# Patient Record
Sex: FEMALE | Race: WHITE | NOT HISPANIC OR LATINO | Employment: OTHER | URBAN - METROPOLITAN AREA
[De-identification: names, ages, dates, MRNs, and addresses within clinical notes are randomized per-mention and may not be internally consistent; named-entity substitution may affect disease eponyms.]

---

## 2017-01-10 ENCOUNTER — CLINICAL SUPPORT NO REQUIREMENTS (OUTPATIENT)
Dept: CARDIOLOGY | Facility: CLINIC | Age: 77
End: 2017-01-10

## 2017-01-10 ENCOUNTER — OFFICE VISIT (OUTPATIENT)
Dept: CARDIOLOGY | Facility: CLINIC | Age: 77
End: 2017-01-10

## 2017-01-10 ENCOUNTER — TELEPHONE (OUTPATIENT)
Dept: CARDIOLOGY | Facility: CLINIC | Age: 77
End: 2017-01-10

## 2017-01-10 VITALS
BODY MASS INDEX: 26.37 KG/M2 | WEIGHT: 168 LBS | HEIGHT: 67 IN | SYSTOLIC BLOOD PRESSURE: 126 MMHG | DIASTOLIC BLOOD PRESSURE: 74 MMHG | HEART RATE: 72 BPM

## 2017-01-10 DIAGNOSIS — I42.9 CARDIOMYOPATHY (HCC): Primary | ICD-10-CM

## 2017-01-10 DIAGNOSIS — I10 ESSENTIAL HYPERTENSION: ICD-10-CM

## 2017-01-10 DIAGNOSIS — E78.49 OTHER HYPERLIPIDEMIA: ICD-10-CM

## 2017-01-10 PROCEDURE — 93284 PRGRMG EVAL IMPLANTABLE DFB: CPT | Performed by: INTERNAL MEDICINE

## 2017-01-10 PROCEDURE — 93000 ELECTROCARDIOGRAM COMPLETE: CPT | Performed by: INTERNAL MEDICINE

## 2017-01-10 PROCEDURE — 99214 OFFICE O/P EST MOD 30 MIN: CPT | Performed by: INTERNAL MEDICINE

## 2017-01-10 PROCEDURE — 93290 INTERROG DEV EVAL ICPMS IP: CPT | Performed by: INTERNAL MEDICINE

## 2017-01-10 RX ORDER — METHYLPREDNISOLONE 4 MG/1
4 TABLET ORAL DAILY
COMMUNITY
End: 2017-03-23

## 2017-01-10 NOTE — MR AVS SNAPSHOT
Charmaine Machuca   1/10/2017 1:20 PM   Office Visit    Dept Phone:  [phone]   Encounter #:  [id number]    Provider:  Estevan Matamoros MD   Department:  Psychiatric CARDIOLOGY                Your Full Care Plan              Your Updated Medication List          This list is accurate as of: 1/10/17  2:23 PM.  Always use your most recent med list.                allopurinol 100 MG tablet   Commonly known as:  ZYLOPRIM       aspirin 81 MG tablet       calcium carbonate 600 MG tablet   Commonly known as:  OS-RAYMOND       carvedilol 12.5 MG tablet   Commonly known as:  COREG   Take 1 tablet by mouth daily.       diazePAM 5 MG tablet   Commonly known as:  VALIUM       FLUZONE HIGH-DOSE 0.5 ML suspension prefilled syringe injection   Generic drug:  influenza vac split high-dose       furosemide 40 MG tablet   Commonly known as:  LASIX   Take 1 tablet by mouth daily. Take 1 tablet by mouth daily or as directed       gabapentin 300 MG capsule   Commonly known as:  NEURONTIN       GLUCOSAMINE CHONDROITIN COMPLX capsule       HORIZANT 300 MG tablet controlled-release   Generic drug:  Gabapentin Enacarbil ER       Iron tablet       levothyroxine 25 MCG tablet   Commonly known as:  SYNTHROID, LEVOTHROID       magnesium oxide 250 MG tablet       methscopolamine 5 MG tablet   Commonly known as:  PAMINE FORTE       methylPREDNISolone 4 MG tablet   Commonly known as:  MEDROL       MULTI-DAY VITAMINS tablet       NEUPRO 4 MG/24HR patch   Generic drug:  rotigotine       psyllium 58.6 % powder   Commonly known as:  METAMUCIL       spironolactone 25 MG tablet   Commonly known as:  ALDACTONE   TAKE 1/2 TABLET DAILY       VITAMIN D PO       Vitamin E 400 UNITS tablet               We Performed the Following     ECG 12 Lead       You Were Diagnosed With        Codes Comments    Cardiomyopathy    -  Primary ICD-10-CM: I42.9  ICD-9-CM: 425.4     Other hyperlipidemia     ICD-10-CM:  E78.4  ICD-9-CM: 272.4     Essential hypertension     ICD-10-CM: I10  ICD-9-CM: 401.9       Instructions     None    Patient Instructions History      Upcoming Appointments     Visit Type Date Time Department    FOLLOW UP 1/10/2017  1:20 PM [facility] [place]    PACEMAKER ICD - IN OFFICE 1/10/2017  1:45 PM [facility]    LAB 1/13/2017  2:30 PM [facility] CBC LAB [facility]    RN REVIEW 1/13/2017  3:00 PM [facility] OP INFU CBC [facility]    FOLLOW UP 1/19/2017 10:30 AM [facility]    FOLLOW UP 2 UNIT 2/21/2017 11:00 AM [facility] [place]    LAB 2/21/2017 10:30 AM [facility] CBC LAB [facility]    FOLLOW UP 2 UNIT 3/23/2017 11:00 AM [facility] CBC [place]    LAB 3/23/2017 10:30 AM [facility] CBC LAB EP    PACEMAKER ICD - REMOTE 4/13/2017 12:00 AM [facility] [place]    FOLLOW UP 7/12/2017 10:30 AM [facility] [place]    PACEMAKER ICD - IN OFFICE 7/12/2017 10:45 AM [facility] [place]      MyChart Signup     Our records indicate that your [facility] [employer] account has been deactivated. If you would like to reactivate your account, please email [email or web address] or call [phone] to talk to our [employer] staff.             Other Info from Your Visit           Your Appointments     Jan 13, 2017  2:30 PM EST   Lab with LAB CHAIR 6 [facility]    [address]  [facility] [address] [phone]            Jan 13, 2017  3:00 PM EST   NURSE APPOINTMENT with RN MICHAEL DAVID   [facility] INFUSION CBC ([place])    [address]  [facility] [address] [phone]            Jan 19, 2017 10:30 AM EST   Follow Up with CHANTELL Harry   [facility] GROUP NEUROLOGY (--)    [address]  [facility] [address] [phone]           Arrive 15 minutes prior to appointment.            Feb 21, 2017 10:30 AM EST   Lab with LAB CHAIR 2 [facility] ONC LAB (--)    [address] [address]   [phone]            Feb 21, 2017 11:00 AM EST   FOLLOW UP with Anuel Scott MD   [facility] GROUP CONSULTANTS IN BLOOD DISORDERS AND CANCER ([facility])    [address] [id number] [phone]              Allergies     Codeine      Propoxyphene-acetaminophen      Chlorhexidine  Rash    Patient states \"blue dye\" in chlorhexidine.  States the orange and clear are OK to use      Reason for Visit     Congestive Heart Failure     Cardiomyopathy           Vital Signs     Blood Pressure Pulse Height Weight Body Mass Index Smoking Status    126/74 (BP Location: Left arm) 72 67\" (170.2 cm) 168 lb (76.2 kg) 26.31 kg/m2 Never Smoker      Problems and Diagnoses Noted     Heart muscle disorder    Other hyperlipidemia        High blood pressure            "

## 2017-01-10 NOTE — PROGRESS NOTES
Date of Office Visit: 01/10/17  Encounter Provider: Estevan Matamoros MD  Place of Service: [facility] CARDIOLOGY  Patient Name: Charmaine Machuca  :1940      Chief Complaint   Patient presents with   • Congestive Heart Failure   • Cardiomyopathy     History of Present Illness  HPI Comments:     The patient is a pleasant 76-year-old female with a history of nonischemic  cardiomyopathy, hypertension, hyperlipidemia and LBBB. Original ejection   fraction was extremely low. She was enrolled in the [facility] study of ACE   inhibitor plus Atacand versus ACE inhibitor plus a placebo. Then, in 2005   she had a perfusion study that showed infarct but no ischemia. She had cardiac   catheterization in 2006, which showed moderate left ventricular dysfunction,   elevated right-sided pressure, left anterior end-diastolic pressure, mild mitral insufficiency,  but normal coronary arteries. She was treated medically. She then had  worsening dyspnea. In 2007, had a Bi-V defibrillator placed and she  continued to have GI problems, fatigue, weakness, dizziness, syncopal, near  syncopal spells, multiple medications were adjusted. Diuretics were  adjusted. On diuretics, off diuretics, volume overloaded, volume depleted.  She was diagnosed with myoclonus. Multiple GI complaints.  She then had her generator changed in 2014. Unfortunately, a day or two after that  developed small bowel obstruction, had to be admitted to the hospital and was then treated  for that. That resolved. She developed C. difficile infection, was treated for that. She  developed a pocket hematoma and had to have that evacuated.   She had a followup echocardiogram in 2015 that showed normal ejection fraction, mild  mitral insufficiency, mild aortic valve calcification without stenosis. She comes in for  followup now. She went to [facility] 2015 with this atypical chest  discomfort, left-sided  under her breast, pleuritic, worse when she would take a breath in,  worse when she would move to one side. No real increased shortness of breath with it, but  it was hard to take breath in. While there, she had a ventilation perfusion scan that was  negative for pulmonary embolus. She had a 2D echocardiogram that, again, showed normal LV  systolic function and no significant valvular disease.  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  Had that removed and new lead replaced.  She comes in for follow-up.  Unfortunately she's been through a lot since she was last year she was anemic and is been very symptomatic from that.  She's following with hematology she's received 3 transfusions some Procrit but her iron studies are still low despite being on oral iron supplement.  She also progressed stage IV renal failure as an appropriate is seeing nephrology.  She also had an elevated A N/A saw rheumatology does not have lupus apparently.  She denies chest pain and pressure.  Does have shortness of breath but she thinks this is due to her anemia.  Denies palpitations, near-syncope and syncope.  No fevers or chills.    Congestive Heart Failure   Pertinent negatives include no abdominal pain, muscle weakness or nocturia.   Cardiomyopathy   Associated symptoms include headaches, joint swelling and numbness. Pertinent negatives include no abdominal pain, congestion, diaphoresis, fever, myalgias, nausea, rash, vertigo, vomiting or weakness.         Past Medical History   Diagnosis Date   • Anemia      Iron deficiency   • Cancer      Skin cancer   • Cardiomyopathy      S/P pacemaker and defibrillator   • Clostridium difficile diarrhea    • Gastroparesis    • GERD (gastroesophageal reflux disease)    • Gout    • Hemorrhoids    • Hiatal hernia    • History of Nissen fundoplication    • History of pancreatitis 01/2014   • Hyperlipidemia    • Hypothyroidism    • Left bundle branch block    • Mitral and aortic valve  disease    • Mitral valve insufficiency    • Myoclonus      S/P Depakote   • Nephrolithiasis    • Osteoarthritis    • Pancytopenia    • Peripheral neuropathy    • Progressive familial myoclonic epilepsy    • Pulmonary hypertension    • RLS (restless legs syndrome)    • Ruptured ovarian cyst    • Spinal stenosis    • Urinary tract infection          Past Surgical History   Procedure Laterality Date   • Appendectomy     • Other surgical history       BLADDER CYSTOTOMY   • Other surgical history       CARDIAC CATH PROCEDURE SUMMARY   • Hemorrhoidectomy     • Hx ovarian cystectomy     • Other surgical history  2007     PACEMAKER PLACEMENT - TRANSVENOUS ELECTRODE BIVENTRICULAR   • Other surgical history       REPAIR OF PARAESOPHAGEAL HIATUS HERNIA   • Joint replacement Left 08/2014     history left total knee replacement   • Implantable cardioverter defibrillator lead replacement/pocket revision Left 3/28/2016     Procedure: AUTOMATIC IMPLANTABLE CARDIOVERTER DEFIBRILLATOR LEAD REPLACEMENT WITH LASER LEAD EXTRACTION;  Surgeon: Leandro Huber MD;  Location: [facility] 18/19;  Service:    • Johny milian (mmk) procedure     • Hysterectomy  1977   • Hernia repair  2006   • Cholecystectomy  2006         Current Outpatient Prescriptions on File Prior to Visit   Medication Sig Dispense Refill   • allopurinol (ZYLOPRIM) 100 MG tablet Take 1 tablet by mouth 2 (two) times a day.     • aspirin 81 MG tablet Take 1 tablet by mouth daily.     • calcium carbonate (OS-RAYMOND) 600 MG tablet Take 1 tablet by mouth daily.     • carvedilol (COREG) 12.5 MG tablet Take 1 tablet by mouth daily. 90 tablet 3   • Cholecalciferol (VITAMIN D PO) Take by mouth.     • diazepam (VALIUM) 5 MG tablet Take 1 tablet by mouth every night.     • FLUZONE HIGH-DOSE 0.5 ML suspension prefilled syringe injection ADM 0.5ML IM UTD  0   • furosemide (LASIX) 40 MG tablet Take 1 tablet by mouth daily. Take 1 tablet by mouth daily or as directed  90 tablet 1   • gabapentin (NEURONTIN) 300 MG capsule Take 300 mg by mouth daily with dinner.     • Glucosamine-Chondroit-Vit C-Mn (GLUCOSAMINE CHONDROITIN COMPLX) capsule Take 1 capsule by mouth every other day.     • HORIZANT 300 MG tablet controlled-release Take 1 tablet by mouth daily.     • Iron tablet Take 1 tablet by mouth daily.     • levothyroxine (SYNTHROID, LEVOTHROID) 25 MCG tablet Take 1 tablet by mouth daily.     • magnesium oxide 250 MG tablet Take 1 tablet by mouth daily.     • methscopolamine (PAMINE FORTE) 5 MG tablet Take 1 tablet by mouth daily.     • Multiple Vitamin (MULTI-DAY VITAMINS) tablet Take by mouth daily.     • NEUPRO 4 MG/24HR patch APPLY 1 PATCH DAILY AS DIRECTED.  3   • psyllium (METAMUCIL) 58.6 % powder Take 1 packet by mouth 2 (two) times a day.     • spironolactone (ALDACTONE) 25 MG tablet TAKE 1/2 TABLET DAILY 45 tablet 1   • Vitamin E 400 UNITS tablet Take by mouth.       No current facility-administered medications on file prior to visit.          Social History     Social History   • Marital status:      Spouse name: Zackery   • Number of children: N/A   • Years of education: N/A     Occupational History   •  Retired     Social History Main Topics   • Smoking status: Never Smoker   • Smokeless tobacco: Never Used   • Alcohol use No   • Drug use: No   • Sexual activity: Not on file     Other Topics Concern   • Not on file     Social History Narrative       Family History   Problem Relation Age of Onset   • Coronary artery disease Other    • Dementia Other    • Kidney disease Other          Review of Systems   Constitution: Positive for malaise/fatigue. Negative for decreased appetite, diaphoresis, fever, weakness, weight gain and weight loss.   HENT: Positive for headaches. Negative for congestion, hearing loss, nosebleeds and tinnitus.    Eyes: Negative for blurred vision, double vision, vision loss in left eye, vision loss in right eye and visual disturbance.  "  Cardiovascular:        As noted in HPI   Respiratory:        As noted HPI   Endocrine: Negative for cold intolerance and heat intolerance.   Hematologic/Lymphatic: Negative for bleeding problem. Does not bruise/bleed easily.   Skin: Negative for color change, flushing, itching and rash.   Musculoskeletal: Positive for joint swelling. Negative for arthritis, back pain, joint pain, muscle weakness and myalgias.   Gastrointestinal: Negative for bloating, abdominal pain, constipation, diarrhea, dysphagia, heartburn, hematemesis, hematochezia, melena, nausea and vomiting.   Genitourinary: Negative for bladder incontinence, dysuria, frequency, nocturia and urgency.   Neurological: Positive for numbness. Negative for dizziness, focal weakness, light-headedness, loss of balance, paresthesias and vertigo.   Psychiatric/Behavioral: Negative for depression, memory loss and substance abuse.       Procedures      ECG 12 Lead  Date/Time: 1/10/2017 1:54 PM  Performed by: YARON KRISHNAN  Authorized by: YARON KRISHNAN   Comparison: compared with previous ECG   Similar to previous ECG  Rhythm: sinus rhythm  Rhythm comments: Ventricular paced.  Rate: normal               Objective:      Visit Vitals   • /74 (BP Location: Left arm)   • Pulse 72   • Ht 67\" (170.2 cm)   • Wt 168 lb (76.2 kg)   • BMI 26.31 kg/m2          Physical Exam  Physical Exam   Constitutional: She is oriented to person, place, and time. She appears well-developed and well-nourished. No distress.   HENT:   Head: Normocephalic.   Eyes: Conjunctivae are normal. Pupils are equal, round, and reactive to light. No scleral icterus.   Neck: Normal carotid pulses, no hepatojugular reflux and no JVD present. Carotid bruit is not present. No tracheal deviation, no edema and no erythema present. No thyromegaly present.   Cardiovascular: Normal rate, regular rhythm, S1 normal, S2 normal and intact distal pulses.   No extrasystoles are present. PMI is not " displaced.  Exam reveals no gallop, no distant heart sounds and no friction rub.    Murmur heard.   Systolic murmur is present with a grade of 2/6  at the apex  Pulses:       Carotid pulses are 2+ on the right side, and 2+ on the left side.       Radial pulses are 2+ on the right side, and 2+ on the left side.        Femoral pulses are 2+ on the right side, and 2+ on the left side.       Dorsalis pedis pulses are 2+ on the right side, and 2+ on the left side.        Posterior tibial pulses are 2+ on the right side, and 2+ on the left side.   Pulmonary/Chest: Effort normal and breath sounds normal. No respiratory distress. She has no decreased breath sounds. She has no wheezes. She has no rhonchi. She has no rales. She exhibits no tenderness.   Abdominal: Soft. Bowel sounds are normal. She exhibits no distension and no mass. There is no hepatosplenomegaly. There is no tenderness. There is no rebound and no guarding.   Musculoskeletal: She exhibits edema (1+ bilateral at ankles). She exhibits no tenderness or deformity.   Neurological: She is alert and oriented to person, place, and time.   Skin: Skin is warm and dry. No rash noted. She is not diaphoretic. No cyanosis or erythema. No pallor. Nails show no clubbing.   Psychiatric: She has a normal mood and affect. Her speech is normal and behavior is normal. Judgment and thought content normal.           Assessment:   1. This is a 76-year-old female with history of nonischemic cardiomyopathy.  Last echocardiogram in 3/15 showed an ejection fraction of 62%.   Heart Failure  Assessment  • NYHA class II  • ACE inhibitor not prescribed for medical reasons  • Beta blocker prescribed  • Diuretics prescribed  • Angiotensin receptor blocker (ARB) not prescribed for medical reasons  • Left ventricular function is normal by qualitative assessment   Plan  • The patient has received heart failure education on the following topics: dietary sodium restriction, medication  instructions, minimizing or avoiding NSAID use, symptom management, weight monitoring and minimizing alcohol intake  • The heart failure care plan was discussed with the patient today including: continuing the current program  • The patient has been counseled about ICD or CRT-D implantation     Subjective/Objective    • Physical exam findings negative for rales, peripheral edema and elevated JVP.  • The patient has an ICD implant  • The patient has a CRT-D implant  • The patient has a CRT implant  She is stable from a cardiovascular standpoint think most her symptoms are now coming from her anemia.     2. History of congestive heart failure status post Bi-V ICD. Following in our defibrillator clinic. Now stable.  3. History of syncope, falling spells, and dizziness of unclear etiology. Resolved. May have been from Depakote.  4. Hyperlipidemia, followed in your office.  5. History of kidney stones.  6. History of left bundle branch block. unchanged.  7. History of anemia.worsening iron deficient still following with hematology.  Apparently she may progress to IV iron.  8. History of renal failure.  To see hematology.  9. History of hypertension.   10. Probable depression.   11. Myoclonus.   12. Mitral Insufficiency. Echocardiogram 3/15 was just mild.          Plan:

## 2017-01-13 ENCOUNTER — LAB (OUTPATIENT)
Dept: LAB | Facility: HOSPITAL | Age: 77
End: 2017-01-13

## 2017-01-13 ENCOUNTER — CLINICAL SUPPORT (OUTPATIENT)
Dept: ONCOLOGY | Facility: HOSPITAL | Age: 77
End: 2017-01-13

## 2017-01-13 ENCOUNTER — TRANSCRIBE ORDERS (OUTPATIENT)
Dept: ADMINISTRATIVE | Facility: HOSPITAL | Age: 77
End: 2017-01-13

## 2017-01-13 DIAGNOSIS — N18.9 HISTORY OF ANEMIA DUE TO CHRONIC KIDNEY DISEASE: Primary | ICD-10-CM

## 2017-01-13 DIAGNOSIS — N18.4 CHRONIC KIDNEY DISEASE, STAGE 4 (SEVERE) (HCC): Primary | ICD-10-CM

## 2017-01-13 DIAGNOSIS — Z86.2 HISTORY OF ANEMIA DUE TO CHRONIC KIDNEY DISEASE: Primary | ICD-10-CM

## 2017-01-13 LAB
ALBUMIN SERPL-MCNC: 3.9 G/DL (ref 3.5–5.2)
ALBUMIN/GLOB SERPL: 1.5 G/DL (ref 1.1–2.4)
ALP SERPL-CCNC: 70 U/L (ref 38–116)
ALT SERPL W P-5'-P-CCNC: 23 U/L (ref 0–33)
ANION GAP SERPL CALCULATED.3IONS-SCNC: 10.3 MMOL/L
AST SERPL-CCNC: 27 U/L (ref 0–32)
BASOPHILS # BLD AUTO: 0.01 10*3/MM3 (ref 0–0.1)
BASOPHILS NFR BLD AUTO: 0.2 % (ref 0–1.1)
BILIRUB SERPL-MCNC: <0.2 MG/DL (ref 0.1–1.2)
BUN BLD-MCNC: 44 MG/DL (ref 6–20)
BUN/CREAT SERPL: 22.3 (ref 7.3–30)
CALCIUM SPEC-SCNC: 10.2 MG/DL (ref 8.5–10.2)
CHLORIDE SERPL-SCNC: 105 MMOL/L (ref 98–107)
CO2 SERPL-SCNC: 26.7 MMOL/L (ref 22–29)
CREAT BLD-MCNC: 1.97 MG/DL (ref 0.6–1.1)
DEPRECATED RDW RBC AUTO: 49.6 FL (ref 37–49)
EOSINOPHIL # BLD AUTO: 0.08 10*3/MM3 (ref 0–0.36)
EOSINOPHIL NFR BLD AUTO: 1.3 % (ref 1–5)
ERYTHROCYTE [DISTWIDTH] IN BLOOD BY AUTOMATED COUNT: 14 % (ref 11.7–14.5)
FERRITIN SERPL-MCNC: 69.8 NG/ML
GFR SERPL CREATININE-BSD FRML MDRD: 25 ML/MIN/1.73
GLOBULIN UR ELPH-MCNC: 2.6 GM/DL (ref 1.8–3.5)
GLUCOSE BLD-MCNC: 93 MG/DL (ref 74–124)
HCT VFR BLD AUTO: 29.6 % (ref 34–45)
HGB BLD-MCNC: 9.1 G/DL (ref 11.5–14.9)
IMM GRANULOCYTES # BLD: 0.02 10*3/MM3 (ref 0–0.03)
IMM GRANULOCYTES NFR BLD: 0.3 % (ref 0–0.5)
IRON 24H UR-MRATE: 60 MCG/DL (ref 37–145)
IRON SATN MFR SERPL: 20 % (ref 14–48)
LYMPHOCYTES # BLD AUTO: 1.34 10*3/MM3 (ref 1–3.5)
LYMPHOCYTES NFR BLD AUTO: 21.6 % (ref 20–49)
MCH RBC QN AUTO: 30.1 PG (ref 27–33)
MCHC RBC AUTO-ENTMCNC: 30.7 G/DL (ref 32–35)
MCV RBC AUTO: 98 FL (ref 83–97)
MONOCYTES # BLD AUTO: 0.28 10*3/MM3 (ref 0.25–0.8)
MONOCYTES NFR BLD AUTO: 4.5 % (ref 4–12)
NEUTROPHILS # BLD AUTO: 4.47 10*3/MM3 (ref 1.5–7)
NEUTROPHILS NFR BLD AUTO: 72.1 % (ref 39–75)
NRBC BLD MANUAL-RTO: 0 /100 WBC (ref 0–0)
PLATELET # BLD AUTO: 140 10*3/MM3 (ref 150–375)
PMV BLD AUTO: 9.1 FL (ref 8.9–12.1)
POTASSIUM BLD-SCNC: 5.2 MMOL/L (ref 3.5–4.7)
PROT SERPL-MCNC: 6.5 G/DL (ref 6.3–8)
RBC # BLD AUTO: 3.02 10*6/MM3 (ref 3.9–5)
SODIUM BLD-SCNC: 142 MMOL/L (ref 134–145)
TIBC SERPL-MCNC: 304 MCG/DL (ref 249–505)
TRANSFERRIN SERPL-MCNC: 217 MG/DL (ref 200–360)
WBC NRBC COR # BLD: 6.2 10*3/MM3 (ref 4–10)

## 2017-01-13 PROCEDURE — 83540 ASSAY OF IRON: CPT | Performed by: INTERNAL MEDICINE

## 2017-01-13 PROCEDURE — 36415 COLL VENOUS BLD VENIPUNCTURE: CPT | Performed by: INTERNAL MEDICINE

## 2017-01-13 PROCEDURE — 85025 COMPLETE CBC W/AUTO DIFF WBC: CPT | Performed by: INTERNAL MEDICINE

## 2017-01-13 PROCEDURE — 84466 ASSAY OF TRANSFERRIN: CPT | Performed by: INTERNAL MEDICINE

## 2017-01-13 PROCEDURE — 80053 COMPREHEN METABOLIC PANEL: CPT | Performed by: INTERNAL MEDICINE

## 2017-01-13 PROCEDURE — 82728 ASSAY OF FERRITIN: CPT | Performed by: INTERNAL MEDICINE

## 2017-01-13 NOTE — PROGRESS NOTES
Pt here today for CBC and RN Review. Reviewed CBC with pt and gave copy to pt.  Instructed pt that the iron and ferritin levels will be back later and it may be Monday before she will be contacted due to being late in the day.  Hgb today 9.1  Pt stated that her symptoms have remained unchanged and she has fatigue and at times has lightheadedness.  Pt denies any SOA.  Pt continues to take iron pill 65mg BID as ordered per MD.  Pt states that she has not any problems with constipation and states that at times has loose stools.   with pt and neither had any further questions.  Appointments reviewed with pt.

## 2017-01-18 ENCOUNTER — HOSPITAL ENCOUNTER (OUTPATIENT)
Dept: ULTRASOUND IMAGING | Facility: HOSPITAL | Age: 77
Discharge: HOME | End: 2017-01-18
Attending: INTERNAL MEDICINE | Admitting: INTERNAL MEDICINE

## 2017-01-18 DIAGNOSIS — N18.4 CHRONIC KIDNEY DISEASE, STAGE 4 (SEVERE) (HCC): ICD-10-CM

## 2017-01-18 PROCEDURE — 76775 US EXAM ABDO BACK WALL LIM: CPT

## 2017-01-19 ENCOUNTER — OFFICE VISIT (OUTPATIENT)
Dept: NEUROLOGY | Facility: CLINIC | Age: 77
End: 2017-01-19

## 2017-01-19 VITALS
SYSTOLIC BLOOD PRESSURE: 120 MMHG | HEIGHT: 67 IN | OXYGEN SATURATION: 93 % | DIASTOLIC BLOOD PRESSURE: 58 MMHG | HEART RATE: 56 BPM | BODY MASS INDEX: 27.31 KG/M2 | WEIGHT: 174 LBS

## 2017-01-19 DIAGNOSIS — G47.61 PERIODIC LIMB MOVEMENT DISORDER: Primary | ICD-10-CM

## 2017-01-19 DIAGNOSIS — G25.81 RESTLESS LEGS SYNDROME: ICD-10-CM

## 2017-01-19 DIAGNOSIS — G62.9 PERIPHERAL POLYNEUROPATHY: ICD-10-CM

## 2017-01-19 PROCEDURE — 99213 OFFICE O/P EST LOW 20 MIN: CPT | Performed by: NURSE PRACTITIONER

## 2017-01-19 RX ORDER — ROTIGOTINE 4 MG/24H
1 PATCH, EXTENDED RELEASE TRANSDERMAL DAILY
Qty: 90 PATCH | Refills: 3 | Status: SHIPPED | OUTPATIENT
Start: 2017-01-19 | End: 2017-02-22 | Stop reason: SDUPTHER

## 2017-01-19 NOTE — MR AVS SNAPSHOT
Charmaine Machuca   1/19/2017 10:30 AM   Office Visit    Dept Phone:  [phone]   Encounter #:  [id number]    Provider:  CHANTELL Harry   Department:  [facility] NEUROLOGY                Your Full Care Plan              Your Updated Medication List          This list is accurate as of: 1/19/17 10:51 AM.  Always use your most recent med list.                allopurinol 100 MG tablet   Commonly known as:  ZYLOPRIM       aspirin 81 MG tablet       calcium carbonate 600 MG tablet   Commonly known as:  OS-RAYMOND       carvedilol 12.5 MG tablet   Commonly known as:  COREG   Take 1 tablet by mouth daily.       diazePAM 5 MG tablet   Commonly known as:  VALIUM       FLUZONE HIGH-DOSE 0.5 ML suspension prefilled syringe injection   Generic drug:  influenza vac split high-dose       furosemide 40 MG tablet   Commonly known as:  LASIX   Take 1 tablet by mouth daily. Take 1 tablet by mouth daily or as directed       gabapentin 300 MG capsule   Commonly known as:  NEURONTIN       GLUCOSAMINE CHONDROITIN COMPLX capsule       HORIZANT 300 MG tablet controlled-release   Generic drug:  Gabapentin Enacarbil ER       Iron tablet       levothyroxine 25 MCG tablet   Commonly known as:  SYNTHROID, LEVOTHROID       magnesium oxide 250 MG tablet       methscopolamine 5 MG tablet   Commonly known as:  PAMINE FORTE       methylPREDNISolone 4 MG tablet   Commonly known as:  MEDROL       MULTI-DAY VITAMINS tablet       NEUPRO 4 MG/24HR patch   Generic drug:  rotigotine       psyllium 58.6 % powder   Commonly known as:  METAMUCIL       spironolactone 25 MG tablet   Commonly known as:  ALDACTONE   TAKE 1/2 TABLET DAILY       VITAMIN D PO       Vitamin E 400 UNITS tablet               Instructions     None    Patient Instructions History      Upcoming Appointments     Visit Type Date Time Department    FOLLOW UP 1/19/2017 10:30 AM [facility] NEUROLOGY [facility]    FOLLOW UP 2 UNIT 2/21/2017 11:00 AM  [facility] [place]    LAB 2017 10:30 AM [facility] CBC LAB EP    FOLLOW UP 2 UNIT 3/23/2017 10:40 AM [facility] CBC [place]    LAB 3/23/2017 10:10 AM [facility] CBC LAB EP    PACEMAKER ICD - REMOTE 2017 12:00 AM [facility] [place]    [facility]    PACEMAKER ICD - IN OFFICE 2017 11:00 AM [facility] [place]      MyChart Signup     [facility] [employer] allows you to send messages to your doctor, view your test results, renew your prescriptions, schedule appointments, and more. To sign up, go to [email or web address] and click on the Sign Up Now link in the New User? box. Enter your [employer] Activation Code exactly as it appears below along with the last four digits of your Social Security Number and your Date of Birth () to complete the sign-up process. If you do not sign up before the expiration date, you must request a new code.    [employer] Activation Code: [email or web address]  Expires: 2017 10:51 AM    If you have questions, you can email [email or web address] or call [phone] to talk to our [employer] staff. Remember, [employer] is NOT to be used for urgent needs. For medical emergencies, dial 911.               Other Info from Your Visit           Your Appointments     2017 10:30 AM EST   Lab with LAB CHAIR 2 [facility] ONC LAB (--)    [facility] [place] [id number]   [phone]            2017 11:00 AM EST   FOLLOW UP with Anuel Scott MD   [facility] MEDICAL GROUP CBC GROUP CONSULTANTS IN BLOOD DISORDERS AND CANCER ([facility])    [address] [place] [id number] [phone]            Mar 23, 2017 10:10 AM EDT   Lab with LAB CHAIR 2 [facility] ONC LAB (--)    [address] [place] [id number]   [phone]            Mar 23, 2017 10:40 AM EDT   FOLLOW UP with Anuel Scott MD  "  [facility] GROUP CONSULTANTS IN BLOOD DISORDERS AND CANCER (CBC [place])    [address] [facility] [address]  [facility] [address] [phone]            Apr 13, 2017 12:00 AM EDT   PACEMAKER - REMOTE with CHUCKY DRUMMOND   [facility] CARDIOLOGY (--)    [address] [facility] [address]  [facility] [address] [phone]              Allergies     Codeine      Propoxyphene-acetaminophen      Chlorhexidine  Rash    Patient states \"blue dye\" in chlorhexidine.  States the orange and clear are OK to use      Reason for Visit     peripheral polyneuropathy     Restless Legs Syndrome           Vital Signs     Blood Pressure Pulse Height Weight Oxygen Saturation Body Mass Index    120/58 56 67\" (170.2 cm) 174 lb (78.9 kg) 93% 27.25 kg/m2    Smoking Status                   Never Smoker             "

## 2017-01-19 NOTE — PROGRESS NOTES
Subjective   Charmaine Machuca is a 76 y.o. female is here today for follow-up for RLS, myoclonic jerking limbs, PLMS, and peripheral neuropathy. Previous patient of Dr. Spencer. My last visit with the patient was on Jul 19, 2016. She is maintained on Neupro patch 4 mg daily as well as Horizant 300 mg daily. She takes an additional Horizant at bedtime if needed, but states that she rarely does this. Her symptoms are well controlled with these medications.  She does report occasional burning sensation to bilateral feet at nighttime. She reported this at her last visit as well. She feels it is worsening. She was recently diagnosed with stage 4 kidney disease.   She continues to take Diazepam 5mg qhs for muscle tightness.     She has chronic iron deficiency anemia and takes supplement. She is followed by [facility] for this.     History of Present Illness    The following portions of the patient's history were reviewed and updated as appropriate: allergies, current medications, past family history, past medical history, past social history, past surgical history and problem list.    Review of Systems   Constitutional: Positive for fatigue. Negative for chills and fever.   HENT: Positive for postnasal drip. Negative for hearing loss, tinnitus and trouble swallowing.    Eyes: Negative for pain, itching and visual disturbance.   Respiratory: Positive for cough and shortness of breath. Negative for wheezing.    Cardiovascular: Positive for leg swelling. Negative for chest pain and palpitations.   Gastrointestinal: Positive for constipation and diarrhea. Negative for nausea and vomiting.   Endocrine: Negative for cold intolerance and heat intolerance.   Genitourinary: Negative for decreased urine volume, difficulty urinating and urgency.   Musculoskeletal: Positive for back pain, gait problem and neck pain. Negative for neck stiffness.   Skin: Negative for rash and wound.   Allergic/Immunologic: Negative for environmental  allergies and food allergies.   Neurological: Positive for weakness, light-headedness and numbness (feet burn, ). Negative for dizziness and headaches.   Hematological: Bruises/bleeds easily.   Psychiatric/Behavioral: Negative for confusion and sleep disturbance. The patient is nervous/anxious.        Objective   Physical Exam  General: Well nourished, well developed, and in no acute distress.  HEENT: Normocephalic/atraumatic. Mucous membranes moist. Sclerae anicteric.    Skin: No notable rashes or lesions on the visible surfaces.    Extremities: arthritic changes bilat hands, no cyanosis.  Psychiatric: Pleasant, cooperative, and appropriate.  Neurologic Exam:  Mental Status: Alert and oriented. Speech is fluent. Comprehension is intact. No issues with memory, cognition or attention identified. She is a good historian.    Cranial Nerves II-XII: Pupils are equal. Extraocular movements are conjugate. No nystagmus noted.  Hearing is intact to conversational tones. Facial strength is preserved/symmetric. Normal facial movement/expression. Voice easily audible, non-hoarse, non-dysarthric.    Motor: decreased bulk L>R FDI, hand intrinsics. No focal or lateralizing weakness grossly. There are no myoclonic jerks, tremors, or other abnormal or involuntary movements noted.    Coordination: grossly intact. No bradykinesia.    Gait: Normal     Assessment/Plan   Charmaine was seen today for peripheral polyneuropathy and restless legs syndrome.    Diagnoses and all orders for this visit:    Periodic limb movement disorder    Restless legs syndrome    Peripheral polyneuropathy      Will continue Neupro patch 4 mg and Horizant 300 mg daily at bedtime. She can take an additional 300 mg of Horizant if needed for worsening symptoms. She only does this occasionally. Take with food. We will hold off on initiating a medication for her peripheral neuropathy given her kidney function. I discussed the option of a compound cream to use on her  feet if needed. She is going to discuss this with her nephrologist and will let me know. Risks benefits and potential side effects were discussed.     She will continue following with [facility] group for iron deficiency issues. It is expected that when her iron levels are lower her restless legs and myoclonus will worsen and she understands this correlation.     Follow-up 1 year.

## 2017-01-25 ENCOUNTER — TELEPHONE (OUTPATIENT)
Dept: NEUROLOGY | Facility: CLINIC | Age: 77
End: 2017-01-25

## 2017-01-25 NOTE — TELEPHONE ENCOUNTER
Okay. Will you let her know that it will be next week before I can do this though. The compound cream pharmacy papers are at [place] and I won't be there until next Tuesday. Is there anyway you could put a note on my desk so that I don't forget whe  n I get there Tuesday morning? Thanks!

## 2017-01-25 NOTE — TELEPHONE ENCOUNTER
----- Message from Alva Fuentes sent at 1/24/2017  3:10 PM EST -----  Pt says her nephrologist says it's ok for her to use the compound cream. She is asking for a rx.

## 2017-02-21 ENCOUNTER — OFFICE VISIT (OUTPATIENT)
Dept: ONCOLOGY | Facility: CLINIC | Age: 77
End: 2017-02-21

## 2017-02-21 ENCOUNTER — LAB (OUTPATIENT)
Dept: ONCOLOGY | Facility: HOSPITAL | Age: 77
End: 2017-02-21

## 2017-02-21 ENCOUNTER — APPOINTMENT (OUTPATIENT)
Dept: ONCOLOGY | Facility: HOSPITAL | Age: 77
End: 2017-02-21

## 2017-02-21 ENCOUNTER — APPOINTMENT (OUTPATIENT)
Dept: ONCOLOGY | Facility: CLINIC | Age: 77
End: 2017-02-21

## 2017-02-21 VITALS
HEART RATE: 67 BPM | SYSTOLIC BLOOD PRESSURE: 139 MMHG | TEMPERATURE: 97.7 F | OXYGEN SATURATION: 100 % | BODY MASS INDEX: 25.92 KG/M2 | RESPIRATION RATE: 12 BRPM | DIASTOLIC BLOOD PRESSURE: 88 MMHG | HEIGHT: 69 IN | WEIGHT: 175 LBS

## 2017-02-21 DIAGNOSIS — Z86.2 HISTORY OF ANEMIA DUE TO CHRONIC KIDNEY DISEASE: Primary | ICD-10-CM

## 2017-02-21 DIAGNOSIS — N18.4 CKD (CHRONIC KIDNEY DISEASE), STAGE 4 (SEVERE): ICD-10-CM

## 2017-02-21 DIAGNOSIS — N18.9 HISTORY OF ANEMIA DUE TO CHRONIC KIDNEY DISEASE: Primary | ICD-10-CM

## 2017-02-21 LAB
ABO GROUP BLD: NORMAL
BASOPHILS # BLD AUTO: 0.02 10*3/MM3 (ref 0–0.2)
BASOPHILS NFR BLD AUTO: 0.4 % (ref 0–1.5)
BLD GP AB SCN SERPL QL: NEGATIVE
DEPRECATED RDW RBC AUTO: 57 FL (ref 37–54)
EOSINOPHIL # BLD AUTO: 0.07 10*3/MM3 (ref 0–0.7)
EOSINOPHIL NFR BLD AUTO: 1.3 % (ref 0.3–6.2)
ERYTHROCYTE [DISTWIDTH] IN BLOOD BY AUTOMATED COUNT: 16.1 % (ref 11.7–13)
HCT VFR BLD AUTO: 25.6 % (ref 35.6–45.5)
HGB BLD-MCNC: 8.3 G/DL (ref 11.9–15.5)
IMM GRANULOCYTES # BLD: 0.03 10*3/MM3 (ref 0–0.03)
IMM GRANULOCYTES NFR BLD: 0.6 % (ref 0–0.5)
LYMPHOCYTES # BLD AUTO: 1.25 10*3/MM3 (ref 0.9–4.8)
LYMPHOCYTES NFR BLD AUTO: 23 % (ref 19.6–45.3)
MCH RBC QN AUTO: 31.8 PG (ref 26.9–32)
MCHC RBC AUTO-ENTMCNC: 32.4 G/DL (ref 32.4–36.3)
MCV RBC AUTO: 98.1 FL (ref 80.5–98.2)
MONOCYTES # BLD AUTO: 0.44 10*3/MM3 (ref 0.2–1.2)
MONOCYTES NFR BLD AUTO: 8.1 % (ref 5–12)
NEUTROPHILS # BLD AUTO: 3.63 10*3/MM3 (ref 1.9–8.1)
NEUTROPHILS NFR BLD AUTO: 66.6 % (ref 42.7–76)
NRBC BLD MANUAL-RTO: 0 /100 WBC (ref 0–0)
PLATELET # BLD AUTO: 147 10*3/MM3 (ref 140–500)
PMV BLD AUTO: 10.5 FL (ref 6–12)
RBC # BLD AUTO: 2.61 10*6/MM3 (ref 3.9–5.2)
RH BLD: NEGATIVE
WBC NRBC COR # BLD: 5.44 10*3/MM3 (ref 4.5–10.7)

## 2017-02-21 PROCEDURE — 86900 BLOOD TYPING SEROLOGIC ABO: CPT | Performed by: NURSE PRACTITIONER

## 2017-02-21 PROCEDURE — 86850 RBC ANTIBODY SCREEN: CPT

## 2017-02-21 PROCEDURE — 86901 BLOOD TYPING SEROLOGIC RH(D): CPT

## 2017-02-21 PROCEDURE — 85025 COMPLETE CBC W/AUTO DIFF WBC: CPT | Performed by: INTERNAL MEDICINE

## 2017-02-21 PROCEDURE — 86923 COMPATIBILITY TEST ELECTRIC: CPT

## 2017-02-21 PROCEDURE — 86901 BLOOD TYPING SEROLOGIC RH(D): CPT | Performed by: NURSE PRACTITIONER

## 2017-02-21 PROCEDURE — 86900 BLOOD TYPING SEROLOGIC ABO: CPT

## 2017-02-21 PROCEDURE — 36415 COLL VENOUS BLD VENIPUNCTURE: CPT

## 2017-02-21 PROCEDURE — 86850 RBC ANTIBODY SCREEN: CPT | Performed by: NURSE PRACTITIONER

## 2017-02-21 PROCEDURE — 99214 OFFICE O/P EST MOD 30 MIN: CPT | Performed by: NURSE PRACTITIONER

## 2017-02-21 RX ORDER — DIPHENHYDRAMINE HCL 25 MG
25 CAPSULE ORAL ONCE
Status: CANCELLED | OUTPATIENT
Start: 2017-02-21 | End: 2017-02-21

## 2017-02-21 RX ORDER — ACETAMINOPHEN 325 MG/1
650 TABLET ORAL ONCE
Status: CANCELLED | OUTPATIENT
Start: 2017-02-21 | End: 2017-02-21

## 2017-02-21 RX ORDER — SODIUM CHLORIDE 9 MG/ML
250 INJECTION, SOLUTION INTRAVENOUS AS NEEDED
Status: CANCELLED | OUTPATIENT
Start: 2017-02-21

## 2017-02-21 NOTE — PROGRESS NOTES
"  REASONS FOR FOLLOWUP:    Anemia  Positive SOFI(1:640)  Stage 4 CKD  Progressive weakness and \"light headedness\"     HISTORY OF PRESENT ILLNESS:  The patient is a 77 y.o. year old female  who is here for follow-up with the above-mentioned history.  She recently had iron studies in January which revealed mild iron depletion with an iron saturation of 12%. At her last office visit, iron dosing was increased from 650 mg to twice daily dosing for a total of 260 mg of elemental iron. She continues to tolerate this without difficulty with the exception of expected, dark, tarry stools.   Unfortunately, over the last week she has developed progressive fatigue and  \"lightheadedness.\"  CBC today reveals a decline in hemoglobin from 9.1 to 8.3.  She denies any excessive bleeding or bruising or abdominal pain.  She reports generalized, worsening weakness.  She recently followed up with Dr. Abraham, her nephrologist who explained that Mrs. Machuca has declining renal function with a GFR of 23%, which now places her in stage IV chronic kidney disease.  Certainly, all of these multifactorial issues are contributory to her progressive anemia.  We have discussed managing symptoms today with a blood transfusion and further evaluation of iron studies after resolution of immediate issues. Patient and her  are agreeable with this plan.  She has no other concerns today.  Past Medical History   Diagnosis Date   • Anemia      Iron deficiency   • Cancer      Skin cancer   • Cardiomyopathy      S/P pacemaker and defibrillator   • Clostridium difficile diarrhea    • Gastroparesis    • GERD (gastroesophageal reflux disease)    • Gout    • Hemorrhoids    • Hiatal hernia    • History of Nissen fundoplication    • History of pancreatitis 01/2014   • Hyperlipidemia    • Hypothyroidism    • Left bundle branch block    • Mitral and aortic valve disease    • Mitral valve insufficiency    • Myoclonus      S/P Depakote   • Nephrolithiasis    • " Osteoarthritis    • Pancytopenia    • Peripheral neuropathy    • Progressive familial myoclonic epilepsy    • Pulmonary hypertension    • RLS (restless legs syndrome)    • Ruptured ovarian cyst    • Spinal stenosis    • Urinary tract infection      Past Surgical History   Procedure Laterality Date   • Appendectomy     • Other surgical history       BLADDER CYSTOTOMY   • Other surgical history       CARDIAC CATH PROCEDURE SUMMARY   • Hemorrhoidectomy     • Hx ovarian cystectomy     • Other surgical history  2007     PACEMAKER PLACEMENT - TRANSVENOUS ELECTRODE BIVENTRICULAR   • Other surgical history       REPAIR OF PARAESOPHAGEAL HIATUS HERNIA   • Joint replacement Left 08/2014     history left total knee replacement   • Implantable cardioverter defibrillator lead replacement/pocket revision Left 3/28/2016     Procedure: AUTOMATIC IMPLANTABLE CARDIOVERTER DEFIBRILLATOR LEAD REPLACEMENT WITH LASER LEAD EXTRACTION;  Surgeon: Leandro Huber MD;  Location: [facility] 18/19;  Service:    • Johny milian (mmk) procedure     • Hysterectomy  1977   • Hernia repair  2006   • Cholecystectomy  2006       HEMATOLOGIC/ONCOLOGIC HISTORY:  (History from previous dates can be found in the separate document.)    MEDICATIONS    Current Outpatient Prescriptions:   •  allopurinol (ZYLOPRIM) 100 MG tablet, Take 1 tablet by mouth 2 (two) times a day., Disp: , Rfl:   •  aspirin 81 MG tablet, Take 1 tablet by mouth daily., Disp: , Rfl:   •  calcium carbonate (OS-RAYMOND) 600 MG tablet, Take 1 tablet by mouth daily., Disp: , Rfl:   •  carvedilol (COREG) 12.5 MG tablet, Take 1 tablet by mouth daily., Disp: 90 tablet, Rfl: 3  •  Cholecalciferol (VITAMIN D PO), Take by mouth., Disp: , Rfl:   •  diazepam (VALIUM) 5 MG tablet, Take 1 tablet by mouth every night., Disp: , Rfl:   •  FLUZONE HIGH-DOSE 0.5 ML suspension prefilled syringe injection, ADM 0.5ML IM UTD, Disp: , Rfl: 0  •  furosemide (LASIX) 40 MG tablet, Take 1 tablet  "by mouth daily. Take 1 tablet by mouth daily or as directed, Disp: 90 tablet, Rfl: 1  •  gabapentin (NEURONTIN) 300 MG capsule, Take 300 mg by mouth daily with dinner., Disp: , Rfl:   •  Glucosamine-Chondroit-Vit C-Mn (GLUCOSAMINE CHONDROITIN COMPLX) capsule, Take 1 capsule by mouth every other day., Disp: , Rfl:   •  HORIZANT 300 MG tablet controlled-release, Take 1 tablet by mouth daily., Disp: , Rfl:   •  Iron tablet, Take 1 tablet by mouth daily., Disp: , Rfl:   •  levothyroxine (SYNTHROID, LEVOTHROID) 25 MCG tablet, Take 1 tablet by mouth daily., Disp: , Rfl:   •  magnesium oxide 250 MG tablet, Take 1 tablet by mouth daily., Disp: , Rfl:   •  methscopolamine (PAMINE FORTE) 5 MG tablet, Take 1 tablet by mouth daily., Disp: , Rfl:   •  methylPREDNISolone (MEDROL) 4 MG tablet, Take 4 mg by mouth Daily., Disp: , Rfl:   •  Multiple Vitamin (MULTI-DAY VITAMINS) tablet, Take by mouth daily., Disp: , Rfl:   •  NEUPRO 4 MG/24HR patch, Place 1 patch on the skin Daily., Disp: 90 patch, Rfl: 3  •  psyllium (METAMUCIL) 58.6 % powder, Take 1 packet by mouth 2 (two) times a day., Disp: , Rfl:   •  spironolactone (ALDACTONE) 25 MG tablet, TAKE 1/2 TABLET DAILY, Disp: 45 tablet, Rfl: 1  •  Vitamin E 400 UNITS tablet, Take by mouth., Disp: , Rfl:     ALLERGIES:     Allergies   Allergen Reactions   • Chlorhexidine Rash and Itching     Patient states \"blue dye\" in chlorhexidine.  States the orange and clear are OK to use   • Chlorhexidine Gluconate Itching     itched   • Valproic Acid Other (See Comments)     Made her extremely sleepy   • Alcohol Itching     itching   • Codeine Other (See Comments)     Want to climb the walls, doesn't help pain   • Propoxyphene-Acetaminophen    • Propoxyphene Other (See Comments)     Belligerent, acting drunk       SOCIAL HISTORY:       Social History     Social History   • Marital status:      Spouse name: Zackery   • Number of children: N/A   • Years of education: N/A     Occupational " "History   •  Retired     Social History Main Topics   • Smoking status: Never Smoker   • Smokeless tobacco: Never Used   • Alcohol use No   • Drug use: No   • Sexual activity: Not on file     Other Topics Concern   • Not on file     Social History Narrative         FAMILY HISTORY:  Family History   Problem Relation Age of Onset   • Coronary artery disease Other    • Dementia Other    • Kidney disease Other        REVIEW OF SYSTEMS:  GENERAL: No change in appetite or weight;   No fevers, chills, sweats. Generalized fatigue.  SKIN: No nonhealing lesions.   No rashes.  HEME/LYMPH: No easy bruising, bleeding.   No swollen nodes.   EYES: No vision changes or diplopia.   ENT: No tinnitus, hearing loss, gum bleeding, epistaxis, hoarseness or dysphagia.   RESPIRATORY: No cough, shortness of breath, hemoptysis or wheezing.   CVS: No chest pain, palpitations, orthopnea, dyspnea on exertion or PND.   GI: No melena or hematochezia. Continued dark stools related to oral iron.   No abdominal pain.  No nausea, vomiting, reports of intermittent diarrhea related to gastroparesis   : No lower tract obstructive symptoms, dysuria or hematuria.   MUSCULOSKELETAL: No bone pain.  No joint stiffness.   NEUROLOGICAL: No global weakness, loss of consciousness or seizures.   PSYCHIATRIC: No increased nervousness, mood changes or depression.     Objective    Vitals:    02/21/17 1126   BP: 139/88   Pulse: 67   Resp: 12   Temp: 97.7 °F (36.5 °C)   SpO2: 100%   Weight: 175 lb (79.4 kg)   Height: 69.29\" (176 cm)   PainSc: 0-No pain     Current Status 2/21/2017   ECOG score 0      PHYSICAL EXAM:    GENERAL:  Well-developed, well-nourished in no acute distress.   SKIN:  Warm, dry without rashes, purpura or petechiae.  EYES:  Pupils equal, round and reactive to light.  EOMs intact.  Conjunctivae normal.  EARS:  Hearing intact.  NOSE:  Septum midline.  No excoriations or nasal discharge.  MOUTH:  Tongue is well-papillated; no stomatitis or ulcers.  " "Lips normal.  THROAT:  Oropharynx without lesions or exudates.  NECK:  Supple with good range of motion; no thyromegaly or masses, no JVD.  CHEST:  Lungs clear to auscultation. Good airflow.  CARDIAC:  Regular rate and rhythm with evidence of previously diagnosed murmur, no rubs or gallops. Normal S1,S2.  ABDOMEN:  Soft, nontender with no hepatosplenomegaly or masses.  EXTREMITIES:  No clubbing, cyanosis or edema.  NEUROLOGICAL:  Cranial Nerves II-XII grossly intact.  No focal neurological deficits.  PSYCHIATRIC:  Normal affect and mood.     RECENT LABS:        Lab Results   Component Value Date    WBC 5.44 02/21/2017    HGB 8.3 (L) 02/21/2017    HCT 25.6 (L) 02/21/2017    MCV 98.1 02/21/2017     02/21/2017         Assessment/Plan    Anemia of chronic kidney disease(stage 4, GFR=24 mL/min): Iron studies performed in January revealed iron studies confirm mild iron depletion.  Oral iron dosing was increased to twice daily 650 mg tablets.  She's continued to tolerate this quite well.  Unfortunately, today she presented to the office with worsening fatigue and generalized weakness.  \"Lightheadedness\" has exacerbated to the point where she finds it difficult to ambulate and perform daily activities.  Hemoglobin has declined from 9.1 in January to 8.3 today.  No evidence of bleeding.  This was discussed with Dr. Sctot and we will treat symptoms of patient's multifactorial anemia today by ordering 2 units of packed red blood cells.  We will bring her back in 2 weeks for iron studies with RN review, in hopes that patient has demonstrated iron repletion and will therefore be able to reinitiate Procrit injections.  In the meantime, patient was instructed to continue oral iron at current dosing.    PLAN:  1. Proceed with 2 units of packed red blood cells tomorrow at [facility].  2.  Continue current dosing of oral iron.  3.  Return in 2 weeks for iron studies, CBC, and RN review.  4.  I will call her with " results of iron studies in order to evaluate adequate stores for reinitiation of Procrit  5.  She was instructed to call our office with worsening shortness of breath, or development of new symptoms of concern.

## 2017-02-22 ENCOUNTER — INFUSION (OUTPATIENT)
Dept: ONCOLOGY | Facility: HOSPITAL | Age: 77
End: 2017-02-22

## 2017-02-22 VITALS
OXYGEN SATURATION: 99 % | RESPIRATION RATE: 18 BRPM | HEART RATE: 81 BPM | TEMPERATURE: 97.4 F | DIASTOLIC BLOOD PRESSURE: 67 MMHG | SYSTOLIC BLOOD PRESSURE: 122 MMHG

## 2017-02-22 DIAGNOSIS — N18.9 HISTORY OF ANEMIA DUE TO CHRONIC KIDNEY DISEASE: ICD-10-CM

## 2017-02-22 DIAGNOSIS — N18.4 CKD (CHRONIC KIDNEY DISEASE), STAGE 4 (SEVERE): ICD-10-CM

## 2017-02-22 DIAGNOSIS — Z86.2 HISTORY OF ANEMIA DUE TO CHRONIC KIDNEY DISEASE: ICD-10-CM

## 2017-02-22 PROCEDURE — 63710000001 DIPHENHYDRAMINE PER 50 MG: Performed by: NURSE PRACTITIONER

## 2017-02-22 PROCEDURE — P9016 RBC LEUKOCYTES REDUCED: HCPCS

## 2017-02-22 PROCEDURE — 86900 BLOOD TYPING SEROLOGIC ABO: CPT

## 2017-02-22 PROCEDURE — 36430 TRANSFUSION BLD/BLD COMPNT: CPT

## 2017-02-22 RX ORDER — SODIUM CHLORIDE 9 MG/ML
250 INJECTION, SOLUTION INTRAVENOUS AS NEEDED
Status: DISCONTINUED | OUTPATIENT
Start: 2017-02-22 | End: 2017-02-22 | Stop reason: HOSPADM

## 2017-02-22 RX ORDER — DIPHENHYDRAMINE HCL 25 MG
25 CAPSULE ORAL ONCE
Status: COMPLETED | OUTPATIENT
Start: 2017-02-22 | End: 2017-02-22

## 2017-02-22 RX ORDER — ACETAMINOPHEN 325 MG/1
650 TABLET ORAL ONCE
Status: COMPLETED | OUTPATIENT
Start: 2017-02-22 | End: 2017-02-22

## 2017-02-22 RX ADMIN — ACETAMINOPHEN 650 MG: 325 TABLET ORAL at 07:48

## 2017-02-22 RX ADMIN — DIPHENHYDRAMINE HYDROCHLORIDE 25 MG: 25 CAPSULE ORAL at 07:48

## 2017-02-23 LAB
ABO + RH BLD: NORMAL
ABO + RH BLD: NORMAL
BH BB BLOOD EXPIRATION DATE: NORMAL
BH BB BLOOD EXPIRATION DATE: NORMAL
BH BB BLOOD TYPE BARCODE: 600
BH BB BLOOD TYPE BARCODE: 600
BH BB DISPENSE STATUS: NORMAL
BH BB DISPENSE STATUS: NORMAL
BH BB PRODUCT CODE: NORMAL
BH BB PRODUCT CODE: NORMAL
BH BB UNIT NUMBER: NORMAL
BH BB UNIT NUMBER: NORMAL
UNIT  ABO: NORMAL
UNIT  ABO: NORMAL
UNIT  RH: NORMAL
UNIT  RH: NORMAL

## 2017-02-23 RX ORDER — ROTIGOTINE 4 MG/24H
PATCH, EXTENDED RELEASE TRANSDERMAL
Qty: 90 PATCH | Refills: 3 | Status: SHIPPED | OUTPATIENT
Start: 2017-02-23 | End: 2017-06-12

## 2017-03-01 RX ORDER — FUROSEMIDE 40 MG/1
TABLET ORAL
Qty: 90 TABLET | Refills: 1 | Status: SHIPPED | OUTPATIENT
Start: 2017-03-01 | End: 2017-09-16 | Stop reason: SDUPTHER

## 2017-03-06 ENCOUNTER — OFFICE VISIT (OUTPATIENT)
Dept: ONCOLOGY | Facility: CLINIC | Age: 77
End: 2017-03-06

## 2017-03-06 ENCOUNTER — LAB (OUTPATIENT)
Dept: ONCOLOGY | Facility: HOSPITAL | Age: 77
End: 2017-03-06

## 2017-03-06 VITALS
SYSTOLIC BLOOD PRESSURE: 142 MMHG | TEMPERATURE: 97.5 F | OXYGEN SATURATION: 96 % | DIASTOLIC BLOOD PRESSURE: 84 MMHG | HEART RATE: 72 BPM | WEIGHT: 173.8 LBS | BODY MASS INDEX: 25.45 KG/M2

## 2017-03-06 DIAGNOSIS — N18.4 CKD (CHRONIC KIDNEY DISEASE), STAGE 4 (SEVERE): ICD-10-CM

## 2017-03-06 DIAGNOSIS — Z86.2 HISTORY OF ANEMIA DUE TO CHRONIC KIDNEY DISEASE: ICD-10-CM

## 2017-03-06 DIAGNOSIS — N18.9 HISTORY OF ANEMIA DUE TO CHRONIC KIDNEY DISEASE: ICD-10-CM

## 2017-03-06 DIAGNOSIS — N18.4 CKD (CHRONIC KIDNEY DISEASE), STAGE 4 (SEVERE): Primary | ICD-10-CM

## 2017-03-06 LAB
BASOPHILS # BLD AUTO: 0.02 10*3/MM3 (ref 0–0.2)
BASOPHILS NFR BLD AUTO: 0.4 % (ref 0–1.5)
DEPRECATED RDW RBC AUTO: 62.9 FL (ref 37–54)
EOSINOPHIL # BLD AUTO: 0.1 10*3/MM3 (ref 0–0.7)
EOSINOPHIL NFR BLD AUTO: 1.9 % (ref 0.3–6.2)
ERYTHROCYTE [DISTWIDTH] IN BLOOD BY AUTOMATED COUNT: 17.7 % (ref 11.7–13)
FERRITIN SERPL-MCNC: 97.4 NG/ML (ref 13–150)
HCT VFR BLD AUTO: 32.1 % (ref 35.6–45.5)
HGB BLD-MCNC: 10 G/DL (ref 11.9–15.5)
IMM GRANULOCYTES # BLD: 0.01 10*3/MM3 (ref 0–0.03)
IMM GRANULOCYTES NFR BLD: 0.2 % (ref 0–0.5)
IRON 24H UR-MRATE: 64 MCG/DL (ref 37–145)
IRON SATN MFR SERPL: 19 % (ref 20–50)
LYMPHOCYTES # BLD AUTO: 1.17 10*3/MM3 (ref 0.9–4.8)
LYMPHOCYTES NFR BLD AUTO: 22.6 % (ref 19.6–45.3)
MCH RBC QN AUTO: 29.9 PG (ref 26.9–32)
MCHC RBC AUTO-ENTMCNC: 31.2 G/DL (ref 32.4–36.3)
MCV RBC AUTO: 96.1 FL (ref 80.5–98.2)
MONOCYTES # BLD AUTO: 0.32 10*3/MM3 (ref 0.2–1.2)
MONOCYTES NFR BLD AUTO: 6.2 % (ref 5–12)
NEUTROPHILS # BLD AUTO: 3.56 10*3/MM3 (ref 1.9–8.1)
NEUTROPHILS NFR BLD AUTO: 68.7 % (ref 42.7–76)
NRBC BLD MANUAL-RTO: 0 /100 WBC (ref 0–0)
PLATELET # BLD AUTO: 148 10*3/MM3 (ref 140–500)
PMV BLD AUTO: 8.7 FL (ref 6–12)
RBC # BLD AUTO: 3.34 10*6/MM3 (ref 3.9–5.2)
TIBC SERPL-MCNC: 346 MCG/DL (ref 298–536)
TRANSFERRIN SERPL-MCNC: 232 MG/DL (ref 200–360)
WBC NRBC COR # BLD: 5.18 10*3/MM3 (ref 4.5–10.7)

## 2017-03-06 PROCEDURE — 84466 ASSAY OF TRANSFERRIN: CPT | Performed by: NURSE PRACTITIONER

## 2017-03-06 PROCEDURE — 36415 COLL VENOUS BLD VENIPUNCTURE: CPT

## 2017-03-06 PROCEDURE — 85025 COMPLETE CBC W/AUTO DIFF WBC: CPT | Performed by: NURSE PRACTITIONER

## 2017-03-06 PROCEDURE — 82728 ASSAY OF FERRITIN: CPT | Performed by: NURSE PRACTITIONER

## 2017-03-06 PROCEDURE — 99212-NC PR NO CHARGE CBC OFFICE OUTPATIENT VISIT 10 MINUTES: Performed by: NURSE PRACTITIONER

## 2017-03-06 PROCEDURE — 83540 ASSAY OF IRON: CPT | Performed by: NURSE PRACTITIONER

## 2017-03-06 NOTE — PROGRESS NOTES
"Patient returns for lab and are reviewed today.  On February 22 she presented to our office with \"lightheadedness\" and CBC revealed a hemoglobin of 8.3.  She received 2 units of packed red blood cells and returns today for evaluation of labs.  Thankfully, her hemoglobin has improved to 10.0 which according to the patient as his highest its been in quite some time.  She continues 2, 325 mg tablets of ferrous sulfate.  She still notes fatigue and lightheadedness, though does admit that it is improved mildly.  She denies any excessive bleeding or bruising or chest pain and shortness of breath.  We bettie iron studies and ferritin level today and I will call her with results.  Likely, she is iron deplete and will require IV iron replacement due to malabsorption.  We will await results.  She will return to see Dr. Scott on March 23, 2017.  I have instructed her to call our office prior to this time if fatigue or other symptoms worsen.  She is agreeable with this plan.    Vitals:    03/06/17 1116   BP: 142/84   Pulse: 72   Temp: 97.5 °F (36.4 °C)   SpO2: 96%     Lab Results   Component Value Date    WBC 5.18 03/06/2017    HGB 10.0 (L) 03/06/2017    HCT 32.1 (L) 03/06/2017    MCV 96.1 03/06/2017     03/06/2017     CHANTELL Tiwari   "

## 2017-03-07 ENCOUNTER — APPOINTMENT (OUTPATIENT)
Dept: ONCOLOGY | Facility: CLINIC | Age: 77
End: 2017-03-07

## 2017-03-07 ENCOUNTER — DOCUMENTATION (OUTPATIENT)
Dept: ONCOLOGY | Facility: CLINIC | Age: 77
End: 2017-03-07

## 2017-03-07 ENCOUNTER — APPOINTMENT (OUTPATIENT)
Dept: ONCOLOGY | Facility: HOSPITAL | Age: 77
End: 2017-03-07

## 2017-03-07 NOTE — PROGRESS NOTES
I called and spoke with patient regarding iron studies. She will not qualify for IV iron based upon an iron saturation of 19% and a ferritin of 97. She will continue the 2 tablets of 325 mg ferrous sulfate and we will likely repeat iron studies again in 1-2 months. Patient knows that anemia is multi-factorial and will call if symptoms worsen prior to her next office visit with Dr. Scott in 2 weeks. She has verbalized understanding.

## 2017-03-22 DIAGNOSIS — N18.9 HISTORY OF ANEMIA DUE TO CHRONIC KIDNEY DISEASE: Primary | ICD-10-CM

## 2017-03-22 DIAGNOSIS — Z86.2 HISTORY OF ANEMIA DUE TO CHRONIC KIDNEY DISEASE: Primary | ICD-10-CM

## 2017-03-23 ENCOUNTER — LAB (OUTPATIENT)
Dept: OTHER | Facility: HOSPITAL | Age: 77
End: 2017-03-23

## 2017-03-23 ENCOUNTER — OFFICE VISIT (OUTPATIENT)
Dept: ONCOLOGY | Facility: CLINIC | Age: 77
End: 2017-03-23

## 2017-03-23 VITALS
HEIGHT: 69 IN | HEART RATE: 94 BPM | WEIGHT: 177.8 LBS | BODY MASS INDEX: 26.33 KG/M2 | TEMPERATURE: 97.4 F | DIASTOLIC BLOOD PRESSURE: 75 MMHG | OXYGEN SATURATION: 90 % | SYSTOLIC BLOOD PRESSURE: 142 MMHG | RESPIRATION RATE: 16 BRPM

## 2017-03-23 DIAGNOSIS — N18.9 HISTORY OF ANEMIA DUE TO CHRONIC KIDNEY DISEASE: ICD-10-CM

## 2017-03-23 DIAGNOSIS — Z86.2 HISTORY OF ANEMIA DUE TO CHRONIC KIDNEY DISEASE: ICD-10-CM

## 2017-03-23 DIAGNOSIS — Z86.2 HISTORY OF ANEMIA DUE TO CHRONIC KIDNEY DISEASE: Primary | ICD-10-CM

## 2017-03-23 DIAGNOSIS — N18.9 HISTORY OF ANEMIA DUE TO CHRONIC KIDNEY DISEASE: Primary | ICD-10-CM

## 2017-03-23 LAB
BASOPHILS # BLD AUTO: 0.02 10*3/MM3 (ref 0–0.2)
BASOPHILS NFR BLD AUTO: 0.2 % (ref 0–1.5)
DEPRECATED RDW RBC AUTO: 64.6 FL (ref 37–54)
EOSINOPHIL # BLD AUTO: 0.07 10*3/MM3 (ref 0–0.7)
EOSINOPHIL NFR BLD AUTO: 0.7 % (ref 0.3–6.2)
ERYTHROCYTE [DISTWIDTH] IN BLOOD BY AUTOMATED COUNT: 18.2 % (ref 11.7–13)
HCT VFR BLD AUTO: 30.1 % (ref 35.6–45.5)
HGB BLD-MCNC: 9.5 G/DL (ref 11.9–15.5)
IMM GRANULOCYTES # BLD: 0.03 10*3/MM3 (ref 0–0.03)
IMM GRANULOCYTES NFR BLD: 0.3 % (ref 0–0.5)
LYMPHOCYTES # BLD AUTO: 0.64 10*3/MM3 (ref 0.9–4.8)
LYMPHOCYTES NFR BLD AUTO: 6.2 % (ref 19.6–45.3)
MCH RBC QN AUTO: 30.1 PG (ref 26.9–32)
MCHC RBC AUTO-ENTMCNC: 31.6 G/DL (ref 32.4–36.3)
MCV RBC AUTO: 95.3 FL (ref 80.5–98.2)
MONOCYTES # BLD AUTO: 0.71 10*3/MM3 (ref 0.2–1.2)
MONOCYTES NFR BLD AUTO: 6.9 % (ref 5–12)
NEUTROPHILS # BLD AUTO: 8.79 10*3/MM3 (ref 1.9–8.1)
NEUTROPHILS NFR BLD AUTO: 85.7 % (ref 42.7–76)
NRBC BLD MANUAL-RTO: 0.2 /100 WBC (ref 0–0)
PLATELET # BLD AUTO: 144 10*3/MM3 (ref 140–500)
PMV BLD AUTO: 10.4 FL (ref 6–12)
RBC # BLD AUTO: 3.16 10*6/MM3 (ref 3.9–5.2)
WBC NRBC COR # BLD: 10.26 10*3/MM3 (ref 4.5–10.7)

## 2017-03-23 PROCEDURE — 36415 COLL VENOUS BLD VENIPUNCTURE: CPT

## 2017-03-23 PROCEDURE — 99214 OFFICE O/P EST MOD 30 MIN: CPT | Performed by: INTERNAL MEDICINE

## 2017-03-23 PROCEDURE — 85025 COMPLETE CBC W/AUTO DIFF WBC: CPT | Performed by: INTERNAL MEDICINE

## 2017-04-12 DIAGNOSIS — N18.4 CKD (CHRONIC KIDNEY DISEASE), STAGE 4 (SEVERE): Primary | ICD-10-CM

## 2017-04-13 ENCOUNTER — LAB (OUTPATIENT)
Dept: OTHER | Facility: HOSPITAL | Age: 77
End: 2017-04-13

## 2017-04-13 ENCOUNTER — OFFICE VISIT (OUTPATIENT)
Dept: ONCOLOGY | Facility: CLINIC | Age: 77
End: 2017-04-13

## 2017-04-13 ENCOUNTER — CLINICAL SUPPORT NO REQUIREMENTS (OUTPATIENT)
Dept: CARDIOLOGY | Facility: CLINIC | Age: 77
End: 2017-04-13

## 2017-04-13 VITALS
DIASTOLIC BLOOD PRESSURE: 70 MMHG | HEART RATE: 84 BPM | SYSTOLIC BLOOD PRESSURE: 122 MMHG | TEMPERATURE: 98 F | OXYGEN SATURATION: 100 %

## 2017-04-13 DIAGNOSIS — N18.4 CKD (CHRONIC KIDNEY DISEASE), STAGE 4 (SEVERE): Primary | ICD-10-CM

## 2017-04-13 DIAGNOSIS — I42.9 CARDIOMYOPATHY (HCC): Primary | ICD-10-CM

## 2017-04-13 DIAGNOSIS — N18.4 CKD (CHRONIC KIDNEY DISEASE), STAGE 4 (SEVERE): ICD-10-CM

## 2017-04-13 DIAGNOSIS — I50.22 CHRONIC SYSTOLIC CONGESTIVE HEART FAILURE (HCC): ICD-10-CM

## 2017-04-13 PROBLEM — D50.9 IRON DEFICIENCY ANEMIA: Status: ACTIVE | Noted: 2017-04-13

## 2017-04-13 LAB
BASOPHILS # BLD AUTO: 0.01 10*3/MM3 (ref 0–0.2)
BASOPHILS NFR BLD AUTO: 0.2 % (ref 0–1.5)
DEPRECATED RDW RBC AUTO: 67.5 FL (ref 37–54)
EOSINOPHIL # BLD AUTO: 0.1 10*3/MM3 (ref 0–0.7)
EOSINOPHIL NFR BLD AUTO: 2.1 % (ref 0.3–6.2)
ERYTHROCYTE [DISTWIDTH] IN BLOOD BY AUTOMATED COUNT: 18.3 % (ref 11.7–13)
FERRITIN SERPL-MCNC: 55.9 NG/ML (ref 13–150)
HCT VFR BLD AUTO: 25.8 % (ref 35.6–45.5)
HGB BLD-MCNC: 8 G/DL (ref 11.9–15.5)
IMM GRANULOCYTES # BLD: 0.01 10*3/MM3 (ref 0–0.03)
IMM GRANULOCYTES NFR BLD: 0.2 % (ref 0–0.5)
IRON 24H UR-MRATE: 54 MCG/DL (ref 37–145)
IRON SATN MFR SERPL: 17 % (ref 20–50)
LYMPHOCYTES # BLD AUTO: 1.13 10*3/MM3 (ref 0.9–4.8)
LYMPHOCYTES NFR BLD AUTO: 24.1 % (ref 19.6–45.3)
MCH RBC QN AUTO: 31 PG (ref 26.9–32)
MCHC RBC AUTO-ENTMCNC: 31 G/DL (ref 32.4–36.3)
MCV RBC AUTO: 100 FL (ref 80.5–98.2)
MONOCYTES # BLD AUTO: 0.37 10*3/MM3 (ref 0.2–1.2)
MONOCYTES NFR BLD AUTO: 7.9 % (ref 5–12)
NEUTROPHILS # BLD AUTO: 3.07 10*3/MM3 (ref 1.9–8.1)
NEUTROPHILS NFR BLD AUTO: 65.5 % (ref 42.7–76)
NRBC BLD MANUAL-RTO: 0 /100 WBC (ref 0–0)
PLATELET # BLD AUTO: 184 10*3/MM3 (ref 140–500)
PMV BLD AUTO: 9.1 FL (ref 6–12)
RBC # BLD AUTO: 2.58 10*6/MM3 (ref 3.9–5.2)
TIBC SERPL-MCNC: 325 MCG/DL (ref 298–536)
TRANSFERRIN SERPL-MCNC: 218 MG/DL (ref 200–360)
WBC NRBC COR # BLD: 4.69 10*3/MM3 (ref 4.5–10.7)

## 2017-04-13 PROCEDURE — 93296 REM INTERROG EVL PM/IDS: CPT | Performed by: INTERNAL MEDICINE

## 2017-04-13 PROCEDURE — 83540 ASSAY OF IRON: CPT

## 2017-04-13 PROCEDURE — 84466 ASSAY OF TRANSFERRIN: CPT

## 2017-04-13 PROCEDURE — 85025 COMPLETE CBC W/AUTO DIFF WBC: CPT

## 2017-04-13 PROCEDURE — 93295 DEV INTERROG REMOTE 1/2/MLT: CPT | Performed by: INTERNAL MEDICINE

## 2017-04-13 PROCEDURE — 36415 COLL VENOUS BLD VENIPUNCTURE: CPT

## 2017-04-13 PROCEDURE — 82728 ASSAY OF FERRITIN: CPT | Performed by: NURSE PRACTITIONER

## 2017-04-13 PROCEDURE — 99212-NC PR NO CHARGE CBC OFFICE OUTPATIENT VISIT 10 MINUTES: Performed by: NURSE PRACTITIONER

## 2017-04-13 PROCEDURE — 93297 REM INTERROG DEV EVAL ICPMS: CPT | Performed by: INTERNAL MEDICINE

## 2017-04-13 NOTE — PROGRESS NOTES
Reviewed labs with patient and her  today. She has been receiving Procrit but this was held due to iron depletion. Dr. Scott doubled her dose of oral iron which has not provided any benefit as evidenced by labs today. Mrs. Bonilla's hgb is 8, iron saturation is 17%, and Ferritin is 55.9. She is not experiencing any shortness of breath, chest pain or heart palpitations and declines transfusion today. Her iron panel was not resulted until after she left the office so I call the patient to make her aware that she needs a Feraheme infusion. She has been scheduled to come in tomorrow at 1:00pm for the first of 2 planned infusions.     She will call the office should she experience shortness of breath, dizziness, or  increased fatigue at which time she could be scheduled for a blood transfusion.

## 2017-04-14 ENCOUNTER — INFUSION (OUTPATIENT)
Dept: ONCOLOGY | Facility: HOSPITAL | Age: 77
End: 2017-04-14

## 2017-04-14 VITALS
SYSTOLIC BLOOD PRESSURE: 149 MMHG | WEIGHT: 181 LBS | BODY MASS INDEX: 26.51 KG/M2 | DIASTOLIC BLOOD PRESSURE: 84 MMHG | HEART RATE: 86 BPM

## 2017-04-14 DIAGNOSIS — D50.8 OTHER IRON DEFICIENCY ANEMIA: Primary | ICD-10-CM

## 2017-04-14 PROCEDURE — 25010000002 FERUMOXYTOL 510 MG/17ML SOLUTION 510 MG VIAL: Performed by: NURSE PRACTITIONER

## 2017-04-14 PROCEDURE — 96375 TX/PRO/DX INJ NEW DRUG ADDON: CPT | Performed by: NURSE PRACTITIONER

## 2017-04-14 PROCEDURE — 96374 THER/PROPH/DIAG INJ IV PUSH: CPT | Performed by: NURSE PRACTITIONER

## 2017-04-14 PROCEDURE — 63710000001 DIPHENHYDRAMINE PER 50 MG: Performed by: NURSE PRACTITIONER

## 2017-04-14 RX ORDER — SODIUM CHLORIDE 9 MG/ML
250 INJECTION, SOLUTION INTRAVENOUS ONCE
Status: COMPLETED | OUTPATIENT
Start: 2017-04-14 | End: 2017-04-14

## 2017-04-14 RX ORDER — DIPHENHYDRAMINE HCL 25 MG
25 CAPSULE ORAL ONCE
Status: COMPLETED | OUTPATIENT
Start: 2017-04-14 | End: 2017-04-14

## 2017-04-14 RX ADMIN — FERUMOXYTOL 510 MG: 510 INJECTION INTRAVENOUS at 13:26

## 2017-04-14 RX ADMIN — DIPHENHYDRAMINE HYDROCHLORIDE 25 MG: 25 CAPSULE ORAL at 13:07

## 2017-04-14 RX ADMIN — SODIUM CHLORIDE 250 ML: 900 INJECTION, SOLUTION INTRAVENOUS at 13:05

## 2017-04-14 RX ADMIN — FAMOTIDINE 20 MG: 10 INJECTION INTRAVENOUS at 13:06

## 2017-04-20 ENCOUNTER — APPOINTMENT (OUTPATIENT)
Dept: OTHER | Facility: HOSPITAL | Age: 77
End: 2017-04-20

## 2017-04-20 ENCOUNTER — OFFICE VISIT (OUTPATIENT)
Dept: ONCOLOGY | Facility: CLINIC | Age: 77
End: 2017-04-20

## 2017-04-20 ENCOUNTER — INFUSION (OUTPATIENT)
Dept: ONCOLOGY | Facility: HOSPITAL | Age: 77
End: 2017-04-20

## 2017-04-20 VITALS — SYSTOLIC BLOOD PRESSURE: 150 MMHG | DIASTOLIC BLOOD PRESSURE: 73 MMHG | TEMPERATURE: 98 F

## 2017-04-20 DIAGNOSIS — Z86.2 HISTORY OF ANEMIA DUE TO CHRONIC KIDNEY DISEASE: Primary | ICD-10-CM

## 2017-04-20 DIAGNOSIS — N18.9 HISTORY OF ANEMIA DUE TO CHRONIC KIDNEY DISEASE: Primary | ICD-10-CM

## 2017-04-20 DIAGNOSIS — D50.8 OTHER IRON DEFICIENCY ANEMIA: ICD-10-CM

## 2017-04-20 DIAGNOSIS — N18.4 CKD (CHRONIC KIDNEY DISEASE), STAGE 4 (SEVERE): ICD-10-CM

## 2017-04-20 DIAGNOSIS — D61.818 PANCYTOPENIA (HCC): ICD-10-CM

## 2017-04-20 LAB
BASOPHILS # BLD AUTO: 0.01 10*3/MM3 (ref 0–0.2)
BASOPHILS NFR BLD AUTO: 0.1 % (ref 0–1.5)
DEPRECATED RDW RBC AUTO: 65.1 FL (ref 37–54)
EOSINOPHIL # BLD AUTO: 0.12 10*3/MM3 (ref 0–0.7)
EOSINOPHIL NFR BLD AUTO: 1.8 % (ref 0.3–6.2)
ERYTHROCYTE [DISTWIDTH] IN BLOOD BY AUTOMATED COUNT: 18.3 % (ref 11.7–13)
HCT VFR BLD AUTO: 24.5 % (ref 35.6–45.5)
HGB BLD-MCNC: 7.9 G/DL (ref 11.9–15.5)
IMM GRANULOCYTES # BLD: 0.06 10*3/MM3 (ref 0–0.03)
IMM GRANULOCYTES NFR BLD: 0.9 % (ref 0–0.5)
LYMPHOCYTES # BLD AUTO: 1.08 10*3/MM3 (ref 0.9–4.8)
LYMPHOCYTES NFR BLD AUTO: 16.1 % (ref 19.6–45.3)
MCH RBC QN AUTO: 31.9 PG (ref 26.9–32)
MCHC RBC AUTO-ENTMCNC: 32.2 G/DL (ref 32.4–36.3)
MCV RBC AUTO: 98.8 FL (ref 80.5–98.2)
MONOCYTES # BLD AUTO: 0.48 10*3/MM3 (ref 0.2–1.2)
MONOCYTES NFR BLD AUTO: 7.2 % (ref 5–12)
NEUTROPHILS # BLD AUTO: 4.95 10*3/MM3 (ref 1.9–8.1)
NEUTROPHILS NFR BLD AUTO: 73.9 % (ref 42.7–76)
NRBC BLD MANUAL-RTO: 0 /100 WBC (ref 0–0)
PLATELET # BLD AUTO: 190 10*3/MM3 (ref 140–500)
PMV BLD AUTO: 9.8 FL (ref 6–12)
RBC # BLD AUTO: 2.48 10*6/MM3 (ref 3.9–5.2)
WBC NRBC COR # BLD: 6.7 10*3/MM3 (ref 4.5–10.7)

## 2017-04-20 PROCEDURE — 36415 COLL VENOUS BLD VENIPUNCTURE: CPT

## 2017-04-20 PROCEDURE — 85025 COMPLETE CBC W/AUTO DIFF WBC: CPT

## 2017-04-20 PROCEDURE — 96374 THER/PROPH/DIAG INJ IV PUSH: CPT

## 2017-04-20 PROCEDURE — 86850 RBC ANTIBODY SCREEN: CPT

## 2017-04-20 PROCEDURE — 86900 BLOOD TYPING SEROLOGIC ABO: CPT

## 2017-04-20 PROCEDURE — 96375 TX/PRO/DX INJ NEW DRUG ADDON: CPT

## 2017-04-20 PROCEDURE — 86901 BLOOD TYPING SEROLOGIC RH(D): CPT

## 2017-04-20 PROCEDURE — 99212-NC PR NO CHARGE CBC OFFICE OUTPATIENT VISIT 10 MINUTES: Performed by: NURSE PRACTITIONER

## 2017-04-20 PROCEDURE — 25010000002 FERUMOXYTOL 510 MG/17ML SOLUTION 510 MG VIAL: Performed by: NURSE PRACTITIONER

## 2017-04-20 PROCEDURE — 63710000001 DIPHENHYDRAMINE PER 50 MG: Performed by: NURSE PRACTITIONER

## 2017-04-20 PROCEDURE — 86923 COMPATIBILITY TEST ELECTRIC: CPT

## 2017-04-20 RX ORDER — SODIUM CHLORIDE 9 MG/ML
250 INJECTION, SOLUTION INTRAVENOUS ONCE
Status: CANCELLED | OUTPATIENT
Start: 2017-04-20

## 2017-04-20 RX ORDER — SODIUM CHLORIDE 9 MG/ML
250 INJECTION, SOLUTION INTRAVENOUS ONCE
Status: COMPLETED | OUTPATIENT
Start: 2017-04-20 | End: 2017-04-20

## 2017-04-20 RX ORDER — SODIUM CHLORIDE 9 MG/ML
250 INJECTION, SOLUTION INTRAVENOUS ONCE
Status: CANCELLED | OUTPATIENT
Start: 2017-04-21

## 2017-04-20 RX ORDER — DIPHENHYDRAMINE HCL 25 MG
25 CAPSULE ORAL ONCE
Status: CANCELLED | OUTPATIENT
Start: 2017-04-20 | End: 2017-04-20

## 2017-04-20 RX ORDER — DIPHENHYDRAMINE HCL 25 MG
25 CAPSULE ORAL ONCE
Status: COMPLETED | OUTPATIENT
Start: 2017-04-20 | End: 2017-04-20

## 2017-04-20 RX ORDER — ACETAMINOPHEN 325 MG/1
650 TABLET ORAL ONCE
Status: CANCELLED | OUTPATIENT
Start: 2017-04-20 | End: 2017-04-20

## 2017-04-20 RX ORDER — SODIUM CHLORIDE 9 MG/ML
250 INJECTION, SOLUTION INTRAVENOUS AS NEEDED
Status: CANCELLED | OUTPATIENT
Start: 2017-04-20

## 2017-04-20 RX ORDER — ONDANSETRON HYDROCHLORIDE 8 MG/1
8 TABLET, FILM COATED ORAL EVERY 8 HOURS PRN
Qty: 30 TABLET | Refills: 2 | Status: SHIPPED | OUTPATIENT
Start: 2017-04-20 | End: 2019-06-21

## 2017-04-20 RX ORDER — DIPHENHYDRAMINE HCL 25 MG
25 CAPSULE ORAL ONCE
Status: CANCELLED | OUTPATIENT
Start: 2017-04-20

## 2017-04-20 RX ORDER — DIPHENHYDRAMINE HCL 25 MG
25 CAPSULE ORAL ONCE
Status: CANCELLED | OUTPATIENT
Start: 2017-04-21

## 2017-04-20 RX ADMIN — FAMOTIDINE 20 MG: 10 INJECTION, SOLUTION INTRAVENOUS at 11:06

## 2017-04-20 RX ADMIN — SODIUM CHLORIDE 250 ML: 900 INJECTION, SOLUTION INTRAVENOUS at 11:06

## 2017-04-20 RX ADMIN — FERUMOXYTOL 510 MG: 510 INJECTION INTRAVENOUS at 11:23

## 2017-04-20 RX ADMIN — DIPHENHYDRAMINE HYDROCHLORIDE 25 MG: 25 CAPSULE ORAL at 11:06

## 2017-04-20 NOTE — PROGRESS NOTES
Mrs. Machuca is here today for scheduled Fereheme. Her hgb is 7.9 and she reports feeling more fatigued with shortness of breath on exertion. She denies chest pain or heart palpitations. I have scheduled her to recevie 2 units of PRBS's tomorrow at [facility]. She will then return as already scheduled on 5/4/2017 for RN Review, then to see Dr. Scott on 5/23/2017. She will call the office should she experience new or worsening symptoms.

## 2017-04-21 ENCOUNTER — INFUSION (OUTPATIENT)
Dept: ONCOLOGY | Facility: HOSPITAL | Age: 77
End: 2017-04-21

## 2017-04-21 VITALS
OXYGEN SATURATION: 99 % | RESPIRATION RATE: 18 BRPM | TEMPERATURE: 97.2 F | DIASTOLIC BLOOD PRESSURE: 68 MMHG | HEART RATE: 70 BPM | SYSTOLIC BLOOD PRESSURE: 134 MMHG

## 2017-04-21 DIAGNOSIS — D50.9 IRON DEFICIENCY ANEMIA, UNSPECIFIED IRON DEFICIENCY ANEMIA TYPE: Primary | ICD-10-CM

## 2017-04-21 LAB
ABO GROUP BLD: NORMAL
BLD GP AB SCN SERPL QL: NEGATIVE
RH BLD: NEGATIVE

## 2017-04-21 PROCEDURE — 86900 BLOOD TYPING SEROLOGIC ABO: CPT

## 2017-04-21 PROCEDURE — 63710000001 DIPHENHYDRAMINE PER 50 MG: Performed by: NURSE PRACTITIONER

## 2017-04-21 PROCEDURE — 86850 RBC ANTIBODY SCREEN: CPT

## 2017-04-21 PROCEDURE — P9016 RBC LEUKOCYTES REDUCED: HCPCS

## 2017-04-21 PROCEDURE — 36430 TRANSFUSION BLD/BLD COMPNT: CPT

## 2017-04-21 RX ORDER — SODIUM CHLORIDE 9 MG/ML
250 INJECTION, SOLUTION INTRAVENOUS AS NEEDED
Status: DISCONTINUED | OUTPATIENT
Start: 2017-04-21 | End: 2017-04-29 | Stop reason: HOSPADM

## 2017-04-21 RX ORDER — ACETAMINOPHEN 325 MG/1
650 TABLET ORAL ONCE
Status: COMPLETED | OUTPATIENT
Start: 2017-04-21 | End: 2017-04-21

## 2017-04-21 RX ORDER — DIPHENHYDRAMINE HCL 25 MG
25 CAPSULE ORAL ONCE
Status: COMPLETED | OUTPATIENT
Start: 2017-04-21 | End: 2017-04-21

## 2017-04-21 RX ADMIN — DIPHENHYDRAMINE HYDROCHLORIDE 25 MG: 25 CAPSULE ORAL at 07:47

## 2017-04-21 RX ADMIN — ACETAMINOPHEN 650 MG: 325 TABLET ORAL at 07:47

## 2017-05-04 ENCOUNTER — LAB (OUTPATIENT)
Dept: OTHER | Facility: HOSPITAL | Age: 77
End: 2017-05-04

## 2017-05-04 ENCOUNTER — OFFICE VISIT (OUTPATIENT)
Dept: ONCOLOGY | Facility: CLINIC | Age: 77
End: 2017-05-04

## 2017-05-04 VITALS
SYSTOLIC BLOOD PRESSURE: 138 MMHG | TEMPERATURE: 97.8 F | HEART RATE: 80 BPM | DIASTOLIC BLOOD PRESSURE: 70 MMHG | OXYGEN SATURATION: 96 %

## 2017-05-04 DIAGNOSIS — N18.4 CKD (CHRONIC KIDNEY DISEASE), STAGE 4 (SEVERE): ICD-10-CM

## 2017-05-04 DIAGNOSIS — D50.8 OTHER IRON DEFICIENCY ANEMIA: Primary | ICD-10-CM

## 2017-05-04 LAB
BASOPHILS # BLD AUTO: 0.02 10*3/MM3 (ref 0–0.2)
BASOPHILS NFR BLD AUTO: 0.4 % (ref 0–1.5)
DEPRECATED RDW RBC AUTO: 59 FL (ref 37–54)
EOSINOPHIL # BLD AUTO: 0.08 10*3/MM3 (ref 0–0.7)
EOSINOPHIL NFR BLD AUTO: 1.6 % (ref 0.3–6.2)
ERYTHROCYTE [DISTWIDTH] IN BLOOD BY AUTOMATED COUNT: 16.8 % (ref 11.7–13)
HCT VFR BLD AUTO: 28.4 % (ref 35.6–45.5)
HGB BLD-MCNC: 9.3 G/DL (ref 11.9–15.5)
IMM GRANULOCYTES # BLD: 0.03 10*3/MM3 (ref 0–0.03)
IMM GRANULOCYTES NFR BLD: 0.6 % (ref 0–0.5)
LYMPHOCYTES # BLD AUTO: 0.95 10*3/MM3 (ref 0.9–4.8)
LYMPHOCYTES NFR BLD AUTO: 18.9 % (ref 19.6–45.3)
MCH RBC QN AUTO: 31.5 PG (ref 26.9–32)
MCHC RBC AUTO-ENTMCNC: 32.7 G/DL (ref 32.4–36.3)
MCV RBC AUTO: 96.3 FL (ref 80.5–98.2)
MONOCYTES # BLD AUTO: 0.39 10*3/MM3 (ref 0.2–1.2)
MONOCYTES NFR BLD AUTO: 7.8 % (ref 5–12)
NEUTROPHILS # BLD AUTO: 3.56 10*3/MM3 (ref 1.9–8.1)
NEUTROPHILS NFR BLD AUTO: 70.7 % (ref 42.7–76)
NRBC BLD MANUAL-RTO: 0 /100 WBC (ref 0–0)
PLATELET # BLD AUTO: 169 10*3/MM3 (ref 140–500)
PMV BLD AUTO: 10.5 FL (ref 6–12)
RBC # BLD AUTO: 2.95 10*6/MM3 (ref 3.9–5.2)
WBC NRBC COR # BLD: 5.03 10*3/MM3 (ref 4.5–10.7)

## 2017-05-04 PROCEDURE — 36415 COLL VENOUS BLD VENIPUNCTURE: CPT

## 2017-05-04 PROCEDURE — 85025 COMPLETE CBC W/AUTO DIFF WBC: CPT | Performed by: INTERNAL MEDICINE

## 2017-05-04 PROCEDURE — 99212-NC PR NO CHARGE CBC OFFICE OUTPATIENT VISIT 10 MINUTES: Performed by: NURSE PRACTITIONER

## 2017-05-16 DIAGNOSIS — D61.818 PANCYTOPENIA (HCC): ICD-10-CM

## 2017-05-16 DIAGNOSIS — N18.9 HISTORY OF ANEMIA DUE TO CHRONIC KIDNEY DISEASE: Primary | ICD-10-CM

## 2017-05-16 DIAGNOSIS — Z86.2 HISTORY OF ANEMIA DUE TO CHRONIC KIDNEY DISEASE: Primary | ICD-10-CM

## 2017-05-23 ENCOUNTER — LAB (OUTPATIENT)
Dept: OTHER | Facility: HOSPITAL | Age: 77
End: 2017-05-23

## 2017-05-23 ENCOUNTER — OFFICE VISIT (OUTPATIENT)
Dept: ONCOLOGY | Facility: CLINIC | Age: 77
End: 2017-05-23

## 2017-05-23 VITALS
TEMPERATURE: 97.9 F | OXYGEN SATURATION: 99 % | HEART RATE: 75 BPM | RESPIRATION RATE: 18 BRPM | WEIGHT: 178 LBS | SYSTOLIC BLOOD PRESSURE: 122 MMHG | DIASTOLIC BLOOD PRESSURE: 71 MMHG | BODY MASS INDEX: 26.36 KG/M2 | HEIGHT: 69 IN

## 2017-05-23 DIAGNOSIS — Z86.2 HISTORY OF ANEMIA DUE TO CHRONIC KIDNEY DISEASE: ICD-10-CM

## 2017-05-23 DIAGNOSIS — N18.9 HISTORY OF ANEMIA DUE TO CHRONIC KIDNEY DISEASE: ICD-10-CM

## 2017-05-23 DIAGNOSIS — D61.818 PANCYTOPENIA (HCC): ICD-10-CM

## 2017-05-23 DIAGNOSIS — D50.8 OTHER IRON DEFICIENCY ANEMIA: Primary | ICD-10-CM

## 2017-05-23 LAB
ABO GROUP BLD: NORMAL
BASOPHILS # BLD AUTO: 0.01 10*3/MM3 (ref 0–0.2)
BASOPHILS NFR BLD AUTO: 0.2 % (ref 0–1.5)
BLD GP AB SCN SERPL QL: NEGATIVE
DEPRECATED RDW RBC AUTO: 65.9 FL (ref 37–54)
EOSINOPHIL # BLD AUTO: 0.11 10*3/MM3 (ref 0–0.7)
EOSINOPHIL NFR BLD AUTO: 2.1 % (ref 0.3–6.2)
ERYTHROCYTE [DISTWIDTH] IN BLOOD BY AUTOMATED COUNT: 18 % (ref 11.7–13)
FERRITIN SERPL-MCNC: 234 NG/ML (ref 13–150)
HCT VFR BLD AUTO: 22.8 % (ref 35.6–45.5)
HGB BLD-MCNC: 7.2 G/DL (ref 11.9–15.5)
IMM GRANULOCYTES # BLD: 0.02 10*3/MM3 (ref 0–0.03)
IMM GRANULOCYTES NFR BLD: 0.4 % (ref 0–0.5)
IRON 24H UR-MRATE: 75 MCG/DL (ref 37–145)
IRON SATN MFR SERPL: 27 % (ref 20–50)
LYMPHOCYTES # BLD AUTO: 1.16 10*3/MM3 (ref 0.9–4.8)
LYMPHOCYTES NFR BLD AUTO: 22.3 % (ref 19.6–45.3)
MCH RBC QN AUTO: 32.1 PG (ref 26.9–32)
MCHC RBC AUTO-ENTMCNC: 31.6 G/DL (ref 32.4–36.3)
MCV RBC AUTO: 101.8 FL (ref 80.5–98.2)
MONOCYTES # BLD AUTO: 0.45 10*3/MM3 (ref 0.2–1.2)
MONOCYTES NFR BLD AUTO: 8.7 % (ref 5–12)
NEUTROPHILS # BLD AUTO: 3.45 10*3/MM3 (ref 1.9–8.1)
NEUTROPHILS NFR BLD AUTO: 66.3 % (ref 42.7–76)
NRBC BLD MANUAL-RTO: 0 /100 WBC (ref 0–0)
PLATELET # BLD AUTO: 153 10*3/MM3 (ref 140–500)
PMV BLD AUTO: 9.6 FL (ref 6–12)
RBC # BLD AUTO: 2.24 10*6/MM3 (ref 3.9–5.2)
RH BLD: NEGATIVE
TIBC SERPL-MCNC: 274 MCG/DL (ref 298–536)
TRANSFERRIN SERPL-MCNC: 184 MG/DL (ref 200–360)
WBC NRBC COR # BLD: 5.2 10*3/MM3 (ref 4.5–10.7)

## 2017-05-23 PROCEDURE — 36415 COLL VENOUS BLD VENIPUNCTURE: CPT

## 2017-05-23 PROCEDURE — 99214 OFFICE O/P EST MOD 30 MIN: CPT | Performed by: INTERNAL MEDICINE

## 2017-05-23 PROCEDURE — 86923 COMPATIBILITY TEST ELECTRIC: CPT

## 2017-05-23 RX ORDER — DIPHENHYDRAMINE HCL 25 MG
25 CAPSULE ORAL ONCE
Status: CANCELLED | OUTPATIENT
Start: 2017-05-23 | End: 2017-05-23

## 2017-05-23 RX ORDER — SODIUM CHLORIDE 9 MG/ML
250 INJECTION, SOLUTION INTRAVENOUS AS NEEDED
Status: CANCELLED | OUTPATIENT
Start: 2017-05-23

## 2017-05-23 RX ORDER — ACETAMINOPHEN 325 MG/1
650 TABLET ORAL ONCE
Status: CANCELLED | OUTPATIENT
Start: 2017-05-23 | End: 2017-05-23

## 2017-05-24 ENCOUNTER — INFUSION (OUTPATIENT)
Dept: ONCOLOGY | Facility: HOSPITAL | Age: 77
End: 2017-05-24

## 2017-05-24 VITALS
TEMPERATURE: 97.6 F | RESPIRATION RATE: 18 BRPM | DIASTOLIC BLOOD PRESSURE: 60 MMHG | SYSTOLIC BLOOD PRESSURE: 121 MMHG | OXYGEN SATURATION: 100 % | HEART RATE: 81 BPM

## 2017-05-24 DIAGNOSIS — D50.8 OTHER IRON DEFICIENCY ANEMIA: ICD-10-CM

## 2017-05-24 PROCEDURE — 63710000001 DIPHENHYDRAMINE PER 50 MG: Performed by: INTERNAL MEDICINE

## 2017-05-24 PROCEDURE — 36430 TRANSFUSION BLD/BLD COMPNT: CPT

## 2017-05-24 PROCEDURE — P9016 RBC LEUKOCYTES REDUCED: HCPCS

## 2017-05-24 PROCEDURE — 86900 BLOOD TYPING SEROLOGIC ABO: CPT

## 2017-05-24 RX ORDER — ACETAMINOPHEN 325 MG/1
650 TABLET ORAL ONCE
Status: COMPLETED | OUTPATIENT
Start: 2017-05-24 | End: 2017-05-24

## 2017-05-24 RX ORDER — DIPHENHYDRAMINE HCL 25 MG
25 CAPSULE ORAL ONCE
Status: COMPLETED | OUTPATIENT
Start: 2017-05-24 | End: 2017-05-24

## 2017-05-24 RX ORDER — SODIUM CHLORIDE 9 MG/ML
250 INJECTION, SOLUTION INTRAVENOUS AS NEEDED
Status: DISCONTINUED | OUTPATIENT
Start: 2017-05-24 | End: 2017-05-24 | Stop reason: HOSPADM

## 2017-05-24 RX ADMIN — ACETAMINOPHEN 650 MG: 325 TABLET ORAL at 07:57

## 2017-05-24 RX ADMIN — DIPHENHYDRAMINE HYDROCHLORIDE 25 MG: 25 CAPSULE ORAL at 07:57

## 2017-05-26 ENCOUNTER — TELEPHONE (OUTPATIENT)
Dept: NEUROLOGY | Facility: CLINIC | Age: 77
End: 2017-05-26

## 2017-06-02 DIAGNOSIS — Z86.2 HISTORY OF ANEMIA DUE TO CHRONIC KIDNEY DISEASE: Primary | ICD-10-CM

## 2017-06-02 DIAGNOSIS — N18.9 HISTORY OF ANEMIA DUE TO CHRONIC KIDNEY DISEASE: Primary | ICD-10-CM

## 2017-06-05 ENCOUNTER — LAB (OUTPATIENT)
Dept: OTHER | Facility: HOSPITAL | Age: 77
End: 2017-06-05

## 2017-06-05 ENCOUNTER — APPOINTMENT (OUTPATIENT)
Dept: ONCOLOGY | Facility: HOSPITAL | Age: 77
End: 2017-06-05

## 2017-06-05 ENCOUNTER — OFFICE VISIT (OUTPATIENT)
Dept: ONCOLOGY | Facility: CLINIC | Age: 77
End: 2017-06-05

## 2017-06-05 VITALS — TEMPERATURE: 97.8 F | DIASTOLIC BLOOD PRESSURE: 70 MMHG | SYSTOLIC BLOOD PRESSURE: 152 MMHG

## 2017-06-05 DIAGNOSIS — Z86.2 HISTORY OF ANEMIA DUE TO CHRONIC KIDNEY DISEASE: ICD-10-CM

## 2017-06-05 DIAGNOSIS — N18.9 HISTORY OF ANEMIA DUE TO CHRONIC KIDNEY DISEASE: ICD-10-CM

## 2017-06-05 DIAGNOSIS — D50.8 OTHER IRON DEFICIENCY ANEMIA: Primary | ICD-10-CM

## 2017-06-05 LAB
BASOPHILS # BLD AUTO: 0.01 10*3/MM3 (ref 0–0.2)
BASOPHILS NFR BLD AUTO: 0.2 % (ref 0–1.5)
DEPRECATED RDW RBC AUTO: 62.6 FL (ref 37–54)
EOSINOPHIL # BLD AUTO: 0.08 10*3/MM3 (ref 0–0.7)
EOSINOPHIL NFR BLD AUTO: 1.6 % (ref 0.3–6.2)
ERYTHROCYTE [DISTWIDTH] IN BLOOD BY AUTOMATED COUNT: 16.8 % (ref 11.7–13)
FERRITIN SERPL-MCNC: 220.7 NG/ML (ref 13–150)
FOLATE SERPL-MCNC: >20 NG/ML (ref 4.78–24.2)
HCT VFR BLD AUTO: 28.4 % (ref 35.6–45.5)
HGB BLD-MCNC: 9 G/DL (ref 11.9–15.5)
IMM GRANULOCYTES # BLD: 0.02 10*3/MM3 (ref 0–0.03)
IMM GRANULOCYTES NFR BLD: 0.4 % (ref 0–0.5)
IRON 24H UR-MRATE: 62 MCG/DL (ref 37–145)
IRON SATN MFR SERPL: 22 % (ref 20–50)
LYMPHOCYTES # BLD AUTO: 0.94 10*3/MM3 (ref 0.9–4.8)
LYMPHOCYTES NFR BLD AUTO: 18.5 % (ref 19.6–45.3)
MCH RBC QN AUTO: 32 PG (ref 26.9–32)
MCHC RBC AUTO-ENTMCNC: 31.7 G/DL (ref 32.4–36.3)
MCV RBC AUTO: 101.1 FL (ref 80.5–98.2)
MONOCYTES # BLD AUTO: 0.24 10*3/MM3 (ref 0.2–1.2)
MONOCYTES NFR BLD AUTO: 4.7 % (ref 5–12)
NEUTROPHILS # BLD AUTO: 3.78 10*3/MM3 (ref 1.9–8.1)
NEUTROPHILS NFR BLD AUTO: 74.6 % (ref 42.7–76)
NRBC BLD MANUAL-RTO: 0 /100 WBC (ref 0–0)
PLATELET # BLD AUTO: 160 10*3/MM3 (ref 140–500)
PMV BLD AUTO: 9.6 FL (ref 6–12)
RBC # BLD AUTO: 2.81 10*6/MM3 (ref 3.9–5.2)
RETICS/RBC NFR AUTO: 2.53 % (ref 0.5–1.5)
TIBC SERPL-MCNC: 282 MCG/DL (ref 298–536)
TRANSFERRIN SERPL-MCNC: 189 MG/DL (ref 200–360)
VIT B12 BLD-MCNC: 843 PG/ML (ref 211–946)
WBC NRBC COR # BLD: 5.07 10*3/MM3 (ref 4.5–10.7)

## 2017-06-05 PROCEDURE — 83540 ASSAY OF IRON: CPT | Performed by: INTERNAL MEDICINE

## 2017-06-05 PROCEDURE — 36415 COLL VENOUS BLD VENIPUNCTURE: CPT

## 2017-06-05 PROCEDURE — 82728 ASSAY OF FERRITIN: CPT | Performed by: INTERNAL MEDICINE

## 2017-06-05 PROCEDURE — 85045 AUTOMATED RETICULOCYTE COUNT: CPT | Performed by: INTERNAL MEDICINE

## 2017-06-05 PROCEDURE — 82746 ASSAY OF FOLIC ACID SERUM: CPT | Performed by: INTERNAL MEDICINE

## 2017-06-05 PROCEDURE — 82607 VITAMIN B-12: CPT | Performed by: INTERNAL MEDICINE

## 2017-06-05 PROCEDURE — 85025 COMPLETE CBC W/AUTO DIFF WBC: CPT | Performed by: INTERNAL MEDICINE

## 2017-06-05 PROCEDURE — 84466 ASSAY OF TRANSFERRIN: CPT | Performed by: INTERNAL MEDICINE

## 2017-06-05 PROCEDURE — 99212-NC PR NO CHARGE CBC OFFICE OUTPATIENT VISIT 10 MINUTES: Performed by: NURSE PRACTITIONER

## 2017-06-05 PROCEDURE — 82668 ASSAY OF ERYTHROPOIETIN: CPT | Performed by: INTERNAL MEDICINE

## 2017-06-05 NOTE — PROGRESS NOTES
Patient here for CBC with RN review.  She does report some fatigue.  She states that she is likely very close to start dialysis, but will be following up with her nephrologist soon to further discuss.    Lab Results   Component Value Date    WBC 5.07 06/05/2017    HGB 9.0 (L) 06/05/2017    HCT 28.4 (L) 06/05/2017    .1 (H) 06/05/2017     06/05/2017     Advised the patient should she develop worsening fatigue or shortness of breath or cough or office as we can repeat a CBC to reevaluate her hemoglobin.  The patient will return in 2 weeks for follow-up with Dr. Scott with a repeat CBC at that time.  They will also discuss potentially reinitiating Procrit.    CHANTELL Butler

## 2017-06-07 LAB — ETHNIC BACKGROUND STATED: 17.5 MIU/ML (ref 2.6–18.5)

## 2017-06-12 RX ORDER — ROPINIROLE 0.5 MG/1
0.5 TABLET, FILM COATED ORAL 2 TIMES DAILY
Qty: 180 TABLET | Refills: 1 | Status: SHIPPED | OUTPATIENT
Start: 2017-06-12 | End: 2017-07-12 | Stop reason: SINTOL

## 2017-06-13 RX ORDER — SPIRONOLACTONE 25 MG/1
TABLET ORAL
Qty: 45 TABLET | Refills: 1 | Status: SHIPPED | OUTPATIENT
Start: 2017-06-13 | End: 2017-09-28

## 2017-06-13 RX ORDER — CARVEDILOL 12.5 MG/1
TABLET ORAL
Qty: 90 TABLET | Refills: 1 | Status: SHIPPED | OUTPATIENT
Start: 2017-06-13 | End: 2017-09-28

## 2017-06-19 DIAGNOSIS — N18.9 HISTORY OF ANEMIA DUE TO CHRONIC KIDNEY DISEASE: Primary | ICD-10-CM

## 2017-06-19 DIAGNOSIS — Z86.2 HISTORY OF ANEMIA DUE TO CHRONIC KIDNEY DISEASE: Primary | ICD-10-CM

## 2017-06-20 ENCOUNTER — OFFICE VISIT (OUTPATIENT)
Dept: ONCOLOGY | Facility: CLINIC | Age: 77
End: 2017-06-20

## 2017-06-20 ENCOUNTER — INFUSION (OUTPATIENT)
Dept: ONCOLOGY | Facility: HOSPITAL | Age: 77
End: 2017-06-20

## 2017-06-20 ENCOUNTER — LAB (OUTPATIENT)
Dept: OTHER | Facility: HOSPITAL | Age: 77
End: 2017-06-20

## 2017-06-20 VITALS
HEIGHT: 69 IN | WEIGHT: 175.8 LBS | RESPIRATION RATE: 16 BRPM | BODY MASS INDEX: 26.04 KG/M2 | DIASTOLIC BLOOD PRESSURE: 68 MMHG | HEART RATE: 76 BPM | SYSTOLIC BLOOD PRESSURE: 126 MMHG | TEMPERATURE: 98.4 F | OXYGEN SATURATION: 100 %

## 2017-06-20 DIAGNOSIS — Z86.2 HISTORY OF ANEMIA DUE TO CHRONIC KIDNEY DISEASE: Primary | ICD-10-CM

## 2017-06-20 DIAGNOSIS — N18.9 HISTORY OF ANEMIA DUE TO CHRONIC KIDNEY DISEASE: ICD-10-CM

## 2017-06-20 DIAGNOSIS — Z86.2 HISTORY OF ANEMIA DUE TO CHRONIC KIDNEY DISEASE: ICD-10-CM

## 2017-06-20 DIAGNOSIS — N18.9 HISTORY OF ANEMIA DUE TO CHRONIC KIDNEY DISEASE: Primary | ICD-10-CM

## 2017-06-20 LAB
BASOPHILS # BLD AUTO: 0.04 10*3/MM3 (ref 0–0.2)
BASOPHILS NFR BLD AUTO: 0.5 % (ref 0–1.5)
DEPRECATED RDW RBC AUTO: 61.2 FL (ref 37–54)
EOSINOPHIL # BLD AUTO: 0.12 10*3/MM3 (ref 0–0.7)
EOSINOPHIL NFR BLD AUTO: 1.4 % (ref 0.3–6.2)
ERYTHROCYTE [DISTWIDTH] IN BLOOD BY AUTOMATED COUNT: 16.8 % (ref 11.7–13)
HCT VFR BLD AUTO: 25.2 % (ref 35.6–45.5)
HGB BLD-MCNC: 8.1 G/DL (ref 11.9–15.5)
IMM GRANULOCYTES # BLD: 0.04 10*3/MM3 (ref 0–0.03)
IMM GRANULOCYTES NFR BLD: 0.5 % (ref 0–0.5)
LYMPHOCYTES # BLD AUTO: 1.32 10*3/MM3 (ref 0.9–4.8)
LYMPHOCYTES NFR BLD AUTO: 15.6 % (ref 19.6–45.3)
MCH RBC QN AUTO: 32.4 PG (ref 26.9–32)
MCHC RBC AUTO-ENTMCNC: 32.1 G/DL (ref 32.4–36.3)
MCV RBC AUTO: 100.8 FL (ref 80.5–98.2)
MONOCYTES # BLD AUTO: 0.51 10*3/MM3 (ref 0.2–1.2)
MONOCYTES NFR BLD AUTO: 6 % (ref 5–12)
NEUTROPHILS # BLD AUTO: 6.43 10*3/MM3 (ref 1.9–8.1)
NEUTROPHILS NFR BLD AUTO: 76 % (ref 42.7–76)
NRBC BLD MANUAL-RTO: 0 /100 WBC (ref 0–0)
PLATELET # BLD AUTO: 166 10*3/MM3 (ref 140–500)
PMV BLD AUTO: 9.7 FL (ref 6–12)
RBC # BLD AUTO: 2.5 10*6/MM3 (ref 3.9–5.2)
WBC NRBC COR # BLD: 8.46 10*3/MM3 (ref 4.5–10.7)

## 2017-06-20 PROCEDURE — 36415 COLL VENOUS BLD VENIPUNCTURE: CPT

## 2017-06-20 PROCEDURE — 99214 OFFICE O/P EST MOD 30 MIN: CPT | Performed by: INTERNAL MEDICINE

## 2017-06-20 PROCEDURE — 85025 COMPLETE CBC W/AUTO DIFF WBC: CPT | Performed by: INTERNAL MEDICINE

## 2017-06-20 RX ORDER — DIPHENHYDRAMINE HCL 25 MG
25 CAPSULE ORAL ONCE
Status: CANCELLED | OUTPATIENT
Start: 2017-06-20 | End: 2017-06-20

## 2017-06-20 RX ORDER — CALCITRIOL 0.25 UG/1
CAPSULE, LIQUID FILLED ORAL
Refills: 11 | COMMUNITY
Start: 2017-05-26 | End: 2017-09-28

## 2017-06-20 RX ORDER — ACETAMINOPHEN 325 MG/1
325 TABLET ORAL ONCE
Status: CANCELLED | OUTPATIENT
Start: 2017-06-20 | End: 2017-06-20

## 2017-06-20 RX ORDER — SODIUM CHLORIDE 9 MG/ML
250 INJECTION, SOLUTION INTRAVENOUS AS NEEDED
Status: CANCELLED | OUTPATIENT
Start: 2017-06-20

## 2017-06-20 NOTE — PROGRESS NOTES
"  Subjective     HISTORY OF PRESENT ILLNESS:     History of Present Illness      Past Medical History, Past Surgical History, Social History, Family History have been reviewed and are without significant changes except as mentioned.    Review of Systems   A comprehensive 14 point review of systems was performed and was negative except as mentioned.    Medications:  The current medication list was reviewed in the EMR    ALLERGIES:    Allergies   Allergen Reactions   • Chlorhexidine Rash and Itching     Patient states \"blue dye\" in chlorhexidine.  States the orange and clear are OK to use   • Chlorhexidine Gluconate Itching     itched   • Valproic Acid Other (See Comments)     Made her extremely sleepy   • Alcohol Itching     itching   • Codeine Other (See Comments)     Want to climb the walls, doesn't help pain   • Propoxyphene-Acetaminophen    • Propoxyphene Other (See Comments)     Belligerent, acting drunk       Objective      Vitals:    06/20/17 1012   BP: 126/68   Pulse: 76   Resp: 16   Temp: 98.4 °F (36.9 °C)   TempSrc: Oral   SpO2: 100%   Weight: 175 lb 12.8 oz (79.7 kg)   Height: 69.29\" (176 cm)   PainSc: 0-No pain     Current Status 6/20/2017   ECOG score 0       Physical Exam   GENERAL:  Well-developed, well-nourished in no acute distress.   SKIN:  Warm, dry without rashes, purpura or petechiae.  EYES:  Pupils equal, round and reactive to light.  EOMs intact.  Conjunctivae normal.  EARS:  Hearing intact.  NOSE:  Septum midline.  No excoriations or nasal discharge.  MOUTH:  Tongue is well-papillated; no stomatitis or ulcers.  Lips normal.  THROAT:  Oropharynx without lesions or exudates.  NECK:  Supple with good range of motion; no thyromegaly or masses, no JVD.  LYMPHATICS:  No cervical, supraclavicular, axillary or inguinal adenopathy.  CHEST:  Lungs clear to auscultation. Good airflow.  CARDIAC:  Regular rate and rhythm without murmurs, rubs or gallops. Normal S1,S2.  ABDOMEN:  Soft, nontender with no " hepatosplenomegaly or masses.  EXTREMITIES:  No clubbing, cyanosis or edema.  NEUROLOGICAL:  Cranial Nerves II-XII grossly intact.  No focal neurological deficits.  PSYCHIATRIC:  Normal affect and mood.      RECENT LABS:  Hematology WBC   Date Value Ref Range Status   06/20/2017 8.46 4.50 - 10.70 10*3/mm3 Final     RBC   Date Value Ref Range Status   06/20/2017 2.50 (L) 3.90 - 5.20 10*6/mm3 Final     Hemoglobin   Date Value Ref Range Status   06/20/2017 8.1 (L) 11.9 - 15.5 g/dL Final     Hematocrit   Date Value Ref Range Status   06/20/2017 25.2 (L) 35.6 - 45.5 % Final     Platelets   Date Value Ref Range Status   06/20/2017 166 140 - 500 10*3/mm3 Final          Assessment/Plan   Symptomatic anemia:transfuse PRBC . Return in 2weeks for labs and 4weeks for labs?MD/Procrit  She has a meeting regarding probable dialysis within the next month or so.  [place] need [place] support immediately prior to that 7[place] departure.    In the short-term with hemoglobin 8.1 and symptomatic anemia he will undergo 2 units packed red blood cells on 6/23.

## 2017-06-20 NOTE — PROGRESS NOTES
Ms Machuca was discharged home per Dr Scott.  Arrangements were made for pt to receive transfusion on 6/23.  DAYANA Esparza

## 2017-06-22 ENCOUNTER — LAB (OUTPATIENT)
Dept: LAB | Facility: HOSPITAL | Age: 77
End: 2017-06-22

## 2017-06-22 DIAGNOSIS — N18.9 HISTORY OF ANEMIA DUE TO CHRONIC KIDNEY DISEASE: ICD-10-CM

## 2017-06-22 DIAGNOSIS — Z86.2 HISTORY OF ANEMIA DUE TO CHRONIC KIDNEY DISEASE: ICD-10-CM

## 2017-06-22 LAB
ABO GROUP BLD: NORMAL
BLD GP AB SCN SERPL QL: NEGATIVE
RH BLD: NEGATIVE

## 2017-06-22 PROCEDURE — 86923 COMPATIBILITY TEST ELECTRIC: CPT

## 2017-06-23 ENCOUNTER — INFUSION (OUTPATIENT)
Dept: ONCOLOGY | Facility: HOSPITAL | Age: 77
End: 2017-06-23

## 2017-06-23 VITALS
RESPIRATION RATE: 18 BRPM | DIASTOLIC BLOOD PRESSURE: 62 MMHG | SYSTOLIC BLOOD PRESSURE: 122 MMHG | HEART RATE: 78 BPM | OXYGEN SATURATION: 100 % | TEMPERATURE: 97.5 F

## 2017-06-23 DIAGNOSIS — Z86.2 HISTORY OF ANEMIA DUE TO CHRONIC KIDNEY DISEASE: ICD-10-CM

## 2017-06-23 DIAGNOSIS — N18.9 HISTORY OF ANEMIA DUE TO CHRONIC KIDNEY DISEASE: ICD-10-CM

## 2017-06-23 PROCEDURE — P9016 RBC LEUKOCYTES REDUCED: HCPCS

## 2017-06-23 PROCEDURE — 86900 BLOOD TYPING SEROLOGIC ABO: CPT

## 2017-06-23 PROCEDURE — 63710000001 DIPHENHYDRAMINE PER 50 MG: Performed by: INTERNAL MEDICINE

## 2017-06-23 PROCEDURE — 36430 TRANSFUSION BLD/BLD COMPNT: CPT

## 2017-06-23 RX ORDER — DIPHENHYDRAMINE HCL 25 MG
25 CAPSULE ORAL ONCE
Status: COMPLETED | OUTPATIENT
Start: 2017-06-23 | End: 2017-06-23

## 2017-06-23 RX ORDER — SODIUM CHLORIDE 9 MG/ML
250 INJECTION, SOLUTION INTRAVENOUS AS NEEDED
Status: DISCONTINUED | OUTPATIENT
Start: 2017-06-23 | End: 2017-06-23 | Stop reason: HOSPADM

## 2017-06-23 RX ORDER — ACETAMINOPHEN 325 MG/1
325 TABLET ORAL ONCE
Status: COMPLETED | OUTPATIENT
Start: 2017-06-23 | End: 2017-06-23

## 2017-06-23 RX ADMIN — DIPHENHYDRAMINE HYDROCHLORIDE 25 MG: 25 CAPSULE ORAL at 07:30

## 2017-06-23 RX ADMIN — ACETAMINOPHEN 325 MG: 325 TABLET ORAL at 07:29

## 2017-07-03 ENCOUNTER — LAB (OUTPATIENT)
Dept: OTHER | Facility: HOSPITAL | Age: 77
End: 2017-07-03

## 2017-07-03 ENCOUNTER — OFFICE VISIT (OUTPATIENT)
Dept: ONCOLOGY | Facility: CLINIC | Age: 77
End: 2017-07-03

## 2017-07-03 VITALS — TEMPERATURE: 98.1 F | SYSTOLIC BLOOD PRESSURE: 115 MMHG | DIASTOLIC BLOOD PRESSURE: 66 MMHG

## 2017-07-03 DIAGNOSIS — D50.8 OTHER IRON DEFICIENCY ANEMIA: Primary | ICD-10-CM

## 2017-07-03 DIAGNOSIS — D61.818 PANCYTOPENIA (HCC): Primary | ICD-10-CM

## 2017-07-03 LAB
BASOPHILS # BLD AUTO: 0.02 10*3/MM3 (ref 0–0.2)
BASOPHILS NFR BLD AUTO: 0.4 % (ref 0–1.5)
DEPRECATED RDW RBC AUTO: 56.7 FL (ref 37–54)
EOSINOPHIL # BLD AUTO: 0.11 10*3/MM3 (ref 0–0.7)
EOSINOPHIL NFR BLD AUTO: 2.1 % (ref 0.3–6.2)
ERYTHROCYTE [DISTWIDTH] IN BLOOD BY AUTOMATED COUNT: 16.2 % (ref 11.7–13)
HCT VFR BLD AUTO: 29.1 % (ref 35.6–45.5)
HGB BLD-MCNC: 9.8 G/DL (ref 11.9–15.5)
IMM GRANULOCYTES # BLD: 0.02 10*3/MM3 (ref 0–0.03)
IMM GRANULOCYTES NFR BLD: 0.4 % (ref 0–0.5)
LYMPHOCYTES # BLD AUTO: 1.33 10*3/MM3 (ref 0.9–4.8)
LYMPHOCYTES NFR BLD AUTO: 24.9 % (ref 19.6–45.3)
MCH RBC QN AUTO: 32.1 PG (ref 26.9–32)
MCHC RBC AUTO-ENTMCNC: 33.7 G/DL (ref 32.4–36.3)
MCV RBC AUTO: 95.4 FL (ref 80.5–98.2)
MONOCYTES # BLD AUTO: 0.46 10*3/MM3 (ref 0.2–1.2)
MONOCYTES NFR BLD AUTO: 8.6 % (ref 5–12)
NEUTROPHILS # BLD AUTO: 3.41 10*3/MM3 (ref 1.9–8.1)
NEUTROPHILS NFR BLD AUTO: 63.6 % (ref 42.7–76)
NRBC BLD MANUAL-RTO: 0 /100 WBC (ref 0–0)
PLATELET # BLD AUTO: 164 10*3/MM3 (ref 140–500)
PMV BLD AUTO: 9.9 FL (ref 6–12)
RBC # BLD AUTO: 3.05 10*6/MM3 (ref 3.9–5.2)
WBC NRBC COR # BLD: 5.35 10*3/MM3 (ref 4.5–10.7)

## 2017-07-03 PROCEDURE — 85025 COMPLETE CBC W/AUTO DIFF WBC: CPT | Performed by: INTERNAL MEDICINE

## 2017-07-03 PROCEDURE — 36415 COLL VENOUS BLD VENIPUNCTURE: CPT

## 2017-07-03 PROCEDURE — 99212-NC PR NO CHARGE CBC OFFICE OUTPATIENT VISIT 10 MINUTES: Performed by: NURSE PRACTITIONER

## 2017-07-03 NOTE — PROGRESS NOTES
"Patient is here for lab with RN review.  She does report some fatigue.  She is also complaining of some ongoing issues with B[employer] horses.  She is being followed by nephrology, and reports she has to go to \"dialysis class\" soon to learn about dialysis.  She is quite tearful at thinking about having to start dialysis.  Vitals:    07/03/17 1456   BP: 115/66   Temp: 98.1 °F (36.7 °C)     Lab Results   Component Value Date    WBC 5.35 07/03/2017    HGB 9.8 (L) 07/03/2017    HCT 29.1 (L) 07/03/2017    MCV 95.4 07/03/2017     07/03/2017     Patient is scheduled to return on 7/17/2017 for follow-up with Dr. Scott.  We will recheck a CBC at that time.  "

## 2017-07-10 ENCOUNTER — TELEPHONE (OUTPATIENT)
Dept: NEUROLOGY | Facility: CLINIC | Age: 77
End: 2017-07-10

## 2017-07-10 NOTE — TELEPHONE ENCOUNTER
----- Message from Brenda Avelar sent at 7/10/2017  9:27 AM EDT -----  Contact: [phone]  Patient medication rOPINIRole (REQUIP) 0.5 MG tablet is making her very sleepy and requesting for a new medication. And also want to talk to Jer Kary about her Horizon because her insurance do not cover and its getting very expensive was wondering if she can get a discount card.

## 2017-07-12 ENCOUNTER — CLINICAL SUPPORT NO REQUIREMENTS (OUTPATIENT)
Dept: CARDIOLOGY | Facility: CLINIC | Age: 77
End: 2017-07-12

## 2017-07-12 ENCOUNTER — OFFICE VISIT (OUTPATIENT)
Dept: CARDIOLOGY | Facility: CLINIC | Age: 77
End: 2017-07-12

## 2017-07-12 VITALS
SYSTOLIC BLOOD PRESSURE: 128 MMHG | BODY MASS INDEX: 28.93 KG/M2 | WEIGHT: 180 LBS | DIASTOLIC BLOOD PRESSURE: 70 MMHG | HEART RATE: 76 BPM | HEIGHT: 66 IN

## 2017-07-12 DIAGNOSIS — I42.9 CARDIOMYOPATHY (HCC): Primary | ICD-10-CM

## 2017-07-12 DIAGNOSIS — E78.49 OTHER HYPERLIPIDEMIA: ICD-10-CM

## 2017-07-12 DIAGNOSIS — I10 ESSENTIAL HYPERTENSION: ICD-10-CM

## 2017-07-12 DIAGNOSIS — N18.4 RENAL FAILURE, CHRONIC, STAGE 4 (SEVERE) (HCC): ICD-10-CM

## 2017-07-12 DIAGNOSIS — I50.22 CHRONIC SYSTOLIC CONGESTIVE HEART FAILURE (HCC): ICD-10-CM

## 2017-07-12 PROCEDURE — 93284 PRGRMG EVAL IMPLANTABLE DFB: CPT | Performed by: INTERNAL MEDICINE

## 2017-07-12 PROCEDURE — 99214 OFFICE O/P EST MOD 30 MIN: CPT | Performed by: INTERNAL MEDICINE

## 2017-07-12 PROCEDURE — 93000 ELECTROCARDIOGRAM COMPLETE: CPT | Performed by: INTERNAL MEDICINE

## 2017-07-12 PROCEDURE — 93290 INTERROG DEV EVAL ICPMS IP: CPT | Performed by: INTERNAL MEDICINE

## 2017-07-12 NOTE — PROGRESS NOTES
Date of Office Visit: 17  Encounter Provider: Estevan Matamoros MD  Place of Service: [facility] CARDIOLOGY  Patient Name: Charmaine Machuca  :1940      Chief Complaint   Patient presents with   • Congestive Heart Failure   • Cardiomyopathy     History of Present Illness  HPI Comments:     The patient is a pleasant 77-year-old female with a history of nonischemic  cardiomyopathy, hypertension, hyperlipidemia and LBBB. Original ejection   fraction was extremely low. She was enrolled in the [facility] study of ACE   inhibitor plus Atacand versus ACE inhibitor plus a placebo. Then, in 2005   she had a perfusion study that showed infarct but no ischemia. She had cardiac   catheterization in 2006, which showed moderate left ventricular dysfunction,   elevated right-sided pressure, left anterior end-diastolic pressure, mild mitral insufficiency,  but normal coronary arteries. She was treated medically. She then had  worsening dyspnea. In 2007, had a Bi-V defibrillator placed and she  continued to have GI problems, fatigue, weakness, dizziness, syncopal, near  syncopal spells, multiple medications were adjusted. Diuretics were  adjusted. On diuretics, off diuretics, volume overloaded, volume depleted.  She was diagnosed with myoclonus. Multiple GI complaints.  She then had her generator changed in 2014. Unfortunately, a day or two after that  developed small bowel obstruction, had to be admitted to the hospital and was then treated  for that. That resolved. She developed C. difficile infection, was treated for that. She  developed a pocket hematoma and had to have that evacuated.   She had a followup echocardiogram in 2015 that showed normal ejection fraction, mild  mitral insufficiency, mild aortic valve calcification without stenosis. She comes in for  followup now. She went to [facility] 2015 with this atypical chest  discomfort, left-sided  under her breast, pleuritic, worse when she would take a breath in,  worse when she would move to one side. No real increased shortness of breath with it, but  it was hard to take breath in. While there, she had a ventilation perfusion scan that was  negative for pulmonary embolus. She had a 2D echocardiogram that, again, showed normal LV  systolic function and no significant valvular disease.  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  Had that removed and new lead replaced.    She got in for follow-up.  She overall says she's doing pretty good.  Things at home are good.  The biggest problem she has now some worsening renal failure and there are apparently talking dialysis.  She gets some shortness of breath with increased activity but really not doing much exercising.  His lower extremity edema which is unchanged.  She gets occasional dizziness but no near-syncope or syncope.  No chest pain or pressure.  No orthopnea or PND.  She feels like her fatigue may be getting somewhat worse.    Congestive Heart Failure   Pertinent negatives include no abdominal pain, muscle weakness, nocturia or shortness of breath.   Cardiomyopathy   Associated symptoms include joint swelling and numbness. Pertinent negatives include no abdominal pain, congestion, diaphoresis, fever, myalgias, nausea, rash, vertigo, vomiting or weakness.         Past Medical History:   Diagnosis Date   • Anemia     Iron deficiency   • Cancer     Skin cancer   • Cardiomyopathy     S/P pacemaker and defibrillator   • Clostridium difficile diarrhea    • Gastroparesis    • GERD (gastroesophageal reflux disease)    • Gout    • Hemorrhoids    • Hiatal hernia    • History of Nissen fundoplication    • History of pancreatitis 01/2014   • Hyperlipidemia    • Hypothyroidism    • Left bundle branch block    • Mitral and aortic valve disease    • Mitral valve insufficiency    • Myoclonus     S/P Depakote   • Nephrolithiasis    • Osteoarthritis    •  Pancytopenia    • Peripheral neuropathy    • Progressive familial myoclonic epilepsy    • Pulmonary hypertension    • RLS (restless legs syndrome)    • Ruptured ovarian cyst    • Spinal stenosis    • Urinary tract infection          Past Surgical History:   Procedure Laterality Date   • APPENDECTOMY     • CHOLECYSTECTOMY  2006   • HEMORRHOIDECTOMY     • HERNIA REPAIR  2006   • HX OVARIAN CYSTECTOMY     • HYSTERECTOMY  1977   • IMPLANTABLE CARDIOVERTER DEFIBRILLATOR LEAD REPLACEMENT/POCKET REVISION Left 3/28/2016    Procedure: AUTOMATIC IMPLANTABLE CARDIOVERTER DEFIBRILLATOR LEAD REPLACEMENT WITH LASER LEAD EXTRACTION;  Surgeon: Leandro Huber MD;  Location: [facility] the [facility] OR 18/19;  Service:    • JOINT REPLACEMENT Left 08/2014    history left total knee replacement   • NATALIE GONZALEZ ([facility]) PROCEDURE     • OTHER SURGICAL HISTORY      BLADDER CYSTOTOMY   • OTHER SURGICAL HISTORY      CARDIAC CATH PROCEDURE SUMMARY   • OTHER SURGICAL HISTORY  2007    PACEMAKER PLACEMENT - TRANSVENOUS ELECTRODE BIVENTRICULAR   • OTHER SURGICAL HISTORY      REPAIR OF PARAESOPHAGEAL HIATUS HERNIA         Current Outpatient Prescriptions on File Prior to Visit   Medication Sig Dispense Refill   • allopurinol (ZYLOPRIM) 100 MG tablet Take 1 tablet by mouth 2 (two) times a day.     • aspirin 81 MG tablet Take 1 tablet by mouth daily.     • calcitriol (ROCALTROL) 0.25 MCG capsule TAKE 1 CAPSULE BY MOUTH DAILY  11   • calcium carbonate (OS-RAYMOND) 600 MG tablet Take 1 tablet by mouth daily.     • carvedilol (COREG) 12.5 MG tablet TAKE 1 TABLET DAILY 90 tablet 1   • Cholecalciferol (VITAMIN D PO) Take  by mouth Daily.     • diazepam (VALIUM) 5 MG tablet Take 1 tablet by mouth every night.     • furosemide (LASIX) 40 MG tablet TAKE 1 TABLET DAILY OR AS  DIRECTED 90 tablet 1   • gabapentin (NEURONTIN) 300 MG capsule Take 300 mg by mouth daily with dinner.     • Glucosamine-Chondroit-Vit C-Mn (GLUCOSAMINE CHONDROITIN COMPLX) capsule  Take 1 capsule by mouth every other day.     • Iron tablet Take 1 tablet by mouth 2 (Two) Times a Day.     • levothyroxine (SYNTHROID, LEVOTHROID) 25 MCG tablet Take 1 tablet by mouth daily.     • magnesium oxide 250 MG tablet Take 1 tablet by mouth daily.     • methscopolamine (PAMINE FORTE) 5 MG tablet Take 1 tablet by mouth daily.     • Multiple Vitamin (MULTI-DAY VITAMINS) tablet Take by mouth daily.     • ondansetron (ZOFRAN) 8 MG tablet Take 1 tablet by mouth Every 8 (Eight) Hours As Needed for Nausea or Vomiting. 30 tablet 2   • psyllium (METAMUCIL) 58.6 % powder Take 1 packet by mouth 2 (two) times a day.     • spironolactone (ALDACTONE) 25 MG tablet TAKE 1/2 TABLET DAILY 45 tablet 1   • Vitamin E 400 UNITS tablet Take by mouth.     • [DISCONTINUED] FLUZONE HIGH-DOSE 0.5 ML suspension prefilled syringe injection ADM 0.5ML IM UTD  0   • [DISCONTINUED] HORIZANT 300 MG tablet controlled-release Take 1 tablet by mouth daily.     • [DISCONTINUED] rOPINIRole (REQUIP) 0.5 MG tablet Take 1 tablet by mouth 2 (Two) Times a Day. 180 tablet 1     No current facility-administered medications on file prior to visit.          Social History     Social History   • Marital status:      Spouse name: Zackery   • Number of children: 5   • Years of education: 1 yr college     Occupational History   •  Retired     Social History Main Topics   • Smoking status: Never Smoker   • Smokeless tobacco: Never Used   • Alcohol use No   • Drug use: No   • Sexual activity: Not on file     Other Topics Concern   • Not on file     Social History Narrative       Family History   Problem Relation Age of Onset   • Coronary artery disease Other    • Dementia Other    • Kidney disease Other          Review of Systems   Constitution: Negative for decreased appetite, diaphoresis, fever, weakness, malaise/fatigue, weight gain and weight loss.   HENT: Negative for congestion, hearing loss, nosebleeds and tinnitus.    Eyes: Negative for blurred  "vision, double vision, vision loss in left eye, vision loss in right eye and visual disturbance.   Cardiovascular: Negative for orthopnea.        As noted in HPI   Respiratory: Negative for shortness of breath.         As noted HPI   Endocrine: Negative for cold intolerance and heat intolerance.   Hematologic/Lymphatic: Negative for bleeding problem. Does not bruise/bleed easily.   Skin: Negative for color change, flushing, itching and rash.   Musculoskeletal: Positive for joint swelling. Negative for arthritis, back pain, joint pain, muscle weakness and myalgias.   Gastrointestinal: Negative for bloating, abdominal pain, constipation, diarrhea, dysphagia, heartburn, hematemesis, hematochezia, melena, nausea and vomiting.   Genitourinary: Negative for bladder incontinence, dysuria, frequency, nocturia and urgency.   Neurological: Positive for numbness. Negative for dizziness, focal weakness, light-headedness, loss of balance, paresthesias and vertigo.   Psychiatric/Behavioral: Negative for depression, memory loss and substance abuse.       Procedures      ECG 12 Lead  Date/Time: 7/12/2017 11:04 AM  Performed by: YARON KRISHNAN  Authorized by: YARON KRISHNAN   Comparison: compared with previous ECG   Similar to previous ECG  Rhythm: sinus rhythm  Rhythm comments: Ventricular paced.                 Objective:    /70 (BP Location: Left arm)  Pulse 76  Ht 66\" (167.6 cm)  Wt 180 lb (81.6 kg)  BMI 29.05 kg/m2       Physical Exam  Physical Exam   Constitutional: She is oriented to person, place, and time. She appears well-developed and well-nourished. No distress.   HENT:   Head: Normocephalic.   Eyes: Conjunctivae are normal. Pupils are equal, round, and reactive to light. No scleral icterus.   Neck: Normal carotid pulses, no hepatojugular reflux and no JVD present. Carotid bruit is not present. No tracheal deviation, no edema and no erythema present. No thyromegaly present.   Cardiovascular: Normal rate, " regular rhythm, S1 normal, S2 normal and intact distal pulses.   No extrasystoles are present. PMI is not displaced.  Exam reveals no distant heart sounds and no friction rub.    Murmur heard.   Systolic murmur is present with a grade of 2/6  at the upper right sternal border  Pulses:       Carotid pulses are 2+ on the right side, and 2+ on the left side.       Radial pulses are 2+ on the right side, and 2+ on the left side.        Femoral pulses are 2+ on the right side, and 2+ on the left side.       Dorsalis pedis pulses are 2+ on the right side, and 2+ on the left side.        Posterior tibial pulses are 2+ on the right side, and 2+ on the left side.   Pulmonary/Chest: Effort normal and breath sounds normal. No respiratory distress. She has no decreased breath sounds. She has no wheezes. She has no rhonchi. She has no rales. She exhibits no tenderness.   Abdominal: Soft. Bowel sounds are normal. She exhibits no distension and no mass. There is no hepatosplenomegaly. There is no tenderness. There is no rebound and no guarding.   Musculoskeletal: She exhibits edema (1+ bilateral tibial). She exhibits no tenderness or deformity.   Neurological: She is alert and oriented to person, place, and time.   Skin: Skin is warm and dry. No rash noted. She is not diaphoretic. No cyanosis or erythema. No pallor. Nails show no clubbing.   Psychiatric: She has a normal mood and affect. Her speech is normal and behavior is normal. Judgment and thought content normal.           Assessment:   1. This is a 77-year-old female with history of nonischemic cardiomyopathy. Last echocardiogram in 3/15 showed an ejection fraction of 62%.   Heart Failure  Assessment  • NYHA class I - There is no limitation of physical activity. Physical activity does not cause fatigue, palpitations or shortness of breath.  • ACE inhibitor not prescribed for medical reasons  • Beta blocker prescribed  • Diuretics prescribed  • Angiotensin receptor blocker  (ARB) not prescribed for medical reasons  • Left ventricular function is normal by qualitative assessment    Plan  • The patient has received heart failure education on the following topics: dietary sodium restriction, medication instructions, minimizing or avoiding NSAID use, symptom management, weight monitoring and minimizing alcohol intake  • The heart failure care plan was discussed with the patient today including: continuing the current program  •  The patient has been counseled about ICD or CRT-D implantation    Subjective/Objective    • Physical exam findings negative for rales and elevated JVP.  • The patient has an ICD implant  • The patient has a CRT-D implant  • The patient has a CRT implant      She is stable from a cardiovascular standpoint.  She will continue the same see us skin in follow-up in 6 months.      2. History of congestive heart failure status post Bi-V ICD. Following in our defibrillator clinic. Now stable.  3. History of syncope, falling spells, and dizziness of unclear etiology. Resolved. May have been from Depakote.  4. Hyperlipidemia, followed in your office.  5. History of kidney stones.  6. History of left bundle branch block. unchanged.  7. History of anemia.worsening iron deficient still following with hematology.   8. History of renal failure. To see hematology.  9. History of hypertension.   10. Probable depression.   11. Myoclonus.   12. Mitral Insufficiency. Echocardiogram 3/15 was just mild.    13.  Renal failure stage IV following with nephrology.         Plan:

## 2017-07-12 NOTE — TELEPHONE ENCOUNTER
Can you call this in? 300 mg Horizant, 1 tablet by mouth daily with dinner. This is controlled now so I can't e-prescribe and if it prints it will be over here at [facility].

## 2017-07-12 NOTE — TELEPHONE ENCOUNTER
She had already stopped the Requip when she called, and restarted the patch. She said all that she did was sleep all day and that has improved since. She is needing a refill on Horizant

## 2017-07-13 DIAGNOSIS — Z86.2 HISTORY OF ANEMIA DUE TO CHRONIC KIDNEY DISEASE: Primary | ICD-10-CM

## 2017-07-13 DIAGNOSIS — N18.9 HISTORY OF ANEMIA DUE TO CHRONIC KIDNEY DISEASE: Primary | ICD-10-CM

## 2017-07-17 ENCOUNTER — INFUSION (OUTPATIENT)
Dept: ONCOLOGY | Facility: HOSPITAL | Age: 77
End: 2017-07-17

## 2017-07-17 ENCOUNTER — OFFICE VISIT (OUTPATIENT)
Dept: ONCOLOGY | Facility: CLINIC | Age: 77
End: 2017-07-17

## 2017-07-17 ENCOUNTER — LAB (OUTPATIENT)
Dept: OTHER | Facility: HOSPITAL | Age: 77
End: 2017-07-17

## 2017-07-17 VITALS
HEIGHT: 66 IN | DIASTOLIC BLOOD PRESSURE: 80 MMHG | TEMPERATURE: 97.6 F | WEIGHT: 179 LBS | SYSTOLIC BLOOD PRESSURE: 130 MMHG | OXYGEN SATURATION: 98 % | HEART RATE: 67 BPM | BODY MASS INDEX: 28.77 KG/M2 | RESPIRATION RATE: 18 BRPM

## 2017-07-17 DIAGNOSIS — D50.8 OTHER IRON DEFICIENCY ANEMIA: ICD-10-CM

## 2017-07-17 DIAGNOSIS — N18.9 HISTORY OF ANEMIA DUE TO CHRONIC KIDNEY DISEASE: ICD-10-CM

## 2017-07-17 DIAGNOSIS — Z86.2 HISTORY OF ANEMIA DUE TO CHRONIC KIDNEY DISEASE: ICD-10-CM

## 2017-07-17 DIAGNOSIS — D61.818 PANCYTOPENIA (HCC): Primary | ICD-10-CM

## 2017-07-17 DIAGNOSIS — N18.4 CKD (CHRONIC KIDNEY DISEASE), STAGE 4 (SEVERE): Primary | ICD-10-CM

## 2017-07-17 DIAGNOSIS — R30.0 DYSURIA: ICD-10-CM

## 2017-07-17 LAB
BACTERIA UR QL AUTO: ABNORMAL /HPF
BASOPHILS # BLD AUTO: 0.02 10*3/MM3 (ref 0–0.2)
BASOPHILS NFR BLD AUTO: 0.3 % (ref 0–1.5)
BILIRUB UR QL STRIP: NEGATIVE
CLARITY UR: ABNORMAL
COLOR UR: YELLOW
DEPRECATED RDW RBC AUTO: 58.2 FL (ref 37–54)
EOSINOPHIL # BLD AUTO: 0.09 10*3/MM3 (ref 0–0.7)
EOSINOPHIL NFR BLD AUTO: 1.4 % (ref 0.3–6.2)
ERYTHROCYTE [DISTWIDTH] IN BLOOD BY AUTOMATED COUNT: 16.6 % (ref 11.7–13)
GLUCOSE UR STRIP-MCNC: NEGATIVE MG/DL
HCT VFR BLD AUTO: 27.1 % (ref 35.6–45.5)
HGB BLD-MCNC: 9.1 G/DL (ref 11.9–15.5)
HGB UR QL STRIP.AUTO: ABNORMAL
HYALINE CASTS UR QL AUTO: ABNORMAL /LPF
IMM GRANULOCYTES # BLD: 0.03 10*3/MM3 (ref 0–0.03)
IMM GRANULOCYTES NFR BLD: 0.5 % (ref 0–0.5)
KETONES UR QL STRIP: NEGATIVE
LEUKOCYTE ESTERASE UR QL STRIP.AUTO: ABNORMAL
LYMPHOCYTES # BLD AUTO: 1.18 10*3/MM3 (ref 0.9–4.8)
LYMPHOCYTES NFR BLD AUTO: 17.7 % (ref 19.6–45.3)
MCH RBC QN AUTO: 32.7 PG (ref 26.9–32)
MCHC RBC AUTO-ENTMCNC: 33.6 G/DL (ref 32.4–36.3)
MCV RBC AUTO: 97.5 FL (ref 80.5–98.2)
MONOCYTES # BLD AUTO: 0.49 10*3/MM3 (ref 0.2–1.2)
MONOCYTES NFR BLD AUTO: 7.4 % (ref 5–12)
NEUTROPHILS # BLD AUTO: 4.84 10*3/MM3 (ref 1.9–8.1)
NEUTROPHILS NFR BLD AUTO: 72.7 % (ref 42.7–76)
NITRITE UR QL STRIP: POSITIVE
NRBC BLD MANUAL-RTO: 0 /100 WBC (ref 0–0)
PH UR STRIP.AUTO: 5.5 [PH] (ref 5–8)
PLATELET # BLD AUTO: 144 10*3/MM3 (ref 140–500)
PMV BLD AUTO: 10.1 FL (ref 6–12)
PROT UR QL STRIP: ABNORMAL
RBC # BLD AUTO: 2.78 10*6/MM3 (ref 3.9–5.2)
RBC # UR: ABNORMAL /HPF
REF LAB TEST METHOD: ABNORMAL
SP GR UR STRIP: 1.01 (ref 1–1.03)
SQUAMOUS #/AREA URNS HPF: ABNORMAL /HPF
UROBILINOGEN UR QL STRIP: ABNORMAL
WBC NRBC COR # BLD: 6.65 10*3/MM3 (ref 4.5–10.7)
WBC UR QL AUTO: ABNORMAL /HPF

## 2017-07-17 PROCEDURE — 85025 COMPLETE CBC W/AUTO DIFF WBC: CPT | Performed by: INTERNAL MEDICINE

## 2017-07-17 PROCEDURE — 81001 URINALYSIS AUTO W/SCOPE: CPT | Performed by: INTERNAL MEDICINE

## 2017-07-17 PROCEDURE — 63510000001 EPOETIN ALFA PER 1000 UNITS: Performed by: INTERNAL MEDICINE

## 2017-07-17 PROCEDURE — 36415 COLL VENOUS BLD VENIPUNCTURE: CPT

## 2017-07-17 PROCEDURE — 99214 OFFICE O/P EST MOD 30 MIN: CPT | Performed by: INTERNAL MEDICINE

## 2017-07-17 PROCEDURE — 96372 THER/PROPH/DIAG INJ SC/IM: CPT

## 2017-07-17 RX ADMIN — ERYTHROPOIETIN 20000 UNITS: 20000 INJECTION, SOLUTION INTRAVENOUS; SUBCUTANEOUS at 12:35

## 2017-07-17 NOTE — PROGRESS NOTES
"  Subjective     HISTORY OF PRESENT ILLNESS: History of anemia due to chronic kidney disease, iron deficiency anemia     History of Present Illness    Patient comes to our office today for the above-mentioned history with ongoing symptoms of dysuria with associated frequency, burning, and pain upon urination.  Does have persistent, chronic lightheadedness, though has been able to maintain a reasonable energy level with frequent rest breaks.  She denies any exertional dyspnea.  Of note, her hemoglobin has declined from 9.8 down to 9.1 standard 2 weeks.  She does receive periodic transfusions for symptom management.  We will proceed with 20,000 units of Procrit today, in hopes of preventing further decline in her hemoglobin, as patient is leaving this weekend for a trip out west to visit the [place].  She is followed by Dr. Abraham, her nephrologist regularly for management of chronic kidney disease consideration of dialysis in the near future, if her kidney function continues to decline.      Past Medical History, Past Surgical History, Social History, Family History have been reviewed and are without significant changes except as mentioned.    Review of Systems   A comprehensive 14 point review of systems was performed and was negative except as mentioned.    Medications:  The current medication list was reviewed in the EMR    ALLERGIES:    Allergies   Allergen Reactions   • Chlorhexidine Rash and Itching     Patient states \"blue dye\" in chlorhexidine.  States the orange and clear are OK to use   • Chlorhexidine Gluconate Itching     itched   • Valproic Acid Other (See Comments)     Made her extremely sleepy   • Alcohol Itching     itching   • Codeine Other (See Comments)     Want to climb the walls, doesn't help pain   • Propoxyphene-Acetaminophen    • Propoxyphene Other (See Comments)     Belligerent, acting drunk       Objective      Vitals:    07/17/17 1132   BP: 130/80   Pulse: 67   Resp: 18   Temp: 97.6 °F " "(36.4 °C)   TempSrc: Oral   SpO2: 98%   Weight: 179 lb (81.2 kg)   Height: 66\" (167.6 cm)   PainSc:   4   PainLoc: Knee     Current Status 7/17/2017   ECOG score 0       Physical Exam   GENERAL:  Well-developed, well-nourished in no acute distress.   SKIN:  Warm, dry without rashes, purpura or petechiae.  EYES:  Pupils equal, round and reactive to light.  EOMs intact.  Conjunctivae normal.  EARS:  Hearing intact.  NOSE:  Septum midline.  No excoriations or nasal discharge.  MOUTH:  Tongue is well-papillated; no stomatitis or ulcers.  Lips normal.  THROAT:  Oropharynx without lesions or exudates.  NECK:  Supple with good range of motion; no thyromegaly or masses, no JVD.  LYMPHATICS:  No cervical, supraclavicular, axillary or inguinal adenopathy.  CHEST:  Lungs clear to auscultation. Good airflow.  CARDIAC:  Regular rate and rhythm without murmurs, rubs or gallops. Normal S1,S2.  ABDOMEN:  Soft, nontender with no hepatosplenomegaly or masses.  EXTREMITIES:  No clubbing, cyanosis, trace, bilateral lower extremity edema.  NEUROLOGICAL:  Cranial Nerves II-XII grossly intact.  No focal neurological deficits.  PSYCHIATRIC:  Normal affect and mood.      RECENT LABS:  Hematology WBC   Date Value Ref Range Status   07/17/2017 6.65 4.50 - 10.70 10*3/mm3 Final     RBC   Date Value Ref Range Status   07/17/2017 2.78 (L) 3.90 - 5.20 10*6/mm3 Final     Hemoglobin   Date Value Ref Range Status   07/17/2017 9.1 (L) 11.9 - 15.5 g/dL Final     Hematocrit   Date Value Ref Range Status   07/17/2017 27.1 (L) 35.6 - 45.5 % Final     Platelets   Date Value Ref Range Status   07/17/2017 144 140 - 500 10*3/mm3 Final          Assessment/Plan   Symptomatic anemia:transfuse PRBC . Patient will receive 20,000 units of Procrit today.  I would like to bring her back in 3 days, on Thursday, 07/20/2017 for a recheck of her labs with RN review.    Patient will be leaving for a vacation out west this weekend and we will proceed with possible blood " transfusion, if labs deem necessary prior to her leaving.    Dysuria: She does have a history of chronic urinary tract infections.  We will obtain a urinalysis today and will call her with results and possible antimicrobial therapy, if required.     I have reviewed the notes, assessments, and/or procedures performed by CHANTELL Tiwari.  I concur with her/his documentation of Charmaine Machuca.

## 2017-07-18 RX ORDER — CIPROFLOXACIN 500 MG/1
500 TABLET, FILM COATED ORAL 2 TIMES DAILY
Qty: 14 TABLET | Refills: 1 | Status: SHIPPED | OUTPATIENT
Start: 2017-07-18 | End: 2017-07-18 | Stop reason: SDUPTHER

## 2017-07-18 RX ORDER — CIPROFLOXACIN 500 MG/1
500 TABLET, FILM COATED ORAL 2 TIMES DAILY
Qty: 14 TABLET | Refills: 1 | Status: SHIPPED | OUTPATIENT
Start: 2017-07-18 | End: 2017-09-28

## 2017-07-20 ENCOUNTER — OFFICE VISIT (OUTPATIENT)
Dept: ONCOLOGY | Facility: CLINIC | Age: 77
End: 2017-07-20

## 2017-07-20 ENCOUNTER — LAB (OUTPATIENT)
Dept: OTHER | Facility: HOSPITAL | Age: 77
End: 2017-07-20

## 2017-07-20 VITALS — SYSTOLIC BLOOD PRESSURE: 121 MMHG | DIASTOLIC BLOOD PRESSURE: 59 MMHG | TEMPERATURE: 97.7 F

## 2017-07-20 DIAGNOSIS — D61.818 PANCYTOPENIA (HCC): ICD-10-CM

## 2017-07-20 DIAGNOSIS — D61.818 PANCYTOPENIA (HCC): Primary | ICD-10-CM

## 2017-07-20 LAB
BASOPHILS # BLD AUTO: 0.01 10*3/MM3 (ref 0–0.2)
BASOPHILS NFR BLD AUTO: 0.2 % (ref 0–1.5)
DEPRECATED RDW RBC AUTO: 59.1 FL (ref 37–54)
EOSINOPHIL # BLD AUTO: 0.08 10*3/MM3 (ref 0–0.7)
EOSINOPHIL NFR BLD AUTO: 1.4 % (ref 0.3–6.2)
ERYTHROCYTE [DISTWIDTH] IN BLOOD BY AUTOMATED COUNT: 17 % (ref 11.7–13)
HCT VFR BLD AUTO: 25.3 % (ref 35.6–45.5)
HGB BLD-MCNC: 8.5 G/DL (ref 11.9–15.5)
IMM GRANULOCYTES # BLD: 0.04 10*3/MM3 (ref 0–0.03)
IMM GRANULOCYTES NFR BLD: 0.7 % (ref 0–0.5)
LYMPHOCYTES # BLD AUTO: 1.08 10*3/MM3 (ref 0.9–4.8)
LYMPHOCYTES NFR BLD AUTO: 18.5 % (ref 19.6–45.3)
MCH RBC QN AUTO: 32.8 PG (ref 26.9–32)
MCHC RBC AUTO-ENTMCNC: 33.6 G/DL (ref 32.4–36.3)
MCV RBC AUTO: 97.7 FL (ref 80.5–98.2)
MONOCYTES # BLD AUTO: 0.42 10*3/MM3 (ref 0.2–1.2)
MONOCYTES NFR BLD AUTO: 7.2 % (ref 5–12)
NEUTROPHILS # BLD AUTO: 4.21 10*3/MM3 (ref 1.9–8.1)
NEUTROPHILS NFR BLD AUTO: 72 % (ref 42.7–76)
NRBC BLD MANUAL-RTO: 0.3 /100 WBC (ref 0–0)
PLATELET # BLD AUTO: 141 10*3/MM3 (ref 140–500)
PMV BLD AUTO: 10.2 FL (ref 6–12)
RBC # BLD AUTO: 2.59 10*6/MM3 (ref 3.9–5.2)
WBC NRBC COR # BLD: 5.84 10*3/MM3 (ref 4.5–10.7)

## 2017-07-20 PROCEDURE — 99212-NC PR NO CHARGE CBC OFFICE OUTPATIENT VISIT 10 MINUTES: Performed by: NURSE PRACTITIONER

## 2017-07-20 PROCEDURE — 36415 COLL VENOUS BLD VENIPUNCTURE: CPT

## 2017-07-20 PROCEDURE — 85025 COMPLETE CBC W/AUTO DIFF WBC: CPT | Performed by: INTERNAL MEDICINE

## 2017-07-20 RX ORDER — DIPHENHYDRAMINE HCL 25 MG
25 CAPSULE ORAL ONCE
Status: CANCELLED | OUTPATIENT
Start: 2017-07-20 | End: 2017-07-20

## 2017-07-20 RX ORDER — ACETAMINOPHEN 325 MG/1
650 TABLET ORAL ONCE
Status: CANCELLED | OUTPATIENT
Start: 2017-07-20 | End: 2017-07-20

## 2017-07-20 RX ORDER — SODIUM CHLORIDE 9 MG/ML
250 INJECTION, SOLUTION INTRAVENOUS AS NEEDED
Status: CANCELLED | OUTPATIENT
Start: 2017-07-20

## 2017-07-20 NOTE — PROGRESS NOTES
I reviewed labs with Mrs. Machuca today. Her hgb has dropped further and she is more symptomatic. She denies bleeding. She has no new questions or concerns. She plans to leave town Sunday. I have ordered 2 units of PRBC's before she leaves for her trip. She will call the office with new or worsening symptoms.     Lab Results   Component Value Date    WBC 5.84 07/20/2017    HGB 8.5 (L) 07/20/2017    HCT 25.3 (L) 07/20/2017    MCV 97.7 07/20/2017     07/20/2017

## 2017-07-21 ENCOUNTER — INFUSION (OUTPATIENT)
Dept: ONCOLOGY | Facility: HOSPITAL | Age: 77
End: 2017-07-21

## 2017-07-21 ENCOUNTER — TELEPHONE (OUTPATIENT)
Dept: NEUROLOGY | Facility: CLINIC | Age: 77
End: 2017-07-21

## 2017-07-21 VITALS
TEMPERATURE: 96.8 F | OXYGEN SATURATION: 98 % | HEART RATE: 66 BPM | SYSTOLIC BLOOD PRESSURE: 106 MMHG | RESPIRATION RATE: 16 BRPM | DIASTOLIC BLOOD PRESSURE: 61 MMHG

## 2017-07-21 DIAGNOSIS — C80.1 CANCER (HCC): Primary | ICD-10-CM

## 2017-07-21 LAB
ABO GROUP BLD: NORMAL
BLD GP AB SCN SERPL QL: NEGATIVE
RH BLD: NEGATIVE

## 2017-07-21 PROCEDURE — 36430 TRANSFUSION BLD/BLD COMPNT: CPT

## 2017-07-21 PROCEDURE — 63710000001 DIPHENHYDRAMINE PER 50 MG: Performed by: NURSE PRACTITIONER

## 2017-07-21 PROCEDURE — 86901 BLOOD TYPING SEROLOGIC RH(D): CPT

## 2017-07-21 PROCEDURE — 86900 BLOOD TYPING SEROLOGIC ABO: CPT

## 2017-07-21 PROCEDURE — 86923 COMPATIBILITY TEST ELECTRIC: CPT

## 2017-07-21 PROCEDURE — 86850 RBC ANTIBODY SCREEN: CPT

## 2017-07-21 PROCEDURE — P9016 RBC LEUKOCYTES REDUCED: HCPCS

## 2017-07-21 RX ORDER — ACETAMINOPHEN 325 MG/1
650 TABLET ORAL ONCE
Status: COMPLETED | OUTPATIENT
Start: 2017-07-21 | End: 2017-07-21

## 2017-07-21 RX ORDER — SODIUM CHLORIDE 9 MG/ML
250 INJECTION, SOLUTION INTRAVENOUS AS NEEDED
Status: DISCONTINUED | OUTPATIENT
Start: 2017-07-21 | End: 2017-07-21 | Stop reason: HOSPADM

## 2017-07-21 RX ORDER — DIPHENHYDRAMINE HCL 25 MG
25 CAPSULE ORAL ONCE
Status: COMPLETED | OUTPATIENT
Start: 2017-07-21 | End: 2017-07-21

## 2017-07-21 RX ADMIN — ACETAMINOPHEN 650 MG: 325 TABLET ORAL at 10:32

## 2017-07-21 RX ADMIN — DIPHENHYDRAMINE HYDROCHLORIDE 25 MG: 25 CAPSULE ORAL at 10:32

## 2017-07-21 NOTE — TELEPHONE ENCOUNTER
Pt called she picked of 30 Tablets of Horizant at the pharmacy It say to take 1 time daily. Pt says it should have been for 2 times a daily and for 90 days.

## 2017-08-02 ENCOUNTER — OFFICE VISIT (OUTPATIENT)
Dept: NEUROLOGY | Facility: CLINIC | Age: 77
End: 2017-08-02

## 2017-08-02 VITALS
HEIGHT: 66 IN | OXYGEN SATURATION: 98 % | SYSTOLIC BLOOD PRESSURE: 133 MMHG | DIASTOLIC BLOOD PRESSURE: 68 MMHG | HEART RATE: 66 BPM | WEIGHT: 176 LBS | BODY MASS INDEX: 28.28 KG/M2

## 2017-08-02 DIAGNOSIS — G25.81 RESTLESS LEGS SYNDROME: ICD-10-CM

## 2017-08-02 DIAGNOSIS — G25.3 MYOCLONUS: Primary | ICD-10-CM

## 2017-08-02 DIAGNOSIS — G62.9 PERIPHERAL POLYNEUROPATHY: ICD-10-CM

## 2017-08-02 PROCEDURE — 99213 OFFICE O/P EST LOW 20 MIN: CPT | Performed by: NURSE PRACTITIONER

## 2017-08-02 NOTE — PROGRESS NOTES
Subjective:     Patient ID: Charmaine Machuca is a 77 y.o. female presenting for follow-up for restless leg syndrome, myoclonic jerking lens, periodic limb movement disorder, and peripheral neuropathy.  She is a previous patient of Dr. Spencer.  My last visit with the patient was on January 19, 2017.  She is currently maintained on Neupro patch 4 mg daily as well as Horizant 300 mg daily.  She takes an additional Horizant at bedtime if needed, but states that she rarely does this.  Her symptoms are well controlled with these medications.  Her last visit she was having trouble keeping the Neupro patch is on her scan and was having a worsening of her symptoms so I switched her to Requip oral.  She could not tolerate this medication to drowsiness so she switched back to a Neupro patch.  She states that the most recent prescription she picked up seems to be sticking to her skin better and her symptoms are back to being well controlled.  She has a history of chronic iron deficiency anemia and takes a supplement.  She is followed by [facility] for this.    Peripheral Neuropathy   Associated symptoms include fatigue, nausea, neck pain, vertigo and weakness. Pertinent negatives include no abdominal pain, chest pain, diaphoresis, fever, headaches or vomiting.   Neurologic Problem   The patient's primary symptoms include focal sensory loss, a loss of balance, near-syncope and weakness. This is a recurrent problem. The current episode started more than 1 year ago. The neurological problem developed suddenly. The problem has been waxing and waning since onset. There was lower extremity focality noted. Associated symptoms include back pain, bowel incontinence, dizziness, fatigue, light-headedness, nausea, neck pain and vertigo. Pertinent negatives include no abdominal pain, auditory change, aura, bladder incontinence, chest pain, confusion, diaphoresis, fever, headaches, palpitations, shortness of breath or vomiting. Past  treatments include acetaminophen, medication and position change. The treatment provided mild relief.     The following portions of the patient's history were reviewed and updated as appropriate: allergies, current medications, past family history, past medical history, past social history, past surgical history and problem list.    Review of Systems   Constitutional: Positive for fatigue. Negative for diaphoresis and fever.   Respiratory: Negative for shortness of breath.    Cardiovascular: Positive for near-syncope. Negative for chest pain and palpitations.   Gastrointestinal: Positive for bowel incontinence and nausea. Negative for abdominal pain and vomiting.   Genitourinary: Negative for bladder incontinence.   Musculoskeletal: Positive for back pain and neck pain.   Neurological: Positive for dizziness, vertigo, weakness, light-headedness and loss of balance. Negative for headaches.   Psychiatric/Behavioral: Negative for confusion.        Objective:    The following exam was performed today and there have been no changes since her last visit.    Neurologic Exam  General: Well nourished, well developed, and in no acute distress.  HEENT: Normocephalic/atraumatic. Mucous membranes moist. Sclerae anicteric.    Skin: No notable rashes or lesions on the visible surfaces.    Extremities: arthritic changes bilat hands, no cyanosis.  Psychiatric: Pleasant, cooperative, and appropriate.  Neurologic Exam:  Mental Status: Alert and oriented. Speech is fluent. Comprehension is intact. No issues with memory, cognition or attention identified. She is a good historian.    Cranial Nerves II-XII: Pupils are equal. Extraocular movements are conjugate. No nystagmus noted.  Hearing is intact to conversational tones. Facial strength is preserved/symmetric. Normal facial movement/expression. Voice easily audible, non-hoarse, non-dysarthric.    Motor: decreased bulk L>R FDI, hand intrinsics. No focal or lateralizing weakness grossly.  There are no myoclonic jerks, tremors, or other abnormal or involuntary movements noted.    Coordination: grossly intact. No bradykinesia.    Gait: Normal      Physical Exam    Assessment/Plan:     Charmaine was seen today for restless legs syndrome and peripheral neuropathy.    Diagnoses and all orders for this visit:    Myoclonus    Peripheral polyneuropathy    Restless legs syndrome     She is to continue Neupro patch 4 mg and Horizant 300 mg daily at bedtime.  She can take an additional 300 mg of Horizant if needed for worsening symptoms.  She only does this occasionally.  She is to take this with food.  She will continue following with [facility] for iron deficiency issues.  It is expected that when her iron levels are lower her restless legs and myoclonus will worsen and she understands this correlation.    Follow-up 1 year.

## 2017-09-06 DIAGNOSIS — D63.1 ANEMIA OF CHRONIC RENAL FAILURE, STAGE 4 (SEVERE) (HCC): ICD-10-CM

## 2017-09-06 DIAGNOSIS — N18.4 ANEMIA OF CHRONIC RENAL FAILURE, STAGE 4 (SEVERE) (HCC): ICD-10-CM

## 2017-09-08 ENCOUNTER — LAB (OUTPATIENT)
Dept: OTHER | Facility: HOSPITAL | Age: 77
End: 2017-09-08

## 2017-09-08 ENCOUNTER — INFUSION (OUTPATIENT)
Dept: ONCOLOGY | Facility: HOSPITAL | Age: 77
End: 2017-09-08

## 2017-09-08 ENCOUNTER — OFFICE VISIT (OUTPATIENT)
Dept: ONCOLOGY | Facility: CLINIC | Age: 77
End: 2017-09-08

## 2017-09-08 VITALS
DIASTOLIC BLOOD PRESSURE: 77 MMHG | RESPIRATION RATE: 16 BRPM | SYSTOLIC BLOOD PRESSURE: 137 MMHG | HEIGHT: 69 IN | BODY MASS INDEX: 26.11 KG/M2 | OXYGEN SATURATION: 99 % | TEMPERATURE: 97.8 F | WEIGHT: 176.3 LBS | HEART RATE: 77 BPM

## 2017-09-08 DIAGNOSIS — N18.4 ANEMIA OF CHRONIC RENAL FAILURE, STAGE 4 (SEVERE) (HCC): Primary | ICD-10-CM

## 2017-09-08 DIAGNOSIS — N18.9 HISTORY OF ANEMIA DUE TO CHRONIC KIDNEY DISEASE: Primary | ICD-10-CM

## 2017-09-08 DIAGNOSIS — Z86.2 HISTORY OF ANEMIA DUE TO CHRONIC KIDNEY DISEASE: Primary | ICD-10-CM

## 2017-09-08 DIAGNOSIS — D63.1 ANEMIA OF CHRONIC RENAL FAILURE, STAGE 4 (SEVERE) (HCC): Primary | ICD-10-CM

## 2017-09-08 DIAGNOSIS — D63.1 ANEMIA OF CHRONIC RENAL FAILURE, STAGE 4 (SEVERE) (HCC): ICD-10-CM

## 2017-09-08 DIAGNOSIS — N18.4 ANEMIA OF CHRONIC RENAL FAILURE, STAGE 4 (SEVERE) (HCC): ICD-10-CM

## 2017-09-08 LAB
ABO GROUP BLD: NORMAL
ALBUMIN SERPL-MCNC: 3.8 G/DL (ref 3.5–5.2)
ALBUMIN/GLOB SERPL: 1.5 G/DL
ALP SERPL-CCNC: 56 U/L (ref 39–117)
ALT SERPL W P-5'-P-CCNC: 13 U/L (ref 1–33)
ANION GAP SERPL CALCULATED.3IONS-SCNC: 12.4 MMOL/L
AST SERPL-CCNC: 17 U/L (ref 1–32)
BASOPHILS # BLD AUTO: 0.01 10*3/MM3 (ref 0–0.2)
BASOPHILS NFR BLD AUTO: 0.2 % (ref 0–1.5)
BILIRUB SERPL-MCNC: <0.2 MG/DL (ref 0.1–1.2)
BLD GP AB SCN SERPL QL: NEGATIVE
BUN BLD-MCNC: 45 MG/DL (ref 8–23)
BUN/CREAT SERPL: 20.1 (ref 7–25)
CALCIUM SPEC-SCNC: 9.4 MG/DL (ref 8.6–10.5)
CHLORIDE SERPL-SCNC: 106 MMOL/L (ref 98–107)
CO2 SERPL-SCNC: 24.6 MMOL/L (ref 22–29)
CREAT BLD-MCNC: 2.24 MG/DL (ref 0.57–1)
DEPRECATED RDW RBC AUTO: 65.1 FL (ref 37–54)
EOSINOPHIL # BLD AUTO: 0.09 10*3/MM3 (ref 0–0.7)
EOSINOPHIL NFR BLD AUTO: 1.5 % (ref 0.3–6.2)
ERYTHROCYTE [DISTWIDTH] IN BLOOD BY AUTOMATED COUNT: 18 % (ref 11.7–13)
FERRITIN SERPL-MCNC: 89.2 NG/ML (ref 13–150)
GFR SERPL CREATININE-BSD FRML MDRD: 21 ML/MIN/1.73
GLOBULIN UR ELPH-MCNC: 2.5 GM/DL
GLUCOSE BLD-MCNC: 143 MG/DL (ref 65–99)
HCT VFR BLD AUTO: 24.9 % (ref 35.6–45.5)
HGB BLD-MCNC: 8 G/DL (ref 11.9–15.5)
IMM GRANULOCYTES # BLD: 0.02 10*3/MM3 (ref 0–0.03)
IMM GRANULOCYTES NFR BLD: 0.3 % (ref 0–0.5)
IRON 24H UR-MRATE: 48 MCG/DL (ref 37–145)
IRON SATN MFR SERPL: 16 % (ref 20–50)
LYMPHOCYTES # BLD AUTO: 1.18 10*3/MM3 (ref 0.9–4.8)
LYMPHOCYTES NFR BLD AUTO: 20 % (ref 19.6–45.3)
MCH RBC QN AUTO: 32 PG (ref 26.9–32)
MCHC RBC AUTO-ENTMCNC: 32.1 G/DL (ref 32.4–36.3)
MCV RBC AUTO: 99.6 FL (ref 80.5–98.2)
MONOCYTES # BLD AUTO: 0.36 10*3/MM3 (ref 0.2–1.2)
MONOCYTES NFR BLD AUTO: 6.1 % (ref 5–12)
NEUTROPHILS # BLD AUTO: 4.24 10*3/MM3 (ref 1.9–8.1)
NEUTROPHILS NFR BLD AUTO: 71.9 % (ref 42.7–76)
NRBC BLD MANUAL-RTO: 0 /100 WBC (ref 0–0)
PLATELET # BLD AUTO: 165 10*3/MM3 (ref 140–500)
PMV BLD AUTO: 9.1 FL (ref 6–12)
POTASSIUM BLD-SCNC: 5.2 MMOL/L (ref 3.5–5.2)
PROT SERPL-MCNC: 6.3 G/DL (ref 6–8.5)
RBC # BLD AUTO: 2.5 10*6/MM3 (ref 3.9–5.2)
RH BLD: NEGATIVE
SODIUM BLD-SCNC: 143 MMOL/L (ref 136–145)
TIBC SERPL-MCNC: 295 MCG/DL (ref 298–536)
TRANSFERRIN SERPL-MCNC: 198 MG/DL (ref 200–360)
WBC NRBC COR # BLD: 5.9 10*3/MM3 (ref 4.5–10.7)

## 2017-09-08 PROCEDURE — 36415 COLL VENOUS BLD VENIPUNCTURE: CPT

## 2017-09-08 PROCEDURE — 86901 BLOOD TYPING SEROLOGIC RH(D): CPT

## 2017-09-08 PROCEDURE — 86850 RBC ANTIBODY SCREEN: CPT

## 2017-09-08 PROCEDURE — 63510000001 EPOETIN ALFA PER 1000 UNITS: Performed by: INTERNAL MEDICINE

## 2017-09-08 PROCEDURE — 99213 OFFICE O/P EST LOW 20 MIN: CPT | Performed by: INTERNAL MEDICINE

## 2017-09-08 PROCEDURE — 86923 COMPATIBILITY TEST ELECTRIC: CPT

## 2017-09-08 PROCEDURE — 86900 BLOOD TYPING SEROLOGIC ABO: CPT

## 2017-09-08 PROCEDURE — 96372 THER/PROPH/DIAG INJ SC/IM: CPT

## 2017-09-08 RX ORDER — SODIUM CHLORIDE 9 MG/ML
250 INJECTION, SOLUTION INTRAVENOUS AS NEEDED
Status: CANCELLED | OUTPATIENT
Start: 2017-09-09

## 2017-09-08 RX ORDER — DIPHENHYDRAMINE HCL 25 MG
25 CAPSULE ORAL ONCE
Status: CANCELLED | OUTPATIENT
Start: 2017-09-09 | End: 2017-09-08

## 2017-09-08 RX ADMIN — ERYTHROPOIETIN 25000 UNITS: 20000 INJECTION, SOLUTION INTRAVENOUS; SUBCUTANEOUS at 14:10

## 2017-09-08 NOTE — PROGRESS NOTES
"  Subjective     Tired    Reason for follow up:   Anemia of chronic kidney diease   Probable Iron deficiency     HISTORY OF PRESENT ILLNESS: History of anemia due to chronic kidney disease, iron deficiency anemia     History of Present Illness    Mrs Machuca returns with progressive anemia and pending iron studies.    Past Medical History, Past Surgical History, Social History, Family History have been reviewed and are without significant changes except as mentioned.    Review of Systems   A comprehensive 14 point review of systems was performed and was negative except as mentioned.    Medications:  The current medication list was reviewed in the EMR    ALLERGIES:    Allergies   Allergen Reactions   • Chlorhexidine Rash and Itching     Patient states \"blue dye\" in chlorhexidine.  States the orange and clear are OK to use   • Chlorhexidine Gluconate Itching     itched   • Valproic Acid Other (See Comments)     Made her extremely sleepy   • Alcohol Itching     itching   • Codeine Other (See Comments)     Want to climb the walls, doesn't help pain   • Propoxyphene-Acetaminophen    • Propoxyphene Other (See Comments)     Belligerent, acting drunk       Objective      Vitals:    09/08/17 1341   BP: 137/77   Pulse: 77   Resp: 16   Temp: 97.8 °F (36.6 °C)   TempSrc: Oral   SpO2: 99%   Weight: 176 lb 4.8 oz (80 kg)   Height: 69.29\" (176 cm)     Current Status 9/8/2017   ECOG score 0       Physical Exam   GENERAL:  Well-developed, well-nourished in no acute distress.   SKIN:  Warm, dry without rashes, purpura or petechiae.  EYES:  Pupils equal, round and reactive to light.  EOMs intact.  Conjunctivae normal.  EARS:  Hearing intact.  NOSE:  Septum midline.  No excoriations or nasal discharge.  MOUTH:  Tongue is well-papillated; no stomatitis or ulcers.  Lips normal.  THROAT:  Oropharynx without lesions or exudates.  NECK:  Supple with good range of motion; no thyromegaly or masses, no JVD.  LYMPHATICS:  No cervical, " supraclavicular, axillary or inguinal adenopathy.  CHEST:  Lungs clear to auscultation. Good airflow.  CARDIAC:  Regular rate and rhythm without murmurs, rubs or gallops. Normal S1,S2.  ABDOMEN:  Soft, nontender with no hepatosplenomegaly or masses.  EXTREMITIES:  No clubbing, cyanosis, trace, bilateral lower extremity edema.  NEUROLOGICAL:  Cranial Nerves II-XII grossly intact.  No focal neurological deficits.  PSYCHIATRIC:  Normal affect and mood.      RECENT LABS:  Hematology WBC   Date Value Ref Range Status   09/08/2017 5.90 4.50 - 10.70 10*3/mm3 Final     RBC   Date Value Ref Range Status   09/08/2017 2.50 (L) 3.90 - 5.20 10*6/mm3 Final     Hemoglobin   Date Value Ref Range Status   09/08/2017 8.0 (L) 11.9 - 15.5 g/dL Final     Hematocrit   Date Value Ref Range Status   09/08/2017 24.9 (L) 35.6 - 45.5 % Final     Platelets   Date Value Ref Range Status   09/08/2017 165 140 - 500 10*3/mm3 Final          Assessment/Plan   ANemia of stage 4 CKD: Patient will receive 25,000 units of Procrit today. She will also receive 2 units of PRBCs.      Possible iron-deficiency:She will begin Venofer weekly due to her intolerance of oral iron(nausea/constipation) with on-going oral therapy.   I will see her in 3 months though she will receive weekly Venofer in the short-term.

## 2017-09-10 ENCOUNTER — INFUSION (OUTPATIENT)
Dept: ONCOLOGY | Facility: HOSPITAL | Age: 77
End: 2017-09-10

## 2017-09-10 VITALS
TEMPERATURE: 97.1 F | RESPIRATION RATE: 18 BRPM | DIASTOLIC BLOOD PRESSURE: 70 MMHG | HEART RATE: 83 BPM | OXYGEN SATURATION: 98 % | SYSTOLIC BLOOD PRESSURE: 117 MMHG

## 2017-09-10 DIAGNOSIS — N18.9 HISTORY OF ANEMIA DUE TO CHRONIC KIDNEY DISEASE: ICD-10-CM

## 2017-09-10 DIAGNOSIS — Z86.2 HISTORY OF ANEMIA DUE TO CHRONIC KIDNEY DISEASE: ICD-10-CM

## 2017-09-10 PROCEDURE — P9016 RBC LEUKOCYTES REDUCED: HCPCS

## 2017-09-10 PROCEDURE — 86900 BLOOD TYPING SEROLOGIC ABO: CPT

## 2017-09-10 PROCEDURE — 36430 TRANSFUSION BLD/BLD COMPNT: CPT

## 2017-09-10 PROCEDURE — 63710000001 DIPHENHYDRAMINE PER 50 MG: Performed by: INTERNAL MEDICINE

## 2017-09-10 RX ORDER — SODIUM CHLORIDE 9 MG/ML
250 INJECTION, SOLUTION INTRAVENOUS AS NEEDED
Status: DISCONTINUED | OUTPATIENT
Start: 2017-09-10 | End: 2017-09-10 | Stop reason: HOSPADM

## 2017-09-10 RX ORDER — DIPHENHYDRAMINE HCL 25 MG
25 CAPSULE ORAL ONCE
Status: COMPLETED | OUTPATIENT
Start: 2017-09-10 | End: 2017-09-10

## 2017-09-10 RX ADMIN — DIPHENHYDRAMINE HYDROCHLORIDE 25 MG: 25 CAPSULE ORAL at 10:07

## 2017-09-11 ENCOUNTER — TELEPHONE (OUTPATIENT)
Dept: ONCOLOGY | Facility: CLINIC | Age: 77
End: 2017-09-11

## 2017-09-11 DIAGNOSIS — N18.4 ANEMIA OF CHRONIC RENAL FAILURE, STAGE 4 (SEVERE) (HCC): Primary | ICD-10-CM

## 2017-09-11 DIAGNOSIS — D63.1 ANEMIA OF CHRONIC RENAL FAILURE, STAGE 4 (SEVERE) (HCC): Primary | ICD-10-CM

## 2017-09-11 NOTE — TELEPHONE ENCOUNTER
Pt calling because she is needing to be referred to a general surgeon for port placement. The vascular surgeons will not place port. D/W Dr. Scott. Per Dr. Scott, refer pt to Dr. Couch. Referral placed.

## 2017-09-13 DIAGNOSIS — D50.8 OTHER IRON DEFICIENCY ANEMIA: ICD-10-CM

## 2017-09-13 DIAGNOSIS — IMO0001 IRON AND ITS COMPOUNDS CAUSING ADVERSE EFFECT IN THERAPEUTIC USE, SUBSEQUENT ENCOUNTER: Primary | ICD-10-CM

## 2017-09-13 PROBLEM — T45.4X5A IRON AND ITS COMPOUNDS CAUSING ADVERSE EFFECT IN THERAPEUTIC USE: Status: ACTIVE | Noted: 2017-09-13

## 2017-09-13 RX ORDER — SODIUM CHLORIDE 9 MG/ML
250 INJECTION, SOLUTION INTRAVENOUS ONCE
Status: CANCELLED | OUTPATIENT
Start: 2017-10-06

## 2017-09-13 RX ORDER — SODIUM CHLORIDE 9 MG/ML
250 INJECTION, SOLUTION INTRAVENOUS ONCE
Status: CANCELLED | OUTPATIENT
Start: 2017-09-29

## 2017-09-13 RX ORDER — DIPHENHYDRAMINE HCL 25 MG
25 CAPSULE ORAL ONCE
Status: CANCELLED | OUTPATIENT
Start: 2017-09-22

## 2017-09-13 RX ORDER — FAMOTIDINE 10 MG/ML
20 INJECTION, SOLUTION INTRAVENOUS ONCE
Status: CANCELLED | OUTPATIENT
Start: 2017-09-29

## 2017-09-13 RX ORDER — FAMOTIDINE 10 MG/ML
20 INJECTION, SOLUTION INTRAVENOUS ONCE
Status: CANCELLED | OUTPATIENT
Start: 2017-10-06

## 2017-09-13 RX ORDER — SODIUM CHLORIDE 9 MG/ML
250 INJECTION, SOLUTION INTRAVENOUS ONCE
Status: CANCELLED | OUTPATIENT
Start: 2017-09-22

## 2017-09-13 RX ORDER — FAMOTIDINE 10 MG/ML
20 INJECTION, SOLUTION INTRAVENOUS ONCE
Status: CANCELLED | OUTPATIENT
Start: 2017-09-15

## 2017-09-13 RX ORDER — FAMOTIDINE 10 MG/ML
20 INJECTION, SOLUTION INTRAVENOUS ONCE
Status: CANCELLED | OUTPATIENT
Start: 2017-09-22

## 2017-09-13 RX ORDER — DIPHENHYDRAMINE HCL 25 MG
25 CAPSULE ORAL ONCE
Status: CANCELLED | OUTPATIENT
Start: 2017-09-15

## 2017-09-13 RX ORDER — DIPHENHYDRAMINE HCL 25 MG
25 CAPSULE ORAL ONCE
Status: CANCELLED | OUTPATIENT
Start: 2017-10-20

## 2017-09-13 RX ORDER — SODIUM CHLORIDE 9 MG/ML
250 INJECTION, SOLUTION INTRAVENOUS ONCE
Status: CANCELLED | OUTPATIENT
Start: 2017-10-20

## 2017-09-13 RX ORDER — SODIUM CHLORIDE 9 MG/ML
250 INJECTION, SOLUTION INTRAVENOUS ONCE
Status: CANCELLED | OUTPATIENT
Start: 2017-09-15

## 2017-09-13 RX ORDER — DIPHENHYDRAMINE HCL 25 MG
25 CAPSULE ORAL ONCE
Status: CANCELLED | OUTPATIENT
Start: 2017-10-06

## 2017-09-13 RX ORDER — DIPHENHYDRAMINE HCL 25 MG
25 CAPSULE ORAL ONCE
Status: CANCELLED | OUTPATIENT
Start: 2017-09-29

## 2017-09-13 RX ORDER — FAMOTIDINE 10 MG/ML
20 INJECTION, SOLUTION INTRAVENOUS ONCE
Status: CANCELLED | OUTPATIENT
Start: 2017-10-20

## 2017-09-13 NOTE — PROGRESS NOTES
Per verbal order Dr Scott patient to receive Venofer 300 mg IV times 5 treatments (Please note patient cannot receive Procrit and Venofer on the same day)

## 2017-09-14 ENCOUNTER — TELEPHONE (OUTPATIENT)
Dept: ONCOLOGY | Facility: HOSPITAL | Age: 77
End: 2017-09-14

## 2017-09-14 NOTE — TELEPHONE ENCOUNTER
Pt calling wanted to let Dr. Scott know that she had Procrit last Friday and 2 units PRBCs on Sunday. She states that they haven't made her feel any better. She is still feeling lightheaded and weak. However, she does start getting iron infusions starting tomorrow. She just wanted to let Dr. Scott know.

## 2017-09-15 ENCOUNTER — INFUSION (OUTPATIENT)
Dept: ONCOLOGY | Facility: HOSPITAL | Age: 77
End: 2017-09-15

## 2017-09-15 VITALS
OXYGEN SATURATION: 98 % | BODY MASS INDEX: 25.63 KG/M2 | SYSTOLIC BLOOD PRESSURE: 127 MMHG | HEART RATE: 66 BPM | DIASTOLIC BLOOD PRESSURE: 73 MMHG | WEIGHT: 175 LBS | TEMPERATURE: 98.4 F

## 2017-09-15 DIAGNOSIS — D50.8 OTHER IRON DEFICIENCY ANEMIA: ICD-10-CM

## 2017-09-15 DIAGNOSIS — IMO0001 IRON AND ITS COMPOUNDS CAUSING ADVERSE EFFECT IN THERAPEUTIC USE, SUBSEQUENT ENCOUNTER: Primary | ICD-10-CM

## 2017-09-15 PROCEDURE — 96375 TX/PRO/DX INJ NEW DRUG ADDON: CPT | Performed by: INTERNAL MEDICINE

## 2017-09-15 PROCEDURE — 96365 THER/PROPH/DIAG IV INF INIT: CPT | Performed by: INTERNAL MEDICINE

## 2017-09-15 PROCEDURE — 96366 THER/PROPH/DIAG IV INF ADDON: CPT | Performed by: INTERNAL MEDICINE

## 2017-09-15 PROCEDURE — 63710000001 DIPHENHYDRAMINE PER 50 MG: Performed by: INTERNAL MEDICINE

## 2017-09-15 PROCEDURE — 25010000002 IRON SUCROSE PER 1 MG: Performed by: INTERNAL MEDICINE

## 2017-09-15 RX ORDER — DIPHENHYDRAMINE HCL 25 MG
25 CAPSULE ORAL ONCE
Status: COMPLETED | OUTPATIENT
Start: 2017-09-15 | End: 2017-09-15

## 2017-09-15 RX ORDER — FAMOTIDINE 10 MG/ML
20 INJECTION, SOLUTION INTRAVENOUS ONCE
Status: COMPLETED | OUTPATIENT
Start: 2017-09-15 | End: 2017-09-15

## 2017-09-15 RX ORDER — SODIUM CHLORIDE 9 MG/ML
250 INJECTION, SOLUTION INTRAVENOUS ONCE
Status: COMPLETED | OUTPATIENT
Start: 2017-09-15 | End: 2017-09-15

## 2017-09-15 RX ADMIN — DIPHENHYDRAMINE HYDROCHLORIDE 25 MG: 25 CAPSULE ORAL at 10:45

## 2017-09-15 RX ADMIN — FAMOTIDINE 20 MG: 10 INJECTION, SOLUTION INTRAVENOUS at 10:45

## 2017-09-15 RX ADMIN — SODIUM CHLORIDE 250 ML: 900 INJECTION, SOLUTION INTRAVENOUS at 10:40

## 2017-09-15 RX ADMIN — IRON SUCROSE 300 MG: 20 INJECTION, SOLUTION INTRAVENOUS at 11:16

## 2017-09-18 RX ORDER — FUROSEMIDE 40 MG/1
TABLET ORAL
Qty: 90 TABLET | Refills: 1 | Status: SHIPPED | OUTPATIENT
Start: 2017-09-18 | End: 2017-09-28

## 2017-09-22 ENCOUNTER — INFUSION (OUTPATIENT)
Dept: ONCOLOGY | Facility: HOSPITAL | Age: 77
End: 2017-09-22

## 2017-09-22 VITALS
SYSTOLIC BLOOD PRESSURE: 101 MMHG | WEIGHT: 175.6 LBS | BODY MASS INDEX: 25.71 KG/M2 | DIASTOLIC BLOOD PRESSURE: 54 MMHG | TEMPERATURE: 98.6 F | HEART RATE: 97 BPM | OXYGEN SATURATION: 100 %

## 2017-09-22 DIAGNOSIS — IMO0001 IRON AND ITS COMPOUNDS CAUSING ADVERSE EFFECT IN THERAPEUTIC USE, SUBSEQUENT ENCOUNTER: ICD-10-CM

## 2017-09-22 DIAGNOSIS — D50.8 OTHER IRON DEFICIENCY ANEMIA: Primary | ICD-10-CM

## 2017-09-22 LAB
BASOPHILS # BLD AUTO: 0.01 10*3/MM3 (ref 0–0.2)
BASOPHILS NFR BLD AUTO: 0.2 % (ref 0–1.5)
DEPRECATED RDW RBC AUTO: 60.6 FL (ref 37–54)
EOSINOPHIL # BLD AUTO: 0.07 10*3/MM3 (ref 0–0.7)
EOSINOPHIL NFR BLD AUTO: 1.7 % (ref 0.3–6.2)
ERYTHROCYTE [DISTWIDTH] IN BLOOD BY AUTOMATED COUNT: 16.8 % (ref 11.7–13)
HCT VFR BLD AUTO: 33.4 % (ref 35.6–45.5)
HGB BLD-MCNC: 10.9 G/DL (ref 11.9–15.5)
IMM GRANULOCYTES # BLD: 0.01 10*3/MM3 (ref 0–0.03)
IMM GRANULOCYTES NFR BLD: 0.2 % (ref 0–0.5)
LYMPHOCYTES # BLD AUTO: 0.74 10*3/MM3 (ref 0.9–4.8)
LYMPHOCYTES NFR BLD AUTO: 17.6 % (ref 19.6–45.3)
MCH RBC QN AUTO: 32 PG (ref 26.9–32)
MCHC RBC AUTO-ENTMCNC: 32.6 G/DL (ref 32.4–36.3)
MCV RBC AUTO: 97.9 FL (ref 80.5–98.2)
MONOCYTES # BLD AUTO: 0.28 10*3/MM3 (ref 0.2–1.2)
MONOCYTES NFR BLD AUTO: 6.7 % (ref 5–12)
NEUTROPHILS # BLD AUTO: 3.09 10*3/MM3 (ref 1.9–8.1)
NEUTROPHILS NFR BLD AUTO: 73.6 % (ref 42.7–76)
NRBC BLD MANUAL-RTO: 0 /100 WBC (ref 0–0)
PLATELET # BLD AUTO: 129 10*3/MM3 (ref 140–500)
PMV BLD AUTO: 9.8 FL (ref 6–12)
RBC # BLD AUTO: 3.41 10*6/MM3 (ref 3.9–5.2)
WBC NRBC COR # BLD: 4.2 10*3/MM3 (ref 4.5–10.7)

## 2017-09-22 PROCEDURE — 36415 COLL VENOUS BLD VENIPUNCTURE: CPT | Performed by: INTERNAL MEDICINE

## 2017-09-22 PROCEDURE — 96366 THER/PROPH/DIAG IV INF ADDON: CPT | Performed by: INTERNAL MEDICINE

## 2017-09-22 PROCEDURE — 25010000002 IRON SUCROSE PER 1 MG: Performed by: INTERNAL MEDICINE

## 2017-09-22 PROCEDURE — 63710000001 DIPHENHYDRAMINE PER 50 MG: Performed by: INTERNAL MEDICINE

## 2017-09-22 PROCEDURE — 96375 TX/PRO/DX INJ NEW DRUG ADDON: CPT | Performed by: INTERNAL MEDICINE

## 2017-09-22 PROCEDURE — 85025 COMPLETE CBC W/AUTO DIFF WBC: CPT | Performed by: INTERNAL MEDICINE

## 2017-09-22 PROCEDURE — 96365 THER/PROPH/DIAG IV INF INIT: CPT | Performed by: INTERNAL MEDICINE

## 2017-09-22 RX ORDER — SODIUM CHLORIDE 9 MG/ML
250 INJECTION, SOLUTION INTRAVENOUS ONCE
Status: COMPLETED | OUTPATIENT
Start: 2017-09-22 | End: 2017-09-22

## 2017-09-22 RX ORDER — FAMOTIDINE 10 MG/ML
20 INJECTION, SOLUTION INTRAVENOUS ONCE
Status: COMPLETED | OUTPATIENT
Start: 2017-09-22 | End: 2017-09-22

## 2017-09-22 RX ORDER — DIPHENHYDRAMINE HCL 25 MG
25 CAPSULE ORAL ONCE
Status: COMPLETED | OUTPATIENT
Start: 2017-09-22 | End: 2017-09-22

## 2017-09-22 RX ADMIN — IRON SUCROSE 300 MG: 20 INJECTION, SOLUTION INTRAVENOUS at 10:53

## 2017-09-22 RX ADMIN — FAMOTIDINE 20 MG: 10 INJECTION, SOLUTION INTRAVENOUS at 10:35

## 2017-09-22 RX ADMIN — SODIUM CHLORIDE 250 ML: 900 INJECTION, SOLUTION INTRAVENOUS at 10:35

## 2017-09-22 RX ADMIN — DIPHENHYDRAMINE HYDROCHLORIDE 25 MG: 25 CAPSULE ORAL at 10:35

## 2017-09-26 ENCOUNTER — OFFICE VISIT (OUTPATIENT)
Dept: SURGERY | Facility: CLINIC | Age: 77
End: 2017-09-26

## 2017-09-26 VITALS — BODY MASS INDEX: 27.81 KG/M2 | OXYGEN SATURATION: 98 % | WEIGHT: 177.2 LBS | HEART RATE: 88 BPM | HEIGHT: 67 IN

## 2017-09-26 DIAGNOSIS — D50.8 OTHER IRON DEFICIENCY ANEMIA: ICD-10-CM

## 2017-09-26 DIAGNOSIS — D63.8 ANEMIA OF CHRONIC DISEASE: Primary | ICD-10-CM

## 2017-09-26 DIAGNOSIS — D17.21 LIPOMA OF RIGHT UPPER EXTREMITY: ICD-10-CM

## 2017-09-26 PROCEDURE — 99203 OFFICE O/P NEW LOW 30 MIN: CPT | Performed by: SURGERY

## 2017-09-26 RX ORDER — CEFAZOLIN SODIUM 2 G/100ML
2 INJECTION, SOLUTION INTRAVENOUS ONCE
Status: CANCELLED | OUTPATIENT
Start: 2017-10-03 | End: 2017-09-26

## 2017-09-26 RX ORDER — METHYLPREDNISOLONE 4 MG/1
4 TABLET ORAL AS NEEDED
COMMUNITY
End: 2019-08-15

## 2017-09-28 ENCOUNTER — APPOINTMENT (OUTPATIENT)
Dept: PREADMISSION TESTING | Facility: HOSPITAL | Age: 77
End: 2017-09-28

## 2017-09-28 VITALS
RESPIRATION RATE: 16 BRPM | HEART RATE: 73 BPM | WEIGHT: 177 LBS | HEIGHT: 67 IN | OXYGEN SATURATION: 99 % | TEMPERATURE: 97.9 F | BODY MASS INDEX: 27.78 KG/M2 | SYSTOLIC BLOOD PRESSURE: 127 MMHG | DIASTOLIC BLOOD PRESSURE: 72 MMHG

## 2017-09-28 LAB
ANION GAP SERPL CALCULATED.3IONS-SCNC: 13.3 MMOL/L
BUN BLD-MCNC: 47 MG/DL (ref 8–23)
BUN/CREAT SERPL: 23.7 (ref 7–25)
CALCIUM SPEC-SCNC: 10.1 MG/DL (ref 8.6–10.5)
CHLORIDE SERPL-SCNC: 108 MMOL/L (ref 98–107)
CO2 SERPL-SCNC: 23.7 MMOL/L (ref 22–29)
CREAT BLD-MCNC: 1.98 MG/DL (ref 0.57–1)
DEPRECATED RDW RBC AUTO: 58.5 FL (ref 37–54)
ERYTHROCYTE [DISTWIDTH] IN BLOOD BY AUTOMATED COUNT: 16 % (ref 11.7–13)
GFR SERPL CREATININE-BSD FRML MDRD: 24 ML/MIN/1.73
GLUCOSE BLD-MCNC: 97 MG/DL (ref 65–99)
HCT VFR BLD AUTO: 32.2 % (ref 35.6–45.5)
HGB BLD-MCNC: 10.3 G/DL (ref 11.9–15.5)
MCH RBC QN AUTO: 31.6 PG (ref 26.9–32)
MCHC RBC AUTO-ENTMCNC: 32 G/DL (ref 32.4–36.3)
MCV RBC AUTO: 98.8 FL (ref 80.5–98.2)
PLATELET # BLD AUTO: 168 10*3/MM3 (ref 140–500)
PMV BLD AUTO: 9.9 FL (ref 6–12)
POTASSIUM BLD-SCNC: 4.8 MMOL/L (ref 3.5–5.2)
RBC # BLD AUTO: 3.26 10*6/MM3 (ref 3.9–5.2)
SODIUM BLD-SCNC: 145 MMOL/L (ref 136–145)
WBC NRBC COR # BLD: 4.66 10*3/MM3 (ref 4.5–10.7)

## 2017-09-28 PROCEDURE — 36415 COLL VENOUS BLD VENIPUNCTURE: CPT

## 2017-09-28 PROCEDURE — 80048 BASIC METABOLIC PNL TOTAL CA: CPT | Performed by: SURGERY

## 2017-09-28 PROCEDURE — 85027 COMPLETE CBC AUTOMATED: CPT | Performed by: SURGERY

## 2017-09-28 RX ORDER — CARVEDILOL 12.5 MG/1
12.5 TABLET ORAL EVERY MORNING
COMMUNITY
End: 2017-12-31 | Stop reason: SDUPTHER

## 2017-09-28 RX ORDER — FUROSEMIDE 40 MG/1
40 TABLET ORAL EVERY OTHER DAY
COMMUNITY
End: 2018-05-10

## 2017-09-28 RX ORDER — CALCITRIOL 0.25 UG/1
0.25 CAPSULE, LIQUID FILLED ORAL EVERY OTHER DAY
COMMUNITY
End: 2020-04-03 | Stop reason: ALTCHOICE

## 2017-09-29 ENCOUNTER — INFUSION (OUTPATIENT)
Dept: ONCOLOGY | Facility: HOSPITAL | Age: 77
End: 2017-09-29

## 2017-09-29 VITALS
BODY MASS INDEX: 28.3 KG/M2 | DIASTOLIC BLOOD PRESSURE: 77 MMHG | HEART RATE: 63 BPM | WEIGHT: 178 LBS | TEMPERATURE: 97.8 F | SYSTOLIC BLOOD PRESSURE: 169 MMHG | OXYGEN SATURATION: 96 %

## 2017-09-29 DIAGNOSIS — D50.8 OTHER IRON DEFICIENCY ANEMIA: ICD-10-CM

## 2017-09-29 DIAGNOSIS — IMO0001 IRON AND ITS COMPOUNDS CAUSING ADVERSE EFFECT IN THERAPEUTIC USE, SUBSEQUENT ENCOUNTER: Primary | ICD-10-CM

## 2017-09-29 PROCEDURE — 63710000001 DIPHENHYDRAMINE PER 50 MG: Performed by: INTERNAL MEDICINE

## 2017-09-29 PROCEDURE — 96375 TX/PRO/DX INJ NEW DRUG ADDON: CPT | Performed by: NURSE PRACTITIONER

## 2017-09-29 PROCEDURE — 25010000002 IRON SUCROSE PER 1 MG: Performed by: INTERNAL MEDICINE

## 2017-09-29 PROCEDURE — 96366 THER/PROPH/DIAG IV INF ADDON: CPT | Performed by: NURSE PRACTITIONER

## 2017-09-29 PROCEDURE — 96365 THER/PROPH/DIAG IV INF INIT: CPT | Performed by: NURSE PRACTITIONER

## 2017-09-29 RX ORDER — SODIUM CHLORIDE 9 MG/ML
250 INJECTION, SOLUTION INTRAVENOUS ONCE
Status: COMPLETED | OUTPATIENT
Start: 2017-09-29 | End: 2017-09-29

## 2017-09-29 RX ORDER — DIPHENHYDRAMINE HCL 25 MG
25 CAPSULE ORAL ONCE
Status: COMPLETED | OUTPATIENT
Start: 2017-09-29 | End: 2017-09-29

## 2017-09-29 RX ORDER — FAMOTIDINE 10 MG/ML
20 INJECTION, SOLUTION INTRAVENOUS ONCE
Status: COMPLETED | OUTPATIENT
Start: 2017-09-29 | End: 2017-09-29

## 2017-09-29 RX ADMIN — DIPHENHYDRAMINE HYDROCHLORIDE 25 MG: 25 CAPSULE ORAL at 10:29

## 2017-09-29 RX ADMIN — IRON SUCROSE 300 MG: 20 INJECTION, SOLUTION INTRAVENOUS at 10:58

## 2017-09-29 RX ADMIN — SODIUM CHLORIDE 250 ML: 900 INJECTION, SOLUTION INTRAVENOUS at 10:29

## 2017-09-29 RX ADMIN — FAMOTIDINE 20 MG: 10 INJECTION, SOLUTION INTRAVENOUS at 10:33

## 2017-09-29 NOTE — PROGRESS NOTES
General Surgery  Initial Office Visit    CC: Port placement    HPI: The patient is a pleasant 77 y.o. year-old lady who presents today to discuss possible port placement.  She has iron deficiency anemia for which she receives blood transfusions once every 6-8 weeks.  She also receives IV Venofer once weekly.  Her peripheral veins have been very difficult access lately, and she would like to have an implantable port placed for long-term IV access.  She also would like to be evaluated for a small lipoma over her right lateral shoulder that has been present for approximately 6 years.  She states that it has slowly enlarged but otherwise does not bother her and is not painful.  She has had no overlying skin changes.    Past Medical History:   Iron deficiency anemia  Myoclonus  Coronary artery disease  Osteoarthritis  Chronic kidney disease  Hypothyroidism  History of C. difficile twice during prior hospitalizations    Past Surgical History:   Colonoscopy and EGD (2006, Dr. Beal)  Laparoscopic cholecystectomy and paraesophageal hernia repair (2006, Dr. Yoon)  Hemorrhoid ectomy (2004)  Pacemaker    Medications:   Lasix 40 mg every other day  Carvedilol 12.5 g daily  Levothyroxine 25 µg daily  Allopurinol 100 mg twice daily  Methylprednisolone steroid pack as needed  Diazepam 5 mg nightly  Gabapentin 300 mg nightly  Spironolactone 25 mg daily  Calcitriol 0.025 µg daily  Aspirin 81 mg daily  Calcium 600 mg daily  Multivitamin with iron once daily  Vitamin E 400 units daily  Vitamin D3 5000 units daily  Metamucil 17 g 1-2 times daily as needed  Magnesium 250 mg daily  Glucosamine chondroitin 1500 mg every other day  Ferrous sulfate 65 mg twice daily    Allergies: Codeine, Darvocet, ChloraPrep    Family History: Mother with coronary artery disease    Social History: Retired, , nonsmoker, no alcohol use regularly    ROS:   Constitutional: Negative for fevers or chills  HENT: Negative for hearing loss or runny  nose  Eyes: Negative for vision changes or scleral icterus  Respiratory: Negative for cough or shortness of breath  Cardiovascular: Negative for chest pain or heart palpitations  Gastrointestinal: Negative for abdominal pain, nausea, vomiting, constipation, melena, or hematochezia  Genitourinary: Negative for hematuria or dysuria  Musculoskeletal: Negative for joint pain or back pain  Neurologic: Negative for headaches or dizziness  Psychiatric: Negative for anxiety or depression  All other systems reviewed and negative    Physical Exam:  Vitals:    09/26/17 0908   Pulse: 88   SpO2: 98%     General: No acute distress, well-nourished & well-developed  HEAD: normocephalic, atraumatic  EYES: normal conjunctiva, sclera anicteric  EARS: grossly normal hearing  NECK: supple, no thyromegaly  CHEST: Pacemaker palpable in the left supraclavicular region of the anterior chest wall  CARDIOVASCULAR: regular rate and rhythm  RESPIRATORY: clear to auscultation bilaterally  GASTROINTESTINAL: soft, nontender, non-distended  MUSCULOSKELETAL: Right shoulder with 4 cm palpable lipoma over the lateral upper shoulder that is mobile within the soft tissues with no overlying skin erythema  PSYCHIATRIC: oriented x3, normal mood and affect    ASSESSMENT & PLAN  Mrs. Gonzalez is a 77-year-old lady in need of long-term IV access for blood transfusions and Venofer.  I discussed the risks of PowerPort placement include bleeding, possible bacteremia, and possible pneumothorax formation.  I also discussed that with her chronic kidney disease and the potential that she may need long-term dialysis access down the road, her port will need to be placed in the internal jugular vein and will need to avoid the subclavian vein.  She understands the risks of the procedure and has consented to proceed.  I discussed her right shoulder lipoma advised her that if she wishes this can be surgically removed, but it poses no health risk for her if left alone.   For now, she would like to leave alone and avoid any surgical intervention regarding lipoma.    Tiffanie Couch MD  General and Endoscopic Surgery  [facility]    [address], [address]  [place], [place], [address]  P: [phone]  F: [phone]

## 2017-10-03 ENCOUNTER — APPOINTMENT (OUTPATIENT)
Dept: GENERAL RADIOLOGY | Facility: HOSPITAL | Age: 77
End: 2017-10-03

## 2017-10-03 ENCOUNTER — HOSPITAL ENCOUNTER (OUTPATIENT)
Facility: HOSPITAL | Age: 77
Setting detail: HOSPITAL OUTPATIENT SURGERY
Discharge: HOME | End: 2017-10-03
Attending: SURGERY | Admitting: SURGERY

## 2017-10-03 ENCOUNTER — APPOINTMENT (OUTPATIENT)
Dept: GENERAL RADIOLOGY | Facility: HOSPITAL | Age: 77
End: 2017-10-03
Attending: SURGERY

## 2017-10-03 ENCOUNTER — ANESTHESIA (OUTPATIENT)
Dept: PERIOP | Facility: HOSPITAL | Age: 77
End: 2017-10-03

## 2017-10-03 ENCOUNTER — ANESTHESIA EVENT (OUTPATIENT)
Dept: PERIOP | Facility: HOSPITAL | Age: 77
End: 2017-10-03

## 2017-10-03 VITALS
RESPIRATION RATE: 16 BRPM | HEART RATE: 53 BPM | HEIGHT: 67 IN | WEIGHT: 177.7 LBS | SYSTOLIC BLOOD PRESSURE: 131 MMHG | TEMPERATURE: 97.6 F | DIASTOLIC BLOOD PRESSURE: 54 MMHG | OXYGEN SATURATION: 98 % | BODY MASS INDEX: 27.89 KG/M2

## 2017-10-03 DIAGNOSIS — D63.8 ANEMIA OF CHRONIC DISEASE: ICD-10-CM

## 2017-10-03 DIAGNOSIS — D50.8 OTHER IRON DEFICIENCY ANEMIA: ICD-10-CM

## 2017-10-03 PROCEDURE — 77001 FLUOROGUIDE FOR VEIN DEVICE: CPT | Performed by: SURGERY

## 2017-10-03 PROCEDURE — C1788 PORT, INDWELLING, IMP: HCPCS | Performed by: SURGERY

## 2017-10-03 PROCEDURE — 25010000002 PROPOFOL 10 MG/ML EMULSION: Performed by: NURSE ANESTHETIST, CERTIFIED REGISTERED

## 2017-10-03 PROCEDURE — 25010000002 MIDAZOLAM PER 1 MG: Performed by: ANESTHESIOLOGY

## 2017-10-03 PROCEDURE — 77001 FLUOROGUIDE FOR VEIN DEVICE: CPT

## 2017-10-03 PROCEDURE — 36561 INSERT TUNNELED CV CATH: CPT | Performed by: SURGERY

## 2017-10-03 PROCEDURE — 25010000003 CEFAZOLIN IN DEXTROSE 2-4 GM/100ML-% SOLUTION: Performed by: SURGERY

## 2017-10-03 DEVICE — POWERPORT M.R.I. IMPLANTABLE PORT WITH ATTACHABLE 8F CHRONOFLEX OPEN-ENDED SINGLE-LUMEN VENOUS CATHETER INTERMEDIATE KIT (WITH SUTURE PLUGS)
Type: IMPLANTABLE DEVICE | Site: NECK | Status: FUNCTIONAL
Brand: POWERPORT M.R.I., CHRONOFLEX

## 2017-10-03 RX ORDER — BUPIVACAINE HYDROCHLORIDE AND EPINEPHRINE 5; 5 MG/ML; UG/ML
INJECTION, SOLUTION PERINEURAL AS NEEDED
Status: DISCONTINUED | OUTPATIENT
Start: 2017-10-03 | End: 2017-10-03 | Stop reason: HOSPADM

## 2017-10-03 RX ORDER — OXYCODONE AND ACETAMINOPHEN 7.5; 325 MG/1; MG/1
1 TABLET ORAL ONCE AS NEEDED
Status: CANCELLED | OUTPATIENT
Start: 2017-10-03 | End: 2017-10-04

## 2017-10-03 RX ORDER — SODIUM CHLORIDE, SODIUM LACTATE, POTASSIUM CHLORIDE, CALCIUM CHLORIDE 600; 310; 30; 20 MG/100ML; MG/100ML; MG/100ML; MG/100ML
9 INJECTION, SOLUTION INTRAVENOUS CONTINUOUS
Status: DISCONTINUED | OUTPATIENT
Start: 2017-10-03 | End: 2017-10-03 | Stop reason: HOSPADM

## 2017-10-03 RX ORDER — DIPHENHYDRAMINE HYDROCHLORIDE 50 MG/ML
12.5 INJECTION INTRAMUSCULAR; INTRAVENOUS
Status: CANCELLED | OUTPATIENT
Start: 2017-10-03

## 2017-10-03 RX ORDER — LABETALOL HYDROCHLORIDE 5 MG/ML
5 INJECTION, SOLUTION INTRAVENOUS
Status: CANCELLED | OUTPATIENT
Start: 2017-10-03

## 2017-10-03 RX ORDER — CEFAZOLIN SODIUM 2 G/100ML
2 INJECTION, SOLUTION INTRAVENOUS ONCE
Status: COMPLETED | OUTPATIENT
Start: 2017-10-03 | End: 2017-10-03

## 2017-10-03 RX ORDER — MIDAZOLAM HYDROCHLORIDE 1 MG/ML
2 INJECTION INTRAMUSCULAR; INTRAVENOUS
Status: DISCONTINUED | OUTPATIENT
Start: 2017-10-03 | End: 2017-10-03 | Stop reason: HOSPADM

## 2017-10-03 RX ORDER — HYDROCODONE BITARTRATE AND ACETAMINOPHEN 7.5; 325 MG/1; MG/1
1 TABLET ORAL ONCE AS NEEDED
Status: CANCELLED | OUTPATIENT
Start: 2017-10-03 | End: 2017-10-04

## 2017-10-03 RX ORDER — MIDAZOLAM HYDROCHLORIDE 1 MG/ML
1 INJECTION INTRAMUSCULAR; INTRAVENOUS
Status: DISCONTINUED | OUTPATIENT
Start: 2017-10-03 | End: 2017-10-03 | Stop reason: HOSPADM

## 2017-10-03 RX ORDER — PROMETHAZINE HYDROCHLORIDE 25 MG/1
25 SUPPOSITORY RECTAL ONCE AS NEEDED
Status: CANCELLED | OUTPATIENT
Start: 2017-10-03

## 2017-10-03 RX ORDER — PROMETHAZINE HYDROCHLORIDE 25 MG/ML
12.5 INJECTION, SOLUTION INTRAMUSCULAR; INTRAVENOUS ONCE AS NEEDED
Status: CANCELLED | OUTPATIENT
Start: 2017-10-03

## 2017-10-03 RX ORDER — LOPERAMIDE HYDROCHLORIDE 2 MG/1
2 CAPSULE ORAL 4 TIMES DAILY PRN
COMMUNITY
End: 2020-02-11

## 2017-10-03 RX ORDER — PROPOFOL 10 MG/ML
VIAL (ML) INTRAVENOUS CONTINUOUS PRN
Status: DISCONTINUED | OUTPATIENT
Start: 2017-10-03 | End: 2017-10-03 | Stop reason: SURG

## 2017-10-03 RX ORDER — PROMETHAZINE HYDROCHLORIDE 25 MG/1
25 TABLET ORAL ONCE AS NEEDED
Status: CANCELLED | OUTPATIENT
Start: 2017-10-03

## 2017-10-03 RX ORDER — FENTANYL CITRATE 50 UG/ML
50 INJECTION, SOLUTION INTRAMUSCULAR; INTRAVENOUS
Status: CANCELLED | OUTPATIENT
Start: 2017-10-03

## 2017-10-03 RX ORDER — ONDANSETRON 2 MG/ML
4 INJECTION INTRAMUSCULAR; INTRAVENOUS ONCE AS NEEDED
Status: CANCELLED | OUTPATIENT
Start: 2017-10-03

## 2017-10-03 RX ORDER — PROMETHAZINE HYDROCHLORIDE 25 MG/1
12.5 TABLET ORAL ONCE AS NEEDED
Status: CANCELLED | OUTPATIENT
Start: 2017-10-03 | End: 2017-10-04

## 2017-10-03 RX ORDER — NALOXONE HCL 0.4 MG/ML
0.2 VIAL (ML) INJECTION AS NEEDED
Status: CANCELLED | OUTPATIENT
Start: 2017-10-03

## 2017-10-03 RX ORDER — HYDRALAZINE HYDROCHLORIDE 20 MG/ML
5 INJECTION INTRAMUSCULAR; INTRAVENOUS
Status: CANCELLED | OUTPATIENT
Start: 2017-10-03

## 2017-10-03 RX ORDER — SODIUM CHLORIDE 0.9 % (FLUSH) 0.9 %
1-10 SYRINGE (ML) INJECTION AS NEEDED
Status: DISCONTINUED | OUTPATIENT
Start: 2017-10-03 | End: 2017-10-03 | Stop reason: HOSPADM

## 2017-10-03 RX ORDER — FAMOTIDINE 10 MG/ML
20 INJECTION, SOLUTION INTRAVENOUS ONCE
Status: COMPLETED | OUTPATIENT
Start: 2017-10-03 | End: 2017-10-03

## 2017-10-03 RX ORDER — FLUMAZENIL 0.1 MG/ML
0.2 INJECTION INTRAVENOUS AS NEEDED
Status: CANCELLED | OUTPATIENT
Start: 2017-10-03

## 2017-10-03 RX ORDER — MAGNESIUM HYDROXIDE 1200 MG/15ML
LIQUID ORAL AS NEEDED
Status: DISCONTINUED | OUTPATIENT
Start: 2017-10-03 | End: 2017-10-03 | Stop reason: HOSPADM

## 2017-10-03 RX ORDER — TRAMADOL HYDROCHLORIDE 50 MG/1
50 TABLET ORAL EVERY 6 HOURS PRN
Qty: 20 TABLET | Refills: 0 | Status: SHIPPED | OUTPATIENT
Start: 2017-10-03 | End: 2017-12-08

## 2017-10-03 RX ORDER — EPHEDRINE SULFATE 50 MG/ML
5 INJECTION, SOLUTION INTRAVENOUS ONCE AS NEEDED
Status: CANCELLED | OUTPATIENT
Start: 2017-10-03

## 2017-10-03 RX ORDER — PROPOFOL 10 MG/ML
VIAL (ML) INTRAVENOUS AS NEEDED
Status: DISCONTINUED | OUTPATIENT
Start: 2017-10-03 | End: 2017-10-03 | Stop reason: SURG

## 2017-10-03 RX ORDER — FENTANYL CITRATE 50 UG/ML
50 INJECTION, SOLUTION INTRAMUSCULAR; INTRAVENOUS
Status: DISCONTINUED | OUTPATIENT
Start: 2017-10-03 | End: 2017-10-03 | Stop reason: HOSPADM

## 2017-10-03 RX ADMIN — SODIUM CHLORIDE, POTASSIUM CHLORIDE, SODIUM LACTATE AND CALCIUM CHLORIDE 9 ML/HR: 600; 310; 30; 20 INJECTION, SOLUTION INTRAVENOUS at 11:02

## 2017-10-03 RX ADMIN — FAMOTIDINE 20 MG: 10 INJECTION, SOLUTION INTRAVENOUS at 11:03

## 2017-10-03 RX ADMIN — PROPOFOL 50 MCG/KG/MIN: 10 INJECTION, EMULSION INTRAVENOUS at 12:19

## 2017-10-03 RX ADMIN — MIDAZOLAM 1 MG: 1 INJECTION INTRAMUSCULAR; INTRAVENOUS at 11:03

## 2017-10-03 RX ADMIN — CEFAZOLIN SODIUM 2 G: 2 INJECTION, SOLUTION INTRAVENOUS at 12:09

## 2017-10-03 RX ADMIN — PROPOFOL 50 MG: 10 INJECTION, EMULSION INTRAVENOUS at 12:11

## 2017-10-03 NOTE — OP NOTE
Operative Note :  Tiffanie Couch MD      Charmaine Seniorhelen  1940    Procedure Date: 10/03/17    Pre-op Diagnosis:  · Iron deficiency anemia, need for long-term IV access    Post-op Diagnosis:  · Iron deficiency anemia, need for long-term IV access    Procedure:   · Right internal jugular power port placement with ultrasound guidance    Surgeon: Tiffanie Couch MD    Assistant: None    Anesthesia:  MAC (monitored anesthetic care)    Estimated Blood Loss: 5 cc    Specimens: None    Complications: None    Indications:  · Mrs. Samayoa is a 77-year-old lady with iron deficiency anemia he requires weekly IV iron infusions as well as once every 6-8 weeks blood transfusion area she presents today for elective PowerPort placement for long-term IV access.  She has been counseled on the risks of the procedure to include bacteremia, pneumothorax, and possible wound infection.  Despite these risks, she has consented to proceed.  Of note, the patient has chronic kidney disease and understands the need to preserve her subclavian veins for possible AV fistula placement down the road.    Findings:   · 8 Hebrew right internal jugular power port placed    Description of procedure:  The patient was brought to the operating room and placed on the OR table in supine position.  A shoulder roll was positioned behind her scapulas and both arms tucked at her side.  Her bilateral neck and chest wall were prepped and draped in sterile fashion using Betadine paint.  A surgical timeout was completed.  Continuous propofol anesthesia was administered.  Her right neck was inspected using the SonoSite ultrasound and her internal jugular vein found to be widely patent.  The overlying skin was anesthetized using 0.5% Marcaine and the internal jugular vein accessed on the first attempt using a large 18-gauge introducer needle.  A guidewire was threaded through the needle and positioned within the right atrium, as confirmed on continuous  fluoroscopy.  A small subcutaneous pocket was fashioned on the anterior right chest wall and the 8 Lebanese port secured to the pectoral fascia using interrupted 2-0 Prolene sutures.  The catheter tubing was then tunneled through the subcutaneous tissues over the clavicle to the insertion site on the right lower neck.  The tubing was draped external to the patient and positioned so as to coincide with the same trajectory of the guidewire within the superior vena cava, as confirmed on continuous fluoroscopy.  The port tubing was cut to reside at the junction between the superior vena cava and right atrium.  The dilator sheath was then inserted over the guidewire, which was subsequently removed.  The catheter tubing was then threaded through the sheath, which was peeled away leaving the port tubing in good position in the superior vena cava.  Fluoroscopy was used to confirm this.  The incisions were then closed primarily using interrupted 3-0 Vicryl deep dermal sutures, running 4-0 Vicryl subcuticular stitches, and dressed with Dermabond.  The port was then accessed, aspirated, and then heparin locked with 20 cc of heparinized saline.  The patient was then transferred to the recovery area in stable condition and all counts were correct per nursing.    Tiffanie Couch MD  General and Endoscopic Surgery  [facility]    [address], [address]  [place], [place], [address]  P: [phone]  F: [phone]

## 2017-10-03 NOTE — DISCHARGE INSTRUCTIONS
Outpatient Surgery Guidelines, Adult  Outpatient procedures are those for which the person having the procedure is allowed to go home the same day as the procedure. Various procedures are done on an outpatient basis. You should follow some general guidelines if you will be having an outpatient procedure.  AFTER THE PROCEDURE  After surgery, you will be taken to a recovery area, where your progress will be monitored. If there are no complications, you will be allowed to go home when you are awake, stable, and taking fluids well. You may have numbness around the surgical site. Healing will take some time. You will have tenderness at the surgical site and may have some swelling and bruising. You may also have some nausea.  HOME CARE INSTRUCTIONS  · Do not drive for 24 hours, or as directed by your health care provider. Do not drive while taking prescription pain medicines.  · Do not drink alcohol for 24 hours.  · Do not make important decisions or sign legal documents for 24 hours.  · You may resume a normal diet and activities as directed.  · Do not lift anything heavier than 10 pounds (4.5 kg) or play contact sports until your health care provider says it is okay.  · Change your bandages (dressings) as directed.  · Only take over-the-counter or prescription medicines as directed by your health care provider.  · Follow up with your health care provider as directed.  SEEK MEDICAL CARE IF:  · You have increased bleeding (more than a small spot) from the surgical site.  · You have redness, swelling, or increasing pain in the wound.  · You see pus coming from the wound.  · You have a fever.  · You notice a bad smell coming from the wound or dressing.  · You feel lightheaded or faint.  · You develop a rash.  · You have trouble breathing.  · You develop allergies.  MAKE SURE YOU:  · Understand these instructions.  · Will watch your condition.  · Will get help right away if you are not doing well or get worse.     This  information is not intended to replace advice given to you by your health care provider. Make sure you discuss any questions you have with your health care provider.     Document Released: 09/12/2002 Document Revised: 05/03/2016 Document Reviewed: 05/22/2014

## 2017-10-03 NOTE — PLAN OF CARE
Problem: Patient Care Overview (Adult)  Goal: Plan of Care Review  Outcome: Outcome(s) achieved Date Met:  10/03/17    10/03/17 1437   Coping/Psychosocial Response Interventions   Plan Of Care Reviewed With patient;spouse   Patient Care Overview   Progress improving   Outcome Evaluation   Outcome Summary/Follow up Plan READY FOR DISCHARGE       Goal: Adult Individualization and Mutuality  Outcome: Outcome(s) achieved Date Met:  10/03/17  Goal: Discharge Needs Assessment  Outcome: Outcome(s) achieved Date Met:  10/03/17    Problem: Perioperative Period (Adult)  Goal: Signs and Symptoms of Listed Potential Problems Will be Absent or Manageable (Perioperative Period)  Outcome: Outcome(s) achieved Date Met:  10/03/17

## 2017-10-03 NOTE — H&P (VIEW-ONLY)
General Surgery  Initial Office Visit    CC: Port placement    HPI: The patient is a pleasant 77 y.o. year-old lady who presents today to discuss possible port placement.  She has iron deficiency anemia for which she receives blood transfusions once every 6-8 weeks.  She also receives IV Venofer once weekly.  Her peripheral veins have been very difficult access lately, and she would like to have an implantable port placed for long-term IV access.  She also would like to be evaluated for a small lipoma over her right lateral shoulder that has been present for approximately 6 years.  She states that it has slowly enlarged but otherwise does not bother her and is not painful.  She has had no overlying skin changes.    Past Medical History:   Iron deficiency anemia  Myoclonus  Coronary artery disease  Osteoarthritis  Chronic kidney disease  Hypothyroidism  History of C. difficile twice during prior hospitalizations    Past Surgical History:   Colonoscopy and EGD (2006, Dr. Beal)  Laparoscopic cholecystectomy and paraesophageal hernia repair (2006, Dr. Yoon)  Hemorrhoid ectomy (2004)  Pacemaker    Medications:   Lasix 40 mg every other day  Carvedilol 12.5 g daily  Levothyroxine 25 µg daily  Allopurinol 100 mg twice daily  Methylprednisolone steroid pack as needed  Diazepam 5 mg nightly  Gabapentin 300 mg nightly  Spironolactone 25 mg daily  Calcitriol 0.025 µg daily  Aspirin 81 mg daily  Calcium 600 mg daily  Multivitamin with iron once daily  Vitamin E 400 units daily  Vitamin D3 5000 units daily  Metamucil 17 g 1-2 times daily as needed  Magnesium 250 mg daily  Glucosamine chondroitin 1500 mg every other day  Ferrous sulfate 65 mg twice daily    Allergies: Codeine, Darvocet, ChloraPrep    Family History: Mother with coronary artery disease    Social History: Retired, , nonsmoker, no alcohol use regularly    ROS:   Constitutional: Negative for fevers or chills  HENT: Negative for hearing loss or runny  nose  Eyes: Negative for vision changes or scleral icterus  Respiratory: Negative for cough or shortness of breath  Cardiovascular: Negative for chest pain or heart palpitations  Gastrointestinal: Negative for abdominal pain, nausea, vomiting, constipation, melena, or hematochezia  Genitourinary: Negative for hematuria or dysuria  Musculoskeletal: Negative for joint pain or back pain  Neurologic: Negative for headaches or dizziness  Psychiatric: Negative for anxiety or depression  All other systems reviewed and negative    Physical Exam:  Vitals:    09/26/17 0908   Pulse: 88   SpO2: 98%     General: No acute distress, well-nourished & well-developed  HEAD: normocephalic, atraumatic  EYES: normal conjunctiva, sclera anicteric  EARS: grossly normal hearing  NECK: supple, no thyromegaly  CHEST: Pacemaker palpable in the left supraclavicular region of the anterior chest wall  CARDIOVASCULAR: regular rate and rhythm  RESPIRATORY: clear to auscultation bilaterally  GASTROINTESTINAL: soft, nontender, non-distended  MUSCULOSKELETAL: Right shoulder with 4 cm palpable lipoma over the lateral upper shoulder that is mobile within the soft tissues with no overlying skin erythema  PSYCHIATRIC: oriented x3, normal mood and affect    ASSESSMENT & PLAN  Mrs. Gonzalez is a 77-year-old lady in need of long-term IV access for blood transfusions and Venofer.  I discussed the risks of PowerPort placement include bleeding, possible bacteremia, and possible pneumothorax formation.  I also discussed that with her chronic kidney disease and the potential that she may need long-term dialysis access down the road, her port will need to be placed in the internal jugular vein and will need to avoid the subclavian vein.  She understands the risks of the procedure and has consented to proceed.  I discussed her right shoulder lipoma advised her that if she wishes this can be surgically removed, but it poses no health risk for her if left alone.   For now, she would like to leave alone and avoid any surgical intervention regarding lipoma.    Tiffanie Couch MD  General and Endoscopic Surgery  [facility]    [address], [address]  [place], [place], [address]  P: [phone]  F: [phone]

## 2017-10-03 NOTE — ANESTHESIA PREPROCEDURE EVALUATION
Anesthesia Evaluation     Patient summary reviewed   NPO Solid Status: > 8 hours  NPO Liquid Status: > 4 hours     Airway   Mallampati: III  TM distance: >3 FB  Dental      Pulmonary    Cardiovascular     ECG reviewed  Rhythm: regular  Rate: normal    (+) pacemaker pacemaker, ICD, dysrhythmias, CHF, hyperlipidemia    ROS comment: Anemia of chronic disease.    Neuro/Psych  (+) seizures, numbness,    GI/Hepatic/Renal/Endo    (+)  hiatal hernia, GERD, renal disease CRI, hypothyroidism,     ROS Comment: Gastroparesis.    Musculoskeletal     (+) neck pain,   Abdominal    Substance History      OB/GYN          Other   (+) arthritis                                     Anesthesia Plan    ASA 3     MAC   total IV anesthesia  Anesthetic plan and risks discussed with patient.

## 2017-10-03 NOTE — ANESTHESIA POSTPROCEDURE EVALUATION
Patient: Charmaine Machuca    Procedure Summary     Date Anesthesia Start Anesthesia Stop Room / Location    10/03/17 1204 1250  [facility] 03 / [facility]       Procedure Diagnosis Surgeon Provider    Right internal jugular port placement (Right ) Anemia of chronic disease; Other iron deficiency anemia  (Anemia of chronic disease [D63.8]; Other iron deficiency anemia [D50.8]) MD Alvina Curry MD          Anesthesia Type: MAC  Last vitals  BP   112/55 (10/03/17 1249)    Temp   36.4 °C (97.6 °F) (10/03/17 1249)    Pulse   54 (10/03/17 1249)   Resp   16 (10/03/17 1249)    SpO2   99 % (10/03/17 1249)      Post Anesthesia Care and Evaluation    Patient location during evaluation: PHASE II  Level of consciousness: awake and alert  Anesthetic complications: No anesthetic complications

## 2017-10-03 NOTE — PLAN OF CARE
Problem: Patient Care Overview (Adult)  Goal: Plan of Care Review  Outcome: Ongoing (interventions implemented as appropriate)    10/03/17 1042   Coping/Psychosocial Response Interventions   Plan Of Care Reviewed With patient   Patient Care Overview   Progress no change       Goal: Adult Individualization and Mutuality  Outcome: Ongoing (interventions implemented as appropriate)    10/03/17 1042   Individualization   Patient Specific Preferences Call pt Charmaine   Patient Specific Goals No infection after surgery.   Patient Specific Interventions Teach good hand hygiene.       Goal: Discharge Needs Assessment  Outcome: Ongoing (interventions implemented as appropriate)    10/03/17 1042   Discharge Needs Assessment   Concerns To Be Addressed denies needs/concerns at this time   Current Health   Anticipated Changes Related to Illness none   Self-Care   Equipment Currently Used at Home none         Problem: Perioperative Period (Adult)  Goal: Signs and Symptoms of Listed Potential Problems Will be Absent or Manageable (Perioperative Period)  Outcome: Ongoing (interventions implemented as appropriate)    10/03/17 1042   Perioperative Period   Problems Assessed (Perioperative Period) pain   Problems Present (Perioperative Period) pain

## 2017-10-03 NOTE — INTERVAL H&P NOTE
H&P reviewed. The patient was examined and there are no changes to the H&P. Plan for RIJ port placement today so as to preserve her subclavian veins for possible AVF long-term.    Tiffanie Couch MD  General and Endoscopic Surgery  [facility]    [address], [address]  [place], [place], [address]  P: [phone]  F: [phone]

## 2017-10-03 NOTE — NURSING NOTE
PORTAL CHEST X-RAY READ BY TALHA SHERIFF, IMPRESSION  MEDIPORT CATHETER TIP THOUGHT TO BE JUST WITHIN THE SVC.  NO PNEUMOTHORAX  IS SEEN.

## 2017-10-04 PROBLEM — Z45.2 ENCOUNTER FOR FITTING AND ADJUSTMENT OF VASCULAR CATHETER: Status: ACTIVE | Noted: 2017-10-04

## 2017-10-04 RX ORDER — SODIUM CHLORIDE 0.9 % (FLUSH) 0.9 %
10 SYRINGE (ML) INJECTION AS NEEDED
Status: CANCELLED | OUTPATIENT
Start: 2017-10-06

## 2017-10-06 ENCOUNTER — INFUSION (OUTPATIENT)
Dept: ONCOLOGY | Facility: HOSPITAL | Age: 77
End: 2017-10-06

## 2017-10-06 VITALS
TEMPERATURE: 97.8 F | OXYGEN SATURATION: 100 % | BODY MASS INDEX: 28.28 KG/M2 | HEART RATE: 83 BPM | DIASTOLIC BLOOD PRESSURE: 66 MMHG | SYSTOLIC BLOOD PRESSURE: 104 MMHG | WEIGHT: 177.9 LBS

## 2017-10-06 DIAGNOSIS — D50.8 OTHER IRON DEFICIENCY ANEMIA: Primary | ICD-10-CM

## 2017-10-06 DIAGNOSIS — IMO0001 IRON AND ITS COMPOUNDS CAUSING ADVERSE EFFECT IN THERAPEUTIC USE, SUBSEQUENT ENCOUNTER: ICD-10-CM

## 2017-10-06 LAB
BASOPHILS # BLD AUTO: 0.02 10*3/MM3 (ref 0–0.2)
BASOPHILS NFR BLD AUTO: 0.4 % (ref 0–1.5)
DEPRECATED RDW RBC AUTO: 57.9 FL (ref 37–54)
EOSINOPHIL # BLD AUTO: 0.09 10*3/MM3 (ref 0–0.7)
EOSINOPHIL NFR BLD AUTO: 1.8 % (ref 0.3–6.2)
ERYTHROCYTE [DISTWIDTH] IN BLOOD BY AUTOMATED COUNT: 16 % (ref 11.7–13)
HCT VFR BLD AUTO: 27.3 % (ref 35.6–45.5)
HGB BLD-MCNC: 9 G/DL (ref 11.9–15.5)
IMM GRANULOCYTES # BLD: 0.02 10*3/MM3 (ref 0–0.03)
IMM GRANULOCYTES NFR BLD: 0.4 % (ref 0–0.5)
LYMPHOCYTES # BLD AUTO: 0.93 10*3/MM3 (ref 0.9–4.8)
LYMPHOCYTES NFR BLD AUTO: 18.8 % (ref 19.6–45.3)
MCH RBC QN AUTO: 32.3 PG (ref 26.9–32)
MCHC RBC AUTO-ENTMCNC: 33 G/DL (ref 32.4–36.3)
MCV RBC AUTO: 97.8 FL (ref 80.5–98.2)
MONOCYTES # BLD AUTO: 0.35 10*3/MM3 (ref 0.2–1.2)
MONOCYTES NFR BLD AUTO: 7.1 % (ref 5–12)
NEUTROPHILS # BLD AUTO: 3.55 10*3/MM3 (ref 1.9–8.1)
NEUTROPHILS NFR BLD AUTO: 71.5 % (ref 42.7–76)
NRBC BLD MANUAL-RTO: 0 /100 WBC (ref 0–0)
PLATELET # BLD AUTO: 126 10*3/MM3 (ref 140–500)
PMV BLD AUTO: 9.8 FL (ref 6–12)
RBC # BLD AUTO: 2.79 10*6/MM3 (ref 3.9–5.2)
WBC NRBC COR # BLD: 4.96 10*3/MM3 (ref 4.5–10.7)

## 2017-10-06 PROCEDURE — 25010000002 IRON SUCROSE PER 1 MG: Performed by: INTERNAL MEDICINE

## 2017-10-06 PROCEDURE — 96366 THER/PROPH/DIAG IV INF ADDON: CPT | Performed by: INTERNAL MEDICINE

## 2017-10-06 PROCEDURE — 85025 COMPLETE CBC W/AUTO DIFF WBC: CPT | Performed by: NURSE PRACTITIONER

## 2017-10-06 PROCEDURE — 96375 TX/PRO/DX INJ NEW DRUG ADDON: CPT | Performed by: INTERNAL MEDICINE

## 2017-10-06 PROCEDURE — 96365 THER/PROPH/DIAG IV INF INIT: CPT | Performed by: INTERNAL MEDICINE

## 2017-10-06 RX ORDER — FAMOTIDINE 10 MG/ML
20 INJECTION, SOLUTION INTRAVENOUS ONCE
Status: COMPLETED | OUTPATIENT
Start: 2017-10-06 | End: 2017-10-06

## 2017-10-06 RX ORDER — HYDROXYZINE PAMOATE 25 MG/1
25 CAPSULE ORAL ONCE
Status: COMPLETED | OUTPATIENT
Start: 2017-10-06 | End: 2017-10-06

## 2017-10-06 RX ORDER — DIPHENHYDRAMINE HCL 25 MG
25 CAPSULE ORAL ONCE
Status: DISCONTINUED | OUTPATIENT
Start: 2017-10-06 | End: 2017-10-06 | Stop reason: HOSPADM

## 2017-10-06 RX ORDER — SODIUM CHLORIDE 9 MG/ML
250 INJECTION, SOLUTION INTRAVENOUS ONCE
Status: COMPLETED | OUTPATIENT
Start: 2017-10-06 | End: 2017-10-06

## 2017-10-06 RX ADMIN — FAMOTIDINE 20 MG: 10 INJECTION, SOLUTION INTRAVENOUS at 10:51

## 2017-10-06 RX ADMIN — SODIUM CHLORIDE 250 ML: 900 INJECTION, SOLUTION INTRAVENOUS at 10:30

## 2017-10-06 RX ADMIN — IRON SUCROSE 300 MG: 20 INJECTION, SOLUTION INTRAVENOUS at 11:26

## 2017-10-06 RX ADMIN — HYDROXYZINE PAMOATE 25 MG: 25 CAPSULE ORAL at 10:51

## 2017-10-06 NOTE — PROGRESS NOTES
S/W DR. RUGGIERO REGARDING VENOFER INFUSIONS. PER DR. RUGGIERO PT TO GET 5 TOTAL (NOT 6). OK FOR PT TO SKIP NEXT WK (PT HAS A SCHEDULING CONFLICT) AND RTN THE FOLLOWING WK FOR DOSE #5. HE ALSO WANTS A CBC, FERRITIN AND IRON PANEL ON THAT DAY BUT DO NOT NEED TO WAIT FOR RESULTS TO GIVE VENOFER. PT MADE AWARE AND V/U.      PT HAS C/O FEELING DIZZY AND LIGHTHEADED OVER THE PAST FEW TREATMENTS. PT DOES HAVE A HX OF THIS EVEN BEFORE THESE INFUSIONS STARTED. TRIED HOLDING THE BENADRYL TODAY AND GAVE PT VISTARIL INSTEAD. PT STILL EXPERIENCED DIZZINESS.

## 2017-10-09 ENCOUNTER — TELEPHONE (OUTPATIENT)
Dept: SURGERY | Facility: CLINIC | Age: 77
End: 2017-10-09

## 2017-10-09 NOTE — TELEPHONE ENCOUNTER
She has two incisions, the one on her chest wall where the port was implanted and the second one just over her clavicle at the base of her neck. As long as the incision isn't erythematous or have drainage, it is nothing to be worried about. Often, the neck incision will have the palpable catheter just beneath the skin and this will create a lump that can be felt. She can come see me in the office anytime if she is concerned and wants to be evaluated.

## 2017-10-10 NOTE — TELEPHONE ENCOUNTER
Spoke with patient and informed of Dr Couch response that the lump is normal for a mediport placement.

## 2017-10-13 ENCOUNTER — APPOINTMENT (OUTPATIENT)
Dept: ONCOLOGY | Facility: HOSPITAL | Age: 77
End: 2017-10-13

## 2017-10-17 ENCOUNTER — CLINICAL SUPPORT NO REQUIREMENTS (OUTPATIENT)
Dept: CARDIOLOGY | Facility: CLINIC | Age: 77
End: 2017-10-17

## 2017-10-17 DIAGNOSIS — I42.8 NONISCHEMIC CARDIOMYOPATHY (HCC): ICD-10-CM

## 2017-10-17 DIAGNOSIS — I50.22 CHRONIC SYSTOLIC CONGESTIVE HEART FAILURE (HCC): Primary | ICD-10-CM

## 2017-10-17 PROCEDURE — 93296 REM INTERROG EVL PM/IDS: CPT | Performed by: INTERNAL MEDICINE

## 2017-10-17 PROCEDURE — 93297 REM INTERROG DEV EVAL ICPMS: CPT | Performed by: INTERNAL MEDICINE

## 2017-10-17 PROCEDURE — 93295 DEV INTERROG REMOTE 1/2/MLT: CPT | Performed by: INTERNAL MEDICINE

## 2017-10-20 ENCOUNTER — INFUSION (OUTPATIENT)
Dept: ONCOLOGY | Facility: HOSPITAL | Age: 77
End: 2017-10-20

## 2017-10-20 VITALS
BODY MASS INDEX: 27.98 KG/M2 | WEIGHT: 176 LBS | OXYGEN SATURATION: 100 % | SYSTOLIC BLOOD PRESSURE: 111 MMHG | TEMPERATURE: 97.8 F | DIASTOLIC BLOOD PRESSURE: 58 MMHG | HEART RATE: 64 BPM

## 2017-10-20 DIAGNOSIS — D50.8 OTHER IRON DEFICIENCY ANEMIA: Primary | ICD-10-CM

## 2017-10-20 DIAGNOSIS — IMO0001 IRON AND ITS COMPOUNDS CAUSING ADVERSE EFFECT IN THERAPEUTIC USE, SUBSEQUENT ENCOUNTER: ICD-10-CM

## 2017-10-20 LAB
ABO GROUP BLD: NORMAL
ALBUMIN SERPL-MCNC: 3.7 G/DL (ref 3.5–5.2)
ALBUMIN/GLOB SERPL: 1.5 G/DL
ALP SERPL-CCNC: 56 U/L (ref 39–117)
ALT SERPL W P-5'-P-CCNC: 14 U/L (ref 1–33)
ANION GAP SERPL CALCULATED.3IONS-SCNC: 13.5 MMOL/L
AST SERPL-CCNC: 20 U/L (ref 1–32)
BASOPHILS # BLD AUTO: 0.01 10*3/MM3 (ref 0–0.2)
BASOPHILS NFR BLD AUTO: 0.2 % (ref 0–1.5)
BILIRUB SERPL-MCNC: <0.2 MG/DL (ref 0.1–1.2)
BLD GP AB SCN SERPL QL: NEGATIVE
BUN BLD-MCNC: 47 MG/DL (ref 8–23)
BUN/CREAT SERPL: 21.4 (ref 7–25)
CALCIUM SPEC-SCNC: 9.8 MG/DL (ref 8.6–10.5)
CHLORIDE SERPL-SCNC: 104 MMOL/L (ref 98–107)
CO2 SERPL-SCNC: 23.5 MMOL/L (ref 22–29)
CREAT BLD-MCNC: 2.2 MG/DL (ref 0.57–1)
DEPRECATED RDW RBC AUTO: 62.8 FL (ref 37–54)
EOSINOPHIL # BLD AUTO: 0.09 10*3/MM3 (ref 0–0.7)
EOSINOPHIL NFR BLD AUTO: 1.8 % (ref 0.3–6.2)
ERYTHROCYTE [DISTWIDTH] IN BLOOD BY AUTOMATED COUNT: 17.1 % (ref 11.7–13)
FERRITIN SERPL-MCNC: 338.1 NG/ML (ref 13–150)
GFR SERPL CREATININE-BSD FRML MDRD: 22 ML/MIN/1.73
GLOBULIN UR ELPH-MCNC: 2.4 GM/DL
GLUCOSE BLD-MCNC: 132 MG/DL (ref 65–99)
HCT VFR BLD AUTO: 23.1 % (ref 35.6–45.5)
HGB BLD-MCNC: 7.5 G/DL (ref 11.9–15.5)
IMM GRANULOCYTES # BLD: 0.02 10*3/MM3 (ref 0–0.03)
IMM GRANULOCYTES NFR BLD: 0.4 % (ref 0–0.5)
IRON 24H UR-MRATE: 64 MCG/DL (ref 37–145)
IRON SATN MFR SERPL: 23 % (ref 20–50)
LYMPHOCYTES # BLD AUTO: 1.03 10*3/MM3 (ref 0.9–4.8)
LYMPHOCYTES NFR BLD AUTO: 21.1 % (ref 19.6–45.3)
MCH RBC QN AUTO: 32.8 PG (ref 26.9–32)
MCHC RBC AUTO-ENTMCNC: 32.5 G/DL (ref 32.4–36.3)
MCV RBC AUTO: 100.9 FL (ref 80.5–98.2)
MONOCYTES # BLD AUTO: 0.32 10*3/MM3 (ref 0.2–1.2)
MONOCYTES NFR BLD AUTO: 6.6 % (ref 5–12)
NEUTROPHILS # BLD AUTO: 3.41 10*3/MM3 (ref 1.9–8.1)
NEUTROPHILS NFR BLD AUTO: 69.9 % (ref 42.7–76)
NRBC BLD MANUAL-RTO: 0 /100 WBC (ref 0–0)
PHOSPHATE SERPL-MCNC: 4 MG/DL (ref 2.5–4.5)
PLATELET # BLD AUTO: 134 10*3/MM3 (ref 140–500)
PMV BLD AUTO: 9.1 FL (ref 6–12)
POTASSIUM BLD-SCNC: 4.6 MMOL/L (ref 3.5–5.2)
PROT SERPL-MCNC: 6.1 G/DL (ref 6–8.5)
PTH-INTACT SERPL-MCNC: 150.8 PG/ML (ref 15–65)
RBC # BLD AUTO: 2.29 10*6/MM3 (ref 3.9–5.2)
RH BLD: NEGATIVE
SODIUM BLD-SCNC: 141 MMOL/L (ref 136–145)
TIBC SERPL-MCNC: 280 MCG/DL (ref 298–536)
TRANSFERRIN SERPL-MCNC: 188 MG/DL (ref 200–360)
URATE SERPL-MCNC: 5.9 MG/DL (ref 2.4–5.7)
WBC NRBC COR # BLD: 4.88 10*3/MM3 (ref 4.5–10.7)

## 2017-10-20 PROCEDURE — 86923 COMPATIBILITY TEST ELECTRIC: CPT

## 2017-10-20 PROCEDURE — 63710000001 DIPHENHYDRAMINE PER 50 MG: Performed by: INTERNAL MEDICINE

## 2017-10-20 PROCEDURE — 96365 THER/PROPH/DIAG IV INF INIT: CPT | Performed by: INTERNAL MEDICINE

## 2017-10-20 PROCEDURE — 96375 TX/PRO/DX INJ NEW DRUG ADDON: CPT | Performed by: INTERNAL MEDICINE

## 2017-10-20 PROCEDURE — 96366 THER/PROPH/DIAG IV INF ADDON: CPT | Performed by: INTERNAL MEDICINE

## 2017-10-20 PROCEDURE — 36415 COLL VENOUS BLD VENIPUNCTURE: CPT | Performed by: INTERNAL MEDICINE

## 2017-10-20 PROCEDURE — 25010000002 IRON SUCROSE PER 1 MG: Performed by: INTERNAL MEDICINE

## 2017-10-20 RX ORDER — SODIUM CHLORIDE 9 MG/ML
250 INJECTION, SOLUTION INTRAVENOUS AS NEEDED
Status: CANCELLED | OUTPATIENT
Start: 2017-10-20

## 2017-10-20 RX ORDER — DIPHENHYDRAMINE HCL 25 MG
25 CAPSULE ORAL ONCE
Status: CANCELLED | OUTPATIENT
Start: 2017-10-20 | End: 2017-10-20

## 2017-10-20 RX ORDER — SODIUM CHLORIDE 9 MG/ML
250 INJECTION, SOLUTION INTRAVENOUS ONCE
Status: COMPLETED | OUTPATIENT
Start: 2017-10-20 | End: 2017-10-20

## 2017-10-20 RX ORDER — FAMOTIDINE 10 MG/ML
20 INJECTION, SOLUTION INTRAVENOUS ONCE
Status: COMPLETED | OUTPATIENT
Start: 2017-10-20 | End: 2017-10-20

## 2017-10-20 RX ORDER — DIPHENHYDRAMINE HCL 25 MG
25 CAPSULE ORAL ONCE
Status: COMPLETED | OUTPATIENT
Start: 2017-10-20 | End: 2017-10-20

## 2017-10-20 RX ADMIN — IRON SUCROSE 300 MG: 20 INJECTION, SOLUTION INTRAVENOUS at 10:51

## 2017-10-20 RX ADMIN — SODIUM CHLORIDE 250 ML: 900 INJECTION, SOLUTION INTRAVENOUS at 10:28

## 2017-10-20 RX ADMIN — FAMOTIDINE 20 MG: 10 INJECTION, SOLUTION INTRAVENOUS at 10:36

## 2017-10-20 RX ADMIN — DIPHENHYDRAMINE HYDROCHLORIDE 25 MG: 25 CAPSULE ORAL at 10:36

## 2017-10-21 ENCOUNTER — INFUSION (OUTPATIENT)
Dept: ONCOLOGY | Facility: HOSPITAL | Age: 77
End: 2017-10-21

## 2017-10-21 VITALS
OXYGEN SATURATION: 97 % | DIASTOLIC BLOOD PRESSURE: 58 MMHG | RESPIRATION RATE: 16 BRPM | HEART RATE: 78 BPM | SYSTOLIC BLOOD PRESSURE: 113 MMHG | TEMPERATURE: 97.1 F

## 2017-10-21 DIAGNOSIS — IMO0001 IRON AND ITS COMPOUNDS CAUSING ADVERSE EFFECT IN THERAPEUTIC USE, SUBSEQUENT ENCOUNTER: ICD-10-CM

## 2017-10-21 PROCEDURE — 86900 BLOOD TYPING SEROLOGIC ABO: CPT

## 2017-10-21 PROCEDURE — 25010000002 HEPARIN FLUSH (PORCINE) 100 UNIT/ML SOLUTION: Performed by: INTERNAL MEDICINE

## 2017-10-21 PROCEDURE — 36430 TRANSFUSION BLD/BLD COMPNT: CPT

## 2017-10-21 PROCEDURE — P9016 RBC LEUKOCYTES REDUCED: HCPCS

## 2017-10-21 PROCEDURE — 63710000001 DIPHENHYDRAMINE PER 50 MG: Performed by: NURSE PRACTITIONER

## 2017-10-21 RX ORDER — SODIUM CHLORIDE 9 MG/ML
250 INJECTION, SOLUTION INTRAVENOUS AS NEEDED
Status: DISCONTINUED | OUTPATIENT
Start: 2017-10-21 | End: 2017-10-21 | Stop reason: HOSPADM

## 2017-10-21 RX ORDER — DIPHENHYDRAMINE HCL 25 MG
25 CAPSULE ORAL ONCE
Status: COMPLETED | OUTPATIENT
Start: 2017-10-21 | End: 2017-10-21

## 2017-10-21 RX ADMIN — DIPHENHYDRAMINE HYDROCHLORIDE 25 MG: 25 CAPSULE ORAL at 08:26

## 2017-10-21 RX ADMIN — Medication 500 UNITS: at 14:40

## 2017-10-26 NOTE — PROGRESS NOTES
REASONS FOR FOLLOWUP:   Anemia  Positive OSFI(1:640)  Stage 4 CKD    History of Present Illness      Mrs. Rios returns today feeling fatigues but not dramatically different.    Her  is to have surgery tomorrow for a ruptured tendon.    Past Medical History:   Diagnosis Date   • Anemia     Iron deficiency   • Cancer     Skin cancer   • Cardiomyopathy     S/P pacemaker and defibrillator   • Clostridium difficile diarrhea    • Gastroparesis    • GERD (gastroesophageal reflux disease)    • Gout    • Hemorrhoids    • Hiatal hernia    • History of Nissen fundoplication    • History of pancreatitis 01/2014   • Hyperlipidemia    • Hypothyroidism    • Left bundle branch block    • Mitral and aortic valve disease    • Mitral valve insufficiency    • Myoclonus     S/P Depakote   • Nephrolithiasis    • Osteoarthritis    • Pancytopenia    • Peripheral neuropathy    • Progressive familial myoclonic epilepsy    • Pulmonary hypertension    • RLS (restless legs syndrome)    • Ruptured ovarian cyst    • Spinal stenosis    • Urinary tract infection        ONCOLOGIC HISTORY:  (History from previous dates can be found in the separate document.)    Current Outpatient Prescriptions on File Prior to Visit   Medication Sig Dispense Refill   • allopurinol (ZYLOPRIM) 100 MG tablet Take 1 tablet by mouth 2 (two) times a day.     • aspirin 81 MG tablet Take 1 tablet by mouth daily.     • calcium carbonate (OS-RAYMOND) 600 MG tablet Take 1 tablet by mouth daily.     • carvedilol (COREG) 12.5 MG tablet Take 1 tablet by mouth daily. 90 tablet 3   • Cholecalciferol (VITAMIN D PO) Take by mouth.     • diazepam (VALIUM) 5 MG tablet Take 1 tablet by mouth every night.     • FLUZONE HIGH-DOSE 0.5 ML suspension prefilled syringe injection ADM 0.5ML IM UTD  0   • furosemide (LASIX) 40 MG tablet TAKE 1 TABLET DAILY OR AS  DIRECTED 90 tablet 1   • gabapentin (NEURONTIN) 300 MG capsule Take 300 mg by mouth daily with dinner.     •  Ivone Silva Fall Risk Assessment:     Last Known Fall: No falls  Mobility: No limitations  Medications: No meds  Mental Status/LOC/Awareness: Awake, alert, and oriented to date, place, and person  Toileting Needs: No needs  Volume/Electrolyte Status: No problems  Communication/Sensory: No deficits  Behavior: Appropriate behavior  Ivone Silva Fall Risk Total: 3  Fall Risk Level: NO RISK    Universal Fall Precautions:  call light/belongings in reach, bed in low position and locked, siderails up x 2, wheelchairs and assistive devices out of sight    Fall Risk Level Interventions:   TRIAL ([employer] 8, NEURO, MED AMILCAR 5) Low Fall Risk Interventions  Place yellow fall risk ID band on patient: verified  Provide patient/family education based on risk assessment: completed  Educate patient/family to call staff for assistance when getting out of bed: completed  Place fall precaution signage outside patient door: verifiedTRIAL (TELE 8, NEURO, MED AMILCAR 5) Moderate Fall Risk Interventions  Place yellow fall risk ID band on patient: verified  Provide patient/family education based on risk assessment : completed  Educate patient/family to call staff for assistance when getting out of bed: completed  Place fall precaution signage outside patient door: verified  Utilize bed/chair fall alarm: verifiedTRIAL (TELE 8, NEURO, [employer] AMILCAR 5) High Fall Risk Interventions  Place yellow fall risk ID band on patient: completed  Provide patient/family education based on risk assessment: completed  Educate patient/family to call staff for assistance when getting out of bed: completed  Place fall precaution signage outside patient door: completed  Place patient in room close to nursing station: completed  Utilize bed/chair fall alarm: completed  Notify charge of high risk for huddle: completed    Patient Specific Interventions:     Medication: review medications with patient and family  Mental Status/LOC/Awareness: reorient patient and  "Glucosamine-Chondroit-Vit C-Mn (GLUCOSAMINE CHONDROITIN COMPLX) capsule Take 1 capsule by mouth every other day.     • HORIZANT 300 MG tablet controlled-release Take 1 tablet by mouth daily.     • Iron tablet Take 1 tablet by mouth daily.     • levothyroxine (SYNTHROID, LEVOTHROID) 25 MCG tablet Take 1 tablet by mouth daily.     • magnesium oxide 250 MG tablet Take 1 tablet by mouth daily.     • methscopolamine (PAMINE FORTE) 5 MG tablet Take 1 tablet by mouth daily.     • Multiple Vitamin (MULTI-DAY VITAMINS) tablet Take by mouth daily.     • NEUPRO 4 MG/24HR patch APPLY 1 PATCH DAILY AS DIRECTED. 90 patch 3   • psyllium (METAMUCIL) 58.6 % powder Take 1 packet by mouth 2 (two) times a day.     • spironolactone (ALDACTONE) 25 MG tablet TAKE 1/2 TABLET DAILY 45 tablet 1   • Vitamin E 400 UNITS tablet Take by mouth.     • [DISCONTINUED] methylPREDNISolone (MEDROL) 4 MG tablet Take 4 mg by mouth Daily.       No current facility-administered medications on file prior to visit.        ALLERGIES:     Allergies   Allergen Reactions   • Chlorhexidine Rash and Itching     Patient states \"blue dye\" in chlorhexidine.  States the orange and clear are OK to use   • Chlorhexidine Gluconate Itching     itched   • Valproic Acid Other (See Comments)     Made her extremely sleepy   • Alcohol Itching     itching   • Codeine Other (See Comments)     Want to climb the walls, doesn't help pain   • Propoxyphene-Acetaminophen    • Propoxyphene Other (See Comments)     Belligerent, acting drunk       Social History     Social History   • Marital status:      Spouse name: Zackery   • Number of children: N/A   • Years of education: N/A     Occupational History   •  Retired     Social History Main Topics   • Smoking status: Never Smoker   • Smokeless tobacco: Never Used   • Alcohol use No   • Drug use: No   • Sexual activity: Not on file     Other Topics Concern   • Not on file     Social History Narrative         Cancer-related family " reinforce falls education  Toileting: consider obtaining elevated toilet seat or bedside commode (BSC)  Volume/Electrolyte Status: ensure patient remains hydrated  Communication/Sensory: update plan of care on whiteboard  Behavioral: encourage patient to voice feelings and engage patient in daily activities  Mobility: utilize bed/chair fall alarm   "history is not on file.     Review of Systems  A comprehensive 14 point review of systems was performed and was negative except as mentioned.    Objective      Vitals:    03/23/17 1053   BP: 142/75   Pulse: 94   Resp: 16   Temp: 97.4 °F (36.3 °C)   TempSrc: Oral   SpO2: 90%   Weight: 177 lb 12.8 oz (80.6 kg)   Height: 69.29\" (176 cm)   PainSc: 0-No pain     Current Status 3/23/2017   ECOG score 0       Physical Exam    GENERAL: Well-developed, well-nourished in no acute distress.   SKIN: Warm, dry without rashes, purpura or petechiae.  EYES: Pupils equal, round and reactive to light. EOMs intact. Conjunctivae normal.  EARS: Hearing intact.  NOSE: Septum midline. No excoriations or nasal discharge.  MOUTH: Tongue is well-papillated; no stomatitis or ulcers. Lips normal.  THROAT: Oropharynx without lesions or exudates.  NECK: Supple with good range of motion; no thyromegaly or masses, no JVD.  CHEST: Lungs clear to auscultation. Good airflow.  CARDIAC: Regular rate and rhythm with evidence of previously diagnosed murmur, no rubs or gallops. Normal S1,S2.  ABDOMEN: Soft, nontender with no hepatosplenomegaly or masses.  EXTREMITIES: No clubbing, cyanosis or edema.  NEUROLOGICAL: Cranial Nerves II-XII grossly intact. No focal neurological deficits.   PSYCHIATRIC: Normal affect and mood.      RECENT LABS:  Hematology WBC   Date Value Ref Range Status   03/23/2017 10.26 4.50 - 10.70 10*3/mm3 Final     RBC   Date Value Ref Range Status   03/23/2017 3.16 (L) 3.90 - 5.20 10*6/mm3 Final     Hemoglobin   Date Value Ref Range Status   03/23/2017 9.5 (L) 11.9 - 15.5 g/dL Final     Hematocrit   Date Value Ref Range Status   03/23/2017 30.1 (L) 35.6 - 45.5 % Final     Platelets   Date Value Ref Range Status   03/23/2017 144 140 - 500 10*3/mm3 Final        Assessment/Plan   Anemia of stage IV chronic kidney disease with mild iron deficiency: With her most recent GFR 24 Carbajal per minute she does fall in the stage IV current category. "  Unfortunately she is mildly iron deficient and Procrit has been withheld however at twice daily iron she likely in the course of 3 weeks will normalize her saturation and resume Procrit therapy   As her  undergoes surgery tomorrow for a ruptured tendon her transportation options are limited.  She will return in 3 weeks to 6 weeks 9 weeks for CBC at the 3 week miroslava she will undergo iron studies again however to assess for appropriateness of Procrit therapy.                  Cc:  Fannie Godfrey MD

## 2017-11-03 ENCOUNTER — INFUSION (OUTPATIENT)
Dept: ONCOLOGY | Facility: HOSPITAL | Age: 77
End: 2017-11-03

## 2017-11-03 ENCOUNTER — LAB (OUTPATIENT)
Dept: OTHER | Facility: HOSPITAL | Age: 77
End: 2017-11-03

## 2017-11-03 VITALS — WEIGHT: 175 LBS | BODY MASS INDEX: 27.82 KG/M2

## 2017-11-03 DIAGNOSIS — IMO0001 IRON AND ITS COMPOUNDS CAUSING ADVERSE EFFECT IN THERAPEUTIC USE, SUBSEQUENT ENCOUNTER: ICD-10-CM

## 2017-11-03 DIAGNOSIS — D50.8 OTHER IRON DEFICIENCY ANEMIA: ICD-10-CM

## 2017-11-03 DIAGNOSIS — D63.1 ANEMIA OF CHRONIC RENAL FAILURE, STAGE 4 (SEVERE) (HCC): Primary | ICD-10-CM

## 2017-11-03 DIAGNOSIS — N18.4 ANEMIA OF CHRONIC RENAL FAILURE, STAGE 4 (SEVERE) (HCC): Primary | ICD-10-CM

## 2017-11-03 LAB
BASOPHILS # BLD AUTO: 0.02 10*3/MM3 (ref 0–0.2)
BASOPHILS NFR BLD AUTO: 0.4 % (ref 0–1.5)
DEPRECATED RDW RBC AUTO: 57.9 FL (ref 37–54)
EOSINOPHIL # BLD AUTO: 0.1 10*3/MM3 (ref 0–0.7)
EOSINOPHIL NFR BLD AUTO: 2.2 % (ref 0.3–6.2)
ERYTHROCYTE [DISTWIDTH] IN BLOOD BY AUTOMATED COUNT: 16.9 % (ref 11.7–13)
HCT VFR BLD AUTO: 27.4 % (ref 35.6–45.5)
HGB BLD-MCNC: 9.3 G/DL (ref 11.9–15.5)
IMM GRANULOCYTES # BLD: 0.02 10*3/MM3 (ref 0–0.03)
IMM GRANULOCYTES NFR BLD: 0.4 % (ref 0–0.5)
LYMPHOCYTES # BLD AUTO: 0.99 10*3/MM3 (ref 0.9–4.8)
LYMPHOCYTES NFR BLD AUTO: 22 % (ref 19.6–45.3)
MCH RBC QN AUTO: 32 PG (ref 26.9–32)
MCHC RBC AUTO-ENTMCNC: 33.9 G/DL (ref 32.4–36.3)
MCV RBC AUTO: 94.2 FL (ref 80.5–98.2)
MONOCYTES # BLD AUTO: 0.33 10*3/MM3 (ref 0.2–1.2)
MONOCYTES NFR BLD AUTO: 7.3 % (ref 5–12)
NEUTROPHILS # BLD AUTO: 3.03 10*3/MM3 (ref 1.9–8.1)
NEUTROPHILS NFR BLD AUTO: 67.7 % (ref 42.7–76)
NRBC BLD MANUAL-RTO: 0.4 /100 WBC (ref 0–0)
PLATELET # BLD AUTO: 149 10*3/MM3 (ref 140–500)
PMV BLD AUTO: 9.9 FL (ref 6–12)
RBC # BLD AUTO: 2.91 10*6/MM3 (ref 3.9–5.2)
WBC NRBC COR # BLD: 4.49 10*3/MM3 (ref 4.5–10.7)

## 2017-11-03 PROCEDURE — 36415 COLL VENOUS BLD VENIPUNCTURE: CPT

## 2017-11-03 PROCEDURE — 96372 THER/PROPH/DIAG INJ SC/IM: CPT

## 2017-11-03 PROCEDURE — 63510000001 EPOETIN ALFA PER 1000 UNITS: Performed by: INTERNAL MEDICINE

## 2017-11-03 PROCEDURE — 85025 COMPLETE CBC W/AUTO DIFF WBC: CPT | Performed by: INTERNAL MEDICINE

## 2017-11-03 RX ADMIN — ERYTHROPOIETIN 25000 UNITS: 20000 INJECTION, SOLUTION INTRAVENOUS; SUBCUTANEOUS at 11:45

## 2017-11-03 NOTE — PROGRESS NOTES
Verified with Dr. Scott and Cindi SCHREIBER that Ferritin and Iron panel were not needed today.

## 2017-11-22 ENCOUNTER — TELEPHONE (OUTPATIENT)
Dept: ONCOLOGY | Facility: HOSPITAL | Age: 77
End: 2017-11-22

## 2017-11-22 NOTE — TELEPHONE ENCOUNTER
Pt called wanting labs checked. She c/o fatigue and feels she needs a CBC checked. Tatyana sent to scheduling and clinic manager re: pt phone call:        Pt states she was here this morning because she thought she had an appt to have her labs checked with possible procrit or RN review. Pt was not scheduled. She had come from home in [facility] and was not feeling well. Pt states she was sent to scheduling and the  spoke with an NP who said they were swamped and couldn't deal with it right now? Pt was sent home.     Pt should have been seen by someone and had CBC checked? Im not sure what happened or who was contacted but charge nurse or one of the other nurses should have been asked about this maybe?     Anyways,   Patient calling to get in and obviously hard to get back in today now and it's a long weekend. Can we call patient and set up CBC with Injection RN appt for Monday. Pt's  has an appt nearby around 10:00 that day. Thanks!

## 2017-11-27 ENCOUNTER — CLINICAL SUPPORT (OUTPATIENT)
Dept: ONCOLOGY | Facility: HOSPITAL | Age: 77
End: 2017-11-27

## 2017-11-27 ENCOUNTER — TELEPHONE (OUTPATIENT)
Dept: ONCOLOGY | Facility: CLINIC | Age: 77
End: 2017-11-27

## 2017-11-27 ENCOUNTER — INFUSION (OUTPATIENT)
Dept: ONCOLOGY | Facility: HOSPITAL | Age: 77
End: 2017-11-27

## 2017-11-27 ENCOUNTER — LAB (OUTPATIENT)
Dept: ONCOLOGY | Facility: HOSPITAL | Age: 77
End: 2017-11-27

## 2017-11-27 ENCOUNTER — APPOINTMENT (OUTPATIENT)
Dept: ONCOLOGY | Facility: HOSPITAL | Age: 77
End: 2017-11-27

## 2017-11-27 ENCOUNTER — APPOINTMENT (OUTPATIENT)
Dept: LAB | Facility: HOSPITAL | Age: 77
End: 2017-11-27

## 2017-11-27 DIAGNOSIS — D63.1 ANEMIA OF CHRONIC RENAL FAILURE, STAGE 4 (SEVERE) (HCC): Primary | ICD-10-CM

## 2017-11-27 DIAGNOSIS — N18.4 ANEMIA OF CHRONIC RENAL FAILURE, STAGE 4 (SEVERE) (HCC): Primary | ICD-10-CM

## 2017-11-27 DIAGNOSIS — IMO0001 IRON AND ITS COMPOUNDS CAUSING ADVERSE EFFECT IN THERAPEUTIC USE, SUBSEQUENT ENCOUNTER: Primary | ICD-10-CM

## 2017-11-27 DIAGNOSIS — Z45.2 ENCOUNTER FOR FITTING AND ADJUSTMENT OF VASCULAR CATHETER: ICD-10-CM

## 2017-11-27 DIAGNOSIS — D50.8 OTHER IRON DEFICIENCY ANEMIA: ICD-10-CM

## 2017-11-27 LAB
ABO GROUP BLD: NORMAL
BASOPHILS # BLD AUTO: 0.02 10*3/MM3 (ref 0–0.1)
BASOPHILS NFR BLD AUTO: 0.4 % (ref 0–1.1)
BLD GP AB SCN SERPL QL: NEGATIVE
DEPRECATED RDW RBC AUTO: 63.9 FL (ref 37–49)
EOSINOPHIL # BLD AUTO: 0.09 10*3/MM3 (ref 0–0.36)
EOSINOPHIL NFR BLD AUTO: 1.9 % (ref 1–5)
ERYTHROCYTE [DISTWIDTH] IN BLOOD BY AUTOMATED COUNT: 17.1 % (ref 11.7–14.5)
FERRITIN SERPL-MCNC: 310.9 NG/ML
HCT VFR BLD AUTO: 25.4 % (ref 34–45)
HGB BLD-MCNC: 8.1 G/DL (ref 11.5–14.9)
IMM GRANULOCYTES # BLD: 0.01 10*3/MM3 (ref 0–0.03)
IMM GRANULOCYTES NFR BLD: 0.2 % (ref 0–0.5)
IRON 24H UR-MRATE: 59 MCG/DL (ref 37–145)
IRON SATN MFR SERPL: 24 % (ref 14–48)
LYMPHOCYTES # BLD AUTO: 1.12 10*3/MM3 (ref 1–3.5)
LYMPHOCYTES NFR BLD AUTO: 23.6 % (ref 20–49)
MCH RBC QN AUTO: 32.8 PG (ref 27–33)
MCHC RBC AUTO-ENTMCNC: 31.9 G/DL (ref 32–35)
MCV RBC AUTO: 102.8 FL (ref 83–97)
MONOCYTES # BLD AUTO: 0.24 10*3/MM3 (ref 0.25–0.8)
MONOCYTES NFR BLD AUTO: 5.1 % (ref 4–12)
NEUTROPHILS # BLD AUTO: 3.27 10*3/MM3 (ref 1.5–7)
NEUTROPHILS NFR BLD AUTO: 68.8 % (ref 39–75)
NRBC BLD MANUAL-RTO: 0 /100 WBC (ref 0–0)
PLATELET # BLD AUTO: 150 10*3/MM3 (ref 150–375)
PMV BLD AUTO: 8.7 FL (ref 8.9–12.1)
RBC # BLD AUTO: 2.47 10*6/MM3 (ref 3.9–5)
RH BLD: NEGATIVE
TIBC SERPL-MCNC: 251 MCG/DL (ref 249–505)
TRANSFERRIN SERPL-MCNC: 179 MG/DL (ref 200–360)
WBC NRBC COR # BLD: 4.75 10*3/MM3 (ref 4–10)

## 2017-11-27 PROCEDURE — 96523 IRRIG DRUG DELIVERY DEVICE: CPT | Performed by: NURSE PRACTITIONER

## 2017-11-27 PROCEDURE — 36415 COLL VENOUS BLD VENIPUNCTURE: CPT | Performed by: INTERNAL MEDICINE

## 2017-11-27 PROCEDURE — 83540 ASSAY OF IRON: CPT | Performed by: INTERNAL MEDICINE

## 2017-11-27 PROCEDURE — 86901 BLOOD TYPING SEROLOGIC RH(D): CPT

## 2017-11-27 PROCEDURE — 82728 ASSAY OF FERRITIN: CPT | Performed by: INTERNAL MEDICINE

## 2017-11-27 PROCEDURE — 86923 COMPATIBILITY TEST ELECTRIC: CPT

## 2017-11-27 PROCEDURE — 85025 COMPLETE CBC W/AUTO DIFF WBC: CPT | Performed by: INTERNAL MEDICINE

## 2017-11-27 PROCEDURE — 25010000002 HEPARIN FLUSH (PORCINE) 100 UNIT/ML SOLUTION: Performed by: NURSE PRACTITIONER

## 2017-11-27 PROCEDURE — 86850 RBC ANTIBODY SCREEN: CPT

## 2017-11-27 PROCEDURE — 86900 BLOOD TYPING SEROLOGIC ABO: CPT

## 2017-11-27 PROCEDURE — 84466 ASSAY OF TRANSFERRIN: CPT | Performed by: INTERNAL MEDICINE

## 2017-11-27 RX ORDER — SODIUM CHLORIDE 0.9 % (FLUSH) 0.9 %
10 SYRINGE (ML) INJECTION AS NEEDED
Status: CANCELLED | OUTPATIENT
Start: 2017-11-27

## 2017-11-27 RX ORDER — ACETAMINOPHEN 325 MG/1
650 TABLET ORAL ONCE
Status: CANCELLED | OUTPATIENT
Start: 2017-11-27 | End: 2017-11-27

## 2017-11-27 RX ORDER — DIPHENHYDRAMINE HCL 25 MG
25 CAPSULE ORAL ONCE
Status: CANCELLED | OUTPATIENT
Start: 2017-11-27 | End: 2017-11-27

## 2017-11-27 RX ORDER — SODIUM CHLORIDE 0.9 % (FLUSH) 0.9 %
10 SYRINGE (ML) INJECTION AS NEEDED
Status: ACTIVE | OUTPATIENT
Start: 2017-11-27

## 2017-11-27 RX ORDER — SODIUM CHLORIDE 9 MG/ML
250 INJECTION, SOLUTION INTRAVENOUS AS NEEDED
Status: CANCELLED | OUTPATIENT
Start: 2017-11-27

## 2017-11-27 RX ADMIN — SODIUM CHLORIDE, PRESERVATIVE FREE 500 UNITS: 5 INJECTION INTRAVENOUS at 13:47

## 2017-11-27 RX ADMIN — Medication 10 ML: at 13:47

## 2017-11-27 NOTE — TELEPHONE ENCOUNTER
----- Message from Maria Del Carmen Mckinley RN sent at 11/27/2017  1:27 PM EST -----  Pt needs 2 units blood scheduled.  Please send her back to lab RN to have port deaccessed if she is not scheduled for blood tomorrow.

## 2017-11-27 NOTE — PROGRESS NOTES
Pt added to injection schedule for procrit injection, however MD decided to hold procrit and proceed with blood transfusion instead. Procrit returned to pharmacy.

## 2017-11-27 NOTE — PROGRESS NOTES
Hgb 8.1.  Pt feeling fatigued and light-headed.  Reviewed labs with Dr. Oropeza (Dr. #2).  Orders given for PRBC's.  No need for procrit today per MD and pt is to f/u as scheduled with Dr. Scott 12/8/17.  Type and screen drawn from port and pt sent to appt desk to schedule.  Pt and  v/u. Port was accessed by Port RN and instructed pt to come back to lab RN office to have port deaccessed if she was not scheduled for transfusion tomorrow.

## 2017-11-29 ENCOUNTER — INFUSION (OUTPATIENT)
Dept: ONCOLOGY | Facility: HOSPITAL | Age: 77
End: 2017-11-29

## 2017-11-29 VITALS
TEMPERATURE: 97.1 F | SYSTOLIC BLOOD PRESSURE: 128 MMHG | HEART RATE: 82 BPM | OXYGEN SATURATION: 98 % | RESPIRATION RATE: 18 BRPM | DIASTOLIC BLOOD PRESSURE: 67 MMHG

## 2017-11-29 DIAGNOSIS — N18.4 ANEMIA OF CHRONIC RENAL FAILURE, STAGE 4 (SEVERE) (HCC): ICD-10-CM

## 2017-11-29 DIAGNOSIS — D63.1 ANEMIA OF CHRONIC RENAL FAILURE, STAGE 4 (SEVERE) (HCC): ICD-10-CM

## 2017-11-29 PROCEDURE — 86900 BLOOD TYPING SEROLOGIC ABO: CPT

## 2017-11-29 PROCEDURE — 63710000001 DIPHENHYDRAMINE PER 50 MG: Performed by: INTERNAL MEDICINE

## 2017-11-29 PROCEDURE — P9016 RBC LEUKOCYTES REDUCED: HCPCS

## 2017-11-29 PROCEDURE — 36430 TRANSFUSION BLD/BLD COMPNT: CPT

## 2017-11-29 PROCEDURE — 25010000002 HEPARIN FLUSH (PORCINE) 100 UNIT/ML SOLUTION: Performed by: INTERNAL MEDICINE

## 2017-11-29 RX ORDER — SODIUM CHLORIDE 9 MG/ML
250 INJECTION, SOLUTION INTRAVENOUS AS NEEDED
Status: DISCONTINUED | OUTPATIENT
Start: 2017-11-29 | End: 2017-11-29 | Stop reason: HOSPADM

## 2017-11-29 RX ORDER — DIPHENHYDRAMINE HCL 25 MG
25 CAPSULE ORAL ONCE
Status: COMPLETED | OUTPATIENT
Start: 2017-11-29 | End: 2017-11-29

## 2017-11-29 RX ORDER — ACETAMINOPHEN 325 MG/1
650 TABLET ORAL ONCE
Status: COMPLETED | OUTPATIENT
Start: 2017-11-29 | End: 2017-11-29

## 2017-11-29 RX ADMIN — ACETAMINOPHEN 650 MG: 325 TABLET ORAL at 07:57

## 2017-11-29 RX ADMIN — Medication 500 UNITS: at 13:14

## 2017-11-29 RX ADMIN — DIPHENHYDRAMINE HYDROCHLORIDE 25 MG: 25 CAPSULE ORAL at 07:57

## 2017-12-05 DIAGNOSIS — N18.4 ANEMIA OF CHRONIC RENAL FAILURE, STAGE 4 (SEVERE) (HCC): ICD-10-CM

## 2017-12-05 DIAGNOSIS — D63.1 ANEMIA OF CHRONIC RENAL FAILURE, STAGE 4 (SEVERE) (HCC): ICD-10-CM

## 2017-12-08 ENCOUNTER — LAB (OUTPATIENT)
Dept: OTHER | Facility: HOSPITAL | Age: 77
End: 2017-12-08

## 2017-12-08 ENCOUNTER — APPOINTMENT (OUTPATIENT)
Dept: ONCOLOGY | Facility: HOSPITAL | Age: 77
End: 2017-12-08

## 2017-12-08 ENCOUNTER — OFFICE VISIT (OUTPATIENT)
Dept: ONCOLOGY | Facility: CLINIC | Age: 77
End: 2017-12-08

## 2017-12-08 VITALS
HEIGHT: 69 IN | TEMPERATURE: 98.1 F | SYSTOLIC BLOOD PRESSURE: 128 MMHG | HEART RATE: 73 BPM | OXYGEN SATURATION: 98 % | RESPIRATION RATE: 16 BRPM | DIASTOLIC BLOOD PRESSURE: 70 MMHG | BODY MASS INDEX: 26.33 KG/M2 | WEIGHT: 177.8 LBS

## 2017-12-08 DIAGNOSIS — D63.1 ANEMIA OF CHRONIC RENAL FAILURE, STAGE 4 (SEVERE) (HCC): ICD-10-CM

## 2017-12-08 DIAGNOSIS — N18.4 ANEMIA OF CHRONIC RENAL FAILURE, STAGE 4 (SEVERE) (HCC): ICD-10-CM

## 2017-12-08 DIAGNOSIS — N18.9 HISTORY OF ANEMIA DUE TO CHRONIC KIDNEY DISEASE: Primary | ICD-10-CM

## 2017-12-08 DIAGNOSIS — Z86.2 HISTORY OF ANEMIA DUE TO CHRONIC KIDNEY DISEASE: Primary | ICD-10-CM

## 2017-12-08 LAB
BASOPHILS # BLD AUTO: 0.01 10*3/MM3 (ref 0–0.2)
BASOPHILS NFR BLD AUTO: 0.2 % (ref 0–1.5)
DEPRECATED RDW RBC AUTO: 56.3 FL (ref 37–54)
EOSINOPHIL # BLD AUTO: 0.09 10*3/MM3 (ref 0–0.7)
EOSINOPHIL NFR BLD AUTO: 1.5 % (ref 0.3–6.2)
ERYTHROCYTE [DISTWIDTH] IN BLOOD BY AUTOMATED COUNT: 15.9 % (ref 11.7–13)
HCT VFR BLD AUTO: 29.7 % (ref 35.6–45.5)
HGB BLD-MCNC: 10 G/DL (ref 11.9–15.5)
IMM GRANULOCYTES # BLD: 0.03 10*3/MM3 (ref 0–0.03)
IMM GRANULOCYTES NFR BLD: 0.5 % (ref 0–0.5)
LYMPHOCYTES # BLD AUTO: 1.19 10*3/MM3 (ref 0.9–4.8)
LYMPHOCYTES NFR BLD AUTO: 19.2 % (ref 19.6–45.3)
MCH RBC QN AUTO: 32.3 PG (ref 26.9–32)
MCHC RBC AUTO-ENTMCNC: 33.7 G/DL (ref 32.4–36.3)
MCV RBC AUTO: 95.8 FL (ref 80.5–98.2)
MONOCYTES # BLD AUTO: 0.38 10*3/MM3 (ref 0.2–1.2)
MONOCYTES NFR BLD AUTO: 6.1 % (ref 5–12)
NEUTROPHILS # BLD AUTO: 4.5 10*3/MM3 (ref 1.9–8.1)
NEUTROPHILS NFR BLD AUTO: 72.5 % (ref 42.7–76)
NRBC BLD MANUAL-RTO: 0 /100 WBC (ref 0–0)
PLATELET # BLD AUTO: 138 10*3/MM3 (ref 140–500)
PMV BLD AUTO: 10.2 FL (ref 6–12)
RBC # BLD AUTO: 3.1 10*6/MM3 (ref 3.9–5.2)
WBC NRBC COR # BLD: 6.2 10*3/MM3 (ref 4.5–10.7)

## 2017-12-08 PROCEDURE — 36415 COLL VENOUS BLD VENIPUNCTURE: CPT

## 2017-12-08 PROCEDURE — 99213 OFFICE O/P EST LOW 20 MIN: CPT | Performed by: INTERNAL MEDICINE

## 2017-12-08 PROCEDURE — 85025 COMPLETE CBC W/AUTO DIFF WBC: CPT | Performed by: INTERNAL MEDICINE

## 2017-12-08 NOTE — PROGRESS NOTES
"  Subjective     Tired    Reason for follow up:   Anemia of chronic kidney diease   Probable Iron deficiency     HISTORY OF PRESENT ILLNESS: History of anemia due to chronic kidney disease, iron deficiency anemia     History of Present Illness    Mrs Machuca returns today after having received 2 units of PRBCs on 11/28/2017. Her hemoglobin reflects this at 10.0 today. She remains fatigued, particularly in the afternoons, though denies associated chest pain or shortness of breath. She continues to complain of \"occasional dizziness\" and leg cramping. These are both chronic issues that have been evaluated previously. She is maintaining adequate nutrition and hydration. Bowels remain regular. No excess bleeding or bruising reported. She has no other concerns today.     Past Medical History, Past Surgical History, Social History, Family History have been reviewed and are without significant changes except as mentioned.    Review of Systems   A comprehensive 14 point review of systems was performed and was negative except as mentioned.    Medications:  The current medication list was reviewed in the EMR    ALLERGIES:    Allergies   Allergen Reactions   • Chlorhexidine Rash and Itching     Patient states \"blue dye\" in chlorhexidine.  States the orange and clear are OK to use   • Chlorhexidine Gluconate Itching     i   • Valproic Acid Other (See Comments)     Made her extremely sleepy   • Codeine Other (See Comments)     Want to climb the walls, doesn't help pain   • Propoxyphene-Acetaminophen    • Propoxyphene Other (See Comments)     Belligerent, acting drunk       Objective      Vitals:    12/08/17 1325   BP: 128/70   Pulse: 73   Resp: 16   Temp: 98.1 °F (36.7 °C)   TempSrc: Oral   SpO2: 98%   Weight: 80.6 kg (177 lb 12.8 oz)   Height: 176 cm (69.29\")     Current Status 12/8/2017   ECOG score 0       Physical Exam   GENERAL:  Well-developed, well-nourished in no acute distress.   SKIN:  Warm, dry without rashes, " purpura or petechiae.  EYES:  Pupils equal, round and reactive to light.  EOMs intact.  Conjunctivae normal.  EARS:  Hearing intact.  NOSE:  Septum midline.  No excoriations or nasal discharge.  MOUTH:  Tongue is well-papillated; no stomatitis or ulcers.  Lips normal.  THROAT:  Oropharynx without lesions or exudates.  NECK:  Supple with good range of motion; no thyromegaly or masses, no JVD.  LYMPHATICS:  No cervical, supraclavicular, axillary or inguinal adenopathy.  CHEST:  Lungs clear to auscultation. Good airflow.  CARDIAC:  Regular rate and rhythm without murmurs, rubs or gallops. Normal S1,S2.  ABDOMEN:  Soft, nontender with no hepatosplenomegaly or masses.  EXTREMITIES:  No clubbing, cyanosis, trace, bilateral lower extremity edema.  NEUROLOGICAL:  Cranial Nerves II-XII grossly intact.  No focal neurological deficits.  PSYCHIATRIC:  Normal affect and mood.      RECENT LABS:  Hematology WBC   Date Value Ref Range Status   12/08/2017 6.20 4.50 - 10.70 10*3/mm3 Final     RBC   Date Value Ref Range Status   12/08/2017 3.10 (L) 3.90 - 5.20 10*6/mm3 Final     Hemoglobin   Date Value Ref Range Status   12/08/2017 10.0 (L) 11.9 - 15.5 g/dL Final     Hematocrit   Date Value Ref Range Status   12/08/2017 29.7 (L) 35.6 - 45.5 % Final     Platelets   Date Value Ref Range Status   12/08/2017 138 (L) 140 - 500 10*3/mm3 Final          Assessment/Plan   Anemia of stage 4 CKD: Patient will continue with transfusion/Procrit support as needed. She is leaving for a trip in 10 days and has requested labs right after returning on the 28th. We will do lab and RN review on that day and again in 6 weeks. I will see her in follow up in 9 weeks. Patient is to alecia our office with any concerns prior.      Possible iron-deficiency: Patient has previously received IV Venofer due to oral iron intolerance. Her last dose was in October. Recent iron studies reveal repletion, therefore she will not require supplementation at this time. We will  continue to monitor.     I have reviewed the notes, assessments, and/or procedures performed by CHANTELL Tiwari.  I concur with her/his documentation of Charmaine Machuca.

## 2017-12-12 RX ORDER — SPIRONOLACTONE 25 MG/1
TABLET ORAL
Qty: 45 TABLET | Refills: 1 | Status: SHIPPED | OUTPATIENT
Start: 2017-12-12 | End: 2018-06-13 | Stop reason: SDUPTHER

## 2017-12-12 RX ORDER — CARVEDILOL 12.5 MG/1
TABLET ORAL
Qty: 90 TABLET | Refills: 1 | Status: SHIPPED | OUTPATIENT
Start: 2017-12-12 | End: 2018-06-13 | Stop reason: SDUPTHER

## 2017-12-29 ENCOUNTER — OFFICE VISIT (OUTPATIENT)
Dept: ONCOLOGY | Facility: CLINIC | Age: 77
End: 2017-12-29

## 2017-12-29 ENCOUNTER — INFUSION (OUTPATIENT)
Dept: ONCOLOGY | Facility: HOSPITAL | Age: 77
End: 2017-12-29

## 2017-12-29 ENCOUNTER — LAB (OUTPATIENT)
Dept: OTHER | Facility: HOSPITAL | Age: 77
End: 2017-12-29

## 2017-12-29 VITALS
OXYGEN SATURATION: 99 % | TEMPERATURE: 97.9 F | SYSTOLIC BLOOD PRESSURE: 103 MMHG | DIASTOLIC BLOOD PRESSURE: 65 MMHG | HEART RATE: 81 BPM

## 2017-12-29 DIAGNOSIS — D63.1 ANEMIA OF CHRONIC RENAL FAILURE, STAGE 4 (SEVERE) (HCC): Primary | ICD-10-CM

## 2017-12-29 DIAGNOSIS — D63.8 ANEMIA OF CHRONIC DISEASE: ICD-10-CM

## 2017-12-29 DIAGNOSIS — N18.4 ANEMIA OF CHRONIC RENAL FAILURE, STAGE 4 (SEVERE) (HCC): Primary | ICD-10-CM

## 2017-12-29 DIAGNOSIS — Z45.2 ENCOUNTER FOR FITTING AND ADJUSTMENT OF VASCULAR CATHETER: ICD-10-CM

## 2017-12-29 DIAGNOSIS — D64.9 SYMPTOMATIC ANEMIA: ICD-10-CM

## 2017-12-29 DIAGNOSIS — D61.818 PANCYTOPENIA (HCC): Primary | ICD-10-CM

## 2017-12-29 LAB
ABO GROUP BLD: NORMAL
BASOPHILS # BLD AUTO: 0.01 10*3/MM3 (ref 0–0.2)
BASOPHILS NFR BLD AUTO: 0.2 % (ref 0–1.5)
BLD GP AB SCN SERPL QL: NEGATIVE
DEPRECATED RDW RBC AUTO: 64.4 FL (ref 37–54)
EOSINOPHIL # BLD AUTO: 0.15 10*3/MM3 (ref 0–0.7)
EOSINOPHIL NFR BLD AUTO: 2.4 % (ref 0.3–6.2)
ERYTHROCYTE [DISTWIDTH] IN BLOOD BY AUTOMATED COUNT: 17.5 % (ref 11.7–13)
HCT VFR BLD AUTO: 25.6 % (ref 35.6–45.5)
HGB BLD-MCNC: 8.2 G/DL (ref 11.9–15.5)
IMM GRANULOCYTES # BLD: 0.02 10*3/MM3 (ref 0–0.03)
IMM GRANULOCYTES NFR BLD: 0.3 % (ref 0–0.5)
LYMPHOCYTES # BLD AUTO: 1.03 10*3/MM3 (ref 0.9–4.8)
LYMPHOCYTES NFR BLD AUTO: 16.8 % (ref 19.6–45.3)
MCH RBC QN AUTO: 32.7 PG (ref 26.9–32)
MCHC RBC AUTO-ENTMCNC: 32 G/DL (ref 32.4–36.3)
MCV RBC AUTO: 102 FL (ref 80.5–98.2)
MONOCYTES # BLD AUTO: 0.36 10*3/MM3 (ref 0.2–1.2)
MONOCYTES NFR BLD AUTO: 5.9 % (ref 5–12)
NEUTROPHILS # BLD AUTO: 4.57 10*3/MM3 (ref 1.9–8.1)
NEUTROPHILS NFR BLD AUTO: 74.4 % (ref 42.7–76)
NRBC BLD MANUAL-RTO: 0 /100 WBC (ref 0–0)
PLATELET # BLD AUTO: 160 10*3/MM3 (ref 140–500)
PMV BLD AUTO: 9.1 FL (ref 6–12)
RBC # BLD AUTO: 2.51 10*6/MM3 (ref 3.9–5.2)
RH BLD: NEGATIVE
WBC NRBC COR # BLD: 6.14 10*3/MM3 (ref 4.5–10.7)

## 2017-12-29 PROCEDURE — 86901 BLOOD TYPING SEROLOGIC RH(D): CPT

## 2017-12-29 PROCEDURE — 86900 BLOOD TYPING SEROLOGIC ABO: CPT

## 2017-12-29 PROCEDURE — 36415 COLL VENOUS BLD VENIPUNCTURE: CPT

## 2017-12-29 PROCEDURE — 63510000001 EPOETIN ALFA PER 1000 UNITS: Performed by: NURSE PRACTITIONER

## 2017-12-29 PROCEDURE — 99212-NC PR NO CHARGE CBC OFFICE OUTPATIENT VISIT 10 MINUTES: Performed by: NURSE PRACTITIONER

## 2017-12-29 PROCEDURE — 85025 COMPLETE CBC W/AUTO DIFF WBC: CPT | Performed by: INTERNAL MEDICINE

## 2017-12-29 PROCEDURE — 86850 RBC ANTIBODY SCREEN: CPT

## 2017-12-29 PROCEDURE — 86923 COMPATIBILITY TEST ELECTRIC: CPT

## 2017-12-29 PROCEDURE — 96372 THER/PROPH/DIAG INJ SC/IM: CPT | Performed by: NURSE PRACTITIONER

## 2017-12-29 RX ORDER — SODIUM CHLORIDE 0.9 % (FLUSH) 0.9 %
10 SYRINGE (ML) INJECTION AS NEEDED
Status: CANCELLED | OUTPATIENT
Start: 2017-12-29

## 2017-12-29 RX ORDER — ACETAMINOPHEN 325 MG/1
650 TABLET ORAL ONCE
Status: CANCELLED | OUTPATIENT
Start: 2017-12-29 | End: 2017-12-29

## 2017-12-29 RX ORDER — SODIUM CHLORIDE 0.9 % (FLUSH) 0.9 %
10 SYRINGE (ML) INJECTION AS NEEDED
Status: DISCONTINUED | OUTPATIENT
Start: 2017-12-29 | End: 2017-12-29 | Stop reason: HOSPADM

## 2017-12-29 RX ORDER — SODIUM CHLORIDE 9 MG/ML
250 INJECTION, SOLUTION INTRAVENOUS AS NEEDED
Status: CANCELLED | OUTPATIENT
Start: 2017-12-29

## 2017-12-29 RX ADMIN — ERYTHROPOIETIN 25000 UNITS: 20000 INJECTION, SOLUTION INTRAVENOUS; SUBCUTANEOUS at 12:25

## 2017-12-31 ENCOUNTER — INFUSION (OUTPATIENT)
Dept: ONCOLOGY | Facility: HOSPITAL | Age: 77
End: 2017-12-31

## 2017-12-31 VITALS
RESPIRATION RATE: 20 BRPM | DIASTOLIC BLOOD PRESSURE: 71 MMHG | SYSTOLIC BLOOD PRESSURE: 120 MMHG | HEART RATE: 75 BPM | OXYGEN SATURATION: 100 % | TEMPERATURE: 97.2 F

## 2017-12-31 DIAGNOSIS — D63.8 ANEMIA OF CHRONIC DISEASE: ICD-10-CM

## 2017-12-31 DIAGNOSIS — D63.1 ANEMIA OF CHRONIC RENAL FAILURE, STAGE 4 (SEVERE) (HCC): ICD-10-CM

## 2017-12-31 DIAGNOSIS — N18.4 ANEMIA OF CHRONIC RENAL FAILURE, STAGE 4 (SEVERE) (HCC): ICD-10-CM

## 2017-12-31 DIAGNOSIS — D64.9 SYMPTOMATIC ANEMIA: ICD-10-CM

## 2017-12-31 PROCEDURE — P9016 RBC LEUKOCYTES REDUCED: HCPCS

## 2017-12-31 PROCEDURE — 86900 BLOOD TYPING SEROLOGIC ABO: CPT

## 2017-12-31 PROCEDURE — 25010000002 HEPARIN FLUSH (PORCINE) 100 UNIT/ML SOLUTION: Performed by: NURSE PRACTITIONER

## 2017-12-31 PROCEDURE — 36430 TRANSFUSION BLD/BLD COMPNT: CPT

## 2017-12-31 RX ORDER — SODIUM CHLORIDE 9 MG/ML
250 INJECTION, SOLUTION INTRAVENOUS AS NEEDED
Status: DISCONTINUED | OUTPATIENT
Start: 2017-12-31 | End: 2017-12-31 | Stop reason: HOSPADM

## 2017-12-31 RX ORDER — ACETAMINOPHEN 325 MG/1
650 TABLET ORAL ONCE
Status: COMPLETED | OUTPATIENT
Start: 2017-12-31 | End: 2017-12-31

## 2017-12-31 RX ADMIN — Medication 500 UNITS: at 16:13

## 2017-12-31 RX ADMIN — ACETAMINOPHEN 650 MG: 325 TABLET ORAL at 09:55

## 2018-01-05 ENCOUNTER — APPOINTMENT (OUTPATIENT)
Dept: ONCOLOGY | Facility: CLINIC | Age: 78
End: 2018-01-05

## 2018-01-05 ENCOUNTER — LAB (OUTPATIENT)
Dept: OTHER | Facility: HOSPITAL | Age: 78
End: 2018-01-05

## 2018-01-05 DIAGNOSIS — D50.8 OTHER IRON DEFICIENCY ANEMIA: Primary | ICD-10-CM

## 2018-01-12 ENCOUNTER — CLINICAL SUPPORT NO REQUIREMENTS (OUTPATIENT)
Dept: CARDIOLOGY | Facility: CLINIC | Age: 78
End: 2018-01-12

## 2018-01-12 ENCOUNTER — OFFICE VISIT (OUTPATIENT)
Dept: CARDIOLOGY | Facility: CLINIC | Age: 78
End: 2018-01-12

## 2018-01-12 VITALS
WEIGHT: 176 LBS | HEIGHT: 66 IN | SYSTOLIC BLOOD PRESSURE: 110 MMHG | BODY MASS INDEX: 28.28 KG/M2 | DIASTOLIC BLOOD PRESSURE: 68 MMHG | HEART RATE: 68 BPM

## 2018-01-12 DIAGNOSIS — I10 ESSENTIAL HYPERTENSION: ICD-10-CM

## 2018-01-12 DIAGNOSIS — I50.22 CHRONIC SYSTOLIC CONGESTIVE HEART FAILURE (HCC): Primary | ICD-10-CM

## 2018-01-12 DIAGNOSIS — I42.8 NONISCHEMIC CARDIOMYOPATHY (HCC): ICD-10-CM

## 2018-01-12 DIAGNOSIS — E78.49 OTHER HYPERLIPIDEMIA: ICD-10-CM

## 2018-01-12 DIAGNOSIS — H35.039: ICD-10-CM

## 2018-01-12 DIAGNOSIS — I34.0 NON-RHEUMATIC MITRAL REGURGITATION: ICD-10-CM

## 2018-01-12 DIAGNOSIS — N18.4: ICD-10-CM

## 2018-01-12 DIAGNOSIS — N18.4 RENAL FAILURE, CHRONIC, STAGE 4 (SEVERE) (HCC): ICD-10-CM

## 2018-01-12 PROCEDURE — 99214 OFFICE O/P EST MOD 30 MIN: CPT | Performed by: INTERNAL MEDICINE

## 2018-01-12 PROCEDURE — 93284 PRGRMG EVAL IMPLANTABLE DFB: CPT | Performed by: INTERNAL MEDICINE

## 2018-01-12 PROCEDURE — 93000 ELECTROCARDIOGRAM COMPLETE: CPT | Performed by: INTERNAL MEDICINE

## 2018-01-12 NOTE — PROGRESS NOTES
Date of Office Visit: 18  Encounter Provider: Estevan Matamoros MD  Place of Service: [facility] CARDIOLOGY  Patient Name: Charmaine Machuca  :1940      Chief Complaint   Patient presents with   • Cardiomyopathy   • Atrial Fibrillation     History of Present Illness  HPI Comments:     The patient is a pleasant 77-year-old female with a history of nonischemic  cardiomyopathy, hypertension, hyperlipidemia and LBBB. Original ejection   fraction was extremely low. She was enrolled in the [facility] study of ACE   inhibitor plus Atacand versus ACE inhibitor plus a placebo. Then, in 2005   she had a perfusion study that showed infarct but no ischemia. She had cardiac   catheterization in 2006, which showed moderate left ventricular dysfunction,   elevated right-sided pressure, left anterior end-diastolic pressure, mild mitral insufficiency,  but normal coronary arteries. She was treated medically. She then had  worsening dyspnea. In 2007, had a Bi-V defibrillator placed and she  continued to have GI problems, fatigue, weakness, dizziness, syncopal, near  syncopal spells, multiple medications were adjusted. Diuretics were  adjusted. On diuretics, off diuretics, volume overloaded, volume depleted.  She was diagnosed with myoclonus. Multiple GI complaints.  She then had her generator changed in 2014. Unfortunately, a day or two after that  developed small bowel obstruction, had to be admitted to the hospital and was then treated  for that. That resolved. She developed C. difficile infection, was treated for that. She  developed a pocket hematoma and had to have that evacuated.   She had a followup echocardiogram in 2015 that showed normal ejection fraction, mild  mitral insufficiency, mild aortic valve calcification without stenosis. She comes in for  followup now. She went to [facility] 2015 with this atypical chest  discomfort, left-sided under  her breast, pleuritic, worse when she would take a breath in,  worse when she would move to one side. No real increased shortness of breath with it, but  it was hard to take breath in. While there, she had a ventilation perfusion scan that was  negative for pulmonary embolus. She had a 2D echocardiogram that, again, showed normal LV  systolic function and no significant valvular disease.  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  Had that removed and new lead replaced.    She got in for follow-up.  Unfortunate she she's had a cold with upper respiratory congestion and with that has maybe been a little more short of breath but denies any real dyspnea with activity denies orthopnea and PND.  She's had no chest pain or pressure no palpitations she does get intermittent lightheadedness which for the past month she says is often all the time but it seems to be position positional.  She has had a port placed since she's required blood transfusions due to her renal failure about 2 units a month.  She does routinely follow with the [facility].    Cardiomyopathy   Associated symptoms include joint swelling and numbness. Pertinent negatives include no abdominal pain, congestion, diaphoresis, fever, headaches, myalgias, nausea, rash, vertigo, vomiting or weakness.   Atrial Fibrillation   Symptoms are negative for dizziness, shortness of breath and weakness. Past medical history includes atrial fibrillation, AICD and CHF.   Congestive Heart Failure   Pertinent negatives include no abdominal pain, muscle weakness, nocturia or shortness of breath.         Past Medical History:   Diagnosis Date   • Anemia     Iron deficiency   • Cancer     Skin cancer   • Cardiomyopathy     S/P pacemaker and defibrillator   • CHF (congestive heart failure)    • Clostridium difficile diarrhea     2013   • Colon polyps    • Gastroparesis    • GERD (gastroesophageal reflux disease)    • Gout    • Hemorrhoids    • Hiatal hernia    •  History of pancreatitis 01/2014   • History of pancreatitis     2014   • History of transfusion    • Hyperlipidemia    • Hypothyroidism    • Left bundle branch block    • Mitral and aortic valve disease    • Mitral valve insufficiency    • Myoclonus     S/P Depakote   • Nephrolithiasis    • Osteoarthritis    • Pancytopenia    • Peripheral neuropathy    • Progressive familial myoclonic epilepsy    • Pulmonary hypertension    • RLS (restless legs syndrome)    • Ruptured ovarian cyst    • Seizures     myoclonus   • Spinal stenosis          Past Surgical History:   Procedure Laterality Date   • APPENDECTOMY N/A    • CARDIAC CATHETERIZATION Left 03/28/2006    Arterial catheter insertion, cath left ventriculography, coronary angiography and left heart catheterization-Dr. Estevan Matamoros   • CARDIAC DEFIBRILLATOR PLACEMENT N/A 11/30/2007    Biventricular implantable cardioverter defibrillator-Dr. Leandro Huber   • CATARACT EXTRACTION Bilateral 2011   • COLONOSCOPY N/A 2014    done at [facility]   • CYSTECTOMY N/A     Ovarian cystectomy   • ENDOSCOPY N/A 04/28/2006    EGD with biopsies. Paraesophageal hiatal hernia from 34 to 44 cm, antral gastritis-Dr. Nehemiah Beal   • ENDOSCOPY N/A 09/29/2004    Hiatal hernia, gastritis and an erosion of the pylorus-Dr. Yang Pavon   • ENTEROSCOPY SMALL BOWEL N/A 10/30/2006    Small bowel enteroscopy with biopsies-Dr. Rory Sevilla   • FLEXIBLE SIGMOIDOSCOPY N/A 09/29/2004    Unsuccessful colonoscopy due to poop prep, a very tortuous sigmoid, bowel prep in the form of enema given and a barium enema was obtained-Dr. Yang Pavon   • FRACTURE SURGERY  2015    Broke big toe   • HEMATOMA EVACUATION TRUNK N/A 02/07/2014    Pocket evacuation of hematoma at pacemaker site-Dr. Leandro Huber   • HEMORRHOIDECTOMY N/A 04/19/2004    Flexible sigmoidoscopy and stapled hemorrhoidectomy-Dr. Yang Pavon   • HYSTERECTOMY Bilateral 1977   • IMPLANTABLE CARDIOVERTER DEFIBRILLATOR LEAD REPLACEMENT/POCKET  REVISION Left 3/28/2016    Procedure: AUTOMATIC IMPLANTABLE CARDIOVERTER DEFIBRILLATOR LEAD REPLACEMENT WITH LASER LEAD EXTRACTION;  Surgeon: Leandro Huber MD;  Location: [facility] HYBRID OR 18/19;  Service:    • IMPLANTABLE CARDIOVERTER DEFIBRILLATOR LEAD REPLACEMENT/POCKET REVISION N/A 01/13/2014    Biventricular ICD replacement-Dr. Jillian Huber   • LAPAROSCOPY REPAIR HIATAL HERNIA N/A 06/27/2006    Laparoscopic paraesophageal hiatal hernia repair with Nissen fundoplication and cholecystectomy-Dr. Sean Yoon   • NATALIE GONZALEZ ([facility]) PROCEDURE     • NJ INSJ TUNNELED CVC W/O SUBQ PORT/ AGE 3 YR/> Right 10/3/2017    Procedure: Right internal jugular port placement;  Surgeon: Tiffanie Couch MD;  Location: [facility] MAIN OR;  Service: General   • TOTAL KNEE ARTHROPLASTY Left 08/2014         Current Outpatient Prescriptions on File Prior to Visit   Medication Sig Dispense Refill   • allopurinol (ZYLOPRIM) 100 MG tablet Take 100 mg by mouth 2 (Two) Times a Day.     • aspirin 81 MG tablet Take 81 mg by mouth Daily. PT HOLDING FOR SURGERY     • calcitriol (ROCALTROL) 0.25 MCG capsule Take 0.25 mcg by mouth Daily.     • calcium carbonate (OS-RAYMOND) 600 MG tablet Take 600 mg by mouth Daily.     • carvedilol (COREG) 12.5 MG tablet TAKE 1 TABLET DAILY 90 tablet 1   • Cholecalciferol (VITAMIN D3) 5000 units capsule capsule Take 5,000 Units by mouth Daily. Every other day     • diazepam (VALIUM) 5 MG tablet Take 5 mg by mouth Every Night.     • furosemide (LASIX) 40 MG tablet Take 40 mg by mouth Every Other Day.     • Gabapentin Enacarbil ER (HORIZANT) 300 MG tablet controlled-release Take 1 tablet by mouth 2 (Two) Times a Day. (Patient taking differently: Take 1 tablet by mouth Daily With Dinner.) 180 tablet 2   • Glucosamine-Chondroit-Vit C-Mn (GLUCOSAMINE CHONDROITIN COMPLX) capsule Take 1,500 mg by mouth Every Other Day. PT HOLDING FOR SURGERY     • levothyroxine (SYNTHROID, LEVOTHROID) 25 MCG tablet  Take 25 mcg by mouth Daily.     • loperamide (IMODIUM) 2 MG capsule Take 2 mg by mouth 4 (Four) Times a Day As Needed for Diarrhea.     • magnesium oxide 250 MG tablet Take 250 mg by mouth Daily.     • methscopolamine (PAMINE FORTE) 5 MG tablet Take 5 mg by mouth Daily.     • methylPREDNISolone (MEDROL) 4 MG tablet Take 4 mg by mouth As Needed. TAKES FOR GOUT FLARE UPS     • Multiple Vitamin (MULTI-DAY VITAMINS) tablet Take 1 tablet by mouth Daily. Multivitamin with Iron     • ondansetron (ZOFRAN) 8 MG tablet Take 1 tablet by mouth Every 8 (Eight) Hours As Needed for Nausea or Vomiting. 30 tablet 2   • psyllium (METAMUCIL) 58.6 % powder Take 1 packet by mouth Daily.     • rotigotine (NEUPRO) 4 MG/24HR patch Place 1 patch on the skin Daily.     • spironolactone (ALDACTONE) 25 MG tablet TAKE 1/2 TABLET DAILY 45 tablet 1   • Vitamin E 400 UNITS tablet Take 400 Units by mouth Daily. PT HOLDING FOR SURGERY     • [DISCONTINUED] spironolactone (ALDACTONE) 12.5 MG tablet half tablet Take 12.5 mg by mouth Daily.       Current Facility-Administered Medications on File Prior to Visit   Medication Dose Route Frequency Provider Last Rate Last Dose   • heparin flush (porcine) 100 UNIT/ML injection 500 Units  500 Units Intravenous PRN Anuel Scott MD   500 Units at 11/27/17 1347   • sodium chloride 0.9 % flush 10 mL  10 mL Intravenous PRN Anuel Scott MD   10 mL at 11/27/17 1347         Social History     Social History   • Marital status:      Spouse name: Zackery   • Number of children: 5   • Years of education: 1 yr college     Occupational History   •  Retired     Social History Main Topics   • Smoking status: Never Smoker   • Smokeless tobacco: Never Used   • Alcohol use No   • Drug use: No   • Sexual activity: Defer      Comment: AGE!!!     Other Topics Concern   • Not on file     Social History Narrative       Family History   Problem Relation Age of Onset   • Coronary artery disease Other    • Dementia Other  "   • Kidney disease Other    • Arthritis Father    • Early death Father      Kidney failure   • Kidney disease Father    • Heart disease Mother    • Mental illness Mother      Dementia   • Malig Hyperthermia Neg Hx          Review of Systems   Constitution: Positive for malaise/fatigue. Negative for decreased appetite, diaphoresis, fever, weakness, weight gain and weight loss.   HENT: Negative for congestion, hearing loss, nosebleeds and tinnitus.    Eyes: Negative for blurred vision, double vision, vision loss in left eye, vision loss in right eye and visual disturbance.   Cardiovascular: Negative for orthopnea.        As noted in HPI   Respiratory: Negative for shortness of breath.         As noted HPI   Endocrine: Negative for cold intolerance and heat intolerance.   Hematologic/Lymphatic: Negative for bleeding problem. Does not bruise/bleed easily.   Skin: Negative for color change, flushing, itching and rash.   Musculoskeletal: Positive for joint swelling. Negative for arthritis, back pain, joint pain, muscle weakness and myalgias.   Gastrointestinal: Negative for bloating, abdominal pain, constipation, diarrhea, dysphagia, heartburn, hematemesis, hematochezia, melena, nausea and vomiting.   Genitourinary: Negative for bladder incontinence, dysuria, frequency, nocturia and urgency.   Neurological: Positive for numbness. Negative for dizziness, focal weakness, headaches, light-headedness, loss of balance, paresthesias and vertigo.   Psychiatric/Behavioral: Negative for depression, memory loss and substance abuse.       Procedures      ECG 12 Lead  Date/Time: 1/12/2018 10:54 AM  Performed by: YARON KRISHNAN  Authorized by: YARON KRISHNAN   Comparison: compared with previous ECG   Similar to previous ECG  Rhythm: sinus rhythm  Rhythm comments: ventricular paced                 Objective:    /68 (BP Location: Right arm)  Pulse 68  Ht 167.6 cm (66\")  Wt 79.8 kg (176 lb)  BMI 28.41 kg/m2       " Physical Exam  Physical Exam   Constitutional: She is oriented to person, place, and time. She appears well-developed and well-nourished. No distress.   HENT:   Head: Normocephalic.   Eyes: Conjunctivae are normal. Pupils are equal, round, and reactive to light. No scleral icterus.   Neck: Normal carotid pulses, no hepatojugular reflux and no JVD present. Carotid bruit is not present. No tracheal deviation, no edema and no erythema present. No thyromegaly present.   Cardiovascular: Normal rate, regular rhythm, S1 normal, S2 normal and intact distal pulses.   No extrasystoles are present. PMI is not displaced.  Exam reveals no distant heart sounds and no friction rub.    Murmur heard.   Systolic murmur is present with a grade of 2/6  at the lower left sternal border  Pulses:       Carotid pulses are 2+ on the right side, and 2+ on the left side.       Radial pulses are 2+ on the right side, and 2+ on the left side.        Femoral pulses are 2+ on the right side, and 2+ on the left side.       Dorsalis pedis pulses are 2+ on the right side, and 2+ on the left side.        Posterior tibial pulses are 2+ on the right side, and 2+ on the left side.   Pulmonary/Chest: Effort normal. No respiratory distress. She has no decreased breath sounds. She has no wheezes. She has rhonchi in the right lower field. She has no rales. She exhibits no tenderness.   Abdominal: Soft. Bowel sounds are normal. She exhibits no distension and no mass. There is no hepatosplenomegaly. There is no tenderness. There is no rebound and no guarding.   Musculoskeletal: She exhibits edema (1+ at ankles). She exhibits no tenderness or deformity.   Neurological: She is alert and oriented to person, place, and time.   Skin: Skin is warm and dry. No rash noted. She is not diaphoretic. No cyanosis or erythema. No pallor. Nails show no clubbing.   Psychiatric: She has a normal mood and affect. Her speech is normal and behavior is normal. Judgment and  thought content normal.           Assessment:   1. This is a 77-year-old female with history of nonischemic cardiomyopathy. Last echocardiogram in 3/15 showed an ejection fraction of 62%.   Heart Failure  Assessment  • NYHA class I - There is no limitation of physical activity. Physical activity does not cause fatigue, palpitations or shortness of breath.  • ACE inhibitor not prescribed for medical reasons  • Beta blocker prescribed  • Diuretics prescribed  • Angiotensin receptor blocker (ARB) not prescribed for medical reasons  • Left ventricular function is normal by qualitative assessment    Plan  • The patient has received heart failure education on the following topics: dietary sodium restriction, medication instructions, minimizing or avoiding NSAID use, symptom management, weight monitoring and minimizing alcohol intake  • The heart failure care plan was discussed with the patient today including: continuing the current program  •  The patient has been counseled about ICD or CRT-D implantation    Subjective/Objective    • Physical exam findings negative for rales and elevated JVP.  • The patient has an ICD implant  • The patient has a CRT-D implant  • The patient has a CRT implant      She is stable from a cardiovascular standpoint.  She will continue the same see us skin in follow-up in 6 months.      2. History of congestive heart failure status post Bi-V ICD. Following in our defibrillator clinic. Now stable.  3. History of syncope, falling spells, and dizziness of unclear etiology. Resolved. May have been from Depakote.  4. Hyperlipidemia, followed in your office.  5. History of kidney stones.  6. History of left bundle branch block. unchanged.  7. History of anemia. iron deficient still following with hematology.   8. History of renal failure. To see hematology.  9. History of hypertension.   10. Probable depression.   11. Myoclonus.   12. Mitral Insufficiency. Echocardiogram 3/15 was just mild.   will  recheck an echocardiogram when she returns in 6 months.  13.  Renal failure stage IV following with nephrology.          Plan:

## 2018-01-19 ENCOUNTER — APPOINTMENT (OUTPATIENT)
Dept: OTHER | Facility: HOSPITAL | Age: 78
End: 2018-01-19

## 2018-01-19 ENCOUNTER — OFFICE VISIT (OUTPATIENT)
Dept: ONCOLOGY | Facility: CLINIC | Age: 78
End: 2018-01-19

## 2018-01-19 VITALS
DIASTOLIC BLOOD PRESSURE: 65 MMHG | BODY MASS INDEX: 27.92 KG/M2 | SYSTOLIC BLOOD PRESSURE: 117 MMHG | HEART RATE: 74 BPM | TEMPERATURE: 97.4 F | OXYGEN SATURATION: 98 % | WEIGHT: 173 LBS

## 2018-01-19 DIAGNOSIS — N18.9 HISTORY OF ANEMIA DUE TO CHRONIC KIDNEY DISEASE: Primary | ICD-10-CM

## 2018-01-19 DIAGNOSIS — Z86.2 HISTORY OF ANEMIA DUE TO CHRONIC KIDNEY DISEASE: Primary | ICD-10-CM

## 2018-01-19 LAB
BASOPHILS # BLD AUTO: 0.02 10*3/MM3 (ref 0–0.2)
BASOPHILS NFR BLD AUTO: 0.5 % (ref 0–1.5)
DEPRECATED RDW RBC AUTO: 57.7 FL (ref 37–54)
EOSINOPHIL # BLD AUTO: 0.09 10*3/MM3 (ref 0–0.7)
EOSINOPHIL NFR BLD AUTO: 2.1 % (ref 0.3–6.2)
ERYTHROCYTE [DISTWIDTH] IN BLOOD BY AUTOMATED COUNT: 15.8 % (ref 11.7–13)
HCT VFR BLD AUTO: 30.1 % (ref 35.6–45.5)
HGB BLD-MCNC: 10 G/DL (ref 11.9–15.5)
IMM GRANULOCYTES # BLD: 0.02 10*3/MM3 (ref 0–0.03)
IMM GRANULOCYTES NFR BLD: 0.5 % (ref 0–0.5)
LYMPHOCYTES # BLD AUTO: 0.89 10*3/MM3 (ref 0.9–4.8)
LYMPHOCYTES NFR BLD AUTO: 20.4 % (ref 19.6–45.3)
MCH RBC QN AUTO: 32.8 PG (ref 26.9–32)
MCHC RBC AUTO-ENTMCNC: 33.2 G/DL (ref 32.4–36.3)
MCV RBC AUTO: 98.7 FL (ref 80.5–98.2)
MONOCYTES # BLD AUTO: 0.3 10*3/MM3 (ref 0.2–1.2)
MONOCYTES NFR BLD AUTO: 6.9 % (ref 5–12)
NEUTROPHILS # BLD AUTO: 3.04 10*3/MM3 (ref 1.9–8.1)
NEUTROPHILS NFR BLD AUTO: 69.6 % (ref 42.7–76)
NRBC BLD MANUAL-RTO: 0 /100 WBC (ref 0–0)
PLAT MORPH BLD: NORMAL
PLATELET # BLD AUTO: 202 10*3/MM3 (ref 140–500)
PMV BLD AUTO: 10 FL (ref 6–12)
RBC # BLD AUTO: 3.05 10*6/MM3 (ref 3.9–5.2)
RBC MORPH BLD: NORMAL
WBC MORPH BLD: NORMAL
WBC NRBC COR # BLD: 4.36 10*3/MM3 (ref 4.5–10.7)

## 2018-01-19 PROCEDURE — 99212-NC PR NO CHARGE CBC OFFICE OUTPATIENT VISIT 10 MINUTES: Performed by: NURSE PRACTITIONER

## 2018-01-19 PROCEDURE — 85025 COMPLETE CBC W/AUTO DIFF WBC: CPT | Performed by: INTERNAL MEDICINE

## 2018-01-19 PROCEDURE — 85007 BL SMEAR W/DIFF WBC COUNT: CPT | Performed by: INTERNAL MEDICINE

## 2018-01-19 PROCEDURE — 36415 COLL VENOUS BLD VENIPUNCTURE: CPT

## 2018-01-19 NOTE — PROGRESS NOTES
Patient here for lab and RN review today.  She and her  are both getting over a sinus infections and have been quite weak.  Thankfully, patient does feel like she is gaining some energy and is recovering.  With the exception of today, patient has denied any of her issues with lightheadedness.  She does have a modest degree today, though this is no worse than her baseline.  She denies any chest pain, shortness of breath, or excessive bleeding or bruising.  Hemoglobin has increased to 10.0 today, which she is very pleased with.  She will not require a Procrit injection.    She has a M.D. visit on February 6, 2018 for follow-up and evaluation of labs.  I've asked her to call our office with any concerns including progressive dyspnea, chest pain, or bleeding prior to this next office visit.  She has verbalized understanding.    Lab Results   Component Value Date    WBC 4.36 (L) 01/19/2018    HGB 10.0 (L) 01/19/2018    HCT 30.1 (L) 01/19/2018    MCV 98.7 (H) 01/19/2018     01/19/2018     Vitals:    01/19/18 1111   BP: 117/65   Pulse: 74   Temp: 97.4 °F (36.3 °C)   SpO2: 98%     CHANTELL Tiwari

## 2018-01-23 NOTE — TELEPHONE ENCOUNTER
----- Message from Danita Bowser sent at 1/22/2018 11:27 AM EST -----  Contact: [phone]  Patient needs a refill on Gabapentin Enacarbil ER (HORIZANT) 300 MG tablet. Call into local [facility] on [address] not mail order.

## 2018-01-26 ENCOUNTER — TELEPHONE (OUTPATIENT)
Dept: NEUROLOGY | Facility: CLINIC | Age: 78
End: 2018-01-26

## 2018-01-26 NOTE — TELEPHONE ENCOUNTER
----- Message from Danita Bowser sent at 1/25/2018  2:12 PM EST -----  Contact: [phone]  Patient would like a script for Gralise (gabapentin) because it cost less. [facility]

## 2018-01-30 NOTE — TELEPHONE ENCOUNTER
Okay. I sent in a low dose, 300 mg one tablet daily with dinner. I want her to start here and if after 1 week she does not notice any improvement or side effects she can increase to 2 tablets. The normal dose is 1800 mg daily with dinner... Let her know this and we will slowly work our way to a dose that works for her.

## 2018-02-06 ENCOUNTER — LAB (OUTPATIENT)
Dept: OTHER | Facility: HOSPITAL | Age: 78
End: 2018-02-06

## 2018-02-06 ENCOUNTER — OFFICE VISIT (OUTPATIENT)
Dept: ONCOLOGY | Facility: CLINIC | Age: 78
End: 2018-02-06

## 2018-02-06 VITALS
WEIGHT: 176.6 LBS | HEART RATE: 83 BPM | HEIGHT: 69 IN | TEMPERATURE: 97.6 F | RESPIRATION RATE: 16 BRPM | OXYGEN SATURATION: 94 % | DIASTOLIC BLOOD PRESSURE: 86 MMHG | BODY MASS INDEX: 26.16 KG/M2 | SYSTOLIC BLOOD PRESSURE: 132 MMHG

## 2018-02-06 DIAGNOSIS — D63.8 ANEMIA OF CHRONIC DISEASE: Primary | ICD-10-CM

## 2018-02-06 DIAGNOSIS — D50.8 OTHER IRON DEFICIENCY ANEMIA: Primary | ICD-10-CM

## 2018-02-06 DIAGNOSIS — D50.8 OTHER IRON DEFICIENCY ANEMIA: ICD-10-CM

## 2018-02-06 LAB
ABO GROUP BLD: NORMAL
BASOPHILS # BLD AUTO: 0.02 10*3/MM3 (ref 0–0.2)
BASOPHILS NFR BLD AUTO: 0.4 % (ref 0–1.5)
BLD GP AB SCN SERPL QL: NEGATIVE
DEPRECATED RDW RBC AUTO: 60.6 FL (ref 37–54)
EOSINOPHIL # BLD AUTO: 0.1 10*3/MM3 (ref 0–0.7)
EOSINOPHIL NFR BLD AUTO: 1.8 % (ref 0.3–6.2)
ERYTHROCYTE [DISTWIDTH] IN BLOOD BY AUTOMATED COUNT: 15.9 % (ref 11.7–13)
HCT VFR BLD AUTO: 27.3 % (ref 35.6–45.5)
HGB BLD-MCNC: 8.8 G/DL (ref 11.9–15.5)
IMM GRANULOCYTES # BLD: 0.03 10*3/MM3 (ref 0–0.03)
IMM GRANULOCYTES NFR BLD: 0.5 % (ref 0–0.5)
LYMPHOCYTES # BLD AUTO: 0.97 10*3/MM3 (ref 0.9–4.8)
LYMPHOCYTES NFR BLD AUTO: 17.7 % (ref 19.6–45.3)
MCH RBC QN AUTO: 33.1 PG (ref 26.9–32)
MCHC RBC AUTO-ENTMCNC: 32.2 G/DL (ref 32.4–36.3)
MCV RBC AUTO: 102.6 FL (ref 80.5–98.2)
MONOCYTES # BLD AUTO: 0.31 10*3/MM3 (ref 0.2–1.2)
MONOCYTES NFR BLD AUTO: 5.7 % (ref 5–12)
NEUTROPHILS # BLD AUTO: 4.04 10*3/MM3 (ref 1.9–8.1)
NEUTROPHILS NFR BLD AUTO: 73.9 % (ref 42.7–76)
NRBC BLD MANUAL-RTO: 0 /100 WBC (ref 0–0)
PLATELET # BLD AUTO: 153 10*3/MM3 (ref 140–500)
PMV BLD AUTO: 9.7 FL (ref 6–12)
RBC # BLD AUTO: 2.66 10*6/MM3 (ref 3.9–5.2)
RH BLD: NEGATIVE
WBC NRBC COR # BLD: 5.47 10*3/MM3 (ref 4.5–10.7)

## 2018-02-06 PROCEDURE — 85025 COMPLETE CBC W/AUTO DIFF WBC: CPT | Performed by: INTERNAL MEDICINE

## 2018-02-06 PROCEDURE — 86901 BLOOD TYPING SEROLOGIC RH(D): CPT | Performed by: INTERNAL MEDICINE

## 2018-02-06 PROCEDURE — 86900 BLOOD TYPING SEROLOGIC ABO: CPT | Performed by: INTERNAL MEDICINE

## 2018-02-06 PROCEDURE — 36415 COLL VENOUS BLD VENIPUNCTURE: CPT

## 2018-02-06 PROCEDURE — 99214 OFFICE O/P EST MOD 30 MIN: CPT | Performed by: INTERNAL MEDICINE

## 2018-02-06 PROCEDURE — 86850 RBC ANTIBODY SCREEN: CPT | Performed by: INTERNAL MEDICINE

## 2018-02-06 PROCEDURE — 86923 COMPATIBILITY TEST ELECTRIC: CPT

## 2018-02-06 RX ORDER — DIPHENHYDRAMINE HCL 25 MG
25 CAPSULE ORAL ONCE
Status: CANCELLED | OUTPATIENT
Start: 2018-02-07 | End: 2018-02-07

## 2018-02-06 RX ORDER — ACETAMINOPHEN 325 MG/1
325 TABLET ORAL ONCE
Status: CANCELLED | OUTPATIENT
Start: 2018-02-07 | End: 2018-02-07

## 2018-02-06 RX ORDER — SODIUM CHLORIDE 9 MG/ML
250 INJECTION, SOLUTION INTRAVENOUS AS NEEDED
Status: CANCELLED | OUTPATIENT
Start: 2018-02-07

## 2018-02-06 NOTE — PROGRESS NOTES
Subjective .    Fatigue   Dizziness    REASONS FOR FOLLOWUP:       HISTORY OF PRESENT ILLNESS:  The patient is a 77 y.o. year old female  who is here for follow-up with the above-mentioned history.She will undergo a 2 unit PRBC transfusion.She will return in about 2 weeks and then about 4weeks for MD.    Past Medical History:   Diagnosis Date   • Anemia     Iron deficiency   • Cancer     Skin cancer   • Cardiomyopathy     S/P pacemaker and defibrillator   • CHF (congestive heart failure)    • Clostridium difficile diarrhea     2013   • Colon polyps    • Gastroparesis    • GERD (gastroesophageal reflux disease)    • Gout    • Hemorrhoids    • Hiatal hernia    • History of pancreatitis 01/2014   • History of pancreatitis     2014   • History of transfusion    • Hyperlipidemia    • Hypothyroidism    • Left bundle branch block    • Mitral and aortic valve disease    • Mitral valve insufficiency    • Myoclonus     S/P Depakote   • Nephrolithiasis    • Osteoarthritis    • Pancytopenia    • Peripheral neuropathy    • Progressive familial myoclonic epilepsy    • Pulmonary hypertension    • RLS (restless legs syndrome)    • Ruptured ovarian cyst    • Seizures     myoclonus   • Spinal stenosis      Past Surgical History:   Procedure Laterality Date   • APPENDECTOMY N/A    • CARDIAC CATHETERIZATION Left 03/28/2006    Arterial catheter insertion, cath left ventriculography, coronary angiography and left heart catheterization-Dr. Estevan Matamoros   • CARDIAC DEFIBRILLATOR PLACEMENT N/A 11/30/2007    Biventricular implantable cardioverter defibrillator-Dr. Leandro Huber   • CATARACT EXTRACTION Bilateral 2011   • COLONOSCOPY N/A 2014    done at [facility]   • CYSTECTOMY N/A     Ovarian cystectomy   • ENDOSCOPY N/A 04/28/2006    EGD with biopsies. Paraesophageal hiatal hernia from 34 to 44 cm, antral gastritis-Dr. Nehemiah Beal   • ENDOSCOPY N/A 09/29/2004    Hiatal hernia, gastritis and an erosion of the pylorus-Dr. Yang Pavon   •  ENTEROSCOPY SMALL BOWEL N/A 10/30/2006    Small bowel enteroscopy with biopsies-Dr. Rory Sevilla   • FLEXIBLE SIGMOIDOSCOPY N/A 09/29/2004    Unsuccessful colonoscopy due to poop prep, a very tortuous sigmoid, bowel prep in the form of enema given and a barium enema was obtained-Dr. Yang Pavon   • FRACTURE SURGERY  2015    Broke big toe   • HEMATOMA EVACUATION TRUNK N/A 02/07/2014    Pocket evacuation of hematoma at pacemaker site-Dr. Leandro Huber   • HEMORRHOIDECTOMY N/A 04/19/2004    Flexible sigmoidoscopy and stapled hemorrhoidectomy-Dr. Yang Pavon   • HYSTERECTOMY Bilateral 1977   • IMPLANTABLE CARDIOVERTER DEFIBRILLATOR LEAD REPLACEMENT/POCKET REVISION Left 3/28/2016    Procedure: AUTOMATIC IMPLANTABLE CARDIOVERTER DEFIBRILLATOR LEAD REPLACEMENT WITH LASER LEAD EXTRACTION;  Surgeon: Leandro Huber MD;  Location: [facility] 18/19;  Service:    • IMPLANTABLE CARDIOVERTER DEFIBRILLATOR LEAD REPLACEMENT/POCKET REVISION N/A 01/13/2014    Biventricular ICD replacement-Dr. Jillian Huber   • LAPAROSCOPY REPAIR HIATAL HERNIA N/A 06/27/2006    Laparoscopic paraesophageal hiatal hernia repair with Nissen fundoplication and cholecystectomy-Dr. Sean Yoon   • NATALIE GONZALEZ ([facility]) PROCEDURE     • PA INSJ TUNNELED CVC W/O SUBQ PORT/ AGE 3 YR/> Right 10/3/2017    Procedure: Right internal jugular port placement;  Surgeon: Tiffanie Couch MD;  Location: [facility];  Service: General   • TOTAL KNEE ARTHROPLASTY Left 08/2014       MEDICATIONS    Current Outpatient Prescriptions:   •  allopurinol (ZYLOPRIM) 100 MG tablet, Take 100 mg by mouth 2 (Two) Times a Day., Disp: , Rfl:   •  aspirin 81 MG tablet, Take 81 mg by mouth Daily. PT HOLDING FOR SURGERY, Disp: , Rfl:   •  calcitriol (ROCALTROL) 0.25 MCG capsule, Take 0.25 mcg by mouth Daily., Disp: , Rfl:   •  calcium carbonate (OS-RAYMOND) 600 MG tablet, Take 600 mg by mouth Daily., Disp: , Rfl:   •  carvedilol (COREG) 12.5 MG tablet,  TAKE 1 TABLET DAILY, Disp: 90 tablet, Rfl: 1  •  Cholecalciferol (VITAMIN D3) 5000 units capsule capsule, Take 5,000 Units by mouth Daily. Every other day, Disp: , Rfl:   •  diazepam (VALIUM) 5 MG tablet, Take 5 mg by mouth Every Night., Disp: , Rfl:   •  furosemide (LASIX) 40 MG tablet, Take 40 mg by mouth Every Other Day., Disp: , Rfl:   •  Gabapentin, Once-Daily, 300 MG tablet, Take one tablet daily with dinner., Disp: 90 tablet, Rfl: 1  •  Glucosamine-Chondroit-Vit C-Mn (GLUCOSAMINE CHONDROITIN COMPLX) capsule, Take 1,500 mg by mouth Every Other Day. PT HOLDING FOR SURGERY, Disp: , Rfl:   •  levothyroxine (SYNTHROID, LEVOTHROID) 25 MCG tablet, Take 25 mcg by mouth Daily., Disp: , Rfl:   •  loperamide (IMODIUM) 2 MG capsule, Take 2 mg by mouth 4 (Four) Times a Day As Needed for Diarrhea., Disp: , Rfl:   •  magnesium oxide 250 MG tablet, Take 250 mg by mouth Daily., Disp: , Rfl:   •  methscopolamine (PAMINE FORTE) 5 MG tablet, Take 5 mg by mouth Daily., Disp: , Rfl:   •  methylPREDNISolone (MEDROL) 4 MG tablet, Take 4 mg by mouth As Needed. TAKES FOR GOUT FLARE UPS, Disp: , Rfl:   •  Multiple Vitamin (MULTI-DAY VITAMINS) tablet, Take 1 tablet by mouth Daily. Multivitamin with Iron, Disp: , Rfl:   •  ondansetron (ZOFRAN) 8 MG tablet, Take 1 tablet by mouth Every 8 (Eight) Hours As Needed for Nausea or Vomiting., Disp: 30 tablet, Rfl: 2  •  psyllium (METAMUCIL) 58.6 % powder, Take 1 packet by mouth Daily., Disp: , Rfl:   •  rotigotine (NEUPRO) 4 MG/24HR patch, Place 1 patch on the skin Daily., Disp: , Rfl:   •  spironolactone (ALDACTONE) 25 MG tablet, TAKE 1/2 TABLET DAILY, Disp: 45 tablet, Rfl: 1  •  Vitamin E 400 UNITS tablet, Take 400 Units by mouth Daily. PT HOLDING FOR SURGERY, Disp: , Rfl:   No current facility-administered medications for this visit.     Facility-Administered Medications Ordered in Other Visits:   •  heparin flush (porcine) 100 UNIT/ML injection 500 Units, 500 Units, Intravenous, PRN, Anuel  "LUIS DANIEL Scott MD, 500 Units at 11/27/17 1347  •  sodium chloride 0.9 % flush 10 mL, 10 mL, Intravenous, PRN, Anuel Scott MD, 10 mL at 11/27/17 1347    ALLERGIES:     Allergies   Allergen Reactions   • Chlorhexidine Rash and Itching     Patient states \"blue dye\" in chlorhexidine.  States the orange and clear are OK to use   • Chlorhexidine Gluconate Itching     i   • Valproic Acid Other (See Comments)     Made her extremely sleepy   • Codeine Other (See Comments)     Want to climb the walls, doesn't help pain   • Propoxyphene-Acetaminophen    • Propoxyphene Other (See Comments)     Belligerent, acting drunk       SOCIAL HISTORY:       Social History     Social History   • Marital status:      Spouse name: Zackery   • Number of children: 5   • Years of education: 1 yr college     Occupational History   •  Retired     Social History Main Topics   • Smoking status: Never Smoker   • Smokeless tobacco: Never Used   • Alcohol use No   • Drug use: No   • Sexual activity: Defer      Comment: AGE!!!     Other Topics Concern   • Not on file     Social History Narrative         FAMILY HISTORY:  Family History   Problem Relation Age of Onset   • Coronary artery disease Other    • Dementia Other    • Kidney disease Other    • Arthritis Father    • Early death Father      Kidney failure   • Kidney disease Father    • Heart disease Mother    • Mental illness Mother      Dementia   • Malig Hyperthermia Neg Hx        REVIEW OF SYSTEMS:  Review of Systems    Objective    Vitals:    02/06/18 1034   BP: 132/86   Pulse: 83   Resp: 16   Temp: 97.6 °F (36.4 °C)   TempSrc: Oral   SpO2: 94%   Weight: 80.1 kg (176 lb 9.6 oz)   Height: 176 cm (69.29\")   PainSc: 0-No pain     Current Status 2/6/2018   ECOG score 0        PHYSICAL EXAM:      CONSTITUTIONAL:  Vital signs reviewed.  No distress, looks comfortable.  EYES:  Conjunctiva and lids unremarkable.  PERRLA  EARS,NOSE,MOUTH,THROAT:  Ears and nose appear unremarkable.  Lips, teeth, " gums appear unremarkable.  RESPIRATORY:  Normal respiratory effort.  Lungs clear to auscultation bilaterally.  CARDIOVASCULAR:  Normal S1, S2.  No murmurs rubs or gallops.  No significant lower extremity edema.  GASTROINTESTINAL: Abdomen appears unremarkable.  Nontender.  No hepatomegaly.  No splenomegaly.  LYMPHATIC:  No cervical, supraclavicular, axillary lymphadenopathy.  MUSCULOSKELETAL:  Unremarkable gait and station.  Unremarkable digits/nails.  No cyanosis or clubbing.  SKIN:  Warm.  No rashes.  PSYCHIATRIC:  Normal judgment and insight.  Normal mood and affect.      RECENT LABS:        WBC   Date/Time Value Ref Range Status   02/06/2018 10:20 AM 5.47 4.50 - 10.70 10*3/mm3 Final     Hemoglobin   Date/Time Value Ref Range Status   02/06/2018 10:20 AM 8.8 (L) 11.9 - 15.5 g/dL Final     Platelets   Date/Time Value Ref Range Status   02/06/2018 10:20  140 - 500 10*3/mm3 Final       Assessment/Plan   Anemia of chronic disease  - Hold Transfusion and Notify Physician  - Nursing communication: Transfusion Reaction Management  - Verify Informed Consent for Transfusion  - Prepare RBC, 2 Units  - Transfuse RBC, 2 Units Infuse Each Unit Over: 2H  - sodium chloride 0.9 % infusion 250 mL  - Type and screen  - diphenhydrAMINE (BENADRYL) capsule 25 mg  - acetaminophen (TYLENOL) tablet 325 mg    Other iron deficiency anemia  repeat labs prior to her return

## 2018-02-08 ENCOUNTER — INFUSION (OUTPATIENT)
Dept: ONCOLOGY | Facility: HOSPITAL | Age: 78
End: 2018-02-08

## 2018-02-08 VITALS
TEMPERATURE: 98 F | SYSTOLIC BLOOD PRESSURE: 112 MMHG | DIASTOLIC BLOOD PRESSURE: 50 MMHG | HEART RATE: 76 BPM | RESPIRATION RATE: 18 BRPM | OXYGEN SATURATION: 99 %

## 2018-02-08 DIAGNOSIS — D63.8 ANEMIA OF CHRONIC DISEASE: Primary | ICD-10-CM

## 2018-02-08 DIAGNOSIS — Z45.2 ENCOUNTER FOR FITTING AND ADJUSTMENT OF VASCULAR CATHETER: ICD-10-CM

## 2018-02-08 PROCEDURE — 96374 THER/PROPH/DIAG INJ IV PUSH: CPT

## 2018-02-08 PROCEDURE — 63710000001 DIPHENHYDRAMINE PER 50 MG: Performed by: INTERNAL MEDICINE

## 2018-02-08 PROCEDURE — 25010000002 HEPARIN FLUSH (PORCINE) 100 UNIT/ML SOLUTION: Performed by: INTERNAL MEDICINE

## 2018-02-08 PROCEDURE — 86900 BLOOD TYPING SEROLOGIC ABO: CPT

## 2018-02-08 PROCEDURE — P9016 RBC LEUKOCYTES REDUCED: HCPCS

## 2018-02-08 PROCEDURE — 36430 TRANSFUSION BLD/BLD COMPNT: CPT

## 2018-02-08 RX ORDER — ACETAMINOPHEN 325 MG/1
325 TABLET ORAL ONCE
Status: COMPLETED | OUTPATIENT
Start: 2018-02-08 | End: 2018-02-08

## 2018-02-08 RX ORDER — SODIUM CHLORIDE 0.9 % (FLUSH) 0.9 %
10 SYRINGE (ML) INJECTION AS NEEDED
Status: CANCELLED | OUTPATIENT
Start: 2018-02-08

## 2018-02-08 RX ORDER — DIPHENHYDRAMINE HCL 25 MG
25 CAPSULE ORAL ONCE
Status: COMPLETED | OUTPATIENT
Start: 2018-02-08 | End: 2018-02-08

## 2018-02-08 RX ORDER — SODIUM CHLORIDE 9 MG/ML
250 INJECTION, SOLUTION INTRAVENOUS AS NEEDED
Status: DISCONTINUED | OUTPATIENT
Start: 2018-02-08 | End: 2018-02-08 | Stop reason: HOSPADM

## 2018-02-08 RX ADMIN — ACETAMINOPHEN 325 MG: 325 TABLET, FILM COATED ORAL at 09:01

## 2018-02-08 RX ADMIN — DIPHENHYDRAMINE HYDROCHLORIDE 25 MG: 25 CAPSULE ORAL at 09:01

## 2018-02-08 RX ADMIN — Medication 500 UNITS: at 13:45

## 2018-02-13 ENCOUNTER — OFFICE (OUTPATIENT)
Dept: URBAN - METROPOLITAN AREA CLINIC 75 | Facility: CLINIC | Age: 78
End: 2018-02-13
Payer: COMMERCIAL

## 2018-02-13 VITALS
HEART RATE: 83 BPM | WEIGHT: 177 LBS | DIASTOLIC BLOOD PRESSURE: 80 MMHG | SYSTOLIC BLOOD PRESSURE: 124 MMHG | HEIGHT: 67 IN

## 2018-02-13 DIAGNOSIS — D50.9 IRON DEFICIENCY ANEMIA, UNSPECIFIED: ICD-10-CM

## 2018-02-13 DIAGNOSIS — K58.0 IRRITABLE BOWEL SYNDROME WITH DIARRHEA: ICD-10-CM

## 2018-02-13 DIAGNOSIS — R13.10 DYSPHAGIA, UNSPECIFIED: ICD-10-CM

## 2018-02-13 PROCEDURE — 99203 OFFICE O/P NEW LOW 30 MIN: CPT | Performed by: INTERNAL MEDICINE

## 2018-02-13 RX ORDER — PANTOPRAZOLE SODIUM 40 MG/1
40 TABLET, DELAYED RELEASE ORAL
Qty: 90 | Refills: 4 | Status: COMPLETED
Start: 2018-02-13 | End: 2019-07-15

## 2018-02-20 ENCOUNTER — LAB (OUTPATIENT)
Dept: ONCOLOGY | Facility: HOSPITAL | Age: 78
End: 2018-02-20

## 2018-02-20 DIAGNOSIS — D61.818 PANCYTOPENIA (HCC): Primary | ICD-10-CM

## 2018-02-20 DIAGNOSIS — Z45.2 ENCOUNTER FOR FITTING AND ADJUSTMENT OF VASCULAR CATHETER: ICD-10-CM

## 2018-02-20 DIAGNOSIS — D50.8 OTHER IRON DEFICIENCY ANEMIA: ICD-10-CM

## 2018-02-20 LAB
BASOPHILS # BLD AUTO: 0.01 10*3/MM3 (ref 0–0.2)
BASOPHILS NFR BLD AUTO: 0.2 % (ref 0–1.5)
DEPRECATED RDW RBC AUTO: 58.6 FL (ref 37–54)
EOSINOPHIL # BLD AUTO: 0.13 10*3/MM3 (ref 0–0.7)
EOSINOPHIL NFR BLD AUTO: 2.8 % (ref 0.3–6.2)
ERYTHROCYTE [DISTWIDTH] IN BLOOD BY AUTOMATED COUNT: 16.5 % (ref 11.7–13)
FERRITIN SERPL-MCNC: 182.9 NG/ML (ref 13–150)
HCT VFR BLD AUTO: 29.1 % (ref 35.6–45.5)
HGB BLD-MCNC: 9.5 G/DL (ref 11.9–15.5)
IMM GRANULOCYTES # BLD: 0.02 10*3/MM3 (ref 0–0.03)
IMM GRANULOCYTES NFR BLD: 0.4 % (ref 0–0.5)
IRON 24H UR-MRATE: 66 MCG/DL (ref 37–145)
IRON SATN MFR SERPL: 24 % (ref 20–50)
LYMPHOCYTES # BLD AUTO: 0.82 10*3/MM3 (ref 0.9–4.8)
LYMPHOCYTES NFR BLD AUTO: 17.6 % (ref 19.6–45.3)
MCH RBC QN AUTO: 31.6 PG (ref 26.9–32)
MCHC RBC AUTO-ENTMCNC: 32.6 G/DL (ref 32.4–36.3)
MCV RBC AUTO: 96.7 FL (ref 80.5–98.2)
MONOCYTES # BLD AUTO: 0.38 10*3/MM3 (ref 0.2–1.2)
MONOCYTES NFR BLD AUTO: 8.2 % (ref 5–12)
NEUTROPHILS # BLD AUTO: 3.29 10*3/MM3 (ref 1.9–8.1)
NEUTROPHILS NFR BLD AUTO: 70.8 % (ref 42.7–76)
NRBC BLD MANUAL-RTO: 0 /100 WBC (ref 0–0)
PLATELET # BLD AUTO: 153 10*3/MM3 (ref 140–500)
PMV BLD AUTO: 8.8 FL (ref 6–12)
RBC # BLD AUTO: 3.01 10*6/MM3 (ref 3.9–5.2)
TIBC SERPL-MCNC: 276 MCG/DL (ref 298–536)
TRANSFERRIN SERPL-MCNC: 185 MG/DL (ref 200–360)
WBC NRBC COR # BLD: 4.65 10*3/MM3 (ref 4.5–10.7)

## 2018-02-20 PROCEDURE — 25010000002 HEPARIN FLUSH (PORCINE) 100 UNIT/ML SOLUTION: Performed by: INTERNAL MEDICINE

## 2018-02-20 PROCEDURE — 85025 COMPLETE CBC W/AUTO DIFF WBC: CPT | Performed by: INTERNAL MEDICINE

## 2018-02-20 PROCEDURE — 96523 IRRIG DRUG DELIVERY DEVICE: CPT | Performed by: NURSE PRACTITIONER

## 2018-02-20 PROCEDURE — 82728 ASSAY OF FERRITIN: CPT | Performed by: INTERNAL MEDICINE

## 2018-02-20 PROCEDURE — 83540 ASSAY OF IRON: CPT | Performed by: INTERNAL MEDICINE

## 2018-02-20 PROCEDURE — 84466 ASSAY OF TRANSFERRIN: CPT | Performed by: INTERNAL MEDICINE

## 2018-02-20 RX ORDER — SODIUM CHLORIDE 0.9 % (FLUSH) 0.9 %
10 SYRINGE (ML) INJECTION AS NEEDED
Status: CANCELLED | OUTPATIENT
Start: 2018-02-20

## 2018-02-20 RX ORDER — SODIUM CHLORIDE 0.9 % (FLUSH) 0.9 %
10 SYRINGE (ML) INJECTION AS NEEDED
Status: DISCONTINUED | OUTPATIENT
Start: 2018-02-20 | End: 2018-02-20 | Stop reason: HOSPADM

## 2018-02-20 RX ADMIN — SODIUM CHLORIDE, PRESERVATIVE FREE 500 UNITS: 5 INJECTION INTRAVENOUS at 10:21

## 2018-02-20 RX ADMIN — Medication 10 ML: at 10:20

## 2018-03-01 RX ORDER — ROTIGOTINE 4 MG/24H
PATCH, EXTENDED RELEASE TRANSDERMAL
Qty: 90 PATCH | Refills: 2 | Status: SHIPPED | OUTPATIENT
Start: 2018-03-01 | End: 2019-01-31

## 2018-03-02 ENCOUNTER — ON CAMPUS - OUTPATIENT (OUTPATIENT)
Dept: URBAN - METROPOLITAN AREA HOSPITAL 108 | Facility: HOSPITAL | Age: 78
End: 2018-03-02
Payer: COMMERCIAL

## 2018-03-02 ENCOUNTER — OFFICE VISIT (OUTPATIENT)
Dept: ONCOLOGY | Facility: CLINIC | Age: 78
End: 2018-03-02

## 2018-03-02 ENCOUNTER — LAB (OUTPATIENT)
Dept: OTHER | Facility: HOSPITAL | Age: 78
End: 2018-03-02

## 2018-03-02 VITALS
OXYGEN SATURATION: 99 % | BODY MASS INDEX: 25.89 KG/M2 | TEMPERATURE: 97.6 F | RESPIRATION RATE: 16 BRPM | HEIGHT: 69 IN | SYSTOLIC BLOOD PRESSURE: 136 MMHG | DIASTOLIC BLOOD PRESSURE: 78 MMHG | WEIGHT: 174.8 LBS | HEART RATE: 70 BPM

## 2018-03-02 DIAGNOSIS — K31.84 GASTROPARESIS: ICD-10-CM

## 2018-03-02 DIAGNOSIS — D61.818 PANCYTOPENIA (HCC): Primary | ICD-10-CM

## 2018-03-02 DIAGNOSIS — K29.70 GASTRITIS, UNSPECIFIED, WITHOUT BLEEDING: ICD-10-CM

## 2018-03-02 DIAGNOSIS — N18.4 ANEMIA OF CHRONIC RENAL FAILURE, STAGE 4 (SEVERE) (HCC): ICD-10-CM

## 2018-03-02 DIAGNOSIS — R13.10 DYSPHAGIA, UNSPECIFIED: ICD-10-CM

## 2018-03-02 DIAGNOSIS — D13.0 BENIGN NEOPLASM OF ESOPHAGUS: ICD-10-CM

## 2018-03-02 DIAGNOSIS — K21.0 GASTRO-ESOPHAGEAL REFLUX DISEASE WITH ESOPHAGITIS: ICD-10-CM

## 2018-03-02 DIAGNOSIS — Z86.2 HISTORY OF ANEMIA DUE TO CHRONIC KIDNEY DISEASE: Primary | ICD-10-CM

## 2018-03-02 DIAGNOSIS — D63.1 ANEMIA OF CHRONIC RENAL FAILURE, STAGE 4 (SEVERE) (HCC): ICD-10-CM

## 2018-03-02 DIAGNOSIS — N18.9 HISTORY OF ANEMIA DUE TO CHRONIC KIDNEY DISEASE: Primary | ICD-10-CM

## 2018-03-02 LAB
BASOPHILS # BLD AUTO: 0.02 10*3/MM3 (ref 0–0.2)
BASOPHILS NFR BLD AUTO: 0.3 % (ref 0–1.5)
DEPRECATED RDW RBC AUTO: 60.1 FL (ref 37–54)
EOSINOPHIL # BLD AUTO: 0.1 10*3/MM3 (ref 0–0.7)
EOSINOPHIL NFR BLD AUTO: 1.4 % (ref 0.3–6.2)
ERYTHROCYTE [DISTWIDTH] IN BLOOD BY AUTOMATED COUNT: 17.3 % (ref 11.7–13)
HCT VFR BLD AUTO: 27.1 % (ref 35.6–45.5)
HGB BLD-MCNC: 9.1 G/DL (ref 11.9–15.5)
IMM GRANULOCYTES # BLD: 0.03 10*3/MM3 (ref 0–0.03)
IMM GRANULOCYTES NFR BLD: 0.4 % (ref 0–0.5)
LYMPHOCYTES # BLD AUTO: 1.21 10*3/MM3 (ref 0.9–4.8)
LYMPHOCYTES NFR BLD AUTO: 16.5 % (ref 19.6–45.3)
MCH RBC QN AUTO: 32 PG (ref 26.9–32)
MCHC RBC AUTO-ENTMCNC: 33.6 G/DL (ref 32.4–36.3)
MCV RBC AUTO: 95.4 FL (ref 80.5–98.2)
MONOCYTES # BLD AUTO: 0.34 10*3/MM3 (ref 0.2–1.2)
MONOCYTES NFR BLD AUTO: 4.6 % (ref 5–12)
NEUTROPHILS # BLD AUTO: 5.63 10*3/MM3 (ref 1.9–8.1)
NEUTROPHILS NFR BLD AUTO: 76.8 % (ref 42.7–76)
NRBC BLD MANUAL-RTO: 0 /100 WBC (ref 0–0)
PLATELET # BLD AUTO: 157 10*3/MM3 (ref 140–500)
PMV BLD AUTO: 9.7 FL (ref 6–12)
RBC # BLD AUTO: 2.84 10*6/MM3 (ref 3.9–5.2)
WBC NRBC COR # BLD: 7.33 10*3/MM3 (ref 4.5–10.7)

## 2018-03-02 PROCEDURE — 43450 DILATE ESOPHAGUS 1/MULT PASS: CPT | Performed by: INTERNAL MEDICINE

## 2018-03-02 PROCEDURE — 43239 EGD BIOPSY SINGLE/MULTIPLE: CPT | Performed by: INTERNAL MEDICINE

## 2018-03-02 PROCEDURE — 36415 COLL VENOUS BLD VENIPUNCTURE: CPT

## 2018-03-02 PROCEDURE — 99214 OFFICE O/P EST MOD 30 MIN: CPT | Performed by: INTERNAL MEDICINE

## 2018-03-02 PROCEDURE — 85025 COMPLETE CBC W/AUTO DIFF WBC: CPT | Performed by: INTERNAL MEDICINE

## 2018-03-02 RX ORDER — PANTOPRAZOLE SODIUM 40 MG/1
TABLET, DELAYED RELEASE ORAL
Refills: 11 | COMMUNITY
Start: 2018-02-13 | End: 2019-02-11

## 2018-03-07 ENCOUNTER — INFUSION (OUTPATIENT)
Dept: ONCOLOGY | Facility: HOSPITAL | Age: 78
End: 2018-03-07

## 2018-03-07 ENCOUNTER — TELEPHONE (OUTPATIENT)
Dept: ONCOLOGY | Facility: CLINIC | Age: 78
End: 2018-03-07

## 2018-03-07 ENCOUNTER — LAB (OUTPATIENT)
Dept: ONCOLOGY | Facility: HOSPITAL | Age: 78
End: 2018-03-07

## 2018-03-07 DIAGNOSIS — D61.818 PANCYTOPENIA (HCC): ICD-10-CM

## 2018-03-07 DIAGNOSIS — D63.8 ANEMIA OF CHRONIC DISEASE: Primary | ICD-10-CM

## 2018-03-07 LAB
ABO GROUP BLD: NORMAL
ALBUMIN SERPL-MCNC: 3.5 G/DL (ref 3.5–5.2)
ALBUMIN/GLOB SERPL: 1.3 G/DL (ref 1.1–2.4)
ALP SERPL-CCNC: 49 U/L (ref 38–116)
ALT SERPL W P-5'-P-CCNC: 14 U/L (ref 0–33)
ANION GAP SERPL CALCULATED.3IONS-SCNC: 11.8 MMOL/L
AST SERPL-CCNC: 22 U/L (ref 0–32)
BASOPHILS # BLD AUTO: 0.01 10*3/MM3 (ref 0–0.1)
BASOPHILS NFR BLD AUTO: 0.2 % (ref 0–1.1)
BILIRUB SERPL-MCNC: <0.2 MG/DL (ref 0.1–1.2)
BLD GP AB SCN SERPL QL: NEGATIVE
BUN BLD-MCNC: 49 MG/DL (ref 6–20)
BUN/CREAT SERPL: 22.2 (ref 7.3–30)
CALCIUM SPEC-SCNC: 9.4 MG/DL (ref 8.5–10.2)
CHLORIDE SERPL-SCNC: 107 MMOL/L (ref 98–107)
CO2 SERPL-SCNC: 24.2 MMOL/L (ref 22–29)
CREAT BLD-MCNC: 2.21 MG/DL (ref 0.6–1.1)
DEPRECATED RDW RBC AUTO: 63.4 FL (ref 37–49)
EOSINOPHIL # BLD AUTO: 0.09 10*3/MM3 (ref 0–0.36)
EOSINOPHIL NFR BLD AUTO: 2.1 % (ref 1–5)
ERYTHROCYTE [DISTWIDTH] IN BLOOD BY AUTOMATED COUNT: 17.5 % (ref 11.7–14.5)
GFR SERPL CREATININE-BSD FRML MDRD: 21 ML/MIN/1.73
GLOBULIN UR ELPH-MCNC: 2.7 GM/DL (ref 1.8–3.5)
GLUCOSE BLD-MCNC: 150 MG/DL (ref 74–124)
HCT VFR BLD AUTO: 25.8 % (ref 34–45)
HGB BLD-MCNC: 8.2 G/DL (ref 11.5–14.9)
IMM GRANULOCYTES # BLD: 0.01 10*3/MM3 (ref 0–0.03)
IMM GRANULOCYTES NFR BLD: 0.2 % (ref 0–0.5)
LYMPHOCYTES # BLD AUTO: 0.88 10*3/MM3 (ref 1–3.5)
LYMPHOCYTES NFR BLD AUTO: 20.9 % (ref 20–49)
MCH RBC QN AUTO: 31.7 PG (ref 27–33)
MCHC RBC AUTO-ENTMCNC: 31.8 G/DL (ref 32–35)
MCV RBC AUTO: 99.6 FL (ref 83–97)
MONOCYTES # BLD AUTO: 0.27 10*3/MM3 (ref 0.25–0.8)
MONOCYTES NFR BLD AUTO: 6.4 % (ref 4–12)
NEUTROPHILS # BLD AUTO: 2.96 10*3/MM3 (ref 1.5–7)
NEUTROPHILS NFR BLD AUTO: 70.2 % (ref 39–75)
NRBC BLD MANUAL-RTO: 0 /100 WBC (ref 0–0)
PLATELET # BLD AUTO: 154 10*3/MM3 (ref 150–375)
PMV BLD AUTO: 8.9 FL (ref 8.9–12.1)
POTASSIUM BLD-SCNC: 4 MMOL/L (ref 3.5–4.7)
PROT SERPL-MCNC: 6.2 G/DL (ref 6.3–8)
RBC # BLD AUTO: 2.59 10*6/MM3 (ref 3.9–5)
RH BLD: NEGATIVE
SODIUM BLD-SCNC: 143 MMOL/L (ref 134–145)
WBC NRBC COR # BLD: 4.22 10*3/MM3 (ref 4–10)

## 2018-03-07 PROCEDURE — 86901 BLOOD TYPING SEROLOGIC RH(D): CPT | Performed by: NURSE PRACTITIONER

## 2018-03-07 PROCEDURE — 86850 RBC ANTIBODY SCREEN: CPT | Performed by: NURSE PRACTITIONER

## 2018-03-07 PROCEDURE — 80053 COMPREHEN METABOLIC PANEL: CPT | Performed by: INTERNAL MEDICINE

## 2018-03-07 PROCEDURE — 85025 COMPLETE CBC W/AUTO DIFF WBC: CPT | Performed by: INTERNAL MEDICINE

## 2018-03-07 PROCEDURE — 36415 COLL VENOUS BLD VENIPUNCTURE: CPT | Performed by: INTERNAL MEDICINE

## 2018-03-07 PROCEDURE — 86900 BLOOD TYPING SEROLOGIC ABO: CPT | Performed by: NURSE PRACTITIONER

## 2018-03-07 PROCEDURE — 86923 COMPATIBILITY TEST ELECTRIC: CPT

## 2018-03-07 RX ORDER — SODIUM CHLORIDE 9 MG/ML
250 INJECTION, SOLUTION INTRAVENOUS AS NEEDED
Status: CANCELLED | OUTPATIENT
Start: 2018-03-07

## 2018-03-07 RX ORDER — ACETAMINOPHEN 325 MG/1
325 TABLET ORAL ONCE
Status: CANCELLED | OUTPATIENT
Start: 2018-03-07 | End: 2018-03-07

## 2018-03-07 RX ORDER — DIPHENHYDRAMINE HCL 25 MG
25 CAPSULE ORAL ONCE
Status: CANCELLED | OUTPATIENT
Start: 2018-03-07 | End: 2018-03-07

## 2018-03-07 NOTE — TELEPHONE ENCOUNTER
----- Message from Shivani Hawkins RN sent at 3/7/2018 10:44 AM EST -----  Please schedule pt for 2 units of PRBC on 3/8/18 on [facility]. Pt can be there at 8 or 830 or after 1030. Please call pt and let them know their appt time.    Thanks,    Shivani POWER

## 2018-03-07 NOTE — PROGRESS NOTES
"  Subjective     Tired    Reason for follow up:   Anemia of chronic kidney diease   History of Iron deficiency     HISTORY OF PRESENT ILLNESS: History of anemia due to chronic kidney disease, iron deficiency anemia     History of Present Illness    Mrs Machuca returns today with fatigue noted. Her Hgb @ 8.2 explains the fatigue. Her iron studies recently were adequate with no recurrent iron deficiency as of yet.    Past Medical History, Past Surgical History, Social History, Family History have been reviewed and are without significant changes except as mentioned.    Review of Systems   A comprehensive 14 point review of systems was performed and was negative except as mentioned.    Medications:  The current medication list was reviewed in the EMR    ALLERGIES:    Allergies   Allergen Reactions   • Chlorhexidine Rash and Itching     Patient states \"blue dye\" in chlorhexidine.  States the orange and clear are OK to use   • Chlorhexidine Gluconate Itching     i   • Valproic Acid Other (See Comments)     Made her extremely sleepy   • Codeine Other (See Comments)     Want to climb the walls, doesn't help pain   • Propoxyphene-Acetaminophen    • Propoxyphene Other (See Comments)     Belligerent, acting drunk       Objective      Vitals:    03/02/18 1051   BP: 136/78   Pulse: 70   Resp: 16   Temp: 97.6 °F (36.4 °C)   TempSrc: Oral   SpO2: 99%   Weight: 79.3 kg (174 lb 12.8 oz)   Height: 176 cm (69.29\")     Current Status 3/2/2018   ECOG score 0       Physical Exam   GENERAL:  Well-developed, well-nourished in no acute distress.   SKIN:  Warm, dry without rashes, purpura or petechiae.  EYES:  Pupils equal, round and reactive to light.  EOMs intact.  Conjunctivae normal.  EARS:  Hearing intact.  NOSE:  Septum midline.  No excoriations or nasal discharge.  MOUTH:  Tongue is well-papillated; no stomatitis or ulcers.  Lips normal.  THROAT:  Oropharynx without lesions or exudates.  NECK:  Supple with good range of motion; " no thyromegaly or masses, no JVD.  LYMPHATICS:  No cervical, supraclavicular, axillary or inguinal adenopathy.  CHEST:  Lungs clear to auscultation. Good airflow.  CARDIAC:  Regular rate and rhythm without murmurs, rubs or gallops. Normal S1,S2.  ABDOMEN:  Soft, nontender with no hepatosplenomegaly or masses.  EXTREMITIES:  No clubbing, cyanosis, trace, bilateral lower extremity edema.  NEUROLOGICAL:  Cranial Nerves II-XII grossly intact.  No focal neurological deficits.  PSYCHIATRIC:  Normal affect and mood.      RECENT LABS:  Hematology WBC   Date Value Ref Range Status   03/02/2018 7.33 4.50 - 10.70 10*3/mm3 Final     RBC   Date Value Ref Range Status   03/02/2018 2.84 (L) 3.90 - 5.20 10*6/mm3 Final     Hemoglobin   Date Value Ref Range Status   03/02/2018 9.1 (L) 11.9 - 15.5 g/dL Final     Hematocrit   Date Value Ref Range Status   03/02/2018 27.1 (L) 35.6 - 45.5 % Final     Platelets   Date Value Ref Range Status   03/02/2018 157 140 - 500 10*3/mm3 Final          Assessment/Plan   Anemia of stage 4 CKD: Patient will continue with transfusion/Procrit support as needed.She requires 2 units of PRBC's now.        Possible iron-deficiency: Patient has previously received IV Venofer due to oral iron intolerance. Her last dose was in October. Recent iron studies reveal repletion, therefore she will not require supplementation at this time. We will continue to monitor.

## 2018-03-07 NOTE — PROGRESS NOTES
Pt presents for RN review. HGB 8.2. Pt states she is fatigues. Discussed with Nichole ABDUL. Pt will receive 2 units PRBC. Blood arm band applied and type and screen sent to lab. Message sent to scheduling to schedule on 3/8. Pt will call for CMP results tomorrow. Pt V/U.

## 2018-03-08 ENCOUNTER — INFUSION (OUTPATIENT)
Dept: ONCOLOGY | Facility: HOSPITAL | Age: 78
End: 2018-03-08

## 2018-03-08 VITALS
TEMPERATURE: 97.3 F | DIASTOLIC BLOOD PRESSURE: 60 MMHG | HEART RATE: 74 BPM | RESPIRATION RATE: 18 BRPM | OXYGEN SATURATION: 100 % | SYSTOLIC BLOOD PRESSURE: 123 MMHG

## 2018-03-08 DIAGNOSIS — D63.8 ANEMIA OF CHRONIC DISEASE: ICD-10-CM

## 2018-03-08 PROCEDURE — P9016 RBC LEUKOCYTES REDUCED: HCPCS

## 2018-03-08 PROCEDURE — 25010000002 HEPARIN FLUSH (PORCINE) 100 UNIT/ML SOLUTION: Performed by: NURSE PRACTITIONER

## 2018-03-08 PROCEDURE — 63710000001 DIPHENHYDRAMINE PER 50 MG: Performed by: NURSE PRACTITIONER

## 2018-03-08 PROCEDURE — 86900 BLOOD TYPING SEROLOGIC ABO: CPT

## 2018-03-08 PROCEDURE — 36430 TRANSFUSION BLD/BLD COMPNT: CPT

## 2018-03-08 RX ORDER — SODIUM CHLORIDE 9 MG/ML
250 INJECTION, SOLUTION INTRAVENOUS AS NEEDED
Status: DISCONTINUED | OUTPATIENT
Start: 2018-03-08 | End: 2018-03-08 | Stop reason: HOSPADM

## 2018-03-08 RX ORDER — ACETAMINOPHEN 325 MG/1
325 TABLET ORAL ONCE
Status: COMPLETED | OUTPATIENT
Start: 2018-03-08 | End: 2018-03-08

## 2018-03-08 RX ORDER — DIPHENHYDRAMINE HCL 25 MG
25 CAPSULE ORAL ONCE
Status: COMPLETED | OUTPATIENT
Start: 2018-03-08 | End: 2018-03-08

## 2018-03-08 RX ADMIN — DIPHENHYDRAMINE HYDROCHLORIDE 25 MG: 25 CAPSULE ORAL at 08:13

## 2018-03-08 RX ADMIN — ACETAMINOPHEN 325 MG: 325 TABLET ORAL at 08:13

## 2018-03-08 RX ADMIN — Medication 500 UNITS: at 14:10

## 2018-03-09 ENCOUNTER — TELEPHONE (OUTPATIENT)
Dept: ONCOLOGY | Facility: HOSPITAL | Age: 78
End: 2018-03-09

## 2018-03-14 ENCOUNTER — LAB (OUTPATIENT)
Dept: ONCOLOGY | Facility: HOSPITAL | Age: 78
End: 2018-03-14

## 2018-03-14 ENCOUNTER — OFFICE VISIT (OUTPATIENT)
Dept: ONCOLOGY | Facility: CLINIC | Age: 78
End: 2018-03-14

## 2018-03-14 VITALS
DIASTOLIC BLOOD PRESSURE: 76 MMHG | SYSTOLIC BLOOD PRESSURE: 138 MMHG | WEIGHT: 174 LBS | BODY MASS INDEX: 25.77 KG/M2 | OXYGEN SATURATION: 96 % | TEMPERATURE: 98.2 F | HEART RATE: 79 BPM | HEIGHT: 69 IN | RESPIRATION RATE: 14 BRPM

## 2018-03-14 DIAGNOSIS — N18.4 ANEMIA OF CHRONIC RENAL FAILURE, STAGE 4 (SEVERE) (HCC): Primary | ICD-10-CM

## 2018-03-14 DIAGNOSIS — D61.818 PANCYTOPENIA (HCC): Primary | ICD-10-CM

## 2018-03-14 DIAGNOSIS — D63.1 ANEMIA OF CHRONIC RENAL FAILURE, STAGE 4 (SEVERE) (HCC): Primary | ICD-10-CM

## 2018-03-14 DIAGNOSIS — Z45.2 ENCOUNTER FOR FITTING AND ADJUSTMENT OF VASCULAR CATHETER: ICD-10-CM

## 2018-03-14 DIAGNOSIS — I50.22 CHRONIC SYSTOLIC CONGESTIVE HEART FAILURE (HCC): ICD-10-CM

## 2018-03-14 LAB
ALBUMIN SERPL-MCNC: 3.6 G/DL (ref 3.5–5.2)
ALBUMIN/GLOB SERPL: 1.2 G/DL (ref 1.1–2.4)
ALP SERPL-CCNC: 55 U/L (ref 38–116)
ALT SERPL W P-5'-P-CCNC: 12 U/L (ref 0–33)
ANION GAP SERPL CALCULATED.3IONS-SCNC: 10.7 MMOL/L
AST SERPL-CCNC: 19 U/L (ref 0–32)
BASOPHILS # BLD AUTO: 0.01 10*3/MM3 (ref 0–0.1)
BASOPHILS NFR BLD AUTO: 0.2 % (ref 0–1.1)
BILIRUB SERPL-MCNC: 0.3 MG/DL (ref 0.1–1.2)
BUN BLD-MCNC: 54 MG/DL (ref 6–20)
BUN/CREAT SERPL: 26 (ref 7.3–30)
CALCIUM SPEC-SCNC: 9.9 MG/DL (ref 8.5–10.2)
CHLORIDE SERPL-SCNC: 104 MMOL/L (ref 98–107)
CO2 SERPL-SCNC: 26.3 MMOL/L (ref 22–29)
CREAT BLD-MCNC: 2.08 MG/DL (ref 0.6–1.1)
DEPRECATED RDW RBC AUTO: 63 FL (ref 37–49)
EOSINOPHIL # BLD AUTO: 0.13 10*3/MM3 (ref 0–0.36)
EOSINOPHIL NFR BLD AUTO: 2.5 % (ref 1–5)
ERYTHROCYTE [DISTWIDTH] IN BLOOD BY AUTOMATED COUNT: 17.9 % (ref 11.7–14.5)
GFR SERPL CREATININE-BSD FRML MDRD: 23 ML/MIN/1.73
GLOBULIN UR ELPH-MCNC: 2.9 GM/DL (ref 1.8–3.5)
GLUCOSE BLD-MCNC: 121 MG/DL (ref 74–124)
HCT VFR BLD AUTO: 30.9 % (ref 34–45)
HGB BLD-MCNC: 10 G/DL (ref 11.5–14.9)
IMM GRANULOCYTES # BLD: 0.01 10*3/MM3 (ref 0–0.03)
IMM GRANULOCYTES NFR BLD: 0.2 % (ref 0–0.5)
LYMPHOCYTES # BLD AUTO: 0.84 10*3/MM3 (ref 1–3.5)
LYMPHOCYTES NFR BLD AUTO: 16.4 % (ref 20–49)
MCH RBC QN AUTO: 31.1 PG (ref 27–33)
MCHC RBC AUTO-ENTMCNC: 32.4 G/DL (ref 32–35)
MCV RBC AUTO: 96 FL (ref 83–97)
MONOCYTES # BLD AUTO: 0.43 10*3/MM3 (ref 0.25–0.8)
MONOCYTES NFR BLD AUTO: 8.4 % (ref 4–12)
NEUTROPHILS # BLD AUTO: 3.71 10*3/MM3 (ref 1.5–7)
NEUTROPHILS NFR BLD AUTO: 72.3 % (ref 39–75)
NRBC BLD MANUAL-RTO: 0 /100 WBC (ref 0–0)
PLATELET # BLD AUTO: 155 10*3/MM3 (ref 150–375)
PMV BLD AUTO: 8.8 FL (ref 8.9–12.1)
POTASSIUM BLD-SCNC: 4.3 MMOL/L (ref 3.5–4.7)
PROT SERPL-MCNC: 6.5 G/DL (ref 6.3–8)
RBC # BLD AUTO: 3.22 10*6/MM3 (ref 3.9–5)
SODIUM BLD-SCNC: 141 MMOL/L (ref 134–145)
WBC NRBC COR # BLD: 5.13 10*3/MM3 (ref 4–10)

## 2018-03-14 PROCEDURE — 99213 OFFICE O/P EST LOW 20 MIN: CPT | Performed by: INTERNAL MEDICINE

## 2018-03-14 PROCEDURE — 96523 IRRIG DRUG DELIVERY DEVICE: CPT | Performed by: INTERNAL MEDICINE

## 2018-03-14 PROCEDURE — 25010000002 HEPARIN FLUSH (PORCINE) 100 UNIT/ML SOLUTION: Performed by: INTERNAL MEDICINE

## 2018-03-14 PROCEDURE — 85025 COMPLETE CBC W/AUTO DIFF WBC: CPT

## 2018-03-14 PROCEDURE — 80053 COMPREHEN METABOLIC PANEL: CPT

## 2018-03-14 RX ORDER — SODIUM CHLORIDE 0.9 % (FLUSH) 0.9 %
10 SYRINGE (ML) INJECTION AS NEEDED
Status: DISCONTINUED | OUTPATIENT
Start: 2018-03-14 | End: 2018-03-14 | Stop reason: HOSPADM

## 2018-03-14 RX ORDER — SODIUM CHLORIDE 0.9 % (FLUSH) 0.9 %
10 SYRINGE (ML) INJECTION AS NEEDED
Status: CANCELLED | OUTPATIENT
Start: 2018-03-14

## 2018-03-14 RX ADMIN — Medication 10 ML: at 10:30

## 2018-03-14 RX ADMIN — SODIUM CHLORIDE, PRESERVATIVE FREE 500 UNITS: 5 INJECTION INTRAVENOUS at 10:30

## 2018-03-14 NOTE — PROGRESS NOTES
Subjective .     REASONS FOR FOLLOWUP:      Anemia of chronic kidney diease              History of Iron deficiency    History of Present Illness      Mrs Machuca is seen today after 2 units of PRBCs about two weeks ago. Her Hgb still manifests a nice increment from 8.2 to 10. She feels somewhat better with CMP pending.    Past Medical History:   Diagnosis Date   • Anemia     Iron deficiency   • Cancer     Skin cancer   • Cardiomyopathy     S/P pacemaker and defibrillator   • CHF (congestive heart failure)    • Clostridium difficile diarrhea     2013   • Colon polyps    • Gastroparesis    • GERD (gastroesophageal reflux disease)    • Gout    • Hemorrhoids    • Hiatal hernia    • History of pancreatitis 01/2014   • History of pancreatitis     2014   • History of transfusion    • Hyperlipidemia    • Hypothyroidism    • Left bundle branch block    • Mitral and aortic valve disease    • Mitral valve insufficiency    • Myoclonus     S/P Depakote   • Nephrolithiasis    • Osteoarthritis    • Pancytopenia    • Peripheral neuropathy    • Progressive familial myoclonic epilepsy    • Pulmonary hypertension    • RLS (restless legs syndrome)    • Ruptured ovarian cyst    • Seizures     myoclonus   • Spinal stenosis        ONCOLOGIC HISTORY:  (History from previous dates can be found in the separate document.)    Current Outpatient Prescriptions on File Prior to Visit   Medication Sig Dispense Refill   • allopurinol (ZYLOPRIM) 100 MG tablet Take 100 mg by mouth 2 (Two) Times a Day.     • aspirin 81 MG tablet Take 81 mg by mouth Daily. PT HOLDING FOR SURGERY     • calcitriol (ROCALTROL) 0.25 MCG capsule Take 0.25 mcg by mouth Daily.     • calcium carbonate (OS-RAYMOND) 600 MG tablet Take 600 mg by mouth Daily.     • carvedilol (COREG) 12.5 MG tablet TAKE 1 TABLET DAILY 90 tablet 1   • Cholecalciferol (VITAMIN D3) 5000 units capsule capsule Take 5,000 Units by mouth Daily. Every other day     • diazepam (VALIUM) 5 MG tablet Take  5 mg by mouth Every Night.     • furosemide (LASIX) 40 MG tablet Take 40 mg by mouth Every Other Day.     • Gabapentin, Once-Daily, 300 MG tablet Take one tablet daily with dinner. 90 tablet 1   • Glucosamine-Chondroit-Vit C-Mn (GLUCOSAMINE CHONDROITIN COMPLX) capsule Take 1,500 mg by mouth Every Other Day. PT HOLDING FOR SURGERY     • levothyroxine (SYNTHROID, LEVOTHROID) 25 MCG tablet Take 25 mcg by mouth Daily.     • loperamide (IMODIUM) 2 MG capsule Take 2 mg by mouth 4 (Four) Times a Day As Needed for Diarrhea.     • magnesium oxide 250 MG tablet Take 250 mg by mouth Daily.     • methscopolamine (PAMINE FORTE) 5 MG tablet Take 5 mg by mouth Daily.     • methylPREDNISolone (MEDROL) 4 MG tablet Take 4 mg by mouth As Needed. TAKES FOR GOUT FLARE UPS     • Multiple Vitamin (MULTI-DAY VITAMINS) tablet Take 1 tablet by mouth Daily. Multivitamin with Iron     • NEUPRO 4 MG/24HR patch APPLY 1 PATCH DAILY AS DIRECTED.**PAYABLE 5/8/17 90 patch 2   • ondansetron (ZOFRAN) 8 MG tablet Take 1 tablet by mouth Every 8 (Eight) Hours As Needed for Nausea or Vomiting. 30 tablet 2   • pantoprazole (PROTONIX) 40 MG EC tablet TAKE 1 TABLET BY MOUTH EVERY MORNING  11   • psyllium (METAMUCIL) 58.6 % powder Take 1 packet by mouth Daily.     • spironolactone (ALDACTONE) 25 MG tablet TAKE 1/2 TABLET DAILY 45 tablet 1   • Vitamin E 400 UNITS tablet Take 400 Units by mouth Daily. PT HOLDING FOR SURGERY       Current Facility-Administered Medications on File Prior to Visit   Medication Dose Route Frequency Provider Last Rate Last Dose   • heparin flush (porcine) 100 UNIT/ML injection 500 Units  500 Units Intravenous PRN Anuel Scott MD   500 Units at 11/27/17 1347   • heparin flush (porcine) 100 UNIT/ML injection 500 Units  500 Units Intravenous PRN Anuel Scott MD   500 Units at 03/14/18 1030   • sodium chloride 0.9 % flush 10 mL  10 mL Intravenous PRN Anuel Scott MD   10 mL at 11/27/17 1347   • sodium chloride 0.9 % flush  "10 mL  10 mL Intravenous PRN Anuel Scott MD   10 mL at 03/14/18 1030       ALLERGIES:     Allergies   Allergen Reactions   • Chlorhexidine Rash and Itching     Patient states \"blue dye\" in chlorhexidine.  States the orange and clear are OK to use   • Chlorhexidine Gluconate Itching     i   • Valproic Acid Other (See Comments)     Made her extremely sleepy   • Codeine Other (See Comments)     Want to climb the walls, doesn't help pain   • Propoxyphene Other (See Comments)     Belligerent, acting drunk       Social History     Social History   • Marital status:      Spouse name: Zackery   • Number of children: 5   • Years of education: 1 yr college     Occupational History   •  Retired     Social History Main Topics   • Smoking status: Never Smoker   • Smokeless tobacco: Never Used   • Alcohol use No   • Drug use: No   • Sexual activity: Defer      Comment: AGE!!!     Other Topics Concern   • Not on file     Social History Narrative   • No narrative on file         Cancer-related family history is not on file.     Review of Systems   Constitutional: Negative.    HENT: Negative.    Eyes: Negative.    Respiratory: Negative.    Cardiovascular: Negative.    Gastrointestinal: Positive for diarrhea.   Endocrine: Negative.    Genitourinary: Negative.    Musculoskeletal: Negative.    Skin: Negative.    Hematological: Negative.      A comprehensive 14 point review of systems was performed and was negative except as mentioned.    Objective      Vitals:    03/14/18 1019   BP: 138/76   Pulse: 79   Resp: 14   Temp: 98.2 °F (36.8 °C)   TempSrc: Oral   SpO2: 96%   Weight: 78.9 kg (174 lb)   Height: 176 cm (69.29\")   PainSc: 4  Comment: arthritis     Current Status 3/14/2018   ECOG score 0       Physical Exam   Constitutional: She appears well-developed and well-nourished.   HENT:   Head: Normocephalic.   Eyes: Conjunctivae are normal. No scleral icterus.   Cardiovascular: Normal rate.    Pulmonary/Chest: Effort normal. "   Abdominal: Soft. Bowel sounds are normal.   Skin: No rash noted.   Psychiatric: She has a normal mood and affect.         RECENT LABS:  Hematology WBC   Date Value Ref Range Status   03/14/2018 5.13 4.00 - 10.00 10*3/mm3 Final     RBC   Date Value Ref Range Status   03/14/2018 3.22 (L) 3.90 - 5.00 10*6/mm3 Final     Hemoglobin   Date Value Ref Range Status   03/14/2018 10.0 (L) 11.5 - 14.9 g/dL Final     Hematocrit   Date Value Ref Range Status   03/14/2018 30.9 (L) 34.0 - 45.0 % Final     Platelets   Date Value Ref Range Status   03/14/2018 155 150 - 375 10*3/mm3 Final        Assessment/Plan    Anemia of stage 4 CKD:  Her creatinine is pending though her Hgb is excellent. We will review every 2 weeks her cbc and see her in 6 weeks.

## 2018-03-27 DIAGNOSIS — D63.1 ANEMIA IN STAGE 4 CHRONIC KIDNEY DISEASE (HCC): Primary | ICD-10-CM

## 2018-03-27 DIAGNOSIS — N18.4 ANEMIA IN STAGE 4 CHRONIC KIDNEY DISEASE (HCC): Primary | ICD-10-CM

## 2018-03-29 ENCOUNTER — OFFICE VISIT (OUTPATIENT)
Dept: ONCOLOGY | Facility: CLINIC | Age: 78
End: 2018-03-29

## 2018-03-29 ENCOUNTER — INFUSION (OUTPATIENT)
Dept: ONCOLOGY | Facility: HOSPITAL | Age: 78
End: 2018-03-29

## 2018-03-29 VITALS — HEIGHT: 69 IN | DIASTOLIC BLOOD PRESSURE: 68 MMHG | SYSTOLIC BLOOD PRESSURE: 117 MMHG

## 2018-03-29 DIAGNOSIS — D63.1 ANEMIA IN STAGE 4 CHRONIC KIDNEY DISEASE (HCC): Primary | ICD-10-CM

## 2018-03-29 DIAGNOSIS — D63.1 ANEMIA OF CHRONIC RENAL FAILURE, STAGE 4 (SEVERE) (HCC): ICD-10-CM

## 2018-03-29 DIAGNOSIS — N18.4 ANEMIA OF CHRONIC RENAL FAILURE, STAGE 4 (SEVERE) (HCC): ICD-10-CM

## 2018-03-29 DIAGNOSIS — N18.4 ANEMIA IN STAGE 4 CHRONIC KIDNEY DISEASE (HCC): Primary | ICD-10-CM

## 2018-03-29 DIAGNOSIS — D63.8 ANEMIA OF CHRONIC DISEASE: Primary | ICD-10-CM

## 2018-03-29 LAB
ABO GROUP BLD: NORMAL
BASOPHILS # BLD AUTO: 0.02 10*3/MM3 (ref 0–0.2)
BASOPHILS NFR BLD AUTO: 0.4 % (ref 0–1.5)
BLD GP AB SCN SERPL QL: NEGATIVE
DEPRECATED RDW RBC AUTO: 73.3 FL (ref 37–54)
EOSINOPHIL # BLD AUTO: 0.09 10*3/MM3 (ref 0–0.7)
EOSINOPHIL NFR BLD AUTO: 1.6 % (ref 0.3–6.2)
ERYTHROCYTE [DISTWIDTH] IN BLOOD BY AUTOMATED COUNT: 20.3 % (ref 11.7–13)
HCT VFR BLD AUTO: 21.8 % (ref 35.6–45.5)
HGB BLD-MCNC: 7 G/DL (ref 11.9–15.5)
IMM GRANULOCYTES # BLD: 0.02 10*3/MM3 (ref 0–0.03)
IMM GRANULOCYTES NFR BLD: 0.4 % (ref 0–0.5)
LYMPHOCYTES # BLD AUTO: 0.94 10*3/MM3 (ref 0.9–4.8)
LYMPHOCYTES NFR BLD AUTO: 16.6 % (ref 19.6–45.3)
MCH RBC QN AUTO: 32.3 PG (ref 26.9–32)
MCHC RBC AUTO-ENTMCNC: 32.1 G/DL (ref 32.4–36.3)
MCV RBC AUTO: 100.5 FL (ref 80.5–98.2)
MONOCYTES # BLD AUTO: 0.34 10*3/MM3 (ref 0.2–1.2)
MONOCYTES NFR BLD AUTO: 6 % (ref 5–12)
NEUTROPHILS # BLD AUTO: 4.25 10*3/MM3 (ref 1.9–8.1)
NEUTROPHILS NFR BLD AUTO: 75 % (ref 42.7–76)
NRBC BLD MANUAL-RTO: 0 /100 WBC (ref 0–0)
PLATELET # BLD AUTO: 159 10*3/MM3 (ref 140–500)
PMV BLD AUTO: 9.2 FL (ref 6–12)
RBC # BLD AUTO: 2.17 10*6/MM3 (ref 3.9–5.2)
RH BLD: NEGATIVE
T&S EXPIRATION DATE: NORMAL
WBC NRBC COR # BLD: 5.66 10*3/MM3 (ref 4.5–10.7)

## 2018-03-29 PROCEDURE — 86923 COMPATIBILITY TEST ELECTRIC: CPT

## 2018-03-29 PROCEDURE — 86850 RBC ANTIBODY SCREEN: CPT | Performed by: NURSE PRACTITIONER

## 2018-03-29 PROCEDURE — 63510000001 EPOETIN ALFA PER 1000 UNITS: Performed by: NURSE PRACTITIONER

## 2018-03-29 PROCEDURE — 86901 BLOOD TYPING SEROLOGIC RH(D): CPT | Performed by: NURSE PRACTITIONER

## 2018-03-29 PROCEDURE — 99212-NC PR NO CHARGE CBC OFFICE OUTPATIENT VISIT 10 MINUTES: Performed by: NURSE PRACTITIONER

## 2018-03-29 PROCEDURE — 85025 COMPLETE CBC W/AUTO DIFF WBC: CPT | Performed by: INTERNAL MEDICINE

## 2018-03-29 PROCEDURE — 86900 BLOOD TYPING SEROLOGIC ABO: CPT | Performed by: NURSE PRACTITIONER

## 2018-03-29 PROCEDURE — 96372 THER/PROPH/DIAG INJ SC/IM: CPT

## 2018-03-29 PROCEDURE — 36415 COLL VENOUS BLD VENIPUNCTURE: CPT | Performed by: INTERNAL MEDICINE

## 2018-03-29 RX ORDER — SODIUM CHLORIDE 9 MG/ML
250 INJECTION, SOLUTION INTRAVENOUS AS NEEDED
Status: CANCELLED | OUTPATIENT
Start: 2018-03-30

## 2018-03-29 RX ORDER — ACETAMINOPHEN 325 MG/1
325 TABLET ORAL ONCE
Status: CANCELLED | OUTPATIENT
Start: 2018-03-30 | End: 2018-03-30

## 2018-03-29 RX ORDER — DIPHENHYDRAMINE HCL 25 MG
25 CAPSULE ORAL ONCE
Status: CANCELLED | OUTPATIENT
Start: 2018-03-30 | End: 2018-03-30

## 2018-03-29 RX ADMIN — ERYTHROPOIETIN 20000 UNITS: 20000 INJECTION, SOLUTION INTRAVENOUS; SUBCUTANEOUS at 11:41

## 2018-03-29 NOTE — PROGRESS NOTES
The patient returns today for CBC with nurse review it.  She continues to be anemic with hemoglobin of 7.0.  She is symptomatic with light headedness with position changes and fatigue.  We will proceed with transfusion of 2 units packed red blood cells.  Patient is scheduled for Saturday, 3/31/2018.  Given that the patient is not being transfused today, we will also proceed with Procrit 25,000 units.  She will return in 1 week for repeat CBC with nurse review.  We will continue to administer Procrit for hemoglobin less than 10.0 unless she is scheduled for transfusion the same day as Procrit injection.    Lab Results   Component Value Date    WBC 5.66 03/29/2018    HGB 7.0 (L) 03/29/2018    HCT 21.8 (L) 03/29/2018    .5 (H) 03/29/2018     03/29/2018     Thuy Cruz, APRN  03/29/2018

## 2018-03-31 ENCOUNTER — INFUSION (OUTPATIENT)
Dept: ONCOLOGY | Facility: HOSPITAL | Age: 78
End: 2018-03-31

## 2018-03-31 VITALS
RESPIRATION RATE: 20 BRPM | SYSTOLIC BLOOD PRESSURE: 116 MMHG | HEART RATE: 71 BPM | DIASTOLIC BLOOD PRESSURE: 66 MMHG | OXYGEN SATURATION: 98 % | TEMPERATURE: 97.7 F

## 2018-03-31 DIAGNOSIS — D63.1 ANEMIA OF CHRONIC RENAL FAILURE, STAGE 4 (SEVERE) (HCC): ICD-10-CM

## 2018-03-31 DIAGNOSIS — N18.4 ANEMIA OF CHRONIC RENAL FAILURE, STAGE 4 (SEVERE) (HCC): ICD-10-CM

## 2018-03-31 DIAGNOSIS — D63.8 ANEMIA OF CHRONIC DISEASE: ICD-10-CM

## 2018-03-31 PROCEDURE — A9270 NON-COVERED ITEM OR SERVICE: HCPCS | Performed by: NURSE PRACTITIONER

## 2018-03-31 PROCEDURE — 63710000001 DIPHENHYDRAMINE PER 50 MG: Performed by: NURSE PRACTITIONER

## 2018-03-31 PROCEDURE — 63710000001 ACETAMINOPHEN 325 MG TABLET: Performed by: NURSE PRACTITIONER

## 2018-03-31 PROCEDURE — 86900 BLOOD TYPING SEROLOGIC ABO: CPT

## 2018-03-31 PROCEDURE — 25010000002 HEPARIN FLUSH (PORCINE) 100 UNIT/ML SOLUTION: Performed by: INTERNAL MEDICINE

## 2018-03-31 PROCEDURE — P9016 RBC LEUKOCYTES REDUCED: HCPCS

## 2018-03-31 PROCEDURE — 36430 TRANSFUSION BLD/BLD COMPNT: CPT

## 2018-03-31 RX ORDER — DIPHENHYDRAMINE HCL 25 MG
25 CAPSULE ORAL ONCE
Status: COMPLETED | OUTPATIENT
Start: 2018-03-31 | End: 2018-03-31

## 2018-03-31 RX ORDER — SODIUM CHLORIDE 9 MG/ML
250 INJECTION, SOLUTION INTRAVENOUS AS NEEDED
Status: DISCONTINUED | OUTPATIENT
Start: 2018-03-31 | End: 2018-03-31 | Stop reason: HOSPADM

## 2018-03-31 RX ORDER — ACETAMINOPHEN 325 MG/1
325 TABLET ORAL ONCE
Status: COMPLETED | OUTPATIENT
Start: 2018-03-31 | End: 2018-03-31

## 2018-03-31 RX ADMIN — Medication 500 UNITS: at 14:00

## 2018-03-31 RX ADMIN — DIPHENHYDRAMINE HYDROCHLORIDE 25 MG: 25 CAPSULE ORAL at 07:45

## 2018-03-31 RX ADMIN — ACETAMINOPHEN 325 MG: 325 TABLET ORAL at 07:45

## 2018-04-05 ENCOUNTER — LAB (OUTPATIENT)
Dept: OTHER | Facility: HOSPITAL | Age: 78
End: 2018-04-05

## 2018-04-05 ENCOUNTER — OFFICE VISIT (OUTPATIENT)
Dept: ONCOLOGY | Facility: CLINIC | Age: 78
End: 2018-04-05

## 2018-04-05 ENCOUNTER — INFUSION (OUTPATIENT)
Dept: ONCOLOGY | Facility: HOSPITAL | Age: 78
End: 2018-04-05

## 2018-04-05 DIAGNOSIS — N18.4 ANEMIA IN STAGE 4 CHRONIC KIDNEY DISEASE (HCC): ICD-10-CM

## 2018-04-05 DIAGNOSIS — D63.1 ANEMIA IN STAGE 4 CHRONIC KIDNEY DISEASE (HCC): ICD-10-CM

## 2018-04-05 DIAGNOSIS — N18.4 ANEMIA OF CHRONIC RENAL FAILURE, STAGE 4 (SEVERE) (HCC): Primary | ICD-10-CM

## 2018-04-05 DIAGNOSIS — D63.1 ANEMIA OF CHRONIC RENAL FAILURE, STAGE 4 (SEVERE) (HCC): Primary | ICD-10-CM

## 2018-04-05 LAB
BASOPHILS # BLD AUTO: 0.01 10*3/MM3 (ref 0–0.2)
BASOPHILS NFR BLD AUTO: 0.2 % (ref 0–1.5)
DEPRECATED RDW RBC AUTO: 73.4 FL (ref 37–54)
EOSINOPHIL # BLD AUTO: 0.13 10*3/MM3 (ref 0–0.7)
EOSINOPHIL NFR BLD AUTO: 2.6 % (ref 0.3–6.2)
ERYTHROCYTE [DISTWIDTH] IN BLOOD BY AUTOMATED COUNT: 20.6 % (ref 11.7–13)
HCT VFR BLD AUTO: 30.9 % (ref 35.6–45.5)
HGB BLD-MCNC: 10 G/DL (ref 11.9–15.5)
IMM GRANULOCYTES # BLD: 0.02 10*3/MM3 (ref 0–0.03)
IMM GRANULOCYTES NFR BLD: 0.4 % (ref 0–0.5)
LYMPHOCYTES # BLD AUTO: 0.77 10*3/MM3 (ref 0.9–4.8)
LYMPHOCYTES NFR BLD AUTO: 15.6 % (ref 19.6–45.3)
MCH RBC QN AUTO: 31.6 PG (ref 26.9–32)
MCHC RBC AUTO-ENTMCNC: 32.4 G/DL (ref 32.4–36.3)
MCV RBC AUTO: 97.8 FL (ref 80.5–98.2)
MONOCYTES # BLD AUTO: 0.35 10*3/MM3 (ref 0.2–1.2)
MONOCYTES NFR BLD AUTO: 7.1 % (ref 5–12)
NEUTROPHILS # BLD AUTO: 3.65 10*3/MM3 (ref 1.9–8.1)
NEUTROPHILS NFR BLD AUTO: 74.1 % (ref 42.7–76)
NRBC BLD MANUAL-RTO: 0 /100 WBC (ref 0–0)
PLATELET # BLD AUTO: 146 10*3/MM3 (ref 140–500)
PMV BLD AUTO: 9.6 FL (ref 6–12)
RBC # BLD AUTO: 3.16 10*6/MM3 (ref 3.9–5.2)
WBC NRBC COR # BLD: 4.93 10*3/MM3 (ref 4.5–10.7)

## 2018-04-05 PROCEDURE — 85025 COMPLETE CBC W/AUTO DIFF WBC: CPT | Performed by: INTERNAL MEDICINE

## 2018-04-05 PROCEDURE — 63510000001 EPOETIN ALFA PER 1000 UNITS: Performed by: NURSE PRACTITIONER

## 2018-04-05 PROCEDURE — 99212-NC PR NO CHARGE CBC OFFICE OUTPATIENT VISIT 10 MINUTES: Performed by: NURSE PRACTITIONER

## 2018-04-05 PROCEDURE — 96372 THER/PROPH/DIAG INJ SC/IM: CPT

## 2018-04-05 PROCEDURE — 36415 COLL VENOUS BLD VENIPUNCTURE: CPT

## 2018-04-05 RX ORDER — SODIUM CHLORIDE 0.9 % (FLUSH) 0.9 %
10 SYRINGE (ML) INJECTION AS NEEDED
Status: CANCELLED | OUTPATIENT
Start: 2018-04-05

## 2018-04-05 RX ADMIN — ERYTHROPOIETIN 20000 UNITS: 20000 INJECTION, SOLUTION INTRAVENOUS; SUBCUTANEOUS at 10:43

## 2018-04-05 NOTE — PROGRESS NOTES
The patient returns today for CBC with nurse review.  When evaluated 1 week ago, she was noted to have a symptomatic hemoglobin of 7.0.  She did receive a Procrit injection, with transfusion of 2 separate blood cells on Saturday, 3/31/2018.  Her hemoglobin has improved today to 10.0.  She reports she is feeling much better.  She will proceed with Procrit injection today, and return in 1 week for repeat CBC with RN review and Procrit if hbg is less than 10.0.    Lab Results   Component Value Date    WBC 4.93 04/05/2018    HGB 10.0 (L) 04/05/2018    HCT 30.9 (L) 04/05/2018    MCV 97.8 04/05/2018     04/05/2018     Thuy Cruz, APRN  04/05/2018

## 2018-04-09 RX ORDER — FUROSEMIDE 40 MG/1
TABLET ORAL
Qty: 90 TABLET | Refills: 3 | Status: SHIPPED | OUTPATIENT
Start: 2018-04-09 | End: 2019-02-11

## 2018-04-12 ENCOUNTER — INFUSION (OUTPATIENT)
Dept: ONCOLOGY | Facility: HOSPITAL | Age: 78
End: 2018-04-12

## 2018-04-12 ENCOUNTER — OFFICE VISIT (OUTPATIENT)
Dept: ONCOLOGY | Facility: CLINIC | Age: 78
End: 2018-04-12

## 2018-04-12 DIAGNOSIS — D63.1 ANEMIA IN STAGE 4 CHRONIC KIDNEY DISEASE (HCC): ICD-10-CM

## 2018-04-12 DIAGNOSIS — D63.8 ANEMIA OF CHRONIC DISEASE: Primary | ICD-10-CM

## 2018-04-12 DIAGNOSIS — N18.4 ANEMIA IN STAGE 4 CHRONIC KIDNEY DISEASE (HCC): ICD-10-CM

## 2018-04-12 LAB
BASOPHILS # BLD AUTO: 0.01 10*3/MM3 (ref 0–0.2)
BASOPHILS NFR BLD AUTO: 0.3 % (ref 0–1.5)
DEPRECATED RDW RBC AUTO: 71.8 FL (ref 37–54)
EOSINOPHIL # BLD AUTO: 0.09 10*3/MM3 (ref 0–0.7)
EOSINOPHIL NFR BLD AUTO: 2.5 % (ref 0.3–6.2)
ERYTHROCYTE [DISTWIDTH] IN BLOOD BY AUTOMATED COUNT: 19.9 % (ref 11.7–13)
HCT VFR BLD AUTO: 32.4 % (ref 35.6–45.5)
HGB BLD-MCNC: 10.2 G/DL (ref 11.9–15.5)
IMM GRANULOCYTES # BLD: 0.01 10*3/MM3 (ref 0–0.03)
IMM GRANULOCYTES NFR BLD: 0.3 % (ref 0–0.5)
LYMPHOCYTES # BLD AUTO: 0.71 10*3/MM3 (ref 0.9–4.8)
LYMPHOCYTES NFR BLD AUTO: 19.9 % (ref 19.6–45.3)
MCH RBC QN AUTO: 31.4 PG (ref 26.9–32)
MCHC RBC AUTO-ENTMCNC: 31.5 G/DL (ref 32.4–36.3)
MCV RBC AUTO: 99.7 FL (ref 80.5–98.2)
MONOCYTES # BLD AUTO: 0.29 10*3/MM3 (ref 0.2–1.2)
MONOCYTES NFR BLD AUTO: 8.1 % (ref 5–12)
NEUTROPHILS # BLD AUTO: 2.46 10*3/MM3 (ref 1.9–8.1)
NEUTROPHILS NFR BLD AUTO: 68.9 % (ref 42.7–76)
NRBC BLD MANUAL-RTO: 0 /100 WBC (ref 0–0)
PLATELET # BLD AUTO: 152 10*3/MM3 (ref 140–500)
PMV BLD AUTO: 9.8 FL (ref 6–12)
RBC # BLD AUTO: 3.25 10*6/MM3 (ref 3.9–5.2)
WBC NRBC COR # BLD: 3.57 10*3/MM3 (ref 4.5–10.7)

## 2018-04-12 PROCEDURE — 36415 COLL VENOUS BLD VENIPUNCTURE: CPT

## 2018-04-12 PROCEDURE — 99212-NC PR NO CHARGE CBC OFFICE OUTPATIENT VISIT 10 MINUTES: Performed by: NURSE PRACTITIONER

## 2018-04-12 PROCEDURE — 85025 COMPLETE CBC W/AUTO DIFF WBC: CPT | Performed by: INTERNAL MEDICINE

## 2018-04-12 NOTE — PROGRESS NOTES
I reviewed labs with the patient today.  Her hemoglobin remains improved at 10.2. She will not require Procrit today. She reports feeling well with no new issues or concerns.  She will return as already scheduled for RN review with me in 2 weeks and possible Procrit. She knows to call the office with any new or worsening symptoms.    Lab Results   Component Value Date    WBC 3.57 (L) 04/12/2018    HGB 10.2 (L) 04/12/2018    HCT 32.4 (L) 04/12/2018    MCV 99.7 (H) 04/12/2018     04/12/2018

## 2018-04-13 ENCOUNTER — CLINICAL SUPPORT NO REQUIREMENTS (OUTPATIENT)
Dept: CARDIOLOGY | Facility: CLINIC | Age: 78
End: 2018-04-13

## 2018-04-13 DIAGNOSIS — I50.22 CHRONIC SYSTOLIC CONGESTIVE HEART FAILURE (HCC): Primary | ICD-10-CM

## 2018-04-13 PROCEDURE — 93297 REM INTERROG DEV EVAL ICPMS: CPT | Performed by: INTERNAL MEDICINE

## 2018-04-13 PROCEDURE — 93295 DEV INTERROG REMOTE 1/2/MLT: CPT | Performed by: INTERNAL MEDICINE

## 2018-04-13 PROCEDURE — 93296 REM INTERROG EVL PM/IDS: CPT | Performed by: INTERNAL MEDICINE

## 2018-04-26 ENCOUNTER — INFUSION (OUTPATIENT)
Dept: ONCOLOGY | Facility: HOSPITAL | Age: 78
End: 2018-04-26

## 2018-04-26 ENCOUNTER — OFFICE VISIT (OUTPATIENT)
Dept: ONCOLOGY | Facility: CLINIC | Age: 78
End: 2018-04-26

## 2018-04-26 DIAGNOSIS — Z45.2 ENCOUNTER FOR FITTING AND ADJUSTMENT OF VASCULAR CATHETER: ICD-10-CM

## 2018-04-26 DIAGNOSIS — N18.9 CHRONIC KIDNEY DISEASE, UNSPECIFIED CKD STAGE: Primary | ICD-10-CM

## 2018-04-26 DIAGNOSIS — N18.4 ANEMIA IN STAGE 4 CHRONIC KIDNEY DISEASE (HCC): Primary | ICD-10-CM

## 2018-04-26 DIAGNOSIS — D63.1 ANEMIA OF CHRONIC RENAL FAILURE, STAGE 4 (SEVERE) (HCC): ICD-10-CM

## 2018-04-26 DIAGNOSIS — D63.1 ANEMIA IN STAGE 4 CHRONIC KIDNEY DISEASE (HCC): Primary | ICD-10-CM

## 2018-04-26 DIAGNOSIS — N18.4 ANEMIA OF CHRONIC RENAL FAILURE, STAGE 4 (SEVERE) (HCC): ICD-10-CM

## 2018-04-26 LAB
ABO GROUP BLD: NORMAL
BASOPHILS # BLD AUTO: 0.01 10*3/MM3 (ref 0–0.2)
BASOPHILS NFR BLD AUTO: 0.3 % (ref 0–1.5)
BLD GP AB SCN SERPL QL: NEGATIVE
DEPRECATED RDW RBC AUTO: 62.1 FL (ref 37–54)
EOSINOPHIL # BLD AUTO: 0.09 10*3/MM3 (ref 0–0.7)
EOSINOPHIL NFR BLD AUTO: 2.5 % (ref 0.3–6.2)
ERYTHROCYTE [DISTWIDTH] IN BLOOD BY AUTOMATED COUNT: 17.2 % (ref 11.7–13)
HCT VFR BLD AUTO: 25.2 % (ref 35.6–45.5)
HGB BLD-MCNC: 8.1 G/DL (ref 11.9–15.5)
IMM GRANULOCYTES # BLD: 0.02 10*3/MM3 (ref 0–0.03)
IMM GRANULOCYTES NFR BLD: 0.6 % (ref 0–0.5)
LYMPHOCYTES # BLD AUTO: 0.75 10*3/MM3 (ref 0.9–4.8)
LYMPHOCYTES NFR BLD AUTO: 20.7 % (ref 19.6–45.3)
MCH RBC QN AUTO: 31.4 PG (ref 26.9–32)
MCHC RBC AUTO-ENTMCNC: 32.1 G/DL (ref 32.4–36.3)
MCV RBC AUTO: 97.7 FL (ref 80.5–98.2)
MONOCYTES # BLD AUTO: 0.32 10*3/MM3 (ref 0.2–1.2)
MONOCYTES NFR BLD AUTO: 8.8 % (ref 5–12)
NEUTROPHILS # BLD AUTO: 2.44 10*3/MM3 (ref 1.9–8.1)
NEUTROPHILS NFR BLD AUTO: 67.1 % (ref 42.7–76)
NRBC BLD MANUAL-RTO: 0 /100 WBC (ref 0–0)
PLATELET # BLD AUTO: 171 10*3/MM3 (ref 140–500)
PMV BLD AUTO: 10.1 FL (ref 6–12)
RBC # BLD AUTO: 2.58 10*6/MM3 (ref 3.9–5.2)
RH BLD: NEGATIVE
T&S EXPIRATION DATE: NORMAL
WBC NRBC COR # BLD: 3.63 10*3/MM3 (ref 4.5–10.7)

## 2018-04-26 PROCEDURE — 63510000001 EPOETIN ALFA PER 1000 UNITS: Performed by: NURSE PRACTITIONER

## 2018-04-26 PROCEDURE — 96372 THER/PROPH/DIAG INJ SC/IM: CPT

## 2018-04-26 PROCEDURE — 36415 COLL VENOUS BLD VENIPUNCTURE: CPT

## 2018-04-26 PROCEDURE — 86850 RBC ANTIBODY SCREEN: CPT | Performed by: NURSE PRACTITIONER

## 2018-04-26 PROCEDURE — 86923 COMPATIBILITY TEST ELECTRIC: CPT

## 2018-04-26 PROCEDURE — 99212-NC PR NO CHARGE CBC OFFICE OUTPATIENT VISIT 10 MINUTES: Performed by: NURSE PRACTITIONER

## 2018-04-26 PROCEDURE — 85025 COMPLETE CBC W/AUTO DIFF WBC: CPT | Performed by: INTERNAL MEDICINE

## 2018-04-26 PROCEDURE — 86900 BLOOD TYPING SEROLOGIC ABO: CPT | Performed by: NURSE PRACTITIONER

## 2018-04-26 PROCEDURE — 86901 BLOOD TYPING SEROLOGIC RH(D): CPT | Performed by: NURSE PRACTITIONER

## 2018-04-26 RX ORDER — SODIUM CHLORIDE 0.9 % (FLUSH) 0.9 %
10 SYRINGE (ML) INJECTION AS NEEDED
Status: CANCELLED | OUTPATIENT
Start: 2018-04-26

## 2018-04-26 RX ORDER — SODIUM CHLORIDE 0.9 % (FLUSH) 0.9 %
10 SYRINGE (ML) INJECTION AS NEEDED
Status: DISCONTINUED | OUTPATIENT
Start: 2018-04-26 | End: 2018-04-26 | Stop reason: HOSPADM

## 2018-04-26 RX ORDER — SODIUM CHLORIDE 9 MG/ML
250 INJECTION, SOLUTION INTRAVENOUS AS NEEDED
Status: CANCELLED | OUTPATIENT
Start: 2018-04-26

## 2018-04-26 RX ADMIN — ERYTHROPOIETIN 20000 UNITS: 20000 INJECTION, SOLUTION INTRAVENOUS; SUBCUTANEOUS at 11:08

## 2018-04-26 RX ADMIN — Medication 10 ML: at 11:12

## 2018-04-26 NOTE — PROGRESS NOTES
I reviewed labs with the patient today. She reports feeling fatigued and more winded with exertion. Her hgb has dropped to 8.1 today.  She denies bleeding or bruising, chest pain or heart palpitations. I have ordered 2 units of PRBC's to be scheduled within the next couple of days. She will also receive Procrit. She will return as already scheduled on 5/10/2018 to see Dr Scott. I have instructed her to call the office with any new or worsening symptoms.    Lab Results   Component Value Date    WBC 3.63 (L) 04/26/2018    HGB 8.1 (L) 04/26/2018    HCT 25.2 (L) 04/26/2018    MCV 97.7 04/26/2018     04/26/2018

## 2018-04-28 ENCOUNTER — INFUSION (OUTPATIENT)
Dept: ONCOLOGY | Facility: HOSPITAL | Age: 78
End: 2018-04-28

## 2018-04-28 VITALS
SYSTOLIC BLOOD PRESSURE: 124 MMHG | DIASTOLIC BLOOD PRESSURE: 69 MMHG | HEART RATE: 84 BPM | OXYGEN SATURATION: 98 % | RESPIRATION RATE: 18 BRPM | TEMPERATURE: 97 F

## 2018-04-28 DIAGNOSIS — N18.9 CHRONIC KIDNEY DISEASE, UNSPECIFIED CKD STAGE: ICD-10-CM

## 2018-04-28 PROCEDURE — 36430 TRANSFUSION BLD/BLD COMPNT: CPT

## 2018-04-28 PROCEDURE — P9016 RBC LEUKOCYTES REDUCED: HCPCS

## 2018-04-28 PROCEDURE — 86900 BLOOD TYPING SEROLOGIC ABO: CPT

## 2018-04-28 RX ORDER — SODIUM CHLORIDE 9 MG/ML
250 INJECTION, SOLUTION INTRAVENOUS AS NEEDED
Status: DISCONTINUED | OUTPATIENT
Start: 2018-04-28 | End: 2018-04-28 | Stop reason: HOSPADM

## 2018-04-30 ENCOUNTER — TELEPHONE (OUTPATIENT)
Dept: NEUROLOGY | Facility: CLINIC | Age: 78
End: 2018-04-30

## 2018-05-08 DIAGNOSIS — N18.9 HISTORY OF ANEMIA DUE TO CHRONIC KIDNEY DISEASE: Primary | ICD-10-CM

## 2018-05-08 DIAGNOSIS — Z86.2 HISTORY OF ANEMIA DUE TO CHRONIC KIDNEY DISEASE: Primary | ICD-10-CM

## 2018-05-08 DIAGNOSIS — N18.4 STAGE 4 CHRONIC KIDNEY DISEASE (HCC): ICD-10-CM

## 2018-05-10 ENCOUNTER — OFFICE VISIT (OUTPATIENT)
Dept: ONCOLOGY | Facility: CLINIC | Age: 78
End: 2018-05-10

## 2018-05-10 ENCOUNTER — INFUSION (OUTPATIENT)
Dept: ONCOLOGY | Facility: HOSPITAL | Age: 78
End: 2018-05-10

## 2018-05-10 VITALS
HEART RATE: 68 BPM | HEIGHT: 69 IN | BODY MASS INDEX: 25.92 KG/M2 | RESPIRATION RATE: 12 BRPM | DIASTOLIC BLOOD PRESSURE: 76 MMHG | WEIGHT: 175 LBS | SYSTOLIC BLOOD PRESSURE: 144 MMHG | TEMPERATURE: 97.7 F | OXYGEN SATURATION: 99 %

## 2018-05-10 DIAGNOSIS — D63.1 ANEMIA IN STAGE 4 CHRONIC KIDNEY DISEASE (HCC): Primary | ICD-10-CM

## 2018-05-10 DIAGNOSIS — D64.9 ANEMIA, UNSPECIFIED TYPE: Primary | ICD-10-CM

## 2018-05-10 DIAGNOSIS — D63.1 ANEMIA OF CHRONIC RENAL FAILURE, STAGE 4 (SEVERE) (HCC): ICD-10-CM

## 2018-05-10 DIAGNOSIS — Z45.2 ENCOUNTER FOR FITTING AND ADJUSTMENT OF VASCULAR CATHETER: ICD-10-CM

## 2018-05-10 DIAGNOSIS — N18.4 STAGE 4 CHRONIC KIDNEY DISEASE (HCC): ICD-10-CM

## 2018-05-10 DIAGNOSIS — Z86.2 HISTORY OF ANEMIA DUE TO CHRONIC KIDNEY DISEASE: ICD-10-CM

## 2018-05-10 DIAGNOSIS — N18.4 ANEMIA IN STAGE 4 CHRONIC KIDNEY DISEASE (HCC): Primary | ICD-10-CM

## 2018-05-10 DIAGNOSIS — N18.4 ANEMIA OF CHRONIC RENAL FAILURE, STAGE 4 (SEVERE) (HCC): ICD-10-CM

## 2018-05-10 DIAGNOSIS — N18.9 HISTORY OF ANEMIA DUE TO CHRONIC KIDNEY DISEASE: ICD-10-CM

## 2018-05-10 LAB
BASOPHILS # BLD AUTO: 0.02 10*3/MM3 (ref 0–0.2)
BASOPHILS NFR BLD AUTO: 0.5 % (ref 0–1.5)
DEPRECATED RDW RBC AUTO: 69.1 FL (ref 37–54)
EOSINOPHIL # BLD AUTO: 0.1 10*3/MM3 (ref 0–0.7)
EOSINOPHIL NFR BLD AUTO: 2.5 % (ref 0.3–6.2)
ERYTHROCYTE [DISTWIDTH] IN BLOOD BY AUTOMATED COUNT: 19.8 % (ref 11.7–13)
FERRITIN SERPL-MCNC: 102.8 NG/ML (ref 13–150)
HCT VFR BLD AUTO: 30.3 % (ref 35.6–45.5)
HGB BLD-MCNC: 9.4 G/DL (ref 11.9–15.5)
IMM GRANULOCYTES # BLD: 0.01 10*3/MM3 (ref 0–0.03)
IMM GRANULOCYTES NFR BLD: 0.3 % (ref 0–0.5)
IRON 24H UR-MRATE: 70 MCG/DL (ref 37–145)
IRON SATN MFR SERPL: 21 % (ref 20–50)
LYMPHOCYTES # BLD AUTO: 0.89 10*3/MM3 (ref 0.9–4.8)
LYMPHOCYTES NFR BLD AUTO: 22.5 % (ref 19.6–45.3)
MCH RBC QN AUTO: 29.5 PG (ref 26.9–32)
MCHC RBC AUTO-ENTMCNC: 31 G/DL (ref 32.4–36.3)
MCV RBC AUTO: 95 FL (ref 80.5–98.2)
MONOCYTES # BLD AUTO: 0.31 10*3/MM3 (ref 0.2–1.2)
MONOCYTES NFR BLD AUTO: 7.8 % (ref 5–12)
NEUTROPHILS # BLD AUTO: 2.62 10*3/MM3 (ref 1.9–8.1)
NEUTROPHILS NFR BLD AUTO: 66.4 % (ref 42.7–76)
NRBC BLD MANUAL-RTO: 0 /100 WBC (ref 0–0)
PLATELET # BLD AUTO: 140 10*3/MM3 (ref 140–500)
PMV BLD AUTO: 9.6 FL (ref 6–12)
RBC # BLD AUTO: 3.19 10*6/MM3 (ref 3.9–5.2)
TIBC SERPL-MCNC: 328 MCG/DL (ref 298–536)
TRANSFERRIN SERPL-MCNC: 220 MG/DL (ref 200–360)
WBC NRBC COR # BLD: 3.95 10*3/MM3 (ref 4.5–10.7)

## 2018-05-10 PROCEDURE — 85025 COMPLETE CBC W/AUTO DIFF WBC: CPT | Performed by: INTERNAL MEDICINE

## 2018-05-10 PROCEDURE — 63510000001 EPOETIN ALFA PER 1000 UNITS: Performed by: INTERNAL MEDICINE

## 2018-05-10 PROCEDURE — 96372 THER/PROPH/DIAG INJ SC/IM: CPT | Performed by: INTERNAL MEDICINE

## 2018-05-10 PROCEDURE — 83540 ASSAY OF IRON: CPT | Performed by: INTERNAL MEDICINE

## 2018-05-10 PROCEDURE — 99213 OFFICE O/P EST LOW 20 MIN: CPT | Performed by: INTERNAL MEDICINE

## 2018-05-10 PROCEDURE — 82728 ASSAY OF FERRITIN: CPT | Performed by: INTERNAL MEDICINE

## 2018-05-10 PROCEDURE — 25010000002 HEPARIN FLUSH (PORCINE) 100 UNIT/ML SOLUTION: Performed by: INTERNAL MEDICINE

## 2018-05-10 PROCEDURE — 84466 ASSAY OF TRANSFERRIN: CPT | Performed by: INTERNAL MEDICINE

## 2018-05-10 RX ORDER — SODIUM CHLORIDE 0.9 % (FLUSH) 0.9 %
10 SYRINGE (ML) INJECTION AS NEEDED
Status: DISCONTINUED | OUTPATIENT
Start: 2018-05-10 | End: 2018-05-10 | Stop reason: HOSPADM

## 2018-05-10 RX ORDER — SODIUM CHLORIDE 0.9 % (FLUSH) 0.9 %
10 SYRINGE (ML) INJECTION AS NEEDED
Status: CANCELLED | OUTPATIENT
Start: 2018-05-10

## 2018-05-10 RX ADMIN — ERYTHROPOIETIN 20000 UNITS: 20000 INJECTION, SOLUTION INTRAVENOUS; SUBCUTANEOUS at 10:51

## 2018-05-10 RX ADMIN — SODIUM CHLORIDE, PRESERVATIVE FREE 500 UNITS: 5 INJECTION INTRAVENOUS at 10:54

## 2018-05-10 RX ADMIN — Medication 10 ML: at 10:54

## 2018-05-18 ENCOUNTER — INFUSION (OUTPATIENT)
Dept: ONCOLOGY | Facility: HOSPITAL | Age: 78
End: 2018-05-18

## 2018-05-18 VITALS — TEMPERATURE: 98 F | WEIGHT: 179 LBS | BODY MASS INDEX: 26.21 KG/M2

## 2018-05-18 DIAGNOSIS — N18.4 ANEMIA IN STAGE 4 CHRONIC KIDNEY DISEASE (HCC): ICD-10-CM

## 2018-05-18 DIAGNOSIS — D63.1 ANEMIA IN STAGE 4 CHRONIC KIDNEY DISEASE (HCC): ICD-10-CM

## 2018-05-18 DIAGNOSIS — D63.1 ANEMIA OF CHRONIC RENAL FAILURE, STAGE 4 (SEVERE) (HCC): ICD-10-CM

## 2018-05-18 DIAGNOSIS — N18.4 ANEMIA OF CHRONIC RENAL FAILURE, STAGE 4 (SEVERE) (HCC): ICD-10-CM

## 2018-05-18 DIAGNOSIS — Z45.2 ENCOUNTER FOR FITTING AND ADJUSTMENT OF VASCULAR CATHETER: Primary | ICD-10-CM

## 2018-05-18 LAB
BASOPHILS # BLD AUTO: 0.03 10*3/MM3 (ref 0–0.2)
BASOPHILS NFR BLD AUTO: 0.6 % (ref 0–1.5)
DEPRECATED RDW RBC AUTO: 71.7 FL (ref 37–54)
EOSINOPHIL # BLD AUTO: 0.09 10*3/MM3 (ref 0–0.7)
EOSINOPHIL NFR BLD AUTO: 1.8 % (ref 0.3–6.2)
ERYTHROCYTE [DISTWIDTH] IN BLOOD BY AUTOMATED COUNT: 20.6 % (ref 11.7–13)
HCT VFR BLD AUTO: 28.1 % (ref 35.6–45.5)
HGB BLD-MCNC: 8.6 G/DL (ref 11.9–15.5)
IMM GRANULOCYTES # BLD: 0.02 10*3/MM3 (ref 0–0.03)
IMM GRANULOCYTES NFR BLD: 0.4 % (ref 0–0.5)
LYMPHOCYTES # BLD AUTO: 0.88 10*3/MM3 (ref 0.9–4.8)
LYMPHOCYTES NFR BLD AUTO: 17.3 % (ref 19.6–45.3)
MCH RBC QN AUTO: 29.7 PG (ref 26.9–32)
MCHC RBC AUTO-ENTMCNC: 30.6 G/DL (ref 32.4–36.3)
MCV RBC AUTO: 96.9 FL (ref 80.5–98.2)
MONOCYTES # BLD AUTO: 0.29 10*3/MM3 (ref 0.2–1.2)
MONOCYTES NFR BLD AUTO: 5.7 % (ref 5–12)
NEUTROPHILS # BLD AUTO: 3.78 10*3/MM3 (ref 1.9–8.1)
NEUTROPHILS NFR BLD AUTO: 74.2 % (ref 42.7–76)
NRBC BLD MANUAL-RTO: 0 /100 WBC (ref 0–0)
PLATELET # BLD AUTO: 162 10*3/MM3 (ref 140–500)
PMV BLD AUTO: 9.1 FL (ref 6–12)
RBC # BLD AUTO: 2.9 10*6/MM3 (ref 3.9–5.2)
WBC NRBC COR # BLD: 5.09 10*3/MM3 (ref 4.5–10.7)

## 2018-05-18 PROCEDURE — 96372 THER/PROPH/DIAG INJ SC/IM: CPT

## 2018-05-18 PROCEDURE — 63510000001 EPOETIN ALFA PER 1000 UNITS: Performed by: NURSE PRACTITIONER

## 2018-05-18 PROCEDURE — 25010000002 HEPARIN FLUSH (PORCINE) 100 UNIT/ML SOLUTION: Performed by: INTERNAL MEDICINE

## 2018-05-18 PROCEDURE — 85025 COMPLETE CBC W/AUTO DIFF WBC: CPT | Performed by: INTERNAL MEDICINE

## 2018-05-18 RX ORDER — SODIUM CHLORIDE 0.9 % (FLUSH) 0.9 %
10 SYRINGE (ML) INJECTION AS NEEDED
Status: DISCONTINUED | OUTPATIENT
Start: 2018-05-18 | End: 2018-05-18 | Stop reason: HOSPADM

## 2018-05-18 RX ORDER — SODIUM CHLORIDE 0.9 % (FLUSH) 0.9 %
10 SYRINGE (ML) INJECTION AS NEEDED
Status: CANCELLED | OUTPATIENT
Start: 2018-05-18

## 2018-05-18 RX ADMIN — Medication 10 ML: at 10:31

## 2018-05-18 RX ADMIN — ERYTHROPOIETIN 20000 UNITS: 20000 INJECTION, SOLUTION INTRAVENOUS; SUBCUTANEOUS at 11:00

## 2018-05-18 RX ADMIN — SODIUM CHLORIDE, PRESERVATIVE FREE 500 UNITS: 5 INJECTION INTRAVENOUS at 10:31

## 2018-05-24 ENCOUNTER — HOSPITAL ENCOUNTER (OUTPATIENT)
Dept: CT IMAGING | Facility: HOSPITAL | Age: 78
Discharge: HOME | End: 2018-05-24
Attending: INTERNAL MEDICINE | Admitting: INTERNAL MEDICINE

## 2018-05-24 VITALS
TEMPERATURE: 97.3 F | WEIGHT: 173 LBS | HEIGHT: 67 IN | BODY MASS INDEX: 27.15 KG/M2 | DIASTOLIC BLOOD PRESSURE: 74 MMHG | HEART RATE: 66 BPM | RESPIRATION RATE: 16 BRPM | SYSTOLIC BLOOD PRESSURE: 131 MMHG | OXYGEN SATURATION: 100 %

## 2018-05-24 DIAGNOSIS — N18.4 STAGE 4 CHRONIC KIDNEY DISEASE (HCC): ICD-10-CM

## 2018-05-24 DIAGNOSIS — D64.9 ANEMIA, UNSPECIFIED TYPE: ICD-10-CM

## 2018-05-24 DIAGNOSIS — D64.9 ANEMIA, UNSPECIFIED TYPE: Primary | ICD-10-CM

## 2018-05-24 LAB
BASOPHILS # BLD AUTO: 0 10*3/MM3 (ref 0–0.2)
BASOPHILS NFR BLD AUTO: 0 % (ref 0–1.5)
DEPRECATED RDW RBC AUTO: 73.2 FL (ref 37–54)
EOSINOPHIL # BLD AUTO: 0.12 10*3/MM3 (ref 0–0.7)
EOSINOPHIL NFR BLD AUTO: 2.5 % (ref 0.3–6.2)
ERYTHROCYTE [DISTWIDTH] IN BLOOD BY AUTOMATED COUNT: 20.6 % (ref 11.7–13)
HCT VFR BLD AUTO: 28.8 % (ref 35.6–45.5)
HGB BLD-MCNC: 8.9 G/DL (ref 11.9–15.5)
IMM GRANULOCYTES # BLD: 0.01 10*3/MM3 (ref 0–0.03)
IMM GRANULOCYTES NFR BLD: 0.2 % (ref 0–0.5)
LYMPHOCYTES # BLD AUTO: 1.06 10*3/MM3 (ref 0.9–4.8)
LYMPHOCYTES NFR BLD AUTO: 22 % (ref 19.6–45.3)
MCH RBC QN AUTO: 29.8 PG (ref 26.9–32)
MCHC RBC AUTO-ENTMCNC: 30.9 G/DL (ref 32.4–36.3)
MCV RBC AUTO: 96.3 FL (ref 80.5–98.2)
MONOCYTES # BLD AUTO: 0.33 10*3/MM3 (ref 0.2–1.2)
MONOCYTES NFR BLD AUTO: 6.8 % (ref 5–12)
NEUTROPHILS # BLD AUTO: 3.31 10*3/MM3 (ref 1.9–8.1)
NEUTROPHILS NFR BLD AUTO: 68.7 % (ref 42.7–76)
PLATELET # BLD AUTO: 166 10*3/MM3 (ref 140–500)
PMV BLD AUTO: 8.9 FL (ref 6–12)
RBC # BLD AUTO: 2.99 10*6/MM3 (ref 3.9–5.2)
WBC NRBC COR # BLD: 4.82 10*3/MM3 (ref 4.5–10.7)

## 2018-05-24 PROCEDURE — 88184 FLOWCYTOMETRY/ TC 1 MARKER: CPT

## 2018-05-24 PROCEDURE — 88264 CHROMOSOME ANALYSIS 20-25: CPT

## 2018-05-24 PROCEDURE — 88360 TUMOR IMMUNOHISTOCHEM/MANUAL: CPT

## 2018-05-24 PROCEDURE — 77012 CT SCAN FOR NEEDLE BIOPSY: CPT

## 2018-05-24 PROCEDURE — 88342 IMHCHEM/IMCYTCHM 1ST ANTB: CPT

## 2018-05-24 PROCEDURE — 63710000001 ALPRAZOLAM 0.5 MG TABLET: Performed by: RADIOLOGY

## 2018-05-24 PROCEDURE — 88311 DECALCIFY TISSUE: CPT | Performed by: INTERNAL MEDICINE

## 2018-05-24 PROCEDURE — A9270 NON-COVERED ITEM OR SERVICE: HCPCS | Performed by: RADIOLOGY

## 2018-05-24 PROCEDURE — 88237 TISSUE CULTURE BONE MARROW: CPT

## 2018-05-24 PROCEDURE — 88313 SPECIAL STAINS GROUP 2: CPT

## 2018-05-24 PROCEDURE — 88341 IMHCHEM/IMCYTCHM EA ADD ANTB: CPT

## 2018-05-24 PROCEDURE — 88185 FLOWCYTOMETRY/TC ADD-ON: CPT

## 2018-05-24 PROCEDURE — 88189 FLOWCYTOMETRY/READ 16 & >: CPT

## 2018-05-24 PROCEDURE — 88305 TISSUE EXAM BY PATHOLOGIST: CPT | Performed by: INTERNAL MEDICINE

## 2018-05-24 PROCEDURE — 85025 COMPLETE CBC W/AUTO DIFF WBC: CPT | Performed by: INTERNAL MEDICINE

## 2018-05-24 RX ORDER — ALPRAZOLAM 0.5 MG/1
0.5 TABLET ORAL ONCE
Status: COMPLETED | OUTPATIENT
Start: 2018-05-24 | End: 2018-05-24

## 2018-05-24 RX ORDER — LIDOCAINE HYDROCHLORIDE 10 MG/ML
20 INJECTION, SOLUTION INFILTRATION; PERINEURAL ONCE
Status: COMPLETED | OUTPATIENT
Start: 2018-05-24 | End: 2018-05-24

## 2018-05-24 RX ADMIN — ALPRAZOLAM 0.5 MG: 0.5 TABLET ORAL at 13:13

## 2018-05-24 RX ADMIN — LIDOCAINE HYDROCHLORIDE 20 ML: 10 INJECTION, SOLUTION INFILTRATION; PERINEURAL at 13:47

## 2018-05-24 NOTE — NURSING NOTE
Triage at CBC called and informed that a tube of blood was drawn for a type and cross and held in the lab if needed after getting the CBC results.  It will be good until Saturday if the patient needs a transfusion.

## 2018-05-24 NOTE — DISCHARGE INSTRUCTIONS
EDUCATION /DISCHARGE INSTRUCTIONS  CT/US guided biopsy:  A biopsy is a procedure done to remove tissue for further analysis.  Before images are taken to locate the target area.  Images can be obtained using ultrasound, CT or MRI.  A physician will clean your skin with antiseptic soap, place a sterile towel around the site and administer a local anesthetic to numb the area.  The physician will then insert a special needle.  Sometimes images are taken of the needle after it is inserted to ensure the needle is in the correct area to be biopsied.   A sample is obtained and sent to the laboratory for study.  Occasionally the laboratory is unable to make a diagnosis from the sample and the procedure may need to be repeated.  Within a week the radiologist will send a report to your physician.  A pathologist will also examine the tissue and send a report.      Risks of the procedure include but are not limited to:   *  Bleeding    *  Infection   *  Puncture of surrounding organs *  Death     *  Lung collapse if the biopsy is near the chest which may require insertion of a       tube to re-inflate the lung if severe.      Benefits of the procedure:  Using x-ray helps to locate the area that requires a biopsy. The procedure is less invasive than a surgical procedure, there are no large incisions and it does not require anesthesia.      Alternatives to the procedure:  A biopsy can be performed surgically.  Risks of a surgical biopsy include exposure to anesthesia, infection, excessive bleeding and injury to abdominal organs.  A benefit of surgical biopsy is the ability to see the area to be biopsied and remove of a larger piece of tissue.    THIS EDUCATION INFORMATION WAS REVIEWED PRIOR TO PROCEDURE AND CONSENT. Patient initials__________________Time____1313_______________    Post Procedure:    *  Expect the biopsy site may be tender up to one week.    *  Rest today (no pushing pulling or straining).   *  Slowly increase  activity tomorrow.    *  If you received sedation do not drive for 24 hours.   *  Keep dressing clean and dry.   *  Leave dressing on puncture site for 24 hours.    *  You may shower when dressing removed.    Call your doctor if experiencing:   *  Signs of infection such as redness, swelling, excessive pain and / or foul        smelling drainage from the puncture site.   *  Chills or fever over 101 degrees (by mouth).   *  Unrelieved pain.   *  Any new or severe symptoms.   *  If experiencing sudden / severe shortness of breath or chest pain go to the       nearest emergency room.     Following the procedure:     Follow-up with the ordering physician as directed.    Continue to take other medications as directed by your physician unless    otherwise instructed.   If applicable, resume taking your blood thinners or Aspirin on _No Aspirin for 24 hours after the biopsy__Resume 5/25/18 after 3 pm.    If you have any concerns please call the Radiology Nurses Desk at [phone].  You are the most important factor in your recovery.  Follow the above instructions carefully.

## 2018-05-24 NOTE — NURSING NOTE
Discharge instructions reviewed with pt (and ) and she verbalizes and is able to teach back those instructions.

## 2018-05-25 ENCOUNTER — TELEPHONE (OUTPATIENT)
Dept: INTERVENTIONAL RADIOLOGY/VASCULAR | Facility: HOSPITAL | Age: 78
End: 2018-05-25

## 2018-05-25 LAB — REF LAB TEST METHOD: NORMAL

## 2018-05-30 ENCOUNTER — TRANSCRIBE ORDERS (OUTPATIENT)
Dept: ADMINISTRATIVE | Facility: HOSPITAL | Age: 78
End: 2018-05-30

## 2018-05-30 ENCOUNTER — HOSPITAL ENCOUNTER (OUTPATIENT)
Dept: CARDIOLOGY | Facility: HOSPITAL | Age: 78
Discharge: HOME | End: 2018-05-30
Admitting: INTERNAL MEDICINE

## 2018-05-30 DIAGNOSIS — D63.1 ANEMIA IN STAGE 4 CHRONIC KIDNEY DISEASE (HCC): Primary | ICD-10-CM

## 2018-05-30 DIAGNOSIS — M79.661 RIGHT CALF PAIN: Primary | ICD-10-CM

## 2018-05-30 DIAGNOSIS — M79.661 RIGHT CALF PAIN: ICD-10-CM

## 2018-05-30 DIAGNOSIS — N18.4 ANEMIA IN STAGE 4 CHRONIC KIDNEY DISEASE (HCC): Primary | ICD-10-CM

## 2018-05-30 LAB
BH CV LOW VAS RIGHT POPLITEAL SPONT: 1
BH CV LOWER VASCULAR LEFT COMMON FEMORAL AUGMENT: NORMAL
BH CV LOWER VASCULAR LEFT COMMON FEMORAL COMPETENT: NORMAL
BH CV LOWER VASCULAR LEFT COMMON FEMORAL COMPRESS: NORMAL
BH CV LOWER VASCULAR LEFT COMMON FEMORAL PHASIC: NORMAL
BH CV LOWER VASCULAR LEFT COMMON FEMORAL SPONT: NORMAL
BH CV LOWER VASCULAR RIGHT COMMON FEMORAL AUGMENT: NORMAL
BH CV LOWER VASCULAR RIGHT COMMON FEMORAL COMPETENT: NORMAL
BH CV LOWER VASCULAR RIGHT COMMON FEMORAL COMPRESS: NORMAL
BH CV LOWER VASCULAR RIGHT COMMON FEMORAL PHASIC: NORMAL
BH CV LOWER VASCULAR RIGHT COMMON FEMORAL SPONT: NORMAL
BH CV LOWER VASCULAR RIGHT DISTAL FEMORAL COMPRESS: NORMAL
BH CV LOWER VASCULAR RIGHT GASTRONEMIUS COMPRESS: NORMAL
BH CV LOWER VASCULAR RIGHT GREATER SAPH AK COMPRESS: NORMAL
BH CV LOWER VASCULAR RIGHT GREATER SAPH BK COMPRESS: NORMAL
BH CV LOWER VASCULAR RIGHT LESSER SAPH COMPRESS: NORMAL
BH CV LOWER VASCULAR RIGHT MID FEMORAL AUGMENT: NORMAL
BH CV LOWER VASCULAR RIGHT MID FEMORAL COMPETENT: NORMAL
BH CV LOWER VASCULAR RIGHT MID FEMORAL COMPRESS: NORMAL
BH CV LOWER VASCULAR RIGHT MID FEMORAL PHASIC: NORMAL
BH CV LOWER VASCULAR RIGHT MID FEMORAL SPONT: NORMAL
BH CV LOWER VASCULAR RIGHT PERONEAL COMPRESS: NORMAL
BH CV LOWER VASCULAR RIGHT POPLITEAL AUGMENT: NORMAL
BH CV LOWER VASCULAR RIGHT POPLITEAL COMPETENT: NORMAL
BH CV LOWER VASCULAR RIGHT POPLITEAL COMPRESS: NORMAL
BH CV LOWER VASCULAR RIGHT POPLITEAL PHASIC: NORMAL
BH CV LOWER VASCULAR RIGHT POPLITEAL SPONT: NORMAL
BH CV LOWER VASCULAR RIGHT POSTERIOR TIBIAL COMPRESS: NORMAL
BH CV LOWER VASCULAR RIGHT PROXIMAL FEMORAL COMPRESS: NORMAL
BH CV LOWER VASCULAR RIGHT SAPHENOFEMORAL JUNCTION AUGMENT: NORMAL
BH CV LOWER VASCULAR RIGHT SAPHENOFEMORAL JUNCTION COMPETENT: NORMAL
BH CV LOWER VASCULAR RIGHT SAPHENOFEMORAL JUNCTION COMPRESS: NORMAL
BH CV LOWER VASCULAR RIGHT SAPHENOFEMORAL JUNCTION PHASIC: NORMAL
BH CV LOWER VASCULAR RIGHT SAPHENOFEMORAL JUNCTION SPONT: NORMAL

## 2018-05-30 PROCEDURE — 93971 EXTREMITY STUDY: CPT

## 2018-06-01 ENCOUNTER — INFUSION (OUTPATIENT)
Dept: ONCOLOGY | Facility: HOSPITAL | Age: 78
End: 2018-06-01

## 2018-06-01 DIAGNOSIS — N18.4 ANEMIA IN STAGE 4 CHRONIC KIDNEY DISEASE (HCC): Primary | ICD-10-CM

## 2018-06-01 DIAGNOSIS — D63.1 ANEMIA OF CHRONIC RENAL FAILURE, STAGE 4 (SEVERE) (HCC): ICD-10-CM

## 2018-06-01 DIAGNOSIS — D63.1 ANEMIA IN STAGE 4 CHRONIC KIDNEY DISEASE (HCC): Primary | ICD-10-CM

## 2018-06-01 DIAGNOSIS — N18.4 ANEMIA OF CHRONIC RENAL FAILURE, STAGE 4 (SEVERE) (HCC): ICD-10-CM

## 2018-06-01 LAB
ABO GROUP BLD: NORMAL
ALBUMIN SERPL-MCNC: 4 G/DL (ref 3.5–5.2)
ALBUMIN/GLOB SERPL: 1.7 G/DL
ALP SERPL-CCNC: 51 U/L (ref 39–117)
ALT SERPL W P-5'-P-CCNC: 9 U/L (ref 1–33)
ANION GAP SERPL CALCULATED.3IONS-SCNC: 9.5 MMOL/L
AST SERPL-CCNC: 16 U/L (ref 1–32)
BASOPHILS # BLD AUTO: 0.02 10*3/MM3 (ref 0–0.2)
BASOPHILS NFR BLD AUTO: 0.5 % (ref 0–1.5)
BILIRUB SERPL-MCNC: 0.2 MG/DL (ref 0.1–1.2)
BLD GP AB SCN SERPL QL: NEGATIVE
BUN BLD-MCNC: 50 MG/DL (ref 8–23)
BUN/CREAT SERPL: 20.5 (ref 7–25)
CALCIUM SPEC-SCNC: 10.4 MG/DL (ref 8.6–10.5)
CHLORIDE SERPL-SCNC: 106 MMOL/L (ref 98–107)
CO2 SERPL-SCNC: 25.5 MMOL/L (ref 22–29)
CREAT BLD-MCNC: 2.44 MG/DL (ref 0.57–1)
DEPRECATED RDW RBC AUTO: 69.6 FL (ref 37–54)
EOSINOPHIL # BLD AUTO: 0.09 10*3/MM3 (ref 0–0.7)
EOSINOPHIL NFR BLD AUTO: 2 % (ref 0.3–6.2)
ERYTHROCYTE [DISTWIDTH] IN BLOOD BY AUTOMATED COUNT: 19.5 % (ref 11.7–13)
GFR SERPL CREATININE-BSD FRML MDRD: 19 ML/MIN/1.73
GLOBULIN UR ELPH-MCNC: 2.4 GM/DL
GLUCOSE BLD-MCNC: 82 MG/DL (ref 65–99)
HCT VFR BLD AUTO: 24.6 % (ref 35.6–45.5)
HGB BLD-MCNC: 7.7 G/DL (ref 11.9–15.5)
IMM GRANULOCYTES # BLD: 0.01 10*3/MM3 (ref 0–0.03)
IMM GRANULOCYTES NFR BLD: 0.2 % (ref 0–0.5)
LYMPHOCYTES # BLD AUTO: 0.9 10*3/MM3 (ref 0.9–4.8)
LYMPHOCYTES NFR BLD AUTO: 20.4 % (ref 19.6–45.3)
MCH RBC QN AUTO: 30.1 PG (ref 26.9–32)
MCHC RBC AUTO-ENTMCNC: 31.3 G/DL (ref 32.4–36.3)
MCV RBC AUTO: 96.1 FL (ref 80.5–98.2)
MONOCYTES # BLD AUTO: 0.34 10*3/MM3 (ref 0.2–1.2)
MONOCYTES NFR BLD AUTO: 7.7 % (ref 5–12)
NEUTROPHILS # BLD AUTO: 3.06 10*3/MM3 (ref 1.9–8.1)
NEUTROPHILS NFR BLD AUTO: 69.2 % (ref 42.7–76)
NRBC BLD MANUAL-RTO: 0 /100 WBC (ref 0–0)
PHOSPHATE SERPL-MCNC: 3.8 MG/DL (ref 2.5–4.5)
PLATELET # BLD AUTO: 158 10*3/MM3 (ref 140–500)
PMV BLD AUTO: 9.5 FL (ref 6–12)
POTASSIUM BLD-SCNC: 4.7 MMOL/L (ref 3.5–5.2)
PROT SERPL-MCNC: 6.4 G/DL (ref 6–8.5)
PTH-INTACT SERPL-MCNC: 117.1 PG/ML (ref 15–65)
RBC # BLD AUTO: 2.56 10*6/MM3 (ref 3.9–5.2)
RH BLD: NEGATIVE
SODIUM BLD-SCNC: 141 MMOL/L (ref 136–145)
T&S EXPIRATION DATE: NORMAL
URATE SERPL-MCNC: 6.2 MG/DL (ref 2.4–5.7)
WBC NRBC COR # BLD: 4.42 10*3/MM3 (ref 4.5–10.7)

## 2018-06-01 PROCEDURE — 86900 BLOOD TYPING SEROLOGIC ABO: CPT | Performed by: INTERNAL MEDICINE

## 2018-06-01 PROCEDURE — 84550 ASSAY OF BLOOD/URIC ACID: CPT | Performed by: INTERNAL MEDICINE

## 2018-06-01 PROCEDURE — 85025 COMPLETE CBC W/AUTO DIFF WBC: CPT | Performed by: INTERNAL MEDICINE

## 2018-06-01 PROCEDURE — 63510000001 EPOETIN ALFA PER 1000 UNITS: Performed by: NURSE PRACTITIONER

## 2018-06-01 PROCEDURE — 96372 THER/PROPH/DIAG INJ SC/IM: CPT | Performed by: NURSE PRACTITIONER

## 2018-06-01 PROCEDURE — 86850 RBC ANTIBODY SCREEN: CPT | Performed by: INTERNAL MEDICINE

## 2018-06-01 PROCEDURE — 84100 ASSAY OF PHOSPHORUS: CPT | Performed by: INTERNAL MEDICINE

## 2018-06-01 PROCEDURE — 36415 COLL VENOUS BLD VENIPUNCTURE: CPT | Performed by: NURSE PRACTITIONER

## 2018-06-01 PROCEDURE — 83970 ASSAY OF PARATHORMONE: CPT | Performed by: INTERNAL MEDICINE

## 2018-06-01 PROCEDURE — 86923 COMPATIBILITY TEST ELECTRIC: CPT

## 2018-06-01 PROCEDURE — 80053 COMPREHEN METABOLIC PANEL: CPT | Performed by: INTERNAL MEDICINE

## 2018-06-01 PROCEDURE — 86901 BLOOD TYPING SEROLOGIC RH(D): CPT | Performed by: INTERNAL MEDICINE

## 2018-06-01 RX ORDER — ACETAMINOPHEN 325 MG/1
650 TABLET ORAL ONCE
Status: CANCELLED | OUTPATIENT
Start: 2018-06-01 | End: 2018-06-01

## 2018-06-01 RX ORDER — DIPHENHYDRAMINE HCL 25 MG
25 CAPSULE ORAL ONCE
Status: CANCELLED | OUTPATIENT
Start: 2018-06-01 | End: 2018-06-01

## 2018-06-01 RX ORDER — SODIUM CHLORIDE 9 MG/ML
250 INJECTION, SOLUTION INTRAVENOUS ONCE
Status: CANCELLED | OUTPATIENT
Start: 2018-06-01 | End: 2018-06-01

## 2018-06-01 RX ADMIN — ERYTHROPOIETIN 20000 UNITS: 20000 INJECTION, SOLUTION INTRAVENOUS; SUBCUTANEOUS at 11:12

## 2018-06-01 NOTE — PROGRESS NOTES
Pt Hgb 7.7.  Reviewed with Dr. Scott, pt to get 2 units PRBC's tomorrow.  Armband placed and pt instructed not to remove.

## 2018-06-02 ENCOUNTER — INFUSION (OUTPATIENT)
Dept: ONCOLOGY | Facility: HOSPITAL | Age: 78
End: 2018-06-02

## 2018-06-02 VITALS
OXYGEN SATURATION: 98 % | DIASTOLIC BLOOD PRESSURE: 64 MMHG | SYSTOLIC BLOOD PRESSURE: 130 MMHG | TEMPERATURE: 97 F | RESPIRATION RATE: 18 BRPM | HEART RATE: 82 BPM

## 2018-06-02 DIAGNOSIS — D63.1 ANEMIA IN STAGE 4 CHRONIC KIDNEY DISEASE (HCC): ICD-10-CM

## 2018-06-02 DIAGNOSIS — N18.4 ANEMIA OF CHRONIC RENAL FAILURE, STAGE 4 (SEVERE) (HCC): ICD-10-CM

## 2018-06-02 DIAGNOSIS — N18.4 ANEMIA IN STAGE 4 CHRONIC KIDNEY DISEASE (HCC): ICD-10-CM

## 2018-06-02 DIAGNOSIS — D63.1 ANEMIA OF CHRONIC RENAL FAILURE, STAGE 4 (SEVERE) (HCC): ICD-10-CM

## 2018-06-02 PROCEDURE — P9016 RBC LEUKOCYTES REDUCED: HCPCS

## 2018-06-02 PROCEDURE — 86900 BLOOD TYPING SEROLOGIC ABO: CPT

## 2018-06-02 PROCEDURE — 25010000002 HEPARIN FLUSH (PORCINE) 100 UNIT/ML SOLUTION: Performed by: INTERNAL MEDICINE

## 2018-06-02 PROCEDURE — 36430 TRANSFUSION BLD/BLD COMPNT: CPT

## 2018-06-02 PROCEDURE — 63710000001 DIPHENHYDRAMINE PER 50 MG: Performed by: INTERNAL MEDICINE

## 2018-06-02 RX ORDER — DIPHENHYDRAMINE HCL 25 MG
25 CAPSULE ORAL ONCE
Status: COMPLETED | OUTPATIENT
Start: 2018-06-02 | End: 2018-06-02

## 2018-06-02 RX ORDER — SODIUM CHLORIDE 9 MG/ML
250 INJECTION, SOLUTION INTRAVENOUS ONCE
Status: DISCONTINUED | OUTPATIENT
Start: 2018-06-02 | End: 2018-06-02 | Stop reason: HOSPADM

## 2018-06-02 RX ORDER — ACETAMINOPHEN 325 MG/1
650 TABLET ORAL ONCE
Status: COMPLETED | OUTPATIENT
Start: 2018-06-02 | End: 2018-06-02

## 2018-06-02 RX ADMIN — Medication 500 UNITS: at 13:12

## 2018-06-02 RX ADMIN — ACETAMINOPHEN 650 MG: 325 TABLET ORAL at 07:28

## 2018-06-02 RX ADMIN — DIPHENHYDRAMINE HYDROCHLORIDE 25 MG: 25 CAPSULE ORAL at 07:28

## 2018-06-04 LAB
CYTO UR: NORMAL
LAB AP CASE REPORT: NORMAL
LAB AP CLINICAL INFORMATION: NORMAL
LAB AP DIAGNOSIS COMMENT: NORMAL
Lab: NORMAL
Lab: NORMAL
PATH REPORT.ADDENDUM SPEC: NORMAL
PATH REPORT.FINAL DX SPEC: NORMAL
PATH REPORT.GROSS SPEC: NORMAL

## 2018-06-11 ENCOUNTER — OFFICE VISIT (OUTPATIENT)
Dept: ONCOLOGY | Facility: CLINIC | Age: 78
End: 2018-06-11

## 2018-06-11 ENCOUNTER — INFUSION (OUTPATIENT)
Dept: ONCOLOGY | Facility: HOSPITAL | Age: 78
End: 2018-06-11

## 2018-06-11 VITALS
HEART RATE: 72 BPM | OXYGEN SATURATION: 100 % | RESPIRATION RATE: 16 BRPM | TEMPERATURE: 98 F | WEIGHT: 175.1 LBS | HEIGHT: 69 IN | BODY MASS INDEX: 25.93 KG/M2 | SYSTOLIC BLOOD PRESSURE: 132 MMHG | DIASTOLIC BLOOD PRESSURE: 76 MMHG

## 2018-06-11 DIAGNOSIS — Z45.2 ENCOUNTER FOR FITTING AND ADJUSTMENT OF VASCULAR CATHETER: ICD-10-CM

## 2018-06-11 DIAGNOSIS — N18.4 ANEMIA OF CHRONIC RENAL FAILURE, STAGE 4 (SEVERE) (HCC): ICD-10-CM

## 2018-06-11 DIAGNOSIS — D63.1 ANEMIA IN STAGE 4 CHRONIC KIDNEY DISEASE (HCC): Primary | ICD-10-CM

## 2018-06-11 DIAGNOSIS — D63.8 ANEMIA OF CHRONIC DISEASE: Primary | ICD-10-CM

## 2018-06-11 DIAGNOSIS — D63.1 ANEMIA OF CHRONIC RENAL FAILURE, STAGE 4 (SEVERE) (HCC): ICD-10-CM

## 2018-06-11 DIAGNOSIS — N18.4 STAGE 4 CHRONIC KIDNEY DISEASE (HCC): ICD-10-CM

## 2018-06-11 DIAGNOSIS — D50.8 OTHER IRON DEFICIENCY ANEMIA: ICD-10-CM

## 2018-06-11 DIAGNOSIS — N18.4 ANEMIA IN STAGE 4 CHRONIC KIDNEY DISEASE (HCC): Primary | ICD-10-CM

## 2018-06-11 LAB
ALBUMIN SERPL-MCNC: 4.2 G/DL (ref 3.5–5.2)
ALBUMIN/GLOB SERPL: 2 G/DL
ALP SERPL-CCNC: 65 U/L (ref 39–117)
ALT SERPL W P-5'-P-CCNC: 10 U/L (ref 1–33)
ANION GAP SERPL CALCULATED.3IONS-SCNC: 12 MMOL/L
AST SERPL-CCNC: 18 U/L (ref 1–32)
BASOPHILS # BLD AUTO: 0.01 10*3/MM3 (ref 0–0.2)
BASOPHILS NFR BLD AUTO: 0.2 % (ref 0–1.5)
BILIRUB SERPL-MCNC: 0.2 MG/DL (ref 0.1–1.2)
BUN BLD-MCNC: 70 MG/DL (ref 8–23)
BUN/CREAT SERPL: 28.2 (ref 7–25)
CALCIUM SPEC-SCNC: 9.7 MG/DL (ref 8.6–10.5)
CHLORIDE SERPL-SCNC: 107 MMOL/L (ref 98–107)
CO2 SERPL-SCNC: 25 MMOL/L (ref 22–29)
CREAT BLD-MCNC: 2.48 MG/DL (ref 0.57–1)
DEPRECATED RDW RBC AUTO: 64.1 FL (ref 37–54)
EOSINOPHIL # BLD AUTO: 0.07 10*3/MM3 (ref 0–0.7)
EOSINOPHIL NFR BLD AUTO: 1.1 % (ref 0.3–6.2)
ERYTHROCYTE [DISTWIDTH] IN BLOOD BY AUTOMATED COUNT: 18.8 % (ref 11.7–13)
GFR SERPL CREATININE-BSD FRML MDRD: 19 ML/MIN/1.73
GLOBULIN UR ELPH-MCNC: 2.1 GM/DL
GLUCOSE BLD-MCNC: 162 MG/DL (ref 65–99)
HCT VFR BLD AUTO: 31 % (ref 35.6–45.5)
HGB BLD-MCNC: 9.8 G/DL (ref 11.9–15.5)
IMM GRANULOCYTES # BLD: 0.02 10*3/MM3 (ref 0–0.03)
IMM GRANULOCYTES NFR BLD: 0.3 % (ref 0–0.5)
LYMPHOCYTES # BLD AUTO: 0.9 10*3/MM3 (ref 0.9–4.8)
LYMPHOCYTES NFR BLD AUTO: 13.6 % (ref 19.6–45.3)
MCH RBC QN AUTO: 29.4 PG (ref 26.9–32)
MCHC RBC AUTO-ENTMCNC: 31.6 G/DL (ref 32.4–36.3)
MCV RBC AUTO: 93.1 FL (ref 80.5–98.2)
MONOCYTES # BLD AUTO: 0.46 10*3/MM3 (ref 0.2–1.2)
MONOCYTES NFR BLD AUTO: 6.9 % (ref 5–12)
NEUTROPHILS # BLD AUTO: 5.16 10*3/MM3 (ref 1.9–8.1)
NEUTROPHILS NFR BLD AUTO: 77.9 % (ref 42.7–76)
NRBC BLD MANUAL-RTO: 0 /100 WBC (ref 0–0)
PLATELET # BLD AUTO: 171 10*3/MM3 (ref 140–500)
PMV BLD AUTO: 9.2 FL (ref 6–12)
POTASSIUM BLD-SCNC: 4.4 MMOL/L (ref 3.5–5.2)
PROT SERPL-MCNC: 6.3 G/DL (ref 6–8.5)
PTH-INTACT SERPL-MCNC: 207.9 PG/ML (ref 15–65)
RBC # BLD AUTO: 3.33 10*6/MM3 (ref 3.9–5.2)
SODIUM BLD-SCNC: 144 MMOL/L (ref 136–145)
URATE SERPL-MCNC: 6.8 MG/DL (ref 2.4–5.7)
WBC NRBC COR # BLD: 6.62 10*3/MM3 (ref 4.5–10.7)

## 2018-06-11 PROCEDURE — 96372 THER/PROPH/DIAG INJ SC/IM: CPT | Performed by: INTERNAL MEDICINE

## 2018-06-11 PROCEDURE — 85025 COMPLETE CBC W/AUTO DIFF WBC: CPT | Performed by: INTERNAL MEDICINE

## 2018-06-11 PROCEDURE — 25010000002 HEPARIN FLUSH (PORCINE) 100 UNIT/ML SOLUTION: Performed by: INTERNAL MEDICINE

## 2018-06-11 PROCEDURE — 83970 ASSAY OF PARATHORMONE: CPT | Performed by: INTERNAL MEDICINE

## 2018-06-11 PROCEDURE — 84550 ASSAY OF BLOOD/URIC ACID: CPT | Performed by: INTERNAL MEDICINE

## 2018-06-11 PROCEDURE — 99213 OFFICE O/P EST LOW 20 MIN: CPT | Performed by: INTERNAL MEDICINE

## 2018-06-11 PROCEDURE — 63510000001 EPOETIN ALFA PER 1000 UNITS: Performed by: INTERNAL MEDICINE

## 2018-06-11 PROCEDURE — 36415 COLL VENOUS BLD VENIPUNCTURE: CPT | Performed by: INTERNAL MEDICINE

## 2018-06-11 PROCEDURE — 80053 COMPREHEN METABOLIC PANEL: CPT | Performed by: INTERNAL MEDICINE

## 2018-06-11 RX ORDER — SODIUM CHLORIDE 0.9 % (FLUSH) 0.9 %
10 SYRINGE (ML) INJECTION AS NEEDED
Status: DISCONTINUED | OUTPATIENT
Start: 2018-06-11 | End: 2018-06-11 | Stop reason: HOSPADM

## 2018-06-11 RX ORDER — SODIUM CHLORIDE 0.9 % (FLUSH) 0.9 %
10 SYRINGE (ML) INJECTION AS NEEDED
Status: CANCELLED | OUTPATIENT
Start: 2018-06-11

## 2018-06-11 RX ADMIN — Medication 10 ML: at 10:25

## 2018-06-11 RX ADMIN — ERYTHROPOIETIN 25000 UNITS: 20000 INJECTION, SOLUTION INTRAVENOUS; SUBCUTANEOUS at 11:23

## 2018-06-11 RX ADMIN — SODIUM CHLORIDE, PRESERVATIVE FREE 500 UNITS: 5 INJECTION INTRAVENOUS at 10:25

## 2018-06-11 NOTE — PROGRESS NOTES
Subjective .     REASONS FOR FOLLOWUP:      Anemia of chronic kidney diease              History of Iron deficiency    History of Present Illness      Mrs Machuca is seen today after undergoing bone marrow biopsy on 05/24/2018.  Thankfully, no evidence of carcinoma or metastatic disease was found.  She remains slightly fatigued and lightheaded.  This is an ongoing issue.  She denies any associated shortness of breath or chest pain.  No visual disturbances or gait abnormalities.  Hemoglobin has recovered to 9.8 following 2 units of blood for a hemoglobin of 7.7 on June 1.  She is to yet again due for Procrit.  She was recently evaluated by her nephrologist, Dr. Abraham who has requested additional lab work given continued anemia.     She has no other concerns today.     Past Medical History:   Diagnosis Date   • Anemia     Iron deficiency   • Cancer     Skin cancer   • Cardiomyopathy     S/P pacemaker and defibrillator   • CHF (congestive heart failure)    • Clostridium difficile diarrhea     2013   • Colon polyps    • Coronary artery disease     pacemaker, defibrillator   • Gastroparesis    • GERD (gastroesophageal reflux disease)    • Gout    • Hemorrhoids    • Hiatal hernia    • History of pancreatitis 01/2014   • History of pancreatitis     2014   • History of transfusion    • Hyperlipidemia    • Hypothyroidism    • Left bundle branch block    • Mitral and aortic valve disease    • Mitral valve insufficiency    • Myoclonus     S/P Depakote   • Nephrolithiasis     stage 4 renal failure   • Osteoarthritis    • Pancytopenia    • Peripheral neuropathy    • Progressive familial myoclonic epilepsy    • Pulmonary hypertension    • RLS (restless legs syndrome)    • Ruptured ovarian cyst    • Seizures     myoclonus   • Spinal stenosis    • Transfusion history     3 weeks ago       ONCOLOGIC HISTORY:  (History from previous dates can be found in the separate document.)    Current Outpatient Prescriptions on File Prior  to Visit   Medication Sig Dispense Refill   • allopurinol (ZYLOPRIM) 100 MG tablet Take 100 mg by mouth 2 (Two) Times a Day.     • aspirin 81 MG tablet Take 81 mg by mouth Daily. PT HOLDING FOR SURGERY     • calcitriol (ROCALTROL) 0.25 MCG capsule Take 0.25 mcg by mouth Daily.     • carvedilol (COREG) 12.5 MG tablet TAKE 1 TABLET DAILY 90 tablet 1   • Cholecalciferol (VITAMIN D3) 5000 units capsule capsule Take 5,000 Units by mouth Daily. Every other day     • diazepam (VALIUM) 5 MG tablet Take 5 mg by mouth Every Night.     • furosemide (LASIX) 40 MG tablet TAKE 1 TABLET DAILY OR AS  DIRECTED 90 tablet 3   • Gabapentin, Once-Daily, 300 MG tablet Take one tablet daily with dinner. 90 tablet 1   • Glucosamine-Chondroit-Vit C-Mn (GLUCOSAMINE CHONDROITIN COMPLX) capsule Take 1,500 mg by mouth Every Other Day. PT HOLDING FOR SURGERY     • levothyroxine (SYNTHROID, LEVOTHROID) 25 MCG tablet Take 25 mcg by mouth Daily.     • loperamide (IMODIUM) 2 MG capsule Take 2 mg by mouth 4 (Four) Times a Day As Needed for Diarrhea.     • magnesium oxide 250 MG tablet Take 250 mg by mouth Daily.     • methscopolamine (PAMINE FORTE) 5 MG tablet Take 5 mg by mouth Daily.     • methylPREDNISolone (MEDROL) 4 MG tablet Take 4 mg by mouth As Needed. TAKES FOR GOUT FLARE UPS     • Multiple Vitamin (MULTI-DAY VITAMINS) tablet Take 1 tablet by mouth Daily. Multivitamin with Iron     • NEUPRO 4 MG/24HR patch APPLY 1 PATCH DAILY AS DIRECTED.**PAYABLE 5/8/17 90 patch 2   • ondansetron (ZOFRAN) 8 MG tablet Take 1 tablet by mouth Every 8 (Eight) Hours As Needed for Nausea or Vomiting. 30 tablet 2   • pantoprazole (PROTONIX) 40 MG EC tablet TAKE 1 TABLET BY MOUTH EVERY MORNING  11   • psyllium (METAMUCIL) 58.6 % powder Take 1 packet by mouth Daily.     • spironolactone (ALDACTONE) 25 MG tablet TAKE 1/2 TABLET DAILY 45 tablet 1   • Vitamin E 400 UNITS tablet Take 400 Units by mouth Daily. PT HOLDING FOR SURGERY     • [DISCONTINUED] calcium carbonate  "(OS-RAYMOND) 600 MG tablet Take 600 mg by mouth Daily.       Current Facility-Administered Medications on File Prior to Visit   Medication Dose Route Frequency Provider Last Rate Last Dose   • heparin flush (porcine) 100 UNIT/ML injection 500 Units  500 Units Intravenous PRN Anuel Scott MD   500 Units at 11/27/17 1347   • sodium chloride 0.9 % flush 10 mL  10 mL Intravenous PRN Anuel Scott MD   10 mL at 11/27/17 1347       ALLERGIES:     Allergies   Allergen Reactions   • Chlorhexidine Rash and Itching     Patient states \"blue dye\" in chlorhexidine.  States the orange and clear are OK to use   • Chlorhexidine Gluconate Itching     i   • Codeine Other (See Comments)     Want to climb the walls, doesn't help pain   • Propoxyphene Other (See Comments)     Belligerent, acting drunk       Social History     Social History   • Marital status:      Spouse name: Zackery   • Number of children: 5   • Years of education: 1 yr college     Occupational History   •  Retired     Social History Main Topics   • Smoking status: Never Smoker   • Smokeless tobacco: Never Used   • Alcohol use No   • Drug use: No   • Sexual activity: Defer      Comment: AGE!!!     Other Topics Concern   • Not on file     Social History Narrative   • No narrative on file         Cancer-related family history is not on file.     Review of Systems   Constitutional: Negative.    HENT: Negative.    Eyes: Negative.    Respiratory: Negative.    Cardiovascular: Negative.    Gastrointestinal: Negative for diarrhea.   Endocrine: Negative.    Genitourinary: Negative.    Musculoskeletal: Negative.    Skin: Negative.    Hematological: Negative.  Does not bruise/bleed easily.     A comprehensive 14 point review of systems was performed and was negative except as mentioned.    Objective      Vitals:    06/11/18 1032   BP: 132/76   Pulse: 72   Resp: 16   Temp: 98 °F (36.7 °C)   TempSrc: Oral   SpO2: 100%   Weight: 79.4 kg (175 lb 1.6 oz)   Height: 176 cm " "(69.29\")   PainSc: 0-No pain     Current Status 6/11/2018   ECOG score 0       Physical Exam   Constitutional: She appears well-developed and well-nourished.   HENT:   Head: Normocephalic.   Eyes: Conjunctivae are normal. No scleral icterus.   Cardiovascular: Normal rate.    Pulmonary/Chest: Effort normal.   Abdominal: Soft. Bowel sounds are normal.   Skin: No rash noted.   Psychiatric: She has a normal mood and affect.         RECENT LABS:  Hematology WBC   Date Value Ref Range Status   06/11/2018 6.62 4.50 - 10.70 10*3/mm3 Final     RBC   Date Value Ref Range Status   06/11/2018 3.33 (L) 3.90 - 5.20 10*6/mm3 Final     Hemoglobin   Date Value Ref Range Status   06/11/2018 9.8 (L) 11.9 - 15.5 g/dL Final     Hematocrit   Date Value Ref Range Status   06/11/2018 31.0 (L) 35.6 - 45.5 % Final     Platelets   Date Value Ref Range Status   06/11/2018 171 140 - 500 10*3/mm3 Final        Assessment/Plan   Anemia of stage 4 CKD:  Recent bone marrow biopsy demonstrates no abnormalities, therefore continued anemia is likely related to renal insufficiency, which is followed closely by Dr. Abraham.     We will increase frequency and dose of Procrit in hopes that we may gain stability of anemia. Patient will now receive Procrit weekly at an elevated dose of 25,000 units.     She will receive Procrit weekly x 6 weeks. I will see her in conjunction with her 6th dose of Procrit with labs prior.     I have asked patient to call our office with any new or worsening symptoms prior to her next office visit.     I have reviewed the notes, assessments, and/or procedures performed by CHANTELL Tiwari.  I concur with her/his documentation of Charmaine Machuca.                        "

## 2018-06-13 DIAGNOSIS — N18.4 ANEMIA OF CHRONIC RENAL FAILURE, STAGE 4 (SEVERE) (HCC): Primary | ICD-10-CM

## 2018-06-13 DIAGNOSIS — D63.1 ANEMIA OF CHRONIC RENAL FAILURE, STAGE 4 (SEVERE) (HCC): Primary | ICD-10-CM

## 2018-06-14 RX ORDER — SPIRONOLACTONE 25 MG/1
TABLET ORAL
Qty: 45 TABLET | Refills: 1 | Status: SHIPPED | OUTPATIENT
Start: 2018-06-14 | End: 2018-11-18 | Stop reason: SDUPTHER

## 2018-06-14 RX ORDER — CARVEDILOL 12.5 MG/1
TABLET ORAL
Qty: 90 TABLET | Refills: 1 | Status: SHIPPED | OUTPATIENT
Start: 2018-06-14 | End: 2018-12-16 | Stop reason: SDUPTHER

## 2018-06-15 ENCOUNTER — OFFICE (OUTPATIENT)
Dept: URBAN - METROPOLITAN AREA CLINIC 75 | Facility: CLINIC | Age: 78
End: 2018-06-15
Payer: COMMERCIAL

## 2018-06-15 VITALS
HEART RATE: 68 BPM | HEIGHT: 67 IN | SYSTOLIC BLOOD PRESSURE: 108 MMHG | DIASTOLIC BLOOD PRESSURE: 76 MMHG | WEIGHT: 177 LBS

## 2018-06-15 DIAGNOSIS — D50.9 IRON DEFICIENCY ANEMIA, UNSPECIFIED: ICD-10-CM

## 2018-06-15 DIAGNOSIS — R13.10 DYSPHAGIA, UNSPECIFIED: ICD-10-CM

## 2018-06-15 DIAGNOSIS — K58.0 IRRITABLE BOWEL SYNDROME WITH DIARRHEA: ICD-10-CM

## 2018-06-15 DIAGNOSIS — R10.30 LOWER ABDOMINAL PAIN, UNSPECIFIED: ICD-10-CM

## 2018-06-15 PROCEDURE — 99214 OFFICE O/P EST MOD 30 MIN: CPT | Performed by: INTERNAL MEDICINE

## 2018-06-18 ENCOUNTER — LAB (OUTPATIENT)
Dept: OTHER | Facility: HOSPITAL | Age: 78
End: 2018-06-18

## 2018-06-18 ENCOUNTER — INFUSION (OUTPATIENT)
Dept: ONCOLOGY | Facility: HOSPITAL | Age: 78
End: 2018-06-18

## 2018-06-18 VITALS — TEMPERATURE: 97.5 F | BODY MASS INDEX: 26.07 KG/M2 | WEIGHT: 178 LBS

## 2018-06-18 DIAGNOSIS — N18.4 ANEMIA OF CHRONIC RENAL FAILURE, STAGE 4 (SEVERE) (HCC): ICD-10-CM

## 2018-06-18 DIAGNOSIS — D63.1 ANEMIA OF CHRONIC RENAL FAILURE, STAGE 4 (SEVERE) (HCC): ICD-10-CM

## 2018-06-18 DIAGNOSIS — N18.4 ANEMIA OF CHRONIC RENAL FAILURE, STAGE 4 (SEVERE) (HCC): Primary | ICD-10-CM

## 2018-06-18 DIAGNOSIS — D63.1 ANEMIA OF CHRONIC RENAL FAILURE, STAGE 4 (SEVERE) (HCC): Primary | ICD-10-CM

## 2018-06-18 LAB
BASOPHILS # BLD AUTO: 0.02 10*3/MM3 (ref 0–0.2)
BASOPHILS NFR BLD AUTO: 0.3 % (ref 0–1.5)
DEPRECATED RDW RBC AUTO: 63.5 FL (ref 37–54)
EOSINOPHIL # BLD AUTO: 0.14 10*3/MM3 (ref 0–0.7)
EOSINOPHIL NFR BLD AUTO: 2.3 % (ref 0.3–6.2)
ERYTHROCYTE [DISTWIDTH] IN BLOOD BY AUTOMATED COUNT: 18.8 % (ref 11.7–13)
HCT VFR BLD AUTO: 30.1 % (ref 35.6–45.5)
HGB BLD-MCNC: 9.4 G/DL (ref 11.9–15.5)
IMM GRANULOCYTES # BLD: 0.02 10*3/MM3 (ref 0–0.03)
IMM GRANULOCYTES NFR BLD: 0.3 % (ref 0–0.5)
LYMPHOCYTES # BLD AUTO: 0.97 10*3/MM3 (ref 0.9–4.8)
LYMPHOCYTES NFR BLD AUTO: 16.2 % (ref 19.6–45.3)
MCH RBC QN AUTO: 29.3 PG (ref 26.9–32)
MCHC RBC AUTO-ENTMCNC: 31.2 G/DL (ref 32.4–36.3)
MCV RBC AUTO: 93.8 FL (ref 80.5–98.2)
MONOCYTES # BLD AUTO: 0.4 10*3/MM3 (ref 0.2–1.2)
MONOCYTES NFR BLD AUTO: 6.7 % (ref 5–12)
NEUTROPHILS # BLD AUTO: 4.42 10*3/MM3 (ref 1.9–8.1)
NEUTROPHILS NFR BLD AUTO: 74.2 % (ref 42.7–76)
NRBC BLD MANUAL-RTO: 0 /100 WBC (ref 0–0)
PLATELET # BLD AUTO: 208 10*3/MM3 (ref 140–500)
PMV BLD AUTO: 9.5 FL (ref 6–12)
RBC # BLD AUTO: 3.21 10*6/MM3 (ref 3.9–5.2)
WBC NRBC COR # BLD: 5.97 10*3/MM3 (ref 4.5–10.7)

## 2018-06-18 PROCEDURE — 85025 COMPLETE CBC W/AUTO DIFF WBC: CPT | Performed by: INTERNAL MEDICINE

## 2018-06-18 PROCEDURE — 96372 THER/PROPH/DIAG INJ SC/IM: CPT

## 2018-06-18 PROCEDURE — 63510000001 EPOETIN ALFA PER 1000 UNITS: Performed by: INTERNAL MEDICINE

## 2018-06-18 PROCEDURE — 36415 COLL VENOUS BLD VENIPUNCTURE: CPT

## 2018-06-18 RX ADMIN — ERYTHROPOIETIN 25000 UNITS: 20000 INJECTION, SOLUTION INTRAVENOUS; SUBCUTANEOUS at 10:44

## 2018-06-25 ENCOUNTER — INFUSION (OUTPATIENT)
Dept: ONCOLOGY | Facility: HOSPITAL | Age: 78
End: 2018-06-25

## 2018-06-25 ENCOUNTER — LAB (OUTPATIENT)
Dept: OTHER | Facility: HOSPITAL | Age: 78
End: 2018-06-25

## 2018-06-25 VITALS — WEIGHT: 181.4 LBS | BODY MASS INDEX: 26.56 KG/M2 | TEMPERATURE: 97.5 F

## 2018-06-25 DIAGNOSIS — D63.1 ANEMIA OF CHRONIC RENAL FAILURE, STAGE 4 (SEVERE) (HCC): Primary | ICD-10-CM

## 2018-06-25 DIAGNOSIS — N18.4 ANEMIA OF CHRONIC RENAL FAILURE, STAGE 4 (SEVERE) (HCC): Primary | ICD-10-CM

## 2018-06-25 DIAGNOSIS — N18.4 ANEMIA OF CHRONIC RENAL FAILURE, STAGE 4 (SEVERE) (HCC): ICD-10-CM

## 2018-06-25 DIAGNOSIS — D63.1 ANEMIA OF CHRONIC RENAL FAILURE, STAGE 4 (SEVERE) (HCC): ICD-10-CM

## 2018-06-25 LAB
BASOPHILS # BLD AUTO: 0.01 10*3/MM3 (ref 0–0.2)
BASOPHILS NFR BLD AUTO: 0.2 % (ref 0–1.5)
DEPRECATED RDW RBC AUTO: 68.1 FL (ref 37–54)
EOSINOPHIL # BLD AUTO: 0.09 10*3/MM3 (ref 0–0.7)
EOSINOPHIL NFR BLD AUTO: 1.6 % (ref 0.3–6.2)
ERYTHROCYTE [DISTWIDTH] IN BLOOD BY AUTOMATED COUNT: 19.8 % (ref 11.7–13)
HCT VFR BLD AUTO: 31.2 % (ref 35.6–45.5)
HGB BLD-MCNC: 9.8 G/DL (ref 11.9–15.5)
IMM GRANULOCYTES # BLD: 0.02 10*3/MM3 (ref 0–0.03)
IMM GRANULOCYTES NFR BLD: 0.4 % (ref 0–0.5)
LYMPHOCYTES # BLD AUTO: 0.95 10*3/MM3 (ref 0.9–4.8)
LYMPHOCYTES NFR BLD AUTO: 16.8 % (ref 19.6–45.3)
MCH RBC QN AUTO: 29.9 PG (ref 26.9–32)
MCHC RBC AUTO-ENTMCNC: 31.4 G/DL (ref 32.4–36.3)
MCV RBC AUTO: 95.1 FL (ref 80.5–98.2)
MONOCYTES # BLD AUTO: 0.36 10*3/MM3 (ref 0.2–1.2)
MONOCYTES NFR BLD AUTO: 6.4 % (ref 5–12)
NEUTROPHILS # BLD AUTO: 4.21 10*3/MM3 (ref 1.9–8.1)
NEUTROPHILS NFR BLD AUTO: 74.6 % (ref 42.7–76)
NRBC BLD MANUAL-RTO: 0 /100 WBC (ref 0–0)
PLATELET # BLD AUTO: 163 10*3/MM3 (ref 140–500)
PMV BLD AUTO: 9.8 FL (ref 6–12)
RBC # BLD AUTO: 3.28 10*6/MM3 (ref 3.9–5.2)
WBC NRBC COR # BLD: 5.64 10*3/MM3 (ref 4.5–10.7)

## 2018-06-25 PROCEDURE — 36415 COLL VENOUS BLD VENIPUNCTURE: CPT

## 2018-06-25 PROCEDURE — 96372 THER/PROPH/DIAG INJ SC/IM: CPT | Performed by: INTERNAL MEDICINE

## 2018-06-25 PROCEDURE — 63510000001 EPOETIN ALFA PER 1000 UNITS: Performed by: INTERNAL MEDICINE

## 2018-06-25 PROCEDURE — 85025 COMPLETE CBC W/AUTO DIFF WBC: CPT | Performed by: INTERNAL MEDICINE

## 2018-06-25 RX ADMIN — ERYTHROPOIETIN 25000 UNITS: 20000 INJECTION, SOLUTION INTRAVENOUS; SUBCUTANEOUS at 10:55

## 2018-06-27 NOTE — PROGRESS NOTES
.     REASONS FOR FOLLOWUP:     Anemia of chronic kidney diease              History of Iron deficiency    HISTORY OF PRESENT ILLNESS:  The patient is a 78 y.o. year old female  who is here for follow-up with the above-mentioned history. She has persistent anemia of chronic kidney disease. Perhaps to a greater degree than might be thought appropriate.    Past Medical History:   Diagnosis Date   • Anemia     Iron deficiency   • Cancer     Skin cancer   • Cardiomyopathy     S/P pacemaker and defibrillator   • CHF (congestive heart failure)    • Clostridium difficile diarrhea     2013   • Colon polyps    • Coronary artery disease     pacemaker, defibrillator   • Gastroparesis    • GERD (gastroesophageal reflux disease)    • Gout    • Hemorrhoids    • Hiatal hernia    • History of pancreatitis 01/2014   • History of pancreatitis     2014   • History of transfusion    • Hyperlipidemia    • Hypothyroidism    • Left bundle branch block    • Mitral and aortic valve disease    • Mitral valve insufficiency    • Myoclonus     S/P Depakote   • Nephrolithiasis     stage 4 renal failure   • Osteoarthritis    • Pancytopenia    • Peripheral neuropathy    • Progressive familial myoclonic epilepsy    • Pulmonary hypertension    • RLS (restless legs syndrome)    • Ruptured ovarian cyst    • Seizures     myoclonus   • Spinal stenosis    • Transfusion history     3 weeks ago     Past Surgical History:   Procedure Laterality Date   • APPENDECTOMY N/A    • CARDIAC CATHETERIZATION Left 03/28/2006    Arterial catheter insertion, cath left ventriculography, coronary angiography and left heart catheterization-Dr. Estevan Matamoros   • CARDIAC DEFIBRILLATOR PLACEMENT N/A 11/30/2007    Biventricular implantable cardioverter defibrillator-Dr. Leandro Huber   • CATARACT EXTRACTION Bilateral 2011   • COLONOSCOPY N/A 2014    done at [facility]   • CYSTECTOMY N/A     Ovarian cystectomy   • ENDOSCOPY N/A 04/28/2006    EGD with biopsies. Paraesophageal  hiatal hernia from 34 to 44 cm, antral gastritis-Dr. Nehemiah Beal   • ENDOSCOPY N/A 09/29/2004    Hiatal hernia, gastritis and an erosion of the pylorus-Dr. Yang Pavon   • ENTEROSCOPY SMALL BOWEL N/A 10/30/2006    Small bowel enteroscopy with biopsies-Dr. Rory Sevilla   • FLEXIBLE SIGMOIDOSCOPY N/A 09/29/2004    Unsuccessful colonoscopy due to poop prep, a very tortuous sigmoid, bowel prep in the form of enema given and a barium enema was obtained-Dr. Yang Pavon   • FRACTURE SURGERY  2015    Broke big toe   • HEMATOMA EVACUATION TRUNK N/A 02/07/2014    Pocket evacuation of hematoma at pacemaker site-Dr. Leandro Huber   • HEMORRHOIDECTOMY N/A 04/19/2004    Flexible sigmoidoscopy and stapled hemorrhoidectomy-Dr. Yang Pavon   • HYSTERECTOMY Bilateral 1977   • IMPLANTABLE CARDIOVERTER DEFIBRILLATOR LEAD REPLACEMENT/POCKET REVISION Left 3/28/2016    Procedure: AUTOMATIC IMPLANTABLE CARDIOVERTER DEFIBRILLATOR LEAD REPLACEMENT WITH LASER LEAD EXTRACTION;  Surgeon: Leandro Huber MD;  Location: [facility] OR 18/19;  Service:    • IMPLANTABLE CARDIOVERTER DEFIBRILLATOR LEAD REPLACEMENT/POCKET REVISION N/A 01/13/2014    Biventricular ICD replacement-Dr. Jillian Huber   • LAPAROSCOPY REPAIR HIATAL HERNIA N/A 06/27/2006    Laparoscopic paraesophageal hiatal hernia repair with Nissen fundoplication and cholecystectomy-Dr. Sean Yoon   • NATALIE GONZALEZ ([facility]) PROCEDURE     • NE INSJ TUNNELED CVC W/O SUBQ PORT/ AGE 3 YR/> Right 10/3/2017    Procedure: Right internal jugular port placement;  Surgeon: Tiffanie Couch MD;  Location: [facility] OR;  Service: General   • TOTAL KNEE ARTHROPLASTY Left 08/2014       MEDICATIONS    Current Outpatient Prescriptions:   •  allopurinol (ZYLOPRIM) 100 MG tablet, Take 100 mg by mouth 2 (Two) Times a Day., Disp: , Rfl:   •  aspirin 81 MG tablet, Take 81 mg by mouth Daily. PT HOLDING FOR SURGERY, Disp: , Rfl:   •  calcitriol (ROCALTROL) 0.25 MCG capsule,  Take 0.25 mcg by mouth Daily., Disp: , Rfl:   •  carvedilol (COREG) 12.5 MG tablet, TAKE 1 TABLET DAILY, Disp: 90 tablet, Rfl: 1  •  Cholecalciferol (VITAMIN D3) 5000 units capsule capsule, Take 5,000 Units by mouth Daily. Every other day, Disp: , Rfl:   •  diazepam (VALIUM) 5 MG tablet, Take 5 mg by mouth Every Night., Disp: , Rfl:   •  furosemide (LASIX) 40 MG tablet, TAKE 1 TABLET DAILY OR AS  DIRECTED, Disp: 90 tablet, Rfl: 3  •  Gabapentin, Once-Daily, 300 MG tablet, Take one tablet daily with dinner., Disp: 90 tablet, Rfl: 1  •  Glucosamine-Chondroit-Vit C-Mn (GLUCOSAMINE CHONDROITIN COMPLX) capsule, Take 1,500 mg by mouth Every Other Day. PT HOLDING FOR SURGERY, Disp: , Rfl:   •  levothyroxine (SYNTHROID, LEVOTHROID) 25 MCG tablet, Take 25 mcg by mouth Daily., Disp: , Rfl:   •  loperamide (IMODIUM) 2 MG capsule, Take 2 mg by mouth 4 (Four) Times a Day As Needed for Diarrhea., Disp: , Rfl:   •  magnesium oxide 250 MG tablet, Take 250 mg by mouth Daily., Disp: , Rfl:   •  methscopolamine (PAMINE FORTE) 5 MG tablet, Take 5 mg by mouth Daily., Disp: , Rfl:   •  methylPREDNISolone (MEDROL) 4 MG tablet, Take 4 mg by mouth As Needed. TAKES FOR GOUT FLARE UPS, Disp: , Rfl:   •  Multiple Vitamin (MULTI-DAY VITAMINS) tablet, Take 1 tablet by mouth Daily. Multivitamin with Iron, Disp: , Rfl:   •  NEUPRO 4 MG/24HR patch, APPLY 1 PATCH DAILY AS DIRECTED.**PAYABLE 5/8/17, Disp: 90 patch, Rfl: 2  •  ondansetron (ZOFRAN) 8 MG tablet, Take 1 tablet by mouth Every 8 (Eight) Hours As Needed for Nausea or Vomiting., Disp: 30 tablet, Rfl: 2  •  pantoprazole (PROTONIX) 40 MG EC tablet, TAKE 1 TABLET BY MOUTH EVERY MORNING, Disp: , Rfl: 11  •  psyllium (METAMUCIL) 58.6 % powder, Take 1 packet by mouth Daily., Disp: , Rfl:   •  spironolactone (ALDACTONE) 25 MG tablet, TAKE 1/2 TABLET DAILY, Disp: 45 tablet, Rfl: 1  •  Vitamin E 400 UNITS tablet, Take 400 Units by mouth Daily. PT HOLDING FOR SURGERY, Disp: , Rfl:   No current  "facility-administered medications for this visit.     Facility-Administered Medications Ordered in Other Visits:   •  heparin flush (porcine) 100 UNIT/ML injection 500 Units, 500 Units, Intravenous, PRN, Anuel Scott MD, 500 Units at 11/27/17 1347  •  sodium chloride 0.9 % flush 10 mL, 10 mL, Intravenous, PRN, Anuel Scott MD, 10 mL at 11/27/17 1347    ALLERGIES:     Allergies   Allergen Reactions   • Chlorhexidine Rash and Itching     Patient states \"blue dye\" in chlorhexidine.  States the orange and clear are OK to use   • Chlorhexidine Gluconate Itching     i   • Codeine Other (See Comments)     Want to climb the walls, doesn't help pain   • Propoxyphene Other (See Comments)     Belligerent, acting drunk       SOCIAL HISTORY:       Social History     Social History   • Marital status:      Spouse name: Zackery   • Number of children: 5   • Years of education: 1 yr college     Occupational History   •  Retired     Social History Main Topics   • Smoking status: Never Smoker   • Smokeless tobacco: Never Used   • Alcohol use No   • Drug use: No   • Sexual activity: Defer      Comment: AGE!!!     Other Topics Concern   • Not on file     Social History Narrative   • No narrative on file         FAMILY HISTORY:  Family History   Problem Relation Age of Onset   • Coronary artery disease Other    • Dementia Other    • Kidney disease Other    • Arthritis Father    • Early death Father         Kidney failure   • Kidney disease Father    • Heart disease Mother    • Mental illness Mother         Dementia   • Malig Hyperthermia Neg Hx        REVIEW OF SYSTEMS:  Review of Systems   Constitutional: Positive for fatigue.            Vitals:    05/10/18 0952   BP: 144/76   Pulse: 68   Resp: 12   Temp: 97.7 °F (36.5 °C)   TempSrc: Oral   SpO2: 99%   Weight: 79.4 kg (175 lb)   Height: 176 cm (69.29\")   PainSc:   4   PainLoc: Generalized     Current Status 6/11/2018   ECOG score 0        PHYSICAL EXAM:  "     CONSTITUTIONAL:  Vital signs reviewed.  No distress, looks comfortable.  EYES:  Conjunctiva and lids unremarkable.  PERRLA  EARS,NOSE,MOUTH,THROAT:  Ears and nose appear unremarkable.  Lips, teeth, gums appear unremarkable.  RESPIRATORY:  Normal respiratory effort.  Lungs clear to auscultation bilaterally.  CARDIOVASCULAR:  Normal S1, S2.  No murmurs rubs or gallops.  No significant lower extremity edema.  GASTROINTESTINAL: Abdomen appears unremarkable.  Nontender.  No hepatomegaly.  No splenomegaly.  LYMPHATIC:  No cervical, supraclavicular, axillary lymphadenopathy.  MUSCULOSKELETAL:  Unremarkable gait and station.  Unremarkable digits/nails.  No cyanosis or clubbing.  SKIN:  Warm.  No rashes.  PSYCHIATRIC:  Normal judgment and insight.  Normal mood and affect.      RECENT LABS:      5/10/2018   WBC 4.50 - 10.70 10*3/mm3 3.95 (A)   Neutrophils, Absolute 1.90 - 8.10 10*3/mm3 2.62   Hemoglobin 11.9 - 15.5 g/dL 9.4 (A)   Hematocrit 35.6 - 45.5 % 30.3 (A)   Platelets 140 - 500 10*3/mm3 140         Assessment/Plan   Anemia, unspecified type  - CT Guided Biopsy Bone Marrow    As her anemia is persistent and cryptic, a bone marrow biopsy is requested.

## 2018-06-28 ENCOUNTER — OFFICE VISIT (OUTPATIENT)
Dept: NEUROLOGY | Facility: CLINIC | Age: 78
End: 2018-06-28

## 2018-06-28 VITALS — WEIGHT: 182 LBS | SYSTOLIC BLOOD PRESSURE: 130 MMHG | BODY MASS INDEX: 26.65 KG/M2 | DIASTOLIC BLOOD PRESSURE: 72 MMHG

## 2018-06-28 DIAGNOSIS — G25.3 MYOCLONUS: Primary | ICD-10-CM

## 2018-06-28 PROCEDURE — 99213 OFFICE O/P EST LOW 20 MIN: CPT | Performed by: NURSE PRACTITIONER

## 2018-07-02 ENCOUNTER — LAB (OUTPATIENT)
Dept: OTHER | Facility: HOSPITAL | Age: 78
End: 2018-07-02

## 2018-07-02 ENCOUNTER — INFUSION (OUTPATIENT)
Dept: ONCOLOGY | Facility: HOSPITAL | Age: 78
End: 2018-07-02

## 2018-07-02 VITALS — BODY MASS INDEX: 25.77 KG/M2 | WEIGHT: 176 LBS | TEMPERATURE: 97.6 F

## 2018-07-02 DIAGNOSIS — D63.1 ANEMIA OF CHRONIC RENAL FAILURE, STAGE 4 (SEVERE) (HCC): ICD-10-CM

## 2018-07-02 DIAGNOSIS — N18.4 ANEMIA OF CHRONIC RENAL FAILURE, STAGE 4 (SEVERE) (HCC): Primary | ICD-10-CM

## 2018-07-02 DIAGNOSIS — D63.1 ANEMIA OF CHRONIC RENAL FAILURE, STAGE 4 (SEVERE) (HCC): Primary | ICD-10-CM

## 2018-07-02 DIAGNOSIS — N18.4 ANEMIA OF CHRONIC RENAL FAILURE, STAGE 4 (SEVERE) (HCC): ICD-10-CM

## 2018-07-02 LAB
BASOPHILS # BLD AUTO: 0.01 10*3/MM3 (ref 0–0.2)
BASOPHILS NFR BLD AUTO: 0.2 % (ref 0–1.5)
DEPRECATED RDW RBC AUTO: 68.8 FL (ref 37–54)
EOSINOPHIL # BLD AUTO: 0.09 10*3/MM3 (ref 0–0.7)
EOSINOPHIL NFR BLD AUTO: 1.6 % (ref 0.3–6.2)
ERYTHROCYTE [DISTWIDTH] IN BLOOD BY AUTOMATED COUNT: 19.5 % (ref 11.7–13)
HCT VFR BLD AUTO: 28.8 % (ref 35.6–45.5)
HGB BLD-MCNC: 9.1 G/DL (ref 11.9–15.5)
IMM GRANULOCYTES # BLD: 0.04 10*3/MM3 (ref 0–0.03)
IMM GRANULOCYTES NFR BLD: 0.7 % (ref 0–0.5)
LYMPHOCYTES # BLD AUTO: 0.88 10*3/MM3 (ref 0.9–4.8)
LYMPHOCYTES NFR BLD AUTO: 16.1 % (ref 19.6–45.3)
MCH RBC QN AUTO: 30.1 PG (ref 26.9–32)
MCHC RBC AUTO-ENTMCNC: 31.6 G/DL (ref 32.4–36.3)
MCV RBC AUTO: 95.4 FL (ref 80.5–98.2)
MONOCYTES # BLD AUTO: 0.48 10*3/MM3 (ref 0.2–1.2)
MONOCYTES NFR BLD AUTO: 8.8 % (ref 5–12)
NEUTROPHILS # BLD AUTO: 3.97 10*3/MM3 (ref 1.9–8.1)
NEUTROPHILS NFR BLD AUTO: 72.6 % (ref 42.7–76)
NRBC BLD MANUAL-RTO: 0.4 /100 WBC (ref 0–0)
PLATELET # BLD AUTO: 161 10*3/MM3 (ref 140–500)
PMV BLD AUTO: 9.7 FL (ref 6–12)
RBC # BLD AUTO: 3.02 10*6/MM3 (ref 3.9–5.2)
WBC NRBC COR # BLD: 5.47 10*3/MM3 (ref 4.5–10.7)

## 2018-07-02 PROCEDURE — 96372 THER/PROPH/DIAG INJ SC/IM: CPT

## 2018-07-02 PROCEDURE — 85025 COMPLETE CBC W/AUTO DIFF WBC: CPT | Performed by: INTERNAL MEDICINE

## 2018-07-02 PROCEDURE — 36415 COLL VENOUS BLD VENIPUNCTURE: CPT

## 2018-07-02 PROCEDURE — 63510000001 EPOETIN ALFA PER 1000 UNITS: Performed by: NURSE PRACTITIONER

## 2018-07-02 RX ADMIN — ERYTHROPOIETIN 25000 UNITS: 20000 INJECTION, SOLUTION INTRAVENOUS; SUBCUTANEOUS at 11:09

## 2018-07-09 RX ORDER — GABAPENTIN 300 MG/1
TABLET, FILM COATED ORAL
Qty: 90 TABLET | Refills: 1 | Status: SHIPPED | OUTPATIENT
Start: 2018-07-09 | End: 2019-01-06 | Stop reason: SDUPTHER

## 2018-07-10 ENCOUNTER — INFUSION (OUTPATIENT)
Dept: ONCOLOGY | Facility: HOSPITAL | Age: 78
End: 2018-07-10

## 2018-07-10 ENCOUNTER — LAB (OUTPATIENT)
Dept: OTHER | Facility: HOSPITAL | Age: 78
End: 2018-07-10

## 2018-07-10 VITALS — WEIGHT: 176 LBS | TEMPERATURE: 97.8 F | BODY MASS INDEX: 25.77 KG/M2

## 2018-07-10 DIAGNOSIS — D63.1 ANEMIA OF CHRONIC RENAL FAILURE, STAGE 4 (SEVERE) (HCC): ICD-10-CM

## 2018-07-10 DIAGNOSIS — D63.1 ANEMIA OF CHRONIC RENAL FAILURE, STAGE 4 (SEVERE) (HCC): Primary | ICD-10-CM

## 2018-07-10 DIAGNOSIS — N18.4 ANEMIA OF CHRONIC RENAL FAILURE, STAGE 4 (SEVERE) (HCC): Primary | ICD-10-CM

## 2018-07-10 DIAGNOSIS — N18.4 ANEMIA OF CHRONIC RENAL FAILURE, STAGE 4 (SEVERE) (HCC): ICD-10-CM

## 2018-07-10 LAB
BASOPHILS # BLD AUTO: 0.02 10*3/MM3 (ref 0–0.2)
BASOPHILS NFR BLD AUTO: 0.3 % (ref 0–1.5)
DEPRECATED RDW RBC AUTO: 65.2 FL (ref 37–54)
EOSINOPHIL # BLD AUTO: 0.08 10*3/MM3 (ref 0–0.7)
EOSINOPHIL NFR BLD AUTO: 1.1 % (ref 0.3–6.2)
ERYTHROCYTE [DISTWIDTH] IN BLOOD BY AUTOMATED COUNT: 18.6 % (ref 11.7–13)
HCT VFR BLD AUTO: 31.4 % (ref 35.6–45.5)
HGB BLD-MCNC: 9.9 G/DL (ref 11.9–15.5)
IMM GRANULOCYTES # BLD: 0.02 10*3/MM3 (ref 0–0.03)
IMM GRANULOCYTES NFR BLD: 0.3 % (ref 0–0.5)
LYMPHOCYTES # BLD AUTO: 0.82 10*3/MM3 (ref 0.9–4.8)
LYMPHOCYTES NFR BLD AUTO: 11.5 % (ref 19.6–45.3)
MCH RBC QN AUTO: 30 PG (ref 26.9–32)
MCHC RBC AUTO-ENTMCNC: 31.5 G/DL (ref 32.4–36.3)
MCV RBC AUTO: 95.2 FL (ref 80.5–98.2)
MONOCYTES # BLD AUTO: 0.48 10*3/MM3 (ref 0.2–1.2)
MONOCYTES NFR BLD AUTO: 6.8 % (ref 5–12)
NEUTROPHILS # BLD AUTO: 5.68 10*3/MM3 (ref 1.9–8.1)
NEUTROPHILS NFR BLD AUTO: 80 % (ref 42.7–76)
NRBC BLD MANUAL-RTO: 0 /100 WBC (ref 0–0)
PLATELET # BLD AUTO: 185 10*3/MM3 (ref 140–500)
PMV BLD AUTO: 10 FL (ref 6–12)
RBC # BLD AUTO: 3.3 10*6/MM3 (ref 3.9–5.2)
WBC NRBC COR # BLD: 7.1 10*3/MM3 (ref 4.5–10.7)

## 2018-07-10 PROCEDURE — 36415 COLL VENOUS BLD VENIPUNCTURE: CPT

## 2018-07-10 PROCEDURE — 96372 THER/PROPH/DIAG INJ SC/IM: CPT

## 2018-07-10 PROCEDURE — 63510000001 EPOETIN ALFA PER 1000 UNITS: Performed by: NURSE PRACTITIONER

## 2018-07-10 PROCEDURE — 85025 COMPLETE CBC W/AUTO DIFF WBC: CPT | Performed by: INTERNAL MEDICINE

## 2018-07-10 RX ADMIN — ERYTHROPOIETIN 30000 UNITS: 20000 INJECTION, SOLUTION INTRAVENOUS; SUBCUTANEOUS at 10:42

## 2018-07-16 ENCOUNTER — LAB (OUTPATIENT)
Dept: OTHER | Facility: HOSPITAL | Age: 78
End: 2018-07-16

## 2018-07-16 ENCOUNTER — INFUSION (OUTPATIENT)
Dept: ONCOLOGY | Facility: HOSPITAL | Age: 78
End: 2018-07-16

## 2018-07-16 VITALS — BODY MASS INDEX: 26.04 KG/M2 | TEMPERATURE: 97.6 F | WEIGHT: 177.8 LBS

## 2018-07-16 DIAGNOSIS — N18.4 ANEMIA OF CHRONIC RENAL FAILURE, STAGE 4 (SEVERE) (HCC): ICD-10-CM

## 2018-07-16 DIAGNOSIS — N18.4 ANEMIA OF CHRONIC RENAL FAILURE, STAGE 4 (SEVERE) (HCC): Primary | ICD-10-CM

## 2018-07-16 DIAGNOSIS — D63.1 ANEMIA OF CHRONIC RENAL FAILURE, STAGE 4 (SEVERE) (HCC): Primary | ICD-10-CM

## 2018-07-16 DIAGNOSIS — D63.1 ANEMIA OF CHRONIC RENAL FAILURE, STAGE 4 (SEVERE) (HCC): ICD-10-CM

## 2018-07-16 LAB
BASOPHILS # BLD AUTO: 0.01 10*3/MM3 (ref 0–0.2)
BASOPHILS NFR BLD AUTO: 0.2 % (ref 0–1.5)
DEPRECATED RDW RBC AUTO: 65.7 FL (ref 37–54)
EOSINOPHIL # BLD AUTO: 0.09 10*3/MM3 (ref 0–0.7)
EOSINOPHIL NFR BLD AUTO: 1.5 % (ref 0.3–6.2)
ERYTHROCYTE [DISTWIDTH] IN BLOOD BY AUTOMATED COUNT: 18.4 % (ref 11.7–13)
HCT VFR BLD AUTO: 31.8 % (ref 35.6–45.5)
HGB BLD-MCNC: 9.7 G/DL (ref 11.9–15.5)
IMM GRANULOCYTES # BLD: 0.04 10*3/MM3 (ref 0–0.03)
IMM GRANULOCYTES NFR BLD: 0.7 % (ref 0–0.5)
LYMPHOCYTES # BLD AUTO: 0.92 10*3/MM3 (ref 0.9–4.8)
LYMPHOCYTES NFR BLD AUTO: 15.5 % (ref 19.6–45.3)
MCH RBC QN AUTO: 29.8 PG (ref 26.9–32)
MCHC RBC AUTO-ENTMCNC: 30.5 G/DL (ref 32.4–36.3)
MCV RBC AUTO: 97.8 FL (ref 80.5–98.2)
MONOCYTES # BLD AUTO: 0.39 10*3/MM3 (ref 0.2–1.2)
MONOCYTES NFR BLD AUTO: 6.6 % (ref 5–12)
NEUTROPHILS # BLD AUTO: 4.48 10*3/MM3 (ref 1.9–8.1)
NEUTROPHILS NFR BLD AUTO: 75.5 % (ref 42.7–76)
NRBC BLD MANUAL-RTO: 0 /100 WBC (ref 0–0)
PLATELET # BLD AUTO: 182 10*3/MM3 (ref 140–500)
PMV BLD AUTO: 9.4 FL (ref 6–12)
RBC # BLD AUTO: 3.25 10*6/MM3 (ref 3.9–5.2)
WBC NRBC COR # BLD: 5.93 10*3/MM3 (ref 4.5–10.7)

## 2018-07-16 PROCEDURE — 36415 COLL VENOUS BLD VENIPUNCTURE: CPT

## 2018-07-16 PROCEDURE — 85025 COMPLETE CBC W/AUTO DIFF WBC: CPT | Performed by: INTERNAL MEDICINE

## 2018-07-16 PROCEDURE — 96372 THER/PROPH/DIAG INJ SC/IM: CPT

## 2018-07-16 PROCEDURE — 63510000001 EPOETIN ALFA PER 1000 UNITS: Performed by: INTERNAL MEDICINE

## 2018-07-16 RX ADMIN — ERYTHROPOIETIN 30000 UNITS: 10000 INJECTION, SOLUTION INTRAVENOUS; SUBCUTANEOUS at 10:44

## 2018-07-25 ENCOUNTER — INFUSION (OUTPATIENT)
Dept: ONCOLOGY | Facility: HOSPITAL | Age: 78
End: 2018-07-25

## 2018-07-25 ENCOUNTER — OFFICE VISIT (OUTPATIENT)
Dept: ONCOLOGY | Facility: CLINIC | Age: 78
End: 2018-07-25

## 2018-07-25 ENCOUNTER — LAB (OUTPATIENT)
Dept: OTHER | Facility: HOSPITAL | Age: 78
End: 2018-07-25

## 2018-07-25 VITALS
HEART RATE: 81 BPM | SYSTOLIC BLOOD PRESSURE: 107 MMHG | BODY MASS INDEX: 25.77 KG/M2 | WEIGHT: 174 LBS | DIASTOLIC BLOOD PRESSURE: 67 MMHG | OXYGEN SATURATION: 98 % | RESPIRATION RATE: 14 BRPM | TEMPERATURE: 97.8 F | HEIGHT: 69 IN

## 2018-07-25 DIAGNOSIS — IMO0001 IRON AND ITS COMPOUNDS CAUSING ADVERSE EFFECT IN THERAPEUTIC USE, SUBSEQUENT ENCOUNTER: ICD-10-CM

## 2018-07-25 DIAGNOSIS — N18.4 ANEMIA OF CHRONIC RENAL FAILURE, STAGE 4 (SEVERE) (HCC): Primary | ICD-10-CM

## 2018-07-25 DIAGNOSIS — D63.1 ANEMIA OF CHRONIC RENAL FAILURE, STAGE 4 (SEVERE) (HCC): Primary | ICD-10-CM

## 2018-07-25 DIAGNOSIS — D63.1 ANEMIA OF CHRONIC RENAL FAILURE, STAGE 4 (SEVERE) (HCC): ICD-10-CM

## 2018-07-25 DIAGNOSIS — N18.4 ANEMIA OF CHRONIC RENAL FAILURE, STAGE 4 (SEVERE) (HCC): ICD-10-CM

## 2018-07-25 DIAGNOSIS — Z45.2 ENCOUNTER FOR FITTING AND ADJUSTMENT OF VASCULAR CATHETER: ICD-10-CM

## 2018-07-25 LAB
BASOPHILS # BLD AUTO: 0.02 10*3/MM3 (ref 0–0.2)
BASOPHILS NFR BLD AUTO: 0.3 % (ref 0–1.5)
DEPRECATED RDW RBC AUTO: 61 FL (ref 37–54)
EOSINOPHIL # BLD AUTO: 0.08 10*3/MM3 (ref 0–0.7)
EOSINOPHIL NFR BLD AUTO: 1.3 % (ref 0.3–6.2)
ERYTHROCYTE [DISTWIDTH] IN BLOOD BY AUTOMATED COUNT: 17.5 % (ref 11.7–13)
HCT VFR BLD AUTO: 31.3 % (ref 35.6–45.5)
HGB BLD-MCNC: 9.8 G/DL (ref 11.9–15.5)
IMM GRANULOCYTES # BLD: 0.03 10*3/MM3 (ref 0–0.03)
IMM GRANULOCYTES NFR BLD: 0.5 % (ref 0–0.5)
LYMPHOCYTES # BLD AUTO: 0.94 10*3/MM3 (ref 0.9–4.8)
LYMPHOCYTES NFR BLD AUTO: 15.7 % (ref 19.6–45.3)
MCH RBC QN AUTO: 29.8 PG (ref 26.9–32)
MCHC RBC AUTO-ENTMCNC: 31.3 G/DL (ref 32.4–36.3)
MCV RBC AUTO: 95.1 FL (ref 80.5–98.2)
MONOCYTES # BLD AUTO: 0.49 10*3/MM3 (ref 0.2–1.2)
MONOCYTES NFR BLD AUTO: 8.2 % (ref 5–12)
NEUTROPHILS # BLD AUTO: 4.43 10*3/MM3 (ref 1.9–8.1)
NEUTROPHILS NFR BLD AUTO: 74 % (ref 42.7–76)
NRBC BLD MANUAL-RTO: 0 /100 WBC (ref 0–0)
PLATELET # BLD AUTO: 171 10*3/MM3 (ref 140–500)
PMV BLD AUTO: 10.1 FL (ref 6–12)
RBC # BLD AUTO: 3.29 10*6/MM3 (ref 3.9–5.2)
WBC NRBC COR # BLD: 5.99 10*3/MM3 (ref 4.5–10.7)

## 2018-07-25 PROCEDURE — 85025 COMPLETE CBC W/AUTO DIFF WBC: CPT | Performed by: INTERNAL MEDICINE

## 2018-07-25 PROCEDURE — 99213 OFFICE O/P EST LOW 20 MIN: CPT | Performed by: INTERNAL MEDICINE

## 2018-07-25 PROCEDURE — 96372 THER/PROPH/DIAG INJ SC/IM: CPT

## 2018-07-25 PROCEDURE — 63510000001 EPOETIN ALFA PER 1000 UNITS: Performed by: INTERNAL MEDICINE

## 2018-07-25 PROCEDURE — 36415 COLL VENOUS BLD VENIPUNCTURE: CPT

## 2018-07-25 RX ORDER — SODIUM CHLORIDE 0.9 % (FLUSH) 0.9 %
10 SYRINGE (ML) INJECTION AS NEEDED
Status: DISCONTINUED | OUTPATIENT
Start: 2018-07-25 | End: 2018-07-25 | Stop reason: HOSPADM

## 2018-07-25 RX ORDER — SODIUM CHLORIDE 0.9 % (FLUSH) 0.9 %
10 SYRINGE (ML) INJECTION AS NEEDED
Status: CANCELLED | OUTPATIENT
Start: 2018-07-25

## 2018-07-25 RX ADMIN — Medication 10 ML: at 11:54

## 2018-07-25 RX ADMIN — ERYTHROPOIETIN 30000 UNITS: 20000 INJECTION, SOLUTION INTRAVENOUS; SUBCUTANEOUS at 11:54

## 2018-07-26 NOTE — PROGRESS NOTES
Subjective .     REASONS FOR FOLLOWUP:      Anemia of chronic kidney diease              History of Iron deficiency    History of Present Illness      Mrs Machuca is seen today with stable Hgb with Procrit support. She remains symptomatic though less so. Her Hgb is stable at 9.8.    Past Medical History:   Diagnosis Date   • Anemia     Iron deficiency   • Cancer (CMS/HCC)     Skin cancer   • Cardiomyopathy (CMS/HCC)     S/P pacemaker and defibrillator   • CHF (congestive heart failure) (CMS/HCC)    • Clostridium difficile diarrhea     2013   • Colon polyps    • Coronary artery disease     pacemaker, defibrillator   • Gastroparesis    • GERD (gastroesophageal reflux disease)    • Gout    • Hemorrhoids    • Hiatal hernia    • History of pancreatitis 01/2014   • History of pancreatitis     2014   • History of transfusion    • Hyperlipidemia    • Hypothyroidism    • Left bundle branch block    • Mitral and aortic valve disease    • Mitral valve insufficiency    • Myoclonus     S/P Depakote   • Nephrolithiasis     stage 4 renal failure   • Osteoarthritis    • Pancytopenia (CMS/HCC)    • Peripheral neuropathy    • Progressive familial myoclonic epilepsy (CMS/HCC)    • Pulmonary hypertension    • RLS (restless legs syndrome)    • Ruptured ovarian cyst    • Seizures (CMS/HCC)     myoclonus   • Spinal stenosis    • Transfusion history     3 weeks ago       ONCOLOGIC HISTORY:  (History from previous dates can be found in the separate document.)    Current Outpatient Prescriptions on File Prior to Visit   Medication Sig Dispense Refill   • allopurinol (ZYLOPRIM) 100 MG tablet Take 100 mg by mouth 2 (Two) Times a Day.     • aspirin 81 MG tablet Take 81 mg by mouth Daily. PT HOLDING FOR SURGERY     • calcitriol (ROCALTROL) 0.25 MCG capsule Take 0.25 mcg by mouth Daily. 1 tablet every other day and 2 tablets every other day     • carvedilol (COREG) 12.5 MG tablet TAKE 1 TABLET DAILY 90 tablet 1   • Cholecalciferol (VITAMIN  D3) 5000 units capsule capsule Take 5,000 Units by mouth Daily. Every other day     • diazepam (VALIUM) 5 MG tablet Take 5 mg by mouth Every Night.     • furosemide (LASIX) 40 MG tablet TAKE 1 TABLET DAILY OR AS  DIRECTED 90 tablet 3   • Glucosamine-Chondroit-Vit C-Mn (GLUCOSAMINE CHONDROITIN COMPLX) capsule Take 1,500 mg by mouth Every Other Day. PT HOLDING FOR SURGERY     • GRALISE 300 MG tablet TAKE 1 TABLET DAILY WITH   DINNER 90 tablet 1   • levothyroxine (SYNTHROID, LEVOTHROID) 25 MCG tablet Take 25 mcg by mouth Daily.     • loperamide (IMODIUM) 2 MG capsule Take 2 mg by mouth 4 (Four) Times a Day As Needed for Diarrhea.     • magnesium oxide 250 MG tablet Take 250 mg by mouth Daily.     • methscopolamine (PAMINE FORTE) 5 MG tablet Take 5 mg by mouth Daily.     • methylPREDNISolone (MEDROL) 4 MG tablet Take 4 mg by mouth As Needed. TAKES FOR GOUT FLARE UPS     • Multiple Vitamin (MULTI-DAY VITAMINS) tablet Take 1 tablet by mouth Daily. Multivitamin with Iron     • NEUPRO 4 MG/24HR patch APPLY 1 PATCH DAILY AS DIRECTED.**PAYABLE 5/8/17 90 patch 2   • ondansetron (ZOFRAN) 8 MG tablet Take 1 tablet by mouth Every 8 (Eight) Hours As Needed for Nausea or Vomiting. 30 tablet 2   • pantoprazole (PROTONIX) 40 MG EC tablet TAKE 1 TABLET BY MOUTH EVERY MORNING  11   • psyllium (METAMUCIL) 58.6 % powder Take 1 packet by mouth Daily.     • spironolactone (ALDACTONE) 25 MG tablet TAKE 1/2 TABLET DAILY 45 tablet 1   • Vitamin E 400 UNITS tablet Take 400 Units by mouth Daily. PT HOLDING FOR SURGERY       Current Facility-Administered Medications on File Prior to Visit   Medication Dose Route Frequency Provider Last Rate Last Dose   • heparin flush (porcine) 100 UNIT/ML injection 500 Units  500 Units Intravenous PRN Anuel Scott MD   500 Units at 11/27/17 1347   • sodium chloride 0.9 % flush 10 mL  10 mL Intravenous PRN Anuel Scott MD   10 mL at 11/27/17 1347       ALLERGIES:     Allergies   Allergen Reactions   •  "Chlorhexidine Rash and Itching     Patient states \"blue dye\" in chlorhexidine.  States the orange and clear are OK to use   • Chlorhexidine Gluconate Itching     i   • Codeine Other (See Comments)     Want to climb the walls, doesn't help pain   • Propoxyphene Other (See Comments)     Belligerent, acting drunk       Social History     Social History   • Marital status:      Spouse name: Zackery   • Number of children: 5   • Years of education: 1 yr college     Occupational History   •  Retired     Social History Main Topics   • Smoking status: Never Smoker   • Smokeless tobacco: Never Used   • Alcohol use No   • Drug use: No   • Sexual activity: Defer      Comment: AGE!!!     Other Topics Concern   • Not on file     Social History Narrative   • No narrative on file         Cancer-related family history is not on file.     Review of Systems   Constitutional: Negative.    HENT: Negative.    Eyes: Negative.    Respiratory: Negative.    Cardiovascular: Negative.    Gastrointestinal: Negative for diarrhea.   Endocrine: Negative.    Genitourinary: Negative.    Musculoskeletal: Negative.    Skin: Negative.    Hematological: Negative.  Does not bruise/bleed easily.     A comprehensive 14 point review of systems was performed and was negative except as mentioned.    Objective      Vitals:    07/25/18 1103   BP: 107/67   Pulse: 81   Resp: 14   Temp: 97.8 °F (36.6 °C)   TempSrc: Oral   SpO2: 98%   Weight: 78.9 kg (174 lb)   Height: 176 cm (69.29\")     Current Status 7/25/2018   ECOG score 0       Physical Exam   Constitutional: She appears well-developed and well-nourished.   HENT:   Head: Normocephalic.   Eyes: Conjunctivae are normal. No scleral icterus.   Cardiovascular: Normal rate.    Pulmonary/Chest: Effort normal.   Abdominal: Soft. Bowel sounds are normal.   Skin: No rash noted.   Psychiatric: She has a normal mood and affect.         RECENT LABS:  Hematology WBC   Date Value Ref Range Status   07/25/2018 5.99 " 4.50 - 10.70 10*3/mm3 Final     RBC   Date Value Ref Range Status   07/25/2018 3.29 (L) 3.90 - 5.20 10*6/mm3 Final     Hemoglobin   Date Value Ref Range Status   07/25/2018 9.8 (L) 11.9 - 15.5 g/dL Final     Hematocrit   Date Value Ref Range Status   07/25/2018 31.3 (L) 35.6 - 45.5 % Final     Platelets   Date Value Ref Range Status   07/25/2018 171 140 - 500 10*3/mm3 Final        Assessment/Plan   Anemia of stage 4 CKD:  Recent bone marrow biopsy demonstrates no abnormalities, therefore continued anemia is likely related to renal insufficiency, which is followed closely by Dr. Abraham.     She has responded to increased frequency of Procrit dosing. We will maintain a weekly schedule today.

## 2018-07-31 DIAGNOSIS — D63.1 ANEMIA OF CHRONIC RENAL FAILURE, STAGE 4 (SEVERE) (HCC): Primary | ICD-10-CM

## 2018-07-31 DIAGNOSIS — N18.4 ANEMIA OF CHRONIC RENAL FAILURE, STAGE 4 (SEVERE) (HCC): Primary | ICD-10-CM

## 2018-08-02 ENCOUNTER — INFUSION (OUTPATIENT)
Dept: ONCOLOGY | Facility: HOSPITAL | Age: 78
End: 2018-08-02

## 2018-08-02 ENCOUNTER — LAB (OUTPATIENT)
Dept: OTHER | Facility: HOSPITAL | Age: 78
End: 2018-08-02

## 2018-08-02 ENCOUNTER — APPOINTMENT (OUTPATIENT)
Dept: LAB | Facility: HOSPITAL | Age: 78
End: 2018-08-02

## 2018-08-02 VITALS — TEMPERATURE: 98 F

## 2018-08-02 DIAGNOSIS — D63.1 ANEMIA OF CHRONIC RENAL FAILURE, STAGE 4 (SEVERE) (HCC): ICD-10-CM

## 2018-08-02 DIAGNOSIS — Z45.2 ENCOUNTER FOR FITTING AND ADJUSTMENT OF VASCULAR CATHETER: Primary | ICD-10-CM

## 2018-08-02 DIAGNOSIS — N18.4 ANEMIA OF CHRONIC RENAL FAILURE, STAGE 4 (SEVERE) (HCC): ICD-10-CM

## 2018-08-02 LAB
ABO GROUP BLD: NORMAL
BASOPHILS # BLD AUTO: 0.01 10*3/MM3 (ref 0–0.2)
BASOPHILS NFR BLD AUTO: 0.2 % (ref 0–1.5)
BLD GP AB SCN SERPL QL: NEGATIVE
DEPRECATED RDW RBC AUTO: 61 FL (ref 37–54)
EOSINOPHIL # BLD AUTO: 0.09 10*3/MM3 (ref 0–0.7)
EOSINOPHIL NFR BLD AUTO: 1.5 % (ref 0.3–6.2)
ERYTHROCYTE [DISTWIDTH] IN BLOOD BY AUTOMATED COUNT: 17.1 % (ref 11.7–13)
FERRITIN SERPL-MCNC: 21.6 NG/ML (ref 13–150)
HCT VFR BLD AUTO: 28 % (ref 35.6–45.5)
HGB BLD-MCNC: 8.4 G/DL (ref 11.9–15.5)
IMM GRANULOCYTES # BLD: 0.02 10*3/MM3 (ref 0–0.03)
IMM GRANULOCYTES NFR BLD: 0.3 % (ref 0–0.5)
IRON 24H UR-MRATE: 33 MCG/DL (ref 37–145)
IRON SATN MFR SERPL: 8 % (ref 20–50)
LYMPHOCYTES # BLD AUTO: 0.72 10*3/MM3 (ref 0.9–4.8)
LYMPHOCYTES NFR BLD AUTO: 12.3 % (ref 19.6–45.3)
MCH RBC QN AUTO: 29.2 PG (ref 26.9–32)
MCHC RBC AUTO-ENTMCNC: 30 G/DL (ref 32.4–36.3)
MCV RBC AUTO: 97.2 FL (ref 80.5–98.2)
MONOCYTES # BLD AUTO: 0.53 10*3/MM3 (ref 0.2–1.2)
MONOCYTES NFR BLD AUTO: 9.1 % (ref 5–12)
NEUTROPHILS # BLD AUTO: 4.48 10*3/MM3 (ref 1.9–8.1)
NEUTROPHILS NFR BLD AUTO: 76.6 % (ref 42.7–76)
NRBC BLD MANUAL-RTO: 0 /100 WBC (ref 0–0)
PLATELET # BLD AUTO: 166 10*3/MM3 (ref 140–500)
PMV BLD AUTO: 8.8 FL (ref 6–12)
RBC # BLD AUTO: 2.88 10*6/MM3 (ref 3.9–5.2)
RH BLD: NEGATIVE
T&S EXPIRATION DATE: NORMAL
TIBC SERPL-MCNC: 399 MCG/DL (ref 298–536)
TRANSFERRIN SERPL-MCNC: 268 MG/DL (ref 200–360)
WBC NRBC COR # BLD: 5.85 10*3/MM3 (ref 4.5–10.7)

## 2018-08-02 PROCEDURE — 63510000001 EPOETIN ALFA PER 1000 UNITS: Performed by: NURSE PRACTITIONER

## 2018-08-02 PROCEDURE — 25010000002 HEPARIN FLUSH (PORCINE) 100 UNIT/ML SOLUTION: Performed by: INTERNAL MEDICINE

## 2018-08-02 PROCEDURE — 96372 THER/PROPH/DIAG INJ SC/IM: CPT

## 2018-08-02 PROCEDURE — 86850 RBC ANTIBODY SCREEN: CPT | Performed by: NURSE PRACTITIONER

## 2018-08-02 PROCEDURE — 82728 ASSAY OF FERRITIN: CPT | Performed by: INTERNAL MEDICINE

## 2018-08-02 PROCEDURE — 85025 COMPLETE CBC W/AUTO DIFF WBC: CPT | Performed by: INTERNAL MEDICINE

## 2018-08-02 PROCEDURE — 86900 BLOOD TYPING SEROLOGIC ABO: CPT | Performed by: NURSE PRACTITIONER

## 2018-08-02 PROCEDURE — 86923 COMPATIBILITY TEST ELECTRIC: CPT

## 2018-08-02 PROCEDURE — 36415 COLL VENOUS BLD VENIPUNCTURE: CPT

## 2018-08-02 PROCEDURE — 83540 ASSAY OF IRON: CPT | Performed by: INTERNAL MEDICINE

## 2018-08-02 PROCEDURE — 84466 ASSAY OF TRANSFERRIN: CPT | Performed by: INTERNAL MEDICINE

## 2018-08-02 PROCEDURE — 86901 BLOOD TYPING SEROLOGIC RH(D): CPT | Performed by: NURSE PRACTITIONER

## 2018-08-02 RX ORDER — DIPHENHYDRAMINE HCL 25 MG
25 CAPSULE ORAL ONCE
Status: CANCELLED | OUTPATIENT
Start: 2018-08-04 | End: 2018-08-04

## 2018-08-02 RX ORDER — SODIUM CHLORIDE 9 MG/ML
250 INJECTION, SOLUTION INTRAVENOUS AS NEEDED
Status: CANCELLED | OUTPATIENT
Start: 2018-08-04 | End: 2019-08-04

## 2018-08-02 RX ORDER — ACETAMINOPHEN 325 MG/1
650 TABLET ORAL ONCE
Status: CANCELLED | OUTPATIENT
Start: 2018-08-04 | End: 2018-08-04

## 2018-08-02 RX ORDER — SODIUM CHLORIDE 0.9 % (FLUSH) 0.9 %
10 SYRINGE (ML) INJECTION AS NEEDED
Status: CANCELLED | OUTPATIENT
Start: 2018-08-02

## 2018-08-02 RX ORDER — SODIUM CHLORIDE 0.9 % (FLUSH) 0.9 %
10 SYRINGE (ML) INJECTION AS NEEDED
Status: DISCONTINUED | OUTPATIENT
Start: 2018-08-02 | End: 2018-08-02 | Stop reason: HOSPADM

## 2018-08-02 RX ADMIN — ERYTHROPOIETIN 30000 UNITS: 20000 INJECTION, SOLUTION INTRAVENOUS; SUBCUTANEOUS at 13:16

## 2018-08-02 RX ADMIN — SODIUM CHLORIDE, PRESERVATIVE FREE 500 UNITS: 5 INJECTION INTRAVENOUS at 12:45

## 2018-08-02 RX ADMIN — Medication 10 ML: at 12:45

## 2018-08-03 ENCOUNTER — TELEPHONE (OUTPATIENT)
Dept: ONCOLOGY | Facility: HOSPITAL | Age: 78
End: 2018-08-03

## 2018-08-03 NOTE — TELEPHONE ENCOUNTER
----- Message from Nisa Will sent at 8/3/2018  4:19 PM EDT -----  Contact: [phone]  Pt concerned about HGB dropping.  Worried about drop in HGB. Questions answered.

## 2018-08-04 ENCOUNTER — INFUSION (OUTPATIENT)
Dept: ONCOLOGY | Facility: HOSPITAL | Age: 78
End: 2018-08-04

## 2018-08-04 VITALS
RESPIRATION RATE: 14 BRPM | DIASTOLIC BLOOD PRESSURE: 68 MMHG | HEART RATE: 68 BPM | TEMPERATURE: 97 F | SYSTOLIC BLOOD PRESSURE: 115 MMHG | OXYGEN SATURATION: 98 %

## 2018-08-04 DIAGNOSIS — Z45.2 ENCOUNTER FOR FITTING AND ADJUSTMENT OF VASCULAR CATHETER: ICD-10-CM

## 2018-08-04 PROCEDURE — 63710000001 DIPHENHYDRAMINE PER 50 MG: Performed by: NURSE PRACTITIONER

## 2018-08-04 PROCEDURE — 86900 BLOOD TYPING SEROLOGIC ABO: CPT

## 2018-08-04 PROCEDURE — P9016 RBC LEUKOCYTES REDUCED: HCPCS

## 2018-08-04 PROCEDURE — 36430 TRANSFUSION BLD/BLD COMPNT: CPT

## 2018-08-04 RX ORDER — SODIUM CHLORIDE 9 MG/ML
250 INJECTION, SOLUTION INTRAVENOUS AS NEEDED
Status: DISCONTINUED | OUTPATIENT
Start: 2018-08-04 | End: 2018-08-04 | Stop reason: HOSPADM

## 2018-08-04 RX ORDER — DIPHENHYDRAMINE HCL 25 MG
25 CAPSULE ORAL ONCE
Status: COMPLETED | OUTPATIENT
Start: 2018-08-04 | End: 2018-08-04

## 2018-08-04 RX ORDER — ACETAMINOPHEN 325 MG/1
650 TABLET ORAL ONCE
Status: COMPLETED | OUTPATIENT
Start: 2018-08-04 | End: 2018-08-04

## 2018-08-04 RX ADMIN — DIPHENHYDRAMINE HYDROCHLORIDE 25 MG: 25 CAPSULE ORAL at 08:29

## 2018-08-04 RX ADMIN — ACETAMINOPHEN 650 MG: 325 TABLET, FILM COATED ORAL at 08:29

## 2018-08-09 ENCOUNTER — APPOINTMENT (OUTPATIENT)
Dept: LAB | Facility: HOSPITAL | Age: 78
End: 2018-08-09

## 2018-08-09 ENCOUNTER — INFUSION (OUTPATIENT)
Dept: ONCOLOGY | Facility: HOSPITAL | Age: 78
End: 2018-08-09

## 2018-08-09 ENCOUNTER — LAB (OUTPATIENT)
Dept: OTHER | Facility: HOSPITAL | Age: 78
End: 2018-08-09

## 2018-08-09 VITALS — BODY MASS INDEX: 26.21 KG/M2 | TEMPERATURE: 98.1 F | WEIGHT: 179 LBS

## 2018-08-09 DIAGNOSIS — D63.1 ANEMIA OF CHRONIC RENAL FAILURE, STAGE 4 (SEVERE) (HCC): ICD-10-CM

## 2018-08-09 DIAGNOSIS — N18.4 ANEMIA OF CHRONIC RENAL FAILURE, STAGE 4 (SEVERE) (HCC): ICD-10-CM

## 2018-08-09 LAB
BASOPHILS # BLD AUTO: 0.01 10*3/MM3 (ref 0–0.2)
BASOPHILS NFR BLD AUTO: 0.2 % (ref 0–1.5)
DEPRECATED RDW RBC AUTO: 61.7 FL (ref 37–54)
EOSINOPHIL # BLD AUTO: 0.1 10*3/MM3 (ref 0–0.7)
EOSINOPHIL NFR BLD AUTO: 1.8 % (ref 0.3–6.2)
ERYTHROCYTE [DISTWIDTH] IN BLOOD BY AUTOMATED COUNT: 18.6 % (ref 11.7–13)
HCT VFR BLD AUTO: 33.9 % (ref 35.6–45.5)
HGB BLD-MCNC: 10.8 G/DL (ref 11.9–15.5)
IMM GRANULOCYTES # BLD: 0.02 10*3/MM3 (ref 0–0.03)
IMM GRANULOCYTES NFR BLD: 0.4 % (ref 0–0.5)
LYMPHOCYTES # BLD AUTO: 0.86 10*3/MM3 (ref 0.9–4.8)
LYMPHOCYTES NFR BLD AUTO: 15.1 % (ref 19.6–45.3)
MCH RBC QN AUTO: 29 PG (ref 26.9–32)
MCHC RBC AUTO-ENTMCNC: 31.9 G/DL (ref 32.4–36.3)
MCV RBC AUTO: 91.1 FL (ref 80.5–98.2)
MONOCYTES # BLD AUTO: 0.42 10*3/MM3 (ref 0.2–1.2)
MONOCYTES NFR BLD AUTO: 7.4 % (ref 5–12)
NEUTROPHILS # BLD AUTO: 4.3 10*3/MM3 (ref 1.9–8.1)
NEUTROPHILS NFR BLD AUTO: 75.1 % (ref 42.7–76)
NRBC BLD MANUAL-RTO: 0 /100 WBC (ref 0–0)
PLATELET # BLD AUTO: 192 10*3/MM3 (ref 140–500)
PMV BLD AUTO: 9.8 FL (ref 6–12)
RBC # BLD AUTO: 3.72 10*6/MM3 (ref 3.9–5.2)
WBC NRBC COR # BLD: 5.71 10*3/MM3 (ref 4.5–10.7)

## 2018-08-09 PROCEDURE — 85025 COMPLETE CBC W/AUTO DIFF WBC: CPT | Performed by: INTERNAL MEDICINE

## 2018-08-09 PROCEDURE — 36415 COLL VENOUS BLD VENIPUNCTURE: CPT

## 2018-08-09 NOTE — PROGRESS NOTES
Patient arrived ambulatory for Procrit injection today. Labs reviewed and hemoglobin 10.8 today no injection given per doctor order. Reviewed labs with patient and D/C home ambulatory.

## 2018-08-16 ENCOUNTER — LAB (OUTPATIENT)
Dept: OTHER | Facility: HOSPITAL | Age: 78
End: 2018-08-16

## 2018-08-16 ENCOUNTER — INFUSION (OUTPATIENT)
Dept: ONCOLOGY | Facility: HOSPITAL | Age: 78
End: 2018-08-16

## 2018-08-16 VITALS — WEIGHT: 179 LBS | BODY MASS INDEX: 26.21 KG/M2 | TEMPERATURE: 98.2 F

## 2018-08-16 DIAGNOSIS — N18.4 ANEMIA OF CHRONIC RENAL FAILURE, STAGE 4 (SEVERE) (HCC): ICD-10-CM

## 2018-08-16 DIAGNOSIS — D63.1 ANEMIA OF CHRONIC RENAL FAILURE, STAGE 4 (SEVERE) (HCC): ICD-10-CM

## 2018-08-16 DIAGNOSIS — N18.4 ANEMIA OF CHRONIC RENAL FAILURE, STAGE 4 (SEVERE) (HCC): Primary | ICD-10-CM

## 2018-08-16 DIAGNOSIS — D63.1 ANEMIA OF CHRONIC RENAL FAILURE, STAGE 4 (SEVERE) (HCC): Primary | ICD-10-CM

## 2018-08-16 LAB
BASOPHILS # BLD AUTO: 0.02 10*3/MM3 (ref 0–0.2)
BASOPHILS NFR BLD AUTO: 0.4 % (ref 0–1.5)
DEPRECATED RDW RBC AUTO: 60.2 FL (ref 37–54)
EOSINOPHIL # BLD AUTO: 0.11 10*3/MM3 (ref 0–0.7)
EOSINOPHIL NFR BLD AUTO: 2.1 % (ref 0.3–6.2)
ERYTHROCYTE [DISTWIDTH] IN BLOOD BY AUTOMATED COUNT: 17.6 % (ref 11.7–13)
HCT VFR BLD AUTO: 31 % (ref 35.6–45.5)
HGB BLD-MCNC: 9.8 G/DL (ref 11.9–15.5)
IMM GRANULOCYTES # BLD: 0.02 10*3/MM3 (ref 0–0.03)
IMM GRANULOCYTES NFR BLD: 0.4 % (ref 0–0.5)
LYMPHOCYTES # BLD AUTO: 0.86 10*3/MM3 (ref 0.9–4.8)
LYMPHOCYTES NFR BLD AUTO: 16.4 % (ref 19.6–45.3)
MCH RBC QN AUTO: 29.1 PG (ref 26.9–32)
MCHC RBC AUTO-ENTMCNC: 31.6 G/DL (ref 32.4–36.3)
MCV RBC AUTO: 92 FL (ref 80.5–98.2)
MONOCYTES # BLD AUTO: 0.36 10*3/MM3 (ref 0.2–1.2)
MONOCYTES NFR BLD AUTO: 6.9 % (ref 5–12)
NEUTROPHILS # BLD AUTO: 3.88 10*3/MM3 (ref 1.9–8.1)
NEUTROPHILS NFR BLD AUTO: 73.8 % (ref 42.7–76)
NRBC BLD MANUAL-RTO: 0 /100 WBC (ref 0–0)
PLATELET # BLD AUTO: 163 10*3/MM3 (ref 140–500)
PMV BLD AUTO: 10.4 FL (ref 6–12)
RBC # BLD AUTO: 3.37 10*6/MM3 (ref 3.9–5.2)
WBC NRBC COR # BLD: 5.25 10*3/MM3 (ref 4.5–10.7)

## 2018-08-16 PROCEDURE — 96372 THER/PROPH/DIAG INJ SC/IM: CPT

## 2018-08-16 PROCEDURE — 63510000001 EPOETIN ALFA PER 1000 UNITS: Performed by: NURSE PRACTITIONER

## 2018-08-16 PROCEDURE — 85025 COMPLETE CBC W/AUTO DIFF WBC: CPT | Performed by: INTERNAL MEDICINE

## 2018-08-16 PROCEDURE — 36415 COLL VENOUS BLD VENIPUNCTURE: CPT

## 2018-08-16 RX ADMIN — ERYTHROPOIETIN 30000 UNITS: 10000 INJECTION, SOLUTION INTRAVENOUS; SUBCUTANEOUS at 13:03

## 2018-08-17 ENCOUNTER — OFFICE VISIT (OUTPATIENT)
Dept: CARDIOLOGY | Facility: CLINIC | Age: 78
End: 2018-08-17

## 2018-08-17 ENCOUNTER — CLINICAL SUPPORT NO REQUIREMENTS (OUTPATIENT)
Dept: CARDIOLOGY | Facility: CLINIC | Age: 78
End: 2018-08-17

## 2018-08-17 ENCOUNTER — HOSPITAL ENCOUNTER (OUTPATIENT)
Dept: CARDIOLOGY | Facility: HOSPITAL | Age: 78
Discharge: HOME | End: 2018-08-17
Attending: INTERNAL MEDICINE | Admitting: INTERNAL MEDICINE

## 2018-08-17 VITALS
HEART RATE: 61 BPM | SYSTOLIC BLOOD PRESSURE: 110 MMHG | WEIGHT: 177 LBS | DIASTOLIC BLOOD PRESSURE: 62 MMHG | BODY MASS INDEX: 26.22 KG/M2 | HEIGHT: 69 IN

## 2018-08-17 VITALS
DIASTOLIC BLOOD PRESSURE: 62 MMHG | WEIGHT: 179 LBS | BODY MASS INDEX: 26.51 KG/M2 | HEIGHT: 69 IN | SYSTOLIC BLOOD PRESSURE: 118 MMHG | HEART RATE: 70 BPM

## 2018-08-17 DIAGNOSIS — E78.2 MIXED HYPERLIPIDEMIA: ICD-10-CM

## 2018-08-17 DIAGNOSIS — I34.0 NON-RHEUMATIC MITRAL REGURGITATION: ICD-10-CM

## 2018-08-17 DIAGNOSIS — I10 ESSENTIAL HYPERTENSION: ICD-10-CM

## 2018-08-17 DIAGNOSIS — I42.9 CARDIOMYOPATHY, UNSPECIFIED TYPE (HCC): Primary | ICD-10-CM

## 2018-08-17 DIAGNOSIS — I42.8 NONISCHEMIC CARDIOMYOPATHY (HCC): Primary | ICD-10-CM

## 2018-08-17 DIAGNOSIS — N18.4 RENAL FAILURE, CHRONIC, STAGE 4 (SEVERE) (HCC): ICD-10-CM

## 2018-08-17 LAB
ASCENDING AORTA: 2.5 CM
BH CV ECHO MEAS - ACS: 1.4 CM
BH CV ECHO MEAS - AO MAX PG (FULL): 9.8 MMHG
BH CV ECHO MEAS - AO MAX PG: 13.4 MMHG
BH CV ECHO MEAS - AO MEAN PG (FULL): 6.3 MMHG
BH CV ECHO MEAS - AO MEAN PG: 8.8 MMHG
BH CV ECHO MEAS - AO ROOT AREA (BSA CORRECTED): 1.6
BH CV ECHO MEAS - AO ROOT AREA: 8.3 CM^2
BH CV ECHO MEAS - AO ROOT DIAM: 3.3 CM
BH CV ECHO MEAS - AO V2 MAX: 183.3 CM/SEC
BH CV ECHO MEAS - AO V2 MEAN: 143.6 CM/SEC
BH CV ECHO MEAS - AO V2 VTI: 44 CM
BH CV ECHO MEAS - AVA(I,A): 1.5 CM^2
BH CV ECHO MEAS - AVA(I,D): 1.5 CM^2
BH CV ECHO MEAS - AVA(V,A): 1.6 CM^2
BH CV ECHO MEAS - AVA(V,D): 1.6 CM^2
BH CV ECHO MEAS - BSA(HAYCOCK): 2 M^2
BH CV ECHO MEAS - BSA: 2 M^2
BH CV ECHO MEAS - BZI_BMI: 26.4 KILOGRAMS/M^2
BH CV ECHO MEAS - BZI_METRIC_HEIGHT: 175.3 CM
BH CV ECHO MEAS - BZI_METRIC_WEIGHT: 81.2 KG
BH CV ECHO MEAS - EDV(MOD-SP2): 81 ML
BH CV ECHO MEAS - EDV(MOD-SP4): 51 ML
BH CV ECHO MEAS - EDV(TEICH): 130.9 ML
BH CV ECHO MEAS - EF(CUBED): 71.8 %
BH CV ECHO MEAS - EF(MOD-BP): 57 %
BH CV ECHO MEAS - EF(MOD-SP2): 58 %
BH CV ECHO MEAS - EF(MOD-SP4): 54.9 %
BH CV ECHO MEAS - EF(TEICH): 63.1 %
BH CV ECHO MEAS - ESV(MOD-SP2): 34 ML
BH CV ECHO MEAS - ESV(MOD-SP4): 23 ML
BH CV ECHO MEAS - ESV(TEICH): 48.4 ML
BH CV ECHO MEAS - FS: 34.4 %
BH CV ECHO MEAS - IVS/LVPW: 1
BH CV ECHO MEAS - IVSD: 0.87 CM
BH CV ECHO MEAS - LAT PEAK E' VEL: 7 CM/SEC
BH CV ECHO MEAS - LV DIASTOLIC VOL/BSA (35-75): 25.9 ML/M^2
BH CV ECHO MEAS - LV MASS(C)D: 157.8 GRAMS
BH CV ECHO MEAS - LV MASS(C)DI: 80.1 GRAMS/M^2
BH CV ECHO MEAS - LV MAX PG: 3.6 MMHG
BH CV ECHO MEAS - LV MEAN PG: 2.5 MMHG
BH CV ECHO MEAS - LV SYSTOLIC VOL/BSA (12-30): 11.7 ML/M^2
BH CV ECHO MEAS - LV V1 MAX: 95.1 CM/SEC
BH CV ECHO MEAS - LV V1 MEAN: 75.7 CM/SEC
BH CV ECHO MEAS - LV V1 VTI: 22.6 CM
BH CV ECHO MEAS - LVIDD: 5.2 CM
BH CV ECHO MEAS - LVIDS: 3.4 CM
BH CV ECHO MEAS - LVLD AP2: 7 CM
BH CV ECHO MEAS - LVLD AP4: 7 CM
BH CV ECHO MEAS - LVLS AP2: 5.7 CM
BH CV ECHO MEAS - LVLS AP4: 6.5 CM
BH CV ECHO MEAS - LVOT AREA (M): 3.1 CM^2
BH CV ECHO MEAS - LVOT AREA: 3 CM^2
BH CV ECHO MEAS - LVOT DIAM: 2 CM
BH CV ECHO MEAS - LVPWD: 0.83 CM
BH CV ECHO MEAS - MED PEAK E' VEL: 6 CM/SEC
BH CV ECHO MEAS - MR MAX PG: 34.4 MMHG
BH CV ECHO MEAS - MR MAX VEL: 293.2 CM/SEC
BH CV ECHO MEAS - MV A DUR: 0.12 SEC
BH CV ECHO MEAS - MV A MAX VEL: 89.7 CM/SEC
BH CV ECHO MEAS - MV DEC SLOPE: 239.7 CM/SEC^2
BH CV ECHO MEAS - MV DEC TIME: 0.26 SEC
BH CV ECHO MEAS - MV E MAX VEL: 63.1 CM/SEC
BH CV ECHO MEAS - MV E/A: 0.7
BH CV ECHO MEAS - MV MAX PG: 4.2 MMHG
BH CV ECHO MEAS - MV MEAN PG: 1.6 MMHG
BH CV ECHO MEAS - MV P1/2T MAX VEL: 65 CM/SEC
BH CV ECHO MEAS - MV P1/2T: 79.4 MSEC
BH CV ECHO MEAS - MV V2 MAX: 102.1 CM/SEC
BH CV ECHO MEAS - MV V2 MEAN: 58 CM/SEC
BH CV ECHO MEAS - MV V2 VTI: 29.2 CM
BH CV ECHO MEAS - MVA P1/2T LCG: 3.4 CM^2
BH CV ECHO MEAS - MVA(P1/2T): 2.8 CM^2
BH CV ECHO MEAS - MVA(VTI): 2.3 CM^2
BH CV ECHO MEAS - PA ACC TIME: 0.14 SEC
BH CV ECHO MEAS - PA MAX PG (FULL): 0.86 MMHG
BH CV ECHO MEAS - PA MAX PG: 2.3 MMHG
BH CV ECHO MEAS - PA PR(ACCEL): 17.2 MMHG
BH CV ECHO MEAS - PA V2 MAX: 75.6 CM/SEC
BH CV ECHO MEAS - PULM A REVS DUR: 0.09 SEC
BH CV ECHO MEAS - PULM A REVS VEL: 31.5 CM/SEC
BH CV ECHO MEAS - PULM DIAS VEL: 42.7 CM/SEC
BH CV ECHO MEAS - PULM S/D: 1.7
BH CV ECHO MEAS - PULM SYS VEL: 73.2 CM/SEC
BH CV ECHO MEAS - PVA(V,A): 2.6 CM^2
BH CV ECHO MEAS - PVA(V,D): 2.6 CM^2
BH CV ECHO MEAS - QP/QS: 0.64
BH CV ECHO MEAS - RAP SYSTOLE: 3 MMHG
BH CV ECHO MEAS - RV MAX PG: 1.4 MMHG
BH CV ECHO MEAS - RV MEAN PG: 0.76 MMHG
BH CV ECHO MEAS - RV V1 MAX: 59.7 CM/SEC
BH CV ECHO MEAS - RV V1 MEAN: 40.7 CM/SEC
BH CV ECHO MEAS - RV V1 VTI: 13 CM
BH CV ECHO MEAS - RVOT AREA: 3.3 CM^2
BH CV ECHO MEAS - RVOT DIAM: 2.1 CM
BH CV ECHO MEAS - RVSP: 31 MMHG
BH CV ECHO MEAS - SI(AO): 185.2 ML/M^2
BH CV ECHO MEAS - SI(CUBED): 51.9 ML/M^2
BH CV ECHO MEAS - SI(LVOT): 34.6 ML/M^2
BH CV ECHO MEAS - SI(MOD-SP2): 23.9 ML/M^2
BH CV ECHO MEAS - SI(MOD-SP4): 14.2 ML/M^2
BH CV ECHO MEAS - SI(TEICH): 41.9 ML/M^2
BH CV ECHO MEAS - SUP REN AO DIAM: 1.5 CM
BH CV ECHO MEAS - SV(AO): 365 ML
BH CV ECHO MEAS - SV(CUBED): 102.3 ML
BH CV ECHO MEAS - SV(LVOT): 68.1 ML
BH CV ECHO MEAS - SV(MOD-SP2): 47 ML
BH CV ECHO MEAS - SV(MOD-SP4): 28 ML
BH CV ECHO MEAS - SV(RVOT): 43.3 ML
BH CV ECHO MEAS - SV(TEICH): 82.5 ML
BH CV ECHO MEAS - TAPSE (>1.6): 2 CM2
BH CV ECHO MEAS - TR MAX VEL: 263.9 CM/SEC
BH CV ECHO MEASUREMENTS AVERAGE E/E' RATIO: 9.71
BH CV XLRA - RV BASE: 2.6 CM
BH CV XLRA - TDI S': 14 CM/SEC
LEFT ATRIUM VOLUME INDEX: 27 ML/M2
SINUS: 2.8 CM
STJ: 2.2 CM

## 2018-08-17 PROCEDURE — 93290 INTERROG DEV EVAL ICPMS IP: CPT | Performed by: INTERNAL MEDICINE

## 2018-08-17 PROCEDURE — 99214 OFFICE O/P EST MOD 30 MIN: CPT | Performed by: INTERNAL MEDICINE

## 2018-08-17 PROCEDURE — 93000 ELECTROCARDIOGRAM COMPLETE: CPT | Performed by: INTERNAL MEDICINE

## 2018-08-17 PROCEDURE — 93284 PRGRMG EVAL IMPLANTABLE DFB: CPT | Performed by: INTERNAL MEDICINE

## 2018-08-17 PROCEDURE — 93306 TTE W/DOPPLER COMPLETE: CPT | Performed by: INTERNAL MEDICINE

## 2018-08-17 PROCEDURE — 93306 TTE W/DOPPLER COMPLETE: CPT

## 2018-08-17 RX ORDER — CARBIDOPA AND LEVODOPA 25; 100 MG/1; MG/1
1 TABLET, EXTENDED RELEASE ORAL EVERY EVENING
COMMUNITY
Start: 2018-08-02

## 2018-08-17 NOTE — PROGRESS NOTES
Date of Office Visit: 18  Encounter Provider: Estevan Matamoros MD  Place of Service: [facility] CARDIOLOGY  Patient Name: Charmaine Machuca  :1940  Referral Provider:Referring, Self      Chief Complaint   Patient presents with   • Cardiomyopathy   • Congestive Heart Failure     History of Present Illness      The patient is a pleasant 78-year-old female with a history of nonischemic  cardiomyopathy, hypertension, hyperlipidemia and LBBB. Original ejection   fraction was extremely low. She was enrolled in the [facility] study of ACE   inhibitor plus Atacand versus ACE inhibitor plus a placebo. Then, in 2005   she had a perfusion study that showed infarct but no ischemia. She had cardiac   catheterization in 2006, which showed moderate left ventricular dysfunction,   elevated right-sided pressure, left anterior end-diastolic pressure, mild mitral insufficiency,  but normal coronary arteries. She was treated medically. She then had  worsening dyspnea. In 2007, had a Bi-V defibrillator placed and she  continued to have GI problems, fatigue, weakness, dizziness, syncopal, near  syncopal spells, multiple medications were adjusted. Diuretics were  adjusted. On diuretics, off diuretics, volume overloaded, volume depleted.  She was diagnosed with myoclonus. Multiple GI complaints.  She then had her generator changed in 2014. Unfortunately, a day or two after that  developed small bowel obstruction, had to be admitted to the hospital and was then treated  for that. That resolved. She developed C. difficile infection, was treated for that. She  developed a pocket hematoma and had to have that evacuated.   She had a followup echocardiogram in 2015 that showed normal ejection fraction, mild  mitral insufficiency, mild aortic valve calcification without stenosis. She comes in for  followup now. She went to [facility] 2015 with this atypical  chest  discomfort, left-sided under her breast, pleuritic, worse when she would take a breath in,  worse when she would move to one side. No real increased shortness of breath with it, but  it was hard to take breath in. While there, she had a ventilation perfusion scan that was  negative for pulmonary embolus. She had a 2D echocardiogram that, again, showed normal LV  systolic function and no significant valvular disease.  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  Had that removed and new lead replaced.    She got in for follow-up.  She's probably doing about the same.  She gets some shortness of breath if she increases her activity to much.  She has frequent lightheadedness but no near-syncope or syncope.  No real chest pain or pressure.  No orthopnea or PND.  She because of her stage IV renal failure is still requiring intermittent transfusions and Epogen shots.  She has some problems with her arthritis.  Unfortunately she's not doing any structured exercise.      Cardiomyopathy   Associated symptoms include joint swelling and numbness. Pertinent negatives include no abdominal pain, congestion, diaphoresis, fever, headaches, myalgias, nausea, rash, vertigo, vomiting or weakness.   Atrial Fibrillation   Symptoms are negative for dizziness, shortness of breath and weakness. Past medical history includes atrial fibrillation, AICD and CHF.   Congestive Heart Failure   Pertinent negatives include no abdominal pain, muscle weakness, nocturia or shortness of breath.         Past Medical History:   Diagnosis Date   • Anemia     Iron deficiency   • Cancer (CMS/HCC)     Skin cancer   • Cardiomyopathy (CMS/HCC)     S/P pacemaker and defibrillator   • CHF (congestive heart failure) (CMS/HCC)    • Clostridium difficile diarrhea     2013   • Colon polyps    • Coronary artery disease     pacemaker, defibrillator   • Gastroparesis    • GERD (gastroesophageal reflux disease)    • Gout    • Hemorrhoids    • Hiatal  hernia    • History of pancreatitis 01/2014   • History of pancreatitis     2014   • History of transfusion    • Hyperlipidemia    • Hypothyroidism    • Left bundle branch block    • Mitral and aortic valve disease    • Mitral valve insufficiency    • Myoclonus     S/P Depakote   • Nephrolithiasis     stage 4 renal failure   • Osteoarthritis    • Pancytopenia (CMS/HCC)    • Peripheral neuropathy    • Progressive familial myoclonic epilepsy (CMS/HCC)    • Pulmonary hypertension    • RLS (restless legs syndrome)    • Ruptured ovarian cyst    • Seizures (CMS/HCC)     myoclonus   • Spinal stenosis    • Transfusion history     3 weeks ago         Past Surgical History:   Procedure Laterality Date   • APPENDECTOMY N/A    • CARDIAC CATHETERIZATION Left 03/28/2006    Arterial catheter insertion, cath left ventriculography, coronary angiography and left heart catheterization-Dr. Estevan Matamoros   • CARDIAC DEFIBRILLATOR PLACEMENT N/A 11/30/2007    Biventricular implantable cardioverter defibrillator-Dr. Leandro Huber   • CATARACT EXTRACTION Bilateral 2011   • COLONOSCOPY N/A 2014    done at [facility]   • CYSTECTOMY N/A     Ovarian cystectomy   • ENDOSCOPY N/A 04/28/2006    EGD with biopsies. Paraesophageal hiatal hernia from 34 to 44 cm, antral gastritis-Dr. Nehemiah Beal   • ENDOSCOPY N/A 09/29/2004    Hiatal hernia, gastritis and an erosion of the pylorus-Dr. Yang Pavon   • ENTEROSCOPY SMALL BOWEL N/A 10/30/2006    Small bowel enteroscopy with biopsies-Dr. Rory Sevilla   • FLEXIBLE SIGMOIDOSCOPY N/A 09/29/2004    Unsuccessful colonoscopy due to poop prep, a very tortuous sigmoid, bowel prep in the form of enema given and a barium enema was obtained-Dr. Yang Pavon   • FRACTURE SURGERY  2015    Broke big toe   • HEMATOMA EVACUATION TRUNK N/A 02/07/2014    Pocket evacuation of hematoma at pacemaker site-Dr. Leandro Huber   • HEMORRHOIDECTOMY N/A 04/19/2004    Flexible sigmoidoscopy and stapled hemorrhoidectomy-Dr. Soria  Savanah   • HYSTERECTOMY Bilateral 1977   • IMPLANTABLE CARDIOVERTER DEFIBRILLATOR LEAD REPLACEMENT/POCKET REVISION Left 3/28/2016    Procedure: AUTOMATIC IMPLANTABLE CARDIOVERTER DEFIBRILLATOR LEAD REPLACEMENT WITH LASER LEAD EXTRACTION;  Surgeon: Leandro Huber MD;  Location: [facility] OR 18/19;  Service:    • IMPLANTABLE CARDIOVERTER DEFIBRILLATOR LEAD REPLACEMENT/POCKET REVISION N/A 01/13/2014    Biventricular ICD replacement-Dr. Jillian Huber   • LAPAROSCOPY REPAIR HIATAL HERNIA N/A 06/27/2006    Laparoscopic paraesophageal hiatal hernia repair with Nissen fundoplication and cholecystectomy-Dr. Sean Yoon   • NATALIE GONZALEZ ([facility]) PROCEDURE     • TN INSJ TUNNELED CVC W/O SUBQ PORT/ AGE 3 YR/> Right 10/3/2017    Procedure: Right internal jugular port placement;  Surgeon: Tiffanie Couch MD;  Location: [facility] OR;  Service: General   • TOTAL KNEE ARTHROPLASTY Left 08/2014         Current Outpatient Prescriptions on File Prior to Visit   Medication Sig Dispense Refill   • allopurinol (ZYLOPRIM) 100 MG tablet Take 100 mg by mouth 2 (Two) Times a Day.     • aspirin 81 MG tablet Take 81 mg by mouth Daily. PT HOLDING FOR SURGERY     • calcitriol (ROCALTROL) 0.25 MCG capsule Take 0.25 mcg by mouth Daily. 1 tablet every other day and 2 tablets every other day     • carvedilol (COREG) 12.5 MG tablet TAKE 1 TABLET DAILY 90 tablet 1   • Cholecalciferol (VITAMIN D3) 5000 units capsule capsule Take 5,000 Units by mouth Daily. Every other day     • diazepam (VALIUM) 5 MG tablet Take 5 mg by mouth Every Night.     • furosemide (LASIX) 40 MG tablet TAKE 1 TABLET DAILY OR AS  DIRECTED 90 tablet 3   • Glucosamine-Chondroit-Vit C-Mn (GLUCOSAMINE CHONDROITIN COMPLX) capsule Take 1,500 mg by mouth Every Other Day. PT HOLDING FOR SURGERY     • GRALISE 300 MG tablet TAKE 1 TABLET DAILY WITH   DINNER 90 tablet 1   • levothyroxine (SYNTHROID, LEVOTHROID) 25 MCG tablet Take 25 mcg by mouth Daily.     •  loperamide (IMODIUM) 2 MG capsule Take 2 mg by mouth 4 (Four) Times a Day As Needed for Diarrhea.     • magnesium oxide 250 MG tablet Take 250 mg by mouth Daily.     • methscopolamine (PAMINE FORTE) 5 MG tablet Take 5 mg by mouth Daily.     • methylPREDNISolone (MEDROL) 4 MG tablet Take 4 mg by mouth As Needed. TAKES FOR GOUT FLARE UPS     • Multiple Vitamin (MULTI-DAY VITAMINS) tablet Take 1 tablet by mouth Daily. Multivitamin with Iron     • NEUPRO 4 MG/24HR patch APPLY 1 PATCH DAILY AS DIRECTED.**PAYABLE 5/8/17 90 patch 2   • ondansetron (ZOFRAN) 8 MG tablet Take 1 tablet by mouth Every 8 (Eight) Hours As Needed for Nausea or Vomiting. 30 tablet 2   • pantoprazole (PROTONIX) 40 MG EC tablet TAKE 1 TABLET BY MOUTH EVERY MORNING  11   • psyllium (METAMUCIL) 58.6 % powder Take 1 packet by mouth Daily.     • spironolactone (ALDACTONE) 25 MG tablet TAKE 1/2 TABLET DAILY 45 tablet 1   • Vitamin E 400 UNITS tablet Take 400 Units by mouth Daily. PT HOLDING FOR SURGERY       Current Facility-Administered Medications on File Prior to Visit   Medication Dose Route Frequency Provider Last Rate Last Dose   • heparin flush (porcine) 100 UNIT/ML injection 500 Units  500 Units Intravenous PRN Anuel Scott MD   500 Units at 11/27/17 1347   • sodium chloride 0.9 % flush 10 mL  10 mL Intravenous PRN Anuel Scott MD   10 mL at 11/27/17 1347   • [DISCONTINUED] epoetin jose (EPOGEN,PROCRIT) injection 30,000 Units  30,000 Units Subcutaneous Weekly Nisa Lloyd APRN   30,000 Units at 08/16/18 1303         Social History     Social History   • Marital status:      Spouse name: Zackery   • Number of children: 5   • Years of education: 1 yr college     Occupational History   •  Retired     Social History Main Topics   • Smoking status: Never Smoker   • Smokeless tobacco: Never Used   • Alcohol use No   • Drug use: No   • Sexual activity: Defer      Comment: AGE!!!     Other Topics Concern   • Not on file     Social  History Narrative   • No narrative on file       Family History   Problem Relation Age of Onset   • Coronary artery disease Other    • Dementia Other    • Kidney disease Other    • Arthritis Father    • Early death Father         Kidney failure   • Kidney disease Father    • Heart disease Mother    • Mental illness Mother         Dementia   • Malig Hyperthermia Neg Hx          Review of Systems   Constitution: Negative for decreased appetite, diaphoresis, fever, weakness, malaise/fatigue, weight gain and weight loss.   HENT: Negative for congestion, hearing loss, nosebleeds and tinnitus.    Eyes: Negative for blurred vision, double vision, vision loss in left eye, vision loss in right eye and visual disturbance.   Cardiovascular:        As noted in HPI   Respiratory: Negative for shortness of breath.         As noted HPI   Endocrine: Negative for cold intolerance and heat intolerance.   Hematologic/Lymphatic: Negative for bleeding problem. Does not bruise/bleed easily.   Skin: Negative for color change, flushing, itching and rash.   Musculoskeletal: Positive for joint swelling. Negative for arthritis, back pain, joint pain, muscle weakness and myalgias.   Gastrointestinal: Negative for bloating, abdominal pain, constipation, diarrhea, dysphagia, heartburn, hematemesis, hematochezia, melena, nausea and vomiting.   Genitourinary: Negative for bladder incontinence, dysuria, frequency, nocturia and urgency.   Neurological: Positive for numbness. Negative for dizziness, focal weakness, headaches, light-headedness, loss of balance, paresthesias and vertigo.   Psychiatric/Behavioral: Negative for depression, memory loss and substance abuse.       Procedures      ECG 12 Lead  Date/Time: 8/17/2018 12:02 PM  Performed by: YARON KRISHANN  Authorized by: YARON KRISHNAN   Comparison: compared with previous ECG   Similar to previous ECG  Rhythm: sinus rhythm  Rhythm comments: Ventricular paced                  Objective:     "/62 (BP Location: Left arm)   Pulse 61   Ht 175.3 cm (69\")   Wt 80.3 kg (177 lb)   BMI 26.14 kg/m²        Physical Exam  Physical Exam   Constitutional: She is oriented to person, place, and time. She appears well-developed and well-nourished. No distress.   HENT:   Head: Normocephalic.   Eyes: Pupils are equal, round, and reactive to light. Conjunctivae are normal. No scleral icterus.   Neck: Normal carotid pulses, no hepatojugular reflux and no JVD present. Carotid bruit is not present. No tracheal deviation, no edema and no erythema present. No thyromegaly present.   Cardiovascular: Normal rate, regular rhythm, S1 normal, S2 normal and intact distal pulses.   No extrasystoles are present. PMI is not displaced.  Exam reveals no gallop, no distant heart sounds and no friction rub.    Murmur heard.   Systolic murmur is present with a grade of 2/6   Pulses:       Carotid pulses are 2+ on the right side, and 2+ on the left side.       Radial pulses are 2+ on the right side, and 2+ on the left side.        Femoral pulses are 2+ on the right side, and 2+ on the left side.       Dorsalis pedis pulses are 2+ on the right side, and 2+ on the left side.        Posterior tibial pulses are 2+ on the right side, and 2+ on the left side.   Pulmonary/Chest: Effort normal and breath sounds normal. No respiratory distress. She has no decreased breath sounds. She has no wheezes. She has no rhonchi. She has no rales. She exhibits no tenderness.   Abdominal: Soft. Bowel sounds are normal. She exhibits no distension and no mass. There is no hepatosplenomegaly. There is no tenderness. There is no rebound and no guarding.   Musculoskeletal: She exhibits edema (1+ bilateral tibial). She exhibits no tenderness or deformity.   Neurological: She is alert and oriented to person, place, and time.   Skin: Skin is warm and dry. No rash noted. She is not diaphoretic. No cyanosis or erythema. No pallor. Nails show no clubbing. "   Psychiatric: She has a normal mood and affect. Her speech is normal and behavior is normal. Judgment and thought content normal.           Assessment:   1. This is a 78-year-old female with history of nonischemic cardiomyopathy.  Echocardiogram 8/18 again normal left ventricular ejection fraction.  Heart Failure  Assessment  • NYHA class I - There is no limitation of physical activity. Physical activity does not cause fatigue, palpitations or shortness of breath.  • ACE inhibitor not prescribed for medical reasons  • Beta blocker prescribed  • Diuretics prescribed  • Angiotensin receptor blocker (ARB) not prescribed for medical reasons  • Left ventricular function is normal by qualitative assessment     Plan  • The patient has received heart failure education on the following topics: dietary sodium restriction, medication instructions, minimizing or avoiding NSAID use, symptom management, weight monitoring and minimizing alcohol intake  • The heart failure care plan was discussed with the patient today including: continuing the current program  •  The patient has been counseled about ICD or CRT-D implantation     Subjective/Objective    • Physical exam findings negative for rales and elevated JVP.  • The patient has an ICD implant  • The patient has a CRT-D implant  • The patient has a CRT implant  She is stable from a cardiovascular standpoint.  She will continue the same see us again in follow-up in 6 months.      2. History of congestive heart failure status post Bi-V ICD. Following in our defibrillator clinic. Now stable.  3. History of syncope, falling spells, and dizziness of unclear etiology. Resolved. May have been from Depakote.  4. Hyperlipidemia, followed in your office.  5. History of kidney stones.  6. History of left bundle branch block. unchanged.  7. History of anemia. iron deficient still following with hematology.   8. History of renal failure. To see hematology.  9. History of hypertension.    10. Probable depression.   11. Myoclonus.   12. Mitral Insufficiency. Echocardiogram 8/18 just mild  13.  Renal failure stage IV following with nephrology.   14. Anemia.  Secondary to renal failure.  Receiving intermittent transfusions and Epogen shots.         Plan:

## 2018-08-23 ENCOUNTER — APPOINTMENT (OUTPATIENT)
Dept: OTHER | Facility: HOSPITAL | Age: 78
End: 2018-08-23

## 2018-08-23 ENCOUNTER — APPOINTMENT (OUTPATIENT)
Dept: ONCOLOGY | Facility: HOSPITAL | Age: 78
End: 2018-08-23

## 2018-08-24 ENCOUNTER — INFUSION (OUTPATIENT)
Dept: ONCOLOGY | Facility: HOSPITAL | Age: 78
End: 2018-08-24

## 2018-08-24 ENCOUNTER — LAB (OUTPATIENT)
Dept: OTHER | Facility: HOSPITAL | Age: 78
End: 2018-08-24

## 2018-08-24 VITALS
SYSTOLIC BLOOD PRESSURE: 112 MMHG | WEIGHT: 180.8 LBS | DIASTOLIC BLOOD PRESSURE: 74 MMHG | BODY MASS INDEX: 26.7 KG/M2 | TEMPERATURE: 98.4 F | HEART RATE: 69 BPM

## 2018-08-24 DIAGNOSIS — D63.1 ANEMIA OF CHRONIC RENAL FAILURE, STAGE 4 (SEVERE) (HCC): ICD-10-CM

## 2018-08-24 DIAGNOSIS — D50.8 OTHER IRON DEFICIENCY ANEMIA: ICD-10-CM

## 2018-08-24 DIAGNOSIS — N18.4 ANEMIA OF CHRONIC RENAL FAILURE, STAGE 4 (SEVERE) (HCC): Primary | ICD-10-CM

## 2018-08-24 DIAGNOSIS — N18.4 ANEMIA OF CHRONIC RENAL FAILURE, STAGE 4 (SEVERE) (HCC): ICD-10-CM

## 2018-08-24 DIAGNOSIS — D63.1 ANEMIA OF CHRONIC RENAL FAILURE, STAGE 4 (SEVERE) (HCC): Primary | ICD-10-CM

## 2018-08-24 DIAGNOSIS — Z45.2 ENCOUNTER FOR FITTING AND ADJUSTMENT OF VASCULAR CATHETER: ICD-10-CM

## 2018-08-24 LAB
ANISOCYTOSIS BLD QL: NORMAL
BASOPHILS # BLD AUTO: 0.01 10*3/MM3 (ref 0–0.2)
BASOPHILS NFR BLD AUTO: 0.1 % (ref 0–1.5)
DEPRECATED RDW RBC AUTO: 62.2 FL (ref 37–54)
EOSINOPHIL # BLD AUTO: 0.11 10*3/MM3 (ref 0–0.7)
EOSINOPHIL NFR BLD AUTO: 1.5 % (ref 0.3–6.2)
ERYTHROCYTE [DISTWIDTH] IN BLOOD BY AUTOMATED COUNT: 18.9 % (ref 11.7–13)
HCT VFR BLD AUTO: 27.3 % (ref 35.6–45.5)
HGB BLD-MCNC: 8.5 G/DL (ref 11.9–15.5)
IMM GRANULOCYTES # BLD: 0.04 10*3/MM3 (ref 0–0.03)
IMM GRANULOCYTES NFR BLD: 0.6 % (ref 0–0.5)
LYMPHOCYTES # BLD AUTO: 0.73 10*3/MM3 (ref 0.9–4.8)
LYMPHOCYTES NFR BLD AUTO: 10.3 % (ref 19.6–45.3)
MCH RBC QN AUTO: 29.2 PG (ref 26.9–32)
MCHC RBC AUTO-ENTMCNC: 31.1 G/DL (ref 32.4–36.3)
MCV RBC AUTO: 93.8 FL (ref 80.5–98.2)
MONOCYTES # BLD AUTO: 0.52 10*3/MM3 (ref 0.2–1.2)
MONOCYTES NFR BLD AUTO: 7.3 % (ref 5–12)
NEUTROPHILS # BLD AUTO: 5.71 10*3/MM3 (ref 1.9–8.1)
NEUTROPHILS NFR BLD AUTO: 80.2 % (ref 42.7–76)
NRBC BLD MANUAL-RTO: 0.3 /100 WBC (ref 0–0)
PLAT MORPH BLD: NORMAL
PLATELET # BLD AUTO: 163 10*3/MM3 (ref 140–500)
PMV BLD AUTO: 10.8 FL (ref 6–12)
RBC # BLD AUTO: 2.91 10*6/MM3 (ref 3.9–5.2)
WBC MORPH BLD: NORMAL
WBC NRBC COR # BLD: 7.12 10*3/MM3 (ref 4.5–10.7)

## 2018-08-24 PROCEDURE — 96374 THER/PROPH/DIAG INJ IV PUSH: CPT

## 2018-08-24 PROCEDURE — 25010000002 FERUMOXYTOL 510 MG/17ML SOLUTION 510 MG VIAL: Performed by: INTERNAL MEDICINE

## 2018-08-24 PROCEDURE — 25010000002 HEPARIN FLUSH (PORCINE) 100 UNIT/ML SOLUTION: Performed by: INTERNAL MEDICINE

## 2018-08-24 PROCEDURE — 85025 COMPLETE CBC W/AUTO DIFF WBC: CPT | Performed by: INTERNAL MEDICINE

## 2018-08-24 PROCEDURE — 63710000001 DIPHENHYDRAMINE PER 50 MG: Performed by: INTERNAL MEDICINE

## 2018-08-24 PROCEDURE — 85007 BL SMEAR W/DIFF WBC COUNT: CPT | Performed by: INTERNAL MEDICINE

## 2018-08-24 PROCEDURE — A9270 NON-COVERED ITEM OR SERVICE: HCPCS | Performed by: INTERNAL MEDICINE

## 2018-08-24 PROCEDURE — 36415 COLL VENOUS BLD VENIPUNCTURE: CPT

## 2018-08-24 RX ORDER — DIPHENHYDRAMINE HCL 25 MG
25 CAPSULE ORAL ONCE
Status: COMPLETED | OUTPATIENT
Start: 2018-08-24 | End: 2018-08-24

## 2018-08-24 RX ORDER — SODIUM CHLORIDE 9 MG/ML
250 INJECTION, SOLUTION INTRAVENOUS ONCE
Status: CANCELLED | OUTPATIENT
Start: 2018-08-24

## 2018-08-24 RX ORDER — SODIUM CHLORIDE 0.9 % (FLUSH) 0.9 %
10 SYRINGE (ML) INJECTION AS NEEDED
Status: DISCONTINUED | OUTPATIENT
Start: 2018-08-24 | End: 2018-08-24 | Stop reason: HOSPADM

## 2018-08-24 RX ORDER — FAMOTIDINE 10 MG/ML
20 INJECTION, SOLUTION INTRAVENOUS ONCE
Status: CANCELLED | OUTPATIENT
Start: 2018-08-24

## 2018-08-24 RX ORDER — DIPHENHYDRAMINE HCL 25 MG
25 CAPSULE ORAL ONCE
Status: CANCELLED | OUTPATIENT
Start: 2018-08-24

## 2018-08-24 RX ORDER — SODIUM CHLORIDE 9 MG/ML
250 INJECTION, SOLUTION INTRAVENOUS ONCE
Status: COMPLETED | OUTPATIENT
Start: 2018-08-24 | End: 2018-08-24

## 2018-08-24 RX ORDER — SODIUM CHLORIDE 0.9 % (FLUSH) 0.9 %
10 SYRINGE (ML) INJECTION AS NEEDED
Status: CANCELLED | OUTPATIENT
Start: 2018-08-24

## 2018-08-24 RX ADMIN — Medication 10 ML: at 13:41

## 2018-08-24 RX ADMIN — SODIUM CHLORIDE 250 ML: 900 INJECTION, SOLUTION INTRAVENOUS at 12:24

## 2018-08-24 RX ADMIN — FAMOTIDINE 20 MG: 10 INJECTION INTRAVENOUS at 12:24

## 2018-08-24 RX ADMIN — DIPHENHYDRAMINE HYDROCHLORIDE 25 MG: 25 CAPSULE ORAL at 12:24

## 2018-08-24 RX ADMIN — FERUMOXYTOL 510 MG: 510 INJECTION INTRAVENOUS at 12:48

## 2018-08-24 RX ADMIN — SODIUM CHLORIDE, PRESERVATIVE FREE 500 UNITS: 5 INJECTION INTRAVENOUS at 13:41

## 2018-08-24 NOTE — PROGRESS NOTES
Procrit not given today, iron sat and ferritin level abnormal and reviewed with Dr Scott. Orders place to receive faraheme for 2 doses. Pt vu and given appt time for next infusion and follow up with Dr Scott in sept with repeat labs   Fall with Harm Risk

## 2018-08-28 DIAGNOSIS — D63.1 ANEMIA OF CHRONIC RENAL FAILURE, STAGE 4 (SEVERE) (HCC): Primary | ICD-10-CM

## 2018-08-28 DIAGNOSIS — N18.4 ANEMIA OF CHRONIC RENAL FAILURE, STAGE 4 (SEVERE) (HCC): Primary | ICD-10-CM

## 2018-08-28 DIAGNOSIS — D50.8 OTHER IRON DEFICIENCY ANEMIA: ICD-10-CM

## 2018-08-30 ENCOUNTER — APPOINTMENT (OUTPATIENT)
Dept: ONCOLOGY | Facility: HOSPITAL | Age: 78
End: 2018-08-30

## 2018-08-30 ENCOUNTER — INFUSION (OUTPATIENT)
Dept: ONCOLOGY | Facility: HOSPITAL | Age: 78
End: 2018-08-30

## 2018-08-30 ENCOUNTER — APPOINTMENT (OUTPATIENT)
Dept: OTHER | Facility: HOSPITAL | Age: 78
End: 2018-08-30

## 2018-08-30 VITALS
TEMPERATURE: 97.6 F | DIASTOLIC BLOOD PRESSURE: 67 MMHG | BODY MASS INDEX: 26.64 KG/M2 | HEART RATE: 76 BPM | SYSTOLIC BLOOD PRESSURE: 118 MMHG | OXYGEN SATURATION: 97 % | WEIGHT: 180.4 LBS

## 2018-08-30 DIAGNOSIS — D50.8 OTHER IRON DEFICIENCY ANEMIA: ICD-10-CM

## 2018-08-30 DIAGNOSIS — N18.4 ANEMIA OF CHRONIC RENAL FAILURE, STAGE 4 (SEVERE) (HCC): ICD-10-CM

## 2018-08-30 DIAGNOSIS — Z45.2 ENCOUNTER FOR FITTING AND ADJUSTMENT OF VASCULAR CATHETER: ICD-10-CM

## 2018-08-30 DIAGNOSIS — D50.9 IRON DEFICIENCY ANEMIA, UNSPECIFIED IRON DEFICIENCY ANEMIA TYPE: Primary | ICD-10-CM

## 2018-08-30 DIAGNOSIS — D63.1 ANEMIA OF CHRONIC RENAL FAILURE, STAGE 4 (SEVERE) (HCC): ICD-10-CM

## 2018-08-30 LAB
ABO GROUP BLD: NORMAL
ALBUMIN SERPL-MCNC: 3.6 G/DL (ref 3.5–5.2)
ALBUMIN/GLOB SERPL: 1.4 G/DL
ALP SERPL-CCNC: 54 U/L (ref 39–117)
ALT SERPL W P-5'-P-CCNC: 8 U/L (ref 1–33)
ANION GAP SERPL CALCULATED.3IONS-SCNC: 9.9 MMOL/L
AST SERPL-CCNC: 16 U/L (ref 1–32)
BASOPHILS # BLD AUTO: 0.01 10*3/MM3 (ref 0–0.2)
BASOPHILS NFR BLD AUTO: 0.2 % (ref 0–1.5)
BILIRUB SERPL-MCNC: <0.2 MG/DL (ref 0.1–1.2)
BLD GP AB SCN SERPL QL: NEGATIVE
BUN BLD-MCNC: 57 MG/DL (ref 8–23)
BUN/CREAT SERPL: 23.8 (ref 7–25)
CALCIUM SPEC-SCNC: 9.9 MG/DL (ref 8.6–10.5)
CHLORIDE SERPL-SCNC: 105 MMOL/L (ref 98–107)
CO2 SERPL-SCNC: 26.1 MMOL/L (ref 22–29)
CREAT BLD-MCNC: 2.39 MG/DL (ref 0.57–1)
DEPRECATED RDW RBC AUTO: 69.8 FL (ref 37–54)
EOSINOPHIL # BLD AUTO: 0.08 10*3/MM3 (ref 0–0.7)
EOSINOPHIL NFR BLD AUTO: 1.6 % (ref 0.3–6.2)
ERYTHROCYTE [DISTWIDTH] IN BLOOD BY AUTOMATED COUNT: 19.6 % (ref 11.7–13)
GFR SERPL CREATININE-BSD FRML MDRD: 20 ML/MIN/1.73
GLOBULIN UR ELPH-MCNC: 2.6 GM/DL
GLUCOSE BLD-MCNC: 88 MG/DL (ref 65–99)
HCT VFR BLD AUTO: 21.3 % (ref 35.6–45.5)
HGB BLD-MCNC: 6.5 G/DL (ref 11.9–15.5)
IMM GRANULOCYTES # BLD: 0.02 10*3/MM3 (ref 0–0.03)
IMM GRANULOCYTES NFR BLD: 0.4 % (ref 0–0.5)
LYMPHOCYTES # BLD AUTO: 0.82 10*3/MM3 (ref 0.9–4.8)
LYMPHOCYTES NFR BLD AUTO: 16.7 % (ref 19.6–45.3)
MCH RBC QN AUTO: 30 PG (ref 26.9–32)
MCHC RBC AUTO-ENTMCNC: 30.5 G/DL (ref 32.4–36.3)
MCV RBC AUTO: 98.2 FL (ref 80.5–98.2)
MONOCYTES # BLD AUTO: 0.4 10*3/MM3 (ref 0.2–1.2)
MONOCYTES NFR BLD AUTO: 8.1 % (ref 5–12)
NEUTROPHILS # BLD AUTO: 3.58 10*3/MM3 (ref 1.9–8.1)
NEUTROPHILS NFR BLD AUTO: 73 % (ref 42.7–76)
NRBC BLD MANUAL-RTO: 0 /100 WBC (ref 0–0)
PLATELET # BLD AUTO: 137 10*3/MM3 (ref 140–500)
PMV BLD AUTO: 9.5 FL (ref 6–12)
POTASSIUM BLD-SCNC: 4.7 MMOL/L (ref 3.5–5.2)
PROT SERPL-MCNC: 6.2 G/DL (ref 6–8.5)
RBC # BLD AUTO: 2.17 10*6/MM3 (ref 3.9–5.2)
RH BLD: NEGATIVE
SODIUM BLD-SCNC: 141 MMOL/L (ref 136–145)
T&S EXPIRATION DATE: NORMAL
WBC NRBC COR # BLD: 4.91 10*3/MM3 (ref 4.5–10.7)

## 2018-08-30 PROCEDURE — 25010000002 FERUMOXYTOL 510 MG/17ML SOLUTION 510 MG VIAL: Performed by: INTERNAL MEDICINE

## 2018-08-30 PROCEDURE — 96374 THER/PROPH/DIAG INJ IV PUSH: CPT

## 2018-08-30 PROCEDURE — 86850 RBC ANTIBODY SCREEN: CPT | Performed by: NURSE PRACTITIONER

## 2018-08-30 PROCEDURE — 85025 COMPLETE CBC W/AUTO DIFF WBC: CPT | Performed by: INTERNAL MEDICINE

## 2018-08-30 PROCEDURE — 25010000002 HEPARIN FLUSH (PORCINE) 100 UNIT/ML SOLUTION: Performed by: INTERNAL MEDICINE

## 2018-08-30 PROCEDURE — 63710000001 DIPHENHYDRAMINE PER 50 MG: Performed by: INTERNAL MEDICINE

## 2018-08-30 PROCEDURE — A9270 NON-COVERED ITEM OR SERVICE: HCPCS | Performed by: INTERNAL MEDICINE

## 2018-08-30 PROCEDURE — 86923 COMPATIBILITY TEST ELECTRIC: CPT

## 2018-08-30 PROCEDURE — 80053 COMPREHEN METABOLIC PANEL: CPT | Performed by: NURSE PRACTITIONER

## 2018-08-30 PROCEDURE — 86901 BLOOD TYPING SEROLOGIC RH(D): CPT | Performed by: NURSE PRACTITIONER

## 2018-08-30 PROCEDURE — 86900 BLOOD TYPING SEROLOGIC ABO: CPT | Performed by: NURSE PRACTITIONER

## 2018-08-30 RX ORDER — ACETAMINOPHEN 325 MG/1
650 TABLET ORAL ONCE
Status: CANCELLED | OUTPATIENT
Start: 2018-08-30 | End: 2018-08-30

## 2018-08-30 RX ORDER — SODIUM CHLORIDE 9 MG/ML
250 INJECTION, SOLUTION INTRAVENOUS AS NEEDED
Status: CANCELLED | OUTPATIENT
Start: 2018-08-30

## 2018-08-30 RX ORDER — SODIUM CHLORIDE 9 MG/ML
250 INJECTION, SOLUTION INTRAVENOUS ONCE
Status: COMPLETED | OUTPATIENT
Start: 2018-08-30 | End: 2018-08-30

## 2018-08-30 RX ORDER — SODIUM CHLORIDE 0.9 % (FLUSH) 0.9 %
10 SYRINGE (ML) INJECTION AS NEEDED
Status: CANCELLED | OUTPATIENT
Start: 2018-08-30

## 2018-08-30 RX ORDER — DIPHENHYDRAMINE HCL 25 MG
25 CAPSULE ORAL ONCE
Status: COMPLETED | OUTPATIENT
Start: 2018-08-30 | End: 2018-08-30

## 2018-08-30 RX ORDER — DIPHENHYDRAMINE HCL 25 MG
25 CAPSULE ORAL ONCE
Status: CANCELLED | OUTPATIENT
Start: 2018-08-31

## 2018-08-30 RX ORDER — DIPHENHYDRAMINE HCL 25 MG
25 CAPSULE ORAL ONCE
Status: CANCELLED | OUTPATIENT
Start: 2018-08-30 | End: 2018-08-30

## 2018-08-30 RX ORDER — SODIUM CHLORIDE 0.9 % (FLUSH) 0.9 %
10 SYRINGE (ML) INJECTION AS NEEDED
Status: DISCONTINUED | OUTPATIENT
Start: 2018-08-30 | End: 2018-08-30 | Stop reason: HOSPADM

## 2018-08-30 RX ORDER — SODIUM CHLORIDE 9 MG/ML
250 INJECTION, SOLUTION INTRAVENOUS ONCE
Status: CANCELLED | OUTPATIENT
Start: 2018-08-31

## 2018-08-30 RX ADMIN — DIPHENHYDRAMINE HYDROCHLORIDE 25 MG: 25 CAPSULE ORAL at 12:58

## 2018-08-30 RX ADMIN — SODIUM CHLORIDE, PRESERVATIVE FREE 500 UNITS: 5 INJECTION INTRAVENOUS at 14:26

## 2018-08-30 RX ADMIN — FERUMOXYTOL 510 MG: 510 INJECTION INTRAVENOUS at 13:28

## 2018-08-30 RX ADMIN — Medication 10 ML: at 14:26

## 2018-08-30 RX ADMIN — SODIUM CHLORIDE 250 ML: 900 INJECTION, SOLUTION INTRAVENOUS at 12:58

## 2018-08-30 NOTE — PROGRESS NOTES
Pt Hgb 6.5.  Order for 2 units PRBC's per Nisa Lloyd NP. Pt requests to have CMP drawn because she is curious about her kidney functioning.  Results reviewed with MEL Paula and called to pt with slight improvement in BUN and Creatinine. Pt voices no other concerns and instructed to call if any arise.

## 2018-09-01 ENCOUNTER — INFUSION (OUTPATIENT)
Dept: ONCOLOGY | Facility: HOSPITAL | Age: 78
End: 2018-09-01

## 2018-09-01 VITALS
RESPIRATION RATE: 16 BRPM | HEART RATE: 68 BPM | SYSTOLIC BLOOD PRESSURE: 112 MMHG | DIASTOLIC BLOOD PRESSURE: 62 MMHG | TEMPERATURE: 97.1 F | OXYGEN SATURATION: 98 %

## 2018-09-01 DIAGNOSIS — D63.1 ANEMIA OF CHRONIC RENAL FAILURE, STAGE 4 (SEVERE) (HCC): Primary | ICD-10-CM

## 2018-09-01 DIAGNOSIS — N18.4 ANEMIA OF CHRONIC RENAL FAILURE, STAGE 4 (SEVERE) (HCC): Primary | ICD-10-CM

## 2018-09-01 DIAGNOSIS — Z45.2 ENCOUNTER FOR FITTING AND ADJUSTMENT OF VASCULAR CATHETER: ICD-10-CM

## 2018-09-01 PROCEDURE — P9016 RBC LEUKOCYTES REDUCED: HCPCS

## 2018-09-01 PROCEDURE — 86900 BLOOD TYPING SEROLOGIC ABO: CPT

## 2018-09-01 PROCEDURE — 63710000001 DIPHENHYDRAMINE PER 50 MG: Performed by: NURSE PRACTITIONER

## 2018-09-01 PROCEDURE — 25010000002 HEPARIN FLUSH (PORCINE) 100 UNIT/ML SOLUTION: Performed by: INTERNAL MEDICINE

## 2018-09-01 PROCEDURE — 36430 TRANSFUSION BLD/BLD COMPNT: CPT

## 2018-09-01 RX ORDER — SODIUM CHLORIDE 9 MG/ML
250 INJECTION, SOLUTION INTRAVENOUS AS NEEDED
Status: DISCONTINUED | OUTPATIENT
Start: 2018-09-01 | End: 2018-09-01 | Stop reason: HOSPADM

## 2018-09-01 RX ORDER — DIPHENHYDRAMINE HCL 25 MG
25 CAPSULE ORAL ONCE
Status: COMPLETED | OUTPATIENT
Start: 2018-09-01 | End: 2018-09-01

## 2018-09-01 RX ORDER — ACETAMINOPHEN 325 MG/1
650 TABLET ORAL ONCE
Status: COMPLETED | OUTPATIENT
Start: 2018-09-01 | End: 2018-09-01

## 2018-09-01 RX ORDER — SODIUM CHLORIDE 0.9 % (FLUSH) 0.9 %
10 SYRINGE (ML) INJECTION AS NEEDED
Status: CANCELLED | OUTPATIENT
Start: 2018-09-01

## 2018-09-01 RX ADMIN — Medication 500 UNITS: at 15:05

## 2018-09-01 RX ADMIN — DIPHENHYDRAMINE HYDROCHLORIDE 25 MG: 25 CAPSULE ORAL at 10:11

## 2018-09-01 RX ADMIN — ACETAMINOPHEN 650 MG: 325 TABLET, FILM COATED ORAL at 10:11

## 2018-09-06 ENCOUNTER — LAB (OUTPATIENT)
Dept: OTHER | Facility: HOSPITAL | Age: 78
End: 2018-09-06

## 2018-09-06 ENCOUNTER — OFFICE VISIT (OUTPATIENT)
Dept: ONCOLOGY | Facility: CLINIC | Age: 78
End: 2018-09-06

## 2018-09-06 ENCOUNTER — INFUSION (OUTPATIENT)
Dept: ONCOLOGY | Facility: HOSPITAL | Age: 78
End: 2018-09-06

## 2018-09-06 VITALS
HEART RATE: 75 BPM | OXYGEN SATURATION: 100 % | RESPIRATION RATE: 16 BRPM | DIASTOLIC BLOOD PRESSURE: 76 MMHG | TEMPERATURE: 97.4 F | SYSTOLIC BLOOD PRESSURE: 152 MMHG | BODY MASS INDEX: 26.22 KG/M2 | HEIGHT: 69 IN | WEIGHT: 177 LBS

## 2018-09-06 DIAGNOSIS — D63.1 ANEMIA OF CHRONIC RENAL FAILURE, STAGE 4 (SEVERE) (HCC): ICD-10-CM

## 2018-09-06 DIAGNOSIS — N18.4 ANEMIA OF CHRONIC RENAL FAILURE, STAGE 4 (SEVERE) (HCC): ICD-10-CM

## 2018-09-06 DIAGNOSIS — D63.8 ANEMIA OF CHRONIC DISEASE: Primary | ICD-10-CM

## 2018-09-06 DIAGNOSIS — D50.8 OTHER IRON DEFICIENCY ANEMIA: ICD-10-CM

## 2018-09-06 DIAGNOSIS — D63.1 ANEMIA OF CHRONIC RENAL FAILURE, STAGE 4 (SEVERE) (HCC): Primary | ICD-10-CM

## 2018-09-06 DIAGNOSIS — N18.4 ANEMIA OF CHRONIC RENAL FAILURE, STAGE 4 (SEVERE) (HCC): Primary | ICD-10-CM

## 2018-09-06 DIAGNOSIS — Z45.2 ENCOUNTER FOR FITTING AND ADJUSTMENT OF VASCULAR CATHETER: ICD-10-CM

## 2018-09-06 LAB
ABO GROUP BLD: NORMAL
BASOPHILS # BLD AUTO: 0.01 10*3/MM3 (ref 0–0.2)
BASOPHILS NFR BLD AUTO: 0.2 % (ref 0–1.5)
BLD GP AB SCN SERPL QL: NEGATIVE
DEPRECATED RDW RBC AUTO: 70.5 FL (ref 37–54)
EOSINOPHIL # BLD AUTO: 0.09 10*3/MM3 (ref 0–0.7)
EOSINOPHIL NFR BLD AUTO: 1.7 % (ref 0.3–6.2)
ERYTHROCYTE [DISTWIDTH] IN BLOOD BY AUTOMATED COUNT: 20 % (ref 11.7–13)
FERRITIN SERPL-MCNC: 493.4 NG/ML (ref 13–150)
HCT VFR BLD AUTO: 25.7 % (ref 35.6–45.5)
HGB BLD-MCNC: 8.2 G/DL (ref 11.9–15.5)
IMM GRANULOCYTES # BLD: 0.03 10*3/MM3 (ref 0–0.03)
IMM GRANULOCYTES NFR BLD: 0.6 % (ref 0–0.5)
IRON 24H UR-MRATE: 78 MCG/DL (ref 37–145)
IRON SATN MFR SERPL: 24 % (ref 20–50)
LYMPHOCYTES # BLD AUTO: 0.71 10*3/MM3 (ref 0.9–4.8)
LYMPHOCYTES NFR BLD AUTO: 13.1 % (ref 19.6–45.3)
MCH RBC QN AUTO: 30.8 PG (ref 26.9–32)
MCHC RBC AUTO-ENTMCNC: 31.9 G/DL (ref 32.4–36.3)
MCV RBC AUTO: 96.6 FL (ref 80.5–98.2)
MONOCYTES # BLD AUTO: 0.48 10*3/MM3 (ref 0.2–1.2)
MONOCYTES NFR BLD AUTO: 8.8 % (ref 5–12)
NEUTROPHILS # BLD AUTO: 4.12 10*3/MM3 (ref 1.9–8.1)
NEUTROPHILS NFR BLD AUTO: 75.6 % (ref 42.7–76)
NRBC BLD MANUAL-RTO: 0 /100 WBC (ref 0–0)
PLATELET # BLD AUTO: 138 10*3/MM3 (ref 140–500)
PMV BLD AUTO: 9.4 FL (ref 6–12)
RBC # BLD AUTO: 2.66 10*6/MM3 (ref 3.9–5.2)
RH BLD: NEGATIVE
T&S EXPIRATION DATE: NORMAL
TIBC SERPL-MCNC: 326 MCG/DL (ref 298–536)
TRANSFERRIN SERPL-MCNC: 219 MG/DL (ref 200–360)
WBC NRBC COR # BLD: 5.44 10*3/MM3 (ref 4.5–10.7)

## 2018-09-06 PROCEDURE — 36415 COLL VENOUS BLD VENIPUNCTURE: CPT

## 2018-09-06 PROCEDURE — 86901 BLOOD TYPING SEROLOGIC RH(D): CPT | Performed by: INTERNAL MEDICINE

## 2018-09-06 PROCEDURE — 84466 ASSAY OF TRANSFERRIN: CPT | Performed by: INTERNAL MEDICINE

## 2018-09-06 PROCEDURE — 96372 THER/PROPH/DIAG INJ SC/IM: CPT

## 2018-09-06 PROCEDURE — 25010000002 HEPARIN FLUSH (PORCINE) 100 UNIT/ML SOLUTION: Performed by: INTERNAL MEDICINE

## 2018-09-06 PROCEDURE — 82728 ASSAY OF FERRITIN: CPT | Performed by: INTERNAL MEDICINE

## 2018-09-06 PROCEDURE — 86900 BLOOD TYPING SEROLOGIC ABO: CPT | Performed by: INTERNAL MEDICINE

## 2018-09-06 PROCEDURE — 85025 COMPLETE CBC W/AUTO DIFF WBC: CPT | Performed by: INTERNAL MEDICINE

## 2018-09-06 PROCEDURE — 86850 RBC ANTIBODY SCREEN: CPT | Performed by: INTERNAL MEDICINE

## 2018-09-06 PROCEDURE — 99213 OFFICE O/P EST LOW 20 MIN: CPT | Performed by: INTERNAL MEDICINE

## 2018-09-06 PROCEDURE — 83540 ASSAY OF IRON: CPT | Performed by: INTERNAL MEDICINE

## 2018-09-06 PROCEDURE — 86923 COMPATIBILITY TEST ELECTRIC: CPT

## 2018-09-06 PROCEDURE — 63510000001 EPOETIN ALFA PER 1000 UNITS: Performed by: INTERNAL MEDICINE

## 2018-09-06 RX ORDER — SODIUM CHLORIDE 0.9 % (FLUSH) 0.9 %
10 SYRINGE (ML) INJECTION AS NEEDED
Status: CANCELLED | OUTPATIENT
Start: 2018-09-06

## 2018-09-06 RX ORDER — DIPHENHYDRAMINE HCL 25 MG
25 CAPSULE ORAL ONCE
Status: CANCELLED | OUTPATIENT
Start: 2018-09-07 | End: 2018-09-07

## 2018-09-06 RX ORDER — SODIUM CHLORIDE 0.9 % (FLUSH) 0.9 %
10 SYRINGE (ML) INJECTION AS NEEDED
Status: DISCONTINUED | OUTPATIENT
Start: 2018-09-06 | End: 2018-09-06 | Stop reason: HOSPADM

## 2018-09-06 RX ORDER — ACETAMINOPHEN 325 MG/1
650 TABLET ORAL ONCE
Status: CANCELLED | OUTPATIENT
Start: 2018-09-07 | End: 2018-09-07

## 2018-09-06 RX ORDER — SODIUM CHLORIDE 9 MG/ML
250 INJECTION, SOLUTION INTRAVENOUS AS NEEDED
Status: CANCELLED | OUTPATIENT
Start: 2018-09-07

## 2018-09-06 RX ADMIN — ERYTHROPOIETIN 30000 UNITS: 20000 INJECTION, SOLUTION INTRAVENOUS; SUBCUTANEOUS at 11:04

## 2018-09-06 RX ADMIN — SODIUM CHLORIDE, PRESERVATIVE FREE 500 UNITS: 5 INJECTION INTRAVENOUS at 11:02

## 2018-09-06 RX ADMIN — Medication 10 ML: at 11:02

## 2018-09-06 NOTE — PROGRESS NOTES
Subjective .     REASONS FOR FOLLOWUP:      Anemia of chronic kidney diease              History of Iron deficiency    History of Present Illness      Mrs Machuca is seen today for evaluation. She recently underwent a blood transfusion for a HGB of 6.5 on 9/1/2018. She has also received 2 doses of IV Feraheme with her last dose on 8/30/2018 as iron studies on 8/2/2018 revealed depletion with a saturation of 8% and a ferritin of 21.     Today, her hemoglobin is 8.2. She is still experiencing chronic vertigo, which has been previously evaluated. She notes that is has worsened over the last week and this is likely a result of her anemia contributing. She denies any associated chest pain and is experiencing a mild degree of exertional dyspnea. Stools are normal.     We will proceed with weekly Procrit today and transfusion support as patient is symptomatic.     Past Medical History:   Diagnosis Date   • Anemia     Iron deficiency   • Cancer (CMS/HCC)     Skin cancer   • Cardiomyopathy (CMS/HCC)     S/P pacemaker and defibrillator   • CHF (congestive heart failure) (CMS/HCC)    • Clostridium difficile diarrhea     2013   • Colon polyps    • Coronary artery disease     pacemaker, defibrillator   • Gastroparesis    • GERD (gastroesophageal reflux disease)    • Gout    • Hemorrhoids    • Hiatal hernia    • History of pancreatitis 01/2014   • History of pancreatitis     2014   • History of transfusion    • Hyperlipidemia    • Hypothyroidism    • Left bundle branch block    • Mitral and aortic valve disease    • Mitral valve insufficiency    • Myoclonus     S/P Depakote   • Nephrolithiasis     stage 4 renal failure   • Osteoarthritis    • Pancytopenia (CMS/HCC)    • Peripheral neuropathy    • Progressive familial myoclonic epilepsy (CMS/HCC)    • Pulmonary hypertension    • RLS (restless legs syndrome)    • Ruptured ovarian cyst    • Seizures (CMS/HCC)     myoclonus   • Spinal stenosis    • Transfusion history     3  weeks ago       ONCOLOGIC HISTORY:  (History from previous dates can be found in the separate document.)    Current Outpatient Prescriptions on File Prior to Visit   Medication Sig Dispense Refill   • allopurinol (ZYLOPRIM) 100 MG tablet Take 100 mg by mouth 2 (Two) Times a Day.     • aspirin 81 MG tablet Take 81 mg by mouth Daily. PT HOLDING FOR SURGERY     • calcitriol (ROCALTROL) 0.25 MCG capsule Take 0.25 mcg by mouth Daily. 1 tablet every other day and 2 tablets every other day     • carbidopa-levodopa ER (SINEMET CR)  MG per tablet Take 1 tablet by mouth.     • carvedilol (COREG) 12.5 MG tablet TAKE 1 TABLET DAILY 90 tablet 1   • Cholecalciferol (VITAMIN D3) 5000 units capsule capsule Take 5,000 Units by mouth Daily. Every other day     • diazepam (VALIUM) 5 MG tablet Take 5 mg by mouth Every Night.     • furosemide (LASIX) 40 MG tablet TAKE 1 TABLET DAILY OR AS  DIRECTED 90 tablet 3   • Glucosamine-Chondroit-Vit C-Mn (GLUCOSAMINE CHONDROITIN COMPLX) capsule Take 1,500 mg by mouth Every Other Day. PT HOLDING FOR SURGERY     • GRALISE 300 MG tablet TAKE 1 TABLET DAILY WITH   DINNER 90 tablet 1   • levothyroxine (SYNTHROID, LEVOTHROID) 25 MCG tablet Take 25 mcg by mouth Daily.     • loperamide (IMODIUM) 2 MG capsule Take 2 mg by mouth 4 (Four) Times a Day As Needed for Diarrhea.     • magnesium oxide 250 MG tablet Take 250 mg by mouth Daily.     • methscopolamine (PAMINE FORTE) 5 MG tablet Take 5 mg by mouth Daily.     • methylPREDNISolone (MEDROL) 4 MG tablet Take 4 mg by mouth As Needed. TAKES FOR GOUT FLARE UPS     • Multiple Vitamin (MULTI-DAY VITAMINS) tablet Take 1 tablet by mouth Daily. Multivitamin with Iron     • NEUPRO 4 MG/24HR patch APPLY 1 PATCH DAILY AS DIRECTED.**PAYABLE 5/8/17 90 patch 2   • ondansetron (ZOFRAN) 8 MG tablet Take 1 tablet by mouth Every 8 (Eight) Hours As Needed for Nausea or Vomiting. 30 tablet 2   • pantoprazole (PROTONIX) 40 MG EC tablet TAKE 1 TABLET BY MOUTH EVERY  "MORNING  11   • psyllium (METAMUCIL) 58.6 % powder Take 1 packet by mouth Daily.     • spironolactone (ALDACTONE) 25 MG tablet TAKE 1/2 TABLET DAILY 45 tablet 1   • Vitamin E 400 UNITS tablet Take 400 Units by mouth Daily. PT HOLDING FOR SURGERY       Current Facility-Administered Medications on File Prior to Visit   Medication Dose Route Frequency Provider Last Rate Last Dose   • epoetin jose (EPOGEN,PROCRIT) injection 30,000 Units  30,000 Units Subcutaneous Weekly Anuel Scott MD       • heparin flush (porcine) 100 UNIT/ML injection 500 Units  500 Units Intravenous PRN Anuel Scott MD   500 Units at 11/27/17 1347   • heparin flush (porcine) 100 UNIT/ML injection 500 Units  500 Units Intravenous PRN Anuel Scott MD   500 Units at 09/06/18 1102   • sodium chloride 0.9 % flush 10 mL  10 mL Intravenous PRAnuel Bueno MD   10 mL at 11/27/17 1347   • sodium chloride 0.9 % flush 10 mL  10 mL Intravenous PRN Anuel Scott MD   10 mL at 09/06/18 1102       ALLERGIES:     Allergies   Allergen Reactions   • Chlorhexidine Rash and Itching     Patient states \"blue dye\" in chlorhexidine.  States the orange and clear are OK to use   • Chlorhexidine Gluconate Itching     i   • Codeine Other (See Comments)     Want to climb the walls, doesn't help pain   • Propoxyphene Other (See Comments)     Belligerent, acting drunk       Social History     Social History   • Marital status:      Spouse name: Zackery   • Number of children: 5   • Years of education: 1 yr college     Occupational History   •  Retired     Social History Main Topics   • Smoking status: Never Smoker   • Smokeless tobacco: Never Used   • Alcohol use No   • Drug use: No   • Sexual activity: Defer      Comment: AGE!!!     Other Topics Concern   • Not on file     Social History Narrative   • No narrative on file         Cancer-related family history is not on file.     Review of Systems   Constitutional: Positive for fatigue.   HENT: " "Negative.    Eyes: Negative.    Respiratory: Positive for shortness of breath (exertional ).    Cardiovascular: Negative.    Gastrointestinal: Negative for diarrhea.   Endocrine: Negative.    Genitourinary: Negative.    Musculoskeletal:        Chronic lower extremity myalgias    Skin: Negative.    Neurological: Positive for weakness and light-headedness (chronic, though worsened over last week ).   Hematological: Negative.  Does not bruise/bleed easily.   Psychiatric/Behavioral: Negative.      A comprehensive 14 point review of systems was performed and was negative except as mentioned.    Objective      Vitals:    09/06/18 1018   BP: 152/76  Comment: 2 cups of coffee   Pulse: 75   Resp: 16   Temp: 97.4 °F (36.3 °C)   TempSrc: Oral   SpO2: 100%   Weight: 80.3 kg (177 lb)   Height: 176 cm (69.29\")   PainSc: 5  Comment: arthrtiis pain, stomach ache   PainLoc: Hand     Current Status 9/6/2018   ECOG score 0       Physical Exam   Constitutional: She appears well-developed and well-nourished.   HENT:   Head: Normocephalic.   Eyes: Conjunctivae are normal. No scleral icterus.   Cardiovascular: Normal rate.    Pulmonary/Chest: Effort normal.   Abdominal: Soft. Bowel sounds are normal.   Skin: No rash noted.   Psychiatric: She has a normal mood and affect.         RECENT LABS:  Hematology WBC   Date Value Ref Range Status   09/06/2018 5.44 4.50 - 10.70 10*3/mm3 Final     RBC   Date Value Ref Range Status   09/06/2018 2.66 (L) 3.90 - 5.20 10*6/mm3 Final     Hemoglobin   Date Value Ref Range Status   09/06/2018 8.2 (L) 11.9 - 15.5 g/dL Final     Hematocrit   Date Value Ref Range Status   09/06/2018 25.7 (L) 35.6 - 45.5 % Final     Platelets   Date Value Ref Range Status   09/06/2018 138 (L) 140 - 500 10*3/mm3 Final        Assessment/Plan   Anemia of stage 4 CKD:  Recent bone marrow biopsy demonstrates no abnormalities, therefore continued anemia is likely related to renal insufficiency, which is followed closely by Dr. Abraham. "     She has developed progressive, symptomatic anemia, despite a blood transfusion just 6 days ago. Her hemoglobin is 8.2 without evidence of excess bleeding. Iron stores obtained today reveal repletion with a ferritin of 493 and saturation of 24. I am hopeful that her hemoglobin will begin to recover as stores have been replaced.     For now, we will proceed with Procrit today and weekly. We will transfuse for a hemoglobin less than 9 with symptoms.     I will plan to see her again formally in 8 weeks.     I have asked patient to call with any concerns prior.     I have reviewed the notes, assessments, and/or procedures performed by CHANTELL Tiwari.  I concur with her/his documentation of Charmaine Machuca.

## 2018-09-08 ENCOUNTER — INFUSION (OUTPATIENT)
Dept: ONCOLOGY | Facility: HOSPITAL | Age: 78
End: 2018-09-08

## 2018-09-08 VITALS
RESPIRATION RATE: 16 BRPM | HEART RATE: 74 BPM | OXYGEN SATURATION: 99 % | SYSTOLIC BLOOD PRESSURE: 136 MMHG | DIASTOLIC BLOOD PRESSURE: 65 MMHG | TEMPERATURE: 96.9 F

## 2018-09-08 DIAGNOSIS — D63.8 ANEMIA OF CHRONIC DISEASE: ICD-10-CM

## 2018-09-08 PROCEDURE — 36430 TRANSFUSION BLD/BLD COMPNT: CPT

## 2018-09-08 PROCEDURE — 96375 TX/PRO/DX INJ NEW DRUG ADDON: CPT

## 2018-09-08 PROCEDURE — 86900 BLOOD TYPING SEROLOGIC ABO: CPT

## 2018-09-08 PROCEDURE — P9016 RBC LEUKOCYTES REDUCED: HCPCS

## 2018-09-08 PROCEDURE — 63710000001 DIPHENHYDRAMINE PER 50 MG: Performed by: INTERNAL MEDICINE

## 2018-09-08 PROCEDURE — 25010000002 HEPARIN FLUSH (PORCINE) 100 UNIT/ML SOLUTION

## 2018-09-08 RX ORDER — DIPHENHYDRAMINE HCL 25 MG
25 CAPSULE ORAL ONCE
Status: COMPLETED | OUTPATIENT
Start: 2018-09-08 | End: 2018-09-08

## 2018-09-08 RX ORDER — SODIUM CHLORIDE 9 MG/ML
250 INJECTION, SOLUTION INTRAVENOUS AS NEEDED
Status: DISCONTINUED | OUTPATIENT
Start: 2018-09-08 | End: 2018-09-08 | Stop reason: HOSPADM

## 2018-09-08 RX ORDER — ACETAMINOPHEN 325 MG/1
650 TABLET ORAL ONCE
Status: COMPLETED | OUTPATIENT
Start: 2018-09-08 | End: 2018-09-08

## 2018-09-08 RX ADMIN — DIPHENHYDRAMINE HYDROCHLORIDE 25 MG: 25 CAPSULE ORAL at 07:56

## 2018-09-08 RX ADMIN — ACETAMINOPHEN 650 MG: 325 TABLET, FILM COATED ORAL at 07:56

## 2018-09-08 RX ADMIN — Medication: at 12:45

## 2018-09-14 ENCOUNTER — LAB (OUTPATIENT)
Dept: OTHER | Facility: HOSPITAL | Age: 78
End: 2018-09-14

## 2018-09-14 ENCOUNTER — INFUSION (OUTPATIENT)
Dept: ONCOLOGY | Facility: HOSPITAL | Age: 78
End: 2018-09-14

## 2018-09-14 VITALS — BODY MASS INDEX: 26.36 KG/M2 | WEIGHT: 180 LBS | TEMPERATURE: 98 F

## 2018-09-14 DIAGNOSIS — N18.4 ANEMIA OF CHRONIC RENAL FAILURE, STAGE 4 (SEVERE) (HCC): Primary | ICD-10-CM

## 2018-09-14 DIAGNOSIS — D63.1 ANEMIA OF CHRONIC RENAL FAILURE, STAGE 4 (SEVERE) (HCC): Primary | ICD-10-CM

## 2018-09-14 DIAGNOSIS — N18.4 ANEMIA OF CHRONIC RENAL FAILURE, STAGE 4 (SEVERE) (HCC): ICD-10-CM

## 2018-09-14 DIAGNOSIS — D63.1 ANEMIA OF CHRONIC RENAL FAILURE, STAGE 4 (SEVERE) (HCC): ICD-10-CM

## 2018-09-14 LAB
BASOPHILS # BLD AUTO: 0.02 10*3/MM3 (ref 0–0.2)
BASOPHILS NFR BLD AUTO: 0.4 % (ref 0–1.5)
DEPRECATED RDW RBC AUTO: 65.8 FL (ref 37–54)
EOSINOPHIL # BLD AUTO: 0.11 10*3/MM3 (ref 0–0.7)
EOSINOPHIL NFR BLD AUTO: 2.2 % (ref 0.3–6.2)
ERYTHROCYTE [DISTWIDTH] IN BLOOD BY AUTOMATED COUNT: 19.8 % (ref 11.7–13)
HCT VFR BLD AUTO: 31 % (ref 35.6–45.5)
HGB BLD-MCNC: 10 G/DL (ref 11.9–15.5)
IMM GRANULOCYTES # BLD: 0.02 10*3/MM3 (ref 0–0.03)
IMM GRANULOCYTES NFR BLD: 0.4 % (ref 0–0.5)
LYMPHOCYTES # BLD AUTO: 0.61 10*3/MM3 (ref 0.9–4.8)
LYMPHOCYTES NFR BLD AUTO: 12.2 % (ref 19.6–45.3)
MCH RBC QN AUTO: 30.7 PG (ref 26.9–32)
MCHC RBC AUTO-ENTMCNC: 32.3 G/DL (ref 32.4–36.3)
MCV RBC AUTO: 95.1 FL (ref 80.5–98.2)
MONOCYTES # BLD AUTO: 0.44 10*3/MM3 (ref 0.2–1.2)
MONOCYTES NFR BLD AUTO: 8.8 % (ref 5–12)
NEUTROPHILS # BLD AUTO: 3.79 10*3/MM3 (ref 1.9–8.1)
NEUTROPHILS NFR BLD AUTO: 76 % (ref 42.7–76)
NRBC BLD MANUAL-RTO: 0.4 /100 WBC (ref 0–0)
PLATELET # BLD AUTO: 137 10*3/MM3 (ref 140–500)
PMV BLD AUTO: 10.1 FL (ref 6–12)
RBC # BLD AUTO: 3.26 10*6/MM3 (ref 3.9–5.2)
WBC NRBC COR # BLD: 4.99 10*3/MM3 (ref 4.5–10.7)

## 2018-09-14 PROCEDURE — 36415 COLL VENOUS BLD VENIPUNCTURE: CPT

## 2018-09-14 PROCEDURE — 96372 THER/PROPH/DIAG INJ SC/IM: CPT

## 2018-09-14 PROCEDURE — 85025 COMPLETE CBC W/AUTO DIFF WBC: CPT | Performed by: INTERNAL MEDICINE

## 2018-09-14 PROCEDURE — 63510000001 EPOETIN ALFA PER 1000 UNITS: Performed by: INTERNAL MEDICINE

## 2018-09-14 RX ADMIN — ERYTHROPOIETIN 30000 UNITS: 20000 INJECTION, SOLUTION INTRAVENOUS; SUBCUTANEOUS at 11:02

## 2018-09-14 NOTE — PROGRESS NOTES
Pt arrived for poss procrit injection, HGB 10.0, parameters are to hold if HGB >10.0, injection given in LT arm at the same dose as last week, with band aid placed over site. No c/o voiced

## 2018-09-20 RX ORDER — SODIUM CHLORIDE 0.9 % (FLUSH) 0.9 %
10 SYRINGE (ML) INJECTION AS NEEDED
Status: CANCELLED | OUTPATIENT
Start: 2018-09-27

## 2018-09-21 ENCOUNTER — LAB (OUTPATIENT)
Dept: OTHER | Facility: HOSPITAL | Age: 78
End: 2018-09-21

## 2018-09-21 ENCOUNTER — INFUSION (OUTPATIENT)
Dept: ONCOLOGY | Facility: HOSPITAL | Age: 78
End: 2018-09-21

## 2018-09-21 VITALS — TEMPERATURE: 97.6 F | WEIGHT: 179 LBS | BODY MASS INDEX: 26.21 KG/M2

## 2018-09-21 DIAGNOSIS — D63.1 ANEMIA OF CHRONIC RENAL FAILURE, STAGE 4 (SEVERE) (HCC): ICD-10-CM

## 2018-09-21 DIAGNOSIS — D63.1 ANEMIA OF CHRONIC RENAL FAILURE, STAGE 4 (SEVERE) (HCC): Primary | ICD-10-CM

## 2018-09-21 DIAGNOSIS — N18.4 ANEMIA OF CHRONIC RENAL FAILURE, STAGE 4 (SEVERE) (HCC): Primary | ICD-10-CM

## 2018-09-21 DIAGNOSIS — N18.4 ANEMIA OF CHRONIC RENAL FAILURE, STAGE 4 (SEVERE) (HCC): ICD-10-CM

## 2018-09-21 LAB
BASOPHILS # BLD AUTO: 0.01 10*3/MM3 (ref 0–0.2)
BASOPHILS NFR BLD AUTO: 0.2 % (ref 0–1.5)
DEPRECATED RDW RBC AUTO: 74.6 FL (ref 37–54)
EOSINOPHIL # BLD AUTO: 0.1 10*3/MM3 (ref 0–0.7)
EOSINOPHIL NFR BLD AUTO: 1.9 % (ref 0.3–6.2)
ERYTHROCYTE [DISTWIDTH] IN BLOOD BY AUTOMATED COUNT: 21.3 % (ref 11.7–13)
HCT VFR BLD AUTO: 29.7 % (ref 35.6–45.5)
HGB BLD-MCNC: 9.4 G/DL (ref 11.9–15.5)
IMM GRANULOCYTES # BLD: 0.03 10*3/MM3 (ref 0–0.03)
IMM GRANULOCYTES NFR BLD: 0.6 % (ref 0–0.5)
LYMPHOCYTES # BLD AUTO: 0.84 10*3/MM3 (ref 0.9–4.8)
LYMPHOCYTES NFR BLD AUTO: 15.8 % (ref 19.6–45.3)
MCH RBC QN AUTO: 31.4 PG (ref 26.9–32)
MCHC RBC AUTO-ENTMCNC: 31.6 G/DL (ref 32.4–36.3)
MCV RBC AUTO: 99.3 FL (ref 80.5–98.2)
MONOCYTES # BLD AUTO: 0.37 10*3/MM3 (ref 0.2–1.2)
MONOCYTES NFR BLD AUTO: 7 % (ref 5–12)
NEUTROPHILS # BLD AUTO: 3.97 10*3/MM3 (ref 1.9–8.1)
NEUTROPHILS NFR BLD AUTO: 74.5 % (ref 42.7–76)
NRBC BLD MANUAL-RTO: 0 /100 WBC (ref 0–0)
PLATELET # BLD AUTO: 150 10*3/MM3 (ref 140–500)
PMV BLD AUTO: 9.8 FL (ref 6–12)
RBC # BLD AUTO: 2.99 10*6/MM3 (ref 3.9–5.2)
WBC NRBC COR # BLD: 5.32 10*3/MM3 (ref 4.5–10.7)

## 2018-09-21 PROCEDURE — 63510000001 EPOETIN ALFA PER 1000 UNITS: Performed by: NURSE PRACTITIONER

## 2018-09-21 PROCEDURE — 85025 COMPLETE CBC W/AUTO DIFF WBC: CPT | Performed by: INTERNAL MEDICINE

## 2018-09-21 PROCEDURE — 96372 THER/PROPH/DIAG INJ SC/IM: CPT | Performed by: NURSE PRACTITIONER

## 2018-09-21 RX ADMIN — ERYTHROPOIETIN 30000 UNITS: 20000 INJECTION, SOLUTION INTRAVENOUS; SUBCUTANEOUS at 10:56

## 2018-09-28 ENCOUNTER — INFUSION (OUTPATIENT)
Dept: ONCOLOGY | Facility: HOSPITAL | Age: 78
End: 2018-09-28

## 2018-09-28 ENCOUNTER — LAB (OUTPATIENT)
Dept: OTHER | Facility: HOSPITAL | Age: 78
End: 2018-09-28

## 2018-09-28 DIAGNOSIS — D63.1 ANEMIA OF CHRONIC RENAL FAILURE, STAGE 4 (SEVERE) (HCC): Primary | ICD-10-CM

## 2018-09-28 DIAGNOSIS — D63.1 ANEMIA OF CHRONIC RENAL FAILURE, STAGE 4 (SEVERE) (HCC): ICD-10-CM

## 2018-09-28 DIAGNOSIS — N18.4 ANEMIA OF CHRONIC RENAL FAILURE, STAGE 4 (SEVERE) (HCC): ICD-10-CM

## 2018-09-28 DIAGNOSIS — Z45.2 ENCOUNTER FOR FITTING AND ADJUSTMENT OF VASCULAR CATHETER: ICD-10-CM

## 2018-09-28 DIAGNOSIS — N18.4 ANEMIA OF CHRONIC RENAL FAILURE, STAGE 4 (SEVERE) (HCC): Primary | ICD-10-CM

## 2018-09-28 LAB
ALBUMIN SERPL-MCNC: 3.7 G/DL (ref 3.5–5.2)
ALBUMIN/GLOB SERPL: 1.3 G/DL
ALP SERPL-CCNC: 60 U/L (ref 39–117)
ALT SERPL W P-5'-P-CCNC: <5 U/L (ref 1–33)
ANION GAP SERPL CALCULATED.3IONS-SCNC: 9.7 MMOL/L
AST SERPL-CCNC: 15 U/L (ref 1–32)
BASOPHILS # BLD AUTO: 0 10*3/MM3 (ref 0–0.2)
BASOPHILS NFR BLD AUTO: 0 % (ref 0–1.5)
BILIRUB SERPL-MCNC: 0.2 MG/DL (ref 0.1–1.2)
BUN BLD-MCNC: 44 MG/DL (ref 8–23)
BUN/CREAT SERPL: 15.7 (ref 7–25)
CALCIUM SPEC-SCNC: 10 MG/DL (ref 8.6–10.5)
CHLORIDE SERPL-SCNC: 106 MMOL/L (ref 98–107)
CO2 SERPL-SCNC: 26.3 MMOL/L (ref 22–29)
CREAT BLD-MCNC: 2.8 MG/DL (ref 0.57–1)
DEPRECATED RDW RBC AUTO: 79 FL (ref 37–54)
EOSINOPHIL # BLD AUTO: 0.08 10*3/MM3 (ref 0–0.7)
EOSINOPHIL NFR BLD AUTO: 1.7 % (ref 0.3–6.2)
ERYTHROCYTE [DISTWIDTH] IN BLOOD BY AUTOMATED COUNT: 21.2 % (ref 11.7–13)
GFR SERPL CREATININE-BSD FRML MDRD: 16 ML/MIN/1.73
GLOBULIN UR ELPH-MCNC: 2.9 GM/DL
GLUCOSE BLD-MCNC: 56 MG/DL (ref 65–99)
HCT VFR BLD AUTO: 30.1 % (ref 35.6–45.5)
HGB BLD-MCNC: 9.5 G/DL (ref 11.9–15.5)
IMM GRANULOCYTES # BLD: 0.02 10*3/MM3 (ref 0–0.03)
IMM GRANULOCYTES NFR BLD: 0.4 % (ref 0–0.5)
LYMPHOCYTES # BLD AUTO: 0.63 10*3/MM3 (ref 0.9–4.8)
LYMPHOCYTES NFR BLD AUTO: 13.5 % (ref 19.6–45.3)
MCH RBC QN AUTO: 32.5 PG (ref 26.9–32)
MCHC RBC AUTO-ENTMCNC: 31.6 G/DL (ref 32.4–36.3)
MCV RBC AUTO: 103.1 FL (ref 80.5–98.2)
MONOCYTES # BLD AUTO: 0.33 10*3/MM3 (ref 0.2–1.2)
MONOCYTES NFR BLD AUTO: 7.1 % (ref 5–12)
NEUTROPHILS # BLD AUTO: 3.62 10*3/MM3 (ref 1.9–8.1)
NEUTROPHILS NFR BLD AUTO: 77.3 % (ref 42.7–76)
NRBC BLD MANUAL-RTO: 0 /100 WBC (ref 0–0)
PHOSPHATE SERPL-MCNC: 3.5 MG/DL (ref 2.5–4.5)
PLATELET # BLD AUTO: 150 10*3/MM3 (ref 140–500)
PMV BLD AUTO: 9.1 FL (ref 6–12)
POTASSIUM BLD-SCNC: 4.7 MMOL/L (ref 3.5–5.2)
PROT SERPL-MCNC: 6.6 G/DL (ref 6–8.5)
PTH-INTACT SERPL-MCNC: 218.6 PG/ML (ref 15–65)
RBC # BLD AUTO: 2.92 10*6/MM3 (ref 3.9–5.2)
SODIUM BLD-SCNC: 142 MMOL/L (ref 136–145)
URATE SERPL-MCNC: 6 MG/DL (ref 2.4–5.7)
WBC NRBC COR # BLD: 4.68 10*3/MM3 (ref 4.5–10.7)

## 2018-09-28 PROCEDURE — 84100 ASSAY OF PHOSPHORUS: CPT | Performed by: INTERNAL MEDICINE

## 2018-09-28 PROCEDURE — 84550 ASSAY OF BLOOD/URIC ACID: CPT | Performed by: INTERNAL MEDICINE

## 2018-09-28 PROCEDURE — 25010000002 HEPARIN FLUSH (PORCINE) 100 UNIT/ML SOLUTION: Performed by: INTERNAL MEDICINE

## 2018-09-28 PROCEDURE — 96372 THER/PROPH/DIAG INJ SC/IM: CPT

## 2018-09-28 PROCEDURE — 85025 COMPLETE CBC W/AUTO DIFF WBC: CPT | Performed by: INTERNAL MEDICINE

## 2018-09-28 PROCEDURE — 83970 ASSAY OF PARATHORMONE: CPT | Performed by: INTERNAL MEDICINE

## 2018-09-28 PROCEDURE — 80053 COMPREHEN METABOLIC PANEL: CPT | Performed by: INTERNAL MEDICINE

## 2018-09-28 PROCEDURE — 36415 COLL VENOUS BLD VENIPUNCTURE: CPT

## 2018-09-28 PROCEDURE — 63510000001 EPOETIN ALFA PER 1000 UNITS: Performed by: INTERNAL MEDICINE

## 2018-09-28 RX ORDER — SODIUM CHLORIDE 0.9 % (FLUSH) 0.9 %
10 SYRINGE (ML) INJECTION AS NEEDED
Status: DISCONTINUED | OUTPATIENT
Start: 2018-09-28 | End: 2018-09-28 | Stop reason: HOSPADM

## 2018-09-28 RX ORDER — SODIUM CHLORIDE 0.9 % (FLUSH) 0.9 %
10 SYRINGE (ML) INJECTION AS NEEDED
Status: CANCELLED | OUTPATIENT
Start: 2018-09-28

## 2018-09-28 RX ADMIN — SODIUM CHLORIDE, PRESERVATIVE FREE 500 UNITS: 5 INJECTION INTRAVENOUS at 11:25

## 2018-09-28 RX ADMIN — ERYTHROPOIETIN 30000 UNITS: 20000 INJECTION, SOLUTION INTRAVENOUS; SUBCUTANEOUS at 11:25

## 2018-09-28 RX ADMIN — Medication 10 ML: at 11:25

## 2018-10-02 ENCOUNTER — TELEPHONE (OUTPATIENT)
Dept: CARDIOLOGY | Facility: CLINIC | Age: 78
End: 2018-10-02

## 2018-10-02 NOTE — TELEPHONE ENCOUNTER
----- Message from Charmaine Machuca sent at 10/1/2018 10:59 PM EDT -----  Regarding: Non-Urgent Medical Question  Contact: [phone]  I have been feeling pain intermittently below my left collarbone, by the upper  outside edge of my pacemaker/defibrillator. Should I be concerned?  On a 1 to 10 scale, it’s about a 7.    Charmaine [email or web address]   38    We have a new phone number — [phone].

## 2018-10-02 NOTE — TELEPHONE ENCOUNTER
Please see the note below and contact the pt mounika am. She denies any pain ,discomfort, SOA, swelling or other symptoms today, stating she is only aware of pain near the ICD when she sits down @ night. [phone]/[email or web address]

## 2018-10-03 ENCOUNTER — CLINICAL SUPPORT NO REQUIREMENTS (OUTPATIENT)
Dept: CARDIOLOGY | Facility: CLINIC | Age: 78
End: 2018-10-03

## 2018-10-03 DIAGNOSIS — I50.22 CHRONIC SYSTOLIC CONGESTIVE HEART FAILURE (HCC): Primary | ICD-10-CM

## 2018-10-03 PROCEDURE — 93297 REM INTERROG DEV EVAL ICPMS: CPT | Performed by: INTERNAL MEDICINE

## 2018-10-03 PROCEDURE — 93296 REM INTERROG EVL PM/IDS: CPT | Performed by: INTERNAL MEDICINE

## 2018-10-03 PROCEDURE — 93295 DEV INTERROG REMOTE 1/2/MLT: CPT | Performed by: INTERNAL MEDICINE

## 2018-10-03 NOTE — TELEPHONE ENCOUNTER
Manual transmission due to pacemaker site pain. Presenting rhythm AS/BVP 96 - 102 bpm. BVP 99.5%. Lead trending stable. No episodes. Fluid index around 0 and thoracic impedance at baseline. Appropriate CRT-D function.    I called pt to further discuss her pain. She states it is under her left collar bone around the pacemaker site. It usually occurs in the evening when she is sitting in her chair and watching TV. It is not reproducible with her sitting in the chair at this time. she describes the pain as sharp and consistent. I was thinking she could possibly have pocket stimulation, however it is not pulsating with her heart beat. She said sometimes it's annoying and other times the pain is somewhat severe.  She has no swelling at the ICD site and no left arm swelling. She states she is receiving Procit injections in her left arm and her arm is sore, but not swollen.  I'm not sure what to make of the pain. I informed her I would inform Dr. Matamoros. I rescheduled her remote check from November to today and we will keep her clinic visit in Feburary in conjunction with SCOTT contreras.

## 2018-10-08 ENCOUNTER — APPOINTMENT (OUTPATIENT)
Dept: OTHER | Facility: HOSPITAL | Age: 78
End: 2018-10-08

## 2018-10-08 ENCOUNTER — INFUSION (OUTPATIENT)
Dept: ONCOLOGY | Facility: HOSPITAL | Age: 78
End: 2018-10-08

## 2018-10-08 VITALS — BODY MASS INDEX: 25.92 KG/M2 | WEIGHT: 177 LBS | TEMPERATURE: 97.5 F

## 2018-10-08 DIAGNOSIS — N18.4 ANEMIA OF CHRONIC RENAL FAILURE, STAGE 4 (SEVERE) (HCC): Primary | ICD-10-CM

## 2018-10-08 DIAGNOSIS — D63.1 ANEMIA OF CHRONIC RENAL FAILURE, STAGE 4 (SEVERE) (HCC): Primary | ICD-10-CM

## 2018-10-08 DIAGNOSIS — D50.8 OTHER IRON DEFICIENCY ANEMIA: Primary | ICD-10-CM

## 2018-10-08 LAB
BASOPHILS # BLD AUTO: 0.01 10*3/MM3 (ref 0–0.2)
BASOPHILS NFR BLD AUTO: 0.2 % (ref 0–1.5)
DEPRECATED RDW RBC AUTO: 74.6 FL (ref 37–54)
EOSINOPHIL # BLD AUTO: 0.08 10*3/MM3 (ref 0–0.7)
EOSINOPHIL NFR BLD AUTO: 1.6 % (ref 0.3–6.2)
ERYTHROCYTE [DISTWIDTH] IN BLOOD BY AUTOMATED COUNT: 19.5 % (ref 11.7–13)
HCT VFR BLD AUTO: 30 % (ref 35.6–45.5)
HGB BLD-MCNC: 9.5 G/DL (ref 11.9–15.5)
IMM GRANULOCYTES # BLD: 0.04 10*3/MM3 (ref 0–0.03)
IMM GRANULOCYTES NFR BLD: 0.8 % (ref 0–0.5)
LYMPHOCYTES # BLD AUTO: 0.92 10*3/MM3 (ref 0.9–4.8)
LYMPHOCYTES NFR BLD AUTO: 18.8 % (ref 19.6–45.3)
MCH RBC QN AUTO: 32.8 PG (ref 26.9–32)
MCHC RBC AUTO-ENTMCNC: 31.7 G/DL (ref 32.4–36.3)
MCV RBC AUTO: 103.4 FL (ref 80.5–98.2)
MONOCYTES # BLD AUTO: 0.41 10*3/MM3 (ref 0.2–1.2)
MONOCYTES NFR BLD AUTO: 8.4 % (ref 5–12)
NEUTROPHILS # BLD AUTO: 3.43 10*3/MM3 (ref 1.9–8.1)
NEUTROPHILS NFR BLD AUTO: 70.2 % (ref 42.7–76)
NRBC BLD MANUAL-RTO: 0 /100 WBC (ref 0–0)
PLATELET # BLD AUTO: 152 10*3/MM3 (ref 140–500)
PMV BLD AUTO: 9.7 FL (ref 6–12)
RBC # BLD AUTO: 2.9 10*6/MM3 (ref 3.9–5.2)
WBC NRBC COR # BLD: 4.89 10*3/MM3 (ref 4.5–10.7)

## 2018-10-08 PROCEDURE — 96372 THER/PROPH/DIAG INJ SC/IM: CPT

## 2018-10-08 PROCEDURE — 85025 COMPLETE CBC W/AUTO DIFF WBC: CPT | Performed by: INTERNAL MEDICINE

## 2018-10-08 PROCEDURE — 63510000001 EPOETIN ALFA PER 1000 UNITS: Performed by: INTERNAL MEDICINE

## 2018-10-08 PROCEDURE — 36415 COLL VENOUS BLD VENIPUNCTURE: CPT

## 2018-10-08 RX ADMIN — ERYTHROPOIETIN 37000 UNITS: 20000 INJECTION, SOLUTION INTRAVENOUS; SUBCUTANEOUS at 11:29

## 2018-10-08 NOTE — PROGRESS NOTES
Labs reviewed and Procrit dosage adjusted per protocol, copy of labs given to pt     Lab Results   Component Value Date    WBC 4.89 10/08/2018    HGB 9.5 (L) 10/08/2018    HCT 30.0 (L) 10/08/2018    .4 (H) 10/08/2018     10/08/2018

## 2018-10-12 DIAGNOSIS — N18.4 ANEMIA OF CHRONIC RENAL FAILURE, STAGE 4 (SEVERE) (HCC): Primary | ICD-10-CM

## 2018-10-12 DIAGNOSIS — D63.1 ANEMIA OF CHRONIC RENAL FAILURE, STAGE 4 (SEVERE) (HCC): Primary | ICD-10-CM

## 2018-10-15 ENCOUNTER — LAB (OUTPATIENT)
Dept: LAB | Facility: HOSPITAL | Age: 78
End: 2018-10-15

## 2018-10-15 ENCOUNTER — INFUSION (OUTPATIENT)
Dept: ONCOLOGY | Facility: HOSPITAL | Age: 78
End: 2018-10-15

## 2018-10-15 DIAGNOSIS — D63.1 ANEMIA OF CHRONIC RENAL FAILURE, STAGE 4 (SEVERE) (HCC): Primary | ICD-10-CM

## 2018-10-15 DIAGNOSIS — N18.4 ANEMIA OF CHRONIC RENAL FAILURE, STAGE 4 (SEVERE) (HCC): ICD-10-CM

## 2018-10-15 DIAGNOSIS — N18.4 ANEMIA OF CHRONIC RENAL FAILURE, STAGE 4 (SEVERE) (HCC): Primary | ICD-10-CM

## 2018-10-15 DIAGNOSIS — D63.1 ANEMIA OF CHRONIC RENAL FAILURE, STAGE 4 (SEVERE) (HCC): ICD-10-CM

## 2018-10-15 LAB
BASOPHILS # BLD AUTO: 0.01 10*3/MM3 (ref 0–0.1)
BASOPHILS NFR BLD AUTO: 0.2 % (ref 0–1.1)
DEPRECATED RDW RBC AUTO: 67.5 FL (ref 37–49)
EOSINOPHIL # BLD AUTO: 0.14 10*3/MM3 (ref 0–0.36)
EOSINOPHIL NFR BLD AUTO: 2.8 % (ref 1–5)
ERYTHROCYTE [DISTWIDTH] IN BLOOD BY AUTOMATED COUNT: 18 % (ref 11.7–14.5)
HCT VFR BLD AUTO: 28.6 % (ref 34–45)
HGB BLD-MCNC: 9 G/DL (ref 11.5–14.9)
IMM GRANULOCYTES # BLD: 0.11 10*3/MM3 (ref 0–0.03)
IMM GRANULOCYTES NFR BLD: 2.2 % (ref 0–0.5)
LYMPHOCYTES # BLD AUTO: 0.69 10*3/MM3 (ref 1–3.5)
LYMPHOCYTES NFR BLD AUTO: 13.6 % (ref 20–49)
MCH RBC QN AUTO: 32.7 PG (ref 27–33)
MCHC RBC AUTO-ENTMCNC: 31.5 G/DL (ref 32–35)
MCV RBC AUTO: 104 FL (ref 83–97)
MONOCYTES # BLD AUTO: 0.37 10*3/MM3 (ref 0.25–0.8)
MONOCYTES NFR BLD AUTO: 7.3 % (ref 4–12)
NEUTROPHILS # BLD AUTO: 3.77 10*3/MM3 (ref 1.5–7)
NEUTROPHILS NFR BLD AUTO: 73.9 % (ref 39–75)
NRBC BLD MANUAL-RTO: 0.6 /100 WBC (ref 0–0)
PLATELET # BLD AUTO: 135 10*3/MM3 (ref 150–375)
PMV BLD AUTO: 11.1 FL (ref 8.9–12.1)
RBC # BLD AUTO: 2.75 10*6/MM3 (ref 3.9–5)
WBC NRBC COR # BLD: 5.09 10*3/MM3 (ref 4–10)

## 2018-10-15 PROCEDURE — 96372 THER/PROPH/DIAG INJ SC/IM: CPT

## 2018-10-15 PROCEDURE — 63510000001 EPOETIN ALFA PER 1000 UNITS: Performed by: INTERNAL MEDICINE

## 2018-10-15 PROCEDURE — 85025 COMPLETE CBC W/AUTO DIFF WBC: CPT | Performed by: INTERNAL MEDICINE

## 2018-10-15 PROCEDURE — 36415 COLL VENOUS BLD VENIPUNCTURE: CPT | Performed by: INTERNAL MEDICINE

## 2018-10-15 RX ADMIN — ERYTHROPOIETIN 37000 UNITS: 20000 INJECTION, SOLUTION INTRAVENOUS; SUBCUTANEOUS at 11:37

## 2018-10-22 ENCOUNTER — LAB (OUTPATIENT)
Dept: OTHER | Facility: HOSPITAL | Age: 78
End: 2018-10-22

## 2018-10-22 ENCOUNTER — INFUSION (OUTPATIENT)
Dept: ONCOLOGY | Facility: HOSPITAL | Age: 78
End: 2018-10-22

## 2018-10-22 DIAGNOSIS — D63.1 ANEMIA OF CHRONIC RENAL FAILURE, STAGE 4 (SEVERE) (HCC): ICD-10-CM

## 2018-10-22 DIAGNOSIS — N18.4 ANEMIA OF CHRONIC RENAL FAILURE, STAGE 4 (SEVERE) (HCC): ICD-10-CM

## 2018-10-22 DIAGNOSIS — N18.4 ANEMIA OF CHRONIC RENAL FAILURE, STAGE 4 (SEVERE) (HCC): Primary | ICD-10-CM

## 2018-10-22 DIAGNOSIS — D63.1 ANEMIA OF CHRONIC RENAL FAILURE, STAGE 4 (SEVERE) (HCC): Primary | ICD-10-CM

## 2018-10-22 LAB
BASOPHILS # BLD AUTO: 0.02 10*3/MM3 (ref 0–0.2)
BASOPHILS NFR BLD AUTO: 0.4 % (ref 0–1.5)
DEPRECATED RDW RBC AUTO: 64.1 FL (ref 37–54)
EOSINOPHIL # BLD AUTO: 0.07 10*3/MM3 (ref 0–0.7)
EOSINOPHIL NFR BLD AUTO: 1.3 % (ref 0.3–6.2)
ERYTHROCYTE [DISTWIDTH] IN BLOOD BY AUTOMATED COUNT: 17.1 % (ref 11.7–13)
HCT VFR BLD AUTO: 28.7 % (ref 35.6–45.5)
HGB BLD-MCNC: 9 G/DL (ref 11.9–15.5)
IMM GRANULOCYTES # BLD: 0.02 10*3/MM3 (ref 0–0.03)
IMM GRANULOCYTES NFR BLD: 0.4 % (ref 0–0.5)
LYMPHOCYTES # BLD AUTO: 0.89 10*3/MM3 (ref 0.9–4.8)
LYMPHOCYTES NFR BLD AUTO: 16.1 % (ref 19.6–45.3)
MCH RBC QN AUTO: 32.1 PG (ref 26.9–32)
MCHC RBC AUTO-ENTMCNC: 31.4 G/DL (ref 32.4–36.3)
MCV RBC AUTO: 102.5 FL (ref 80.5–98.2)
MONOCYTES # BLD AUTO: 0.39 10*3/MM3 (ref 0.2–1.2)
MONOCYTES NFR BLD AUTO: 7.1 % (ref 5–12)
NEUTROPHILS # BLD AUTO: 4.13 10*3/MM3 (ref 1.9–8.1)
NEUTROPHILS NFR BLD AUTO: 74.7 % (ref 42.7–76)
NRBC BLD MANUAL-RTO: 0 /100 WBC (ref 0–0)
PLATELET # BLD AUTO: 181 10*3/MM3 (ref 140–500)
PMV BLD AUTO: 9.6 FL (ref 6–12)
RBC # BLD AUTO: 2.8 10*6/MM3 (ref 3.9–5.2)
WBC NRBC COR # BLD: 5.52 10*3/MM3 (ref 4.5–10.7)

## 2018-10-22 PROCEDURE — 36415 COLL VENOUS BLD VENIPUNCTURE: CPT

## 2018-10-22 PROCEDURE — 96372 THER/PROPH/DIAG INJ SC/IM: CPT

## 2018-10-22 PROCEDURE — 85025 COMPLETE CBC W/AUTO DIFF WBC: CPT | Performed by: INTERNAL MEDICINE

## 2018-10-22 PROCEDURE — 63510000001 EPOETIN ALFA PER 1000 UNITS: Performed by: INTERNAL MEDICINE

## 2018-10-22 RX ADMIN — ERYTHROPOIETIN 37000 UNITS: 20000 INJECTION, SOLUTION INTRAVENOUS; SUBCUTANEOUS at 11:12

## 2018-10-29 ENCOUNTER — LAB (OUTPATIENT)
Dept: OTHER | Facility: HOSPITAL | Age: 78
End: 2018-10-29

## 2018-10-29 ENCOUNTER — TRANSCRIBE ORDERS (OUTPATIENT)
Dept: ADMINISTRATIVE | Facility: HOSPITAL | Age: 78
End: 2018-10-29

## 2018-10-29 ENCOUNTER — INFUSION (OUTPATIENT)
Dept: ONCOLOGY | Facility: HOSPITAL | Age: 78
End: 2018-10-29

## 2018-10-29 VITALS
BODY MASS INDEX: 26.04 KG/M2 | SYSTOLIC BLOOD PRESSURE: 132 MMHG | DIASTOLIC BLOOD PRESSURE: 76 MMHG | HEART RATE: 82 BPM | WEIGHT: 177.8 LBS

## 2018-10-29 DIAGNOSIS — N18.4 CKD (CHRONIC KIDNEY DISEASE), STAGE IV (HCC): Primary | ICD-10-CM

## 2018-10-29 DIAGNOSIS — N18.4 ANEMIA OF CHRONIC RENAL FAILURE, STAGE 4 (SEVERE) (HCC): ICD-10-CM

## 2018-10-29 DIAGNOSIS — D63.1 ANEMIA OF CHRONIC RENAL FAILURE, STAGE 4 (SEVERE) (HCC): Primary | ICD-10-CM

## 2018-10-29 DIAGNOSIS — N18.4 ANEMIA OF CHRONIC RENAL FAILURE, STAGE 4 (SEVERE) (HCC): Primary | ICD-10-CM

## 2018-10-29 DIAGNOSIS — D63.1 ANEMIA OF CHRONIC RENAL FAILURE, STAGE 4 (SEVERE) (HCC): ICD-10-CM

## 2018-10-29 LAB
BASOPHILS # BLD AUTO: 0.01 10*3/MM3 (ref 0–0.2)
BASOPHILS NFR BLD AUTO: 0.2 % (ref 0–1.5)
DEPRECATED RDW RBC AUTO: 60.5 FL (ref 37–54)
EOSINOPHIL # BLD AUTO: 0.06 10*3/MM3 (ref 0–0.7)
EOSINOPHIL NFR BLD AUTO: 1.3 % (ref 0.3–6.2)
ERYTHROCYTE [DISTWIDTH] IN BLOOD BY AUTOMATED COUNT: 16.2 % (ref 11.7–13)
HCT VFR BLD AUTO: 27.3 % (ref 35.6–45.5)
HGB BLD-MCNC: 8.6 G/DL (ref 11.9–15.5)
IMM GRANULOCYTES # BLD: 0.03 10*3/MM3 (ref 0–0.03)
IMM GRANULOCYTES NFR BLD: 0.7 % (ref 0–0.5)
LYMPHOCYTES # BLD AUTO: 0.76 10*3/MM3 (ref 0.9–4.8)
LYMPHOCYTES NFR BLD AUTO: 16.9 % (ref 19.6–45.3)
MCH RBC QN AUTO: 32.1 PG (ref 26.9–32)
MCHC RBC AUTO-ENTMCNC: 31.5 G/DL (ref 32.4–36.3)
MCV RBC AUTO: 101.9 FL (ref 80.5–98.2)
MONOCYTES # BLD AUTO: 0.37 10*3/MM3 (ref 0.2–1.2)
MONOCYTES NFR BLD AUTO: 8.2 % (ref 5–12)
NEUTROPHILS # BLD AUTO: 3.27 10*3/MM3 (ref 1.9–8.1)
NEUTROPHILS NFR BLD AUTO: 72.7 % (ref 42.7–76)
NRBC BLD MANUAL-RTO: 0 /100 WBC (ref 0–0)
PLATELET # BLD AUTO: 178 10*3/MM3 (ref 140–500)
PMV BLD AUTO: 10 FL (ref 6–12)
RBC # BLD AUTO: 2.68 10*6/MM3 (ref 3.9–5.2)
WBC NRBC COR # BLD: 4.5 10*3/MM3 (ref 4.5–10.7)

## 2018-10-29 PROCEDURE — 85025 COMPLETE CBC W/AUTO DIFF WBC: CPT | Performed by: INTERNAL MEDICINE

## 2018-10-29 PROCEDURE — 36415 COLL VENOUS BLD VENIPUNCTURE: CPT

## 2018-10-29 PROCEDURE — 63510000001 EPOETIN ALFA PER 1000 UNITS: Performed by: NURSE PRACTITIONER

## 2018-10-29 PROCEDURE — 96372 THER/PROPH/DIAG INJ SC/IM: CPT

## 2018-10-29 RX ADMIN — ERYTHROPOIETIN 37000 UNITS: 20000 INJECTION, SOLUTION INTRAVENOUS; SUBCUTANEOUS at 11:24

## 2018-11-02 ENCOUNTER — HOSPITAL ENCOUNTER (OUTPATIENT)
Dept: ULTRASOUND IMAGING | Facility: HOSPITAL | Age: 78
Discharge: HOME | End: 2018-11-02
Attending: INTERNAL MEDICINE | Admitting: INTERNAL MEDICINE

## 2018-11-02 DIAGNOSIS — N18.4 CKD (CHRONIC KIDNEY DISEASE), STAGE IV (HCC): ICD-10-CM

## 2018-11-02 PROCEDURE — 76775 US EXAM ABDO BACK WALL LIM: CPT

## 2018-11-08 ENCOUNTER — LAB (OUTPATIENT)
Dept: OTHER | Facility: HOSPITAL | Age: 78
End: 2018-11-08

## 2018-11-08 ENCOUNTER — OFFICE VISIT (OUTPATIENT)
Dept: ONCOLOGY | Facility: CLINIC | Age: 78
End: 2018-11-08

## 2018-11-08 VITALS
HEIGHT: 69 IN | HEART RATE: 82 BPM | OXYGEN SATURATION: 98 % | RESPIRATION RATE: 16 BRPM | BODY MASS INDEX: 26.31 KG/M2 | TEMPERATURE: 97.7 F | WEIGHT: 177.6 LBS | SYSTOLIC BLOOD PRESSURE: 132 MMHG | DIASTOLIC BLOOD PRESSURE: 70 MMHG

## 2018-11-08 DIAGNOSIS — D63.1 ANEMIA OF CHRONIC RENAL FAILURE, STAGE 4 (SEVERE) (HCC): Primary | ICD-10-CM

## 2018-11-08 DIAGNOSIS — N18.4 ANEMIA OF CHRONIC RENAL FAILURE, STAGE 4 (SEVERE) (HCC): Primary | ICD-10-CM

## 2018-11-08 DIAGNOSIS — D50.8 OTHER IRON DEFICIENCY ANEMIA: ICD-10-CM

## 2018-11-08 DIAGNOSIS — D63.1 ANEMIA OF CHRONIC RENAL FAILURE, STAGE 4 (SEVERE) (HCC): ICD-10-CM

## 2018-11-08 DIAGNOSIS — N18.4 ANEMIA OF CHRONIC RENAL FAILURE, STAGE 4 (SEVERE) (HCC): ICD-10-CM

## 2018-11-08 DIAGNOSIS — D63.8 ANEMIA OF CHRONIC DISEASE: ICD-10-CM

## 2018-11-08 LAB
ABO GROUP BLD: NORMAL
BASOPHILS # BLD AUTO: 0.01 10*3/MM3 (ref 0–0.2)
BASOPHILS NFR BLD AUTO: 0.3 % (ref 0–1.5)
BLD GP AB SCN SERPL QL: NEGATIVE
DEPRECATED RDW RBC AUTO: 54.3 FL (ref 37–54)
EOSINOPHIL # BLD AUTO: 0.05 10*3/MM3 (ref 0–0.7)
EOSINOPHIL NFR BLD AUTO: 1.3 % (ref 0.3–6.2)
ERYTHROCYTE [DISTWIDTH] IN BLOOD BY AUTOMATED COUNT: 15.1 % (ref 11.7–13)
HCT VFR BLD AUTO: 22.5 % (ref 35.6–45.5)
HGB BLD-MCNC: 6.9 G/DL (ref 11.9–15.5)
IMM GRANULOCYTES # BLD: 0.02 10*3/MM3 (ref 0–0.03)
IMM GRANULOCYTES NFR BLD: 0.5 % (ref 0–0.5)
LYMPHOCYTES # BLD AUTO: 0.65 10*3/MM3 (ref 0.9–4.8)
LYMPHOCYTES NFR BLD AUTO: 17.5 % (ref 19.6–45.3)
MCH RBC QN AUTO: 30.1 PG (ref 26.9–32)
MCHC RBC AUTO-ENTMCNC: 30.7 G/DL (ref 32.4–36.3)
MCV RBC AUTO: 98.3 FL (ref 80.5–98.2)
MONOCYTES # BLD AUTO: 0.39 10*3/MM3 (ref 0.2–1.2)
MONOCYTES NFR BLD AUTO: 10.5 % (ref 5–12)
NEUTROPHILS # BLD AUTO: 2.59 10*3/MM3 (ref 1.9–8.1)
NEUTROPHILS NFR BLD AUTO: 69.9 % (ref 42.7–76)
NRBC BLD MANUAL-RTO: 0 /100 WBC (ref 0–0)
PLATELET # BLD AUTO: 154 10*3/MM3 (ref 140–500)
PMV BLD AUTO: 10.1 FL (ref 6–12)
RBC # BLD AUTO: 2.29 10*6/MM3 (ref 3.9–5.2)
RH BLD: NEGATIVE
T&S EXPIRATION DATE: NORMAL
WBC NRBC COR # BLD: 3.71 10*3/MM3 (ref 4.5–10.7)

## 2018-11-08 PROCEDURE — 99214 OFFICE O/P EST MOD 30 MIN: CPT | Performed by: INTERNAL MEDICINE

## 2018-11-08 PROCEDURE — 86900 BLOOD TYPING SEROLOGIC ABO: CPT | Performed by: INTERNAL MEDICINE

## 2018-11-08 PROCEDURE — 86901 BLOOD TYPING SEROLOGIC RH(D): CPT | Performed by: INTERNAL MEDICINE

## 2018-11-08 PROCEDURE — 85025 COMPLETE CBC W/AUTO DIFF WBC: CPT | Performed by: INTERNAL MEDICINE

## 2018-11-08 PROCEDURE — 86850 RBC ANTIBODY SCREEN: CPT | Performed by: INTERNAL MEDICINE

## 2018-11-08 PROCEDURE — 36415 COLL VENOUS BLD VENIPUNCTURE: CPT

## 2018-11-08 PROCEDURE — 86923 COMPATIBILITY TEST ELECTRIC: CPT

## 2018-11-08 RX ORDER — SODIUM CHLORIDE 9 MG/ML
250 INJECTION, SOLUTION INTRAVENOUS AS NEEDED
Status: CANCELLED | OUTPATIENT
Start: 2018-11-09

## 2018-11-08 RX ORDER — FAMOTIDINE 20 MG/1
20 TABLET, FILM COATED ORAL 2 TIMES DAILY
COMMUNITY
End: 2019-05-28

## 2018-11-08 RX ORDER — DIPHENHYDRAMINE HCL 25 MG
25 CAPSULE ORAL ONCE
Status: CANCELLED | OUTPATIENT
Start: 2018-11-09 | End: 2018-11-09

## 2018-11-08 NOTE — PROGRESS NOTES
Subjective .     REASONS FOR FOLLOWUP:      Anemia of chronic kidney diease              History of Iron deficiency    Shortness of Breath          Mrs Machuca is seen today for evaluation. Her hemoglobin today has again declined to 6.9. Remarkably, she denies any exertional dyspnea, chest pain, or bleeding issues. Of note, she receieved 2 doses of IV Feraheme on  8/30/2018 as iron studies on 8/2/2018 revealed depletion with a saturation of 8% and a ferritin of 21.    She continues to experience chronic vertigo, which has been evaluated previously.      Weekly procrit continues.     Progressive anemia is attributable to worsening renal insufficiency. She will be initiating dialysis under the direction of Dr. Marcello Abraham within the next several weeks.     She has no other concerns today.     Past Medical History:   Diagnosis Date   • Anemia     Iron deficiency   • Cancer (CMS/HCC)     Skin cancer   • Cardiomyopathy (CMS/HCC)     S/P pacemaker and defibrillator   • CHF (congestive heart failure) (CMS/HCC)    • Clostridium difficile diarrhea     2013   • Colon polyps    • Coronary artery disease     pacemaker, defibrillator   • Gastroparesis    • GERD (gastroesophageal reflux disease)    • Gout    • Hemorrhoids    • Hiatal hernia    • History of pancreatitis 01/2014   • History of pancreatitis     2014   • History of transfusion    • Hyperlipidemia    • Hypothyroidism    • Left bundle branch block    • Mitral and aortic valve disease    • Mitral valve insufficiency    • Myoclonus     S/P Depakote   • Nephrolithiasis     stage 4 renal failure   • Osteoarthritis    • Pancytopenia (CMS/HCC)    • Peripheral neuropathy    • Progressive familial myoclonic epilepsy (CMS/HCC)    • Pulmonary hypertension (CMS/HCC)    • RLS (restless legs syndrome)    • Ruptured ovarian cyst    • Seizures (CMS/HCC)     myoclonus   • Spinal stenosis    • Transfusion history     3 weeks ago       ONCOLOGIC HISTORY:  (History from previous dates  can be found in the separate document.)    Current Outpatient Prescriptions on File Prior to Visit   Medication Sig Dispense Refill   • allopurinol (ZYLOPRIM) 100 MG tablet Take 100 mg by mouth 2 (Two) Times a Day.     • aspirin 81 MG tablet Take 81 mg by mouth Daily. PT HOLDING FOR SURGERY     • calcitriol (ROCALTROL) 0.25 MCG capsule Take 0.25 mcg by mouth Daily. 1 tablet every other day and 2 tablets every other day     • carbidopa-levodopa ER (SINEMET CR)  MG per tablet Take 1 tablet by mouth.     • carvedilol (COREG) 12.5 MG tablet TAKE 1 TABLET DAILY 90 tablet 1   • Cholecalciferol (VITAMIN D3) 5000 units capsule capsule Take 5,000 Units by mouth Daily. Every other day     • diazepam (VALIUM) 5 MG tablet Take 5 mg by mouth Every Night.     • furosemide (LASIX) 40 MG tablet TAKE 1 TABLET DAILY OR AS  DIRECTED 90 tablet 3   • Glucosamine-Chondroit-Vit C-Mn (GLUCOSAMINE CHONDROITIN COMPLX) capsule Take 1,500 mg by mouth Every Other Day. PT HOLDING FOR SURGERY     • GRALISE 300 MG tablet TAKE 1 TABLET DAILY WITH   DINNER 90 tablet 1   • levothyroxine (SYNTHROID, LEVOTHROID) 25 MCG tablet Take 25 mcg by mouth Daily.     • loperamide (IMODIUM) 2 MG capsule Take 2 mg by mouth 4 (Four) Times a Day As Needed for Diarrhea.     • magnesium oxide 250 MG tablet Take 250 mg by mouth Daily.     • methscopolamine (PAMINE FORTE) 5 MG tablet Take 5 mg by mouth Daily.     • methylPREDNISolone (MEDROL) 4 MG tablet Take 4 mg by mouth As Needed. TAKES FOR GOUT FLARE UPS     • Multiple Vitamin (MULTI-DAY VITAMINS) tablet Take 1 tablet by mouth Daily. Multivitamin with Iron     • NEUPRO 4 MG/24HR patch APPLY 1 PATCH DAILY AS DIRECTED.**PAYABLE 5/8/17 90 patch 2   • ondansetron (ZOFRAN) 8 MG tablet Take 1 tablet by mouth Every 8 (Eight) Hours As Needed for Nausea or Vomiting. 30 tablet 2   • pantoprazole (PROTONIX) 40 MG EC tablet TAKE 1 TABLET BY MOUTH EVERY MORNING  11   • psyllium (METAMUCIL) 58.6 % powder Take 1 packet by  "mouth Daily.     • spironolactone (ALDACTONE) 25 MG tablet TAKE 1/2 TABLET DAILY 45 tablet 1   • Vitamin E 400 UNITS tablet Take 400 Units by mouth Daily. PT HOLDING FOR SURGERY       Current Facility-Administered Medications on File Prior to Visit   Medication Dose Route Frequency Provider Last Rate Last Dose   • heparin flush (porcine) 100 UNIT/ML injection 500 Units  500 Units Intravenous PRN Anuel Scott MD   500 Units at 11/27/17 1347   • sodium chloride 0.9 % flush 10 mL  10 mL Intravenous PRN Anuel Scott MD   10 mL at 11/27/17 1347       ALLERGIES:     Allergies   Allergen Reactions   • Chlorhexidine Rash and Itching     Patient states \"blue dye\" in chlorhexidine.  States the orange and clear are OK to use   • Chlorhexidine Gluconate Itching     i   • Codeine Other (See Comments)     Want to climb the walls, doesn't help pain   • Propoxyphene Other (See Comments)     Belligerent, acting drunk       Social History     Social History   • Marital status:      Spouse name: Zackery   • Number of children: 5   • Years of education: 1 yr college     Occupational History   •  Retired     Social History Main Topics   • Smoking status: Never Smoker   • Smokeless tobacco: Never Used   • Alcohol use No   • Drug use: No   • Sexual activity: Defer      Comment: AGE!!!     Other Topics Concern   • Not on file     Social History Narrative   • No narrative on file         Cancer-related family history is not on file.     Review of Systems   Constitutional: Positive for fatigue.   HENT: Negative.    Eyes: Negative.    Respiratory: Negative for shortness of breath.    Cardiovascular: Negative.    Gastrointestinal: Negative for diarrhea.   Endocrine: Negative.    Genitourinary: Negative.    Musculoskeletal:        Chronic lower extremity myalgias    Skin: Negative.    Neurological: Positive for weakness and light-headedness (chronic, unchanged ).   Hematological: Negative.  Does not bruise/bleed easily. " "  Psychiatric/Behavioral: Negative.      A comprehensive 14 point review of systems was performed and was negative except as mentioned.    Objective      Vitals:    11/08/18 1009   BP: 132/70   Pulse: 82   Resp: 16   Temp: 97.7 °F (36.5 °C)   SpO2: 98%  Comment: at rest   Weight: 80.6 kg (177 lb 9.6 oz)   Height: 176 cm (69.29\")   PainSc: 3  Comment: all over     Current Status 11/8/2018   ECOG score 0       Physical Exam   Constitutional: She appears well-developed and well-nourished.   HENT:   Head: Normocephalic.   Eyes: Conjunctivae are normal. No scleral icterus.   Cardiovascular: Normal rate.    Pulmonary/Chest: Effort normal.   Abdominal: Soft. Bowel sounds are normal.   Skin: No rash noted.   Psychiatric: She has a normal mood and affect.         RECENT LABS:  Hematology WBC   Date Value Ref Range Status   11/08/2018 3.71 (L) 4.50 - 10.70 10*3/mm3 Final     RBC   Date Value Ref Range Status   11/08/2018 2.29 (L) 3.90 - 5.20 10*6/mm3 Final     Hemoglobin   Date Value Ref Range Status   11/08/2018 6.9 (C) 11.9 - 15.5 g/dL Final     Hematocrit   Date Value Ref Range Status   11/08/2018 22.5 (L) 35.6 - 45.5 % Final     Platelets   Date Value Ref Range Status   11/08/2018 154 140 - 500 10*3/mm3 Final        Assessment/Plan   Anemia of stage 4 CKD:  Recent bone marrow biopsy demonstrates no abnormalities, therefore continued anemia is likely related to renal insufficiency, which is followed closely by Dr. Abraham. Patient will be initiating dialysis within the next several weeks.     Her hemoglobin is reflected in this renal insult with a hemoglobin today of 6.9. She will proceed with 2 units of PRBC and Procrit injection today.      PLAN:  1. Proceed with Procrit today and weekly for HGB under 10  2. Transfusion support for symptomatic anemia with hemoglobin under 9  3. Port flush every 6 weeks  4. I will plan to see her formally again in 3 months, but have asked her to call sooner with any new concerns  5. Patient " will begin dialysis under the direction of Dr. Marcello Abraham within the next month      I have reviewed the notes, assessments, and/or procedures performed by CHANTELL Tiwari.  I concur with her/his documentation of Charmaine Machuca.

## 2018-11-10 ENCOUNTER — INFUSION (OUTPATIENT)
Dept: ONCOLOGY | Facility: HOSPITAL | Age: 78
End: 2018-11-10

## 2018-11-10 VITALS
DIASTOLIC BLOOD PRESSURE: 59 MMHG | SYSTOLIC BLOOD PRESSURE: 113 MMHG | OXYGEN SATURATION: 100 % | RESPIRATION RATE: 16 BRPM | TEMPERATURE: 98.1 F | HEART RATE: 77 BPM

## 2018-11-10 DIAGNOSIS — D63.1 ANEMIA OF CHRONIC RENAL FAILURE, STAGE 4 (SEVERE) (HCC): ICD-10-CM

## 2018-11-10 DIAGNOSIS — N18.4 ANEMIA OF CHRONIC RENAL FAILURE, STAGE 4 (SEVERE) (HCC): ICD-10-CM

## 2018-11-10 PROCEDURE — 86900 BLOOD TYPING SEROLOGIC ABO: CPT

## 2018-11-10 PROCEDURE — 63710000001 DIPHENHYDRAMINE PER 50 MG: Performed by: INTERNAL MEDICINE

## 2018-11-10 PROCEDURE — P9016 RBC LEUKOCYTES REDUCED: HCPCS

## 2018-11-10 PROCEDURE — 36430 TRANSFUSION BLD/BLD COMPNT: CPT

## 2018-11-10 RX ORDER — DIPHENHYDRAMINE HCL 25 MG
25 CAPSULE ORAL ONCE
Status: COMPLETED | OUTPATIENT
Start: 2018-11-10 | End: 2018-11-10

## 2018-11-10 RX ORDER — SODIUM CHLORIDE 9 MG/ML
250 INJECTION, SOLUTION INTRAVENOUS AS NEEDED
Status: DISCONTINUED | OUTPATIENT
Start: 2018-11-10 | End: 2018-11-10 | Stop reason: HOSPADM

## 2018-11-10 RX ADMIN — Medication: at 13:42

## 2018-11-10 RX ADMIN — DIPHENHYDRAMINE HYDROCHLORIDE 25 MG: 25 CAPSULE ORAL at 08:27

## 2018-11-15 ENCOUNTER — APPOINTMENT (OUTPATIENT)
Dept: ONCOLOGY | Facility: HOSPITAL | Age: 78
End: 2018-11-15

## 2018-11-15 ENCOUNTER — INFUSION (OUTPATIENT)
Dept: ONCOLOGY | Facility: HOSPITAL | Age: 78
End: 2018-11-15

## 2018-11-15 ENCOUNTER — APPOINTMENT (OUTPATIENT)
Dept: ONCOLOGY | Facility: CLINIC | Age: 78
End: 2018-11-15

## 2018-11-15 DIAGNOSIS — M25.50 PAIN IN JOINT, MULTIPLE SITES: ICD-10-CM

## 2018-11-15 DIAGNOSIS — Z45.2 ENCOUNTER FOR FITTING AND ADJUSTMENT OF VASCULAR CATHETER: ICD-10-CM

## 2018-11-15 DIAGNOSIS — N18.4 ANEMIA OF CHRONIC RENAL FAILURE, STAGE 4 (SEVERE) (HCC): Primary | ICD-10-CM

## 2018-11-15 DIAGNOSIS — D63.1 ANEMIA OF CHRONIC RENAL FAILURE, STAGE 4 (SEVERE) (HCC): Primary | ICD-10-CM

## 2018-11-15 LAB
ALBUMIN SERPL-MCNC: 3.8 G/DL (ref 3.5–5.2)
ALBUMIN/GLOB SERPL: 1.5 G/DL (ref 1.1–2.4)
ALP SERPL-CCNC: 55 U/L (ref 38–116)
ALT SERPL W P-5'-P-CCNC: <5 U/L (ref 0–33)
ANION GAP SERPL CALCULATED.3IONS-SCNC: 11.1 MMOL/L
AST SERPL-CCNC: 16 U/L (ref 0–32)
BACTERIA UR QL AUTO: NEGATIVE /HPF
BASOPHILS # BLD AUTO: 0.01 10*3/MM3 (ref 0–0.1)
BASOPHILS NFR BLD AUTO: 0.2 % (ref 0–1.1)
BILIRUB SERPL-MCNC: 0.2 MG/DL (ref 0.1–1.2)
BILIRUB UR QL STRIP: NEGATIVE
BUN BLD-MCNC: 61 MG/DL (ref 6–20)
BUN/CREAT SERPL: 24.8 (ref 7.3–30)
CALCIUM SPEC-SCNC: 9.7 MG/DL (ref 8.5–10.2)
CHLORIDE SERPL-SCNC: 104 MMOL/L (ref 98–107)
CK SERPL-CCNC: 52 U/L (ref 20–180)
CLARITY UR: CLEAR
CO2 SERPL-SCNC: 26.9 MMOL/L (ref 22–29)
COLOR UR: ABNORMAL
CREAT BLD-MCNC: 2.46 MG/DL (ref 0.6–1.1)
CRP SERPL-MCNC: 0.54 MG/DL (ref 0–0.5)
DEPRECATED RDW RBC AUTO: 55.2 FL (ref 37–49)
EOSINOPHIL # BLD AUTO: 0.05 10*3/MM3 (ref 0–0.36)
EOSINOPHIL NFR BLD AUTO: 1.2 % (ref 1–5)
ERYTHROCYTE [DISTWIDTH] IN BLOOD BY AUTOMATED COUNT: 15.6 % (ref 11.7–14.5)
GFR SERPL CREATININE-BSD FRML MDRD: 19 ML/MIN/1.73
GLOBULIN UR ELPH-MCNC: 2.5 GM/DL (ref 1.8–3.5)
GLUCOSE BLD-MCNC: 85 MG/DL (ref 74–124)
GLUCOSE UR STRIP-MCNC: NEGATIVE MG/DL
HCT VFR BLD AUTO: 30.4 % (ref 34–45)
HGB BLD-MCNC: 9.1 G/DL (ref 11.5–14.9)
HGB UR QL STRIP.AUTO: NEGATIVE
HYALINE CASTS UR QL AUTO: ABNORMAL /LPF
IMM GRANULOCYTES # BLD: 0.02 10*3/MM3 (ref 0–0.03)
IMM GRANULOCYTES NFR BLD: 0.5 % (ref 0–0.5)
KETONES UR QL STRIP: NEGATIVE
LEUKOCYTE ESTERASE UR QL STRIP.AUTO: ABNORMAL
LYMPHOCYTES # BLD AUTO: 0.62 10*3/MM3 (ref 1–3.5)
LYMPHOCYTES NFR BLD AUTO: 14.9 % (ref 20–49)
MCH RBC QN AUTO: 29.5 PG (ref 27–33)
MCHC RBC AUTO-ENTMCNC: 29.9 G/DL (ref 32–35)
MCV RBC AUTO: 98.7 FL (ref 83–97)
MONOCYTES # BLD AUTO: 0.33 10*3/MM3 (ref 0.25–0.8)
MONOCYTES NFR BLD AUTO: 8 % (ref 4–12)
NEUTROPHILS # BLD AUTO: 3.12 10*3/MM3 (ref 1.5–7)
NEUTROPHILS NFR BLD AUTO: 75.2 % (ref 39–75)
NITRITE UR QL STRIP: NEGATIVE
NRBC BLD MANUAL-RTO: 0 /100 WBC (ref 0–0)
PH UR STRIP.AUTO: 5.5 [PH] (ref 4.5–8)
PLATELET # BLD AUTO: 161 10*3/MM3 (ref 150–375)
PMV BLD AUTO: 9.3 FL (ref 8.9–12.1)
POTASSIUM BLD-SCNC: 4.9 MMOL/L (ref 3.5–4.7)
PROT SERPL-MCNC: 6.3 G/DL (ref 6.3–8)
PROT UR QL STRIP: NEGATIVE
RBC # BLD AUTO: 3.08 10*6/MM3 (ref 3.9–5)
RBC # UR: ABNORMAL /HPF
REF LAB TEST METHOD: ABNORMAL
SODIUM BLD-SCNC: 142 MMOL/L (ref 134–145)
SP GR UR STRIP: 1.01 (ref 1–1.03)
SQUAMOUS #/AREA URNS HPF: ABNORMAL /HPF
UROBILINOGEN UR QL STRIP: ABNORMAL
WBC NRBC COR # BLD: 4.15 10*3/MM3 (ref 4–10)
WBC UR QL AUTO: ABNORMAL /HPF

## 2018-11-15 PROCEDURE — 96523 IRRIG DRUG DELIVERY DEVICE: CPT | Performed by: NURSE PRACTITIONER

## 2018-11-15 PROCEDURE — 80053 COMPREHEN METABOLIC PANEL: CPT

## 2018-11-15 PROCEDURE — 81001 URINALYSIS AUTO W/SCOPE: CPT

## 2018-11-15 PROCEDURE — 96372 THER/PROPH/DIAG INJ SC/IM: CPT | Performed by: NURSE PRACTITIONER

## 2018-11-15 PROCEDURE — 63510000001 EPOETIN ALFA PER 1000 UNITS: Performed by: NURSE PRACTITIONER

## 2018-11-15 PROCEDURE — 85025 COMPLETE CBC W/AUTO DIFF WBC: CPT

## 2018-11-15 PROCEDURE — 82550 ASSAY OF CK (CPK): CPT | Performed by: INTERNAL MEDICINE

## 2018-11-15 PROCEDURE — 86140 C-REACTIVE PROTEIN: CPT | Performed by: INTERNAL MEDICINE

## 2018-11-15 RX ORDER — SODIUM CHLORIDE 0.9 % (FLUSH) 0.9 %
10 SYRINGE (ML) INJECTION AS NEEDED
Status: DISCONTINUED | OUTPATIENT
Start: 2018-11-15 | End: 2018-11-15 | Stop reason: HOSPADM

## 2018-11-15 RX ORDER — SODIUM CHLORIDE 0.9 % (FLUSH) 0.9 %
10 SYRINGE (ML) INJECTION AS NEEDED
Status: CANCELLED | OUTPATIENT
Start: 2018-11-15

## 2018-11-15 RX ADMIN — SODIUM CHLORIDE, PRESERVATIVE FREE 500 UNITS: 5 INJECTION INTRAVENOUS at 15:10

## 2018-11-15 RX ADMIN — Medication 10 ML: at 15:10

## 2018-11-15 RX ADMIN — ERYTHROPOIETIN 46000 UNITS: 40000 INJECTION, SOLUTION INTRAVENOUS; SUBCUTANEOUS at 15:40

## 2018-11-15 NOTE — PROGRESS NOTES
Reviewed CBC with patient.  Counts are stable.   Patient without questions or concerns at this time.   Received procrit dose today [id number] units.   Given copy of labs.

## 2018-11-16 LAB
ANA SER QL: NEGATIVE
C3 SERPL-MCNC: 127 MG/DL (ref 82–167)
C4 SERPL-MCNC: 25 MG/DL (ref 14–44)
RIBOSOMAL P AB SER-ACNC: <0.2 AI (ref 0–0.9)

## 2018-11-17 LAB
CARDIOLIPIN IGA SER IA-ACNC: <9 APL U/ML (ref 0–11)
CARDIOLIPIN IGG SER IA-ACNC: <9 GPL U/ML (ref 0–14)
CARDIOLIPIN IGM SER IA-ACNC: <9 MPL U/ML (ref 0–12)
PS IGA SER-ACNC: 1 APS IGA (ref 0–20)
PS IGG SER-ACNC: 2 GPS IGG (ref 0–11)
PS IGM SER-ACNC: 14 MPS IGM (ref 0–25)

## 2018-11-19 RX ORDER — SPIRONOLACTONE 25 MG/1
TABLET ORAL
Qty: 45 TABLET | Refills: 1 | Status: SHIPPED | OUTPATIENT
Start: 2018-11-19 | End: 2019-02-11

## 2018-11-20 ENCOUNTER — LAB (OUTPATIENT)
Dept: OTHER | Facility: HOSPITAL | Age: 78
End: 2018-11-20

## 2018-11-20 ENCOUNTER — INFUSION (OUTPATIENT)
Dept: ONCOLOGY | Facility: HOSPITAL | Age: 78
End: 2018-11-20

## 2018-11-20 ENCOUNTER — OFFICE VISIT (OUTPATIENT)
Dept: ONCOLOGY | Facility: CLINIC | Age: 78
End: 2018-11-20

## 2018-11-20 DIAGNOSIS — D63.1 ANEMIA OF CHRONIC RENAL FAILURE, STAGE 4 (SEVERE) (HCC): Primary | ICD-10-CM

## 2018-11-20 DIAGNOSIS — D63.1 ANEMIA OF CHRONIC RENAL FAILURE, STAGE 4 (SEVERE) (HCC): ICD-10-CM

## 2018-11-20 DIAGNOSIS — N18.4 ANEMIA OF CHRONIC RENAL FAILURE, STAGE 4 (SEVERE) (HCC): Primary | ICD-10-CM

## 2018-11-20 DIAGNOSIS — N18.4 ANEMIA OF CHRONIC RENAL FAILURE, STAGE 4 (SEVERE) (HCC): ICD-10-CM

## 2018-11-20 LAB
BASOPHILS # BLD AUTO: 0.01 10*3/MM3 (ref 0–0.2)
BASOPHILS NFR BLD AUTO: 0.2 % (ref 0–1.5)
DEPRECATED RDW RBC AUTO: 54.9 FL (ref 37–54)
EOSINOPHIL # BLD AUTO: 0.08 10*3/MM3 (ref 0–0.7)
EOSINOPHIL NFR BLD AUTO: 1.6 % (ref 0.3–6.2)
ERYTHROCYTE [DISTWIDTH] IN BLOOD BY AUTOMATED COUNT: 15.7 % (ref 11.7–13)
HCT VFR BLD AUTO: 27.4 % (ref 35.6–45.5)
HGB BLD-MCNC: 8.5 G/DL (ref 11.9–15.5)
IMM GRANULOCYTES # BLD: 0.02 10*3/MM3 (ref 0–0.03)
IMM GRANULOCYTES NFR BLD: 0.4 % (ref 0–0.5)
LYMPHOCYTES # BLD AUTO: 0.69 10*3/MM3 (ref 0.9–4.8)
LYMPHOCYTES NFR BLD AUTO: 13.6 % (ref 19.6–45.3)
MCH RBC QN AUTO: 30 PG (ref 26.9–32)
MCHC RBC AUTO-ENTMCNC: 31 G/DL (ref 32.4–36.3)
MCV RBC AUTO: 96.8 FL (ref 80.5–98.2)
MONOCYTES # BLD AUTO: 0.46 10*3/MM3 (ref 0.2–1.2)
MONOCYTES NFR BLD AUTO: 9 % (ref 5–12)
NEUTROPHILS # BLD AUTO: 3.83 10*3/MM3 (ref 1.9–8.1)
NEUTROPHILS NFR BLD AUTO: 75.2 % (ref 42.7–76)
NRBC BLD MANUAL-RTO: 0 /100 WBC (ref 0–0)
PLATELET # BLD AUTO: 175 10*3/MM3 (ref 140–500)
PMV BLD AUTO: 10.4 FL (ref 6–12)
RBC # BLD AUTO: 2.83 10*6/MM3 (ref 3.9–5.2)
WBC NRBC COR # BLD: 5.09 10*3/MM3 (ref 4.5–10.7)

## 2018-11-20 PROCEDURE — 63510000001 EPOETIN ALFA PER 1000 UNITS: Performed by: NURSE PRACTITIONER

## 2018-11-20 PROCEDURE — 99212-NC PR NO CHARGE CBC OFFICE OUTPATIENT VISIT 10 MINUTES: Performed by: NURSE PRACTITIONER

## 2018-11-20 PROCEDURE — 36415 COLL VENOUS BLD VENIPUNCTURE: CPT

## 2018-11-20 PROCEDURE — 96372 THER/PROPH/DIAG INJ SC/IM: CPT

## 2018-11-20 PROCEDURE — 85025 COMPLETE CBC W/AUTO DIFF WBC: CPT | Performed by: INTERNAL MEDICINE

## 2018-11-20 RX ADMIN — ERYTHROPOIETIN 46000 UNITS: 40000 INJECTION, SOLUTION INTRAVENOUS; SUBCUTANEOUS at 12:02

## 2018-11-20 NOTE — PROGRESS NOTES
Patient is here for lab and RN review.  Hemoglobin has declined today from 9.1 down to 8.5.  Patient is essentially asymptomatic, however.  She has stable, chronic shortness of breath.  She denies any associated chest pain or excess bleeding and bruising.  Given that this is a holiday week, patient feels that she can wait to receive a blood transfusion.  She would like to proceed with her Procrit today and will return in 9 days for a repeat lab and RN review, likely requiring transfusion support at that time.  I have asked her to call in the interim should she develop progressive fatigue or any additional symptoms.  She is agreeable.    Lab Results   Component Value Date    WBC 5.09 11/20/2018    HGB 8.5 (L) 11/20/2018    HCT 27.4 (L) 11/20/2018    MCV 96.8 11/20/2018     11/20/2018     Nisa Lloyd, APRN

## 2018-11-21 ENCOUNTER — APPOINTMENT (OUTPATIENT)
Dept: ONCOLOGY | Facility: HOSPITAL | Age: 78
End: 2018-11-21

## 2018-11-21 ENCOUNTER — APPOINTMENT (OUTPATIENT)
Dept: ONCOLOGY | Facility: CLINIC | Age: 78
End: 2018-11-21

## 2018-11-21 LAB
APTT HEX PL PPP: 17 SEC
APTT IMM NP PPP: ABNORMAL SEC
APTT PPP 1:1 SALINE: ABNORMAL SEC
APTT PPP: 27.9 SEC
B2 GLYCOPROT1 IGA SER-ACNC: <10 SAU
B2 GLYCOPROT1 IGG SER-ACNC: <10 SGU
B2 GLYCOPROT1 IGM SER-ACNC: <10 SMU
CARDIOLIPIN IGG SER IA-ACNC: <10 GPL
CARDIOLIPIN IGM SER IA-ACNC: <10 MPL
CONFIRM DRVVT: ABNORMAL SEC
INR PPP: 0.9 RATIO
LAC INTERPRETATION: ABNORMAL
PLATELET NEUTRALIZATION: 7.1 SEC
PROTHROMBIN TIME: 10 SEC
SCREEN DRVVT/NORMAL: ABNORMAL RATIO
SCREEN DRVVT: 43.2 SEC
THROMBIN TIME: 20.7 SEC

## 2018-11-29 ENCOUNTER — OFFICE VISIT (OUTPATIENT)
Dept: ONCOLOGY | Facility: CLINIC | Age: 78
End: 2018-11-29

## 2018-11-29 ENCOUNTER — INFUSION (OUTPATIENT)
Dept: ONCOLOGY | Facility: HOSPITAL | Age: 78
End: 2018-11-29

## 2018-11-29 ENCOUNTER — APPOINTMENT (OUTPATIENT)
Dept: ONCOLOGY | Facility: HOSPITAL | Age: 78
End: 2018-11-29

## 2018-11-29 DIAGNOSIS — N18.4 ANEMIA OF CHRONIC RENAL FAILURE, STAGE 4 (SEVERE) (HCC): Primary | ICD-10-CM

## 2018-11-29 DIAGNOSIS — N18.4 ANEMIA DUE TO STAGE 4 CHRONIC KIDNEY DISEASE (HCC): Primary | ICD-10-CM

## 2018-11-29 DIAGNOSIS — D63.1 ANEMIA OF CHRONIC RENAL FAILURE, STAGE 4 (SEVERE) (HCC): Primary | ICD-10-CM

## 2018-11-29 DIAGNOSIS — D63.1 ANEMIA DUE TO STAGE 4 CHRONIC KIDNEY DISEASE (HCC): Primary | ICD-10-CM

## 2018-11-29 DIAGNOSIS — N18.4 STAGE 4 CHRONIC KIDNEY DISEASE (HCC): Primary | ICD-10-CM

## 2018-11-29 LAB
ABO GROUP BLD: NORMAL
BASOPHILS # BLD AUTO: 0.01 10*3/MM3 (ref 0–0.2)
BASOPHILS NFR BLD AUTO: 0.1 % (ref 0–1.5)
BLD GP AB SCN SERPL QL: NEGATIVE
DEPRECATED RDW RBC AUTO: 53.7 FL (ref 37–54)
EOSINOPHIL # BLD AUTO: 0.04 10*3/MM3 (ref 0–0.7)
EOSINOPHIL NFR BLD AUTO: 0.5 % (ref 0.3–6.2)
ERYTHROCYTE [DISTWIDTH] IN BLOOD BY AUTOMATED COUNT: 14.9 % (ref 11.7–13)
HCT VFR BLD AUTO: 23.1 % (ref 35.6–45.5)
HGB BLD-MCNC: 6.7 G/DL (ref 11.9–15.5)
HYPOCHROMIA BLD QL: NORMAL
IMM GRANULOCYTES # BLD: 0.02 10*3/MM3 (ref 0–0.03)
IMM GRANULOCYTES NFR BLD: 0.3 % (ref 0–0.5)
LYMPHOCYTES # BLD AUTO: 0.51 10*3/MM3 (ref 0.9–4.8)
LYMPHOCYTES NFR BLD AUTO: 6.9 % (ref 19.6–45.3)
MCH RBC QN AUTO: 28.4 PG (ref 26.9–32)
MCHC RBC AUTO-ENTMCNC: 29 G/DL (ref 32.4–36.3)
MCV RBC AUTO: 97.9 FL (ref 80.5–98.2)
MONOCYTES # BLD AUTO: 0.43 10*3/MM3 (ref 0.2–1.2)
MONOCYTES NFR BLD AUTO: 5.8 % (ref 5–12)
NEUTROPHILS # BLD AUTO: 6.4 10*3/MM3 (ref 1.9–8.1)
NEUTROPHILS NFR BLD AUTO: 86.4 % (ref 42.7–76)
NRBC BLD MANUAL-RTO: 0 /100 WBC (ref 0–0)
PLAT MORPH BLD: NORMAL
PLATELET # BLD AUTO: 163 10*3/MM3 (ref 140–500)
PMV BLD AUTO: 10 FL (ref 6–12)
RBC # BLD AUTO: 2.36 10*6/MM3 (ref 3.9–5.2)
RH BLD: NEGATIVE
T&S EXPIRATION DATE: NORMAL
WBC MORPH BLD: NORMAL
WBC NRBC COR # BLD: 7.41 10*3/MM3 (ref 4.5–10.7)

## 2018-11-29 PROCEDURE — 86850 RBC ANTIBODY SCREEN: CPT | Performed by: NURSE PRACTITIONER

## 2018-11-29 PROCEDURE — 86900 BLOOD TYPING SEROLOGIC ABO: CPT | Performed by: NURSE PRACTITIONER

## 2018-11-29 PROCEDURE — 99212-NC PR NO CHARGE CBC OFFICE OUTPATIENT VISIT 10 MINUTES: Performed by: NURSE PRACTITIONER

## 2018-11-29 PROCEDURE — 86923 COMPATIBILITY TEST ELECTRIC: CPT

## 2018-11-29 PROCEDURE — 25010000002 EPOETIN ALFA PER 1000 UNITS: Performed by: NURSE PRACTITIONER

## 2018-11-29 PROCEDURE — 85025 COMPLETE CBC W/AUTO DIFF WBC: CPT | Performed by: INTERNAL MEDICINE

## 2018-11-29 PROCEDURE — 86901 BLOOD TYPING SEROLOGIC RH(D): CPT | Performed by: NURSE PRACTITIONER

## 2018-11-29 PROCEDURE — 36415 COLL VENOUS BLD VENIPUNCTURE: CPT

## 2018-11-29 PROCEDURE — 96372 THER/PROPH/DIAG INJ SC/IM: CPT

## 2018-11-29 PROCEDURE — 85007 BL SMEAR W/DIFF WBC COUNT: CPT | Performed by: INTERNAL MEDICINE

## 2018-11-29 RX ORDER — ACETAMINOPHEN 325 MG/1
650 TABLET ORAL ONCE
Status: CANCELLED | OUTPATIENT
Start: 2018-12-01 | End: 2018-11-29

## 2018-11-29 RX ORDER — SODIUM CHLORIDE 9 MG/ML
250 INJECTION, SOLUTION INTRAVENOUS AS NEEDED
Status: CANCELLED | OUTPATIENT
Start: 2018-12-01

## 2018-11-29 RX ORDER — DIPHENHYDRAMINE HCL 25 MG
25 CAPSULE ORAL ONCE
Status: CANCELLED | OUTPATIENT
Start: 2018-12-01 | End: 2018-11-29

## 2018-11-29 RX ADMIN — ERYTHROPOIETIN 46000 UNITS: 40000 INJECTION, SOLUTION INTRAVENOUS; SUBCUTANEOUS at 11:32

## 2018-12-01 ENCOUNTER — INFUSION (OUTPATIENT)
Dept: ONCOLOGY | Facility: HOSPITAL | Age: 78
End: 2018-12-01

## 2018-12-01 VITALS
SYSTOLIC BLOOD PRESSURE: 115 MMHG | OXYGEN SATURATION: 99 % | RESPIRATION RATE: 18 BRPM | DIASTOLIC BLOOD PRESSURE: 60 MMHG | TEMPERATURE: 97.2 F | HEART RATE: 78 BPM

## 2018-12-01 DIAGNOSIS — N18.4 STAGE 4 CHRONIC KIDNEY DISEASE (HCC): ICD-10-CM

## 2018-12-01 PROCEDURE — 63710000001 DIPHENHYDRAMINE PER 50 MG: Performed by: NURSE PRACTITIONER

## 2018-12-01 PROCEDURE — 86900 BLOOD TYPING SEROLOGIC ABO: CPT

## 2018-12-01 PROCEDURE — 36430 TRANSFUSION BLD/BLD COMPNT: CPT

## 2018-12-01 PROCEDURE — P9016 RBC LEUKOCYTES REDUCED: HCPCS

## 2018-12-01 RX ORDER — DIPHENHYDRAMINE HCL 25 MG
25 CAPSULE ORAL ONCE
Status: COMPLETED | OUTPATIENT
Start: 2018-12-01 | End: 2018-12-01

## 2018-12-01 RX ORDER — SODIUM CHLORIDE 9 MG/ML
250 INJECTION, SOLUTION INTRAVENOUS AS NEEDED
Status: DISCONTINUED | OUTPATIENT
Start: 2018-12-01 | End: 2018-12-01 | Stop reason: HOSPADM

## 2018-12-01 RX ORDER — ACETAMINOPHEN 325 MG/1
650 TABLET ORAL ONCE
Status: COMPLETED | OUTPATIENT
Start: 2018-12-01 | End: 2018-12-01

## 2018-12-01 RX ADMIN — Medication: at 13:52

## 2018-12-01 RX ADMIN — ACETAMINOPHEN 650 MG: 325 TABLET, FILM COATED ORAL at 08:37

## 2018-12-01 RX ADMIN — DIPHENHYDRAMINE HYDROCHLORIDE 25 MG: 25 CAPSULE ORAL at 08:37

## 2018-12-05 DIAGNOSIS — D50.8 OTHER IRON DEFICIENCY ANEMIA: ICD-10-CM

## 2018-12-05 DIAGNOSIS — N18.9 HISTORY OF ANEMIA DUE TO CHRONIC KIDNEY DISEASE: Primary | ICD-10-CM

## 2018-12-05 DIAGNOSIS — D63.1 ANEMIA OF CHRONIC RENAL FAILURE, STAGE 4 (SEVERE) (HCC): ICD-10-CM

## 2018-12-05 DIAGNOSIS — N18.4 ANEMIA OF CHRONIC RENAL FAILURE, STAGE 4 (SEVERE) (HCC): ICD-10-CM

## 2018-12-05 DIAGNOSIS — Z86.2 HISTORY OF ANEMIA DUE TO CHRONIC KIDNEY DISEASE: Primary | ICD-10-CM

## 2018-12-06 ENCOUNTER — INFUSION (OUTPATIENT)
Dept: ONCOLOGY | Facility: HOSPITAL | Age: 78
End: 2018-12-06

## 2018-12-06 ENCOUNTER — APPOINTMENT (OUTPATIENT)
Dept: OTHER | Facility: HOSPITAL | Age: 78
End: 2018-12-06

## 2018-12-06 ENCOUNTER — OFFICE VISIT (OUTPATIENT)
Dept: ONCOLOGY | Facility: CLINIC | Age: 78
End: 2018-12-06

## 2018-12-06 DIAGNOSIS — D63.1 ANEMIA OF CHRONIC RENAL FAILURE, STAGE 4 (SEVERE) (HCC): Primary | ICD-10-CM

## 2018-12-06 DIAGNOSIS — N18.4 ANEMIA OF CHRONIC RENAL FAILURE, STAGE 4 (SEVERE) (HCC): Primary | ICD-10-CM

## 2018-12-06 DIAGNOSIS — E27.40 CHRONIC ADRENAL INSUFFICIENCY (HCC): Primary | ICD-10-CM

## 2018-12-06 DIAGNOSIS — Z45.2 ENCOUNTER FOR FITTING AND ADJUSTMENT OF VASCULAR CATHETER: ICD-10-CM

## 2018-12-06 DIAGNOSIS — D50.8 OTHER IRON DEFICIENCY ANEMIA: ICD-10-CM

## 2018-12-06 DIAGNOSIS — Z86.2 HISTORY OF ANEMIA DUE TO CHRONIC KIDNEY DISEASE: ICD-10-CM

## 2018-12-06 DIAGNOSIS — D63.1 ANEMIA OF CHRONIC RENAL FAILURE, STAGE 4 (SEVERE) (HCC): ICD-10-CM

## 2018-12-06 DIAGNOSIS — N18.4 ANEMIA OF CHRONIC RENAL FAILURE, STAGE 4 (SEVERE) (HCC): ICD-10-CM

## 2018-12-06 DIAGNOSIS — N18.9 HISTORY OF ANEMIA DUE TO CHRONIC KIDNEY DISEASE: ICD-10-CM

## 2018-12-06 LAB
BASOPHILS # BLD AUTO: 0.01 10*3/MM3 (ref 0–0.2)
BASOPHILS NFR BLD AUTO: 0.2 % (ref 0–1.5)
DEPRECATED RDW RBC AUTO: 54.4 FL (ref 37–54)
EOSINOPHIL # BLD AUTO: 0.04 10*3/MM3 (ref 0–0.7)
EOSINOPHIL NFR BLD AUTO: 0.8 % (ref 0.3–6.2)
ERYTHROCYTE [DISTWIDTH] IN BLOOD BY AUTOMATED COUNT: 16 % (ref 11.7–13)
FERRITIN SERPL-MCNC: 16.7 NG/ML (ref 13–150)
HCT VFR BLD AUTO: 27.1 % (ref 35.6–45.5)
HGB BLD-MCNC: 8.2 G/DL (ref 11.9–15.5)
IMM GRANULOCYTES # BLD: 0.01 10*3/MM3 (ref 0–0.03)
IMM GRANULOCYTES NFR BLD: 0.2 % (ref 0–0.5)
IRON 24H UR-MRATE: 23 MCG/DL (ref 37–145)
IRON SATN MFR SERPL: 6 % (ref 20–50)
LYMPHOCYTES # BLD AUTO: 0.62 10*3/MM3 (ref 0.9–4.8)
LYMPHOCYTES NFR BLD AUTO: 12.4 % (ref 19.6–45.3)
MCH RBC QN AUTO: 28.4 PG (ref 26.9–32)
MCHC RBC AUTO-ENTMCNC: 30.3 G/DL (ref 32.4–36.3)
MCV RBC AUTO: 93.8 FL (ref 80.5–98.2)
MONOCYTES # BLD AUTO: 0.5 10*3/MM3 (ref 0.2–1.2)
MONOCYTES NFR BLD AUTO: 10 % (ref 5–12)
NEUTROPHILS # BLD AUTO: 3.8 10*3/MM3 (ref 1.9–8.1)
NEUTROPHILS NFR BLD AUTO: 76.4 % (ref 42.7–76)
NRBC BLD MANUAL-RTO: 0 /100 WBC (ref 0–0)
PLATELET # BLD AUTO: 148 10*3/MM3 (ref 140–500)
PMV BLD AUTO: 9.5 FL (ref 6–12)
RBC # BLD AUTO: 2.89 10*6/MM3 (ref 3.9–5.2)
TIBC SERPL-MCNC: 389 MCG/DL (ref 298–536)
TRANSFERRIN SERPL-MCNC: 261 MG/DL (ref 200–360)
WBC NRBC COR # BLD: 4.98 10*3/MM3 (ref 4.5–10.7)

## 2018-12-06 PROCEDURE — 96372 THER/PROPH/DIAG INJ SC/IM: CPT | Performed by: NURSE PRACTITIONER

## 2018-12-06 PROCEDURE — 85025 COMPLETE CBC W/AUTO DIFF WBC: CPT | Performed by: INTERNAL MEDICINE

## 2018-12-06 PROCEDURE — 83540 ASSAY OF IRON: CPT | Performed by: INTERNAL MEDICINE

## 2018-12-06 PROCEDURE — 84466 ASSAY OF TRANSFERRIN: CPT | Performed by: INTERNAL MEDICINE

## 2018-12-06 PROCEDURE — 63510000001 EPOETIN ALFA PER 1000 UNITS: Performed by: NURSE PRACTITIONER

## 2018-12-06 PROCEDURE — 25010000002 EPOETIN ALFA PER 1000 UNITS: Performed by: NURSE PRACTITIONER

## 2018-12-06 PROCEDURE — 99212 OFFICE O/P EST SF 10 MIN: CPT | Performed by: NURSE PRACTITIONER

## 2018-12-06 PROCEDURE — 82728 ASSAY OF FERRITIN: CPT | Performed by: INTERNAL MEDICINE

## 2018-12-06 RX ORDER — SODIUM CHLORIDE 0.9 % (FLUSH) 0.9 %
10 SYRINGE (ML) INJECTION AS NEEDED
Status: CANCELLED | OUTPATIENT
Start: 2018-12-06

## 2018-12-06 RX ORDER — SODIUM CHLORIDE 0.9 % (FLUSH) 0.9 %
10 SYRINGE (ML) INJECTION AS NEEDED
Status: DISCONTINUED | OUTPATIENT
Start: 2018-12-06 | End: 2018-12-06 | Stop reason: HOSPADM

## 2018-12-06 RX ADMIN — SODIUM CHLORIDE, PRESERVATIVE FREE 10 ML: 5 INJECTION INTRAVENOUS at 11:34

## 2018-12-06 RX ADMIN — SODIUM CHLORIDE, PRESERVATIVE FREE 500 UNITS: 5 INJECTION INTRAVENOUS at 11:40

## 2018-12-06 RX ADMIN — ERYTHROPOIETIN 57000 UNITS: 40000 INJECTION, SOLUTION INTRAVENOUS; SUBCUTANEOUS at 13:25

## 2018-12-06 NOTE — PROGRESS NOTES
Mrs. Machuca is here today for scheduled lab review and possible Procrit.  She did receive a blood transfusion last week for a hemoglobin of just 6.7. Her hgb today is improved but remains low at 8.2. She denies new symptoms  But continues to struggle with significant fatigue. She denies bleeding, bruising, or chest pain. She received Procrit today and will return next week as already scheduled.      The patients iron profile returned today revealing iron depletion. Her saturation is 6%. There I have ordered IV Feraheme to be added to her schedule next week and the following week.       I spent approximately 20 minutes with the patient today reviewing lab values and answering questions. We discussed the contributing factors to her anemia and our plan of care going forward. We discussed the necessity of meeting with he specialists and why she sees each physician. 100% of this time was spent in face to face counseling.        Lab Results   Component Value Date    WBC 4.98 12/06/2018    HGB 8.2 (L) 12/06/2018    HCT 27.1 (L) 12/06/2018    MCV 93.8 12/06/2018     12/06/2018     Lab Results   Component Value Date    IRON 23 (L) 12/06/2018    TIBC 389 12/06/2018    FERRITIN 16.70 12/06/2018

## 2018-12-10 ENCOUNTER — HOSPITAL ENCOUNTER (OUTPATIENT)
Dept: GENERAL RADIOLOGY | Facility: HOSPITAL | Age: 78
Discharge: HOME | End: 2018-12-10
Admitting: INTERNAL MEDICINE

## 2018-12-10 DIAGNOSIS — M54.2 CERVICALGIA: ICD-10-CM

## 2018-12-10 PROCEDURE — 72050 X-RAY EXAM NECK SPINE 4/5VWS: CPT

## 2018-12-13 ENCOUNTER — APPOINTMENT (OUTPATIENT)
Dept: ONCOLOGY | Facility: CLINIC | Age: 78
End: 2018-12-13

## 2018-12-13 ENCOUNTER — APPOINTMENT (OUTPATIENT)
Dept: ONCOLOGY | Facility: HOSPITAL | Age: 78
End: 2018-12-13

## 2018-12-13 ENCOUNTER — INFUSION (OUTPATIENT)
Dept: ONCOLOGY | Facility: HOSPITAL | Age: 78
End: 2018-12-13

## 2018-12-13 VITALS
TEMPERATURE: 97.7 F | DIASTOLIC BLOOD PRESSURE: 69 MMHG | HEART RATE: 84 BPM | SYSTOLIC BLOOD PRESSURE: 146 MMHG | OXYGEN SATURATION: 98 %

## 2018-12-13 DIAGNOSIS — IMO0001 IRON AND ITS COMPOUNDS CAUSING ADVERSE EFFECT IN THERAPEUTIC USE, SUBSEQUENT ENCOUNTER: ICD-10-CM

## 2018-12-13 DIAGNOSIS — N18.4 ANEMIA OF CHRONIC RENAL FAILURE, STAGE 4 (SEVERE) (HCC): ICD-10-CM

## 2018-12-13 DIAGNOSIS — D63.1 ANEMIA OF CHRONIC RENAL FAILURE, STAGE 4 (SEVERE) (HCC): ICD-10-CM

## 2018-12-13 DIAGNOSIS — D50.8 OTHER IRON DEFICIENCY ANEMIA: Primary | ICD-10-CM

## 2018-12-13 LAB
ANION GAP SERPL CALCULATED.3IONS-SCNC: 12.8 MMOL/L
BASOPHILS # BLD AUTO: 0.01 10*3/MM3 (ref 0–0.2)
BASOPHILS NFR BLD AUTO: 0.3 % (ref 0–1.5)
BUN BLD-MCNC: 58 MG/DL (ref 8–23)
BUN/CREAT SERPL: 17.8 (ref 7–25)
CALCIUM SPEC-SCNC: 9.7 MG/DL (ref 8.6–10.5)
CHLORIDE SERPL-SCNC: 104 MMOL/L (ref 98–107)
CO2 SERPL-SCNC: 25.2 MMOL/L (ref 22–29)
CREAT BLD-MCNC: 3.26 MG/DL (ref 0.57–1)
DEPRECATED RDW RBC AUTO: 54.4 FL (ref 37–54)
EOSINOPHIL # BLD AUTO: 0.05 10*3/MM3 (ref 0–0.7)
EOSINOPHIL NFR BLD AUTO: 1.5 % (ref 0.3–6.2)
ERYTHROCYTE [DISTWIDTH] IN BLOOD BY AUTOMATED COUNT: 15.7 % (ref 11.7–13)
GFR SERPL CREATININE-BSD FRML MDRD: 14 ML/MIN/1.73
GFR SERPL CREATININE-BSD FRML MDRD: ABNORMAL ML/MIN/1.73
GLUCOSE BLD-MCNC: 100 MG/DL (ref 65–99)
HCT VFR BLD AUTO: 27 % (ref 35.6–45.5)
HGB BLD-MCNC: 7.9 G/DL (ref 11.9–15.5)
IMM GRANULOCYTES # BLD: 0.02 10*3/MM3 (ref 0–0.03)
IMM GRANULOCYTES NFR BLD: 0.6 % (ref 0–0.5)
LYMPHOCYTES # BLD AUTO: 0.59 10*3/MM3 (ref 0.9–4.8)
LYMPHOCYTES NFR BLD AUTO: 17.2 % (ref 19.6–45.3)
MCH RBC QN AUTO: 27.6 PG (ref 26.9–32)
MCHC RBC AUTO-ENTMCNC: 29.3 G/DL (ref 32.4–36.3)
MCV RBC AUTO: 94.4 FL (ref 80.5–98.2)
MONOCYTES # BLD AUTO: 0.38 10*3/MM3 (ref 0.2–1.2)
MONOCYTES NFR BLD AUTO: 11 % (ref 5–12)
NEUTROPHILS # BLD AUTO: 2.39 10*3/MM3 (ref 1.9–8.1)
NEUTROPHILS NFR BLD AUTO: 69.4 % (ref 42.7–76)
NRBC BLD MANUAL-RTO: 0 /100 WBC (ref 0–0)
PLATELET # BLD AUTO: 186 10*3/MM3 (ref 140–500)
PMV BLD AUTO: 9.5 FL (ref 6–12)
POTASSIUM BLD-SCNC: 5.1 MMOL/L (ref 3.5–5.2)
POTASSIUM BLD-SCNC: 5.2 MMOL/L (ref 3.5–5.2)
RBC # BLD AUTO: 2.86 10*6/MM3 (ref 3.9–5.2)
SODIUM BLD-SCNC: 142 MMOL/L (ref 136–145)
WBC NRBC COR # BLD: 3.44 10*3/MM3 (ref 4.5–10.7)

## 2018-12-13 PROCEDURE — 63710000001 DIPHENHYDRAMINE PER 50 MG: Performed by: INTERNAL MEDICINE

## 2018-12-13 PROCEDURE — 96374 THER/PROPH/DIAG INJ IV PUSH: CPT | Performed by: NURSE PRACTITIONER

## 2018-12-13 PROCEDURE — 25010000002 FERUMOXYTOL 510 MG/17ML SOLUTION 510 MG VIAL: Performed by: NURSE PRACTITIONER

## 2018-12-13 PROCEDURE — 84132 ASSAY OF SERUM POTASSIUM: CPT | Performed by: INTERNAL MEDICINE

## 2018-12-13 PROCEDURE — 85025 COMPLETE CBC W/AUTO DIFF WBC: CPT | Performed by: INTERNAL MEDICINE

## 2018-12-13 PROCEDURE — 96375 TX/PRO/DX INJ NEW DRUG ADDON: CPT | Performed by: NURSE PRACTITIONER

## 2018-12-13 PROCEDURE — A9270 NON-COVERED ITEM OR SERVICE: HCPCS | Performed by: INTERNAL MEDICINE

## 2018-12-13 PROCEDURE — 80048 BASIC METABOLIC PNL TOTAL CA: CPT | Performed by: INTERNAL MEDICINE

## 2018-12-13 RX ORDER — SODIUM CHLORIDE 9 MG/ML
250 INJECTION, SOLUTION INTRAVENOUS ONCE
Status: CANCELLED | OUTPATIENT
Start: 2018-12-13

## 2018-12-13 RX ORDER — DIPHENHYDRAMINE HCL 25 MG
25 CAPSULE ORAL ONCE
Status: COMPLETED | OUTPATIENT
Start: 2018-12-13 | End: 2018-12-13

## 2018-12-13 RX ORDER — SODIUM CHLORIDE 9 MG/ML
250 INJECTION, SOLUTION INTRAVENOUS ONCE
Status: COMPLETED | OUTPATIENT
Start: 2018-12-13 | End: 2018-12-13

## 2018-12-13 RX ORDER — DIPHENHYDRAMINE HCL 25 MG
25 CAPSULE ORAL ONCE
Status: CANCELLED | OUTPATIENT
Start: 2018-12-13

## 2018-12-13 RX ORDER — FAMOTIDINE 10 MG/ML
20 INJECTION, SOLUTION INTRAVENOUS ONCE
Status: CANCELLED | OUTPATIENT
Start: 2018-12-13

## 2018-12-13 RX ADMIN — FERUMOXYTOL 510 MG: 510 INJECTION INTRAVENOUS at 11:25

## 2018-12-13 RX ADMIN — SODIUM CHLORIDE 250 ML: 900 INJECTION, SOLUTION INTRAVENOUS at 11:00

## 2018-12-13 RX ADMIN — FAMOTIDINE 20 MG: 10 INJECTION, SOLUTION INTRAVENOUS at 11:14

## 2018-12-13 RX ADMIN — DIPHENHYDRAMINE HYDROCHLORIDE 25 MG: 25 CAPSULE ORAL at 11:12

## 2018-12-13 NOTE — PROGRESS NOTES
Supportive plan placed for Feraheme x 2 doses one week apart per Odalys ABDUL notes,  ELADIO Scott

## 2018-12-13 NOTE — PROGRESS NOTES
Pt states that her primary care MD, Dr Godfrey wanted a potassium level collected and wanted to know if we could draw additional lab while here. Spoke with Nichole at Dr Godfrey's office, ordered faxed over for K+ level and copy sent to lab and medical records to be scanned in.

## 2018-12-17 RX ORDER — CARVEDILOL 12.5 MG/1
TABLET ORAL
Qty: 90 TABLET | Refills: 1 | Status: SHIPPED | OUTPATIENT
Start: 2018-12-17 | End: 2019-02-11

## 2018-12-20 ENCOUNTER — INFUSION (OUTPATIENT)
Dept: ONCOLOGY | Facility: HOSPITAL | Age: 78
End: 2018-12-20

## 2018-12-20 ENCOUNTER — APPOINTMENT (OUTPATIENT)
Dept: ONCOLOGY | Facility: HOSPITAL | Age: 78
End: 2018-12-20

## 2018-12-20 ENCOUNTER — OFFICE VISIT (OUTPATIENT)
Dept: ONCOLOGY | Facility: CLINIC | Age: 78
End: 2018-12-20

## 2018-12-20 DIAGNOSIS — N18.4 STAGE 4 CHRONIC KIDNEY DISEASE (HCC): Primary | ICD-10-CM

## 2018-12-20 DIAGNOSIS — D63.1 ANEMIA OF CHRONIC RENAL FAILURE, STAGE 4 (SEVERE) (HCC): ICD-10-CM

## 2018-12-20 DIAGNOSIS — D63.8 ANEMIA OF CHRONIC DISEASE: ICD-10-CM

## 2018-12-20 DIAGNOSIS — Z45.2 ENCOUNTER FOR FITTING AND ADJUSTMENT OF VASCULAR CATHETER: ICD-10-CM

## 2018-12-20 DIAGNOSIS — N18.4 ANEMIA OF CHRONIC RENAL FAILURE, STAGE 4 (SEVERE) (HCC): ICD-10-CM

## 2018-12-20 DIAGNOSIS — D50.8 OTHER IRON DEFICIENCY ANEMIA: ICD-10-CM

## 2018-12-20 PROBLEM — N18.9 CKD (CHRONIC KIDNEY DISEASE): Status: ACTIVE | Noted: 2017-09-13

## 2018-12-20 LAB
ABO GROUP BLD: NORMAL
ALBUMIN SERPL-MCNC: 3.7 G/DL (ref 3.5–5.2)
ALBUMIN/GLOB SERPL: 1.5 G/DL
ALP SERPL-CCNC: 45 U/L (ref 39–117)
ALT SERPL W P-5'-P-CCNC: <5 U/L (ref 1–33)
ANION GAP SERPL CALCULATED.3IONS-SCNC: 12.8 MMOL/L
AST SERPL-CCNC: 13 U/L (ref 1–32)
BASOPHILS # BLD AUTO: 0.02 10*3/MM3 (ref 0–0.2)
BASOPHILS NFR BLD AUTO: 0.4 % (ref 0–1.5)
BILIRUB SERPL-MCNC: <0.2 MG/DL (ref 0.1–1.2)
BLD GP AB SCN SERPL QL: NEGATIVE
BUN BLD-MCNC: 54 MG/DL (ref 8–23)
BUN/CREAT SERPL: 16.2 (ref 7–25)
CALCIUM SPEC-SCNC: 10.4 MG/DL (ref 8.6–10.5)
CHLORIDE SERPL-SCNC: 103 MMOL/L (ref 98–107)
CO2 SERPL-SCNC: 27.2 MMOL/L (ref 22–29)
CREAT BLD-MCNC: 3.33 MG/DL (ref 0.57–1)
DEPRECATED RDW RBC AUTO: 59.1 FL (ref 37–54)
EOSINOPHIL # BLD AUTO: 0.04 10*3/MM3 (ref 0–0.7)
EOSINOPHIL NFR BLD AUTO: 0.9 % (ref 0.3–6.2)
ERYTHROCYTE [DISTWIDTH] IN BLOOD BY AUTOMATED COUNT: 17.2 % (ref 11.7–13)
GFR SERPL CREATININE-BSD FRML MDRD: 13 ML/MIN/1.73
GFR SERPL CREATININE-BSD FRML MDRD: ABNORMAL ML/MIN/1.73
GLOBULIN UR ELPH-MCNC: 2.5 GM/DL
GLUCOSE BLD-MCNC: 143 MG/DL (ref 65–99)
HCT VFR BLD AUTO: 25.2 % (ref 35.6–45.5)
HGB BLD-MCNC: 7.5 G/DL (ref 11.9–15.5)
IMM GRANULOCYTES # BLD: 0.02 10*3/MM3 (ref 0–0.03)
IMM GRANULOCYTES NFR BLD: 0.4 % (ref 0–0.5)
LYMPHOCYTES # BLD AUTO: 0.58 10*3/MM3 (ref 0.9–4.8)
LYMPHOCYTES NFR BLD AUTO: 12.4 % (ref 19.6–45.3)
MCH RBC QN AUTO: 28.3 PG (ref 26.9–32)
MCHC RBC AUTO-ENTMCNC: 29.8 G/DL (ref 32.4–36.3)
MCV RBC AUTO: 95.1 FL (ref 80.5–98.2)
MONOCYTES # BLD AUTO: 0.38 10*3/MM3 (ref 0.2–1.2)
MONOCYTES NFR BLD AUTO: 8.2 % (ref 5–12)
NEUTROPHILS # BLD AUTO: 3.62 10*3/MM3 (ref 1.9–8.1)
NEUTROPHILS NFR BLD AUTO: 77.7 % (ref 42.7–76)
NRBC BLD MANUAL-RTO: 0 /100 WBC (ref 0–0)
PLATELET # BLD AUTO: 161 10*3/MM3 (ref 140–500)
PMV BLD AUTO: 9.1 FL (ref 6–12)
POTASSIUM BLD-SCNC: 5.3 MMOL/L (ref 3.5–5.2)
PROT SERPL-MCNC: 6.2 G/DL (ref 6–8.5)
RBC # BLD AUTO: 2.65 10*6/MM3 (ref 3.9–5.2)
RH BLD: NEGATIVE
SODIUM BLD-SCNC: 143 MMOL/L (ref 136–145)
T&S EXPIRATION DATE: NORMAL
WBC NRBC COR # BLD: 4.66 10*3/MM3 (ref 4.5–10.7)

## 2018-12-20 PROCEDURE — 25010000002 FERUMOXYTOL 510 MG/17ML SOLUTION 510 MG VIAL: Performed by: NURSE PRACTITIONER

## 2018-12-20 PROCEDURE — A9270 NON-COVERED ITEM OR SERVICE: HCPCS | Performed by: NURSE PRACTITIONER

## 2018-12-20 PROCEDURE — 63710000001 DIPHENHYDRAMINE PER 50 MG: Performed by: NURSE PRACTITIONER

## 2018-12-20 PROCEDURE — 86900 BLOOD TYPING SEROLOGIC ABO: CPT

## 2018-12-20 PROCEDURE — 36415 COLL VENOUS BLD VENIPUNCTURE: CPT

## 2018-12-20 PROCEDURE — 86901 BLOOD TYPING SEROLOGIC RH(D): CPT

## 2018-12-20 PROCEDURE — 85025 COMPLETE CBC W/AUTO DIFF WBC: CPT | Performed by: INTERNAL MEDICINE

## 2018-12-20 PROCEDURE — 86923 COMPATIBILITY TEST ELECTRIC: CPT

## 2018-12-20 PROCEDURE — 80053 COMPREHEN METABOLIC PANEL: CPT | Performed by: NURSE PRACTITIONER

## 2018-12-20 PROCEDURE — 96374 THER/PROPH/DIAG INJ IV PUSH: CPT

## 2018-12-20 PROCEDURE — 86850 RBC ANTIBODY SCREEN: CPT

## 2018-12-20 PROCEDURE — 99214 OFFICE O/P EST MOD 30 MIN: CPT | Performed by: NURSE PRACTITIONER

## 2018-12-20 RX ORDER — SODIUM CHLORIDE 0.9 % (FLUSH) 0.9 %
10 SYRINGE (ML) INJECTION AS NEEDED
Status: CANCELLED | OUTPATIENT
Start: 2018-12-20

## 2018-12-20 RX ORDER — SODIUM CHLORIDE 9 MG/ML
250 INJECTION, SOLUTION INTRAVENOUS AS NEEDED
Status: CANCELLED | OUTPATIENT
Start: 2018-12-21

## 2018-12-20 RX ORDER — DIPHENHYDRAMINE HCL 25 MG
25 CAPSULE ORAL ONCE
Status: CANCELLED | OUTPATIENT
Start: 2018-12-20

## 2018-12-20 RX ORDER — ACETAMINOPHEN 325 MG/1
650 TABLET ORAL ONCE
Status: CANCELLED | OUTPATIENT
Start: 2018-12-21 | End: 2018-12-20

## 2018-12-20 RX ORDER — DIPHENHYDRAMINE HCL 25 MG
25 CAPSULE ORAL ONCE
Status: COMPLETED | OUTPATIENT
Start: 2018-12-20 | End: 2018-12-20

## 2018-12-20 RX ORDER — DIPHENHYDRAMINE HCL 25 MG
25 CAPSULE ORAL ONCE
Status: CANCELLED | OUTPATIENT
Start: 2018-12-21 | End: 2018-12-20

## 2018-12-20 RX ORDER — SODIUM CHLORIDE 9 MG/ML
250 INJECTION, SOLUTION INTRAVENOUS ONCE
Status: COMPLETED | OUTPATIENT
Start: 2018-12-20 | End: 2018-12-20

## 2018-12-20 RX ORDER — SODIUM CHLORIDE 0.9 % (FLUSH) 0.9 %
10 SYRINGE (ML) INJECTION AS NEEDED
Status: DISCONTINUED | OUTPATIENT
Start: 2018-12-20 | End: 2018-12-20 | Stop reason: HOSPADM

## 2018-12-20 RX ORDER — SODIUM CHLORIDE 9 MG/ML
250 INJECTION, SOLUTION INTRAVENOUS ONCE
Status: CANCELLED | OUTPATIENT
Start: 2018-12-20

## 2018-12-20 RX ADMIN — SODIUM CHLORIDE 250 ML: 900 INJECTION, SOLUTION INTRAVENOUS at 11:09

## 2018-12-20 RX ADMIN — DIPHENHYDRAMINE HYDROCHLORIDE 25 MG: 25 CAPSULE ORAL at 11:10

## 2018-12-20 RX ADMIN — SODIUM CHLORIDE, PRESERVATIVE FREE 500 UNITS: 5 INJECTION INTRAVENOUS at 12:23

## 2018-12-20 RX ADMIN — SODIUM CHLORIDE, PRESERVATIVE FREE 10 ML: 5 INJECTION INTRAVENOUS at 12:23

## 2018-12-20 RX ADMIN — FERUMOXYTOL 510 MG: 510 INJECTION INTRAVENOUS at 11:37

## 2018-12-20 NOTE — PROGRESS NOTES
Subjective .     REASONS FOR FOLLOWUP:      Anemia of chronic kidney diease              History of Iron deficiency    Shortness of Breath          Mrs Machuca is seen today for lab review and scheduled Fereheme. She reports feeling quite fatigued and short of breath. She denies chest pain or heart palpitations. Her hgb is quite low but stable at 7.5. She denies bleeding or bruising.    She was informed by her primary care physician last week that her potassium was quite elevated.  She was instructed to follow up with her nephrologist, Dr. deficit as possible.  She has an appointment at the first of the year.  Her creatinine continues to climb at 3.33 today versus 3.26 last week.  Her potassium is 5.3.  The only noted medication that I see that could be contributing to this aspirin lactone.  This is prescribed by her cardiologist.    She is urinating without issue. She denies swelling.       Past Medical History:   Diagnosis Date   • Anemia     Iron deficiency   • Cancer (CMS/HCC)     Skin cancer   • Cardiomyopathy (CMS/HCC)     S/P pacemaker and defibrillator   • CHF (congestive heart failure) (CMS/HCC)    • Clostridium difficile diarrhea     2013   • Colon polyps    • Coronary artery disease     pacemaker, defibrillator   • Gastroparesis    • GERD (gastroesophageal reflux disease)    • Gout    • Hemorrhoids    • Hiatal hernia    • History of pancreatitis 01/2014   • History of pancreatitis     2014   • History of transfusion    • Hyperlipidemia    • Hypothyroidism    • Left bundle branch block    • Mitral and aortic valve disease    • Mitral valve insufficiency    • Myoclonus     S/P Depakote   • Nephrolithiasis     stage 4 renal failure   • Osteoarthritis    • Pancytopenia (CMS/HCC)    • Peripheral neuropathy    • Progressive familial myoclonic epilepsy (CMS/HCC)    • Pulmonary hypertension (CMS/HCC)    • RLS (restless legs syndrome)    • Ruptured ovarian cyst    • Seizures (CMS/HCC)     myoclonus   •  Spinal stenosis    • Transfusion history     3 weeks ago       ONCOLOGIC HISTORY:  (History from previous dates can be found in the separate document.)    Current Outpatient Medications on File Prior to Visit   Medication Sig Dispense Refill   • allopurinol (ZYLOPRIM) 100 MG tablet Take 100 mg by mouth 2 (Two) Times a Day.     • aspirin 81 MG tablet Take 81 mg by mouth Daily. PT HOLDING FOR SURGERY     • calcitriol (ROCALTROL) 0.25 MCG capsule Take 0.25 mcg by mouth Daily. 1 tablet every other day and 2 tablets every other day     • carbidopa-levodopa ER (SINEMET CR)  MG per tablet Take 1 tablet by mouth.     • carvedilol (COREG) 12.5 MG tablet TAKE 1 TABLET DAILY 90 tablet 1   • Cholecalciferol (VITAMIN D3) 5000 units capsule capsule Take 5,000 Units by mouth Daily. Every other day     • diazepam (VALIUM) 5 MG tablet Take 5 mg by mouth Every Night.     • famotidine (PEPCID) 20 MG tablet Take 20 mg by mouth 2 (Two) Times a Day.     • furosemide (LASIX) 40 MG tablet TAKE 1 TABLET DAILY OR AS  DIRECTED 90 tablet 3   • Glucosamine-Chondroit-Vit C-Mn (GLUCOSAMINE CHONDROITIN COMPLX) capsule Take 1,500 mg by mouth Every Other Day. PT HOLDING FOR SURGERY     • GRALISE 300 MG tablet TAKE 1 TABLET DAILY WITH   DINNER 90 tablet 1   • levothyroxine (SYNTHROID, LEVOTHROID) 25 MCG tablet Take 25 mcg by mouth Daily.     • loperamide (IMODIUM) 2 MG capsule Take 2 mg by mouth 4 (Four) Times a Day As Needed for Diarrhea.     • magnesium oxide 250 MG tablet Take 250 mg by mouth Daily.     • methscopolamine (PAMINE FORTE) 5 MG tablet Take 5 mg by mouth Daily.     • methylPREDNISolone (MEDROL) 4 MG tablet Take 4 mg by mouth As Needed. TAKES FOR GOUT FLARE UPS     • Multiple Vitamin (MULTI-DAY VITAMINS) tablet Take 1 tablet by mouth Daily. Multivitamin with Iron     • NEUPRO 4 MG/24HR patch APPLY 1 PATCH DAILY AS DIRECTED.**PAYABLE 5/8/17 90 patch 2   • ondansetron (ZOFRAN) 8 MG tablet Take 1 tablet by mouth Every 8 (Eight) Hours  "As Needed for Nausea or Vomiting. 30 tablet 2   • pantoprazole (PROTONIX) 40 MG EC tablet TAKE 1 TABLET BY MOUTH EVERY MORNING  11   • psyllium (METAMUCIL) 58.6 % powder Take 1 packet by mouth Daily.     • rotigotine (NEUPRO) 6 MG/24HR patch Place 1 patch on the skin as directed by provider.     • spironolactone (ALDACTONE) 25 MG tablet TAKE 1/2 TABLET DAILY 45 tablet 1   • Vitamin E 400 UNITS tablet Take 400 Units by mouth Daily. PT HOLDING FOR SURGERY       Current Facility-Administered Medications on File Prior to Visit   Medication Dose Route Frequency Provider Last Rate Last Dose   • heparin flush (porcine) 100 UNIT/ML injection 500 Units  500 Units Intravenous PRN Anuel Scott MD   500 Units at 11/27/17 1347   • sodium chloride 0.9 % flush 10 mL  10 mL Intravenous PRN Anuel Scott MD   10 mL at 11/27/17 1347       ALLERGIES:     Allergies   Allergen Reactions   • Chlorhexidine Rash and Itching     Patient states \"blue dye\" in chlorhexidine.  States the orange and clear are OK to use   • Chlorhexidine Gluconate Itching     i   • Codeine Other (See Comments)     Want to climb the walls, doesn't help pain   • Propoxyphene Other (See Comments)     Belligerent, acting drunk       Social History     Socioeconomic History   • Marital status:      Spouse name: Zackery   • Number of children: 5   • Years of education: 1 yr college   • Highest education level: Not on file   Social Needs   • Financial resource strain: Not on file   • Food insecurity - worry: Not on file   • Food insecurity - inability: Not on file   • Transportation needs - medical: Not on file   • Transportation needs - non-medical: Not on file   Occupational History     Employer: RETIRED   Tobacco Use   • Smoking status: Never Smoker   • Smokeless tobacco: Never Used   Substance and Sexual Activity   • Alcohol use: No   • Drug use: No   • Sexual activity: Defer     Comment: AGE!!!   Other Topics Concern   • Not on file   Social History " Narrative   • Not on file         Cancer-related family history is not on file.     Review of Systems   Constitutional: Positive for fatigue.   HENT: Negative.    Eyes: Negative.    Respiratory: Negative for shortness of breath.    Cardiovascular: Negative.    Gastrointestinal: Negative for diarrhea.   Endocrine: Negative.    Genitourinary: Negative.         See HPI   Musculoskeletal:        Chronic lower extremity myalgias    Skin: Negative.    Neurological: Positive for weakness and light-headedness (chronic, unchanged ).   Hematological: Negative.  Does not bruise/bleed easily.        See HPI   Psychiatric/Behavioral: Negative.        Objective      There were no vitals filed for this visit.  Current Status 11/8/2018   ECOG score 0       Physical Exam   Constitutional: She appears well-developed and well-nourished.   HENT:   Head: Normocephalic.   Eyes: Conjunctivae are normal. No scleral icterus.   Cardiovascular: Normal rate.   Pulmonary/Chest: Effort normal.   Abdominal: Soft. Bowel sounds are normal.   Skin: No rash noted.   Psychiatric: She has a normal mood and affect.         RECENT LABS:  Hematology WBC   Date Value Ref Range Status   12/20/2018 4.66 4.50 - 10.70 10*3/mm3 Final     RBC   Date Value Ref Range Status   12/20/2018 2.65 (L) 3.90 - 5.20 10*6/mm3 Final     Hemoglobin   Date Value Ref Range Status   12/20/2018 7.5 (L) 11.9 - 15.5 g/dL Final     Hematocrit   Date Value Ref Range Status   12/20/2018 25.2 (L) 35.6 - 45.5 % Final     Platelets   Date Value Ref Range Status   12/20/2018 161 140 - 500 10*3/mm3 Final        Assessment/Plan   Anemia of stage 4 CKD:  William Maki is here today for scheduled Feraheme today recent iron profile revealed a saturation of just 6%. This is of course contributing to her anemia. However, her CKD appears to be worsening as evidenced by an increased creatinine of 3.33 as opposed to 3.261 week ago and 2.46 the month prior.  Her potassium is also increasing at  5.3 in the office today.    PLAN:  1.  Proceed with Feraheme as scheduled today.  She will return next week as already scheduled for her second dose and to see me, CHANTELL Morgan.  2.  She will likely continue to require Procrit once she is iron replete.  We will schedule and dose according to labs.  3.  I have emphasized the importance of following up with  as soon  as possible.  I explained that the patient's creatinine is rising and her potassium as directed correlated to kidney function.  I did advise that she call her cardiologist,  to make him aware of her increased potassium and possibly discontinue Spirinolactone.

## 2018-12-22 ENCOUNTER — INFUSION (OUTPATIENT)
Dept: ONCOLOGY | Facility: HOSPITAL | Age: 78
End: 2018-12-22

## 2018-12-22 VITALS
TEMPERATURE: 96.7 F | DIASTOLIC BLOOD PRESSURE: 68 MMHG | SYSTOLIC BLOOD PRESSURE: 110 MMHG | OXYGEN SATURATION: 100 % | HEART RATE: 63 BPM | RESPIRATION RATE: 16 BRPM

## 2018-12-22 DIAGNOSIS — N18.4 STAGE 4 CHRONIC KIDNEY DISEASE (HCC): ICD-10-CM

## 2018-12-22 PROCEDURE — 36430 TRANSFUSION BLD/BLD COMPNT: CPT

## 2018-12-22 PROCEDURE — 86900 BLOOD TYPING SEROLOGIC ABO: CPT

## 2018-12-22 PROCEDURE — P9016 RBC LEUKOCYTES REDUCED: HCPCS

## 2018-12-22 PROCEDURE — 63710000001 DIPHENHYDRAMINE PER 50 MG: Performed by: NURSE PRACTITIONER

## 2018-12-22 RX ORDER — SODIUM CHLORIDE 9 MG/ML
250 INJECTION, SOLUTION INTRAVENOUS AS NEEDED
Status: DISCONTINUED | OUTPATIENT
Start: 2018-12-22 | End: 2018-12-22 | Stop reason: HOSPADM

## 2018-12-22 RX ORDER — ACETAMINOPHEN 325 MG/1
650 TABLET ORAL ONCE
Status: COMPLETED | OUTPATIENT
Start: 2018-12-22 | End: 2018-12-22

## 2018-12-22 RX ORDER — DIPHENHYDRAMINE HCL 25 MG
25 CAPSULE ORAL ONCE
Status: COMPLETED | OUTPATIENT
Start: 2018-12-22 | End: 2018-12-22

## 2018-12-22 RX ADMIN — Medication 500 UNITS: at 11:52

## 2018-12-22 RX ADMIN — ACETAMINOPHEN 650 MG: 325 TABLET, FILM COATED ORAL at 07:22

## 2018-12-22 RX ADMIN — DIPHENHYDRAMINE HYDROCHLORIDE 25 MG: 25 CAPSULE ORAL at 07:22

## 2018-12-27 ENCOUNTER — INFUSION (OUTPATIENT)
Dept: ONCOLOGY | Facility: HOSPITAL | Age: 78
End: 2018-12-27

## 2018-12-27 ENCOUNTER — OFFICE VISIT (OUTPATIENT)
Dept: ONCOLOGY | Facility: CLINIC | Age: 78
End: 2018-12-27

## 2018-12-27 ENCOUNTER — APPOINTMENT (OUTPATIENT)
Dept: OTHER | Facility: HOSPITAL | Age: 78
End: 2018-12-27

## 2018-12-27 VITALS
OXYGEN SATURATION: 97 % | TEMPERATURE: 97.5 F | SYSTOLIC BLOOD PRESSURE: 123 MMHG | RESPIRATION RATE: 16 BRPM | DIASTOLIC BLOOD PRESSURE: 69 MMHG | HEART RATE: 69 BPM

## 2018-12-27 DIAGNOSIS — D63.1 ANEMIA OF CHRONIC RENAL FAILURE, STAGE 4 (SEVERE) (HCC): ICD-10-CM

## 2018-12-27 DIAGNOSIS — E27.40 CHRONIC ADRENAL INSUFFICIENCY (HCC): Primary | ICD-10-CM

## 2018-12-27 DIAGNOSIS — N18.4 ANEMIA OF CHRONIC RENAL FAILURE, STAGE 4 (SEVERE) (HCC): ICD-10-CM

## 2018-12-27 DIAGNOSIS — D63.1 ANEMIA OF CHRONIC RENAL FAILURE, STAGE 4 (SEVERE) (HCC): Primary | ICD-10-CM

## 2018-12-27 DIAGNOSIS — N18.4 ANEMIA OF CHRONIC RENAL FAILURE, STAGE 4 (SEVERE) (HCC): Primary | ICD-10-CM

## 2018-12-27 DIAGNOSIS — Z86.2 HISTORY OF ANEMIA DUE TO CHRONIC KIDNEY DISEASE: ICD-10-CM

## 2018-12-27 DIAGNOSIS — N18.9 HISTORY OF ANEMIA DUE TO CHRONIC KIDNEY DISEASE: ICD-10-CM

## 2018-12-27 LAB
BASOPHILS # BLD AUTO: 0.01 10*3/MM3 (ref 0–0.2)
BASOPHILS NFR BLD AUTO: 0.2 % (ref 0–1.5)
DEPRECATED RDW RBC AUTO: 56.9 FL (ref 37–54)
EOSINOPHIL # BLD AUTO: 0.05 10*3/MM3 (ref 0–0.7)
EOSINOPHIL NFR BLD AUTO: 0.8 % (ref 0.3–6.2)
ERYTHROCYTE [DISTWIDTH] IN BLOOD BY AUTOMATED COUNT: 16.8 % (ref 11.7–13)
HCT VFR BLD AUTO: 28.8 % (ref 35.6–45.5)
HGB BLD-MCNC: 8.9 G/DL (ref 11.9–15.5)
IMM GRANULOCYTES # BLD AUTO: 0.03 10*3/MM3 (ref 0–0.03)
IMM GRANULOCYTES NFR BLD AUTO: 0.5 % (ref 0–0.5)
LYMPHOCYTES # BLD AUTO: 0.65 10*3/MM3 (ref 0.9–4.8)
LYMPHOCYTES NFR BLD AUTO: 11 % (ref 19.6–45.3)
MCH RBC QN AUTO: 28.6 PG (ref 26.9–32)
MCHC RBC AUTO-ENTMCNC: 30.9 G/DL (ref 32.4–36.3)
MCV RBC AUTO: 92.6 FL (ref 80.5–98.2)
MONOCYTES # BLD AUTO: 0.52 10*3/MM3 (ref 0.2–1.2)
MONOCYTES NFR BLD AUTO: 8.8 % (ref 5–12)
NEUTROPHILS # BLD AUTO: 4.64 10*3/MM3 (ref 1.9–8.1)
NEUTROPHILS NFR BLD AUTO: 78.7 % (ref 42.7–76)
NRBC BLD AUTO-RTO: 0 /100 WBC (ref 0–0)
PLATELET # BLD AUTO: 161 10*3/MM3 (ref 140–500)
PMV BLD AUTO: 10 FL (ref 6–12)
RBC # BLD AUTO: 3.11 10*6/MM3 (ref 3.9–5.2)
WBC NRBC COR # BLD: 5.9 10*3/MM3 (ref 4.5–10.7)

## 2018-12-27 PROCEDURE — 85025 COMPLETE CBC W/AUTO DIFF WBC: CPT | Performed by: INTERNAL MEDICINE

## 2018-12-27 PROCEDURE — 63510000001 EPOETIN ALFA PER 1000 UNITS: Performed by: NURSE PRACTITIONER

## 2018-12-27 PROCEDURE — 99214 OFFICE O/P EST MOD 30 MIN: CPT | Performed by: NURSE PRACTITIONER

## 2018-12-27 PROCEDURE — 96372 THER/PROPH/DIAG INJ SC/IM: CPT

## 2018-12-27 RX ADMIN — ERYTHROPOIETIN 57000 UNITS: 20000 INJECTION, SOLUTION INTRAVENOUS; SUBCUTANEOUS at 11:57

## 2018-12-27 NOTE — PROGRESS NOTES
Subjective .     REASONS FOR FOLLOWUP:      Anemia of chronic kidney diease              History of Iron deficiency    Shortness of Breath          Mrs Machuca is seen today for lab review and scheduled and possible Procrit. She completed her second dose of Feraheme on 12/20/2018 and a blood transfusion on 12/23/2018.  Her hgb has improved to 8.9. She reports feeling slightly better today in terms of her fatigue and shortness of breath with exertion.    She denies bleeding or swelling.     When she was seen in the office last week her potassium was 5.3.. I advised the patient to contact her nephrologist immediately and her cardiologist as she is on Spirinolactone. She states today that she has not been in contact with either.       Past Medical History:   Diagnosis Date   • Anemia     Iron deficiency   • Cancer (CMS/HCC)     Skin cancer   • Cardiomyopathy (CMS/HCC)     S/P pacemaker and defibrillator   • CHF (congestive heart failure) (CMS/HCC)    • Clostridium difficile diarrhea     2013   • Colon polyps    • Coronary artery disease     pacemaker, defibrillator   • Gastroparesis    • GERD (gastroesophageal reflux disease)    • Gout    • Hemorrhoids    • Hiatal hernia    • History of pancreatitis 01/2014   • History of pancreatitis     2014   • History of transfusion    • Hyperlipidemia    • Hypothyroidism    • Left bundle branch block    • Mitral and aortic valve disease    • Mitral valve insufficiency    • Myoclonus     S/P Depakote   • Nephrolithiasis     stage 4 renal failure   • Osteoarthritis    • Pancytopenia (CMS/HCC)    • Peripheral neuropathy    • Progressive familial myoclonic epilepsy (CMS/HCC)    • Pulmonary hypertension (CMS/HCC)    • RLS (restless legs syndrome)    • Ruptured ovarian cyst    • Seizures (CMS/HCC)     myoclonus   • Spinal stenosis    • Transfusion history     3 weeks ago       ONCOLOGIC HISTORY:  (History from previous dates can be found in the separate document.)    Current  Outpatient Medications on File Prior to Visit   Medication Sig Dispense Refill   • allopurinol (ZYLOPRIM) 100 MG tablet Take 100 mg by mouth 2 (Two) Times a Day.     • aspirin 81 MG tablet Take 81 mg by mouth Daily. PT HOLDING FOR SURGERY     • calcitriol (ROCALTROL) 0.25 MCG capsule Take 0.25 mcg by mouth Daily. 1 tablet every other day and 2 tablets every other day     • carbidopa-levodopa ER (SINEMET CR)  MG per tablet Take 1 tablet by mouth.     • carvedilol (COREG) 12.5 MG tablet TAKE 1 TABLET DAILY 90 tablet 1   • Cholecalciferol (VITAMIN D3) 5000 units capsule capsule Take 5,000 Units by mouth Daily. Every other day     • diazepam (VALIUM) 5 MG tablet Take 5 mg by mouth Every Night.     • famotidine (PEPCID) 20 MG tablet Take 20 mg by mouth 2 (Two) Times a Day.     • furosemide (LASIX) 40 MG tablet TAKE 1 TABLET DAILY OR AS  DIRECTED 90 tablet 3   • Glucosamine-Chondroit-Vit C-Mn (GLUCOSAMINE CHONDROITIN COMPLX) capsule Take 1,500 mg by mouth Every Other Day. PT HOLDING FOR SURGERY     • GRALISE 300 MG tablet TAKE 1 TABLET DAILY WITH   DINNER 90 tablet 1   • levothyroxine (SYNTHROID, LEVOTHROID) 25 MCG tablet Take 25 mcg by mouth Daily.     • loperamide (IMODIUM) 2 MG capsule Take 2 mg by mouth 4 (Four) Times a Day As Needed for Diarrhea.     • magnesium oxide 250 MG tablet Take 250 mg by mouth Daily.     • methscopolamine (PAMINE FORTE) 5 MG tablet Take 5 mg by mouth Daily.     • methylPREDNISolone (MEDROL) 4 MG tablet Take 4 mg by mouth As Needed. TAKES FOR GOUT FLARE UPS     • Multiple Vitamin (MULTI-DAY VITAMINS) tablet Take 1 tablet by mouth Daily. Multivitamin with Iron     • NEUPRO 4 MG/24HR patch APPLY 1 PATCH DAILY AS DIRECTED.**PAYABLE 5/8/17 90 patch 2   • ondansetron (ZOFRAN) 8 MG tablet Take 1 tablet by mouth Every 8 (Eight) Hours As Needed for Nausea or Vomiting. 30 tablet 2   • pantoprazole (PROTONIX) 40 MG EC tablet TAKE 1 TABLET BY MOUTH EVERY MORNING  11   • psyllium (METAMUCIL) 58.6 %  "powder Take 1 packet by mouth Daily.     • rotigotine (NEUPRO) 6 MG/24HR patch Place 1 patch on the skin as directed by provider.     • spironolactone (ALDACTONE) 25 MG tablet TAKE 1/2 TABLET DAILY 45 tablet 1   • Vitamin E 400 UNITS tablet Take 400 Units by mouth Daily. PT HOLDING FOR SURGERY       Current Facility-Administered Medications on File Prior to Visit   Medication Dose Route Frequency Provider Last Rate Last Dose   • heparin flush (porcine) 100 UNIT/ML injection 500 Units  500 Units Intravenous PRN Anuel Scott MD   500 Units at 11/27/17 1347   • sodium chloride 0.9 % flush 10 mL  10 mL Intravenous PRN Anuel Scott MD   10 mL at 11/27/17 1347       ALLERGIES:     Allergies   Allergen Reactions   • Chlorhexidine Rash and Itching     Patient states \"blue dye\" in chlorhexidine.  States the orange and clear are OK to use   • Chlorhexidine Gluconate Itching     i   • Codeine Other (See Comments)     Want to climb the walls, doesn't help pain   • Propoxyphene Other (See Comments)     Belligerent, acting drunk       Social History     Socioeconomic History   • Marital status:      Spouse name: Zackery   • Number of children: 5   • Years of education: 1 yr college   • Highest education level: Not on file   Social Needs   • Financial resource strain: Not on file   • Food insecurity - worry: Not on file   • Food insecurity - inability: Not on file   • Transportation needs - medical: Not on file   • Transportation needs - non-medical: Not on file   Occupational History     Employer: RETIRED   Tobacco Use   • Smoking status: Never Smoker   • Smokeless tobacco: Never Used   Substance and Sexual Activity   • Alcohol use: No   • Drug use: No   • Sexual activity: Defer     Comment: AGE!!!   Other Topics Concern   • Not on file   Social History Narrative   • Not on file         Cancer-related family history is not on file.     Review of Systems   Constitutional: Positive for fatigue.   HENT: Negative.  "   Eyes: Negative.    Respiratory: Negative for shortness of breath.    Cardiovascular: Negative.    Gastrointestinal: Negative for diarrhea.   Endocrine: Negative.    Genitourinary: Negative.         See HPI   Musculoskeletal:        Chronic lower extremity myalgias    Skin: Negative.    Neurological: Positive for weakness and light-headedness (chronic, unchanged ).   Hematological: Negative.  Does not bruise/bleed easily.        See HPI   Psychiatric/Behavioral: Negative.        Objective      There were no vitals filed for this visit.  Current Status 11/8/2018   ECOG score 0       Physical Exam   Constitutional: She appears well-developed and well-nourished.   HENT:   Head: Normocephalic.   Eyes: Conjunctivae are normal. No scleral icterus.   Cardiovascular: Normal rate.   Pulmonary/Chest: Effort normal.   Abdominal: Soft. Bowel sounds are normal.   Skin: No rash noted.   Psychiatric: She has a normal mood and affect.         RECENT LABS:  Hematology WBC   Date Value Ref Range Status   12/27/2018 5.90 4.50 - 10.70 10*3/mm3 Final     RBC   Date Value Ref Range Status   12/27/2018 3.11 (L) 3.90 - 5.20 10*6/mm3 Final     Hemoglobin   Date Value Ref Range Status   12/27/2018 8.9 (L) 11.9 - 15.5 g/dL Final     Hematocrit   Date Value Ref Range Status   12/27/2018 28.8 (L) 35.6 - 45.5 % Final     Platelets   Date Value Ref Range Status   12/27/2018 161 140 - 500 10*3/mm3 Final        Assessment/Plan   Anemia of stage 4 CKD:  1. William Maki is here today for scheduled lab review and possible Procrit.  She did receive her second dose of Feraheme last week.  She also had a blood transfusion on 12/23/2018.  This is reflected by an improved hemoglobin, however, she will still require Procrit today.  2.  For: Anemia related to chronic kidney disease disease.  She does see Dr. Abraham and I instructed her last week to follow up with him as soon as possible regarding her elevated potassium.  She is also currently taking  spironolactone and I instructed her to follow-up with her cardiologist.  She has not been able to do this yet she states that she didn't know she was supposed to call.    PLAN:  1.  Proceed Procrit as scheduled today.  2.  I have again emphasized the importance of following up with her nephrologist and cardiologist.  I did explain that an elevated potassium can cause significant and life-threatening issues.  She verbalizes understanding.  I also offered to call her nephrologist myself, however, she declines and states that she will do so today.  3.  She will return as already scheduled  to see Dr. Scott on 1/31/2019.  She will return weekly for CBC with differential, and possible Procrit before this date.  4.  I am instructed the patient to call the office with any new or worsening symptoms.

## 2019-01-03 ENCOUNTER — INFUSION (OUTPATIENT)
Dept: ONCOLOGY | Facility: HOSPITAL | Age: 79
End: 2019-01-03

## 2019-01-03 VITALS
OXYGEN SATURATION: 97 % | HEART RATE: 81 BPM | WEIGHT: 175.8 LBS | BODY MASS INDEX: 25.74 KG/M2 | TEMPERATURE: 97.5 F | SYSTOLIC BLOOD PRESSURE: 125 MMHG | DIASTOLIC BLOOD PRESSURE: 76 MMHG

## 2019-01-03 DIAGNOSIS — Z45.2 ENCOUNTER FOR FITTING AND ADJUSTMENT OF VASCULAR CATHETER: ICD-10-CM

## 2019-01-03 DIAGNOSIS — D63.1 ANEMIA OF CHRONIC RENAL FAILURE, STAGE 4 (SEVERE) (HCC): Primary | ICD-10-CM

## 2019-01-03 DIAGNOSIS — N18.4 ANEMIA OF CHRONIC RENAL FAILURE, STAGE 4 (SEVERE) (HCC): Primary | ICD-10-CM

## 2019-01-03 LAB
ABO GROUP BLD: NORMAL
ALBUMIN SERPL-MCNC: 3.9 G/DL (ref 3.5–5.2)
ALBUMIN/GLOB SERPL: 1.6 G/DL
ALP SERPL-CCNC: 53 U/L (ref 39–117)
ALT SERPL W P-5'-P-CCNC: <5 U/L (ref 1–33)
ANION GAP SERPL CALCULATED.3IONS-SCNC: 9.5 MMOL/L
AST SERPL-CCNC: 16 U/L (ref 1–32)
BASOPHILS # BLD AUTO: 0.01 10*3/MM3 (ref 0–0.2)
BASOPHILS NFR BLD AUTO: 0.2 % (ref 0–1.5)
BILIRUB SERPL-MCNC: 0.2 MG/DL (ref 0.1–1.2)
BLD GP AB SCN SERPL QL: NEGATIVE
BUN BLD-MCNC: 48 MG/DL (ref 8–23)
BUN/CREAT SERPL: 17.7 (ref 7–25)
CALCIUM SPEC-SCNC: 10.2 MG/DL (ref 8.6–10.5)
CHLORIDE SERPL-SCNC: 102 MMOL/L (ref 98–107)
CO2 SERPL-SCNC: 27.5 MMOL/L (ref 22–29)
CREAT BLD-MCNC: 2.71 MG/DL (ref 0.57–1)
DEPRECATED RDW RBC AUTO: 59.4 FL (ref 37–54)
EOSINOPHIL # BLD AUTO: 0.07 10*3/MM3 (ref 0–0.7)
EOSINOPHIL NFR BLD AUTO: 1.4 % (ref 0.3–6.2)
ERYTHROCYTE [DISTWIDTH] IN BLOOD BY AUTOMATED COUNT: 18.1 % (ref 11.7–13)
GFR SERPL CREATININE-BSD FRML MDRD: 17 ML/MIN/1.73
GLOBULIN UR ELPH-MCNC: 2.4 GM/DL
GLUCOSE BLD-MCNC: 103 MG/DL (ref 65–99)
HCT VFR BLD AUTO: 27.1 % (ref 35.6–45.5)
HGB BLD-MCNC: 8.5 G/DL (ref 11.9–15.5)
IMM GRANULOCYTES # BLD AUTO: 0.07 10*3/MM3 (ref 0–0.03)
IMM GRANULOCYTES NFR BLD AUTO: 1.4 % (ref 0–0.5)
LYMPHOCYTES # BLD AUTO: 0.63 10*3/MM3 (ref 0.9–4.8)
LYMPHOCYTES NFR BLD AUTO: 12.2 % (ref 19.6–45.3)
MCH RBC QN AUTO: 29.8 PG (ref 26.9–32)
MCHC RBC AUTO-ENTMCNC: 31.4 G/DL (ref 32.4–36.3)
MCV RBC AUTO: 95.1 FL (ref 80.5–98.2)
MONOCYTES # BLD AUTO: 0.36 10*3/MM3 (ref 0.2–1.2)
MONOCYTES NFR BLD AUTO: 7 % (ref 5–12)
NEUTROPHILS # BLD AUTO: 4.03 10*3/MM3 (ref 1.9–8.1)
NEUTROPHILS NFR BLD AUTO: 77.8 % (ref 42.7–76)
NRBC BLD AUTO-RTO: 1.4 /100 WBC (ref 0–0)
PHOSPHATE SERPL-MCNC: 3.5 MG/DL (ref 2.5–4.5)
PLATELET # BLD AUTO: 219 10*3/MM3 (ref 140–500)
PMV BLD AUTO: 9.3 FL (ref 6–12)
POTASSIUM BLD-SCNC: 3.9 MMOL/L (ref 3.5–5.2)
PROT SERPL-MCNC: 6.3 G/DL (ref 6–8.5)
RBC # BLD AUTO: 2.85 10*6/MM3 (ref 3.9–5.2)
RH BLD: NEGATIVE
SODIUM BLD-SCNC: 139 MMOL/L (ref 136–145)
T&S EXPIRATION DATE: NORMAL
URATE SERPL-MCNC: 11.6 MG/DL (ref 2.4–5.7)
WBC NRBC COR # BLD: 5.17 10*3/MM3 (ref 4.5–10.7)

## 2019-01-03 PROCEDURE — 86850 RBC ANTIBODY SCREEN: CPT | Performed by: INTERNAL MEDICINE

## 2019-01-03 PROCEDURE — 86901 BLOOD TYPING SEROLOGIC RH(D): CPT | Performed by: INTERNAL MEDICINE

## 2019-01-03 PROCEDURE — 84550 ASSAY OF BLOOD/URIC ACID: CPT | Performed by: INTERNAL MEDICINE

## 2019-01-03 PROCEDURE — 85025 COMPLETE CBC W/AUTO DIFF WBC: CPT | Performed by: INTERNAL MEDICINE

## 2019-01-03 PROCEDURE — 86900 BLOOD TYPING SEROLOGIC ABO: CPT | Performed by: INTERNAL MEDICINE

## 2019-01-03 PROCEDURE — 25010000002 EPOETIN ALFA PER 1000 UNITS: Performed by: NURSE PRACTITIONER

## 2019-01-03 PROCEDURE — 80053 COMPREHEN METABOLIC PANEL: CPT | Performed by: INTERNAL MEDICINE

## 2019-01-03 PROCEDURE — 86923 COMPATIBILITY TEST ELECTRIC: CPT

## 2019-01-03 PROCEDURE — 96372 THER/PROPH/DIAG INJ SC/IM: CPT

## 2019-01-03 PROCEDURE — 84100 ASSAY OF PHOSPHORUS: CPT | Performed by: INTERNAL MEDICINE

## 2019-01-03 PROCEDURE — 63510000001 EPOETIN ALFA PER 1000 UNITS: Performed by: NURSE PRACTITIONER

## 2019-01-03 RX ORDER — SODIUM CHLORIDE 9 MG/ML
250 INJECTION, SOLUTION INTRAVENOUS AS NEEDED
Status: CANCELLED | OUTPATIENT
Start: 2019-01-03

## 2019-01-03 RX ORDER — SODIUM CHLORIDE 0.9 % (FLUSH) 0.9 %
10 SYRINGE (ML) INJECTION AS NEEDED
Status: DISCONTINUED | OUTPATIENT
Start: 2019-01-03 | End: 2019-01-03 | Stop reason: HOSPADM

## 2019-01-03 RX ORDER — SODIUM CHLORIDE 0.9 % (FLUSH) 0.9 %
10 SYRINGE (ML) INJECTION AS NEEDED
Status: CANCELLED | OUTPATIENT
Start: 2019-01-03

## 2019-01-03 RX ORDER — DIPHENHYDRAMINE HCL 25 MG
25 CAPSULE ORAL ONCE
Status: CANCELLED | OUTPATIENT
Start: 2019-01-03 | End: 2019-01-03

## 2019-01-03 RX ORDER — ACETAMINOPHEN 325 MG/1
650 TABLET ORAL ONCE
Status: CANCELLED | OUTPATIENT
Start: 2019-01-03 | End: 2019-01-03

## 2019-01-03 RX ADMIN — Medication 10 ML: at 10:45

## 2019-01-03 RX ADMIN — SODIUM CHLORIDE, PRESERVATIVE FREE 500 UNITS: 5 INJECTION INTRAVENOUS at 10:45

## 2019-01-03 RX ADMIN — ERYTHROPOIETIN 57000 UNITS: 40000 INJECTION, SOLUTION INTRAVENOUS; SUBCUTANEOUS at 11:06

## 2019-01-03 NOTE — PROGRESS NOTES
"Pt hgb 8.5 and she c/o feeling \"woozy\" and fatigue.  Reviewed with Dr. Scott and v/o to give 2 units PRBC's.  Armband placed and pt instructed not to remove.  Transfusion scheduled for Saturday morning. Maloney needle left in place per patient's request.   "

## 2019-01-05 ENCOUNTER — INFUSION (OUTPATIENT)
Dept: ONCOLOGY | Facility: HOSPITAL | Age: 79
End: 2019-01-05

## 2019-01-05 VITALS
SYSTOLIC BLOOD PRESSURE: 128 MMHG | RESPIRATION RATE: 16 BRPM | HEART RATE: 71 BPM | DIASTOLIC BLOOD PRESSURE: 58 MMHG | TEMPERATURE: 96.9 F | OXYGEN SATURATION: 100 %

## 2019-01-05 DIAGNOSIS — N18.4 ANEMIA OF CHRONIC RENAL FAILURE, STAGE 4 (SEVERE) (HCC): Primary | ICD-10-CM

## 2019-01-05 DIAGNOSIS — Z45.2 ENCOUNTER FOR FITTING AND ADJUSTMENT OF VASCULAR CATHETER: ICD-10-CM

## 2019-01-05 DIAGNOSIS — D63.1 ANEMIA OF CHRONIC RENAL FAILURE, STAGE 4 (SEVERE) (HCC): Primary | ICD-10-CM

## 2019-01-05 PROCEDURE — 63710000001 DIPHENHYDRAMINE PER 50 MG: Performed by: INTERNAL MEDICINE

## 2019-01-05 PROCEDURE — 86900 BLOOD TYPING SEROLOGIC ABO: CPT

## 2019-01-05 PROCEDURE — 36430 TRANSFUSION BLD/BLD COMPNT: CPT

## 2019-01-05 PROCEDURE — P9016 RBC LEUKOCYTES REDUCED: HCPCS

## 2019-01-05 RX ORDER — SODIUM CHLORIDE 0.9 % (FLUSH) 0.9 %
10 SYRINGE (ML) INJECTION AS NEEDED
Status: DISCONTINUED | OUTPATIENT
Start: 2019-01-05 | End: 2019-01-05 | Stop reason: HOSPADM

## 2019-01-05 RX ORDER — SODIUM CHLORIDE 0.9 % (FLUSH) 0.9 %
10 SYRINGE (ML) INJECTION AS NEEDED
Status: CANCELLED | OUTPATIENT
Start: 2019-01-05

## 2019-01-05 RX ORDER — DIPHENHYDRAMINE HCL 25 MG
25 CAPSULE ORAL ONCE
Status: COMPLETED | OUTPATIENT
Start: 2019-01-05 | End: 2019-01-05

## 2019-01-05 RX ORDER — ACETAMINOPHEN 325 MG/1
650 TABLET ORAL ONCE
Status: COMPLETED | OUTPATIENT
Start: 2019-01-05 | End: 2019-01-05

## 2019-01-05 RX ORDER — SODIUM CHLORIDE 9 MG/ML
250 INJECTION, SOLUTION INTRAVENOUS AS NEEDED
Status: DISCONTINUED | OUTPATIENT
Start: 2019-01-05 | End: 2019-01-05 | Stop reason: HOSPADM

## 2019-01-05 RX ADMIN — DIPHENHYDRAMINE HYDROCHLORIDE 25 MG: 25 CAPSULE ORAL at 08:23

## 2019-01-05 RX ADMIN — Medication 500 UNITS: at 12:51

## 2019-01-05 RX ADMIN — ACETAMINOPHEN 650 MG: 325 TABLET, FILM COATED ORAL at 08:23

## 2019-01-08 RX ORDER — GABAPENTIN 300 MG/1
TABLET, FILM COATED ORAL
Qty: 90 TABLET | Refills: 1 | Status: SHIPPED | OUTPATIENT
Start: 2019-01-08 | End: 2019-02-11

## 2019-01-10 ENCOUNTER — INFUSION (OUTPATIENT)
Dept: ONCOLOGY | Facility: HOSPITAL | Age: 79
End: 2019-01-10

## 2019-01-10 DIAGNOSIS — Z45.2 ENCOUNTER FOR FITTING AND ADJUSTMENT OF VASCULAR CATHETER: ICD-10-CM

## 2019-01-10 DIAGNOSIS — D63.1 ANEMIA OF CHRONIC RENAL FAILURE, STAGE 4 (SEVERE) (HCC): Primary | ICD-10-CM

## 2019-01-10 DIAGNOSIS — N18.4 ANEMIA OF CHRONIC RENAL FAILURE, STAGE 4 (SEVERE) (HCC): Primary | ICD-10-CM

## 2019-01-10 LAB
BASOPHILS # BLD AUTO: 0.02 10*3/MM3 (ref 0–0.2)
BASOPHILS NFR BLD AUTO: 0.4 % (ref 0–1.5)
DEPRECATED RDW RBC AUTO: 66.6 FL (ref 37–54)
EOSINOPHIL # BLD AUTO: 0.08 10*3/MM3 (ref 0–0.7)
EOSINOPHIL NFR BLD AUTO: 1.7 % (ref 0.3–6.2)
ERYTHROCYTE [DISTWIDTH] IN BLOOD BY AUTOMATED COUNT: 20.4 % (ref 11.7–13)
HCT VFR BLD AUTO: 37.1 % (ref 35.6–45.5)
HGB BLD-MCNC: 11.7 G/DL (ref 11.9–15.5)
IMM GRANULOCYTES # BLD AUTO: 0.01 10*3/MM3 (ref 0–0.03)
IMM GRANULOCYTES NFR BLD AUTO: 0.2 % (ref 0–0.5)
LYMPHOCYTES # BLD AUTO: 0.49 10*3/MM3 (ref 0.9–4.8)
LYMPHOCYTES NFR BLD AUTO: 10.4 % (ref 19.6–45.3)
MCH RBC QN AUTO: 30.7 PG (ref 26.9–32)
MCHC RBC AUTO-ENTMCNC: 31.5 G/DL (ref 32.4–36.3)
MCV RBC AUTO: 97.4 FL (ref 80.5–98.2)
MONOCYTES # BLD AUTO: 0.42 10*3/MM3 (ref 0.2–1.2)
MONOCYTES NFR BLD AUTO: 8.9 % (ref 5–12)
NEUTROPHILS # BLD AUTO: 3.68 10*3/MM3 (ref 1.9–8.1)
NEUTROPHILS NFR BLD AUTO: 78.4 % (ref 42.7–76)
NRBC BLD AUTO-RTO: 0.4 /100 WBC (ref 0–0)
PLATELET # BLD AUTO: 129 10*3/MM3 (ref 140–500)
PMV BLD AUTO: 9.7 FL (ref 6–12)
RBC # BLD AUTO: 3.81 10*6/MM3 (ref 3.9–5.2)
WBC NRBC COR # BLD: 4.7 10*3/MM3 (ref 4.5–10.7)

## 2019-01-10 PROCEDURE — 82397 CHEMILUMINESCENT ASSAY: CPT | Performed by: INTERNAL MEDICINE

## 2019-01-10 PROCEDURE — 36591 DRAW BLOOD OFF VENOUS DEVICE: CPT

## 2019-01-10 PROCEDURE — 85025 COMPLETE CBC W/AUTO DIFF WBC: CPT | Performed by: INTERNAL MEDICINE

## 2019-01-10 PROCEDURE — 36592 COLLECT BLOOD FROM PICC: CPT

## 2019-01-10 RX ORDER — SODIUM CHLORIDE 0.9 % (FLUSH) 0.9 %
10 SYRINGE (ML) INJECTION AS NEEDED
Status: CANCELLED | OUTPATIENT
Start: 2019-01-10

## 2019-01-10 RX ORDER — SODIUM CHLORIDE 0.9 % (FLUSH) 0.9 %
10 SYRINGE (ML) INJECTION AS NEEDED
Status: DISCONTINUED | OUTPATIENT
Start: 2019-01-10 | End: 2019-01-10 | Stop reason: HOSPADM

## 2019-01-10 RX ADMIN — SODIUM CHLORIDE, PRESERVATIVE FREE 10 ML: 5 INJECTION INTRAVENOUS at 10:47

## 2019-01-10 RX ADMIN — Medication 500 UNITS: at 10:47

## 2019-01-10 NOTE — PROGRESS NOTES
Procrit not indicated today per tx plan    Lab Results   Component Value Date    WBC 4.70 01/10/2019    HGB 11.7 (L) 01/10/2019    HCT 37.1 01/10/2019    MCV 97.4 01/10/2019     (L) 01/10/2019

## 2019-01-14 LAB — PTH RELATED PROT SERPL-SCNC: <2 PMOL/L

## 2019-01-17 ENCOUNTER — LAB (OUTPATIENT)
Dept: OTHER | Facility: HOSPITAL | Age: 79
End: 2019-01-17

## 2019-01-17 ENCOUNTER — INFUSION (OUTPATIENT)
Dept: ONCOLOGY | Facility: HOSPITAL | Age: 79
End: 2019-01-17

## 2019-01-17 DIAGNOSIS — D63.1 ANEMIA OF CHRONIC RENAL FAILURE, STAGE 4 (SEVERE) (HCC): ICD-10-CM

## 2019-01-17 DIAGNOSIS — N18.4 ANEMIA OF CHRONIC RENAL FAILURE, STAGE 4 (SEVERE) (HCC): ICD-10-CM

## 2019-01-17 LAB
BASOPHILS # BLD AUTO: 0.01 10*3/MM3 (ref 0–0.2)
BASOPHILS NFR BLD AUTO: 0.2 % (ref 0–1.5)
DEPRECATED RDW RBC AUTO: 69.4 FL (ref 37–54)
EOSINOPHIL # BLD AUTO: 0.07 10*3/MM3 (ref 0–0.7)
EOSINOPHIL NFR BLD AUTO: 1.5 % (ref 0.3–6.2)
ERYTHROCYTE [DISTWIDTH] IN BLOOD BY AUTOMATED COUNT: 19.5 % (ref 11.7–13)
HCT VFR BLD AUTO: 36.9 % (ref 35.6–45.5)
HGB BLD-MCNC: 11.8 G/DL (ref 11.9–15.5)
IMM GRANULOCYTES # BLD AUTO: 0.02 10*3/MM3 (ref 0–0.03)
IMM GRANULOCYTES NFR BLD AUTO: 0.4 % (ref 0–0.5)
LYMPHOCYTES # BLD AUTO: 0.56 10*3/MM3 (ref 0.9–4.8)
LYMPHOCYTES NFR BLD AUTO: 11.6 % (ref 19.6–45.3)
MCH RBC QN AUTO: 30.8 PG (ref 26.9–32)
MCHC RBC AUTO-ENTMCNC: 32 G/DL (ref 32.4–36.3)
MCV RBC AUTO: 96.3 FL (ref 80.5–98.2)
MONOCYTES # BLD AUTO: 0.41 10*3/MM3 (ref 0.2–1.2)
MONOCYTES NFR BLD AUTO: 8.5 % (ref 5–12)
NEUTROPHILS # BLD AUTO: 3.74 10*3/MM3 (ref 1.9–8.1)
NEUTROPHILS NFR BLD AUTO: 77.8 % (ref 42.7–76)
NRBC BLD AUTO-RTO: 0 /100 WBC (ref 0–0)
PLATELET # BLD AUTO: 138 10*3/MM3 (ref 140–500)
PMV BLD AUTO: 10.3 FL (ref 6–12)
RBC # BLD AUTO: 3.83 10*6/MM3 (ref 3.9–5.2)
WBC NRBC COR # BLD: 4.81 10*3/MM3 (ref 4.5–10.7)

## 2019-01-17 PROCEDURE — 85025 COMPLETE CBC W/AUTO DIFF WBC: CPT | Performed by: INTERNAL MEDICINE

## 2019-01-17 PROCEDURE — 36415 COLL VENOUS BLD VENIPUNCTURE: CPT

## 2019-01-17 NOTE — PROGRESS NOTES
Procrit not indicated for Hgb 11.8.  Pt denies complaints and next appt reviewed. Pt knows to call sooner with concerns.

## 2019-01-24 ENCOUNTER — LAB (OUTPATIENT)
Dept: OTHER | Facility: HOSPITAL | Age: 79
End: 2019-01-24

## 2019-01-24 ENCOUNTER — INFUSION (OUTPATIENT)
Dept: ONCOLOGY | Facility: HOSPITAL | Age: 79
End: 2019-01-24

## 2019-01-24 VITALS
OXYGEN SATURATION: 96 % | WEIGHT: 178.6 LBS | BODY MASS INDEX: 26.15 KG/M2 | SYSTOLIC BLOOD PRESSURE: 143 MMHG | DIASTOLIC BLOOD PRESSURE: 78 MMHG | TEMPERATURE: 98.1 F | HEART RATE: 79 BPM

## 2019-01-24 DIAGNOSIS — N18.4 ANEMIA OF CHRONIC RENAL FAILURE, STAGE 4 (SEVERE) (HCC): ICD-10-CM

## 2019-01-24 DIAGNOSIS — D63.1 ANEMIA OF CHRONIC RENAL FAILURE, STAGE 4 (SEVERE) (HCC): ICD-10-CM

## 2019-01-24 LAB
BASOPHILS # BLD AUTO: 0.01 10*3/MM3 (ref 0–0.2)
BASOPHILS NFR BLD AUTO: 0.2 % (ref 0–1.5)
DEPRECATED RDW RBC AUTO: 62.3 FL (ref 37–54)
EOSINOPHIL # BLD AUTO: 0.06 10*3/MM3 (ref 0–0.7)
EOSINOPHIL NFR BLD AUTO: 1.3 % (ref 0.3–6.2)
ERYTHROCYTE [DISTWIDTH] IN BLOOD BY AUTOMATED COUNT: 17.6 % (ref 11.7–13)
HCT VFR BLD AUTO: 36 % (ref 35.6–45.5)
HGB BLD-MCNC: 11.6 G/DL (ref 11.9–15.5)
IMM GRANULOCYTES # BLD AUTO: 0.02 10*3/MM3 (ref 0–0.03)
IMM GRANULOCYTES NFR BLD AUTO: 0.4 % (ref 0–0.5)
LYMPHOCYTES # BLD AUTO: 0.68 10*3/MM3 (ref 0.9–4.8)
LYMPHOCYTES NFR BLD AUTO: 14.2 % (ref 19.6–45.3)
MCH RBC QN AUTO: 30.8 PG (ref 26.9–32)
MCHC RBC AUTO-ENTMCNC: 32.2 G/DL (ref 32.4–36.3)
MCV RBC AUTO: 95.5 FL (ref 80.5–98.2)
MONOCYTES # BLD AUTO: 0.27 10*3/MM3 (ref 0.2–1.2)
MONOCYTES NFR BLD AUTO: 5.6 % (ref 5–12)
NEUTROPHILS # BLD AUTO: 3.76 10*3/MM3 (ref 1.9–8.1)
NEUTROPHILS NFR BLD AUTO: 78.3 % (ref 42.7–76)
NRBC BLD AUTO-RTO: 0 /100 WBC (ref 0–0)
PLATELET # BLD AUTO: 139 10*3/MM3 (ref 140–500)
PMV BLD AUTO: 10.2 FL (ref 6–12)
RBC # BLD AUTO: 3.77 10*6/MM3 (ref 3.9–5.2)
WBC NRBC COR # BLD: 4.8 10*3/MM3 (ref 4.5–10.7)

## 2019-01-24 PROCEDURE — 85025 COMPLETE CBC W/AUTO DIFF WBC: CPT | Performed by: INTERNAL MEDICINE

## 2019-01-24 PROCEDURE — 36415 COLL VENOUS BLD VENIPUNCTURE: CPT

## 2019-01-29 DIAGNOSIS — D63.1 ANEMIA OF CHRONIC RENAL FAILURE, STAGE 4 (SEVERE) (HCC): Primary | ICD-10-CM

## 2019-01-29 DIAGNOSIS — N18.4 ANEMIA OF CHRONIC RENAL FAILURE, STAGE 4 (SEVERE) (HCC): Primary | ICD-10-CM

## 2019-01-29 DIAGNOSIS — D50.8 OTHER IRON DEFICIENCY ANEMIA: ICD-10-CM

## 2019-01-31 ENCOUNTER — APPOINTMENT (OUTPATIENT)
Dept: ONCOLOGY | Facility: HOSPITAL | Age: 79
End: 2019-01-31

## 2019-01-31 ENCOUNTER — INFUSION (OUTPATIENT)
Dept: ONCOLOGY | Facility: HOSPITAL | Age: 79
End: 2019-01-31

## 2019-01-31 ENCOUNTER — OFFICE VISIT (OUTPATIENT)
Dept: ONCOLOGY | Facility: CLINIC | Age: 79
End: 2019-01-31

## 2019-01-31 VITALS
WEIGHT: 176 LBS | RESPIRATION RATE: 12 BRPM | DIASTOLIC BLOOD PRESSURE: 78 MMHG | TEMPERATURE: 97.9 F | HEIGHT: 69 IN | SYSTOLIC BLOOD PRESSURE: 160 MMHG | OXYGEN SATURATION: 98 % | BODY MASS INDEX: 26.07 KG/M2 | HEART RATE: 70 BPM

## 2019-01-31 DIAGNOSIS — N18.4 ANEMIA OF CHRONIC RENAL FAILURE, STAGE 4 (SEVERE) (HCC): Primary | ICD-10-CM

## 2019-01-31 DIAGNOSIS — D50.8 OTHER IRON DEFICIENCY ANEMIA: Primary | ICD-10-CM

## 2019-01-31 DIAGNOSIS — N18.4 STAGE 4 CHRONIC KIDNEY DISEASE (HCC): ICD-10-CM

## 2019-01-31 DIAGNOSIS — D63.1 ANEMIA OF CHRONIC RENAL FAILURE, STAGE 4 (SEVERE) (HCC): Primary | ICD-10-CM

## 2019-01-31 DIAGNOSIS — D50.8 OTHER IRON DEFICIENCY ANEMIA: ICD-10-CM

## 2019-01-31 DIAGNOSIS — Z45.2 ENCOUNTER FOR FITTING AND ADJUSTMENT OF VASCULAR CATHETER: ICD-10-CM

## 2019-01-31 LAB
BASOPHILS # BLD AUTO: 0.01 10*3/MM3 (ref 0–0.2)
BASOPHILS NFR BLD AUTO: 0.3 % (ref 0–1.5)
DEPRECATED RDW RBC AUTO: 59.1 FL (ref 37–54)
EOSINOPHIL # BLD AUTO: 0.07 10*3/MM3 (ref 0–0.7)
EOSINOPHIL NFR BLD AUTO: 1.8 % (ref 0.3–6.2)
ERYTHROCYTE [DISTWIDTH] IN BLOOD BY AUTOMATED COUNT: 16.5 % (ref 11.7–13)
FERRITIN SERPL-MCNC: 116.6 NG/ML (ref 13–150)
HCT VFR BLD AUTO: 31.7 % (ref 35.6–45.5)
HGB BLD-MCNC: 10.2 G/DL (ref 11.9–15.5)
IMM GRANULOCYTES # BLD AUTO: 0.01 10*3/MM3 (ref 0–0.03)
IMM GRANULOCYTES NFR BLD AUTO: 0.3 % (ref 0–0.5)
IRON 24H UR-MRATE: 68 MCG/DL (ref 37–145)
IRON SATN MFR SERPL: 24 % (ref 20–50)
LYMPHOCYTES # BLD AUTO: 0.56 10*3/MM3 (ref 0.9–4.8)
LYMPHOCYTES NFR BLD AUTO: 14.5 % (ref 19.6–45.3)
MCH RBC QN AUTO: 30.8 PG (ref 26.9–32)
MCHC RBC AUTO-ENTMCNC: 32.2 G/DL (ref 32.4–36.3)
MCV RBC AUTO: 95.8 FL (ref 80.5–98.2)
MONOCYTES # BLD AUTO: 0.31 10*3/MM3 (ref 0.2–1.2)
MONOCYTES NFR BLD AUTO: 8 % (ref 5–12)
NEUTROPHILS # BLD AUTO: 2.9 10*3/MM3 (ref 1.9–8.1)
NEUTROPHILS NFR BLD AUTO: 75.1 % (ref 42.7–76)
NRBC BLD AUTO-RTO: 0 /100 WBC (ref 0–0)
PLATELET # BLD AUTO: 146 10*3/MM3 (ref 140–500)
PMV BLD AUTO: 9.5 FL (ref 6–12)
RBC # BLD AUTO: 3.31 10*6/MM3 (ref 3.9–5.2)
TIBC SERPL-MCNC: 283 MCG/DL (ref 298–536)
TRANSFERRIN SERPL-MCNC: 190 MG/DL (ref 200–360)
WBC NRBC COR # BLD: 3.86 10*3/MM3 (ref 4.5–10.7)

## 2019-01-31 PROCEDURE — 84466 ASSAY OF TRANSFERRIN: CPT | Performed by: INTERNAL MEDICINE

## 2019-01-31 PROCEDURE — 83540 ASSAY OF IRON: CPT | Performed by: INTERNAL MEDICINE

## 2019-01-31 PROCEDURE — 82728 ASSAY OF FERRITIN: CPT | Performed by: INTERNAL MEDICINE

## 2019-01-31 PROCEDURE — 96523 IRRIG DRUG DELIVERY DEVICE: CPT | Performed by: INTERNAL MEDICINE

## 2019-01-31 PROCEDURE — 85025 COMPLETE CBC W/AUTO DIFF WBC: CPT | Performed by: INTERNAL MEDICINE

## 2019-01-31 PROCEDURE — 99213 OFFICE O/P EST LOW 20 MIN: CPT | Performed by: INTERNAL MEDICINE

## 2019-01-31 RX ORDER — SODIUM CHLORIDE 0.9 % (FLUSH) 0.9 %
10 SYRINGE (ML) INJECTION AS NEEDED
Status: CANCELLED | OUTPATIENT
Start: 2019-01-31

## 2019-01-31 RX ORDER — SODIUM CHLORIDE 0.9 % (FLUSH) 0.9 %
10 SYRINGE (ML) INJECTION AS NEEDED
Status: DISCONTINUED | OUTPATIENT
Start: 2019-01-31 | End: 2019-01-31 | Stop reason: HOSPADM

## 2019-01-31 RX ADMIN — Medication 10 ML: at 10:22

## 2019-01-31 RX ADMIN — SODIUM CHLORIDE, PRESERVATIVE FREE 500 UNITS: 5 INJECTION INTRAVENOUS at 10:22

## 2019-01-31 NOTE — PROGRESS NOTES
Subjective .     REASONS FOR FOLLOWUP:      Anemia of chronic kidney diease              History of Iron deficiency    Shortness of Breath          Mrs Machuca is seen today for evaluation.She has continued on weekly Procrit for a hemoglobin less than 10. Most recently, patient has received transfusion support twice within a month for symptomatic anemia (Hemoglobin in the 8 range). Today, her hemoglobin has declined from 11.6 last week to 10.2. She denies any excess exertional dyspnea or bleeding. Stools are regular.     Her greatest concern today is upcoming surgery for a right basilic fistula placement on 2/18/2019 as patient is looking to initiate dialysis in the near future for progressive renal insufficiency.     She has no other concerns today.     Past Medical History:   Diagnosis Date   • Anemia     Iron deficiency   • Cancer (CMS/HCC)     Skin cancer   • Cardiomyopathy (CMS/HCC)     S/P pacemaker and defibrillator   • CHF (congestive heart failure) (CMS/HCC)    • Clostridium difficile diarrhea     2013   • Colon polyps    • Coronary artery disease     pacemaker, defibrillator   • Gastroparesis    • GERD (gastroesophageal reflux disease)    • Gout    • Hemorrhoids    • Hiatal hernia    • History of pancreatitis 01/2014   • History of pancreatitis     2014   • History of transfusion    • Hyperlipidemia    • Hypothyroidism    • Left bundle branch block    • Mitral and aortic valve disease    • Mitral valve insufficiency    • Myoclonus     S/P Depakote   • Nephrolithiasis     stage 4 renal failure   • Osteoarthritis    • Pancytopenia (CMS/HCC)    • Peripheral neuropathy    • Progressive familial myoclonic epilepsy (CMS/HCC)    • Pulmonary hypertension (CMS/HCC)    • RLS (restless legs syndrome)    • Ruptured ovarian cyst    • Seizures (CMS/HCC)     myoclonus   • Spinal stenosis    • Transfusion history     3 weeks ago       ONCOLOGIC HISTORY:  (History from previous dates can be found in the separate  document.)    Current Outpatient Medications on File Prior to Visit   Medication Sig Dispense Refill   • aspirin 81 MG tablet Take 81 mg by mouth Daily. PT HOLDING FOR SURGERY     • calcitriol (ROCALTROL) 0.25 MCG capsule Take 0.25 mcg by mouth Daily. 1 tablet every other day and 2 tablets every other day     • carbidopa-levodopa ER (SINEMET CR)  MG per tablet Take 1 tablet by mouth.     • carvedilol (COREG) 12.5 MG tablet TAKE 1 TABLET DAILY 90 tablet 1   • Cholecalciferol (VITAMIN D3) 5000 units capsule capsule Take 5,000 Units by mouth Daily. Every other day     • diazepam (VALIUM) 5 MG tablet Take 5 mg by mouth Every Night.     • famotidine (PEPCID) 20 MG tablet Take 20 mg by mouth 2 (Two) Times a Day.     • furosemide (LASIX) 40 MG tablet TAKE 1 TABLET DAILY OR AS  DIRECTED 90 tablet 3   • Glucosamine-Chondroit-Vit C-Mn (GLUCOSAMINE CHONDROITIN COMPLX) capsule Take 1,500 mg by mouth Every Other Day. PT HOLDING FOR SURGERY     • GRALISE 300 MG tablet TAKE 1 TABLET DAILY WITH   DINNER 90 tablet 1   • levothyroxine (SYNTHROID, LEVOTHROID) 25 MCG tablet Take 25 mcg by mouth Daily.     • loperamide (IMODIUM) 2 MG capsule Take 2 mg by mouth 4 (Four) Times a Day As Needed for Diarrhea.     • magnesium oxide 250 MG tablet Take 250 mg by mouth Daily.     • methscopolamine (PAMINE FORTE) 5 MG tablet Take 5 mg by mouth Daily.     • methylPREDNISolone (MEDROL) 4 MG tablet Take 4 mg by mouth As Needed. TAKES FOR GOUT FLARE UPS     • Multiple Vitamin (MULTI-DAY VITAMINS) tablet Take 1 tablet by mouth Daily. Multivitamin with Iron     • ondansetron (ZOFRAN) 8 MG tablet Take 1 tablet by mouth Every 8 (Eight) Hours As Needed for Nausea or Vomiting. 30 tablet 2   • pantoprazole (PROTONIX) 40 MG EC tablet TAKE 1 TABLET BY MOUTH EVERY MORNING  11   • psyllium (METAMUCIL) 58.6 % powder Take 1 packet by mouth Daily.     • rotigotine (NEUPRO) 6 MG/24HR patch Place 1 patch on the skin as directed by provider.     •  "spironolactone (ALDACTONE) 25 MG tablet TAKE 1/2 TABLET DAILY 45 tablet 1   • Vitamin E 400 UNITS tablet Take 400 Units by mouth Daily. PT HOLDING FOR SURGERY     • [DISCONTINUED] allopurinol (ZYLOPRIM) 100 MG tablet Take 100 mg by mouth 2 (Two) Times a Day.     • [DISCONTINUED] NEUPRO 4 MG/24HR patch APPLY 1 PATCH DAILY AS DIRECTED.**PAYABLE 5/8/17 90 patch 2     Current Facility-Administered Medications on File Prior to Visit   Medication Dose Route Frequency Provider Last Rate Last Dose   • heparin flush (porcine) 100 UNIT/ML injection 500 Units  500 Units Intravenous PRN Anuel Scott MD   500 Units at 11/27/17 1347   • sodium chloride 0.9 % flush 10 mL  10 mL Intravenous PRN Anuel Scott MD   10 mL at 11/27/17 1347       ALLERGIES:     Allergies   Allergen Reactions   • Chlorhexidine Rash and Itching     Patient states \"blue dye\" in chlorhexidine.  States the orange and clear are OK to use   • Chlorhexidine Gluconate Itching     i   • Valproic Acid Other (See Comments)     Made her extremely sleepy  Made her extremely sleepy   • Codeine Other (See Comments)     Want to climb the walls, doesn't help pain   • Propoxyphene Other (See Comments)     Belligerent, acting drunk  Belligerent, acting drunk       Social History     Socioeconomic History   • Marital status:      Spouse name: Zackery   • Number of children: 5   • Years of education: 1 yr college   • Highest education level: Not on file   Social Needs   • Financial resource strain: Not on file   • Food insecurity - worry: Not on file   • Food insecurity - inability: Not on file   • Transportation needs - medical: Not on file   • Transportation needs - non-medical: Not on file   Occupational History     Employer: RETIRED   Tobacco Use   • Smoking status: Never Smoker   • Smokeless tobacco: Never Used   Substance and Sexual Activity   • Alcohol use: No   • Drug use: No   • Sexual activity: Defer     Comment: AGE!!!   Other Topics Concern   • " "Not on file   Social History Narrative   • Not on file         Cancer-related family history is not on file.     Review of Systems   Constitutional: Positive for fatigue.   HENT: Negative.    Eyes: Negative.    Respiratory: Negative for shortness of breath.    Cardiovascular: Negative.    Gastrointestinal: Negative for diarrhea.   Endocrine: Negative.    Genitourinary: Negative.    Musculoskeletal:        Chronic lower extremity myalgias    Skin: Negative.    Neurological: Positive for weakness and light-headedness (chronic, unchanged ).   Hematological: Negative.  Does not bruise/bleed easily.   Psychiatric/Behavioral: Negative.    ROS unchanged from prior   A comprehensive 14 point review of systems was performed and was negative except as mentioned.    Objective      Vitals:    01/31/19 1029   BP: 160/78   Pulse: 70   Resp: 12   Temp: 97.9 °F (36.6 °C)   TempSrc: Oral   SpO2: 98%   Weight: 79.8 kg (176 lb)   Height: 176 cm (69.29\")   PainSc:   3   PainLoc: Generalized     Current Status 1/31/2019   ECOG score 0       Physical Exam   Constitutional: She appears well-developed and well-nourished.   HENT:   Head: Normocephalic.   Eyes: Conjunctivae are normal. No scleral icterus.   Cardiovascular: Normal rate.   Pulmonary/Chest: Effort normal.   Abdominal: Soft. Bowel sounds are normal.   Skin: No rash noted.   Psychiatric: She has a normal mood and affect.         RECENT LABS:  Hematology WBC   Date Value Ref Range Status   01/31/2019 3.86 (L) 4.50 - 10.70 10*3/mm3 Final     RBC   Date Value Ref Range Status   01/31/2019 3.31 (L) 3.90 - 5.20 10*6/mm3 Final     Hemoglobin   Date Value Ref Range Status   01/31/2019 10.2 (L) 11.9 - 15.5 g/dL Final     Hematocrit   Date Value Ref Range Status   01/31/2019 31.7 (L) 35.6 - 45.5 % Final     Platelets   Date Value Ref Range Status   01/31/2019 146 140 - 500 10*3/mm3 Final        Assessment/Plan   Anemia of stage 4 CKD:  Recent bone marrow biopsy demonstrates no " abnormalities, therefore continued anemia is likely related to renal insufficiency, which is followed closely by Dr. Abraham. As noted above, patient will be undergoing fistula placement in 3 weeks in preparation for dialysis.    Her hemoglobin has declined today, though has not reached threshold for transfusion support, nor Procrit.     PLAN:  1. Proceed with weekly for HGB under 10  2. Transfusion support for symptomatic anemia with hemoglobin under 9  3. Port flush every 6 weeks  4. I will plan to see her formally again in 1 month, but have asked her to call sooner with any new concerns  5. Patient will begin dialysis under the direction of Dr. Marcello Abraham following Fistula placement       I have reviewed the notes, assessments, and/or procedures performed by CHANTELL Tiwari.  I concur with her/his documentation of Charmaine Machuca.

## 2019-02-07 ENCOUNTER — APPOINTMENT (OUTPATIENT)
Dept: ONCOLOGY | Facility: HOSPITAL | Age: 79
End: 2019-02-07

## 2019-02-07 ENCOUNTER — INFUSION (OUTPATIENT)
Dept: ONCOLOGY | Facility: HOSPITAL | Age: 79
End: 2019-02-07

## 2019-02-07 VITALS
RESPIRATION RATE: 14 BRPM | OXYGEN SATURATION: 98 % | SYSTOLIC BLOOD PRESSURE: 106 MMHG | BODY MASS INDEX: 25.63 KG/M2 | WEIGHT: 175 LBS | TEMPERATURE: 98.1 F | DIASTOLIC BLOOD PRESSURE: 71 MMHG | HEART RATE: 63 BPM

## 2019-02-07 DIAGNOSIS — N18.4 ANEMIA OF CHRONIC RENAL FAILURE, STAGE 4 (SEVERE) (HCC): Primary | ICD-10-CM

## 2019-02-07 DIAGNOSIS — Z45.2 ENCOUNTER FOR FITTING AND ADJUSTMENT OF VASCULAR CATHETER: ICD-10-CM

## 2019-02-07 DIAGNOSIS — D63.1 ANEMIA OF CHRONIC RENAL FAILURE, STAGE 4 (SEVERE) (HCC): Primary | ICD-10-CM

## 2019-02-07 LAB
BASOPHILS # BLD AUTO: 0.01 10*3/MM3 (ref 0–0.2)
BASOPHILS NFR BLD AUTO: 0.2 % (ref 0–1.5)
DEPRECATED RDW RBC AUTO: 56.9 FL (ref 37–54)
EOSINOPHIL # BLD AUTO: 0.06 10*3/MM3 (ref 0–0.7)
EOSINOPHIL NFR BLD AUTO: 1.2 % (ref 0.3–6.2)
ERYTHROCYTE [DISTWIDTH] IN BLOOD BY AUTOMATED COUNT: 16 % (ref 11.7–13)
HCT VFR BLD AUTO: 28.8 % (ref 35.6–45.5)
HGB BLD-MCNC: 9.3 G/DL (ref 11.9–15.5)
IMM GRANULOCYTES # BLD AUTO: 0.01 10*3/MM3 (ref 0–0.03)
IMM GRANULOCYTES NFR BLD AUTO: 0.2 % (ref 0–0.5)
LYMPHOCYTES # BLD AUTO: 0.62 10*3/MM3 (ref 0.9–4.8)
LYMPHOCYTES NFR BLD AUTO: 12.8 % (ref 19.6–45.3)
MCH RBC QN AUTO: 30.7 PG (ref 26.9–32)
MCHC RBC AUTO-ENTMCNC: 32.3 G/DL (ref 32.4–36.3)
MCV RBC AUTO: 95 FL (ref 80.5–98.2)
MONOCYTES # BLD AUTO: 0.39 10*3/MM3 (ref 0.2–1.2)
MONOCYTES NFR BLD AUTO: 8 % (ref 5–12)
NEUTROPHILS # BLD AUTO: 3.76 10*3/MM3 (ref 1.9–8.1)
NEUTROPHILS NFR BLD AUTO: 77.6 % (ref 42.7–76)
NRBC BLD AUTO-RTO: 0 /100 WBC (ref 0–0)
PLATELET # BLD AUTO: 139 10*3/MM3 (ref 140–500)
PMV BLD AUTO: 8.9 FL (ref 6–12)
RBC # BLD AUTO: 3.03 10*6/MM3 (ref 3.9–5.2)
WBC NRBC COR # BLD: 4.85 10*3/MM3 (ref 4.5–10.7)

## 2019-02-07 PROCEDURE — 63510000001 EPOETIN ALFA PER 1000 UNITS: Performed by: INTERNAL MEDICINE

## 2019-02-07 PROCEDURE — 25010000002 EPOETIN ALFA PER 1000 UNITS: Performed by: INTERNAL MEDICINE

## 2019-02-07 PROCEDURE — 85025 COMPLETE CBC W/AUTO DIFF WBC: CPT | Performed by: INTERNAL MEDICINE

## 2019-02-07 PROCEDURE — 96372 THER/PROPH/DIAG INJ SC/IM: CPT

## 2019-02-07 RX ORDER — SODIUM CHLORIDE 0.9 % (FLUSH) 0.9 %
10 SYRINGE (ML) INJECTION AS NEEDED
Status: DISCONTINUED | OUTPATIENT
Start: 2019-02-07 | End: 2019-02-07 | Stop reason: HOSPADM

## 2019-02-07 RX ORDER — SODIUM CHLORIDE 0.9 % (FLUSH) 0.9 %
10 SYRINGE (ML) INJECTION AS NEEDED
Status: CANCELLED | OUTPATIENT
Start: 2019-02-07

## 2019-02-07 RX ADMIN — SODIUM CHLORIDE, PRESERVATIVE FREE 10 ML: 5 INJECTION INTRAVENOUS at 11:11

## 2019-02-07 RX ADMIN — SODIUM CHLORIDE, PRESERVATIVE FREE 500 UNITS: 5 INJECTION INTRAVENOUS at 11:11

## 2019-02-07 RX ADMIN — ERYTHROPOIETIN 42000 UNITS: 40000 INJECTION, SOLUTION INTRAVENOUS; SUBCUTANEOUS at 11:36

## 2019-02-11 ENCOUNTER — HOSPITAL ENCOUNTER (OUTPATIENT)
Dept: GENERAL RADIOLOGY | Facility: HOSPITAL | Age: 79
Discharge: HOME | End: 2019-02-11
Admitting: SURGERY

## 2019-02-11 ENCOUNTER — APPOINTMENT (OUTPATIENT)
Dept: PREADMISSION TESTING | Facility: HOSPITAL | Age: 79
End: 2019-02-11

## 2019-02-11 VITALS
SYSTOLIC BLOOD PRESSURE: 126 MMHG | RESPIRATION RATE: 18 BRPM | TEMPERATURE: 97.7 F | DIASTOLIC BLOOD PRESSURE: 70 MMHG | BODY MASS INDEX: 27.8 KG/M2 | HEART RATE: 67 BPM | HEIGHT: 67 IN | OXYGEN SATURATION: 99 % | WEIGHT: 177.13 LBS

## 2019-02-11 LAB
ALBUMIN SERPL-MCNC: 3.9 G/DL (ref 3.5–5.2)
ALBUMIN/GLOB SERPL: 1.4 G/DL
ALP SERPL-CCNC: 50 U/L (ref 39–117)
ALT SERPL W P-5'-P-CCNC: 8 U/L (ref 1–33)
ANION GAP SERPL CALCULATED.3IONS-SCNC: 11.8 MMOL/L
APTT PPP: 28.1 SECONDS (ref 22.7–35.4)
AST SERPL-CCNC: 12 U/L (ref 1–32)
BACTERIA UR QL AUTO: NORMAL /HPF
BILIRUB SERPL-MCNC: 0.2 MG/DL (ref 0.1–1.2)
BILIRUB UR QL STRIP: NEGATIVE
BUN BLD-MCNC: 46 MG/DL (ref 8–23)
BUN/CREAT SERPL: 20.1 (ref 7–25)
CALCIUM SPEC-SCNC: 10.3 MG/DL (ref 8.6–10.5)
CHLORIDE SERPL-SCNC: 102 MMOL/L (ref 98–107)
CLARITY UR: CLEAR
CO2 SERPL-SCNC: 29.2 MMOL/L (ref 22–29)
COLOR UR: YELLOW
CREAT BLD-MCNC: 2.29 MG/DL (ref 0.57–1)
DEPRECATED RDW RBC AUTO: 58.9 FL (ref 37–54)
ERYTHROCYTE [DISTWIDTH] IN BLOOD BY AUTOMATED COUNT: 16.3 % (ref 12.3–15.4)
GFR SERPL CREATININE-BSD FRML MDRD: 21 ML/MIN/1.73
GLOBULIN UR ELPH-MCNC: 2.7 GM/DL
GLUCOSE BLD-MCNC: 125 MG/DL (ref 65–99)
GLUCOSE UR STRIP-MCNC: NEGATIVE MG/DL
HCT VFR BLD AUTO: 30.6 % (ref 34–46.6)
HGB BLD-MCNC: 9.6 G/DL (ref 12–15.9)
HGB UR QL STRIP.AUTO: NEGATIVE
HYALINE CASTS UR QL AUTO: NORMAL /LPF
INR PPP: 1.02 (ref 0.9–1.1)
KETONES UR QL STRIP: NEGATIVE
LEUKOCYTE ESTERASE UR QL STRIP.AUTO: NEGATIVE
MCH RBC QN AUTO: 31.2 PG (ref 26.6–33)
MCHC RBC AUTO-ENTMCNC: 31.4 G/DL (ref 31.5–35.7)
MCV RBC AUTO: 99.4 FL (ref 79–97)
NITRITE UR QL STRIP: NEGATIVE
PH UR STRIP.AUTO: 5.5 [PH] (ref 5–8)
PLATELET # BLD AUTO: 164 10*3/MM3 (ref 140–450)
PMV BLD AUTO: 10.1 FL (ref 6–12)
POTASSIUM BLD-SCNC: 4.2 MMOL/L (ref 3.5–5.2)
PROT SERPL-MCNC: 6.6 G/DL (ref 6–8.5)
PROT UR QL STRIP: NEGATIVE
PROTHROMBIN TIME: 13.2 SECONDS (ref 11.7–14.2)
RBC # BLD AUTO: 3.08 10*6/MM3 (ref 3.77–5.28)
RBC # UR: NORMAL /HPF
REF LAB TEST METHOD: NORMAL
SODIUM BLD-SCNC: 143 MMOL/L (ref 136–145)
SP GR UR STRIP: 1.01 (ref 1–1.03)
SQUAMOUS #/AREA URNS HPF: NORMAL /HPF
UROBILINOGEN UR QL STRIP: NORMAL
WBC NRBC COR # BLD: 5.27 10*3/MM3 (ref 3.4–10.8)
WBC UR QL AUTO: NORMAL /HPF

## 2019-02-11 PROCEDURE — 85610 PROTHROMBIN TIME: CPT | Performed by: SURGERY

## 2019-02-11 PROCEDURE — 36415 COLL VENOUS BLD VENIPUNCTURE: CPT

## 2019-02-11 PROCEDURE — 81001 URINALYSIS AUTO W/SCOPE: CPT | Performed by: SURGERY

## 2019-02-11 PROCEDURE — 93005 ELECTROCARDIOGRAM TRACING: CPT

## 2019-02-11 PROCEDURE — 93010 ELECTROCARDIOGRAM REPORT: CPT | Performed by: INTERNAL MEDICINE

## 2019-02-11 PROCEDURE — 85027 COMPLETE CBC AUTOMATED: CPT | Performed by: SURGERY

## 2019-02-11 PROCEDURE — 85730 THROMBOPLASTIN TIME PARTIAL: CPT | Performed by: SURGERY

## 2019-02-11 PROCEDURE — 71046 X-RAY EXAM CHEST 2 VIEWS: CPT

## 2019-02-11 PROCEDURE — 80053 COMPREHEN METABOLIC PANEL: CPT | Performed by: SURGERY

## 2019-02-11 RX ORDER — SPIRONOLACTONE 25 MG/1
12.5 TABLET ORAL DAILY
Status: ON HOLD | COMMUNITY
End: 2019-02-18

## 2019-02-11 RX ORDER — DULOXETIN HYDROCHLORIDE 30 MG/1
30 CAPSULE, DELAYED RELEASE ORAL DAILY
COMMUNITY
End: 2019-06-21 | Stop reason: SDDI

## 2019-02-11 RX ORDER — CARVEDILOL 12.5 MG/1
12.5 TABLET ORAL DAILY
COMMUNITY
End: 2019-05-20 | Stop reason: SDUPTHER

## 2019-02-11 RX ORDER — FUROSEMIDE 40 MG/1
40 TABLET ORAL DAILY
COMMUNITY
End: 2019-05-20 | Stop reason: SDUPTHER

## 2019-02-11 RX ORDER — PANTOPRAZOLE SODIUM 40 MG/1
40 TABLET, DELAYED RELEASE ORAL DAILY
COMMUNITY
End: 2019-05-28

## 2019-02-11 NOTE — DISCHARGE INSTRUCTIONS
ARRIVE DAY OF SURGERY AT  7:30 AM TO MAIN SURGERY        Take the following medications the morning of surgery with a small sip of water:    FAMOTIDINE    General Instructions:  • Do not eat solid food after midnight the night before surgery.  • You may drink clear liquids day of surgery but must stop at least one hour before your hospital arrival time.  • It is beneficial for you to have a clear drink that contains carbohydrates the day of surgery.  We suggest a 12 to 20 ounce bottle of Gatorade or Powerade for non-diabetic patients or a 12 to 20 ounce bottle of G2 or Powerade Zero for diabetic patients. (Pediatric patients, are not advised to drink a 12 to 20 ounce carbohydrate drink)    Clear liquids are liquids you can see through.  Nothing red in color.     Plain water                               Sports drinks  Sodas                                   Gelatin (Jell-O)  Fruit juices without pulp such as white grape juice and apple juice  Popsicles that contain no fruit or yogurt  Tea or coffee (no cream or milk added)  Gatorade / Powerade  G2 / Powerade Zero    • Infants may have breast milk up to four hours before surgery.  • Infants drinking formula may drink formula up to six hours before surgery.   • Patients who avoid smoking, chewing tobacco and alcohol for 4 weeks prior to surgery have a reduced risk of post-operative complications.  Quit smoking as many days before surgery as you can.  • Do not smoke, use chewing tobacco or drink alcohol the day of surgery.   • If applicable bring your C-PAP/ BI-PAP machine.  • Bring any papers given to you in the doctor’s office.  • Wear clean comfortable clothes and socks.  • Do not wear contact lenses or make-up.  Bring a case for your glasses.   • Bring crutches or walker if applicable.  • Remove all piercings.  Leave jewelry and any other valuables at home.  • Hair extensions with metal clips must be removed prior to surgery.  • The Pre-Admission Testing nurse will  instruct you to bring medications if unable to obtain an accurate list in Pre-Admission Testing.            Preventing a Surgical Site Infection:  • For 2 to 3 days before surgery, avoid shaving with a razor because the razor can irritate skin and make it easier to develop an infection.    • Any areas of open skin can increase the risk of a post-operative wound infection by allowing bacteria to enter and travel throughout the body.  Notify your surgeon if you have any skin wounds / rashes even if it is not near the expected surgical site.  The area will need assessed to determine if surgery should be delayed until it is healed.  • The night prior to surgery sleep in a clean bed with clean clothing.  Do not allow pets to sleep with you.  • Shower on the morning of surgery using a fresh bar of anti-bacterial soap (such as Dial) and clean washcloth.  Dry with a clean towel and dress in clean clothing.  • Ask your surgeon if you will be receiving antibiotics prior to surgery.  • Make sure you, your family, and all healthcare providers clean their hands with soap and water or an alcohol based hand  before caring for you or your wound.    Day of surgery:  Upon arrival, a Pre-op nurse and Anesthesiologist will review your health history, obtain vital signs, and answer questions you may have.  The only belongings needed at this time will be your home medications and if applicable your C-PAP/BI-PAP machine.  If you are staying overnight your family can leave the rest of your belongings in the car and bring them to your room later.  A Pre-op nurse will start an IV and you may receive medication in preparation for surgery, including something to help you relax.  Your family will be able to see you in the Pre-op area.  While you are in surgery your family should notify the waiting room  if they leave the waiting room area and provide a contact phone number.    Please be aware that surgery does come with  discomfort.  We want to make every effort to control your discomfort so please discuss any uncontrolled symptoms with your nurse.   Your doctor will most likely have prescribed pain medications.      If you are going home after surgery you will receive individualized written care instructions before being discharged.  A responsible adult must drive you to and from the hospital on the day of your surgery and stay with you for 24 hours.    If you are staying overnight following surgery, you will be transported to your hospital room following the recovery period.  [facility] has all private rooms.    You have received a list of surgical assistants for your reference.  If you have any questions please call Pre-Admission Testing at [phone].  Deductibles and co-payments are collected on the day of service. Please be prepared to pay the required co-pay, deductible or deposit on the day of service as defined by your plan.

## 2019-02-14 ENCOUNTER — OFFICE VISIT (OUTPATIENT)
Dept: CARDIOLOGY | Facility: CLINIC | Age: 79
End: 2019-02-14

## 2019-02-14 ENCOUNTER — CLINICAL SUPPORT (OUTPATIENT)
Dept: ONCOLOGY | Facility: HOSPITAL | Age: 79
End: 2019-02-14

## 2019-02-14 ENCOUNTER — CLINICAL SUPPORT NO REQUIREMENTS (OUTPATIENT)
Dept: CARDIOLOGY | Facility: CLINIC | Age: 79
End: 2019-02-14

## 2019-02-14 ENCOUNTER — LAB (OUTPATIENT)
Dept: LAB | Facility: HOSPITAL | Age: 79
End: 2019-02-14

## 2019-02-14 VITALS
SYSTOLIC BLOOD PRESSURE: 110 MMHG | WEIGHT: 176 LBS | DIASTOLIC BLOOD PRESSURE: 80 MMHG | HEART RATE: 76 BPM | BODY MASS INDEX: 27.62 KG/M2 | HEIGHT: 67 IN

## 2019-02-14 DIAGNOSIS — I50.22 CHRONIC SYSTOLIC CONGESTIVE HEART FAILURE (HCC): Primary | ICD-10-CM

## 2019-02-14 DIAGNOSIS — I34.0 NON-RHEUMATIC MITRAL REGURGITATION: ICD-10-CM

## 2019-02-14 DIAGNOSIS — N18.4 ANEMIA OF CHRONIC RENAL FAILURE, STAGE 4 (SEVERE) (HCC): ICD-10-CM

## 2019-02-14 DIAGNOSIS — N18.4 ANEMIA, CHRONIC RENAL FAILURE, STAGE 4 (SEVERE) (HCC): ICD-10-CM

## 2019-02-14 DIAGNOSIS — E78.2 MIXED HYPERLIPIDEMIA: ICD-10-CM

## 2019-02-14 DIAGNOSIS — I42.8 NONISCHEMIC CARDIOMYOPATHY (HCC): ICD-10-CM

## 2019-02-14 DIAGNOSIS — I10 ESSENTIAL HYPERTENSION: ICD-10-CM

## 2019-02-14 DIAGNOSIS — D63.1 ANEMIA, CHRONIC RENAL FAILURE, STAGE 4 (SEVERE) (HCC): ICD-10-CM

## 2019-02-14 DIAGNOSIS — D63.1 ANEMIA OF CHRONIC RENAL FAILURE, STAGE 4 (SEVERE) (HCC): ICD-10-CM

## 2019-02-14 LAB
BASOPHILS # BLD AUTO: 0.01 10*3/MM3 (ref 0–0.2)
BASOPHILS NFR BLD AUTO: 0.2 % (ref 0–1.5)
DEPRECATED RDW RBC AUTO: 56.3 FL (ref 37–54)
EOSINOPHIL # BLD AUTO: 0.09 10*3/MM3 (ref 0–0.4)
EOSINOPHIL NFR BLD AUTO: 1.4 % (ref 0.3–6.2)
ERYTHROCYTE [DISTWIDTH] IN BLOOD BY AUTOMATED COUNT: 16.5 % (ref 12.3–15.4)
HCT VFR BLD AUTO: 30.8 % (ref 34–46.6)
HGB BLD-MCNC: 10.1 G/DL (ref 12–15.9)
IMM GRANULOCYTES # BLD AUTO: 0.02 10*3/MM3 (ref 0–0.05)
IMM GRANULOCYTES NFR BLD AUTO: 0.3 % (ref 0–0.5)
LYMPHOCYTES # BLD AUTO: 0.79 10*3/MM3 (ref 0.7–3.1)
LYMPHOCYTES NFR BLD AUTO: 12.1 % (ref 19.6–45.3)
MCH RBC QN AUTO: 31.1 PG (ref 26.6–33)
MCHC RBC AUTO-ENTMCNC: 32.8 G/DL (ref 31.5–35.7)
MCV RBC AUTO: 94.8 FL (ref 79–97)
MONOCYTES # BLD AUTO: 0.43 10*3/MM3 (ref 0.1–0.9)
MONOCYTES NFR BLD AUTO: 6.6 % (ref 5–12)
NEUTROPHILS # BLD AUTO: 5.21 10*3/MM3 (ref 1.4–7)
NEUTROPHILS NFR BLD AUTO: 79.4 % (ref 42.7–76)
NRBC BLD AUTO-RTO: 0.3 /100 WBC (ref 0–0)
PLATELET # BLD AUTO: 173 10*3/MM3 (ref 140–450)
PMV BLD AUTO: 10.1 FL (ref 6–12)
RBC # BLD AUTO: 3.25 10*6/MM3 (ref 3.77–5.28)
WBC NRBC COR # BLD: 6.55 10*3/MM3 (ref 3.4–10.8)

## 2019-02-14 PROCEDURE — 36416 COLLJ CAPILLARY BLOOD SPEC: CPT | Performed by: INTERNAL MEDICINE

## 2019-02-14 PROCEDURE — 93284 PRGRMG EVAL IMPLANTABLE DFB: CPT | Performed by: INTERNAL MEDICINE

## 2019-02-14 PROCEDURE — 99214 OFFICE O/P EST MOD 30 MIN: CPT | Performed by: INTERNAL MEDICINE

## 2019-02-14 PROCEDURE — 93000 ELECTROCARDIOGRAM COMPLETE: CPT | Performed by: INTERNAL MEDICINE

## 2019-02-14 PROCEDURE — 85025 COMPLETE CBC W/AUTO DIFF WBC: CPT | Performed by: INTERNAL MEDICINE

## 2019-02-14 RX ORDER — FEBUXOSTAT 40 MG/1
40 TABLET ORAL DAILY
COMMUNITY
End: 2021-01-01

## 2019-02-14 NOTE — PROGRESS NOTES
Pt is here for lab with RN review.  CBC reviewed with pt, counts are stable for this pt at this time. Pt has no complaints.  Copy of labs given to pt and f/u appt reviewed. Pt is instructed to call with concerns prior to next visit.    Lab Results   Component Value Date    WBC 6.55 02/14/2019    HGB 10.1 (L) 02/14/2019    HCT 30.8 (L) 02/14/2019    MCV 94.8 02/14/2019     02/14/2019

## 2019-02-14 NOTE — PROGRESS NOTES
Date of Office Visit: 19  Encounter Provider: Estevan Matamoros MD  Place of Service: [facility] CARDIOLOGY  Patient Name: Charmaine Machuca  :1940  Referral Provider:Estevan Matamoros MD      No chief complaint on file.    History of Present Illness      The patient is a pleasant 78-year-old female with a history of nonischemic  cardiomyopathy, hypertension, hyperlipidemia and LBBB. Original ejection   fraction was extremely low. She was enrolled in the [facility] study of ACE   inhibitor plus Atacand versus ACE inhibitor plus a placebo. Then, in 2005   she had a perfusion study that showed infarct but no ischemia. She had cardiac   catheterization in 2006, which showed moderate left ventricular dysfunction,   elevated right-sided pressure, left anterior end-diastolic pressure, mild mitral insufficiency,  but normal coronary arteries. She was treated medically. She then had  worsening dyspnea. In 2007, had a Bi-V defibrillator placed and she  continued to have GI problems, fatigue, weakness, dizziness, syncopal, near  syncopal spells, multiple medications were adjusted. Diuretics were  adjusted. On diuretics, off diuretics, volume overloaded, volume depleted.  She was diagnosed with myoclonus. Multiple GI complaints.  She then had her generator changed in 2014. Unfortunately, a day or two after that  developed small bowel obstruction, had to be admitted to the hospital and was then treated  for that. That resolved. She developed C. difficile infection, was treated for that. She  developed a pocket hematoma and had to have that evacuated.   She had a followup echocardiogram in 2015 that showed normal ejection fraction, mild  mitral insufficiency, mild aortic valve calcification without stenosis. She comes in for  followup now. She went to [facility] 2015 with this atypical chest  discomfort, left-sided under her breast, pleuritic, worse  when she would take a breath in,  worse when she would move to one side. No real increased shortness of breath with it, but  it was hard to take breath in. While there, she had a ventilation perfusion scan that was  negative for pulmonary embolus. She had a 2D echocardiogram that, again, showed normal LV  systolic function and no significant valvular disease.  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  Had that removed and new lead replaced.    She got in for follow-up.  The patient denies chest pain, pressure and heaviness. No shortness of breath, othopnea or PND. No palpitations, near syncope or syncope. No stroke type symptoms like paralysis, paresthesia, abrupt vision loss and dysarthria. No bleeding like blood in the stool or dark stools.  She is just really mainly markedly fatigued with activity and back pain.  Her renal function has gotten worse for contemplating fistula placement and temporary dialysis.      Cardiomyopathy   Associated symptoms include numbness. Pertinent negatives include no abdominal pain, congestion, diaphoresis, fever, headaches, myalgias, nausea, rash, vertigo, vomiting or weakness.   Atrial Fibrillation   Symptoms are negative for dizziness, shortness of breath and weakness. Past medical history includes atrial fibrillation, AICD and CHF.   Congestive Heart Failure   Pertinent negatives include no abdominal pain, muscle weakness, nocturia or shortness of breath.         Past Medical History:   Diagnosis Date   • Anemia     Iron deficiency   • Cardiomyopathy (CMS/HCC)     S/P pacemaker and defibrillator   • CHF (congestive heart failure) (CMS/HCC)    • Chronic renal failure, stage 4 (severe) (CMS/HCC)    • Colon polyps    • Coronary artery disease     pacemaker, defibrillator   • Gastroparesis    • GERD (gastroesophageal reflux disease)    • Gout    • Hemorrhoids    • Hiatal hernia    • History of Clostridium difficile colitis 2013   • History of kidney stones    •  History of pancreatitis     2014   • History of skin cancer    • History of transfusion     LAST TRANSFUSION 2 UNITS, 4 WEEKS AGO   • Hypothyroidism    • Left bundle branch block    • Mitral and aortic valve disease    • Mitral valve insufficiency    • Myoclonus     S/P Depakote   • Osteoarthritis    • Pancytopenia (CMS/HCC)    • Peripheral neuropathy    • Presence of cardiac pacemaker     AND DEFIBRILLATOR   • Pulmonary hypertension (CMS/HCC)    • RLS (restless legs syndrome)    • SOB (shortness of breath) on exertion    • Spinal stenosis          Past Surgical History:   Procedure Laterality Date   • APPENDECTOMY N/A    • CARDIAC CATHETERIZATION Left 03/28/2006    Arterial catheter insertion, cath left ventriculography, coronary angiography and left heart catheterization-Dr. Estevan Matamoros   • CARDIAC DEFIBRILLATOR PLACEMENT N/A 11/30/2007    Biventricular implantable cardioverter defibrillator-Dr. Leandro Huber   • CATARACT EXTRACTION Bilateral 2011   • COLONOSCOPY N/A 2014    done at [facility]   • CYSTECTOMY N/A     Ovarian cystectomy   • ENDOSCOPY N/A 04/28/2006    EGD with biopsies. Paraesophageal hiatal hernia from 34 to 44 cm, antral gastritis-Dr. Nehemiah Beal   • ENDOSCOPY N/A 09/29/2004    Hiatal hernia, gastritis and an erosion of the pylorus-Dr. Yang Pavon   • ENTEROSCOPY SMALL BOWEL N/A 10/30/2006    Small bowel enteroscopy with biopsies-Dr. Rory Sevilla   • FLEXIBLE SIGMOIDOSCOPY N/A 09/29/2004    Unsuccessful colonoscopy due to poop prep, a very tortuous sigmoid, bowel prep in the form of enema given and a barium enema was obtained-Dr. Yang Pavon   • FRACTURE SURGERY  2015    Broke big toe   • HEMATOMA EVACUATION TRUNK N/A 02/07/2014    Pocket evacuation of hematoma at pacemaker site-Dr. Leandro Huber   • HEMORRHOIDECTOMY N/A 04/19/2004    Flexible sigmoidoscopy and stapled hemorrhoidectomy-Dr. Yang Pavon   • HYSTERECTOMY Bilateral 1977   • IMPLANTABLE CARDIOVERTER DEFIBRILLATOR LEAD  REPLACEMENT/POCKET REVISION Left 3/28/2016    Procedure: AUTOMATIC IMPLANTABLE CARDIOVERTER DEFIBRILLATOR LEAD REPLACEMENT WITH LASER LEAD EXTRACTION;  Surgeon: Leandro Huber MD;  Location: [facility] OR 18/19;  Service:    • IMPLANTABLE CARDIOVERTER DEFIBRILLATOR LEAD REPLACEMENT/POCKET REVISION N/A 01/13/2014    Biventricular ICD replacement-Dr. Jillian Huber   • LAPAROSCOPY REPAIR HIATAL HERNIA N/A 06/27/2006    Laparoscopic paraesophageal hiatal hernia repair with Nissen fundoplication and cholecystectomy-Dr. Sean Yoon   • NATALIE GONZALEZ ([facility]) PROCEDURE     • FL INSJ TUNNELED CVC W/O SUBQ PORT/ AGE 3 YR/> Right 10/3/2017    Procedure: Right internal jugular port placement;  Surgeon: Tiffanie Couch MD;  Location: [facility] OR;  Service: General   • TOTAL KNEE ARTHROPLASTY Left 08/2014         Current Outpatient Medications on File Prior to Visit   Medication Sig Dispense Refill   • aspirin 81 MG tablet Take 81 mg by mouth Daily.     • calcitriol (ROCALTROL) 0.25 MCG capsule Take 0.25 mcg by mouth Daily. 1 tablet every other day and 2 tablets every other day     • Calcium Carbonate (CALCIUM 600 PO) Take 600 mg by mouth Daily.     • carbidopa-levodopa ER (SINEMET CR)  MG per tablet Take 1 tablet by mouth Every Evening.     • carvedilol (COREG) 12.5 MG tablet Take 12.5 mg by mouth Daily.     • Cholecalciferol (VITAMIN D3) 5000 units capsule capsule Take 5,000 Units by mouth Daily. Every other day     • diazepam (VALIUM) 5 MG tablet Take 5 mg by mouth Every Night.     • DULoxetine (CYMBALTA) 30 MG capsule Take 30 mg by mouth Daily.     • famotidine (PEPCID) 20 MG tablet Take 20 mg by mouth 2 (Two) Times a Day.     • febuxostat (ULORIC) 40 MG tablet Take 40 mg by mouth Daily.     • furosemide (LASIX) 40 MG tablet Take 40 mg by mouth Daily.     • Gabapentin, Once-Daily, (GRALISE) 300 MG tablet Take 300 mg by mouth Daily With Dinner. MAY REPEAT LATE EVENING IF NEEDED     •  Glucosamine-Chondroit-Vit C-Mn (GLUCOSAMINE CHONDROITIN COMPLX) capsule Take 1,500 mg by mouth Every Other Day. PT HOLDING FOR SURGERY     • levothyroxine (SYNTHROID, LEVOTHROID) 25 MCG tablet Take 25 mcg by mouth Daily.     • loperamide (IMODIUM) 2 MG capsule Take 2 mg by mouth 4 (Four) Times a Day As Needed for Diarrhea.     • magnesium oxide 250 MG tablet Take 250 mg by mouth Daily.     • methscopolamine (PAMINE FORTE) 5 MG tablet Take 5 mg by mouth Every Morning.     • methylPREDNISolone (MEDROL) 4 MG tablet Take 4 mg by mouth As Needed. TAKES FOR GOUT FLARE UPS     • Multiple Vitamin (MULTI-DAY VITAMINS) tablet Take 1 tablet by mouth Daily. Multivitamin with Iron     • ondansetron (ZOFRAN) 8 MG tablet Take 1 tablet by mouth Every 8 (Eight) Hours As Needed for Nausea or Vomiting. 30 tablet 2   • pantoprazole (PROTONIX) 40 MG EC tablet Take 40 mg by mouth Daily.     • psyllium (METAMUCIL) 58.6 % powder Take 1 packet by mouth Daily.     • rotigotine (NEUPRO) 6 MG/24HR patch Place 1 patch on the skin as directed by provider Daily.     • spironolactone (ALDACTONE) 25 MG tablet Take 12.5 mg by mouth Daily.     • Vitamin E 400 UNITS tablet Take 400 Units by mouth Daily. PT HOLDING FOR SURGERY       Current Facility-Administered Medications on File Prior to Visit   Medication Dose Route Frequency Provider Last Rate Last Dose   • heparin flush (porcine) 100 UNIT/ML injection 500 Units  500 Units Intravenous PRN Anuel Scott MD   500 Units at 11/27/17 1347   • sodium chloride 0.9 % flush 10 mL  10 mL Intravenous PRN Anuel Scott MD   10 mL at 11/27/17 1347         Social History     Socioeconomic History   • Marital status:      Spouse name: Zackery   • Number of children: 5   • Years of education: 1 yr college   • Highest education level: Not on file   Social Needs   • Financial resource strain: Not on file   • Food insecurity - worry: Not on file   • Food insecurity - inability: Not on file   •  Transportation needs - medical: Not on file   • Transportation needs - non-medical: Not on file   Occupational History     Employer: RETIRED   Tobacco Use   • Smoking status: Never Smoker   • Smokeless tobacco: Never Used   Substance and Sexual Activity   • Alcohol use: No   • Drug use: No   • Sexual activity: Defer   Other Topics Concern   • Not on file   Social History Narrative   • Not on file       Family History   Problem Relation Age of Onset   • Coronary artery disease Other    • Dementia Other    • Kidney disease Other    • Arthritis Father    • Early death Father         Kidney failure   • Kidney disease Father    • Heart disease Mother    • Mental illness Mother         Dementia   • Malig Hyperthermia Neg Hx          Review of Systems   Constitution: Positive for malaise/fatigue. Negative for decreased appetite, diaphoresis, fever, weakness, weight gain and weight loss.   HENT: Negative for congestion, hearing loss, nosebleeds and tinnitus.    Eyes: Negative for blurred vision, double vision, vision loss in left eye, vision loss in right eye and visual disturbance.   Cardiovascular: Negative for orthopnea.        As noted in HPI   Respiratory: Negative for shortness of breath.         As noted HPI   Endocrine: Negative for cold intolerance and heat intolerance.   Hematologic/Lymphatic: Negative for bleeding problem. Does not bruise/bleed easily.   Skin: Negative for color change, flushing, itching and rash.   Musculoskeletal: Negative for arthritis, back pain, joint pain, muscle weakness and myalgias.   Gastrointestinal: Negative for bloating, abdominal pain, constipation, diarrhea, dysphagia, heartburn, hematemesis, hematochezia, melena, nausea and vomiting.   Genitourinary: Negative for bladder incontinence, dysuria, frequency, nocturia and urgency.   Neurological: Positive for numbness. Negative for dizziness, focal weakness, headaches, light-headedness, loss of balance, paresthesias and vertigo.  "  Psychiatric/Behavioral: Negative for depression, memory loss and substance abuse.       Procedures      ECG 12 Lead  Date/Time: 2/14/2019 12:15 PM  Performed by: Estevan Matamoros MD  Authorized by: Estevan Matamoros MD   Comparison: compared with previous ECG   Similar to previous ECG  Rhythm: sinus rhythm  Rhythm comments: Ventricular paced.                  Objective:    /80 (BP Location: Right arm)   Pulse 76   Ht 170.2 cm (67\")   Wt 79.8 kg (176 lb)   BMI 27.57 kg/m²        Physical Exam  Physical Exam   Constitutional: She is oriented to person, place, and time. She appears well-developed and well-nourished. No distress.   HENT:   Head: Normocephalic.   Eyes: Conjunctivae are normal. Pupils are equal, round, and reactive to light. No scleral icterus.   Neck: Normal carotid pulses, no hepatojugular reflux and no JVD present. Carotid bruit is not present. No tracheal deviation, no edema and no erythema present. No thyromegaly present.   Cardiovascular: Normal rate, regular rhythm, S1 normal, S2 normal, normal heart sounds and intact distal pulses.  No extrasystoles are present. PMI is not displaced. Exam reveals no distant heart sounds and no friction rub.   No murmur heard.  Pulses:       Carotid pulses are 2+ on the right side, and 2+ on the left side.       Radial pulses are 2+ on the right side, and 2+ on the left side.        Femoral pulses are 2+ on the right side, and 2+ on the left side.       Dorsalis pedis pulses are 2+ on the right side, and 2+ on the left side.        Posterior tibial pulses are 2+ on the right side, and 2+ on the left side.   Pulmonary/Chest: Effort normal and breath sounds normal. No respiratory distress. She has no decreased breath sounds. She has no wheezes. She has no rhonchi. She has no rales. She exhibits no tenderness.   Abdominal: Soft. Bowel sounds are normal. She exhibits no distension and no mass. There is no hepatosplenomegaly. There is no tenderness. There " is no rebound and no guarding.   Musculoskeletal: She exhibits edema (1+ bilateral ankle). She exhibits no tenderness or deformity.   Neurological: She is alert and oriented to person, place, and time.   Skin: Skin is warm and dry. No rash noted. She is not diaphoretic. No cyanosis or erythema. No pallor. Nails show no clubbing.   Psychiatric: She has a normal mood and affect. Her speech is normal and behavior is normal. Judgment and thought content normal.           Assessment:   1. This is a 79-year-old female with history of nonischemic cardiomyopathy.  Echocardiogram 8/18 again normal left ventricular ejection fraction.  Heart Failure  Assessment  • NYHA class II - There is slight limitation of physical activity. The patient is comfortable at rest, but physical activity results in fatigue, palpitations or shortness of breath.  • ACE inhibitor not prescribed for medical reasons  • Beta blocker prescribed  • Diuretics prescribed  • Angiotensin receptor blocker (ARB) not prescribed for medical reasons  • Left ventricular function is normal by qualitative assessment    Plan  • The patient has received heart failure education on the following topics: dietary sodium restriction, medication instructions, minimizing or avoiding NSAID use, symptom management, weight monitoring and minimizing alcohol intake  • The heart failure care plan was discussed with the patient today including: continuing the current program  •  The patient has been counseled about ICD or CRT-D implantation    Subjective/Objective    • Physical exam findings negative for rales and elevated JVP.  • The patient has an ICD implant  • The patient has a CRT-D implant  • The patient has a CRT implant      Probably stable.  She will see us in follow-up in 6 months we will continue to follow her in our defibrillator clinic.      2. History of congestive heart failure status post Bi-V ICD. Following in our defibrillator clinic. Now stable.  3. History of  syncope, falling spells, and dizziness of unclear etiology. Resolved. May have been from Depakote.  4. Hyperlipidemia, followed in your office.  5. History of kidney stones.  6. History of left bundle branch block. unchanged.  7. History of anemia. iron deficient still following with hematology.   8. History of renal failure.  Progressively worse.  She is going to get fistula placement.  She is Jer's revised moderate risk none of those modifiable no further cardiovascular evaluation or treatment needed prior to that procedure.  9. History of hypertension.  Blood pressure at goal.  10. Probable depression.  Much improved.  11. Myoclonus.   12. Mitral Insufficiency. Echocardiogram 8/18 just mild  13.  Anemia.  Secondary to renal failure.    Following up with hematology.       Plan:

## 2019-02-18 ENCOUNTER — HOSPITAL ENCOUNTER (OUTPATIENT)
Facility: HOSPITAL | Age: 79
Setting detail: HOSPITAL OUTPATIENT SURGERY
Discharge: HOME | End: 2019-02-18
Attending: SURGERY | Admitting: SURGERY

## 2019-02-18 ENCOUNTER — ANESTHESIA (OUTPATIENT)
Dept: PERIOP | Facility: HOSPITAL | Age: 79
End: 2019-02-18

## 2019-02-18 ENCOUNTER — ANESTHESIA EVENT (OUTPATIENT)
Dept: PERIOP | Facility: HOSPITAL | Age: 79
End: 2019-02-18

## 2019-02-18 VITALS
RESPIRATION RATE: 18 BRPM | DIASTOLIC BLOOD PRESSURE: 92 MMHG | TEMPERATURE: 97.6 F | HEIGHT: 67 IN | OXYGEN SATURATION: 100 % | WEIGHT: 176.31 LBS | BODY MASS INDEX: 27.67 KG/M2 | HEART RATE: 67 BPM | SYSTOLIC BLOOD PRESSURE: 115 MMHG

## 2019-02-18 PROCEDURE — 25010000002 HEPARIN (PORCINE) PER 1000 UNITS: Performed by: SURGERY

## 2019-02-18 PROCEDURE — 25010000002 HEPARIN (PORCINE) PER 1000 UNITS: Performed by: NURSE ANESTHETIST, CERTIFIED REGISTERED

## 2019-02-18 PROCEDURE — 25010000003 CEFAZOLIN IN DEXTROSE 2-4 GM/100ML-% SOLUTION: Performed by: SURGERY

## 2019-02-18 PROCEDURE — 25010000003 CEFAZOLIN PER 500 MG: Performed by: SURGERY

## 2019-02-18 PROCEDURE — 25010000002 PROPOFOL 10 MG/ML EMULSION: Performed by: NURSE ANESTHETIST, CERTIFIED REGISTERED

## 2019-02-18 PROCEDURE — 25010000002 MIDAZOLAM PER 1 MG: Performed by: ANESTHESIOLOGY

## 2019-02-18 PROCEDURE — 25010000002 PROTAMINE SULFATE PER 10 MG: Performed by: NURSE ANESTHETIST, CERTIFIED REGISTERED

## 2019-02-18 RX ORDER — PROMETHAZINE HYDROCHLORIDE 25 MG/ML
12.5 INJECTION, SOLUTION INTRAMUSCULAR; INTRAVENOUS ONCE AS NEEDED
Status: DISCONTINUED | OUTPATIENT
Start: 2019-02-18 | End: 2019-02-18 | Stop reason: HOSPADM

## 2019-02-18 RX ORDER — OXYCODONE AND ACETAMINOPHEN 7.5; 325 MG/1; MG/1
1 TABLET ORAL ONCE AS NEEDED
Status: CANCELLED | OUTPATIENT
Start: 2019-02-18

## 2019-02-18 RX ORDER — FLUMAZENIL 0.1 MG/ML
0.2 INJECTION INTRAVENOUS AS NEEDED
Status: DISCONTINUED | OUTPATIENT
Start: 2019-02-18 | End: 2019-02-18 | Stop reason: HOSPADM

## 2019-02-18 RX ORDER — MIDAZOLAM HYDROCHLORIDE 1 MG/ML
2 INJECTION INTRAMUSCULAR; INTRAVENOUS
Status: DISCONTINUED | OUTPATIENT
Start: 2019-02-18 | End: 2019-02-18 | Stop reason: HOSPADM

## 2019-02-18 RX ORDER — SODIUM CHLORIDE 9 MG/ML
9 INJECTION, SOLUTION INTRAVENOUS CONTINUOUS PRN
Status: DISCONTINUED | OUTPATIENT
Start: 2019-02-18 | End: 2019-02-18 | Stop reason: HOSPADM

## 2019-02-18 RX ORDER — HYDROMORPHONE HYDROCHLORIDE 1 MG/ML
0.5 INJECTION, SOLUTION INTRAMUSCULAR; INTRAVENOUS; SUBCUTANEOUS
Status: CANCELLED | OUTPATIENT
Start: 2019-02-18

## 2019-02-18 RX ORDER — DIPHENHYDRAMINE HYDROCHLORIDE 50 MG/ML
12.5 INJECTION INTRAMUSCULAR; INTRAVENOUS
Status: DISCONTINUED | OUTPATIENT
Start: 2019-02-18 | End: 2019-02-18 | Stop reason: HOSPADM

## 2019-02-18 RX ORDER — LABETALOL HYDROCHLORIDE 5 MG/ML
5 INJECTION, SOLUTION INTRAVENOUS
Status: DISCONTINUED | OUTPATIENT
Start: 2019-02-18 | End: 2019-02-18 | Stop reason: HOSPADM

## 2019-02-18 RX ORDER — DIPHENHYDRAMINE HCL 25 MG
25 CAPSULE ORAL
Status: DISCONTINUED | OUTPATIENT
Start: 2019-02-18 | End: 2019-02-18 | Stop reason: HOSPADM

## 2019-02-18 RX ORDER — PROMETHAZINE HYDROCHLORIDE 25 MG/1
25 SUPPOSITORY RECTAL ONCE AS NEEDED
Status: DISCONTINUED | OUTPATIENT
Start: 2019-02-18 | End: 2019-02-18 | Stop reason: HOSPADM

## 2019-02-18 RX ORDER — FAMOTIDINE 10 MG/ML
20 INJECTION, SOLUTION INTRAVENOUS
Status: DISCONTINUED | OUTPATIENT
Start: 2019-02-18 | End: 2019-02-18 | Stop reason: HOSPADM

## 2019-02-18 RX ORDER — HEPARIN SODIUM 1000 [USP'U]/ML
INJECTION, SOLUTION INTRAVENOUS; SUBCUTANEOUS AS NEEDED
Status: DISCONTINUED | OUTPATIENT
Start: 2019-02-18 | End: 2019-02-18 | Stop reason: SURG

## 2019-02-18 RX ORDER — MIDAZOLAM HYDROCHLORIDE 1 MG/ML
1 INJECTION INTRAMUSCULAR; INTRAVENOUS
Status: DISCONTINUED | OUTPATIENT
Start: 2019-02-18 | End: 2019-02-18 | Stop reason: HOSPADM

## 2019-02-18 RX ORDER — FENTANYL CITRATE 50 UG/ML
50 INJECTION, SOLUTION INTRAMUSCULAR; INTRAVENOUS
Status: DISCONTINUED | OUTPATIENT
Start: 2019-02-18 | End: 2019-02-18 | Stop reason: HOSPADM

## 2019-02-18 RX ORDER — EPHEDRINE SULFATE 50 MG/ML
5 INJECTION, SOLUTION INTRAVENOUS ONCE AS NEEDED
Status: DISCONTINUED | OUTPATIENT
Start: 2019-02-18 | End: 2019-02-18 | Stop reason: HOSPADM

## 2019-02-18 RX ORDER — PROPOFOL 10 MG/ML
VIAL (ML) INTRAVENOUS CONTINUOUS PRN
Status: DISCONTINUED | OUTPATIENT
Start: 2019-02-18 | End: 2019-02-18 | Stop reason: SURG

## 2019-02-18 RX ORDER — SODIUM CHLORIDE 0.9 % (FLUSH) 0.9 %
3 SYRINGE (ML) INJECTION EVERY 12 HOURS SCHEDULED
Status: DISCONTINUED | OUTPATIENT
Start: 2019-02-18 | End: 2019-02-18 | Stop reason: HOSPADM

## 2019-02-18 RX ORDER — BUPIVACAINE HYDROCHLORIDE AND EPINEPHRINE 5; 5 MG/ML; UG/ML
INJECTION, SOLUTION EPIDURAL; INTRACAUDAL; PERINEURAL AS NEEDED
Status: DISCONTINUED | OUTPATIENT
Start: 2019-02-18 | End: 2019-02-18 | Stop reason: SURG

## 2019-02-18 RX ORDER — PROMETHAZINE HYDROCHLORIDE 25 MG/1
25 TABLET ORAL ONCE AS NEEDED
Status: DISCONTINUED | OUTPATIENT
Start: 2019-02-18 | End: 2019-02-18 | Stop reason: HOSPADM

## 2019-02-18 RX ORDER — HYDRALAZINE HYDROCHLORIDE 20 MG/ML
5 INJECTION INTRAMUSCULAR; INTRAVENOUS
Status: DISCONTINUED | OUTPATIENT
Start: 2019-02-18 | End: 2019-02-18 | Stop reason: HOSPADM

## 2019-02-18 RX ORDER — ONDANSETRON 2 MG/ML
4 INJECTION INTRAMUSCULAR; INTRAVENOUS ONCE AS NEEDED
Status: DISCONTINUED | OUTPATIENT
Start: 2019-02-18 | End: 2019-02-18 | Stop reason: HOSPADM

## 2019-02-18 RX ORDER — HYDROCODONE BITARTRATE AND ACETAMINOPHEN 5; 325 MG/1; MG/1
1 TABLET ORAL ONCE AS NEEDED
Status: DISCONTINUED | OUTPATIENT
Start: 2019-02-18 | End: 2019-02-18 | Stop reason: HOSPADM

## 2019-02-18 RX ORDER — ACETAMINOPHEN 325 MG/1
650 TABLET ORAL ONCE AS NEEDED
Status: DISCONTINUED | OUTPATIENT
Start: 2019-02-18 | End: 2019-02-18 | Stop reason: HOSPADM

## 2019-02-18 RX ORDER — PROTAMINE SULFATE 10 MG/ML
INJECTION, SOLUTION INTRAVENOUS AS NEEDED
Status: DISCONTINUED | OUTPATIENT
Start: 2019-02-18 | End: 2019-02-18 | Stop reason: SURG

## 2019-02-18 RX ORDER — HYDROCODONE BITARTRATE AND ACETAMINOPHEN 7.5; 325 MG/1; MG/1
1 TABLET ORAL ONCE AS NEEDED
Status: CANCELLED | OUTPATIENT
Start: 2019-02-18

## 2019-02-18 RX ORDER — CEFAZOLIN SODIUM 2 G/100ML
2 INJECTION, SOLUTION INTRAVENOUS ONCE
Status: COMPLETED | OUTPATIENT
Start: 2019-02-18 | End: 2019-02-18

## 2019-02-18 RX ORDER — HYDROCODONE BITARTRATE AND ACETAMINOPHEN 5; 325 MG/1; MG/1
1-2 TABLET ORAL EVERY 8 HOURS PRN
Qty: 10 TABLET | Refills: 0 | Status: ON HOLD | OUTPATIENT
Start: 2019-02-18 | End: 2019-05-31 | Stop reason: SDUPTHER

## 2019-02-18 RX ORDER — NALOXONE HCL 0.4 MG/ML
0.2 VIAL (ML) INJECTION AS NEEDED
Status: DISCONTINUED | OUTPATIENT
Start: 2019-02-18 | End: 2019-02-18 | Stop reason: HOSPADM

## 2019-02-18 RX ORDER — SODIUM CHLORIDE 0.9 % (FLUSH) 0.9 %
3-10 SYRINGE (ML) INJECTION AS NEEDED
Status: DISCONTINUED | OUTPATIENT
Start: 2019-02-18 | End: 2019-02-18 | Stop reason: HOSPADM

## 2019-02-18 RX ADMIN — BUPIVACAINE HYDROCHLORIDE AND EPINEPHRINE BITARTRATE 25 ML: 5; .0091 INJECTION, SOLUTION EPIDURAL; INTRACAUDAL; PERINEURAL at 09:30

## 2019-02-18 RX ADMIN — SODIUM CHLORIDE 9 ML/HR: 9 INJECTION, SOLUTION INTRAVENOUS at 09:25

## 2019-02-18 RX ADMIN — FAMOTIDINE 20 MG: 10 INJECTION, SOLUTION INTRAVENOUS at 09:24

## 2019-02-18 RX ADMIN — PROTAMINE SULFATE 30 MG: 10 INJECTION, SOLUTION INTRAVENOUS at 10:35

## 2019-02-18 RX ADMIN — CEFAZOLIN SODIUM 2 G: 2 INJECTION, SOLUTION INTRAVENOUS at 09:54

## 2019-02-18 RX ADMIN — PROPOFOL 75 MCG/KG/MIN: 10 INJECTION, EMULSION INTRAVENOUS at 09:43

## 2019-02-18 RX ADMIN — HEPARIN SODIUM 5000 UNITS: 1000 INJECTION, SOLUTION INTRAVENOUS; SUBCUTANEOUS at 10:11

## 2019-02-18 RX ADMIN — MIDAZOLAM 1 MG: 1 INJECTION INTRAMUSCULAR; INTRAVENOUS at 09:29

## 2019-02-18 NOTE — ANESTHESIA PREPROCEDURE EVALUATION
Anesthesia Evaluation     Patient summary reviewed                Airway   Dental      Pulmonary    Cardiovascular     ECG reviewed    (+) valvular problems/murmurs, CAD, dysrhythmias,       Neuro/Psych  GI/Hepatic/Renal/Endo    (+)   hypothyroidism,     Musculoskeletal     Abdominal    Substance History      OB/GYN          Other          Other Comment: Hb 10.1                  Anesthesia Plan    ASA 3     regional     Anesthetic plan, all risks, benefits, and alternatives have been provided, discussed and informed consent has been obtained with: patient.  Use of blood products discussed with patient .

## 2019-02-18 NOTE — ANESTHESIA PROCEDURE NOTES
Peripheral Block      Patient location during procedure: holding area  Start time: 2/18/2019 9:26 AM  Stop time: 2/18/2019 9:30 AM  Reason for block: primary anesthetic  Performed by  Anesthesiologist: Azael Carver MD  Preanesthetic Checklist  Completed: patient identified, site marked, surgical consent, pre-op evaluation, timeout performed, IV checked, risks and benefits discussed and monitors and equipment checked  Prep:  Pt Position: supine  Patient monitoring: blood pressure monitoring, continuous pulse oximetry and EKG  Procedure  Sedation:yes    Guidance:ultrasound guided  ULTRASOUND INTERPRETATION.  Using ultrasound guidance a 21 G gauge needle was placed in close proximity to the brachial plexus nerve, at which point, under ultrasound guidance anesthetic was injected in the area of the nerve and spread of the anesthesia was seen on ultrasound in close proximity thereto.  There were no abnormalities seen on ultrasound; a digital image was taken; and the patient tolerated the procedure with no complications. Images:still images obtained    Laterality:right  Block Type:interscalene and supraclavicular  Injection Technique:single-shot    Post Assessment  Injection Assessment: negative aspiration for heme  Patient Tolerance:comfortable throughout block  Complications:no

## 2019-02-18 NOTE — OP NOTE
Date of Admission:  2/18/2019  Today's Date:  02/18/19  Royer Dozier MD      Preoperative Diagnosis: Chronic Kidney Disease Stage IV    Postoperative Diagnosis: same as above    Procedure Performed: Right brachial basilic fistula creation    CPT:  78094    Surgeon: Royer Dozier MD    Assistant:  Lisa Napier KCSA , provided critical assistance in exposure, retraction, and suctioning that overall decrease blood loss and operative time.    Anesthesia: MAC with regional    Staff:   Circulator: Clemencia Mclean RN; Estelle Barbosa RN  Scrub Person: Ashley Silverio  Assistant: Lisa Napier    Estimated Blood Loss: minimal    Findings:     Vein quality:  Moderate   Artery quality:  Good   Post operative thrill quality:  Good   Post distal perfusion: Palpable radial pulse.  Unchanged from preoperative.    Implants:    Nothing was implanted during the procedure     Specimen: none    Complications: none    Dispo: to PACU    Indication for procedure: 79 y.o. female with chronic kidney disease patient of Dr. Abraham.  Referral for preemptive arteriovenous fistula creation.  Preoperative vein mapping shows a adequate right basilic vein.  Of note patient has a right internal jugular port and a left subclavian pacer.  Informed consent was obtained.    Description of procedure:   Patient was taken to the operating room. Anesthesia was induced without difficulty. Surgical sites were prepped and draped in the usual sterile manner. A full surgical timeout was performed. Ultrasound was used to make the basilic vein as well as the brachial artery. Incision was planned between the two. This was made with a #15 blade scalpel. Bovie electrocautery was used to dissect down to the level of the skin, down to subcutaneous tissue, down to the level of the vein. Sharp dissection was performed to mobilize the basilic vein. Once enough vein was mobilized, the brachial artery was identified and dissected upon its length. Then,  5000 units of heparin were administered. After 5 minutes, clamps were placed on the vein. It was transected distally, flushed with heparin, and dilated with a 3 and 3.5 coronary dilator. Venous end was spatulated and a #11 blade scalpel and Moreno scissors to make an arteriotomy after vascular clamps were applied to he brachial artery. A 6-0 anastomosis was performed in running manner. Appropriate flushing maneuvers were taken prior to completion of the anastomosis. Flow was restored. Meticulous hemostasis was obtained throughout. Protamine was administered. Deep tissues closed with appropriate Vicryl sutures and skin was run closed with 4-0 Vicryl in subcuticular manner. Overall, patient tolerated procedure well.    Royer Dozier MD  02/18/19    There are no hospital problems to display for this patient.

## 2019-02-18 NOTE — ANESTHESIA POSTPROCEDURE EVALUATION
"Patient: Charmaine Machuca    Procedure Summary     Date:  02/18/19 Room / Location:  [facility] OR 09 / [facility] MAIN OR    Anesthesia Start:  0939 Anesthesia Stop:  1055    Procedure:  RIGHT BASILIC FISTULA CREATION WITHOUT TRANSPOSITION (Right ) Diagnosis:      Surgeon:  Royer Dozier MD Provider:  Azael Carver MD    Anesthesia Type:  regional ASA Status:  3          Anesthesia Type: regional  Last vitals  BP   124/63 (02/18/19 1100)   Temp   36.4 °C (97.6 °F) (02/18/19 1052)   Pulse   73 (02/18/19 1100)   Resp   16 (02/18/19 1100)     SpO2   97 % (02/18/19 1100)     Post Anesthesia Care and Evaluation    Patient location during evaluation: bedside  Patient participation: complete - patient participated  Level of consciousness: awake and alert  Pain management: adequate  Airway patency: patent  Anesthetic complications: No anesthetic complications  PONV Status: none  Cardiovascular status: acceptable  Respiratory status: acceptable  Hydration status: acceptable    Comments: /63   Pulse 73   Temp 36.4 °C (97.6 °F) (Oral)   Resp 16   Ht 170.2 cm (67.01\")   Wt 80 kg (176 lb 5 oz)   SpO2 97%   BMI 27.61 kg/m²         "

## 2019-02-20 NOTE — PROGRESS NOTES
Arrived ambulatory for possible injection today . HGB 11.6 Procrit held per doctors orders . Will return next week for lab recheck.  
20-Feb-2019 09:27

## 2019-02-21 ENCOUNTER — APPOINTMENT (OUTPATIENT)
Dept: ONCOLOGY | Facility: HOSPITAL | Age: 79
End: 2019-02-21

## 2019-02-21 ENCOUNTER — INFUSION (OUTPATIENT)
Dept: ONCOLOGY | Facility: HOSPITAL | Age: 79
End: 2019-02-21

## 2019-02-21 VITALS
WEIGHT: 176 LBS | RESPIRATION RATE: 16 BRPM | OXYGEN SATURATION: 99 % | DIASTOLIC BLOOD PRESSURE: 69 MMHG | SYSTOLIC BLOOD PRESSURE: 116 MMHG | HEIGHT: 67 IN | TEMPERATURE: 98 F | HEART RATE: 77 BPM | BODY MASS INDEX: 27.62 KG/M2

## 2019-02-21 DIAGNOSIS — N18.4 ANEMIA OF CHRONIC RENAL FAILURE, STAGE 4 (SEVERE) (HCC): Primary | ICD-10-CM

## 2019-02-21 DIAGNOSIS — D63.1 ANEMIA OF CHRONIC RENAL FAILURE, STAGE 4 (SEVERE) (HCC): Primary | ICD-10-CM

## 2019-02-21 LAB
ABO GROUP BLD: NORMAL
BASOPHILS # BLD AUTO: 0.01 10*3/MM3 (ref 0–0.2)
BASOPHILS NFR BLD AUTO: 0.3 % (ref 0–1.5)
BLD GP AB SCN SERPL QL: NEGATIVE
DEPRECATED RDW RBC AUTO: 60.7 FL (ref 37–54)
EOSINOPHIL # BLD AUTO: 0.06 10*3/MM3 (ref 0–0.4)
EOSINOPHIL NFR BLD AUTO: 1.8 % (ref 0.3–6.2)
ERYTHROCYTE [DISTWIDTH] IN BLOOD BY AUTOMATED COUNT: 16.8 % (ref 12.3–15.4)
FERRITIN SERPL-MCNC: 67 NG/ML (ref 13–150)
HCT VFR BLD AUTO: 25.4 % (ref 34–46.6)
HGB BLD-MCNC: 7.9 G/DL (ref 12–15.9)
IMM GRANULOCYTES # BLD AUTO: 0 10*3/MM3 (ref 0–0.05)
IMM GRANULOCYTES NFR BLD AUTO: 0 % (ref 0–0.5)
IRON 24H UR-MRATE: 32 MCG/DL (ref 37–145)
IRON SATN MFR SERPL: 13 % (ref 20–50)
LYMPHOCYTES # BLD AUTO: 0.45 10*3/MM3 (ref 0.7–3.1)
LYMPHOCYTES NFR BLD AUTO: 13.4 % (ref 19.6–45.3)
MCH RBC QN AUTO: 30.6 PG (ref 26.6–33)
MCHC RBC AUTO-ENTMCNC: 31.1 G/DL (ref 31.5–35.7)
MCV RBC AUTO: 98.4 FL (ref 79–97)
MONOCYTES # BLD AUTO: 0.39 10*3/MM3 (ref 0.1–0.9)
MONOCYTES NFR BLD AUTO: 11.6 % (ref 5–12)
NEUTROPHILS # BLD AUTO: 2.46 10*3/MM3 (ref 1.4–7)
NEUTROPHILS NFR BLD AUTO: 72.9 % (ref 42.7–76)
NRBC BLD AUTO-RTO: 0 /100 WBC (ref 0–0)
PLATELET # BLD AUTO: 151 10*3/MM3 (ref 140–450)
PMV BLD AUTO: 9.8 FL (ref 6–12)
RBC # BLD AUTO: 2.58 10*6/MM3 (ref 3.77–5.28)
RH BLD: NEGATIVE
T&S EXPIRATION DATE: NORMAL
TIBC SERPL-MCNC: 249 MCG/DL (ref 298–536)
TRANSFERRIN SERPL-MCNC: 167 MG/DL (ref 200–360)
WBC NRBC COR # BLD: 3.37 10*3/MM3 (ref 3.4–10.8)

## 2019-02-21 PROCEDURE — 84466 ASSAY OF TRANSFERRIN: CPT | Performed by: NURSE PRACTITIONER

## 2019-02-21 PROCEDURE — 86923 COMPATIBILITY TEST ELECTRIC: CPT

## 2019-02-21 PROCEDURE — 96372 THER/PROPH/DIAG INJ SC/IM: CPT

## 2019-02-21 PROCEDURE — 86900 BLOOD TYPING SEROLOGIC ABO: CPT

## 2019-02-21 PROCEDURE — 82728 ASSAY OF FERRITIN: CPT | Performed by: NURSE PRACTITIONER

## 2019-02-21 PROCEDURE — 85025 COMPLETE CBC W/AUTO DIFF WBC: CPT | Performed by: INTERNAL MEDICINE

## 2019-02-21 PROCEDURE — 83540 ASSAY OF IRON: CPT | Performed by: NURSE PRACTITIONER

## 2019-02-21 PROCEDURE — 86901 BLOOD TYPING SEROLOGIC RH(D): CPT

## 2019-02-21 PROCEDURE — 86850 RBC ANTIBODY SCREEN: CPT

## 2019-02-21 PROCEDURE — 63510000001 EPOETIN ALFA PER 1000 UNITS: Performed by: NURSE PRACTITIONER

## 2019-02-21 RX ORDER — SODIUM CHLORIDE 9 MG/ML
250 INJECTION, SOLUTION INTRAVENOUS AS NEEDED
Status: CANCELLED | OUTPATIENT
Start: 2019-02-21

## 2019-02-21 RX ORDER — SODIUM CHLORIDE 9 MG/ML
250 INJECTION, SOLUTION INTRAVENOUS ONCE
Status: CANCELLED | OUTPATIENT
Start: 2019-02-28

## 2019-02-21 RX ORDER — PROCHLORPERAZINE MALEATE 10 MG
10 TABLET ORAL ONCE
Status: CANCELLED
Start: 2019-03-14 | End: 2019-03-07

## 2019-02-21 RX ORDER — PROCHLORPERAZINE MALEATE 10 MG
10 TABLET ORAL ONCE
Status: CANCELLED
Start: 2019-02-28 | End: 2019-02-28

## 2019-02-21 RX ORDER — SODIUM CHLORIDE 9 MG/ML
250 INJECTION, SOLUTION INTRAVENOUS ONCE
Status: CANCELLED | OUTPATIENT
Start: 2019-03-14

## 2019-02-21 RX ORDER — DIPHENHYDRAMINE HCL 25 MG
25 CAPSULE ORAL ONCE
Status: CANCELLED | OUTPATIENT
Start: 2019-02-21 | End: 2019-02-21

## 2019-02-21 RX ORDER — ACETAMINOPHEN 325 MG/1
325 TABLET ORAL ONCE
Status: CANCELLED | OUTPATIENT
Start: 2019-02-21 | End: 2019-02-21

## 2019-02-21 RX ADMIN — ERYTHROPOIETIN 42000 UNITS: 20000 INJECTION, SOLUTION INTRAVENOUS; SUBCUTANEOUS at 12:04

## 2019-02-21 NOTE — PROGRESS NOTES
Pt is here for lab with RN review.  CBC reviewed with pt,HGB was 7.9 today . Reviewed with  ARNP and orders noted. Type and cross drawn and apaitent set up to receive two units of blood on Saturday .Also given a hemoccult card for patient to complete at home. We proceed with Procrit today .    Pt has no complaints.  Copy of labs given to pt and f/u appt reviewed. Pt is instructed to call with concerns prior to next visit.

## 2019-02-23 ENCOUNTER — INFUSION (OUTPATIENT)
Dept: ONCOLOGY | Facility: HOSPITAL | Age: 79
End: 2019-02-23

## 2019-02-23 VITALS
SYSTOLIC BLOOD PRESSURE: 123 MMHG | RESPIRATION RATE: 16 BRPM | OXYGEN SATURATION: 99 % | TEMPERATURE: 97.7 F | HEART RATE: 83 BPM | DIASTOLIC BLOOD PRESSURE: 67 MMHG

## 2019-02-23 DIAGNOSIS — N18.4 ANEMIA OF CHRONIC RENAL FAILURE, STAGE 4 (SEVERE) (HCC): Primary | ICD-10-CM

## 2019-02-23 DIAGNOSIS — Z45.2 ENCOUNTER FOR FITTING AND ADJUSTMENT OF VASCULAR CATHETER: ICD-10-CM

## 2019-02-23 DIAGNOSIS — D63.1 ANEMIA OF CHRONIC RENAL FAILURE, STAGE 4 (SEVERE) (HCC): Primary | ICD-10-CM

## 2019-02-23 PROCEDURE — 86900 BLOOD TYPING SEROLOGIC ABO: CPT

## 2019-02-23 PROCEDURE — 36430 TRANSFUSION BLD/BLD COMPNT: CPT

## 2019-02-23 PROCEDURE — P9016 RBC LEUKOCYTES REDUCED: HCPCS

## 2019-02-23 PROCEDURE — 63710000001 DIPHENHYDRAMINE PER 50 MG: Performed by: NURSE PRACTITIONER

## 2019-02-23 RX ORDER — SODIUM CHLORIDE 0.9 % (FLUSH) 0.9 %
10 SYRINGE (ML) INJECTION AS NEEDED
Status: DISCONTINUED | OUTPATIENT
Start: 2019-02-23 | End: 2019-02-23 | Stop reason: HOSPADM

## 2019-02-23 RX ORDER — ACETAMINOPHEN 325 MG/1
325 TABLET ORAL ONCE
Status: COMPLETED | OUTPATIENT
Start: 2019-02-23 | End: 2019-02-23

## 2019-02-23 RX ORDER — SODIUM CHLORIDE 9 MG/ML
250 INJECTION, SOLUTION INTRAVENOUS AS NEEDED
Status: DISCONTINUED | OUTPATIENT
Start: 2019-02-23 | End: 2019-02-23 | Stop reason: HOSPADM

## 2019-02-23 RX ORDER — DIPHENHYDRAMINE HCL 25 MG
25 CAPSULE ORAL ONCE
Status: COMPLETED | OUTPATIENT
Start: 2019-02-23 | End: 2019-02-23

## 2019-02-23 RX ORDER — SODIUM CHLORIDE 0.9 % (FLUSH) 0.9 %
10 SYRINGE (ML) INJECTION AS NEEDED
Status: CANCELLED | OUTPATIENT
Start: 2019-02-23

## 2019-02-23 RX ADMIN — DIPHENHYDRAMINE HYDROCHLORIDE 25 MG: 25 CAPSULE ORAL at 08:40

## 2019-02-23 RX ADMIN — SODIUM CHLORIDE, PRESERVATIVE FREE 500 UNITS: 5 INJECTION INTRAVENOUS at 13:46

## 2019-02-23 RX ADMIN — ACETAMINOPHEN 325 MG: 325 TABLET, FILM COATED ORAL at 08:40

## 2019-02-28 ENCOUNTER — INFUSION (OUTPATIENT)
Dept: ONCOLOGY | Facility: HOSPITAL | Age: 79
End: 2019-02-28

## 2019-02-28 ENCOUNTER — OFFICE VISIT (OUTPATIENT)
Dept: ONCOLOGY | Facility: CLINIC | Age: 79
End: 2019-02-28

## 2019-02-28 VITALS
TEMPERATURE: 98.3 F | BODY MASS INDEX: 27.97 KG/M2 | HEIGHT: 67 IN | RESPIRATION RATE: 16 BRPM | HEART RATE: 66 BPM | OXYGEN SATURATION: 99 % | WEIGHT: 178.2 LBS | SYSTOLIC BLOOD PRESSURE: 146 MMHG | DIASTOLIC BLOOD PRESSURE: 67 MMHG

## 2019-02-28 DIAGNOSIS — N18.4 ANEMIA OF CHRONIC RENAL FAILURE, STAGE 4 (SEVERE) (HCC): ICD-10-CM

## 2019-02-28 DIAGNOSIS — D63.1 ANEMIA OF CHRONIC RENAL FAILURE, STAGE 4 (SEVERE) (HCC): ICD-10-CM

## 2019-02-28 DIAGNOSIS — N18.4 STAGE 4 CHRONIC KIDNEY DISEASE (HCC): Primary | ICD-10-CM

## 2019-02-28 DIAGNOSIS — D50.8 OTHER IRON DEFICIENCY ANEMIA: ICD-10-CM

## 2019-02-28 DIAGNOSIS — N18.4 STAGE 4 CHRONIC KIDNEY DISEASE (HCC): ICD-10-CM

## 2019-02-28 DIAGNOSIS — D63.8 ANEMIA OF CHRONIC DISEASE: Primary | ICD-10-CM

## 2019-02-28 LAB
BASOPHILS # BLD AUTO: 0.01 10*3/MM3 (ref 0–0.2)
BASOPHILS NFR BLD AUTO: 0.2 % (ref 0–1.5)
DEPRECATED RDW RBC AUTO: 57.4 FL (ref 37–54)
EOSINOPHIL # BLD AUTO: 0.08 10*3/MM3 (ref 0–0.4)
EOSINOPHIL NFR BLD AUTO: 1.4 % (ref 0.3–6.2)
ERYTHROCYTE [DISTWIDTH] IN BLOOD BY AUTOMATED COUNT: 17 % (ref 12.3–15.4)
HCT VFR BLD AUTO: 33.9 % (ref 34–46.6)
HGB BLD-MCNC: 10.9 G/DL (ref 12–15.9)
IMM GRANULOCYTES # BLD AUTO: 0.02 10*3/MM3 (ref 0–0.05)
IMM GRANULOCYTES NFR BLD AUTO: 0.4 % (ref 0–0.5)
LYMPHOCYTES # BLD AUTO: 0.72 10*3/MM3 (ref 0.7–3.1)
LYMPHOCYTES NFR BLD AUTO: 13 % (ref 19.6–45.3)
MCH RBC QN AUTO: 30.3 PG (ref 26.6–33)
MCHC RBC AUTO-ENTMCNC: 32.2 G/DL (ref 31.5–35.7)
MCV RBC AUTO: 94.2 FL (ref 79–97)
MONOCYTES # BLD AUTO: 0.42 10*3/MM3 (ref 0.1–0.9)
MONOCYTES NFR BLD AUTO: 7.6 % (ref 5–12)
NEUTROPHILS # BLD AUTO: 4.29 10*3/MM3 (ref 1.4–7)
NEUTROPHILS NFR BLD AUTO: 77.4 % (ref 42.7–76)
NRBC BLD AUTO-RTO: 0 /100 WBC (ref 0–0)
PLATELET # BLD AUTO: 197 10*3/MM3 (ref 140–450)
PMV BLD AUTO: 9.1 FL (ref 6–12)
RBC # BLD AUTO: 3.6 10*6/MM3 (ref 3.77–5.28)
WBC NRBC COR # BLD: 5.54 10*3/MM3 (ref 3.4–10.8)

## 2019-02-28 PROCEDURE — 96523 IRRIG DRUG DELIVERY DEVICE: CPT | Performed by: INTERNAL MEDICINE

## 2019-02-28 PROCEDURE — 85025 COMPLETE CBC W/AUTO DIFF WBC: CPT | Performed by: INTERNAL MEDICINE

## 2019-02-28 PROCEDURE — 99213 OFFICE O/P EST LOW 20 MIN: CPT | Performed by: INTERNAL MEDICINE

## 2019-02-28 RX ORDER — PROCHLORPERAZINE MALEATE 10 MG
10 TABLET ORAL ONCE
Status: DISCONTINUED | OUTPATIENT
Start: 2019-02-28 | End: 2019-02-28

## 2019-02-28 RX ORDER — SODIUM CHLORIDE 0.9 % (FLUSH) 0.9 %
10 SYRINGE (ML) INJECTION AS NEEDED
Status: CANCELLED | OUTPATIENT
Start: 2019-02-28

## 2019-02-28 RX ORDER — SODIUM CHLORIDE 9 MG/ML
250 INJECTION, SOLUTION INTRAVENOUS ONCE
Status: CANCELLED | OUTPATIENT
Start: 2019-03-07

## 2019-02-28 RX ORDER — SODIUM CHLORIDE 9 MG/ML
250 INJECTION, SOLUTION INTRAVENOUS ONCE
Status: DISCONTINUED | OUTPATIENT
Start: 2019-02-28 | End: 2019-02-28

## 2019-02-28 RX ORDER — PROCHLORPERAZINE MALEATE 10 MG
10 TABLET ORAL ONCE
Status: CANCELLED | OUTPATIENT
Start: 2019-03-07

## 2019-02-28 NOTE — PROGRESS NOTES
Subjective .     REASONS FOR FOLLOWUP:      Anemia of chronic kidney diease              History of Iron deficiency     Shortness of Breath          Mrs Machuca is seen today for evaluation.She has continued on weekly Procrit for a hemoglobin less than 10. Most recently, she underwent transfusion last week with a hemoglobin of 7.9.  She has had quite an incremented 10.9 on today's review however.    Preceding that hemoglobin of 7.9 however was extensive bleeding and bruising associated with a right arm fistula surgery.     Additionally she underwent iron studies prior to this visit 2/21/2019 in fact.  Her iron percent saturation was 13 ferritin 67 and therefore she will undergo injected for therapy and replacement to resume Procrit therapy.    Past Medical History:   Diagnosis Date   • Anemia     Iron deficiency   • Cardiomyopathy (CMS/HCC)     S/P pacemaker and defibrillator   • CHF (congestive heart failure) (CMS/HCC)    • Chronic renal failure, stage 4 (severe) (CMS/HCC)    • Colon polyps    • Coronary artery disease     pacemaker, defibrillator   • Gastroparesis    • GERD (gastroesophageal reflux disease)    • Gout    • Hemorrhoids    • Hiatal hernia    • History of Clostridium difficile colitis 2013   • History of kidney stones    • History of pancreatitis     2014   • History of skin cancer    • History of transfusion     LAST TRANSFUSION 2 UNITS, 4 WEEKS AGO   • Hypothyroidism    • Left bundle branch block    • Mitral and aortic valve disease    • Mitral valve insufficiency    • Myoclonus     S/P Depakote   • Osteoarthritis    • Pancytopenia (CMS/HCC)    • Peripheral neuropathy    • Presence of cardiac pacemaker     AND DEFIBRILLATOR   • Pulmonary hypertension (CMS/HCC)    • RLS (restless legs syndrome)    • SOB (shortness of breath) on exertion    • Spinal stenosis        ONCOLOGIC HISTORY:  (History from previous dates can be found in the separate document.)    Current Outpatient Medications on File  Prior to Visit   Medication Sig Dispense Refill   • aspirin 81 MG tablet Take 81 mg by mouth Daily.     • calcitriol (ROCALTROL) 0.25 MCG capsule Take 0.25 mcg by mouth Daily. 1 tablet every other day and 2 tablets every other day     • Calcium Carbonate (CALCIUM 600 PO) Take 600 mg by mouth Daily.     • carbidopa-levodopa ER (SINEMET CR)  MG per tablet Take 1 tablet by mouth Every Evening.     • carvedilol (COREG) 12.5 MG tablet Take 12.5 mg by mouth Daily.     • Cholecalciferol (VITAMIN D3) 5000 units capsule capsule Take 5,000 Units by mouth Daily. Every other day     • diazepam (VALIUM) 5 MG tablet Take 5 mg by mouth Every Night.     • famotidine (PEPCID) 20 MG tablet Take 20 mg by mouth 2 (Two) Times a Day.     • febuxostat (ULORIC) 40 MG tablet Take 40 mg by mouth Daily.     • furosemide (LASIX) 40 MG tablet Take 40 mg by mouth Daily.     • Gabapentin, Once-Daily, (GRALISE) 300 MG tablet Take 300 mg by mouth Daily With Dinner. MAY REPEAT LATE EVENING IF NEEDED     • Glucosamine-Chondroit-Vit C-Mn (GLUCOSAMINE CHONDROITIN COMPLX) capsule Take 1,500 mg by mouth Every Other Day. PT HOLDING FOR SURGERY     • HYDROcodone-acetaminophen (NORCO) 5-325 MG per tablet Take 1-2 tablets by mouth Every 8 (Eight) Hours As Needed (Pain). 10 tablet 0   • levothyroxine (SYNTHROID, LEVOTHROID) 25 MCG tablet Take 25 mcg by mouth Daily.     • loperamide (IMODIUM) 2 MG capsule Take 2 mg by mouth 4 (Four) Times a Day As Needed for Diarrhea.     • magnesium oxide 250 MG tablet Take 250 mg by mouth Daily.     • methscopolamine (PAMINE FORTE) 5 MG tablet Take 5 mg by mouth Every Morning.     • methylPREDNISolone (MEDROL) 4 MG tablet Take 4 mg by mouth As Needed. TAKES FOR GOUT FLARE UPS     • Multiple Vitamin (MULTI-DAY VITAMINS) tablet Take 1 tablet by mouth Daily. Multivitamin with Iron     • ondansetron (ZOFRAN) 8 MG tablet Take 1 tablet by mouth Every 8 (Eight) Hours As Needed for Nausea or Vomiting. 30 tablet 2   •  "pantoprazole (PROTONIX) 40 MG EC tablet Take 40 mg by mouth Daily.     • psyllium (METAMUCIL) 58.6 % powder Take 1 packet by mouth Daily.     • rotigotine (NEUPRO) 6 MG/24HR patch Place 1 patch on the skin as directed by provider Daily.     • Vitamin E 400 UNITS tablet Take 400 Units by mouth Daily. PT HOLDING FOR SURGERY     • DULoxetine (CYMBALTA) 30 MG capsule Take 30 mg by mouth Daily.       Current Facility-Administered Medications on File Prior to Visit   Medication Dose Route Frequency Provider Last Rate Last Dose   • heparin flush (porcine) 100 UNIT/ML injection 500 Units  500 Units Intravenous PRN Anuel Scott MD   500 Units at 11/27/17 1347   • sodium chloride 0.9 % flush 10 mL  10 mL Intravenous PRN Anuel Scott MD   10 mL at 11/27/17 1347       ALLERGIES:     Allergies   Allergen Reactions   • Chlorhexidine Rash and Itching     Patient states \"blue dye\" in chlorhexidine.  States the orange and clear are OK to use   • Valproic Acid Other (See Comments)     Made her extremely sleepy  Made her extremely sleepy   • Codeine Other (See Comments)     Want to climb the walls, doesn't help pain   • Propoxyphene Other (See Comments)     Belligerent, acting drunk  Belligerent, acting drunk       Social History     Socioeconomic History   • Marital status:      Spouse name: Zackery   • Number of children: 5   • Years of education: 1 yr college   • Highest education level: Not on file   Social Needs   • Financial resource strain: Not on file   • Food insecurity - worry: Not on file   • Food insecurity - inability: Not on file   • Transportation needs - medical: Not on file   • Transportation needs - non-medical: Not on file   Occupational History     Employer: RETIRED   Tobacco Use   • Smoking status: Never Smoker   • Smokeless tobacco: Never Used   Substance and Sexual Activity   • Alcohol use: No   • Drug use: No   • Sexual activity: Defer   Other Topics Concern   • Not on file   Social History " "Narrative   • Not on file         Cancer-related family history is not on file.     Review of Systems   Constitutional: Positive for fatigue.   HENT: Negative.    Eyes: Negative.    Respiratory: Negative for shortness of breath.    Cardiovascular: Negative.    Gastrointestinal: Negative for diarrhea.   Endocrine: Negative.    Genitourinary: Negative.    Musculoskeletal:        Chronic lower extremity myalgias    Skin: Negative.    Neurological: Positive for weakness and light-headedness (chronic, unchanged ).   Hematological: Negative.  Does not bruise/bleed easily.   Psychiatric/Behavioral: Negative.    ROS unchanged from prior   A comprehensive 14 point review of systems was performed and was negative except as mentioned.    Objective      Vitals:    02/28/19 1116   BP: 146/67   Pulse: 66   Resp: 16   Temp: 98.3 °F (36.8 °C)   SpO2: 99%   Weight: 80.8 kg (178 lb 3.2 oz)   Height: 170.2 cm (67.01\")   PainSc:   3   PainLoc: Arm  Comment: PT HAD SURGERY A WEEK AGO ON RIGHT ARM.     Current Status 2/28/2019   ECOG score 0       Physical Exam   Constitutional: She appears well-developed and well-nourished.   HENT:   Head: Normocephalic.   Eyes: Conjunctivae are normal. No scleral icterus.   Cardiovascular: Normal rate.   Pulmonary/Chest: Effort normal.   Abdominal: Soft. Bowel sounds are normal.   Skin: No rash noted.   Psychiatric: She has a normal mood and affect.         RECENT LABS:  Hematology WBC   Date Value Ref Range Status   02/28/2019 5.54 3.40 - 10.80 10*3/mm3 Final     RBC   Date Value Ref Range Status   02/28/2019 3.60 (L) 3.77 - 5.28 10*6/mm3 Final     Hemoglobin   Date Value Ref Range Status   02/28/2019 10.9 (L) 12.0 - 15.9 g/dL Final     Hematocrit   Date Value Ref Range Status   02/28/2019 33.9 (L) 34.0 - 46.6 % Final     Platelets   Date Value Ref Range Status   02/28/2019 197 140 - 450 10*3/mm3 Final        Assessment/Plan   Anemia of stage 4 CKD:  She is clearly iron deficient with an iron percent " saturation of 13 and iron replacement will take place beginning 1 week from today with Injectafer x2 weekly doses planned.   She will undergo weekly Procrit therapy thereafter   We will have repeat studies in approximately 5 weeks   An MD review will follow in 6 weeks

## 2019-02-28 NOTE — PROGRESS NOTES
Patient here for physician appointment and C1D1 of Injectafer. Patient had made other plans and so was unable to stay for treatment. Dr. Scott was okay with the deferral to an additional day. C1D1 will be March 7, 2019.

## 2019-03-01 LAB
COLLECT DATE SP2 STL: ABNORMAL
COLLECT DATE STL: ABNORMAL
HEMOCCULT STL QL: POSITIVE
Lab: ABNORMAL
Lab: ABNORMAL

## 2019-03-01 PROCEDURE — 82270 OCCULT BLOOD FECES: CPT | Performed by: INTERNAL MEDICINE

## 2019-03-07 ENCOUNTER — APPOINTMENT (OUTPATIENT)
Dept: ONCOLOGY | Facility: HOSPITAL | Age: 79
End: 2019-03-07

## 2019-03-07 ENCOUNTER — INFUSION (OUTPATIENT)
Dept: ONCOLOGY | Facility: HOSPITAL | Age: 79
End: 2019-03-07

## 2019-03-07 VITALS
HEART RATE: 67 BPM | OXYGEN SATURATION: 96 % | TEMPERATURE: 97.8 F | SYSTOLIC BLOOD PRESSURE: 122 MMHG | WEIGHT: 176.4 LBS | DIASTOLIC BLOOD PRESSURE: 72 MMHG | BODY MASS INDEX: 27.62 KG/M2

## 2019-03-07 DIAGNOSIS — N18.4 ANEMIA OF CHRONIC RENAL FAILURE, STAGE 4 (SEVERE) (HCC): Primary | ICD-10-CM

## 2019-03-07 DIAGNOSIS — N18.4 STAGE 4 CHRONIC KIDNEY DISEASE (HCC): ICD-10-CM

## 2019-03-07 DIAGNOSIS — D63.1 ANEMIA OF CHRONIC RENAL FAILURE, STAGE 4 (SEVERE) (HCC): Primary | ICD-10-CM

## 2019-03-07 DIAGNOSIS — Z45.2 ENCOUNTER FOR FITTING AND ADJUSTMENT OF VASCULAR CATHETER: ICD-10-CM

## 2019-03-07 DIAGNOSIS — D50.8 OTHER IRON DEFICIENCY ANEMIA: ICD-10-CM

## 2019-03-07 LAB
BASOPHILS # BLD AUTO: 0.01 10*3/MM3 (ref 0–0.2)
BASOPHILS NFR BLD AUTO: 0.2 % (ref 0–1.5)
DEPRECATED RDW RBC AUTO: 58 FL (ref 37–54)
EOSINOPHIL # BLD AUTO: 0.05 10*3/MM3 (ref 0–0.4)
EOSINOPHIL NFR BLD AUTO: 1.1 % (ref 0.3–6.2)
ERYTHROCYTE [DISTWIDTH] IN BLOOD BY AUTOMATED COUNT: 16.5 % (ref 12.3–15.4)
HCT VFR BLD AUTO: 33.6 % (ref 34–46.6)
HGB BLD-MCNC: 10.8 G/DL (ref 12–15.9)
IMM GRANULOCYTES # BLD AUTO: 0.01 10*3/MM3 (ref 0–0.05)
IMM GRANULOCYTES NFR BLD AUTO: 0.2 % (ref 0–0.5)
LYMPHOCYTES # BLD AUTO: 0.51 10*3/MM3 (ref 0.7–3.1)
LYMPHOCYTES NFR BLD AUTO: 11.5 % (ref 19.6–45.3)
MCH RBC QN AUTO: 30.5 PG (ref 26.6–33)
MCHC RBC AUTO-ENTMCNC: 32.1 G/DL (ref 31.5–35.7)
MCV RBC AUTO: 94.9 FL (ref 79–97)
MONOCYTES # BLD AUTO: 0.42 10*3/MM3 (ref 0.1–0.9)
MONOCYTES NFR BLD AUTO: 9.5 % (ref 5–12)
NEUTROPHILS # BLD AUTO: 3.43 10*3/MM3 (ref 1.4–7)
NEUTROPHILS NFR BLD AUTO: 77.5 % (ref 42.7–76)
NRBC BLD AUTO-RTO: 0 /100 WBC (ref 0–0)
PLATELET # BLD AUTO: 171 10*3/MM3 (ref 140–450)
PMV BLD AUTO: 10 FL (ref 6–12)
RBC # BLD AUTO: 3.54 10*6/MM3 (ref 3.77–5.28)
WBC NRBC COR # BLD: 4.43 10*3/MM3 (ref 3.4–10.8)

## 2019-03-07 PROCEDURE — 85025 COMPLETE CBC W/AUTO DIFF WBC: CPT | Performed by: INTERNAL MEDICINE

## 2019-03-07 PROCEDURE — 96374 THER/PROPH/DIAG INJ IV PUSH: CPT | Performed by: INTERNAL MEDICINE

## 2019-03-07 PROCEDURE — 63710000001 PROCHLORPERAZINE MALEATE PER 10 MG: Performed by: INTERNAL MEDICINE

## 2019-03-07 PROCEDURE — 25010000002 FERRIC CARBOXYMALTOSE 750 MG/15ML SOLUTION 15 ML VIAL: Performed by: INTERNAL MEDICINE

## 2019-03-07 RX ORDER — SODIUM CHLORIDE 0.9 % (FLUSH) 0.9 %
10 SYRINGE (ML) INJECTION AS NEEDED
Status: CANCELLED | OUTPATIENT
Start: 2019-03-07

## 2019-03-07 RX ORDER — SODIUM CHLORIDE 0.9 % (FLUSH) 0.9 %
10 SYRINGE (ML) INJECTION AS NEEDED
Status: DISCONTINUED | OUTPATIENT
Start: 2019-03-07 | End: 2019-03-07 | Stop reason: HOSPADM

## 2019-03-07 RX ORDER — PROCHLORPERAZINE MALEATE 10 MG
10 TABLET ORAL ONCE
Status: COMPLETED | OUTPATIENT
Start: 2019-03-07 | End: 2019-03-07

## 2019-03-07 RX ORDER — SODIUM CHLORIDE 9 MG/ML
250 INJECTION, SOLUTION INTRAVENOUS ONCE
Status: COMPLETED | OUTPATIENT
Start: 2019-03-07 | End: 2019-03-07

## 2019-03-07 RX ADMIN — SODIUM CHLORIDE, PRESERVATIVE FREE 10 ML: 5 INJECTION INTRAVENOUS at 11:49

## 2019-03-07 RX ADMIN — SODIUM CHLORIDE 250 ML: 900 INJECTION, SOLUTION INTRAVENOUS at 10:49

## 2019-03-07 RX ADMIN — PROCHLORPERAZINE MALEATE 10 MG: 10 TABLET, FILM COATED ORAL at 10:49

## 2019-03-07 RX ADMIN — FERRIC CARBOXYMALTOSE INJECTION 750 MG: 50 INJECTION, SOLUTION INTRAVENOUS at 11:01

## 2019-03-07 RX ADMIN — Medication 500 UNITS: at 11:49

## 2019-03-07 NOTE — PROGRESS NOTES
Pt is here for lab with RN review and IV injectafer.  CBC reviewed with pt, counts are stable for this pt at this time. Pt has no complaints.  Pt declined copy of labs and f/u appt reviewed. Pt is instructed to call with concerns prior to next visit.    Lab Results   Component Value Date    WBC 4.43 03/07/2019    HGB 10.8 (L) 03/07/2019    HCT 33.6 (L) 03/07/2019    MCV 94.9 03/07/2019     03/07/2019

## 2019-03-14 ENCOUNTER — INFUSION (OUTPATIENT)
Dept: ONCOLOGY | Facility: HOSPITAL | Age: 79
End: 2019-03-14

## 2019-03-14 VITALS
OXYGEN SATURATION: 97 % | BODY MASS INDEX: 27.68 KG/M2 | HEART RATE: 83 BPM | SYSTOLIC BLOOD PRESSURE: 108 MMHG | DIASTOLIC BLOOD PRESSURE: 45 MMHG | WEIGHT: 176.8 LBS | TEMPERATURE: 98 F

## 2019-03-14 DIAGNOSIS — D63.1 ANEMIA OF CHRONIC RENAL FAILURE, STAGE 4 (SEVERE) (HCC): ICD-10-CM

## 2019-03-14 DIAGNOSIS — D50.8 OTHER IRON DEFICIENCY ANEMIA: ICD-10-CM

## 2019-03-14 DIAGNOSIS — N18.4 STAGE 4 CHRONIC KIDNEY DISEASE (HCC): Primary | ICD-10-CM

## 2019-03-14 DIAGNOSIS — Z45.2 ENCOUNTER FOR FITTING AND ADJUSTMENT OF VASCULAR CATHETER: ICD-10-CM

## 2019-03-14 DIAGNOSIS — N18.4 ANEMIA OF CHRONIC RENAL FAILURE, STAGE 4 (SEVERE) (HCC): ICD-10-CM

## 2019-03-14 PROCEDURE — 63710000001 PROCHLORPERAZINE MALEATE PER 10 MG: Performed by: INTERNAL MEDICINE

## 2019-03-14 PROCEDURE — 25010000002 FERRIC CARBOXYMALTOSE 750 MG/15ML SOLUTION 15 ML VIAL: Performed by: INTERNAL MEDICINE

## 2019-03-14 RX ORDER — SODIUM CHLORIDE 9 MG/ML
250 INJECTION, SOLUTION INTRAVENOUS ONCE
Status: COMPLETED | OUTPATIENT
Start: 2019-03-14 | End: 2019-03-14

## 2019-03-14 RX ORDER — SODIUM CHLORIDE 0.9 % (FLUSH) 0.9 %
10 SYRINGE (ML) INJECTION AS NEEDED
Status: CANCELLED | OUTPATIENT
Start: 2019-03-14

## 2019-03-14 RX ORDER — SODIUM CHLORIDE 0.9 % (FLUSH) 0.9 %
10 SYRINGE (ML) INJECTION AS NEEDED
Status: DISCONTINUED | OUTPATIENT
Start: 2019-03-14 | End: 2019-03-14 | Stop reason: HOSPADM

## 2019-03-14 RX ORDER — PROCHLORPERAZINE MALEATE 10 MG
10 TABLET ORAL ONCE
Status: COMPLETED | OUTPATIENT
Start: 2019-03-14 | End: 2019-03-14

## 2019-03-14 RX ADMIN — PROCHLORPERAZINE MALEATE 10 MG: 10 TABLET, FILM COATED ORAL at 10:13

## 2019-03-14 RX ADMIN — FERRIC CARBOXYMALTOSE INJECTION 750 MG: 50 INJECTION, SOLUTION INTRAVENOUS at 10:26

## 2019-03-14 RX ADMIN — SODIUM CHLORIDE 250 ML: 900 INJECTION, SOLUTION INTRAVENOUS at 10:01

## 2019-03-14 RX ADMIN — Medication 500 UNITS: at 11:16

## 2019-03-14 RX ADMIN — SODIUM CHLORIDE, PRESERVATIVE FREE 10 ML: 5 INJECTION INTRAVENOUS at 11:15

## 2019-03-21 ENCOUNTER — APPOINTMENT (OUTPATIENT)
Dept: ONCOLOGY | Facility: HOSPITAL | Age: 79
End: 2019-03-21

## 2019-03-21 ENCOUNTER — LAB (OUTPATIENT)
Dept: OTHER | Facility: HOSPITAL | Age: 79
End: 2019-03-21

## 2019-03-21 ENCOUNTER — INFUSION (OUTPATIENT)
Dept: ONCOLOGY | Facility: HOSPITAL | Age: 79
End: 2019-03-21

## 2019-03-21 VITALS
SYSTOLIC BLOOD PRESSURE: 137 MMHG | RESPIRATION RATE: 16 BRPM | WEIGHT: 173.4 LBS | HEIGHT: 67 IN | OXYGEN SATURATION: 98 % | BODY MASS INDEX: 27.21 KG/M2 | TEMPERATURE: 98.2 F | HEART RATE: 87 BPM | DIASTOLIC BLOOD PRESSURE: 66 MMHG

## 2019-03-21 DIAGNOSIS — N18.4 ANEMIA OF CHRONIC RENAL FAILURE, STAGE 4 (SEVERE) (HCC): ICD-10-CM

## 2019-03-21 DIAGNOSIS — D63.1 ANEMIA OF CHRONIC RENAL FAILURE, STAGE 4 (SEVERE) (HCC): Primary | ICD-10-CM

## 2019-03-21 DIAGNOSIS — D63.1 ANEMIA OF CHRONIC RENAL FAILURE, STAGE 4 (SEVERE) (HCC): ICD-10-CM

## 2019-03-21 DIAGNOSIS — N18.4 ANEMIA OF CHRONIC RENAL FAILURE, STAGE 4 (SEVERE) (HCC): Primary | ICD-10-CM

## 2019-03-21 LAB
BASOPHILS # BLD AUTO: 0.01 10*3/MM3 (ref 0–0.2)
BASOPHILS NFR BLD AUTO: 0.2 % (ref 0–1.5)
DEPRECATED RDW RBC AUTO: 58.3 FL (ref 37–54)
EOSINOPHIL # BLD AUTO: 0.07 10*3/MM3 (ref 0–0.4)
EOSINOPHIL NFR BLD AUTO: 1.5 % (ref 0.3–6.2)
ERYTHROCYTE [DISTWIDTH] IN BLOOD BY AUTOMATED COUNT: 16.6 % (ref 12.3–15.4)
HCT VFR BLD AUTO: 25.4 % (ref 34–46.6)
HGB BLD-MCNC: 8.2 G/DL (ref 12–15.9)
IMM GRANULOCYTES # BLD AUTO: 0.03 10*3/MM3 (ref 0–0.05)
IMM GRANULOCYTES NFR BLD AUTO: 0.6 % (ref 0–0.5)
LYMPHOCYTES # BLD AUTO: 0.67 10*3/MM3 (ref 0.7–3.1)
LYMPHOCYTES NFR BLD AUTO: 14.1 % (ref 19.6–45.3)
MCH RBC QN AUTO: 30.8 PG (ref 26.6–33)
MCHC RBC AUTO-ENTMCNC: 32.3 G/DL (ref 31.5–35.7)
MCV RBC AUTO: 95.5 FL (ref 79–97)
MONOCYTES # BLD AUTO: 0.44 10*3/MM3 (ref 0.1–0.9)
MONOCYTES NFR BLD AUTO: 9.2 % (ref 5–12)
NEUTROPHILS # BLD AUTO: 3.54 10*3/MM3 (ref 1.4–7)
NEUTROPHILS NFR BLD AUTO: 74.4 % (ref 42.7–76)
NRBC BLD AUTO-RTO: 0 /100 WBC (ref 0–0)
PLATELET # BLD AUTO: 149 10*3/MM3 (ref 140–450)
PMV BLD AUTO: 10.1 FL (ref 6–12)
RBC # BLD AUTO: 2.66 10*6/MM3 (ref 3.77–5.28)
WBC NRBC COR # BLD: 4.76 10*3/MM3 (ref 3.4–10.8)

## 2019-03-21 PROCEDURE — 63510000001 EPOETIN ALFA PER 1000 UNITS: Performed by: INTERNAL MEDICINE

## 2019-03-21 PROCEDURE — 96372 THER/PROPH/DIAG INJ SC/IM: CPT

## 2019-03-21 PROCEDURE — 36415 COLL VENOUS BLD VENIPUNCTURE: CPT

## 2019-03-21 PROCEDURE — 85025 COMPLETE CBC W/AUTO DIFF WBC: CPT | Performed by: INTERNAL MEDICINE

## 2019-03-21 RX ADMIN — ERYTHROPOIETIN 31000 UNITS: 20000 INJECTION, SOLUTION INTRAVENOUS; SUBCUTANEOUS at 10:59

## 2019-03-28 ENCOUNTER — INFUSION (OUTPATIENT)
Dept: ONCOLOGY | Facility: HOSPITAL | Age: 79
End: 2019-03-28

## 2019-03-28 ENCOUNTER — LAB (OUTPATIENT)
Dept: OTHER | Facility: HOSPITAL | Age: 79
End: 2019-03-28

## 2019-03-28 VITALS
HEIGHT: 67 IN | BODY MASS INDEX: 27.81 KG/M2 | SYSTOLIC BLOOD PRESSURE: 142 MMHG | DIASTOLIC BLOOD PRESSURE: 53 MMHG | WEIGHT: 177.2 LBS | TEMPERATURE: 98.2 F | HEART RATE: 67 BPM | OXYGEN SATURATION: 99 % | RESPIRATION RATE: 16 BRPM

## 2019-03-28 DIAGNOSIS — N18.4 ANEMIA OF CHRONIC RENAL FAILURE, STAGE 4 (SEVERE) (HCC): ICD-10-CM

## 2019-03-28 DIAGNOSIS — N18.4 ANEMIA DUE TO STAGE 4 CHRONIC KIDNEY DISEASE (HCC): ICD-10-CM

## 2019-03-28 DIAGNOSIS — D63.8 ANEMIA OF CHRONIC DISEASE: Primary | ICD-10-CM

## 2019-03-28 DIAGNOSIS — Z45.2 ENCOUNTER FOR FITTING AND ADJUSTMENT OF VASCULAR CATHETER: ICD-10-CM

## 2019-03-28 DIAGNOSIS — D63.1 ANEMIA DUE TO STAGE 4 CHRONIC KIDNEY DISEASE (HCC): ICD-10-CM

## 2019-03-28 DIAGNOSIS — N18.4 ANEMIA OF CHRONIC RENAL FAILURE, STAGE 4 (SEVERE) (HCC): Primary | ICD-10-CM

## 2019-03-28 DIAGNOSIS — D63.1 ANEMIA OF CHRONIC RENAL FAILURE, STAGE 4 (SEVERE) (HCC): Primary | ICD-10-CM

## 2019-03-28 DIAGNOSIS — D63.1 ANEMIA OF CHRONIC RENAL FAILURE, STAGE 4 (SEVERE) (HCC): ICD-10-CM

## 2019-03-28 LAB
ABO GROUP BLD: NORMAL
BASOPHILS # BLD AUTO: 0 10*3/MM3 (ref 0–0.2)
BASOPHILS NFR BLD AUTO: 0 % (ref 0–1.5)
BLD GP AB SCN SERPL QL: NEGATIVE
DEPRECATED RDW RBC AUTO: 66.4 FL (ref 37–54)
EOSINOPHIL # BLD AUTO: 0.07 10*3/MM3 (ref 0–0.4)
EOSINOPHIL NFR BLD AUTO: 1.5 % (ref 0.3–6.2)
ERYTHROCYTE [DISTWIDTH] IN BLOOD BY AUTOMATED COUNT: 19.3 % (ref 12.3–15.4)
HCT VFR BLD AUTO: 24.5 % (ref 34–46.6)
HGB BLD-MCNC: 7.8 G/DL (ref 12–15.9)
IMM GRANULOCYTES # BLD AUTO: 0.02 10*3/MM3 (ref 0–0.05)
IMM GRANULOCYTES NFR BLD AUTO: 0.4 % (ref 0–0.5)
LYMPHOCYTES # BLD AUTO: 0.66 10*3/MM3 (ref 0.7–3.1)
LYMPHOCYTES NFR BLD AUTO: 13.8 % (ref 19.6–45.3)
MCH RBC QN AUTO: 31.6 PG (ref 26.6–33)
MCHC RBC AUTO-ENTMCNC: 31.8 G/DL (ref 31.5–35.7)
MCV RBC AUTO: 99.2 FL (ref 79–97)
MONOCYTES # BLD AUTO: 0.38 10*3/MM3 (ref 0.1–0.9)
MONOCYTES NFR BLD AUTO: 8 % (ref 5–12)
NEUTROPHILS # BLD AUTO: 3.64 10*3/MM3 (ref 1.4–7)
NEUTROPHILS NFR BLD AUTO: 76.3 % (ref 42.7–76)
NRBC BLD AUTO-RTO: 0.4 /100 WBC (ref 0–0)
PLATELET # BLD AUTO: 167 10*3/MM3 (ref 140–450)
PMV BLD AUTO: 9.9 FL (ref 6–12)
RBC # BLD AUTO: 2.47 10*6/MM3 (ref 3.77–5.28)
RH BLD: NEGATIVE
T&S EXPIRATION DATE: NORMAL
WBC NRBC COR # BLD: 4.77 10*3/MM3 (ref 3.4–10.8)

## 2019-03-28 PROCEDURE — 86923 COMPATIBILITY TEST ELECTRIC: CPT

## 2019-03-28 PROCEDURE — 86901 BLOOD TYPING SEROLOGIC RH(D): CPT

## 2019-03-28 PROCEDURE — 63510000001 EPOETIN ALFA PER 1000 UNITS: Performed by: INTERNAL MEDICINE

## 2019-03-28 PROCEDURE — 36415 COLL VENOUS BLD VENIPUNCTURE: CPT

## 2019-03-28 PROCEDURE — 96372 THER/PROPH/DIAG INJ SC/IM: CPT | Performed by: INTERNAL MEDICINE

## 2019-03-28 PROCEDURE — 86900 BLOOD TYPING SEROLOGIC ABO: CPT

## 2019-03-28 PROCEDURE — 85025 COMPLETE CBC W/AUTO DIFF WBC: CPT | Performed by: INTERNAL MEDICINE

## 2019-03-28 PROCEDURE — 86850 RBC ANTIBODY SCREEN: CPT

## 2019-03-28 RX ORDER — DIPHENHYDRAMINE HCL 25 MG
25 CAPSULE ORAL ONCE
Status: CANCELLED | OUTPATIENT
Start: 2019-03-30 | End: 2019-03-28

## 2019-03-28 RX ORDER — ACETAMINOPHEN 325 MG/1
650 TABLET ORAL ONCE
Status: CANCELLED | OUTPATIENT
Start: 2019-03-30 | End: 2019-03-28

## 2019-03-28 RX ORDER — SODIUM CHLORIDE 9 MG/ML
250 INJECTION, SOLUTION INTRAVENOUS AS NEEDED
Status: CANCELLED | OUTPATIENT
Start: 2019-03-30

## 2019-03-28 RX ORDER — SODIUM CHLORIDE 0.9 % (FLUSH) 0.9 %
10 SYRINGE (ML) INJECTION AS NEEDED
Status: CANCELLED | OUTPATIENT
Start: 2019-03-28

## 2019-03-28 RX ORDER — SODIUM CHLORIDE 0.9 % (FLUSH) 0.9 %
10 SYRINGE (ML) INJECTION AS NEEDED
Status: DISCONTINUED | OUTPATIENT
Start: 2019-03-28 | End: 2019-03-28 | Stop reason: HOSPADM

## 2019-03-28 RX ADMIN — SODIUM CHLORIDE, PRESERVATIVE FREE 10 ML: 5 INJECTION INTRAVENOUS at 11:03

## 2019-03-28 RX ADMIN — ERYTHROPOIETIN 31000 UNITS: 20000 INJECTION, SOLUTION INTRAVENOUS; SUBCUTANEOUS at 11:03

## 2019-03-28 RX ADMIN — Medication 500 UNITS: at 11:03

## 2019-03-28 NOTE — PROGRESS NOTES
Lab Results   Component Value Date    WBC 4.77 03/28/2019    HGB 7.8 (L) 03/28/2019    HCT 24.5 (L) 03/28/2019    MCV 99.2 (H) 03/28/2019     03/28/2019     Pt here for procrit injection and hgb 7.8.  Reviewed with MEL Paula and v/o to for 2 units PRBC's.  Pt T&C done and armband placed.  Pt knows not remove bracelet and pt aware of appt made for Sat at 8:00AM.

## 2019-03-28 NOTE — PROGRESS NOTES
Orders entered for 2 units PRBCs and referral to [facility] to be transfused as soon as can be scheduled ELADIO ANSARI

## 2019-03-30 ENCOUNTER — INFUSION (OUTPATIENT)
Dept: ONCOLOGY | Facility: HOSPITAL | Age: 79
End: 2019-03-30

## 2019-03-30 VITALS
TEMPERATURE: 97 F | SYSTOLIC BLOOD PRESSURE: 110 MMHG | HEART RATE: 68 BPM | RESPIRATION RATE: 16 BRPM | OXYGEN SATURATION: 98 % | DIASTOLIC BLOOD PRESSURE: 51 MMHG

## 2019-03-30 DIAGNOSIS — D63.8 ANEMIA OF CHRONIC DISEASE: Primary | ICD-10-CM

## 2019-03-30 DIAGNOSIS — N18.4 ANEMIA DUE TO STAGE 4 CHRONIC KIDNEY DISEASE (HCC): ICD-10-CM

## 2019-03-30 DIAGNOSIS — D63.1 ANEMIA DUE TO STAGE 4 CHRONIC KIDNEY DISEASE (HCC): ICD-10-CM

## 2019-03-30 DIAGNOSIS — Z45.2 ENCOUNTER FOR FITTING AND ADJUSTMENT OF VASCULAR CATHETER: ICD-10-CM

## 2019-03-30 PROCEDURE — P9016 RBC LEUKOCYTES REDUCED: HCPCS

## 2019-03-30 PROCEDURE — 36430 TRANSFUSION BLD/BLD COMPNT: CPT

## 2019-03-30 PROCEDURE — 86900 BLOOD TYPING SEROLOGIC ABO: CPT

## 2019-03-30 PROCEDURE — 63710000001 DIPHENHYDRAMINE PER 50 MG: Performed by: NURSE PRACTITIONER

## 2019-03-30 RX ORDER — DIPHENHYDRAMINE HCL 25 MG
25 CAPSULE ORAL ONCE
Status: COMPLETED | OUTPATIENT
Start: 2019-03-30 | End: 2019-03-30

## 2019-03-30 RX ORDER — SODIUM CHLORIDE 0.9 % (FLUSH) 0.9 %
10 SYRINGE (ML) INJECTION AS NEEDED
Status: CANCELLED | OUTPATIENT
Start: 2019-03-30

## 2019-03-30 RX ORDER — ACETAMINOPHEN 325 MG/1
650 TABLET ORAL ONCE
Status: COMPLETED | OUTPATIENT
Start: 2019-03-30 | End: 2019-03-30

## 2019-03-30 RX ORDER — SODIUM CHLORIDE 9 MG/ML
250 INJECTION, SOLUTION INTRAVENOUS AS NEEDED
Status: DISCONTINUED | OUTPATIENT
Start: 2019-03-30 | End: 2019-03-30 | Stop reason: HOSPADM

## 2019-03-30 RX ORDER — SODIUM CHLORIDE 0.9 % (FLUSH) 0.9 %
10 SYRINGE (ML) INJECTION AS NEEDED
Status: DISCONTINUED | OUTPATIENT
Start: 2019-03-30 | End: 2019-03-30 | Stop reason: HOSPADM

## 2019-03-30 RX ADMIN — DIPHENHYDRAMINE HYDROCHLORIDE 25 MG: 25 CAPSULE ORAL at 08:02

## 2019-03-30 RX ADMIN — Medication 500 UNITS: at 13:32

## 2019-03-30 RX ADMIN — ACETAMINOPHEN 650 MG: 325 TABLET ORAL at 08:02

## 2019-04-04 ENCOUNTER — LAB (OUTPATIENT)
Dept: OTHER | Facility: HOSPITAL | Age: 79
End: 2019-04-04

## 2019-04-04 ENCOUNTER — INFUSION (OUTPATIENT)
Dept: ONCOLOGY | Facility: HOSPITAL | Age: 79
End: 2019-04-04

## 2019-04-04 DIAGNOSIS — N18.4 ANEMIA OF CHRONIC RENAL FAILURE, STAGE 4 (SEVERE) (HCC): ICD-10-CM

## 2019-04-04 DIAGNOSIS — Z45.2 ENCOUNTER FOR FITTING AND ADJUSTMENT OF VASCULAR CATHETER: Primary | ICD-10-CM

## 2019-04-04 DIAGNOSIS — D63.1 ANEMIA OF CHRONIC RENAL FAILURE, STAGE 4 (SEVERE) (HCC): ICD-10-CM

## 2019-04-04 DIAGNOSIS — D50.8 OTHER IRON DEFICIENCY ANEMIA: ICD-10-CM

## 2019-04-04 LAB
BASOPHILS # BLD AUTO: 0.01 10*3/MM3 (ref 0–0.2)
BASOPHILS NFR BLD AUTO: 0.2 % (ref 0–1.5)
DEPRECATED RDW RBC AUTO: 72.2 FL (ref 37–54)
EOSINOPHIL # BLD AUTO: 0.07 10*3/MM3 (ref 0–0.4)
EOSINOPHIL NFR BLD AUTO: 1.6 % (ref 0.3–6.2)
ERYTHROCYTE [DISTWIDTH] IN BLOOD BY AUTOMATED COUNT: 19.3 % (ref 12.3–15.4)
FERRITIN SERPL-MCNC: 356 NG/ML (ref 13–150)
HCT VFR BLD AUTO: 32.9 % (ref 34–46.6)
HGB BLD-MCNC: 10.2 G/DL (ref 12–15.9)
IMM GRANULOCYTES # BLD AUTO: 0.01 10*3/MM3 (ref 0–0.05)
IMM GRANULOCYTES NFR BLD AUTO: 0.2 % (ref 0–0.5)
IRON 24H UR-MRATE: 59 MCG/DL (ref 37–145)
IRON SATN MFR SERPL: 24 % (ref 20–50)
LYMPHOCYTES # BLD AUTO: 0.5 10*3/MM3 (ref 0.7–3.1)
LYMPHOCYTES NFR BLD AUTO: 11.3 % (ref 19.6–45.3)
MCH RBC QN AUTO: 31.7 PG (ref 26.6–33)
MCHC RBC AUTO-ENTMCNC: 31 G/DL (ref 31.5–35.7)
MCV RBC AUTO: 102.2 FL (ref 79–97)
MONOCYTES # BLD AUTO: 0.42 10*3/MM3 (ref 0.1–0.9)
MONOCYTES NFR BLD AUTO: 9.5 % (ref 5–12)
NEUTROPHILS # BLD AUTO: 3.41 10*3/MM3 (ref 1.4–7)
NEUTROPHILS NFR BLD AUTO: 77.2 % (ref 42.7–76)
NRBC BLD AUTO-RTO: 0 /100 WBC (ref 0–0)
PLATELET # BLD AUTO: 139 10*3/MM3 (ref 140–450)
PMV BLD AUTO: 9.6 FL (ref 6–12)
RBC # BLD AUTO: 3.22 10*6/MM3 (ref 3.77–5.28)
TIBC SERPL-MCNC: 250 MCG/DL (ref 298–536)
TRANSFERRIN SERPL-MCNC: 168 MG/DL (ref 200–360)
WBC NRBC COR # BLD: 4.42 10*3/MM3 (ref 3.4–10.8)

## 2019-04-04 PROCEDURE — 82728 ASSAY OF FERRITIN: CPT | Performed by: INTERNAL MEDICINE

## 2019-04-04 PROCEDURE — 83540 ASSAY OF IRON: CPT | Performed by: INTERNAL MEDICINE

## 2019-04-04 PROCEDURE — 85025 COMPLETE CBC W/AUTO DIFF WBC: CPT | Performed by: INTERNAL MEDICINE

## 2019-04-04 PROCEDURE — 36415 COLL VENOUS BLD VENIPUNCTURE: CPT

## 2019-04-04 PROCEDURE — 84466 ASSAY OF TRANSFERRIN: CPT | Performed by: INTERNAL MEDICINE

## 2019-04-04 PROCEDURE — 96523 IRRIG DRUG DELIVERY DEVICE: CPT | Performed by: NURSE PRACTITIONER

## 2019-04-04 RX ORDER — SODIUM CHLORIDE 0.9 % (FLUSH) 0.9 %
10 SYRINGE (ML) INJECTION AS NEEDED
Status: CANCELLED | OUTPATIENT
Start: 2019-04-04

## 2019-04-04 RX ORDER — SODIUM CHLORIDE 0.9 % (FLUSH) 0.9 %
10 SYRINGE (ML) INJECTION AS NEEDED
Status: DISCONTINUED | OUTPATIENT
Start: 2019-04-04 | End: 2019-04-04 | Stop reason: HOSPADM

## 2019-04-04 RX ADMIN — Medication 500 UNITS: at 10:10

## 2019-04-04 RX ADMIN — SODIUM CHLORIDE, PRESERVATIVE FREE 10 ML: 5 INJECTION INTRAVENOUS at 10:09

## 2019-04-04 NOTE — PROGRESS NOTES
Lab Results   Component Value Date    WBC 4.42 04/04/2019    HGB 10.2 (L) 04/04/2019    HCT 32.9 (L) 04/04/2019    .2 (H) 04/04/2019     (L) 04/04/2019     Procrit not indicated for Hgb 10.2.  Pt had recent blood transfusion and states she is feeling well.  Pt will return next week for poss procrit and knows to call sooner with concerns.

## 2019-04-10 DIAGNOSIS — D63.1 ANEMIA DUE TO STAGE 4 CHRONIC KIDNEY DISEASE (HCC): Primary | ICD-10-CM

## 2019-04-10 DIAGNOSIS — N18.4 ANEMIA DUE TO STAGE 4 CHRONIC KIDNEY DISEASE (HCC): Primary | ICD-10-CM

## 2019-04-11 ENCOUNTER — INFUSION (OUTPATIENT)
Dept: ONCOLOGY | Facility: HOSPITAL | Age: 79
End: 2019-04-11

## 2019-04-11 ENCOUNTER — LAB (OUTPATIENT)
Dept: OTHER | Facility: HOSPITAL | Age: 79
End: 2019-04-11

## 2019-04-11 VITALS — BODY MASS INDEX: 27.21 KG/M2 | WEIGHT: 173.8 LBS

## 2019-04-11 DIAGNOSIS — D63.1 ANEMIA OF CHRONIC RENAL FAILURE, STAGE 4 (SEVERE) (HCC): Primary | ICD-10-CM

## 2019-04-11 DIAGNOSIS — N18.4 ANEMIA OF CHRONIC RENAL FAILURE, STAGE 4 (SEVERE) (HCC): Primary | ICD-10-CM

## 2019-04-11 DIAGNOSIS — N18.4 ANEMIA DUE TO STAGE 4 CHRONIC KIDNEY DISEASE (HCC): ICD-10-CM

## 2019-04-11 DIAGNOSIS — D63.1 ANEMIA DUE TO STAGE 4 CHRONIC KIDNEY DISEASE (HCC): ICD-10-CM

## 2019-04-11 LAB
BASOPHILS # BLD AUTO: 0.01 10*3/MM3 (ref 0–0.2)
BASOPHILS NFR BLD AUTO: 0.2 % (ref 0–1.5)
DEPRECATED RDW RBC AUTO: 63.9 FL (ref 37–54)
EOSINOPHIL # BLD AUTO: 0.12 10*3/MM3 (ref 0–0.4)
EOSINOPHIL NFR BLD AUTO: 2.6 % (ref 0.3–6.2)
ERYTHROCYTE [DISTWIDTH] IN BLOOD BY AUTOMATED COUNT: 17.6 % (ref 12.3–15.4)
HCT VFR BLD AUTO: 29.3 % (ref 34–46.6)
HGB BLD-MCNC: 9.6 G/DL (ref 12–15.9)
IMM GRANULOCYTES # BLD AUTO: 0.02 10*3/MM3 (ref 0–0.05)
IMM GRANULOCYTES NFR BLD AUTO: 0.4 % (ref 0–0.5)
LYMPHOCYTES # BLD AUTO: 0.75 10*3/MM3 (ref 0.7–3.1)
LYMPHOCYTES NFR BLD AUTO: 16.3 % (ref 19.6–45.3)
MCH RBC QN AUTO: 32.3 PG (ref 26.6–33)
MCHC RBC AUTO-ENTMCNC: 32.8 G/DL (ref 31.5–35.7)
MCV RBC AUTO: 98.7 FL (ref 79–97)
MONOCYTES # BLD AUTO: 0.47 10*3/MM3 (ref 0.1–0.9)
MONOCYTES NFR BLD AUTO: 10.2 % (ref 5–12)
NEUTROPHILS # BLD AUTO: 3.24 10*3/MM3 (ref 1.4–7)
NEUTROPHILS NFR BLD AUTO: 70.3 % (ref 42.7–76)
NRBC BLD AUTO-RTO: 0 /100 WBC (ref 0–0)
PLATELET # BLD AUTO: 143 10*3/MM3 (ref 140–450)
PMV BLD AUTO: 10.1 FL (ref 6–12)
RBC # BLD AUTO: 2.97 10*6/MM3 (ref 3.77–5.28)
WBC NRBC COR # BLD: 4.61 10*3/MM3 (ref 3.4–10.8)

## 2019-04-11 PROCEDURE — 63510000001 EPOETIN ALFA PER 1000 UNITS: Performed by: NURSE PRACTITIONER

## 2019-04-11 PROCEDURE — 96372 THER/PROPH/DIAG INJ SC/IM: CPT | Performed by: NURSE PRACTITIONER

## 2019-04-11 PROCEDURE — 36415 COLL VENOUS BLD VENIPUNCTURE: CPT

## 2019-04-11 PROCEDURE — 85025 COMPLETE CBC W/AUTO DIFF WBC: CPT | Performed by: INTERNAL MEDICINE

## 2019-04-11 RX ADMIN — ERYTHROPOIETIN 31000 UNITS: 20000 INJECTION, SOLUTION INTRAVENOUS; SUBCUTANEOUS at 10:48

## 2019-04-17 DIAGNOSIS — N18.4 ANEMIA OF CHRONIC RENAL FAILURE, STAGE 4 (SEVERE) (HCC): Primary | ICD-10-CM

## 2019-04-17 DIAGNOSIS — D63.1 ANEMIA OF CHRONIC RENAL FAILURE, STAGE 4 (SEVERE) (HCC): Primary | ICD-10-CM

## 2019-04-18 ENCOUNTER — INFUSION (OUTPATIENT)
Dept: ONCOLOGY | Facility: HOSPITAL | Age: 79
End: 2019-04-18

## 2019-04-18 ENCOUNTER — LAB (OUTPATIENT)
Dept: OTHER | Facility: HOSPITAL | Age: 79
End: 2019-04-18

## 2019-04-18 VITALS
BODY MASS INDEX: 26.96 KG/M2 | HEART RATE: 77 BPM | SYSTOLIC BLOOD PRESSURE: 141 MMHG | WEIGHT: 172.2 LBS | DIASTOLIC BLOOD PRESSURE: 52 MMHG | TEMPERATURE: 98 F | OXYGEN SATURATION: 97 %

## 2019-04-18 DIAGNOSIS — D63.1 ANEMIA OF CHRONIC RENAL FAILURE, STAGE 4 (SEVERE) (HCC): Primary | ICD-10-CM

## 2019-04-18 DIAGNOSIS — D63.1 ANEMIA OF CHRONIC RENAL FAILURE, STAGE 4 (SEVERE) (HCC): ICD-10-CM

## 2019-04-18 DIAGNOSIS — N18.4 ANEMIA DUE TO STAGE 4 CHRONIC KIDNEY DISEASE (HCC): ICD-10-CM

## 2019-04-18 DIAGNOSIS — N18.4 ANEMIA OF CHRONIC RENAL FAILURE, STAGE 4 (SEVERE) (HCC): ICD-10-CM

## 2019-04-18 DIAGNOSIS — N18.4 ANEMIA OF CHRONIC RENAL FAILURE, STAGE 4 (SEVERE) (HCC): Primary | ICD-10-CM

## 2019-04-18 DIAGNOSIS — D63.1 ANEMIA DUE TO STAGE 4 CHRONIC KIDNEY DISEASE (HCC): ICD-10-CM

## 2019-04-18 LAB
BASOPHILS # BLD AUTO: 0.01 10*3/MM3 (ref 0–0.2)
BASOPHILS NFR BLD AUTO: 0.2 % (ref 0–1.5)
DEPRECATED RDW RBC AUTO: 63.6 FL (ref 37–54)
EOSINOPHIL # BLD AUTO: 0.06 10*3/MM3 (ref 0–0.4)
EOSINOPHIL NFR BLD AUTO: 1.2 % (ref 0.3–6.2)
ERYTHROCYTE [DISTWIDTH] IN BLOOD BY AUTOMATED COUNT: 17.4 % (ref 12.3–15.4)
HCT VFR BLD AUTO: 29 % (ref 34–46.6)
HGB BLD-MCNC: 9.4 G/DL (ref 12–15.9)
IMM GRANULOCYTES # BLD AUTO: 0.04 10*3/MM3 (ref 0–0.05)
IMM GRANULOCYTES NFR BLD AUTO: 0.8 % (ref 0–0.5)
LYMPHOCYTES # BLD AUTO: 0.84 10*3/MM3 (ref 0.7–3.1)
LYMPHOCYTES NFR BLD AUTO: 16.2 % (ref 19.6–45.3)
MCH RBC QN AUTO: 32.8 PG (ref 26.6–33)
MCHC RBC AUTO-ENTMCNC: 32.4 G/DL (ref 31.5–35.7)
MCV RBC AUTO: 101 FL (ref 79–97)
MONOCYTES # BLD AUTO: 0.4 10*3/MM3 (ref 0.1–0.9)
MONOCYTES NFR BLD AUTO: 7.7 % (ref 5–12)
NEUTROPHILS # BLD AUTO: 3.84 10*3/MM3 (ref 1.4–7)
NEUTROPHILS NFR BLD AUTO: 73.9 % (ref 42.7–76)
NRBC BLD AUTO-RTO: 0.6 /100 WBC (ref 0–0)
PLATELET # BLD AUTO: 226 10*3/MM3 (ref 140–450)
PMV BLD AUTO: 9.7 FL (ref 6–12)
RBC # BLD AUTO: 2.87 10*6/MM3 (ref 3.77–5.28)
WBC NRBC COR # BLD: 5.19 10*3/MM3 (ref 3.4–10.8)

## 2019-04-18 PROCEDURE — 63510000001 EPOETIN ALFA PER 1000 UNITS: Performed by: NURSE PRACTITIONER

## 2019-04-18 PROCEDURE — 96372 THER/PROPH/DIAG INJ SC/IM: CPT | Performed by: NURSE PRACTITIONER

## 2019-04-18 PROCEDURE — 36415 COLL VENOUS BLD VENIPUNCTURE: CPT

## 2019-04-18 PROCEDURE — 85025 COMPLETE CBC W/AUTO DIFF WBC: CPT | Performed by: INTERNAL MEDICINE

## 2019-04-18 RX ADMIN — ERYTHROPOIETIN 31000 UNITS: 20000 INJECTION, SOLUTION INTRAVENOUS; SUBCUTANEOUS at 11:17

## 2019-04-18 NOTE — PROGRESS NOTES
V/o Dr. Scott to keep Procrit dose 31,000 Units today.  If patient Hgb goes below 10, reduce dose by 25%.

## 2019-04-25 ENCOUNTER — LAB (OUTPATIENT)
Dept: OTHER | Facility: HOSPITAL | Age: 79
End: 2019-04-25

## 2019-04-25 ENCOUNTER — OFFICE VISIT (OUTPATIENT)
Dept: ONCOLOGY | Facility: CLINIC | Age: 79
End: 2019-04-25

## 2019-04-25 ENCOUNTER — APPOINTMENT (OUTPATIENT)
Dept: ONCOLOGY | Facility: HOSPITAL | Age: 79
End: 2019-04-25

## 2019-04-25 ENCOUNTER — INFUSION (OUTPATIENT)
Dept: ONCOLOGY | Facility: HOSPITAL | Age: 79
End: 2019-04-25

## 2019-04-25 ENCOUNTER — APPOINTMENT (OUTPATIENT)
Dept: ONCOLOGY | Facility: CLINIC | Age: 79
End: 2019-04-25

## 2019-04-25 VITALS
HEIGHT: 66 IN | WEIGHT: 174.8 LBS | BODY MASS INDEX: 28.09 KG/M2 | DIASTOLIC BLOOD PRESSURE: 65 MMHG | RESPIRATION RATE: 16 BRPM | SYSTOLIC BLOOD PRESSURE: 105 MMHG | HEART RATE: 78 BPM | OXYGEN SATURATION: 97 % | TEMPERATURE: 98 F

## 2019-04-25 DIAGNOSIS — D63.1 ANEMIA OF CHRONIC RENAL FAILURE, STAGE 4 (SEVERE) (HCC): Primary | ICD-10-CM

## 2019-04-25 DIAGNOSIS — D63.1 ANEMIA DUE TO STAGE 4 CHRONIC KIDNEY DISEASE (HCC): ICD-10-CM

## 2019-04-25 DIAGNOSIS — N18.4 ANEMIA OF CHRONIC RENAL FAILURE, STAGE 4 (SEVERE) (HCC): ICD-10-CM

## 2019-04-25 DIAGNOSIS — N18.9 HISTORY OF ANEMIA DUE TO CHRONIC KIDNEY DISEASE: ICD-10-CM

## 2019-04-25 DIAGNOSIS — D63.1 ANEMIA OF CHRONIC RENAL FAILURE, STAGE 4 (SEVERE) (HCC): ICD-10-CM

## 2019-04-25 DIAGNOSIS — Z86.2 HISTORY OF ANEMIA DUE TO CHRONIC KIDNEY DISEASE: ICD-10-CM

## 2019-04-25 DIAGNOSIS — N18.4 ANEMIA OF CHRONIC RENAL FAILURE, STAGE 4 (SEVERE) (HCC): Primary | ICD-10-CM

## 2019-04-25 DIAGNOSIS — N18.4 STAGE 4 CHRONIC KIDNEY DISEASE (HCC): Primary | ICD-10-CM

## 2019-04-25 DIAGNOSIS — N18.4 ANEMIA DUE TO STAGE 4 CHRONIC KIDNEY DISEASE (HCC): ICD-10-CM

## 2019-04-25 LAB
BASOPHILS # BLD AUTO: 0.02 10*3/MM3 (ref 0–0.2)
BASOPHILS NFR BLD AUTO: 0.4 % (ref 0–1.5)
DEPRECATED RDW RBC AUTO: 71.7 FL (ref 37–54)
EOSINOPHIL # BLD AUTO: 0.08 10*3/MM3 (ref 0–0.4)
EOSINOPHIL NFR BLD AUTO: 1.7 % (ref 0.3–6.2)
ERYTHROCYTE [DISTWIDTH] IN BLOOD BY AUTOMATED COUNT: 18.8 % (ref 12.3–15.4)
HCT VFR BLD AUTO: 28.7 % (ref 34–46.6)
HGB BLD-MCNC: 9.3 G/DL (ref 12–15.9)
IMM GRANULOCYTES # BLD AUTO: 0.02 10*3/MM3 (ref 0–0.05)
IMM GRANULOCYTES NFR BLD AUTO: 0.4 % (ref 0–0.5)
LYMPHOCYTES # BLD AUTO: 0.73 10*3/MM3 (ref 0.7–3.1)
LYMPHOCYTES NFR BLD AUTO: 15.3 % (ref 19.6–45.3)
MCH RBC QN AUTO: 33.3 PG (ref 26.6–33)
MCHC RBC AUTO-ENTMCNC: 32.4 G/DL (ref 31.5–35.7)
MCV RBC AUTO: 102.9 FL (ref 79–97)
MONOCYTES # BLD AUTO: 0.37 10*3/MM3 (ref 0.1–0.9)
MONOCYTES NFR BLD AUTO: 7.8 % (ref 5–12)
NEUTROPHILS # BLD AUTO: 3.55 10*3/MM3 (ref 1.7–7)
NEUTROPHILS NFR BLD AUTO: 74.4 % (ref 42.7–76)
NRBC BLD AUTO-RTO: 0 /100 WBC (ref 0–0.2)
PLATELET # BLD AUTO: 168 10*3/MM3 (ref 140–450)
PMV BLD AUTO: 9.8 FL (ref 6–12)
RBC # BLD AUTO: 2.79 10*6/MM3 (ref 3.77–5.28)
WBC NRBC COR # BLD: 4.77 10*3/MM3 (ref 3.4–10.8)

## 2019-04-25 PROCEDURE — 25010000002 EPOETIN ALFA PER 1000 UNITS: Performed by: INTERNAL MEDICINE

## 2019-04-25 PROCEDURE — 96372 THER/PROPH/DIAG INJ SC/IM: CPT

## 2019-04-25 PROCEDURE — 63510000001 EPOETIN ALFA PER 1000 UNITS: Performed by: INTERNAL MEDICINE

## 2019-04-25 PROCEDURE — 85025 COMPLETE CBC W/AUTO DIFF WBC: CPT | Performed by: INTERNAL MEDICINE

## 2019-04-25 PROCEDURE — 99213 OFFICE O/P EST LOW 20 MIN: CPT | Performed by: INTERNAL MEDICINE

## 2019-04-25 RX ADMIN — ERYTHROPOIETIN 31000 UNITS: 20000 INJECTION, SOLUTION INTRAVENOUS; SUBCUTANEOUS at 15:13

## 2019-04-30 DIAGNOSIS — N18.4 ANEMIA OF CHRONIC RENAL FAILURE, STAGE 4 (SEVERE) (HCC): Primary | ICD-10-CM

## 2019-04-30 DIAGNOSIS — D63.1 ANEMIA OF CHRONIC RENAL FAILURE, STAGE 4 (SEVERE) (HCC): Primary | ICD-10-CM

## 2019-05-02 ENCOUNTER — INFUSION (OUTPATIENT)
Dept: ONCOLOGY | Facility: HOSPITAL | Age: 79
End: 2019-05-02

## 2019-05-02 ENCOUNTER — LAB (OUTPATIENT)
Dept: OTHER | Facility: HOSPITAL | Age: 79
End: 2019-05-02

## 2019-05-02 VITALS
OXYGEN SATURATION: 99 % | WEIGHT: 172.6 LBS | HEART RATE: 59 BPM | DIASTOLIC BLOOD PRESSURE: 72 MMHG | RESPIRATION RATE: 16 BRPM | SYSTOLIC BLOOD PRESSURE: 118 MMHG | BODY MASS INDEX: 27.74 KG/M2 | TEMPERATURE: 98.1 F

## 2019-05-02 DIAGNOSIS — D63.1 ANEMIA OF CHRONIC RENAL FAILURE, STAGE 4 (SEVERE) (HCC): ICD-10-CM

## 2019-05-02 DIAGNOSIS — N18.4 ANEMIA OF CHRONIC RENAL FAILURE, STAGE 4 (SEVERE) (HCC): Primary | ICD-10-CM

## 2019-05-02 DIAGNOSIS — D63.1 ANEMIA OF CHRONIC RENAL FAILURE, STAGE 4 (SEVERE) (HCC): Primary | ICD-10-CM

## 2019-05-02 DIAGNOSIS — N18.4 ANEMIA OF CHRONIC RENAL FAILURE, STAGE 4 (SEVERE) (HCC): ICD-10-CM

## 2019-05-02 LAB
BASOPHILS # BLD AUTO: 0.01 10*3/MM3 (ref 0–0.2)
BASOPHILS NFR BLD AUTO: 0.2 % (ref 0–1.5)
DEPRECATED RDW RBC AUTO: 68.6 FL (ref 37–54)
EOSINOPHIL # BLD AUTO: 0.08 10*3/MM3 (ref 0–0.4)
EOSINOPHIL NFR BLD AUTO: 1.6 % (ref 0.3–6.2)
ERYTHROCYTE [DISTWIDTH] IN BLOOD BY AUTOMATED COUNT: 17.9 % (ref 12.3–15.4)
HCT VFR BLD AUTO: 28 % (ref 34–46.6)
HGB BLD-MCNC: 8.8 G/DL (ref 12–15.9)
IMM GRANULOCYTES # BLD AUTO: 0.02 10*3/MM3 (ref 0–0.05)
IMM GRANULOCYTES NFR BLD AUTO: 0.4 % (ref 0–0.5)
LYMPHOCYTES # BLD AUTO: 0.63 10*3/MM3 (ref 0.7–3.1)
LYMPHOCYTES NFR BLD AUTO: 12.7 % (ref 19.6–45.3)
MCH RBC QN AUTO: 32.6 PG (ref 26.6–33)
MCHC RBC AUTO-ENTMCNC: 31.4 G/DL (ref 31.5–35.7)
MCV RBC AUTO: 103.7 FL (ref 79–97)
MONOCYTES # BLD AUTO: 0.42 10*3/MM3 (ref 0.1–0.9)
MONOCYTES NFR BLD AUTO: 8.5 % (ref 5–12)
NEUTROPHILS # BLD AUTO: 3.8 10*3/MM3 (ref 1.7–7)
NEUTROPHILS NFR BLD AUTO: 76.6 % (ref 42.7–76)
NRBC BLD AUTO-RTO: 0 /100 WBC (ref 0–0.2)
PLATELET # BLD AUTO: 184 10*3/MM3 (ref 140–450)
PMV BLD AUTO: 10 FL (ref 6–12)
RBC # BLD AUTO: 2.7 10*6/MM3 (ref 3.77–5.28)
WBC NRBC COR # BLD: 4.96 10*3/MM3 (ref 3.4–10.8)

## 2019-05-02 PROCEDURE — 63510000001 EPOETIN ALFA PER 1000 UNITS: Performed by: NURSE PRACTITIONER

## 2019-05-02 PROCEDURE — 96372 THER/PROPH/DIAG INJ SC/IM: CPT

## 2019-05-02 PROCEDURE — 25010000002 EPOETIN ALFA PER 1000 UNITS: Performed by: NURSE PRACTITIONER

## 2019-05-02 PROCEDURE — 36415 COLL VENOUS BLD VENIPUNCTURE: CPT

## 2019-05-02 PROCEDURE — 85025 COMPLETE CBC W/AUTO DIFF WBC: CPT | Performed by: NURSE PRACTITIONER

## 2019-05-02 RX ADMIN — ERYTHROPOIETIN 31000 UNITS: 20000 INJECTION, SOLUTION INTRAVENOUS; SUBCUTANEOUS at 10:53

## 2019-05-03 DIAGNOSIS — N18.4 ANEMIA OF CHRONIC RENAL FAILURE, STAGE 4 (SEVERE) (HCC): Primary | ICD-10-CM

## 2019-05-03 DIAGNOSIS — D63.1 ANEMIA OF CHRONIC RENAL FAILURE, STAGE 4 (SEVERE) (HCC): Primary | ICD-10-CM

## 2019-05-09 ENCOUNTER — INFUSION (OUTPATIENT)
Dept: ONCOLOGY | Facility: HOSPITAL | Age: 79
End: 2019-05-09

## 2019-05-09 ENCOUNTER — LAB (OUTPATIENT)
Dept: OTHER | Facility: HOSPITAL | Age: 79
End: 2019-05-09

## 2019-05-09 VITALS
DIASTOLIC BLOOD PRESSURE: 61 MMHG | RESPIRATION RATE: 16 BRPM | WEIGHT: 175.4 LBS | OXYGEN SATURATION: 97 % | HEIGHT: 66 IN | BODY MASS INDEX: 28.19 KG/M2 | SYSTOLIC BLOOD PRESSURE: 131 MMHG | TEMPERATURE: 97.9 F | HEART RATE: 79 BPM

## 2019-05-09 DIAGNOSIS — D63.1 ANEMIA OF CHRONIC RENAL FAILURE, STAGE 4 (SEVERE) (HCC): ICD-10-CM

## 2019-05-09 DIAGNOSIS — N18.4 CHRONIC KIDNEY DISEASE, STAGE IV (SEVERE) (HCC): Primary | ICD-10-CM

## 2019-05-09 DIAGNOSIS — N18.4 ANEMIA OF CHRONIC RENAL FAILURE, STAGE 4 (SEVERE) (HCC): ICD-10-CM

## 2019-05-09 DIAGNOSIS — Z45.2 ENCOUNTER FOR FITTING AND ADJUSTMENT OF VASCULAR CATHETER: Primary | ICD-10-CM

## 2019-05-09 LAB
ALBUMIN SERPL-MCNC: 3.7 G/DL (ref 3.5–5.2)
ALBUMIN/GLOB SERPL: 1.7 G/DL
ALP SERPL-CCNC: 48 U/L (ref 39–117)
ALT SERPL W P-5'-P-CCNC: 6 U/L (ref 1–33)
ANION GAP SERPL CALCULATED.3IONS-SCNC: 8.5 MMOL/L
ANISOCYTOSIS BLD QL: NORMAL
AST SERPL-CCNC: 16 U/L (ref 1–32)
BASOPHILS # BLD AUTO: 0.01 10*3/MM3 (ref 0–0.2)
BASOPHILS NFR BLD AUTO: 0.2 % (ref 0–1.5)
BILIRUB SERPL-MCNC: <0.2 MG/DL (ref 0.1–1.2)
BUN BLD-MCNC: 54 MG/DL (ref 8–23)
BUN/CREAT SERPL: 19.1 (ref 7–25)
CALCIUM SPEC-SCNC: 9.6 MG/DL (ref 8.6–10.5)
CHLORIDE SERPL-SCNC: 105 MMOL/L (ref 98–107)
CO2 SERPL-SCNC: 27.5 MMOL/L (ref 22–29)
CREAT BLD-MCNC: 2.82 MG/DL (ref 0.57–1)
DEPRECATED RDW RBC AUTO: 65.3 FL (ref 37–54)
EOSINOPHIL # BLD AUTO: 0.06 10*3/MM3 (ref 0–0.4)
EOSINOPHIL NFR BLD AUTO: 1.5 % (ref 0.3–6.2)
ERYTHROCYTE [DISTWIDTH] IN BLOOD BY AUTOMATED COUNT: 16.7 % (ref 12.3–15.4)
GFR SERPL CREATININE-BSD FRML MDRD: 16 ML/MIN/1.73
GLOBULIN UR ELPH-MCNC: 2.2 GM/DL
GLUCOSE BLD-MCNC: 86 MG/DL (ref 65–99)
HCT VFR BLD AUTO: 27.3 % (ref 34–46.6)
HGB BLD-MCNC: 8.3 G/DL (ref 12–15.9)
IMM GRANULOCYTES # BLD AUTO: 0.01 10*3/MM3 (ref 0–0.05)
IMM GRANULOCYTES NFR BLD AUTO: 0.2 % (ref 0–0.5)
LYMPHOCYTES # BLD AUTO: 0.65 10*3/MM3 (ref 0.7–3.1)
LYMPHOCYTES NFR BLD AUTO: 15.9 % (ref 19.6–45.3)
MCH RBC QN AUTO: 32.3 PG (ref 26.6–33)
MCHC RBC AUTO-ENTMCNC: 30.4 G/DL (ref 31.5–35.7)
MCV RBC AUTO: 106.2 FL (ref 79–97)
MONOCYTES # BLD AUTO: 0.35 10*3/MM3 (ref 0.1–0.9)
MONOCYTES NFR BLD AUTO: 8.5 % (ref 5–12)
NEUTROPHILS # BLD AUTO: 3.02 10*3/MM3 (ref 1.7–7)
NEUTROPHILS NFR BLD AUTO: 73.7 % (ref 42.7–76)
NRBC BLD AUTO-RTO: 0 /100 WBC (ref 0–0.2)
OVALOCYTES BLD QL SMEAR: NORMAL
PHOSPHATE SERPL-MCNC: 3.6 MG/DL (ref 2.5–4.5)
PLAT MORPH BLD: NORMAL
PLATELET # BLD AUTO: 171 10*3/MM3 (ref 140–450)
PMV BLD AUTO: 9.2 FL (ref 6–12)
POTASSIUM BLD-SCNC: 4.9 MMOL/L (ref 3.5–5.2)
PROT SERPL-MCNC: 5.9 G/DL (ref 6–8.5)
PTH-INTACT SERPL-MCNC: 90.7 PG/ML (ref 15–65)
RBC # BLD AUTO: 2.57 10*6/MM3 (ref 3.77–5.28)
SODIUM BLD-SCNC: 141 MMOL/L (ref 136–145)
SPHEROCYTES BLD QL SMEAR: NORMAL
URATE SERPL-MCNC: 6.4 MG/DL (ref 2.4–5.7)
WBC MORPH BLD: NORMAL
WBC NRBC COR # BLD: 4.1 10*3/MM3 (ref 3.4–10.8)

## 2019-05-09 PROCEDURE — 85007 BL SMEAR W/DIFF WBC COUNT: CPT | Performed by: NURSE PRACTITIONER

## 2019-05-09 PROCEDURE — 84100 ASSAY OF PHOSPHORUS: CPT | Performed by: INTERNAL MEDICINE

## 2019-05-09 PROCEDURE — 84550 ASSAY OF BLOOD/URIC ACID: CPT | Performed by: INTERNAL MEDICINE

## 2019-05-09 PROCEDURE — 63510000001 EPOETIN ALFA PER 1000 UNITS: Performed by: NURSE PRACTITIONER

## 2019-05-09 PROCEDURE — 36415 COLL VENOUS BLD VENIPUNCTURE: CPT

## 2019-05-09 PROCEDURE — 83970 ASSAY OF PARATHORMONE: CPT | Performed by: INTERNAL MEDICINE

## 2019-05-09 PROCEDURE — 85025 COMPLETE CBC W/AUTO DIFF WBC: CPT | Performed by: NURSE PRACTITIONER

## 2019-05-09 PROCEDURE — 80053 COMPREHEN METABOLIC PANEL: CPT | Performed by: INTERNAL MEDICINE

## 2019-05-09 PROCEDURE — 96372 THER/PROPH/DIAG INJ SC/IM: CPT | Performed by: NURSE PRACTITIONER

## 2019-05-09 PROCEDURE — 25010000002 EPOETIN ALFA PER 1000 UNITS: Performed by: NURSE PRACTITIONER

## 2019-05-09 RX ORDER — SODIUM CHLORIDE 0.9 % (FLUSH) 0.9 %
10 SYRINGE (ML) INJECTION AS NEEDED
Status: CANCELLED | OUTPATIENT
Start: 2019-05-09

## 2019-05-09 RX ORDER — SODIUM CHLORIDE 0.9 % (FLUSH) 0.9 %
10 SYRINGE (ML) INJECTION AS NEEDED
Status: DISCONTINUED | OUTPATIENT
Start: 2019-05-09 | End: 2019-05-09 | Stop reason: HOSPADM

## 2019-05-09 RX ADMIN — SODIUM CHLORIDE, PRESERVATIVE FREE 10 ML: 5 INJECTION INTRAVENOUS at 10:24

## 2019-05-09 RX ADMIN — Medication 500 UNITS: at 10:24

## 2019-05-09 RX ADMIN — ERYTHROPOIETIN 38000 UNITS: 20000 INJECTION, SOLUTION INTRAVENOUS; SUBCUTANEOUS at 11:00

## 2019-05-09 NOTE — PROGRESS NOTES
Lab Results   Component Value Date    WBC 4.10 05/09/2019    HGB 8.3 (L) 05/09/2019    HCT 27.3 (L) 05/09/2019    .2 (H) 05/09/2019     05/09/2019     Pt Procrit dose increased per protocol.  Pt reports feeling fatigued but has been preparing for a yard sale and moving to a condo.  She will return next week for repeat labs and procrit.  Pt knows to call sooner with worsening symptoms.

## 2019-05-10 DIAGNOSIS — D50.8 OTHER IRON DEFICIENCY ANEMIA: ICD-10-CM

## 2019-05-10 DIAGNOSIS — N18.4 ANEMIA OF CHRONIC RENAL FAILURE, STAGE 4 (SEVERE) (HCC): Primary | ICD-10-CM

## 2019-05-10 DIAGNOSIS — D63.1 ANEMIA OF CHRONIC RENAL FAILURE, STAGE 4 (SEVERE) (HCC): Primary | ICD-10-CM

## 2019-05-16 ENCOUNTER — INFUSION (OUTPATIENT)
Dept: ONCOLOGY | Facility: HOSPITAL | Age: 79
End: 2019-05-16

## 2019-05-16 ENCOUNTER — LAB (OUTPATIENT)
Dept: OTHER | Facility: HOSPITAL | Age: 79
End: 2019-05-16

## 2019-05-16 VITALS
WEIGHT: 173 LBS | HEART RATE: 83 BPM | BODY MASS INDEX: 27.8 KG/M2 | OXYGEN SATURATION: 99 % | DIASTOLIC BLOOD PRESSURE: 50 MMHG | SYSTOLIC BLOOD PRESSURE: 130 MMHG | RESPIRATION RATE: 16 BRPM | TEMPERATURE: 98 F

## 2019-05-16 DIAGNOSIS — N18.4 ANEMIA OF CHRONIC RENAL FAILURE, STAGE 4 (SEVERE) (HCC): Primary | ICD-10-CM

## 2019-05-16 DIAGNOSIS — D50.8 OTHER IRON DEFICIENCY ANEMIA: ICD-10-CM

## 2019-05-16 DIAGNOSIS — D63.1 ANEMIA OF CHRONIC RENAL FAILURE, STAGE 4 (SEVERE) (HCC): ICD-10-CM

## 2019-05-16 DIAGNOSIS — N18.4 ANEMIA OF CHRONIC RENAL FAILURE, STAGE 4 (SEVERE) (HCC): ICD-10-CM

## 2019-05-16 DIAGNOSIS — D63.1 ANEMIA OF CHRONIC RENAL FAILURE, STAGE 4 (SEVERE) (HCC): Primary | ICD-10-CM

## 2019-05-16 LAB
BASOPHILS # BLD AUTO: 0.01 10*3/MM3 (ref 0–0.2)
BASOPHILS NFR BLD AUTO: 0.2 % (ref 0–1.5)
DEPRECATED RDW RBC AUTO: 63 FL (ref 37–54)
EOSINOPHIL # BLD AUTO: 0.08 10*3/MM3 (ref 0–0.4)
EOSINOPHIL NFR BLD AUTO: 1.8 % (ref 0.3–6.2)
ERYTHROCYTE [DISTWIDTH] IN BLOOD BY AUTOMATED COUNT: 16.4 % (ref 12.3–15.4)
HCT VFR BLD AUTO: 27.5 % (ref 34–46.6)
HGB BLD-MCNC: 8.7 G/DL (ref 12–15.9)
IMM GRANULOCYTES # BLD AUTO: 0.02 10*3/MM3 (ref 0–0.05)
IMM GRANULOCYTES NFR BLD AUTO: 0.4 % (ref 0–0.5)
LYMPHOCYTES # BLD AUTO: 0.72 10*3/MM3 (ref 0.7–3.1)
LYMPHOCYTES NFR BLD AUTO: 15.8 % (ref 19.6–45.3)
MCH RBC QN AUTO: 33.2 PG (ref 26.6–33)
MCHC RBC AUTO-ENTMCNC: 31.6 G/DL (ref 31.5–35.7)
MCV RBC AUTO: 105 FL (ref 79–97)
MONOCYTES # BLD AUTO: 0.39 10*3/MM3 (ref 0.1–0.9)
MONOCYTES NFR BLD AUTO: 8.6 % (ref 5–12)
NEUTROPHILS # BLD AUTO: 3.33 10*3/MM3 (ref 1.7–7)
NEUTROPHILS NFR BLD AUTO: 73.2 % (ref 42.7–76)
NRBC BLD AUTO-RTO: 0.4 /100 WBC (ref 0–0.2)
PLATELET # BLD AUTO: 190 10*3/MM3 (ref 140–450)
PMV BLD AUTO: 9.6 FL (ref 6–12)
RBC # BLD AUTO: 2.62 10*6/MM3 (ref 3.77–5.28)
WBC NRBC COR # BLD: 4.55 10*3/MM3 (ref 3.4–10.8)

## 2019-05-16 PROCEDURE — 96372 THER/PROPH/DIAG INJ SC/IM: CPT

## 2019-05-16 PROCEDURE — 85025 COMPLETE CBC W/AUTO DIFF WBC: CPT | Performed by: NURSE PRACTITIONER

## 2019-05-16 PROCEDURE — 25010000002 EPOETIN ALFA PER 1000 UNITS: Performed by: NURSE PRACTITIONER

## 2019-05-16 PROCEDURE — 36415 COLL VENOUS BLD VENIPUNCTURE: CPT

## 2019-05-16 RX ADMIN — ERYTHROPOIETIN 38000 UNITS: 20000 INJECTION, SOLUTION INTRAVENOUS; SUBCUTANEOUS at 10:39

## 2019-05-17 ENCOUNTER — CLINICAL SUPPORT NO REQUIREMENTS (OUTPATIENT)
Dept: CARDIOLOGY | Facility: CLINIC | Age: 79
End: 2019-05-17

## 2019-05-17 DIAGNOSIS — I42.9 CARDIOMYOPATHY, UNSPECIFIED TYPE (HCC): Primary | ICD-10-CM

## 2019-05-17 PROCEDURE — 93294 REM INTERROG EVL PM/LDLS PM: CPT | Performed by: INTERNAL MEDICINE

## 2019-05-17 PROCEDURE — 93296 REM INTERROG EVL PM/IDS: CPT | Performed by: INTERNAL MEDICINE

## 2019-05-20 RX ORDER — FUROSEMIDE 40 MG/1
40 TABLET ORAL DAILY
Qty: 90 TABLET | Refills: 3 | Status: SHIPPED | OUTPATIENT
Start: 2019-05-20 | End: 2020-09-02 | Stop reason: ALTCHOICE

## 2019-05-20 RX ORDER — CARVEDILOL 12.5 MG/1
TABLET ORAL
Qty: 90 TABLET | Refills: 1 | Status: SHIPPED | OUTPATIENT
Start: 2019-05-20 | End: 2019-05-28

## 2019-05-22 NOTE — PROGRESS NOTES
Subjective .     REASONS FOR FOLLOWUP:      Anemia of chronic kidney diease              History of Iron deficiency         Mrs Machuca is seen today for evaluation.She has continued on weekly Procrit for a hemoglobin less than 10. With a Hgb of 9.3, she doesn't require transfusion.    Additionally she underwent iron studies last month which were generous.    Past Medical History:   Diagnosis Date   • Anemia     Iron deficiency   • Cardiomyopathy (CMS/HCC)     S/P pacemaker and defibrillator   • CHF (congestive heart failure) (CMS/HCC)    • Chronic renal failure, stage 4 (severe) (CMS/HCC)    • Colon polyps    • Coronary artery disease     pacemaker, defibrillator   • Gastroparesis    • GERD (gastroesophageal reflux disease)    • Gout    • Hemorrhoids    • Hiatal hernia    • History of Clostridium difficile colitis 2013   • History of kidney stones    • History of pancreatitis     2014   • History of skin cancer    • History of transfusion     LAST TRANSFUSION 2 UNITS, 4 WEEKS AGO   • Hypothyroidism    • Left bundle branch block    • Mitral and aortic valve disease    • Mitral valve insufficiency    • Myoclonus     S/P Depakote   • Osteoarthritis    • Pancytopenia (CMS/HCC)    • Peripheral neuropathy    • Presence of cardiac pacemaker     AND DEFIBRILLATOR   • Pulmonary hypertension (CMS/HCC)    • RLS (restless legs syndrome)    • SOB (shortness of breath) on exertion    • Spinal stenosis        ONCOLOGIC HISTORY:  (History from previous dates can be found in the separate document.)    Current Outpatient Medications on File Prior to Visit   Medication Sig Dispense Refill   • aspirin 81 MG tablet Take 81 mg by mouth Daily.     • calcitriol (ROCALTROL) 0.25 MCG capsule Take 0.25 mcg by mouth Daily. 1 tablet every other day and 2 tablets every other day     • Calcium Carbonate (CALCIUM 600 PO) Take 600 mg by mouth Daily.     • carbidopa-levodopa ER (SINEMET CR)  MG per tablet Take 1 tablet by mouth Every  Evening.     • Cholecalciferol (VITAMIN D3) 5000 units capsule capsule Take 5,000 Units by mouth Daily. Every other day     • diazepam (VALIUM) 5 MG tablet Take 5 mg by mouth Every Night.     • febuxostat (ULORIC) 40 MG tablet Take 40 mg by mouth Daily.     • Gabapentin, Once-Daily, (GRALISE) 300 MG tablet Take 300 mg by mouth Daily With Dinner. MAY REPEAT LATE EVENING IF NEEDED     • Glucosamine-Chondroit-Vit C-Mn (GLUCOSAMINE CHONDROITIN COMPLX) capsule Take 1,500 mg by mouth Every Other Day. PT HOLDING FOR SURGERY     • levothyroxine (SYNTHROID, LEVOTHROID) 25 MCG tablet Take 25 mcg by mouth Daily.     • loperamide (IMODIUM) 2 MG capsule Take 2 mg by mouth 4 (Four) Times a Day As Needed for Diarrhea.     • magnesium oxide 250 MG tablet Take 250 mg by mouth Daily.     • methscopolamine (PAMINE FORTE) 5 MG tablet Take 5 mg by mouth Every Morning.     • Multiple Vitamin (MULTI-DAY VITAMINS) tablet Take 1 tablet by mouth Daily. Multivitamin with Iron     • psyllium (METAMUCIL) 58.6 % powder Take 1 packet by mouth Daily.     • rotigotine (NEUPRO) 6 MG/24HR patch Place 1 patch on the skin as directed by provider Daily.     • Vitamin E 400 UNITS tablet Take 400 Units by mouth Daily. PT HOLDING FOR SURGERY     • DULoxetine (CYMBALTA) 30 MG capsule Take 30 mg by mouth Daily.     • famotidine (PEPCID) 20 MG tablet Take 20 mg by mouth 2 (Two) Times a Day.     • HYDROcodone-acetaminophen (NORCO) 5-325 MG per tablet Take 1-2 tablets by mouth Every 8 (Eight) Hours As Needed (Pain). 10 tablet 0   • methylPREDNISolone (MEDROL) 4 MG tablet Take 4 mg by mouth As Needed. TAKES FOR GOUT FLARE UPS     • ondansetron (ZOFRAN) 8 MG tablet Take 1 tablet by mouth Every 8 (Eight) Hours As Needed for Nausea or Vomiting. 30 tablet 2   • pantoprazole (PROTONIX) 40 MG EC tablet Take 40 mg by mouth Daily.       Current Facility-Administered Medications on File Prior to Visit   Medication Dose Route Frequency Provider Last Rate Last Dose   •  "heparin flush (porcine) 100 UNIT/ML injection 500 Units  500 Units Intravenous PRN Anuel Scott MD   500 Units at 11/27/17 1347   • sodium chloride 0.9 % flush 10 mL  10 mL Intravenous PRN Anuel Scott MD   10 mL at 11/27/17 1347       ALLERGIES:     Allergies   Allergen Reactions   • Chlorhexidine Rash and Itching     Patient states \"blue dye\" in chlorhexidine.  States the orange and clear are OK to use   • Valproic Acid Other (See Comments)     Made her extremely sleepy  Made her extremely sleepy   • Codeine Other (See Comments)     Want to climb the walls, doesn't help pain   • Propoxyphene Other (See Comments)     Belligerent, acting drunk  Belligerent, acting drunk       Social History     Socioeconomic History   • Marital status:      Spouse name: Zackery   • Number of children: 5   • Years of education: 1 yr college   • Highest education level: Not on file   Occupational History     Employer: RETIRED   Tobacco Use   • Smoking status: Never Smoker   • Smokeless tobacco: Never Used   Substance and Sexual Activity   • Alcohol use: No   • Drug use: No   • Sexual activity: Defer         Cancer-related family history is not on file.     Review of Systems   Constitutional: Positive for fatigue.   HENT: Negative.    Eyes: Negative.    Respiratory: Negative for shortness of breath.    Cardiovascular: Negative.    Gastrointestinal: Negative for diarrhea.   Endocrine: Negative.    Genitourinary: Negative.    Musculoskeletal:        Chronic lower extremity myalgias    Skin: Negative.    Neurological: Positive for weakness and light-headedness (chronic, unchanged ).   Hematological: Negative.  Does not bruise/bleed easily.   Psychiatric/Behavioral: Negative.    ROS unchanged from prior   A comprehensive 14 point review of systems was performed and was negative except as mentioned.    Objective      Vitals:    04/25/19 1347   BP: 105/65   Pulse: 78   Resp: 16   Temp: 98 °F (36.7 °C)   SpO2: 97%   Weight: " "79.3 kg (174 lb 12.8 oz)   Height: 168 cm (66.14\")  Comment: new ht w/shoes   PainSc:   3   PainLoc: Generalized  Comment: joints     Current Status 4/25/2019   ECOG score 0       Physical Exam   Constitutional: She appears well-developed and well-nourished.   HENT:   Head: Normocephalic.   Eyes: Conjunctivae are normal. No scleral icterus.   Cardiovascular: Normal rate.   Pulmonary/Chest: Effort normal.   Abdominal: Soft. Bowel sounds are normal.   Skin: No rash noted.   Psychiatric: She has a normal mood and affect.         RECENT LABS:      4/25/2019   WBC 3.40 - 10.80 10*3/mm3 4.77   Neutrophils Absolute 1.70 - 7.00 10*3/mm3 3.55   Hemoglobin 12.0 - 15.9 g/dL 9.3 (A)   Hematocrit 34.0 - 46.6 % 28.7 (A)   Platelets 140 - 450 10*3/mm3 168         Assessment/Plan   Anemia of stage 4 CKD:  She is iron replete with an iron percent saturation of 24.  She will receive Procrit today though does not require transfusion.   She will return weekly for CBC and Procrit as needed.                          "

## 2019-05-23 ENCOUNTER — LAB (OUTPATIENT)
Dept: OTHER | Facility: HOSPITAL | Age: 79
End: 2019-05-23

## 2019-05-23 ENCOUNTER — OFFICE VISIT (OUTPATIENT)
Dept: ONCOLOGY | Facility: CLINIC | Age: 79
End: 2019-05-23

## 2019-05-23 ENCOUNTER — INFUSION (OUTPATIENT)
Dept: ONCOLOGY | Facility: HOSPITAL | Age: 79
End: 2019-05-23

## 2019-05-23 VITALS
OXYGEN SATURATION: 100 % | RESPIRATION RATE: 16 BRPM | HEIGHT: 66 IN | SYSTOLIC BLOOD PRESSURE: 114 MMHG | TEMPERATURE: 97.7 F | DIASTOLIC BLOOD PRESSURE: 67 MMHG | BODY MASS INDEX: 27.76 KG/M2 | WEIGHT: 172.7 LBS | HEART RATE: 64 BPM

## 2019-05-23 DIAGNOSIS — D63.1 ANEMIA OF CHRONIC RENAL FAILURE, STAGE 4 (SEVERE) (HCC): Primary | ICD-10-CM

## 2019-05-23 DIAGNOSIS — N18.4 ANEMIA OF CHRONIC RENAL FAILURE, STAGE 4 (SEVERE) (HCC): Primary | ICD-10-CM

## 2019-05-23 DIAGNOSIS — D50.8 OTHER IRON DEFICIENCY ANEMIA: ICD-10-CM

## 2019-05-23 DIAGNOSIS — Z45.2 ENCOUNTER FOR FITTING AND ADJUSTMENT OF VASCULAR CATHETER: ICD-10-CM

## 2019-05-23 DIAGNOSIS — N18.4 ANEMIA OF CHRONIC RENAL FAILURE, STAGE 4 (SEVERE) (HCC): ICD-10-CM

## 2019-05-23 DIAGNOSIS — N18.4 STAGE 4 CHRONIC KIDNEY DISEASE (HCC): ICD-10-CM

## 2019-05-23 DIAGNOSIS — D63.1 ANEMIA OF CHRONIC RENAL FAILURE, STAGE 4 (SEVERE) (HCC): ICD-10-CM

## 2019-05-23 LAB
ABO GROUP BLD: NORMAL
BASOPHILS # BLD AUTO: 0.02 10*3/MM3 (ref 0–0.2)
BASOPHILS NFR BLD AUTO: 0.5 % (ref 0–1.5)
BLD GP AB SCN SERPL QL: NEGATIVE
DEPRECATED RDW RBC AUTO: 61.7 FL (ref 37–54)
EOSINOPHIL # BLD AUTO: 0.13 10*3/MM3 (ref 0–0.4)
EOSINOPHIL NFR BLD AUTO: 3 % (ref 0.3–6.2)
ERYTHROCYTE [DISTWIDTH] IN BLOOD BY AUTOMATED COUNT: 16.1 % (ref 12.3–15.4)
HCT VFR BLD AUTO: 26.7 % (ref 34–46.6)
HGB BLD-MCNC: 8.2 G/DL (ref 12–15.9)
IMM GRANULOCYTES # BLD AUTO: 0.03 10*3/MM3 (ref 0–0.05)
IMM GRANULOCYTES NFR BLD AUTO: 0.7 % (ref 0–0.5)
LYMPHOCYTES # BLD AUTO: 0.67 10*3/MM3 (ref 0.7–3.1)
LYMPHOCYTES NFR BLD AUTO: 15.5 % (ref 19.6–45.3)
MACROCYTES BLD QL SMEAR: NORMAL
MCH RBC QN AUTO: 32.4 PG (ref 26.6–33)
MCHC RBC AUTO-ENTMCNC: 30.7 G/DL (ref 31.5–35.7)
MCV RBC AUTO: 105.5 FL (ref 79–97)
MONOCYTES # BLD AUTO: 0.32 10*3/MM3 (ref 0.1–0.9)
MONOCYTES NFR BLD AUTO: 7.4 % (ref 5–12)
NEUTROPHILS # BLD AUTO: 3.15 10*3/MM3 (ref 1.7–7)
NEUTROPHILS NFR BLD AUTO: 72.9 % (ref 42.7–76)
NRBC BLD AUTO-RTO: 0 /100 WBC (ref 0–0.2)
PLAT MORPH BLD: NORMAL
PLATELET # BLD AUTO: 180 10*3/MM3 (ref 140–450)
PMV BLD AUTO: 10.3 FL (ref 6–12)
RBC # BLD AUTO: 2.53 10*6/MM3 (ref 3.77–5.28)
RH BLD: NEGATIVE
T&S EXPIRATION DATE: NORMAL
WBC MORPH BLD: NORMAL
WBC NRBC COR # BLD: 4.32 10*3/MM3 (ref 3.4–10.8)

## 2019-05-23 PROCEDURE — 85025 COMPLETE CBC W/AUTO DIFF WBC: CPT | Performed by: NURSE PRACTITIONER

## 2019-05-23 PROCEDURE — 25010000002 EPOETIN ALFA PER 1000 UNITS: Performed by: NURSE PRACTITIONER

## 2019-05-23 PROCEDURE — 86923 COMPATIBILITY TEST ELECTRIC: CPT

## 2019-05-23 PROCEDURE — 86900 BLOOD TYPING SEROLOGIC ABO: CPT | Performed by: NURSE PRACTITIONER

## 2019-05-23 PROCEDURE — 85007 BL SMEAR W/DIFF WBC COUNT: CPT | Performed by: NURSE PRACTITIONER

## 2019-05-23 PROCEDURE — 86850 RBC ANTIBODY SCREEN: CPT | Performed by: NURSE PRACTITIONER

## 2019-05-23 PROCEDURE — 96372 THER/PROPH/DIAG INJ SC/IM: CPT

## 2019-05-23 PROCEDURE — 99213 OFFICE O/P EST LOW 20 MIN: CPT | Performed by: NURSE PRACTITIONER

## 2019-05-23 PROCEDURE — 86901 BLOOD TYPING SEROLOGIC RH(D): CPT | Performed by: NURSE PRACTITIONER

## 2019-05-23 PROCEDURE — 36415 COLL VENOUS BLD VENIPUNCTURE: CPT

## 2019-05-23 RX ORDER — SODIUM CHLORIDE 0.9 % (FLUSH) 0.9 %
10 SYRINGE (ML) INJECTION AS NEEDED
Status: CANCELLED | OUTPATIENT
Start: 2019-05-23

## 2019-05-23 RX ORDER — SODIUM CHLORIDE 0.9 % (FLUSH) 0.9 %
10 SYRINGE (ML) INJECTION AS NEEDED
Status: DISCONTINUED | OUTPATIENT
Start: 2019-05-23 | End: 2019-05-23 | Stop reason: HOSPADM

## 2019-05-23 RX ORDER — DIPHENHYDRAMINE HCL 25 MG
25 CAPSULE ORAL ONCE
Status: CANCELLED | OUTPATIENT
Start: 2019-05-25 | End: 2019-05-23

## 2019-05-23 RX ORDER — SODIUM CHLORIDE 9 MG/ML
250 INJECTION, SOLUTION INTRAVENOUS AS NEEDED
Status: CANCELLED | OUTPATIENT
Start: 2019-05-25

## 2019-05-23 RX ORDER — ACETAMINOPHEN 325 MG/1
325 TABLET ORAL ONCE
Status: CANCELLED | OUTPATIENT
Start: 2019-05-25 | End: 2019-05-23

## 2019-05-23 RX ORDER — SPIRONOLACTONE 25 MG/1
25 TABLET ORAL DAILY
COMMUNITY
Start: 2019-05-19 | End: 2019-08-15 | Stop reason: SDUPTHER

## 2019-05-23 RX ADMIN — SODIUM CHLORIDE, PRESERVATIVE FREE 500 UNITS: 5 INJECTION INTRAVENOUS at 11:35

## 2019-05-23 RX ADMIN — Medication 10 ML: at 11:35

## 2019-05-23 RX ADMIN — ERYTHROPOIETIN 38000 UNITS: 20000 INJECTION, SOLUTION INTRAVENOUS; SUBCUTANEOUS at 11:21

## 2019-05-23 NOTE — PROGRESS NOTES
Subjective .     REASONS FOR FOLLOWUP:      Anemia of chronic kidney diease              History of Iron deficiency         Mrs Machuca for routine follow-up for the above-mentioned history.  She continues on weekly Procrit for hemoglobin less than 10.  Iron studies were recently obtained in April with a normal saturation of 24% and a ferritin of 356.    Overall, she is feeling well.  She does continue with chronic lightheadedness, though notes that this is improving.  She is in the process of moving and has been quite busy and fatigued from this.  She has been more short of breath over the last several days, but denies any associated chest pain or bleeding.  She will be undergoing a fistula placement next Friday and is requesting a blood transfusion prior.    Her hemoglobin today is 8.2 therefore we will proceed with Procrit.    She has no other concerns today.  Past Medical History:   Diagnosis Date   • Anemia     Iron deficiency   • Cardiomyopathy (CMS/HCC)     S/P pacemaker and defibrillator   • CHF (congestive heart failure) (CMS/HCC)    • Chronic renal failure, stage 4 (severe) (CMS/HCC)    • Colon polyps    • Coronary artery disease     pacemaker, defibrillator   • Gastroparesis    • GERD (gastroesophageal reflux disease)    • Gout    • Hemorrhoids    • Hiatal hernia    • History of Clostridium difficile colitis 2013   • History of kidney stones    • History of pancreatitis     2014   • History of skin cancer    • History of transfusion     LAST TRANSFUSION 2 UNITS, 4 WEEKS AGO   • Hypothyroidism    • Left bundle branch block    • Mitral and aortic valve disease    • Mitral valve insufficiency    • Myoclonus     S/P Depakote   • Osteoarthritis    • Pancytopenia (CMS/HCC)    • Peripheral neuropathy    • Presence of cardiac pacemaker     AND DEFIBRILLATOR   • Pulmonary hypertension (CMS/HCC)    • RLS (restless legs syndrome)    • SOB (shortness of breath) on exertion    • Spinal stenosis        ONCOLOGIC  HISTORY:  (History from previous dates can be found in the separate document.)    Current Outpatient Medications on File Prior to Visit   Medication Sig Dispense Refill   • aspirin 81 MG tablet Take 81 mg by mouth Daily.     • calcitriol (ROCALTROL) 0.25 MCG capsule Take 0.25 mcg by mouth Daily. 1 tablet every other day and 2 tablets every other day     • Calcium Carbonate (CALCIUM 600 PO) Take 600 mg by mouth Daily.     • carbidopa-levodopa ER (SINEMET CR)  MG per tablet Take 1 tablet by mouth Every Evening.     • carvedilol (COREG) 12.5 MG tablet TAKE 1 TABLET DAILY 90 tablet 1   • Cholecalciferol (VITAMIN D3) 5000 units capsule capsule Take 5,000 Units by mouth Daily. Every other day     • diazepam (VALIUM) 5 MG tablet Take 5 mg by mouth Every Night.     • DULoxetine (CYMBALTA) 30 MG capsule Take 30 mg by mouth Daily.     • famotidine (PEPCID) 20 MG tablet Take 20 mg by mouth 2 (Two) Times a Day.     • febuxostat (ULORIC) 40 MG tablet Take 40 mg by mouth Daily.     • furosemide (LASIX) 40 MG tablet Take 1 tablet by mouth Daily. 90 tablet 3   • Gabapentin, Once-Daily, (GRALISE) 300 MG tablet Take 300 mg by mouth Daily With Dinner. MAY REPEAT LATE EVENING IF NEEDED     • Glucosamine-Chondroit-Vit C-Mn (GLUCOSAMINE CHONDROITIN COMPLX) capsule Take 1,500 mg by mouth Every Other Day. PT HOLDING FOR SURGERY     • HYDROcodone-acetaminophen (NORCO) 5-325 MG per tablet Take 1-2 tablets by mouth Every 8 (Eight) Hours As Needed (Pain). 10 tablet 0   • levothyroxine (SYNTHROID, LEVOTHROID) 25 MCG tablet Take 25 mcg by mouth Daily.     • loperamide (IMODIUM) 2 MG capsule Take 2 mg by mouth 4 (Four) Times a Day As Needed for Diarrhea.     • magnesium oxide 250 MG tablet Take 250 mg by mouth Daily.     • methscopolamine (PAMINE FORTE) 5 MG tablet Take 5 mg by mouth Every Morning.     • methylPREDNISolone (MEDROL) 4 MG tablet Take 4 mg by mouth As Needed. TAKES FOR GOUT FLARE UPS     • Multiple Vitamin (MULTI-DAY VITAMINS)  "tablet Take 1 tablet by mouth Daily. Multivitamin with Iron     • ondansetron (ZOFRAN) 8 MG tablet Take 1 tablet by mouth Every 8 (Eight) Hours As Needed for Nausea or Vomiting. 30 tablet 2   • pantoprazole (PROTONIX) 40 MG EC tablet Take 40 mg by mouth Daily.     • psyllium (METAMUCIL) 58.6 % powder Take 1 packet by mouth Daily.     • rotigotine (NEUPRO) 6 MG/24HR patch Place 1 patch on the skin as directed by provider Daily.     • Vitamin E 400 UNITS tablet Take 400 Units by mouth Daily. PT HOLDING FOR SURGERY       Current Facility-Administered Medications on File Prior to Visit   Medication Dose Route Frequency Provider Last Rate Last Dose   • heparin flush (porcine) 100 UNIT/ML injection 500 Units  500 Units Intravenous PRN Anuel Scott MD   500 Units at 11/27/17 1347   • sodium chloride 0.9 % flush 10 mL  10 mL Intravenous PRN Anuel Scott MD   10 mL at 11/27/17 1347       ALLERGIES:     Allergies   Allergen Reactions   • Chlorhexidine Rash and Itching     Patient states \"blue dye\" in chlorhexidine.  States the orange and clear are OK to use   • Valproic Acid Other (See Comments)     Made her extremely sleepy  Made her extremely sleepy   • Codeine Other (See Comments)     Want to climb the walls, doesn't help pain   • Propoxyphene Other (See Comments)     Belligerent, acting drunk  Belligerent, acting drunk       Social History     Socioeconomic History   • Marital status:      Spouse name: Zackery   • Number of children: 5   • Years of education: 1 yr college   • Highest education level: Not on file   Occupational History     Employer: RETIRED   Tobacco Use   • Smoking status: Never Smoker   • Smokeless tobacco: Never Used   Substance and Sexual Activity   • Alcohol use: No   • Drug use: No   • Sexual activity: Defer         Cancer-related family history is not on file.     Review of Systems   Constitutional: Positive for fatigue.   HENT: Negative.    Eyes: Negative.    Respiratory: Negative " for shortness of breath (with exertion ).    Cardiovascular: Negative.    Gastrointestinal: Negative for diarrhea.   Endocrine: Negative.    Genitourinary: Negative.    Musculoskeletal:        Chronic lower extremity myalgias    Skin: Negative.    Neurological: Positive for weakness and light-headedness (chronic, unchanged ).   Hematological: Negative.  Does not bruise/bleed easily.   Psychiatric/Behavioral: Negative.    ROS unchanged from prior   A comprehensive 14 point review of systems was performed and was negative except as mentioned.    Objective      There were no vitals filed for this visit.  Current Status 4/25/2019   ECOG score 0       Physical Exam   Constitutional: She appears well-developed and well-nourished.   HENT:   Head: Normocephalic.   Eyes: Conjunctivae are normal. No scleral icterus.   Cardiovascular: Normal rate.   Pulmonary/Chest: Effort normal.   Abdominal: Soft. Bowel sounds are normal.   Skin: No rash noted.   Psychiatric: She has a normal mood and affect.         RECENT LABS:      4/25/2019   WBC 3.40 - 10.80 10*3/mm3 4.77   Neutrophils Absolute 1.70 - 7.00 10*3/mm3 3.55   Hemoglobin 12.0 - 15.9 g/dL 9.3 (A)   Hematocrit 34.0 - 46.6 % 28.7 (A)   Platelets 140 - 450 10*3/mm3 168         Assessment/Plan   Anemia of stage 4 CKD:  As noted above, patient is iron replete and therefore is okay to proceed with weekly Procrit for hemoglobin less than 10.  She will be undergoing fistula placement next Friday, May 31st and though she states she could wait on transfusion symptomatically, she would like to proceed in light of the upcoming surgery.      We will plan to set her up for 2 units of packed red blood cells for hemoglobin 8.2.  She will continue with weekly Procrit injections.    She will formally be seen by Dr. Oropeza on June 25, 2019.  I have asked her to call with any issues prior to her next office visit.  She has verbalized understanding.

## 2019-05-25 ENCOUNTER — INFUSION (OUTPATIENT)
Dept: ONCOLOGY | Facility: HOSPITAL | Age: 79
End: 2019-05-25

## 2019-05-25 VITALS
DIASTOLIC BLOOD PRESSURE: 53 MMHG | HEART RATE: 69 BPM | TEMPERATURE: 98.3 F | RESPIRATION RATE: 20 BRPM | OXYGEN SATURATION: 100 % | SYSTOLIC BLOOD PRESSURE: 117 MMHG

## 2019-05-25 DIAGNOSIS — Z45.2 ENCOUNTER FOR FITTING AND ADJUSTMENT OF VASCULAR CATHETER: ICD-10-CM

## 2019-05-25 DIAGNOSIS — N18.4 ANEMIA OF CHRONIC RENAL FAILURE, STAGE 4 (SEVERE) (HCC): Primary | ICD-10-CM

## 2019-05-25 DIAGNOSIS — D63.1 ANEMIA OF CHRONIC RENAL FAILURE, STAGE 4 (SEVERE) (HCC): Primary | ICD-10-CM

## 2019-05-25 PROCEDURE — 86900 BLOOD TYPING SEROLOGIC ABO: CPT

## 2019-05-25 PROCEDURE — P9016 RBC LEUKOCYTES REDUCED: HCPCS

## 2019-05-25 PROCEDURE — 63710000001 DIPHENHYDRAMINE PER 50 MG: Performed by: NURSE PRACTITIONER

## 2019-05-25 PROCEDURE — 36430 TRANSFUSION BLD/BLD COMPNT: CPT

## 2019-05-25 RX ORDER — SODIUM CHLORIDE 0.9 % (FLUSH) 0.9 %
10 SYRINGE (ML) INJECTION AS NEEDED
Status: DISCONTINUED | OUTPATIENT
Start: 2019-05-25 | End: 2019-05-25 | Stop reason: HOSPADM

## 2019-05-25 RX ORDER — SODIUM CHLORIDE 9 MG/ML
250 INJECTION, SOLUTION INTRAVENOUS AS NEEDED
Status: DISCONTINUED | OUTPATIENT
Start: 2019-05-25 | End: 2019-05-25 | Stop reason: HOSPADM

## 2019-05-25 RX ORDER — ACETAMINOPHEN 325 MG/1
325 TABLET ORAL ONCE
Status: COMPLETED | OUTPATIENT
Start: 2019-05-25 | End: 2019-05-25

## 2019-05-25 RX ORDER — SODIUM CHLORIDE 0.9 % (FLUSH) 0.9 %
10 SYRINGE (ML) INJECTION AS NEEDED
Status: CANCELLED | OUTPATIENT
Start: 2019-05-25

## 2019-05-25 RX ORDER — DIPHENHYDRAMINE HCL 25 MG
25 CAPSULE ORAL ONCE
Status: COMPLETED | OUTPATIENT
Start: 2019-05-25 | End: 2019-05-25

## 2019-05-25 RX ADMIN — Medication 500 UNITS: at 13:00

## 2019-05-25 RX ADMIN — ACETAMINOPHEN 325 MG: 325 TABLET, FILM COATED ORAL at 08:09

## 2019-05-25 RX ADMIN — DIPHENHYDRAMINE HYDROCHLORIDE 25 MG: 25 CAPSULE ORAL at 08:09

## 2019-05-25 RX ADMIN — Medication 10 ML: at 13:00

## 2019-05-28 ENCOUNTER — HOSPITAL ENCOUNTER (OUTPATIENT)
Dept: GENERAL RADIOLOGY | Facility: HOSPITAL | Age: 79
Discharge: HOME | End: 2019-05-28

## 2019-05-28 ENCOUNTER — APPOINTMENT (OUTPATIENT)
Dept: PREADMISSION TESTING | Facility: HOSPITAL | Age: 79
End: 2019-05-28

## 2019-05-28 ENCOUNTER — HOSPITAL ENCOUNTER (OUTPATIENT)
Dept: INFUSION THERAPY | Facility: HOSPITAL | Age: 79
Discharge: HOME | End: 2019-05-28
Admitting: SURGERY

## 2019-05-28 VITALS
DIASTOLIC BLOOD PRESSURE: 73 MMHG | WEIGHT: 171 LBS | TEMPERATURE: 96.9 F | HEART RATE: 67 BPM | RESPIRATION RATE: 16 BRPM | OXYGEN SATURATION: 100 % | SYSTOLIC BLOOD PRESSURE: 135 MMHG | BODY MASS INDEX: 26.84 KG/M2 | HEIGHT: 67 IN

## 2019-05-28 VITALS
SYSTOLIC BLOOD PRESSURE: 131 MMHG | RESPIRATION RATE: 20 BRPM | HEART RATE: 72 BPM | OXYGEN SATURATION: 99 % | DIASTOLIC BLOOD PRESSURE: 58 MMHG | TEMPERATURE: 97.7 F

## 2019-05-28 DIAGNOSIS — Z01.818 ENCOUNTER FOR PREADMISSION TESTING: Primary | ICD-10-CM

## 2019-05-28 LAB
ALBUMIN SERPL-MCNC: 3.9 G/DL (ref 3.5–5.2)
ALBUMIN/GLOB SERPL: 1.6 G/DL
ALP SERPL-CCNC: 50 U/L (ref 39–117)
ALT SERPL W P-5'-P-CCNC: 7 U/L (ref 1–33)
ANION GAP SERPL CALCULATED.3IONS-SCNC: 11 MMOL/L
APTT PPP: >200 SECONDS (ref 22.7–35.4)
AST SERPL-CCNC: 15 U/L (ref 1–32)
BACTERIA UR QL AUTO: NORMAL /HPF
BASOPHILS # BLD AUTO: 0.02 10*3/MM3 (ref 0–0.2)
BASOPHILS NFR BLD AUTO: 0.4 % (ref 0–1.5)
BILIRUB SERPL-MCNC: 0.2 MG/DL (ref 0.2–1.2)
BILIRUB UR QL STRIP: NEGATIVE
BUN BLD-MCNC: 54 MG/DL (ref 8–23)
BUN/CREAT SERPL: 24.1 (ref 7–25)
CALCIUM SPEC-SCNC: 9.6 MG/DL (ref 8.6–10.5)
CHLORIDE SERPL-SCNC: 102 MMOL/L (ref 98–107)
CLARITY UR: CLEAR
CO2 SERPL-SCNC: 25 MMOL/L (ref 22–29)
COLOR UR: YELLOW
CREAT BLD-MCNC: 2.24 MG/DL (ref 0.57–1)
DEPRECATED RDW RBC AUTO: 63.8 FL (ref 37–54)
EOSINOPHIL # BLD AUTO: 0.07 10*3/MM3 (ref 0–0.4)
EOSINOPHIL NFR BLD AUTO: 1.3 % (ref 0.3–6.2)
ERYTHROCYTE [DISTWIDTH] IN BLOOD BY AUTOMATED COUNT: 17 % (ref 12.3–15.4)
GFR SERPL CREATININE-BSD FRML MDRD: 21 ML/MIN/1.73
GLOBULIN UR ELPH-MCNC: 2.5 GM/DL
GLUCOSE BLD-MCNC: 85 MG/DL (ref 65–99)
GLUCOSE UR STRIP-MCNC: NEGATIVE MG/DL
HCT VFR BLD AUTO: 35.1 % (ref 34–46.6)
HGB BLD-MCNC: 10.6 G/DL (ref 12–15.9)
HGB UR QL STRIP.AUTO: NEGATIVE
HYALINE CASTS UR QL AUTO: NORMAL /LPF
IMM GRANULOCYTES # BLD AUTO: 0.02 10*3/MM3 (ref 0–0.05)
IMM GRANULOCYTES NFR BLD AUTO: 0.4 % (ref 0–0.5)
INR PPP: 1.07 (ref 0.9–1.1)
KETONES UR QL STRIP: NEGATIVE
LEUKOCYTE ESTERASE UR QL STRIP.AUTO: NEGATIVE
LYMPHOCYTES # BLD AUTO: 0.69 10*3/MM3 (ref 0.7–3.1)
LYMPHOCYTES NFR BLD AUTO: 12.6 % (ref 19.6–45.3)
MCH RBC QN AUTO: 31.1 PG (ref 26.6–33)
MCHC RBC AUTO-ENTMCNC: 30.2 G/DL (ref 31.5–35.7)
MCV RBC AUTO: 102.9 FL (ref 79–97)
MONOCYTES # BLD AUTO: 0.46 10*3/MM3 (ref 0.1–0.9)
MONOCYTES NFR BLD AUTO: 8.4 % (ref 5–12)
NEUTROPHILS # BLD AUTO: 4.2 10*3/MM3 (ref 1.7–7)
NEUTROPHILS NFR BLD AUTO: 76.9 % (ref 42.7–76)
NITRITE UR QL STRIP: NEGATIVE
NRBC BLD AUTO-RTO: 0 /100 WBC (ref 0–0.2)
PH UR STRIP.AUTO: 6 [PH] (ref 5–8)
PLATELET # BLD AUTO: 201 10*3/MM3 (ref 140–450)
PMV BLD AUTO: 10 FL (ref 6–12)
POTASSIUM BLD-SCNC: 5.1 MMOL/L (ref 3.5–5.2)
PROT SERPL-MCNC: 6.4 G/DL (ref 6–8.5)
PROT UR QL STRIP: NEGATIVE
PROTHROMBIN TIME: 13.6 SECONDS (ref 11.7–14.2)
RBC # BLD AUTO: 3.41 10*6/MM3 (ref 3.77–5.28)
RBC # UR: NORMAL /HPF
REF LAB TEST METHOD: NORMAL
SODIUM BLD-SCNC: 138 MMOL/L (ref 136–145)
SP GR UR STRIP: 1.01 (ref 1–1.03)
SQUAMOUS #/AREA URNS HPF: NORMAL /HPF
UROBILINOGEN UR QL STRIP: NORMAL
WBC NRBC COR # BLD: 5.46 10*3/MM3 (ref 3.4–10.8)
WBC UR QL AUTO: NORMAL /HPF

## 2019-05-28 PROCEDURE — 81001 URINALYSIS AUTO W/SCOPE: CPT | Performed by: SURGERY

## 2019-05-28 PROCEDURE — 85730 THROMBOPLASTIN TIME PARTIAL: CPT | Performed by: SURGERY

## 2019-05-28 PROCEDURE — 85025 COMPLETE CBC W/AUTO DIFF WBC: CPT | Performed by: SURGERY

## 2019-05-28 PROCEDURE — 80053 COMPREHEN METABOLIC PANEL: CPT | Performed by: SURGERY

## 2019-05-28 PROCEDURE — 71046 X-RAY EXAM CHEST 2 VIEWS: CPT

## 2019-05-28 PROCEDURE — 36591 DRAW BLOOD OFF VENOUS DEVICE: CPT

## 2019-05-28 PROCEDURE — 85610 PROTHROMBIN TIME: CPT | Performed by: SURGERY

## 2019-05-28 RX ORDER — FAMOTIDINE 20 MG/1
20 TABLET, FILM COATED ORAL 2 TIMES DAILY
COMMUNITY
End: 2019-05-31 | Stop reason: HOSPADM

## 2019-05-28 RX ORDER — CARVEDILOL 6.25 MG/1
12.5 TABLET ORAL DAILY
Status: ON HOLD | COMMUNITY
End: 2019-08-19 | Stop reason: SDUPTHER

## 2019-05-28 RX ADMIN — Medication 500 UNITS: at 14:11

## 2019-05-30 ENCOUNTER — LAB (OUTPATIENT)
Dept: OTHER | Facility: HOSPITAL | Age: 79
End: 2019-05-30

## 2019-05-30 ENCOUNTER — INFUSION (OUTPATIENT)
Dept: ONCOLOGY | Facility: HOSPITAL | Age: 79
End: 2019-05-30

## 2019-05-30 VITALS
WEIGHT: 173.4 LBS | OXYGEN SATURATION: 97 % | BODY MASS INDEX: 27.16 KG/M2 | TEMPERATURE: 97.5 F | DIASTOLIC BLOOD PRESSURE: 81 MMHG | HEART RATE: 99 BPM | SYSTOLIC BLOOD PRESSURE: 119 MMHG

## 2019-05-30 DIAGNOSIS — N18.4 ANEMIA OF CHRONIC RENAL FAILURE, STAGE 4 (SEVERE) (HCC): ICD-10-CM

## 2019-05-30 DIAGNOSIS — D63.1 ANEMIA OF CHRONIC RENAL FAILURE, STAGE 4 (SEVERE) (HCC): Primary | ICD-10-CM

## 2019-05-30 DIAGNOSIS — N18.4 ANEMIA OF CHRONIC RENAL FAILURE, STAGE 4 (SEVERE) (HCC): Primary | ICD-10-CM

## 2019-05-30 DIAGNOSIS — D63.1 ANEMIA OF CHRONIC RENAL FAILURE, STAGE 4 (SEVERE) (HCC): ICD-10-CM

## 2019-05-30 LAB
BASOPHILS # BLD AUTO: 0.02 10*3/MM3 (ref 0–0.2)
BASOPHILS NFR BLD AUTO: 0.4 % (ref 0–1.5)
DEPRECATED RDW RBC AUTO: 58.4 FL (ref 37–54)
EOSINOPHIL # BLD AUTO: 0.08 10*3/MM3 (ref 0–0.4)
EOSINOPHIL NFR BLD AUTO: 1.4 % (ref 0.3–6.2)
ERYTHROCYTE [DISTWIDTH] IN BLOOD BY AUTOMATED COUNT: 16.1 % (ref 12.3–15.4)
HCT VFR BLD AUTO: 33.3 % (ref 34–46.6)
HGB BLD-MCNC: 10.6 G/DL (ref 12–15.9)
IMM GRANULOCYTES # BLD AUTO: 0.02 10*3/MM3 (ref 0–0.05)
IMM GRANULOCYTES NFR BLD AUTO: 0.4 % (ref 0–0.5)
LYMPHOCYTES # BLD AUTO: 0.69 10*3/MM3 (ref 0.7–3.1)
LYMPHOCYTES NFR BLD AUTO: 12.4 % (ref 19.6–45.3)
MCH RBC QN AUTO: 31.2 PG (ref 26.6–33)
MCHC RBC AUTO-ENTMCNC: 31.8 G/DL (ref 31.5–35.7)
MCV RBC AUTO: 97.9 FL (ref 79–97)
MONOCYTES # BLD AUTO: 0.53 10*3/MM3 (ref 0.1–0.9)
MONOCYTES NFR BLD AUTO: 9.5 % (ref 5–12)
NEUTROPHILS # BLD AUTO: 4.21 10*3/MM3 (ref 1.7–7)
NEUTROPHILS NFR BLD AUTO: 75.9 % (ref 42.7–76)
NRBC BLD AUTO-RTO: 0 /100 WBC (ref 0–0.2)
PLATELET # BLD AUTO: 203 10*3/MM3 (ref 140–450)
PMV BLD AUTO: 9.9 FL (ref 6–12)
RBC # BLD AUTO: 3.4 10*6/MM3 (ref 3.77–5.28)
WBC NRBC COR # BLD: 5.55 10*3/MM3 (ref 3.4–10.8)

## 2019-05-30 PROCEDURE — 36415 COLL VENOUS BLD VENIPUNCTURE: CPT

## 2019-05-30 PROCEDURE — 85025 COMPLETE CBC W/AUTO DIFF WBC: CPT | Performed by: NURSE PRACTITIONER

## 2019-05-30 NOTE — PROGRESS NOTES
Lab Results   Component Value Date    WBC 5.55 05/30/2019    HGB 10.6 (L) 05/30/2019    HCT 33.3 (L) 05/30/2019    MCV 97.9 (H) 05/30/2019     05/30/2019     Procrit not indicated for Hgb 10.6.  Pt denies complaints and states she is feeling well.  She will return next week for CBC and poss. Procrit.

## 2019-05-31 ENCOUNTER — ANESTHESIA (OUTPATIENT)
Dept: PERIOP | Facility: HOSPITAL | Age: 79
End: 2019-05-31

## 2019-05-31 ENCOUNTER — HOSPITAL ENCOUNTER (OUTPATIENT)
Facility: HOSPITAL | Age: 79
Setting detail: HOSPITAL OUTPATIENT SURGERY
Discharge: HOME | End: 2019-05-31
Attending: SURGERY | Admitting: SURGERY

## 2019-05-31 ENCOUNTER — ANESTHESIA EVENT (OUTPATIENT)
Dept: PERIOP | Facility: HOSPITAL | Age: 79
End: 2019-05-31

## 2019-05-31 VITALS
WEIGHT: 172.56 LBS | SYSTOLIC BLOOD PRESSURE: 101 MMHG | DIASTOLIC BLOOD PRESSURE: 56 MMHG | BODY MASS INDEX: 27.08 KG/M2 | HEIGHT: 67 IN | OXYGEN SATURATION: 95 % | HEART RATE: 64 BPM | TEMPERATURE: 97.6 F | RESPIRATION RATE: 16 BRPM

## 2019-05-31 PROCEDURE — 25010000003 MEPIVACAINE PER 10 ML: Performed by: ANESTHESIOLOGY

## 2019-05-31 PROCEDURE — 25010000002 PHENYLEPHRINE PER 1 ML: Performed by: NURSE ANESTHETIST, CERTIFIED REGISTERED

## 2019-05-31 PROCEDURE — 25010000003 CEFAZOLIN IN DEXTROSE 2-4 GM/100ML-% SOLUTION: Performed by: SURGERY

## 2019-05-31 PROCEDURE — 25010000002 PROTAMINE SULFATE PER 10 MG: Performed by: NURSE ANESTHETIST, CERTIFIED REGISTERED

## 2019-05-31 PROCEDURE — 25010000002 HEPARIN (PORCINE) PER 1000 UNITS: Performed by: SURGERY

## 2019-05-31 PROCEDURE — 25010000002 MIDAZOLAM PER 1 MG: Performed by: ANESTHESIOLOGY

## 2019-05-31 PROCEDURE — 25010000003 CEFAZOLIN PER 500 MG: Performed by: SURGERY

## 2019-05-31 PROCEDURE — 25010000002 FENTANYL CITRATE (PF) 100 MCG/2ML SOLUTION: Performed by: ANESTHESIOLOGY

## 2019-05-31 PROCEDURE — 25010000002 HEPARIN (PORCINE) PER 1000 UNITS: Performed by: NURSE ANESTHETIST, CERTIFIED REGISTERED

## 2019-05-31 PROCEDURE — 25010000002 PROPOFOL 10 MG/ML EMULSION: Performed by: NURSE ANESTHETIST, CERTIFIED REGISTERED

## 2019-05-31 RX ORDER — CALCITRIOL 0.25 UG/1
0.5 CAPSULE, LIQUID FILLED ORAL EVERY OTHER DAY
COMMUNITY
End: 2019-06-21 | Stop reason: SDUPTHER

## 2019-05-31 RX ORDER — ONDANSETRON 2 MG/ML
4 INJECTION INTRAMUSCULAR; INTRAVENOUS ONCE AS NEEDED
Status: DISCONTINUED | OUTPATIENT
Start: 2019-05-31 | End: 2019-05-31 | Stop reason: HOSPADM

## 2019-05-31 RX ORDER — FENTANYL CITRATE 50 UG/ML
25 INJECTION, SOLUTION INTRAMUSCULAR; INTRAVENOUS
Status: DISCONTINUED | OUTPATIENT
Start: 2019-05-31 | End: 2019-05-31 | Stop reason: HOSPADM

## 2019-05-31 RX ORDER — SODIUM CHLORIDE, SODIUM LACTATE, POTASSIUM CHLORIDE, CALCIUM CHLORIDE 600; 310; 30; 20 MG/100ML; MG/100ML; MG/100ML; MG/100ML
9 INJECTION, SOLUTION INTRAVENOUS CONTINUOUS
Status: DISCONTINUED | OUTPATIENT
Start: 2019-05-31 | End: 2019-05-31

## 2019-05-31 RX ORDER — SODIUM CHLORIDE 0.9 % (FLUSH) 0.9 %
1-10 SYRINGE (ML) INJECTION AS NEEDED
Status: DISCONTINUED | OUTPATIENT
Start: 2019-05-31 | End: 2019-05-31 | Stop reason: HOSPADM

## 2019-05-31 RX ORDER — MIDAZOLAM HYDROCHLORIDE 1 MG/ML
2 INJECTION INTRAMUSCULAR; INTRAVENOUS
Status: DISCONTINUED | OUTPATIENT
Start: 2019-05-31 | End: 2019-05-31 | Stop reason: HOSPADM

## 2019-05-31 RX ORDER — FLUMAZENIL 0.1 MG/ML
0.2 INJECTION INTRAVENOUS AS NEEDED
Status: DISCONTINUED | OUTPATIENT
Start: 2019-05-31 | End: 2019-05-31 | Stop reason: HOSPADM

## 2019-05-31 RX ORDER — OXYCODONE AND ACETAMINOPHEN 7.5; 325 MG/1; MG/1
1 TABLET ORAL ONCE AS NEEDED
Status: DISCONTINUED | OUTPATIENT
Start: 2019-05-31 | End: 2019-05-31 | Stop reason: HOSPADM

## 2019-05-31 RX ORDER — SODIUM CHLORIDE 9 MG/ML
20 INJECTION, SOLUTION INTRAVENOUS CONTINUOUS
Status: DISCONTINUED | OUTPATIENT
Start: 2019-05-31 | End: 2019-05-31 | Stop reason: HOSPADM

## 2019-05-31 RX ORDER — PROTAMINE SULFATE 10 MG/ML
INJECTION, SOLUTION INTRAVENOUS AS NEEDED
Status: DISCONTINUED | OUTPATIENT
Start: 2019-05-31 | End: 2019-05-31 | Stop reason: SURG

## 2019-05-31 RX ORDER — HYDROCODONE BITARTRATE AND ACETAMINOPHEN 7.5; 325 MG/1; MG/1
1 TABLET ORAL ONCE AS NEEDED
Status: DISCONTINUED | OUTPATIENT
Start: 2019-05-31 | End: 2019-05-31 | Stop reason: HOSPADM

## 2019-05-31 RX ORDER — MAGNESIUM HYDROXIDE 1200 MG/15ML
LIQUID ORAL AS NEEDED
Status: DISCONTINUED | OUTPATIENT
Start: 2019-05-31 | End: 2019-05-31 | Stop reason: HOSPADM

## 2019-05-31 RX ORDER — BUPIVACAINE HYDROCHLORIDE 5 MG/ML
INJECTION, SOLUTION EPIDURAL; INTRACAUDAL
Status: COMPLETED | OUTPATIENT
Start: 2019-05-31 | End: 2019-05-31

## 2019-05-31 RX ORDER — DIPHENHYDRAMINE HYDROCHLORIDE 50 MG/ML
12.5 INJECTION INTRAMUSCULAR; INTRAVENOUS
Status: DISCONTINUED | OUTPATIENT
Start: 2019-05-31 | End: 2019-05-31 | Stop reason: HOSPADM

## 2019-05-31 RX ORDER — FAMOTIDINE 10 MG/ML
20 INJECTION, SOLUTION INTRAVENOUS ONCE
Status: COMPLETED | OUTPATIENT
Start: 2019-05-31 | End: 2019-05-31

## 2019-05-31 RX ORDER — HEPARIN SODIUM 1000 [USP'U]/ML
INJECTION, SOLUTION INTRAVENOUS; SUBCUTANEOUS AS NEEDED
Status: DISCONTINUED | OUTPATIENT
Start: 2019-05-31 | End: 2019-05-31 | Stop reason: SURG

## 2019-05-31 RX ORDER — MIDAZOLAM HYDROCHLORIDE 1 MG/ML
1 INJECTION INTRAMUSCULAR; INTRAVENOUS
Status: DISCONTINUED | OUTPATIENT
Start: 2019-05-31 | End: 2019-05-31 | Stop reason: HOSPADM

## 2019-05-31 RX ORDER — CEFAZOLIN SODIUM 2 G/100ML
2 INJECTION, SOLUTION INTRAVENOUS ONCE
Status: COMPLETED | OUTPATIENT
Start: 2019-05-31 | End: 2019-05-31

## 2019-05-31 RX ORDER — EPHEDRINE SULFATE 50 MG/ML
5 INJECTION, SOLUTION INTRAVENOUS ONCE AS NEEDED
Status: DISCONTINUED | OUTPATIENT
Start: 2019-05-31 | End: 2019-05-31 | Stop reason: HOSPADM

## 2019-05-31 RX ORDER — DIPHENHYDRAMINE HCL 25 MG
25 CAPSULE ORAL
Status: DISCONTINUED | OUTPATIENT
Start: 2019-05-31 | End: 2019-05-31 | Stop reason: HOSPADM

## 2019-05-31 RX ORDER — HYDRALAZINE HYDROCHLORIDE 20 MG/ML
5 INJECTION INTRAMUSCULAR; INTRAVENOUS
Status: DISCONTINUED | OUTPATIENT
Start: 2019-05-31 | End: 2019-05-31 | Stop reason: HOSPADM

## 2019-05-31 RX ORDER — HYDROCODONE BITARTRATE AND ACETAMINOPHEN 5; 325 MG/1; MG/1
1-2 TABLET ORAL EVERY 8 HOURS PRN
Qty: 30 TABLET | Refills: 0 | Status: ON HOLD | OUTPATIENT
Start: 2019-05-31 | End: 2019-10-28

## 2019-05-31 RX ORDER — HYDROMORPHONE HYDROCHLORIDE 1 MG/ML
0.25 INJECTION, SOLUTION INTRAMUSCULAR; INTRAVENOUS; SUBCUTANEOUS
Status: DISCONTINUED | OUTPATIENT
Start: 2019-05-31 | End: 2019-05-31 | Stop reason: HOSPADM

## 2019-05-31 RX ORDER — NALOXONE HCL 0.4 MG/ML
0.2 VIAL (ML) INJECTION AS NEEDED
Status: DISCONTINUED | OUTPATIENT
Start: 2019-05-31 | End: 2019-05-31 | Stop reason: HOSPADM

## 2019-05-31 RX ORDER — PROPOFOL 10 MG/ML
VIAL (ML) INTRAVENOUS CONTINUOUS PRN
Status: DISCONTINUED | OUTPATIENT
Start: 2019-05-31 | End: 2019-05-31 | Stop reason: SURG

## 2019-05-31 RX ORDER — LIDOCAINE HYDROCHLORIDE 10 MG/ML
0.5 INJECTION, SOLUTION EPIDURAL; INFILTRATION; INTRACAUDAL; PERINEURAL ONCE AS NEEDED
Status: DISCONTINUED | OUTPATIENT
Start: 2019-05-31 | End: 2019-05-31 | Stop reason: HOSPADM

## 2019-05-31 RX ORDER — SODIUM CHLORIDE 9 MG/ML
9 INJECTION, SOLUTION INTRAVENOUS CONTINUOUS
Status: DISCONTINUED | OUTPATIENT
Start: 2019-05-31 | End: 2019-05-31 | Stop reason: HOSPADM

## 2019-05-31 RX ORDER — LIDOCAINE HYDROCHLORIDE 20 MG/ML
INJECTION, SOLUTION INFILTRATION; PERINEURAL AS NEEDED
Status: DISCONTINUED | OUTPATIENT
Start: 2019-05-31 | End: 2019-05-31 | Stop reason: SURG

## 2019-05-31 RX ORDER — ALBUTEROL SULFATE 2.5 MG/3ML
2.5 SOLUTION RESPIRATORY (INHALATION) ONCE AS NEEDED
Status: DISCONTINUED | OUTPATIENT
Start: 2019-05-31 | End: 2019-05-31 | Stop reason: HOSPADM

## 2019-05-31 RX ORDER — FENTANYL CITRATE 50 UG/ML
25 INJECTION, SOLUTION INTRAMUSCULAR; INTRAVENOUS
Status: ACTIVE | OUTPATIENT
Start: 2019-05-31 | End: 2019-05-31

## 2019-05-31 RX ORDER — ACETAMINOPHEN 325 MG/1
650 TABLET ORAL ONCE AS NEEDED
Status: DISCONTINUED | OUTPATIENT
Start: 2019-05-31 | End: 2019-05-31 | Stop reason: HOSPADM

## 2019-05-31 RX ADMIN — FENTANYL CITRATE 25 MCG: 50 INJECTION INTRAMUSCULAR; INTRAVENOUS at 10:55

## 2019-05-31 RX ADMIN — PHENYLEPHRINE HYDROCHLORIDE 200 MCG: 10 INJECTION INTRAVENOUS at 13:50

## 2019-05-31 RX ADMIN — MIDAZOLAM 0.5 MG: 1 INJECTION INTRAMUSCULAR; INTRAVENOUS at 10:57

## 2019-05-31 RX ADMIN — FENTANYL CITRATE 25 MCG: 50 INJECTION INTRAMUSCULAR; INTRAVENOUS at 11:01

## 2019-05-31 RX ADMIN — BUPIVACAINE HYDROCHLORIDE 20 ML: 5 INJECTION, SOLUTION EPIDURAL; INTRACAUDAL; PERINEURAL at 10:50

## 2019-05-31 RX ADMIN — PHENYLEPHRINE HYDROCHLORIDE 100 MCG: 10 INJECTION INTRAVENOUS at 13:30

## 2019-05-31 RX ADMIN — PHENYLEPHRINE HYDROCHLORIDE 100 MCG: 10 INJECTION INTRAVENOUS at 12:45

## 2019-05-31 RX ADMIN — SODIUM CHLORIDE, POTASSIUM CHLORIDE, SODIUM LACTATE AND CALCIUM CHLORIDE 9 ML/HR: 600; 310; 30; 20 INJECTION, SOLUTION INTRAVENOUS at 10:08

## 2019-05-31 RX ADMIN — PROPOFOL 50 MCG/KG/MIN: 10 INJECTION, EMULSION INTRAVENOUS at 12:09

## 2019-05-31 RX ADMIN — CEFAZOLIN SODIUM 2 G: 2 INJECTION, SOLUTION INTRAVENOUS at 12:04

## 2019-05-31 RX ADMIN — HEPARIN SODIUM 5000 UNITS: 1000 INJECTION, SOLUTION INTRAVENOUS; SUBCUTANEOUS at 13:16

## 2019-05-31 RX ADMIN — SODIUM CHLORIDE 9 ML/HR: 9 INJECTION, SOLUTION INTRAVENOUS at 11:12

## 2019-05-31 RX ADMIN — PHENYLEPHRINE HYDROCHLORIDE 100 MCG: 10 INJECTION INTRAVENOUS at 13:10

## 2019-05-31 RX ADMIN — PHENYLEPHRINE HYDROCHLORIDE 100 MCG: 10 INJECTION INTRAVENOUS at 12:55

## 2019-05-31 RX ADMIN — PROTAMINE SULFATE 30 MG: 10 INJECTION, SOLUTION INTRAVENOUS at 13:37

## 2019-05-31 RX ADMIN — LIDOCAINE HYDROCHLORIDE 25 MG: 20 INJECTION, SOLUTION INFILTRATION; PERINEURAL at 12:09

## 2019-05-31 RX ADMIN — MEPIVACAINE HYDROCHLORIDE 10 ML: 15 INJECTION, SOLUTION EPIDURAL; INFILTRATION at 10:50

## 2019-05-31 RX ADMIN — FAMOTIDINE 20 MG: 10 INJECTION INTRAVENOUS at 10:36

## 2019-05-31 RX ADMIN — PHENYLEPHRINE HYDROCHLORIDE 100 MCG: 10 INJECTION INTRAVENOUS at 13:20

## 2019-05-31 RX ADMIN — PHENYLEPHRINE HYDROCHLORIDE 100 MCG: 10 INJECTION INTRAVENOUS at 13:05

## 2019-05-31 NOTE — ANESTHESIA POSTPROCEDURE EVALUATION
"Patient: Charmaine Machuca    Procedure Summary     Date:  05/31/19 Room / Location:  [facility] OR  / [facility] MAIN OR    Anesthesia Start:  1203 Anesthesia Stop:  1412    Procedure:  RIGHT 2ND STAGE BASILIC VEIN CREATION (Right ) Diagnosis:      Surgeon:  Royer Dozier MD Provider:  Obed Wright MD    Anesthesia Type:  regional, MAC ASA Status:  4          Anesthesia Type: regional, MAC  Last vitals  BP   110/60 (05/31/19 1529)   Temp   36.4 °C (97.6 °F) (05/31/19 1406)   Pulse   64 (05/31/19 1529)   Resp   16 (05/31/19 1529)     SpO2   100 % (05/31/19 1529)     Post Anesthesia Care and Evaluation    Patient location during evaluation: bedside  Patient participation: complete - patient participated  Level of consciousness: awake  Pain score: 2  Pain management: adequate  Airway patency: patent  Anesthetic complications: No anesthetic complications    Cardiovascular status: acceptable  Respiratory status: acceptable  Hydration status: acceptable    Comments: /60 (BP Location: Left arm, Patient Position: Lying)   Pulse 64   Temp 36.4 °C (97.6 °F) (Oral)   Resp 16   Ht 170.2 cm (67\")   Wt 78.3 kg (172 lb 9 oz)   SpO2 100%   BMI 27.03 kg/m²         "

## 2019-05-31 NOTE — ANESTHESIA PROCEDURE NOTES
Peripheral Block      Patient location during procedure: holding area  Start time: 5/31/2019 10:51 AM  Reason for block: at surgeon's request and primary anesthetic  Performed by  Anesthesiologist: Yamileth Jett MD  Preanesthetic Checklist  Completed: patient identified, site marked, surgical consent, pre-op evaluation, timeout performed, IV checked, risks and benefits discussed and monitors and equipment checked  Prep:  Sterile barriers:cap, gloves and sterile barriers  Prep: ChloraPrep  Patient monitoring: blood pressure monitoring, continuous pulse oximetry and EKG  Procedure  Sedation:yes  Performed under: PNB  Guidance:ultrasound guided  ULTRASOUND INTERPRETATION. Using ultrasound guidance a gauge needle was placed in close proximity to the brachial plexus nerve, at which point, under ultrasound guidance anesthetic was injected in the area of the nerve and spread of the anesthesia was seen on ultrasound in close proximity thereto.  There were no abnormalities seen on ultrasound; a digital image was taken; and the patient tolerated the procedure with no complications. Images:still images obtained, printed/placed on chart    Laterality:right  Block Type:supraclavicular  Needle Gauge:20 G    Medications Used: bupivacaine (PF) (MARCAINE) 0.5 % injection, 20 mL  mepivacaine (CARBOCAINE) 1.5 % injection, 10 mL  Post Assessment  Patient Tolerance:comfortable throughout block  Complications:no

## 2019-05-31 NOTE — ANESTHESIA PREPROCEDURE EVALUATION
Anesthesia Evaluation                  Airway   Mallampati: III  TM distance: <3 FB  Neck ROM: full  Possible difficult intubation and Small opening  Dental - normal exam     Pulmonary - normal exam   (+) shortness of breath,   Cardiovascular - normal exam    ECG reviewed  Patient on routine beta blocker    (+) pacemaker pacemaker, ICD, valvular problems/murmurs MR, CAD, dysrhythmias, CHF,     ROS comment: cardiomyopathy    Neuro/Psych  (+) numbness,     GI/Hepatic/Renal/Endo    (+)  hiatal hernia, GERD,  renal disease ESRD, hypothyroidism,     Musculoskeletal     (+) chronic pain,   Abdominal    Substance History      OB/GYN          Other                        Anesthesia Plan    ASA 4     regional and MAC     intravenous induction   Anesthetic plan, all risks, benefits, and alternatives have been provided, discussed and informed consent has been obtained with: patient.

## 2019-06-01 ENCOUNTER — HOSPITAL ENCOUNTER (EMERGENCY)
Facility: HOSPITAL | Age: 79
Discharge: HOME | End: 2019-06-01
Attending: EMERGENCY MEDICINE | Admitting: EMERGENCY MEDICINE

## 2019-06-01 VITALS
HEART RATE: 63 BPM | SYSTOLIC BLOOD PRESSURE: 107 MMHG | WEIGHT: 172 LBS | OXYGEN SATURATION: 98 % | HEIGHT: 67 IN | BODY MASS INDEX: 27 KG/M2 | RESPIRATION RATE: 18 BRPM | TEMPERATURE: 98.2 F | DIASTOLIC BLOOD PRESSURE: 50 MMHG

## 2019-06-01 DIAGNOSIS — T82.9XXA COMPLICATION OF ARTERIOVENOUS DIALYSIS FISTULA, INITIAL ENCOUNTER: Primary | ICD-10-CM

## 2019-06-01 PROCEDURE — 99283 EMERGENCY DEPT VISIT LOW MDM: CPT

## 2019-06-01 NOTE — DISCHARGE INSTRUCTIONS
Follow your vascular surgeon's discharge instructions, change bandage daily, home medicine as prescribed, follow up with your surgeon for recheck as scheduled. Return to care with further concerns.

## 2019-06-01 NOTE — ED TRIAGE NOTES
Pt reports she had a fistula placed in her right arm today. Pt reports the incision site started bleeding and she can't get it under control. Pt reports the on call physician told her to wrap it with ace wrap and she still couldn't the bleeding under control.

## 2019-06-01 NOTE — ED PROVIDER NOTES
EMERGENCY DEPARTMENT ENCOUNTER    CHIEF COMPLAINT  Chief Complaint: post op bleeding  History given by: pt  History limited by: nothing  Room Number: 29/29  PMD: Fannie Godfrey MD Dr. Scherrer, surgery    HPI:  Pt is a 79 y.o. female who presents complaining of bleeding post op after getting fistula put in R arm today. Pt called their surgeon, was told to wrap the area w/ ace bandage, but the bleeding continued.     Duration: today  Onset: gradual  Timing: constant  Location: R arm fistula  Quality: bleeding  Intensity/Severity: moderate  Progression: worsening  Treatment before arrival: call w/ surgeon    PAST MEDICAL HISTORY  Active Ambulatory Problems     Diagnosis Date Noted   • Pacemaker lead failure 03/28/2016   • Chronic systolic congestive heart failure (CMS/HCC) 06/15/2016   • Cardiomyopathy (CMS/HCC) 06/15/2016   • Carpal tunnel syndrome 07/19/2016   • Periodic limb movement disorder 07/19/2016   • Peripheral neuropathy 07/19/2016   • Restless legs syndrome 07/19/2016   • Anemia 09/22/2016   • CKD (chronic kidney disease) 11/22/2016   • History of anemia due to chronic kidney disease 11/23/2016   • Iron deficiency anemia 04/13/2017   • Dysuria 07/17/2017   • Chronic adrenal insufficiency (CMS/HCC) 08/17/2013   • 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 08/05/2015   • Chest pain 11/29/2015   • Congestive heart failure (CMS/HCC) 08/15/2013   • Gastroparesis 08/15/2013   • Hypotension 08/15/2013   • Hypothyroidism 08/15/2013   • Myoclonus 08/15/2013   • Osteoarthritis 08/15/2013   • Automatic implantable cardioverter-defibrillator in situ 08/15/2013   • Spondylolisthesis 08/05/2015   • History of total knee replacement 08/15/2013   • Anemia of chronic renal failure, stage 4 (severe) (CMS/HCC) 09/06/2017   • CKD (chronic kidney disease) 09/13/2017   • Anemia of chronic disease 09/26/2017   • Encounter for fitting and adjustment of vascular catheter 10/04/2017     Resolved Ambulatory Problems      Diagnosis Date Noted   • No Resolved Ambulatory Problems     Past Medical History:   Diagnosis Date   • Anemia    • Cardiomyopathy (CMS/HCC)    • CHF (congestive heart failure) (CMS/HCC)    • Chronic renal failure, stage 4 (severe) (CMS/HCC)    • Coronary artery disease    • Dizzy    • Gastroparesis    • GERD (gastroesophageal reflux disease)    • Gout    • Hemorrhoids    • Hiatal hernia    • History of Clostridium difficile colitis 2013   • History of kidney stones    • History of pancreatitis    • History of skin cancer    • History of transfusion    • Hypothyroidism    • Left bundle branch block    • Mitral and aortic valve disease    • Mitral valve insufficiency    • Myoclonus    • Osteoarthritis    • Pancytopenia (CMS/HCC)    • Peripheral neuropathy    • Presence of cardiac pacemaker    • Pulmonary hypertension (CMS/HCC)    • SOB (shortness of breath) on exertion    • Spinal stenosis        PAST SURGICAL HISTORY  Past Surgical History:   Procedure Laterality Date   • APPENDECTOMY N/A    • ARTERIOVENOUS FISTULA/SHUNT SURGERY Right 2/18/2019    Procedure: RIGHT BASILIC FISTULA CREATION WITHOUT TRANSPOSITION;  Surgeon: Royer Dozier MD;  Location: [facility];  Service: Vascular   • CARDIAC CATHETERIZATION Left 03/28/2006    Arterial catheter insertion, cath left ventriculography, coronary angiography and left heart catheterization-Dr. Estevan Matamoros   • CARDIAC DEFIBRILLATOR PLACEMENT N/A 11/30/2007    Biventricular implantable cardioverter defibrillator-Dr. Leandro Huber   • CATARACT EXTRACTION Bilateral 2011   • COLONOSCOPY N/A 2014    done at [facility]   • CYSTECTOMY N/A     Ovarian cystectomy   • ENDOSCOPY N/A 04/28/2006    EGD with biopsies. Paraesophageal hiatal hernia from 34 to 44 cm, antral gastritis-Dr. Nehemiah Beal   • ENDOSCOPY N/A 09/29/2004    Hiatal hernia, gastritis and an erosion of the pylorus-Dr. Yang Pavon   • ENTEROSCOPY SMALL BOWEL N/A 10/30/2006    Small bowel enteroscopy with  biopsies-Dr. Rory Sevilla   • FLEXIBLE SIGMOIDOSCOPY N/A 09/29/2004    Unsuccessful colonoscopy due to poop prep, a very tortuous sigmoid, bowel prep in the form of enema given and a barium enema was obtained-Dr. Yang Pavon   • FRACTURE SURGERY  2015    Broke big toe   • HEMATOMA EVACUATION TRUNK N/A 02/07/2014    Pocket evacuation of hematoma at pacemaker site-Dr. Leandro Huber   • HEMORRHOIDECTOMY N/A 04/19/2004    Flexible sigmoidoscopy and stapled hemorrhoidectomy-Dr. Yang Pavon   • HYSTERECTOMY Bilateral 1977   • IMPLANTABLE CARDIOVERTER DEFIBRILLATOR LEAD REPLACEMENT/POCKET REVISION Left 3/28/2016    Procedure: AUTOMATIC IMPLANTABLE CARDIOVERTER DEFIBRILLATOR LEAD REPLACEMENT WITH LASER LEAD EXTRACTION;  Surgeon: Leandro Huber MD;  Location: [facility] 18/19;  Service:    • IMPLANTABLE CARDIOVERTER DEFIBRILLATOR LEAD REPLACEMENT/POCKET REVISION N/A 01/13/2014    Biventricular ICD replacement-Dr. Jillian Huber   • LAPAROSCOPY REPAIR HIATAL HERNIA N/A 06/27/2006    Laparoscopic paraesophageal hiatal hernia repair with Nissen fundoplication and cholecystectomy-Dr. Sean Yoon   • NATALIE GONZALEZ ([facility]) PROCEDURE     • [place] INSJ TUNNELED CVC W/O SUBQ PORT/ AGE 3 YR/> Right 10/3/2017    Procedure: Right internal jugular port placement;  Surgeon: Tiffanie Couch MD;  Location: [facility];  Service: General   • TOE SURGERY Left     SET BIG TOE   • TOTAL KNEE ARTHROPLASTY Left 08/2014       FAMILY HISTORY  Family History   Problem Relation Age of Onset   • Coronary artery disease Other    • Dementia Other    • Kidney disease Other    • Arthritis Father    • Early death Father         Kidney failure   • Kidney disease Father    • Heart disease Mother    • Mental illness Mother         Dementia   • Malig Hyperthermia Neg Hx        SOCIAL HISTORY  Social History     Socioeconomic History   • Marital status:      Spouse name: Zackery   • Number of children: 5   • Years of  education: 1 yr college   • Highest education level: Not on file   Occupational History     Employer: RETIRED   Tobacco Use   • Smoking status: Never Smoker   • Smokeless tobacco: Never Used   Substance and Sexual Activity   • Alcohol use: No   • Drug use: No   • Sexual activity: Defer       ALLERGIES  Chlorhexidine; Valproic acid; Codeine; and Propoxyphene    REVIEW OF SYSTEMS  Review of Systems   Constitutional: Negative for fever.   HENT: Negative for sore throat.    Eyes: Negative.    Respiratory: Negative for cough and shortness of breath.    Cardiovascular: Negative for chest pain.   Gastrointestinal: Negative for abdominal pain, diarrhea and vomiting.   Genitourinary: Negative for dysuria.   Musculoskeletal: Negative for neck pain.   Skin: Positive for wound. Negative for rash.        (+) bleeding from fistula site post op   Allergic/Immunologic: Negative.    Neurological: Negative for weakness, numbness and headaches.   Hematological: Negative.    Psychiatric/Behavioral: Negative.    All other systems reviewed and are negative.      PHYSICAL EXAM  ED Triage Vitals [06/01/19 0014]   Temp Heart Rate Resp BP SpO2   97.4 °F (36.3 °C) 67 16 -- 97 %      Temp src Heart Rate Source Patient Position BP Location FiO2 (%)   Tympanic Monitor -- -- --       Physical Exam   Constitutional: She is oriented to person, place, and time. No distress.   Eyes: EOM are normal.   Neck: Normal range of motion.   Cardiovascular: Normal rate and regular rhythm.   Pulmonary/Chest: Effort normal and breath sounds normal. No respiratory distress.   Musculoskeletal:   Fistula in RUE w/ surgical incision w/ staples in place. Small amount of bleeding from distal aspect of incision. Surrounding ecchymosis and thrill is present.    Neurological: She is alert and oriented to person, place, and time. She has normal sensation and normal strength.   Skin: Skin is warm and dry.   Psychiatric: Affect normal.   Nursing note and vitals  reviewed.      PROCEDURES  Procedures      PROGRESS AND CONSULTS     0029- Pt is resting comfortably. Discussed the plan to discharge the pt home with thrombus pad and ace wrap on fistula site. I instructed the pt to f/u w/ surgeon and PCP as needed. Pt understands and agrees with the plan, all questions answered.    0100- Discussed the pt with Dr. Gill. After performing their own physical exam of the pt, they agreed with the plan of care.    0110- Rechecked, no further bleeding on bandage.    MEDICAL DECISION MAKING  Results were reviewed/discussed with the patient and they were also made aware of online access. Pt also made aware that some labs, such as cultures, will not be resulted during ER visit and follow up with PMD is necessary.     MDM  Number of Diagnoses or Management Options  Complication of arteriovenous dialysis fistula, initial encounter:      Amount and/or Complexity of Data Reviewed  Decide to obtain previous medical records or to obtain history from someone other than the patient: yes  Review and summarize past medical records: yes  Discuss the patient with other providers: yes (Dr. Gill)           DIAGNOSIS  Final diagnoses:   Complication of arteriovenous dialysis fistula, initial encounter       DISPOSITION  DISCHARGE    Patient discharged in stable condition.    Reviewed implications of results, diagnosis, meds, responsibility to follow up, warning signs and symptoms of possible worsening, potential complications and reasons to return to ER, including worsening sx.    Patient/Family voiced understanding of above instructions.    Discussed plan for discharge, as there is no emergent indication for admission. Patient referred to primary care provider for BP management due to today's BP. Pt/family is agreeable and understands need for follow up and repeat testing.  Pt is aware that discharge does not mean that nothing is wrong but it indicates no emergency is present that requires admission and  they must continue care with follow-up as given below or physician of their choice.     FOLLOW-UP  Royer Dozier MD  [address] [address]  [phone]    Call            Medication List      No changes were made to your prescriptions during this visit.           Latest Documented Vital Signs:  As of 1:27 AM  BP- 107/50 HR- 63 Temp- 98.2 °F (36.8 °C) (Oral) O2 sat- 98%    --  Documentation assistance provided by tia Johnson for Wally Kemp PA-C.  Information recorded by the scribe was done at my direction and has been verified and validated by me.     Kristine Johnson  06/01/19 0109       Wally Kemp PA  06/01/19 0127

## 2019-06-01 NOTE — ED PROVIDER NOTES
MD ATTESTATION NOTE    The ANDREW and I have discussed this patient's history, physical exam, and treatment plan.  I have reviewed the documentation and personally had a face to face interaction with the patient. I affirm the documentation and agree with the treatment and plan.  The attached note describes my personal findings.    Pt with bleeding after having a new AV fistula placed today in the R upper arm. She has not yet had fistula accessed. Pt has had continued bleeding despite applying continued pressure. Pt does not take anticoagulants      Limited physical exam:  Patient is nontoxic appearing and in mild distress  Lungs/cardiovascular: CTAB and RRR. 1+ radial pulse L hand with <1s cap refill.   Back/extremities: AV fistula R upper arm with dressing in place    Area has been dressed with thrombipad. Bleeding is controlled. Will discharge.    Documentation assistance provided by tia Gtz for Dr Gill.  Information recorded by the scribe was done at my direction and has been verified and validated by me.       Tex Gtz  06/01/19 0128       Denzel Gill MD  06/01/19 0258

## 2019-06-06 ENCOUNTER — INFUSION (OUTPATIENT)
Dept: ONCOLOGY | Facility: HOSPITAL | Age: 79
End: 2019-06-06

## 2019-06-06 ENCOUNTER — LAB (OUTPATIENT)
Dept: OTHER | Facility: HOSPITAL | Age: 79
End: 2019-06-06

## 2019-06-06 VITALS
HEART RATE: 71 BPM | DIASTOLIC BLOOD PRESSURE: 72 MMHG | TEMPERATURE: 97.6 F | SYSTOLIC BLOOD PRESSURE: 138 MMHG | BODY MASS INDEX: 27.1 KG/M2 | WEIGHT: 173 LBS | OXYGEN SATURATION: 98 %

## 2019-06-06 DIAGNOSIS — D63.1 ANEMIA OF CHRONIC RENAL FAILURE, STAGE 4 (SEVERE) (HCC): Primary | ICD-10-CM

## 2019-06-06 DIAGNOSIS — N18.4 ANEMIA OF CHRONIC RENAL FAILURE, STAGE 4 (SEVERE) (HCC): ICD-10-CM

## 2019-06-06 DIAGNOSIS — N18.4 ANEMIA OF CHRONIC RENAL FAILURE, STAGE 4 (SEVERE) (HCC): Primary | ICD-10-CM

## 2019-06-06 DIAGNOSIS — D63.1 ANEMIA OF CHRONIC RENAL FAILURE, STAGE 4 (SEVERE) (HCC): ICD-10-CM

## 2019-06-06 LAB
ABO GROUP BLD: NORMAL
BASOPHILS # BLD AUTO: 0.02 10*3/MM3 (ref 0–0.2)
BASOPHILS NFR BLD AUTO: 0.3 % (ref 0–1.5)
BLD GP AB SCN SERPL QL: NEGATIVE
DEPRECATED RDW RBC AUTO: 54.9 FL (ref 37–54)
EOSINOPHIL # BLD AUTO: 0.13 10*3/MM3 (ref 0–0.4)
EOSINOPHIL NFR BLD AUTO: 2 % (ref 0.3–6.2)
ERYTHROCYTE [DISTWIDTH] IN BLOOD BY AUTOMATED COUNT: 15.1 % (ref 12.3–15.4)
HCT VFR BLD AUTO: 25 % (ref 34–46.6)
HGB BLD-MCNC: 7.9 G/DL (ref 12–15.9)
IMM GRANULOCYTES # BLD AUTO: 0.03 10*3/MM3 (ref 0–0.05)
IMM GRANULOCYTES NFR BLD AUTO: 0.5 % (ref 0–0.5)
LYMPHOCYTES # BLD AUTO: 0.93 10*3/MM3 (ref 0.7–3.1)
LYMPHOCYTES NFR BLD AUTO: 14.3 % (ref 19.6–45.3)
MCH RBC QN AUTO: 31.1 PG (ref 26.6–33)
MCHC RBC AUTO-ENTMCNC: 31.6 G/DL (ref 31.5–35.7)
MCV RBC AUTO: 98.4 FL (ref 79–97)
MONOCYTES # BLD AUTO: 0.57 10*3/MM3 (ref 0.1–0.9)
MONOCYTES NFR BLD AUTO: 8.7 % (ref 5–12)
NEUTROPHILS # BLD AUTO: 4.84 10*3/MM3 (ref 1.7–7)
NEUTROPHILS NFR BLD AUTO: 74.2 % (ref 42.7–76)
NRBC BLD AUTO-RTO: 0 /100 WBC (ref 0–0.2)
PLATELET # BLD AUTO: 203 10*3/MM3 (ref 140–450)
PMV BLD AUTO: 9.7 FL (ref 6–12)
RBC # BLD AUTO: 2.54 10*6/MM3 (ref 3.77–5.28)
RH BLD: NEGATIVE
T&S EXPIRATION DATE: NORMAL
WBC NRBC COR # BLD: 6.52 10*3/MM3 (ref 3.4–10.8)

## 2019-06-06 PROCEDURE — 86923 COMPATIBILITY TEST ELECTRIC: CPT

## 2019-06-06 PROCEDURE — 96372 THER/PROPH/DIAG INJ SC/IM: CPT

## 2019-06-06 PROCEDURE — 86901 BLOOD TYPING SEROLOGIC RH(D): CPT | Performed by: NURSE PRACTITIONER

## 2019-06-06 PROCEDURE — 86900 BLOOD TYPING SEROLOGIC ABO: CPT | Performed by: NURSE PRACTITIONER

## 2019-06-06 PROCEDURE — 85025 COMPLETE CBC W/AUTO DIFF WBC: CPT | Performed by: NURSE PRACTITIONER

## 2019-06-06 PROCEDURE — 36415 COLL VENOUS BLD VENIPUNCTURE: CPT

## 2019-06-06 PROCEDURE — 25010000002 EPOETIN ALFA PER 1000 UNITS: Performed by: NURSE PRACTITIONER

## 2019-06-06 PROCEDURE — 86850 RBC ANTIBODY SCREEN: CPT | Performed by: NURSE PRACTITIONER

## 2019-06-06 RX ORDER — SODIUM CHLORIDE 9 MG/ML
250 INJECTION, SOLUTION INTRAVENOUS AS NEEDED
Status: CANCELLED | OUTPATIENT
Start: 2019-06-08

## 2019-06-06 RX ORDER — ACETAMINOPHEN 325 MG/1
650 TABLET ORAL ONCE
Status: CANCELLED | OUTPATIENT
Start: 2019-06-08 | End: 2019-06-06

## 2019-06-06 RX ORDER — DIPHENHYDRAMINE HCL 25 MG
25 CAPSULE ORAL ONCE
Status: CANCELLED | OUTPATIENT
Start: 2019-06-08 | End: 2019-06-06

## 2019-06-06 RX ADMIN — ERYTHROPOIETIN 38000 UNITS: 20000 INJECTION, SOLUTION INTRAVENOUS; SUBCUTANEOUS at 10:49

## 2019-06-06 NOTE — PROGRESS NOTES
lovely arrived today for procrit today. Dose was not reduced because patient received a blood transfusion.HGB was 7.9 today and procrit given and patient set up for blood transfusion on Saturday. Patient states post op bleeding after shunt placement last week.

## 2019-06-08 ENCOUNTER — INFUSION (OUTPATIENT)
Dept: ONCOLOGY | Facility: HOSPITAL | Age: 79
End: 2019-06-08

## 2019-06-08 VITALS
HEART RATE: 77 BPM | RESPIRATION RATE: 20 BRPM | DIASTOLIC BLOOD PRESSURE: 65 MMHG | OXYGEN SATURATION: 98 % | SYSTOLIC BLOOD PRESSURE: 106 MMHG | TEMPERATURE: 97 F

## 2019-06-08 DIAGNOSIS — Z45.2 ENCOUNTER FOR FITTING AND ADJUSTMENT OF VASCULAR CATHETER: ICD-10-CM

## 2019-06-08 DIAGNOSIS — N18.4 ANEMIA OF CHRONIC RENAL FAILURE, STAGE 4 (SEVERE) (HCC): Primary | ICD-10-CM

## 2019-06-08 DIAGNOSIS — D63.1 ANEMIA OF CHRONIC RENAL FAILURE, STAGE 4 (SEVERE) (HCC): Primary | ICD-10-CM

## 2019-06-08 PROCEDURE — 86900 BLOOD TYPING SEROLOGIC ABO: CPT

## 2019-06-08 PROCEDURE — 36430 TRANSFUSION BLD/BLD COMPNT: CPT

## 2019-06-08 PROCEDURE — 63710000001 DIPHENHYDRAMINE PER 50 MG: Performed by: NURSE PRACTITIONER

## 2019-06-08 PROCEDURE — P9016 RBC LEUKOCYTES REDUCED: HCPCS

## 2019-06-08 RX ORDER — SODIUM CHLORIDE 0.9 % (FLUSH) 0.9 %
10 SYRINGE (ML) INJECTION AS NEEDED
Status: DISCONTINUED | OUTPATIENT
Start: 2019-06-08 | End: 2019-06-08 | Stop reason: HOSPADM

## 2019-06-08 RX ORDER — SODIUM CHLORIDE 0.9 % (FLUSH) 0.9 %
10 SYRINGE (ML) INJECTION AS NEEDED
Status: CANCELLED | OUTPATIENT
Start: 2019-06-08

## 2019-06-08 RX ORDER — ACETAMINOPHEN 325 MG/1
650 TABLET ORAL ONCE
Status: COMPLETED | OUTPATIENT
Start: 2019-06-08 | End: 2019-06-08

## 2019-06-08 RX ORDER — DIPHENHYDRAMINE HCL 25 MG
25 CAPSULE ORAL ONCE
Status: COMPLETED | OUTPATIENT
Start: 2019-06-08 | End: 2019-06-08

## 2019-06-08 RX ORDER — SODIUM CHLORIDE 9 MG/ML
250 INJECTION, SOLUTION INTRAVENOUS AS NEEDED
Status: DISCONTINUED | OUTPATIENT
Start: 2019-06-08 | End: 2019-06-08 | Stop reason: HOSPADM

## 2019-06-08 RX ADMIN — Medication 500 UNITS: at 12:46

## 2019-06-08 RX ADMIN — ACETAMINOPHEN 650 MG: 325 TABLET, FILM COATED ORAL at 07:30

## 2019-06-08 RX ADMIN — DIPHENHYDRAMINE HYDROCHLORIDE 25 MG: 25 CAPSULE ORAL at 07:30

## 2019-06-08 RX ADMIN — Medication 10 ML: at 12:46

## 2019-06-11 DIAGNOSIS — N18.4 ANEMIA DUE TO STAGE 4 CHRONIC KIDNEY DISEASE (HCC): ICD-10-CM

## 2019-06-11 DIAGNOSIS — D63.1 ANEMIA DUE TO STAGE 4 CHRONIC KIDNEY DISEASE (HCC): ICD-10-CM

## 2019-06-11 DIAGNOSIS — N18.4 STAGE 4 CHRONIC KIDNEY DISEASE (HCC): Primary | ICD-10-CM

## 2019-06-13 ENCOUNTER — INFUSION (OUTPATIENT)
Dept: ONCOLOGY | Facility: HOSPITAL | Age: 79
End: 2019-06-13

## 2019-06-13 ENCOUNTER — LAB (OUTPATIENT)
Dept: OTHER | Facility: HOSPITAL | Age: 79
End: 2019-06-13

## 2019-06-13 VITALS
HEART RATE: 72 BPM | BODY MASS INDEX: 27.19 KG/M2 | TEMPERATURE: 97.7 F | DIASTOLIC BLOOD PRESSURE: 72 MMHG | WEIGHT: 173.6 LBS | SYSTOLIC BLOOD PRESSURE: 143 MMHG | OXYGEN SATURATION: 98 %

## 2019-06-13 DIAGNOSIS — D63.1 ANEMIA OF CHRONIC RENAL FAILURE, STAGE 4 (SEVERE) (HCC): Primary | ICD-10-CM

## 2019-06-13 DIAGNOSIS — N18.4 ANEMIA DUE TO STAGE 4 CHRONIC KIDNEY DISEASE (HCC): ICD-10-CM

## 2019-06-13 DIAGNOSIS — N18.4 ANEMIA OF CHRONIC RENAL FAILURE, STAGE 4 (SEVERE) (HCC): Primary | ICD-10-CM

## 2019-06-13 DIAGNOSIS — N18.4 STAGE 4 CHRONIC KIDNEY DISEASE (HCC): ICD-10-CM

## 2019-06-13 DIAGNOSIS — D63.1 ANEMIA DUE TO STAGE 4 CHRONIC KIDNEY DISEASE (HCC): ICD-10-CM

## 2019-06-13 LAB
BASOPHILS # BLD AUTO: 0.02 10*3/MM3 (ref 0–0.2)
BASOPHILS NFR BLD AUTO: 0.3 % (ref 0–1.5)
DEPRECATED RDW RBC AUTO: 55.5 FL (ref 37–54)
EOSINOPHIL # BLD AUTO: 0.07 10*3/MM3 (ref 0–0.4)
EOSINOPHIL NFR BLD AUTO: 1 % (ref 0.3–6.2)
ERYTHROCYTE [DISTWIDTH] IN BLOOD BY AUTOMATED COUNT: 16 % (ref 12.3–15.4)
HCT VFR BLD AUTO: 30.6 % (ref 34–46.6)
HGB BLD-MCNC: 9.6 G/DL (ref 12–15.9)
IMM GRANULOCYTES # BLD AUTO: 0.03 10*3/MM3 (ref 0–0.05)
IMM GRANULOCYTES NFR BLD AUTO: 0.4 % (ref 0–0.5)
LYMPHOCYTES # BLD AUTO: 0.76 10*3/MM3 (ref 0.7–3.1)
LYMPHOCYTES NFR BLD AUTO: 11 % (ref 19.6–45.3)
MCH RBC QN AUTO: 30.8 PG (ref 26.6–33)
MCHC RBC AUTO-ENTMCNC: 31.4 G/DL (ref 31.5–35.7)
MCV RBC AUTO: 98.1 FL (ref 79–97)
MONOCYTES # BLD AUTO: 0.45 10*3/MM3 (ref 0.1–0.9)
MONOCYTES NFR BLD AUTO: 6.5 % (ref 5–12)
NEUTROPHILS # BLD AUTO: 5.6 10*3/MM3 (ref 1.7–7)
NEUTROPHILS NFR BLD AUTO: 80.8 % (ref 42.7–76)
NRBC BLD AUTO-RTO: 0.3 /100 WBC (ref 0–0.2)
PLATELET # BLD AUTO: 255 10*3/MM3 (ref 140–450)
PMV BLD AUTO: 9.4 FL (ref 6–12)
RBC # BLD AUTO: 3.12 10*6/MM3 (ref 3.77–5.28)
WBC NRBC COR # BLD: 6.93 10*3/MM3 (ref 3.4–10.8)

## 2019-06-13 PROCEDURE — 25010000002 EPOETIN ALFA PER 1000 UNITS: Performed by: NURSE PRACTITIONER

## 2019-06-13 PROCEDURE — 36415 COLL VENOUS BLD VENIPUNCTURE: CPT

## 2019-06-13 PROCEDURE — 96372 THER/PROPH/DIAG INJ SC/IM: CPT

## 2019-06-13 PROCEDURE — 85025 COMPLETE CBC W/AUTO DIFF WBC: CPT | Performed by: INTERNAL MEDICINE

## 2019-06-13 RX ADMIN — ERYTHROPOIETIN 38000 UNITS: 20000 INJECTION, SOLUTION INTRAVENOUS; SUBCUTANEOUS at 10:36

## 2019-06-20 ENCOUNTER — LAB (OUTPATIENT)
Dept: OTHER | Facility: HOSPITAL | Age: 79
End: 2019-06-20

## 2019-06-20 ENCOUNTER — INFUSION (OUTPATIENT)
Dept: ONCOLOGY | Facility: HOSPITAL | Age: 79
End: 2019-06-20

## 2019-06-20 VITALS
SYSTOLIC BLOOD PRESSURE: 119 MMHG | WEIGHT: 173 LBS | HEART RATE: 80 BPM | OXYGEN SATURATION: 99 % | BODY MASS INDEX: 27.1 KG/M2 | DIASTOLIC BLOOD PRESSURE: 62 MMHG | TEMPERATURE: 97.9 F

## 2019-06-20 DIAGNOSIS — D63.1 ANEMIA OF CHRONIC RENAL FAILURE, STAGE 4 (SEVERE) (HCC): ICD-10-CM

## 2019-06-20 DIAGNOSIS — N18.4 ANEMIA OF CHRONIC RENAL FAILURE, STAGE 4 (SEVERE) (HCC): Primary | ICD-10-CM

## 2019-06-20 DIAGNOSIS — N18.4 ANEMIA OF CHRONIC RENAL FAILURE, STAGE 4 (SEVERE) (HCC): ICD-10-CM

## 2019-06-20 DIAGNOSIS — D63.1 ANEMIA OF CHRONIC RENAL FAILURE, STAGE 4 (SEVERE) (HCC): Primary | ICD-10-CM

## 2019-06-20 LAB
BASOPHILS # BLD AUTO: 0.03 10*3/MM3 (ref 0–0.2)
BASOPHILS NFR BLD AUTO: 0.5 % (ref 0–1.5)
DEPRECATED RDW RBC AUTO: 57.2 FL (ref 37–54)
EOSINOPHIL # BLD AUTO: 0.1 10*3/MM3 (ref 0–0.4)
EOSINOPHIL NFR BLD AUTO: 1.7 % (ref 0.3–6.2)
ERYTHROCYTE [DISTWIDTH] IN BLOOD BY AUTOMATED COUNT: 16 % (ref 12.3–15.4)
HCT VFR BLD AUTO: 28 % (ref 34–46.6)
HGB BLD-MCNC: 8.9 G/DL (ref 12–15.9)
IMM GRANULOCYTES # BLD AUTO: 0.06 10*3/MM3 (ref 0–0.05)
IMM GRANULOCYTES NFR BLD AUTO: 1 % (ref 0–0.5)
LYMPHOCYTES # BLD AUTO: 0.84 10*3/MM3 (ref 0.7–3.1)
LYMPHOCYTES NFR BLD AUTO: 14.5 % (ref 19.6–45.3)
MCH RBC QN AUTO: 31 PG (ref 26.6–33)
MCHC RBC AUTO-ENTMCNC: 31.8 G/DL (ref 31.5–35.7)
MCV RBC AUTO: 97.6 FL (ref 79–97)
MONOCYTES # BLD AUTO: 0.73 10*3/MM3 (ref 0.1–0.9)
MONOCYTES NFR BLD AUTO: 12.6 % (ref 5–12)
NEUTROPHILS # BLD AUTO: 4.03 10*3/MM3 (ref 1.7–7)
NEUTROPHILS NFR BLD AUTO: 69.7 % (ref 42.7–76)
NRBC BLD AUTO-RTO: 0 /100 WBC (ref 0–0.2)
PLATELET # BLD AUTO: 207 10*3/MM3 (ref 140–450)
PMV BLD AUTO: 10.1 FL (ref 6–12)
RBC # BLD AUTO: 2.87 10*6/MM3 (ref 3.77–5.28)
WBC NRBC COR # BLD: 5.79 10*3/MM3 (ref 3.4–10.8)

## 2019-06-20 PROCEDURE — 25010000002 EPOETIN ALFA PER 1000 UNITS: Performed by: NURSE PRACTITIONER

## 2019-06-20 PROCEDURE — 36415 COLL VENOUS BLD VENIPUNCTURE: CPT

## 2019-06-20 PROCEDURE — 96372 THER/PROPH/DIAG INJ SC/IM: CPT

## 2019-06-20 PROCEDURE — 85025 COMPLETE CBC W/AUTO DIFF WBC: CPT | Performed by: NURSE PRACTITIONER

## 2019-06-20 RX ADMIN — ERYTHROPOIETIN 38000 UNITS: 20000 INJECTION, SOLUTION INTRAVENOUS; SUBCUTANEOUS at 10:53

## 2019-06-21 ENCOUNTER — APPOINTMENT (OUTPATIENT)
Dept: GENERAL RADIOLOGY | Facility: HOSPITAL | Age: 79
End: 2019-06-21

## 2019-06-21 ENCOUNTER — HOSPITAL ENCOUNTER (EMERGENCY)
Facility: HOSPITAL | Age: 79
Discharge: HOME | End: 2019-06-21
Attending: EMERGENCY MEDICINE | Admitting: EMERGENCY MEDICINE

## 2019-06-21 VITALS
BODY MASS INDEX: 26.68 KG/M2 | WEIGHT: 170 LBS | HEIGHT: 67 IN | TEMPERATURE: 98.5 F | SYSTOLIC BLOOD PRESSURE: 132 MMHG | DIASTOLIC BLOOD PRESSURE: 74 MMHG | RESPIRATION RATE: 16 BRPM | HEART RATE: 78 BPM | OXYGEN SATURATION: 96 %

## 2019-06-21 DIAGNOSIS — M25.551 PAIN OF RIGHT HIP JOINT: Primary | ICD-10-CM

## 2019-06-21 PROCEDURE — 99283 EMERGENCY DEPT VISIT LOW MDM: CPT

## 2019-06-21 PROCEDURE — 73502 X-RAY EXAM HIP UNI 2-3 VIEWS: CPT

## 2019-06-21 PROCEDURE — 72110 X-RAY EXAM L-2 SPINE 4/>VWS: CPT

## 2019-06-21 RX ORDER — HYDROCODONE BITARTRATE AND ACETAMINOPHEN 7.5; 325 MG/1; MG/1
1 TABLET ORAL ONCE
Status: COMPLETED | OUTPATIENT
Start: 2019-06-21 | End: 2019-06-21

## 2019-06-21 RX ADMIN — HYDROCODONE BITARTRATE AND ACETAMINOPHEN 1 TABLET: 7.5; 325 TABLET ORAL at 13:27

## 2019-06-24 DIAGNOSIS — N18.4 ANEMIA OF CHRONIC RENAL FAILURE, STAGE 4 (SEVERE) (HCC): Primary | ICD-10-CM

## 2019-06-24 DIAGNOSIS — D63.1 ANEMIA OF CHRONIC RENAL FAILURE, STAGE 4 (SEVERE) (HCC): Primary | ICD-10-CM

## 2019-06-25 ENCOUNTER — APPOINTMENT (OUTPATIENT)
Dept: ONCOLOGY | Facility: HOSPITAL | Age: 79
End: 2019-06-25

## 2019-06-25 ENCOUNTER — APPOINTMENT (OUTPATIENT)
Dept: ONCOLOGY | Facility: CLINIC | Age: 79
End: 2019-06-25

## 2019-06-25 ENCOUNTER — APPOINTMENT (OUTPATIENT)
Dept: OTHER | Facility: HOSPITAL | Age: 79
End: 2019-06-25

## 2019-06-27 ENCOUNTER — APPOINTMENT (OUTPATIENT)
Dept: ONCOLOGY | Facility: HOSPITAL | Age: 79
End: 2019-06-27

## 2019-06-27 ENCOUNTER — APPOINTMENT (OUTPATIENT)
Dept: OTHER | Facility: HOSPITAL | Age: 79
End: 2019-06-27

## 2019-06-27 ENCOUNTER — INFUSION (OUTPATIENT)
Dept: ONCOLOGY | Facility: HOSPITAL | Age: 79
End: 2019-06-27

## 2019-06-27 ENCOUNTER — OFFICE VISIT (OUTPATIENT)
Dept: ONCOLOGY | Facility: CLINIC | Age: 79
End: 2019-06-27

## 2019-06-27 VITALS
OXYGEN SATURATION: 94 % | TEMPERATURE: 97.4 F | HEIGHT: 66 IN | BODY MASS INDEX: 27.58 KG/M2 | DIASTOLIC BLOOD PRESSURE: 72 MMHG | RESPIRATION RATE: 16 BRPM | SYSTOLIC BLOOD PRESSURE: 120 MMHG | HEART RATE: 71 BPM | WEIGHT: 171.6 LBS

## 2019-06-27 DIAGNOSIS — D50.8 OTHER IRON DEFICIENCY ANEMIA: ICD-10-CM

## 2019-06-27 DIAGNOSIS — Z45.2 ENCOUNTER FOR FITTING AND ADJUSTMENT OF VASCULAR CATHETER: ICD-10-CM

## 2019-06-27 DIAGNOSIS — E27.40 CHRONIC ADRENAL INSUFFICIENCY (HCC): ICD-10-CM

## 2019-06-27 DIAGNOSIS — R79.9 ABNORMAL FINDING OF BLOOD CHEMISTRY: ICD-10-CM

## 2019-06-27 DIAGNOSIS — D63.1 ANEMIA OF CHRONIC RENAL FAILURE, STAGE 4 (SEVERE) (HCC): ICD-10-CM

## 2019-06-27 DIAGNOSIS — K31.84 GASTROPARESIS: ICD-10-CM

## 2019-06-27 DIAGNOSIS — I50.22 CHRONIC SYSTOLIC CONGESTIVE HEART FAILURE (HCC): ICD-10-CM

## 2019-06-27 DIAGNOSIS — N18.4 ANEMIA OF CHRONIC RENAL FAILURE, STAGE 4 (SEVERE) (HCC): ICD-10-CM

## 2019-06-27 DIAGNOSIS — N18.4 ANEMIA DUE TO STAGE 4 CHRONIC KIDNEY DISEASE (HCC): ICD-10-CM

## 2019-06-27 DIAGNOSIS — G62.9 PERIPHERAL POLYNEUROPATHY: ICD-10-CM

## 2019-06-27 DIAGNOSIS — D50.8 OTHER IRON DEFICIENCY ANEMIA: Primary | ICD-10-CM

## 2019-06-27 DIAGNOSIS — D63.1 ANEMIA DUE TO STAGE 4 CHRONIC KIDNEY DISEASE (HCC): ICD-10-CM

## 2019-06-27 DIAGNOSIS — N18.4 STAGE 4 CHRONIC KIDNEY DISEASE (HCC): ICD-10-CM

## 2019-06-27 DIAGNOSIS — IMO0001 ADVERSE EFFECT OF IRON OR ITS COMPOUND, SUBSEQUENT ENCOUNTER: ICD-10-CM

## 2019-06-27 DIAGNOSIS — R30.0 DYSURIA: ICD-10-CM

## 2019-06-27 DIAGNOSIS — I42.9 CARDIOMYOPATHY, UNSPECIFIED TYPE (HCC): ICD-10-CM

## 2019-06-27 DIAGNOSIS — G62.9 NERVE DISEASE, PERIPHERAL: Primary | ICD-10-CM

## 2019-06-27 LAB
ABO GROUP BLD: NORMAL
BASOPHILS # BLD AUTO: 0.01 10*3/MM3 (ref 0–0.2)
BASOPHILS NFR BLD AUTO: 0.3 % (ref 0–1.5)
BLD GP AB SCN SERPL QL: NEGATIVE
DEPRECATED RDW RBC AUTO: 55.1 FL (ref 37–54)
EOSINOPHIL # BLD AUTO: 0.08 10*3/MM3 (ref 0–0.4)
EOSINOPHIL NFR BLD AUTO: 2.1 % (ref 0.3–6.2)
ERYTHROCYTE [DISTWIDTH] IN BLOOD BY AUTOMATED COUNT: 15.3 % (ref 12.3–15.4)
FERRITIN SERPL-MCNC: 25.8 NG/ML (ref 13–150)
HBA1C MFR BLD: 4.72 % (ref 4.8–5.6)
HCT VFR BLD AUTO: 21 % (ref 34–46.6)
HGB BLD-MCNC: 6.4 G/DL (ref 12–15.9)
IMM GRANULOCYTES # BLD AUTO: 0.01 10*3/MM3 (ref 0–0.05)
IMM GRANULOCYTES NFR BLD AUTO: 0.3 % (ref 0–0.5)
IRON 24H UR-MRATE: 28 MCG/DL (ref 37–145)
IRON SATN MFR SERPL: 8 % (ref 20–50)
LYMPHOCYTES # BLD AUTO: 0.55 10*3/MM3 (ref 0.7–3.1)
LYMPHOCYTES NFR BLD AUTO: 14.7 % (ref 19.6–45.3)
MCH RBC QN AUTO: 30 PG (ref 26.6–33)
MCHC RBC AUTO-ENTMCNC: 30.5 G/DL (ref 31.5–35.7)
MCV RBC AUTO: 98.6 FL (ref 79–97)
MONOCYTES # BLD AUTO: 0.41 10*3/MM3 (ref 0.1–0.9)
MONOCYTES NFR BLD AUTO: 11 % (ref 5–12)
NEUTROPHILS # BLD AUTO: 2.68 10*3/MM3 (ref 1.7–7)
NEUTROPHILS NFR BLD AUTO: 71.6 % (ref 42.7–76)
NRBC BLD AUTO-RTO: 0 /100 WBC (ref 0–0.2)
PLATELET # BLD AUTO: 214 10*3/MM3 (ref 140–450)
PMV BLD AUTO: 9.4 FL (ref 6–12)
RBC # BLD AUTO: 2.13 10*6/MM3 (ref 3.77–5.28)
RH BLD: NEGATIVE
T&S EXPIRATION DATE: NORMAL
TIBC SERPL-MCNC: 362 MCG/DL (ref 298–536)
TRANSFERRIN SERPL-MCNC: 243 MG/DL (ref 200–360)
VIT B12 BLD-MCNC: 646 PG/ML (ref 211–946)
WBC NRBC COR # BLD: 3.74 10*3/MM3 (ref 3.4–10.8)

## 2019-06-27 PROCEDURE — 82784 ASSAY IGA/IGD/IGG/IGM EACH: CPT | Performed by: PSYCHIATRY & NEUROLOGY

## 2019-06-27 PROCEDURE — 86901 BLOOD TYPING SEROLOGIC RH(D): CPT | Performed by: INTERNAL MEDICINE

## 2019-06-27 PROCEDURE — 86850 RBC ANTIBODY SCREEN: CPT | Performed by: INTERNAL MEDICINE

## 2019-06-27 PROCEDURE — 85025 COMPLETE CBC W/AUTO DIFF WBC: CPT | Performed by: INTERNAL MEDICINE

## 2019-06-27 PROCEDURE — 84155 ASSAY OF PROTEIN SERUM: CPT | Performed by: PSYCHIATRY & NEUROLOGY

## 2019-06-27 PROCEDURE — 84165 PROTEIN E-PHORESIS SERUM: CPT | Performed by: PSYCHIATRY & NEUROLOGY

## 2019-06-27 PROCEDURE — 83036 HEMOGLOBIN GLYCOSYLATED A1C: CPT | Performed by: PSYCHIATRY & NEUROLOGY

## 2019-06-27 PROCEDURE — 82728 ASSAY OF FERRITIN: CPT | Performed by: INTERNAL MEDICINE

## 2019-06-27 PROCEDURE — 84466 ASSAY OF TRANSFERRIN: CPT | Performed by: INTERNAL MEDICINE

## 2019-06-27 PROCEDURE — 63710000001 PROCHLORPERAZINE MALEATE PER 10 MG: Performed by: INTERNAL MEDICINE

## 2019-06-27 PROCEDURE — 36415 COLL VENOUS BLD VENIPUNCTURE: CPT

## 2019-06-27 PROCEDURE — 99214 OFFICE O/P EST MOD 30 MIN: CPT | Performed by: INTERNAL MEDICINE

## 2019-06-27 PROCEDURE — 96374 THER/PROPH/DIAG INJ IV PUSH: CPT | Performed by: INTERNAL MEDICINE

## 2019-06-27 PROCEDURE — 82607 VITAMIN B-12: CPT | Performed by: PSYCHIATRY & NEUROLOGY

## 2019-06-27 PROCEDURE — 83540 ASSAY OF IRON: CPT | Performed by: INTERNAL MEDICINE

## 2019-06-27 PROCEDURE — 86334 IMMUNOFIX E-PHORESIS SERUM: CPT | Performed by: PSYCHIATRY & NEUROLOGY

## 2019-06-27 PROCEDURE — 86923 COMPATIBILITY TEST ELECTRIC: CPT

## 2019-06-27 PROCEDURE — A9270 NON-COVERED ITEM OR SERVICE: HCPCS | Performed by: INTERNAL MEDICINE

## 2019-06-27 PROCEDURE — 84425 ASSAY OF VITAMIN B-1: CPT | Performed by: PSYCHIATRY & NEUROLOGY

## 2019-06-27 PROCEDURE — 86900 BLOOD TYPING SEROLOGIC ABO: CPT | Performed by: INTERNAL MEDICINE

## 2019-06-27 PROCEDURE — 25010000002 FERRIC CARBOXYMALTOSE 750 MG/15ML SOLUTION 15 ML VIAL: Performed by: INTERNAL MEDICINE

## 2019-06-27 PROCEDURE — 82525 ASSAY OF COPPER: CPT | Performed by: PSYCHIATRY & NEUROLOGY

## 2019-06-27 RX ORDER — ACETAMINOPHEN 325 MG/1
650 TABLET ORAL ONCE
Status: CANCELLED | OUTPATIENT
Start: 2019-06-29 | End: 2019-06-27

## 2019-06-27 RX ORDER — SODIUM CHLORIDE 0.9 % (FLUSH) 0.9 %
10 SYRINGE (ML) INJECTION AS NEEDED
Status: CANCELLED | OUTPATIENT
Start: 2019-06-27

## 2019-06-27 RX ORDER — PROCHLORPERAZINE MALEATE 10 MG
10 TABLET ORAL ONCE
Status: COMPLETED | OUTPATIENT
Start: 2019-06-27 | End: 2019-06-27

## 2019-06-27 RX ORDER — DIPHENHYDRAMINE HCL 25 MG
25 CAPSULE ORAL ONCE
Status: CANCELLED | OUTPATIENT
Start: 2019-06-29 | End: 2019-06-27

## 2019-06-27 RX ORDER — PROCHLORPERAZINE MALEATE 10 MG
10 TABLET ORAL ONCE
Status: CANCELLED
Start: 2019-06-27 | End: 2019-06-27

## 2019-06-27 RX ORDER — SODIUM CHLORIDE 9 MG/ML
250 INJECTION, SOLUTION INTRAVENOUS AS NEEDED
Status: CANCELLED | OUTPATIENT
Start: 2019-06-29

## 2019-06-27 RX ORDER — SODIUM CHLORIDE 9 MG/ML
250 INJECTION, SOLUTION INTRAVENOUS ONCE
Status: COMPLETED | OUTPATIENT
Start: 2019-06-27 | End: 2019-06-27

## 2019-06-27 RX ORDER — SODIUM CHLORIDE 9 MG/ML
250 INJECTION, SOLUTION INTRAVENOUS ONCE
Status: CANCELLED | OUTPATIENT
Start: 2019-06-27

## 2019-06-27 RX ORDER — SODIUM CHLORIDE 0.9 % (FLUSH) 0.9 %
10 SYRINGE (ML) INJECTION AS NEEDED
Status: DISCONTINUED | OUTPATIENT
Start: 2019-06-27 | End: 2019-06-27 | Stop reason: HOSPADM

## 2019-06-27 RX ADMIN — FERRIC CARBOXYMALTOSE INJECTION 750 MG: 50 INJECTION, SOLUTION INTRAVENOUS at 11:23

## 2019-06-27 RX ADMIN — Medication 10 ML: at 12:10

## 2019-06-27 RX ADMIN — PROCHLORPERAZINE MALEATE 10 MG: 10 TABLET, FILM COATED ORAL at 11:23

## 2019-06-27 RX ADMIN — SODIUM CHLORIDE, PRESERVATIVE FREE 500 UNITS: 5 INJECTION INTRAVENOUS at 12:10

## 2019-06-27 RX ADMIN — SODIUM CHLORIDE 250 ML: 900 INJECTION, SOLUTION INTRAVENOUS at 11:23

## 2019-06-27 NOTE — PROGRESS NOTES
Orders entered for 2 units PRBCs with referral to [facility] for transfusions.  Supportive plan placed for 2 doses of Injectafer 750 mg IV x 2 doses and premedication of Compazine 10 mg PO to be given prior to each dose, first dose to be today.  Labs entered as ordered in scheduling notes with CBC to be drawn with second dose of Injectafer to be scheduled for next week.  STAT Ferritin, iron profile and CBC to be drawn in 2 weeks, CBC with MD visit in 2 months and ferritin, iron profile and reticulated hemoglobin to be drawn one week prior to MD visit in 2 months per scheduling notes Dr. Merritt

## 2019-06-27 NOTE — PROGRESS NOTES
Patient requested the Port Needle be left in as she is receiving another infusion on Saturday. Port was flushed with 10 ml normal saline flush with brisk blood return and then Heparinized per protocol.

## 2019-06-27 NOTE — PROGRESS NOTES
.     REASONS FOR FOLLOWUP: Anemia    HISTORY OF PRESENT ILLNESS:  The patient is a 79 y.o. year old female  who is here for follow-up with the above-mentioned history.    Feels more tired and more dizzy.  Has had some dark stools recently.  Has not seen blood in the stools.  States she is overdue to see Dr. Earl Avelar and plans to call him for a follow-up.    Past Medical History:   Diagnosis Date   • Anemia     Iron deficiency   • Cardiomyopathy (CMS/HCC)     S/P pacemaker and defibrillator   • CHF (congestive heart failure) (CMS/HCC)    • Chronic renal failure, stage 4 (severe) (CMS/HCC)    • Coronary artery disease     pacemaker, defibrillator   • Dizzy    • Gastroparesis    • GERD (gastroesophageal reflux disease)    • Gout    • Hemorrhoids    • Hiatal hernia    • History of Clostridium difficile colitis 2013   • History of kidney stones    • History of pancreatitis     2014   • History of skin cancer    • History of transfusion     MULTIPLE    • Hypothyroidism    • Left bundle branch block    • Mitral and aortic valve disease    • Mitral valve insufficiency    • Myoclonus     S/P Depakote   • Osteoarthritis    • Pancytopenia (CMS/HCC)    • Peripheral neuropathy    • Presence of cardiac pacemaker     AND DEFIBRILLATOR   • Pulmonary hypertension (CMS/HCC)    • SOB (shortness of breath) on exertion    • Spinal stenosis      Past Surgical History:   Procedure Laterality Date   • APPENDECTOMY N/A    • ARTERIOVENOUS FISTULA/SHUNT SURGERY Right 2/18/2019    Procedure: RIGHT BASILIC FISTULA CREATION WITHOUT TRANSPOSITION;  Surgeon: Royer Dozier MD;  Location: [facility];  Service: Vascular   • ARTERIOVENOUS FISTULA/SHUNT SURGERY Right 5/31/2019    Procedure: RIGHT 2ND STAGE BASILIC VEIN CREATION;  Surgeon: Royer Dozier MD;  Location: [facility] OR;  Service: Vascular   • CARDIAC CATHETERIZATION Left 03/28/2006    Arterial catheter insertion, cath left ventriculography, coronary angiography and left  heart catheterization-Dr. Estevan Matamoros   • CARDIAC DEFIBRILLATOR PLACEMENT N/A 11/30/2007    Biventricular implantable cardioverter defibrillator-Dr. Leandro Huber   • CATARACT EXTRACTION Bilateral 2011   • COLONOSCOPY N/A 2014    done at [facility]   • CYSTECTOMY N/A     Ovarian cystectomy   • ENDOSCOPY N/A 04/28/2006    EGD with biopsies. Paraesophageal hiatal hernia from 34 to 44 cm, antral gastritis-Dr. Nehemiah Beal   • ENDOSCOPY N/A 09/29/2004    Hiatal hernia, gastritis and an erosion of the pylorus-Dr. Yang Pavon   • ENTEROSCOPY SMALL BOWEL N/A 10/30/2006    Small bowel enteroscopy with biopsies-Dr. Rory Sevilla   • FLEXIBLE SIGMOIDOSCOPY N/A 09/29/2004    Unsuccessful colonoscopy due to poop prep, a very tortuous sigmoid, bowel prep in the form of enema given and a barium enema was obtained-Dr. Yang Pavon   • FRACTURE SURGERY  2015    Broke big toe   • HEMATOMA EVACUATION TRUNK N/A 02/07/2014    Pocket evacuation of hematoma at pacemaker site-Dr. Leandro Huber   • HEMORRHOIDECTOMY N/A 04/19/2004    Flexible sigmoidoscopy and stapled hemorrhoidectomy-Dr. Yang Pavon   • HYSTERECTOMY Bilateral 1977   • IMPLANTABLE CARDIOVERTER DEFIBRILLATOR LEAD REPLACEMENT/POCKET REVISION Left 3/28/2016    Procedure: AUTOMATIC IMPLANTABLE CARDIOVERTER DEFIBRILLATOR LEAD REPLACEMENT WITH LASER LEAD EXTRACTION;  Surgeon: Leandro Huber MD;  Location: [facility] 18/19;  Service:    • IMPLANTABLE CARDIOVERTER DEFIBRILLATOR LEAD REPLACEMENT/POCKET REVISION N/A 01/13/2014    Biventricular ICD replacement-Dr. Jillian Huber   • LAPAROSCOPY REPAIR HIATAL HERNIA N/A 06/27/2006    Laparoscopic paraesophageal hiatal hernia repair with Nissen fundoplication and cholecystectomy-Dr. Sean Yoon   • NATALIE GONZALEZ ([facility]) PROCEDURE     • TX INSJ TUNNELED CVC W/O SUBQ PORT/ AGE 3 YR/> Right 10/3/2017    Procedure: Right internal jugular port placement;  Surgeon: Tiffanie Couch MD;  Location: [facility]  MAIN OR;  Service: General   • TOE SURGERY Left     SET BIG TOE   • TOTAL KNEE ARTHROPLASTY Left 08/2014       MEDICATIONS    Current Outpatient Medications:   •  aspirin 81 MG tablet, Take 81 mg by mouth Daily., Disp: , Rfl:   •  calcitriol (ROCALTROL) 0.25 MCG capsule, Take 0.25 mcg by mouth Every Other Day. 1 tablet every other day and 2 tablets every other day, Disp: , Rfl:   •  Calcium Carbonate (CALCIUM 600 PO), Take 600 mg by mouth Daily., Disp: , Rfl:   •  carbidopa-levodopa ER (SINEMET CR)  MG per tablet, Take 1 tablet by mouth Every Evening., Disp: , Rfl:   •  carvedilol (COREG) 6.25 MG tablet, Take 6.25 mg by mouth Daily., Disp: , Rfl:   •  Cholecalciferol (VITAMIN D3) 5000 units capsule capsule, Take 5,000 Units by mouth Daily., Disp: , Rfl:   •  diazepam (VALIUM) 5 MG tablet, Take 5 mg by mouth Every Night., Disp: , Rfl:   •  febuxostat (ULORIC) 40 MG tablet, Take 40 mg by mouth Daily., Disp: , Rfl:   •  furosemide (LASIX) 40 MG tablet, Take 1 tablet by mouth Daily., Disp: 90 tablet, Rfl: 3  •  Gabapentin, Once-Daily, (GRALISE) 300 MG tablet, Take 300 mg by mouth Daily With Dinner. MAY REPEAT LATE EVENING IF NEEDED, Disp: , Rfl:   •  Glucosamine-Chondroit-Vit C-Mn (GLUCOSAMINE CHONDROITIN COMPLX) capsule, Take 1,500 mg by mouth Every Other Day. PT HOLDING FOR SURGERY, Disp: , Rfl:   •  HYDROcodone-acetaminophen (NORCO) 5-325 MG per tablet, Take 1-2 tablets by mouth Every 8 (Eight) Hours As Needed for Moderate Pain  or Severe Pain  (Pain)., Disp: 30 tablet, Rfl: 0  •  levothyroxine (SYNTHROID, LEVOTHROID) 25 MCG tablet, Take 25 mcg by mouth Daily., Disp: , Rfl:   •  loperamide (IMODIUM) 2 MG capsule, Take 2 mg by mouth 4 (Four) Times a Day As Needed for Diarrhea., Disp: , Rfl:   •  magnesium oxide 250 MG tablet, Take 250 mg by mouth Daily., Disp: , Rfl:   •  methscopolamine (PAMINE FORTE) 5 MG tablet, Take 5 mg by mouth Every Morning., Disp: , Rfl:   •  methylPREDNISolone (MEDROL) 4 MG tablet, Take  "4 mg by mouth As Needed. TAKES FOR GOUT FLARE UPS, Disp: , Rfl:   •  Multiple Vitamin (MULTI-DAY VITAMINS) tablet, Take 1 tablet by mouth Daily. HOLD FOR SURGERY, Disp: , Rfl:   •  psyllium (METAMUCIL) 58.6 % powder, Take 1 packet by mouth Daily., Disp: , Rfl:   •  rotigotine (NEUPRO) 6 MG/24HR patch, Place 1 patch on the skin as directed by provider Daily., Disp: , Rfl:   •  spironolactone (ALDACTONE) 25 MG tablet, Take 25 mg by mouth Daily., Disp: , Rfl:   •  Vitamin E 400 UNITS tablet, Take 400 Units by mouth Daily. PT HOLDING FOR SURGERY, Disp: , Rfl:   No current facility-administered medications for this visit.     Facility-Administered Medications Ordered in Other Visits:   •  heparin flush (porcine) 100 UNIT/ML injection 500 Units, 500 Units, Intravenous, PRN, Anuel Scott MD, 500 Units at 11/27/17 1347  •  sodium chloride 0.9 % flush 10 mL, 10 mL, Intravenous, PRN, Anuel Scott MD, 10 mL at 11/27/17 1347    ALLERGIES:     Allergies   Allergen Reactions   • Chlorhexidine Rash and Itching     Patient states \"blue dye\" in chlorhexidine.  States the orange and clear are OK to use   • Valproic Acid Other (See Comments)     Made her extremely sleepy  Made her extremely sleepy   • Codeine Other (See Comments)     HYPER, DOES NOT HELP PAIN   • Propoxyphene Other (See Comments)     Belligerent, acting drunk  Belligerent, acting drunk       SOCIAL HISTORY:       Social History     Socioeconomic History   • Marital status:      Spouse name: Zackery   • Number of children: 5   • Years of education: 1 yr college   • Highest education level: Not on file   Occupational History     Employer: RETIRED   Tobacco Use   • Smoking status: Never Smoker   • Smokeless tobacco: Never Used   Substance and Sexual Activity   • Alcohol use: No   • Drug use: No   • Sexual activity: Defer         FAMILY HISTORY:  Family History   Problem Relation Age of Onset   • Coronary artery disease Other    • Dementia Other    • " "Kidney disease Other    • Arthritis Father    • Early death Father         Kidney failure   • Kidney disease Father    • Heart disease Mother    • Mental illness Mother         Dementia   • Malig Hyperthermia Neg Hx        REVIEW OF SYSTEMS:  Review of Systems   Constitutional: Positive for fatigue. Negative for activity change.   HENT: Negative for nosebleeds and trouble swallowing.    Respiratory: Negative for shortness of breath and wheezing.    Cardiovascular: Negative for chest pain and palpitations.   Gastrointestinal: Negative for constipation, diarrhea and nausea.   Genitourinary: Negative for dysuria and hematuria.   Musculoskeletal: Negative for arthralgias and myalgias.   Skin: Negative for rash and wound.   Neurological: Negative for seizures and syncope.   Hematological: Negative for adenopathy. Does not bruise/bleed easily.   Psychiatric/Behavioral: Negative for confusion.            Vitals:    06/27/19 1015   BP: 120/72   Pulse: 71   Resp: 16   Temp: 97.4 °F (36.3 °C)   SpO2: 94%   Weight: 77.8 kg (171 lb 9.6 oz)   Height: 168 cm (66.14\")   PainSc:   2   PainLoc: Comment: back and hands     Current Status 6/27/2019   ECOG score 0        PHYSICAL EXAM:    CONSTITUTIONAL:  Vital signs reviewed.  No distress, looks comfortable.  EYES:  Conjunctiva and lids unremarkable.  PERRLA  EARS,NOSE,MOUTH,THROAT:  Ears and nose appear unremarkable.  Lips, teeth, gums appear unremarkable.  RESPIRATORY:  Normal respiratory effort.  Lungs clear to auscultation bilaterally.  CARDIOVASCULAR:  Normal S1, S2.  No murmurs rubs or gallops.  No significant lower extremity edema.  GASTROINTESTINAL: Abdomen appears unremarkable.  Nontender.  No hepatomegaly.  No splenomegaly.  LYMPHATIC:  No cervical, supraclavicular, axillary lymphadenopathy.  SKIN:  Warm.  No rashes.  PSYCHIATRIC:  Normal judgment and insight.  Normal mood and affect.      RECENT LABS:        WBC   Date/Time Value Ref Range Status   06/27/2019 09:34 AM 3.74 " 3.40 - 10.80 10*3/mm3 Final     Hemoglobin   Date/Time Value Ref Range Status   06/27/2019 09:34 AM 6.4 (C) 12.0 - 15.9 g/dL Final     Platelets   Date/Time Value Ref Range Status   06/27/2019 09:34  140 - 450 10*3/mm3 Final       Assessment/Plan   Other iron deficiency anemia    *Anemia due to stage IV chronic kidney disease.  Weekly Procrit if Hb <10.    *Iron deficiency anemia.  Does not respond to oral iron due to poor absorption.  Ferritin 25.  8% saturation.  Hb down to 6.4.    *Source of iron deficiency.  Follows regularly with Dr. Earl Avelar.  States she cannot have colonoscopies due to tortuous colon and therefore has virtual colonoscopies.  Dark stools recently.  I recommended she follow-up with Dr. Avelar.  She states she will do so.    Plan  2 doses Injectafer, first dose today if possible.  Weekly CBC with Procrit if Hb <10  MD 2 months.  1 week prior: Iron labs  2 unit PRBC transfusion     assisted with history.  This was my first time meeting the patient as she previously saw Dr. Scott of our practice.  I reviewed and summarized records.  I also discussed this case with Nisa Caputo who is seen the patient in the past.

## 2019-06-28 LAB
ALBUMIN SERPL-MCNC: 2.4 G/DL (ref 2.9–4.4)
ALBUMIN/GLOB SERPL: 1.5 {RATIO} (ref 0.7–1.7)
ALPHA1 GLOB FLD ELPH-MCNC: 0.1 G/DL (ref 0–0.4)
ALPHA2 GLOB SERPL ELPH-MCNC: 0.4 G/DL (ref 0.4–1)
B-GLOBULIN SERPL ELPH-MCNC: 0.6 G/DL (ref 0.7–1.3)
GAMMA GLOB SERPL ELPH-MCNC: 0.5 G/DL (ref 0.4–1.8)
GLOBULIN SER CALC-MCNC: 1.6 G/DL (ref 2.2–3.9)
IGA SERPL-MCNC: 132 MG/DL (ref 64–422)
IGG SERPL-MCNC: 752 MG/DL (ref 700–1600)
IGM SERPL-MCNC: 58 MG/DL (ref 26–217)
Lab: ABNORMAL
M-SPIKE: ABNORMAL G/DL
PROT PATTERN SERPL IFE-IMP: NORMAL
PROT SERPL-MCNC: 4 G/DL (ref 6–8.5)

## 2019-06-29 ENCOUNTER — INFUSION (OUTPATIENT)
Dept: ONCOLOGY | Facility: HOSPITAL | Age: 79
End: 2019-06-29

## 2019-06-29 VITALS
HEART RATE: 65 BPM | DIASTOLIC BLOOD PRESSURE: 46 MMHG | SYSTOLIC BLOOD PRESSURE: 97 MMHG | OXYGEN SATURATION: 98 % | TEMPERATURE: 98 F | RESPIRATION RATE: 18 BRPM

## 2019-06-29 DIAGNOSIS — D63.1 ANEMIA OF CHRONIC RENAL FAILURE, STAGE 4 (SEVERE) (HCC): ICD-10-CM

## 2019-06-29 DIAGNOSIS — N18.4 ANEMIA OF CHRONIC RENAL FAILURE, STAGE 4 (SEVERE) (HCC): ICD-10-CM

## 2019-06-29 DIAGNOSIS — Z45.2 ENCOUNTER FOR FITTING AND ADJUSTMENT OF VASCULAR CATHETER: ICD-10-CM

## 2019-06-29 DIAGNOSIS — D50.8 OTHER IRON DEFICIENCY ANEMIA: Primary | ICD-10-CM

## 2019-06-29 LAB — COPPER SERPL-MCNC: 98 UG/DL (ref 72–166)

## 2019-06-29 PROCEDURE — P9016 RBC LEUKOCYTES REDUCED: HCPCS

## 2019-06-29 PROCEDURE — 63710000001 DIPHENHYDRAMINE PER 50 MG: Performed by: INTERNAL MEDICINE

## 2019-06-29 PROCEDURE — 36430 TRANSFUSION BLD/BLD COMPNT: CPT

## 2019-06-29 PROCEDURE — 86900 BLOOD TYPING SEROLOGIC ABO: CPT

## 2019-06-29 RX ORDER — SODIUM CHLORIDE 0.9 % (FLUSH) 0.9 %
10 SYRINGE (ML) INJECTION AS NEEDED
Status: CANCELLED | OUTPATIENT
Start: 2019-06-29

## 2019-06-29 RX ORDER — DIPHENHYDRAMINE HCL 25 MG
25 CAPSULE ORAL ONCE
Status: COMPLETED | OUTPATIENT
Start: 2019-06-29 | End: 2019-06-29

## 2019-06-29 RX ORDER — ACETAMINOPHEN 325 MG/1
650 TABLET ORAL ONCE
Status: COMPLETED | OUTPATIENT
Start: 2019-06-29 | End: 2019-06-29

## 2019-06-29 RX ORDER — SODIUM CHLORIDE 0.9 % (FLUSH) 0.9 %
10 SYRINGE (ML) INJECTION AS NEEDED
Status: DISCONTINUED | OUTPATIENT
Start: 2019-06-29 | End: 2019-06-29 | Stop reason: HOSPADM

## 2019-06-29 RX ORDER — SODIUM CHLORIDE 9 MG/ML
250 INJECTION, SOLUTION INTRAVENOUS AS NEEDED
Status: DISCONTINUED | OUTPATIENT
Start: 2019-06-29 | End: 2019-06-29 | Stop reason: HOSPADM

## 2019-06-29 RX ADMIN — DIPHENHYDRAMINE HYDROCHLORIDE 25 MG: 25 CAPSULE ORAL at 08:15

## 2019-06-29 RX ADMIN — SODIUM CHLORIDE, PRESERVATIVE FREE 10 ML: 5 INJECTION INTRAVENOUS at 13:08

## 2019-06-29 RX ADMIN — Medication 500 UNITS: at 13:09

## 2019-06-29 RX ADMIN — ACETAMINOPHEN 650 MG: 325 TABLET, FILM COATED ORAL at 08:15

## 2019-07-01 DIAGNOSIS — IMO0001 ADVERSE EFFECT OF IRON OR ITS COMPOUND, SUBSEQUENT ENCOUNTER: ICD-10-CM

## 2019-07-01 DIAGNOSIS — D63.1 ANEMIA OF CHRONIC RENAL FAILURE, STAGE 4 (SEVERE) (HCC): ICD-10-CM

## 2019-07-01 DIAGNOSIS — N18.4 ANEMIA OF CHRONIC RENAL FAILURE, STAGE 4 (SEVERE) (HCC): ICD-10-CM

## 2019-07-01 DIAGNOSIS — D50.8 OTHER IRON DEFICIENCY ANEMIA: ICD-10-CM

## 2019-07-01 DIAGNOSIS — N18.4 STAGE 4 CHRONIC KIDNEY DISEASE (HCC): ICD-10-CM

## 2019-07-03 LAB — VIT B1 BLD-SCNC: 105.4 NMOL/L (ref 66.5–200)

## 2019-07-05 ENCOUNTER — INFUSION (OUTPATIENT)
Dept: ONCOLOGY | Facility: HOSPITAL | Age: 79
End: 2019-07-05

## 2019-07-05 VITALS
DIASTOLIC BLOOD PRESSURE: 72 MMHG | OXYGEN SATURATION: 99 % | WEIGHT: 174.2 LBS | SYSTOLIC BLOOD PRESSURE: 113 MMHG | BODY MASS INDEX: 28 KG/M2 | TEMPERATURE: 98.2 F | HEART RATE: 78 BPM

## 2019-07-05 DIAGNOSIS — N18.4 STAGE 4 CHRONIC KIDNEY DISEASE (HCC): Primary | ICD-10-CM

## 2019-07-05 DIAGNOSIS — N18.4 ANEMIA OF CHRONIC RENAL FAILURE, STAGE 4 (SEVERE) (HCC): ICD-10-CM

## 2019-07-05 DIAGNOSIS — D50.8 OTHER IRON DEFICIENCY ANEMIA: ICD-10-CM

## 2019-07-05 DIAGNOSIS — Z45.2 ENCOUNTER FOR FITTING AND ADJUSTMENT OF VASCULAR CATHETER: ICD-10-CM

## 2019-07-05 DIAGNOSIS — D63.1 ANEMIA OF CHRONIC RENAL FAILURE, STAGE 4 (SEVERE) (HCC): ICD-10-CM

## 2019-07-05 DIAGNOSIS — IMO0001 ADVERSE EFFECT OF IRON OR ITS COMPOUND, SUBSEQUENT ENCOUNTER: ICD-10-CM

## 2019-07-05 LAB
ABO GROUP BLD: NORMAL
BASOPHILS # BLD AUTO: 0.01 10*3/MM3 (ref 0–0.2)
BASOPHILS NFR BLD AUTO: 0.2 % (ref 0–1.5)
BLD GP AB SCN SERPL QL: NEGATIVE
DEPRECATED RDW RBC AUTO: 60.9 FL (ref 37–54)
EOSINOPHIL # BLD AUTO: 0.08 10*3/MM3 (ref 0–0.4)
EOSINOPHIL NFR BLD AUTO: 1.5 % (ref 0.3–6.2)
ERYTHROCYTE [DISTWIDTH] IN BLOOD BY AUTOMATED COUNT: 17 % (ref 12.3–15.4)
HCT VFR BLD AUTO: 23 % (ref 34–46.6)
HGB BLD-MCNC: 7 G/DL (ref 12–15.9)
IMM GRANULOCYTES # BLD AUTO: 0.02 10*3/MM3 (ref 0–0.05)
IMM GRANULOCYTES NFR BLD AUTO: 0.4 % (ref 0–0.5)
LYMPHOCYTES # BLD AUTO: 0.63 10*3/MM3 (ref 0.7–3.1)
LYMPHOCYTES NFR BLD AUTO: 12 % (ref 19.6–45.3)
MCH RBC QN AUTO: 30.2 PG (ref 26.6–33)
MCHC RBC AUTO-ENTMCNC: 30.4 G/DL (ref 31.5–35.7)
MCV RBC AUTO: 99.1 FL (ref 79–97)
MONOCYTES # BLD AUTO: 0.35 10*3/MM3 (ref 0.1–0.9)
MONOCYTES NFR BLD AUTO: 6.7 % (ref 5–12)
NEUTROPHILS # BLD AUTO: 4.16 10*3/MM3 (ref 1.7–7)
NEUTROPHILS NFR BLD AUTO: 79.2 % (ref 42.7–76)
NRBC BLD AUTO-RTO: 0 /100 WBC (ref 0–0.2)
PLATELET # BLD AUTO: 170 10*3/MM3 (ref 140–450)
PMV BLD AUTO: 9.1 FL (ref 6–12)
RBC # BLD AUTO: 2.32 10*6/MM3 (ref 3.77–5.28)
RH BLD: NEGATIVE
T&S EXPIRATION DATE: NORMAL
WBC NRBC COR # BLD: 5.25 10*3/MM3 (ref 3.4–10.8)

## 2019-07-05 PROCEDURE — 63710000001 PROCHLORPERAZINE MALEATE PER 10 MG: Performed by: INTERNAL MEDICINE

## 2019-07-05 PROCEDURE — 86900 BLOOD TYPING SEROLOGIC ABO: CPT | Performed by: NURSE PRACTITIONER

## 2019-07-05 PROCEDURE — 36415 COLL VENOUS BLD VENIPUNCTURE: CPT

## 2019-07-05 PROCEDURE — 85025 COMPLETE CBC W/AUTO DIFF WBC: CPT | Performed by: INTERNAL MEDICINE

## 2019-07-05 PROCEDURE — 25010000002 FERRIC CARBOXYMALTOSE 750 MG/15ML SOLUTION 15 ML VIAL: Performed by: INTERNAL MEDICINE

## 2019-07-05 PROCEDURE — 83883 ASSAY NEPHELOMETRY NOT SPEC: CPT | Performed by: PSYCHIATRY & NEUROLOGY

## 2019-07-05 PROCEDURE — 86850 RBC ANTIBODY SCREEN: CPT | Performed by: NURSE PRACTITIONER

## 2019-07-05 PROCEDURE — 86901 BLOOD TYPING SEROLOGIC RH(D): CPT | Performed by: NURSE PRACTITIONER

## 2019-07-05 PROCEDURE — 96374 THER/PROPH/DIAG INJ IV PUSH: CPT | Performed by: NURSE PRACTITIONER

## 2019-07-05 PROCEDURE — 86923 COMPATIBILITY TEST ELECTRIC: CPT

## 2019-07-05 RX ORDER — SODIUM CHLORIDE 0.9 % (FLUSH) 0.9 %
10 SYRINGE (ML) INJECTION AS NEEDED
Status: CANCELLED | OUTPATIENT
Start: 2019-07-05

## 2019-07-05 RX ORDER — SODIUM CHLORIDE 9 MG/ML
250 INJECTION, SOLUTION INTRAVENOUS AS NEEDED
Status: CANCELLED | OUTPATIENT
Start: 2019-07-06

## 2019-07-05 RX ORDER — DIPHENHYDRAMINE HCL 25 MG
25 CAPSULE ORAL ONCE
Status: CANCELLED | OUTPATIENT
Start: 2019-07-06 | End: 2019-07-05

## 2019-07-05 RX ORDER — SODIUM CHLORIDE 9 MG/ML
250 INJECTION, SOLUTION INTRAVENOUS ONCE
Status: COMPLETED | OUTPATIENT
Start: 2019-07-05 | End: 2019-07-05

## 2019-07-05 RX ORDER — ACETAMINOPHEN 325 MG/1
650 TABLET ORAL ONCE
Status: CANCELLED | OUTPATIENT
Start: 2019-07-06 | End: 2019-07-05

## 2019-07-05 RX ORDER — SODIUM CHLORIDE 0.9 % (FLUSH) 0.9 %
10 SYRINGE (ML) INJECTION AS NEEDED
Status: DISCONTINUED | OUTPATIENT
Start: 2019-07-05 | End: 2019-07-05 | Stop reason: HOSPADM

## 2019-07-05 RX ORDER — PROCHLORPERAZINE MALEATE 10 MG
10 TABLET ORAL ONCE
Status: COMPLETED | OUTPATIENT
Start: 2019-07-05 | End: 2019-07-05

## 2019-07-05 RX ADMIN — Medication 500 UNITS: at 12:05

## 2019-07-05 RX ADMIN — SODIUM CHLORIDE 250 ML: 900 INJECTION, SOLUTION INTRAVENOUS at 11:04

## 2019-07-05 RX ADMIN — PROCHLORPERAZINE MALEATE 10 MG: 10 TABLET, FILM COATED ORAL at 11:04

## 2019-07-05 RX ADMIN — FERRIC CARBOXYMALTOSE INJECTION 750 MG: 50 INJECTION, SOLUTION INTRAVENOUS at 11:10

## 2019-07-05 RX ADMIN — SODIUM CHLORIDE, PRESERVATIVE FREE 10 ML: 5 INJECTION INTRAVENOUS at 12:05

## 2019-07-05 NOTE — PROGRESS NOTES
Orders entered for two units PRBCs and referral to [facility]U per communication from Ekta Perez RN, ELADIO Lloyd APRN

## 2019-07-05 NOTE — PROGRESS NOTES
CBC results reviewed with Nisa Lloyd APRN, hgb 7.0. Verbal order received to give 2 units of pRBC. Spoke with clinical RN,  Leanne Argueta who will place orders. Pt informed and instructed to leave blood bank arm band on until received transfusions. Pt vu  Pt given supplies and instructed on collecting 24 hr urine, pt vu

## 2019-07-06 ENCOUNTER — INFUSION (OUTPATIENT)
Dept: ONCOLOGY | Facility: HOSPITAL | Age: 79
End: 2019-07-06

## 2019-07-06 VITALS
RESPIRATION RATE: 18 BRPM | OXYGEN SATURATION: 99 % | SYSTOLIC BLOOD PRESSURE: 96 MMHG | DIASTOLIC BLOOD PRESSURE: 45 MMHG | TEMPERATURE: 97.2 F | HEART RATE: 67 BPM

## 2019-07-06 DIAGNOSIS — D63.1 ANEMIA OF CHRONIC RENAL FAILURE, STAGE 4 (SEVERE) (HCC): ICD-10-CM

## 2019-07-06 DIAGNOSIS — N18.4 STAGE 4 CHRONIC KIDNEY DISEASE (HCC): Primary | ICD-10-CM

## 2019-07-06 DIAGNOSIS — N18.4 ANEMIA OF CHRONIC RENAL FAILURE, STAGE 4 (SEVERE) (HCC): ICD-10-CM

## 2019-07-06 DIAGNOSIS — D50.8 OTHER IRON DEFICIENCY ANEMIA: ICD-10-CM

## 2019-07-06 DIAGNOSIS — Z45.2 ENCOUNTER FOR FITTING AND ADJUSTMENT OF VASCULAR CATHETER: ICD-10-CM

## 2019-07-06 PROCEDURE — P9016 RBC LEUKOCYTES REDUCED: HCPCS

## 2019-07-06 PROCEDURE — 81050 URINALYSIS VOLUME MEASURE: CPT | Performed by: PSYCHIATRY & NEUROLOGY

## 2019-07-06 PROCEDURE — 86900 BLOOD TYPING SEROLOGIC ABO: CPT

## 2019-07-06 PROCEDURE — 83883 ASSAY NEPHELOMETRY NOT SPEC: CPT | Performed by: PSYCHIATRY & NEUROLOGY

## 2019-07-06 PROCEDURE — 63710000001 DIPHENHYDRAMINE PER 50 MG: Performed by: NURSE PRACTITIONER

## 2019-07-06 PROCEDURE — 36430 TRANSFUSION BLD/BLD COMPNT: CPT

## 2019-07-06 RX ORDER — SODIUM CHLORIDE 0.9 % (FLUSH) 0.9 %
10 SYRINGE (ML) INJECTION AS NEEDED
Status: DISCONTINUED | OUTPATIENT
Start: 2019-07-06 | End: 2019-07-06 | Stop reason: HOSPADM

## 2019-07-06 RX ORDER — SODIUM CHLORIDE 9 MG/ML
250 INJECTION, SOLUTION INTRAVENOUS AS NEEDED
Status: DISCONTINUED | OUTPATIENT
Start: 2019-07-06 | End: 2019-07-06 | Stop reason: HOSPADM

## 2019-07-06 RX ORDER — ACETAMINOPHEN 325 MG/1
650 TABLET ORAL ONCE
Status: COMPLETED | OUTPATIENT
Start: 2019-07-06 | End: 2019-07-06

## 2019-07-06 RX ORDER — DIPHENHYDRAMINE HCL 25 MG
25 CAPSULE ORAL ONCE
Status: COMPLETED | OUTPATIENT
Start: 2019-07-06 | End: 2019-07-06

## 2019-07-06 RX ORDER — SODIUM CHLORIDE 0.9 % (FLUSH) 0.9 %
10 SYRINGE (ML) INJECTION AS NEEDED
Status: CANCELLED | OUTPATIENT
Start: 2019-07-06

## 2019-07-06 RX ADMIN — DIPHENHYDRAMINE HYDROCHLORIDE 25 MG: 25 CAPSULE ORAL at 07:43

## 2019-07-06 RX ADMIN — ACETAMINOPHEN 650 MG: 325 TABLET, FILM COATED ORAL at 07:43

## 2019-07-08 LAB
KAPPA LC SERPL-MCNC: 77.7 MG/L (ref 3.3–19.4)
KAPPA LC/LAMBDA SER: 1.28 {RATIO} (ref 0.26–1.65)
LAMBDA LC FREE SERPL-MCNC: 60.6 MG/L (ref 5.7–26.3)

## 2019-07-11 ENCOUNTER — CLINICAL SUPPORT (OUTPATIENT)
Dept: ONCOLOGY | Facility: HOSPITAL | Age: 79
End: 2019-07-11

## 2019-07-11 ENCOUNTER — LAB (OUTPATIENT)
Dept: ONCOLOGY | Facility: HOSPITAL | Age: 79
End: 2019-07-11

## 2019-07-11 DIAGNOSIS — Z45.2 ENCOUNTER FOR FITTING AND ADJUSTMENT OF VASCULAR CATHETER: ICD-10-CM

## 2019-07-11 DIAGNOSIS — D63.1 ANEMIA OF CHRONIC RENAL FAILURE, STAGE 4 (SEVERE) (HCC): ICD-10-CM

## 2019-07-11 DIAGNOSIS — E53.8 B12 DEFICIENCY: ICD-10-CM

## 2019-07-11 DIAGNOSIS — D50.8 OTHER IRON DEFICIENCY ANEMIA: ICD-10-CM

## 2019-07-11 DIAGNOSIS — N18.4 ANEMIA OF CHRONIC RENAL FAILURE, STAGE 4 (SEVERE) (HCC): Primary | ICD-10-CM

## 2019-07-11 DIAGNOSIS — G62.9 PERIPHERAL NERVE DISORDER: Primary | ICD-10-CM

## 2019-07-11 DIAGNOSIS — N18.4 STAGE 4 CHRONIC KIDNEY DISEASE (HCC): ICD-10-CM

## 2019-07-11 DIAGNOSIS — D63.1 ANEMIA OF CHRONIC RENAL FAILURE, STAGE 4 (SEVERE) (HCC): Primary | ICD-10-CM

## 2019-07-11 DIAGNOSIS — IMO0001 ADVERSE EFFECT OF IRON OR ITS COMPOUND, SUBSEQUENT ENCOUNTER: ICD-10-CM

## 2019-07-11 DIAGNOSIS — N18.4 ANEMIA OF CHRONIC RENAL FAILURE, STAGE 4 (SEVERE) (HCC): ICD-10-CM

## 2019-07-11 LAB
ABO GROUP BLD: NORMAL
BASOPHILS # BLD AUTO: 0.01 10*3/MM3 (ref 0–0.2)
BASOPHILS NFR BLD AUTO: 0.2 % (ref 0–1.5)
BLD GP AB SCN SERPL QL: NEGATIVE
DEPRECATED RDW RBC AUTO: 59.3 FL (ref 37–54)
EOSINOPHIL # BLD AUTO: 0.08 10*3/MM3 (ref 0–0.4)
EOSINOPHIL NFR BLD AUTO: 1.2 % (ref 0.3–6.2)
ERYTHROCYTE [DISTWIDTH] IN BLOOD BY AUTOMATED COUNT: 16.7 % (ref 12.3–15.4)
FERRITIN SERPL-MCNC: 580 NG/ML (ref 13–150)
FOLATE SERPL-MCNC: >20 NG/ML (ref 4.78–24.2)
HCT VFR BLD AUTO: 22.1 % (ref 34–46.6)
HGB BLD-MCNC: 7.1 G/DL (ref 12–15.9)
IMM GRANULOCYTES # BLD AUTO: 0.02 10*3/MM3 (ref 0–0.05)
IMM GRANULOCYTES NFR BLD AUTO: 0.3 % (ref 0–0.5)
IRON 24H UR-MRATE: 108 MCG/DL (ref 37–145)
IRON SATN MFR SERPL: 35 % (ref 20–50)
LYMPHOCYTES # BLD AUTO: 0.6 10*3/MM3 (ref 0.7–3.1)
LYMPHOCYTES NFR BLD AUTO: 9.2 % (ref 19.6–45.3)
MCH RBC QN AUTO: 31.8 PG (ref 26.6–33)
MCHC RBC AUTO-ENTMCNC: 32.1 G/DL (ref 31.5–35.7)
MCV RBC AUTO: 99.1 FL (ref 79–97)
MONOCYTES # BLD AUTO: 0.46 10*3/MM3 (ref 0.1–0.9)
MONOCYTES NFR BLD AUTO: 7.1 % (ref 5–12)
NEUTROPHILS # BLD AUTO: 5.34 10*3/MM3 (ref 1.7–7)
NEUTROPHILS NFR BLD AUTO: 82 % (ref 42.7–76)
NRBC BLD AUTO-RTO: 0 /100 WBC (ref 0–0.2)
PLATELET # BLD AUTO: 177 10*3/MM3 (ref 140–450)
PMV BLD AUTO: 9.5 FL (ref 6–12)
RBC # BLD AUTO: 2.23 10*6/MM3 (ref 3.77–5.28)
RH BLD: NEGATIVE
T&S EXPIRATION DATE: NORMAL
TIBC SERPL-MCNC: 305 MCG/DL (ref 298–536)
TRANSFERRIN SERPL-MCNC: 205 MG/DL (ref 200–360)
WBC NRBC COR # BLD: 6.51 10*3/MM3 (ref 3.4–10.8)

## 2019-07-11 PROCEDURE — 84466 ASSAY OF TRANSFERRIN: CPT | Performed by: INTERNAL MEDICINE

## 2019-07-11 PROCEDURE — 96523 IRRIG DRUG DELIVERY DEVICE: CPT | Performed by: NURSE PRACTITIONER

## 2019-07-11 PROCEDURE — 86850 RBC ANTIBODY SCREEN: CPT | Performed by: NURSE PRACTITIONER

## 2019-07-11 PROCEDURE — 86923 COMPATIBILITY TEST ELECTRIC: CPT

## 2019-07-11 PROCEDURE — 96372 THER/PROPH/DIAG INJ SC/IM: CPT | Performed by: NURSE PRACTITIONER

## 2019-07-11 PROCEDURE — 86901 BLOOD TYPING SEROLOGIC RH(D): CPT | Performed by: NURSE PRACTITIONER

## 2019-07-11 PROCEDURE — 82746 ASSAY OF FOLIC ACID SERUM: CPT | Performed by: INTERNAL MEDICINE

## 2019-07-11 PROCEDURE — 25010000002 EPOETIN ALFA PER 1000 UNITS: Performed by: NURSE PRACTITIONER

## 2019-07-11 PROCEDURE — 84207 ASSAY OF VITAMIN B-6: CPT | Performed by: PSYCHIATRY & NEUROLOGY

## 2019-07-11 PROCEDURE — 85025 COMPLETE CBC W/AUTO DIFF WBC: CPT | Performed by: INTERNAL MEDICINE

## 2019-07-11 PROCEDURE — 86900 BLOOD TYPING SEROLOGIC ABO: CPT | Performed by: NURSE PRACTITIONER

## 2019-07-11 PROCEDURE — 82728 ASSAY OF FERRITIN: CPT | Performed by: INTERNAL MEDICINE

## 2019-07-11 PROCEDURE — 25010000002 CYANOCOBALAMIN PER 1000 MCG: Performed by: NURSE PRACTITIONER

## 2019-07-11 PROCEDURE — 83540 ASSAY OF IRON: CPT | Performed by: INTERNAL MEDICINE

## 2019-07-11 PROCEDURE — 36415 COLL VENOUS BLD VENIPUNCTURE: CPT | Performed by: NURSE PRACTITIONER

## 2019-07-11 RX ORDER — SODIUM CHLORIDE 0.9 % (FLUSH) 0.9 %
10 SYRINGE (ML) INJECTION AS NEEDED
Status: CANCELLED | OUTPATIENT
Start: 2019-07-11

## 2019-07-11 RX ORDER — SODIUM CHLORIDE 9 MG/ML
250 INJECTION, SOLUTION INTRAVENOUS AS NEEDED
Status: CANCELLED | OUTPATIENT
Start: 2019-07-13

## 2019-07-11 RX ORDER — ACETAMINOPHEN 325 MG/1
650 TABLET ORAL ONCE
Status: CANCELLED | OUTPATIENT
Start: 2019-07-13 | End: 2019-07-11

## 2019-07-11 RX ORDER — SODIUM CHLORIDE 0.9 % (FLUSH) 0.9 %
10 SYRINGE (ML) INJECTION AS NEEDED
Status: DISCONTINUED | OUTPATIENT
Start: 2019-07-11 | End: 2019-07-11 | Stop reason: HOSPADM

## 2019-07-11 RX ORDER — CYANOCOBALAMIN 1000 UG/ML
1000 INJECTION, SOLUTION INTRAMUSCULAR; SUBCUTANEOUS
Status: DISCONTINUED | OUTPATIENT
Start: 2019-07-11 | End: 2019-07-11 | Stop reason: HOSPADM

## 2019-07-11 RX ORDER — DIPHENHYDRAMINE HCL 25 MG
25 CAPSULE ORAL ONCE
Status: CANCELLED | OUTPATIENT
Start: 2019-07-13 | End: 2019-07-11

## 2019-07-11 RX ADMIN — Medication 500 UNITS: at 12:25

## 2019-07-11 RX ADMIN — ERYTHROPOIETIN 38000 UNITS: 20000 INJECTION, SOLUTION INTRAVENOUS; SUBCUTANEOUS at 12:24

## 2019-07-11 RX ADMIN — CYANOCOBALAMIN 1000 MCG: 1000 INJECTION, SOLUTION INTRAMUSCULAR at 12:24

## 2019-07-11 RX ADMIN — SODIUM CHLORIDE, PRESERVATIVE FREE 10 ML: 5 INJECTION INTRAVENOUS at 12:24

## 2019-07-11 NOTE — PROGRESS NOTES
Pt here for cbc and RN Review.  Pt's HGB 7.1 today and she is symptomatic- short of breath with exertion and fatigue.  Pt states that she is having dark stools and has had dark stools for 4 weeks. Reviewed with Nisa Lloyd NP.  Per Nisa, transfuse 2 units of PRBC and give 38,000 units of procrit today.  Nisa reviewed the above with .  Per Dr. Rivas, make sure pt gets in to see GI MD ASAP- it sounds like patient has a bleed.  Also add on monthly Vitamin b12 injections. Also draw a folate level today. R&V DAYANA Yusuf   Explained to patient, she v/u.  Orders placed for blood and ambulatory care referral.  Pt will contact GI MD and get in ASAP.      Lab Results   Component Value Date    WBC 6.51 07/11/2019    HGB 7.1 (L) 07/11/2019    HCT 22.1 (L) 07/11/2019    MCV 99.1 (H) 07/11/2019     07/11/2019       Flushed patient's mediport per protocol.  Pt is scheduled to get a blood transfusion on Saturday- she is requesting to leave her mediport needle in.  I explained to the patient that we are not supposed to leave in the needle if it is not going to be flushed every 24 hours- per protocol.  Per patient she always comes here on Thursday and gets blood on Saturday and the needle has always been left accessed.  The patient is refusing to have mediport taken out today and wants to keep it in for blood transfusion on Saturday.  I explained to patient the risks of leaving it in without being flushed in 24 hours, she v/u. Mediport secured to patient.

## 2019-07-11 NOTE — PROGRESS NOTES
Supportive plan entered for B12 1000 mcg injections monthly per communication from MONTANA Jensen RN, per LIAM ABDUL/ ELADIO Rivas

## 2019-07-13 ENCOUNTER — INFUSION (OUTPATIENT)
Dept: ONCOLOGY | Facility: HOSPITAL | Age: 79
End: 2019-07-13

## 2019-07-13 VITALS
SYSTOLIC BLOOD PRESSURE: 105 MMHG | HEART RATE: 78 BPM | DIASTOLIC BLOOD PRESSURE: 49 MMHG | RESPIRATION RATE: 16 BRPM | OXYGEN SATURATION: 100 % | TEMPERATURE: 97 F

## 2019-07-13 DIAGNOSIS — N18.4 ANEMIA OF CHRONIC RENAL FAILURE, STAGE 4 (SEVERE) (HCC): Primary | ICD-10-CM

## 2019-07-13 DIAGNOSIS — Z45.2 ENCOUNTER FOR FITTING AND ADJUSTMENT OF VASCULAR CATHETER: ICD-10-CM

## 2019-07-13 DIAGNOSIS — D63.1 ANEMIA OF CHRONIC RENAL FAILURE, STAGE 4 (SEVERE) (HCC): Primary | ICD-10-CM

## 2019-07-13 LAB
ALBUMIN SERPL-MCNC: 3.4 G/DL (ref 3.5–5.2)
ALBUMIN/GLOB SERPL: 1.8 G/DL
ALP SERPL-CCNC: 37 U/L (ref 39–117)
ALT SERPL W P-5'-P-CCNC: 5 U/L (ref 1–33)
ANION GAP SERPL CALCULATED.3IONS-SCNC: 14.1 MMOL/L (ref 5–15)
AST SERPL-CCNC: 15 U/L (ref 1–32)
BILIRUB SERPL-MCNC: 0.2 MG/DL (ref 0.2–1.2)
BUN BLD-MCNC: 75 MG/DL (ref 8–23)
BUN/CREAT SERPL: 26.7 (ref 7–25)
CALCIUM SPEC-SCNC: 9.2 MG/DL (ref 8.6–10.5)
CHLORIDE SERPL-SCNC: 106 MMOL/L (ref 98–107)
CO2 SERPL-SCNC: 22.9 MMOL/L (ref 22–29)
CREAT BLD-MCNC: 2.81 MG/DL (ref 0.57–1)
FREE KAPPA LT CHAINS, 24HR: 40 MG/24 HR
FREE LAMBDA LT CHAINS, 24 HR: 4 MG/24 HR
GFR SERPL CREATININE-BSD FRML MDRD: 16 ML/MIN/1.73
GLOBULIN UR ELPH-MCNC: 1.9 GM/DL
GLUCOSE BLD-MCNC: 120 MG/DL (ref 65–99)
KAPPA LC UR-MCNC: 43.9 MG/L (ref 1.35–24.19)
KAPPA LC/LAMBDA UR: 9.13 {RATIO} (ref 2.04–10.37)
LAMBDA LC UR-MCNC: 4.81 MG/L (ref 0.24–6.66)
PHOSPHATE SERPL-MCNC: 3.3 MG/DL (ref 2.5–4.5)
POTASSIUM BLD-SCNC: 4.1 MMOL/L (ref 3.5–5.2)
PROT SERPL-MCNC: 5.3 G/DL (ref 6–8.5)
PTH-INTACT SERPL-MCNC: 121 PG/ML (ref 15–65)
SODIUM BLD-SCNC: 143 MMOL/L (ref 136–145)
URATE SERPL-MCNC: 6.7 MG/DL (ref 2.4–5.7)

## 2019-07-13 PROCEDURE — 86900 BLOOD TYPING SEROLOGIC ABO: CPT

## 2019-07-13 PROCEDURE — A9270 NON-COVERED ITEM OR SERVICE: HCPCS | Performed by: NURSE PRACTITIONER

## 2019-07-13 PROCEDURE — P9016 RBC LEUKOCYTES REDUCED: HCPCS

## 2019-07-13 PROCEDURE — 84550 ASSAY OF BLOOD/URIC ACID: CPT | Performed by: INTERNAL MEDICINE

## 2019-07-13 PROCEDURE — 36430 TRANSFUSION BLD/BLD COMPNT: CPT

## 2019-07-13 PROCEDURE — 63710000001 DIPHENHYDRAMINE PER 50 MG: Performed by: NURSE PRACTITIONER

## 2019-07-13 PROCEDURE — 84100 ASSAY OF PHOSPHORUS: CPT | Performed by: INTERNAL MEDICINE

## 2019-07-13 PROCEDURE — 80053 COMPREHEN METABOLIC PANEL: CPT | Performed by: INTERNAL MEDICINE

## 2019-07-13 PROCEDURE — 63710000001 ACETAMINOPHEN 325 MG TABLET: Performed by: NURSE PRACTITIONER

## 2019-07-13 PROCEDURE — 83970 ASSAY OF PARATHORMONE: CPT | Performed by: INTERNAL MEDICINE

## 2019-07-13 RX ORDER — SODIUM CHLORIDE 0.9 % (FLUSH) 0.9 %
10 SYRINGE (ML) INJECTION AS NEEDED
Status: DISCONTINUED | OUTPATIENT
Start: 2019-07-13 | End: 2019-07-13 | Stop reason: HOSPADM

## 2019-07-13 RX ORDER — DIPHENHYDRAMINE HCL 25 MG
25 CAPSULE ORAL ONCE
Status: COMPLETED | OUTPATIENT
Start: 2019-07-13 | End: 2019-07-13

## 2019-07-13 RX ORDER — SODIUM CHLORIDE 0.9 % (FLUSH) 0.9 %
10 SYRINGE (ML) INJECTION AS NEEDED
Status: CANCELLED | OUTPATIENT
Start: 2019-07-13

## 2019-07-13 RX ORDER — ACETAMINOPHEN 325 MG/1
650 TABLET ORAL ONCE
Status: COMPLETED | OUTPATIENT
Start: 2019-07-13 | End: 2019-07-13

## 2019-07-13 RX ORDER — SODIUM CHLORIDE 9 MG/ML
250 INJECTION, SOLUTION INTRAVENOUS AS NEEDED
Status: DISCONTINUED | OUTPATIENT
Start: 2019-07-13 | End: 2019-07-13 | Stop reason: HOSPADM

## 2019-07-13 RX ADMIN — Medication 500 UNITS: at 13:03

## 2019-07-13 RX ADMIN — ACETAMINOPHEN 650 MG: 325 TABLET, FILM COATED ORAL at 07:38

## 2019-07-13 RX ADMIN — DIPHENHYDRAMINE HYDROCHLORIDE 25 MG: 25 CAPSULE ORAL at 07:38

## 2019-07-14 LAB
ABO + RH BLD: NORMAL
ABO + RH BLD: NORMAL
BH BB BLOOD EXPIRATION DATE: NORMAL
BH BB BLOOD EXPIRATION DATE: NORMAL
BH BB BLOOD TYPE BARCODE: 600
BH BB BLOOD TYPE BARCODE: 600
BH BB DISPENSE STATUS: NORMAL
BH BB DISPENSE STATUS: NORMAL
BH BB PRODUCT CODE: NORMAL
BH BB PRODUCT CODE: NORMAL
BH BB UNIT NUMBER: NORMAL
BH BB UNIT NUMBER: NORMAL
UNIT  ABO: NORMAL
UNIT  ABO: NORMAL
UNIT  RH: NORMAL
UNIT  RH: NORMAL
VIT B6 SERPL-MCNC: 2.7 UG/L (ref 2–32.8)

## 2019-07-15 ENCOUNTER — OFFICE (OUTPATIENT)
Dept: URBAN - METROPOLITAN AREA CLINIC 75 | Facility: CLINIC | Age: 79
End: 2019-07-15
Payer: COMMERCIAL

## 2019-07-15 VITALS
DIASTOLIC BLOOD PRESSURE: 74 MMHG | HEIGHT: 67 IN | HEART RATE: 75 BPM | SYSTOLIC BLOOD PRESSURE: 116 MMHG | WEIGHT: 174 LBS

## 2019-07-15 DIAGNOSIS — R13.10 DYSPHAGIA, UNSPECIFIED: ICD-10-CM

## 2019-07-15 DIAGNOSIS — D50.9 IRON DEFICIENCY ANEMIA, UNSPECIFIED: ICD-10-CM

## 2019-07-15 DIAGNOSIS — K58.0 IRRITABLE BOWEL SYNDROME WITH DIARRHEA: ICD-10-CM

## 2019-07-15 PROCEDURE — 99214 OFFICE O/P EST MOD 30 MIN: CPT | Performed by: INTERNAL MEDICINE

## 2019-07-15 RX ORDER — FAMOTIDINE 20 MG/1
TABLET, FILM COATED ORAL
Qty: 180 | Refills: 6 | Status: ACTIVE

## 2019-07-17 RX ORDER — SPIRONOLACTONE 25 MG/1
TABLET ORAL
Qty: 45 TABLET | Refills: 1 | Status: SHIPPED | OUTPATIENT
Start: 2019-07-17 | End: 2019-09-02 | Stop reason: HOSPADM

## 2019-07-18 ENCOUNTER — CLINICAL SUPPORT (OUTPATIENT)
Dept: ONCOLOGY | Facility: HOSPITAL | Age: 79
End: 2019-07-18

## 2019-07-18 ENCOUNTER — APPOINTMENT (OUTPATIENT)
Dept: OTHER | Facility: HOSPITAL | Age: 79
End: 2019-07-18

## 2019-07-18 VITALS
TEMPERATURE: 98 F | OXYGEN SATURATION: 92 % | WEIGHT: 174 LBS | HEART RATE: 84 BPM | SYSTOLIC BLOOD PRESSURE: 138 MMHG | BODY MASS INDEX: 27.96 KG/M2 | DIASTOLIC BLOOD PRESSURE: 67 MMHG

## 2019-07-18 DIAGNOSIS — D63.1 ANEMIA OF CHRONIC RENAL FAILURE, STAGE 4 (SEVERE) (HCC): Primary | ICD-10-CM

## 2019-07-18 DIAGNOSIS — N18.4 ANEMIA OF CHRONIC RENAL FAILURE, STAGE 4 (SEVERE) (HCC): Primary | ICD-10-CM

## 2019-07-18 LAB
BASOPHILS # BLD AUTO: 0.01 10*3/MM3 (ref 0–0.2)
BASOPHILS NFR BLD AUTO: 0.2 % (ref 0–1.5)
DEPRECATED RDW RBC AUTO: 62.3 FL (ref 37–54)
EOSINOPHIL # BLD AUTO: 0.12 10*3/MM3 (ref 0–0.4)
EOSINOPHIL NFR BLD AUTO: 2 % (ref 0.3–6.2)
ERYTHROCYTE [DISTWIDTH] IN BLOOD BY AUTOMATED COUNT: 18.8 % (ref 12.3–15.4)
HCT VFR BLD AUTO: 29.1 % (ref 34–46.6)
HGB BLD-MCNC: 9.5 G/DL (ref 12–15.9)
IMM GRANULOCYTES # BLD AUTO: 0.03 10*3/MM3 (ref 0–0.05)
IMM GRANULOCYTES NFR BLD AUTO: 0.5 % (ref 0–0.5)
LYMPHOCYTES # BLD AUTO: 0.65 10*3/MM3 (ref 0.7–3.1)
LYMPHOCYTES NFR BLD AUTO: 10.8 % (ref 19.6–45.3)
MCH RBC QN AUTO: 31.3 PG (ref 26.6–33)
MCHC RBC AUTO-ENTMCNC: 32.6 G/DL (ref 31.5–35.7)
MCV RBC AUTO: 95.7 FL (ref 79–97)
MONOCYTES # BLD AUTO: 0.41 10*3/MM3 (ref 0.1–0.9)
MONOCYTES NFR BLD AUTO: 6.8 % (ref 5–12)
NEUTROPHILS # BLD AUTO: 4.82 10*3/MM3 (ref 1.7–7)
NEUTROPHILS NFR BLD AUTO: 79.7 % (ref 42.7–76)
NRBC BLD AUTO-RTO: 0.8 /100 WBC (ref 0–0.2)
PLATELET # BLD AUTO: 183 10*3/MM3 (ref 140–450)
PMV BLD AUTO: 9.6 FL (ref 6–12)
RBC # BLD AUTO: 3.04 10*6/MM3 (ref 3.77–5.28)
WBC NRBC COR # BLD: 6.04 10*3/MM3 (ref 3.4–10.8)

## 2019-07-18 PROCEDURE — 36415 COLL VENOUS BLD VENIPUNCTURE: CPT

## 2019-07-18 PROCEDURE — 85025 COMPLETE CBC W/AUTO DIFF WBC: CPT | Performed by: INTERNAL MEDICINE

## 2019-07-18 PROCEDURE — 96372 THER/PROPH/DIAG INJ SC/IM: CPT

## 2019-07-18 PROCEDURE — 25010000002 EPOETIN ALFA PER 1000 UNITS: Performed by: NURSE PRACTITIONER

## 2019-07-18 RX ADMIN — ERYTHROPOIETIN 47000 UNITS: 20000 INJECTION, SOLUTION INTRAVENOUS; SUBCUTANEOUS at 11:20

## 2019-07-25 ENCOUNTER — CLINICAL SUPPORT (OUTPATIENT)
Dept: ONCOLOGY | Facility: HOSPITAL | Age: 79
End: 2019-07-25

## 2019-07-25 ENCOUNTER — LAB (OUTPATIENT)
Dept: ONCOLOGY | Facility: HOSPITAL | Age: 79
End: 2019-07-25

## 2019-07-25 VITALS
SYSTOLIC BLOOD PRESSURE: 93 MMHG | OXYGEN SATURATION: 99 % | TEMPERATURE: 97.8 F | HEART RATE: 84 BPM | DIASTOLIC BLOOD PRESSURE: 57 MMHG

## 2019-07-25 DIAGNOSIS — D63.1 ANEMIA DUE TO STAGE 4 CHRONIC KIDNEY DISEASE (HCC): Primary | ICD-10-CM

## 2019-07-25 DIAGNOSIS — N18.4 ANEMIA DUE TO STAGE 4 CHRONIC KIDNEY DISEASE (HCC): Primary | ICD-10-CM

## 2019-07-25 DIAGNOSIS — Z45.2 ENCOUNTER FOR FITTING AND ADJUSTMENT OF VASCULAR CATHETER: ICD-10-CM

## 2019-07-25 DIAGNOSIS — N18.4 ANEMIA OF CHRONIC RENAL FAILURE, STAGE 4 (SEVERE) (HCC): Primary | ICD-10-CM

## 2019-07-25 DIAGNOSIS — D63.1 ANEMIA OF CHRONIC RENAL FAILURE, STAGE 4 (SEVERE) (HCC): Primary | ICD-10-CM

## 2019-07-25 LAB
ABO GROUP BLD: NORMAL
BASOPHILS # BLD AUTO: 0.01 10*3/MM3 (ref 0–0.2)
BASOPHILS NFR BLD AUTO: 0.2 % (ref 0–1.5)
BLD GP AB SCN SERPL QL: NEGATIVE
DEPRECATED RDW RBC AUTO: 86.1 FL (ref 37–54)
EOSINOPHIL # BLD AUTO: 0.09 10*3/MM3 (ref 0–0.4)
EOSINOPHIL NFR BLD AUTO: 1.8 % (ref 0.3–6.2)
ERYTHROCYTE [DISTWIDTH] IN BLOOD BY AUTOMATED COUNT: 21.9 % (ref 12.3–15.4)
HCT VFR BLD AUTO: 21.5 % (ref 34–46.6)
HGB BLD-MCNC: 6.4 G/DL (ref 12–15.9)
IMM GRANULOCYTES # BLD AUTO: 0.02 10*3/MM3 (ref 0–0.05)
IMM GRANULOCYTES NFR BLD AUTO: 0.4 % (ref 0–0.5)
LYMPHOCYTES # BLD AUTO: 0.58 10*3/MM3 (ref 0.7–3.1)
LYMPHOCYTES NFR BLD AUTO: 11.9 % (ref 19.6–45.3)
MCH RBC QN AUTO: 33.2 PG (ref 26.6–33)
MCHC RBC AUTO-ENTMCNC: 29.8 G/DL (ref 31.5–35.7)
MCV RBC AUTO: 111.4 FL (ref 79–97)
MONOCYTES # BLD AUTO: 0.41 10*3/MM3 (ref 0.1–0.9)
MONOCYTES NFR BLD AUTO: 8.4 % (ref 5–12)
NEUTROPHILS # BLD AUTO: 3.78 10*3/MM3 (ref 1.7–7)
NEUTROPHILS NFR BLD AUTO: 77.3 % (ref 42.7–76)
NRBC BLD AUTO-RTO: 0.4 /100 WBC (ref 0–0.2)
PLATELET # BLD AUTO: 174 10*3/MM3 (ref 140–450)
PMV BLD AUTO: 9 FL (ref 6–12)
RBC # BLD AUTO: 1.93 10*6/MM3 (ref 3.77–5.28)
RH BLD: NEGATIVE
T&S EXPIRATION DATE: NORMAL
WBC NRBC COR # BLD: 4.89 10*3/MM3 (ref 3.4–10.8)

## 2019-07-25 PROCEDURE — 25010000002 EPOETIN ALFA PER 1000 UNITS: Performed by: INTERNAL MEDICINE

## 2019-07-25 PROCEDURE — 86901 BLOOD TYPING SEROLOGIC RH(D): CPT | Performed by: NURSE PRACTITIONER

## 2019-07-25 PROCEDURE — 96372 THER/PROPH/DIAG INJ SC/IM: CPT | Performed by: INTERNAL MEDICINE

## 2019-07-25 PROCEDURE — 36415 COLL VENOUS BLD VENIPUNCTURE: CPT | Performed by: INTERNAL MEDICINE

## 2019-07-25 PROCEDURE — 86923 COMPATIBILITY TEST ELECTRIC: CPT

## 2019-07-25 PROCEDURE — 86900 BLOOD TYPING SEROLOGIC ABO: CPT | Performed by: NURSE PRACTITIONER

## 2019-07-25 PROCEDURE — 86850 RBC ANTIBODY SCREEN: CPT | Performed by: NURSE PRACTITIONER

## 2019-07-25 PROCEDURE — 85025 COMPLETE CBC W/AUTO DIFF WBC: CPT | Performed by: INTERNAL MEDICINE

## 2019-07-25 PROCEDURE — 96523 IRRIG DRUG DELIVERY DEVICE: CPT | Performed by: INTERNAL MEDICINE

## 2019-07-25 RX ORDER — SODIUM CHLORIDE 0.9 % (FLUSH) 0.9 %
10 SYRINGE (ML) INJECTION AS NEEDED
Status: CANCELLED | OUTPATIENT
Start: 2019-07-25

## 2019-07-25 RX ORDER — SODIUM CHLORIDE 9 MG/ML
250 INJECTION, SOLUTION INTRAVENOUS AS NEEDED
Status: CANCELLED | OUTPATIENT
Start: 2019-07-27

## 2019-07-25 RX ORDER — ACETAMINOPHEN 325 MG/1
650 TABLET ORAL ONCE
Status: CANCELLED | OUTPATIENT
Start: 2019-07-27 | End: 2019-07-25

## 2019-07-25 RX ORDER — SODIUM CHLORIDE 0.9 % (FLUSH) 0.9 %
10 SYRINGE (ML) INJECTION AS NEEDED
Status: DISCONTINUED | OUTPATIENT
Start: 2019-07-25 | End: 2019-07-25 | Stop reason: HOSPADM

## 2019-07-25 RX ORDER — FUROSEMIDE 10 MG/ML
20 INJECTION INTRAMUSCULAR; INTRAVENOUS ONCE
Status: CANCELLED | OUTPATIENT
Start: 2019-07-27 | End: 2019-07-25

## 2019-07-25 RX ORDER — DIPHENHYDRAMINE HCL 25 MG
25 CAPSULE ORAL ONCE
Status: CANCELLED | OUTPATIENT
Start: 2019-07-27 | End: 2019-07-25

## 2019-07-25 RX ADMIN — ERYTHROPOIETIN 47000 UNITS: 20000 INJECTION, SOLUTION INTRAVENOUS; SUBCUTANEOUS at 11:55

## 2019-07-25 RX ADMIN — Medication 500 UNITS: at 11:01

## 2019-07-25 RX ADMIN — SODIUM CHLORIDE, PRESERVATIVE FREE 10 ML: 5 INJECTION INTRAVENOUS at 11:01

## 2019-07-25 NOTE — PROGRESS NOTES
Pt adamant that we leave port needle in for her possible transfusion on Saturday. Informed her that they might not be able to use it. She V/U.

## 2019-07-25 NOTE — PROGRESS NOTES
Pt is here for lab with RN review.  Pt C/O being fatigued.She denies any bleeding. CBC reviewed with Jocelyne Delatorre NP Per jocelyne pt is to receive 2 units of PRBC. Per Dr. Clary champagne pt will also receive a procrit injection. She will hold her daily lasix the day of transfusion and take IV lasix between units. All orders placed and message sent to scheduling. Copy of labs given to pt and f/u appt reviewed. Pt is instructed to call the office with any concerns or new symptoms prior to next visit. Pt vu all above.   Lab Results   Component Value Date    WBC 4.89 07/25/2019    HGB 6.4 (C) 07/25/2019    HCT 21.5 (L) 07/25/2019    .4 (H) 07/25/2019     07/25/2019

## 2019-07-27 ENCOUNTER — INFUSION (OUTPATIENT)
Dept: ONCOLOGY | Facility: HOSPITAL | Age: 79
End: 2019-07-27

## 2019-07-27 VITALS
TEMPERATURE: 97 F | HEART RATE: 70 BPM | DIASTOLIC BLOOD PRESSURE: 58 MMHG | SYSTOLIC BLOOD PRESSURE: 96 MMHG | OXYGEN SATURATION: 99 % | RESPIRATION RATE: 18 BRPM

## 2019-07-27 DIAGNOSIS — D63.1 ANEMIA OF CHRONIC RENAL FAILURE, STAGE 4 (SEVERE) (HCC): Primary | ICD-10-CM

## 2019-07-27 DIAGNOSIS — N18.4 ANEMIA OF CHRONIC RENAL FAILURE, STAGE 4 (SEVERE) (HCC): Primary | ICD-10-CM

## 2019-07-27 DIAGNOSIS — Z45.2 ENCOUNTER FOR FITTING AND ADJUSTMENT OF VASCULAR CATHETER: ICD-10-CM

## 2019-07-27 PROCEDURE — 96374 THER/PROPH/DIAG INJ IV PUSH: CPT

## 2019-07-27 PROCEDURE — 63710000001 ACETAMINOPHEN 325 MG TABLET: Performed by: NURSE PRACTITIONER

## 2019-07-27 PROCEDURE — A9270 NON-COVERED ITEM OR SERVICE: HCPCS | Performed by: NURSE PRACTITIONER

## 2019-07-27 PROCEDURE — 63710000001 DIPHENHYDRAMINE PER 50 MG: Performed by: NURSE PRACTITIONER

## 2019-07-27 PROCEDURE — 86900 BLOOD TYPING SEROLOGIC ABO: CPT

## 2019-07-27 PROCEDURE — 36430 TRANSFUSION BLD/BLD COMPNT: CPT

## 2019-07-27 PROCEDURE — 25010000002 FUROSEMIDE PER 20 MG: Performed by: NURSE PRACTITIONER

## 2019-07-27 PROCEDURE — P9016 RBC LEUKOCYTES REDUCED: HCPCS

## 2019-07-27 RX ORDER — FUROSEMIDE 10 MG/ML
20 INJECTION INTRAMUSCULAR; INTRAVENOUS ONCE
Status: COMPLETED | OUTPATIENT
Start: 2019-07-27 | End: 2019-07-27

## 2019-07-27 RX ORDER — ACETAMINOPHEN 325 MG/1
650 TABLET ORAL ONCE
Status: COMPLETED | OUTPATIENT
Start: 2019-07-27 | End: 2019-07-27

## 2019-07-27 RX ORDER — DIPHENHYDRAMINE HCL 25 MG
25 CAPSULE ORAL ONCE
Status: COMPLETED | OUTPATIENT
Start: 2019-07-27 | End: 2019-07-27

## 2019-07-27 RX ORDER — SODIUM CHLORIDE 0.9 % (FLUSH) 0.9 %
10 SYRINGE (ML) INJECTION AS NEEDED
Status: CANCELLED | OUTPATIENT
Start: 2019-07-27

## 2019-07-27 RX ORDER — SODIUM CHLORIDE 9 MG/ML
250 INJECTION, SOLUTION INTRAVENOUS AS NEEDED
Status: DISCONTINUED | OUTPATIENT
Start: 2019-07-27 | End: 2019-07-27 | Stop reason: HOSPADM

## 2019-07-27 RX ORDER — SODIUM CHLORIDE 0.9 % (FLUSH) 0.9 %
10 SYRINGE (ML) INJECTION AS NEEDED
Status: DISCONTINUED | OUTPATIENT
Start: 2019-07-27 | End: 2019-07-27 | Stop reason: HOSPADM

## 2019-07-27 RX ADMIN — Medication 500 UNITS: at 13:56

## 2019-07-27 RX ADMIN — FUROSEMIDE 20 MG: 10 INJECTION, SOLUTION INTRAMUSCULAR; INTRAVENOUS at 12:47

## 2019-07-27 RX ADMIN — DIPHENHYDRAMINE HYDROCHLORIDE 25 MG: 25 CAPSULE ORAL at 09:19

## 2019-07-27 RX ADMIN — ACETAMINOPHEN 650 MG: 325 TABLET, FILM COATED ORAL at 09:19

## 2019-08-01 ENCOUNTER — LAB (OUTPATIENT)
Dept: OTHER | Facility: HOSPITAL | Age: 79
End: 2019-08-01

## 2019-08-01 ENCOUNTER — CLINICAL SUPPORT (OUTPATIENT)
Dept: ONCOLOGY | Facility: HOSPITAL | Age: 79
End: 2019-08-01

## 2019-08-01 VITALS
SYSTOLIC BLOOD PRESSURE: 117 MMHG | BODY MASS INDEX: 27.13 KG/M2 | OXYGEN SATURATION: 97 % | WEIGHT: 168.8 LBS | DIASTOLIC BLOOD PRESSURE: 69 MMHG | TEMPERATURE: 98 F | HEART RATE: 84 BPM

## 2019-08-01 DIAGNOSIS — D63.1 ANEMIA OF CHRONIC RENAL FAILURE, STAGE 4 (SEVERE) (HCC): Primary | ICD-10-CM

## 2019-08-01 DIAGNOSIS — N18.4 ANEMIA OF CHRONIC RENAL FAILURE, STAGE 4 (SEVERE) (HCC): ICD-10-CM

## 2019-08-01 DIAGNOSIS — D63.1 ANEMIA OF CHRONIC RENAL FAILURE, STAGE 4 (SEVERE) (HCC): ICD-10-CM

## 2019-08-01 DIAGNOSIS — N18.4 ANEMIA OF CHRONIC RENAL FAILURE, STAGE 4 (SEVERE) (HCC): Primary | ICD-10-CM

## 2019-08-01 DIAGNOSIS — Z45.2 ENCOUNTER FOR FITTING AND ADJUSTMENT OF VASCULAR CATHETER: ICD-10-CM

## 2019-08-01 DIAGNOSIS — D50.8 OTHER IRON DEFICIENCY ANEMIA: ICD-10-CM

## 2019-08-01 LAB
BASOPHILS # BLD AUTO: 0.01 10*3/MM3 (ref 0–0.2)
BASOPHILS NFR BLD AUTO: 0.2 % (ref 0–1.5)
DEPRECATED RDW RBC AUTO: 76.2 FL (ref 37–54)
EOSINOPHIL # BLD AUTO: 0.15 10*3/MM3 (ref 0–0.4)
EOSINOPHIL NFR BLD AUTO: 2.5 % (ref 0.3–6.2)
ERYTHROCYTE [DISTWIDTH] IN BLOOD BY AUTOMATED COUNT: 20.5 % (ref 12.3–15.4)
HCT VFR BLD AUTO: 27.9 % (ref 34–46.6)
HGB BLD-MCNC: 8.7 G/DL (ref 12–15.9)
IMM GRANULOCYTES # BLD AUTO: 0.03 10*3/MM3 (ref 0–0.05)
IMM GRANULOCYTES NFR BLD AUTO: 0.5 % (ref 0–0.5)
LYMPHOCYTES # BLD AUTO: 0.54 10*3/MM3 (ref 0.7–3.1)
LYMPHOCYTES NFR BLD AUTO: 8.9 % (ref 19.6–45.3)
MCH RBC QN AUTO: 32.3 PG (ref 26.6–33)
MCHC RBC AUTO-ENTMCNC: 31.2 G/DL (ref 31.5–35.7)
MCV RBC AUTO: 103.7 FL (ref 79–97)
MONOCYTES # BLD AUTO: 0.42 10*3/MM3 (ref 0.1–0.9)
MONOCYTES NFR BLD AUTO: 7 % (ref 5–12)
NEUTROPHILS # BLD AUTO: 4.89 10*3/MM3 (ref 1.7–7)
NEUTROPHILS NFR BLD AUTO: 80.9 % (ref 42.7–76)
NRBC BLD AUTO-RTO: 0.5 /100 WBC (ref 0–0.2)
PLATELET # BLD AUTO: 180 10*3/MM3 (ref 140–450)
PMV BLD AUTO: 9.6 FL (ref 6–12)
RBC # BLD AUTO: 2.69 10*6/MM3 (ref 3.77–5.28)
WBC NRBC COR # BLD: 6.04 10*3/MM3 (ref 3.4–10.8)

## 2019-08-01 PROCEDURE — 36415 COLL VENOUS BLD VENIPUNCTURE: CPT

## 2019-08-01 PROCEDURE — 96372 THER/PROPH/DIAG INJ SC/IM: CPT

## 2019-08-01 PROCEDURE — 25010000002 EPOETIN ALFA PER 1000 UNITS: Performed by: NURSE PRACTITIONER

## 2019-08-01 PROCEDURE — 85025 COMPLETE CBC W/AUTO DIFF WBC: CPT | Performed by: INTERNAL MEDICINE

## 2019-08-01 RX ORDER — SODIUM CHLORIDE 0.9 % (FLUSH) 0.9 %
10 SYRINGE (ML) INJECTION AS NEEDED
Status: DISCONTINUED | OUTPATIENT
Start: 2019-08-01 | End: 2019-08-01 | Stop reason: HOSPADM

## 2019-08-01 RX ORDER — SODIUM CHLORIDE 0.9 % (FLUSH) 0.9 %
10 SYRINGE (ML) INJECTION AS NEEDED
Status: CANCELLED | OUTPATIENT
Start: 2019-08-01

## 2019-08-01 RX ADMIN — ERYTHROPOIETIN 47000 UNITS: 40000 INJECTION, SOLUTION INTRAVENOUS; SUBCUTANEOUS at 11:28

## 2019-08-01 NOTE — PROGRESS NOTES
Patient arrived ambulatory with her  waiting in the lobby. Patient's labs reviewed with CHANTELL Tiwari. Hgb=8.7 and patient does not complain of shortness of air, excessive bruising or active bleeding. Patient will not require transfusion of PRBCs today. Patient will receive Procrit 47,000 units. Patient vu and discharged ambulatory.

## 2019-08-02 DIAGNOSIS — N18.4 ANEMIA OF CHRONIC RENAL FAILURE, STAGE 4 (SEVERE) (HCC): ICD-10-CM

## 2019-08-02 DIAGNOSIS — D63.1 ANEMIA OF CHRONIC RENAL FAILURE, STAGE 4 (SEVERE) (HCC): ICD-10-CM

## 2019-08-08 ENCOUNTER — CLINICAL SUPPORT (OUTPATIENT)
Dept: ONCOLOGY | Facility: HOSPITAL | Age: 79
End: 2019-08-08

## 2019-08-08 ENCOUNTER — LAB (OUTPATIENT)
Dept: OTHER | Facility: HOSPITAL | Age: 79
End: 2019-08-08

## 2019-08-08 VITALS
TEMPERATURE: 97.7 F | OXYGEN SATURATION: 100 % | SYSTOLIC BLOOD PRESSURE: 103 MMHG | HEART RATE: 92 BPM | BODY MASS INDEX: 27.35 KG/M2 | WEIGHT: 170.2 LBS | DIASTOLIC BLOOD PRESSURE: 58 MMHG

## 2019-08-08 DIAGNOSIS — E53.8 B12 DEFICIENCY: ICD-10-CM

## 2019-08-08 DIAGNOSIS — D63.1 ANEMIA OF CHRONIC RENAL FAILURE, STAGE 4 (SEVERE) (HCC): Primary | ICD-10-CM

## 2019-08-08 DIAGNOSIS — N18.4 ANEMIA OF CHRONIC RENAL FAILURE, STAGE 4 (SEVERE) (HCC): ICD-10-CM

## 2019-08-08 DIAGNOSIS — D50.8 OTHER IRON DEFICIENCY ANEMIA: ICD-10-CM

## 2019-08-08 DIAGNOSIS — IMO0001 ADVERSE EFFECT OF IRON OR ITS COMPOUND, SUBSEQUENT ENCOUNTER: ICD-10-CM

## 2019-08-08 DIAGNOSIS — Z45.2 ENCOUNTER FOR FITTING AND ADJUSTMENT OF VASCULAR CATHETER: ICD-10-CM

## 2019-08-08 DIAGNOSIS — N18.4 STAGE 4 CHRONIC KIDNEY DISEASE (HCC): ICD-10-CM

## 2019-08-08 DIAGNOSIS — N18.4 ANEMIA OF CHRONIC RENAL FAILURE, STAGE 4 (SEVERE) (HCC): Primary | ICD-10-CM

## 2019-08-08 DIAGNOSIS — D63.1 ANEMIA OF CHRONIC RENAL FAILURE, STAGE 4 (SEVERE) (HCC): ICD-10-CM

## 2019-08-08 LAB
ABO GROUP BLD: NORMAL
BASOPHILS # BLD AUTO: 0.01 10*3/MM3 (ref 0–0.2)
BASOPHILS NFR BLD AUTO: 0.2 % (ref 0–1.5)
BLD GP AB SCN SERPL QL: NEGATIVE
C3 FRG RBC-MCNC: NORMAL
DACRYOCYTES BLD QL SMEAR: NORMAL
DEPRECATED RDW RBC AUTO: 73.5 FL (ref 37–54)
EOSINOPHIL # BLD AUTO: 0.2 10*3/MM3 (ref 0–0.4)
EOSINOPHIL NFR BLD AUTO: 3.3 % (ref 0.3–6.2)
ERYTHROCYTE [DISTWIDTH] IN BLOOD BY AUTOMATED COUNT: 19.3 % (ref 12.3–15.4)
HCT VFR BLD AUTO: 20.6 % (ref 34–46.6)
HGB BLD-MCNC: 6.4 G/DL (ref 12–15.9)
HYPOCHROMIA BLD QL: NORMAL
IMM GRANULOCYTES # BLD AUTO: 0.03 10*3/MM3 (ref 0–0.05)
IMM GRANULOCYTES NFR BLD AUTO: 0.5 % (ref 0–0.5)
LYMPHOCYTES # BLD AUTO: 0.51 10*3/MM3 (ref 0.7–3.1)
LYMPHOCYTES NFR BLD AUTO: 8.5 % (ref 19.6–45.3)
MCH RBC QN AUTO: 33 PG (ref 26.6–33)
MCHC RBC AUTO-ENTMCNC: 31.1 G/DL (ref 31.5–35.7)
MCV RBC AUTO: 106.2 FL (ref 79–97)
MICROCYTES BLD QL: NORMAL
MONOCYTES # BLD AUTO: 0.22 10*3/MM3 (ref 0.1–0.9)
MONOCYTES NFR BLD AUTO: 3.7 % (ref 5–12)
NEUTROPHILS # BLD AUTO: 5.05 10*3/MM3 (ref 1.7–7)
NEUTROPHILS NFR BLD AUTO: 83.8 % (ref 42.7–76)
NRBC BLD AUTO-RTO: 0 /100 WBC (ref 0–0.2)
PLAT MORPH BLD: NORMAL
PLATELET # BLD AUTO: 213 10*3/MM3 (ref 140–450)
PMV BLD AUTO: 9.9 FL (ref 6–12)
RBC # BLD AUTO: 1.94 10*6/MM3 (ref 3.77–5.28)
RH BLD: NEGATIVE
SPHEROCYTES BLD QL SMEAR: NORMAL
T&S EXPIRATION DATE: NORMAL
WBC MORPH BLD: NORMAL
WBC NRBC COR # BLD: 6.02 10*3/MM3 (ref 3.4–10.8)

## 2019-08-08 PROCEDURE — 85025 COMPLETE CBC W/AUTO DIFF WBC: CPT | Performed by: INTERNAL MEDICINE

## 2019-08-08 PROCEDURE — 36415 COLL VENOUS BLD VENIPUNCTURE: CPT

## 2019-08-08 PROCEDURE — 86850 RBC ANTIBODY SCREEN: CPT | Performed by: NURSE PRACTITIONER

## 2019-08-08 PROCEDURE — 86901 BLOOD TYPING SEROLOGIC RH(D): CPT | Performed by: NURSE PRACTITIONER

## 2019-08-08 PROCEDURE — 86923 COMPATIBILITY TEST ELECTRIC: CPT

## 2019-08-08 PROCEDURE — 25010000002 EPOETIN ALFA PER 1000 UNITS: Performed by: NURSE PRACTITIONER

## 2019-08-08 PROCEDURE — 25010000002 CYANOCOBALAMIN PER 1000 MCG: Performed by: NURSE PRACTITIONER

## 2019-08-08 PROCEDURE — 86900 BLOOD TYPING SEROLOGIC ABO: CPT | Performed by: NURSE PRACTITIONER

## 2019-08-08 PROCEDURE — 96372 THER/PROPH/DIAG INJ SC/IM: CPT

## 2019-08-08 PROCEDURE — 96523 IRRIG DRUG DELIVERY DEVICE: CPT

## 2019-08-08 PROCEDURE — 85007 BL SMEAR W/DIFF WBC COUNT: CPT | Performed by: INTERNAL MEDICINE

## 2019-08-08 RX ORDER — DIPHENHYDRAMINE HCL 25 MG
25 CAPSULE ORAL ONCE
Status: CANCELLED | OUTPATIENT
Start: 2019-08-10 | End: 2019-08-08

## 2019-08-08 RX ORDER — ACETAMINOPHEN 325 MG/1
650 TABLET ORAL ONCE
Status: CANCELLED | OUTPATIENT
Start: 2019-08-10 | End: 2019-08-08

## 2019-08-08 RX ORDER — SODIUM CHLORIDE 9 MG/ML
250 INJECTION, SOLUTION INTRAVENOUS AS NEEDED
Status: CANCELLED | OUTPATIENT
Start: 2019-08-10

## 2019-08-08 RX ORDER — CYANOCOBALAMIN 1000 UG/ML
1000 INJECTION, SOLUTION INTRAMUSCULAR; SUBCUTANEOUS
Status: DISCONTINUED | OUTPATIENT
Start: 2019-08-08 | End: 2019-08-08 | Stop reason: HOSPADM

## 2019-08-08 RX ORDER — SODIUM CHLORIDE 0.9 % (FLUSH) 0.9 %
10 SYRINGE (ML) INJECTION AS NEEDED
Status: DISCONTINUED | OUTPATIENT
Start: 2019-08-08 | End: 2019-08-08 | Stop reason: HOSPADM

## 2019-08-08 RX ORDER — SODIUM CHLORIDE 0.9 % (FLUSH) 0.9 %
10 SYRINGE (ML) INJECTION AS NEEDED
Status: CANCELLED | OUTPATIENT
Start: 2019-08-08

## 2019-08-08 RX ADMIN — Medication 500 UNITS: at 11:17

## 2019-08-08 RX ADMIN — SODIUM CHLORIDE, PRESERVATIVE FREE 10 ML: 5 INJECTION INTRAVENOUS at 11:15

## 2019-08-08 RX ADMIN — CYANOCOBALAMIN 1000 MCG: 1000 INJECTION, SOLUTION INTRAMUSCULAR at 11:14

## 2019-08-08 RX ADMIN — ERYTHROPOIETIN 47000 UNITS: 40000 INJECTION, SOLUTION INTRAVENOUS; SUBCUTANEOUS at 11:14

## 2019-08-08 NOTE — PROGRESS NOTES
Pt here for CBC with possible weekly procrit and monthly B12.  Hgb 6.4 today.  Pt feels fatigued.  No active bleeding.  She felt like blood was low.  Reviewed labs with CHANTELL Paula. Orders given for 2 units PRBC's.    She requested that right chest port be accessed for type and screen and leave needle in for transfusion Saturday morning.  Procrit 47,000 units given per protocol.  B12 also given.      Lab Results   Component Value Date    WBC 6.02 08/08/2019    HGB 6.4 (C) 08/08/2019    HCT 20.6 (C) 08/08/2019    .2 (H) 08/08/2019     08/08/2019

## 2019-08-10 ENCOUNTER — INFUSION (OUTPATIENT)
Dept: ONCOLOGY | Facility: HOSPITAL | Age: 79
End: 2019-08-10

## 2019-08-10 VITALS
DIASTOLIC BLOOD PRESSURE: 50 MMHG | TEMPERATURE: 98 F | HEART RATE: 57 BPM | OXYGEN SATURATION: 96 % | SYSTOLIC BLOOD PRESSURE: 94 MMHG | RESPIRATION RATE: 18 BRPM

## 2019-08-10 DIAGNOSIS — Z45.2 ENCOUNTER FOR FITTING AND ADJUSTMENT OF VASCULAR CATHETER: ICD-10-CM

## 2019-08-10 DIAGNOSIS — D63.1 ANEMIA OF CHRONIC RENAL FAILURE, STAGE 4 (SEVERE) (HCC): Primary | ICD-10-CM

## 2019-08-10 DIAGNOSIS — N18.4 ANEMIA OF CHRONIC RENAL FAILURE, STAGE 4 (SEVERE) (HCC): Primary | ICD-10-CM

## 2019-08-10 PROCEDURE — P9016 RBC LEUKOCYTES REDUCED: HCPCS

## 2019-08-10 PROCEDURE — 36430 TRANSFUSION BLD/BLD COMPNT: CPT

## 2019-08-10 PROCEDURE — 86900 BLOOD TYPING SEROLOGIC ABO: CPT

## 2019-08-10 PROCEDURE — 63710000001 DIPHENHYDRAMINE PER 50 MG: Performed by: NURSE PRACTITIONER

## 2019-08-10 RX ORDER — SODIUM CHLORIDE 0.9 % (FLUSH) 0.9 %
10 SYRINGE (ML) INJECTION AS NEEDED
Status: CANCELLED | OUTPATIENT
Start: 2019-08-10

## 2019-08-10 RX ORDER — SODIUM CHLORIDE 9 MG/ML
250 INJECTION, SOLUTION INTRAVENOUS AS NEEDED
Status: DISCONTINUED | OUTPATIENT
Start: 2019-08-10 | End: 2019-08-10 | Stop reason: HOSPADM

## 2019-08-10 RX ORDER — ACETAMINOPHEN 325 MG/1
650 TABLET ORAL ONCE
Status: COMPLETED | OUTPATIENT
Start: 2019-08-10 | End: 2019-08-10

## 2019-08-10 RX ORDER — DIPHENHYDRAMINE HCL 25 MG
25 CAPSULE ORAL ONCE
Status: COMPLETED | OUTPATIENT
Start: 2019-08-10 | End: 2019-08-10

## 2019-08-10 RX ORDER — SODIUM CHLORIDE 0.9 % (FLUSH) 0.9 %
10 SYRINGE (ML) INJECTION AS NEEDED
Status: DISCONTINUED | OUTPATIENT
Start: 2019-08-10 | End: 2019-08-10 | Stop reason: HOSPADM

## 2019-08-10 RX ADMIN — DIPHENHYDRAMINE HYDROCHLORIDE 25 MG: 25 CAPSULE ORAL at 08:54

## 2019-08-10 RX ADMIN — Medication 500 UNITS: at 14:11

## 2019-08-10 RX ADMIN — ACETAMINOPHEN 650 MG: 325 TABLET, FILM COATED ORAL at 08:54

## 2019-08-10 RX ADMIN — Medication 10 ML: at 14:10

## 2019-08-15 ENCOUNTER — CLINICAL SUPPORT NO REQUIREMENTS (OUTPATIENT)
Dept: CARDIOLOGY | Facility: CLINIC | Age: 79
End: 2019-08-15

## 2019-08-15 ENCOUNTER — CLINICAL SUPPORT (OUTPATIENT)
Dept: ONCOLOGY | Facility: HOSPITAL | Age: 79
End: 2019-08-15

## 2019-08-15 ENCOUNTER — LAB (OUTPATIENT)
Dept: ONCOLOGY | Facility: HOSPITAL | Age: 79
End: 2019-08-15

## 2019-08-15 ENCOUNTER — OFFICE VISIT (OUTPATIENT)
Dept: CARDIOLOGY | Facility: CLINIC | Age: 79
End: 2019-08-15

## 2019-08-15 VITALS
DIASTOLIC BLOOD PRESSURE: 61 MMHG | OXYGEN SATURATION: 97 % | SYSTOLIC BLOOD PRESSURE: 95 MMHG | HEART RATE: 76 BPM | TEMPERATURE: 97.7 F

## 2019-08-15 VITALS
HEIGHT: 66 IN | DIASTOLIC BLOOD PRESSURE: 62 MMHG | WEIGHT: 168.8 LBS | SYSTOLIC BLOOD PRESSURE: 126 MMHG | BODY MASS INDEX: 27.13 KG/M2 | HEART RATE: 77 BPM

## 2019-08-15 DIAGNOSIS — D63.1 ANEMIA OF CHRONIC RENAL FAILURE, STAGE 4 (SEVERE) (HCC): Primary | ICD-10-CM

## 2019-08-15 DIAGNOSIS — I34.0 NON-RHEUMATIC MITRAL REGURGITATION: ICD-10-CM

## 2019-08-15 DIAGNOSIS — I50.22 CHRONIC SYSTOLIC CONGESTIVE HEART FAILURE (HCC): ICD-10-CM

## 2019-08-15 DIAGNOSIS — I10 ESSENTIAL HYPERTENSION: ICD-10-CM

## 2019-08-15 DIAGNOSIS — I50.22 CHRONIC SYSTOLIC CONGESTIVE HEART FAILURE (HCC): Primary | ICD-10-CM

## 2019-08-15 DIAGNOSIS — N18.4 ANEMIA OF CHRONIC RENAL FAILURE, STAGE 4 (SEVERE) (HCC): Primary | ICD-10-CM

## 2019-08-15 DIAGNOSIS — I42.8 NONISCHEMIC CARDIOMYOPATHY (HCC): Primary | ICD-10-CM

## 2019-08-15 DIAGNOSIS — Z45.2 ENCOUNTER FOR FITTING AND ADJUSTMENT OF VASCULAR CATHETER: ICD-10-CM

## 2019-08-15 DIAGNOSIS — E78.2 MIXED HYPERLIPIDEMIA: ICD-10-CM

## 2019-08-15 LAB
ABO GROUP BLD: NORMAL
BASOPHILS # BLD AUTO: 0.01 10*3/MM3 (ref 0–0.2)
BASOPHILS NFR BLD AUTO: 0.2 % (ref 0–1.5)
BLD GP AB SCN SERPL QL: NEGATIVE
DEPRECATED RDW RBC AUTO: 75 FL (ref 37–54)
EOSINOPHIL # BLD AUTO: 0.18 10*3/MM3 (ref 0–0.4)
EOSINOPHIL NFR BLD AUTO: 3.7 % (ref 0.3–6.2)
ERYTHROCYTE [DISTWIDTH] IN BLOOD BY AUTOMATED COUNT: 19.2 % (ref 12.3–15.4)
FERRITIN SERPL-MCNC: 72 NG/ML (ref 13–150)
HCT VFR BLD AUTO: 24.4 % (ref 34–46.6)
HGB BLD-MCNC: 7.2 G/DL (ref 12–15.9)
HGB RETIC QN AUTO: 30.1 PG (ref 29.8–36.1)
IMM GRANULOCYTES # BLD AUTO: 0.01 10*3/MM3 (ref 0–0.05)
IMM GRANULOCYTES NFR BLD AUTO: 0.2 % (ref 0–0.5)
IMM RETICS NFR: 23.5 % (ref 3–15.8)
IRON 24H UR-MRATE: 48 MCG/DL (ref 37–145)
IRON SATN MFR SERPL: 16 % (ref 14–48)
LYMPHOCYTES # BLD AUTO: 0.34 10*3/MM3 (ref 0.7–3.1)
LYMPHOCYTES NFR BLD AUTO: 6.9 % (ref 19.6–45.3)
MCH RBC QN AUTO: 32.1 PG (ref 26.6–33)
MCHC RBC AUTO-ENTMCNC: 29.5 G/DL (ref 31.5–35.7)
MCV RBC AUTO: 108.9 FL (ref 79–97)
MONOCYTES # BLD AUTO: 0.33 10*3/MM3 (ref 0.1–0.9)
MONOCYTES NFR BLD AUTO: 6.7 % (ref 5–12)
NEUTROPHILS # BLD AUTO: 4.05 10*3/MM3 (ref 1.7–7)
NEUTROPHILS NFR BLD AUTO: 82.3 % (ref 42.7–76)
NRBC BLD AUTO-RTO: 0 /100 WBC (ref 0–0.2)
PLATELET # BLD AUTO: 212 10*3/MM3 (ref 140–450)
PMV BLD AUTO: 9 FL (ref 6–12)
RBC # BLD AUTO: 2.24 10*6/MM3 (ref 3.77–5.28)
RETICS/RBC NFR AUTO: 9.67 % (ref 0.7–1.9)
RH BLD: NEGATIVE
T&S EXPIRATION DATE: NORMAL
TIBC SERPL-MCNC: 295 MCG/DL (ref 249–505)
TRANSFERRIN SERPL-MCNC: 211 MG/DL (ref 200–360)
WBC NRBC COR # BLD: 4.92 10*3/MM3 (ref 3.4–10.8)

## 2019-08-15 PROCEDURE — 93000 ELECTROCARDIOGRAM COMPLETE: CPT | Performed by: INTERNAL MEDICINE

## 2019-08-15 PROCEDURE — 86901 BLOOD TYPING SEROLOGIC RH(D): CPT

## 2019-08-15 PROCEDURE — 36415 COLL VENOUS BLD VENIPUNCTURE: CPT | Performed by: NURSE PRACTITIONER

## 2019-08-15 PROCEDURE — 86923 COMPATIBILITY TEST ELECTRIC: CPT

## 2019-08-15 PROCEDURE — 85025 COMPLETE CBC W/AUTO DIFF WBC: CPT | Performed by: NURSE PRACTITIONER

## 2019-08-15 PROCEDURE — 99214 OFFICE O/P EST MOD 30 MIN: CPT | Performed by: INTERNAL MEDICINE

## 2019-08-15 PROCEDURE — 96523 IRRIG DRUG DELIVERY DEVICE: CPT | Performed by: NURSE PRACTITIONER

## 2019-08-15 PROCEDURE — 93284 PRGRMG EVAL IMPLANTABLE DFB: CPT | Performed by: INTERNAL MEDICINE

## 2019-08-15 PROCEDURE — 82728 ASSAY OF FERRITIN: CPT | Performed by: NURSE PRACTITIONER

## 2019-08-15 PROCEDURE — 85046 RETICYTE/HGB CONCENTRATE: CPT | Performed by: NURSE PRACTITIONER

## 2019-08-15 PROCEDURE — 96372 THER/PROPH/DIAG INJ SC/IM: CPT | Performed by: NURSE PRACTITIONER

## 2019-08-15 PROCEDURE — 84466 ASSAY OF TRANSFERRIN: CPT | Performed by: NURSE PRACTITIONER

## 2019-08-15 PROCEDURE — 86900 BLOOD TYPING SEROLOGIC ABO: CPT

## 2019-08-15 PROCEDURE — 25010000002 EPOETIN ALFA PER 1000 UNITS: Performed by: NURSE PRACTITIONER

## 2019-08-15 PROCEDURE — 86850 RBC ANTIBODY SCREEN: CPT

## 2019-08-15 PROCEDURE — 83540 ASSAY OF IRON: CPT | Performed by: NURSE PRACTITIONER

## 2019-08-15 RX ORDER — SODIUM CHLORIDE 0.9 % (FLUSH) 0.9 %
10 SYRINGE (ML) INJECTION AS NEEDED
Status: CANCELLED | OUTPATIENT
Start: 2019-08-15

## 2019-08-15 RX ORDER — FAMOTIDINE 20 MG/1
20 TABLET, FILM COATED ORAL 2 TIMES DAILY
COMMUNITY
Start: 2019-07-15 | End: 2019-10-15 | Stop reason: SDUPTHER

## 2019-08-15 RX ORDER — ACETAMINOPHEN 325 MG/1
650 TABLET ORAL ONCE
Status: CANCELLED | OUTPATIENT
Start: 2019-08-18 | End: 2019-08-15

## 2019-08-15 RX ORDER — SODIUM CHLORIDE 0.9 % (FLUSH) 0.9 %
10 SYRINGE (ML) INJECTION AS NEEDED
Status: DISCONTINUED | OUTPATIENT
Start: 2019-08-15 | End: 2019-08-15 | Stop reason: HOSPADM

## 2019-08-15 RX ORDER — DIPHENHYDRAMINE HCL 25 MG
25 CAPSULE ORAL ONCE
Status: CANCELLED | OUTPATIENT
Start: 2019-08-18 | End: 2019-08-15

## 2019-08-15 RX ORDER — SODIUM CHLORIDE 9 MG/ML
250 INJECTION, SOLUTION INTRAVENOUS AS NEEDED
Status: CANCELLED | OUTPATIENT
Start: 2019-08-18

## 2019-08-15 RX ORDER — DULOXETIN HYDROCHLORIDE 30 MG/1
CAPSULE, DELAYED RELEASE ORAL 2 TIMES DAILY
Status: ON HOLD | COMMUNITY
End: 2020-02-12

## 2019-08-15 RX ADMIN — Medication 500 UNITS: at 14:34

## 2019-08-15 RX ADMIN — SODIUM CHLORIDE, PRESERVATIVE FREE 10 ML: 5 INJECTION INTRAVENOUS at 14:34

## 2019-08-15 RX ADMIN — ERYTHROPOIETIN 58000 UNITS: 40000 INJECTION, SOLUTION INTRAVENOUS; SUBCUTANEOUS at 15:25

## 2019-08-15 NOTE — PROGRESS NOTES
Pt is here for lab with RN review. Pt C/O fatigue and SOA HGB 7.2  CBC reviewed and symptoms reviewed with Dr. Merritt. Per Dr. Merritt pt will receive 58,000 (25% increase) units of procrit and 2 units of PRBC. Added labs are ferritin, Iron profile and Retic. All orders placed and pt given 58,0000 units of Procrit.     Message sent to scheduling for PRBC. Copy of labs given to pt and f/u appt reviewed. Pt is instructed to call the office with any concerns or new symptoms prior to next visit. Pt vu.   Lab Results   Component Value Date    WBC 4.92 08/15/2019    HGB 7.2 (C) 08/15/2019    HCT 24.4 (L) 08/15/2019    .9 (H) 08/15/2019     08/15/2019

## 2019-08-15 NOTE — PROGRESS NOTES
Date of Office Visit: 08/15/19  Encounter Provider: Estevan Matamoros MD  Place of Service: [facility] CARDIOLOGY  Patient Name: Charmaine Machuca  :1940  Referral Provider:No ref. provider found      Chief Complaint   Patient presents with   • Follow-up     History of Present Illness      The patient is a pleasant 79-year-old female with a history of nonischemic  cardiomyopathy, hypertension, hyperlipidemia and LBBB. Original ejection   fraction was extremely low. She was enrolled in the [facility] study of ACE   inhibitor plus Atacand versus ACE inhibitor plus a placebo. Then, in 2005   she had a perfusion study that showed infarct but no ischemia. She had cardiac   catheterization in 2006, which showed moderate left ventricular dysfunction,   elevated right-sided pressure, left anterior end-diastolic pressure, mild mitral insufficiency,  but normal coronary arteries. She was treated medically. She then had  worsening dyspnea. In 2007, had a Bi-V defibrillator placed and she  continued to have GI problems, fatigue, weakness, dizziness, syncopal, near  syncopal spells, multiple medications were adjusted. Diuretics were  adjusted. On diuretics, off diuretics, volume overloaded, volume depleted.  She was diagnosed with myoclonus. Multiple GI complaints.  She then had her generator changed in 2014. Unfortunately, a day or two after that  developed small bowel obstruction, had to be admitted to the hospital and was then treated  for that. That resolved. She developed C. difficile infection, was treated for that. She  developed a pocket hematoma and had to have that evacuated.   She had a followup echocardiogram in 2015 that showed normal ejection fraction, mild  mitral insufficiency, mild aortic valve calcification without stenosis. She comes in for  followup now. She went to [facility] 2015 with this atypical chest  discomfort, left-sided under her  breast, pleuritic, worse when she would take a breath in,  worse when she would move to one side. No real increased shortness of breath with it, but  it was hard to take breath in. While there, she had a ventilation perfusion scan that was  negative for pulmonary embolus. She had a 2D echocardiogram that, again, showed normal LV  systolic function and no significant valvular disease.  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  Had that removed and new lead replaced.    She got in for follow-up.  She still been undergoing a lot.  Her kidney function is poor has a fistula placed.  She is been anemic and is intermittently required blood transfusions in addition to the Epogen.  She has had no real chest pain or pressure she does have shortness of breath which is chronic and unchanged may be a little worse since she is been more anemic denies palpitations near syncope or syncope.  She also does have fatigue.  No paralysis, paresthesia, dysarthria, abrupt loss of vision.      Cardiomyopathy   Associated symptoms include myalgias and numbness. Pertinent negatives include no abdominal pain, congestion, diaphoresis, fever, headaches, joint swelling, nausea, rash, vertigo, vomiting or weakness.   Atrial Fibrillation   Symptoms are negative for dizziness, shortness of breath and weakness. Past medical history includes atrial fibrillation, AICD and CHF.   Congestive Heart Failure   Pertinent negatives include no abdominal pain, muscle weakness, nocturia or shortness of breath.         Past Medical History:   Diagnosis Date   • Anemia     Iron deficiency   • Cardiomyopathy (CMS/HCC)     S/P pacemaker and defibrillator   • CHF (congestive heart failure) (CMS/HCC)    • Chronic renal failure, stage 4 (severe) (CMS/HCC)    • Coronary artery disease     pacemaker, defibrillator   • Dizzy    • Gastroparesis    • GERD (gastroesophageal reflux disease)    • Gout    • Hemorrhoids    • Hiatal hernia    • History of  Clostridium difficile colitis 2013   • History of kidney stones    • History of pancreatitis     2014   • History of skin cancer    • History of transfusion     MULTIPLE    • Hypothyroidism    • Left bundle branch block    • Mitral and aortic valve disease    • Mitral valve insufficiency    • Myoclonus     S/P Depakote   • Osteoarthritis    • Pancytopenia (CMS/HCC)    • Peripheral neuropathy    • Presence of cardiac pacemaker     AND DEFIBRILLATOR   • Pulmonary hypertension (CMS/HCC)    • SOB (shortness of breath) on exertion    • Spinal stenosis          Past Surgical History:   Procedure Laterality Date   • APPENDECTOMY N/A    • ARTERIOVENOUS FISTULA/SHUNT SURGERY Right 2/18/2019    Procedure: RIGHT BASILIC FISTULA CREATION WITHOUT TRANSPOSITION;  Surgeon: Royer Dozier MD;  Location: [facility] OR;  Service: Vascular   • ARTERIOVENOUS FISTULA/SHUNT SURGERY Right 5/31/2019    Procedure: RIGHT 2ND STAGE BASILIC VEIN CREATION;  Surgeon: Royer Dozier MD;  Location: [facility] OR;  Service: Vascular   • CARDIAC CATHETERIZATION Left 03/28/2006    Arterial catheter insertion, cath left ventriculography, coronary angiography and left heart catheterization-Dr. Estevan Matamoros   • CARDIAC DEFIBRILLATOR PLACEMENT N/A 11/30/2007    Biventricular implantable cardioverter defibrillator-Dr. Leandro Huber   • CATARACT EXTRACTION Bilateral 2011   • COLONOSCOPY N/A 2014    done at [facility]   • CYSTECTOMY N/A     Ovarian cystectomy   • ENDOSCOPY N/A 04/28/2006    EGD with biopsies. Paraesophageal hiatal hernia from 34 to 44 cm, antral gastritis-Dr. Nehemiah Beal   • ENDOSCOPY N/A 09/29/2004    Hiatal hernia, gastritis and an erosion of the pylorus-Dr. Yang Pavon   • ENTEROSCOPY SMALL BOWEL N/A 10/30/2006    Small bowel enteroscopy with biopsies-Dr. Rory Sevilla   • FLEXIBLE SIGMOIDOSCOPY N/A 09/29/2004    Unsuccessful colonoscopy due to poop prep, a very tortuous sigmoid, bowel prep in the form of enema given and a  barium enema was obtained-Dr. Yang Pavon   • FRACTURE SURGERY  2015    Broke big toe   • HEMATOMA EVACUATION TRUNK N/A 02/07/2014    Pocket evacuation of hematoma at pacemaker site-Dr. Leandro Huber   • HEMORRHOIDECTOMY N/A 04/19/2004    Flexible sigmoidoscopy and stapled hemorrhoidectomy-Dr. Yang Pavon   • HYSTERECTOMY Bilateral 1977   • IMPLANTABLE CARDIOVERTER DEFIBRILLATOR LEAD REPLACEMENT/POCKET REVISION Left 3/28/2016    Procedure: AUTOMATIC IMPLANTABLE CARDIOVERTER DEFIBRILLATOR LEAD REPLACEMENT WITH LASER LEAD EXTRACTION;  Surgeon: Leandro Huber MD;  [facility];  Service:    • IMPLANTABLE CARDIOVERTER DEFIBRILLATOR LEAD REPLACEMENT/POCKET REVISION N/A 01/13/2014    Biventricular ICD replacement-Dr. Jillian Huber   • LAPAROSCOPY REPAIR HIATAL HERNIA N/A 06/27/2006    Laparoscopic paraesophageal hiatal hernia repair with Nissen fundoplication and cholecystectomy-Dr. Sean Yoon   • NATALIE GONZALEZ ([facility]) PROCEDURE     • NC INSJ TUNNELED CVC W/O SUBQ PORT/ AGE 3 YR/> Right 10/3/2017    Procedure: Right internal jugular port placement;  Surgeon: Tiffanie Couch MD;  Location: [facility] OR;  Service: General   • TOE SURGERY Left     SET BIG TOE   • TOTAL KNEE ARTHROPLASTY Left 08/2014         Current Outpatient Medications on File Prior to Visit   Medication Sig Dispense Refill   • aspirin 81 MG tablet Take 81 mg by mouth Daily.     • calcitriol (ROCALTROL) 0.25 MCG capsule Take 0.25 mcg by mouth Every Other Day. 1 tablet every other day and 2 tablets every other day     • carbidopa-levodopa ER (SINEMET CR)  MG per tablet Take 1 tablet by mouth Every Evening.     • carvedilol (COREG) 6.25 MG tablet Take 6.25 mg by mouth Daily.     • Cholecalciferol (VITAMIN D3) 5000 units capsule capsule Take 5,000 Units by mouth Daily.     • diazepam (VALIUM) 5 MG tablet Take 5 mg by mouth Every Night.     • DULOXETINE HCL PO Take  by mouth 2 (Two) Times a Day.      • famotidine (PEPCID) 20 MG tablet Take 20 mg by mouth 2 (Two) Times a Day.     • febuxostat (ULORIC) 40 MG tablet Take 40 mg by mouth Daily.     • furosemide (LASIX) 40 MG tablet Take 1 tablet by mouth Daily. 90 tablet 3   • Gabapentin, Once-Daily, (GRALISE) 300 MG tablet Take 300 mg by mouth Daily With Dinner. MAY REPEAT LATE EVENING IF NEEDED     • Glucosamine-Chondroit-Vit C-Mn (GLUCOSAMINE CHONDROITIN COMPLX) capsule Take 1,500 mg by mouth Every Other Day. PT HOLDING FOR SURGERY     • HYDROcodone-acetaminophen (NORCO) 5-325 MG per tablet Take 1-2 tablets by mouth Every 8 (Eight) Hours As Needed for Moderate Pain  or Severe Pain  (Pain). 30 tablet 0   • levothyroxine (SYNTHROID, LEVOTHROID) 25 MCG tablet Take 25 mcg by mouth Daily.     • loperamide (IMODIUM) 2 MG capsule Take 2 mg by mouth 4 (Four) Times a Day As Needed for Diarrhea.     • magnesium oxide 250 MG tablet Take 250 mg by mouth Daily.     • methscopolamine (PAMINE FORTE) 5 MG tablet Take 5 mg by mouth Every Morning.     • Multiple Vitamin (MULTI-DAY VITAMINS) tablet Take 1 tablet by mouth Daily. HOLD FOR SURGERY     • psyllium (METAMUCIL) 58.6 % powder Take 1 packet by mouth Daily.     • rotigotine (NEUPRO) 6 MG/24HR patch Place 1 patch on the skin as directed by provider Daily.     • spironolactone (ALDACTONE) 25 MG tablet TAKE 1/2 TABLET DAILY 45 tablet 1   • Vitamin E 400 UNITS tablet Take 400 Units by mouth Daily. PT HOLDING FOR SURGERY     • [DISCONTINUED] Calcium Carbonate (CALCIUM 600 PO) Take 600 mg by mouth Daily.     • [DISCONTINUED] methylPREDNISolone (MEDROL) 4 MG tablet Take 4 mg by mouth As Needed. TAKES FOR GOUT FLARE UPS     • [DISCONTINUED] spironolactone (ALDACTONE) 25 MG tablet Take 25 mg by mouth Daily.       Current Facility-Administered Medications on File Prior to Visit   Medication Dose Route Frequency Provider Last Rate Last Dose   • heparin flush (porcine) 100 UNIT/ML injection 500 Units  500 Units Intravenous PRN Tyler  Anuel CARY MD   500 Units at 11/27/17 1347   • sodium chloride 0.9 % flush 10 mL  10 mL Intravenous PRN Anuel Scott MD   10 mL at 11/27/17 1347         Social History     Socioeconomic History   • Marital status:      Spouse name: Zackery   • Number of children: 5   • Years of education: 1 yr college   • Highest education level: Not on file   Occupational History     Employer: RETIRED   Tobacco Use   • Smoking status: Never Smoker   • Smokeless tobacco: Never Used   Substance and Sexual Activity   • Alcohol use: No   • Drug use: No   • Sexual activity: Defer       Family History   Problem Relation Age of Onset   • Coronary artery disease Other    • Dementia Other    • Kidney disease Other    • Arthritis Father    • Early death Father         Kidney failure   • Kidney disease Father    • Heart disease Mother    • Mental illness Mother         Dementia   • Malig Hyperthermia Neg Hx          Review of Systems   Constitution: Positive for malaise/fatigue. Negative for decreased appetite, diaphoresis, fever, weakness, weight gain and weight loss.   HENT: Negative for congestion, hearing loss, nosebleeds and tinnitus.    Eyes: Negative for blurred vision, double vision, vision loss in left eye, vision loss in right eye and visual disturbance.   Cardiovascular:        As noted in HPI   Respiratory: Negative for shortness of breath.         As noted HPI   Endocrine: Negative for cold intolerance and heat intolerance.   Hematologic/Lymphatic: Negative for bleeding problem. Does not bruise/bleed easily.   Skin: Negative for color change, flushing, itching and rash.   Musculoskeletal: Positive for joint pain and myalgias. Negative for arthritis, back pain, joint swelling and muscle weakness.   Gastrointestinal: Positive for diarrhea. Negative for bloating, abdominal pain, constipation, dysphagia, heartburn, hematemesis, hematochezia, melena, nausea and vomiting.   Genitourinary: Negative for bladder incontinence,  "dysuria, frequency, nocturia and urgency.   Neurological: Positive for light-headedness and numbness. Negative for dizziness, focal weakness, headaches, loss of balance, paresthesias and vertigo.   Psychiatric/Behavioral: Negative for depression, memory loss and substance abuse.       Procedures      ECG 12 Lead  Date/Time: 8/15/2019 12:36 PM  Performed by: Estevan Matamoros MD  Authorized by: Estevan Matamoros MD   Comparison: compared with previous ECG   Similar to previous ECG  Rhythm: sinus rhythm  Rhythm comments: Ventricular paced                  Objective:    /62 (BP Location: Left arm)   Pulse 77   Ht 167.6 cm (66\")   Wt 76.6 kg (168 lb 12.8 oz)   BMI 27.25 kg/m²        Physical Exam  Physical Exam   Constitutional: She is oriented to person, place, and time. She appears well-developed and well-nourished. No distress.   HENT:   Head: Normocephalic.   Eyes: Conjunctivae are normal. Pupils are equal, round, and reactive to light. No scleral icterus.   Neck: Normal carotid pulses, no hepatojugular reflux and no JVD present. Carotid bruit is not present. No tracheal deviation, no edema and no erythema present. No thyromegaly present.   Cardiovascular: Normal rate, regular rhythm, S1 normal, S2 normal and intact distal pulses.  No extrasystoles are present. PMI is not displaced. Exam reveals no gallop, no distant heart sounds and no friction rub.   Murmur heard.   Systolic murmur is present with a grade of 2/6 at the lower left sternal border. Fistual right arm  Pulses:       Carotid pulses are 2+ on the right side, and 2+ on the left side.       Radial pulses are 2+ on the right side, and 2+ on the left side.        Femoral pulses are 2+ on the right side, and 2+ on the left side.       Dorsalis pedis pulses are 2+ on the right side, and 2+ on the left side.        Posterior tibial pulses are 2+ on the right side, and 2+ on the left side.   Pulmonary/Chest: Effort normal and breath sounds normal. No " respiratory distress. She has no decreased breath sounds. She has no wheezes. She has no rhonchi. She has no rales. She exhibits no tenderness.   Abdominal: Soft. Bowel sounds are normal. She exhibits no distension and no mass. There is no hepatosplenomegaly. There is no tenderness. There is no rebound and no guarding.   Musculoskeletal: She exhibits edema (1+ bilateral tibial). She exhibits no tenderness or deformity.   Neurological: She is alert and oriented to person, place, and time.   Skin: Skin is warm and dry. No rash noted. She is not diaphoretic. No cyanosis or erythema. No pallor. Nails show no clubbing.   Psychiatric: She has a normal mood and affect. Her speech is normal and behavior is normal. Judgment and thought content normal.           Assessment:   1. This is a 79-year-old female with history of nonischemic cardiomyopathy.  Echocardiogram 8/18 again normal left ventricular ejection fraction.  Heart Failure  Assessment  • NYHA class II - There is slight limitation of physical activity. The patient is comfortable at rest, but physical activity results in fatigue, palpitations or shortness of breath.  • ACE inhibitor not prescribed for medical reasons  • Beta blocker prescribed  • Diuretics prescribed  • Angiotensin receptor blocker (ARB) not prescribed for medical reasons  • Left ventricular function is normal by qualitative assessment     Plan  • The patient has received heart failure education on the following topics: dietary sodium restriction, medication instructions, minimizing or avoiding NSAID use, symptom management, weight monitoring and minimizing alcohol intake  • The heart failure care plan was discussed with the patient today including: continuing the current program  •  The patient has been counseled about ICD or CRT-D implantation     Subjective/Objective    • Physical exam findings negative for rales and elevated JVP.  • The patient has an ICD implant  • The patient has a CRT-D  implant  • The patient has a CRT implant  From a cardiac standpoint I believe she is stable.  We are checking her defibrillator today if no changes will see her again in follow-up in a year and have her see our nurse practitioner in 6 months.      2. History of congestive heart failure status post Bi-V ICD. Following in our defibrillator clinic. Now stable.  3. History of syncope, falling spells, and dizziness of unclear etiology. Resolved. May have been from Depakote.  4. Hyperlipidemia, followed by her PCP  5. History of kidney stones.  6. History of left bundle branch block. unchanged.  7. History of anemia. iron deficient still following with hematology.   8. History of renal failure.  Progressively worse.    Following with nephrology.  9. History of hypertension.  Blood pressure at goal.  10. Probable depression.  Much improved.  11. Myoclonus.   12. Mitral Insufficiency. Echocardiogram 8/18 just mild  13.  Anemia.  Secondary to renal failure.    Following up with hematology.          Plan:

## 2019-08-17 ENCOUNTER — APPOINTMENT (OUTPATIENT)
Dept: CT IMAGING | Facility: HOSPITAL | Age: 79
End: 2019-08-17

## 2019-08-17 ENCOUNTER — HOSPITAL ENCOUNTER (INPATIENT)
Facility: HOSPITAL | Age: 79
LOS: 3 days | Discharge: HOME | End: 2019-08-20
Attending: EMERGENCY MEDICINE | Admitting: INTERNAL MEDICINE

## 2019-08-17 DIAGNOSIS — N17.9 ACUTE RENAL FAILURE SUPERIMPOSED ON CHRONIC KIDNEY DISEASE, UNSPECIFIED CKD STAGE, UNSPECIFIED ACUTE RENAL FAILURE TYPE (HCC): ICD-10-CM

## 2019-08-17 DIAGNOSIS — N18.9 ACUTE RENAL FAILURE SUPERIMPOSED ON CHRONIC KIDNEY DISEASE, UNSPECIFIED CKD STAGE, UNSPECIFIED ACUTE RENAL FAILURE TYPE (HCC): ICD-10-CM

## 2019-08-17 DIAGNOSIS — R47.81 SLURRED SPEECH: ICD-10-CM

## 2019-08-17 DIAGNOSIS — D64.9 ANEMIA, UNSPECIFIED TYPE: ICD-10-CM

## 2019-08-17 DIAGNOSIS — R29.810 WEAKNESS ON LEFT SIDE OF FACE: Primary | ICD-10-CM

## 2019-08-17 DIAGNOSIS — I63.9 ACUTE CVA (CEREBROVASCULAR ACCIDENT) (HCC): ICD-10-CM

## 2019-08-17 LAB
ALBUMIN SERPL-MCNC: 2.9 G/DL (ref 3.5–5.2)
ALBUMIN/GLOB SERPL: 1.1 G/DL
ALP SERPL-CCNC: 44 U/L (ref 39–117)
ALT SERPL W P-5'-P-CCNC: <5 U/L (ref 1–33)
ANION GAP SERPL CALCULATED.3IONS-SCNC: 9 MMOL/L (ref 5–15)
ANISOCYTOSIS BLD QL: ABNORMAL
AST SERPL-CCNC: 16 U/L (ref 1–32)
BASOPHILS # BLD MANUAL: 0.05 10*3/MM3 (ref 0–0.2)
BASOPHILS NFR BLD AUTO: 1 % (ref 0–1.5)
BILIRUB SERPL-MCNC: <0.2 MG/DL (ref 0.2–1.2)
BUN BLD-MCNC: 75 MG/DL (ref 8–23)
BUN/CREAT SERPL: 21.1 (ref 7–25)
CALCIUM SPEC-SCNC: 9.8 MG/DL (ref 8.6–10.5)
CHLORIDE SERPL-SCNC: 102 MMOL/L (ref 98–107)
CO2 SERPL-SCNC: 27 MMOL/L (ref 22–29)
CREAT BLD-MCNC: 3.56 MG/DL (ref 0.57–1)
DEPRECATED RDW RBC AUTO: 72.9 FL (ref 37–54)
EOSINOPHIL # BLD MANUAL: 0.21 10*3/MM3 (ref 0–0.4)
EOSINOPHIL NFR BLD MANUAL: 4.1 % (ref 0.3–6.2)
ERYTHROCYTE [DISTWIDTH] IN BLOOD BY AUTOMATED COUNT: 18.6 % (ref 12.3–15.4)
GFR SERPL CREATININE-BSD FRML MDRD: 12 ML/MIN/1.73
GFR SERPL CREATININE-BSD FRML MDRD: ABNORMAL ML/MIN/1.73
GIANT PLATELETS: ABNORMAL
GLOBULIN UR ELPH-MCNC: 2.7 GM/DL
GLUCOSE BLD-MCNC: 116 MG/DL (ref 65–99)
GLUCOSE BLDC GLUCOMTR-MCNC: 156 MG/DL (ref 70–130)
HCT VFR BLD AUTO: 21.8 % (ref 34–46.6)
HGB BLD-MCNC: 6.3 G/DL (ref 12–15.9)
INR PPP: 1.11 (ref 0.9–1.1)
LYMPHOCYTES # BLD MANUAL: 0.26 10*3/MM3 (ref 0.7–3.1)
LYMPHOCYTES NFR BLD MANUAL: 2 % (ref 5–12)
LYMPHOCYTES NFR BLD MANUAL: 5.1 % (ref 19.6–45.3)
MACROCYTES BLD QL SMEAR: ABNORMAL
MCH RBC QN AUTO: 31 PG (ref 26.6–33)
MCHC RBC AUTO-ENTMCNC: 28.9 G/DL (ref 31.5–35.7)
MCV RBC AUTO: 107.4 FL (ref 79–97)
MONOCYTES # BLD AUTO: 0.1 10*3/MM3 (ref 0.1–0.9)
NEUTROPHILS # BLD AUTO: 4.45 10*3/MM3 (ref 1.7–7)
NEUTROPHILS NFR BLD MANUAL: 87.8 % (ref 42.7–76)
NRBC SPEC MANUAL: 1 /100 WBC (ref 0–0.2)
PLATELET # BLD AUTO: 200 10*3/MM3 (ref 140–450)
PMV BLD AUTO: 9.6 FL (ref 6–12)
POLYCHROMASIA BLD QL SMEAR: ABNORMAL
POTASSIUM BLD-SCNC: 4.5 MMOL/L (ref 3.5–5.2)
PROT SERPL-MCNC: 5.6 G/DL (ref 6–8.5)
PROTHROMBIN TIME: 14 SECONDS (ref 11.7–14.2)
RBC # BLD AUTO: 2.03 10*6/MM3 (ref 3.77–5.28)
SODIUM BLD-SCNC: 138 MMOL/L (ref 136–145)
TROPONIN T SERPL-MCNC: <0.01 NG/ML (ref 0–0.03)
WBC MORPH BLD: NORMAL
WBC NRBC COR # BLD: 5.07 10*3/MM3 (ref 3.4–10.8)

## 2019-08-17 PROCEDURE — 93005 ELECTROCARDIOGRAM TRACING: CPT | Performed by: EMERGENCY MEDICINE

## 2019-08-17 PROCEDURE — 93010 ELECTROCARDIOGRAM REPORT: CPT | Performed by: INTERNAL MEDICINE

## 2019-08-17 PROCEDURE — 82962 GLUCOSE BLOOD TEST: CPT

## 2019-08-17 PROCEDURE — 70450 CT HEAD/BRAIN W/O DYE: CPT

## 2019-08-17 PROCEDURE — 84484 ASSAY OF TROPONIN QUANT: CPT | Performed by: EMERGENCY MEDICINE

## 2019-08-17 PROCEDURE — 99284 EMERGENCY DEPT VISIT MOD MDM: CPT

## 2019-08-17 PROCEDURE — 85025 COMPLETE CBC W/AUTO DIFF WBC: CPT | Performed by: EMERGENCY MEDICINE

## 2019-08-17 PROCEDURE — 85610 PROTHROMBIN TIME: CPT | Performed by: EMERGENCY MEDICINE

## 2019-08-17 PROCEDURE — 80053 COMPREHEN METABOLIC PANEL: CPT | Performed by: EMERGENCY MEDICINE

## 2019-08-17 PROCEDURE — 85007 BL SMEAR W/DIFF WBC COUNT: CPT | Performed by: EMERGENCY MEDICINE

## 2019-08-17 RX ORDER — SODIUM CHLORIDE 0.9 % (FLUSH) 0.9 %
10 SYRINGE (ML) INJECTION AS NEEDED
Status: DISCONTINUED | OUTPATIENT
Start: 2019-08-17 | End: 2019-08-20 | Stop reason: HOSPADM

## 2019-08-17 RX ORDER — SODIUM CHLORIDE 0.9 % (FLUSH) 0.9 %
20 SYRINGE (ML) INJECTION AS NEEDED
Status: DISCONTINUED | OUTPATIENT
Start: 2019-08-17 | End: 2019-08-20 | Stop reason: HOSPADM

## 2019-08-17 RX ORDER — SODIUM CHLORIDE 0.9 % (FLUSH) 0.9 %
10 SYRINGE (ML) INJECTION EVERY 12 HOURS SCHEDULED
Status: DISCONTINUED | OUTPATIENT
Start: 2019-08-17 | End: 2019-08-20 | Stop reason: HOSPADM

## 2019-08-17 RX ADMIN — SODIUM CHLORIDE, PRESERVATIVE FREE 20 ML: 5 INJECTION INTRAVENOUS at 21:12

## 2019-08-18 ENCOUNTER — INFUSION (OUTPATIENT)
Dept: ONCOLOGY | Facility: HOSPITAL | Age: 79
End: 2019-08-18

## 2019-08-18 DIAGNOSIS — N18.4 ANEMIA OF CHRONIC RENAL FAILURE, STAGE 4 (SEVERE) (HCC): ICD-10-CM

## 2019-08-18 DIAGNOSIS — D63.1 ANEMIA OF CHRONIC RENAL FAILURE, STAGE 4 (SEVERE) (HCC): ICD-10-CM

## 2019-08-18 LAB
ABO + RH BLD: NORMAL
ABO + RH BLD: NORMAL
ANION GAP SERPL CALCULATED.3IONS-SCNC: 9.8 MMOL/L (ref 5–15)
BH BB BLOOD EXPIRATION DATE: NORMAL
BH BB BLOOD EXPIRATION DATE: NORMAL
BH BB BLOOD TYPE BARCODE: 600
BH BB BLOOD TYPE BARCODE: 600
BH BB DISPENSE STATUS: NORMAL
BH BB DISPENSE STATUS: NORMAL
BH BB PRODUCT CODE: NORMAL
BH BB PRODUCT CODE: NORMAL
BH BB UNIT NUMBER: NORMAL
BH BB UNIT NUMBER: NORMAL
BUN BLD-MCNC: 77 MG/DL (ref 8–23)
BUN/CREAT SERPL: 23.5 (ref 7–25)
CALCIUM SPEC-SCNC: 9.3 MG/DL (ref 8.6–10.5)
CHLORIDE SERPL-SCNC: 102 MMOL/L (ref 98–107)
CO2 SERPL-SCNC: 27.2 MMOL/L (ref 22–29)
CREAT BLD-MCNC: 3.27 MG/DL (ref 0.57–1)
DEPRECATED RDW RBC AUTO: 71.5 FL (ref 37–54)
ERYTHROCYTE [DISTWIDTH] IN BLOOD BY AUTOMATED COUNT: 20.3 % (ref 12.3–15.4)
GFR SERPL CREATININE-BSD FRML MDRD: 14 ML/MIN/1.73
GFR SERPL CREATININE-BSD FRML MDRD: ABNORMAL ML/MIN/1.73
GLUCOSE BLD-MCNC: 104 MG/DL (ref 65–99)
HCT VFR BLD AUTO: 27.8 % (ref 34–46.6)
HGB BLD-MCNC: 8.7 G/DL (ref 12–15.9)
MAGNESIUM SERPL-MCNC: 2.6 MG/DL (ref 1.6–2.4)
MCH RBC QN AUTO: 31.1 PG (ref 26.6–33)
MCHC RBC AUTO-ENTMCNC: 31.3 G/DL (ref 31.5–35.7)
MCV RBC AUTO: 99.3 FL (ref 79–97)
PHOSPHATE SERPL-MCNC: 3.8 MG/DL (ref 2.5–4.5)
PLATELET # BLD AUTO: 172 10*3/MM3 (ref 140–450)
PMV BLD AUTO: 9.6 FL (ref 6–12)
POTASSIUM BLD-SCNC: 4.5 MMOL/L (ref 3.5–5.2)
RBC # BLD AUTO: 2.8 10*6/MM3 (ref 3.77–5.28)
SODIUM BLD-SCNC: 139 MMOL/L (ref 136–145)
UNIT  ABO: NORMAL
UNIT  ABO: NORMAL
UNIT  RH: NORMAL
UNIT  RH: NORMAL
WBC NRBC COR # BLD: 4.82 10*3/MM3 (ref 3.4–10.8)

## 2019-08-18 PROCEDURE — P9016 RBC LEUKOCYTES REDUCED: HCPCS

## 2019-08-18 PROCEDURE — 83735 ASSAY OF MAGNESIUM: CPT | Performed by: HOSPITALIST

## 2019-08-18 PROCEDURE — 84100 ASSAY OF PHOSPHORUS: CPT | Performed by: HOSPITALIST

## 2019-08-18 PROCEDURE — 85027 COMPLETE CBC AUTOMATED: CPT | Performed by: HOSPITALIST

## 2019-08-18 PROCEDURE — 80048 BASIC METABOLIC PNL TOTAL CA: CPT | Performed by: HOSPITALIST

## 2019-08-18 PROCEDURE — 99222 1ST HOSP IP/OBS MODERATE 55: CPT | Performed by: PSYCHIATRY & NEUROLOGY

## 2019-08-18 PROCEDURE — 36430 TRANSFUSION BLD/BLD COMPNT: CPT

## 2019-08-18 PROCEDURE — 86900 BLOOD TYPING SEROLOGIC ABO: CPT

## 2019-08-18 PROCEDURE — 92610 EVALUATE SWALLOWING FUNCTION: CPT

## 2019-08-18 PROCEDURE — 97110 THERAPEUTIC EXERCISES: CPT

## 2019-08-18 PROCEDURE — 97161 PT EVAL LOW COMPLEX 20 MIN: CPT

## 2019-08-18 RX ORDER — ATORVASTATIN CALCIUM 80 MG/1
80 TABLET, FILM COATED ORAL NIGHTLY
Status: DISCONTINUED | OUTPATIENT
Start: 2019-08-18 | End: 2019-08-18

## 2019-08-18 RX ORDER — BISACODYL 10 MG
10 SUPPOSITORY, RECTAL RECTAL DAILY PRN
Status: DISCONTINUED | OUTPATIENT
Start: 2019-08-18 | End: 2019-08-20 | Stop reason: HOSPADM

## 2019-08-18 RX ORDER — DIAZEPAM 5 MG/1
5 TABLET ORAL NIGHTLY
Status: DISCONTINUED | OUTPATIENT
Start: 2019-08-18 | End: 2019-08-20 | Stop reason: HOSPADM

## 2019-08-18 RX ORDER — ONDANSETRON 2 MG/ML
4 INJECTION INTRAMUSCULAR; INTRAVENOUS EVERY 6 HOURS PRN
Status: DISCONTINUED | OUTPATIENT
Start: 2019-08-18 | End: 2019-08-20 | Stop reason: HOSPADM

## 2019-08-18 RX ORDER — ZOLPIDEM TARTRATE 5 MG/1
5 TABLET ORAL NIGHTLY PRN
Status: DISCONTINUED | OUTPATIENT
Start: 2019-08-18 | End: 2019-08-20 | Stop reason: HOSPADM

## 2019-08-18 RX ORDER — ASPIRIN 81 MG/1
81 TABLET, CHEWABLE ORAL DAILY
Status: DISCONTINUED | OUTPATIENT
Start: 2019-08-18 | End: 2019-08-20 | Stop reason: HOSPADM

## 2019-08-18 RX ORDER — ASPIRIN 300 MG/1
300 SUPPOSITORY RECTAL DAILY
Status: DISCONTINUED | OUTPATIENT
Start: 2019-08-18 | End: 2019-08-18

## 2019-08-18 RX ORDER — CARVEDILOL 6.25 MG/1
6.25 TABLET ORAL DAILY
Status: DISCONTINUED | OUTPATIENT
Start: 2019-08-18 | End: 2019-08-20 | Stop reason: HOSPADM

## 2019-08-18 RX ORDER — ASPIRIN 81 MG/1
81 TABLET, CHEWABLE ORAL DAILY
Status: DISCONTINUED | OUTPATIENT
Start: 2019-08-18 | End: 2019-08-18

## 2019-08-18 RX ORDER — BISACODYL 5 MG/1
5 TABLET, DELAYED RELEASE ORAL DAILY PRN
Status: DISCONTINUED | OUTPATIENT
Start: 2019-08-18 | End: 2019-08-20 | Stop reason: HOSPADM

## 2019-08-18 RX ORDER — SODIUM CHLORIDE 0.9 % (FLUSH) 0.9 %
3 SYRINGE (ML) INJECTION EVERY 12 HOURS SCHEDULED
Status: DISCONTINUED | OUTPATIENT
Start: 2019-08-18 | End: 2019-08-20 | Stop reason: HOSPADM

## 2019-08-18 RX ORDER — FAMOTIDINE 20 MG/1
20 TABLET, FILM COATED ORAL 2 TIMES DAILY
Status: DISCONTINUED | OUTPATIENT
Start: 2019-08-18 | End: 2019-08-18

## 2019-08-18 RX ORDER — CALCITRIOL 0.25 UG/1
0.25 CAPSULE, LIQUID FILLED ORAL EVERY OTHER DAY
Status: DISCONTINUED | OUTPATIENT
Start: 2019-08-18 | End: 2019-08-20 | Stop reason: HOSPADM

## 2019-08-18 RX ORDER — HYDROCODONE BITARTRATE AND ACETAMINOPHEN 5; 325 MG/1; MG/1
1 TABLET ORAL EVERY 8 HOURS PRN
Status: DISCONTINUED | OUTPATIENT
Start: 2019-08-18 | End: 2019-08-20 | Stop reason: HOSPADM

## 2019-08-18 RX ORDER — FUROSEMIDE 40 MG/1
40 TABLET ORAL DAILY
Status: DISCONTINUED | OUTPATIENT
Start: 2019-08-18 | End: 2019-08-19

## 2019-08-18 RX ORDER — ATORVASTATIN CALCIUM 20 MG/1
40 TABLET, FILM COATED ORAL NIGHTLY
Status: DISCONTINUED | OUTPATIENT
Start: 2019-08-18 | End: 2019-08-20 | Stop reason: HOSPADM

## 2019-08-18 RX ORDER — LEVOTHYROXINE SODIUM 0.03 MG/1
25 TABLET ORAL DAILY
Status: DISCONTINUED | OUTPATIENT
Start: 2019-08-18 | End: 2019-08-20 | Stop reason: HOSPADM

## 2019-08-18 RX ORDER — CARBIDOPA AND LEVODOPA 25; 100 MG/1; MG/1
1 TABLET, EXTENDED RELEASE ORAL EVERY EVENING
Status: DISCONTINUED | OUTPATIENT
Start: 2019-08-18 | End: 2019-08-20 | Stop reason: HOSPADM

## 2019-08-18 RX ORDER — SODIUM CHLORIDE 0.9 % (FLUSH) 0.9 %
3-10 SYRINGE (ML) INJECTION AS NEEDED
Status: DISCONTINUED | OUTPATIENT
Start: 2019-08-18 | End: 2019-08-20 | Stop reason: HOSPADM

## 2019-08-18 RX ORDER — FEBUXOSTAT 40 MG/1
40 TABLET, FILM COATED ORAL DAILY
Status: DISCONTINUED | OUTPATIENT
Start: 2019-08-18 | End: 2019-08-20 | Stop reason: HOSPADM

## 2019-08-18 RX ORDER — FAMOTIDINE 20 MG/1
20 TABLET, FILM COATED ORAL DAILY
Status: DISCONTINUED | OUTPATIENT
Start: 2019-08-19 | End: 2019-08-20 | Stop reason: HOSPADM

## 2019-08-18 RX ORDER — NITROGLYCERIN 0.4 MG/1
0.4 TABLET SUBLINGUAL
Status: DISCONTINUED | OUTPATIENT
Start: 2019-08-18 | End: 2019-08-20 | Stop reason: HOSPADM

## 2019-08-18 RX ORDER — GABAPENTIN 300 MG/1
300 CAPSULE ORAL NIGHTLY
Status: DISCONTINUED | OUTPATIENT
Start: 2019-08-18 | End: 2019-08-20 | Stop reason: HOSPADM

## 2019-08-18 RX ADMIN — SODIUM CHLORIDE, PRESERVATIVE FREE 3 ML: 5 INJECTION INTRAVENOUS at 09:00

## 2019-08-18 RX ADMIN — DIAZEPAM 5 MG: 5 TABLET ORAL at 21:05

## 2019-08-18 RX ADMIN — ATORVASTATIN CALCIUM 40 MG: 20 TABLET, FILM COATED ORAL at 21:05

## 2019-08-18 RX ADMIN — LEVOTHYROXINE SODIUM 25 MCG: 25 TABLET ORAL at 15:07

## 2019-08-18 RX ADMIN — CARBIDOPA AND LEVODOPA 1 TABLET: 25; 100 TABLET, EXTENDED RELEASE ORAL at 17:12

## 2019-08-18 RX ADMIN — CALCITRIOL 0.25 MCG: 0.25 CAPSULE ORAL at 15:11

## 2019-08-18 RX ADMIN — GABAPENTIN 300 MG: 300 CAPSULE ORAL at 21:05

## 2019-08-18 RX ADMIN — ASPIRIN 81 MG: 81 TABLET, CHEWABLE ORAL at 15:06

## 2019-08-18 RX ADMIN — SODIUM CHLORIDE, PRESERVATIVE FREE 20 ML: 5 INJECTION INTRAVENOUS at 21:07

## 2019-08-18 RX ADMIN — FEBUXOSTAT 40 MG: 40 TABLET, FILM COATED ORAL at 15:09

## 2019-08-18 RX ADMIN — SODIUM CHLORIDE, PRESERVATIVE FREE 10 ML: 5 INJECTION INTRAVENOUS at 09:00

## 2019-08-18 RX ADMIN — FUROSEMIDE 40 MG: 40 TABLET ORAL at 15:13

## 2019-08-18 RX ADMIN — CARVEDILOL 6.25 MG: 6.25 TABLET, FILM COATED ORAL at 15:12

## 2019-08-18 NOTE — PLAN OF CARE
Problem: Patient Care Overview  Goal: Plan of Care Review   08/18/19 1411   Coping/Psychosocial   Plan of Care Reviewed With patient   OTHER   Outcome Summary Pt. will benefit from skilled inpt. P.T. to address her functional deficits and to assist pt. in regaining her maximum level of independence with functional mobility.

## 2019-08-18 NOTE — PROGRESS NOTES
"Renal Adjustment Note    Charmaine Machuca is currently ordered famotine 20 mg po bid.    Relevant clinical data and objective history reviewed:  79 y.o. female 167.6 cm (66\")   76.7 kg (169 lb 3.2 oz)   Ideal body weight: 59.3 kg (130 lb 11.7 oz)  Adjusted ideal body weight: 66.3 kg (146 lb 1.9 oz)    Allergies: reviewed allergy section in the chart    Lab Results   Component Value Date    GLUCOSE 104 (H) 08/18/2019    CALCIUM 9.3 08/18/2019     08/18/2019    K 4.5 08/18/2019    CO2 27.2 08/18/2019     08/18/2019    BUN 77 (H) 08/18/2019    CREATININE 3.27 (H) 08/18/2019    EGFRIFAFRI  08/18/2019      Comment:      <15 Indicative of kidney failure.    EGFRIFNONA 14 (L) 08/18/2019    BCR 23.5 08/18/2019    ANIONGAP 9.8 08/18/2019     Lab Results   Component Value Date    WBC 4.82 08/18/2019    HGB 8.7 (L) 08/18/2019    HCT 27.8 (L) 08/18/2019    MCV 99.3 (H) 08/18/2019     08/18/2019       Estimated Creatinine Clearance: 14.1 mL/min (A) (by C-G formula based on SCr of 3.27 mg/dL (H)).  I/O last 3 completed shifts:  In: 652.5 [Blood:652.5]  Out: -   Temp Readings from Last 3 Encounters:   08/18/19 98.2 °F (36.8 °C) (Oral)   08/15/19 97.7 °F (36.5 °C) (Oral)   08/10/19 98 °F (36.7 °C)       Assessment/Plan    Recommended dose of famotidine 20 mg once daily for CrCl <50 ml/min. Per P&T Protocol - famotidine 20 mg dose decreased to once daily to account for this patient's currently level of renal function.  CKD - stage IV.      Yamileth Mart R.Ph.  Clinical Staff Pharmacist    "

## 2019-08-18 NOTE — ED PROVIDER NOTES
EMERGENCY DEPARTMENT ENCOUNTER    CHIEF COMPLAINT  Chief Complaint: Facial droop, speech problem  Room Number: 24/24  PMD: Fannie Godfrey MD      HPI:  Pt is a 79 y.o. female who presents complaining of left facial droop and slurred speech which began this morning.  Last known normal was around midnight when she went to bed.  She denies weakness of the left arm or left leg.  Facial weakness and slurred speech is mild.  Worsened/improved by nothing.  She has not had prior symptoms in the past.  Patient has numerous chronic medical conditions including nonischemic cardiomyopathy,  stage IV renal disease and anemia of chronic disease.  She takes aspirin 81 mg for anticoagulation.  Apparently has a hemoglobin in the 7 range and is due for a blood transfusion sometime in the very near future.  She has had dark stools ongoing for several weeks or months and had a recent GI visit during which she was heme-negative.      PAST MEDICAL HISTORY  Active Ambulatory Problems     Diagnosis Date Noted   • Pacemaker lead failure 03/28/2016   • Chronic systolic congestive heart failure (CMS/HCC) 06/15/2016   • Cardiomyopathy (CMS/HCC) 06/15/2016   • Carpal tunnel syndrome 07/19/2016   • Periodic limb movement disorder 07/19/2016   • Peripheral neuropathy 07/19/2016   • Restless legs syndrome 07/19/2016   • Anemia 09/22/2016   • CKD (chronic kidney disease) 11/22/2016   • History of anemia due to chronic kidney disease 11/23/2016   • Iron deficiency anemia 04/13/2017   • Dysuria 07/17/2017   • Chronic adrenal insufficiency (CMS/HCC) 08/17/2013   • 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 08/05/2015   • Chest pain 11/29/2015   • Congestive heart failure (CMS/HCC) 08/15/2013   • Gastroparesis 08/15/2013   • Hypotension 08/15/2013   • Hypothyroidism 08/15/2013   • Myoclonus 08/15/2013   • Osteoarthritis 08/15/2013   • Automatic implantable cardioverter-defibrillator in situ 08/15/2013   • Spondylolisthesis 08/05/2015   •  History of total knee replacement 08/15/2013   • Anemia of chronic renal failure, stage 4 (severe) (CMS/HCC) 09/06/2017   • CKD (chronic kidney disease) 09/13/2017   • Anemia of chronic disease 09/26/2017   • Encounter for fitting and adjustment of vascular catheter 10/04/2017   • Adverse effect of iron or its compound, subsequent encounter 06/27/2019   • B12 deficiency 07/11/2019     Resolved Ambulatory Problems     Diagnosis Date Noted   • No Resolved Ambulatory Problems     Past Medical History:   Diagnosis Date   • Anemia    • Cardiomyopathy (CMS/HCC)    • CHF (congestive heart failure) (CMS/HCC)    • Chronic renal failure, stage 4 (severe) (CMS/HCC)    • Coronary artery disease    • Dizzy    • Gastroparesis    • GERD (gastroesophageal reflux disease)    • Gout    • Hemorrhoids    • Hiatal hernia    • History of Clostridium difficile colitis 2013   • History of kidney stones    • History of pancreatitis    • History of skin cancer    • History of transfusion    • Hypothyroidism    • Left bundle branch block    • Mitral and aortic valve disease    • Mitral valve insufficiency    • Myoclonus    • Osteoarthritis    • Pancytopenia (CMS/HCC)    • Peripheral neuropathy    • Presence of cardiac pacemaker    • Pulmonary hypertension (CMS/HCC)    • SOB (shortness of breath) on exertion    • Spinal stenosis        PAST SURGICAL HISTORY  Past Surgical History:   Procedure Laterality Date   • APPENDECTOMY N/A    • ARTERIOVENOUS FISTULA/SHUNT SURGERY Right 2/18/2019    Procedure: RIGHT BASILIC FISTULA CREATION WITHOUT TRANSPOSITION;  Surgeon: Royer Dozier MD;  Location: [facility];  Service: Vascular   • ARTERIOVENOUS FISTULA/SHUNT SURGERY Right 5/31/2019    Procedure: RIGHT 2ND STAGE BASILIC VEIN CREATION;  Surgeon: Royer Dozier MD;  Location: [facility];  Service: Vascular   • CARDIAC CATHETERIZATION Left 03/28/2006    Arterial catheter insertion, cath left ventriculography, coronary angiography and left  heart catheterization-Dr. Estevan Matamoros   • CARDIAC DEFIBRILLATOR PLACEMENT N/A 11/30/2007    Biventricular implantable cardioverter defibrillator-Dr. Leandro Huber   • CATARACT EXTRACTION Bilateral 2011   • COLONOSCOPY N/A 2014    done at [facility]   • CYSTECTOMY N/A     Ovarian cystectomy   • ENDOSCOPY N/A 04/28/2006    EGD with biopsies. Paraesophageal hiatal hernia from 34 to 44 cm, antral gastritis-Dr. Nehemiah Beal   • ENDOSCOPY N/A 09/29/2004    Hiatal hernia, gastritis and an erosion of the pylorus-Dr. Yang Pavon   • ENTEROSCOPY SMALL BOWEL N/A 10/30/2006    Small bowel enteroscopy with biopsies-Dr. Rory Sevilla   • FLEXIBLE SIGMOIDOSCOPY N/A 09/29/2004    Unsuccessful colonoscopy due to poop prep, a very tortuous sigmoid, bowel prep in the form of enema given and a barium enema was obtained-Dr. Yang Pavon   • FRACTURE SURGERY  2015    Broke big toe   • HEMATOMA EVACUATION TRUNK N/A 02/07/2014    Pocket evacuation of hematoma at pacemaker site-Dr. Leandro Huber   • HEMORRHOIDECTOMY N/A 04/19/2004    Flexible sigmoidoscopy and stapled hemorrhoidectomy-Dr. Yang Pavon   • HYSTERECTOMY Bilateral 1977   • IMPLANTABLE CARDIOVERTER DEFIBRILLATOR LEAD REPLACEMENT/POCKET REVISION Left 3/28/2016    Procedure: AUTOMATIC IMPLANTABLE CARDIOVERTER DEFIBRILLATOR LEAD REPLACEMENT WITH LASER LEAD EXTRACTION;  Surgeon: Leandro Huber MD;  Location: [facility] 18/19;  Service:    • IMPLANTABLE CARDIOVERTER DEFIBRILLATOR LEAD REPLACEMENT/POCKET REVISION N/A 01/13/2014    Biventricular ICD replacement-Dr. Jillian Huber   • LAPAROSCOPY REPAIR HIATAL HERNIA N/A 06/27/2006    Laparoscopic paraesophageal hiatal hernia repair with Nissen fundoplication and cholecystectomy-Dr. eSan Yoon   • NATALIE GONZALEZ ([facility]) PROCEDURE     • [place] INSJ TUNNELED CVC W/O SUBQ PORT/ AGE 3 YR/> Right 10/3/2017    Procedure: Right internal jugular port placement;  Surgeon: Tiffanie Couch MD;  Location: [facility]  MAIN OR;  Service: General   • TOE SURGERY Left     SET BIG TOE   • TOTAL KNEE ARTHROPLASTY Left 08/2014       FAMILY HISTORY  Family History   Problem Relation Age of Onset   • Coronary artery disease Other    • Dementia Other    • Kidney disease Other    • Arthritis Father    • Early death Father         Kidney failure   • Kidney disease Father    • Heart disease Mother    • Mental illness Mother         Dementia   • Malig Hyperthermia Neg Hx        SOCIAL HISTORY  Social History     Socioeconomic History   • Marital status:      Spouse name: Zackery   • Number of children: 5   • Years of education: 1 yr college   • Highest education level: Not on file   Occupational History     Employer: RETIRED   Tobacco Use   • Smoking status: Never Smoker   • Smokeless tobacco: Never Used   Substance and Sexual Activity   • Alcohol use: No   • Drug use: No   • Sexual activity: Defer       ALLERGIES  Chlorhexidine; Valproic acid; Codeine; and Propoxyphene    REVIEW OF SYSTEMS  Review of Systems   Constitutional: Negative.  Negative for fever.   HENT: Negative.  Negative for sore throat.    Eyes: Negative.    Respiratory: Positive for shortness of breath (Slightly increased shortness of air over the last several months). Negative for cough.    Cardiovascular: Negative.  Negative for chest pain.   Gastrointestinal: Negative.  Negative for blood in stool.   Genitourinary: Negative.  Negative for dysuria.   Musculoskeletal: Negative.  Negative for back pain.        Chronic left ankle swelling   Skin: Negative.  Negative for rash.   Neurological: Positive for speech difficulty. Negative for headaches.   All other systems reviewed and are negative.      PHYSICAL EXAM  ED Triage Vitals   Temp Pulse Resp BP SpO2   -- -- -- -- --      Temp src Heart Rate Source Patient Position BP Location FiO2 (%)   -- -- -- -- --       Physical Exam   Constitutional: No distress.   HENT:   Head: Normocephalic and atraumatic.   Eyes: Conjunctivae  are normal.   Neck: Normal range of motion.   Cardiovascular: Normal rate and regular rhythm.   Pulmonary/Chest: Breath sounds normal. No respiratory distress.   Abdominal: There is no tenderness.   Musculoskeletal: She exhibits no edema or tenderness.   Neurological: She is alert.   Mild left facial droop.  There is no obvious involvement of the upper face, raising the concern for a upper motor neuron problem.  Strength, sensation, coordination and mentation all seem to be within normal limits.  Bedside NIH is 2.  A point is added for facial weakness and for slurred speech.   Skin: No rash noted.   Nursing note and vitals reviewed.      LAB RESULTS  Recent Results (from the past 24 hour(s))   POC Glucose Once    Collection Time: 08/17/19  8:50 PM   Result Value Ref Range    Glucose 156 (H) 70 - 130 mg/dL   Comprehensive Metabolic Panel    Collection Time: 08/17/19  9:10 PM   Result Value Ref Range    Glucose 116 (H) 65 - 99 mg/dL    BUN 75 (H) 8 - 23 mg/dL    Creatinine 3.56 (H) 0.57 - 1.00 mg/dL    Sodium 138 136 - 145 mmol/L    Potassium 4.5 3.5 - 5.2 mmol/L    Chloride 102 98 - 107 mmol/L    CO2 27.0 22.0 - 29.0 mmol/L    Calcium 9.8 8.6 - 10.5 mg/dL    Total Protein 5.6 (L) 6.0 - 8.5 g/dL    Albumin 2.90 (L) 3.50 - 5.20 g/dL    ALT (SGPT) <5 1 - 33 U/L    AST (SGOT) 16 1 - 32 U/L    Alkaline Phosphatase 44 39 - 117 U/L    Total Bilirubin <0.2 (L) 0.2 - 1.2 mg/dL    eGFR Non African Amer 12 (L) >60 mL/min/1.73    eGFR  African Amer  >60 mL/min/1.73    Globulin 2.7 gm/dL    A/G Ratio 1.1 g/dL    BUN/Creatinine Ratio 21.1 7.0 - 25.0    Anion Gap 9.0 5.0 - 15.0 mmol/L   Protime-INR    Collection Time: 08/17/19  9:10 PM   Result Value Ref Range    Protime 14.0 11.7 - 14.2 Seconds    INR 1.11 (H) 0.90 - 1.10   Troponin    Collection Time: 08/17/19  9:10 PM   Result Value Ref Range    Troponin T <0.010 0.000 - 0.030 ng/mL   CBC Auto Differential    Collection Time: 08/17/19  9:10 PM   Result Value Ref Range    WBC  5.07 3.40 - 10.80 10*3/mm3    RBC 2.03 (L) 3.77 - 5.28 10*6/mm3    Hemoglobin 6.3 (C) 12.0 - 15.9 g/dL    Hematocrit 21.8 (L) 34.0 - 46.6 %    .4 (H) 79.0 - 97.0 fL    MCH 31.0 26.6 - 33.0 pg    MCHC 28.9 (L) 31.5 - 35.7 g/dL    RDW 18.6 (H) 12.3 - 15.4 %    RDW-SD 72.9 (H) 37.0 - 54.0 fl    MPV 9.6 6.0 - 12.0 fL    Platelets 200 140 - 450 10*3/mm3   Manual Differential    Collection Time: 08/17/19  9:10 PM   Result Value Ref Range    Neutrophil % 87.8 (H) 42.7 - 76.0 %    Lymphocyte % 5.1 (L) 19.6 - 45.3 %    Monocyte % 2.0 (L) 5.0 - 12.0 %    Eosinophil % 4.1 0.3 - 6.2 %    Basophil % 1.0 0.0 - 1.5 %    Neutrophils Absolute 4.45 1.70 - 7.00 10*3/mm3    Lymphocytes Absolute 0.26 (L) 0.70 - 3.10 10*3/mm3    Monocytes Absolute 0.10 0.10 - 0.90 10*3/mm3    Eosinophils Absolute 0.21 0.00 - 0.40 10*3/mm3    Basophils Absolute 0.05 0.00 - 0.20 10*3/mm3    nRBC 1.0 (H) 0.0 - 0.2 /100 WBC    Anisocytosis Mod/2+ None Seen    Macrocytes Slight/1+ None Seen    Polychromasia Mod/2+ None Seen    WBC Morphology Normal Normal    Giant Platelets Slight/1+ None Seen   Prepare RBC, 2 Units    Collection Time: 08/17/19 11:08 PM   Result Value Ref Range    Product Code R6566U78     Unit Number [id number]     UNIT  ABO A     UNIT  RH NEG     Dispense Status XM     Blood Type ANEG     Blood Expiration Date 201909182359     Blood Type Barcode 0600     Product Code K8361X02     Unit Number [id number]     UNIT  ABO A     UNIT  RH NEG     Dispense Status XM     Blood Type ANEG     Blood Expiration Date 201909182359     Blood Type Barcode 0600        I ordered the above labs and reviewed the results    RADIOLOGY  CT Head Without Contrast   Final Result   1. No acute intracranial abnormality.                       This report was finalized on 8/17/2019 9:41 PM by Willie Washington M.D.                 PROCEDURES  Critical Care  Performed by: Mikhail Higginbotham MD  Authorized by: Mikhail Higginbotham MD     Critical care provider  statement:     Critical care time (minutes):  35    Critical care time was exclusive of:  Separately billable procedures and treating other patients    Critical care was necessary to treat or prevent imminent or life-threatening deterioration of the following conditions:  CNS failure or compromise and circulatory failure    Critical care was time spent personally by me on the following activities:  Evaluation of patient's response to treatment, examination of patient, re-evaluation of patient's condition, review of old charts, pulse oximetry, ordering and review of radiographic studies, ordering and review of laboratory studies, ordering and performing treatments and interventions and obtaining history from patient or surrogate        EKG          EKG time: 2120  Rhythm/Rate: Atrial paced 75  P waves and PA: Normal P waves  QRS, axis: Atrial sensed, ventricular paced, IVCD, left axis deviation  ST and T waves: Nonspecific changes    Interpreted Contemporaneously by me, independently viewed  Not changed compared to prior 2/2019      PROGRESS AND CONSULTS  ED Course as of Aug 17 2314   Sat Aug 17, 2019   2057 I reviewed patient's prior medical records including most recent cardiology visit with Dr. Matamoros.  Patient has nonischemic cardiomyopathy with a ICD in place.  She has chronic renal disease and chronic anemia.  [DB]   2058 Patient is not a candidate for thrombolytics due to onset and severity.  Will get CT and labs for further evaluation.  [DB]   2240 I reviewed patient's labs.  Hemoglobin has dropped to 6.3.  We will go ahead and initiate a blood transfusion.  Renal function slightly worsened than baseline with a creatinine of 3.6.  [DB]   2241 Head CT shows no acute pathology.  Will contact A about further admission  [DB]   7804 I have paged A for admission.  I have ordered 2 units of packed red blood cells for severe anemia in the setting of acute likely stroke.  I discussed the results of findings and  plan for treatment at the bedside.  [DB]   3535 I spoke with Dr. Emigdio Huffman from [facility] about admission and evaluation.  [DB]      ED Course User Index  [DB] Mikhail Higginbotham MD           MEDICAL DECISION MAKING  Results were reviewed/discussed with the patient and they were also made aware of online access. Pt also made aware that some labs, such as cultures, will not be resulted during ER visit and follow up with PMD is necessary.     MDM       DIAGNOSIS  Final diagnoses:   Weakness on left side of face   Slurred speech   Anemia, unspecified type   Acute renal failure superimposed on chronic kidney disease, unspecified CKD stage, unspecified acute renal failure type (CMS/HCC)       DISPOSITION  ADMISSION    Discussed treatment plan and reason for admission with pt/family and admitting physician.  Pt/family voiced understanding of the plan for admission for further testing/treatment as needed.           Latest Documented Vital Signs:  As of 11:14 PM  BP- 109/47 HR- 68 Temp- 97.9 °F (36.6 °C) (Tympanic) O2 sat- 98%    Dictated using Dragon Dictation     Mikhail Higginbotham MD  08/17/19 [id number]

## 2019-08-18 NOTE — PLAN OF CARE
Problem: Fall Risk (Adult)  Goal: Absence of Fall  Outcome: Ongoing (interventions implemented as appropriate)      Problem: Patient Care Overview  Goal: Plan of Care Review  Outcome: Ongoing (interventions implemented as appropriate)   08/18/19 0203   Coping/Psychosocial   Plan of Care Reviewed With patient   Plan of Care Review   Progress no change   OTHER   Outcome Summary new admit from ED w/ facial weakness. NIH 2. transfusing 2 units PRBC. will continue to monitor.       Problem: Skin Injury Risk (Adult)  Goal: Skin Health and Integrity  Outcome: Ongoing (interventions implemented as appropriate)      Problem: Anemia (Adult)  Goal: Symptom Improvement  Outcome: Ongoing (interventions implemented as appropriate)      Problem: Stroke (Ischemic) (Adult)  Goal: Signs and Symptoms of Listed Potential Problems Will be Absent, Minimized or Managed (Stroke)  Outcome: Ongoing (interventions implemented as appropriate)

## 2019-08-18 NOTE — CONSULTS
Patient Identification:  NAME:  Charmaine Machuca  Age:  79 y.o.   Sex:  female   :  1940   MRN:  [id number]       Chief complaint: I thought I had a stroke, reason for consult stroke    History of present illness: This patient is a 79-year-old right-handed white female with a history of pacemaker internal defibrillator cardiomyopathy pacemaker gastroparesis hypertension  pancreatitis in the past who comes in now with with awake awakening with some slurred speech and left face weakness.  Durations been over 24 hours associated symptoms she drops things in her with her left hand before she came in but feels like the hand is stronger now it is only her face and her speech now involved duration is noted quality as described no double vision no pain paresthesias or paralysis involving anything else her face is not numb modifying factors she is on aspirin a day a confounding variable is the fact that her hemoglobin was 6.6 and she is needed transfusions she is apparently had long-standing anemia as context.  Quality is weakness of the left face and slurred speech.      Past medical history:  Past Medical History:   Diagnosis Date   • Anemia     Iron deficiency   • Cardiomyopathy (CMS/HCC)     S/P pacemaker and defibrillator   • CHF (congestive heart failure) (CMS/HCC)    • Chronic renal failure, stage 4 (severe) (CMS/HCC)    • Coronary artery disease     pacemaker, defibrillator   • Dizzy    • Gastroparesis    • GERD (gastroesophageal reflux disease)    • Gout    • Hemorrhoids    • Hiatal hernia    • History of Clostridium difficile colitis    • History of kidney stones    • History of pancreatitis        • History of skin cancer    • History of transfusion     MULTIPLE    • Hypothyroidism    • Left bundle branch block    • Mitral and aortic valve disease    • Mitral valve insufficiency    • Myoclonus     S/P Depakote   • Osteoarthritis    • Pancytopenia (CMS/HCC)    • Peripheral neuropathy    •  Presence of cardiac pacemaker     AND DEFIBRILLATOR   • Pulmonary hypertension (CMS/HCC)    • SOB (shortness of breath) on exertion    • Spinal stenosis        Past surgical history:  Past Surgical History:   Procedure Laterality Date   • APPENDECTOMY N/A    • ARTERIOVENOUS FISTULA/SHUNT SURGERY Right 2/18/2019    Procedure: RIGHT BASILIC FISTULA CREATION WITHOUT TRANSPOSITION;  Surgeon: Royer Dozier MD;  Location: [facility] OR;  Service: Vascular   • ARTERIOVENOUS FISTULA/SHUNT SURGERY Right 5/31/2019    Procedure: RIGHT 2ND STAGE BASILIC VEIN CREATION;  Surgeon: Royer Dozier MD;  Location: [facility] OR;  Service: Vascular   • CARDIAC CATHETERIZATION Left 03/28/2006    Arterial catheter insertion, cath left ventriculography, coronary angiography and left heart catheterization-Dr. Estevan Matamoros   • CARDIAC DEFIBRILLATOR PLACEMENT N/A 11/30/2007    Biventricular implantable cardioverter defibrillator-Dr. Leandro Huber   • CATARACT EXTRACTION Bilateral 2011   • COLONOSCOPY N/A 2014    done at [facility]   • CYSTECTOMY N/A     Ovarian cystectomy   • ENDOSCOPY N/A 04/28/2006    EGD with biopsies. Paraesophageal hiatal hernia from 34 to 44 cm, antral gastritis-Dr. Nehemiah Beal   • ENDOSCOPY N/A 09/29/2004    Hiatal hernia, gastritis and an erosion of the pylorus-Dr. Yang Pavon   • ENTEROSCOPY SMALL BOWEL N/A 10/30/2006    Small bowel enteroscopy with biopsies-Dr. Rory Sevilla   • FLEXIBLE SIGMOIDOSCOPY N/A 09/29/2004    Unsuccessful colonoscopy due to poop prep, a very tortuous sigmoid, bowel prep in the form of enema given and a barium enema was obtained-Dr. Yang Pavon   • FRACTURE SURGERY  2015    Broke big toe   • HEMATOMA EVACUATION TRUNK N/A 02/07/2014    Pocket evacuation of hematoma at pacemaker site-Dr. Leandro Huber   • HEMORRHOIDECTOMY N/A 04/19/2004    Flexible sigmoidoscopy and stapled hemorrhoidectomy-Dr. Yang Pavon   • HYSTERECTOMY Bilateral 1977   • IMPLANTABLE CARDIOVERTER  DEFIBRILLATOR LEAD REPLACEMENT/POCKET REVISION Left 3/28/2016    Procedure: AUTOMATIC IMPLANTABLE CARDIOVERTER DEFIBRILLATOR LEAD REPLACEMENT WITH LASER LEAD EXTRACTION;  Surgeon: Leandro Huber MD;  Location: [facility] OR 18/19;  Service:    • IMPLANTABLE CARDIOVERTER DEFIBRILLATOR LEAD REPLACEMENT/POCKET REVISION N/A 01/13/2014    Biventricular ICD replacement-Dr. Jillian Huber   • LAPAROSCOPY REPAIR HIATAL HERNIA N/A 06/27/2006    Laparoscopic paraesophageal hiatal hernia repair with Nissen fundoplication and cholecystectomy-Dr. Sean Yoon   • NATALIE GONZALEZ ([facility]) PROCEDURE     • HI INSJ TUNNELED CVC W/O SUBQ PORT/ AGE 3 YR/> Right 10/3/2017    Procedure: Right internal jugular port placement;  Surgeon: Tiffanie Couch MD;  Location: [facility] OR;  Service: General   • TOE SURGERY Left     SET BIG TOE   • TOTAL KNEE ARTHROPLASTY Left 08/2014       Allergies:  Chlorhexidine; Valproic acid; Codeine; and Propoxyphene    Home medications:  Medications Prior to Admission   Medication Sig Dispense Refill Last Dose   • aspirin 81 MG tablet Take 81 mg by mouth Daily.   8/17/2019 at Unknown time   • calcitriol (ROCALTROL) 0.25 MCG capsule Take 0.25 mcg by mouth Every Other Day. 1 tablet every other day and 2 tablets every other day   8/17/2019 at Unknown time   • carbidopa-levodopa ER (SINEMET CR)  MG per tablet Take 1 tablet by mouth Every Evening.   8/17/2019 at Unknown time   • carvedilol (COREG) 6.25 MG tablet Take 12.5 mg by mouth Daily.   8/17/2019 at Unknown time   • Cholecalciferol (VITAMIN D3) 5000 units capsule capsule Take 5,000 Units by mouth Daily.   8/17/2019 at Unknown time   • diazepam (VALIUM) 5 MG tablet Take 5 mg by mouth Every Night.   8/17/2019 at Unknown time   • DULOXETINE HCL PO Take  by mouth 2 (Two) Times a Day.   8/17/2019 at Unknown time   • famotidine (PEPCID) 20 MG tablet Take 20 mg by mouth 2 (Two) Times a Day.   8/17/2019 at Unknown time   •  febuxostat (ULORIC) 40 MG tablet Take 40 mg by mouth Daily.   8/17/2019 at Unknown time   • furosemide (LASIX) 40 MG tablet Take 1 tablet by mouth Daily. 90 tablet 3 8/17/2019 at Unknown time   • Gabapentin, Once-Daily, (GRALISE) 300 MG tablet Take 300 mg by mouth Daily With Dinner. MAY REPEAT LATE EVENING IF NEEDED   8/17/2019 at Unknown time   • Glucosamine-Chondroit-Vit C-Mn (GLUCOSAMINE CHONDROITIN COMPLX) capsule Take 1,500 mg by mouth Every Other Day. PT HOLDING FOR SURGERY   8/17/2019 at Unknown time   • levothyroxine (SYNTHROID, LEVOTHROID) 25 MCG tablet Take 25 mcg by mouth Daily.   8/17/2019 at Unknown time   • magnesium oxide 250 MG tablet Take 250 mg by mouth Daily.   8/17/2019 at Unknown time   • methscopolamine (PAMINE FORTE) 5 MG tablet Take 5 mg by mouth Every Morning.   8/17/2019 at Unknown time   • Multiple Vitamin (MULTI-DAY VITAMINS) tablet Take 1 tablet by mouth Daily. HOLD FOR SURGERY   8/17/2019 at Unknown time   • rotigotine (NEUPRO) 6 MG/24HR patch Place 1 patch on the skin as directed by provider Daily.   8/17/2019 at Unknown time   • spironolactone (ALDACTONE) 25 MG tablet TAKE 1/2 TABLET DAILY 45 tablet 1 8/17/2019 at Unknown time   • Vitamin E 400 UNITS tablet Take 400 Units by mouth Daily. PT HOLDING FOR SURGERY   8/17/2019 at Unknown time   • HYDROcodone-acetaminophen (NORCO) 5-325 MG per tablet Take 1-2 tablets by mouth Every 8 (Eight) Hours As Needed for Moderate Pain  or Severe Pain  (Pain). 30 tablet 0 Taking   • loperamide (IMODIUM) 2 MG capsule Take 2 mg by mouth 4 (Four) Times a Day As Needed for Diarrhea.   Taking   • psyllium (METAMUCIL) 58.6 % powder Take 1 packet by mouth Daily.   Taking        Hospital medications:    aspirin 81 mg Oral Daily   calcitriol 0.25 mcg Oral Every Other Day   carbidopa-levodopa ER 1 tablet Oral Q PM   carvedilol 6.25 mg Oral Daily   diazePAM 5 mg Oral Nightly   [START ON 8/19/2019] famotidine 20 mg Oral Daily   febuxostat 40 mg Oral Daily    furosemide 40 mg Oral Daily   gabapentin 300 mg Oral Nightly   levothyroxine 25 mcg Oral Daily   sodium chloride 10 mL Intravenous Q12H   sodium chloride 3 mL Intravenous Q12H        bisacodyl  •  bisacodyl  •  heparin flush (porcine)  •  HYDROcodone-acetaminophen  •  nitroglycerin  •  ondansetron  •  [COMPLETED] Insert peripheral IV **AND** sodium chloride  •  Access VAD **AND** sodium chloride  •  sodium chloride  •  sodium chloride  •  sodium chloride    Family history:  Family History   Problem Relation Age of Onset   • Coronary artery disease Other    • Dementia Other    • Kidney disease Other    • Arthritis Father    • Early death Father         Kidney failure   • Kidney disease Father    • Heart disease Mother    • Mental illness Mother         Dementia   • Malig Hyperthermia Neg Hx        Social history:  Social History     Tobacco Use   • Smoking status: Never Smoker   • Smokeless tobacco: Never Used   Substance Use Topics   • Alcohol use: No   • Drug use: No       Review of systems:    Her speech is slurred and her face is weak she dropped a few things in her left hand but does not feel like the left hand is weak now she has no hair eyes ears nose throat skin bone joint  lymphatic hematologic or oncologic complaints no neck pain chest pain abdominal pain bowel bladder incontinence fever chills or rash    Objective:  Vitals Ranges:   Temp:  [97.3 °F (36.3 °C)-99.6 °F (37.6 °C)] 99.6 °F (37.6 °C)  Heart Rate:  [66-87] 79  Resp:  [16-18] 18  BP: (103-136)/(40-61) 107/44      Physical Exam: Awake alert slurred speech dysarthria but no a aphasia elderly in no distress fund of knowledge good attention span concentration good recent remote memory good language as noted well-developed well-nourished no distress decreased left nasolabial fold tongue deviates to the left forehead wrinkling is perfectly symmetrical pupils one half constricting to 1 normal disc retinas visual fields extraocular movements full  without nystagmus palpebral facial fissures are equal on both side head turning facial sensation shoulder shrug is all normal motor no pronator drift atrophy fasciculations rigidity or bradykinesia she does have some mild bilateral asterixis reflexes trace throughout symmetrical toes downgoing bilaterally sensation normal light touch face both arms both legs coordination normal in the upper extremities with some superimposed tremor heart regular without murmur neck supple without bruits extremities no clubbing cyanosis there is some edema in the left leg compared to the right leg which is apparently long-standing Station and gait was not tested for [place] letter fall normal light touch throughout coordination is within normal limits bilaterally in the upper and lower extremities    Results review:   I reviewed the patient's new clinical results.    Data review:  Lab Results (last 24 hours)     Procedure Component Value Units Date/Time    CBC (No Diff) [[id number]]  (Abnormal) Collected:  08/18/19 0625    Specimen:  Blood Updated:  08/18/19 0830     WBC 4.82 10*3/mm3      RBC 2.80 10*6/mm3      Hemoglobin 8.7 g/dL      Hematocrit 27.8 %      MCV 99.3 fL      MCH 31.1 pg      MCHC 31.3 g/dL      RDW 20.3 %      RDW-SD 71.5 fl      MPV 9.6 fL      Platelets 172 10*3/mm3     Narrative:       Post transfusion specimen    Basic Metabolic Panel [[id number]]  (Abnormal) Collected:  08/18/19 0625    Specimen:  Blood Updated:  08/18/19 0720     Glucose 104 mg/dL      BUN 77 mg/dL      Creatinine 3.27 mg/dL      Sodium 139 mmol/L      Potassium 4.5 mmol/L      Chloride 102 mmol/L      CO2 27.2 mmol/L      Calcium 9.3 mg/dL      eGFR  African Amer -- mL/min/1.73      Comment: <15 Indicative of kidney failure.        eGFR Non African Amer 14 mL/min/1.73      Comment: <15 Indicative of kidney failure.        BUN/Creatinine Ratio 23.5     Anion Gap 9.8 mmol/L     Narrative:       GFR Normal >60  Chronic Kidney Disease <60  Kidney  Failure <15    Magnesium [[id number]]  (Abnormal) Collected:  08/18/19 0625    Specimen:  Blood Updated:  08/18/19 0720     Magnesium 2.6 mg/dL     Phosphorus [[id number]]  (Normal) Collected:  08/18/19 0625    Specimen:  Blood Updated:  08/18/19 0720     Phosphorus 3.8 mg/dL     Manual Differential [[id number]]  (Abnormal) Collected:  08/17/19 2110    Specimen:  Blood Updated:  08/17/19 2152     Neutrophil % 87.8 %      Lymphocyte % 5.1 %      Monocyte % 2.0 %      Eosinophil % 4.1 %      Basophil % 1.0 %      Neutrophils Absolute 4.45 10*3/mm3      Lymphocytes Absolute 0.26 10*3/mm3      Monocytes Absolute 0.10 10*3/mm3      Eosinophils Absolute 0.21 10*3/mm3      Basophils Absolute 0.05 10*3/mm3      nRBC 1.0 /100 WBC      Anisocytosis Mod/2+     Macrocytes Slight/1+     Polychromasia Mod/2+     WBC Morphology Normal     Giant Platelets Slight/1+    Troponin [[id number]]  (Normal) Collected:  08/17/19 2110    Specimen:  Blood Updated:  08/17/19 2149     Troponin T <0.010 ng/mL     Narrative:       Troponin T Reference Range:  <= 0.03 ng/mL-   Negative for AMI  >0.03 ng/mL-     Abnormal for myocardial necrosis.  Clinicians would have to utilize clinical acumen, EKG, Troponin and serial changes to determine if it is an Acute Myocardial Infarction or myocardial injury due to an underlying chronic condition.     Comprehensive Metabolic Panel [[id number]]  (Abnormal) Collected:  08/17/19 2110    Specimen:  Blood Updated:  08/17/19 2147     Glucose 116 mg/dL      BUN 75 mg/dL      Creatinine 3.56 mg/dL      Sodium 138 mmol/L      Potassium 4.5 mmol/L      Chloride 102 mmol/L      CO2 27.0 mmol/L      Calcium 9.8 mg/dL      Total Protein 5.6 g/dL      Albumin 2.90 g/dL      ALT (SGPT) <5 U/L      AST (SGOT) 16 U/L      Alkaline Phosphatase 44 U/L      Total Bilirubin <0.2 mg/dL      eGFR Non African Amer 12 mL/min/1.73      Comment: <15 Indicative of kidney failure.        eGFR   Amer -- mL/min/1.73      Comment: <15  Indicative of kidney failure.        Globulin 2.7 gm/dL      A/G Ratio 1.1 g/dL      BUN/Creatinine Ratio 21.1     Anion Gap 9.0 mmol/L     Narrative:       GFR Normal >60  Chronic Kidney Disease <60  Kidney Failure <15    Protime-INR [[id number]]  (Abnormal) Collected:  08/17/19 2110    Specimen:  Blood Updated:  08/17/19 2144     Protime 14.0 Seconds      INR 1.11    CBC & Differential [[id number]] Collected:  08/17/19 2110    Specimen:  Blood Updated:  08/17/19 2135    Narrative:       The following orders were created for panel order CBC & Differential.  Procedure                               Abnormality         Status                     ---------                               -----------         ------                     CBC Auto Differential[[id number]]        Abnormal            Final result                 Please view results for these tests on the individual orders.    CBC Auto Differential [[id number]]  (Abnormal) Collected:  08/17/19 2110    Specimen:  Blood Updated:  08/17/19 2135     WBC 5.07 10*3/mm3      RBC 2.03 10*6/mm3      Hemoglobin 6.3 g/dL      Hematocrit 21.8 %      .4 fL      MCH 31.0 pg      MCHC 28.9 g/dL      RDW 18.6 %      RDW-SD 72.9 fl      MPV 9.6 fL      Platelets 200 10*3/mm3     POC Glucose Once [[id number]]  (Abnormal) Collected:  08/17/19 2050    Specimen:  Blood Updated:  08/17/19 2053     Glucose 156 mg/dL            Imaging:  Imaging Results (last 24 hours)     Procedure Component Value Units Date/Time    CT Head Without Contrast [[id number]] Collected:  08/17/19 2140     Updated:  08/17/19 2144    Narrative:       CRANIAL CT SCAN WITHOUT CONTRAST     CLINICAL HISTORY: Stroke     COMPARISON: None.     TECHNIQUE: Radiation dose reduction techniques were utilized, including  automated exposure control and exposure modulation based on body size.  Multiple axial images of the head were obtained without contrast.      FINDINGS:  There are no abnormal areas of increased density  or mass  effect. Generalized atrophy. There are mild scattered areas of decreased  density in the white matter likely related to chronic ischemic gliotic  changes.   Ventricles, sulci, and cisterns appear normal. Bone window  images are unremarkable.                Impression:       1. No acute intracranial abnormality.                 This report was finalized on 8/17/2019 9:41 PM by Willie Washington M.D.                Assessment and Plan:       Chronic systolic congestive heart failure (CMS/HCC)    Cardiomyopathy (CMS/HCC)    Restless legs syndrome    Chronic adrenal insufficiency (CMS/HCC)    Hypothyroidism    Anemia of chronic renal failure, stage 4 (severe) (CMS/HCC)    CKD (chronic kidney disease)    Anemia of chronic disease    Weakness on left side of face    What a conundrum!  This patient is suffered an acute cortical stroke in the right hemisphere that would explain her current symptoms.  This will almost certainly be an embolic etiology rather than thrombotic.  The ideal treatment would of course be anticoagulation but she will not be able to have any of that secondary to her severe anemia!  She is on a baby aspirin now which is about as strong as we will go currently as her anemia is being worked up aggressively.      Wally Jones MD  08/18/19  4:21 PM

## 2019-08-18 NOTE — PLAN OF CARE
Problem: Patient Care Overview  Goal: Plan of Care Review  Outcome: Ongoing (interventions implemented as appropriate)   08/18/19 6583   Coping/Psychosocial   Plan of Care Reviewed With patient;spouse   Plan of Care Review   Progress improving   OTHER   Outcome Summary Pt A+O x4. NIH =2 for facial droop and slurred speech. Up x1, multiple trips to the bathroom. Had several BMs today, pt states this is normal for her. Had 2 units PRBC last night. Hgb 8.7 today. Seen by Dr. Jones, who feels that the pt experieneced a stroke, not Bell's Palsy. Pt passed ST eval and is on regular-thin diet. Nephrology to see for Stage IV kidney disease. Pacemaker not MRI compatible. [place] to monitor and progress towards goals as tolerated.

## 2019-08-18 NOTE — PROGRESS NOTES
Name: Charmaine Machuca ADMIT: 2019   : 1940  PCP: Fannie Godfrey MD    MRN: [id number] LOS: 1 days   AGE/SEX: 79 y.o. female  ROOM: [facility]     Subjective   Subjective   CC: left facial weakness  No acute events.  Patient overall feels a little better.  She feels that her speech has improved.  She is NPO awaiting SLP evaluation.  No CP/dyspnea/f/c/n/v/d/HA/vision changes.    Objective   Objective   Vital Signs  Temp:  [97.3 °F (36.3 °C)-99 °F (37.2 °C)] 98.2 °F (36.8 °C)  Heart Rate:  [66-87] 84  Resp:  [16-18] 18  BP: (103-136)/(40-61) 113/48  SpO2:  [95 %-99 %] 97 %  on   ;   Device (Oxygen Therapy): room air  Body mass index is 27.31 kg/m².  Physical Exam   Constitutional: She is oriented to person, place, and time. No distress.   HENT:   Head: Normocephalic and atraumatic.   Mouth/Throat: Oropharynx is clear and moist.   Eyes: Conjunctivae and EOM are normal. Pupils are equal, round, and reactive to light.   Neck: Normal range of motion. Neck supple.   Cardiovascular: Normal rate, regular rhythm and intact distal pulses.   Right-sided port is in place   Pulmonary/Chest: Effort normal and breath sounds normal. She has no rales.   Abdominal: Soft. Bowel sounds are normal. There is no tenderness.   Musculoskeletal: She exhibits no edema or tenderness.   Neurological: She is alert and oriented to person, place, and time.   Left facial weakness mostly in the lower portion   Skin: Skin is warm and dry. She is not diaphoretic.   Psychiatric: She has a normal mood and affect. Her behavior is normal.   Nursing note and vitals reviewed.      Results Review:       I reviewed the patient's new clinical results.  Results from last 7 days   Lab Units 19  0625 19  2110 08/15/19  1424   WBC 10*3/mm3 4.82 5.07 4.92   HEMOGLOBIN g/dL 8.7* 6.3* 7.2*   PLATELETS 10*3/mm3 172 200 212     Results from last 7 days   Lab Units 19  0625 19  2110   SODIUM mmol/L 139 138   POTASSIUM mmol/L 4.5  4.5   CHLORIDE mmol/L 102 102   CO2 mmol/L 27.2 27.0   BUN mg/dL 77* 75*   CREATININE mg/dL 3.27* 3.56*   GLUCOSE mg/dL 104* 116*   Estimated Creatinine Clearance: 14.1 mL/min (A) (by C-G formula based on SCr of 3.27 mg/dL (H)).  Results from last 7 days   Lab Units 08/17/19  2110   ALBUMIN g/dL 2.90*   BILIRUBIN mg/dL <0.2*   ALK PHOS U/L 44   AST (SGOT) U/L 16   ALT (SGPT) U/L <5     Results from last 7 days   Lab Units 08/18/19  0625 08/17/19  2110   CALCIUM mg/dL 9.3 9.8   ALBUMIN g/dL  --  2.90*   MAGNESIUM mg/dL 2.6*  --    PHOSPHORUS mg/dL 3.8  --        Glucose   Date/Time Value Ref Range Status   08/17/2019 2050 156 (H) 70 - 130 mg/dL Final         aspirin 81 mg Oral Daily   calcitriol 0.25 mcg Oral Every Other Day   carbidopa-levodopa ER 1 tablet Oral Q PM   carvedilol 6.25 mg Oral Daily   diazePAM 5 mg Oral Nightly   [START ON 8/19/2019] famotidine 20 mg Oral Daily   febuxostat 40 mg Oral Daily   furosemide 40 mg Oral Daily   gabapentin 300 mg Oral Nightly   levothyroxine 25 mcg Oral Daily   sodium chloride 10 mL Intravenous Q12H   sodium chloride 3 mL Intravenous Q12H      NPO Diet       Assessment/Plan     Active Hospital Problems    Diagnosis  POA   • Weakness on left side of face [R29.810]  Yes   • Anemia of chronic disease [D63.8]  Yes   • CKD (chronic kidney disease) [N18.9]  Yes   • Anemia of chronic renal failure, stage 4 (severe) (CMS/HCC) [N18.4, D63.1]  Yes   • Restless legs syndrome [G25.81]  Yes   • Cardiomyopathy (CMS/HCC) [I42.9]  Yes   • Chronic systolic congestive heart failure (CMS/HCC) [I50.22]  Yes   • Chronic adrenal insufficiency (CMS/HCC) [E27.40]  Yes   • Hypothyroidism [E03.9]  Yes      Resolved Hospital Problems   No resolved problems to display.   Left-Sided Facial Weakness  - could be Bell's Palsy but lower facial weakness is much more pronounced-treating as CVA currently  - CT head is negative for acute stroke or bleed-she would have issues with MRI as she has a pacemaker and  would rather avoid IV contrast due to her renal disease-defer further imaging to neuro who are consulted  - continue on ASA  - SLP, PT/OT    CKD Stage 4  - Cr a little lower today  - nephrology consulted for fluid/electrolyte management    Anemia  - from chronic inflammation, CKD, has had Fe deficiency in the past  - s/p 2 units of PRBCs with appropriate response  - will monitor    Chronic Systolic CHF  - no exacerbation  - continue on coreg and lasix  - monitor volume status    VTE Prophylaxis - SCDs  Code Status - Full code  Disposition - TBD      Willie Haddad MD  [facility]  08/18/19  1:55 PM

## 2019-08-18 NOTE — PLAN OF CARE
Problem: Patient Care Overview  Goal: Plan of Care Review  Outcome: Ongoing (interventions implemented as appropriate)   08/18/19 1510   Coping/Psychosocial   Plan of Care Reviewed With patient   OTHER   Outcome Summary Pt presents with functional-mild oropharyngeal dysphagia s/p concern for CVA vs Bell's Palsy characterized by impaired labial sill causing spillage of juice from mixed consistency peaches on the left, impaired sensation of left labial area,  and mildly prolonged mastication, but functional and adequate. Pharyngeal phase appears functional indicated by no overt signs/symptoms of penetration/aspiration during any trials of consistencies assessed and no reports of globus sensation. Recommend regular diet and thin liquids. Meds whole with water.

## 2019-08-18 NOTE — THERAPY EVALUATION
Patient Name: Charmaine Machuca  : 1940    MRN: [id number]                              Today's Date: 2019       Admit Date: 2019    Visit Dx:     ICD-10-CM ICD-9-CM   1. Weakness on left side of face R29.810 781.94   2. Slurred speech R47.81 784.59   3. Anemia, unspecified type D64.9 285.9   4. Acute renal failure superimposed on chronic kidney disease, unspecified CKD stage, unspecified acute renal failure type (CMS/HCC) N17.9 584.9    N18.9 585.9     Patient Active Problem List   Diagnosis   • Pacemaker lead failure   • Chronic systolic congestive heart failure (CMS/HCC)   • Cardiomyopathy (CMS/HCC)   • Carpal tunnel syndrome   • Periodic limb movement disorder   • Peripheral neuropathy   • Restless legs syndrome   • Anemia   • CKD (chronic kidney disease)   • History of anemia due to chronic kidney disease   • Iron deficiency anemia   • Dysuria   • Chronic adrenal insufficiency (CMS/HCC)   • 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   • Chest pain   • Congestive heart failure (CMS/HCC)   • Gastroparesis   • Hypotension   • Hypothyroidism   • Myoclonus   • Osteoarthritis   • Automatic implantable cardioverter-defibrillator in situ   • Spondylolisthesis   • History of total knee replacement   • Anemia of chronic renal failure, stage 4 (severe) (CMS/HCC)   • CKD (chronic kidney disease)   • Anemia of chronic disease   • Encounter for fitting and adjustment of vascular catheter   • Adverse effect of iron or its compound, subsequent encounter   • B12 deficiency   • Weakness on left side of face     Past Medical History:   Diagnosis Date   • Anemia     Iron deficiency   • Cardiomyopathy (CMS/HCC)     S/P pacemaker and defibrillator   • CHF (congestive heart failure) (CMS/HCC)    • Chronic renal failure, stage 4 (severe) (CMS/HCC)    • Coronary artery disease     pacemaker, defibrillator   • Dizzy    • Gastroparesis    • GERD (gastroesophageal reflux disease)    • Gout    • Hemorrhoids     • Hiatal hernia    • History of Clostridium difficile colitis 2013   • History of kidney stones    • History of pancreatitis     2014   • History of skin cancer    • History of transfusion     MULTIPLE    • Hypothyroidism    • Left bundle branch block    • Mitral and aortic valve disease    • Mitral valve insufficiency    • Myoclonus     S/P Depakote   • Osteoarthritis    • Pancytopenia (CMS/HCC)    • Peripheral neuropathy    • Presence of cardiac pacemaker     AND DEFIBRILLATOR   • Pulmonary hypertension (CMS/HCC)    • SOB (shortness of breath) on exertion    • Spinal stenosis      Past Surgical History:   Procedure Laterality Date   • APPENDECTOMY N/A    • ARTERIOVENOUS FISTULA/SHUNT SURGERY Right 2/18/2019    Procedure: RIGHT BASILIC FISTULA CREATION WITHOUT TRANSPOSITION;  Surgeon: Royer Dozier MD;  Location: [facility] OR;  Service: Vascular   • ARTERIOVENOUS FISTULA/SHUNT SURGERY Right 5/31/2019    Procedure: RIGHT 2ND STAGE BASILIC VEIN CREATION;  Surgeon: Royer Dozier MD;  Location: [facility] OR;  Service: Vascular   • CARDIAC CATHETERIZATION Left 03/28/2006    Arterial catheter insertion, cath left ventriculography, coronary angiography and left heart catheterization-Dr. Estevan Matamoros   • CARDIAC DEFIBRILLATOR PLACEMENT N/A 11/30/2007    Biventricular implantable cardioverter defibrillator-Dr. Leandro Huber   • CATARACT EXTRACTION Bilateral 2011   • COLONOSCOPY N/A 2014    [facility]   • CYSTECTOMY N/A     Ovarian cystectomy   • ENDOSCOPY N/A 04/28/2006    EGD with biopsies. Paraesophageal hiatal hernia from 34 to 44 cm, antral gastritis-Dr. Nehemiah Beal   • ENDOSCOPY N/A 09/29/2004    Hiatal hernia, gastritis and an erosion of the pylorus-Dr. Yang Pavon   • ENTEROSCOPY SMALL BOWEL N/A 10/30/2006    Small bowel enteroscopy with biopsies-Dr. Rory Sevilla   • FLEXIBLE SIGMOIDOSCOPY N/A 09/29/2004    Unsuccessful colonoscopy due to poop prep, a very tortuous sigmoid, bowel prep in the  form of enema given and a barium enema was obtained-Dr. Yang Pavon   • FRACTURE SURGERY  2015    Broke big toe   • HEMATOMA EVACUATION TRUNK N/A 02/07/2014    Pocket evacuation of hematoma at pacemaker site-Dr. Leandro Huber   • HEMORRHOIDECTOMY N/A 04/19/2004    Flexible sigmoidoscopy and stapled hemorrhoidectomy-Dr. Yang Pavon   • HYSTERECTOMY Bilateral 1977   • IMPLANTABLE CARDIOVERTER DEFIBRILLATOR LEAD REPLACEMENT/POCKET REVISION Left 3/28/2016    Procedure: AUTOMATIC IMPLANTABLE CARDIOVERTER DEFIBRILLATOR LEAD REPLACEMENT WITH LASER LEAD EXTRACTION;  Surgeon: Leandro Huber MD;  Location: [facility] 18/19;  Service:    • IMPLANTABLE CARDIOVERTER DEFIBRILLATOR LEAD REPLACEMENT/POCKET REVISION N/A 01/13/2014    Biventricular ICD replacement-Dr. Jillian Huber   • LAPAROSCOPY REPAIR HIATAL HERNIA N/A 06/27/2006    Laparoscopic paraesophageal hiatal hernia repair with Nissen fundoplication and cholecystectomy-Dr. Sean Yoon   • NATALIE GONZALEZ ([facility]) PROCEDURE     • LA INSJ TUNNELED CVC W/O SUBQ PORT/ AGE 3 YR/> Right 10/3/2017    Procedure: Right internal jugular port placement;  Surgeon: Tiffanie Couch MD;  Location: [facility] OR;  Service: General   • TOE SURGERY Left     SET BIG TOE   • TOTAL KNEE ARTHROPLASTY Left 08/2014     General Information     Row Name 08/18/19 1408          PT Evaluation Time/Intention    Document Type  evaluation Pt. admitted with Left sided facial droop; Speech Difficulty  -MS     Mode of Treatment  physical therapy;individual therapy  -MS     Row Name 08/18/19 140          General Information    Patient Profile Reviewed?  yes  -MS     Prior Level of Function  independent: No A.D.  -MS     Existing Precautions/Restrictions  fall Exit alarm  -MS     Barriers to Rehab  none identified  -MS     Row Name 08/18/19 1400          Cognitive Assessment/Intervention- PT/OT    Orientation Status (Cognition)  oriented x 3  -MS     Row Name 08/18/19  1407          Safety Issues, Functional Mobility    Comment, Safety Issues/Impairments (Mobility)  Gait belt used for safety  -MS       User Key  (r) = Recorded By, (t) = Taken By, (c) = Cosigned By    Initials Name Provider Type    MS HagenWillie, PT Physical Therapist        Mobility     Row Name 08/18/19 1408          Bed Mobility Assessment/Treatment    Bed Mobility Assessment/Treatment  bed mobility (all) activities  -MS     [place] Level (Bed Mobility)  contact guard assist  -MS     Assistive Device (Bed Mobility)  bed rails  -MS     Row Name 08/18/19 1408          Sit-Stand Transfer    Sit-Stand [place] (Transfers)  contact guard  -MS     Assistive Device (Sit-Stand Transfers)  -- HHA x 1  -MS     Row Name 08/18/19 1408          Gait/Stairs Assessment/Training    [place] Level (Gait)  contact guard  -MS     Assistive Device (Gait)  -- HHA x 1  -MS     Distance in Feet (Gait)  100 feet  -MS     Deviations/Abnormal Patterns (Gait)  bala decreased;stride length decreased  -MS     Comment (Gait/Stairs)  Limited in gait distance this date by overall fatigue, weakness.  -MS       User Key  (r) = Recorded By, (t) = Taken By, (c) = Cosigned By    Initials Name Provider Type    MS DelucaerWillie PT Physical Therapist        Obj/Interventions     Row Name 08/18/19 1409          General ROM    GENERAL ROM COMMENTS  BUE/LE (WFL's)  -MS     Row Name 08/18/19 1409          MMT (Manual Muscle Testing)    General MMT Comments  BUE/LE (3+/5)  -MS       User Key  (r) = Recorded By, (t) = Taken By, (c) = Cosigned By    Initials Name Provider Type    MS HagenWillie, PT Physical Therapist        Goals/Plan     Row Name 08/18/19 1410          Bed Mobility Goal 1 (PT)    Activity/Assistive Device (Bed Mobility Goal 1, PT)  bed mobility activities, all  -MS     [place] Level/Cues Needed (Bed Mobility Goal 1, PT)  independent  -MS     Time Frame (Bed Mobility Goal 1, PT)  long term goal (LTG);5  days  -MS     Row Name 08/18/19 1410          Transfer Goal 1 (PT)    Activity/Assistive Device (Transfer Goal 1, PT)  transfers, all  -MS     [address] Level/Cues Needed (Transfer Goal 1, PT)  independent  -MS     Time Frame (Transfer Goal 1, PT)  long term goal (LTG);5 days  -MS     Row Name 08/18/19 1410          Gait Training Goal 1 (PT)    Activity/Assistive Device (Gait Training Goal 1, PT)  gait (walking locomotion)  -MS     [address] Level (Gait Training Goal 1, PT)  independent  -MS     Distance (Gait Goal 1, PT)  300 feet  -MS     Time Frame (Gait Training Goal 1, PT)  long term goal (LTG);5 days  -MS       User Key  (r) = Recorded By, (t) = Taken By, (c) = Cosigned By    Initials Name Provider Type    Willie Ken, PT Physical Therapist        Clinical Impression     Row Name 08/18/19 1409          Pain Assessment    Additional Documentation  Pain Scale: Numbers Pre/Post-Treatment (Group)  -MS     Row Name 08/18/19 1409          Pain Scale: Numbers Pre/Post-Treatment    Pain Scale: Numbers, Pretreatment  0/10 - no pain  -MS     Pain Scale: Numbers, Post-Treatment  0/10 - no pain  -MS     Row Name 08/18/19 1411 08/18/19 1042       Plan of Care Review    Plan of Care Reviewed With  patient  -MS  patient  -JS    [facility] Name 08/18/19 1409          Physical Therapy Clinical Impression    Criteria for Skilled Interventions Met (PT Clinical Impression)  yes  -MS     Rehab Potential (PT Clinical Summary)  good, to achieve stated therapy goals  -MS     Row Name 08/18/19 1409          Positioning and Restraints    Pre-Treatment Position  in bed  -MS     Post Treatment Position  bed  -MS     In Bed  notified nsg;supine;call light within reach;encouraged to call for assist;exit alarm on All lines intact.  -MS       User Key  (r) = Recorded By, (t) = Taken By, (c) = Cosigned By    Initials Name Provider Type    Willie Ken, PT Physical Therapist    Katelyn Cameron, RN Registered Nurse         Outcome Measures     Row Name 08/18/19 1411          How much help from another person do you currently need...    Turning from your back to your side while in flat bed without using bedrails?  3  -MS     Moving from lying on back to sitting on the side of a flat bed without bedrails?  3  -MS     Moving to and from a bed to a chair (including a wheelchair)?  3  -MS     Standing up from a chair using your arms (e.g., wheelchair, bedside chair)?  3  -MS     Climbing 3-5 steps with a railing?  3  -MS     To walk in hospital room?  3  -MS     AM-PAC 6 Clicks Score (PT)  18  -MS     Row Name 08/18/19 1411          Functional Assessment    Outcome Measure Options  AM-PAC 6 Clicks Basic Mobility (PT)  -MS       User Key  (r) = Recorded By, (t) = Taken By, (c) = Cosigned By    Initials Name Provider Type    Willie Ken, PT Physical Therapist        Physical Therapy Education     Title: PT OT SLP Therapies (Done)     Topic: Physical Therapy (Done)     Point: Mobility training (Done)     Learning Progress Summary           Patient Acceptance, E,D, VU,NR by MS at 8/18/2019  2:10 PM                   Point: Home exercise program (Done)     Learning Progress Summary           Patient Acceptance, E,D, VU,NR by MS at 8/18/2019  2:10 PM                   Point: Body mechanics (Done)     Learning Progress Summary           Patient Acceptance, E,D, VU,NR by MS at 8/18/2019  2:10 PM                   Point: Precautions (Done)     Learning Progress Summary           Patient Acceptance, E,D, VU,NR by MS at 8/18/2019  2:10 PM                               User Key     Initials Effective Dates Name Provider Type Discipline    MS 04/03/18 -  Willie Hagen, PT Physical Therapist PT              PT Recommendation and Plan  Planned Therapy Interventions (PT Eval): balance training, bed mobility training, gait training, home exercise program, patient/family education, postural re-education, strengthening, transfer  training  Outcome Summary/Treatment Plan (PT)  Anticipated Discharge Disposition (PT): home with assist, home with home health  Plan of Care Reviewed With: patient  Outcome Summary: Pt. will benefit from skilled inpt. P.T. to address her functional deficits and to assist pt. in regaining her maximum level of independence with functional mobility.      Time Calculation:   PT Charges     Row Name 08/18/19 1411             Time Calculation    Start Time  1305  -MS      Stop Time  1320  -MS      Time Calculation (min)  15 min  -MS      PT Received On  08/18/19  -MS      PT - Next Appointment  08/19/19  -MS      PT Goal Re-Cert Due Date  08/23/19  -MS         Time Calculation- PT    Total Timed Code Minutes- PT  13 minute(s)  -MS        User Key  (r) = Recorded By, (t) = Taken By, (c) = Cosigned By    Initials Name Provider Type    Willie Ken, PT Physical Therapist        Therapy Charges for Today     Code Description Service Date Service Provider Modifiers Qty    88817551223 HC PT EVAL LOW COMPLEXITY 1 8/18/2019 Willie Hagen, PT GP 1    41823643146 HC PT THER PROC EA 15 MIN 8/18/2019 Willie Hagen, PT GP 1          PT G-Codes  Outcome Measure Options: AM-PAC 6 Clicks Basic Mobility (PT)  AM-PAC 6 Clicks Score (PT): 18    Willie Hagen, CRUZ  8/18/2019

## 2019-08-18 NOTE — THERAPY EVALUATION
Acute Care - Speech Language Pathology   Swallow Initial Evaluation [facility]     Patient Name: Charmaine Machuca  : 1940  MRN: [id number]  Today's Date: 2019               Admit Date: 2019    Visit Dx:     ICD-10-CM ICD-9-CM   1. Weakness on left side of face R29.810 781.94   2. Slurred speech R47.81 784.59   3. Anemia, unspecified type D64.9 285.9   4. Acute renal failure superimposed on chronic kidney disease, unspecified CKD stage, unspecified acute renal failure type (CMS/HCC) N17.9 584.9    N18.9 585.9     Patient Active Problem List   Diagnosis   • Pacemaker lead failure   • Chronic systolic congestive heart failure (CMS/HCC)   • Cardiomyopathy (CMS/HCC)   • Carpal tunnel syndrome   • Periodic limb movement disorder   • Peripheral neuropathy   • Restless legs syndrome   • Anemia   • CKD (chronic kidney disease)   • History of anemia due to chronic kidney disease   • Iron deficiency anemia   • Dysuria   • Chronic adrenal insufficiency (CMS/HCC)   • 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   • Chest pain   • Congestive heart failure (CMS/HCC)   • Gastroparesis   • Hypotension   • Hypothyroidism   • Myoclonus   • Osteoarthritis   • Automatic implantable cardioverter-defibrillator in situ   • Spondylolisthesis   • History of total knee replacement   • Anemia of chronic renal failure, stage 4 (severe) (CMS/HCC)   • CKD (chronic kidney disease)   • Anemia of chronic disease   • Encounter for fitting and adjustment of vascular catheter   • Adverse effect of iron or its compound, subsequent encounter   • B12 deficiency   • Weakness on left side of face     Past Medical History:   Diagnosis Date   • Anemia     Iron deficiency   • Cardiomyopathy (CMS/HCC)     S/P pacemaker and defibrillator   • CHF (congestive heart failure) (CMS/HCC)    • Chronic renal failure, stage 4 (severe) (CMS/HCC)    • Coronary artery disease     pacemaker, defibrillator   • Dizzy    • Gastroparesis    • GERD  (gastroesophageal reflux disease)    • Gout    • Hemorrhoids    • Hiatal hernia    • History of Clostridium difficile colitis 2013   • History of kidney stones    • History of pancreatitis     2014   • History of skin cancer    • History of transfusion     MULTIPLE    • Hypothyroidism    • Left bundle branch block    • Mitral and aortic valve disease    • Mitral valve insufficiency    • Myoclonus     S/P Depakote   • Osteoarthritis    • Pancytopenia (CMS/HCC)    • Peripheral neuropathy    • Presence of cardiac pacemaker     AND DEFIBRILLATOR   • Pulmonary hypertension (CMS/HCC)    • SOB (shortness of breath) on exertion    • Spinal stenosis      Past Surgical History:   Procedure Laterality Date   • APPENDECTOMY N/A    • ARTERIOVENOUS FISTULA/SHUNT SURGERY Right 2/18/2019    Procedure: RIGHT BASILIC FISTULA CREATION WITHOUT TRANSPOSITION;  Surgeon: Royer Dozier MD;  Location: [facility] OR;  Service: Vascular   • ARTERIOVENOUS FISTULA/SHUNT SURGERY Right 5/31/2019    Procedure: RIGHT 2ND STAGE BASILIC VEIN CREATION;  Surgeon: Royer Dozier MD;  Location: [facility] OR;  Service: Vascular   • CARDIAC CATHETERIZATION Left 03/28/2006    Arterial catheter insertion, cath left ventriculography, coronary angiography and left heart catheterization-Dr. Estevan Matamoros   • CARDIAC DEFIBRILLATOR PLACEMENT N/A 11/30/2007    Biventricular implantable cardioverter defibrillator-Dr. Leandro Huber   • CATARACT EXTRACTION Bilateral 2011   • COLONOSCOPY N/A 2014    done at [facility]   • CYSTECTOMY N/A     Ovarian cystectomy   • ENDOSCOPY N/A 04/28/2006    EGD with biopsies. Paraesophageal hiatal hernia from 34 to 44 cm, antral gastritis-Dr. Nehemiah Beal   • ENDOSCOPY N/A 09/29/2004    Hiatal hernia, gastritis and an erosion of the pylorus-Dr. Yang Pavon   • ENTEROSCOPY SMALL BOWEL N/A 10/30/2006    Small bowel enteroscopy with biopsies-Dr. Rory Sevilla   • FLEXIBLE SIGMOIDOSCOPY N/A 09/29/2004    Unsuccessful colonoscopy  due to poop prep, a very tortuous sigmoid, bowel prep in the form of enema given and a barium enema was obtained-Dr. Yang Pavon   • FRACTURE SURGERY  2015    Broke big toe   • HEMATOMA EVACUATION TRUNK N/A 02/07/2014    Pocket evacuation of hematoma at pacemaker site-Dr. Leandro Huber   • HEMORRHOIDECTOMY N/A 04/19/2004    Flexible sigmoidoscopy and stapled hemorrhoidectomy-Dr. Yang Pavon   • HYSTERECTOMY Bilateral 1977   • IMPLANTABLE CARDIOVERTER DEFIBRILLATOR LEAD REPLACEMENT/POCKET REVISION Left 3/28/2016    Procedure: AUTOMATIC IMPLANTABLE CARDIOVERTER DEFIBRILLATOR LEAD REPLACEMENT WITH LASER LEAD EXTRACTION;  Surgeon: Leandro Huber MD;  Location: [facility] OR 18/19;  Service:    • IMPLANTABLE CARDIOVERTER DEFIBRILLATOR LEAD REPLACEMENT/POCKET REVISION N/A 01/13/2014    Biventricular ICD replacement-Dr. Jillian Huber   • LAPAROSCOPY REPAIR HIATAL HERNIA N/A 06/27/2006    Laparoscopic paraesophageal hiatal hernia repair with Nissen fundoplication and cholecystectomy-Dr. Sean Yoon   • NATALIE GONZALEZ ([facility]) PROCEDURE     • [place] INSJ TUNNELED CVC W/O SUBQ PORT/ AGE 3 YR/> Right 10/3/2017    Procedure: Right internal jugular port placement;  Surgeon: Tiffanie Couch MD;  Location: [facility] OR;  Service: General   • TOE SURGERY Left     SET BIG TOE   • TOTAL KNEE ARTHROPLASTY Left 08/2014        SWALLOW EVALUATION (last 72 hours)      SLP Adult Swallow Evaluation     Row Name 08/18/19 4665          Document Type  evaluation  -ML    Subjective Information  no complaints  -ML    Patient Observations  alert;cooperative  -ML    Patient/Family Observations  No family present  -ML    Patient Effort  good  -ML    Symptoms Noted During/After Treatment  none  -ML          Patient Profile Reviewed  yes  -ML    Pertinent History Of Current Problem  admitted with L sided facial droop and slurred speech. Concern for CVA. CT negative for acute intracranial abnormalities. Pt cannot have  MRI.   -ML    Current Method of Nutrition  NPO failed RN swallow screen due to facial droop  -ML    Precautions/Limitations, Vision  WFL with corrective lenses;for purposes of eval  -ML    Precautions/Limitations, Hearing  WFL;for purposes of eval  -ML    Prior Level of Function-Communication  WFL  -ML    Prior Level of Function-Swallowing  no diet consistency restrictions pt avoids breads  -ML    Plans/Goals Discussed with  patient;agreed upon  -ML    Barriers to Rehab  none identified  -ML    Patient's Goals for Discharge  return to PO diet  -ML          Additional Documentation  Pain Scale: Numbers Pre/Post-Treatment (Group)  -ML          Pain Scale: Numbers, Pretreatment  0/10 - no pain  -ML    Pain Scale: Numbers, Post-Treatment  0/10 - no pain  -ML          Dentition Assessment  natural, present and adequate  -ML    Secretion Management  WNL/WFL  -ML    Mucosal Quality  moist, healthy  -ML          Oral Motor General Assessment  lingual impairment;oral labial or buccal impairment  -ML    Oral Labial or Buccal Impairment, Detail, Cranial Nerve VII (Facial):  left labial droop;CN7: Sensory Impairment  -ML    Lingual Impairment, Detail. Cranial Nerves IX, XII (Glossopharyngeal and Hypoglossal)  -- appeared to deviate slightly to the left  -ML    Oral Motor, Comment  Pt initially declining impaired sensation, however, during p.o. trials, pt unable to feel left sided labial residue of puree and spillage of peach juice.   -ML          Respiratory Support Currently in Use  room air  -ML    Eating/Swallowing Skills  self-fed  -ML    Positioning During Eating  upright 90 degree;upright in bed  -ML    Utensils Used  spoon;cup;straw  -ML    Consistencies Trialed  regular textures;soft textures;pureed;thin liquids mixed  -ML          Oral Prep Phase  impaired  -ML    Oral Transit  WFL  -ML    Oral Residue  WFL  -ML    Pharyngeal Phase  no overt signs/symptoms of pharyngeal impairment  -ML    Clinical Swallow Evaluation  Summary  Pt presents with functional-mild oropharyngeal dysphagia s/p concern for CVA vs Bell's Palsy characterized by impaired labial sill causing spillage of juice from mixed consistency peaches on the left, impaired sensation of left labial area, and mildly prolonged mastication, but functional and adequate. Pharyngeal phase appears functional indicated by no overt signs/symptoms of penetration/aspiration during any trials of consistencies assessed and no reports of globus sensation. Pt appears appropriate for regular diet and thin liquids. Meds whole with water.   -ML          SLP Swallowing Diagnosis  functional oral phase;functional pharyngeal phase  -ML    Functional Impact  no impact on function  -ML    Rehab Potential/Prognosis, Swallowing  good, to achieve stated therapy goals  -ML    Swallow Criteria for Skilled Therapeutic Interventions Met  demonstrates skilled criteria  -ML          Therapy Frequency (Swallow)  PRN  -ML    Predicted Duration Therapy Intervention (Days)  until discharge  -ML    SLP Diet Recommendation  regular textures;thin liquids  -ML    Recommended Precautions and Strategies  upright posture during/after eating  -ML    SLP Rec. for Method of Medication Administration  meds whole;with thin liquids  -ML    Monitor for Signs of Aspiration  yes;notify SLP if any concerns  -ML    Anticipated Dischage Disposition  home  -ML          Oral Nutrition/Hydration Goal Selection (SLP)  oral nutrition/hydration, SLP goal 1  -ML    Labial Strengthening Goal Selection (SLP)  labial strengthening, SLP goal 1  -ML    Additional Documentation  labial strengthening goal selection (SLP)  -ML          Oral Nutrition/Hydration Goal 1, SLP  Pt will safely swallow regular diet and thin liquids without overt signs/symptoms of penetration/aspiration.   -ML    Time Frame (Oral Nutrition/Hydration Goal 1, SLP)  by discharge  -ML          Activity (Labial Strengthening Goal 1, SLP)  increase labial tone;increase  labial sensation/afferent drive  -ML    Increase Labial Tone  labial resistance exercises  -ML    Increase Labial Sensation/Afferent Drive  swallow trials;sensory stimulation (CN V, VII)  -ML    [place]/Accuracy (Labial Strengthening Goal 1, SLP)  independently (over 90% accuracy)  -ML    Time Frame (Labial Strengthening Goal 1, SLP)  by discharge  -ML      User Key  (r) = Recorded By, (t) = Taken By, (c) = Cosigned By    Initials Name Effective Dates    ML Loree Terrazas MS CCC-SLP 10/04/18 -           EDUCATION  The patient has been educated in the following areas:   Dysphagia (Swallowing Impairment).    SLP Recommendation and Plan  SLP Swallowing Diagnosis: functional oral phase, functional pharyngeal phase  SLP Diet Recommendation: regular textures, thin liquids  Recommended Precautions and Strategies: upright posture during/after eating  SLP Rec. for Method of Medication Administration: meds whole, with thin liquids     Monitor for Signs of Aspiration: yes, notify SLP if any concerns     Swallow Criteria for Skilled Therapeutic Interventions Met: demonstrates skilled criteria  Anticipated Dischage Disposition: home  Rehab Potential/Prognosis, Swallowing: good, to achieve stated therapy goals  Therapy Frequency (Swallow): PRN  Predicted Duration Therapy Intervention (Days): until discharge       Plan of Care Reviewed With: patient  Plan of Care Review  Plan of Care Reviewed With: patient  Outcome Summary: Pt presents with functional-mild oropharyngeal dysphagia s/p concern for CVA vs Bell's Palsy characterized by impaired labial sill causing spillage of juice from mixed consistency peaches on the left and mildly prolonged mastication, but functional and adequate. Pharyngeal phase appears functional indicated by no overt signs/symptoms of penetration/aspiration during any trials of consistencies assessed and no reports of globus sensation. Pt appears appropriate for regular diet and thin liquids. Meds  whole with water.     SLP GOALS     Row Name 08/18/19 1430             Oral Nutrition/Hydration Goal 1 (SLP)    Oral Nutrition/Hydration Goal 1, SLP  Pt will safely swallow regular diet and thin liquids without overt signs/symptoms of penetration/aspiration.   -ML      Time Frame (Oral Nutrition/Hydration Goal 1, SLP)  by discharge  -ML         Labial Strengthening Goal 1 (SLP)    Activity (Labial Strengthening Goal 1, SLP)  increase labial tone;increase labial sensation/afferent drive  -ML      Increase Labial Tone  labial resistance exercises  -ML      Increase Labial Sensation/Afferent Drive  swallow trials;sensory stimulation (CN V, VII)  -ML      [place]/Accuracy (Labial Strengthening Goal 1, SLP)  independently (over 90% accuracy)  -ML      Time Frame (Labial Strengthening Goal 1, SLP)  by discharge  -ML        User Key  (r) = Recorded By, (t) = Taken By, (c) = Cosigned By    Initials Name Provider Type    Loree Fonseca MS CCC-SLP Speech and Language Pathologist           SLP Outcome Measures (last 72 hours)      SLP Outcome Measures     Row Name 08/18/19 1500             SLP Outcome Measures    Outcome Measure Used?  Adult NOMS  -ML         Adult FCM Scores    FCM Chosen  Swallowing  -ML      Swallowing FCM Score  7  -ML        User Key  (r) = Recorded By, (t) = Taken By, (c) = Cosigned By    Initials Name Effective Dates    Loree Fonseca MS CCC-SLP 10/04/18 -            Time Calculation:   Time Calculation- SLP     Row Name 08/18/19 1512             Time Calculation- SLP    SLP Start Time  1430  -ML      SLP Received On  08/18/19  -ML        User Key  (r) = Recorded By, (t) = Taken By, (c) = Cosigned By    Initials Name Provider Type    Loree Fonseca MS CCC-SLP Speech and Language Pathologist          Therapy Charges for Today     Code Description Service Date Service Provider Modifiers Qty    [id number]  ST EVAL ORAL PHARYNG SWALLOW 3 8/18/2019 Loree Terrazas MS  CCC-SLP GN 1               Loree Terrazas, MS DEISY-SLP  8/18/2019

## 2019-08-18 NOTE — H&P
Name: Charmaine Machuca ADMIT: 2019   : 1940  PCP: Fannie Godfrey MD    MRN: [id number] LOS: 0 days   AGE/SEX: 79 y.o. female  ROOM:      Chief Complaint   Patient presents with   • Facial Droop   • Speech Problem       Subjective   Patient is a 79 y.o. female who presents to [facility] with the above chief complaint.  She woke up this morning with facial droop and slurred speech.  She kept telling her  that the problem was him and that he could not hear but eventually she looked in the mirror and noticed that her left side of her mouth was not moving right.  Throughout the day did not get any better so she came to the emergency room this evening.  She does not have a history of stroke but does have a history of chronic kidney disease stage IV.  She also has a history of iron deficiency anemia and is followed by the [facility] across the street.  She denies any other numbness or tingling but says she has a wobbly gait but this is chronic in nature and not acute.        History of Present Illness    Past Medical History:   Diagnosis Date   • Anemia     Iron deficiency   • Cardiomyopathy (CMS/HCC)     S/P pacemaker and defibrillator   • CHF (congestive heart failure) (CMS/HCC)    • Chronic renal failure, stage 4 (severe) (CMS/HCC)    • Coronary artery disease     pacemaker, defibrillator   • Dizzy    • Gastroparesis    • GERD (gastroesophageal reflux disease)    • Gout    • Hemorrhoids    • Hiatal hernia    • History of Clostridium difficile colitis    • History of kidney stones    • History of pancreatitis        • History of skin cancer    • History of transfusion     MULTIPLE    • Hypothyroidism    • Left bundle branch block    • Mitral and aortic valve disease    • Mitral valve insufficiency    • Myoclonus     S/P Depakote   • Osteoarthritis    • Pancytopenia (CMS/HCC)    • Peripheral neuropathy    • Presence of cardiac pacemaker     AND DEFIBRILLATOR   •  Pulmonary hypertension (CMS/HCC)    • SOB (shortness of breath) on exertion    • Spinal stenosis      Past Surgical History:   Procedure Laterality Date   • APPENDECTOMY N/A    • ARTERIOVENOUS FISTULA/SHUNT SURGERY Right 2/18/2019    Procedure: RIGHT BASILIC FISTULA CREATION WITHOUT TRANSPOSITION;  Surgeon: Royer Dozier MD;  Location: [facility] OR;  Service: Vascular   • ARTERIOVENOUS FISTULA/SHUNT SURGERY Right 5/31/2019    Procedure: RIGHT 2ND STAGE BASILIC VEIN CREATION;  Surgeon: Royer Dozier MD;  Location: [facility] OR;  Service: Vascular   • CARDIAC CATHETERIZATION Left 03/28/2006    Arterial catheter insertion, cath left ventriculography, coronary angiography and left heart catheterization-Dr. Estevan Matamoros   • CARDIAC DEFIBRILLATOR PLACEMENT N/A 11/30/2007    Biventricular implantable cardioverter defibrillator-Dr. Leandro Huber   • CATARACT EXTRACTION Bilateral 2011   • COLONOSCOPY N/A 2014    done at [facility]   • CYSTECTOMY N/A     Ovarian cystectomy   • ENDOSCOPY N/A 04/28/2006    EGD with biopsies. Paraesophageal hiatal hernia from 34 to 44 cm, antral gastritis-Dr. Nehemiah Beal   • ENDOSCOPY N/A 09/29/2004    Hiatal hernia, gastritis and an erosion of the pylorus-Dr. Yang Pavon   • ENTEROSCOPY SMALL BOWEL N/A 10/30/2006    Small bowel enteroscopy with biopsies-Dr. Rory Sevilla   • FLEXIBLE SIGMOIDOSCOPY N/A 09/29/2004    Unsuccessful colonoscopy due to poop prep, a very tortuous sigmoid, bowel prep in the form of enema given and a barium enema was obtained-Dr. Yang Pavon   • FRACTURE SURGERY  2015    Broke big toe   • HEMATOMA EVACUATION TRUNK N/A 02/07/2014    Pocket evacuation of hematoma at pacemaker site-Dr. Leandro Huber   • HEMORRHOIDECTOMY N/A 04/19/2004    Flexible sigmoidoscopy and stapled hemorrhoidectomy-Dr. Yang Pavon   • HYSTERECTOMY Bilateral 1977   • IMPLANTABLE CARDIOVERTER DEFIBRILLATOR LEAD REPLACEMENT/POCKET REVISION Left 3/28/2016    Procedure: AUTOMATIC  IMPLANTABLE CARDIOVERTER DEFIBRILLATOR LEAD REPLACEMENT WITH LASER LEAD EXTRACTION;  Surgeon: Leandro Huber MD;  Location: [facility] HYBRID OR 18/19;  Service:    • IMPLANTABLE CARDIOVERTER DEFIBRILLATOR LEAD REPLACEMENT/POCKET REVISION N/A 01/13/2014    Biventricular ICD replacement-Dr. Jillian Huber   • LAPAROSCOPY REPAIR HIATAL HERNIA N/A 06/27/2006    Laparoscopic paraesophageal hiatal hernia repair with Nissen fundoplication and cholecystectomy-Dr. Sean Yoon   • NATALIE GONZALEZ ([facility]) PROCEDURE     • [place] INSJ TUNNELED CVC W/O SUBQ PORT/ AGE 3 YR/> Right 10/3/2017    Procedure: Right internal jugular port placement;  Surgeon: Tiffanie Couch MD;  Location: [facility] MAIN OR;  Service: General   • TOE SURGERY Left     SET BIG TOE   • TOTAL KNEE ARTHROPLASTY Left 08/2014     Family History   Problem Relation Age of Onset   • Coronary artery disease Other    • Dementia Other    • Kidney disease Other    • Arthritis Father    • Early death Father         Kidney failure   • Kidney disease Father    • Heart disease Mother    • Mental illness Mother         Dementia   • Malig Hyperthermia Neg Hx      Social History     Tobacco Use   • Smoking status: Never Smoker   • Smokeless tobacco: Never Used   Substance Use Topics   • Alcohol use: No   • Drug use: No       (Not in a hospital admission)  Allergies:  Chlorhexidine; Valproic acid; Codeine; and Propoxyphene    Review of Systems   Constitutional: Negative for chills, fatigue and fever.   HENT: Negative for congestion, rhinorrhea and sore throat.    Eyes: Negative for photophobia, redness and visual disturbance.   Respiratory: Negative for cough and shortness of breath.    Cardiovascular: Negative for chest pain, palpitations and leg swelling.   Gastrointestinal: Negative for constipation, diarrhea and nausea.   Endocrine: Negative for polydipsia, polyphagia and polyuria.   Genitourinary: Negative for difficulty urinating and dysuria.    Musculoskeletal: Negative for back pain, myalgias and neck pain.   Skin: Negative for pallor and rash.   Allergic/Immunologic: Negative for environmental allergies, food allergies and immunocompromised state.   Neurological: Positive for facial asymmetry. Negative for dizziness and headaches.   Hematological: Negative for adenopathy. Does not bruise/bleed easily.   Psychiatric/Behavioral: Negative for agitation, behavioral problems and confusion.        Objective    Vital Signs  Temp:  [97.5 °F (36.4 °C)-97.9 °F (36.6 °C)] 97.5 °F (36.4 °C)  Heart Rate:  [68-87] 70  Resp:  [16] 16  BP: (109-131)/(47-61) 111/55  SpO2:  [96 %-99 %] 98 %  on   ;   Device (Oxygen Therapy): room air  Body mass index is 27.39 kg/m².    Physical Exam   Constitutional: She is oriented to person, place, and time. She appears well-developed and well-nourished.   HENT:   Head: Normocephalic and atraumatic.   Cardiovascular: Normal rate, regular rhythm and normal heart sounds.   Pulmonary/Chest: Effort normal and breath sounds normal.   Abdominal: Soft. Bowel sounds are normal.   Musculoskeletal: Normal range of motion. She exhibits no edema.   Neurological: She is alert and oriented to person, place, and time.   Facial asym     Skin: Skin is warm and dry. Capillary refill takes less than 2 seconds.   Psychiatric: She has a normal mood and affect. Her behavior is normal.   Nursing note and vitals reviewed.      Results Review:   I reviewed the patient's new clinical results.  Results from last 7 days   Lab Units 08/17/19  2110 08/15/19  1424   WBC 10*3/mm3 5.07 4.92   HEMOGLOBIN g/dL 6.3* 7.2*   PLATELETS 10*3/mm3 200 212     Results from last 7 days   Lab Units 08/17/19  2110   SODIUM mmol/L 138   POTASSIUM mmol/L 4.5   CHLORIDE mmol/L 102   CO2 mmol/L 27.0   BUN mg/dL 75*   CREATININE mg/dL 3.56*   GLUCOSE mg/dL 116*   ALBUMIN g/dL 2.90*   BILIRUBIN mg/dL <0.2*   ALK PHOS U/L 44   AST (SGOT) U/L 16   ALT (SGPT) U/L <5   Estimated Creatinine  Clearance: 13 mL/min (A) (by C-G formula based on SCr of 3.56 mg/dL (H)).  Results from last 7 days   Lab Units 08/17/19  2110   INR  1.11*   TROPONIN T ng/mL <0.010         Invalid input(s): LDLCALC    CT Head Without Contrast   Final Result   1. No acute intracranial abnormality.                       This report was finalized on 8/17/2019 9:41 PM by Willie Washington M.D.            Assessment/Plan       Chronic systolic congestive heart failure (CMS/HCC)    Cardiomyopathy (CMS/HCC)    Restless legs syndrome    Chronic adrenal insufficiency (CMS/HCC)    Hypothyroidism    Anemia of chronic renal failure, stage 4 (severe) (CMS/HCC)    CKD (chronic kidney disease)    Anemia of chronic disease    Weakness on left side of face      Assessment & Plan  This is an 79-year-old female with a history of chronic kidney disease stage IV presents to the hospital with left facial droop.  1.  Left facial droop: Possible this could be Bell's palsy but also possible it could be an acute stroke.  Neurology's been consulted for assistance.  I have held off on imaging given her pacemaker she can have an MRI and given her chronic kidney disease she can have a CT with contrast.  Will defer further imaging and evaluation to neurology.  2.  Symptomatic anemia: She had her labs checked earlier this week and was scheduled to have a blood transfusion tomorrow.  Hemoglobin 6.6.  She is fatigued tired short of breath plan at this time is for transfusion of 2 units packed red blood cells  3.  Chronic kidney disease stage IV: I consult her nephrologist for further assistance with managing medications and work-up with regards to her kidney disease      I discussed the patients findings and my recommendations with patient, family and nursing staff.          Anuel Huffman MD  [facility]  08/17/19  11:34 PM

## 2019-08-18 NOTE — ED NOTES
"Pt c/o slurred speech since \"earlier this morning.\"  also reports left sided facial droop since she woke up this morning.  states the last time she was normal was last night before she went to bed at midnight. Pt also c/o generalized weakness \"because of my anemia.\" States has been having SOB x several months. Pt has an accessed port in place and states she is supposed to get blood in the morning. States stool is very dark and was checked by the GI doctor three weeks ago and was negative then. Denies any hx of stroke or TIA. Dr. Higginbotham at bedside assessing pt.     Brittni Martínez, RN  08/17/19 2045    "

## 2019-08-18 NOTE — ED TRIAGE NOTES
Patient arrived from home with complaint of slurred speech that she states she noticed around the time when she awoke this am around 0800. Patient noted to have left side facial droop and reported difficulty with her  on the left.

## 2019-08-19 ENCOUNTER — APPOINTMENT (OUTPATIENT)
Dept: CARDIOLOGY | Facility: HOSPITAL | Age: 79
End: 2019-08-19

## 2019-08-19 LAB
ABO + RH BLD: NORMAL
ABO + RH BLD: NORMAL
ALBUMIN SERPL-MCNC: 3 G/DL (ref 3.5–5.2)
ANION GAP SERPL CALCULATED.3IONS-SCNC: 9.6 MMOL/L (ref 5–15)
BACTERIA UR QL AUTO: ABNORMAL /HPF
BH BB BLOOD EXPIRATION DATE: NORMAL
BH BB BLOOD EXPIRATION DATE: NORMAL
BH BB BLOOD TYPE BARCODE: 600
BH BB BLOOD TYPE BARCODE: 600
BH BB DISPENSE STATUS: NORMAL
BH BB DISPENSE STATUS: NORMAL
BH BB PRODUCT CODE: NORMAL
BH BB PRODUCT CODE: NORMAL
BH BB UNIT NUMBER: NORMAL
BH BB UNIT NUMBER: NORMAL
BH CV XLRA MEAS LEFT DIST CCA EDV: -16.1 CM/SEC
BH CV XLRA MEAS LEFT DIST CCA PSV: -71.1 CM/SEC
BH CV XLRA MEAS LEFT DIST ICA EDV: 22.8 CM/SEC
BH CV XLRA MEAS LEFT DIST ICA PSV: 99.8 CM/SEC
BH CV XLRA MEAS LEFT ICA/CCA RATIO: 1.44
BH CV XLRA MEAS LEFT MID ICA EDV: -27.5 CM/SEC
BH CV XLRA MEAS LEFT MID ICA PSV: -103 CM/SEC
BH CV XLRA MEAS LEFT PROX CCA EDV: 11.8 CM/SEC
BH CV XLRA MEAS LEFT PROX CCA PSV: 66 CM/SEC
BH CV XLRA MEAS LEFT PROX ECA PSV: -79.2 CM/SEC
BH CV XLRA MEAS LEFT PROX ICA EDV: -16.5 CM/SEC
BH CV XLRA MEAS LEFT PROX ICA PSV: -76.6 CM/SEC
BH CV XLRA MEAS LEFT PROX SCLA PSV: 92.9 CM/SEC
BH CV XLRA MEAS LEFT VERTEBRAL A EDV: -13.4 CM/SEC
BH CV XLRA MEAS LEFT VERTEBRAL A PSV: -65.2 CM/SEC
BH CV XLRA MEAS RIGHT DIST CCA EDV: -11.1 CM/SEC
BH CV XLRA MEAS RIGHT DIST CCA PSV: -76.2 CM/SEC
BH CV XLRA MEAS RIGHT DIST ICA EDV: -21.7 CM/SEC
BH CV XLRA MEAS RIGHT DIST ICA PSV: -96.2 CM/SEC
BH CV XLRA MEAS RIGHT ICA/CCA RATIO: 1.26
BH CV XLRA MEAS RIGHT MID ICA EDV: 16.4 CM/SEC
BH CV XLRA MEAS RIGHT MID ICA PSV: 89.7 CM/SEC
BH CV XLRA MEAS RIGHT PROX CCA EDV: 11.1 CM/SEC
BH CV XLRA MEAS RIGHT PROX CCA PSV: 82.1 CM/SEC
BH CV XLRA MEAS RIGHT PROX ECA PSV: 51.1 CM/SEC
BH CV XLRA MEAS RIGHT PROX ICA EDV: -15.2 CM/SEC
BH CV XLRA MEAS RIGHT PROX ICA PSV: -77.4 CM/SEC
BH CV XLRA MEAS RIGHT PROX SCLA EDV: 34.1 CM/SEC
BH CV XLRA MEAS RIGHT PROX SCLA PSV: 225 CM/SEC
BH CV XLRA MEAS RIGHT VERTEBRAL A EDV: -13 CM/SEC
BH CV XLRA MEAS RIGHT VERTEBRAL A PSV: -54.6 CM/SEC
BILIRUB UR QL STRIP: NEGATIVE
BUN BLD-MCNC: 64 MG/DL (ref 8–23)
BUN/CREAT SERPL: 20.7 (ref 7–25)
CALCIUM SPEC-SCNC: 9.5 MG/DL (ref 8.6–10.5)
CHLORIDE SERPL-SCNC: 105 MMOL/L (ref 98–107)
CHOLEST SERPL-MCNC: 124 MG/DL (ref 0–200)
CLARITY UR: ABNORMAL
CO2 SERPL-SCNC: 28.4 MMOL/L (ref 22–29)
COLOR UR: YELLOW
CREAT BLD-MCNC: 3.09 MG/DL (ref 0.57–1)
CREAT UR-MCNC: 88.3 MG/DL
GFR SERPL CREATININE-BSD FRML MDRD: 14 ML/MIN/1.73
GFR SERPL CREATININE-BSD FRML MDRD: ABNORMAL ML/MIN/1.73
GLUCOSE BLD-MCNC: 98 MG/DL (ref 65–99)
GLUCOSE BLDC GLUCOMTR-MCNC: 100 MG/DL (ref 70–130)
GLUCOSE BLDC GLUCOMTR-MCNC: 102 MG/DL (ref 70–130)
GLUCOSE UR STRIP-MCNC: NEGATIVE MG/DL
HBA1C MFR BLD: 4.5 % (ref 4.8–5.6)
HDLC SERPL-MCNC: 37 MG/DL (ref 40–60)
HGB UR QL STRIP.AUTO: NEGATIVE
HYALINE CASTS UR QL AUTO: ABNORMAL /LPF
KETONES UR QL STRIP: NEGATIVE
LDLC SERPL CALC-MCNC: 60 MG/DL (ref 0–100)
LDLC/HDLC SERPL: 1.63 {RATIO}
LEFT ARM BP: NORMAL MMHG
LEUKOCYTE ESTERASE UR QL STRIP.AUTO: ABNORMAL
NITRITE UR QL STRIP: POSITIVE
PH UR STRIP.AUTO: 6 [PH] (ref 5–8)
PHOSPHATE SERPL-MCNC: 3.2 MG/DL (ref 2.5–4.5)
POTASSIUM BLD-SCNC: 4.2 MMOL/L (ref 3.5–5.2)
PROT UR QL STRIP: NEGATIVE
RBC # UR: ABNORMAL /HPF
REF LAB TEST METHOD: ABNORMAL
SODIUM BLD-SCNC: 143 MMOL/L (ref 136–145)
SODIUM UR-SCNC: 55 MMOL/L
SP GR UR STRIP: 1.01 (ref 1–1.03)
SQUAMOUS #/AREA URNS HPF: ABNORMAL /HPF
TRIGL SERPL-MCNC: 134 MG/DL (ref 0–150)
UNIT  ABO: NORMAL
UNIT  ABO: NORMAL
UNIT  RH: NORMAL
UNIT  RH: NORMAL
UROBILINOGEN UR QL STRIP: ABNORMAL
VLDLC SERPL-MCNC: 26.8 MG/DL (ref 5–40)
WBC UR QL AUTO: ABNORMAL /HPF

## 2019-08-19 PROCEDURE — 93880 EXTRACRANIAL BILAT STUDY: CPT

## 2019-08-19 PROCEDURE — 84300 ASSAY OF URINE SODIUM: CPT | Performed by: INTERNAL MEDICINE

## 2019-08-19 PROCEDURE — 81001 URINALYSIS AUTO W/SCOPE: CPT | Performed by: INTERNAL MEDICINE

## 2019-08-19 PROCEDURE — 82570 ASSAY OF URINE CREATININE: CPT | Performed by: INTERNAL MEDICINE

## 2019-08-19 PROCEDURE — 97110 THERAPEUTIC EXERCISES: CPT

## 2019-08-19 PROCEDURE — 83036 HEMOGLOBIN GLYCOSYLATED A1C: CPT | Performed by: PSYCHIATRY & NEUROLOGY

## 2019-08-19 PROCEDURE — 80061 LIPID PANEL: CPT | Performed by: PSYCHIATRY & NEUROLOGY

## 2019-08-19 PROCEDURE — 82962 GLUCOSE BLOOD TEST: CPT

## 2019-08-19 PROCEDURE — 97535 SELF CARE MNGMENT TRAINING: CPT

## 2019-08-19 PROCEDURE — 97165 OT EVAL LOW COMPLEX 30 MIN: CPT

## 2019-08-19 PROCEDURE — 80069 RENAL FUNCTION PANEL: CPT | Performed by: INTERNAL MEDICINE

## 2019-08-19 PROCEDURE — 99233 SBSQ HOSP IP/OBS HIGH 50: CPT | Performed by: NURSE PRACTITIONER

## 2019-08-19 RX ORDER — ATORVASTATIN CALCIUM 40 MG/1
40 TABLET, FILM COATED ORAL NIGHTLY
Qty: 30 TABLET | Refills: 0 | Status: SHIPPED | OUTPATIENT
Start: 2019-08-19 | End: 2020-02-12

## 2019-08-19 RX ORDER — FUROSEMIDE 40 MG/1
40 TABLET ORAL DAILY
Status: DISCONTINUED | OUTPATIENT
Start: 2019-08-20 | End: 2019-08-20 | Stop reason: HOSPADM

## 2019-08-19 RX ORDER — CLOPIDOGREL BISULFATE 75 MG/1
75 TABLET ORAL DAILY
Qty: 30 TABLET | Refills: 0 | Status: SHIPPED | OUTPATIENT
Start: 2019-08-20 | End: 2020-02-11

## 2019-08-19 RX ORDER — CARVEDILOL 6.25 MG/1
6.25 TABLET ORAL DAILY
Start: 2019-08-19 | End: 2019-09-02 | Stop reason: HOSPADM

## 2019-08-19 RX ORDER — CLOPIDOGREL BISULFATE 75 MG/1
75 TABLET ORAL DAILY
Status: DISCONTINUED | OUTPATIENT
Start: 2019-08-19 | End: 2019-08-20 | Stop reason: HOSPADM

## 2019-08-19 RX ADMIN — CARVEDILOL 6.25 MG: 6.25 TABLET, FILM COATED ORAL at 09:22

## 2019-08-19 RX ADMIN — SODIUM CHLORIDE, PRESERVATIVE FREE 3 ML: 5 INJECTION INTRAVENOUS at 21:27

## 2019-08-19 RX ADMIN — FAMOTIDINE 20 MG: 20 TABLET, FILM COATED ORAL at 09:22

## 2019-08-19 RX ADMIN — SODIUM CHLORIDE, PRESERVATIVE FREE 3 ML: 5 INJECTION INTRAVENOUS at 09:23

## 2019-08-19 RX ADMIN — CLOPIDOGREL 75 MG: 75 TABLET, FILM COATED ORAL at 14:54

## 2019-08-19 RX ADMIN — DIAZEPAM 5 MG: 5 TABLET ORAL at 21:25

## 2019-08-19 RX ADMIN — FUROSEMIDE 40 MG: 40 TABLET ORAL at 09:22

## 2019-08-19 RX ADMIN — CARBIDOPA AND LEVODOPA 1 TABLET: 25; 100 TABLET, EXTENDED RELEASE ORAL at 17:57

## 2019-08-19 RX ADMIN — LEVOTHYROXINE SODIUM 25 MCG: 25 TABLET ORAL at 09:22

## 2019-08-19 RX ADMIN — ASPIRIN 81 MG: 81 TABLET, CHEWABLE ORAL at 09:21

## 2019-08-19 RX ADMIN — ATORVASTATIN CALCIUM 40 MG: 20 TABLET, FILM COATED ORAL at 21:25

## 2019-08-19 RX ADMIN — SODIUM CHLORIDE, PRESERVATIVE FREE 10 ML: 5 INJECTION INTRAVENOUS at 21:26

## 2019-08-19 RX ADMIN — FEBUXOSTAT 40 MG: 40 TABLET, FILM COATED ORAL at 09:22

## 2019-08-19 RX ADMIN — SODIUM CHLORIDE, PRESERVATIVE FREE 10 ML: 5 INJECTION INTRAVENOUS at 09:31

## 2019-08-19 RX ADMIN — GABAPENTIN 300 MG: 300 CAPSULE ORAL at 21:25

## 2019-08-19 NOTE — CONSULTS
Referring Provider: Dr. Bradly Haddad  Reason for Consultation: CARYL on CKD4    Subjective     Chief complaint   Chief Complaint   Patient presents with   • Facial Droop   • Speech Problem       History of present illness:  78 yo WF with CKD4 (baseline SCR 2.2-2.5; she has a mature AV fistula in the right arm in preparation for future dialysis) followed by our group admitted 8.17.19 for further eval abrupt-onset left facial droop with dysarthria.  Renal consulted due to abnormal SCR of 3.6, though value today is 3.1.  Full PMH outlined below; pertinent is chronic CHF/cardiomyopathy with defibrillator; multi-valvular heart disease; pulmonary hypertension; and profound anemia that is now transfusion-dependent.  Hospitalization notable for administration of IV fluids; neurology evaluation underway to discriminate between Bell's palsy and stroke syndrome.  · No urinary complaints other than occasional incontinence  · Bowel movements unremarkable  · Has had decline in appetite for the last week or 2; describes metallic taste  · No shortness of breath at rest but does have dyspnea with exertion; no chest pain  · No recent fever or chills    Past Medical History:   Diagnosis Date   • Anemia     Iron deficiency   • Cardiomyopathy (CMS/HCC)     S/P pacemaker and defibrillator   • CHF (congestive heart failure) (CMS/HCC)    • Chronic renal failure, stage 4 (severe) (CMS/HCC)    • Coronary artery disease     pacemaker, defibrillator   • Dizzy    • Gastroparesis    • GERD (gastroesophageal reflux disease)    • Gout    • Hemorrhoids    • Hiatal hernia    • History of Clostridium difficile colitis 2013   • History of kidney stones    • History of pancreatitis     2014   • History of skin cancer    • History of transfusion     MULTIPLE    • Hypothyroidism    • Left bundle branch block    • Mitral and aortic valve disease    • Mitral valve insufficiency    • Myoclonus     S/P Depakote   • Osteoarthritis    • Pancytopenia (CMS/HCC)     • Peripheral neuropathy    • Presence of cardiac pacemaker     AND DEFIBRILLATOR   • Pulmonary hypertension (CMS/HCC)    • SOB (shortness of breath) on exertion    • Spinal stenosis      Past Surgical History:   Procedure Laterality Date   • APPENDECTOMY N/A    • ARTERIOVENOUS FISTULA/SHUNT SURGERY Right 2/18/2019    Procedure: RIGHT BASILIC FISTULA CREATION WITHOUT TRANSPOSITION;  Surgeon: Royer Dozier MD;  Location: [facility] OR;  Service: Vascular   • ARTERIOVENOUS FISTULA/SHUNT SURGERY Right 5/31/2019    Procedure: RIGHT 2ND STAGE BASILIC VEIN CREATION;  Surgeon: Royer Dozier MD;  Location: [facility] OR;  Service: Vascular   • CARDIAC CATHETERIZATION Left 03/28/2006    Arterial catheter insertion, cath left ventriculography, coronary angiography and left heart catheterization-Dr. Estevan Matamoros   • CARDIAC DEFIBRILLATOR PLACEMENT N/A 11/30/2007    Biventricular implantable cardioverter defibrillator-Dr. Leandro Huber   • CATARACT EXTRACTION Bilateral 2011   • COLONOSCOPY N/A 2014    done at [facility]   • CYSTECTOMY N/A     Ovarian cystectomy   • ENDOSCOPY N/A 04/28/2006    EGD with biopsies. Paraesophageal hiatal hernia from 34 to 44 cm, antral gastritis-Dr. Nehemiah Beal   • ENDOSCOPY N/A 09/29/2004    Hiatal hernia, gastritis and an erosion of the pylorus-Dr. Yang Pavon   • ENTEROSCOPY SMALL BOWEL N/A 10/30/2006    Small bowel enteroscopy with biopsies-Dr. Rory Sevilla   • FLEXIBLE SIGMOIDOSCOPY N/A 09/29/2004    Unsuccessful colonoscopy due to poop prep, a very tortuous sigmoid, bowel prep in the form of enema given and a barium enema was obtained-Dr. Yang Pavon   • FRACTURE SURGERY  2015    Broke big toe   • HEMATOMA EVACUATION TRUNK N/A 02/07/2014    Pocket evacuation of hematoma at pacemaker site-Dr. Leandro Huber   • HEMORRHOIDECTOMY N/A 04/19/2004    Flexible sigmoidoscopy and stapled hemorrhoidectomy-Dr. Yang Pavon   • HYSTERECTOMY Bilateral 1977   • IMPLANTABLE CARDIOVERTER  DEFIBRILLATOR LEAD REPLACEMENT/POCKET REVISION Left 3/28/2016    Procedure: AUTOMATIC IMPLANTABLE CARDIOVERTER DEFIBRILLATOR LEAD REPLACEMENT WITH LASER LEAD EXTRACTION;  Surgeon: Leandro Huber MD;  Location: [facility] OR 18/19;  Service:    • IMPLANTABLE CARDIOVERTER DEFIBRILLATOR LEAD REPLACEMENT/POCKET REVISION N/A 01/13/2014    Biventricular ICD replacement-Dr. Jillian Huber   • LAPAROSCOPY REPAIR HIATAL HERNIA N/A 06/27/2006    Laparoscopic paraesophageal hiatal hernia repair with Nissen fundoplication and cholecystectomy-Dr. Sean Yoon   • NATALIE GONZALEZ ([facility]) PROCEDURE     • AZ INSJ TUNNELED CVC W/O SUBQ PORT/ AGE 3 YR/> Right 10/3/2017    Procedure: Right internal jugular port placement;  Surgeon: Tiffanie Couch MD;  Location: [facility] OR;  Service: General   • TOE SURGERY Left     SET BIG TOE   • TOTAL KNEE ARTHROPLASTY Left 08/2014     Family History   Problem Relation Age of Onset   • Coronary artery disease Other    • Dementia Other    • Kidney disease Other    • Arthritis Father    • Early death Father         Kidney failure   • Kidney disease Father    • Heart disease Mother    • Mental illness Mother         Dementia   • Malig Hyperthermia Neg Hx      Social History     Tobacco Use   • Smoking status: Never Smoker   • Smokeless tobacco: Never Used   Substance Use Topics   • Alcohol use: No   • Drug use: No     Medications Prior to Admission   Medication Sig Dispense Refill Last Dose   • aspirin 81 MG tablet Take 81 mg by mouth Daily.   8/17/2019 at Unknown time   • calcitriol (ROCALTROL) 0.25 MCG capsule Take 0.25 mcg by mouth Every Other Day. 1 tablet every other day and 2 tablets every other day   8/17/2019 at Unknown time   • carbidopa-levodopa ER (SINEMET CR)  MG per tablet Take 1 tablet by mouth Every Evening.   8/17/2019 at Unknown time   • carvedilol (COREG) 6.25 MG tablet Take 12.5 mg by mouth Daily.   8/17/2019 at Unknown time   • Cholecalciferol  (VITAMIN D3) 5000 units capsule capsule Take 5,000 Units by mouth Daily.   8/17/2019 at Unknown time   • diazepam (VALIUM) 5 MG tablet Take 5 mg by mouth Every Night.   8/17/2019 at Unknown time   • DULOXETINE HCL PO Take  by mouth 2 (Two) Times a Day.   8/17/2019 at Unknown time   • famotidine (PEPCID) 20 MG tablet Take 20 mg by mouth 2 (Two) Times a Day.   8/17/2019 at Unknown time   • febuxostat (ULORIC) 40 MG tablet Take 40 mg by mouth Daily.   8/17/2019 at Unknown time   • furosemide (LASIX) 40 MG tablet Take 1 tablet by mouth Daily. 90 tablet 3 8/17/2019 at Unknown time   • Gabapentin, Once-Daily, (GRALISE) 300 MG tablet Take 300 mg by mouth Daily With Dinner. MAY REPEAT LATE EVENING IF NEEDED   8/17/2019 at Unknown time   • Glucosamine-Chondroit-Vit C-Mn (GLUCOSAMINE CHONDROITIN COMPLX) capsule Take 1,500 mg by mouth Every Other Day. PT HOLDING FOR SURGERY   8/17/2019 at Unknown time   • levothyroxine (SYNTHROID, LEVOTHROID) 25 MCG tablet Take 25 mcg by mouth Daily.   8/17/2019 at Unknown time   • magnesium oxide 250 MG tablet Take 250 mg by mouth Daily.   8/17/2019 at Unknown time   • methscopolamine (PAMINE FORTE) 5 MG tablet Take 5 mg by mouth Every Morning.   8/17/2019 at Unknown time   • Multiple Vitamin (MULTI-DAY VITAMINS) tablet Take 1 tablet by mouth Daily. HOLD FOR SURGERY   8/17/2019 at Unknown time   • rotigotine (NEUPRO) 6 MG/24HR patch Place 1 patch on the skin as directed by provider Daily.   8/17/2019 at Unknown time   • spironolactone (ALDACTONE) 25 MG tablet TAKE 1/2 TABLET DAILY 45 tablet 1 8/17/2019 at Unknown time   • Vitamin E 400 UNITS tablet Take 400 Units by mouth Daily. PT HOLDING FOR SURGERY   8/17/2019 at Unknown time   • HYDROcodone-acetaminophen (NORCO) 5-325 MG per tablet Take 1-2 tablets by mouth Every 8 (Eight) Hours As Needed for Moderate Pain  or Severe Pain  (Pain). 30 tablet 0 Taking   • loperamide (IMODIUM) 2 MG capsule Take 2 mg by mouth 4 (Four) Times a Day As Needed  "for Diarrhea.   Taking   • psyllium (METAMUCIL) 58.6 % powder Take 1 packet by mouth Daily.   Taking     Allergies:  Chlorhexidine; Valproic acid; Codeine; and Propoxyphene    Review of Systems  14-point ROS performed and all negative except for pertinent +/-'s detailed in HPI.     Objective     Vital Signs  Temp:  [97.9 °F (36.6 °C)-99.6 °F (37.6 °C)] 98.6 °F (37 °C)  Heart Rate:  [71-84] 71  Resp:  [18] 18  BP: (107-121)/(43-96) 118/65    Flowsheet Rows      First Filed Value   Admission Height  167.6 cm (66\") Documented at 08/17/2019 2053   Admission Weight  77 kg (169 lb 11.2 oz) Documented at 08/17/2019 2053           No intake/output data recorded.  I/O last 3 completed shifts:  In: 652.5 [Blood:652.5]  Out: 300 [Urine:300]    Intake/Output Summary (Last 24 hours) at 8/19/2019 0821  Last data filed at 8/19/2019 0624  Gross per 24 hour   Intake --   Output 300 ml   Net -300 ml       Physical Exam:  NAD; pleasant; oriented; looks stated age  Chronically ill-appearing; facial asymmetry with left-sided droop  MMM; AT/NC   No eye discharge; no scleral icterus  No JVD; bilateral carotid bruits  Coarse bilat; not labored on room air  RRR, no rub  Soft, NT, ND, BS+  Trace edema  Right arm AV fistula patent  No clubbing  Subtle asterixis  Moves all extremities, though hemiparesis noted on the left  Mood and affect are normal  Speech is a bit slurred; word-searching at times      Results Review:  Results from last 7 days   Lab Units 08/19/19  0635 08/18/19  0625 08/17/19  2110   SODIUM mmol/L 143 139 138   POTASSIUM mmol/L 4.2 4.5 4.5   CHLORIDE mmol/L 105 102 102   CO2 mmol/L 28.4 27.2 27.0   BUN mg/dL 64* 77* 75*   CREATININE mg/dL 3.09* 3.27* 3.56*   CALCIUM mg/dL 9.5 9.3 9.8   BILIRUBIN mg/dL  --   --  <0.2*   ALK PHOS U/L  --   --  44   ALT (SGPT) U/L  --   --  <5   AST (SGOT) U/L  --   --  16   GLUCOSE mg/dL 98 104* 116*       Estimated Creatinine Clearance: 14.7 mL/min (A) (by C-G formula based on SCr of 3.09 " mg/dL (H)).    Results from last 7 days   Lab Units 08/19/19  0635 08/18/19  0625   MAGNESIUM mg/dL  --  2.6*   PHOSPHORUS mg/dL 3.2 3.8       Results from last 7 days   Lab Units 08/18/19  0625 08/17/19  2110 08/15/19  1424   WBC 10*3/mm3 4.82 5.07 4.92   HEMOGLOBIN g/dL 8.7* 6.3* 7.2*   PLATELETS 10*3/mm3 172 200 212       Results from last 7 days   Lab Units 08/17/19  2110   INR  1.11*       Active Medications    aspirin 81 mg Oral Daily   atorvastatin 40 mg Oral Nightly   calcitriol 0.25 mcg Oral Every Other Day   carbidopa-levodopa ER 1 tablet Oral Q PM   carvedilol 6.25 mg Oral Daily   diazePAM 5 mg Oral Nightly   famotidine 20 mg Oral Daily   febuxostat 40 mg Oral Daily   furosemide 40 mg Oral Daily   gabapentin 300 mg Oral Nightly   levothyroxine 25 mcg Oral Daily   rotigotine 1 patch Transdermal Daily   sodium chloride 10 mL Intravenous Q12H   sodium chloride 3 mL Intravenous Q12H   sodium chloride 3 mL Intravenous Q12H          Assessment/Plan   Assessment  1.  CARYL on CKD4: Urine output not fully quantified; likely prerenal related to very poor intake of food and drink for the last several days.  SCR has already improved today versus yesterday.  She seems volume-replete.  Compensated electrolytes other than mild hypernatremia.  Mature fistula noted.  Urinalysis with moderate leukocytes, TNTC WBCs, and 4+ bacteria.  No blood or protein detected.  2.  Left facial droop with dysarthria and word-searching difficulties: Neurology evaluating and stroke is suspected  3.  Hypertension, controlled  4.  Severe transfusion-dependent anemia: Followed by [facility]       Chronic systolic congestive heart failure (CMS/HCC)    Cardiomyopathy (CMS/HCC)    Restless legs syndrome    Chronic adrenal insufficiency (CMS/HCC)    Hypothyroidism    Anemia of chronic renal failure, stage 4 (severe) (CMS/HCC)    CKD (chronic kidney disease)    Anemia of chronic disease    Weakness on left side of face      Plan  1.  Will stop lasix  if serum Na climbs further  2.  Neurology evaluation underway  3.  Surveillance labs    I discussed the patient's findings and my recommendations with patient    Juan Luis Skelton MD  08/19/19  8:21 AM

## 2019-08-19 NOTE — PLAN OF CARE
Problem: Fall Risk (Adult)  Goal: Absence of Fall  Outcome: Ongoing (interventions implemented as appropriate)      Problem: Patient Care Overview  Goal: Plan of Care Review  Outcome: Ongoing (interventions implemented as appropriate)   08/18/19 1730 08/18/19 2045 08/19/19 0601   Coping/Psychosocial   Plan of Care Reviewed With --  patient --    Plan of Care Review   Progress improving --  --    OTHER   Outcome Summary --  --  Pt A&O x 4, NIH 2, slurred speecha nd facial dropp. Ambulates x 1 asst. to BRP. Awaiting lab results. Not candidate for MRI r/t PPM. V paced on monitor. Frequent BM's. No s/s os distress, and no acute changes over night. No c/o pain. Will cntinue to monitor.       Problem: Skin Injury Risk (Adult)  Goal: Skin Health and Integrity  Outcome: Ongoing (interventions implemented as appropriate)      Problem: Anemia (Adult)  Goal: Symptom Improvement  Outcome: Ongoing (interventions implemented as appropriate)      Problem: Stroke (Ischemic) (Adult)  Goal: Signs and Symptoms of Listed Potential Problems Will be Absent, Minimized or Managed (Stroke)  Outcome: Ongoing (interventions implemented as appropriate)

## 2019-08-19 NOTE — PLAN OF CARE
Problem: Patient Care Overview  Goal: Plan of Care Review  Outcome: Ongoing (interventions implemented as appropriate)   08/19/19 1230   Coping/Psychosocial   Plan of Care Reviewed With patient   OTHER   Outcome Summary Pt presents to OT with c/o decreased sensation and coordination LUE. Pt instructed with in hand manipulation tasks and nany. coordiantion activities to perform on own. Pt will continue to benefit from OT

## 2019-08-19 NOTE — PROGRESS NOTES
"DOS: 2019  NAME: Charmaine Machuca   : 1940  PCP: Fannie Godfrey MD  Chief Complaint   Patient presents with   • Facial Droop   • Speech Problem     Stroke    Subjective: Patient conitnues to have left facial droop and dysarthria. Feels her left arm is a little weak as well. Denies h/a, vision change, dizziness. States her most recnt stool check was negative for blood, she see's Dr Earl Avelar GI. Pt seen in follow up today, however the problem is new to the examiner.      Objective:  Vital signs: /54 (BP Location: Left arm, Patient Position: Lying)   Pulse 84   Temp 97.8 °F (36.6 °C) (Oral)   Resp 18   Ht 167.6 cm (66\")   Wt 73.6 kg (162 lb 3.2 oz)   SpO2 98%   BMI 26.18 kg/m²       General appearance: Well developed, well nourished, well groomed, alert and cooperative.   HEENT: Normocephalic.   Neck and spine: Normal range of motion. Normal alignment. No mass or tenderness.    Cardiac: Regular rate and rhythm. No murmurs.   Peripheral Vasculature: Peripheral pulses are equal and symmetric.  Chest Exam: Clear to auscultation bilaterally, no wheezes, no rhonchi.  Extremities: Normal, no edema.   Skin: No rashes or birthmarks.     Higher integrative function: Oriented to time, place, person, intact recent and remote memory, attention span, concentration and language. Spontaneous speech, fund of vocabulary are normal. Mild dysarthria.  CN II: Normal  visual fields.   CN III IV VI: Extraocular movements are full without nystagmus. Pupils are equal, round, and reactive to light.  CN V: Normal facial sensation and strength of muscles of mastication.   CN VII:Left lower > upper facial droop.   CN VIII: Auditory acuity is normal.   CN IX & X: Symmetric palatal movement.   CN XI: Sternocleidomastoid and trapezius are normal. No weakness.   CN XII: Left tongue deviation.  Motor:  LUE pronator drift, otherwise normal muscle strength. No fasciculations, rigidity, spasticity or abnormal movements. "   Sensation: Normal light touch.  Station and gait: N/A  Muscle stretch reflexes: Reflexes are 1+  Plantar reflexes are flexor bilaterally.   Coordination: Finger to nose test showed no dysmetria. Rapid alternating movements were decreased in L hand.       Scheduled Meds:  aspirin 81 mg Oral Daily   atorvastatin 40 mg Oral Nightly   calcitriol 0.25 mcg Oral Every Other Day   carbidopa-levodopa ER 1 tablet Oral Q PM   carvedilol 6.25 mg Oral Daily   diazePAM 5 mg Oral Nightly   famotidine 20 mg Oral Daily   febuxostat 40 mg Oral Daily   furosemide 40 mg Oral Daily   gabapentin 300 mg Oral Nightly   levothyroxine 25 mcg Oral Daily   rotigotine 1 patch Transdermal Daily   sodium chloride 10 mL Intravenous Q12H   sodium chloride 3 mL Intravenous Q12H   sodium chloride 3 mL Intravenous Q12H     Continuous Infusions:   PRN Meds:.bisacodyl  •  bisacodyl  •  heparin flush (porcine)  •  HYDROcodone-acetaminophen  •  nitroglycerin  •  ondansetron  •  [COMPLETED] Insert peripheral IV **AND** sodium chloride  •  Access VAD **AND** sodium chloride  •  sodium chloride  •  sodium chloride  •  sodium chloride  •  sodium chloride  •  zolpidem    Laboratory results:  Lab Results   Component Value Date    GLUCOSE 98 08/19/2019    CALCIUM 9.5 08/19/2019     08/19/2019    K 4.2 08/19/2019    CO2 28.4 08/19/2019     08/19/2019    BUN 64 (H) 08/19/2019    CREATININE 3.09 (H) 08/19/2019    EGFRIFAFRI  08/19/2019      Comment:      <15 Indicative of kidney failure.    EGFRIFNONA 14 (L) 08/19/2019    BCR 20.7 08/19/2019    ANIONGAP 9.6 08/19/2019     Lab Results   Component Value Date    WBC 4.82 08/18/2019    HGB 8.7 (L) 08/18/2019    HCT 27.8 (L) 08/18/2019    MCV 99.3 (H) 08/18/2019     08/18/2019     Lab Results   Component Value Date    CHOL 124 08/19/2019     Lab Results   Component Value Date    HDL 37 (L) 08/19/2019    HDL 42 02/14/2014    HDL 65 (H) 01/15/2014     Lab Results   Component Value Date    LDL 60  08/19/2019    LDL 88 02/14/2014    LDL 69 01/15/2014     Lab Results   Component Value Date    TRIG 134 08/19/2019    TRIG 230 (H) 02/14/2014    TRIG 115 01/15/2014     ECG tracings viewed by me, shows paced rhythm    Review and interpretation of imaging: CT head images viewed by me, no acute findings  CRANIAL CT SCAN WITHOUT CONTRAST     CLINICAL HISTORY: Stroke     COMPARISON: None.     TECHNIQUE: Radiation dose reduction techniques were utilized, including  automated exposure control and exposure modulation based on body size.  Multiple axial images of the head were obtained without contrast.      FINDINGS:  There are no abnormal areas of increased density or mass  effect. Generalized atrophy. There are mild scattered areas of decreased  density in the white matter likely related to chronic ischemic gliotic  changes.   Ventricles, sulci, and cisterns appear normal. Bone window  images are unremarkable.              IMPRESSION:  1. No acute intracranial abnormality.                 This report was finalized on 8/17/2019 9:41 PM by Willie Washington M.D.    Impression:  Patient is an 80 yo female with PMH HTN, HLD, CKD IV, NICM s/p ICD, periodic limb movement d/o, peripheral neuropathy with MGUS, myoclonus, hypothyroidism, gastroparesis, pancreatitis, iron deficiency anemia, and previous blood in stool who was admitted 8/17 with facial droop and slurred speech. Her hgb was 6.6 on admission (s/p transfusion) and SBP has been in low 100s to 120. She was on asa 81 mg PTA, was not on statin. No afib detected on most recent ICD interrogation 8/18/19. Most recent Echo showed EF 57% in Aug 2018    Dx: clinical right hemisphere stroke   CT head 8/17: generalized atrophy, no acute findings.   Labs: hgb a1c 4.5%, LDL 60, creatinine 3.09, UA with 4+ bact, 2+ leukocytes, positive nitrites  Unable to get MRI due to ICD, unable to get CTA due to CKD    Plan:  D/W Dr Farrell, prefer to add plavix or increase asa. Will get most recent  "GI note to eval risk for recurrent GI Bleed.  Check carotid u/s .  ASA 81 mg continued  Lipitor 40 mg added  Neurochecks  BP control  Stroke Education  GISSELL/SCDs  PT/OT/ST  Will follow.     Addendum @ 6863- records from recent visit with GI 7/15/19, Dr Earl Avelar, reviewed. He notes \" no evidence of GI blood loss and her stools are hemoccult negative today.\" Will add plavix to asa 81 mg. If carotid u/s negative for significant stenosis patient can be d/c.  Dr Haddad updated.  "

## 2019-08-19 NOTE — PROGRESS NOTES
Name: Charmaine Machuca ADMIT: 2019   : 1940  PCP: Fannie Godfrey MD    MRN: [id number] LOS: 2 days   AGE/SEX: 79 y.o. female  ROOM: [facility]     Subjective   Subjective   CC: left facial weakness  No acute events.  Patient overall feels well, eager to go home.  CP/dyspnea/f/c/n/v/d/HA/vision changes.    Objective   Objective   Vital Signs  Temp:  [97.8 °F (36.6 °C)-98.6 °F (37 °C)] 97.8 °F (36.6 °C)  Heart Rate:  [76-84] 76  Resp:  [18] 18  BP: (115-121)/(47-96) 117/47  SpO2:  [98 %-100 %] 100 %  on   ;   Device (Oxygen Therapy): room air  Body mass index is 26.18 kg/m².  Physical Exam   Constitutional: She is oriented to person, place, and time. No distress.   HENT:   Head: Normocephalic and atraumatic.   Mouth/Throat: Oropharynx is clear and moist.   Eyes: Conjunctivae and EOM are normal. Pupils are equal, round, and reactive to light.   Neck: Normal range of motion. Neck supple.   Cardiovascular: Normal rate, regular rhythm and intact distal pulses.   Right-sided port is in place   Pulmonary/Chest: Effort normal and breath sounds normal. She has no rales.   Abdominal: Soft. Bowel sounds are normal. There is no tenderness.   Musculoskeletal: She exhibits no edema or tenderness.   Neurological: She is alert and oriented to person, place, and time.   Left facial weakness mostly in the lower portion   Skin: Skin is warm and dry. She is not diaphoretic.   Psychiatric: She has a normal mood and affect. Her behavior is normal.   Nursing note and vitals reviewed.      Results Review:       I reviewed the patient's new clinical results.  Results from last 7 days   Lab Units 19  0625 19  2110 08/15/19  1424   WBC 10*3/mm3 4.82 5.07 4.92   HEMOGLOBIN g/dL 8.7* 6.3* 7.2*   PLATELETS 10*3/mm3 172 200 212     Results from last 7 days   Lab Units 19  0635 19  0625 19  2110   SODIUM mmol/L 143 139 138   POTASSIUM mmol/L 4.2 4.5 4.5   CHLORIDE mmol/L 105 102 102   CO2 mmol/L 28.4  27.2 27.0   BUN mg/dL 64* 77* 75*   CREATININE mg/dL 3.09* 3.27* 3.56*   GLUCOSE mg/dL 98 104* 116*   Estimated Creatinine Clearance: 14.7 mL/min (A) (by C-G formula based on SCr of 3.09 mg/dL (H)).  Results from last 7 days   Lab Units 08/19/19  0635 08/17/19  2110   ALBUMIN g/dL 3.00* 2.90*   BILIRUBIN mg/dL  --  <0.2*   ALK PHOS U/L  --  44   AST (SGOT) U/L  --  16   ALT (SGPT) U/L  --  <5     Results from last 7 days   Lab Units 08/19/19  0635 08/18/19  0625 08/17/19  2110   CALCIUM mg/dL 9.5 9.3 9.8   ALBUMIN g/dL 3.00*  --  2.90*   MAGNESIUM mg/dL  --  2.6*  --    PHOSPHORUS mg/dL 3.2 3.8  --        Hemoglobin A1C   Date/Time Value Ref Range Status   08/19/2019 0635 4.50 (L) 4.80 - 5.60 % Final     Glucose   Date/Time Value Ref Range Status   08/19/2019 1144 102 70 - 130 mg/dL Final   08/19/2019 0029 100 70 - 130 mg/dL Final   08/17/2019 2050 156 (H) 70 - 130 mg/dL Final         aspirin 81 mg Oral Daily   atorvastatin 40 mg Oral Nightly   calcitriol 0.25 mcg Oral Every Other Day   carbidopa-levodopa ER 1 tablet Oral Q PM   carvedilol 6.25 mg Oral Daily   clopidogrel 75 mg Oral Daily   diazePAM 5 mg Oral Nightly   famotidine 20 mg Oral Daily   febuxostat 40 mg Oral Daily   [START ON 8/20/2019] furosemide 40 mg Oral Daily   gabapentin 300 mg Oral Nightly   levothyroxine 25 mcg Oral Daily   rotigotine 1 patch Transdermal Daily   sodium chloride 10 mL Intravenous Q12H   sodium chloride 3 mL Intravenous Q12H   sodium chloride 3 mL Intravenous Q12H      Diet Regular; Thin; Renal       Assessment/Plan     Active Hospital Problems    Diagnosis  POA   • Weakness on left side of face [R29.810]  Yes   • Anemia of chronic disease [D63.8]  Yes   • CKD (chronic kidney disease) [N18.9]  Yes   • Anemia of chronic renal failure, stage 4 (severe) (CMS/HCC) [N18.4, D63.1]  Yes   • Restless legs syndrome [G25.81]  Yes   • Cardiomyopathy (CMS/HCC) [I42.9]  Yes   • Chronic systolic congestive heart failure (CMS/HCC) [I50.22]  Yes    • Chronic adrenal insufficiency (CMS/HCC) [E27.40]  Yes   • Hypothyroidism [E03.9]  Yes      Resolved Hospital Problems   No resolved problems to display.   Acute CVA  - unable to demonstrate on imaging due to CKD and pacemaker (cannot have contrasted CT or MRI)  - no AF on pacemaker interrogation  - on ASA and plavix per neuro-low likelihood of GIB based on recent GI evaluation  - SLP, PT/OT  - carotid doppler today  - d/w CHANTELL Gomez    CKD Stage 4  - Cr is improved today  - nephrology consulted for fluid/electrolyte management    Anemia  - from chronic inflammation, CKD, has had Fe deficiency in the past  - s/p 2 units of PRBCs with appropriate response  - will monitor    Chronic Systolic CHF  - no exacerbation  - continue on coreg and lasix  - monitor volume status    VTE Prophylaxis - SCDs  Code Status - Full code  Disposition - Home with HH tomorrow if carotid doppler shows no significant stenosis and okay with all      Willie Haddad MD  [facility]  08/19/19  5:02 PM

## 2019-08-19 NOTE — PLAN OF CARE
Problem: Patient Care Overview  Goal: Plan of Care Review  Outcome: Ongoing (interventions implemented as appropriate)   08/19/19 7272   Coping/Psychosocial   Plan of Care Reviewed With patient   Plan of Care Review   Progress improving   OTHER   Outcome Summary pt tolerated treatment with no complaints. Pt is progressing well with PT. Pt requires supervision for bed mobility and CGA for sit/stand transfers. Pt ambulated 400 feet without AD, CGA. Pt will tend to veer to the right side during ambulation but is able to self correct without assistance.

## 2019-08-19 NOTE — THERAPY EVALUATION
Acute Care - Occupational Therapy Initial Evaluation  [facility]     Patient Name: Charmaine Machuca  : 1940  MRN: [id number]  Today's Date: 2019             Admit Date: 2019       ICD-10-CM ICD-9-CM   1. Weakness on left side of face R29.810 781.94   2. Slurred speech R47.81 784.59   3. Anemia, unspecified type D64.9 285.9   4. Acute renal failure superimposed on chronic kidney disease, unspecified CKD stage, unspecified acute renal failure type (CMS/HCC) N17.9 584.9    N18.9 585.9     Patient Active Problem List   Diagnosis   • Pacemaker lead failure   • Chronic systolic congestive heart failure (CMS/HCC)   • Cardiomyopathy (CMS/HCC)   • Carpal tunnel syndrome   • Periodic limb movement disorder   • Peripheral neuropathy   • Restless legs syndrome   • Anemia   • CKD (chronic kidney disease)   • History of anemia due to chronic kidney disease   • Iron deficiency anemia   • Dysuria   • Chronic adrenal insufficiency (CMS/HCC)   • 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   • Chest pain   • Congestive heart failure (CMS/HCC)   • Gastroparesis   • Hypotension   • Hypothyroidism   • Myoclonus   • Osteoarthritis   • Automatic implantable cardioverter-defibrillator in situ   • Spondylolisthesis   • History of total knee replacement   • Anemia of chronic renal failure, stage 4 (severe) (CMS/HCC)   • CKD (chronic kidney disease)   • Anemia of chronic disease   • Encounter for fitting and adjustment of vascular catheter   • Adverse effect of iron or its compound, subsequent encounter   • B12 deficiency   • Weakness on left side of face     Past Medical History:   Diagnosis Date   • Anemia     Iron deficiency   • Cardiomyopathy (CMS/HCC)     S/P pacemaker and defibrillator   • CHF (congestive heart failure) (CMS/HCC)    • Chronic renal failure, stage 4 (severe) (CMS/HCC)    • Coronary artery disease     pacemaker, defibrillator   • Dizzy    • Gastroparesis    • GERD (gastroesophageal reflux  disease)    • Gout    • Hemorrhoids    • Hiatal hernia    • History of Clostridium difficile colitis 2013   • History of kidney stones    • History of pancreatitis     2014   • History of skin cancer    • History of transfusion     MULTIPLE    • Hypothyroidism    • Left bundle branch block    • Mitral and aortic valve disease    • Mitral valve insufficiency    • Myoclonus     S/P Depakote   • Osteoarthritis    • Pancytopenia (CMS/HCC)    • Peripheral neuropathy    • Presence of cardiac pacemaker     AND DEFIBRILLATOR   • Pulmonary hypertension (CMS/HCC)    • SOB (shortness of breath) on exertion    • Spinal stenosis      Past Surgical History:   Procedure Laterality Date   • APPENDECTOMY N/A    • ARTERIOVENOUS FISTULA/SHUNT SURGERY Right 2/18/2019    Procedure: RIGHT BASILIC FISTULA CREATION WITHOUT TRANSPOSITION;  Surgeon: Royer Dozier MD;  Location: [facility] OR;  Service: Vascular   • ARTERIOVENOUS FISTULA/SHUNT SURGERY Right 5/31/2019    Procedure: RIGHT 2ND STAGE BASILIC VEIN CREATION;  Surgeon: Royer Dozier MD;  Location: [facility] MAIN OR;  Service: Vascular   • CARDIAC CATHETERIZATION Left 03/28/2006    Arterial catheter insertion, cath left ventriculography, coronary angiography and left heart catheterization-Dr. Estevan Matamoros   • CARDIAC DEFIBRILLATOR PLACEMENT N/A 11/30/2007    Biventricular implantable cardioverter defibrillator-Dr. Leandro Huber   • CATARACT EXTRACTION Bilateral 2011   • COLONOSCOPY N/A 2014    done at [facility]   • CYSTECTOMY N/A     Ovarian cystectomy   • ENDOSCOPY N/A 04/28/2006    EGD with biopsies. Paraesophageal hiatal hernia from 34 to 44 cm, antral gastritis-Dr. Nehemiah Beal   • ENDOSCOPY N/A 09/29/2004    Hiatal hernia, gastritis and an erosion of the pylorus-Dr. Yang Pavon   • ENTEROSCOPY SMALL BOWEL N/A 10/30/2006    Small bowel enteroscopy with biopsies-Dr. Rory Sevilla   • FLEXIBLE SIGMOIDOSCOPY N/A 09/29/2004    Unsuccessful colonoscopy due to poop prep, a very  tortuous sigmoid, bowel prep in the form of enema given and a barium enema was obtained-Dr. Yang Pavon   • FRACTURE SURGERY  2015    Broke big toe   • HEMATOMA EVACUATION TRUNK N/A 02/07/2014    Pocket evacuation of hematoma at pacemaker site-Dr. Leandro Huber   • HEMORRHOIDECTOMY N/A 04/19/2004    Flexible sigmoidoscopy and stapled hemorrhoidectomy-Dr. Yang Pavon   • HYSTERECTOMY Bilateral 1977   • IMPLANTABLE CARDIOVERTER DEFIBRILLATOR LEAD REPLACEMENT/POCKET REVISION Left 3/28/2016    Procedure: AUTOMATIC IMPLANTABLE CARDIOVERTER DEFIBRILLATOR LEAD REPLACEMENT WITH LASER LEAD EXTRACTION;  Surgeon: Leandro Huber MD;  [facility];  Service:    • IMPLANTABLE CARDIOVERTER DEFIBRILLATOR LEAD REPLACEMENT/POCKET REVISION N/A 01/13/2014    Biventricular ICD replacement-Dr. Jillian Huber   • LAPAROSCOPY REPAIR HIATAL HERNIA N/A 06/27/2006    Laparoscopic paraesophageal hiatal hernia repair with Nissen fundoplication and cholecystectomy-Dr. Sean Yoon   • NATALIE GONZALEZ ([facility]) PROCEDURE     • MT INSJ TUNNELED CVC W/O SUBQ PORT/ AGE 3 YR/> Right 10/3/2017    Procedure: Right internal jugular port placement;  Surgeon: Tiffanie Couch MD;  Location: [facility] OR;  Service: General   • TOE SURGERY Left     SET BIG TOE   • TOTAL KNEE ARTHROPLASTY Left 08/2014          OT ASSESSMENT FLOWSHEET (last 12 hours)      Occupational Therapy Evaluation     Row Name 08/19/19 1200                   OT Evaluation Time/Intention    Subjective Information  no complaints  -SG        Document Type  evaluation  -SG        Mode of Treatment  individual therapy;occupational therapy  -SG        Patient Effort  good  -SG           General Information    Patient Profile Reviewed?  yes  -SG        Patient Observations  alert;cooperative;agree to therapy  -SG        Prior Level of Function  independent:;ADL's  -SG        Existing Precautions/Restrictions  fall  -SG           Cognitive  Assessment/Intervention- PT/OT    Orientation Status (Cognition)  oriented x 4  -SG        Follows Commands (Cognition)  WFL  -SG           ADL Assessment/Intervention    BADL Assessment/Intervention  upper body dressing;grooming;feeding  -SG           Upper Body Dressing Assessment/Training    Upper Body Dressing [place] Level  upper body dressing skills;doff;don;set up;supervision  -SG        Upper Body Dressing Position  supported sitting  -SG           Grooming Assessment/Training    [place] Level (Grooming)  grooming skills;supervision  -SG        Grooming Position  sitting up in bed  -SG           Self-Feeding Assessment/Training    [place] Level (Feeding)  feeding skills;supervision  -SG        Position (Self-Feeding)  sitting up in bed  -SG        Comment (Feeding)  pt states difficulty holding on to objects at times. Able to hold items at time of OT eval  -SG           BADL Safety/Performance    Impairments, BADL Safety/Performance  coordination  -SG        Skilled BADL Treatment/Intervention  BADL process/adaptation training;sensory/visual deficit compensatory training  -SG           General ROM    GENERAL ROM COMMENTS  BUE's WFL AROM, arthritic changes Agustin hands/fingers  -SG           Motor Assessment/Interventions    Additional Documentation  Neuromuscular Re-education (Group)  -SG           Neuromuscular Re-education    Comment, Neuromuscular Re-education  pt instructed with in hand manipulation tasks and agustin. coordination activities  -           Sensory Assessment/Intervention    Sensory General Assessment  -- pt c/o numbness at times with LUE  -SG           Positioning and Restraints    Pre-Treatment Position  in bed  -SG        Post Treatment Position  bed  -SG        In Bed  call light within reach;encouraged to call for assist;exit alarm on  -SG           Pain Scale: Numbers Pre/Post-Treatment    Pain Scale: Numbers, Pretreatment  0/10 - no pain  -SG        Pain Scale: Numbers,  Post-Treatment  0/10 - no pain  -SG           Clinical Impression (OT)    OT Diagnosis  need for assist with personal care  -SG        Criteria for Skilled Therapeutic Interventions Met (OT Eval)  yes;treatment indicated  -SG        Therapy Frequency (OT Eval)  5 times/wk  -SG        Care Plan Review (OT)  evaluation/treatment results reviewed  -SG           Planned OT Interventions    Planned Therapy Interventions (OT Eval)  BADL retraining;transfer/mobility retraining;neuromuscular control/coordination retraining  -SG           OT Goals    Transfer Goal Selection (OT)  transfer, OT goal 1  -SG        Dressing Goal Selection (OT)  dressing, OT goal 1  -SG        Coordination Goal Selection (OT)  coordination, OT goal 1  -SG        Additional Documentation  Coordination Goal Selection (OT) (Row)  -SG           Transfer Goal 1 (OT)    Activity/Assistive Device (Transfer Goal 1, OT)  toilet  -SG        [place] Level/Cues Needed (Transfer Goal 1, OT)  supervision required  -SG        Time Frame (Transfer Goal 1, OT)  1 week  -SG        Progress/Outcome (Transfer Goal 1, OT)  goal ongoing  -SG           Dressing Goal 1 (OT)    Activity/Assistive Device (Dressing Goal 1, OT)  dressing skills, all  -SG        [place]/Cues Needed (Dressing Goal 1, OT)  supervision required  -SG        Time Frame (Dressing Goal 1, OT)  1 week  -SG        Progress/Outcome (Dressing Goal 1, OT)  goal ongoing  -SG           Coordination Goal 1 (OT)    Activity/Assistive Device (Coordination Goal 1, OT)  FM task;GM task  -SG        [place] Level/Cues Needed (Coordination Goal 1, OT)  supervision required;set-up required  -SG        Time Frame (Coordination Goal 1, OT)  1 week  -SG        Progress/Outcomes (Coordination Goal 1, OT)  goal ongoing  -SG          User Key  (r) = Recorded By, (t) = Taken By, (c) = Cosigned By    Initials Name Effective Dates    Fannie Redmond OTR 06/08/18 -          Occupational Therapy Education      Title: PT OT SLP Therapies (In Progress)     Topic: Occupational Therapy (In Progress)     Point: ADL training (Done)     Description: Instruct learner(s) on proper safety adaptation and remediation techniques during self care or transfers.   Instruct in proper use of assistive devices.    Learning Progress Summary           Patient Acceptance, E,TB,D, VU,NR by  at 8/19/2019 12:29 PM    Comment:  pt instructed with safety for LUE and decreased sensation. Instructed with nany. coordination activities and in hand manipulation task                               User Key     Initials Effective Dates Name Provider Type Discipline     06/08/18 -  Fannie Gómez, OTR Occupational Therapist OT                  OT Recommendation and Plan  Planned Therapy Interventions (OT Eval): BADL retraining, transfer/mobility retraining, neuromuscular control/coordination retraining  Therapy Frequency (OT Eval): 5 times/wk  Plan of Care Review  Plan of Care Reviewed With: patient  Plan of Care Reviewed With: patient  Outcome Summary: Pt presents to OT with c/o decreased sensation and coordination LUE. Pt instructed with in hand manipulation tasks and nany. coordiantion activities to perform on own.  Pt will continue to benefit from OT    Outcome Measures     Row Name 08/19/19 1232             How much help from another is currently needed...    Putting on and taking off regular lower body clothing?  3  -SG      Bathing (including washing, rinsing, and drying)  3  -SG      Toileting (which includes using toilet bed pan or urinal)  3  -SG      Putting on and taking off regular upper body clothing  3  -SG      Taking care of personal grooming (such as brushing teeth)  3  -SG      Eating meals  3  -SG      AM-PAC 6 Clicks Score (OT)  18  -SG         Modified [place] Scale    Modified [place] Scale  4 - Moderately severe disability.  Unable to walk without assistance, and unable to attend to own bodily needs without assistance.  -SG          Functional Assessment    Outcome Measure Options  AM-PAC 6 Clicks Daily Activity (OT)  -SG        User Key  (r) = Recorded By, (t) = Taken By, (c) = Cosigned By    Initials Name Provider Type    Fannie Redmond OTR Occupational Therapist          Time Calculation:   Time Calculation- OT     Row Name 08/19/19 1233             Time Calculation- OT    OT Start Time  0854  -SG      OT Stop Time  0912  -      OT Time Calculation (min)  18 min  -      Total Timed Code Minutes- OT  9 minute(s)  -      OT Received On  08/19/19  -      OT Goal Re-Cert Due Date  08/26/19  -        User Key  (r) = Recorded By, (t) = Taken By, (c) = Cosigned By    Initials Name Provider Type    Fannie Redmond OTR Occupational Therapist        Therapy Charges for Today     Code Description Service Date Service Provider Modifiers Qty    [id number]  OT EVAL LOW COMPLEXITY 2 8/19/2019 Fannie Gómez OTR GO 1    [id number]  OT SELF CARE/MGMT/TRAIN EA 15 MIN 8/19/2019 Fannie Gómez OTR GO 1               PIYUSH Castanon  8/19/2019

## 2019-08-19 NOTE — PLAN OF CARE
Problem: Patient Care Overview  Goal: Discharge Needs Assessment  Outcome: Ongoing (interventions implemented as appropriate)  Pt alert and oriented x4. Facial droop still present with slurred speech; NIH score of 2. Plan is to D/C pt tomorrow. Pt had Carotid US done today.     Problem: Skin Injury Risk (Adult)  Goal: Identify Related Risk Factors and Signs and Symptoms  Outcome: Ongoing (interventions implemented as appropriate)      Problem: Anemia (Adult)  Goal: Identify Related Risk Factors and Signs and Symptoms  Outcome: Ongoing (interventions implemented as appropriate)    Goal: Symptom Improvement  Outcome: Ongoing (interventions implemented as appropriate)      Problem: Stroke (Ischemic) (Adult)  Goal: Signs and Symptoms of Listed Potential Problems Will be Absent, Minimized or Managed (Stroke)  Outcome: Ongoing (interventions implemented as appropriate)

## 2019-08-19 NOTE — THERAPY TREATMENT NOTE
Patient Name: Charmaine Machuca  : 1940    MRN: [id number]                              Today's Date: 2019       Admit Date: 2019    Visit Dx:     ICD-10-CM ICD-9-CM   1. Weakness on left side of face R29.810 781.94   2. Slurred speech R47.81 784.59   3. Anemia, unspecified type D64.9 285.9   4. Acute renal failure superimposed on chronic kidney disease, unspecified CKD stage, unspecified acute renal failure type (CMS/[facility]) N17.9 584.9    N18.9 585.9   5. Acute CVA (cerebrovascular accident) (CMS/[facility]) I63.9 434.91     Patient Active Problem List   Diagnosis   • Pacemaker lead failure   • Chronic systolic congestive heart failure (CMS/[facility])   • Cardiomyopathy (CMS/[facility])   • Carpal tunnel syndrome   • Periodic limb movement disorder   • Peripheral neuropathy   • Restless legs syndrome   • Anemia   • CKD (chronic kidney disease)   • History of anemia due to chronic kidney disease   • Iron deficiency anemia   • Dysuria   • Chronic adrenal insufficiency (CMS/[facility])   • 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   • Chest pain   • Congestive heart failure (CMS/[facility])   • Gastroparesis   • Hypotension   • Hypothyroidism   • Myoclonus   • Osteoarthritis   • Automatic implantable cardioverter-defibrillator in situ   • Spondylolisthesis   • History of total knee replacement   • Anemia of chronic renal failure, stage 4 (severe) (CMS/[facility])   • CKD (chronic kidney disease)   • Anemia of chronic disease   • Encounter for fitting and adjustment of vascular catheter   • Adverse effect of iron or its compound, subsequent encounter   • B12 deficiency   • Weakness on left side of face     Past Medical History:   Diagnosis Date   • Anemia     Iron deficiency   • Cardiomyopathy (CMS/[facility])     S/P pacemaker and defibrillator   • CHF (congestive heart failure) (CMS/[facility])    • Chronic renal failure, stage 4 (severe) (CMS/[facility])    • Coronary artery disease     pacemaker, defibrillator   • Dizzy    • Gastroparesis    • GERD  (gastroesophageal reflux disease)    • Gout    • Hemorrhoids    • Hiatal hernia    • History of Clostridium difficile colitis 2013   • History of kidney stones    • History of pancreatitis     2014   • History of skin cancer    • History of transfusion     MULTIPLE    • Hypothyroidism    • Left bundle branch block    • Mitral and aortic valve disease    • Mitral valve insufficiency    • Myoclonus     S/P Depakote   • Osteoarthritis    • Pancytopenia (CMS/HCC)    • Peripheral neuropathy    • Presence of cardiac pacemaker     AND DEFIBRILLATOR   • Pulmonary hypertension (CMS/HCC)    • SOB (shortness of breath) on exertion    • Spinal stenosis      Past Surgical History:   Procedure Laterality Date   • APPENDECTOMY N/A    • ARTERIOVENOUS FISTULA/SHUNT SURGERY Right 2/18/2019    Procedure: RIGHT BASILIC FISTULA CREATION WITHOUT TRANSPOSITION;  Surgeon: Royer Dozier MD;  Location: [facility] OR;  Service: Vascular   • ARTERIOVENOUS FISTULA/SHUNT SURGERY Right 5/31/2019    Procedure: RIGHT 2ND STAGE BASILIC VEIN CREATION;  Surgeon: Royer Dozier MD;  Location: [facility] OR;  Service: Vascular   • CARDIAC CATHETERIZATION Left 03/28/2006    Arterial catheter insertion, cath left ventriculography, coronary angiography and left heart catheterization-Dr. Estevan Matamoros   • CARDIAC DEFIBRILLATOR PLACEMENT N/A 11/30/2007    Biventricular implantable cardioverter defibrillator-Dr. Leandro Huber   • CATARACT EXTRACTION Bilateral 2011   • COLONOSCOPY N/A 2014    done at [facility]   • CYSTECTOMY N/A     Ovarian cystectomy   • ENDOSCOPY N/A 04/28/2006    EGD with biopsies. Paraesophageal hiatal hernia from 34 to 44 cm, antral gastritis-Dr. Nehemiah Beal   • ENDOSCOPY N/A 09/29/2004    Hiatal hernia, gastritis and an erosion of the pylorus-Dr. Yang Pavon   • ENTEROSCOPY SMALL BOWEL N/A 10/30/2006    Small bowel enteroscopy with biopsies-Dr. Rory Sevilla   • FLEXIBLE SIGMOIDOSCOPY N/A 09/29/2004    Unsuccessful colonoscopy  due to poop prep, a very tortuous sigmoid, bowel prep in the form of enema given and a barium enema was obtained-Dr. Yang Pavon   • FRACTURE SURGERY  2015    Broke big toe   • HEMATOMA EVACUATION TRUNK N/A 02/07/2014    Pocket evacuation of hematoma at pacemaker site-Dr. Leandro Huber   • HEMORRHOIDECTOMY N/A 04/19/2004    Flexible sigmoidoscopy and stapled hemorrhoidectomy-Dr. Yang Pavon   • HYSTERECTOMY Bilateral 1977   • IMPLANTABLE CARDIOVERTER DEFIBRILLATOR LEAD REPLACEMENT/POCKET REVISION Left 3/28/2016    Procedure: AUTOMATIC IMPLANTABLE CARDIOVERTER DEFIBRILLATOR LEAD REPLACEMENT WITH LASER LEAD EXTRACTION;  Surgeon: Leandro Huber MD;  Location: [facility] 18/19;  Service:    • IMPLANTABLE CARDIOVERTER DEFIBRILLATOR LEAD REPLACEMENT/POCKET REVISION N/A 01/13/2014    Biventricular ICD replacement-Dr. Jillian Huber   • LAPAROSCOPY REPAIR HIATAL HERNIA N/A 06/27/2006    Laparoscopic paraesophageal hiatal hernia repair with Nissen fundoplication and cholecystectomy-Dr. Sean Yoon   • NATALIE GONZALEZ ([facility]) PROCEDURE     • TX INSJ TUNNELED CVC W/O SUBQ PORT/ AGE 3 YR/> Right 10/3/2017    Procedure: Right internal jugular port placement;  Surgeon: Tiffanie Couch MD;  Location: [facility];  Service: General   • TOE SURGERY Left     SET BIG TOE   • TOTAL KNEE ARTHROPLASTY Left 08/2014     General Information     Row Name 08/19/19 1613 08/19/19 1200       PT Evaluation Time/Intention    Subjective Information  --  no complaints  -SG    Document Type  therapy note (daily note)  -  evaluation  -    Mode of Treatment  individual therapy;physical therapy  -EH  individual therapy;occupational therapy  -SG    Patient Effort  --  good  -    Row Name 08/19/19 1613 08/19/19 1200       General Information    Patient Profile Reviewed?  --  yes  -SG    Patient Observations  --  alert;cooperative;agree to therapy  -SG    Prior Level of Function  --  independent:;ADL's  -SG     Existing Precautions/Restrictions  fall  -EH  fall  -SG    Row Name 08/19/19 1112          Relationship/Environment    Lives With  spouse  -MS     Name(s) of Who Lives With Patient  Zackery Machuca [phone]  -MS     Family Caregiver if Needed  spouse  -MS     Family Caregiver Names  Zackery Machuca [phone]  -MS     Row Name 08/19/19 1112          Resource/Environmental Concerns    Current Living Arrangements  home/apartment/condo  -MS     Resource/Environmental Concerns  none  -MS     Row Name 08/19/19 1613 08/19/19 1200       Cognitive Assessment/Intervention- PT/OT    Orientation Status (Cognition)  oriented x 4  -EH  oriented x 4  -SG    Follows Commands (Cognition)  --  WFL  -SG      User Key  (r) = Recorded By, (t) = Taken By, (c) = Cosigned By    Initials Name Provider Type    SG Fannie Gómez, OTR Occupational Therapist    Heidi Christensen, MSW      Cha Farley PTA Physical Therapy Assistant        Mobility     Row Name 08/19/19 1613          Bed Mobility Assessment/Treatment    Supine-Sit Glenn (Bed Mobility)  supervision  -     Sit-Supine Glenn (Bed Mobility)  supervision  -EH     Assistive Device (Bed Mobility)  bed rails;head of bed elevated  -EH     Row Name 08/19/19 1613          Sit-Stand Transfer    Sit-Stand Glenn (Transfers)  contact guard  -EH     Assistive Device (Sit-Stand Transfers)  -- No AD  -EH     Row Name 08/19/19 1613          Gait/Stairs Assessment/Training    Glenn Level (Gait)  contact guard  -EH     Assistive Device (Gait)  -- No AD  -EH     Distance in Feet (Gait)  400  -EH     Deviations/Abnormal Patterns (Gait)  bala decreased;stride length decreased  -EH     Comment (Gait/Stairs)  pt will veer to right side but able to self correct.   -       User Key  (r) = Recorded By, (t) = Taken By, (c) = Cosigned By    Initials Name Provider Type    Cha Ramirez PTA Physical Therapy Assistant        Obj/Interventions      Row Name 08/19/19 1200          General ROM    GENERAL ROM COMMENTS  BUE's WFL AROM, arthritic changes Agustin hands/fingers  -SG     Row Name 08/19/19 1200          Sensory Assessment/Intervention    Sensory General Assessment  -- pt c/o numbness at times with LUE  -SG       User Key  (r) = Recorded By, (t) = Taken By, (c) = Cosigned By    Initials Name Provider Type    Fannie Redmond OTR Occupational Therapist        Goals/Plan    No documentation.       Clinical Impression     Row Name 08/19/19 1200          Pain Scale: Numbers Pre/Post-Treatment    Pain Scale: Numbers, Pretreatment  0/10 - no pain  -SG     Pain Scale: Numbers, Post-Treatment  0/10 - no pain  -SG     Row Name 08/19/19 1617 08/19/19 1615       Plan of Care Review    Plan of Care Reviewed With  patient  -  patient  -    Row Name 08/19/19 1230 08/19/19 0936       Plan of Care Review    Plan of Care Reviewed With  patient  -SG  patient  -    Row Name 08/19/19 1615          Vital Signs    O2 Delivery Pre Treatment  room air  -     O2 Delivery Intra Treatment  room air  -     O2 Delivery Post Treatment  room air  -EH     Row Name 08/19/19 1615 08/19/19 1200       Positioning and Restraints    Pre-Treatment Position  in bed  -  in bed  -    Post Treatment Position  bed  -  bed  -SG    In Bed  fowlers;call light within reach;encouraged to call for assist;exit alarm on  -EH  call light within reach;encouraged to call for assist;exit alarm on  -SG      User Key  (r) = Recorded By, (t) = Taken By, (c) = Cosigned By    Initials Name Provider Type    Fannie Redmond OTR Occupational Therapist     Cha Farley PTA Physical Therapy Assistant     Nichole Wick RN Registered Nurse        Outcome Measures     Row Name 08/19/19 1615          How much help from another person do you currently need...    Turning from your back to your side while in flat bed without using bedrails?  3  -EH     Moving from lying on back to sitting on  the side of a flat bed without bedrails?  3  -EH     Moving to and from a bed to a chair (including a wheelchair)?  3  -EH     Standing up from a chair using your arms (e.g., wheelchair, bedside chair)?  3  -EH     Climbing 3-5 steps with a railing?  3  -EH     To walk in hospital room?  3  -EH     AM-PAC 6 Clicks Score (PT)  18  -EH     Row Name 08/19/19 1615 08/19/19 1232       Modified Tawnya Scale    Modified Tawnya Scale  4 - Moderately severe disability.  Unable to walk without assistance, and unable to attend to own bodily needs without assistance.  -EH  4 - Moderately severe disability.  Unable to walk without assistance, and unable to attend to own bodily needs without assistance.  -SG    Row Name 08/19/19 0834          Modified Tawnya Scale    Modified [place] Scale  2 - Slight disability.  Unable to carry out all previous activities but able to look after own affairs without assistance.  -MS     Row Name 08/19/19 1232 08/19/19 0834       Functional Assessment    Outcome Measure Options  AM-PAC 6 Clicks Daily Activity (OT)  -SG  Modified [place]  -MS      User Key  (r) = Recorded By, (t) = Taken By, (c) = Cosigned By    Initials Name Provider Type    SG Fannie Gómez, OTR Occupational Therapist    Emily Garcia, PT Physical Therapist    Cha Ramirez PTA Physical Therapy Assistant        Physical Therapy Education     Title: PT OT SLP Therapies (In Progress)     Topic: Physical Therapy (Done)     Point: Mobility training (Done)     Learning Progress Summary           Patient Acceptance, E,D, VU,NR by  at 8/19/2019  4:15 PM    Acceptance, E,D, VU,NR by MS at 8/18/2019  2:10 PM                   Point: Home exercise program (Done)     Learning Progress Summary           Patient Acceptance, E,D, VU,NR by  at 8/19/2019  4:15 PM    Acceptance, E,D, VU,NR by MS at 8/18/2019  2:10 PM                   Point: Body mechanics (Done)     Learning Progress Summary           Patient Acceptance, E,D,  VU,NR by  at 8/19/2019  4:15 PM    Acceptance, E,D, VU,NR by MS at 8/18/2019  2:10 PM                   Point: Precautions (Done)     Learning Progress Summary           Patient Acceptance, E,D, VU,NR by  at 8/19/2019  4:15 PM    Acceptance, E,D, VU,NR by MS at 8/18/2019  2:10 PM                               User Key     Initials Effective Dates Name Provider Type Discipline    MS 04/03/18 -  Willie Hagen PT Physical Therapist PT     08/19/18 -  Cha Farley PTA Physical Therapy Assistant PT              PT Recommendation and Plan     Plan of Care Reviewed With: patient  Progress: improving  Outcome Summary: pt tolerated treatment with no complaints. Pt is progressing well with PT. Pt requires supervision for bed mobility and CGA for sit/stand transfers. Pt ambulated 400 feet without AD, CGA. Pt will tend to veer to the right side during ambulation but is able to self correct without assistance.      Time Calculation:   PT Charges     Row Name 08/19/19 1559             Time Calculation    Start Time  1503  -      Stop Time  1518  -      Time Calculation (min)  15 min  -      PT Received On  08/19/19  -      PT - Next Appointment  08/20/19  -         Time Calculation- PT    Total Timed Code Minutes- PT  15 minute(s)  -        User Key  (r) = Recorded By, (t) = Taken By, (c) = Cosigned By    Initials Name Provider Type     Cha Farley PTA Physical Therapy Assistant        Therapy Charges for Today     Code Description Service Date Service Provider Modifiers Qty    [id number] HC PT THER PROC EA 15 MIN 8/19/2019 Cha Farley PTA GP 1          PT G-Codes  Outcome Measure Options: AM-PAC 6 Clicks Daily Activity (OT)  AM-PAC 6 Clicks Score (PT): 18  AM-PAC 6 Clicks Score (OT): 18  Modified Poth Scale: 4 - Moderately severe disability.  Unable to walk without assistance, and unable to attend to own bodily needs without assistance.    Cha Farley PTA  8/19/2019

## 2019-08-20 VITALS
DIASTOLIC BLOOD PRESSURE: 58 MMHG | SYSTOLIC BLOOD PRESSURE: 127 MMHG | WEIGHT: 162.2 LBS | RESPIRATION RATE: 17 BRPM | HEART RATE: 81 BPM | BODY MASS INDEX: 26.07 KG/M2 | HEIGHT: 66 IN | OXYGEN SATURATION: 97 % | TEMPERATURE: 97.5 F

## 2019-08-20 LAB
ALBUMIN SERPL-MCNC: 2.7 G/DL (ref 3.5–5.2)
ANION GAP SERPL CALCULATED.3IONS-SCNC: 8.9 MMOL/L (ref 5–15)
BUN BLD-MCNC: 57 MG/DL (ref 8–23)
BUN/CREAT SERPL: 19.4 (ref 7–25)
CALCIUM SPEC-SCNC: 9.4 MG/DL (ref 8.6–10.5)
CHLORIDE SERPL-SCNC: 104 MMOL/L (ref 98–107)
CO2 SERPL-SCNC: 30.1 MMOL/L (ref 22–29)
CREAT BLD-MCNC: 2.94 MG/DL (ref 0.57–1)
GFR SERPL CREATININE-BSD FRML MDRD: 15 ML/MIN/1.73
GLUCOSE BLD-MCNC: 95 MG/DL (ref 65–99)
MAGNESIUM SERPL-MCNC: 2.2 MG/DL (ref 1.6–2.4)
PHOSPHATE SERPL-MCNC: 3.4 MG/DL (ref 2.5–4.5)
POTASSIUM BLD-SCNC: 4 MMOL/L (ref 3.5–5.2)
SODIUM BLD-SCNC: 143 MMOL/L (ref 136–145)

## 2019-08-20 PROCEDURE — 80069 RENAL FUNCTION PANEL: CPT | Performed by: INTERNAL MEDICINE

## 2019-08-20 PROCEDURE — 97535 SELF CARE MNGMENT TRAINING: CPT

## 2019-08-20 PROCEDURE — 83735 ASSAY OF MAGNESIUM: CPT | Performed by: INTERNAL MEDICINE

## 2019-08-20 PROCEDURE — 99231 SBSQ HOSP IP/OBS SF/LOW 25: CPT | Performed by: NURSE PRACTITIONER

## 2019-08-20 RX ADMIN — SODIUM CHLORIDE, PRESERVATIVE FREE 3 ML: 5 INJECTION INTRAVENOUS at 08:54

## 2019-08-20 RX ADMIN — Medication 500 UNITS: at 15:37

## 2019-08-20 RX ADMIN — ASPIRIN 81 MG: 81 TABLET, CHEWABLE ORAL at 08:51

## 2019-08-20 RX ADMIN — SODIUM CHLORIDE, PRESERVATIVE FREE 3 ML: 5 INJECTION INTRAVENOUS at 08:53

## 2019-08-20 RX ADMIN — CARVEDILOL 6.25 MG: 6.25 TABLET, FILM COATED ORAL at 08:51

## 2019-08-20 RX ADMIN — CALCITRIOL 0.25 MCG: 0.25 CAPSULE ORAL at 08:52

## 2019-08-20 RX ADMIN — CLOPIDOGREL 75 MG: 75 TABLET, FILM COATED ORAL at 08:51

## 2019-08-20 RX ADMIN — FAMOTIDINE 20 MG: 20 TABLET, FILM COATED ORAL at 08:51

## 2019-08-20 RX ADMIN — FEBUXOSTAT 40 MG: 40 TABLET, FILM COATED ORAL at 08:51

## 2019-08-20 RX ADMIN — HYDROCODONE BITARTRATE AND ACETAMINOPHEN 1 TABLET: 5; 325 TABLET ORAL at 00:53

## 2019-08-20 RX ADMIN — FUROSEMIDE 40 MG: 40 TABLET ORAL at 08:51

## 2019-08-20 RX ADMIN — SODIUM CHLORIDE, PRESERVATIVE FREE 10 ML: 5 INJECTION INTRAVENOUS at 08:52

## 2019-08-20 RX ADMIN — LEVOTHYROXINE SODIUM 25 MCG: 25 TABLET ORAL at 08:51

## 2019-08-20 NOTE — DISCHARGE SUMMARY
Date of Admission: 8/17/2019  Date of Discharge:  8/20/2019  Primary Care Physician: Fannie Godfrey MD     Discharge Diagnosis:  Active Hospital Problems    Diagnosis  POA   • Weakness on left side of face [R29.810]  Yes   • Anemia of chronic disease [D63.8]  Yes   • CKD (chronic kidney disease) [N18.9]  Yes   • Anemia of chronic renal failure, stage 4 (severe) (CMS/HCC) [N18.4, D63.1]  Yes   • Restless legs syndrome [G25.81]  Yes   • Cardiomyopathy (CMS/HCC) [I42.9]  Yes   • Chronic systolic congestive heart failure (CMS/HCC) [I50.22]  Yes   • Chronic adrenal insufficiency (CMS/HCC) [E27.40]  Yes   • Hypothyroidism [E03.9]  Yes      Resolved Hospital Problems   No resolved problems to display.       Presenting Problem/History of Present Illness:  Slurred speech [R47.81]  Weakness on left side of face [R29.810]  Anemia, unspecified type [D64.9]  Acute renal failure superimposed on chronic kidney disease, unspecified CKD stage, unspecified acute renal failure type (CMS/HCC) [N17.9, N18.9]     Hospital Course:  The patient is a 79 y.o. female with a history of systolic CHF, pacemaker placement, and CKD stage IV who presented with left facial and LUE weakness.  Please see admission H&P from 8/17/19 for further details.  On admission she had a head CT which was negative.  She was unable to get a CT angiogram due to her renal function and unable to get an MRI due to her pacemaker.  She did have a pacemaker interrogation which showed no atrial fibrillation.  Neurology evaluated the patient and she was thought to have had a right hemisphere cortical stroke.  She was started on plavix in addition to her aspirin.  She had a bilateral carotid doppler which showed no significant stenosis.  She will continue her aspirin and plavix and follow up in stroke clinic in 3 months.  She will be discharged with PT/OT/SLP.    Of note, she has chronic anemia and her hemoglobin was 6.3 on admission.  She was transfused 2 units of PRBCs  "which she tolerated well.  Her hemoglobin levels responded appropriately.  She does have chronic anemia from chronic renal disease and iron deficiency-she has received procrit and IV iron as an outpatient with the [facility] group and she should continue to follow with them.  She has been evaluated extensively by gastroenterology (Dr. Avelar) and she has no evidence of GI blood loss and thus the benefits of dual antiplatelet therapy for stroke prophylaxis are thought to outweigh the risks.    Exam Today:  Blood pressure 127/58, pulse 81, temperature 97.5 °F (36.4 °C), temperature source Oral, resp. rate 17, height 167.6 cm (66\"), weight 73.6 kg (162 lb 3.2 oz), SpO2 97 %.  Constitutional: She is oriented to person, place, and time. No distress.   Head: Normocephalic and atraumatic.   Mouth/Throat: Oropharynx is clear and moist.   Eyes: Conjunctivae and EOM are normal. Pupils are equal, round, and reactive to light.   Neck: Normal range of motion. Neck supple.   Cardiovascular: Normal rate, regular rhythm and intact distal pulses.   Right-sided port is in place   Pulmonary/Chest: Effort normal and breath sounds normal. She has no rales.   Abdominal: Soft. Bowel sounds are normal. There is no tenderness.   Musculoskeletal: She exhibits no edema or tenderness.   Neurological: She is alert and oriented to person, place, and time.   Left facial weakness mostly in the lower portion, subtle LUE weakness  Skin: Skin is warm and dry. She is not diaphoretic.   Psychiatric: She has a normal mood and affect. Her behavior is normal.   Nursing note and vitals reviewed.    Procedures Performed:  Charmaine Machuca   Duplex scan of extracranial arteries using B-mode, color flow, and/or spectral Doppler; bilateral   Order# [id number]   Reading physician: Kian Arreola MD Ordering physician: Thuy Alan APRN Study date: 19   Patient Information     Patient Name  Charmaine Machuca MRN  [id number] Sex  Female  " (Age)  1940 (79 y.o.)   Clinical Indication     speech impairment; motor impairment; slurred speech   Admission Information     Admission Date/Time Discharge Date/Time Room/Bed   08/17/19 2036  P592/1   Interpretation Summary     · Mid right internal carotid artery mild stenosis.  · Mid left internal carotid artery mild stenosis.      Patient Hx Of Height, Weight, and Vitals     Height Weight BSA (Calculated - sq m) BMI (kg/m2) Pulse BP    73.6 kg (162 lb 3.2 oz)   76 118/48   Study Findings     • Right CCA Prox: No plaque visualized.   • Right CCA Dist: No plaque visualized.   • Right ICA Prox: Plaque present.   • Right ECA: Plaque present.   • Right Vertebral: Antegrade flow noted.       • Left CCA Prox: No plaque visualized.   • Left CCA Dist: No plaque visualized.   • Left ICA Prox: Plaque present.   • Left ECA: Plaque present.   • Left Vertebral: Antegrade flow noted.      CRANIAL CT SCAN WITHOUT CONTRAST-8/17/19   CLINICAL HISTORY: Stroke     COMPARISON: None.     TECHNIQUE: Radiation dose reduction techniques were utilized, including  automated exposure control and exposure modulation based on body size.  Multiple axial images of the head were obtained without contrast.      FINDINGS:  There are no abnormal areas of increased density or mass  effect. Generalized atrophy. There are mild scattered areas of decreased  density in the white matter likely related to chronic ischemic gliotic  changes.   Ventricles, sulci, and cisterns appear normal. Bone window  images are unremarkable.        IMPRESSION:  1. No acute intracranial abnormality.      Consults:   Consults     Date and Time Order Name Status Description    8/18/2019 0800 Inpatient Nephrology Consult      8/18/2019 0748 Inpatient Neurology Consult Stroke Completed     8/17/2019 1721 [facility] (on-call MD unless specified) Details Completed            Discharge Disposition:  Home or Self Care    Discharge Medications:     Discharge Medications      New  Medications      Instructions Start Date   atorvastatin 40 MG tablet  Commonly known as:  LIPITOR   40 mg, Oral, Nightly      clopidogrel 75 MG tablet  Commonly known as:  PLAVIX   75 mg, Oral, Daily         Changes to Medications      Instructions Start Date   carvedilol 6.25 MG tablet  Commonly known as:  COREG  What changed:  how much to take   6.25 mg, Oral, Daily         Continue These Medications      Instructions Start Date   aspirin 81 MG tablet   81 mg, Oral, Daily      calcitriol 0.25 MCG capsule  Commonly known as:  ROCALTROL   0.25 mcg, Oral, Every Other Day, 1 tablet every other day and 2 tablets every other day      carbidopa-levodopa ER  MG per tablet  Commonly known as:  SINEMET CR   1 tablet, Oral, Every Evening      diazePAM 5 MG tablet  Commonly known as:  VALIUM   5 mg, Oral, Nightly      DULOXETINE HCL PO   Oral, 2 Times Daily      famotidine 20 MG tablet  Commonly known as:  PEPCID   20 mg, Oral, 2 Times Daily      furosemide 40 MG tablet  Commonly known as:  LASIX   40 mg, Oral, Daily      GLUCOSAMINE CHONDROITIN COMPLX capsule   1,500 mg, Oral, Every Other Day, PT HOLDING FOR SURGERY      GRALISE 300 MG tablet  Generic drug:  Gabapentin (Once-Daily)   300 mg, Oral, Daily With Dinner, MAY REPEAT LATE EVENING IF NEEDED       HYDROcodone-acetaminophen 5-325 MG per tablet  Commonly known as:  NORCO   1-2 tablets, Oral, Every 8 Hours PRN      levothyroxine 25 MCG tablet  Commonly known as:  SYNTHROID, LEVOTHROID   25 mcg, Oral, Daily      loperamide 2 MG capsule  Commonly known as:  IMODIUM   2 mg, Oral, 4 Times Daily PRN      magnesium oxide 250 MG tablet   250 mg, Oral, Daily      methscopolamine 5 MG tablet  Commonly known as:  PAMINE FORTE   5 mg, Oral, Every Morning      MULTI-DAY VITAMINS tablet   1 tablet, Oral, Daily, HOLD FOR SURGERY      psyllium 58.6 % powder  Commonly known as:  METAMUCIL   1 packet, Oral, Daily      rotigotine 6 MG/24HR patch  Commonly known as:  NEUPRO   1  patch, Transdermal, Daily      spironolactone 25 MG tablet  Commonly known as:  ALDACTONE   TAKE 1/2 TABLET DAILY      ULORIC 40 MG tablet  Generic drug:  febuxostat   40 mg, Oral, Daily      vitamin D3 5000 units capsule capsule   5,000 Units, Oral, Daily      Vitamin E 400 units tablet   400 Units, Oral, Daily, PT HOLDING FOR SURGERY             Discharge Diet:   Diet Instructions     Diet: Regular, Renal; Thin      Discharge Diet:   Regular  Renal       Fluid Consistency:  Thin          Activity at Discharge:   Activity Instructions     Activity as Tolerated            Follow-up Appointments:  Future Appointments   Date Time Provider Department Center   8/22/2019 10:30 AM LAB CHAIR [facility] None   8/22/2019 11:00 AM Neno Merritt II, MD [facility]   11/19/2019 12:00 AM CHUCKY FLORES [facility] None   2/17/2020 10:00 AM Pastora Wilson APRN [facility] None     Additional Instructions for the Follow-ups that You Need to Schedule     Discharge Follow-up with PCP   As directed       Currently Documented PCP:    Fannie Godfrey MD    PCP Phone Number:    [phone]     Follow Up Details:  1 week         Discharge Follow-up with Specialty: Stroke clinic; 3 Months   As directed      Specialty:  Stroke clinic    Follow Up:  3 Months         Referral to Home Health   As directed      Face to Face Visit Date:  8/19/2019    Follow-up Provider for Plan of Care?:  I treated the patient in an acute care facility and will not continue treatment after discharge.    Follow-up Provider:  FANNIE GODFREY [[id number]]    Reason/Clinical Findings:  CVA    Describe mobility limitations that make leaving home difficult:  CVA    Nursing/Therapeutic Services Requested:  Physical Therapy Occupational Therapy Speech Therapy    PT orders:  Strengthening    Occupational orders:  Strengthening Fine motor    SLP orders:  Dysphagia therapy    Frequency:  1 Week 1               Willie Haddad,  MD  08/20/19  1:50 PM    Time Spent on Discharge Activities: Greater than 30 minutes.

## 2019-08-20 NOTE — DISCHARGE PLACEMENT REQUEST
"Charmaine Machuca (79 y.o. Female)     Date of Birth Social Security Number Address Home Phone MRN    1940  [address] [place] [address] [address] [place] [address] [phone] [id number]    Sikhism Marital Status          Roman Catholic        Admission Date Admission Type Admitting Provider Attending Provider Department, Room/Bed    8/17/19 Emergency Anuel Huffman MD Lykins, Matthew D, MD [address], P592/1    Discharge Date Discharge Disposition Discharge Destination         Home or Self Care              Attending Provider:  Willie Haddad MD    Allergies:  Chlorhexidine, Valproic Acid, Codeine, Propoxyphene    Isolation:  None   Infection:  None   Code Status:  CPR    Ht:  167.6 cm (66\")   Wt:  73.6 kg (162 lb 3.2 oz)    Admission Cmt:  None   Principal Problem:  None                Active Insurance as of 8/17/2019     Primary Coverage     Payor Plan Insurance Group Employer/Plan Group    MEDICARE MEDICARE A & B      Payor Plan Address Payor Plan Phone Number Payor Plan Fax Number Effective Dates    [address] [phone]  2/1/2003 - None Entered    [facility] [id number]       Subscriber Name Subscriber Birth Date Member ID       CHARMAINE MACHUCA 1940 [id number]           Secondary Coverage     Payor Plan Insurance Group Employer/Plan Group    [employer]  [employer] [id number]     Payor Plan Address Payor Plan Phone Number Payor Plan Fax Number Effective Dates    [address] [phone]  1/10/1970 - None Entered    [facility] [id number]       Subscriber Name Subscriber Birth Date Member ID       CHARMAINE MACHUCA 1940 [id number]                 Emergency Contacts      (Rel.) Home Phone Work Phone Mobile Phone    Zackery Machuca (Spouse) [phone] -- --    XIOMARA MACHUCA (Daughter) [phone] -- --    Nichole Agrawal (Daughter) [phone] -- --              "

## 2019-08-20 NOTE — PLAN OF CARE
Problem: Patient Care Overview  Goal: Plan of Care Review   08/20/19 1532   Coping/Psychosocial   Plan of Care Reviewed With patient;spouse   Plan of Care Review   Progress improving   OTHER   Outcome Summary VSS. No complaints of pain. Up with standby assist. Chest port deactivated. Discharge to home/ for transprotation.

## 2019-08-20 NOTE — PROGRESS NOTES
"DOS: 2019  NAME: Charmaine Machuca   : 1940  PCP: Fannie Godfrey MD  Chief Complaint   Patient presents with   • Facial Droop   • Speech Problem     Stroke    Subjective: Patient feels facial droop is a little improved. Denies vision changes. Going home.    Objective:  Vital signs: /58 (BP Location: Left arm, Patient Position: Sitting)   Pulse 81   Temp 97.5 °F (36.4 °C) (Oral)   Resp 17   Ht 167.6 cm (66\")   Wt 73.6 kg (162 lb 3.2 oz)   SpO2 97%   BMI 26.18 kg/m²       General appearance: Well developed, well nourished, well groomed, alert and cooperative.   HEENT: Normocephalic.   Neck and spine: Normal range of motion. Normal alignment. No mass or tenderness.    Cardiac: Regular rate and rhythm. No murmurs.   Peripheral Vasculature: Radial pulses are equal and symmetric.  Chest Exam: Clear to auscultation bilaterally, no wheezes, no rhonchi.  Extremities: Normal, no edema.   Skin: No rashes or birthmarks.      Higher integrative function: Oriented to time, place, person, intact recent and remote memory, attention span, concentration and language. Spontaneous speech, fund of vocabulary are normal. Mild dysarthria.  CN II: Normal visual fields.   CN III IV VI: Extraocular movements are full without nystagmus. Pupils are equal, round, and reactive to light.  CN V: Normal facial sensation.  CN VII:Left lower > upper facial droop.   CN VIII: Auditory acuity is normal.   CN IX & X: Symmetric palatal movement.   CN XI: Sternocleidomastoid and trapezius are normal. No weakness.   CN XII: Left tongue deviation.  Motor:  Normal muscle strength, no drift. No fasciculations, rigidity, spasticity or abnormal movements.   Sensation: Normal light touch.  Station and gait: N/A  Muscle stretch reflexes: Reflexes are 1+    Coordination: Finger to nose test showed no dysmetria.     Patient reexamined, changes noted.     Scheduled Meds:  aspirin 81 mg Oral Daily   atorvastatin 40 mg Oral Nightly "   calcitriol 0.25 mcg Oral Every Other Day   carbidopa-levodopa ER 1 tablet Oral Q PM   carvedilol 6.25 mg Oral Daily   clopidogrel 75 mg Oral Daily   diazePAM 5 mg Oral Nightly   famotidine 20 mg Oral Daily   febuxostat 40 mg Oral Daily   furosemide 40 mg Oral Daily   gabapentin 300 mg Oral Nightly   levothyroxine 25 mcg Oral Daily   rotigotine 1 patch Transdermal Daily   sodium chloride 10 mL Intravenous Q12H   sodium chloride 3 mL Intravenous Q12H   sodium chloride 3 mL Intravenous Q12H     Continuous Infusions:   PRN Meds:.bisacodyl  •  bisacodyl  •  heparin flush (porcine)  •  HYDROcodone-acetaminophen  •  nitroglycerin  •  ondansetron  •  [COMPLETED] Insert peripheral IV **AND** sodium chloride  •  Access VAD **AND** sodium chloride  •  sodium chloride  •  sodium chloride  •  sodium chloride  •  sodium chloride  •  zolpidem    Laboratory results:  Lab Results   Component Value Date    GLUCOSE 95 08/20/2019    CALCIUM 9.4 08/20/2019     08/20/2019    K 4.0 08/20/2019    CO2 30.1 (H) 08/20/2019     08/20/2019    BUN 57 (H) 08/20/2019    CREATININE 2.94 (H) 08/20/2019    EGFRIFAFRI  08/19/2019      Comment:      <15 Indicative of kidney failure.    EGFRIFNONA 15 (L) 08/20/2019    BCR 19.4 08/20/2019    ANIONGAP 8.9 08/20/2019     Lab Results   Component Value Date    WBC 4.82 08/18/2019    HGB 8.7 (L) 08/18/2019    HCT 27.8 (L) 08/18/2019    MCV 99.3 (H) 08/18/2019     08/18/2019     Lab Results   Component Value Date    CHOL 124 08/19/2019     Lab Results   Component Value Date    HDL 37 (L) 08/19/2019    HDL 42 02/14/2014    HDL 65 (H) 01/15/2014     Lab Results   Component Value Date    LDL 60 08/19/2019    LDL 88 02/14/2014    LDL 69 01/15/2014     Lab Results   Component Value Date    TRIG 134 08/19/2019    TRIG 230 (H) 02/14/2014    TRIG 115 01/15/2014     @hgba1c@   Review and interpretation of imaging:  Study Impression     • Right ICA Mid: Imaging indicates 16%-49% stenosis.       •  Left ICA Mid: Imaging indicates 16-49% stenosis.       Impression:  Patient is an 80 yo female with PMH HTN, HLD, CKD IV, NICM s/p ICD, periodic limb movement d/o, peripheral neuropathy with MGUS, myoclonus, hypothyroidism, gastroparesis, pancreatitis, iron deficiency anemia, and previous blood in stool who was admitted 8/17 with facial droop and slurred speech. Her hgb was 6.6 on admission (s/p transfusion) and SBP has been in low 100s to 120. She was on asa 81 mg PTA, was not on statin. No afib detected on most recent ICD interrogation 8/15/19. Most recent Echo showed EF 57% in Aug 2018     Dx: clinical right hemisphere stroke   CT head 8/17: generalized atrophy, no acute findings.   Labs: hgb a1c 4.5%, LDL 60, creatinine 3.09, UA with 4+ bact, 2+ leukocytes, positive nitrites  Unable to get MRI due to ICD, unable to get CTA due to CKD  Carotid u/s 8/16: bilateral ICA stenosis of 16-49%    The above impression statement reviewed and changes noted.    Plan:  ASA 81 mg continued from home, Plavix 75 mg started this admission.  Lipitor 40 mg daily started this admission  Neurochecks  BP control  Stroke Education  Follow-up as outpatient with me in 3 months.  Okay for discharge.  Discussed with Dr. Farrell.

## 2019-08-20 NOTE — PLAN OF CARE
Problem: Fall Risk (Adult)  Goal: Absence of Fall  Outcome: Ongoing (interventions implemented as appropriate)   08/20/19 0645   Fall Risk (Adult)   Absence of Fall making progress toward outcome       Problem: Patient Care Overview  Goal: Plan of Care Review  Outcome: Ongoing (interventions implemented as appropriate)   08/20/19 0645   Coping/Psychosocial   Plan of Care Reviewed With patient   Plan of Care Review   Progress improving   OTHER   Outcome Summary Progressing well, ambulating with standby assist, vss, anticipating to go home today       Problem: Skin Injury Risk (Adult)  Goal: Skin Health and Integrity  Outcome: Ongoing (interventions implemented as appropriate)   08/20/19 0645   Skin Injury Risk (Adult)   Skin Health and Integrity making progress toward outcome       Problem: Anemia (Adult)  Goal: Symptom Improvement  Outcome: Ongoing (interventions implemented as appropriate)   08/20/19 0645   Anemia (Adult)   Symptom Improvement making progress toward outcome

## 2019-08-20 NOTE — PROGRESS NOTES
Continued Stay Note  [facility]     Patient Name: Charmaine Machuca  MRN: [id number]  Today's Date: 8/20/2019    Admit Date: 8/17/2019    Discharge Plan     Row Name 08/20/19 1413       Plan    Plan  Home with [facility] (referral made)    Patient/Family in Agreement with Plan  yes    Plan Comments  Noted HH referral order. Spoke with patient who is agreeable to [facility]. Referral made and Franchesca notified.         Discharge Codes    No documentation.       Expected Discharge Date and Time     Expected Discharge Date Expected Discharge Time    Aug 20, 2019             Fannie Wheeler RN

## 2019-08-20 NOTE — PLAN OF CARE
Problem: Patient Care Overview  Goal: Plan of Care Review  Outcome: Ongoing (interventions implemented as appropriate)   08/20/19 1536   Coping/Psychosocial   Plan of Care Reviewed With patient   Plan of Care Review   Progress improving   OTHER   Outcome Summary Pt performs sit to stand transfer with SBA but required min A due to LOB with stand /sit transfer today. Pt stands and performs functional standing balance task with CGA. Pt will continue to benefit from OT

## 2019-08-20 NOTE — THERAPY TREATMENT NOTE
Acute Care - Occupational Therapy Progress Note  [facility]     Patient Name: Charmaine Machuca  : 1940  MRN: [id number]  Today's Date: 2019             Admit Date: 2019       ICD-10-CM ICD-9-CM   1. Weakness on left side of face R29.810 781.94   2. Slurred speech R47.81 784.59   3. Anemia, unspecified type D64.9 285.9   4. Acute renal failure superimposed on chronic kidney disease, unspecified CKD stage, unspecified acute renal failure type (CMS/formerly [facility]) N17.9 584.9    N18.9 585.9   5. Acute CVA (cerebrovascular accident) (CMS/formerly [facility]) I63.9 434.91     Patient Active Problem List   Diagnosis   • Pacemaker lead failure   • Chronic systolic congestive heart failure (CMS/formerly [facility])   • Cardiomyopathy (CMS/formerly [facility])   • Carpal tunnel syndrome   • Periodic limb movement disorder   • Peripheral neuropathy   • Restless legs syndrome   • Anemia   • CKD (chronic kidney disease)   • History of anemia due to chronic kidney disease   • Iron deficiency anemia   • Dysuria   • Chronic adrenal insufficiency (CMS/formerly [facility])   • 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   • Chest pain   • Congestive heart failure (CMS/formerly [facility])   • Gastroparesis   • Hypotension   • Hypothyroidism   • Myoclonus   • Osteoarthritis   • Automatic implantable cardioverter-defibrillator in situ   • Spondylolisthesis   • History of total knee replacement   • Anemia of chronic renal failure, stage 4 (severe) (CMS/formerly [facility])   • CKD (chronic kidney disease)   • Anemia of chronic disease   • Encounter for fitting and adjustment of vascular catheter   • Adverse effect of iron or its compound, subsequent encounter   • B12 deficiency   • Weakness on left side of face     Past Medical History:   Diagnosis Date   • Anemia     Iron deficiency   • Cardiomyopathy (CMS/formerly [facility])     S/P pacemaker and defibrillator   • CHF (congestive heart failure) (CMS/formerly [facility])    • Chronic renal failure, stage 4 (severe) (CMS/formerly [facility])    • Coronary artery disease     pacemaker, defibrillator   •  Dizzy    • Gastroparesis    • GERD (gastroesophageal reflux disease)    • Gout    • Hemorrhoids    • Hiatal hernia    • History of Clostridium difficile colitis 2013   • History of kidney stones    • History of pancreatitis     2014   • History of skin cancer    • History of transfusion     MULTIPLE    • Hypothyroidism    • Left bundle branch block    • Mitral and aortic valve disease    • Mitral valve insufficiency    • Myoclonus     S/P Depakote   • Osteoarthritis    • Pancytopenia (CMS/HCC)    • Peripheral neuropathy    • Presence of cardiac pacemaker     AND DEFIBRILLATOR   • Pulmonary hypertension (CMS/HCC)    • SOB (shortness of breath) on exertion    • Spinal stenosis      Past Surgical History:   Procedure Laterality Date   • APPENDECTOMY N/A    • ARTERIOVENOUS FISTULA/SHUNT SURGERY Right 2/18/2019    Procedure: RIGHT BASILIC FISTULA CREATION WITHOUT TRANSPOSITION;  Surgeon: Royer Dozier MD;  Location: [facility] OR;  Service: Vascular   • ARTERIOVENOUS FISTULA/SHUNT SURGERY Right 5/31/2019    Procedure: RIGHT 2ND STAGE BASILIC VEIN CREATION;  Surgeon: Royer Dozier MD;  Location: [facility] OR;  Service: Vascular   • CARDIAC CATHETERIZATION Left 03/28/2006    Arterial catheter insertion, cath left ventriculography, coronary angiography and left heart catheterization-Dr. Estevan Matamoros   • CARDIAC DEFIBRILLATOR PLACEMENT N/A 11/30/2007    Biventricular implantable cardioverter defibrillator-Dr. Leandro Huber   • CATARACT EXTRACTION Bilateral 2011   • COLONOSCOPY N/A 2014    done at [facility]   • CYSTECTOMY N/A     Ovarian cystectomy   • ENDOSCOPY N/A 04/28/2006    EGD with biopsies. Paraesophageal hiatal hernia from 34 to 44 cm, antral gastritis-Dr. Nehemiah Beal   • ENDOSCOPY N/A 09/29/2004    Hiatal hernia, gastritis and an erosion of the pylorus-Dr. Yang Pavon   • ENTEROSCOPY SMALL BOWEL N/A 10/30/2006    Small bowel enteroscopy with biopsies-Dr. Rory Sevilla   • FLEXIBLE SIGMOIDOSCOPY N/A  09/29/2004    Unsuccessful colonoscopy due to poop prep, a very tortuous sigmoid, bowel prep in the form of enema given and a barium enema was obtained-Dr. Yang Pavon   • FRACTURE SURGERY  2015    Broke big toe   • HEMATOMA EVACUATION TRUNK N/A 02/07/2014    Pocket evacuation of hematoma at pacemaker site-Dr. Leandro Huber   • HEMORRHOIDECTOMY N/A 04/19/2004    Flexible sigmoidoscopy and stapled hemorrhoidectomy-Dr. Yang Pavon   • HYSTERECTOMY Bilateral 1977   • IMPLANTABLE CARDIOVERTER DEFIBRILLATOR LEAD REPLACEMENT/POCKET REVISION Left 3/28/2016    Procedure: AUTOMATIC IMPLANTABLE CARDIOVERTER DEFIBRILLATOR LEAD REPLACEMENT WITH LASER LEAD EXTRACTION;  Surgeon: Leandro Huber MD;  Location: [facility] 18/19;  Service:    • IMPLANTABLE CARDIOVERTER DEFIBRILLATOR LEAD REPLACEMENT/POCKET REVISION N/A 01/13/2014    Biventricular ICD replacement-Dr. Jillian Huber   • LAPAROSCOPY REPAIR HIATAL HERNIA N/A 06/27/2006    Laparoscopic paraesophageal hiatal hernia repair with Nissen fundoplication and cholecystectomy-Dr. Sean Yoon   • NATALIE GONZALEZ ([facility]) PROCEDURE     • TN INSJ TUNNELED CVC W/O SUBQ PORT/ AGE 3 YR/> Right 10/3/2017    Procedure: Right internal jugular port placement;  Surgeon: Tiffanie Couch MD;  Location: [facility];  Service: General   • TOE SURGERY Left     SET BIG TOE   • TOTAL KNEE ARTHROPLASTY Left 08/2014       Therapy Treatment    Rehabilitation Treatment Summary     Row Name 08/20/19 1136             Treatment Time/Intention    Discipline  occupational therapist  -SG      Document Type  therapy note (daily note)  -SG      Subjective Information  no complaints  -SG      Mode of Treatment  individual therapy;occupational therapy  -SG      Patient Effort  good  -SG      Existing Precautions/Restrictions  fall  -SG      Recorded by [SG] Fannie Gómez OTR 08/20/19 1159      Row Name 08/20/19 1136             Cognitive Assessment/Intervention- PT/OT     Orientation Status (Cognition)  oriented x 4  -SG      Follows Commands (Cognition)  WFL  -SG      Recorded by [SG] Fannie Gómez OTR 08/20/19 1159      Row Name 08/20/19 1136             Transfer Assessment/Treatment    Transfer Assessment/Treatment  stand-sit transfer  -SG      Recorded by [SG] Fannie Gómez OTR 08/20/19 1159      Row Name 08/20/19 1136             Sit-Stand Transfer    Sit-Stand [place] (Transfers)  contact guard  -SG      Recorded by [SG] Fannie Gómez OTR 08/20/19 1159      Row Name 08/20/19 1136             Stand-Sit Transfer    Stand-Sit [place] (Transfers)  minimum assist (75% patient effort) pt with LOB as sitting in chair  -SG      Recorded by [SG] Fannie Gómez OTR 08/20/19 1159      Row Name 08/20/19 1136             ADL Assessment/Intervention    BADL Assessment/Intervention  lower body dressing  -SG      Recorded by [SG] Fannie Gómez OTR 08/20/19 1159      Row Name 08/20/19 1136             Lower Body Dressing Assessment/Training    Lower Body Dressing Woodson Level  lower body dressing skills;doff;don;contact guard assist  -SG      Lower Body Dressing Position  supported sitting;supported standing  -SG      Comment (Lower Body Dressing)  cga for safety with functional standing  -SG      Recorded by [SG] Fannie Gómez OTR 08/20/19 1159      Row Name 08/20/19 1136             Neuromuscular Re-education    Comment, Neuromuscular Re-education  pt performed nany. coordination task and in hand manipulation task  -SG      Recorded by [SG] Fannie Gómez OTR 08/20/19 1159      Row Name 08/20/19 1136             Positioning and Restraints    Post Treatment Position  chair  -SG      In Chair  reclined;call light within reach;encouraged to call for assist;exit alarm on  -SG      Recorded by [SG] Fannie Gómez OTR 08/20/19 1159      Row Name 08/20/19 1136             Pain Scale: Numbers Pre/Post-Treatment    Pain Scale: Numbers, Pretreatment  0/10  - no pain  -SG      Pain Scale: Numbers, Post-Treatment  0/10 - no pain  -SG      Recorded by [] Dalia PIYUSH Greco 08/20/19 0749        User Key  (r) = Recorded By, (t) = Taken By, (c) = Cosigned By    Initials Name Effective Dates Discipline    SG Dalia Fannie, OTR 06/08/18 -  OT             Occupational Therapy Education     Title: PT OT SLP Therapies (In Progress)     Topic: Occupational Therapy (In Progress)     Point: ADL training (Done)     Description: Instruct learner(s) on proper safety adaptation and remediation techniques during self care or transfers.   Instruct in proper use of assistive devices.    Learning Progress Summary           Patient Acceptance, E,TB,D, VU,NR by  at 8/19/2019 12:29 PM    Comment:  pt instructed with safety for LUE and decreased sensation. Instructed with nany. coordination activities and in hand manipulation task                               User Key     Initials Effective Dates Name Provider Type Discipline     06/08/18 -  Fannie Gómez OTR Occupational Therapist OT                OT Recommendation and Plan  Planned Therapy Interventions (OT Eval): BADL retraining, transfer/mobility retraining, neuromuscular control/coordination retraining  Therapy Frequency (OT Eval): 5 times/wk  Plan of Care Review  Plan of Care Reviewed With: patient  Plan of Care Reviewed With: patient  Outcome Summary: Pt performs sit to stand transfer with SBA but required min A due to LOB with stand /sit transfer today.  Pt stands and performs functional standing balance task with CGA.  Pt will continue to benefit from OT  Outcome Measures     Row Name 08/20/19 1202 08/19/19 1232          How much help from another is currently needed...    Putting on and taking off regular lower body clothing?  3  -SG  3  -SG     Bathing (including washing, rinsing, and drying)  3  -SG  3  -SG     Toileting (which includes using toilet bed pan or urinal)  3  -SG  3  -SG     Putting on and taking off  regular upper body clothing  3  -SG  3  -SG     Taking care of personal grooming (such as brushing teeth)  3  -SG  3  -SG     Eating meals  3  -SG  3  -SG     AM-PAC 6 Clicks Score (OT)  18  -SG  18  -SG        Modified [place] Scale    Modified [place] Scale  --  4 - Moderately severe disability.  Unable to walk without assistance, and unable to attend to own bodily needs without assistance.  -SG        Functional Assessment    Outcome Measure Options  --  AM-PAC 6 Clicks Daily Activity (OT)  -SG       User Key  (r) = Recorded By, (t) = Taken By, (c) = Cosigned By    Initials Name Provider Type    Fannie Redmond OTR Occupational Therapist           Time Calculation:   Time Calculation- OT     Row Name 08/20/19 1202             Time Calculation- OT    OT Start Time  1000  -SG      OT Stop Time  1012  -      OT Time Calculation (min)  12 min  -      Total Timed Code Minutes- OT  12 minute(s)  -      OT Non-Billable Time (min)  12 min  -      OT Received On  08/20/19  -        User Key  (r) = Recorded By, (t) = Taken By, (c) = Cosigned By    Initials Name Provider Type    Fannie Redmond OTR Occupational Therapist        Therapy Charges for Today     Code Description Service Date Service Provider Modifiers Qty    16603514729 HC OT EVAL LOW COMPLEXITY 2 8/19/2019 Fannie Gómez OTR GO 1    50262302870 HC OT SELF CARE/MGMT/TRAIN EA 15 MIN 8/19/2019 Fannie Gómez OTR GO 1    69063368945 HC OT SELF CARE/MGMT/TRAIN EA 15 MIN 8/20/2019 Fannie Gómez OTR GO 1               PIYUSH Castanon  8/20/2019

## 2019-08-21 ENCOUNTER — READMISSION MANAGEMENT (OUTPATIENT)
Dept: CALL CENTER | Facility: HOSPITAL | Age: 79
End: 2019-08-21

## 2019-08-21 NOTE — PROGRESS NOTES
Case Management Discharge Note    Final Note: Discharged home with [facility] ([facility]). Family transported         Transportation Services  Private: Car    Final Discharge Disposition Code: 01 - home or self-care

## 2019-08-21 NOTE — OUTREACH NOTE
Prep Survey      Responses   Facility patient discharged from?  [facility]   Is patient eligible?  Yes   Discharge diagnosis  Right hemisphere cortical stroke   Does the patient have one of the following disease processes/diagnoses(primary or secondary)?  Stroke (TIA)   Does the patient have Home health ordered?  Yes   What is the Home health agency?   [facility]   Is there a DME ordered?  No   Prep survey completed?  Yes          Gena Howard RN

## 2019-08-22 ENCOUNTER — APPOINTMENT (OUTPATIENT)
Dept: ONCOLOGY | Facility: HOSPITAL | Age: 79
End: 2019-08-22

## 2019-08-22 ENCOUNTER — OFFICE VISIT (OUTPATIENT)
Dept: ONCOLOGY | Facility: CLINIC | Age: 79
End: 2019-08-22

## 2019-08-22 ENCOUNTER — READMISSION MANAGEMENT (OUTPATIENT)
Dept: CALL CENTER | Facility: HOSPITAL | Age: 79
End: 2019-08-22

## 2019-08-22 ENCOUNTER — LAB (OUTPATIENT)
Dept: OTHER | Facility: HOSPITAL | Age: 79
End: 2019-08-22

## 2019-08-22 ENCOUNTER — INFUSION (OUTPATIENT)
Dept: ONCOLOGY | Facility: HOSPITAL | Age: 79
End: 2019-08-22

## 2019-08-22 VITALS
TEMPERATURE: 97.7 F | OXYGEN SATURATION: 98 % | WEIGHT: 164.9 LBS | RESPIRATION RATE: 16 BRPM | HEART RATE: 83 BPM | HEIGHT: 66 IN | DIASTOLIC BLOOD PRESSURE: 78 MMHG | BODY MASS INDEX: 26.5 KG/M2 | SYSTOLIC BLOOD PRESSURE: 130 MMHG

## 2019-08-22 DIAGNOSIS — N18.4 ANEMIA DUE TO STAGE 4 CHRONIC KIDNEY DISEASE (HCC): ICD-10-CM

## 2019-08-22 DIAGNOSIS — N18.30 CHRONIC KIDNEY DISEASE, STAGE III (MODERATE) (HCC): Primary | ICD-10-CM

## 2019-08-22 DIAGNOSIS — IMO0001 ADVERSE EFFECT OF IRON OR ITS COMPOUND, SUBSEQUENT ENCOUNTER: ICD-10-CM

## 2019-08-22 DIAGNOSIS — D63.1 ANEMIA DUE TO STAGE 4 CHRONIC KIDNEY DISEASE (HCC): Primary | ICD-10-CM

## 2019-08-22 DIAGNOSIS — D63.8 ANEMIA OF CHRONIC DISEASE: ICD-10-CM

## 2019-08-22 DIAGNOSIS — Z45.2 ENCOUNTER FOR FITTING AND ADJUSTMENT OF VASCULAR CATHETER: ICD-10-CM

## 2019-08-22 DIAGNOSIS — N18.4 ANEMIA DUE TO STAGE 4 CHRONIC KIDNEY DISEASE (HCC): Primary | ICD-10-CM

## 2019-08-22 DIAGNOSIS — D50.8 OTHER IRON DEFICIENCY ANEMIA: ICD-10-CM

## 2019-08-22 DIAGNOSIS — D63.1 ANEMIA OF CHRONIC RENAL FAILURE, STAGE 4 (SEVERE) (HCC): ICD-10-CM

## 2019-08-22 DIAGNOSIS — D63.1 ANEMIA DUE TO STAGE 4 CHRONIC KIDNEY DISEASE (HCC): ICD-10-CM

## 2019-08-22 DIAGNOSIS — N18.4 ANEMIA OF CHRONIC RENAL FAILURE, STAGE 4 (SEVERE) (HCC): ICD-10-CM

## 2019-08-22 LAB
BASOPHILS # BLD AUTO: 0.02 10*3/MM3 (ref 0–0.2)
BASOPHILS NFR BLD AUTO: 0.3 % (ref 0–1.5)
BILIRUB SERPL-MCNC: 0.2 MG/DL (ref 0.1–1.2)
DAT POLY-SP REAG RBC QL: NEGATIVE
DEPRECATED RDW RBC AUTO: 63.7 FL (ref 37–54)
EOSINOPHIL # BLD AUTO: 0.17 10*3/MM3 (ref 0–0.4)
EOSINOPHIL NFR BLD AUTO: 2.9 % (ref 0.3–6.2)
ERYTHROCYTE [DISTWIDTH] IN BLOOD BY AUTOMATED COUNT: 17.6 % (ref 12.3–15.4)
FERRITIN SERPL-MCNC: 46.5 NG/ML (ref 13–150)
HAPTOGLOB SERPL-MCNC: 146 MG/DL (ref 30–200)
HCT VFR BLD AUTO: 30.4 % (ref 34–46.6)
HGB BLD-MCNC: 9.4 G/DL (ref 12–15.9)
HGB RETIC QN AUTO: 25 PG (ref 29.8–36.1)
IMM GRANULOCYTES # BLD AUTO: 0.01 10*3/MM3 (ref 0–0.05)
IMM GRANULOCYTES NFR BLD AUTO: 0.2 % (ref 0–0.5)
IMM RETICS NFR: 19.3 % (ref 3–15.8)
IRON 24H UR-MRATE: 40 MCG/DL (ref 37–145)
IRON SATN MFR SERPL: 13 % (ref 20–50)
LDH SERPL-CCNC: 217 U/L (ref 135–214)
LYMPHOCYTES # BLD AUTO: 0.41 10*3/MM3 (ref 0.7–3.1)
LYMPHOCYTES NFR BLD AUTO: 6.9 % (ref 19.6–45.3)
MCH RBC QN AUTO: 30.8 PG (ref 26.6–33)
MCHC RBC AUTO-ENTMCNC: 30.9 G/DL (ref 31.5–35.7)
MCV RBC AUTO: 99.7 FL (ref 79–97)
MONOCYTES # BLD AUTO: 0.57 10*3/MM3 (ref 0.1–0.9)
MONOCYTES NFR BLD AUTO: 9.6 % (ref 5–12)
NEUTROPHILS # BLD AUTO: 4.73 10*3/MM3 (ref 1.7–7)
NEUTROPHILS NFR BLD AUTO: 80.1 % (ref 42.7–76)
NRBC BLD AUTO-RTO: 0 /100 WBC (ref 0–0.2)
PLATELET # BLD AUTO: 196 10*3/MM3 (ref 140–450)
PMV BLD AUTO: 9.2 FL (ref 6–12)
RBC # BLD AUTO: 3.05 10*6/MM3 (ref 3.77–5.28)
RETICS/RBC NFR AUTO: 4.81 % (ref 0.7–1.9)
TIBC SERPL-MCNC: 307 MCG/DL (ref 298–536)
TRANSFERRIN SERPL-MCNC: 206 MG/DL (ref 200–360)
WBC NRBC COR # BLD: 5.91 10*3/MM3 (ref 3.4–10.8)

## 2019-08-22 PROCEDURE — 86880 COOMBS TEST DIRECT: CPT | Performed by: INTERNAL MEDICINE

## 2019-08-22 PROCEDURE — 63710000001 PROCHLORPERAZINE MALEATE PER 5 MG: Performed by: INTERNAL MEDICINE

## 2019-08-22 PROCEDURE — 85025 COMPLETE CBC W/AUTO DIFF WBC: CPT | Performed by: INTERNAL MEDICINE

## 2019-08-22 PROCEDURE — 82728 ASSAY OF FERRITIN: CPT | Performed by: INTERNAL MEDICINE

## 2019-08-22 PROCEDURE — 85046 RETICYTE/HGB CONCENTRATE: CPT | Performed by: INTERNAL MEDICINE

## 2019-08-22 PROCEDURE — 83540 ASSAY OF IRON: CPT | Performed by: INTERNAL MEDICINE

## 2019-08-22 PROCEDURE — 99215 OFFICE O/P EST HI 40 MIN: CPT | Performed by: INTERNAL MEDICINE

## 2019-08-22 PROCEDURE — 36415 COLL VENOUS BLD VENIPUNCTURE: CPT

## 2019-08-22 PROCEDURE — 82247 BILIRUBIN TOTAL: CPT | Performed by: INTERNAL MEDICINE

## 2019-08-22 PROCEDURE — 25010000002 FERRIC CARBOXYMALTOSE 750 MG/15ML SOLUTION 15 ML VIAL: Performed by: INTERNAL MEDICINE

## 2019-08-22 PROCEDURE — 83010 ASSAY OF HAPTOGLOBIN QUANT: CPT | Performed by: INTERNAL MEDICINE

## 2019-08-22 PROCEDURE — 84466 ASSAY OF TRANSFERRIN: CPT | Performed by: INTERNAL MEDICINE

## 2019-08-22 PROCEDURE — 83615 LACTATE (LD) (LDH) ENZYME: CPT | Performed by: INTERNAL MEDICINE

## 2019-08-22 PROCEDURE — 96374 THER/PROPH/DIAG INJ IV PUSH: CPT

## 2019-08-22 RX ORDER — SODIUM CHLORIDE 9 MG/ML
250 INJECTION, SOLUTION INTRAVENOUS ONCE
Status: CANCELLED | OUTPATIENT
Start: 2019-08-22

## 2019-08-22 RX ORDER — SODIUM CHLORIDE 0.9 % (FLUSH) 0.9 %
10 SYRINGE (ML) INJECTION AS NEEDED
Status: CANCELLED | OUTPATIENT
Start: 2019-08-22

## 2019-08-22 RX ORDER — PROCHLORPERAZINE MALEATE 10 MG
10 TABLET ORAL ONCE
Status: CANCELLED
Start: 2019-08-22 | End: 2019-08-22

## 2019-08-22 RX ORDER — SODIUM CHLORIDE 9 MG/ML
250 INJECTION, SOLUTION INTRAVENOUS ONCE
Status: COMPLETED | OUTPATIENT
Start: 2019-08-22 | End: 2019-08-22

## 2019-08-22 RX ORDER — SODIUM CHLORIDE 0.9 % (FLUSH) 0.9 %
10 SYRINGE (ML) INJECTION AS NEEDED
Status: DISCONTINUED | OUTPATIENT
Start: 2019-08-22 | End: 2019-08-22 | Stop reason: HOSPADM

## 2019-08-22 RX ORDER — SODIUM CHLORIDE 9 MG/ML
250 INJECTION, SOLUTION INTRAVENOUS ONCE
Status: CANCELLED | OUTPATIENT
Start: 2019-08-29

## 2019-08-22 RX ORDER — PROCHLORPERAZINE MALEATE 5 MG/1
10 TABLET ORAL ONCE
Status: COMPLETED | OUTPATIENT
Start: 2019-08-22 | End: 2019-08-22

## 2019-08-22 RX ORDER — PROCHLORPERAZINE MALEATE 10 MG
10 TABLET ORAL ONCE
Status: CANCELLED
Start: 2019-08-29 | End: 2019-08-29

## 2019-08-22 RX ADMIN — SODIUM CHLORIDE, PRESERVATIVE FREE 10 ML: 5 INJECTION INTRAVENOUS at 13:18

## 2019-08-22 RX ADMIN — Medication 500 UNITS: at 13:18

## 2019-08-22 RX ADMIN — FERRIC CARBOXYMALTOSE INJECTION 750 MG: 50 INJECTION, SOLUTION INTRAVENOUS at 12:30

## 2019-08-22 RX ADMIN — PROCHLORPERAZINE MALEATE 10 MG: 5 TABLET, FILM COATED ORAL at 12:31

## 2019-08-22 RX ADMIN — SODIUM CHLORIDE 250 ML: 900 INJECTION, SOLUTION INTRAVENOUS at 12:30

## 2019-08-22 NOTE — OUTREACH NOTE
Stroke Week 1 Survey      Responses   Facility patient discharged from?  [facility]   Does the patient have one of the following disease processes/diagnoses(primary or secondary)?  Stroke (TIA)   Is there a successful TCM telephone encounter documented?  No   Week 1 attempt successful?  Yes   Call start time  1455   Call end time  1515   Discharge diagnosis  Right hemisphere cortical stroke   Is patient permission given to speak with other caregiver?  Yes   Person spoke with today (if not patient) and relationship     Meds reviewed with patient/caregiver?  Yes   Is the patient having any side effects they believe may be caused by any medication additions or changes?  No   Does the patient have all medications ordered at discharge?  Yes   Is the patient taking all medications as directed (includes completed medication regime)?  Yes   Does the patient have a primary care provider?   Yes   Does the patient have an appointment with their PCP within 7 days of discharge?  Yes   Has the patient kept scheduled appointments due by today?  Yes   What is the Home health agency?   [facility]   Has home health visited the patient within 72 hours of discharge?  Yes   Psychosocial issues?  No   Does the patient require any assistance with activities of daily living such as eating, bathing, dressing, walking, etc.?  No   Does the patient have any residual symptoms from stroke/TIA?  Yes   Residual symptoms comments  Corner of mouth with droop still   Does the patient understand the diet ordered at discharge?  Yes   Did the patient receive a copy of their discharge instructions?  Yes   Nursing interventions  Reviewed instructions with patient   What is the patient's perception of their health status since discharge?  Improving   Nursing interventions  Nurse provided patient education   Is the patient able to teach back FAST for Stroke?  Yes   Is the patient/caregiver able to teach back the risk factors for a stroke?  High blood  pressure-goal below 120/80, High Cholesterol, Physical inactivity and obesity, Carotid or other artery disease   Is the patient/caregiver able to teach back signs and symptoms related to disease process for when to call PCP?  Yes   Is the patient/caregiver able to teach back signs and symptoms related to disease process for when to call 911?  Yes   Is the patient/caregiver able to teach back the hierarchy of who to call/visit for symptoms/problems? PCP, Specialist, Home health nurse, Urgent Care, ED, 911  Yes   Week 1 call completed?  Yes          Esdras Ruiz RN

## 2019-08-22 NOTE — PROGRESS NOTES
.     REASONS FOR FOLLOWUP: Anemia    HISTORY OF PRESENT ILLNESS:  The patient is a 79 y.o. year old female  who is here for follow-up with the above-mentioned history.    Denies any more dark stools.  However, was in the hospital a few days ago and diagnosed with a presumed stroke.  Has residual left mouth drooping.  Cannot have an MRI due to pacemaker.  Plavix was added to aspirin.  Despite this, denies bleeding.    Denies chest pain or shortness of breath.  No change in baseline dizziness.    Past Medical History:   Diagnosis Date   • Anemia     Iron deficiency   • Cardiomyopathy (CMS/HCC)     S/P pacemaker and defibrillator   • CHF (congestive heart failure) (CMS/HCC)    • Chronic renal failure, stage 4 (severe) (CMS/HCC)    • Coronary artery disease     pacemaker, defibrillator   • Dizzy    • Gastroparesis    • GERD (gastroesophageal reflux disease)    • Gout    • Hemorrhoids    • Hiatal hernia    • History of Clostridium difficile colitis 2013   • History of kidney stones    • History of pancreatitis     2014   • History of skin cancer    • History of transfusion     MULTIPLE    • Hypothyroidism    • Left bundle branch block    • Mitral and aortic valve disease    • Mitral valve insufficiency    • Myoclonus     S/P Depakote   • Osteoarthritis    • Pancytopenia (CMS/HCC)    • Peripheral neuropathy    • Presence of cardiac pacemaker     AND DEFIBRILLATOR   • Pulmonary hypertension (CMS/HCC)    • SOB (shortness of breath) on exertion    • Spinal stenosis      Past Surgical History:   Procedure Laterality Date   • APPENDECTOMY N/A    • ARTERIOVENOUS FISTULA/SHUNT SURGERY Right 2/18/2019    Procedure: RIGHT BASILIC FISTULA CREATION WITHOUT TRANSPOSITION;  Surgeon: Royer Dozier MD;  Location: [facility] OR;  Service: Vascular   • ARTERIOVENOUS FISTULA/SHUNT SURGERY Right 5/31/2019    Procedure: RIGHT 2ND STAGE BASILIC VEIN CREATION;  Surgeon: Royer Dozier MD;  Location: [facility] OR;  Service:  Vascular   • CARDIAC CATHETERIZATION Left 03/28/2006    Arterial catheter insertion, cath left ventriculography, coronary angiography and left heart catheterization-Dr. Estevan Matamoros   • CARDIAC DEFIBRILLATOR PLACEMENT N/A 11/30/2007    Biventricular implantable cardioverter defibrillator-Dr. Leandro Huber   • CATARACT EXTRACTION Bilateral 2011   • COLONOSCOPY N/A 2014    done at [facility]   • CYSTECTOMY N/A     Ovarian cystectomy   • ENDOSCOPY N/A 04/28/2006    EGD with biopsies. Paraesophageal hiatal hernia from 34 to 44 cm, antral gastritis-Dr. Nehemiah Beal   • ENDOSCOPY N/A 09/29/2004    Hiatal hernia, gastritis and an erosion of the pylorus-Dr. Yang Pavon   • ENTEROSCOPY SMALL BOWEL N/A 10/30/2006    Small bowel enteroscopy with biopsies-Dr. Rory Sevilla   • FLEXIBLE SIGMOIDOSCOPY N/A 09/29/2004    Unsuccessful colonoscopy due to poop prep, a very tortuous sigmoid, bowel prep in the form of enema given and a barium enema was obtained-Dr. Yang Pavon   • FRACTURE SURGERY  2015    Broke big toe   • HEMATOMA EVACUATION TRUNK N/A 02/07/2014    Pocket evacuation of hematoma at pacemaker site-Dr. Leandro Huber   • HEMORRHOIDECTOMY N/A 04/19/2004    Flexible sigmoidoscopy and stapled hemorrhoidectomy-Dr. Yang Pavon   • HYSTERECTOMY Bilateral 1977   • IMPLANTABLE CARDIOVERTER DEFIBRILLATOR LEAD REPLACEMENT/POCKET REVISION Left 3/28/2016    Procedure: AUTOMATIC IMPLANTABLE CARDIOVERTER DEFIBRILLATOR LEAD REPLACEMENT WITH LASER LEAD EXTRACTION;  Surgeon: Leandro Huber MD;  Location: [facility] 18/19;  Service:    • IMPLANTABLE CARDIOVERTER DEFIBRILLATOR LEAD REPLACEMENT/POCKET REVISION N/A 01/13/2014    Biventricular ICD replacement-Dr. Jillian Huber   • LAPAROSCOPY REPAIR HIATAL HERNIA N/A 06/27/2006    Laparoscopic paraesophageal hiatal hernia repair with Nissen fundoplication and cholecystectomy-Dr. Sean Yoon   • NATALIE GONZALEZ ([facility]) PROCEDURE     • NH INSJ TUNNELED CVC W/O  SUBQ PORT/ AGE 3 YR/> Right 10/3/2017    Procedure: Right internal jugular port placement;  Surgeon: Tiffanie Couch MD;  Location: [facility] OR;  Service: General   • TOE SURGERY Left     SET BIG TOE   • TOTAL KNEE ARTHROPLASTY Left 08/2014       MEDICATIONS    Current Outpatient Medications:   •  aspirin 81 MG tablet, Take 81 mg by mouth Daily., Disp: , Rfl:   •  atorvastatin (LIPITOR) 40 MG tablet, Take 1 tablet by mouth Every Night., Disp: 30 tablet, Rfl: 0  •  calcitriol (ROCALTROL) 0.25 MCG capsule, Take 0.25 mcg by mouth Every Other Day. 1 tablet every other day and 2 tablets every other day, Disp: , Rfl:   •  carbidopa-levodopa ER (SINEMET CR)  MG per tablet, Take 1 tablet by mouth Every Evening., Disp: , Rfl:   •  carvedilol (COREG) 6.25 MG tablet, Take 1 tablet by mouth Daily., Disp: , Rfl:   •  Cholecalciferol (VITAMIN D3) 5000 units capsule capsule, Take 5,000 Units by mouth Daily., Disp: , Rfl:   •  clopidogrel (PLAVIX) 75 MG tablet, Take 1 tablet by mouth Daily., Disp: 30 tablet, Rfl: 0  •  diazepam (VALIUM) 5 MG tablet, Take 5 mg by mouth Every Night., Disp: , Rfl:   •  DULOXETINE HCL PO, Take  by mouth 2 (Two) Times a Day., Disp: , Rfl:   •  famotidine (PEPCID) 20 MG tablet, Take 20 mg by mouth 2 (Two) Times a Day., Disp: , Rfl:   •  febuxostat (ULORIC) 40 MG tablet, Take 40 mg by mouth Daily., Disp: , Rfl:   •  furosemide (LASIX) 40 MG tablet, Take 1 tablet by mouth Daily., Disp: 90 tablet, Rfl: 3  •  Gabapentin, Once-Daily, (GRALISE) 300 MG tablet, Take 300 mg by mouth Daily With Dinner. MAY REPEAT LATE EVENING IF NEEDED, Disp: , Rfl:   •  Glucosamine-Chondroit-Vit C-Mn (GLUCOSAMINE CHONDROITIN COMPLX) capsule, Take 1,500 mg by mouth Every Other Day. PT HOLDING FOR SURGERY, Disp: , Rfl:   •  HYDROcodone-acetaminophen (NORCO) 5-325 MG per tablet, Take 1-2 tablets by mouth Every 8 (Eight) Hours As Needed for Moderate Pain  or Severe Pain  (Pain)., Disp: 30 tablet, Rfl: 0  •   "levothyroxine (SYNTHROID, LEVOTHROID) 25 MCG tablet, Take 25 mcg by mouth Daily., Disp: , Rfl:   •  loperamide (IMODIUM) 2 MG capsule, Take 2 mg by mouth 4 (Four) Times a Day As Needed for Diarrhea., Disp: , Rfl:   •  magnesium oxide 250 MG tablet, Take 250 mg by mouth Daily., Disp: , Rfl:   •  methscopolamine (PAMINE FORTE) 5 MG tablet, Take 5 mg by mouth Every Morning., Disp: , Rfl:   •  Multiple Vitamin (MULTI-DAY VITAMINS) tablet, Take 1 tablet by mouth Daily. HOLD FOR SURGERY, Disp: , Rfl:   •  psyllium (METAMUCIL) 58.6 % powder, Take 1 packet by mouth Daily., Disp: , Rfl:   •  rotigotine (NEUPRO) 6 MG/24HR patch, Place 1 patch on the skin as directed by provider Daily., Disp: , Rfl:   •  spironolactone (ALDACTONE) 25 MG tablet, TAKE 1/2 TABLET DAILY, Disp: 45 tablet, Rfl: 1  •  Vitamin E 400 UNITS tablet, Take 400 Units by mouth Daily. PT HOLDING FOR SURGERY, Disp: , Rfl:   No current facility-administered medications for this visit.     Facility-Administered Medications Ordered in Other Visits:   •  heparin flush (porcine) 100 UNIT/ML injection 500 Units, 500 Units, Intravenous, PRN, Anuel Scott MD, 500 Units at 11/27/17 1347  •  sodium chloride 0.9 % flush 10 mL, 10 mL, Intravenous, PRN, Anuel Scott MD, 10 mL at 11/27/17 1347    ALLERGIES:     Allergies   Allergen Reactions   • Chlorhexidine Rash and Itching     Patient states \"blue dye\" in chlorhexidine.  States the orange and clear are OK to use   • Valproic Acid Other (See Comments)     Made her extremely sleepy  Made her extremely sleepy   • Codeine Other (See Comments)     HYPER, DOES NOT HELP PAIN   • Propoxyphene Other (See Comments)     Belligerent, acting drunk  Belligerent, acting drunk       SOCIAL HISTORY:       Social History     Socioeconomic History   • Marital status:      Spouse name: Zackery   • Number of children: 5   • Years of education: 1 yr college   • Highest education level: Not on file   Occupational History     " "Employer: RETIRED   Tobacco Use   • Smoking status: Never Smoker   • Smokeless tobacco: Never Used   Substance and Sexual Activity   • Alcohol use: No   • Drug use: No   • Sexual activity: Defer         FAMILY HISTORY:  Family History   Problem Relation Age of Onset   • Coronary artery disease Other    • Dementia Other    • Kidney disease Other    • Arthritis Father    • Early death Father         Kidney failure   • Kidney disease Father    • Heart disease Mother    • Mental illness Mother         Dementia   • Malig Hyperthermia Neg Hx        REVIEW OF SYSTEMS:  Review of Systems   Constitutional: Positive for fatigue. Negative for activity change.   HENT: Negative for nosebleeds and trouble swallowing.    Respiratory: Negative for shortness of breath and wheezing.    Cardiovascular: Negative for chest pain and palpitations.   Gastrointestinal: Negative for constipation, diarrhea and nausea.   Genitourinary: Negative for dysuria and hematuria.   Musculoskeletal: Negative for arthralgias and myalgias.   Skin: Negative for rash and wound.   Neurological: Positive for dizziness and facial asymmetry. Negative for seizures and syncope.   Hematological: Negative for adenopathy. Does not bruise/bleed easily.   Psychiatric/Behavioral: Negative for confusion.            Vitals:    08/22/19 1053   BP: 130/78   Pulse: 83   Resp: 16   Temp: 97.7 °F (36.5 °C)   SpO2: 98%   Weight: 74.8 kg (164 lb 14.4 oz)   Height: 168 cm (66.14\")   PainSc: 0-No pain     Current Status 7/18/2019   ECOG score 0        PHYSICAL EXAM:    CONSTITUTIONAL:  Vital signs reviewed.  No distress, looks comfortable.  EYES:  Conjunctiva and lids unremarkable.  PERRLA  EARS,NOSE,MOUTH,THROAT:  Ears and nose appear unremarkable.  Lips, teeth, gums appear unremarkable.  RESPIRATORY:  Normal respiratory effort.  Lungs clear to auscultation bilaterally.  CARDIOVASCULAR:  Normal S1, S2.  No murmurs rubs or gallops.  No significant lower extremity " edema.  GASTROINTESTINAL: Abdomen appears unremarkable.  Nontender.  No hepatomegaly.  No splenomegaly.  NEURO left mouth drooping  LYMPHATIC:  No cervical, supraclavicular, axillary lymphadenopathy.  SKIN:  Warm.  No rashes.  PSYCHIATRIC:  Normal judgment and insight.  Normal mood and affect.      RECENT LABS:        WBC   Date/Time Value Ref Range Status   08/22/2019 10:46 AM 5.91 3.40 - 10.80 10*3/mm3 Final     Hemoglobin   Date/Time Value Ref Range Status   08/22/2019 10:46 AM 9.4 (L) 12.0 - 15.9 g/dL Final     Platelets   Date/Time Value Ref Range Status   08/22/2019 10:46  140 - 450 10*3/mm3 Final       Assessment/Plan   There are no diagnoses linked to this encounter.     *Anemia due to stage IV chronic kidney disease.  Weekly Procrit if Hb <10.    *Iron deficiency anemia.  Does not respond to oral iron due to poor absorption.  Iron labs today are low.   Arrange 2 doses Injectafer.    *Source of iron deficiency.  · Follows regularly with Dr. Earl Avelar.  · States she cannot have colonoscopies due to tortuous colon and therefore has virtual colonoscopies.  · She states she saw Dr. Avelar after the 6/27/2019 visit with me due to recent dark stools.  She states Dr. Avelar did a rectal exam which was heme negative and recommended no further evaluation.  · Today, 8/22/2019, I told her I suspect she is having occult GI bleeding considering she is needing IV iron frequently.  However, with new stroke mid May 2019 and the addition of Plavix to aspirin, risk may be too great to stop antiplatelet agents for further GI evaluation.  Patient and  agree.    *Macrocytosis.  B12 and folate unremarkable    *Reticulocytosis.  Because of this, check hemolysis labs    *Circulating nucleated red blood cells.  Intermittent.  Could consider a bone marrow biopsy at some point.    *Stroke mid August 2019.  Presumed.  Could not have MRI due to pacemaker.  Plavix was added to aspirin.    Plan  · 2 doses Injectafer, first  dose today if possible.  · Weekly CBC with Procrit if Hb <10  · MD 6 weeks.  1 week prior: Iron labs  · Hemolysis labs today  · Could consider a bone marrow biopsy in the future.       assisted with history.  I reviewed and summarized hospital records      Send a copy this to Dr. Earl Avelar, GI

## 2019-08-29 ENCOUNTER — APPOINTMENT (OUTPATIENT)
Dept: OTHER | Facility: HOSPITAL | Age: 79
End: 2019-08-29

## 2019-08-29 ENCOUNTER — INFUSION (OUTPATIENT)
Dept: ONCOLOGY | Facility: HOSPITAL | Age: 79
End: 2019-08-29

## 2019-08-29 ENCOUNTER — HOSPITAL ENCOUNTER (INPATIENT)
Facility: HOSPITAL | Age: 79
LOS: 3 days | Discharge: HOME HEALTH | End: 2019-09-02
Attending: EMERGENCY MEDICINE | Admitting: HOSPITALIST

## 2019-08-29 ENCOUNTER — READMISSION MANAGEMENT (OUTPATIENT)
Dept: CALL CENTER | Facility: HOSPITAL | Age: 79
End: 2019-08-29

## 2019-08-29 VITALS
DIASTOLIC BLOOD PRESSURE: 52 MMHG | RESPIRATION RATE: 18 BRPM | SYSTOLIC BLOOD PRESSURE: 94 MMHG | WEIGHT: 156.8 LBS | HEIGHT: 66 IN | BODY MASS INDEX: 25.2 KG/M2 | OXYGEN SATURATION: 98 % | TEMPERATURE: 97.5 F | HEART RATE: 62 BPM

## 2019-08-29 DIAGNOSIS — N18.30 CHRONIC KIDNEY DISEASE, STAGE III (MODERATE) (HCC): ICD-10-CM

## 2019-08-29 DIAGNOSIS — IMO0001 ADVERSE EFFECT OF IRON OR ITS COMPOUND, SUBSEQUENT ENCOUNTER: ICD-10-CM

## 2019-08-29 DIAGNOSIS — D63.8 ANEMIA OF CHRONIC DISEASE: ICD-10-CM

## 2019-08-29 DIAGNOSIS — Z95.810 AUTOMATIC IMPLANTABLE CARDIOVERTER-DEFIBRILLATOR IN SITU: ICD-10-CM

## 2019-08-29 DIAGNOSIS — Z86.73 HISTORY OF RECENT STROKE: ICD-10-CM

## 2019-08-29 DIAGNOSIS — K31.819 GAVE (GASTRIC ANTRAL VASCULAR ECTASIA): ICD-10-CM

## 2019-08-29 DIAGNOSIS — D62 ANEMIA DUE TO ACUTE BLOOD LOSS: ICD-10-CM

## 2019-08-29 DIAGNOSIS — N17.9 ACUTE RENAL FAILURE SUPERIMPOSED ON CHRONIC KIDNEY DISEASE, UNSPECIFIED CKD STAGE, UNSPECIFIED ACUTE RENAL FAILURE TYPE (HCC): ICD-10-CM

## 2019-08-29 DIAGNOSIS — C91.10 CLL (CHRONIC LYMPHOCYTIC LEUKEMIA) (HCC): Primary | ICD-10-CM

## 2019-08-29 DIAGNOSIS — D64.9 SYMPTOMATIC ANEMIA: Primary | ICD-10-CM

## 2019-08-29 DIAGNOSIS — R29.810 WEAKNESS ON LEFT SIDE OF FACE: ICD-10-CM

## 2019-08-29 DIAGNOSIS — N18.9 ACUTE RENAL FAILURE SUPERIMPOSED ON CHRONIC KIDNEY DISEASE, UNSPECIFIED CKD STAGE, UNSPECIFIED ACUTE RENAL FAILURE TYPE (HCC): ICD-10-CM

## 2019-08-29 LAB
ABO GROUP BLD: NORMAL
ALBUMIN SERPL-MCNC: 2.8 G/DL (ref 3.5–5.2)
ALBUMIN/GLOB SERPL: 1.2 G/DL
ALP SERPL-CCNC: 40 U/L (ref 39–117)
ALT SERPL W P-5'-P-CCNC: <5 U/L (ref 1–33)
ANION GAP SERPL CALCULATED.3IONS-SCNC: 8.4 MMOL/L (ref 5–15)
AST SERPL-CCNC: 19 U/L (ref 1–32)
BASO STIPL COARSE BLD QL SMEAR: NORMAL
BASOPHILS # BLD AUTO: 0.02 10*3/MM3 (ref 0–0.2)
BASOPHILS NFR BLD AUTO: 0.3 % (ref 0–1.5)
BILIRUB SERPL-MCNC: <0.2 MG/DL (ref 0.2–1.2)
BLD GP AB SCN SERPL QL: NEGATIVE
BUN BLD-MCNC: 90 MG/DL (ref 8–23)
BUN/CREAT SERPL: 26.6 (ref 7–25)
CALCIUM SPEC-SCNC: 9.4 MG/DL (ref 8.6–10.5)
CHLORIDE SERPL-SCNC: 103 MMOL/L (ref 98–107)
CO2 SERPL-SCNC: 28.6 MMOL/L (ref 22–29)
CREAT BLD-MCNC: 3.38 MG/DL (ref 0.57–1)
DEPRECATED RDW RBC AUTO: 69 FL (ref 37–54)
EOSINOPHIL # BLD AUTO: 0.15 10*3/MM3 (ref 0–0.4)
EOSINOPHIL NFR BLD AUTO: 2.2 % (ref 0.3–6.2)
ERYTHROCYTE [DISTWIDTH] IN BLOOD BY AUTOMATED COUNT: 18.3 % (ref 12.3–15.4)
FERRITIN SERPL-MCNC: 422 NG/ML (ref 13–150)
FOLATE SERPL-MCNC: >20 NG/ML (ref 4.78–24.2)
GFR SERPL CREATININE-BSD FRML MDRD: 13 ML/MIN/1.73
GFR SERPL CREATININE-BSD FRML MDRD: ABNORMAL ML/MIN/{1.73_M2}
GLOBULIN UR ELPH-MCNC: 2.4 GM/DL
GLUCOSE BLD-MCNC: 100 MG/DL (ref 65–99)
HCT VFR BLD AUTO: 17.4 % (ref 34–46.6)
HGB BLD-MCNC: 5.3 G/DL (ref 12–15.9)
HYPOCHROMIA BLD QL: NORMAL
IMM GRANULOCYTES # BLD AUTO: 0.05 10*3/MM3 (ref 0–0.05)
IMM GRANULOCYTES NFR BLD AUTO: 0.7 % (ref 0–0.5)
IRON 24H UR-MRATE: 357 MCG/DL (ref 37–145)
IRON SATN MFR SERPL: >101 % (ref 20–50)
LYMPHOCYTES # BLD AUTO: 0.56 10*3/MM3 (ref 0.7–3.1)
LYMPHOCYTES NFR BLD AUTO: 8.3 % (ref 19.6–45.3)
MACROCYTES BLD QL SMEAR: NORMAL
MCH RBC QN AUTO: 31.9 PG (ref 26.6–33)
MCHC RBC AUTO-ENTMCNC: 30.5 G/DL (ref 31.5–35.7)
MCV RBC AUTO: 104.8 FL (ref 79–97)
MONOCYTES # BLD AUTO: 0.51 10*3/MM3 (ref 0.1–0.9)
MONOCYTES NFR BLD AUTO: 7.5 % (ref 5–12)
NEUTROPHILS # BLD AUTO: 5.48 10*3/MM3 (ref 1.7–7)
NEUTROPHILS NFR BLD AUTO: 81 % (ref 42.7–76)
NRBC BLD AUTO-RTO: 0.3 /100 WBC (ref 0–0.2)
PLAT MORPH BLD: NORMAL
PLATELET # BLD AUTO: 272 10*3/MM3 (ref 140–450)
PMV BLD AUTO: 9.8 FL (ref 6–12)
POTASSIUM BLD-SCNC: 4.4 MMOL/L (ref 3.5–5.2)
PROT SERPL-MCNC: 5.2 G/DL (ref 6–8.5)
RBC # BLD AUTO: 1.66 10*6/MM3 (ref 3.77–5.28)
RH BLD: NEGATIVE
SODIUM BLD-SCNC: 140 MMOL/L (ref 136–145)
T&S EXPIRATION DATE: NORMAL
TIBC SERPL-MCNC: 288 MCG/DL (ref 298–536)
TRANSFERRIN SERPL-MCNC: 193 MG/DL (ref 200–360)
TSH SERPL DL<=0.05 MIU/L-ACNC: 1.96 UIU/ML (ref 0.27–4.2)
VIT B12 BLD-MCNC: 1301 PG/ML (ref 211–946)
WBC MORPH BLD: NORMAL
WBC NRBC COR # BLD: 6.77 10*3/MM3 (ref 3.4–10.8)

## 2019-08-29 PROCEDURE — G0378 HOSPITAL OBSERVATION PER HR: HCPCS

## 2019-08-29 PROCEDURE — 86901 BLOOD TYPING SEROLOGIC RH(D): CPT | Performed by: EMERGENCY MEDICINE

## 2019-08-29 PROCEDURE — 80053 COMPREHEN METABOLIC PANEL: CPT | Performed by: EMERGENCY MEDICINE

## 2019-08-29 PROCEDURE — 82607 VITAMIN B-12: CPT | Performed by: HOSPITALIST

## 2019-08-29 PROCEDURE — 99284 EMERGENCY DEPT VISIT MOD MDM: CPT

## 2019-08-29 PROCEDURE — P9016 RBC LEUKOCYTES REDUCED: HCPCS

## 2019-08-29 PROCEDURE — 25010000002 FERRIC CARBOXYMALTOSE 750 MG/15ML SOLUTION 15 ML VIAL: Performed by: NURSE PRACTITIONER

## 2019-08-29 PROCEDURE — 86923 COMPATIBILITY TEST ELECTRIC: CPT

## 2019-08-29 PROCEDURE — 83540 ASSAY OF IRON: CPT | Performed by: HOSPITALIST

## 2019-08-29 PROCEDURE — 36430 TRANSFUSION BLD/BLD COMPNT: CPT

## 2019-08-29 PROCEDURE — 86900 BLOOD TYPING SEROLOGIC ABO: CPT | Performed by: EMERGENCY MEDICINE

## 2019-08-29 PROCEDURE — 84466 ASSAY OF TRANSFERRIN: CPT | Performed by: HOSPITALIST

## 2019-08-29 PROCEDURE — 85025 COMPLETE CBC W/AUTO DIFF WBC: CPT | Performed by: NURSE PRACTITIONER

## 2019-08-29 PROCEDURE — 82746 ASSAY OF FOLIC ACID SERUM: CPT | Performed by: HOSPITALIST

## 2019-08-29 PROCEDURE — 63710000001 PROCHLORPERAZINE MALEATE PER 5 MG: Performed by: NURSE PRACTITIONER

## 2019-08-29 PROCEDURE — 96374 THER/PROPH/DIAG INJ IV PUSH: CPT | Performed by: NURSE PRACTITIONER

## 2019-08-29 PROCEDURE — 86900 BLOOD TYPING SEROLOGIC ABO: CPT

## 2019-08-29 PROCEDURE — 86850 RBC ANTIBODY SCREEN: CPT | Performed by: EMERGENCY MEDICINE

## 2019-08-29 PROCEDURE — 82728 ASSAY OF FERRITIN: CPT | Performed by: HOSPITALIST

## 2019-08-29 PROCEDURE — 84443 ASSAY THYROID STIM HORMONE: CPT | Performed by: HOSPITALIST

## 2019-08-29 PROCEDURE — 85007 BL SMEAR W/DIFF WBC COUNT: CPT | Performed by: NURSE PRACTITIONER

## 2019-08-29 RX ORDER — LEVOTHYROXINE SODIUM 0.03 MG/1
25 TABLET ORAL
Status: DISCONTINUED | OUTPATIENT
Start: 2019-08-30 | End: 2019-09-02 | Stop reason: HOSPADM

## 2019-08-29 RX ORDER — FUROSEMIDE 40 MG/1
40 TABLET ORAL DAILY
Status: DISCONTINUED | OUTPATIENT
Start: 2019-08-29 | End: 2019-09-02 | Stop reason: HOSPADM

## 2019-08-29 RX ORDER — ASPIRIN 81 MG/1
81 TABLET ORAL DAILY
Status: DISCONTINUED | OUTPATIENT
Start: 2019-08-29 | End: 2019-09-02 | Stop reason: HOSPADM

## 2019-08-29 RX ORDER — ATORVASTATIN CALCIUM 20 MG/1
40 TABLET, FILM COATED ORAL NIGHTLY
Status: DISCONTINUED | OUTPATIENT
Start: 2019-08-29 | End: 2019-09-02 | Stop reason: HOSPADM

## 2019-08-29 RX ORDER — CARBIDOPA AND LEVODOPA 25; 100 MG/1; MG/1
1 TABLET, EXTENDED RELEASE ORAL NIGHTLY
Status: DISCONTINUED | OUTPATIENT
Start: 2019-08-29 | End: 2019-09-02 | Stop reason: HOSPADM

## 2019-08-29 RX ORDER — GABAPENTIN 300 MG/1
300 CAPSULE ORAL NIGHTLY
Status: DISCONTINUED | OUTPATIENT
Start: 2019-08-29 | End: 2019-09-02 | Stop reason: HOSPADM

## 2019-08-29 RX ORDER — LOPERAMIDE HYDROCHLORIDE 2 MG/1
2 CAPSULE ORAL 4 TIMES DAILY PRN
Status: DISCONTINUED | OUTPATIENT
Start: 2019-08-29 | End: 2019-09-02 | Stop reason: HOSPADM

## 2019-08-29 RX ORDER — FAMOTIDINE 20 MG/1
20 TABLET, FILM COATED ORAL 2 TIMES DAILY
Status: DISCONTINUED | OUTPATIENT
Start: 2019-08-29 | End: 2019-08-30

## 2019-08-29 RX ORDER — ONDANSETRON 2 MG/ML
4 INJECTION INTRAMUSCULAR; INTRAVENOUS EVERY 6 HOURS PRN
Status: DISCONTINUED | OUTPATIENT
Start: 2019-08-29 | End: 2019-09-02 | Stop reason: HOSPADM

## 2019-08-29 RX ORDER — CALCITRIOL 0.25 UG/1
0.25 CAPSULE, LIQUID FILLED ORAL EVERY OTHER DAY
Status: DISCONTINUED | OUTPATIENT
Start: 2019-08-30 | End: 2019-09-02 | Stop reason: HOSPADM

## 2019-08-29 RX ORDER — ACETAMINOPHEN 325 MG/1
650 TABLET ORAL EVERY 6 HOURS PRN
Status: DISCONTINUED | OUTPATIENT
Start: 2019-08-29 | End: 2019-09-02 | Stop reason: HOSPADM

## 2019-08-29 RX ORDER — SODIUM CHLORIDE 0.9 % (FLUSH) 0.9 %
10 SYRINGE (ML) INJECTION AS NEEDED
Status: DISCONTINUED | OUTPATIENT
Start: 2019-08-29 | End: 2019-09-02 | Stop reason: HOSPADM

## 2019-08-29 RX ORDER — CARVEDILOL 6.25 MG/1
6.25 TABLET ORAL DAILY
Status: DISCONTINUED | OUTPATIENT
Start: 2019-08-29 | End: 2019-08-30

## 2019-08-29 RX ORDER — HYDROCODONE BITARTRATE AND ACETAMINOPHEN 5; 325 MG/1; MG/1
1 TABLET ORAL EVERY 4 HOURS PRN
Status: DISCONTINUED | OUTPATIENT
Start: 2019-08-29 | End: 2019-09-02 | Stop reason: HOSPADM

## 2019-08-29 RX ORDER — PROCHLORPERAZINE MALEATE 5 MG/1
10 TABLET ORAL ONCE
Status: COMPLETED | OUTPATIENT
Start: 2019-08-29 | End: 2019-08-29

## 2019-08-29 RX ORDER — SODIUM CHLORIDE 9 MG/ML
250 INJECTION, SOLUTION INTRAVENOUS ONCE
Status: COMPLETED | OUTPATIENT
Start: 2019-08-29 | End: 2019-08-29

## 2019-08-29 RX ORDER — CLOPIDOGREL BISULFATE 75 MG/1
75 TABLET ORAL DAILY
Status: DISCONTINUED | OUTPATIENT
Start: 2019-08-29 | End: 2019-09-02 | Stop reason: HOSPADM

## 2019-08-29 RX ORDER — DIPHENOXYLATE HYDROCHLORIDE AND ATROPINE SULFATE 2.5; .025 MG/1; MG/1
1 TABLET ORAL DAILY
Status: DISCONTINUED | OUTPATIENT
Start: 2019-08-29 | End: 2019-09-02 | Stop reason: HOSPADM

## 2019-08-29 RX ORDER — FEBUXOSTAT 40 MG/1
40 TABLET, FILM COATED ORAL DAILY
Status: DISCONTINUED | OUTPATIENT
Start: 2019-08-29 | End: 2019-09-02 | Stop reason: HOSPADM

## 2019-08-29 RX ORDER — SPIRONOLACTONE 25 MG/1
12.5 TABLET ORAL DAILY
Status: DISCONTINUED | OUTPATIENT
Start: 2019-08-29 | End: 2019-08-30

## 2019-08-29 RX ORDER — SODIUM CHLORIDE 0.9 % (FLUSH) 0.9 %
10 SYRINGE (ML) INJECTION EVERY 12 HOURS SCHEDULED
Status: DISCONTINUED | OUTPATIENT
Start: 2019-08-29 | End: 2019-09-02 | Stop reason: HOSPADM

## 2019-08-29 RX ORDER — DIAZEPAM 5 MG/1
5 TABLET ORAL NIGHTLY
Status: DISCONTINUED | OUTPATIENT
Start: 2019-08-29 | End: 2019-09-02 | Stop reason: HOSPADM

## 2019-08-29 RX ADMIN — CARBIDOPA AND LEVODOPA 1 TABLET: 25; 100 TABLET, EXTENDED RELEASE ORAL at 20:50

## 2019-08-29 RX ADMIN — FERRIC CARBOXYMALTOSE INJECTION 750 MG: 50 INJECTION, SOLUTION INTRAVENOUS at 10:18

## 2019-08-29 RX ADMIN — Medication 1 TABLET: at 18:39

## 2019-08-29 RX ADMIN — DIAZEPAM 5 MG: 5 TABLET ORAL at 20:50

## 2019-08-29 RX ADMIN — SODIUM CHLORIDE 250 ML: 900 INJECTION, SOLUTION INTRAVENOUS at 10:17

## 2019-08-29 RX ADMIN — HYDROCODONE BITARTRATE AND ACETAMINOPHEN 1 TABLET: 5; 325 TABLET ORAL at 19:56

## 2019-08-29 RX ADMIN — GABAPENTIN 300 MG: 300 CAPSULE ORAL at 18:39

## 2019-08-29 RX ADMIN — PROCHLORPERAZINE MALEATE 10 MG: 5 TABLET, FILM COATED ORAL at 10:17

## 2019-08-29 RX ADMIN — ATORVASTATIN CALCIUM 40 MG: 20 TABLET, FILM COATED ORAL at 20:50

## 2019-08-29 RX ADMIN — FAMOTIDINE 20 MG: 20 TABLET, FILM COATED ORAL at 20:50

## 2019-08-29 RX ADMIN — CLOPIDOGREL 75 MG: 75 TABLET, FILM COATED ORAL at 18:39

## 2019-08-29 RX ADMIN — ASPIRIN 81 MG: 81 TABLET, COATED ORAL at 18:39

## 2019-08-29 RX ADMIN — SODIUM CHLORIDE, POTASSIUM CHLORIDE, SODIUM LACTATE AND CALCIUM CHLORIDE 1000 ML: 600; 310; 30; 20 INJECTION, SOLUTION INTRAVENOUS at 12:50

## 2019-08-29 RX ADMIN — FEBUXOSTAT 40 MG: 40 TABLET, FILM COATED ORAL at 18:39

## 2019-08-29 NOTE — PROGRESS NOTES
Patient complaining of fatigue and not feeling well. Previous stroke. CBC drawn HGB 5.3. MEL Paula sending patient to ED per ambulance.

## 2019-08-29 NOTE — OUTREACH NOTE
Stroke Week 2 Survey      Responses   Facility patient discharged from?  [facility]   Does the patient have one of the following disease processes/diagnoses(primary or secondary)?  Stroke (TIA)   Week 2 attempt successful?  No [Patient noted currently in the ER. ]   Unsuccessful attempts  Attempt 1          Ekta Rincon RN

## 2019-08-30 ENCOUNTER — APPOINTMENT (OUTPATIENT)
Dept: CT IMAGING | Facility: HOSPITAL | Age: 79
End: 2019-08-30

## 2019-08-30 ENCOUNTER — READMISSION MANAGEMENT (OUTPATIENT)
Dept: CALL CENTER | Facility: HOSPITAL | Age: 79
End: 2019-08-30

## 2019-08-30 PROBLEM — Z86.73 HISTORY OF RECENT STROKE: Status: ACTIVE | Noted: 2019-08-30

## 2019-08-30 LAB
ABO + RH BLD: NORMAL
ABO + RH BLD: NORMAL
ANION GAP SERPL CALCULATED.3IONS-SCNC: 8.2 MMOL/L (ref 5–15)
BASOPHILS # BLD AUTO: 0.01 10*3/MM3 (ref 0–0.2)
BASOPHILS NFR BLD AUTO: 0.2 % (ref 0–1.5)
BH BB BLOOD EXPIRATION DATE: NORMAL
BH BB BLOOD EXPIRATION DATE: NORMAL
BH BB BLOOD TYPE BARCODE: 600
BH BB BLOOD TYPE BARCODE: 600
BH BB DISPENSE STATUS: NORMAL
BH BB DISPENSE STATUS: NORMAL
BH BB PRODUCT CODE: NORMAL
BH BB PRODUCT CODE: NORMAL
BH BB UNIT NUMBER: NORMAL
BH BB UNIT NUMBER: NORMAL
BUN BLD-MCNC: 78 MG/DL (ref 8–23)
BUN/CREAT SERPL: 24.1 (ref 7–25)
CALCIUM SPEC-SCNC: 9.1 MG/DL (ref 8.6–10.5)
CHLORIDE SERPL-SCNC: 104 MMOL/L (ref 98–107)
CO2 SERPL-SCNC: 28.8 MMOL/L (ref 22–29)
CREAT BLD-MCNC: 3.23 MG/DL (ref 0.57–1)
DEPRECATED RDW RBC AUTO: 72.1 FL (ref 37–54)
EOSINOPHIL # BLD AUTO: 0.12 10*3/MM3 (ref 0–0.4)
EOSINOPHIL NFR BLD AUTO: 2.9 % (ref 0.3–6.2)
ERYTHROCYTE [DISTWIDTH] IN BLOOD BY AUTOMATED COUNT: 20.4 % (ref 12.3–15.4)
GFR SERPL CREATININE-BSD FRML MDRD: 14 ML/MIN/1.73
GFR SERPL CREATININE-BSD FRML MDRD: ABNORMAL ML/MIN/{1.73_M2}
GLUCOSE BLD-MCNC: 89 MG/DL (ref 65–99)
HCT VFR BLD AUTO: 23.1 % (ref 34–46.6)
HCT VFR BLD AUTO: 26.7 % (ref 34–46.6)
HEMOCCULT STL QL: POSITIVE
HGB BLD-MCNC: 6.9 G/DL (ref 12–15.9)
HGB BLD-MCNC: 8.4 G/DL (ref 12–15.9)
IMM GRANULOCYTES # BLD AUTO: 0.04 10*3/MM3 (ref 0–0.05)
IMM GRANULOCYTES NFR BLD AUTO: 1 % (ref 0–0.5)
LYMPHOCYTES # BLD AUTO: 0.49 10*3/MM3 (ref 0.7–3.1)
LYMPHOCYTES NFR BLD AUTO: 11.7 % (ref 19.6–45.3)
MCH RBC QN AUTO: 30 PG (ref 26.6–33)
MCHC RBC AUTO-ENTMCNC: 29.9 G/DL (ref 31.5–35.7)
MCV RBC AUTO: 100.4 FL (ref 79–97)
MONOCYTES # BLD AUTO: 0.34 10*3/MM3 (ref 0.1–0.9)
MONOCYTES NFR BLD AUTO: 8.1 % (ref 5–12)
NEUTROPHILS # BLD AUTO: 3.2 10*3/MM3 (ref 1.7–7)
NEUTROPHILS NFR BLD AUTO: 76.1 % (ref 42.7–76)
NRBC BLD AUTO-RTO: 0.5 /100 WBC (ref 0–0.2)
PLATELET # BLD AUTO: 183 10*3/MM3 (ref 140–450)
PMV BLD AUTO: 9.5 FL (ref 6–12)
POTASSIUM BLD-SCNC: 4.4 MMOL/L (ref 3.5–5.2)
RBC # BLD AUTO: 2.3 10*6/MM3 (ref 3.77–5.28)
SODIUM BLD-SCNC: 141 MMOL/L (ref 136–145)
UNIT  ABO: NORMAL
UNIT  ABO: NORMAL
UNIT  RH: NORMAL
UNIT  RH: NORMAL
WBC NRBC COR # BLD: 4.2 10*3/MM3 (ref 3.4–10.8)

## 2019-08-30 PROCEDURE — P9016 RBC LEUKOCYTES REDUCED: HCPCS

## 2019-08-30 PROCEDURE — 25010000002 FUROSEMIDE PER 20 MG: Performed by: INTERNAL MEDICINE

## 2019-08-30 PROCEDURE — 85014 HEMATOCRIT: CPT | Performed by: HOSPITALIST

## 2019-08-30 PROCEDURE — 82272 OCCULT BLD FECES 1-3 TESTS: CPT | Performed by: HOSPITALIST

## 2019-08-30 PROCEDURE — 86900 BLOOD TYPING SEROLOGIC ABO: CPT

## 2019-08-30 PROCEDURE — 70450 CT HEAD/BRAIN W/O DYE: CPT

## 2019-08-30 PROCEDURE — 36430 TRANSFUSION BLD/BLD COMPNT: CPT

## 2019-08-30 PROCEDURE — 92610 EVALUATE SWALLOWING FUNCTION: CPT

## 2019-08-30 PROCEDURE — 99222 1ST HOSP IP/OBS MODERATE 55: CPT | Performed by: INTERNAL MEDICINE

## 2019-08-30 PROCEDURE — 85025 COMPLETE CBC W/AUTO DIFF WBC: CPT | Performed by: HOSPITALIST

## 2019-08-30 PROCEDURE — 80048 BASIC METABOLIC PNL TOTAL CA: CPT | Performed by: HOSPITALIST

## 2019-08-30 PROCEDURE — 85018 HEMOGLOBIN: CPT | Performed by: HOSPITALIST

## 2019-08-30 RX ORDER — BISACODYL 10 MG
10 SUPPOSITORY, RECTAL RECTAL DAILY
Status: DISCONTINUED | OUTPATIENT
Start: 2019-08-30 | End: 2019-09-02 | Stop reason: HOSPADM

## 2019-08-30 RX ORDER — FUROSEMIDE 10 MG/ML
20 INJECTION INTRAMUSCULAR; INTRAVENOUS ONCE
Status: DISCONTINUED | OUTPATIENT
Start: 2019-08-30 | End: 2019-08-30

## 2019-08-30 RX ORDER — FAMOTIDINE 20 MG/1
20 TABLET, FILM COATED ORAL DAILY
Status: DISCONTINUED | OUTPATIENT
Start: 2019-08-31 | End: 2019-09-02 | Stop reason: HOSPADM

## 2019-08-30 RX ORDER — FUROSEMIDE 10 MG/ML
20 INJECTION INTRAMUSCULAR; INTRAVENOUS ONCE AS NEEDED
Status: COMPLETED | OUTPATIENT
Start: 2019-08-30 | End: 2019-08-30

## 2019-08-30 RX ORDER — CARVEDILOL 3.12 MG/1
3.12 TABLET ORAL DAILY
Status: DISCONTINUED | OUTPATIENT
Start: 2019-08-31 | End: 2019-09-02 | Stop reason: HOSPADM

## 2019-08-30 RX ADMIN — CARBIDOPA AND LEVODOPA 1 TABLET: 25; 100 TABLET, EXTENDED RELEASE ORAL at 20:13

## 2019-08-30 RX ADMIN — FUROSEMIDE 40 MG: 40 TABLET ORAL at 11:05

## 2019-08-30 RX ADMIN — ATORVASTATIN CALCIUM 40 MG: 20 TABLET, FILM COATED ORAL at 20:14

## 2019-08-30 RX ADMIN — DIAZEPAM 5 MG: 5 TABLET ORAL at 20:14

## 2019-08-30 RX ADMIN — ASPIRIN 81 MG: 81 TABLET, COATED ORAL at 11:05

## 2019-08-30 RX ADMIN — BISACODYL 10 MG: 10 SUPPOSITORY RECTAL at 16:42

## 2019-08-30 RX ADMIN — LEVOTHYROXINE SODIUM 25 MCG: 25 TABLET ORAL at 06:15

## 2019-08-30 RX ADMIN — CALCITRIOL 0.25 MCG: 0.25 CAPSULE ORAL at 11:05

## 2019-08-30 RX ADMIN — FUROSEMIDE 20 MG: 10 INJECTION, SOLUTION INTRAMUSCULAR; INTRAVENOUS at 16:42

## 2019-08-30 RX ADMIN — SODIUM CHLORIDE, PRESERVATIVE FREE 10 ML: 5 INJECTION INTRAVENOUS at 20:15

## 2019-08-30 RX ADMIN — FEBUXOSTAT 40 MG: 40 TABLET, FILM COATED ORAL at 11:05

## 2019-08-30 RX ADMIN — SPIRONOLACTONE 12.5 MG: 25 TABLET, FILM COATED ORAL at 11:04

## 2019-08-30 RX ADMIN — CLOPIDOGREL 75 MG: 75 TABLET, FILM COATED ORAL at 11:05

## 2019-08-30 RX ADMIN — CARVEDILOL 3.12 MG: 6.25 TABLET, FILM COATED ORAL at 11:06

## 2019-08-30 RX ADMIN — GABAPENTIN 300 MG: 300 CAPSULE ORAL at 17:37

## 2019-08-30 RX ADMIN — Medication 1 TABLET: at 11:05

## 2019-08-30 NOTE — OUTREACH NOTE
Stroke Week 2 Survey      Responses   Facility patient discharged from?  [facility]   Does the patient have one of the following disease processes/diagnoses(primary or secondary)?  Stroke (TIA)   Week 2 attempt successful?  No   Revoke  Readmitted          Tania Qureshi RN

## 2019-08-31 ENCOUNTER — DOCUMENTATION (OUTPATIENT)
Dept: ONCOLOGY | Facility: CLINIC | Age: 79
End: 2019-08-31

## 2019-08-31 PROBLEM — D62 ANEMIA DUE TO ACUTE BLOOD LOSS: Status: ACTIVE | Noted: 2019-08-29

## 2019-08-31 LAB
ABO + RH BLD: NORMAL
ALBUMIN SERPL-MCNC: 3.2 G/DL (ref 3.5–5.2)
ANION GAP SERPL CALCULATED.3IONS-SCNC: 6.7 MMOL/L (ref 5–15)
BASOPHILS # BLD AUTO: 0.01 10*3/MM3 (ref 0–0.2)
BASOPHILS NFR BLD AUTO: 0.2 % (ref 0–1.5)
BH BB BLOOD EXPIRATION DATE: NORMAL
BH BB BLOOD TYPE BARCODE: 600
BH BB DISPENSE STATUS: NORMAL
BH BB PRODUCT CODE: NORMAL
BH BB UNIT NUMBER: NORMAL
BUN BLD-MCNC: 74 MG/DL (ref 8–23)
BUN/CREAT SERPL: 25.5 (ref 7–25)
CALCIUM SPEC-SCNC: 9.6 MG/DL (ref 8.6–10.5)
CHLORIDE SERPL-SCNC: 103 MMOL/L (ref 98–107)
CO2 SERPL-SCNC: 29.3 MMOL/L (ref 22–29)
CREAT BLD-MCNC: 2.9 MG/DL (ref 0.57–1)
DEPRECATED RDW RBC AUTO: 65.9 FL (ref 37–54)
EOSINOPHIL # BLD AUTO: 0.24 10*3/MM3 (ref 0–0.4)
EOSINOPHIL NFR BLD AUTO: 4 % (ref 0.3–6.2)
ERYTHROCYTE [DISTWIDTH] IN BLOOD BY AUTOMATED COUNT: 18.7 % (ref 12.3–15.4)
GFR SERPL CREATININE-BSD FRML MDRD: 16 ML/MIN/1.73
GLUCOSE BLD-MCNC: 95 MG/DL (ref 65–99)
HCT VFR BLD AUTO: 27 % (ref 34–46.6)
HGB BLD-MCNC: 8.7 G/DL (ref 12–15.9)
IMM GRANULOCYTES # BLD AUTO: 0.03 10*3/MM3 (ref 0–0.05)
IMM GRANULOCYTES NFR BLD AUTO: 0.5 % (ref 0–0.5)
LYMPHOCYTES # BLD AUTO: 0.53 10*3/MM3 (ref 0.7–3.1)
LYMPHOCYTES NFR BLD AUTO: 8.8 % (ref 19.6–45.3)
MCH RBC QN AUTO: 32.2 PG (ref 26.6–33)
MCHC RBC AUTO-ENTMCNC: 32.2 G/DL (ref 31.5–35.7)
MCV RBC AUTO: 100 FL (ref 79–97)
MONOCYTES # BLD AUTO: 0.47 10*3/MM3 (ref 0.1–0.9)
MONOCYTES NFR BLD AUTO: 7.8 % (ref 5–12)
NEUTROPHILS # BLD AUTO: 4.71 10*3/MM3 (ref 1.7–7)
NEUTROPHILS NFR BLD AUTO: 78.7 % (ref 42.7–76)
NRBC BLD AUTO-RTO: 0.2 /100 WBC (ref 0–0.2)
PHOSPHATE SERPL-MCNC: 3.4 MG/DL (ref 2.5–4.5)
PLATELET # BLD AUTO: 195 10*3/MM3 (ref 140–450)
PMV BLD AUTO: 9.5 FL (ref 6–12)
POTASSIUM BLD-SCNC: 4.1 MMOL/L (ref 3.5–5.2)
RBC # BLD AUTO: 2.7 10*6/MM3 (ref 3.77–5.28)
SODIUM BLD-SCNC: 139 MMOL/L (ref 136–145)
UNIT  ABO: NORMAL
UNIT  RH: NORMAL
WBC NRBC COR # BLD: 5.99 10*3/MM3 (ref 3.4–10.8)

## 2019-08-31 PROCEDURE — 99222 1ST HOSP IP/OBS MODERATE 55: CPT | Performed by: INTERNAL MEDICINE

## 2019-08-31 PROCEDURE — 85025 COMPLETE CBC W/AUTO DIFF WBC: CPT | Performed by: INTERNAL MEDICINE

## 2019-08-31 PROCEDURE — 99231 SBSQ HOSP IP/OBS SF/LOW 25: CPT | Performed by: INTERNAL MEDICINE

## 2019-08-31 PROCEDURE — 80069 RENAL FUNCTION PANEL: CPT | Performed by: INTERNAL MEDICINE

## 2019-08-31 RX ADMIN — FUROSEMIDE 40 MG: 40 TABLET ORAL at 09:32

## 2019-08-31 RX ADMIN — ASPIRIN 81 MG: 81 TABLET, COATED ORAL at 09:32

## 2019-08-31 RX ADMIN — Medication 1 TABLET: at 09:32

## 2019-08-31 RX ADMIN — GABAPENTIN 300 MG: 300 CAPSULE ORAL at 18:06

## 2019-08-31 RX ADMIN — SODIUM CHLORIDE, PRESERVATIVE FREE 10 ML: 5 INJECTION INTRAVENOUS at 09:33

## 2019-08-31 RX ADMIN — FAMOTIDINE 20 MG: 20 TABLET, FILM COATED ORAL at 09:32

## 2019-08-31 RX ADMIN — CLOPIDOGREL 75 MG: 75 TABLET, FILM COATED ORAL at 09:32

## 2019-08-31 RX ADMIN — DIAZEPAM 5 MG: 5 TABLET ORAL at 20:32

## 2019-08-31 RX ADMIN — SODIUM CHLORIDE, PRESERVATIVE FREE 10 ML: 5 INJECTION INTRAVENOUS at 20:32

## 2019-08-31 RX ADMIN — CARBIDOPA AND LEVODOPA 1 TABLET: 25; 100 TABLET, EXTENDED RELEASE ORAL at 20:32

## 2019-08-31 RX ADMIN — LEVOTHYROXINE SODIUM 25 MCG: 25 TABLET ORAL at 05:47

## 2019-08-31 RX ADMIN — CARVEDILOL 3.12 MG: 6.25 TABLET, FILM COATED ORAL at 09:32

## 2019-08-31 RX ADMIN — FEBUXOSTAT 40 MG: 40 TABLET, FILM COATED ORAL at 09:32

## 2019-08-31 RX ADMIN — ATORVASTATIN CALCIUM 40 MG: 20 TABLET, FILM COATED ORAL at 20:32

## 2019-08-31 NOTE — PROGRESS NOTES
Patient's  calls today indicating that her pain persist.  They are nearly out of her hydrocodone and we discussed that, ordinarily, this is not refilled on the weekend but it is the holiday weekend and the record is clear per her need.  Plan to prescribe 40 of the hydrocodone tablets and she is to be seen next week as scheduled.

## 2019-09-01 ENCOUNTER — APPOINTMENT (OUTPATIENT)
Dept: CARDIOLOGY | Facility: HOSPITAL | Age: 79
End: 2019-09-01

## 2019-09-01 ENCOUNTER — ANESTHESIA EVENT (OUTPATIENT)
Dept: GASTROENTEROLOGY | Facility: HOSPITAL | Age: 79
End: 2019-09-01

## 2019-09-01 ENCOUNTER — ANESTHESIA (OUTPATIENT)
Dept: GASTROENTEROLOGY | Facility: HOSPITAL | Age: 79
End: 2019-09-01

## 2019-09-01 PROBLEM — K31.819 GAVE (GASTRIC ANTRAL VASCULAR ECTASIA): Status: ACTIVE | Noted: 2019-09-01

## 2019-09-01 LAB
ANION GAP SERPL CALCULATED.3IONS-SCNC: 9.6 MMOL/L (ref 5–15)
AORTIC DIMENSIONLESS INDEX: 0.5 (DI)
BH CV ECHO MEAS - ACS: 1.5 CM
BH CV ECHO MEAS - AO MAX PG (FULL): 15.7 MMHG
BH CV ECHO MEAS - AO MAX PG: 19.4 MMHG
BH CV ECHO MEAS - AO MEAN PG (FULL): 7 MMHG
BH CV ECHO MEAS - AO MEAN PG: 9 MMHG
BH CV ECHO MEAS - AO ROOT AREA (BSA CORRECTED): 1.7
BH CV ECHO MEAS - AO ROOT AREA: 7.5 CM^2
BH CV ECHO MEAS - AO ROOT DIAM: 3.1 CM
BH CV ECHO MEAS - AO V2 MAX: 220 CM/SEC
BH CV ECHO MEAS - AO V2 MEAN: 139 CM/SEC
BH CV ECHO MEAS - AO V2 VTI: 49.2 CM
BH CV ECHO MEAS - AVA(I,A): 1.6 CM^2
BH CV ECHO MEAS - AVA(I,D): 1.6 CM^2
BH CV ECHO MEAS - AVA(V,A): 1.4 CM^2
BH CV ECHO MEAS - AVA(V,D): 1.4 CM^2
BH CV ECHO MEAS - BSA(HAYCOCK): 1.9 M^2
BH CV ECHO MEAS - BSA: 1.8 M^2
BH CV ECHO MEAS - BZI_BMI: 26.3 KILOGRAMS/M^2
BH CV ECHO MEAS - BZI_METRIC_HEIGHT: 167.6 CM
BH CV ECHO MEAS - BZI_METRIC_WEIGHT: 73.9 KG
BH CV ECHO MEAS - EDV(MOD-SP2): 83 ML
BH CV ECHO MEAS - EDV(MOD-SP4): 75 ML
BH CV ECHO MEAS - EDV(TEICH): 107.5 ML
BH CV ECHO MEAS - EF(CUBED): 74.6 %
BH CV ECHO MEAS - EF(MOD-BP): 57 %
BH CV ECHO MEAS - EF(MOD-SP2): 53 %
BH CV ECHO MEAS - EF(MOD-SP4): 61.3 %
BH CV ECHO MEAS - EF(TEICH): 66.4 %
BH CV ECHO MEAS - ESV(MOD-SP2): 39 ML
BH CV ECHO MEAS - ESV(MOD-SP4): 29 ML
BH CV ECHO MEAS - ESV(TEICH): 36.2 ML
BH CV ECHO MEAS - FS: 36.7 %
BH CV ECHO MEAS - IVS/LVPW: 0.85
BH CV ECHO MEAS - IVSD: 0.79 CM
BH CV ECHO MEAS - LV DIASTOLIC VOL/BSA (35-75): 40.9 ML/M^2
BH CV ECHO MEAS - LV MASS(C)D: 139 GRAMS
BH CV ECHO MEAS - LV MASS(C)DI: 75.8 GRAMS/M^2
BH CV ECHO MEAS - LV MAX PG: 3.7 MMHG
BH CV ECHO MEAS - LV MEAN PG: 2 MMHG
BH CV ECHO MEAS - LV SYSTOLIC VOL/BSA (12-30): 15.8 ML/M^2
BH CV ECHO MEAS - LV V1 MAX: 96 CM/SEC
BH CV ECHO MEAS - LV V1 MEAN: 61.6 CM/SEC
BH CV ECHO MEAS - LV V1 VTI: 24.4 CM
BH CV ECHO MEAS - LVIDD: 4.8 CM
BH CV ECHO MEAS - LVIDS: 3 CM
BH CV ECHO MEAS - LVLD AP2: 7 CM
BH CV ECHO MEAS - LVLD AP4: 6.6 CM
BH CV ECHO MEAS - LVLS AP2: 5.8 CM
BH CV ECHO MEAS - LVLS AP4: 5.6 CM
BH CV ECHO MEAS - LVOT AREA (M): 3.1 CM^2
BH CV ECHO MEAS - LVOT AREA: 3.1 CM^2
BH CV ECHO MEAS - LVOT DIAM: 2 CM
BH CV ECHO MEAS - LVPWD: 0.93 CM
BH CV ECHO MEAS - MR MAX PG: 43.8 MMHG
BH CV ECHO MEAS - MR MAX VEL: 331 CM/SEC
BH CV ECHO MEAS - MV A DUR: 0.1 SEC
BH CV ECHO MEAS - MV A MAX VEL: 121 CM/SEC
BH CV ECHO MEAS - MV DEC SLOPE: 309 CM/SEC^2
BH CV ECHO MEAS - MV DEC TIME: 0.22 SEC
BH CV ECHO MEAS - MV E MAX VEL: 76.5 CM/SEC
BH CV ECHO MEAS - MV E/A: 0.63
BH CV ECHO MEAS - MV MAX PG: 5.9 MMHG
BH CV ECHO MEAS - MV MEAN PG: 2 MMHG
BH CV ECHO MEAS - MV P1/2T MAX VEL: 90.7 CM/SEC
BH CV ECHO MEAS - MV P1/2T: 85.9 MSEC
BH CV ECHO MEAS - MV V2 MAX: 121 CM/SEC
BH CV ECHO MEAS - MV V2 MEAN: 68.9 CM/SEC
BH CV ECHO MEAS - MV V2 VTI: 39.7 CM
BH CV ECHO MEAS - MVA P1/2T LCG: 2.4 CM^2
BH CV ECHO MEAS - MVA(P1/2T): 2.6 CM^2
BH CV ECHO MEAS - MVA(VTI): 1.9 CM^2
BH CV ECHO MEAS - PA ACC TIME: 0.1 SEC
BH CV ECHO MEAS - PA MAX PG (FULL): 0.66 MMHG
BH CV ECHO MEAS - PA MAX PG: 2.5 MMHG
BH CV ECHO MEAS - PA PR(ACCEL): 34.5 MMHG
BH CV ECHO MEAS - PA V2 MAX: 78.4 CM/SEC
BH CV ECHO MEAS - PULM A REVS DUR: 0.13 SEC
BH CV ECHO MEAS - PULM A REVS VEL: 22.6 CM/SEC
BH CV ECHO MEAS - PULM DIAS VEL: 37.8 CM/SEC
BH CV ECHO MEAS - PULM S/D: 1.7
BH CV ECHO MEAS - PULM SYS VEL: 66.1 CM/SEC
BH CV ECHO MEAS - PVA(V,A): 2.4 CM^2
BH CV ECHO MEAS - PVA(V,D): 2.4 CM^2
BH CV ECHO MEAS - QP/QS: 0.47
BH CV ECHO MEAS - RAP SYSTOLE: 3 MMHG
BH CV ECHO MEAS - RV MAX PG: 1.8 MMHG
BH CV ECHO MEAS - RV MEAN PG: 1 MMHG
BH CV ECHO MEAS - RV V1 MAX: 67.1 CM/SEC
BH CV ECHO MEAS - RV V1 MEAN: 43.9 CM/SEC
BH CV ECHO MEAS - RV V1 VTI: 12.8 CM
BH CV ECHO MEAS - RVOT AREA: 2.8 CM^2
BH CV ECHO MEAS - RVOT DIAM: 1.9 CM
BH CV ECHO MEAS - RVSP: 28 MMHG
BH CV ECHO MEAS - SI(AO): 202.5 ML/M^2
BH CV ECHO MEAS - SI(CUBED): 45 ML/M^2
BH CV ECHO MEAS - SI(LVOT): 41.8 ML/M^2
BH CV ECHO MEAS - SI(MOD-SP2): 24 ML/M^2
BH CV ECHO MEAS - SI(MOD-SP4): 25.1 ML/M^2
BH CV ECHO MEAS - SI(TEICH): 38.9 ML/M^2
BH CV ECHO MEAS - SV(AO): 371.3 ML
BH CV ECHO MEAS - SV(CUBED): 82.5 ML
BH CV ECHO MEAS - SV(LVOT): 76.7 ML
BH CV ECHO MEAS - SV(MOD-SP2): 44 ML
BH CV ECHO MEAS - SV(MOD-SP4): 46 ML
BH CV ECHO MEAS - SV(RVOT): 36.3 ML
BH CV ECHO MEAS - SV(TEICH): 71.4 ML
BH CV ECHO MEAS - TAPSE (>1.6): 2.1 CM2
BH CV ECHO MEAS - TR MAX VEL: 253 CM/SEC
BH CV XLRA - RV BASE: 4 CM
BUN BLD-MCNC: 74 MG/DL (ref 8–23)
BUN/CREAT SERPL: 25.2 (ref 7–25)
CALCIUM SPEC-SCNC: 8.8 MG/DL (ref 8.6–10.5)
CHLORIDE SERPL-SCNC: 106 MMOL/L (ref 98–107)
CO2 SERPL-SCNC: 28.4 MMOL/L (ref 22–29)
CREAT BLD-MCNC: 2.94 MG/DL (ref 0.57–1)
DEPRECATED RDW RBC AUTO: 69.1 FL (ref 37–54)
ERYTHROCYTE [DISTWIDTH] IN BLOOD BY AUTOMATED COUNT: 19.1 % (ref 12.3–15.4)
GFR SERPL CREATININE-BSD FRML MDRD: 15 ML/MIN/1.73
GLUCOSE BLD-MCNC: 85 MG/DL (ref 65–99)
HCT VFR BLD AUTO: 24.3 % (ref 34–46.6)
HGB BLD-MCNC: 7.4 G/DL (ref 12–15.9)
LEFT ATRIUM VOLUME INDEX: 38 ML/M2
MAXIMAL PREDICTED HEART RATE: 139 BPM
MCH RBC QN AUTO: 31.1 PG (ref 26.6–33)
MCHC RBC AUTO-ENTMCNC: 30.5 G/DL (ref 31.5–35.7)
MCV RBC AUTO: 102.1 FL (ref 79–97)
PLATELET # BLD AUTO: 187 10*3/MM3 (ref 140–450)
PMV BLD AUTO: 9.6 FL (ref 6–12)
POTASSIUM BLD-SCNC: 3.6 MMOL/L (ref 3.5–5.2)
RBC # BLD AUTO: 2.38 10*6/MM3 (ref 3.77–5.28)
SODIUM BLD-SCNC: 144 MMOL/L (ref 136–145)
STRESS TARGET HR: 118 BPM
WBC NRBC COR # BLD: 5.18 10*3/MM3 (ref 3.4–10.8)

## 2019-09-01 PROCEDURE — P9016 RBC LEUKOCYTES REDUCED: HCPCS

## 2019-09-01 PROCEDURE — 0W3P8ZZ CONTROL BLEEDING IN GASTROINTESTINAL TRACT, VIA NATURAL OR ARTIFICIAL OPENING ENDOSCOPIC: ICD-10-PCS | Performed by: INTERNAL MEDICINE

## 2019-09-01 PROCEDURE — 93306 TTE W/DOPPLER COMPLETE: CPT

## 2019-09-01 PROCEDURE — 99232 SBSQ HOSP IP/OBS MODERATE 35: CPT | Performed by: INTERNAL MEDICINE

## 2019-09-01 PROCEDURE — 85027 COMPLETE CBC AUTOMATED: CPT | Performed by: INTERNAL MEDICINE

## 2019-09-01 PROCEDURE — 25010000002 FUROSEMIDE PER 20 MG: Performed by: INTERNAL MEDICINE

## 2019-09-01 PROCEDURE — 93306 TTE W/DOPPLER COMPLETE: CPT | Performed by: INTERNAL MEDICINE

## 2019-09-01 PROCEDURE — 86900 BLOOD TYPING SEROLOGIC ABO: CPT

## 2019-09-01 PROCEDURE — 43255 EGD CONTROL BLEEDING ANY: CPT | Performed by: INTERNAL MEDICINE

## 2019-09-01 PROCEDURE — 80048 BASIC METABOLIC PNL TOTAL CA: CPT | Performed by: INTERNAL MEDICINE

## 2019-09-01 PROCEDURE — 36430 TRANSFUSION BLD/BLD COMPNT: CPT

## 2019-09-01 PROCEDURE — 25010000002 PROPOFOL 10 MG/ML EMULSION: Performed by: ANESTHESIOLOGY

## 2019-09-01 RX ORDER — SODIUM CHLORIDE, SODIUM LACTATE, POTASSIUM CHLORIDE, CALCIUM CHLORIDE 600; 310; 30; 20 MG/100ML; MG/100ML; MG/100ML; MG/100ML
INJECTION, SOLUTION INTRAVENOUS CONTINUOUS PRN
Status: DISCONTINUED | OUTPATIENT
Start: 2019-09-01 | End: 2019-09-01 | Stop reason: SURG

## 2019-09-01 RX ORDER — SUCRALFATE ORAL 1 G/10ML
1 SUSPENSION ORAL
Status: DISCONTINUED | OUTPATIENT
Start: 2019-09-01 | End: 2019-09-02 | Stop reason: HOSPADM

## 2019-09-01 RX ORDER — LIDOCAINE HYDROCHLORIDE 20 MG/ML
INJECTION, SOLUTION INFILTRATION; PERINEURAL AS NEEDED
Status: DISCONTINUED | OUTPATIENT
Start: 2019-09-01 | End: 2019-09-01 | Stop reason: SURG

## 2019-09-01 RX ORDER — SODIUM CHLORIDE 9 MG/ML
30 INJECTION, SOLUTION INTRAVENOUS CONTINUOUS PRN
Status: DISCONTINUED | OUTPATIENT
Start: 2019-09-01 | End: 2019-09-02 | Stop reason: HOSPADM

## 2019-09-01 RX ORDER — PROPOFOL 10 MG/ML
VIAL (ML) INTRAVENOUS AS NEEDED
Status: DISCONTINUED | OUTPATIENT
Start: 2019-09-01 | End: 2019-09-01 | Stop reason: SURG

## 2019-09-01 RX ORDER — FUROSEMIDE 10 MG/ML
40 INJECTION INTRAMUSCULAR; INTRAVENOUS ONCE
Status: COMPLETED | OUTPATIENT
Start: 2019-09-01 | End: 2019-09-01

## 2019-09-01 RX ADMIN — DIAZEPAM 5 MG: 5 TABLET ORAL at 23:42

## 2019-09-01 RX ADMIN — PROPOFOL 250 MG: 10 INJECTION, EMULSION INTRAVENOUS at 10:55

## 2019-09-01 RX ADMIN — CALCITRIOL 0.25 MCG: 0.25 CAPSULE ORAL at 08:12

## 2019-09-01 RX ADMIN — Medication 1 TABLET: at 08:14

## 2019-09-01 RX ADMIN — SODIUM CHLORIDE 30 ML/HR: 9 INJECTION, SOLUTION INTRAVENOUS at 09:25

## 2019-09-01 RX ADMIN — GABAPENTIN 300 MG: 300 CAPSULE ORAL at 17:18

## 2019-09-01 RX ADMIN — LIDOCAINE HYDROCHLORIDE 100 MG: 20 INJECTION, SOLUTION INFILTRATION; PERINEURAL at 10:55

## 2019-09-01 RX ADMIN — SODIUM CHLORIDE, POTASSIUM CHLORIDE, SODIUM LACTATE AND CALCIUM CHLORIDE: 600; 310; 30; 20 INJECTION, SOLUTION INTRAVENOUS at 10:51

## 2019-09-01 RX ADMIN — FEBUXOSTAT 40 MG: 40 TABLET, FILM COATED ORAL at 08:14

## 2019-09-01 RX ADMIN — SUCRALFATE 1 G: 1 SUSPENSION ORAL at 20:16

## 2019-09-01 RX ADMIN — CARBIDOPA AND LEVODOPA 1 TABLET: 25; 100 TABLET, EXTENDED RELEASE ORAL at 20:16

## 2019-09-01 RX ADMIN — SODIUM CHLORIDE, PRESERVATIVE FREE 10 ML: 5 INJECTION INTRAVENOUS at 08:15

## 2019-09-01 RX ADMIN — FAMOTIDINE 20 MG: 20 TABLET, FILM COATED ORAL at 08:14

## 2019-09-01 RX ADMIN — CLOPIDOGREL 75 MG: 75 TABLET, FILM COATED ORAL at 08:12

## 2019-09-01 RX ADMIN — FUROSEMIDE 40 MG: 40 TABLET ORAL at 08:14

## 2019-09-01 RX ADMIN — CARVEDILOL 3.12 MG: 6.25 TABLET, FILM COATED ORAL at 08:09

## 2019-09-01 RX ADMIN — SUCRALFATE 1 G: 1 SUSPENSION ORAL at 17:18

## 2019-09-01 RX ADMIN — FUROSEMIDE 40 MG: 10 INJECTION, SOLUTION INTRAMUSCULAR; INTRAVENOUS at 20:16

## 2019-09-01 RX ADMIN — ASPIRIN 81 MG: 81 TABLET, COATED ORAL at 08:08

## 2019-09-01 RX ADMIN — ATORVASTATIN CALCIUM 40 MG: 20 TABLET, FILM COATED ORAL at 20:15

## 2019-09-01 NOTE — ANESTHESIA POSTPROCEDURE EVALUATION
"Patient: Charmaine Machuca    Procedure Summary     Date:  09/01/19 Room / Location:   [facility]    Anesthesia Start:  1051 Anesthesia Stop:  1134    Procedure:  ESOPHAGOGASTRODUODENOSCOPY with APC (N/A Esophagus) Diagnosis:       Anemia due to acute blood loss      GAVE (gastric antral vascular ectasia)      Reflux esophagitis      (Anemia due to acute blood loss [D62])    Surgeon:  Anuel Roach MD Provider:  Wally Escobedo MD    Anesthesia Type:  MAC ASA Status:  4          Anesthesia Type: MAC  Last vitals  BP   122/42 (09/01/19 1219)   Temp   36.5 °C (97.7 °F) (09/01/19 1219)   Pulse   70 (09/01/19 1219)   Resp   16 (09/01/19 1219)     SpO2   97 % (09/01/19 1219)     Post Anesthesia Care and Evaluation    Patient location during evaluation: bedside  Patient participation: complete - patient participated  Level of consciousness: awake and alert  Pain management: adequate  Airway patency: patent  Anesthetic complications: No anesthetic complications    Cardiovascular status: acceptable  Respiratory status: acceptable  Hydration status: acceptable    Comments: /42 (BP Location: Left arm, Patient Position: Sitting)   Pulse 70   Temp 36.5 °C (97.7 °F) (Oral)   Resp 16   Ht 168 cm (66.14\")   Wt 74.1 kg (163 lb 6.4 oz)   SpO2 97%   BMI 26.26 kg/m²       "

## 2019-09-01 NOTE — PROGRESS NOTES
"DAILY PROGRESS NOTE  [facility]    Patient Identification:  Name: Charmaine Machuca  Age: 79 y.o.  Sex: female  :  1940  MRN: [id number]         Primary Care Physician: Fannie Godfrey MD    Subjective:  Interval History:She feels better today. Still weak    Objective:    Scheduled Meds:    aspirin 81 mg Oral Daily   atorvastatin 40 mg Oral Nightly   bisacodyl 10 mg Rectal Daily   calcitriol 0.25 mcg Oral Every Other Day   carbidopa-levodopa ER 1 tablet Oral Nightly   carvedilol 3.125 mg Oral Daily   clopidogrel 75 mg Oral Daily   diazePAM 5 mg Oral Nightly   famotidine 20 mg Oral Daily   febuxostat 40 mg Oral Daily   furosemide 40 mg Intravenous Once   furosemide 40 mg Oral Daily   gabapentin 300 mg Oral Nightly   levothyroxine 25 mcg Oral Q AM   multivitamin 1 tablet Oral Daily   rotigotine 1 patch Transdermal Q24H   sodium chloride 10 mL Intravenous Q12H   sucralfate 1 g Oral 4x Daily AC & at Bedtime     Continuous Infusions:    sodium chloride 30 mL/hr Last Rate: Stopped (19 1146)       Vital signs in last 24 hours:  Temp:  [97.7 °F (36.5 °C)-98.1 °F (36.7 °C)] 97.9 °F (36.6 °C)  Heart Rate:  [67-77] 72  Resp:  [12-18] 16  BP: (104-131)/(42-57) 129/52    Intake/Output:    Intake/Output Summary (Last 24 hours) at 2019 1508  Last data filed at 2019 1300  Gross per 24 hour   Intake 2120 ml   Output 350 ml   Net 1770 ml       Exam:  /52   Pulse 72   Temp 97.9 °F (36.6 °C) (Oral)   Resp 16   Ht 167.6 cm (66\")   Wt 73.9 kg (163 lb)   SpO2 98%   BMI 26.31 kg/m²     General Appearance:    Alert, cooperative, no distress   Head:    Normocephalic, without obvious abnormality, atraumatic   Eyes:       Throat:   Lips, tongue, gums normal   Neck:   Supple, symmetrical, trachea midline, no JVD   Lungs:     Clear to auscultation bilaterally, respirations unlabored   Chest Wall:    No tenderness or deformity    Heart:    Regular rate and rhythm, S1 and S2 normal, no " murmur,no  Rub or gallop   Abdomen:     Soft, non-tender, bowel sounds active, no masses, no organomegaly    Extremities:   Extremities normal, atraumatic, no cyanosis or edema   Pulses:      Skin:   Skin is warm and dry,  no rashes or palpable lesions   Neurologic:   no focal deficits noted      Lab Results (last 72 hours)     Procedure Component Value Units Date/Time    CBC Auto Differential [[id number]]  (Abnormal) Collected:  08/30/19 0704    Specimen:  Blood Updated:  08/30/19 0817     WBC 4.20 10*3/mm3      RBC 2.30 10*6/mm3      Hemoglobin 6.9 g/dL      Hematocrit 23.1 %      .4 fL      MCH 30.0 pg      MCHC 29.9 g/dL      RDW 20.4 %      RDW-SD 72.1 fl      MPV 9.5 fL      Platelets 183 10*3/mm3      Neutrophil % 76.1 %      Lymphocyte % 11.7 %      Monocyte % 8.1 %      Eosinophil % 2.9 %      Basophil % 0.2 %      Immature Grans % 1.0 %      Neutrophils, Absolute 3.20 10*3/mm3      Lymphocytes, Absolute 0.49 10*3/mm3      Monocytes, Absolute 0.34 10*3/mm3      Eosinophils, Absolute 0.12 10*3/mm3      Basophils, Absolute 0.01 10*3/mm3      Immature Grans, Absolute 0.04 10*3/mm3      nRBC 0.5 /100 WBC     Basic Metabolic Panel [[id number]]  (Abnormal) Collected:  08/30/19 0704    Specimen:  Blood Updated:  08/30/19 0756     Glucose 89 mg/dL      BUN 78 mg/dL      Creatinine 3.23 mg/dL      Sodium 141 mmol/L      Potassium 4.4 mmol/L      Chloride 104 mmol/L      CO2 28.8 mmol/L      Calcium 9.1 mg/dL      eGFR   Amer --     Comment: <15 Indicative of kidney failure.        eGFR Non African Amer 14 mL/min/1.73      Comment: <15 Indicative of kidney failure.        BUN/Creatinine Ratio 24.1     Anion Gap 8.2 mmol/L     Narrative:       GFR Normal >60  Chronic Kidney Disease <60  Kidney Failure <15    Ferritin [[id number]]  (Abnormal) Collected:  08/29/19 1657    Specimen:  Blood Updated:  08/29/19 1816     Ferritin 422.00 ng/mL     Folate [[id number]]  (Normal) Collected:  08/29/19 1657    Specimen:   Blood Updated:  08/29/19 1814     Folate >20.00 ng/mL     Vitamin B12 [[id number]]  (Abnormal) Collected:  08/29/19 1657    Specimen:  Blood Updated:  08/29/19 1814     Vitamin B-12 1,301 pg/mL     TSH [[id number]]  (Normal) Collected:  08/29/19 1657    Specimen:  Blood Updated:  08/29/19 1814     TSH 1.960 uIU/mL     Iron Profile [[id number]]  (Abnormal) Collected:  08/29/19 1657    Specimen:  Blood Updated:  08/29/19 1802     Iron 357 mcg/dL      Iron Saturation >101 %      Transferrin 193 mg/dL      TIBC 288 mcg/dL     Comprehensive Metabolic Panel [[id number]]  (Abnormal) Collected:  08/29/19 1247    Specimen:  Blood Updated:  08/29/19 1328     Glucose 100 mg/dL      BUN 90 mg/dL      Creatinine 3.38 mg/dL      Sodium 140 mmol/L      Potassium 4.4 mmol/L      Chloride 103 mmol/L      CO2 28.6 mmol/L      Calcium 9.4 mg/dL      Total Protein 5.2 g/dL      Albumin 2.80 g/dL      ALT (SGPT) <5 U/L      AST (SGOT) 19 U/L      Alkaline Phosphatase 40 U/L      Total Bilirubin <0.2 mg/dL      eGFR Non African Amer 13 mL/min/1.73      Comment: <15 Indicative of kidney failure.        eGFR   Amer --     Comment: <15 Indicative of kidney failure.        Globulin 2.4 gm/dL      A/G Ratio 1.2 g/dL      BUN/Creatinine Ratio 26.6     Anion Gap 8.4 mmol/L     Narrative:       GFR Normal >60  Chronic Kidney Disease <60  Kidney Failure <15        Data Review:  Results from last 7 days   Lab Units 09/01/19  0438 08/31/19  0550 08/30/19  0704   SODIUM mmol/L 144 139 141   POTASSIUM mmol/L 3.6 4.1 4.4   CHLORIDE mmol/L 106 103 104   CO2 mmol/L 28.4 29.3* 28.8   BUN mg/dL 74* 74* 78*   CREATININE mg/dL 2.94* 2.90* 3.23*   GLUCOSE mg/dL 85 95 89   CALCIUM mg/dL 8.8 9.6 9.1     Results from last 7 days   Lab Units 09/01/19  0438 08/31/19  0550 08/30/19 2016 08/30/19  0704   WBC 10*3/mm3 5.18 5.99  --  4.20   HEMOGLOBIN g/dL 7.4* 8.7* 8.4* 6.9*   HEMATOCRIT % 24.3* 27.0* 26.7* 23.1*   PLATELETS 10*3/mm3 187 195  --  183     Results  from last 7 days   Lab Units 08/29/19  1657   TSH uIU/mL 1.960         Lab Results   Lab Value Date/Time    TROPONINT <0.010 08/17/2019 2110    TROPONINT <0.01 01/14/2014 1757         Results from last 7 days   Lab Units 08/29/19  1247   ALK PHOS U/L 40   BILIRUBIN mg/dL <0.2*   ALT (SGPT) U/L <5   AST (SGOT) U/L 19     Results from last 7 days   Lab Units 08/29/19  1657   TSH uIU/mL 1.960         No results found for: POCGLU        Past Medical History:   Diagnosis Date   • Anemia     Iron deficiency   • Anxiety    • Cardiomyopathy (CMS/HCC)     S/P pacemaker and defibrillator   • CHF (congestive heart failure) (CMS/HCC)    • Chronic renal failure, stage 4 (severe) (CMS/HCC)    • Coronary artery disease     pacemaker, defibrillator   • Depression    • Dizzy    • Gastroparesis    • GAVE (gastric antral vascular ectasia)    • GERD (gastroesophageal reflux disease)    • Gout    • Hemorrhoids    • Hiatal hernia    • History of Clostridium difficile colitis 2013   • History of kidney stones    • History of pancreatitis     2014   • History of skin cancer    • History of transfusion     MULTIPLE    • Hyperlipidemia    • Hypertension    • Hypothyroidism    • Left bundle branch block    • Mitral and aortic valve disease    • Mitral valve insufficiency    • Myoclonus     S/P Depakote   • Osteoarthritis    • Pancytopenia (CMS/HCC)    • Peripheral neuropathy    • Presence of cardiac pacemaker     AND DEFIBRILLATOR   • Pulmonary hypertension (CMS/HCC)    • SOB (shortness of breath) on exertion    • Spinal stenosis    • Stroke (CMS/HCC)        Assessment:  Active Hospital Problems    Diagnosis  POA   • **Symptomatic anemia [D64.9]  Yes   • GAVE (gastric antral vascular ectasia) [K31.819]  Unknown   • History of recent stroke [Z86.73]  Not Applicable   • Acute renal failure superimposed on chronic kidney disease (CMS/HCC) [N17.9, N18.9]  Unknown   • Anemia due to acute blood loss [D62]  Unknown   • Iron deficiency anemia [D50.9]   Yes   • History of anemia due to chronic kidney disease [Z86.2]  Not Applicable   • Peripheral neuropathy [G62.9]  Yes   • Chronic systolic congestive heart failure (CMS/HCC) [I50.22]  Yes   • Cardiomyopathy (CMS/HCC) [I42.9]  Yes   • Gastroparesis [K31.84]  Yes   • Hypothyroidism [E03.9]  Yes   • Automatic implantable cardioverter-defibrillator in situ [Z95.810]  Yes      Resolved Hospital Problems   No resolved problems to display.       Plan:  Give one more unit PRBC  And GI work up for heme positive stool.  Follow up lab.  CT shows stroke consistent with hx of recent stroke. No new neuro SX.    PT, OT and speech jean marie Poon MD  9/1/2019  3:08 PM

## 2019-09-01 NOTE — PROGRESS NOTES
"   LOS: 2 days    Patient Care Team:  Fannie Godfrey MD as PCP - General (Internal Medicine)  Anuel Scott MD as PCP - Claims Attributed  Anuel Scott MD as Consulting Physician (Hematology and Oncology)  Fannie Godfrey MD as Referring Physician (Internal Medicine)  Estevan Matamoros MD as Consulting Physician (Cardiology)  Parveen Abraham MD as Consulting Physician (Nephrology)  Ondina Haro MD as Consulting Physician (Neurology)  Estevan Oropeza MD as Consulting Physician (Hematology and Oncology)  Neno Merritt II, MD as Consulting Physician (Hematology and Oncology)    Chief Complaint:    Chief Complaint   Patient presents with   • Abnormal Lab     Hgb 5.3 per CBC      Follow UP Elsa on CKD 5  Subjective     Interval History:   Feels sleepy.  Plan for EGD today noted.   Urinating.  Appetite better. No dysgeusia. Bowels moved, not black.  Chronically dark.   Not soa.        ROS :  See above    Objective     Vital Signs  Temp:  [97.6 °F (36.4 °C)-98.1 °F (36.7 °C)] 98.1 °F (36.7 °C)  Heart Rate:  [71-77] 77  Resp:  [16-18] 18  BP: (104-123)/(46-52) 107/46    Flowsheet Rows      First Filed Value   Admission Height  168 cm (66.14\") Documented at 08/29/2019 1148   Admission Weight  71.1 kg (156 lb 12.8 oz) Documented at 08/29/2019 1148          No intake/output data recorded.  I/O last 3 completed shifts:  In: 1560 [P.O.:1560]  Out: -     Intake/Output Summary (Last 24 hours) at 9/1/2019 0917  Last data filed at 8/31/2019 2247  Gross per 24 hour   Intake 960 ml   Output --   Net 960 ml       Physical Exam:  Elderly female. Lying in bed. Some left facial asymmetry. Oral mucosa very dry.   No scleral icterus .   Neck  Right chest port  Heart RRR no s3 or rub  Lungs clear to auscultation  Abd +bs soft, nontender. No body wall edema  Skin no rash  Ext 1+ lower ext edema.   Neuro Awake alert, left facial asymmetry.     Results Review:    Results from last 7 days   Lab Units 09/01/19  0438 " 08/31/19  0550 08/30/19  0704 08/29/19  1247   SODIUM mmol/L 144 139 141 140   POTASSIUM mmol/L 3.6 4.1 4.4 4.4   CHLORIDE mmol/L 106 103 104 103   CO2 mmol/L 28.4 29.3* 28.8 28.6   BUN mg/dL 74* 74* 78* 90*   CREATININE mg/dL 2.94* 2.90* 3.23* 3.38*   CALCIUM mg/dL 8.8 9.6 9.1 9.4   BILIRUBIN mg/dL  --   --   --  <0.2*   ALK PHOS U/L  --   --   --  40   ALT (SGPT) U/L  --   --   --  <5   AST (SGOT) U/L  --   --   --  19   GLUCOSE mg/dL 85 95 89 100*       Estimated Creatinine Clearance: 15.5 mL/min (A) (by C-G formula based on SCr of 2.94 mg/dL (H)).    Results from last 7 days   Lab Units 08/31/19  0550   PHOSPHORUS mg/dL 3.4             Results from last 7 days   Lab Units 09/01/19  0438 08/31/19  0550 08/30/19  2016 08/30/19  0704 08/29/19  1011   WBC 10*3/mm3 5.18 5.99  --  4.20 6.77   HEMOGLOBIN g/dL 7.4* 8.7* 8.4* 6.9* 5.3*   PLATELETS 10*3/mm3 187 195  --  183 272               Imaging Results (last 24 hours)     ** No results found for the last 24 hours. **          aspirin 81 mg Oral Daily   atorvastatin 40 mg Oral Nightly   bisacodyl 10 mg Rectal Daily   calcitriol 0.25 mcg Oral Every Other Day   carbidopa-levodopa ER 1 tablet Oral Nightly   carvedilol 3.125 mg Oral Daily   clopidogrel 75 mg Oral Daily   diazePAM 5 mg Oral Nightly   famotidine 20 mg Oral Daily   febuxostat 40 mg Oral Daily   furosemide 40 mg Oral Daily   gabapentin 300 mg Oral Nightly   levothyroxine 25 mcg Oral Q AM   multivitamin 1 tablet Oral Daily   rotigotine 1 patch Transdermal Q24H   sodium chloride 10 mL Intravenous Q12H          Medication Review:   Current Facility-Administered Medications   Medication Dose Route Frequency Provider Last Rate Last Dose   • acetaminophen (TYLENOL) tablet 650 mg  650 mg Oral Q6H PRN Melchor Poon MD       • aspirin EC tablet 81 mg  81 mg Oral Daily Melchor Poon MD   Stopped at 09/01/19 0808   • atorvastatin (LIPITOR) tablet 40 mg  40 mg Oral Nightly Melchor Poon MD   40 mg at 08/31/19 2032   •  bisacodyl (DULCOLAX) suppository 10 mg  10 mg Rectal Daily Estevan Oropeza MD   Stopped at 08/31/19 0932   • calcitriol (ROCALTROL) capsule 0.25 mcg  0.25 mcg Oral Every Other Day Melchor Poon MD   Stopped at 09/01/19 0812   • carbidopa-levodopa ER (SINEMET CR)  MG per tablet 1 tablet  1 tablet Oral Nightly Melchor Poon MD   1 tablet at 08/31/19 2032   • carvedilol (COREG) tablet 3.125 mg  3.125 mg Oral Daily Estevan Pyle MD   3.125 mg at 09/01/19 0809   • clopidogrel (PLAVIX) tablet 75 mg  75 mg Oral Daily Melchor Poon MD   Stopped at 09/01/19 0812   • diazePAM (VALIUM) tablet 5 mg  5 mg Oral Nightly Melchor Poon MD   5 mg at 08/31/19 2032   • famotidine (PEPCID) tablet 20 mg  20 mg Oral Daily Estevan Pyle MD   Stopped at 09/01/19 0814   • febuxostat (ULORIC) tablet 40 mg  40 mg Oral Daily Melchor Poon MD   Stopped at 09/01/19 0814   • furosemide (LASIX) tablet 40 mg  40 mg Oral Daily Melchor Poon MD   Stopped at 09/01/19 0814   • gabapentin (NEURONTIN) capsule 300 mg  300 mg Oral Nightly Melchor Poon MD   300 mg at 08/31/19 1806   • HYDROcodone-acetaminophen (NORCO) 5-325 MG per tablet 1 tablet  1 tablet Oral Q4H PRN Melchor Poon MD   1 tablet at 08/29/19 1956   • levothyroxine (SYNTHROID, LEVOTHROID) tablet 25 mcg  25 mcg Oral Q AM Melchor Poon MD   25 mcg at 08/31/19 0547   • loperamide (IMODIUM) capsule 2 mg  2 mg Oral 4x Daily PRN Melchor Poon MD       • multivitamin (THERAGRAN) tablet 1 tablet  1 tablet Oral Daily Melchor Poon MD   Stopped at 09/01/19 0814   • ondansetron (ZOFRAN) injection 4 mg  4 mg Intravenous Q6H PRN Melchor Poon MD       • rotigotine (NEUPRO) 6 MG/24HR patch 1 patch  1 patch Transdermal Q24H eMlchor Poon MD   1 patch at 08/31/19 1808   • sodium chloride 0.9 % flush 10 mL  10 mL Intravenous Q12H Melchor Poon MD   10 mL at 09/01/19 0815   • sodium chloride 0.9 % flush 10 mL  10 mL Intravenous PRN Melchor Poon MD          Facility-Administered Medications Ordered in Other Encounters   Medication Dose Route Frequency Provider Last Rate Last Dose   • heparin flush (porcine) 100 UNIT/ML injection 500 Units  500 Units Intravenous PRN Anuel Scott MD   500 Units at 11/27/17 1347   • sodium chloride 0.9 % flush 10 mL  10 mL Intravenous PRN Anuel Scott MD   10 mL at 11/27/17 1347       Assessment/Plan   1. CARYL on CKD 5 with baseline crt 3 ( poor muscle mass).  Creatinine stable today. Dysgeusia resolved. No indication for acute dialysis.    2. Anemia of CKD, iron deficiency,  and blood loss. Hg falling after PRBC.   IV iron recently, AMELIA.  GI eval Dr. Earl Avelar 6/19. Virtual colonoscopy 2-3 years ago. Pre-renal azotemia concerning for GI blood loss. EGD today.  Dr. Oropeza ordered PRBC.   3. Chronic diastolic heart failure. Echo 8/17/18. Lasix after PRBC today .  4. Hypothyroid on replacement.   5. Recent CVA on ASA, Plavix. Evidence of new right corona radiata infarct when compared to CT 8/17/19..         Lisa Luke MD  09/01/19  9:17 AM

## 2019-09-01 NOTE — PROGRESS NOTES
[facility] GROUP INPATIENT PROGRESS NOTE  Subjective  Patient feeling about the same, understands need for additional transfusion and EGD this a.mLexy    CC: Anemia    Interval history:   The patient is a 79 y.o. year old female who had been seen in our office August 22, 2019 with anemia secondary to stage IV chronic kidney disease undergoing weekly Procrit, iron deficiency requiring Injectafer, additional evidence of macrocytosis and circulating nucleated RBCs.  She had been recently in hospital for presumed stroke with Plavix added to aspirin.  She was not having darker stools, shortness of breath or dizziness.       We planned, at this point, to give IV Injectafer with her first dose August 22, proceeded with hemolytic assessment with LORETTA negative, retake at 4.81%, LDH of 217, total bilirubin 0.2 normal haptoglobin.  Her assessment was that she likely had occult GI bleeding but that assessing her was going to be difficult as result of her recent CVA.  She, unfortunately, presented back to emergency room August 29, 2019 just after obtaining a second Injectafer infusion with a hemoglobin of 5.3 g%, hematocrit 17.4, white count of 6770, .8 and platelet count of 272,000.  Additional tests include a ferritin at 422, normal folate, iron with TIBC of 288, iron 357 with saturation of greater than 100%,, elevated B12, normal TSH.  She is transfused and is seen August 30, 2019 with hemoglobin of 6.9 and 23.1.  The patient states that she is feeling stronger than she had been and that she has not been able to have a BM for several days.  Stool has been dark without overt blood and she has had no hemoptysis, hematemesis or hematuria.      The patient did go on to receive additional transfusion.  She was further seen by speech therapy with pharyngeal dysfunction recognized.  She continues to feel reasonably well and hopes to be discharged home.  Discussed that her stool is heme positive and that GI assessment will  "be necessary which did proceed with the patient seen by Dr. Roach August 31, 2019 and EGD planned this a.m. she indicates that her stool remains dark but not blackish and no blood is noticeable.        Medications:  The current medication list was reviewed in the EMR.    Allergies:    Allergies   Allergen Reactions   • Chlorhexidine Rash and Itching     Patient states \"blue dye\" in chlorhexidine.  States the orange and clear are OK to use   • Valproic Acid Other (See Comments)     Made her extremely sleepy  Made her extremely sleepy   • Codeine Other (See Comments)     HYPER, DOES NOT HELP PAIN   • Propoxyphene Other (See Comments)     Belligerent, acting drunk  Belligerent, acting drunk       Objective      Vitals:    08/31/19 2350   BP: 104/47   Pulse: 76   Resp: 18   Temp: 98.1 °F (36.7 °C)   SpO2: 100%        Physical exam:   CONSTITUTIONAL:  Vital signs reviewed.  No distress, looks comfortable.  EYES:  Conjunctiva and lids unremarkable.  PERRLA  EARS,NOSE,MOUTH,THROAT:  Ears and nose appear unremarkable.  Lips, teeth, gums appear unremarkable.  RESPIRATORY:  Normal respiratory effort.  Lungs clear to auscultation bilaterally.  CARDIOVASCULAR:  Normal S1, S2.  No murmurs rubs or gallops.  No significant lower extremity edema.  GASTROINTESTINAL: Abdomen appears unremarkable.  Nontender.  No hepatomegaly.  No splenomegaly.  NEURO left mouth drooping  LYMPHATIC:  No cervical, supraclavicular, axillary lymphadenopathy.  SKIN:  Warm.  No rashes.  PSYCHIATRIC:  Normal judgment and insight.  Normal mood and affect.    RECENT LABS:    Results from last 7 days   Lab Units 09/01/19 0438 08/31/19  0550 08/30/19  2016 08/30/19  0704   WBC 10*3/mm3 5.18 5.99  --  4.20   HEMOGLOBIN g/dL 7.4* 8.7* 8.4* 6.9*   HEMATOCRIT % 24.3* 27.0* 26.7* 23.1*   PLATELETS 10*3/mm3 187 195  --  183     Results from last 7 days   Lab Units 09/01/19 0438 08/31/19  0550 08/30/19  0704 08/29/19  1247   SODIUM mmol/L 144 139 141 140 "   POTASSIUM mmol/L 3.6 4.1 4.4 4.4   CHLORIDE mmol/L 106 103 104 103   CO2 mmol/L 28.4 29.3* 28.8 28.6   BUN mg/dL 74* 74* 78* 90*   CREATININE mg/dL 2.94* 2.90* 3.23* 3.38*   CALCIUM mg/dL 8.8 9.6 9.1 9.4   BILIRUBIN mg/dL  --   --   --  <0.2*   ALK PHOS U/L  --   --   --  40   ALT (SGPT) U/L  --   --   --  <5   AST (SGOT) U/L  --   --   --  19   GLUCOSE mg/dL 85 95 89 100*           Assessment/Plan        *Anemia due to stage IV chronic kidney disease.  Weekly Procrit if Hb <10.     *Iron deficiency anemia.  Does not respond to oral iron due to poor absorption.  Status post IV Injectafer X2 with her second dose August 29, 2019.     *Source of iron deficiency.  · Follows regularly with Dr. Earl Avelar.  · States she cannot have colonoscopies due to tortuous colon and therefore has virtual colonoscopies.  · She states she saw Dr. Avelar after the 6/27/2019 visit with me due to recent dark stools.  She states Dr. Avelar did a rectal exam which was heme negative and recommended no further evaluation.  · Seen August 22 was suspected that she is having occult GI bleeding considering she is needing IV iron frequently.  However, with new stroke mid May 2019 and the addition of Plavix to aspirin, risk may be too great to stop antiplatelet agents for further GI evaluation.   · As she is readmitted with a significant drop in her hemoglobin additional bleeding is suspected and stool will be assessed patient transfused supportively.  Additional GI assessment has proceeded and EGD is planned this a.m. as well as additional transfusion.     *Macrocytosis.  B12 and folate unremarkable     *Reticulocytosis.  Because of this, hemolysis labs have been checked and found to be unremarkable     *Circulating nucleated red blood cells.  Intermittent.  Could consider a bone marrow biopsy at some point.     *Stroke mid August 2019.  Presumed.  Could not have MRI due to pacemaker.  Plavix was added to aspirin.              Estevan Oropeza,  MD  9/1/2019  8:01 AM

## 2019-09-01 NOTE — BRIEF OP NOTE
ESOPHAGOGASTRODUODENOSCOPY  Progress Note    Charmaine Machuca  9/1/2019    Pre-op Diagnosis:   Anemia due to acute blood loss [D62]       Post-Op Diagnosis Codes:     * Anemia due to acute blood loss [D62]     * GAVE (gastric antral vascular ectasia) [K31.819]     * Reflux esophagitis [K21.0]    Procedure/CPT® Codes:      Procedure(s):  ESOPHAGOGASTRODUODENOSCOPY with APC    Surgeon(s):  Anuel Roach MD    Anesthesia: Monitored Anesthesia Care    Staff:   Endo Technician: Lisa Soriano  Endo Nurse: Ekta Rivers RN    Estimated Blood Loss: none    Urine Voided: * No values recorded between 9/1/2019 10:50 AM and 9/1/2019 11:28 AM *    Specimens:                None          Drains:      Findings: Normal Duodenum  Moderate/Severe GAVE-APC 2L/80W  Reflux esophagitis    Complications: None      Anuel Roach MD     Date: 9/1/2019  Time: 11:29 AM

## 2019-09-01 NOTE — PLAN OF CARE
Problem: Patient Care Overview  Goal: Plan of Care Review  Outcome: Ongoing (interventions implemented as appropriate)   09/01/19 1753   Coping/Psychosocial   Plan of Care Reviewed With patient   Plan of Care Review   Progress no change   OTHER   Outcome Summary Pt has had no c/o of pain this shift. EGD completed. Pt started on Carafate. Echo completed. 2 units of blood ordered. First unit running now. Up w/ assist x's 1. Several loose dark BMs this shift. VSS. Will continue to monitor.        Problem: Fall Risk (Adult)  Goal: Absence of Fall  Outcome: Ongoing (interventions implemented as appropriate)   09/01/19 1753   Fall Risk (Adult)   Absence of Fall making progress toward outcome       Problem: Anemia (Adult)  Goal: Symptom Improvement  Outcome: Ongoing (interventions implemented as appropriate)   09/01/19 1753   Anemia (Adult)   Symptom Improvement making progress toward outcome

## 2019-09-01 NOTE — PLAN OF CARE
Problem: Patient Care Overview  Goal: Plan of Care Review  Outcome: Ongoing (interventions implemented as appropriate)   09/01/19 0527   Coping/Psychosocial   Plan of Care Reviewed With patient   Plan of Care Review   Progress no change   OTHER   Outcome Summary pt is alert and oriented, room air, no complaints of pain, ambulated in grider, no falls, up with assist, NPO for EGD today, continue to monitor     Goal: Individualization and Mutuality  Outcome: Ongoing (interventions implemented as appropriate)    Goal: Discharge Needs Assessment  Outcome: Ongoing (interventions implemented as appropriate)      Problem: Fall Risk (Adult)  Goal: Absence of Fall  Outcome: Ongoing (interventions implemented as appropriate)      Problem: Anemia (Adult)  Goal: Identify Related Risk Factors and Signs and Symptoms  Outcome: Outcome(s) achieved Date Met: 09/01/19    Goal: Symptom Improvement  Outcome: Ongoing (interventions implemented as appropriate)

## 2019-09-01 NOTE — ANESTHESIA PREPROCEDURE EVALUATION
Anesthesia Evaluation     Patient summary reviewed and Nursing notes reviewed   NPO Solid Status: > 8 hours             Airway   Mallampati: II  TM distance: >3 FB  Neck ROM: full  no difficulty expected  Dental - normal exam     Pulmonary - normal exam   (+) shortness of breath,   Cardiovascular - normal exam    (+) pacemaker ICD, hypertension, valvular problems/murmurs MR, CAD, CHF, hyperlipidemia,       Neuro/Psych  (+) CVA, dizziness/light headedness, numbness, psychiatric history Anxiety and Depression,     GI/Hepatic/Renal/Endo    (+)  hiatal hernia, GERD,  hypothyroidism,     Musculoskeletal     Abdominal  - normal exam   Substance History      OB/GYN          Other   (+) arthritis                     Anesthesia Plan    ASA 4     MAC     Anesthetic plan, all risks, benefits, and alternatives have been provided, discussed and informed consent has been obtained with: patient.

## 2019-09-02 VITALS
TEMPERATURE: 98.3 F | OXYGEN SATURATION: 99 % | RESPIRATION RATE: 16 BRPM | HEIGHT: 66 IN | WEIGHT: 159.8 LBS | SYSTOLIC BLOOD PRESSURE: 123 MMHG | DIASTOLIC BLOOD PRESSURE: 64 MMHG | HEART RATE: 84 BPM | BODY MASS INDEX: 25.68 KG/M2

## 2019-09-02 LAB
ABO + RH BLD: NORMAL
ABO + RH BLD: NORMAL
ALBUMIN SERPL-MCNC: 3 G/DL (ref 3.5–5.2)
ANION GAP SERPL CALCULATED.3IONS-SCNC: 11.2 MMOL/L (ref 5–15)
BASOPHILS # BLD AUTO: 0.02 10*3/MM3 (ref 0–0.2)
BASOPHILS NFR BLD AUTO: 0.3 % (ref 0–1.5)
BH BB BLOOD EXPIRATION DATE: NORMAL
BH BB BLOOD EXPIRATION DATE: NORMAL
BH BB BLOOD TYPE BARCODE: 600
BH BB BLOOD TYPE BARCODE: 600
BH BB DISPENSE STATUS: NORMAL
BH BB DISPENSE STATUS: NORMAL
BH BB PRODUCT CODE: NORMAL
BH BB PRODUCT CODE: NORMAL
BH BB UNIT NUMBER: NORMAL
BH BB UNIT NUMBER: NORMAL
BUN BLD-MCNC: 59 MG/DL (ref 8–23)
BUN/CREAT SERPL: 23.2 (ref 7–25)
CALCIUM SPEC-SCNC: 8.9 MG/DL (ref 8.6–10.5)
CHLORIDE SERPL-SCNC: 109 MMOL/L (ref 98–107)
CO2 SERPL-SCNC: 27.8 MMOL/L (ref 22–29)
CREAT BLD-MCNC: 2.54 MG/DL (ref 0.57–1)
DEPRECATED RDW RBC AUTO: 71.6 FL (ref 37–54)
DEPRECATED RDW RBC AUTO: 71.6 FL (ref 37–54)
EOSINOPHIL # BLD AUTO: 0.17 10*3/MM3 (ref 0–0.4)
EOSINOPHIL NFR BLD AUTO: 3 % (ref 0.3–6.2)
ERYTHROCYTE [DISTWIDTH] IN BLOOD BY AUTOMATED COUNT: 20.7 % (ref 12.3–15.4)
ERYTHROCYTE [DISTWIDTH] IN BLOOD BY AUTOMATED COUNT: 20.9 % (ref 12.3–15.4)
GFR SERPL CREATININE-BSD FRML MDRD: 18 ML/MIN/1.73
GLUCOSE BLD-MCNC: 84 MG/DL (ref 65–99)
HCT VFR BLD AUTO: 31.9 % (ref 34–46.6)
HCT VFR BLD AUTO: 31.9 % (ref 34–46.6)
HGB BLD-MCNC: 9.8 G/DL (ref 12–15.9)
HGB BLD-MCNC: 9.8 G/DL (ref 12–15.9)
IMM GRANULOCYTES # BLD AUTO: 0.04 10*3/MM3 (ref 0–0.05)
IMM GRANULOCYTES NFR BLD AUTO: 0.7 % (ref 0–0.5)
LYMPHOCYTES # BLD AUTO: 0.56 10*3/MM3 (ref 0.7–3.1)
LYMPHOCYTES NFR BLD AUTO: 9.8 % (ref 19.6–45.3)
MCH RBC QN AUTO: 29.5 PG (ref 26.6–33)
MCH RBC QN AUTO: 29.5 PG (ref 26.6–33)
MCHC RBC AUTO-ENTMCNC: 30.7 G/DL (ref 31.5–35.7)
MCHC RBC AUTO-ENTMCNC: 32 G/DL (ref 31.5–35.7)
MCV RBC AUTO: 96.1 FL (ref 79–97)
MCV RBC AUTO: 96.1 FL (ref 79–97)
MONOCYTES # BLD AUTO: 0.49 10*3/MM3 (ref 0.1–0.9)
MONOCYTES NFR BLD AUTO: 8.6 % (ref 5–12)
NEUTROPHILS # BLD AUTO: 4.45 10*3/MM3 (ref 1.7–7)
NEUTROPHILS NFR BLD AUTO: 77.6 % (ref 42.7–76)
NRBC BLD AUTO-RTO: 0 /100 WBC (ref 0–0.2)
PHOSPHATE SERPL-MCNC: 3 MG/DL (ref 2.5–4.5)
PLATELET # BLD AUTO: 189 10*3/MM3 (ref 140–450)
PLATELET # BLD AUTO: 189 10*3/MM3 (ref 140–450)
PMV BLD AUTO: 9.3 FL (ref 6–12)
PMV BLD AUTO: 9.9 FL (ref 6–12)
POTASSIUM BLD-SCNC: 3.6 MMOL/L (ref 3.5–5.2)
RBC # BLD AUTO: 3.32 10*6/MM3 (ref 3.77–5.28)
RBC # BLD AUTO: 3.32 10*6/MM3 (ref 3.77–5.28)
SODIUM BLD-SCNC: 148 MMOL/L (ref 136–145)
UNIT  ABO: NORMAL
UNIT  ABO: NORMAL
UNIT  RH: NORMAL
UNIT  RH: NORMAL
WBC NRBC COR # BLD: 5.73 10*3/MM3 (ref 3.4–10.8)
WBC NRBC COR # BLD: 5.73 10*3/MM3 (ref 3.4–10.8)

## 2019-09-02 PROCEDURE — 99231 SBSQ HOSP IP/OBS SF/LOW 25: CPT | Performed by: INTERNAL MEDICINE

## 2019-09-02 PROCEDURE — 85025 COMPLETE CBC W/AUTO DIFF WBC: CPT | Performed by: INTERNAL MEDICINE

## 2019-09-02 PROCEDURE — 80069 RENAL FUNCTION PANEL: CPT | Performed by: INTERNAL MEDICINE

## 2019-09-02 PROCEDURE — 85027 COMPLETE CBC AUTOMATED: CPT | Performed by: INTERNAL MEDICINE

## 2019-09-02 RX ORDER — SUCRALFATE ORAL 1 G/10ML
1 SUSPENSION ORAL
Qty: 1200 ML | Refills: 0 | Status: SHIPPED | OUTPATIENT
Start: 2019-09-02 | End: 2019-10-02

## 2019-09-02 RX ORDER — CARVEDILOL 3.12 MG/1
3.12 TABLET ORAL DAILY
Qty: 30 TABLET | Refills: 0 | Status: SHIPPED | OUTPATIENT
Start: 2019-09-03 | End: 2019-10-03

## 2019-09-02 RX ADMIN — CARVEDILOL 3.12 MG: 6.25 TABLET, FILM COATED ORAL at 09:11

## 2019-09-02 RX ADMIN — FEBUXOSTAT 40 MG: 40 TABLET, FILM COATED ORAL at 09:11

## 2019-09-02 RX ADMIN — Medication 500 UNITS: at 14:28

## 2019-09-02 RX ADMIN — FUROSEMIDE 40 MG: 40 TABLET ORAL at 09:11

## 2019-09-02 RX ADMIN — SUCRALFATE 1 G: 1 SUSPENSION ORAL at 09:10

## 2019-09-02 RX ADMIN — SUCRALFATE 1 G: 1 SUSPENSION ORAL at 11:53

## 2019-09-02 RX ADMIN — ASPIRIN 81 MG: 81 TABLET, COATED ORAL at 09:11

## 2019-09-02 RX ADMIN — LEVOTHYROXINE SODIUM 25 MCG: 25 TABLET ORAL at 05:59

## 2019-09-02 RX ADMIN — CLOPIDOGREL 75 MG: 75 TABLET, FILM COATED ORAL at 09:11

## 2019-09-02 RX ADMIN — FAMOTIDINE 20 MG: 20 TABLET, FILM COATED ORAL at 09:10

## 2019-09-02 RX ADMIN — Medication 1 TABLET: at 09:11

## 2019-09-02 RX ADMIN — LOPERAMIDE HYDROCHLORIDE 2 MG: 2 CAPSULE ORAL at 11:53

## 2019-09-02 NOTE — PLAN OF CARE
Problem: Patient Care Overview  Goal: Plan of Care Review  Outcome: Ongoing (interventions implemented as appropriate)   09/02/19 2665   Coping/Psychosocial   Plan of Care Reviewed With patient   Plan of Care Review   Progress improving   OTHER   Outcome Summary Patient okay to be DC per GI with follow up with her current GI MD or switch to Dr. Roach but should be seen by a GI MD in 2-4 weeks. She should also follup up with her PCP for CBC check on hgb. DC home with .

## 2019-09-02 NOTE — DISCHARGE SUMMARY
PHYSICIAN DISCHARGE SUMMARY                                                                        [facility]    Patient Identification:  Name: Charmaine Machuca  Age: 79 y.o.  Sex: female  :  1940  MRN: [id number]  Primary Care Physician: Fannie Godfrey MD    Admit date: 2019  Discharge date and time:2019  Discharged Condition: good    Discharge Diagnoses:  Active Hospital Problems    Diagnosis  POA   • **Symptomatic anemia [D64.9]  Yes   • GAVE (gastric antral vascular ectasia) [K31.819]  Unknown   • History of recent stroke [Z86.73]  Not Applicable   • Acute renal failure superimposed on chronic kidney disease (CMS/HCC) [N17.9, N18.9]  Unknown   • Anemia due to acute blood loss [D62]  Unknown   • Iron deficiency anemia [D50.9]  Yes   • History of anemia due to chronic kidney disease [Z86.2]  Not Applicable   • Peripheral neuropathy [G62.9]  Yes   • Chronic systolic congestive heart failure (CMS/HCC) [I50.22]  Yes   • Cardiomyopathy (CMS/HCC) [I42.9]  Yes   • Gastroparesis [K31.84]  Yes   • Hypothyroidism [E03.9]  Yes   • Automatic implantable cardioverter-defibrillator in situ [Z95.810]  Yes      Resolved Hospital Problems   No resolved problems to display.          PMHX:   Past Medical History:   Diagnosis Date   • Anemia     Iron deficiency   • Anxiety    • Cardiomyopathy (CMS/HCC)     S/P pacemaker and defibrillator   • CHF (congestive heart failure) (CMS/HCC)    • Chronic renal failure, stage 4 (severe) (CMS/HCC)    • Coronary artery disease     pacemaker, defibrillator   • Depression    • Dizzy    • Gastroparesis    • GAVE (gastric antral vascular ectasia)    • GERD (gastroesophageal reflux disease)    • Gout    • Hemorrhoids    • Hiatal hernia    • History of Clostridium difficile colitis    • History of kidney stones    • History of pancreatitis        • History of skin cancer    • History of  transfusion     MULTIPLE    • Hyperlipidemia    • Hypertension    • Hypothyroidism    • Left bundle branch block    • Mitral and aortic valve disease    • Mitral valve insufficiency    • Myoclonus     S/P Depakote   • Osteoarthritis    • Pancytopenia (CMS/HCC)    • Peripheral neuropathy    • Presence of cardiac pacemaker     AND DEFIBRILLATOR   • Pulmonary hypertension (CMS/HCC)    • SOB (shortness of breath) on exertion    • Spinal stenosis    • Stroke (CMS/HCC)      PSHX:   Past Surgical History:   Procedure Laterality Date   • APPENDECTOMY N/A    • ARTERIOVENOUS FISTULA/SHUNT SURGERY Right 2/18/2019    Procedure: RIGHT BASILIC FISTULA CREATION WITHOUT TRANSPOSITION;  Surgeon: Royer Dozier MD;  Location: [facility] OR;  Service: Vascular   • ARTERIOVENOUS FISTULA/SHUNT SURGERY Right 5/31/2019    Procedure: RIGHT 2ND STAGE BASILIC VEIN CREATION;  Surgeon: Royer Dozier MD;  Location: [facility] OR;  Service: Vascular   • CARDIAC CATHETERIZATION Left 03/28/2006    Arterial catheter insertion, cath left ventriculography, coronary angiography and left heart catheterization-Dr. Estevan Matamoros   • CARDIAC DEFIBRILLATOR PLACEMENT N/A 11/30/2007    Biventricular implantable cardioverter defibrillator-Dr. Leandro Huber   • CATARACT EXTRACTION Bilateral 2011   • COLONOSCOPY N/A 2014    done at [facility]   • CYSTECTOMY N/A     Ovarian cystectomy   • ENDOSCOPY N/A 04/28/2006    EGD with biopsies. Paraesophageal hiatal hernia from 34 to 44 cm, antral gastritis-Dr. Nehemiah Beal   • ENDOSCOPY N/A 09/29/2004    Hiatal hernia, gastritis and an erosion of the pylorus-Dr. Yang Pavon   • ENTEROSCOPY SMALL BOWEL N/A 10/30/2006    Small bowel enteroscopy with biopsies-Dr. Rory Sevilla   • FLEXIBLE SIGMOIDOSCOPY N/A 09/29/2004    Unsuccessful colonoscopy due to poop prep, a very tortuous sigmoid, bowel prep in the form of enema given and a barium enema was obtained-Dr. Yang Pavon   • FRACTURE SURGERY  2015    Broke big  toe   • HEMATOMA EVACUATION TRUNK N/A 02/07/2014    Pocket evacuation of hematoma at pacemaker site-Dr. Leandro Huber   • HEMORRHOIDECTOMY N/A 04/19/2004    Flexible sigmoidoscopy and stapled hemorrhoidectomy-Dr. Yang Pavon   • HYSTERECTOMY Bilateral 1977   • IMPLANTABLE CARDIOVERTER DEFIBRILLATOR LEAD REPLACEMENT/POCKET REVISION Left 3/28/2016    Procedure: AUTOMATIC IMPLANTABLE CARDIOVERTER DEFIBRILLATOR LEAD REPLACEMENT WITH LASER LEAD EXTRACTION;  Surgeon: Leandro Huber MD;  Location: [facility] OR 18/19;  Service:    • IMPLANTABLE CARDIOVERTER DEFIBRILLATOR LEAD REPLACEMENT/POCKET REVISION N/A 01/13/2014    Biventricular ICD replacement-Dr. Jillian Huber   • LAPAROSCOPY REPAIR HIATAL HERNIA N/A 06/27/2006    Laparoscopic paraesophageal hiatal hernia repair with Nissen fundoplication and cholecystectomy-Dr. Sean Yoon   • NATALIE GONZALEZ ([facility]) PROCEDURE     • WA INSJ TUNNELED CVC W/O SUBQ PORT/ AGE 3 YR/> Right 10/3/2017    Procedure: Right internal jugular port placement;  Surgeon: Tiffanie Couch MD;  Location: [facility] OR;  Service: General   • TOE SURGERY Left     SET BIG TOE   • TOTAL KNEE ARTHROPLASTY Left 08/2014       Hospital Course: Charmaine Machuca  is an 79-year-old white female with history of anemia, iron deficiency, cardiac myopathy with pacemaker defibrillator, history of CHF, chronic renal failure CKD stage IV, coronary artery disease, gastroparesis, GERD, gout, hypothyroidism, osteoarthritis, peripheral neuropathy, history of stroke and pulmonary hypertension who is admitted with history of being extremely weak. The patient was seen at the [facility] and was noted to have hemoglobin of 5 and was sent to the ER for further evaluation and admission. The patient was evaluated in the ER and transfusion of 2 units of packed cells were ordered. The patient had been extremely weak and was concerned she might be having a stroke again. She just complained of  being weak all over and was dropping things. She has not passed out or fallen down. The patient has had history of previous GI work-up with no past source of GI bleeding was found. The patient denies having any chest pain. She has not had any shortness of air. She denies having any nausea or vomiting. She has not had any fever or chills. The patient is admitted for further treatment and evaluation of her anemia.        The patient was admitted to hospital and seen by hematology, GI, and nephrology.  The patient did have heme positive stool and had transfusion of several units of packed cells.  EGD showed GA VE and patient had laser photocoagulation of Angio-Seal ectasia.  The patient was feeling better after being in the hospital for a few days but still little weak from her stroke.  The plan is to go home with home health and she will have PT OT and speech therapy and home health services.  She will follow-up with her primary care doctor in 1 week for continuing care and also follow-up with her specialist and she will need some follow-up lab in a week or so.    Consults:     Consults     Date and Time Order Name Status Description    8/31/2019 0824 Inpatient Gastroenterology Consult Completed     8/29/2019 1626 Inpatient Nephrology Consult Completed     8/29/2019 1626 Inpatient Hematology & Oncology Consult Completed     8/29/2019 1331 [facility] (on-call MD unless specified) Details Completed     8/18/2019 0748 Inpatient Neurology Consult Stroke Completed     8/17/2019 0354 [facility] (on-call MD unless specified) Details Completed         Results from last 7 days   Lab Units 09/02/19  0559   WBC 10*3/mm3 5.73  5.73   HEMOGLOBIN g/dL 9.8*  9.8*   HEMATOCRIT % 31.9*  31.9*   PLATELETS 10*3/mm3 189  189     Results from last 7 days   Lab Units 09/02/19  0559   SODIUM mmol/L 148*   POTASSIUM mmol/L 3.6   CHLORIDE mmol/L 109*   CO2 mmol/L 27.8   BUN mg/dL 59*   CREATININE mg/dL 2.54*   GLUCOSE mg/dL 84   CALCIUM mg/dL 8.9      Significant Diagnostic Studies:   Lab Results   Component Value Date    WBC 5.73 09/02/2019    WBC 5.73 09/02/2019    HGB 9.8 (L) 09/02/2019    HGB 9.8 (L) 09/02/2019    HCT 31.9 (L) 09/02/2019    HCT 31.9 (L) 09/02/2019     09/02/2019     09/02/2019     Lab Results   Component Value Date     (H) 09/02/2019    K 3.6 09/02/2019     (H) 09/02/2019    CO2 27.8 09/02/2019    BUN 59 (H) 09/02/2019    CREATININE 2.54 (H) 09/02/2019    GLUCOSE 84 09/02/2019     Lab Results   Component Value Date    CALCIUM 8.9 09/02/2019    PHOS 3.0 09/02/2019     No results found for: AST, ALT, ALKPHOS  No results found for: APTT, INR  No results found for: COLORU, CLARITYU, SPECGRAV, PHUR, PROTEINUR, GLUCOSEU, KETONESU, BLOODU, NITRITE, LEUKOCYTESUR, BILIRUBINUR, UROBILINOGEN, RBCUA, WBCUA, BACTERIA, UACOMMENT  No results found for: TROPONINT, TROPONINI, BNP  No components found for: HGBA1C;2  No components found for: TSH;2  Imaging Results (all)     Procedure Component Value Units Date/Time    CT Head Without Contrast [[id number]] Collected:  08/30/19 1117     Updated:  08/30/19 1403    Narrative:       CT HEAD WITHOUT CONTRAST      HISTORY: Weakness, stroke.     COMPARISON: CT head 08/17/2019.     FINDINGS: There is age-appropriate atrophy. There is an area of  decreased attenuation involving the corona radiata on the right  measuring 7 mm in size which is new versus a prior examination  consistent with small corona radiata infarct. No other areas of  decreased attenuation to suggest acute infarction is identified.     Vascular calcification involving the carotid siphons are noted  bilaterally, moderate. Area of calcification involving the sylvian  fissure on the right is noted, was present previously and appears  unchanged. However this is new versus the CT examination of 2013. I  cannot exclude a calcified embolus.       Impression:       1. No evidence of hemorrhage. There is a 7 mm area of  decreased  attenuation involving the corona radiata on the right which is new  versus the CT examination of 08/17/2019 consistent with an area of acute  infarction. There is no evidence of hemorrhage.  2. Vascular calcification involving carotid siphons are noted  bilaterally, present previously. There is also an area of calcification  involving the sylvian fissure on the right which was present previously.  This is new, however, versus the CT examination of the head performed on  01/10/2013. I cannot exclude a calcified embolus.                  Radiation dose reduction techniques were utilized, including automated  exposure control and exposure modulation based on body size.     This report was finalized on 8/30/2019 2:00 PM by Dr. Anuel Modi M.D.           Lab Results (last 7 days)     Procedure Component Value Units Date/Time    CBC & Differential [[id number]] Collected:  09/02/19 0559    Specimen:  Blood Updated:  09/02/19 0723    Narrative:       The following orders were created for panel order CBC & Differential.  Procedure                               Abnormality         Status                     ---------                               -----------         ------                     CBC Auto Differential[[id number]]        Abnormal            Final result                 Please view results for these tests on the individual orders.    CBC Auto Differential [[id number]]  (Abnormal) Collected:  09/02/19 0559    Specimen:  Blood Updated:  09/02/19 0723     WBC 5.73 10*3/mm3      RBC 3.32 10*6/mm3      Hemoglobin 9.8 g/dL      Hematocrit 31.9 %      MCV 96.1 fL      MCH 29.5 pg      MCHC 30.7 g/dL      RDW 20.9 %      RDW-SD 71.6 fl      MPV 9.9 fL      Platelets 189 10*3/mm3      Neutrophil % 77.6 %      Lymphocyte % 9.8 %      Monocyte % 8.6 %      Eosinophil % 3.0 %      Basophil % 0.3 %      Immature Grans % 0.7 %      Neutrophils, Absolute 4.45 10*3/mm3      Lymphocytes, Absolute 0.56 10*3/mm3       Monocytes, Absolute 0.49 10*3/mm3      Eosinophils, Absolute 0.17 10*3/mm3      Basophils, Absolute 0.02 10*3/mm3      Immature Grans, Absolute 0.04 10*3/mm3      nRBC 0.0 /100 WBC     CBC (No Diff) [[id number]]  (Abnormal) Collected:  09/02/19 0559    Specimen:  Blood Updated:  09/02/19 0722     WBC 5.73 10*3/mm3      RBC 3.32 10*6/mm3      Hemoglobin 9.8 g/dL      Hematocrit 31.9 %      MCV 96.1 fL      MCH 29.5 pg      MCHC 32.0 g/dL      RDW 20.7 %      RDW-SD 71.6 fl      MPV 9.3 fL      Platelets 189 10*3/mm3     Renal Function Panel [[id number]]  (Abnormal) Collected:  09/02/19 0559    Specimen:  Blood Updated:  09/02/19 0718     Glucose 84 mg/dL      BUN 59 mg/dL      Creatinine 2.54 mg/dL      Sodium 148 mmol/L      Potassium 3.6 mmol/L      Chloride 109 mmol/L      CO2 27.8 mmol/L      Calcium 8.9 mg/dL      Albumin 3.00 g/dL      Phosphorus 3.0 mg/dL      Anion Gap 11.2 mmol/L      BUN/Creatinine Ratio 23.2     eGFR Non African Amer 18 mL/min/1.73     Narrative:       GFR Normal >60  Chronic Kidney Disease <60  Kidney Failure <15    Basic Metabolic Panel [[id number]]  (Abnormal) Collected:  09/01/19 0438    Specimen:  Blood Updated:  09/01/19 0619     Glucose 85 mg/dL      BUN 74 mg/dL      Creatinine 2.94 mg/dL      Sodium 144 mmol/L      Potassium 3.6 mmol/L      Chloride 106 mmol/L      CO2 28.4 mmol/L      Calcium 8.8 mg/dL      eGFR Non African Amer 15 mL/min/1.73      BUN/Creatinine Ratio 25.2     Anion Gap 9.6 mmol/L     Narrative:       GFR Normal >60  Chronic Kidney Disease <60  Kidney Failure <15    CBC (No Diff) [[id number]]  (Abnormal) Collected:  09/01/19 0438    Specimen:  Blood Updated:  09/01/19 0545     WBC 5.18 10*3/mm3      RBC 2.38 10*6/mm3      Hemoglobin 7.4 g/dL      Hematocrit 24.3 %      .1 fL      MCH 31.1 pg      MCHC 30.5 g/dL      RDW 19.1 %      RDW-SD 69.1 fl      MPV 9.6 fL      Platelets 187 10*3/mm3     Renal Function Panel [[id number]]  (Abnormal) Collected:  08/31/19  0550    Specimen:  Blood Updated:  08/31/19 0652     Glucose 95 mg/dL      BUN 74 mg/dL      Creatinine 2.90 mg/dL      Sodium 139 mmol/L      Potassium 4.1 mmol/L      Chloride 103 mmol/L      CO2 29.3 mmol/L      Calcium 9.6 mg/dL      Albumin 3.20 g/dL      Phosphorus 3.4 mg/dL      Anion Gap 6.7 mmol/L      BUN/Creatinine Ratio 25.5     eGFR Non African Amer 16 mL/min/1.73     Narrative:       GFR Normal >60  Chronic Kidney Disease <60  Kidney Failure <15    CBC & Differential [[id number]] Collected:  08/31/19 0550    Specimen:  Blood Updated:  08/31/19 0637    Narrative:       The following orders were created for panel order CBC & Differential.  Procedure                               Abnormality         Status                     ---------                               -----------         ------                     CBC Auto Differential[[id number]]        Abnormal            Final result                 Please view results for these tests on the individual orders.    CBC Auto Differential [[id number]]  (Abnormal) Collected:  08/31/19 0550    Specimen:  Blood Updated:  08/31/19 0637     WBC 5.99 10*3/mm3      RBC 2.70 10*6/mm3      Hemoglobin 8.7 g/dL      Hematocrit 27.0 %      .0 fL      MCH 32.2 pg      MCHC 32.2 g/dL      RDW 18.7 %      RDW-SD 65.9 fl      MPV 9.5 fL      Platelets 195 10*3/mm3      Neutrophil % 78.7 %      Lymphocyte % 8.8 %      Monocyte % 7.8 %      Eosinophil % 4.0 %      Basophil % 0.2 %      Immature Grans % 0.5 %      Neutrophils, Absolute 4.71 10*3/mm3      Lymphocytes, Absolute 0.53 10*3/mm3      Monocytes, Absolute 0.47 10*3/mm3      Eosinophils, Absolute 0.24 10*3/mm3      Basophils, Absolute 0.01 10*3/mm3      Immature Grans, Absolute 0.03 10*3/mm3      nRBC 0.2 /100 WBC     Hemoglobin & Hematocrit, Blood [[id number]]  (Abnormal) Collected:  08/30/19 2016    Specimen:  Blood Updated:  08/30/19 2046     Hemoglobin 8.4 g/dL      Hematocrit 26.7 %     Occult Blood X 1, Stool -  Stool, Per Rectum [[id number]]  (Abnormal) Collected:  08/30/19 1737    Specimen:  Stool from Per Rectum Updated:  08/30/19 1908     Fecal Occult Blood Positive    CBC Auto Differential [[id number]]  (Abnormal) Collected:  08/30/19 0704    Specimen:  Blood Updated:  08/30/19 0817     WBC 4.20 10*3/mm3      RBC 2.30 10*6/mm3      Hemoglobin 6.9 g/dL      Hematocrit 23.1 %      .4 fL      MCH 30.0 pg      MCHC 29.9 g/dL      RDW 20.4 %      RDW-SD 72.1 fl      MPV 9.5 fL      Platelets 183 10*3/mm3      Neutrophil % 76.1 %      Lymphocyte % 11.7 %      Monocyte % 8.1 %      Eosinophil % 2.9 %      Basophil % 0.2 %      Immature Grans % 1.0 %      Neutrophils, Absolute 3.20 10*3/mm3      Lymphocytes, Absolute 0.49 10*3/mm3      Monocytes, Absolute 0.34 10*3/mm3      Eosinophils, Absolute 0.12 10*3/mm3      Basophils, Absolute 0.01 10*3/mm3      Immature Grans, Absolute 0.04 10*3/mm3      nRBC 0.5 /100 WBC     Basic Metabolic Panel [[id number]]  (Abnormal) Collected:  08/30/19 0704    Specimen:  Blood Updated:  08/30/19 0756     Glucose 89 mg/dL      BUN 78 mg/dL      Creatinine 3.23 mg/dL      Sodium 141 mmol/L      Potassium 4.4 mmol/L      Chloride 104 mmol/L      CO2 28.8 mmol/L      Calcium 9.1 mg/dL      eGFR   Amer --     Comment: <15 Indicative of kidney failure.        eGFR Non African Amer 14 mL/min/1.73      Comment: <15 Indicative of kidney failure.        BUN/Creatinine Ratio 24.1     Anion Gap 8.2 mmol/L     Narrative:       GFR Normal >60  Chronic Kidney Disease <60  Kidney Failure <15    Ferritin [[id number]]  (Abnormal) Collected:  08/29/19 1657    Specimen:  Blood Updated:  08/29/19 1816     Ferritin 422.00 ng/mL     Folate [[id number]]  (Normal) Collected:  08/29/19 1657    Specimen:  Blood Updated:  08/29/19 1814     Folate >20.00 ng/mL     Vitamin B12 [[id number]]  (Abnormal) Collected:  08/29/19 1657    Specimen:  Blood Updated:  08/29/19 1814     Vitamin B-12 1,301 pg/mL     TSH  "[[id number]]  (Normal) Collected:  08/29/19 1657    Specimen:  Blood Updated:  08/29/19 1814     TSH 1.960 uIU/mL     Iron Profile [[id number]]  (Abnormal) Collected:  08/29/19 1657    Specimen:  Blood Updated:  08/29/19 1802     Iron 357 mcg/dL      Iron Saturation >101 %      Transferrin 193 mg/dL      TIBC 288 mcg/dL     Comprehensive Metabolic Panel [[id number]]  (Abnormal) Collected:  08/29/19 1247    Specimen:  Blood Updated:  08/29/19 1328     Glucose 100 mg/dL      BUN 90 mg/dL      Creatinine 3.38 mg/dL      Sodium 140 mmol/L      Potassium 4.4 mmol/L      Chloride 103 mmol/L      CO2 28.6 mmol/L      Calcium 9.4 mg/dL      Total Protein 5.2 g/dL      Albumin 2.80 g/dL      ALT (SGPT) <5 U/L      AST (SGOT) 19 U/L      Alkaline Phosphatase 40 U/L      Total Bilirubin <0.2 mg/dL      eGFR Non African Amer 13 mL/min/1.73      Comment: <15 Indicative of kidney failure.        eGFR   Amer --     Comment: <15 Indicative of kidney failure.        Globulin 2.4 gm/dL      A/G Ratio 1.2 g/dL      BUN/Creatinine Ratio 26.6     Anion Gap 8.4 mmol/L     Narrative:       GFR Normal >60  Chronic Kidney Disease <60  Kidney Failure <15        /64 (BP Location: Left arm, Patient Position: Sitting)   Pulse 84   Temp 98.3 °F (36.8 °C) (Oral)   Resp 16   Ht 167.6 cm (66\")   Wt 72.5 kg (159 lb 12.8 oz)   SpO2 99%   BMI 25.79 kg/m²     Discharge Exam:  General Appearance:    Alert, cooperative, no distress                          Head:    Normocephalic, without obvious abnormality, atraumatic                          Eyes:                            Throat:   Lips, tongue, gums normal                          Neck:   Supple, symmetrical, trachea midline, no JVD                        Lungs:     Clear to auscultation bilaterally, respirations unlabored                Chest Wall:    No tenderness or deformity                        Heart:    Regular rate and rhythm, S1 and S2 normal, no murmur,no  Rub or gallop   "                Abdomen:     Soft, non-tender, bowel sounds active, no masses, no organomegaly                  Extremities:   Extremities normal, atraumatic, no cyanosis or edema                             Skin:   Skin is warm and dry,  no rashes or palpable lesions                  Neurologic:  some left-sided weakness from recent stroke     Disposition:  Home with home health    Patient Instructions:      Discharge Medications      New Medications      Instructions Start Date   sucralfate 1 GM/10ML suspension  Commonly known as:  CARAFATE   1 g, Oral, 4 Times Daily Before Meals & Nightly         Changes to Medications      Instructions Start Date   carvedilol 3.125 MG tablet  Commonly known as:  COREG  What changed:    · medication strength  · how much to take   3.125 mg, Oral, Daily   Start Date:  9/3/2019        Continue These Medications      Instructions Start Date   aspirin 81 MG tablet   81 mg, Oral, Daily      atorvastatin 40 MG tablet  Commonly known as:  LIPITOR   40 mg, Oral, Nightly      calcitriol 0.25 MCG capsule  Commonly known as:  ROCALTROL   0.25 mcg, Oral, Every Other Day, 1 tablet every other day and 2 tablets every other day      carbidopa-levodopa ER  MG per tablet  Commonly known as:  SINEMET CR   1 tablet, Oral, Every Evening      clopidogrel 75 MG tablet  Commonly known as:  PLAVIX   75 mg, Oral, Daily      diazePAM 5 MG tablet  Commonly known as:  VALIUM   5 mg, Oral, Nightly      DULOXETINE HCL PO   Oral, 2 Times Daily      famotidine 20 MG tablet  Commonly known as:  PEPCID   20 mg, Oral, 2 Times Daily      furosemide 40 MG tablet  Commonly known as:  LASIX   40 mg, Oral, Daily      GLUCOSAMINE CHONDROITIN COMPLX capsule   1,500 mg, Oral, Every Other Day, PT HOLDING FOR SURGERY      GRALISE 300 MG tablet  Generic drug:  Gabapentin (Once-Daily)   300 mg, Oral, Daily With Dinner, MAY REPEAT LATE EVENING IF NEEDED       HYDROcodone-acetaminophen 5-325 MG per tablet  Commonly known  as:  NORCO   1-2 tablets, Oral, Every 8 Hours PRN      levothyroxine 25 MCG tablet  Commonly known as:  SYNTHROID, LEVOTHROID   25 mcg, Oral, Daily      loperamide 2 MG capsule  Commonly known as:  IMODIUM   2 mg, Oral, 4 Times Daily PRN      magnesium oxide 250 MG tablet   250 mg, Oral, Daily      methscopolamine 5 MG tablet  Commonly known as:  PAMINE FORTE   5 mg, Oral, Every Morning      MULTI-DAY VITAMINS tablet   1 tablet, Oral, Daily, HOLD FOR SURGERY      psyllium 58.6 % powder  Commonly known as:  METAMUCIL   1 packet, Oral, Daily      rotigotine 6 MG/24HR patch  Commonly known as:  NEUPRO   1 patch, Transdermal, Daily      ULORIC 40 MG tablet  Generic drug:  febuxostat   40 mg, Oral, Daily      vitamin D3 5000 units capsule capsule   5,000 Units, Oral, Daily      Vitamin E 400 units tablet   400 Units, Oral, Daily, PT HOLDING FOR SURGERY         Stop These Medications    spironolactone 25 MG tablet  Commonly known as:  ALDACTONE          Future Appointments   Date Time Provider Department Center   9/4/2019 10:30 AM LAB CHAIR [facility] None   9/4/2019 11:00 AM INJECTION CHAIR [facility] INFUS EP [facility]   9/11/2019 10:30 AM LAB CHAIR [facility] None   9/11/2019 11:00 AM INJECTION CHAIR [facility] INFUS EP [facility]   9/19/2019 10:00 AM LAB CHAIR [facility] None   9/19/2019 10:30 AM INJECTION CHAIR [facility] INFUS EP [facility]   9/26/2019 10:30 AM LAB CHAIR [facility] None   9/26/2019 11:00 AM INJECTION CHAIR [facility] INFUS EP [facility]   10/3/2019 10:50 AM LAB CHAIR [facility] None   10/3/2019 11:20 AM Neno Merritt II, MD [facility]   10/3/2019 11:45 AM INJECTION CHAIR [facility] INFUS [facility]   11/19/2019 12:00 AM CHUCKY FLORES [facility] None   11/21/2019 10:00 AM Thuy Alan APRN [facility] None   2/17/2020 10:00 AM Pastora Wilson APRN [facility] None      Additional Instructions for the Follow-ups that You Need to Schedule     Ambulatory Referral to Home Health   As directed      Face to Face Visit Date:  9/2/2019    Follow-up provider for Plan of Care?:  I treated the patient in an acute care facility and will not continue treatment after discharge.    Follow-up provider:  FANNIE GODFREY [[id number]]    Reason/Clinical Findings:  weak    Describe mobility limitations that make leaving home difficult:  stroke sx    Nursing/Therapeutic Services Requested:  Skilled Nursing Physical Therapy Occupational Therapy Speech Therapy    Skilled nursing orders:  Other    PT orders:  Therapeutic exercise    Occupational orders:  Activities of daily living    SLP orders:  Language    Frequency:  1 Week 1           Follow-up Information     Fannie Godfrey MD Follow up in 1 week(s).    Specialty:  Internal Medicine  Contact information:  [address] [address]  [facility] [id number] [phone]             Anuel Scott MD .    Specialties:  Hematology and Oncology, Oncology, Hematology           Doc, Anuel Munoz MD Follow up.    Specialty:  Gastroenterology  Contact information:  [address]  [facility] [id number] [phone]                 Discharge Order (From admission, onward)    Start     Ordered    09/02/19 1332  Discharge patient  Once     Expected Discharge Date:  09/02/19    Discharge Disposition:  Home or Self Care    Physician of Record for Attribution - Please select from Treatment Team:  PAOLA BLANK [[id number]]    Review needed by CMO to determine Physician of Record:  No       Question Answer Comment   Physician of Record for Attribution - Please select from Treatment Team PAOLA BLANK    Review needed by CMO to determine Physician of Record No        09/02/19 1338          Total time spent discharging patient including evaluation,post hospitalization follow up,  medication and post hospitalization instructions and education total time  exceeds 30 minutes.    Signed:  Melchor Poon MD  9/2/2019  1:39 PM

## 2019-09-02 NOTE — PROGRESS NOTES
"   LOS: 3 days    Patient Care Team:  Fannie Godfrey MD as PCP - General (Internal Medicine)  Anuel Scott MD as PCP - Claims Attributed  Anuel Scott MD as Consulting Physician (Hematology and Oncology)  Fannie Godfrey MD as Referring Physician (Internal Medicine)  Estevan Matamoros MD as Consulting Physician (Cardiology)  Parveen Abraham MD as Consulting Physician (Nephrology)  Ondina Haro MD as Consulting Physician (Neurology)  Estevan Oropeza MD as Consulting Physician (Hematology and Oncology)  Neno Merritt II, MD as Consulting Physician (Hematology and Oncology)    Chief Complaint:    Chief Complaint   Patient presents with   • Abnormal Lab     Hgb 5.3 per CBC      Follow UP CARYL CKD  Subjective     Interval History:   EGD yest showed mod to severe GAVE, s/p APC.  Denies dyspnea.  Wants to go home.    Objective     Vital Signs  Temp:  [97.3 °F (36.3 °C)-98 °F (36.7 °C)] 97.6 °F (36.4 °C)  Heart Rate:  [67-87] 76  Resp:  [12-16] 16  BP: (101-131)/(42-58) 119/58    Flowsheet Rows      First Filed Value   Admission Height  168 cm (66.14\") Documented at 08/29/2019 1148   Admission Weight  71.1 kg (156 lb 12.8 oz) Documented at 08/29/2019 1148          I/O this shift:  In: 900 [Blood:900]  Out: -   I/O last 3 completed shifts:  In: 2600 [P.O.:1800; I.V.:800]  Out: 350 [Urine:350]    Intake/Output Summary (Last 24 hours) at 9/2/2019 0613  Last data filed at 9/1/2019 2338  Gross per 24 hour   Intake 2180 ml   Output 350 ml   Net 1830 ml       Physical Exam:  GEN nad, alert  Neck supple no jvd  Lungs CTA bilat no rales  CV RRR no m/g  abd soft NT/ND  vasc 1+ left ankle, trace right ankle, 2+ radial pulses      Results Review:    Results from last 7 days   Lab Units 09/01/19  0438 08/31/19  0550 08/30/19  0704 08/29/19  1247   SODIUM mmol/L 144 139 141 140   POTASSIUM mmol/L 3.6 4.1 4.4 4.4   CHLORIDE mmol/L 106 103 104 103   CO2 mmol/L 28.4 29.3* 28.8 28.6   BUN mg/dL 74* 74* 78* 90* "   CREATININE mg/dL 2.94* 2.90* 3.23* 3.38*   CALCIUM mg/dL 8.8 9.6 9.1 9.4   BILIRUBIN mg/dL  --   --   --  <0.2*   ALK PHOS U/L  --   --   --  40   ALT (SGPT) U/L  --   --   --  <5   AST (SGOT) U/L  --   --   --  19   GLUCOSE mg/dL 85 95 89 100*       Estimated Creatinine Clearance: 15.3 mL/min (A) (by C-G formula based on SCr of 2.94 mg/dL (H)).    Results from last 7 days   Lab Units 08/31/19  0550   PHOSPHORUS mg/dL 3.4             Results from last 7 days   Lab Units 09/01/19  0438 08/31/19  0550 08/30/19 2016 08/30/19  0704 08/29/19  1011   WBC 10*3/mm3 5.18 5.99  --  4.20 6.77   HEMOGLOBIN g/dL 7.4* 8.7* 8.4* 6.9* 5.3*   PLATELETS 10*3/mm3 187 195  --  183 272               Imaging Results (last 24 hours)     ** No results found for the last 24 hours. **          aspirin 81 mg Oral Daily   atorvastatin 40 mg Oral Nightly   bisacodyl 10 mg Rectal Daily   calcitriol 0.25 mcg Oral Every Other Day   carbidopa-levodopa ER 1 tablet Oral Nightly   carvedilol 3.125 mg Oral Daily   clopidogrel 75 mg Oral Daily   diazePAM 5 mg Oral Nightly   famotidine 20 mg Oral Daily   febuxostat 40 mg Oral Daily   furosemide 40 mg Oral Daily   gabapentin 300 mg Oral Nightly   levothyroxine 25 mcg Oral Q AM   multivitamin 1 tablet Oral Daily   rotigotine 1 patch Transdermal Q24H   sodium chloride 10 mL Intravenous Q12H   sucralfate 1 g Oral 4x Daily AC & at Bedtime       sodium chloride 30 mL/hr Last Rate: Stopped (09/01/19 1146)       Medication Review:   Current Facility-Administered Medications   Medication Dose Route Frequency Provider Last Rate Last Dose   • acetaminophen (TYLENOL) tablet 650 mg  650 mg Oral Q6H PRN Melchor Poon MD       • aspirin EC tablet 81 mg  81 mg Oral Daily Melchor Poon MD   81 mg at 09/01/19 0808   • atorvastatin (LIPITOR) tablet 40 mg  40 mg Oral Nightly Melchor Poon MD   40 mg at 09/01/19 2015   • bisacodyl (DULCOLAX) suppository 10 mg  10 mg Rectal Daily Estevan Oropeza MD   Stopped at  08/31/19 0932   • calcitriol (ROCALTROL) capsule 0.25 mcg  0.25 mcg Oral Every Other Day Melchor Poon MD   0.25 mcg at 09/01/19 0812   • carbidopa-levodopa ER (SINEMET CR)  MG per tablet 1 tablet  1 tablet Oral Nightly Melchor Poon MD   1 tablet at 09/01/19 2016   • carvedilol (COREG) tablet 3.125 mg  3.125 mg Oral Daily Estevan Pyle MD   3.125 mg at 09/01/19 0809   • clopidogrel (PLAVIX) tablet 75 mg  75 mg Oral Daily Melchor Poon MD   75 mg at 09/01/19 0812   • diazePAM (VALIUM) tablet 5 mg  5 mg Oral Nightly Melchor Poon MD   5 mg at 09/01/19 2342   • famotidine (PEPCID) tablet 20 mg  20 mg Oral Daily Estevan Pyle MD   20 mg at 09/01/19 0814   • febuxostat (ULORIC) tablet 40 mg  40 mg Oral Daily Melchor Poon MD   40 mg at 09/01/19 0814   • furosemide (LASIX) tablet 40 mg  40 mg Oral Daily Melchor Poon MD   40 mg at 09/01/19 0814   • gabapentin (NEURONTIN) capsule 300 mg  300 mg Oral Nightly Melchor Poon MD   300 mg at 09/01/19 1718   • HYDROcodone-acetaminophen (NORCO) 5-325 MG per tablet 1 tablet  1 tablet Oral Q4H PRN Melchor Poon MD   1 tablet at 08/29/19 1956   • levothyroxine (SYNTHROID, LEVOTHROID) tablet 25 mcg  25 mcg Oral Q AM Melchor Poon MD   25 mcg at 09/02/19 0559   • loperamide (IMODIUM) capsule 2 mg  2 mg Oral 4x Daily PRN Melchor Poon MD       • multivitamin (THERAGRAN) tablet 1 tablet  1 tablet Oral Daily Melchor Poon MD   1 tablet at 09/01/19 0814   • ondansetron (ZOFRAN) injection 4 mg  4 mg Intravenous Q6H PRN Melchor Poon MD       • rotigotine (NEUPRO) 6 MG/24HR patch 1 patch  1 patch Transdermal Q24H Melchor Poon MD   1 patch at 09/01/19 1832   • sodium chloride 0.9 % flush 10 mL  10 mL Intravenous Q12H Melchor Poon MD   10 mL at 09/01/19 0815   • sodium chloride 0.9 % flush 10 mL  10 mL Intravenous PRN Melchor Poon MD       • sodium chloride 0.9 % infusion  30 mL/hr Intravenous Continuous PRN DocAnuel MD    Stopped at 09/01/19 1146   • sucralfate (CARAFATE) 1 GM/10ML suspension 1 g  1 g Oral 4x Daily AC & at Bedtime Doc, Anuel Munoz MD   1 g at 09/01/19 2016     Facility-Administered Medications Ordered in Other Encounters   Medication Dose Route Frequency Provider Last Rate Last Dose   • heparin flush (porcine) 100 UNIT/ML injection 500 Units  500 Units Intravenous PRN Anuel Scott MD   500 Units at 11/27/17 1347   • sodium chloride 0.9 % flush 10 mL  10 mL Intravenous PRN Anuel Scott MD   10 mL at 11/27/17 1347       Assessment/Plan   1. CARYL on CKD 5 with baseline Cr ~ 3 (low muscle mass).  Prerenal azotemia picture from GIB.  Creatinine stable yesterday 2.9, labs pending today.  No uremic sx.  2. Anemia of CKD, iron deficiency,  and blood loss. Hg down 7.4 yest and transfused again.  EGD showed GAVE, s/p APC  3. Chronic diastolic heart failure. Echo 8/17/18. Compensated, s/p IV lasix with transfusion, on PO lasix 40mg daily  .  4. Hypothyroid on replacement.   5. Recent CVA on ASA, Plavix.    Plan  - await labs.  If Hgb & Cr stable no objection to discharge from nephrology standpoint.  Has follow up with Dr Sravan Abraham in a few weeks      Symptomatic anemia    Chronic systolic congestive heart failure (CMS/HCC)    Cardiomyopathy (CMS/HCC)    Peripheral neuropathy    History of anemia due to chronic kidney disease    Iron deficiency anemia    Gastroparesis    Hypothyroidism    Automatic implantable cardioverter-defibrillator in situ    Acute renal failure superimposed on chronic kidney disease (CMS/HCC)    History of recent stroke    Anemia due to acute blood loss    GAVE (gastric antral vascular ectasia)              Estevan Pyle MD  09/02/19  6:13 AM

## 2019-09-02 NOTE — PROGRESS NOTES
[facility]  Inpatient Progress Note    Reason for Follow Up: Acute blood loss anemia    Subjective     Interval History:   Patient seated at bedside, wishes discharge today.  Denies any current GI bleeding.  Reviewed endoscopic findings and need for follow-up.  She intends to either follow-up with Dr. Avelar or Dr. Roach in the outpatient setting.  Would have her monitor her hemoglobin as well through her primary care tender.      Current Facility-Administered Medications:   •  acetaminophen (TYLENOL) tablet 650 mg, 650 mg, Oral, Q6H PRN, Melchor Poon MD  •  aspirin EC tablet 81 mg, 81 mg, Oral, Daily, Melchor Poon MD, 81 mg at 09/02/19 0911  •  atorvastatin (LIPITOR) tablet 40 mg, 40 mg, Oral, Nightly, Melchor Poon MD, 40 mg at 09/01/19 2015  •  bisacodyl (DULCOLAX) suppository 10 mg, 10 mg, Rectal, Daily, Estevan Oropeza MD, Stopped at 08/31/19 0932  •  calcitriol (ROCALTROL) capsule 0.25 mcg, 0.25 mcg, Oral, Every Other Day, Melchor Poon MD, 0.25 mcg at 09/01/19 0812  •  carbidopa-levodopa ER (SINEMET CR)  MG per tablet 1 tablet, 1 tablet, Oral, Nightly, Melchor Poon MD, 1 tablet at 09/01/19 2016  •  carvedilol (COREG) tablet 3.125 mg, 3.125 mg, Oral, Daily, Estevan Pyle MD, 3.125 mg at 09/02/19 0911  •  clopidogrel (PLAVIX) tablet 75 mg, 75 mg, Oral, Daily, Melchor Poon MD, 75 mg at 09/02/19 0911  •  diazePAM (VALIUM) tablet 5 mg, 5 mg, Oral, Nightly, Melchor Poon MD, 5 mg at 09/01/19 2342  •  famotidine (PEPCID) tablet 20 mg, 20 mg, Oral, Daily, Estevan Pyle MD, 20 mg at 09/02/19 0910  •  febuxostat (ULORIC) tablet 40 mg, 40 mg, Oral, Daily, Melchor Poon MD, 40 mg at 09/02/19 0911  •  furosemide (LASIX) tablet 40 mg, 40 mg, Oral, Daily, Melchor Poon MD, 40 mg at 09/02/19 0911  •  gabapentin (NEURONTIN) capsule 300 mg, 300 mg, Oral, Nightly, Melchor Poon MD, 300 mg at 09/01/19 1718  •  HYDROcodone-acetaminophen (NORCO) 5-325 MG  per tablet 1 tablet, 1 tablet, Oral, Q4H PRN, Melchor Poon MD, 1 tablet at 08/29/19 1956  •  levothyroxine (SYNTHROID, LEVOTHROID) tablet 25 mcg, 25 mcg, Oral, Q AM, Melchor Poon MD, 25 mcg at 09/02/19 0559  •  loperamide (IMODIUM) capsule 2 mg, 2 mg, Oral, 4x Daily PRN, Melchor Poon MD, 2 mg at 09/02/19 1153  •  multivitamin (THERAGRAN) tablet 1 tablet, 1 tablet, Oral, Daily, Melchor Poon MD, 1 tablet at 09/02/19 0911  •  ondansetron (ZOFRAN) injection 4 mg, 4 mg, Intravenous, Q6H PRN, Melchor Poon MD  •  rotigotine (NEUPRO) 6 MG/24HR patch 1 patch, 1 patch, Transdermal, Q24H, Melchor Poon MD, 1 patch at 09/01/19 1832  •  sodium chloride 0.9 % flush 10 mL, 10 mL, Intravenous, Q12H, Melchor Poon MD, 10 mL at 09/01/19 0815  •  sodium chloride 0.9 % flush 10 mL, 10 mL, Intravenous, PRN, Melchor Poon MD  •  sodium chloride 0.9 % infusion, 30 mL/hr, Intravenous, Continuous PRN, Anuel Roach MD, Stopped at 09/01/19 1146  •  sucralfate (CARAFATE) 1 GM/10ML suspension 1 g, 1 g, Oral, 4x Daily AC & at Bedtime, Anuel Roach MD, 1 g at 09/02/19 1153    Facility-Administered Medications Ordered in Other Encounters:   •  heparin flush (porcine) 100 UNIT/ML injection 500 Units, 500 Units, Intravenous, PRN, Anuel Scott MD, 500 Units at 11/27/17 1347  •  sodium chloride 0.9 % flush 10 mL, 10 mL, Intravenous, PRN, Anuel Scott MD, 10 mL at 11/27/17 1347  Review of Systems:    All systems were reviewed and negative except for:  Gastrointestinal: positive for  See HPI    Objective     Vital Signs  Temp:  [97.3 °F (36.3 °C)-98.3 °F (36.8 °C)] 98.3 °F (36.8 °C)  Heart Rate:  [72-87] 85  Resp:  [16] 16  BP: (101-129)/(46-67) 115/67  Body mass index is 25.79 kg/m².    Intake/Output Summary (Last 24 hours) at 9/2/2019 1243  Last data filed at 9/2/2019 0900  Gross per 24 hour   Intake 1380 ml   Output 350 ml   Net 1030 ml     I/O this shift:  In: 480 [P.O.:480]  Out: -       Physical Exam:   General: patient awake, alert and cooperative   Eyes: Normal lids and lashes, no scleral icterus   Neck: supple, normal ROM   Skin: warm and dry, not jaundiced   Cardiovascular: regular rhythm and rate, no murmurs auscultated   Pulm: clear to auscultation bilaterally, regular and unlabored   Abdomen: soft, nontender, nondistended; normal bowel sounds   Rectal: deferred   Extremities: no rash or edema   Psychiatric: Normal mood and behavior; memory intact     Results Review:     I reviewed the patient's new clinical results.    Results from last 7 days   Lab Units 09/02/19  0559 09/01/19  0438 08/31/19  0550   WBC 10*3/mm3 5.73  5.73 5.18 5.99   HEMOGLOBIN g/dL 9.8*  9.8* 7.4* 8.7*   HEMATOCRIT % 31.9*  31.9* 24.3* 27.0*   PLATELETS 10*3/mm3 189  189 187 195     Results from last 7 days   Lab Units 09/02/19  0559 09/01/19  0438 08/31/19  0550  08/29/19  1247   SODIUM mmol/L 148* 144 139   < > 140   POTASSIUM mmol/L 3.6 3.6 4.1   < > 4.4   CHLORIDE mmol/L 109* 106 103   < > 103   CO2 mmol/L 27.8 28.4 29.3*   < > 28.6   BUN mg/dL 59* 74* 74*   < > 90*   CREATININE mg/dL 2.54* 2.94* 2.90*   < > 3.38*   CALCIUM mg/dL 8.9 8.8 9.6   < > 9.4   BILIRUBIN mg/dL  --   --   --   --  <0.2*   ALK PHOS U/L  --   --   --   --  40   ALT (SGPT) U/L  --   --   --   --  <5   AST (SGOT) U/L  --   --   --   --  19   GLUCOSE mg/dL 84 85 95   < > 100*    < > = values in this interval not displayed.         Lab Results   Lab Value Date/Time    LIPASE 20 04/01/2016 1134    LIPASE 84 (H) 01/29/2014 0606    LIPASE 92 (H) 01/27/2014 0640    LIPASE 88 (H) 01/21/2014 0556    LIPASE 101 (H) 01/20/2014 0720    LIPASE 94 (H) 01/19/2014 0434    LIPASE 65 (H) 01/17/2014 0426    LIPASE 1,688 (H) 01/15/2014 0543    LIPASE >3,000 (H) 01/14/2014 1757       Radiology:  CT Head Without Contrast   Final Result   1. No evidence of hemorrhage. There is a 7 mm area of decreased   attenuation involving the corona radiata on the right  which is new   versus the CT examination of 08/17/2019 consistent with an area of acute   infarction. There is no evidence of hemorrhage.   2. Vascular calcification involving carotid siphons are noted   bilaterally, present previously. There is also an area of calcification   involving the sylvian fissure on the right which was present previously.   This is new, however, versus the CT examination of the head performed on   01/10/2013. I cannot exclude a calcified embolus.                        Radiation dose reduction techniques were utilized, including automated   exposure control and exposure modulation based on body size.       This report was finalized on 8/30/2019 2:00 PM by Dr. Anuel Modi M.D.              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   • Chest pain   • Congestive heart failure (CMS/HCC)   • Gastroparesis   • Hypotension   • Hypothyroidism   • Myoclonus   • Osteoarthritis   • Automatic implantable cardioverter-defibrillator in situ   • Spondylolisthesis   • History of total knee replacement   • Anemia of chronic renal failure, stage 4 (severe) (CMS/HCC)   • CKD (chronic kidney disease)   • Anemia of chronic disease   • Encounter for fitting and adjustment of vascular catheter   • Adverse effect of iron or its compound, subsequent encounter   • B12 deficiency   • Weakness on left side of face   • Chronic kidney disease, stage III (moderate) (CMS/HCC)    • Symptomatic anemia   • Acute renal failure superimposed on  chronic kidney disease (CMS/HCC)   • History of recent stroke   • Anemia due to acute blood loss   • GAVE (gastric antral vascular ectasia)     Impression  #1 acute blood loss anemia: Endoscopy with evidence of moderate to severe gastric antral vascular ectasias.  Status post coagulation with APC.      Recommendation  Follow-up with GI in outpatient setting, will require reassessment and possible further treatment in 2 to 4 weeks.  Follow-up with primary care tender to monitor H&H  Low residue diet  Okay for discharge from GI perspective    I discussed the patients findings and my recommendations with patient and nursing staff.    Estevan Gómez MD

## 2019-09-02 NOTE — PROGRESS NOTES
[facility] INPATIENT PROGRESS NOTE  Subjective      CC: Anemia    Interval history:   The patient underwent an EGD 9/1/2019 which showed G AVE with bleeding treated with APC.  She received 2 additional units packed red blood cells yesterday with improved hemoglobin today to 9.8.  She feels much better today.  She denies significant shortness of breath or lightheadedness.    Hematology History:  The patient is a 79 y.o. year old female who had been seen in our office August 22, 2019 with anemia secondary to stage IV chronic kidney disease undergoing weekly Procrit, iron deficiency requiring Injectafer, additional evidence of macrocytosis and circulating nucleated RBCs.  She had been recently in hospital for presumed stroke with Plavix added to aspirin.  She was not having darker stools, shortness of breath or dizziness.       We planned, at this point, to give IV Injectafer with her first dose August 22, proceeded with hemolytic assessment with LORETTA negative, retake at 4.81%, LDH of 217, total bilirubin 0.2 normal haptoglobin.  Her assessment was that she likely had occult GI bleeding but that assessing her was going to be difficult as result of her recent CVA.  She, unfortunately, presented back to emergency room August 29, 2019 just after obtaining a second Injectafer infusion with a hemoglobin of 5.3 g%, hematocrit 17.4, white count of 6770, .8 and platelet count of 272,000.  Additional tests include a ferritin at 422, normal folate, iron with TIBC of 288, iron 357 with saturation of greater than 100%,, elevated B12, normal TSH.  She is transfused and is seen August 30, 2019 with hemoglobin of 6.9 and 23.1.  The patient states that she is feeling stronger than she had been and that she has not been able to have a BM for several days.  Stool has been dark without overt blood and she has had no hemoptysis, hematemesis or hematuria.      The patient did go on to receive additional transfusion.   "She was further seen by speech therapy with pharyngeal dysfunction recognized.  She continues to feel reasonably well and hopes to be discharged home.  Discussed that her stool is heme positive and that GI assessment will be necessary which did proceed with the patient seen by Dr. Roach August 31, 2019 and EGD performed on September 1, 2019 showed gastric angioectasias which were cauterized.       ROS: She denies bright red blood per rectum, melena, increased dyspnea, lightheadedness, dizziness, skin rash    Medications:  The current medication list was reviewed in the EMR.    Allergies:    Allergies   Allergen Reactions   • Chlorhexidine Rash and Itching     Patient states \"blue dye\" in chlorhexidine.  States the orange and clear are OK to use   • Valproic Acid Other (See Comments)     Made her extremely sleepy  (depakote)   • Codeine Other (See Comments)     HYPER, DOES NOT HELP PAIN   • Propoxyphene Other (See Comments)     Belligerent, acting drunk  (darvon)       Objective      Vitals:    09/02/19 0712   BP: 115/67   Pulse: 85   Resp: 16   Temp: 98.3 °F (36.8 °C)   SpO2: 99%        Physical exam:   CONSTITUTIONAL:  Vital signs reviewed.  No distress, looks comfortable.  RESPIRATORY:  Normal respiratory effort.  Lungs clear to auscultation bilaterally.  CARDIOVASCULAR:  Normal S1, S2.  No murmurs rubs or gallops.  No significant lower extremity edema.  GASTROINTESTINAL: Abdomen appears unremarkable.  Nontender.  No hepatomegaly.  No splenomegaly.  LYMPHATIC:  No cervical, supraclavicular, axillary lymphadenopathy.  SKIN:  Warm.  No rashes.  PSYCHIATRIC:  Normal judgment and insight.  Normal mood and affect.    RECENT LABS:    Results from last 7 days   Lab Units 09/02/19  0559 09/01/19  0438 08/31/19  0550   WBC 10*3/mm3 5.73  5.73 5.18 5.99   HEMOGLOBIN g/dL 9.8*  9.8* 7.4* 8.7*   HEMATOCRIT % 31.9*  31.9* 24.3* 27.0*   PLATELETS 10*3/mm3 189  189 187 195     Results from last 7 days   Lab Units " 09/02/19  0559 09/01/19  0438 08/31/19  0550  08/29/19  1247   SODIUM mmol/L 148* 144 139   < > 140   POTASSIUM mmol/L 3.6 3.6 4.1   < > 4.4   CHLORIDE mmol/L 109* 106 103   < > 103   CO2 mmol/L 27.8 28.4 29.3*   < > 28.6   BUN mg/dL 59* 74* 74*   < > 90*   CREATININE mg/dL 2.54* 2.94* 2.90*   < > 3.38*   CALCIUM mg/dL 8.9 8.8 9.6   < > 9.4   BILIRUBIN mg/dL  --   --   --   --  <0.2*   ALK PHOS U/L  --   --   --   --  40   ALT (SGPT) U/L  --   --   --   --  <5   AST (SGOT) U/L  --   --   --   --  19   GLUCOSE mg/dL 84 85 95   < > 100*    < > = values in this interval not displayed.           Assessment/Plan        *Anemia due to stage IV chronic kidney disease.  Weekly Procrit if Hb <10.     *Iron deficiency anemia.  Does not respond to oral iron due to poor absorption.  Status post IV Injectafer X2 with her second dose August 29, 2019.     *Source of iron deficiency.  · Follows regularly with Dr. Earl Avelar.  · States she cannot have colonoscopies due to tortuous colon and therefore has virtual colonoscopies.  · She states she saw Dr. Avelar after the 6/27/2019 visit with me due to recent dark stools.  She states Dr. Avelar did a rectal exam which was heme negative and recommended no further evaluation.  · Seen August 22 was suspected that she is having occult GI bleeding considering she is needing IV iron frequently.  However, with new stroke mid May 2019 and the addition of Plavix to aspirin  · As she is readmitted with a significant drop in her hemoglobin additional bleeding is suspected.  Hemoccult of the stool positive and the patient underwent EGD 9/1/2019 showing G AVE with bleeding which was cauterized     *Macrocytosis.  B12 and folate unremarkable     *Reticulocytosis.  Because of this, hemolysis labs have been checked and found to be unremarkable     *Circulating nucleated red blood cells.  Intermittent.  Could consider a bone marrow biopsy at some point but this may be as a response to increased  production in the setting of GI bleeding.     *Stroke mid August 2019.  Presumed.  Could not have MRI due to pacemaker.  Plavix was added to aspirin.    Patient appears stable from a hematology perspective.  No objection to discharge once cleared by GI.  She has weekly CBC and Procrit set up in our office.          Neno Jc MD  9/2/2019  8:02 AM

## 2019-09-03 ENCOUNTER — READMISSION MANAGEMENT (OUTPATIENT)
Dept: CALL CENTER | Facility: HOSPITAL | Age: 79
End: 2019-09-03

## 2019-09-03 NOTE — PROGRESS NOTES
Case Management Discharge Note    Final Note: DC'd home with [facility] to follow. Rita Barnes RN    Destination      No service has been selected for the patient.      Durable Medical Equipment      No service has been selected for the patient.      Dialysis/Infusion      No service has been selected for the patient.      Home Medical Care - Selection Complete      Service Provider Request Status Selected Services Address Phone Number Fax Number    [facility] Selected Home Health Services [address] [address] [phone]      Therapy      No service has been selected for the patient.      Community Resources      No service has been selected for the patient.        Transportation Services  Private: Car    Final Discharge Disposition Code: 06 - home with home health care

## 2019-09-04 ENCOUNTER — APPOINTMENT (OUTPATIENT)
Dept: OTHER | Facility: HOSPITAL | Age: 79
End: 2019-09-04

## 2019-09-04 ENCOUNTER — APPOINTMENT (OUTPATIENT)
Dept: ONCOLOGY | Facility: HOSPITAL | Age: 79
End: 2019-09-04

## 2019-09-04 NOTE — OUTREACH NOTE
Prep Survey      Responses   Facility patient discharged from?  [place]   Is patient eligible?  Yes   Discharge diagnosis  Symptomatic anemia   Does the patient have one of the following disease processes/diagnoses(primary or secondary)?  Other   Does the patient have Home health ordered?  Yes   What is the Home health agency?   [facility]   Is there a DME ordered?  No   Prep survey completed?  Yes          Gena Howard RN

## 2019-09-05 ENCOUNTER — INFUSION (OUTPATIENT)
Dept: ONCOLOGY | Facility: HOSPITAL | Age: 79
End: 2019-09-05

## 2019-09-05 ENCOUNTER — APPOINTMENT (OUTPATIENT)
Dept: OTHER | Facility: HOSPITAL | Age: 79
End: 2019-09-05

## 2019-09-05 ENCOUNTER — READMISSION MANAGEMENT (OUTPATIENT)
Dept: CALL CENTER | Facility: HOSPITAL | Age: 79
End: 2019-09-05

## 2019-09-05 VITALS
HEART RATE: 85 BPM | OXYGEN SATURATION: 100 % | DIASTOLIC BLOOD PRESSURE: 71 MMHG | TEMPERATURE: 97.6 F | SYSTOLIC BLOOD PRESSURE: 118 MMHG | BODY MASS INDEX: 26.5 KG/M2 | WEIGHT: 164.2 LBS

## 2019-09-05 DIAGNOSIS — Z45.2 ENCOUNTER FOR FITTING AND ADJUSTMENT OF VASCULAR CATHETER: ICD-10-CM

## 2019-09-05 DIAGNOSIS — N18.4 ANEMIA OF CHRONIC RENAL FAILURE, STAGE 4 (SEVERE) (HCC): ICD-10-CM

## 2019-09-05 DIAGNOSIS — D63.8 ANEMIA OF CHRONIC DISEASE: ICD-10-CM

## 2019-09-05 DIAGNOSIS — N18.30 CHRONIC KIDNEY DISEASE, STAGE III (MODERATE) (HCC): Primary | ICD-10-CM

## 2019-09-05 DIAGNOSIS — D63.1 ANEMIA OF CHRONIC RENAL FAILURE, STAGE 4 (SEVERE) (HCC): ICD-10-CM

## 2019-09-05 LAB
BASOPHILS # BLD AUTO: 0.02 10*3/MM3 (ref 0–0.2)
BASOPHILS NFR BLD AUTO: 0.2 % (ref 0–1.5)
DEPRECATED RDW RBC AUTO: 66.6 FL (ref 37–54)
EOSINOPHIL # BLD AUTO: 0.15 10*3/MM3 (ref 0–0.4)
EOSINOPHIL NFR BLD AUTO: 1.8 % (ref 0.3–6.2)
ERYTHROCYTE [DISTWIDTH] IN BLOOD BY AUTOMATED COUNT: 18.9 % (ref 12.3–15.4)
FERRITIN SERPL-MCNC: 831.8 NG/ML (ref 13–150)
HCT VFR BLD AUTO: 29.9 % (ref 34–46.6)
HGB BLD-MCNC: 9.6 G/DL (ref 12–15.9)
IMM GRANULOCYTES # BLD AUTO: 0.02 10*3/MM3 (ref 0–0.05)
IMM GRANULOCYTES NFR BLD AUTO: 0.2 % (ref 0–0.5)
IRON 24H UR-MRATE: 38 MCG/DL (ref 37–145)
IRON SATN MFR SERPL: 16 % (ref 20–50)
LYMPHOCYTES # BLD AUTO: 0.44 10*3/MM3 (ref 0.7–3.1)
LYMPHOCYTES NFR BLD AUTO: 5.1 % (ref 19.6–45.3)
MCH RBC QN AUTO: 30.9 PG (ref 26.6–33)
MCHC RBC AUTO-ENTMCNC: 32.1 G/DL (ref 31.5–35.7)
MCV RBC AUTO: 96.1 FL (ref 79–97)
MONOCYTES # BLD AUTO: 0.74 10*3/MM3 (ref 0.1–0.9)
MONOCYTES NFR BLD AUTO: 8.6 % (ref 5–12)
NEUTROPHILS # BLD AUTO: 7.19 10*3/MM3 (ref 1.7–7)
NEUTROPHILS NFR BLD AUTO: 84.1 % (ref 42.7–76)
NRBC BLD AUTO-RTO: 0 /100 WBC (ref 0–0.2)
PLATELET # BLD AUTO: 163 10*3/MM3 (ref 140–450)
PMV BLD AUTO: 9.5 FL (ref 6–12)
RBC # BLD AUTO: 3.11 10*6/MM3 (ref 3.77–5.28)
TIBC SERPL-MCNC: 234 MCG/DL (ref 298–536)
TRANSFERRIN SERPL-MCNC: 157 MG/DL (ref 200–360)
WBC NRBC COR # BLD: 8.56 10*3/MM3 (ref 3.4–10.8)

## 2019-09-05 PROCEDURE — 84466 ASSAY OF TRANSFERRIN: CPT | Performed by: INTERNAL MEDICINE

## 2019-09-05 PROCEDURE — 85025 COMPLETE CBC W/AUTO DIFF WBC: CPT | Performed by: INTERNAL MEDICINE

## 2019-09-05 PROCEDURE — 83540 ASSAY OF IRON: CPT | Performed by: INTERNAL MEDICINE

## 2019-09-05 PROCEDURE — 82728 ASSAY OF FERRITIN: CPT | Performed by: INTERNAL MEDICINE

## 2019-09-05 PROCEDURE — 96372 THER/PROPH/DIAG INJ SC/IM: CPT

## 2019-09-05 PROCEDURE — 25010000002 EPOETIN ALFA PER 1000 UNITS: Performed by: NURSE PRACTITIONER

## 2019-09-05 RX ORDER — SODIUM CHLORIDE 0.9 % (FLUSH) 0.9 %
10 SYRINGE (ML) INJECTION AS NEEDED
Status: CANCELLED | OUTPATIENT
Start: 2019-09-05

## 2019-09-05 RX ORDER — SODIUM CHLORIDE 0.9 % (FLUSH) 0.9 %
10 SYRINGE (ML) INJECTION AS NEEDED
Status: DISCONTINUED | OUTPATIENT
Start: 2019-09-05 | End: 2019-09-05 | Stop reason: HOSPADM

## 2019-09-05 RX ADMIN — ERYTHROPOIETIN 57000 UNITS: 40000 INJECTION, SOLUTION INTRAVENOUS; SUBCUTANEOUS at 11:42

## 2019-09-05 RX ADMIN — SODIUM CHLORIDE, PRESERVATIVE FREE 10 ML: 5 INJECTION INTRAVENOUS at 10:47

## 2019-09-05 NOTE — OUTREACH NOTE
Medical Week 1 Survey      Responses   Facility patient discharged from?  [place]   Does the patient have one of the following disease processes/diagnoses(primary or secondary)?  Other   Is there a successful TCM telephone encounter documented?  No   Week 1 attempt successful?  Yes   Call start time  1157   Call end time  1202   Discharge diagnosis  Symptomatic anemia   Meds reviewed with patient/caregiver?  Yes   Is the patient having any side effects they believe may be caused by any medication additions or changes?  No   Does the patient have all medications ordered at discharge?  Yes   Is the patient taking all medications as directed (includes completed medication regime)?  Yes   Does the patient have a primary care provider?   Yes   Does the patient have an appointment with their PCP within 7 days of discharge?  Yes   Comments regarding PCP  Saw PCP today.   Has the patient kept scheduled appointments due by today?  Yes   What is the Home health agency?   [facility]   Has home health visited the patient within 72 hours of discharge?  Yes   Psychosocial issues?  No   Did the patient receive a copy of their discharge instructions?  Yes   Nursing interventions  Reviewed instructions with patient   What is the patient's perception of their health status since discharge?  Improving   Is the patient/caregiver able to teach back signs and symptoms related to disease process for when to call PCP?  Yes   Is the patient/caregiver able to teach back signs and symptoms related to disease process for when to call 911?  Yes   Is the patient/caregiver able to teach back the hierarchy of who to call/visit for symptoms/problems? PCP, Specialist, Home health nurse, Urgent Care, ED, 911  Yes   Additional teach back comments  Pt says she is doing well, she just had her blood drawn at her PCPs office this AM and saw PCP, no questions or concerns at this time.   Week 1 call completed?  Yes          Zohreh Corea RN

## 2019-09-11 ENCOUNTER — TELEPHONE (OUTPATIENT)
Dept: GASTROENTEROLOGY | Facility: CLINIC | Age: 79
End: 2019-09-11

## 2019-09-11 NOTE — TELEPHONE ENCOUNTER
WAS DISCHARGED FROM THE HOSPITAL AND CARAFATE WAS SENT TO HER PHARMACY.  CARAFATE COST OVER $100, WOULD LIKE SOME CHEAPER.  PLEASE CALL.

## 2019-09-12 ENCOUNTER — INFUSION (OUTPATIENT)
Dept: ONCOLOGY | Facility: HOSPITAL | Age: 79
End: 2019-09-12

## 2019-09-12 ENCOUNTER — READMISSION MANAGEMENT (OUTPATIENT)
Dept: CALL CENTER | Facility: HOSPITAL | Age: 79
End: 2019-09-12

## 2019-09-12 ENCOUNTER — LAB (OUTPATIENT)
Dept: OTHER | Facility: HOSPITAL | Age: 79
End: 2019-09-12

## 2019-09-12 VITALS
OXYGEN SATURATION: 100 % | TEMPERATURE: 97.5 F | DIASTOLIC BLOOD PRESSURE: 70 MMHG | WEIGHT: 164.2 LBS | HEART RATE: 79 BPM | BODY MASS INDEX: 26.5 KG/M2 | SYSTOLIC BLOOD PRESSURE: 136 MMHG

## 2019-09-12 DIAGNOSIS — N18.4 ANEMIA OF CHRONIC RENAL FAILURE, STAGE 4 (SEVERE) (HCC): ICD-10-CM

## 2019-09-12 DIAGNOSIS — D63.1 ANEMIA OF CHRONIC RENAL FAILURE, STAGE 4 (SEVERE) (HCC): ICD-10-CM

## 2019-09-12 DIAGNOSIS — D63.8 ANEMIA OF CHRONIC DISEASE: ICD-10-CM

## 2019-09-12 DIAGNOSIS — N18.30 CHRONIC KIDNEY DISEASE, STAGE III (MODERATE) (HCC): ICD-10-CM

## 2019-09-12 LAB
BASOPHILS # BLD AUTO: 0.02 10*3/MM3 (ref 0–0.2)
BASOPHILS NFR BLD AUTO: 0.3 % (ref 0–1.5)
DEPRECATED RDW RBC AUTO: 65.9 FL (ref 37–54)
EOSINOPHIL # BLD AUTO: 0.13 10*3/MM3 (ref 0–0.4)
EOSINOPHIL NFR BLD AUTO: 1.9 % (ref 0.3–6.2)
ERYTHROCYTE [DISTWIDTH] IN BLOOD BY AUTOMATED COUNT: 19.1 % (ref 12.3–15.4)
HCT VFR BLD AUTO: 34.9 % (ref 34–46.6)
HGB BLD-MCNC: 11.2 G/DL (ref 12–15.9)
IMM GRANULOCYTES # BLD AUTO: 0.04 10*3/MM3 (ref 0–0.05)
IMM GRANULOCYTES NFR BLD AUTO: 0.6 % (ref 0–0.5)
LYMPHOCYTES # BLD AUTO: 0.63 10*3/MM3 (ref 0.7–3.1)
LYMPHOCYTES NFR BLD AUTO: 9.4 % (ref 19.6–45.3)
MCH RBC QN AUTO: 31.3 PG (ref 26.6–33)
MCHC RBC AUTO-ENTMCNC: 32.1 G/DL (ref 31.5–35.7)
MCV RBC AUTO: 97.5 FL (ref 79–97)
MONOCYTES # BLD AUTO: 0.55 10*3/MM3 (ref 0.1–0.9)
MONOCYTES NFR BLD AUTO: 8.2 % (ref 5–12)
NEUTROPHILS # BLD AUTO: 5.3 10*3/MM3 (ref 1.7–7)
NEUTROPHILS NFR BLD AUTO: 79.6 % (ref 42.7–76)
NRBC BLD AUTO-RTO: 0.7 /100 WBC (ref 0–0.2)
PLATELET # BLD AUTO: 222 10*3/MM3 (ref 140–450)
PMV BLD AUTO: 9.5 FL (ref 6–12)
RBC # BLD AUTO: 3.58 10*6/MM3 (ref 3.77–5.28)
WBC NRBC COR # BLD: 6.67 10*3/MM3 (ref 3.4–10.8)

## 2019-09-12 PROCEDURE — 85025 COMPLETE CBC W/AUTO DIFF WBC: CPT | Performed by: INTERNAL MEDICINE

## 2019-09-12 PROCEDURE — 36415 COLL VENOUS BLD VENIPUNCTURE: CPT

## 2019-09-12 NOTE — PROGRESS NOTES
Lab Results   Component Value Date    WBC 6.67 09/12/2019    HGB 11.2 (L) 09/12/2019    HCT 34.9 09/12/2019    MCV 97.5 (H) 09/12/2019     09/12/2019     Procrit not indicated for Hgb 11.2.  Pt voices fatigue but has been moving into a new house.  She was instructed to call if symptoms worsen and next appt reviewed.

## 2019-09-12 NOTE — OUTREACH NOTE
Medical Week 2 Survey      Responses   Facility patient discharged from?  [place]   Does the patient have one of the following disease processes/diagnoses(primary or secondary)?  Other   Week 2 attempt successful?  No   Unsuccessful attempts  Attempt 1          Aileen Resendiz RN

## 2019-09-13 ENCOUNTER — READMISSION MANAGEMENT (OUTPATIENT)
Dept: CALL CENTER | Facility: HOSPITAL | Age: 79
End: 2019-09-13

## 2019-09-13 NOTE — OUTREACH NOTE
Medical Week 2 Survey      Responses   Facility patient discharged from?  [place]   Does the patient have one of the following disease processes/diagnoses(primary or secondary)?  Other   Week 2 attempt successful?  Yes   Call start time  0938   Discharge diagnosis  Symptomatic anemia   Call end time  0942   Meds reviewed with patient/caregiver?  Yes   Is the patient having any side effects they believe may be caused by any medication additions or changes?  No   Does the patient have all medications ordered at discharge?  Yes   Is the patient taking all medications as directed (includes completed medication regime)?  Yes   Does the patient have a primary care provider?   Yes   Does the patient have an appointment with their PCP within 7 days of discharge?  Yes   Has the patient kept scheduled appointments due by today?  Yes   What is the Home health agency?   [facility]   Has home health visited the patient within 72 hours of discharge?  Yes   Psychosocial issues?  No   Did the patient receive a copy of their discharge instructions?  Yes   Nursing interventions  Reviewed instructions with patient   What is the patient's perception of their health status since discharge?  Same   Is the patient/caregiver able to teach back signs and symptoms related to disease process for when to call PCP?  Yes   Is the patient/caregiver able to teach back signs and symptoms related to disease process for when to call 911?  Yes   Is the patient/caregiver able to teach back the hierarchy of who to call/visit for symptoms/problems? PCP, Specialist, Home health nurse, Urgent Care, ED, 911  Yes   Week 2 Call Completed?  Yes   Wrap up additional comments  pt stated her H&H are much better.           Leslee Muñoz RN

## 2019-09-19 ENCOUNTER — LAB (OUTPATIENT)
Dept: OTHER | Facility: HOSPITAL | Age: 79
End: 2019-09-19

## 2019-09-19 ENCOUNTER — INFUSION (OUTPATIENT)
Dept: ONCOLOGY | Facility: HOSPITAL | Age: 79
End: 2019-09-19

## 2019-09-19 DIAGNOSIS — N18.30 CHRONIC KIDNEY DISEASE, STAGE III (MODERATE) (HCC): ICD-10-CM

## 2019-09-19 DIAGNOSIS — D63.8 ANEMIA OF CHRONIC DISEASE: ICD-10-CM

## 2019-09-19 LAB
BASOPHILS # BLD AUTO: 0.01 10*3/MM3 (ref 0–0.2)
BASOPHILS NFR BLD AUTO: 0.2 % (ref 0–1.5)
DEPRECATED RDW RBC AUTO: 65.8 FL (ref 37–54)
EOSINOPHIL # BLD AUTO: 0.18 10*3/MM3 (ref 0–0.4)
EOSINOPHIL NFR BLD AUTO: 3.5 % (ref 0.3–6.2)
ERYTHROCYTE [DISTWIDTH] IN BLOOD BY AUTOMATED COUNT: 18.1 % (ref 12.3–15.4)
HCT VFR BLD AUTO: 35.9 % (ref 34–46.6)
HGB BLD-MCNC: 11.2 G/DL (ref 12–15.9)
IMM GRANULOCYTES # BLD AUTO: 0.02 10*3/MM3 (ref 0–0.05)
IMM GRANULOCYTES NFR BLD AUTO: 0.4 % (ref 0–0.5)
LYMPHOCYTES # BLD AUTO: 0.48 10*3/MM3 (ref 0.7–3.1)
LYMPHOCYTES NFR BLD AUTO: 9.4 % (ref 19.6–45.3)
MCH RBC QN AUTO: 30.9 PG (ref 26.6–33)
MCHC RBC AUTO-ENTMCNC: 31.2 G/DL (ref 31.5–35.7)
MCV RBC AUTO: 98.9 FL (ref 79–97)
MONOCYTES # BLD AUTO: 0.27 10*3/MM3 (ref 0.1–0.9)
MONOCYTES NFR BLD AUTO: 5.3 % (ref 5–12)
NEUTROPHILS # BLD AUTO: 4.15 10*3/MM3 (ref 1.7–7)
NEUTROPHILS NFR BLD AUTO: 81.2 % (ref 42.7–76)
NRBC BLD AUTO-RTO: 0 /100 WBC (ref 0–0.2)
PLATELET # BLD AUTO: 117 10*3/MM3 (ref 140–450)
PMV BLD AUTO: 10.2 FL (ref 6–12)
RBC # BLD AUTO: 3.63 10*6/MM3 (ref 3.77–5.28)
WBC NRBC COR # BLD: 5.11 10*3/MM3 (ref 3.4–10.8)

## 2019-09-19 PROCEDURE — 36415 COLL VENOUS BLD VENIPUNCTURE: CPT

## 2019-09-19 PROCEDURE — 85025 COMPLETE CBC W/AUTO DIFF WBC: CPT | Performed by: INTERNAL MEDICINE

## 2019-09-19 NOTE — PROGRESS NOTES
Lab Results   Component Value Date    WBC 5.11 09/19/2019    HGB 11.2 (L) 09/19/2019    HCT 35.9 09/19/2019    MCV 98.9 (H) 09/19/2019     (L) 09/19/2019     Procrit not indicated for Hgb 11.2.  Pt denies complaints and voices feeling well.  Copy of labs to patient and instructed to call with concerns prior to her next appt.

## 2019-09-20 ENCOUNTER — READMISSION MANAGEMENT (OUTPATIENT)
Dept: CALL CENTER | Facility: HOSPITAL | Age: 79
End: 2019-09-20

## 2019-09-20 NOTE — OUTREACH NOTE
Medical Week 3 Survey      Responses   Facility patient discharged from?  [place]   Does the patient have one of the following disease processes/diagnoses(primary or secondary)?  Other   Week 3 attempt successful?  Yes   Call start time  1548   Call end time  1549   Discharge diagnosis  Symptomatic anemia   Has the patient kept scheduled appointments due by today?  Yes   What is the Home health agency?   [facility]   What is the patient's perception of their health status since discharge?  Improving   Is the patient/caregiver able to teach back the hierarchy of who to call/visit for symptoms/problems? PCP, Specialist, Home health nurse, Urgent Care, ED, 911  Yes   Additional teach back comments  Pt says she is doing well, she is practicing her speech therapy lessons and getting stronger, no questions or concerns at this time.   Week 3 Call Completed?  Yes          Zohreh Corea RN

## 2019-09-26 ENCOUNTER — APPOINTMENT (OUTPATIENT)
Dept: OTHER | Facility: HOSPITAL | Age: 79
End: 2019-09-26

## 2019-09-26 ENCOUNTER — INFUSION (OUTPATIENT)
Dept: ONCOLOGY | Facility: HOSPITAL | Age: 79
End: 2019-09-26

## 2019-09-26 VITALS
SYSTOLIC BLOOD PRESSURE: 130 MMHG | RESPIRATION RATE: 16 BRPM | TEMPERATURE: 97.9 F | WEIGHT: 165.6 LBS | HEART RATE: 81 BPM | DIASTOLIC BLOOD PRESSURE: 76 MMHG | OXYGEN SATURATION: 97 % | BODY MASS INDEX: 26.73 KG/M2

## 2019-09-26 DIAGNOSIS — N18.4 STAGE 4 CHRONIC KIDNEY DISEASE (HCC): ICD-10-CM

## 2019-09-26 DIAGNOSIS — D50.8 OTHER IRON DEFICIENCY ANEMIA: ICD-10-CM

## 2019-09-26 DIAGNOSIS — Z45.2 ENCOUNTER FOR FITTING AND ADJUSTMENT OF VASCULAR CATHETER: ICD-10-CM

## 2019-09-26 DIAGNOSIS — E53.8 B12 DEFICIENCY: Primary | ICD-10-CM

## 2019-09-26 DIAGNOSIS — D63.8 ANEMIA OF CHRONIC DISEASE: ICD-10-CM

## 2019-09-26 DIAGNOSIS — IMO0001 ADVERSE EFFECT OF IRON OR ITS COMPOUND, SUBSEQUENT ENCOUNTER: ICD-10-CM

## 2019-09-26 DIAGNOSIS — N18.4 ANEMIA OF CHRONIC RENAL FAILURE, STAGE 4 (SEVERE) (HCC): ICD-10-CM

## 2019-09-26 DIAGNOSIS — D63.1 ANEMIA OF CHRONIC RENAL FAILURE, STAGE 4 (SEVERE) (HCC): ICD-10-CM

## 2019-09-26 DIAGNOSIS — N18.30 CHRONIC KIDNEY DISEASE, STAGE III (MODERATE) (HCC): ICD-10-CM

## 2019-09-26 LAB
BASOPHILS # BLD AUTO: 0.01 10*3/MM3 (ref 0–0.2)
BASOPHILS NFR BLD AUTO: 0.2 % (ref 0–1.5)
DEPRECATED RDW RBC AUTO: 62.1 FL (ref 37–54)
EOSINOPHIL # BLD AUTO: 0.1 10*3/MM3 (ref 0–0.4)
EOSINOPHIL NFR BLD AUTO: 1.9 % (ref 0.3–6.2)
ERYTHROCYTE [DISTWIDTH] IN BLOOD BY AUTOMATED COUNT: 16.8 % (ref 12.3–15.4)
FERRITIN SERPL-MCNC: 337.7 NG/ML (ref 13–150)
HCT VFR BLD AUTO: 35.4 % (ref 34–46.6)
HGB BLD-MCNC: 11.1 G/DL (ref 12–15.9)
IMM GRANULOCYTES # BLD AUTO: 0.02 10*3/MM3 (ref 0–0.05)
IMM GRANULOCYTES NFR BLD AUTO: 0.4 % (ref 0–0.5)
IRON 24H UR-MRATE: 74 MCG/DL (ref 37–145)
IRON SATN MFR SERPL: 34 % (ref 20–50)
LYMPHOCYTES # BLD AUTO: 0.51 10*3/MM3 (ref 0.7–3.1)
LYMPHOCYTES NFR BLD AUTO: 9.8 % (ref 19.6–45.3)
MCH RBC QN AUTO: 31.2 PG (ref 26.6–33)
MCHC RBC AUTO-ENTMCNC: 31.4 G/DL (ref 31.5–35.7)
MCV RBC AUTO: 99.4 FL (ref 79–97)
MONOCYTES # BLD AUTO: 0.41 10*3/MM3 (ref 0.1–0.9)
MONOCYTES NFR BLD AUTO: 7.9 % (ref 5–12)
NEUTROPHILS # BLD AUTO: 4.13 10*3/MM3 (ref 1.7–7)
NEUTROPHILS NFR BLD AUTO: 79.8 % (ref 42.7–76)
NRBC BLD AUTO-RTO: 0 /100 WBC (ref 0–0.2)
PLATELET # BLD AUTO: 141 10*3/MM3 (ref 140–450)
PMV BLD AUTO: 9.3 FL (ref 6–12)
RBC # BLD AUTO: 3.56 10*6/MM3 (ref 3.77–5.28)
TIBC SERPL-MCNC: 216 MCG/DL (ref 298–536)
TRANSFERRIN SERPL-MCNC: 145 MG/DL (ref 200–360)
WBC NRBC COR # BLD: 5.18 10*3/MM3 (ref 3.4–10.8)

## 2019-09-26 PROCEDURE — 83540 ASSAY OF IRON: CPT | Performed by: INTERNAL MEDICINE

## 2019-09-26 PROCEDURE — 96372 THER/PROPH/DIAG INJ SC/IM: CPT | Performed by: NURSE PRACTITIONER

## 2019-09-26 PROCEDURE — 25010000002 CYANOCOBALAMIN PER 1000 MCG: Performed by: NURSE PRACTITIONER

## 2019-09-26 PROCEDURE — 96523 IRRIG DRUG DELIVERY DEVICE: CPT | Performed by: NURSE PRACTITIONER

## 2019-09-26 PROCEDURE — 85025 COMPLETE CBC W/AUTO DIFF WBC: CPT | Performed by: INTERNAL MEDICINE

## 2019-09-26 PROCEDURE — 84466 ASSAY OF TRANSFERRIN: CPT | Performed by: INTERNAL MEDICINE

## 2019-09-26 PROCEDURE — 82728 ASSAY OF FERRITIN: CPT | Performed by: INTERNAL MEDICINE

## 2019-09-26 RX ORDER — SODIUM CHLORIDE 0.9 % (FLUSH) 0.9 %
10 SYRINGE (ML) INJECTION AS NEEDED
Status: CANCELLED | OUTPATIENT
Start: 2019-09-26

## 2019-09-26 RX ORDER — SODIUM CHLORIDE 0.9 % (FLUSH) 0.9 %
10 SYRINGE (ML) INJECTION AS NEEDED
Status: DISCONTINUED | OUTPATIENT
Start: 2019-09-26 | End: 2019-09-26 | Stop reason: HOSPADM

## 2019-09-26 RX ORDER — CYANOCOBALAMIN 1000 UG/ML
1000 INJECTION, SOLUTION INTRAMUSCULAR; SUBCUTANEOUS
Status: DISCONTINUED | OUTPATIENT
Start: 2019-09-26 | End: 2019-09-26 | Stop reason: HOSPADM

## 2019-09-26 RX ADMIN — CYANOCOBALAMIN 1000 MCG: 1000 INJECTION, SOLUTION INTRAMUSCULAR at 10:01

## 2019-09-26 RX ADMIN — SODIUM CHLORIDE, PRESERVATIVE FREE 10 ML: 5 INJECTION INTRAVENOUS at 10:01

## 2019-09-26 RX ADMIN — Medication 500 UNITS: at 10:01

## 2019-09-30 ENCOUNTER — READMISSION MANAGEMENT (OUTPATIENT)
Dept: CALL CENTER | Facility: HOSPITAL | Age: 79
End: 2019-09-30

## 2019-09-30 NOTE — OUTREACH NOTE
Medical Week 4 Survey      Responses   Facility patient discharged from?  [place]   Does the patient have one of the following disease processes/diagnoses(primary or secondary)?  Other   Week 4 attempt successful?  Yes   Call start time  1434   Call end time  1436   Meds reviewed with patient/caregiver?  Yes   Is the patient taking all medications as directed (includes completed medication regime)?  Yes   Has the patient kept scheduled appointments due by today?  Yes   Is the patient still receiving Home Health Services?  No   Home health comments  Speech therapy has finished and PT not coming   What is the patient's perception of their health status since discharge?  Improving   Week 4 Call Completed?  Yes   Would the patient like one additional call?  No   Graduated  Yes   Did the patient feel the follow up calls were helpful during their recovery period?  Yes   Was the number of calls appropriate?  Yes   Wrap up additional comments  She staes, is doing fine, does not need anything          Alice Ortiz, RN

## 2019-10-01 NOTE — PROGRESS NOTES
.     REASONS FOR FOLLOWUP: Anemia    HISTORY OF PRESENT ILLNESS:  The patient is a 79 y.o. year old female  who is here for follow-up with the above-mentioned history.    Since last visit, admitted for GI bleed.  G AVE found.  Underwent photocoagulation.    She saw no gross bleeding before or after the event but does state her stools were dark before the event and have not been dark since.  Hb has dropped about one-point over the past week.    She feels fine.    Past Medical History:   Diagnosis Date   • Anemia     Iron deficiency   • Anxiety    • Cardiomyopathy (CMS/HCC)     S/P pacemaker and defibrillator   • CHF (congestive heart failure) (CMS/HCC)    • Chronic renal failure, stage 4 (severe) (CMS/HCC)    • Coronary artery disease     pacemaker, defibrillator   • Depression    • Dizzy    • Gastroparesis    • GAVE (gastric antral vascular ectasia)    • GERD (gastroesophageal reflux disease)    • Gout    • Hemorrhoids    • Hiatal hernia    • History of Clostridium difficile colitis 2013   • History of kidney stones    • History of pancreatitis     2014   • History of skin cancer    • History of transfusion     MULTIPLE    • Hyperlipidemia    • Hypertension    • Hypothyroidism    • Left bundle branch block    • Mitral and aortic valve disease    • Mitral valve insufficiency    • Myoclonus     S/P Depakote   • Osteoarthritis    • Pancytopenia (CMS/HCC)    • Peripheral neuropathy    • Presence of cardiac pacemaker     AND DEFIBRILLATOR   • Pulmonary hypertension (CMS/HCC)    • SOB (shortness of breath) on exertion    • Spinal stenosis    • Stroke (CMS/HCC)      Past Surgical History:   Procedure Laterality Date   • APPENDECTOMY N/A    • ARTERIOVENOUS FISTULA/SHUNT SURGERY Right 2/18/2019    Procedure: RIGHT BASILIC FISTULA CREATION WITHOUT TRANSPOSITION;  Surgeon: Royer Dozier MD;  Location: [facility];  Service: Vascular   • ARTERIOVENOUS FISTULA/SHUNT SURGERY Right 5/31/2019    Procedure: RIGHT 2ND  STAGE BASILIC VEIN CREATION;  Surgeon: Royer Dozier MD;  Location: [facility] OR;  Service: Vascular   • CARDIAC CATHETERIZATION Left 03/28/2006    Arterial catheter insertion, cath left ventriculography, coronary angiography and left heart catheterization-Dr. Estevan Matamoros   • CARDIAC DEFIBRILLATOR PLACEMENT N/A 11/30/2007    Biventricular implantable cardioverter defibrillator-Dr. Leandro Huber   • CATARACT EXTRACTION Bilateral 2011   • COLONOSCOPY N/A 2014    done at [facility]   • CYSTECTOMY N/A     Ovarian cystectomy   • ENDOSCOPY N/A 04/28/2006    EGD with biopsies. Paraesophageal hiatal hernia from 34 to 44 cm, antral gastritis-Dr. Nehemiah Beal   • ENDOSCOPY N/A 09/29/2004    Hiatal hernia, gastritis and an erosion of the pylorus-Dr. Yang Pavon   • ENDOSCOPY N/A 9/1/2019    Procedure: ESOPHAGOGASTRODUODENOSCOPY with APC;  Surgeon: Anuel Roach MD;  Location: [facility] ENDOSCOPY;  Service: Gastroenterology   • ENTEROSCOPY SMALL BOWEL N/A 10/30/2006    Small bowel enteroscopy with biopsies-Dr. Rory Sevilla   • FLEXIBLE SIGMOIDOSCOPY N/A 09/29/2004    Unsuccessful colonoscopy due to poop prep, a very tortuous sigmoid, bowel prep in the form of enema given and a barium enema was obtained-Dr. Yang Pavon   • FRACTURE SURGERY  2015    Broke big toe   • HEMATOMA EVACUATION TRUNK N/A 02/07/2014    Pocket evacuation of hematoma at pacemaker site-Dr. Leandro Huber   • HEMORRHOIDECTOMY N/A 04/19/2004    Flexible sigmoidoscopy and stapled hemorrhoidectomy-Dr. Yang Pavon   • HYSTERECTOMY Bilateral 1977   • IMPLANTABLE CARDIOVERTER DEFIBRILLATOR LEAD REPLACEMENT/POCKET REVISION Left 3/28/2016    Procedure: AUTOMATIC IMPLANTABLE CARDIOVERTER DEFIBRILLATOR LEAD REPLACEMENT WITH LASER LEAD EXTRACTION;  Surgeon: Leandro Huber MD;  Location: [facility] OR 18/19;  Service:    • IMPLANTABLE CARDIOVERTER DEFIBRILLATOR LEAD REPLACEMENT/POCKET REVISION N/A 01/13/2014    Biventricular ICD  replacement-Dr. Jillian Huber   • LAPAROSCOPY REPAIR HIATAL HERNIA N/A 06/27/2006    Laparoscopic paraesophageal hiatal hernia repair with Nissen fundoplication and cholecystectomy-Dr. Sean Yoon   • NATALIE GONZALEZ ([facility]) PROCEDURE     • KS INSJ TUNNELED CVC W/O SUBQ PORT/ AGE 3 YR/> Right 10/3/2017    Procedure: Right internal jugular port placement;  Surgeon: Tiffanie Couch MD;  Location: [facility] OR;  Service: General   • TOE SURGERY Left     SET BIG TOE   • TOTAL KNEE ARTHROPLASTY Left 08/2014       MEDICATIONS    Current Outpatient Medications:   •  aspirin 81 MG tablet, Take 81 mg by mouth Daily., Disp: , Rfl:   •  atorvastatin (LIPITOR) 40 MG tablet, Take 1 tablet by mouth Every Night., Disp: 30 tablet, Rfl: 0  •  calcitriol (ROCALTROL) 0.25 MCG capsule, Take 0.25 mcg by mouth Every Other Day. 1 tablet every other day and 2 tablets every other day, Disp: , Rfl:   •  carbidopa-levodopa ER (SINEMET CR)  MG per tablet, Take 1 tablet by mouth Every Evening., Disp: , Rfl:   •  carvedilol (COREG) 3.125 MG tablet, Take 1 tablet by mouth Daily for 30 days., Disp: 30 tablet, Rfl: 0  •  Cholecalciferol (VITAMIN D3) 5000 units capsule capsule, Take 5,000 Units by mouth Daily., Disp: , Rfl:   •  clopidogrel (PLAVIX) 75 MG tablet, Take 1 tablet by mouth Daily., Disp: 30 tablet, Rfl: 0  •  diazepam (VALIUM) 5 MG tablet, Take 5 mg by mouth Every Night., Disp: , Rfl:   •  DULOXETINE HCL PO, Take  by mouth 2 (Two) Times a Day., Disp: , Rfl:   •  famotidine (PEPCID) 20 MG tablet, Take 20 mg by mouth 2 (Two) Times a Day., Disp: , Rfl:   •  febuxostat (ULORIC) 40 MG tablet, Take 40 mg by mouth Daily., Disp: , Rfl:   •  furosemide (LASIX) 40 MG tablet, Take 1 tablet by mouth Daily., Disp: 90 tablet, Rfl: 3  •  Gabapentin, Once-Daily, (GRALISE) 300 MG tablet, Take 300 mg by mouth Daily With Dinner. MAY REPEAT LATE EVENING IF NEEDED, Disp: , Rfl:   •  Glucosamine-Chondroit-Vit C-Mn (GLUCOSAMINE  "CHONDROITIN COMPLX) capsule, Take 1,500 mg by mouth Every Other Day. PT HOLDING FOR SURGERY, Disp: , Rfl:   •  HYDROcodone-acetaminophen (NORCO) 5-325 MG per tablet, Take 1-2 tablets by mouth Every 8 (Eight) Hours As Needed for Moderate Pain  or Severe Pain  (Pain)., Disp: 30 tablet, Rfl: 0  •  levothyroxine (SYNTHROID, LEVOTHROID) 25 MCG tablet, Take 25 mcg by mouth Daily., Disp: , Rfl:   •  loperamide (IMODIUM) 2 MG capsule, Take 2 mg by mouth 4 (Four) Times a Day As Needed for Diarrhea., Disp: , Rfl:   •  magnesium oxide 250 MG tablet, Take 250 mg by mouth Daily., Disp: , Rfl:   •  methscopolamine (PAMINE FORTE) 5 MG tablet, Take 5 mg by mouth Every Morning., Disp: , Rfl:   •  Multiple Vitamin (MULTI-DAY VITAMINS) tablet, Take 1 tablet by mouth Daily. HOLD FOR SURGERY, Disp: , Rfl:   •  psyllium (METAMUCIL) 58.6 % powder, Take 1 packet by mouth Daily., Disp: , Rfl:   •  rotigotine (NEUPRO) 6 MG/24HR patch, Place 1 patch on the skin as directed by provider Daily., Disp: , Rfl:   •  Vitamin E 400 UNITS tablet, Take 400 Units by mouth Daily. PT HOLDING FOR SURGERY, Disp: , Rfl:   No current facility-administered medications for this visit.     Facility-Administered Medications Ordered in Other Visits:   •  heparin flush (porcine) 100 UNIT/ML injection 500 Units, 500 Units, Intravenous, PRN, Anuel Scott MD, 500 Units at 11/27/17 1347  •  heparin flush (porcine) 100 UNIT/ML injection 500 Units, 500 Units, Intravenous, PRN, Neno Merritt II, MD, 500 Units at 10/03/19 1105  •  sodium chloride 0.9 % flush 10 mL, 10 mL, Intravenous, PRN, Anuel Scott MD, 10 mL at 11/27/17 1347  •  sodium chloride 0.9 % flush 10 mL, 10 mL, Intravenous, PRN, Neno Merritt II, MD, 10 mL at 10/03/19 1105    ALLERGIES:     Allergies   Allergen Reactions   • Chlorhexidine Rash and Itching     Patient states \"blue dye\" in chlorhexidine.  States the orange and clear are OK to use   • Valproic Acid Other (See Comments)     Made her " "extremely sleepy  (depakote)   • Codeine Other (See Comments)     HYPER, DOES NOT HELP PAIN   • Propoxyphene Other (See Comments)     Belligerent, acting drunk  (darvon)       SOCIAL HISTORY:       Social History     Socioeconomic History   • Marital status:      Spouse name: Zackery   • Number of children: 5   • Years of education: 1 yr college   • Highest education level: Not on file   Occupational History     Employer: RETIRED   Tobacco Use   • Smoking status: Never Smoker   • Smokeless tobacco: Never Used   Substance and Sexual Activity   • Alcohol use: No   • Drug use: No   • Sexual activity: Defer         FAMILY HISTORY:  Family History   Problem Relation Age of Onset   • Coronary artery disease Other    • Dementia Other    • Kidney disease Other    • Arthritis Father    • Early death Father         Kidney failure   • Kidney disease Father    • Heart disease Mother    • Mental illness Mother         Dementia   • Malig Hyperthermia Neg Hx        REVIEW OF SYSTEMS:  Review of Systems   Constitutional: Positive for fatigue. Negative for activity change.   HENT: Positive for ear pain. Negative for nosebleeds and trouble swallowing.    Respiratory: Negative for shortness of breath and wheezing.    Cardiovascular: Negative for chest pain and palpitations.   Gastrointestinal: Negative for constipation, diarrhea and nausea.   Genitourinary: Negative for dysuria and hematuria.   Musculoskeletal: Negative for arthralgias and myalgias.   Skin: Negative for rash and wound.   Neurological: Negative for seizures and syncope.   Hematological: Negative for adenopathy. Does not bruise/bleed easily.   Psychiatric/Behavioral: Negative for confusion.            Vitals:    10/03/19 1112   BP: 125/75   Pulse: 71   Resp: 14   Temp: 98 °F (36.7 °C)   SpO2: 99%   Weight: 75.8 kg (167 lb)   Height: 168 cm (66.14\")   PainSc: 0-No pain     Current Status 10/3/2019   ECOG score 0        PHYSICAL EXAM:    CONSTITUTIONAL:  Vital signs " reviewed.  No distress, looks comfortable.  EYES:  Conjunctiva and lids unremarkable.  PERRLA  EARS,NOSE,MOUTH,THROAT:  Ears and nose appear unremarkable.  Lips, teeth, gums appear unremarkable.  RESPIRATORY:  Normal respiratory effort.  Lungs clear to auscultation bilaterally.  CARDIOVASCULAR:  Normal S1, S2.  No murmurs rubs or gallops.  No significant lower extremity edema.  GASTROINTESTINAL: Abdomen appears unremarkable.  Nontender.  No hepatomegaly.  No splenomegaly.  LYMPHATIC:  No cervical, supraclavicular, axillary lymphadenopathy.  SKIN:  Warm.  No rashes.  PSYCHIATRIC:  Normal judgment and insight.  Normal mood and affect.      RECENT LABS:        WBC   Date/Time Value Ref Range Status   10/03/2019 11:05 AM 3.92 3.40 - 10.80 10*3/mm3 Final     Hemoglobin   Date/Time Value Ref Range Status   10/03/2019 11:05 AM 10.3 (L) 12.0 - 15.9 g/dL Final     Platelets   Date/Time Value Ref Range Status   10/03/2019 11:05  (L) 140 - 450 10*3/mm3 Final       Assessment/Plan   Anemia due to stage 4 chronic kidney disease (CMS/HCC)  - CBC & Differential  - CBC & Differential  - CBC & Differential  - CBC & Differential  - CBC & Differential  - CBC & Differential  - Ferritin  - Iron Profile  - Retic With IRF & RET-He  - Ferritin  - Iron Profile  - Retic With IRF & RET-He       *Anemia due to stage IV chronic kidney disease.    · Weekly Procrit if Hb <10.  · No need for Procrit today.  Hb >10 today.    *Admitted 8/29/2019-9/2/2019 due to Hb of 5.  Transfused 2 units.  Heme positive stools.  EGD revealed G AVE.  Laser photocoagulation  · She states Dr. Roach plans EGD every 3 months.    *Iron deficiency anemia.  · Does not respond to oral iron due to poor absorption.  2 doses Injectafer late August 2019 resulted in normal iron labs and improvement of Hb from 9.4 up to 11.1 on 9/26/2019.  Hb down to 10.3 today, 10/3/2019.    *Source of iron deficiency.  · Previously followed regularly with Dr. Earl Avelar.  · States  she cannot have colonoscopies due to tortuous colon and therefore has virtual colonoscopies.  · She states she saw Dr. Avelar after the 6/27/2019 visit with me due to recent dark stools.  She states Dr. Avelar did a rectal exam which was heme negative and recommended no further evaluation.  · On 8/22/2019, I told her I suspect she is having occult GI bleeding considering she is needing IV iron frequently.  However, with new stroke mid May 2019 and the addition of Plavix to aspirin, risk may be too great to stop antiplatelet agents for further GI evaluation.  Patient and  agree.  · During late August 2019 hospitalization for GI bleed with Hb of 5, EGD through Dr. Roach revealed G A[address].  Laser photocoagulation.  · Subsequently, following with Dr. Roach.  Planning EGD every 3 months.    *Macrocytosis.  B12 and folate unremarkable    *Reticulocytosis.  · On 8/22/2019, unremarkable: Direct [place], haptoglobin, bilirubin ( with normal range up to 214.  Therefore, likely not significant).    *Thrombocytopenia.  Intermittent since 9/19/2019.    *Circulating nucleated red blood cells.  Intermittent.  Could consider a bone marrow biopsy at some point.  I suspect this is occurring as her bone marrow is trying to increase production after bleeding episodes.    *Stroke mid August 2019.  Presumed.  Could not have MRI due to pacemaker.  Plavix was added to aspirin.    Plan  · Weekly CBC.  Procrit if Hb <10.  · MD 6 weeks.  1 week prior: Iron labs  · Rechecked iron labs today because of the Hb drop.  They were normal.  ·   · Could consider a bone marrow biopsy in the future.       assisted with history.  I reviewed and summarized hospital records      Send a copy this to Dr. Roach, GI

## 2019-10-03 ENCOUNTER — LAB (OUTPATIENT)
Dept: OTHER | Facility: HOSPITAL | Age: 79
End: 2019-10-03

## 2019-10-03 ENCOUNTER — OFFICE VISIT (OUTPATIENT)
Dept: ONCOLOGY | Facility: CLINIC | Age: 79
End: 2019-10-03

## 2019-10-03 ENCOUNTER — INFUSION (OUTPATIENT)
Dept: ONCOLOGY | Facility: HOSPITAL | Age: 79
End: 2019-10-03

## 2019-10-03 VITALS
HEART RATE: 71 BPM | DIASTOLIC BLOOD PRESSURE: 75 MMHG | TEMPERATURE: 98 F | OXYGEN SATURATION: 99 % | RESPIRATION RATE: 14 BRPM | WEIGHT: 167 LBS | BODY MASS INDEX: 26.84 KG/M2 | HEIGHT: 66 IN | SYSTOLIC BLOOD PRESSURE: 125 MMHG

## 2019-10-03 DIAGNOSIS — N18.4 ANEMIA DUE TO STAGE 4 CHRONIC KIDNEY DISEASE (HCC): Primary | ICD-10-CM

## 2019-10-03 DIAGNOSIS — N18.30 CHRONIC KIDNEY DISEASE, STAGE III (MODERATE) (HCC): ICD-10-CM

## 2019-10-03 DIAGNOSIS — Z45.2 ENCOUNTER FOR FITTING AND ADJUSTMENT OF VASCULAR CATHETER: Primary | ICD-10-CM

## 2019-10-03 DIAGNOSIS — D63.8 ANEMIA OF CHRONIC DISEASE: ICD-10-CM

## 2019-10-03 DIAGNOSIS — D63.1 ANEMIA DUE TO STAGE 4 CHRONIC KIDNEY DISEASE (HCC): Primary | ICD-10-CM

## 2019-10-03 LAB
25(OH)D3 SERPL-MCNC: 52.4 NG/ML (ref 30–100)
ALBUMIN SERPL-MCNC: 3.5 G/DL (ref 3.5–5.2)
ALBUMIN/GLOB SERPL: 1.4 G/DL
ALP SERPL-CCNC: 59 U/L (ref 39–117)
ALT SERPL W P-5'-P-CCNC: 5 U/L (ref 1–33)
ANION GAP SERPL CALCULATED.3IONS-SCNC: 5.8 MMOL/L (ref 5–15)
AST SERPL-CCNC: 24 U/L (ref 1–32)
BASOPHILS # BLD AUTO: 0.01 10*3/MM3 (ref 0–0.2)
BASOPHILS NFR BLD AUTO: 0.3 % (ref 0–1.5)
BILIRUB SERPL-MCNC: 0.2 MG/DL (ref 0.1–1.2)
BUN BLD-MCNC: 42 MG/DL (ref 8–23)
BUN/CREAT SERPL: 17.5 (ref 7–25)
CALCIUM SPEC-SCNC: 9.7 MG/DL (ref 8.6–10.5)
CHLORIDE SERPL-SCNC: 103 MMOL/L (ref 98–107)
CO2 SERPL-SCNC: 32.2 MMOL/L (ref 22–29)
CREAT BLD-MCNC: 2.4 MG/DL (ref 0.57–1)
DEPRECATED RDW RBC AUTO: 58.5 FL (ref 37–54)
EOSINOPHIL # BLD AUTO: 0.15 10*3/MM3 (ref 0–0.4)
EOSINOPHIL NFR BLD AUTO: 3.8 % (ref 0.3–6.2)
ERYTHROCYTE [DISTWIDTH] IN BLOOD BY AUTOMATED COUNT: 16.2 % (ref 12.3–15.4)
FERRITIN SERPL-MCNC: 336.5 NG/ML (ref 13–150)
GFR SERPL CREATININE-BSD FRML MDRD: 19 ML/MIN/1.73
GLOBULIN UR ELPH-MCNC: 2.5 GM/DL
GLUCOSE BLD-MCNC: 70 MG/DL (ref 65–99)
HCT VFR BLD AUTO: 32.9 % (ref 34–46.6)
HGB BLD-MCNC: 10.3 G/DL (ref 12–15.9)
HGB RETIC QN AUTO: 36.2 PG (ref 29.8–36.1)
IMM GRANULOCYTES # BLD AUTO: 0.01 10*3/MM3 (ref 0–0.05)
IMM GRANULOCYTES NFR BLD AUTO: 0.3 % (ref 0–0.5)
IMM RETICS NFR: 7.2 % (ref 3–15.8)
IRON 24H UR-MRATE: 66 MCG/DL (ref 37–145)
IRON SATN MFR SERPL: 32 % (ref 20–50)
LYMPHOCYTES # BLD AUTO: 0.54 10*3/MM3 (ref 0.7–3.1)
LYMPHOCYTES NFR BLD AUTO: 13.8 % (ref 19.6–45.3)
MCH RBC QN AUTO: 30.7 PG (ref 26.6–33)
MCHC RBC AUTO-ENTMCNC: 31.3 G/DL (ref 31.5–35.7)
MCV RBC AUTO: 97.9 FL (ref 79–97)
MONOCYTES # BLD AUTO: 0.42 10*3/MM3 (ref 0.1–0.9)
MONOCYTES NFR BLD AUTO: 10.7 % (ref 5–12)
NEUTROPHILS # BLD AUTO: 2.79 10*3/MM3 (ref 1.7–7)
NEUTROPHILS NFR BLD AUTO: 71.1 % (ref 42.7–76)
NRBC BLD AUTO-RTO: 0 /100 WBC (ref 0–0.2)
PHOSPHATE SERPL-MCNC: 3.2 MG/DL (ref 2.5–4.5)
PLATELET # BLD AUTO: 118 10*3/MM3 (ref 140–450)
PMV BLD AUTO: 9.4 FL (ref 6–12)
POTASSIUM BLD-SCNC: 4.5 MMOL/L (ref 3.5–5.2)
PROT SERPL-MCNC: 6 G/DL (ref 6–8.5)
PTH-INTACT SERPL-MCNC: 141 PG/ML (ref 15–65)
RBC # BLD AUTO: 3.36 10*6/MM3 (ref 3.77–5.28)
RETICS/RBC NFR AUTO: 0.95 % (ref 0.7–1.9)
SODIUM BLD-SCNC: 141 MMOL/L (ref 136–145)
TIBC SERPL-MCNC: 206 MCG/DL (ref 298–536)
TRANSFERRIN SERPL-MCNC: 138 MG/DL (ref 200–360)
URATE SERPL-MCNC: 5 MG/DL (ref 2.4–5.7)
WBC NRBC COR # BLD: 3.92 10*3/MM3 (ref 3.4–10.8)

## 2019-10-03 PROCEDURE — 85025 COMPLETE CBC W/AUTO DIFF WBC: CPT | Performed by: INTERNAL MEDICINE

## 2019-10-03 PROCEDURE — 82728 ASSAY OF FERRITIN: CPT | Performed by: INTERNAL MEDICINE

## 2019-10-03 PROCEDURE — 85046 RETICYTE/HGB CONCENTRATE: CPT | Performed by: INTERNAL MEDICINE

## 2019-10-03 PROCEDURE — 84100 ASSAY OF PHOSPHORUS: CPT | Performed by: INTERNAL MEDICINE

## 2019-10-03 PROCEDURE — 36591 DRAW BLOOD OFF VENOUS DEVICE: CPT

## 2019-10-03 PROCEDURE — 83540 ASSAY OF IRON: CPT | Performed by: INTERNAL MEDICINE

## 2019-10-03 PROCEDURE — 36415 COLL VENOUS BLD VENIPUNCTURE: CPT

## 2019-10-03 PROCEDURE — 80053 COMPREHEN METABOLIC PANEL: CPT | Performed by: INTERNAL MEDICINE

## 2019-10-03 PROCEDURE — 82306 VITAMIN D 25 HYDROXY: CPT | Performed by: INTERNAL MEDICINE

## 2019-10-03 PROCEDURE — 84550 ASSAY OF BLOOD/URIC ACID: CPT | Performed by: INTERNAL MEDICINE

## 2019-10-03 PROCEDURE — 84466 ASSAY OF TRANSFERRIN: CPT | Performed by: INTERNAL MEDICINE

## 2019-10-03 PROCEDURE — 99215 OFFICE O/P EST HI 40 MIN: CPT | Performed by: INTERNAL MEDICINE

## 2019-10-03 PROCEDURE — 83970 ASSAY OF PARATHORMONE: CPT | Performed by: INTERNAL MEDICINE

## 2019-10-03 RX ORDER — SODIUM CHLORIDE 0.9 % (FLUSH) 0.9 %
10 SYRINGE (ML) INJECTION AS NEEDED
Status: CANCELLED | OUTPATIENT
Start: 2019-10-03

## 2019-10-03 RX ORDER — SODIUM CHLORIDE 0.9 % (FLUSH) 0.9 %
10 SYRINGE (ML) INJECTION AS NEEDED
Status: DISCONTINUED | OUTPATIENT
Start: 2019-10-03 | End: 2019-10-03 | Stop reason: HOSPADM

## 2019-10-03 RX ADMIN — Medication 10 ML: at 11:05

## 2019-10-03 RX ADMIN — SODIUM CHLORIDE, PRESERVATIVE FREE 500 UNITS: 5 INJECTION INTRAVENOUS at 11:05

## 2019-10-10 ENCOUNTER — INFUSION (OUTPATIENT)
Dept: ONCOLOGY | Facility: HOSPITAL | Age: 79
End: 2019-10-10

## 2019-10-10 ENCOUNTER — LAB (OUTPATIENT)
Dept: OTHER | Facility: HOSPITAL | Age: 79
End: 2019-10-10

## 2019-10-10 VITALS
TEMPERATURE: 98 F | OXYGEN SATURATION: 98 % | HEART RATE: 83 BPM | SYSTOLIC BLOOD PRESSURE: 133 MMHG | DIASTOLIC BLOOD PRESSURE: 59 MMHG | WEIGHT: 167.8 LBS | BODY MASS INDEX: 26.97 KG/M2

## 2019-10-10 DIAGNOSIS — D63.1 ANEMIA DUE TO STAGE 4 CHRONIC KIDNEY DISEASE (HCC): ICD-10-CM

## 2019-10-10 DIAGNOSIS — N18.4 ANEMIA DUE TO STAGE 4 CHRONIC KIDNEY DISEASE (HCC): ICD-10-CM

## 2019-10-10 LAB
BASOPHILS # BLD AUTO: 0.02 10*3/MM3 (ref 0–0.2)
BASOPHILS NFR BLD AUTO: 0.3 % (ref 0–1.5)
DEPRECATED RDW RBC AUTO: 56.5 FL (ref 37–54)
EOSINOPHIL # BLD AUTO: 0.17 10*3/MM3 (ref 0–0.4)
EOSINOPHIL NFR BLD AUTO: 2.9 % (ref 0.3–6.2)
ERYTHROCYTE [DISTWIDTH] IN BLOOD BY AUTOMATED COUNT: 15.9 % (ref 12.3–15.4)
HCT VFR BLD AUTO: 34 % (ref 34–46.6)
HGB BLD-MCNC: 10.9 G/DL (ref 12–15.9)
IMM GRANULOCYTES # BLD AUTO: 0.02 10*3/MM3 (ref 0–0.05)
IMM GRANULOCYTES NFR BLD AUTO: 0.3 % (ref 0–0.5)
LYMPHOCYTES # BLD AUTO: 0.52 10*3/MM3 (ref 0.7–3.1)
LYMPHOCYTES NFR BLD AUTO: 8.9 % (ref 19.6–45.3)
MCH RBC QN AUTO: 31.1 PG (ref 26.6–33)
MCHC RBC AUTO-ENTMCNC: 32.1 G/DL (ref 31.5–35.7)
MCV RBC AUTO: 96.9 FL (ref 79–97)
MONOCYTES # BLD AUTO: 0.41 10*3/MM3 (ref 0.1–0.9)
MONOCYTES NFR BLD AUTO: 7 % (ref 5–12)
NEUTROPHILS # BLD AUTO: 4.68 10*3/MM3 (ref 1.7–7)
NEUTROPHILS NFR BLD AUTO: 80.6 % (ref 42.7–76)
NRBC BLD AUTO-RTO: 0 /100 WBC (ref 0–0.2)
PLATELET # BLD AUTO: 150 10*3/MM3 (ref 140–450)
PMV BLD AUTO: 9.9 FL (ref 6–12)
RBC # BLD AUTO: 3.51 10*6/MM3 (ref 3.77–5.28)
WBC NRBC COR # BLD: 5.82 10*3/MM3 (ref 3.4–10.8)

## 2019-10-10 PROCEDURE — 85025 COMPLETE CBC W/AUTO DIFF WBC: CPT | Performed by: INTERNAL MEDICINE

## 2019-10-10 PROCEDURE — 36415 COLL VENOUS BLD VENIPUNCTURE: CPT

## 2019-10-10 NOTE — PROGRESS NOTES
Lab Results   Component Value Date    WBC 5.82 10/10/2019    HGB 10.9 (L) 10/10/2019    HCT 34.0 10/10/2019    MCV 96.9 10/10/2019     10/10/2019     Procrit not indicated for Hgb 10.9.  Pt voices feeling well with no complaints.  Next appt reviewed and pt knows to call sooner with concerns.

## 2019-10-13 ENCOUNTER — CLINICAL SUPPORT NO REQUIREMENTS (OUTPATIENT)
Dept: CARDIOLOGY | Facility: CLINIC | Age: 79
End: 2019-10-13

## 2019-10-13 DIAGNOSIS — I42.9 CARDIOMYOPATHY, UNSPECIFIED TYPE (HCC): Primary | ICD-10-CM

## 2019-10-13 PROCEDURE — 93295 DEV INTERROG REMOTE 1/2/MLT: CPT | Performed by: INTERNAL MEDICINE

## 2019-10-13 PROCEDURE — 93296 REM INTERROG EVL PM/IDS: CPT | Performed by: INTERNAL MEDICINE

## 2019-10-14 ENCOUNTER — TELEPHONE (OUTPATIENT)
Dept: CARDIOLOGY | Facility: CLINIC | Age: 79
End: 2019-10-14

## 2019-10-14 DIAGNOSIS — I42.9 CARDIOMYOPATHY, UNSPECIFIED TYPE (HCC): Primary | ICD-10-CM

## 2019-10-14 NOTE — TELEPHONE ENCOUNTER
Alert remote received 10/13/19 and reviewed 10/14/19. Patient's device reached BRANDEE on 10/13/19. Patient has a Medtronic M266CII Protecta XT CRT-D, not MRI compatible. RA lead is a medtronic 5076 Capsure Fix, RV lead is a Medtronic 6935 Sprint Quattro Secure scan, both are MRI compatible. LV lead is a Medtronic [id number], not MRI compatible. Please send replacement order.

## 2019-10-14 NOTE — TELEPHONE ENCOUNTER
Since last clinic check on 8/15/19, 2 monitored AT/AF episodes, both on 9/27/19 @ 7594-9455, lasting 4 mins 30 secs-4 mins 40 secs, avg v-rates 80's bpm, EGM's are AF with atrial undersensing noted. There are also 31 VS episodes, all available on logbook are also dated 9/27/19 @ [id number], episodes lasting 8-21 secs, available EGM's also appear to be AF with atrial undersensing. 46 additional AT/AF episodes on counters, all < 1 min, no available EGM's. AT/AF burden=<0.1%, although this may not be correct due to atrial undersensing. Patient does have history of AF, is not currently on an AC. Patient denies symptoms with episode.

## 2019-10-15 ENCOUNTER — RESEARCH ENCOUNTER (OUTPATIENT)
Dept: CARDIOLOGY | Facility: CLINIC | Age: 79
End: 2019-10-15

## 2019-10-15 ENCOUNTER — OFFICE VISIT (OUTPATIENT)
Dept: GASTROENTEROLOGY | Facility: CLINIC | Age: 79
End: 2019-10-15

## 2019-10-15 DIAGNOSIS — K31.819 GAVE (GASTRIC ANTRAL VASCULAR ECTASIA): ICD-10-CM

## 2019-10-15 DIAGNOSIS — D50.0 IRON DEFICIENCY ANEMIA DUE TO CHRONIC BLOOD LOSS: Primary | ICD-10-CM

## 2019-10-15 DIAGNOSIS — K58.2 IRRITABLE BOWEL SYNDROME WITH BOTH CONSTIPATION AND DIARRHEA: ICD-10-CM

## 2019-10-15 DIAGNOSIS — N73.6 FEMALE PELVIC PERITONEAL ADHESIONS: ICD-10-CM

## 2019-10-15 PROCEDURE — 99213 OFFICE O/P EST LOW 20 MIN: CPT | Performed by: INTERNAL MEDICINE

## 2019-10-15 NOTE — RESEARCH
[facility]  [address]  [place] [address]  [phone]    Research Note:   DECIDE-ICD    Patient Information:  Charmaine Machuca  Study ID #:  [id number]  Subject Initials: RICHARD      RICHARD was identified by study staff as a possible participant in the DECIDE-ICD Trial. I called her today to see if she might be interested in participating in the study. I described the purpose of the study, the three surveys that she would complete over a 6-month period, and the gift card disbursement process. I emphasized that her participation is completely voluntary, that there are no extra charges or office visits, that she could choose to not answer some or all of the survey questions, and that she could withdraw from the study at any time.  All questions were answered to her satisfaction and RICHARD gave verbal consent to participate in the DECIDE-ICD study.  I told her I would mail out a copy of the ICF and baseline survey with return postage so that she could completed and mail back to us.  She verbalized understanding and had no additional questions at this time.  RICHARD will mail the completed forms back to us prior to her procedure on 10/28/19 and we will complete the enrollment process at that time. In the meantime, she can call us, as needed, with any questions or concerns.        Roxi Daley RN  Research Coordinator

## 2019-10-15 NOTE — PROGRESS NOTES
PATIENT INFORMATION  Charmaine Machuca       - 1940    CHIEF COMPLAINT  Chief Complaint   Patient presents with   • Follow-up     hospital follow up Anemia       HISTORY OF PRESENT ILLNESS  Here for follow up ofGAVE and has history of alternating bowels and constipation for up to threedays but also has had stable HGB for the first time in years.  Is back on Plavix and tolerating    Reviewed her difficult colon and will not continue to screen even non invasively.               REVIEW OF SYSTEMS  Review of Systems   Constitutional: Positive for activity change and unexpected weight change.   Eyes: Positive for photophobia.   Gastrointestinal: Positive for constipation.   Endocrine: Positive for heat intolerance.   Musculoskeletal: Positive for arthralgias and back pain.   Neurological: Positive for facial asymmetry.   Hematological: Bruises/bleeds easily.   All other systems reviewed and are negative.        ACTIVE PROBLEMS  Patient Active Problem List    Diagnosis   • GAVE (gastric antral vascular ectasia) [K31.819]   • History of recent stroke [Z86.73]   • Symptomatic anemia [D64.9]   • Acute renal failure superimposed on chronic kidney disease (CMS/HCC) [N17.9, N18.9]   • Anemia due to acute blood loss [D62]   • Chronic kidney disease, stage III (moderate) (CMS/HCC)  [N18.3]   • Weakness on left side of face [R29.810]   • B12 deficiency [E53.8]   • Adverse effect of iron or its compound, subsequent encounter [T45.4X5D]   • Encounter for fitting and adjustment of vascular catheter [Z45.2]   • Anemia of chronic disease [D63.8]   • CKD (chronic kidney disease) [N18.9]   • Anemia of chronic renal failure, stage 4 (severe) (CMS/HCC) [N18.4, D63.1]   • Dysuria [R30.0]   • Iron deficiency anemia [D50.9]   • History of anemia due to chronic kidney disease [N18.9, Z86.2]   • CKD (chronic kidney disease) [N18.9]   • Anemia [D64.9]   • Carpal tunnel syndrome [G56.00]   • Periodic limb movement disorder [G47.61]    • Peripheral neuropathy [G62.9]   • Restless legs syndrome [G25.81]   • Chronic systolic congestive heart failure (CMS/HCC) [I50.22]   • Cardiomyopathy (CMS/HCC) [I42.9]   • Pacemaker lead failure [T82.110A]   • Chest pain [R07.9]   • 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 [M47.812]   • Spondylolisthesis [M43.10]   • Chronic adrenal insufficiency (CMS/HCC) [E27.40]   • Congestive heart failure (CMS/HCC) [I50.9]   • Gastroparesis [K31.84]   • Hypotension [I95.9]   • Hypothyroidism [E03.9]   • 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]   • Osteoarthritis [M19.90]   • Automatic implantable cardioverter-defibrillator in situ [Z95.810]   • History of total knee replacement [Z96.659]         PAST MEDICAL HISTORY  Past Medical History:   Diagnosis Date   • Anemia     Iron deficiency   • Anxiety    • Cardiomyopathy (CMS/HCC)     S/P pacemaker and defibrillator   • CHF (congestive heart failure) (CMS/HCC)    • Chronic renal failure, stage 4 (severe) (CMS/HCC)    • Colon polyp    • Coronary artery disease     pacemaker, defibrillator   • Depression    • Dizzy    • Gastroparesis    • GAVE (gastric antral vascular ectasia)    • GERD (gastroesophageal reflux disease)    • Gout    • Hemorrhoids    • Hiatal hernia    • History of Clostridium difficile colitis 2013   • History of kidney stones    • History of pancreatitis     2014   • History of skin cancer    • History of transfusion     MULTIPLE    • Hyperlipidemia    • Hypertension    • Hypothyroidism    • Left bundle branch block    • Mitral and aortic valve disease    • Mitral valve insufficiency    • Myoclonus     S/P Depakote   • Osteoarthritis    • Pancytopenia (CMS/HCC)    • Peripheral neuropathy    • Presence of cardiac pacemaker     AND DEFIBRILLATOR   • Pulmonary hypertension (CMS/HCC)    • SOB (shortness of breath) on exertion    • Spinal stenosis    • Stroke (CMS/HCC)          SURGICAL HISTORY  Past Surgical History:   Procedure Laterality Date   • APPENDECTOMY N/A    •  ARTERIOVENOUS FISTULA/SHUNT SURGERY Right 2/18/2019    Procedure: RIGHT BASILIC FISTULA CREATION WITHOUT TRANSPOSITION;  Surgeon: Royer Dozier MD;  Location: [facility] MAIN OR;  Service: Vascular   • ARTERIOVENOUS FISTULA/SHUNT SURGERY Right 5/31/2019    Procedure: RIGHT 2ND STAGE BASILIC VEIN CREATION;  Surgeon: Royer Dozier MD;  Location: [facility] MAIN OR;  Service: Vascular   • CARDIAC CATHETERIZATION Left 03/28/2006    Arterial catheter insertion, cath left ventriculography, coronary angiography and left heart catheterization-Dr. Estevan Matamoros   • CARDIAC DEFIBRILLATOR PLACEMENT N/A 11/30/2007    Biventricular implantable cardioverter defibrillator-Dr. Leandro Huber   • CATARACT EXTRACTION Bilateral 2011   • COLONOSCOPY N/A 2014    done at [facility]   • CYSTECTOMY N/A     Ovarian cystectomy   • ENDOSCOPY N/A 04/28/2006    EGD with biopsies. Paraesophageal hiatal hernia from 34 to 44 cm, antral gastritis-Dr. Nehemiah Beal   • ENDOSCOPY N/A 09/29/2004    Hiatal hernia, gastritis and an erosion of the pylorus-Dr. Yang Pavon   • ENDOSCOPY N/A 9/1/2019    Procedure: ESOPHAGOGASTRODUODENOSCOPY with APC;  Surgeon: Anuel Roach MD;  Location: [facility] ENDOSCOPY;  Service: Gastroenterology   • ENTEROSCOPY SMALL BOWEL N/A 10/30/2006    Small bowel enteroscopy with biopsies-Dr. Rory Sevilla   • FLEXIBLE SIGMOIDOSCOPY N/A 09/29/2004    Unsuccessful colonoscopy due to poop prep, a very tortuous sigmoid, bowel prep in the form of enema given and a barium enema was obtained-Dr. Yang Pavon   • FRACTURE SURGERY  2015    Broke big toe   • HEMATOMA EVACUATION TRUNK N/A 02/07/2014    Pocket evacuation of hematoma at pacemaker site-Dr. Leandro Huber   • HEMORRHOIDECTOMY N/A 04/19/2004    Flexible sigmoidoscopy and stapled hemorrhoidectomy-Dr. Yang Pavon   • HYSTERECTOMY Bilateral 1977   • IMPLANTABLE CARDIOVERTER DEFIBRILLATOR LEAD REPLACEMENT/POCKET REVISION Left 3/28/2016    Procedure: AUTOMATIC  IMPLANTABLE CARDIOVERTER DEFIBRILLATOR LEAD REPLACEMENT WITH LASER LEAD EXTRACTION;  Surgeon: Leandro Huber MD;  Location: [facility] HYBRID OR 18/19;  Service:    • IMPLANTABLE CARDIOVERTER DEFIBRILLATOR LEAD REPLACEMENT/POCKET REVISION N/A 01/13/2014    Biventricular ICD replacement-Dr. Jillian Huber   • LAPAROSCOPY REPAIR HIATAL HERNIA N/A 06/27/2006    Laparoscopic paraesophageal hiatal hernia repair with Nissen fundoplication and cholecystectomy-Dr. Sean Yoon   • NATALIE GONZALEZ ([facility]) PROCEDURE     • SC INSJ TUNNELED CVC W/O SUBQ PORT/ AGE 3 YR/> Right 10/3/2017    Procedure: Right internal jugular port placement;  Surgeon: Tiffanie Couch MD;  Location: [facility] MAIN OR;  Service: General   • TOE SURGERY Left     SET BIG TOE   • TOTAL KNEE ARTHROPLASTY Left 08/2014         FAMILY HISTORY  Family History   Problem Relation Age of Onset   • Coronary artery disease Other    • Dementia Other    • Kidney disease Other    • Arthritis Father    • Early death Father         Kidney failure   • Kidney disease Father    • Heart disease Mother    • Mental illness Mother         Dementia   • Malig Hyperthermia Neg Hx    • Colon cancer Neg Hx    • Colon polyps Neg Hx          SOCIAL HISTORY  Social History     Occupational History     Employer: RETIRED   Tobacco Use   • Smoking status: Never Smoker   • Smokeless tobacco: Never Used   Substance and Sexual Activity   • Alcohol use: No   • Drug use: No   • Sexual activity: Defer       Debilities/Disabilities Identified: None    Emotional Behavior: Appropriate    CURRENT MEDICATIONS    Current Outpatient Medications:   •  aspirin 81 MG tablet, Take 81 mg by mouth Daily., Disp: , Rfl:   •  atorvastatin (LIPITOR) 40 MG tablet, Take 1 tablet by mouth Every Night., Disp: 30 tablet, Rfl: 0  •  calcitriol (ROCALTROL) 0.25 MCG capsule, Take 0.25 mcg by mouth Every Other Day. 1 tablet every other day and 2 tablets every other day, Disp: , Rfl:   •   carbidopa-levodopa ER (SINEMET CR)  MG per tablet, Take 1 tablet by mouth Every Evening., Disp: , Rfl:   •  Cholecalciferol (VITAMIN D3) 5000 units capsule capsule, Take 5,000 Units by mouth Daily., Disp: , Rfl:   •  clopidogrel (PLAVIX) 75 MG tablet, Take 1 tablet by mouth Daily., Disp: 30 tablet, Rfl: 0  •  diazepam (VALIUM) 5 MG tablet, Take 5 mg by mouth Every Night., Disp: , Rfl:   •  DULOXETINE HCL PO, Take  by mouth 2 (Two) Times a Day., Disp: , Rfl:   •  famotidine (PEPCID) 20 MG tablet, Take 20 mg by mouth 2 (Two) Times a Day., Disp: , Rfl:   •  febuxostat (ULORIC) 40 MG tablet, Take 40 mg by mouth Daily., Disp: , Rfl:   •  furosemide (LASIX) 40 MG tablet, Take 1 tablet by mouth Daily., Disp: 90 tablet, Rfl: 3  •  Gabapentin, Once-Daily, (GRALISE) 300 MG tablet, Take 300 mg by mouth Daily With Dinner. MAY REPEAT LATE EVENING IF NEEDED, Disp: , Rfl:   •  Glucosamine-Chondroit-Vit C-Mn (GLUCOSAMINE CHONDROITIN COMPLX) capsule, Take 1,500 mg by mouth Every Other Day. PT HOLDING FOR SURGERY, Disp: , Rfl:   •  HYDROcodone-acetaminophen (NORCO) 5-325 MG per tablet, Take 1-2 tablets by mouth Every 8 (Eight) Hours As Needed for Moderate Pain  or Severe Pain  (Pain)., Disp: 30 tablet, Rfl: 0  •  levothyroxine (SYNTHROID, LEVOTHROID) 25 MCG tablet, Take 25 mcg by mouth Daily., Disp: , Rfl:   •  loperamide (IMODIUM) 2 MG capsule, Take 2 mg by mouth 4 (Four) Times a Day As Needed for Diarrhea., Disp: , Rfl:   •  magnesium oxide 250 MG tablet, Take 250 mg by mouth Daily., Disp: , Rfl:   •  methscopolamine (PAMINE FORTE) 5 MG tablet, Take 5 mg by mouth Every Morning., Disp: , Rfl:   •  Multiple Vitamin (MULTI-DAY VITAMINS) tablet, Take 1 tablet by mouth Daily. HOLD FOR SURGERY, Disp: , Rfl:   •  psyllium (METAMUCIL) 58.6 % powder, Take 1 packet by mouth Daily., Disp: , Rfl:   •  rotigotine (NEUPRO) 6 MG/24HR patch, Place 1 patch on the skin as directed by provider Daily., Disp: , Rfl:   •  Vitamin E 400 UNITS  tablet, Take 400 Units by mouth Daily. PT HOLDING FOR SURGERY, Disp: , Rfl:   No current facility-administered medications for this visit.     Facility-Administered Medications Ordered in Other Visits:   •  heparin flush (porcine) 100 UNIT/ML injection 500 Units, 500 Units, Intravenous, PRNTyler Stephen D., MD, 500 Units at 11/27/17 1347  •  sodium chloride 0.9 % flush 10 mL, 10 mL, Intravenous, PRN, Anuel Scott MD, 10 mL at 11/27/17 1347    ALLERGIES  Chlorhexidine; Valproic acid; Codeine; and Propoxyphene    VITALS  There were no vitals filed for this visit.    LAST RESULTS   Lab on 10/10/2019   Component Date Value Ref Range Status   • WBC 10/10/2019 5.82  3.40 - 10.80 10*3/mm3 Final   • RBC 10/10/2019 3.51* 3.77 - 5.28 10*6/mm3 Final   • Hemoglobin 10/10/2019 10.9* 12.0 - 15.9 g/dL Final   • Hematocrit 10/10/2019 34.0  34.0 - 46.6 % Final   • MCV 10/10/2019 96.9  79.0 - 97.0 fL Final   • MCH 10/10/2019 31.1  26.6 - 33.0 pg Final   • MCHC 10/10/2019 32.1  31.5 - 35.7 g/dL Final   • RDW 10/10/2019 15.9* 12.3 - 15.4 % Final   • RDW-SD 10/10/2019 56.5* 37.0 - 54.0 fl Final   • MPV 10/10/2019 9.9  6.0 - 12.0 fL Final   • Platelets 10/10/2019 150  140 - 450 10*3/mm3 Final   • Neutrophil % 10/10/2019 80.6* 42.7 - 76.0 % Final   • Lymphocyte % 10/10/2019 8.9* 19.6 - 45.3 % Final   • Monocyte % 10/10/2019 7.0  5.0 - 12.0 % Final   • Eosinophil % 10/10/2019 2.9  0.3 - 6.2 % Final   • Basophil % 10/10/2019 0.3  0.0 - 1.5 % Final   • Immature Grans % 10/10/2019 0.3  0.0 - 0.5 % Final   • Neutrophils, Absolute 10/10/2019 4.68  1.70 - 7.00 10*3/mm3 Final   • Lymphocytes, Absolute 10/10/2019 0.52* 0.70 - 3.10 10*3/mm3 Final   • Monocytes, Absolute 10/10/2019 0.41  0.10 - 0.90 10*3/mm3 Final   • Eosinophils, Absolute 10/10/2019 0.17  0.00 - 0.40 10*3/mm3 Final   • Basophils, Absolute 10/10/2019 0.02  0.00 - 0.20 10*3/mm3 Final   • Immature Grans, Absolute 10/10/2019 0.02  0.00 - 0.05 10*3/mm3 Final   • nRBC 10/10/2019  0.0  0.0 - 0.2 /100 WBC Final     No results found.    PHYSICAL EXAM  Physical Exam   Constitutional: She is oriented to person, place, and time. She appears well-developed and well-nourished.   HENT:   Head: Normocephalic and atraumatic.   Eyes: Conjunctivae and EOM are normal. Pupils are equal, round, and reactive to light. No scleral icterus.   Neck: Normal range of motion. Neck supple. No thyromegaly present.   Cardiovascular: Normal rate, regular rhythm, normal heart sounds and intact distal pulses. Exam reveals no gallop.   No murmur heard.  Pulmonary/Chest: Effort normal and breath sounds normal. She has no wheezes. She has no rales.   Abdominal: Soft. Bowel sounds are normal. She exhibits no shifting dullness, no distension, no fluid wave, no abdominal bruit, no ascites and no mass. There is no hepatosplenomegaly. There is no tenderness. There is no guarding and negative Singh's sign. Hernia confirmed negative in the ventral area.   Musculoskeletal: Normal range of motion. She exhibits no edema.   Lymphadenopathy:     She has no cervical adenopathy.   Neurological: She is alert and oriented to person, place, and time.   Skin: Skin is warm and dry. No rash noted. She is not diaphoretic. No erythema.   Psychiatric: She has a normal mood and affect. Her behavior is normal.       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

## 2019-10-17 ENCOUNTER — LAB (OUTPATIENT)
Dept: OTHER | Facility: HOSPITAL | Age: 79
End: 2019-10-17

## 2019-10-17 ENCOUNTER — INFUSION (OUTPATIENT)
Dept: ONCOLOGY | Facility: HOSPITAL | Age: 79
End: 2019-10-17

## 2019-10-17 VITALS
HEART RATE: 111 BPM | DIASTOLIC BLOOD PRESSURE: 78 MMHG | TEMPERATURE: 97.9 F | BODY MASS INDEX: 26.97 KG/M2 | OXYGEN SATURATION: 97 % | WEIGHT: 167.8 LBS | SYSTOLIC BLOOD PRESSURE: 151 MMHG

## 2019-10-17 DIAGNOSIS — D63.1 ANEMIA DUE TO STAGE 4 CHRONIC KIDNEY DISEASE (HCC): ICD-10-CM

## 2019-10-17 DIAGNOSIS — N18.4 ANEMIA DUE TO STAGE 4 CHRONIC KIDNEY DISEASE (HCC): ICD-10-CM

## 2019-10-17 LAB
BASOPHILS # BLD AUTO: 0.02 10*3/MM3 (ref 0–0.2)
BASOPHILS NFR BLD AUTO: 0.4 % (ref 0–1.5)
DEPRECATED RDW RBC AUTO: 54 FL (ref 37–54)
EOSINOPHIL # BLD AUTO: 0.07 10*3/MM3 (ref 0–0.4)
EOSINOPHIL NFR BLD AUTO: 1.6 % (ref 0.3–6.2)
ERYTHROCYTE [DISTWIDTH] IN BLOOD BY AUTOMATED COUNT: 15.4 % (ref 12.3–15.4)
HCT VFR BLD AUTO: 30.5 % (ref 34–46.6)
HGB BLD-MCNC: 10 G/DL (ref 12–15.9)
IMM GRANULOCYTES # BLD AUTO: 0.05 10*3/MM3 (ref 0–0.05)
IMM GRANULOCYTES NFR BLD AUTO: 1.1 % (ref 0–0.5)
LYMPHOCYTES # BLD AUTO: 0.5 10*3/MM3 (ref 0.7–3.1)
LYMPHOCYTES NFR BLD AUTO: 11.1 % (ref 19.6–45.3)
MCH RBC QN AUTO: 31 PG (ref 26.6–33)
MCHC RBC AUTO-ENTMCNC: 32.8 G/DL (ref 31.5–35.7)
MCV RBC AUTO: 94.4 FL (ref 79–97)
MONOCYTES # BLD AUTO: 0.44 10*3/MM3 (ref 0.1–0.9)
MONOCYTES NFR BLD AUTO: 9.8 % (ref 5–12)
NEUTROPHILS # BLD AUTO: 3.43 10*3/MM3 (ref 1.7–7)
NEUTROPHILS NFR BLD AUTO: 76 % (ref 42.7–76)
NRBC BLD AUTO-RTO: 0 /100 WBC (ref 0–0.2)
PLATELET # BLD AUTO: 177 10*3/MM3 (ref 140–450)
PMV BLD AUTO: 11.3 FL (ref 6–12)
RBC # BLD AUTO: 3.23 10*6/MM3 (ref 3.77–5.28)
WBC NRBC COR # BLD: 4.51 10*3/MM3 (ref 3.4–10.8)

## 2019-10-17 PROCEDURE — 85025 COMPLETE CBC W/AUTO DIFF WBC: CPT | Performed by: INTERNAL MEDICINE

## 2019-10-17 PROCEDURE — 36415 COLL VENOUS BLD VENIPUNCTURE: CPT

## 2019-10-17 NOTE — NURSING NOTE
Lab Results   Component Value Date    WBC 4.51 10/17/2019    HGB 10.0 (L) 10/17/2019    HCT 30.5 (L) 10/17/2019    MCV 94.4 10/17/2019     10/17/2019     Procrit not indicated for Hgb 10.  Pt concerned about the slow dropping.  Explained to her that we can check again next week and discuss with MD if she continues to drop.  Pt instructed to call sooner that her appt if symptoms worsen.  She v.u.

## 2019-10-18 RX ORDER — FAMOTIDINE 20 MG/1
20 TABLET, FILM COATED ORAL 2 TIMES DAILY
Qty: 180 TABLET | Refills: 3 | Status: ON HOLD | OUTPATIENT
Start: 2019-10-18 | End: 2020-02-12

## 2019-10-22 ENCOUNTER — RESEARCH ENCOUNTER (OUTPATIENT)
Dept: CARDIOLOGY | Facility: CLINIC | Age: 79
End: 2019-10-22

## 2019-10-22 NOTE — RESEARCH
[facility]  [address]  [place] [address]  [phone]    Research Note:   DECIDE-ICD    Patient Information:  Charmaine Machuca  Study ID #:  [id number]  Subject Initials: RICHARD RAMOS completed the baseline surveys for Decide-ICD Trial.  We previously spoke on the phone about participation in the study.  I mailed her a copy of the ICF and baseline survey.  We received both in the mail today and completed her study enrollment.   I entered her survey responses into [employer] as per protocol. I also mailed her $20 gift card today for completing these surveys.  We will see her at the time of her 1-month survey.  In the meantime, she can call us, as needed, with any questions or concerns.        Roxi Daley RN  Research Coordinator

## 2019-10-24 ENCOUNTER — INFUSION (OUTPATIENT)
Dept: ONCOLOGY | Facility: HOSPITAL | Age: 79
End: 2019-10-24

## 2019-10-24 VITALS
SYSTOLIC BLOOD PRESSURE: 137 MMHG | TEMPERATURE: 98.4 F | HEART RATE: 65 BPM | RESPIRATION RATE: 12 BRPM | DIASTOLIC BLOOD PRESSURE: 58 MMHG | OXYGEN SATURATION: 100 %

## 2019-10-24 DIAGNOSIS — Z01.810 PRE-OPERATIVE CARDIOVASCULAR EXAMINATION: ICD-10-CM

## 2019-10-24 DIAGNOSIS — N18.4 ANEMIA OF CHRONIC RENAL FAILURE, STAGE 4 (SEVERE) (HCC): Primary | ICD-10-CM

## 2019-10-24 DIAGNOSIS — D63.1 ANEMIA OF CHRONIC RENAL FAILURE, STAGE 4 (SEVERE) (HCC): Primary | ICD-10-CM

## 2019-10-24 DIAGNOSIS — IMO0001 ADVERSE EFFECT OF IRON OR ITS COMPOUND, SUBSEQUENT ENCOUNTER: ICD-10-CM

## 2019-10-24 DIAGNOSIS — D50.8 OTHER IRON DEFICIENCY ANEMIA: ICD-10-CM

## 2019-10-24 DIAGNOSIS — D63.1 ANEMIA DUE TO STAGE 4 CHRONIC KIDNEY DISEASE (HCC): ICD-10-CM

## 2019-10-24 DIAGNOSIS — E53.8 B12 DEFICIENCY: ICD-10-CM

## 2019-10-24 DIAGNOSIS — I42.9 FAMILIAL CARDIOMYOPATHY (HCC): ICD-10-CM

## 2019-10-24 DIAGNOSIS — N18.4 ANEMIA DUE TO STAGE 4 CHRONIC KIDNEY DISEASE (HCC): ICD-10-CM

## 2019-10-24 DIAGNOSIS — Z45.2 ENCOUNTER FOR FITTING AND ADJUSTMENT OF VASCULAR CATHETER: ICD-10-CM

## 2019-10-24 DIAGNOSIS — Z13.6 SCREENING FOR ISCHEMIC HEART DISEASE: Primary | ICD-10-CM

## 2019-10-24 DIAGNOSIS — N18.4 STAGE 4 CHRONIC KIDNEY DISEASE (HCC): ICD-10-CM

## 2019-10-24 LAB
ANION GAP SERPL CALCULATED.3IONS-SCNC: 9.7 MMOL/L (ref 5–15)
BASOPHILS # BLD AUTO: 0.02 10*3/MM3 (ref 0–0.2)
BASOPHILS NFR BLD AUTO: 0.5 % (ref 0–1.5)
BUN BLD-MCNC: 42 MG/DL (ref 8–23)
BUN/CREAT SERPL: 19.7 (ref 7–25)
CALCIUM SPEC-SCNC: 9.7 MG/DL (ref 8.6–10.5)
CHLORIDE SERPL-SCNC: 105 MMOL/L (ref 98–107)
CO2 SERPL-SCNC: 27.3 MMOL/L (ref 22–29)
CREAT BLD-MCNC: 2.13 MG/DL (ref 0.57–1)
DEPRECATED RDW RBC AUTO: 53.2 FL (ref 37–54)
EOSINOPHIL # BLD AUTO: 0.06 10*3/MM3 (ref 0–0.4)
EOSINOPHIL NFR BLD AUTO: 1.5 % (ref 0.3–6.2)
ERYTHROCYTE [DISTWIDTH] IN BLOOD BY AUTOMATED COUNT: 15.1 % (ref 12.3–15.4)
GFR SERPL CREATININE-BSD FRML MDRD: 22 ML/MIN/1.73
GLUCOSE BLD-MCNC: 172 MG/DL (ref 65–99)
HCT VFR BLD AUTO: 29.4 % (ref 34–46.6)
HGB BLD-MCNC: 9.4 G/DL (ref 12–15.9)
IMM GRANULOCYTES # BLD AUTO: 0.02 10*3/MM3 (ref 0–0.05)
IMM GRANULOCYTES NFR BLD AUTO: 0.5 % (ref 0–0.5)
LYMPHOCYTES # BLD AUTO: 0.52 10*3/MM3 (ref 0.7–3.1)
LYMPHOCYTES NFR BLD AUTO: 12.6 % (ref 19.6–45.3)
MCH RBC QN AUTO: 30.8 PG (ref 26.6–33)
MCHC RBC AUTO-ENTMCNC: 32 G/DL (ref 31.5–35.7)
MCV RBC AUTO: 96.4 FL (ref 79–97)
MONOCYTES # BLD AUTO: 0.37 10*3/MM3 (ref 0.1–0.9)
MONOCYTES NFR BLD AUTO: 9 % (ref 5–12)
NEUTROPHILS # BLD AUTO: 3.13 10*3/MM3 (ref 1.7–7)
NEUTROPHILS NFR BLD AUTO: 75.9 % (ref 42.7–76)
NRBC BLD AUTO-RTO: 0 /100 WBC (ref 0–0.2)
PLATELET # BLD AUTO: 148 10*3/MM3 (ref 140–450)
PMV BLD AUTO: 9.5 FL (ref 6–12)
POTASSIUM BLD-SCNC: 3.5 MMOL/L (ref 3.5–5.2)
RBC # BLD AUTO: 3.05 10*6/MM3 (ref 3.77–5.28)
SODIUM BLD-SCNC: 142 MMOL/L (ref 136–145)
WBC NRBC COR # BLD: 4.12 10*3/MM3 (ref 3.4–10.8)

## 2019-10-24 PROCEDURE — 25010000002 EPOETIN ALFA PER 1000 UNITS: Performed by: NURSE PRACTITIONER

## 2019-10-24 PROCEDURE — 96372 THER/PROPH/DIAG INJ SC/IM: CPT | Performed by: NURSE PRACTITIONER

## 2019-10-24 PROCEDURE — 80048 BASIC METABOLIC PNL TOTAL CA: CPT | Performed by: INTERNAL MEDICINE

## 2019-10-24 PROCEDURE — 25010000002 CYANOCOBALAMIN PER 1000 MCG: Performed by: NURSE PRACTITIONER

## 2019-10-24 PROCEDURE — 96523 IRRIG DRUG DELIVERY DEVICE: CPT | Performed by: NURSE PRACTITIONER

## 2019-10-24 PROCEDURE — 85025 COMPLETE CBC W/AUTO DIFF WBC: CPT | Performed by: INTERNAL MEDICINE

## 2019-10-24 RX ORDER — CYANOCOBALAMIN 1000 UG/ML
1000 INJECTION, SOLUTION INTRAMUSCULAR; SUBCUTANEOUS
Status: DISCONTINUED | OUTPATIENT
Start: 2019-10-24 | End: 2019-10-24 | Stop reason: HOSPADM

## 2019-10-24 RX ORDER — SODIUM CHLORIDE 0.9 % (FLUSH) 0.9 %
10 SYRINGE (ML) INJECTION AS NEEDED
Status: DISCONTINUED | OUTPATIENT
Start: 2019-10-24 | End: 2019-10-24 | Stop reason: HOSPADM

## 2019-10-24 RX ORDER — SODIUM CHLORIDE 0.9 % (FLUSH) 0.9 %
10 SYRINGE (ML) INJECTION AS NEEDED
Status: CANCELLED | OUTPATIENT
Start: 2019-10-24

## 2019-10-24 RX ADMIN — CYANOCOBALAMIN 1000 MCG: 1000 INJECTION, SOLUTION INTRAMUSCULAR; SUBCUTANEOUS at 11:36

## 2019-10-24 RX ADMIN — Medication 10 ML: at 10:50

## 2019-10-24 RX ADMIN — ERYTHROPOIETIN 42000 UNITS: 20000 INJECTION, SOLUTION INTRAVENOUS; SUBCUTANEOUS at 11:36

## 2019-10-24 RX ADMIN — SODIUM CHLORIDE, PRESERVATIVE FREE 500 UNITS: 5 INJECTION INTRAVENOUS at 10:50

## 2019-10-28 ENCOUNTER — HOSPITAL ENCOUNTER (OUTPATIENT)
Facility: HOSPITAL | Age: 79
Setting detail: HOSPITAL OUTPATIENT SURGERY
Discharge: HOME | End: 2019-10-28
Attending: INTERNAL MEDICINE | Admitting: INTERNAL MEDICINE

## 2019-10-28 VITALS
BODY MASS INDEX: 24.8 KG/M2 | RESPIRATION RATE: 16 BRPM | OXYGEN SATURATION: 100 % | SYSTOLIC BLOOD PRESSURE: 130 MMHG | TEMPERATURE: 97 F | DIASTOLIC BLOOD PRESSURE: 56 MMHG | WEIGHT: 158 LBS | HEIGHT: 67 IN | HEART RATE: 68 BPM

## 2019-10-28 DIAGNOSIS — I42.9 CARDIOMYOPATHY, UNSPECIFIED TYPE (HCC): ICD-10-CM

## 2019-10-28 PROCEDURE — 33264 RMVL & RPLCMT DFB GEN MLT LD: CPT | Performed by: INTERNAL MEDICINE

## 2019-10-28 PROCEDURE — C1882 AICD, OTHER THAN SING/DUAL: HCPCS | Performed by: INTERNAL MEDICINE

## 2019-10-28 PROCEDURE — 25010000002 MIDAZOLAM PER 1 MG: Performed by: INTERNAL MEDICINE

## 2019-10-28 PROCEDURE — 25010000002 FENTANYL CITRATE (PF) 100 MCG/2ML SOLUTION: Performed by: INTERNAL MEDICINE

## 2019-10-28 PROCEDURE — 99152 MOD SED SAME PHYS/QHP 5/>YRS: CPT | Performed by: INTERNAL MEDICINE

## 2019-10-28 PROCEDURE — 25010000003 LIDOCAINE 1 % SOLUTION: Performed by: INTERNAL MEDICINE

## 2019-10-28 PROCEDURE — 25010000003 CEFAZOLIN IN DEXTROSE 2-4 GM/100ML-% SOLUTION: Performed by: INTERNAL MEDICINE

## 2019-10-28 DEVICE — GEN DEFIB CLARIA MRI QUAD IS1/DF1 CRTD: Type: IMPLANTABLE DEVICE | Status: FUNCTIONAL

## 2019-10-28 RX ORDER — CEFAZOLIN SODIUM 2 G/100ML
INJECTION, SOLUTION INTRAVENOUS CONTINUOUS PRN
Status: COMPLETED | OUTPATIENT
Start: 2019-10-28 | End: 2019-10-28

## 2019-10-28 RX ORDER — SODIUM CHLORIDE 0.9 % (FLUSH) 0.9 %
3 SYRINGE (ML) INJECTION EVERY 12 HOURS SCHEDULED
Status: DISCONTINUED | OUTPATIENT
Start: 2019-10-28 | End: 2019-10-28 | Stop reason: HOSPADM

## 2019-10-28 RX ORDER — LIDOCAINE HYDROCHLORIDE 10 MG/ML
0.1 INJECTION, SOLUTION EPIDURAL; INFILTRATION; INTRACAUDAL; PERINEURAL ONCE AS NEEDED
Status: DISCONTINUED | OUTPATIENT
Start: 2019-10-28 | End: 2019-10-28 | Stop reason: HOSPADM

## 2019-10-28 RX ORDER — CARVEDILOL 6.25 MG/1
6.25 TABLET ORAL 2 TIMES DAILY WITH MEALS
COMMUNITY
End: 2020-02-11

## 2019-10-28 RX ORDER — PANTOPRAZOLE SODIUM 40 MG/1
40 TABLET, DELAYED RELEASE ORAL DAILY
COMMUNITY
End: 2019-11-06

## 2019-10-28 RX ORDER — MIDAZOLAM HYDROCHLORIDE 1 MG/ML
INJECTION INTRAMUSCULAR; INTRAVENOUS AS NEEDED
Status: DISCONTINUED | OUTPATIENT
Start: 2019-10-28 | End: 2019-10-28 | Stop reason: HOSPADM

## 2019-10-28 RX ORDER — FENTANYL CITRATE 50 UG/ML
INJECTION, SOLUTION INTRAMUSCULAR; INTRAVENOUS AS NEEDED
Status: DISCONTINUED | OUTPATIENT
Start: 2019-10-28 | End: 2019-10-28 | Stop reason: HOSPADM

## 2019-10-28 RX ORDER — LIDOCAINE HYDROCHLORIDE 10 MG/ML
INJECTION, SOLUTION INFILTRATION; PERINEURAL AS NEEDED
Status: DISCONTINUED | OUTPATIENT
Start: 2019-10-28 | End: 2019-10-28 | Stop reason: HOSPADM

## 2019-10-28 RX ORDER — PHENOL 1.4 %
600 AEROSOL, SPRAY (ML) MUCOUS MEMBRANE DAILY
COMMUNITY
End: 2021-01-01 | Stop reason: ALTCHOICE

## 2019-10-28 RX ORDER — SODIUM CHLORIDE 9 MG/ML
75 INJECTION, SOLUTION INTRAVENOUS CONTINUOUS
Status: DISCONTINUED | OUTPATIENT
Start: 2019-10-28 | End: 2019-10-28 | Stop reason: HOSPADM

## 2019-10-28 RX ORDER — SODIUM CHLORIDE 0.9 % (FLUSH) 0.9 %
10 SYRINGE (ML) INJECTION AS NEEDED
Status: DISCONTINUED | OUTPATIENT
Start: 2019-10-28 | End: 2019-10-28 | Stop reason: HOSPADM

## 2019-10-28 RX ADMIN — SODIUM CHLORIDE 75 ML/HR: 9 INJECTION, SOLUTION INTRAVENOUS at 06:53

## 2019-10-28 NOTE — H&P
Patient Name: Charmaine Machuca  Age/Sex: 79 y.o. female  : 1940  MRN: [id number]    Date of Admission: 10/28/2019  Date of Encounter Visit: 10/28/19  Encounter Provider: Leandro Huber MD  Place of Service: [facility]      Referring Provider: Leandro Huber MD  Patient Care Team:  Fannie Godfrey MD as PCP - General (Internal Medicine)  Anuel Scott MD as PCP - Claims Attributed  Anuel Scott MD as Consulting Physician (Hematology and Oncology)  Fannie Godfrey MD as Referring Physician (Internal Medicine)  Estevan Matamoros MD as Consulting Physician (Cardiology)  Parveen Abraham MD as Consulting Physician (Nephrology)  Ondina Haro MD as Consulting Physician (Neurology)  Estevan Oropeza MD as Consulting Physician (Hematology and Oncology)  Neno Merritt II, MD as Consulting Physician (Hematology and Oncology)    Subjective:   Admitted/Consulted for:ICD change         History of Present Illness:  Charmaine Machuca is a 79 y.o. female with a nonischemic cardiomyopathy.  She has a history of a biventricular ICD that is now at [facility].  She is here for generator replacement.      Past Medical History:  Past Medical History:   Diagnosis Date   • Anemia     Iron deficiency   • Anxiety    • Cardiomyopathy (CMS/HCC)     S/P pacemaker and defibrillator   • CHF (congestive heart failure) (CMS/HCC)    • Chronic renal failure, stage 4 (severe) (CMS/HCC)    • Colon polyp    • Coronary artery disease     pacemaker, defibrillator   • Depression    • Dizzy    • Gastroparesis    • GAVE (gastric antral vascular ectasia)    • GERD (gastroesophageal reflux disease)    • Gout    • Hemorrhoids    • Hiatal hernia    • History of Clostridium difficile colitis    • History of kidney stones    • History of pancreatitis        • History of skin cancer    • History of transfusion     MULTIPLE    • Hyperlipidemia    • Hypertension    •  Hypothyroidism    • Left bundle branch block    • Mitral and aortic valve disease    • Mitral valve insufficiency    • Myoclonus     S/P Depakote   • Osteoarthritis    • Pancytopenia (CMS/HCC)    • Peripheral neuropathy    • Presence of cardiac pacemaker     AND DEFIBRILLATOR   • Pulmonary hypertension (CMS/HCC)    • SOB (shortness of breath) on exertion    • Spinal stenosis    • Stroke (CMS/HCC)        Past Surgical History:   Procedure Laterality Date   • APPENDECTOMY N/A    • ARTERIOVENOUS FISTULA/SHUNT SURGERY Right 2/18/2019    Procedure: RIGHT BASILIC FISTULA CREATION WITHOUT TRANSPOSITION;  Surgeon: Royer Dozier MD;  Location: [facility] OR;  Service: Vascular   • ARTERIOVENOUS FISTULA/SHUNT SURGERY Right 5/31/2019    Procedure: RIGHT 2ND STAGE BASILIC VEIN CREATION;  Surgeon: Royer Dozier MD;  Location: [facility] OR;  Service: Vascular   • CARDIAC CATHETERIZATION Left 03/28/2006    Arterial catheter insertion, cath left ventriculography, coronary angiography and left heart catheterization-Dr. Estevan Matamoros   • CARDIAC DEFIBRILLATOR PLACEMENT N/A 11/30/2007    Biventricular implantable cardioverter defibrillator-Dr. Leandro Huber   • CATARACT EXTRACTION Bilateral 2011   • COLONOSCOPY N/A 2014    done at [facility]   • CYSTECTOMY N/A     Ovarian cystectomy   • ENDOSCOPY N/A 04/28/2006    EGD with biopsies. Paraesophageal hiatal hernia from 34 to 44 cm, antral gastritis-Dr. Nehemiah Beal   • ENDOSCOPY N/A 09/29/2004    Hiatal hernia, gastritis and an erosion of the pylorus-Dr. Yang Pavon   • ENDOSCOPY N/A 9/1/2019    Procedure: ESOPHAGOGASTRODUODENOSCOPY with APC;  Surgeon: Anuel Roach MD;  Location: [facility] ENDOSCOPY;  Service: Gastroenterology   • ENTEROSCOPY SMALL BOWEL N/A 10/30/2006    Small bowel enteroscopy with biopsies-Dr. Rory Sevilla   • FLEXIBLE SIGMOIDOSCOPY N/A 09/29/2004    Unsuccessful colonoscopy due to poop prep, a very tortuous sigmoid, bowel prep in the form of  enema given and a barium enema was obtained-Dr. Yang Pavon   • FRACTURE SURGERY  2015    Broke big toe   • HEMATOMA EVACUATION TRUNK N/A 02/07/2014    Pocket evacuation of hematoma at pacemaker site-Dr. Leandro Huber   • HEMORRHOIDECTOMY N/A 04/19/2004    Flexible sigmoidoscopy and stapled hemorrhoidectomy-Dr. Yang Pavon   • HYSTERECTOMY Bilateral 1977   • IMPLANTABLE CARDIOVERTER DEFIBRILLATOR LEAD REPLACEMENT/POCKET REVISION Left 3/28/2016    Procedure: AUTOMATIC IMPLANTABLE CARDIOVERTER DEFIBRILLATOR LEAD REPLACEMENT WITH LASER LEAD EXTRACTION;  Surgeon: Leandro Huber MD;  Location: [facility] OR 18/19;  Service:    • IMPLANTABLE CARDIOVERTER DEFIBRILLATOR LEAD REPLACEMENT/POCKET REVISION N/A 01/13/2014    Biventricular ICD replacement-Dr. Jillian Huber   • LAPAROSCOPY REPAIR HIATAL HERNIA N/A 06/27/2006    Laparoscopic paraesophageal hiatal hernia repair with Nissen fundoplication and cholecystectomy-Dr. Sean Yoon   • NATALIE GONZALEZ ([facility]) PROCEDURE     • W[place] INSJ TUNNELED CVC W/O SUBQ PORT/ AGE 3 YR/> Right 10/3/2017    Procedure: Right internal jugular port placement;  Surgeon: Tiffanie Couch MD;  Location: [facility] OR;  Service: General   • TOE SURGERY Left     SET BIG TOE   • TOTAL KNEE ARTHROPLASTY Left 08/2014       Home Medications:   Medications Prior to Admission   Medication Sig Dispense Refill Last Dose   • aspirin 81 MG tablet Take 81 mg by mouth Daily.   10/27/2019 at Unknown time   • atorvastatin (LIPITOR) 40 MG tablet Take 1 tablet by mouth Every Night. 30 tablet 0 10/27/2019 at Unknown time   • calcitriol (ROCALTROL) 0.25 MCG capsule Take 0.25 mcg by mouth Every Other Day. 1 tablet every other day and 2 tablets every other day   10/27/2019 at Unknown time   • calcium carbonate (OS-RAYMOND) 600 MG tablet Take 600 mg by mouth Daily.   10/27/2019 at Unknown time   • carbidopa-levodopa ER (SINEMET CR)  MG per tablet Take 1 tablet by mouth Every Evening.    10/27/2019 at Unknown time   • carvedilol (COREG) 6.25 MG tablet Take 6.25 mg by mouth 2 (Two) Times a Day With Meals.   10/27/2019 at Unknown time   • Cholecalciferol (VITAMIN D3) 5000 units capsule capsule Take 5,000 Units by mouth Daily.   10/27/2019 at Unknown time   • clopidogrel (PLAVIX) 75 MG tablet Take 1 tablet by mouth Daily. 30 tablet 0 10/27/2019 at Unknown time   • diazepam (VALIUM) 5 MG tablet Take 5 mg by mouth Every Night.   10/27/2019 at Unknown time   • DULOXETINE HCL PO Take  by mouth 2 (Two) Times a Day.   10/27/2019 at Unknown time   • famotidine (PEPCID) 20 MG tablet Take 1 tablet by mouth 2 (Two) Times a Day. 180 tablet 3 10/27/2019 at Unknown time   • febuxostat (ULORIC) 40 MG tablet Take 40 mg by mouth Daily.   10/27/2019 at Unknown time   • furosemide (LASIX) 40 MG tablet Take 1 tablet by mouth Daily. 90 tablet 3 10/27/2019 at Unknown time   • Gabapentin, Once-Daily, (GRALISE) 300 MG tablet Take 300 mg by mouth Daily With Dinner. MAY REPEAT LATE EVENING IF NEEDED   10/27/2019 at Unknown time   • Glucosamine-Chondroit-Vit C-Mn (GLUCOSAMINE CHONDROITIN COMPLX) capsule Take 1,500 mg by mouth Every Other Day. PT HOLDING FOR SURGERY   10/27/2019 at Unknown time   • levothyroxine (SYNTHROID, LEVOTHROID) 25 MCG tablet Take 25 mcg by mouth Daily.   10/28/2019 at Unknown time   • loperamide (IMODIUM) 2 MG capsule Take 2 mg by mouth 4 (Four) Times a Day As Needed for Diarrhea.   10/27/2019 at Unknown time   • magnesium oxide 250 MG tablet Take 250 mg by mouth Daily.   10/27/2019 at Unknown time   • methscopolamine (PAMINE FORTE) 5 MG tablet Take 5 mg by mouth Every Morning.   10/27/2019 at Unknown time   • Multiple Vitamin (MULTI-DAY VITAMINS) tablet Take 1 tablet by mouth Daily. HOLD FOR SURGERY   10/27/2019 at Unknown time   • pantoprazole (PROTONIX) 40 MG EC tablet Take 40 mg by mouth Daily.   10/27/2019 at Unknown time   • psyllium (METAMUCIL) 58.6 % powder Take 1 packet by mouth Daily.    "10/27/2019 at Unknown time   • rotigotine (NEUPRO) 6 MG/24HR patch Place 1 patch on the skin as directed by provider Daily.   10/27/2019 at Unknown time   • Vitamin E 400 UNITS tablet Take 400 Units by mouth Daily. PT HOLDING FOR SURGERY   10/27/2019 at Unknown time       Allergies:  Allergies   Allergen Reactions   • Chlorhexidine Rash and Itching     Patient states \"blue dye\" in chlorhexidine.  States the orange and clear are OK to use   • Valproic Acid Other (See Comments)     Made her extremely sleepy  (depakote)   • Codeine Other (See Comments)     HYPER, DOES NOT HELP PAIN   • Propoxyphene Other (See Comments)     Belligerent, acting drunk  (darvon)       Past Social History:  Social History     Socioeconomic History   • Marital status:      Spouse name: Zackery   • Number of children: 5   • Years of education: 1 yr college   • Highest education level: Not on file   Occupational History     Employer: RETIRED   Tobacco Use   • Smoking status: Never Smoker   • Smokeless tobacco: Never Used   Substance and Sexual Activity   • Alcohol use: No   • Drug use: No   • Sexual activity: Defer        Past Family History:  Family History   Problem Relation Age of Onset   • Coronary artery disease Other    • Dementia Other    • Kidney disease Other    • Arthritis Father    • Early death Father         Kidney failure   • Kidney disease Father    • Heart disease Mother    • Mental illness Mother         Dementia   • Malig Hyperthermia Neg Hx    • Colon cancer Neg Hx    • Colon polyps Neg Hx        Review of Systems: All systems reviewed. Pertinent positives identified in HPI. All other systems are negative.     REVIEW OF SYSTEMS:   CONSTITUTIONAL: No weight loss, fever, chills, weakness or fatigue.   HEENT: Eyes: No visual loss, blurred vision, double vision or yellow sclerae. Ears, Nose, Throat: No hearing loss, sneezing, congestion, runny nose or sore throat.   SKIN: No rash or itching.     RESPIRATORY: No shortness of " breath, hemoptysis, cough or sputum.   GASTROINTESTINAL: No anorexia, nausea, vomiting or diarrhea. No abdominal pain, bright red blood per rectum or melena.  GENITOURINARY: No burning on urination, hematuria or increased frequency.  NEUROLOGICAL: No headache, dizziness, syncope, paralysis, ataxia, numbness or tingling in the extremities. No change in bowel or bladder control.   MUSCULOSKELETAL: No muscle, back pain, joint pain or stiffness.   HEMATOLOGIC: No anemia, bleeding or bruising.   LYMPHATICS: No enlarged nodes. No history of splenectomy.   PSYCHIATRIC: No history of depression, anxiety, hallucinations.   ENDOCRINOLOGIC: No reports of sweating, cold or heat intolerance. No polyuria or polydipsia.       Objective:     Objective:  Temp:  [97 °F (36.1 °C)] 97 °F (36.1 °C)  Heart Rate:  [61] 61  Resp:  [16] 16  BP: (136)/(54) 136/54  No intake or output data in the 24 hours ending 10/28/19 0715  Body mass index is 24.75 kg/m².      10/28/19  0654   Weight: 71.7 kg (158 lb)           Physical Exam:   Physical Exam   Constitutional: She is oriented to person, place, and time. She appears well-developed and well-nourished.   HENT:   Head: Normocephalic.   Eyes: Pupils are equal, round, and reactive to light.   Neck: Normal range of motion. No JVD present. Carotid bruit is not present. No thyromegaly present.   Cardiovascular: Normal rate, regular rhythm, S1 normal, S2 normal, normal heart sounds and intact distal pulses. Exam reveals no gallop and no friction rub.   No murmur heard.  Pulmonary/Chest: Effort normal and breath sounds normal.   Abdominal: Soft. Bowel sounds are normal.   Musculoskeletal: She exhibits no edema.   Neurological: She is alert and oriented to person, place, and time.   Skin: Skin is warm, dry and intact. No erythema.   Psychiatric: She has a normal mood and affect.   Vitals reviewed.        Lab Review:     Results from last 7 days   Lab Units 10/24/19  1039   SODIUM mmol/L 142   POTASSIUM  mmol/L 3.5   CHLORIDE mmol/L 105   CO2 mmol/L 27.3   BUN mg/dL 42*   CREATININE mg/dL 2.13*   GLUCOSE mg/dL 172*   CALCIUM mg/dL 9.7           Results from last 7 days   Lab Units 10/24/19  1039   WBC 10*3/mm3 4.12   HEMOGLOBIN g/dL 9.4*   HEMATOCRIT % 29.4*   PLATELETS 10*3/mm3 148                                   Imaging:      Imaging Results (most recent)     None          EKG:         Baseline:     I personally viewed and interpreted the patient's EKG/Telemetry data.    Assessment:   Assessment/Plan         Cardiomyopathy (CMS/HCC)              Plan:   Biventricular ICD replacement    Thank you for allowing me to participate in the care of Charmaine Machuca. Feel free to contact me directly with any further questions or concerns.    Leandro Huber MD  [facility]  10/28/19  7:15 AM

## 2019-10-31 ENCOUNTER — INFUSION (OUTPATIENT)
Dept: ONCOLOGY | Facility: HOSPITAL | Age: 79
End: 2019-10-31

## 2019-10-31 ENCOUNTER — APPOINTMENT (OUTPATIENT)
Dept: ONCOLOGY | Facility: HOSPITAL | Age: 79
End: 2019-10-31

## 2019-10-31 DIAGNOSIS — N18.4 ANEMIA DUE TO STAGE 4 CHRONIC KIDNEY DISEASE (HCC): Primary | ICD-10-CM

## 2019-10-31 DIAGNOSIS — D63.1 ANEMIA OF CHRONIC RENAL FAILURE, STAGE 4 (SEVERE) (HCC): ICD-10-CM

## 2019-10-31 DIAGNOSIS — Z45.2 ENCOUNTER FOR FITTING AND ADJUSTMENT OF VASCULAR CATHETER: ICD-10-CM

## 2019-10-31 DIAGNOSIS — N18.4 ANEMIA OF CHRONIC RENAL FAILURE, STAGE 4 (SEVERE) (HCC): ICD-10-CM

## 2019-10-31 DIAGNOSIS — D63.1 ANEMIA DUE TO STAGE 4 CHRONIC KIDNEY DISEASE (HCC): Primary | ICD-10-CM

## 2019-10-31 LAB
BASOPHILS # BLD AUTO: 0.01 10*3/MM3 (ref 0–0.2)
BASOPHILS NFR BLD AUTO: 0.2 % (ref 0–1.5)
DEPRECATED RDW RBC AUTO: 55.4 FL (ref 37–54)
EOSINOPHIL # BLD AUTO: 0.18 10*3/MM3 (ref 0–0.4)
EOSINOPHIL NFR BLD AUTO: 3.7 % (ref 0.3–6.2)
ERYTHROCYTE [DISTWIDTH] IN BLOOD BY AUTOMATED COUNT: 15.9 % (ref 12.3–15.4)
FERRITIN SERPL-MCNC: 199.9 NG/ML (ref 13–150)
HCT VFR BLD AUTO: 28.9 % (ref 34–46.6)
HGB BLD-MCNC: 9 G/DL (ref 12–15.9)
HGB RETIC QN AUTO: 31.2 PG (ref 29.8–36.1)
IMM GRANULOCYTES # BLD AUTO: 0.04 10*3/MM3 (ref 0–0.05)
IMM GRANULOCYTES NFR BLD AUTO: 0.8 % (ref 0–0.5)
IMM RETICS NFR: 27.7 % (ref 3–15.8)
IRON 24H UR-MRATE: 48 MCG/DL (ref 37–145)
IRON SATN MFR SERPL: 21 % (ref 20–50)
LYMPHOCYTES # BLD AUTO: 0.55 10*3/MM3 (ref 0.7–3.1)
LYMPHOCYTES NFR BLD AUTO: 11.4 % (ref 19.6–45.3)
MCH RBC QN AUTO: 30.9 PG (ref 26.6–33)
MCHC RBC AUTO-ENTMCNC: 31.1 G/DL (ref 31.5–35.7)
MCV RBC AUTO: 99.3 FL (ref 79–97)
MONOCYTES # BLD AUTO: 0.46 10*3/MM3 (ref 0.1–0.9)
MONOCYTES NFR BLD AUTO: 9.6 % (ref 5–12)
NEUTROPHILS # BLD AUTO: 3.57 10*3/MM3 (ref 1.7–7)
NEUTROPHILS NFR BLD AUTO: 74.3 % (ref 42.7–76)
NRBC BLD AUTO-RTO: 0.4 /100 WBC (ref 0–0.2)
PLATELET # BLD AUTO: 166 10*3/MM3 (ref 140–450)
PMV BLD AUTO: 9.5 FL (ref 6–12)
RBC # BLD AUTO: 2.91 10*6/MM3 (ref 3.77–5.28)
RETICS/RBC NFR AUTO: 3.33 % (ref 0.7–1.9)
TIBC SERPL-MCNC: 225 MCG/DL (ref 298–536)
TRANSFERRIN SERPL-MCNC: 151 MG/DL (ref 200–360)
WBC NRBC COR # BLD: 4.81 10*3/MM3 (ref 3.4–10.8)

## 2019-10-31 PROCEDURE — 96523 IRRIG DRUG DELIVERY DEVICE: CPT

## 2019-10-31 PROCEDURE — 25010000002 EPOETIN ALFA PER 1000 UNITS: Performed by: NURSE PRACTITIONER

## 2019-10-31 PROCEDURE — 85025 COMPLETE CBC W/AUTO DIFF WBC: CPT | Performed by: INTERNAL MEDICINE

## 2019-10-31 PROCEDURE — 85046 RETICYTE/HGB CONCENTRATE: CPT | Performed by: INTERNAL MEDICINE

## 2019-10-31 PROCEDURE — 82728 ASSAY OF FERRITIN: CPT | Performed by: INTERNAL MEDICINE

## 2019-10-31 PROCEDURE — 96372 THER/PROPH/DIAG INJ SC/IM: CPT

## 2019-10-31 PROCEDURE — 83540 ASSAY OF IRON: CPT | Performed by: INTERNAL MEDICINE

## 2019-10-31 PROCEDURE — 84466 ASSAY OF TRANSFERRIN: CPT | Performed by: INTERNAL MEDICINE

## 2019-10-31 RX ORDER — SODIUM CHLORIDE 0.9 % (FLUSH) 0.9 %
10 SYRINGE (ML) INJECTION AS NEEDED
Status: DISCONTINUED | OUTPATIENT
Start: 2019-10-31 | End: 2019-10-31 | Stop reason: HOSPADM

## 2019-10-31 RX ORDER — SODIUM CHLORIDE 0.9 % (FLUSH) 0.9 %
10 SYRINGE (ML) INJECTION AS NEEDED
Status: CANCELLED | OUTPATIENT
Start: 2019-10-31

## 2019-10-31 RX ADMIN — ERYTHROPOIETIN 42000 UNITS: 20000 INJECTION, SOLUTION INTRAVENOUS; SUBCUTANEOUS at 11:11

## 2019-10-31 RX ADMIN — SODIUM CHLORIDE, PRESERVATIVE FREE 10 ML: 5 INJECTION INTRAVENOUS at 10:25

## 2019-10-31 RX ADMIN — Medication 500 UNITS: at 10:25

## 2019-11-05 NOTE — PROGRESS NOTES
.     REASONS FOR FOLLOWUP: Anemia    HISTORY OF PRESENT ILLNESS:  The patient is a 79 y.o. year old female  who is here for follow-up with the above-mentioned history.    Nothing new since last visit.  She is happy her Hb is better.  Denies bleeding, chest pain, shortness of breath.    Past Medical History:   Diagnosis Date   • Anemia     Iron deficiency   • Anxiety    • Cardiomyopathy (CMS/HCC)     S/P pacemaker and defibrillator   • CHF (congestive heart failure) (CMS/HCC)    • Chronic renal failure, stage 4 (severe) (CMS/HCC)    • Colon polyp    • Coronary artery disease     pacemaker, defibrillator   • Depression    • Dizzy    • Gastroparesis    • GAVE (gastric antral vascular ectasia)    • GERD (gastroesophageal reflux disease)    • Gout    • Hemorrhoids    • Hiatal hernia    • History of Clostridium difficile colitis 2013   • History of kidney stones    • History of pancreatitis     2014   • History of skin cancer    • History of transfusion     MULTIPLE    • Hyperlipidemia    • Hypertension    • Hypothyroidism    • Left bundle branch block    • Mitral and aortic valve disease    • Mitral valve insufficiency    • Myoclonus     S/P Depakote   • Osteoarthritis    • Pancytopenia (CMS/HCC)    • Peripheral neuropathy    • Presence of cardiac pacemaker     AND DEFIBRILLATOR   • Pulmonary hypertension (CMS/HCC)    • SOB (shortness of breath) on exertion    • Spinal stenosis    • Stroke (CMS/HCC)      Past Surgical History:   Procedure Laterality Date   • APPENDECTOMY N/A    • ARTERIOVENOUS FISTULA/SHUNT SURGERY Right 2/18/2019    Procedure: RIGHT BASILIC FISTULA CREATION WITHOUT TRANSPOSITION;  Surgeon: Royer Dozier MD;  Location: [facility] OR;  Service: Vascular   • ARTERIOVENOUS FISTULA/SHUNT SURGERY Right 5/31/2019    Procedure: RIGHT 2ND STAGE BASILIC VEIN CREATION;  Surgeon: Royer Dozier MD;  Location: [facility] OR;  Service: Vascular   • CARDIAC CATHETERIZATION Left 03/28/2006    Arterial  catheter insertion, cath left ventriculography, coronary angiography and left heart catheterization-Dr. Estevan Matamoros   • CARDIAC DEFIBRILLATOR PLACEMENT N/A 11/30/2007    Biventricular implantable cardioverter defibrillator-Dr. Leandro Huber   • CARDIAC ELECTROPHYSIOLOGY PROCEDURE N/A 10/28/2019    Procedure: GENERATOR CHANGE BI-V ICD  medtronic;  Surgeon: Leandro Huber MD;  Location: [facility] INVASIVE LOCATION;  Service: Cardiology   • CATARACT EXTRACTION Bilateral 2011   • COLONOSCOPY N/A 2014    done at [facility]   • CYSTECTOMY N/A     Ovarian cystectomy   • ENDOSCOPY N/A 04/28/2006    EGD with biopsies. Paraesophageal hiatal hernia from 34 to 44 cm, antral gastritis-Dr. Nehemiah Beal   • ENDOSCOPY N/A 09/29/2004    Hiatal hernia, gastritis and an erosion of the pylorus-Dr. Yang Pavon   • ENDOSCOPY N/A 9/1/2019    Procedure: ESOPHAGOGASTRODUODENOSCOPY with APC;  Surgeon: Anuel Roach MD;  Location: [facility] ENDOSCOPY;  Service: Gastroenterology   • ENTEROSCOPY SMALL BOWEL N/A 10/30/2006    Small bowel enteroscopy with biopsies-Dr. Rory Sevilla   • FLEXIBLE SIGMOIDOSCOPY N/A 09/29/2004    Unsuccessful colonoscopy due to poop prep, a very tortuous sigmoid, bowel prep in the form of enema given and a barium enema was obtained-Dr. Yang Pavon   • FRACTURE SURGERY  2015    Broke big toe   • HEMATOMA EVACUATION TRUNK N/A 02/07/2014    Pocket evacuation of hematoma at pacemaker site-Dr. Leandro Huber   • HEMORRHOIDECTOMY N/A 04/19/2004    Flexible sigmoidoscopy and stapled hemorrhoidectomy-Dr. Yang Pavon   • HYSTERECTOMY Bilateral 1977   • IMPLANTABLE CARDIOVERTER DEFIBRILLATOR LEAD REPLACEMENT/POCKET REVISION Left 3/28/2016    Procedure: AUTOMATIC IMPLANTABLE CARDIOVERTER DEFIBRILLATOR LEAD REPLACEMENT WITH LASER LEAD EXTRACTION;  Surgeon: Leandro Huber MD;  Location: [facility] OR 18/19;  Service:    • IMPLANTABLE CARDIOVERTER DEFIBRILLATOR LEAD REPLACEMENT/POCKET REVISION N/A  01/13/2014    Biventricular ICD replacement-Dr. Jillian Huber   • LAPAROSCOPY REPAIR HIATAL HERNIA N/A 06/27/2006    Laparoscopic paraesophageal hiatal hernia repair with Nissen fundoplication and cholecystectomy-Dr. Sean Yoon   • NATALIE GONZALEZ ([facility]) PROCEDURE     • CA INSJ TUNNELED CVC W/O SUBQ PORT/ AGE 3 YR/> Right 10/3/2017    Procedure: Right internal jugular port placement;  Surgeon: Tiffanie Couch MD;  Location: [facility] OR;  Service: General   • TOE SURGERY Left     SET BIG TOE   • TOTAL KNEE ARTHROPLASTY Left 08/2014       MEDICATIONS    Current Outpatient Medications:   •  aspirin 81 MG tablet, Take 81 mg by mouth Daily., Disp: , Rfl:   •  atorvastatin (LIPITOR) 40 MG tablet, Take 1 tablet by mouth Every Night., Disp: 30 tablet, Rfl: 0  •  calcitriol (ROCALTROL) 0.25 MCG capsule, Take 0.25 mcg by mouth Every Other Day. 1 tablet every other day and 2 tablets every other day, Disp: , Rfl:   •  calcium carbonate (OS-RAYMOND) 600 MG tablet, Take 600 mg by mouth Daily., Disp: , Rfl:   •  carbidopa-levodopa ER (SINEMET CR)  MG per tablet, Take 1 tablet by mouth Every Evening., Disp: , Rfl:   •  carvedilol (COREG) 6.25 MG tablet, Take 6.25 mg by mouth 2 (Two) Times a Day With Meals., Disp: , Rfl:   •  Cholecalciferol (VITAMIN D3) 5000 units capsule capsule, Take 5,000 Units by mouth Daily., Disp: , Rfl:   •  clopidogrel (PLAVIX) 75 MG tablet, Take 1 tablet by mouth Daily., Disp: 30 tablet, Rfl: 0  •  diazepam (VALIUM) 5 MG tablet, Take 5 mg by mouth Every Night., Disp: , Rfl:   •  febuxostat (ULORIC) 40 MG tablet, Take 40 mg by mouth Daily., Disp: , Rfl:   •  furosemide (LASIX) 40 MG tablet, Take 1 tablet by mouth Daily., Disp: 90 tablet, Rfl: 3  •  Gabapentin, Once-Daily, (GRALISE) 300 MG tablet, Take 300 mg by mouth Daily With Dinner. MAY REPEAT LATE EVENING IF NEEDED, Disp: , Rfl:   •  Glucosamine-Chondroit-Vit C-Mn (GLUCOSAMINE CHONDROITIN COMPLX) capsule, Take 1,500 mg by  "mouth Every Other Day. PT HOLDING FOR SURGERY, Disp: , Rfl:   •  levothyroxine (SYNTHROID, LEVOTHROID) 25 MCG tablet, Take 25 mcg by mouth Daily., Disp: , Rfl:   •  loperamide (IMODIUM) 2 MG capsule, Take 2 mg by mouth 4 (Four) Times a Day As Needed for Diarrhea., Disp: , Rfl:   •  magnesium oxide 250 MG tablet, Take 250 mg by mouth Daily., Disp: , Rfl:   •  Multiple Vitamin (MULTI-DAY VITAMINS) tablet, Take 1 tablet by mouth Daily. HOLD FOR SURGERY, Disp: , Rfl:   •  polyethylene glycol (MIRALAX) powder, , Disp: , Rfl:   •  psyllium (METAMUCIL) 58.6 % powder, Take 1 packet by mouth Daily., Disp: , Rfl:   •  rotigotine (NEUPRO) 6 MG/24HR patch, Place 1 patch on the skin as directed by provider Daily., Disp: , Rfl:   •  Vitamin E 400 UNITS tablet, Take 400 Units by mouth Daily. PT HOLDING FOR SURGERY, Disp: , Rfl:   •  DULOXETINE HCL PO, Take  by mouth 2 (Two) Times a Day., Disp: , Rfl:   •  famotidine (PEPCID) 20 MG tablet, Take 1 tablet by mouth 2 (Two) Times a Day., Disp: 180 tablet, Rfl: 3  No current facility-administered medications for this visit.     Facility-Administered Medications Ordered in Other Visits:   •  heparin flush (porcine) 100 UNIT/ML injection 500 Units, 500 Units, Intravenous, PRN, Anuel Scott MD, 500 Units at 11/27/17 1347  •  sodium chloride 0.9 % flush 10 mL, 10 mL, Intravenous, PRN, Anuel Scott MD, 10 mL at 11/27/17 1347    ALLERGIES:     Allergies   Allergen Reactions   • Chlorhexidine Rash and Itching     Patient states \"blue dye\" in chlorhexidine.  States the orange and clear are OK to use   • Valproic Acid Other (See Comments)     Made her extremely sleepy  (depakote)   • Codeine Other (See Comments)     HYPER, DOES NOT HELP PAIN   • Propoxyphene Other (See Comments)     Belligerent, acting drunk  (darvon)       SOCIAL HISTORY:       Social History     Socioeconomic History   • Marital status:      Spouse name: Zackery   • Number of children: 5   • Years of " "education: 1 yr college   • Highest education level: Not on file   Occupational History     Employer: RETIRED   Tobacco Use   • Smoking status: Never Smoker   • Smokeless tobacco: Never Used   Substance and Sexual Activity   • Alcohol use: No   • Drug use: No   • Sexual activity: Defer         FAMILY HISTORY:  Family History   Problem Relation Age of Onset   • Coronary artery disease Other    • Dementia Other    • Kidney disease Other    • Arthritis Father    • Early death Father         Kidney failure   • Kidney disease Father    • Heart disease Mother    • Mental illness Mother         Dementia   • Malig Hyperthermia Neg Hx    • Colon cancer Neg Hx    • Colon polyps Neg Hx        REVIEW OF SYSTEMS:  Review of Systems   Constitutional: Positive for fatigue. Negative for activity change.   HENT: Positive for ear pain. Negative for nosebleeds and trouble swallowing.    Respiratory: Negative for shortness of breath and wheezing.    Cardiovascular: Negative for chest pain and palpitations.   Gastrointestinal: Negative for constipation, diarrhea and nausea.   Genitourinary: Negative for dysuria and hematuria.   Musculoskeletal: Negative for arthralgias and myalgias.   Skin: Negative for rash and wound.   Neurological: Negative for seizures and syncope.   Hematological: Negative for adenopathy. Does not bruise/bleed easily.   Psychiatric/Behavioral: Negative for confusion.            Vitals:    11/06/19 0904   BP: 151/70   Pulse: 69   Resp: 16   Temp: 97.7 °F (36.5 °C)   SpO2: 98%   Weight: 75.6 kg (166 lb 11.2 oz)   Height: 170.2 cm (67.01\")   PainSc: 3  Comment: arthritis     Current Status 11/6/2019   ECOG score 0        PHYSICAL EXAM:    CONSTITUTIONAL:  Vital signs reviewed.  No distress, looks comfortable.  EYES:  Conjunctiva and lids unremarkable.  PERRLA  EARS,NOSE,MOUTH,THROAT:  Ears and nose appear unremarkable.  Lips, teeth, gums appear unremarkable.  RESPIRATORY:  Normal respiratory effort.  Lungs clear to " auscultation bilaterally.  CARDIOVASCULAR:  Normal S1, S2.  No murmurs rubs or gallops.  No significant lower extremity edema.  GASTROINTESTINAL: Abdomen appears unremarkable.  Nontender.  No hepatomegaly.  No splenomegaly.  LYMPHATIC:  No cervical, supraclavicular, axillary lymphadenopathy.  SKIN:  Warm.  No rashes.  PSYCHIATRIC:  Normal judgment and insight.  Normal mood and affect.      RECENT LABS:        WBC   Date/Time Value Ref Range Status   11/06/2019 08:53 AM 5.48 3.40 - 10.80 10*3/mm3 Final     Hemoglobin   Date/Time Value Ref Range Status   11/06/2019 08:53 AM 10.4 (L) 12.0 - 15.9 g/dL Final     Platelets   Date/Time Value Ref Range Status   11/06/2019 08:53  140 - 450 10*3/mm3 Final       Assessment/Plan   Anemia due to stage 4 chronic kidney disease (CMS/HCC)  - CBC & Differential  - CBC & Differential  - CBC & Differential  - CBC & Differential  - Ferritin  - Iron Profile  - Retic With IRF & RET-He       *Anemia due to stage IV chronic kidney disease.    · Weekly Procrit if Hb <10.  PLT 10.4.  Therefore, no Procrit today.    *Admitted 8/29/2019-9/2/2019 due to Hb of 5.  Transfused 2 units.  Heme positive stools.  EGD revealed GAVE.  Laser photocoagulation  · She states Dr. Roach plans EGD every 3 months.    *Iron deficiency anemia.  · Does not respond to oral iron due to poor absorption.  · 2 doses Injectafer late August 2019 resulted in normal iron labs and improvement of Hb from 9.4 up to 11.1 on 9/26/2019.  · Hb down to 10.3, 10/3/2019.  · Iron labs normal 10/31/2019, ferritin 200, 21% saturation.    *Source of iron deficiency.  · Previously followed regularly with Dr. Earl Avelar.  · States she cannot have colonoscopies due to tortuous colon and therefore has virtual colonoscopies.  · She states she saw Dr. Avelar after the 6/27/2019 visit with me due to recent dark stools.  She states Dr. Avelar did a rectal exam which was heme negative and recommended no further evaluation.  · On  8/22/2019, I told her I suspect she is having occult GI bleeding considering she is needing IV iron frequently.  However, with new stroke mid May 2019 and the addition of Plavix to aspirin, risk may be too great to stop antiplatelet agents for further GI evaluation.  Patient and  agree.  · During late August 2019 hospitalization for GI bleed with Hb of 5, EGD through Dr. Roach revealed G AVE.  Laser photocoagulation.  · Subsequently, following with Dr. Roach.  He initially planned EGD every 3 months.  · However, on the 10/18/2019 office visit, he changed the plan to be return to see him mid February 2020 and stated he could retreat her GAVE if she develops a significant drop in her Hb.    *Macrocytosis.  B12 and folate unremarkable  .1    *Reticulocytosis.  · On 8/22/2019, unremarkable: Direct [place], haptoglobin, bilirubin ( with normal range up to 214.  Therefore, likely not significant).    *Thrombocytopenia.  Intermittent since 9/19/2019.      *Circulating nucleated red blood cells.  Intermittent.  Could consider a bone marrow biopsy at some point.  I suspect this is occurring as her bone marrow is trying to increase production after bleeding episodes.  Nucleated red blood cells seen on 10/31/2019 and again today, 11/6/2019    *Stroke mid August 2019.  Presumed.  Could not have MRI due to pacemaker.  Plavix was added to aspirin.    Plan  · Change to every 2-week CBC.  Procrit if Hb <10.  (Not needing Procrit weekly)  · MD 2 months or so.  2 weeks prior: Iron labs      · Could consider a bone marrow biopsy in the future.       assisted with history.  I reviewed and summarized Dr. Roach's recent office note

## 2019-11-06 ENCOUNTER — LAB (OUTPATIENT)
Dept: LAB | Facility: HOSPITAL | Age: 79
End: 2019-11-06

## 2019-11-06 ENCOUNTER — CLINICAL SUPPORT NO REQUIREMENTS (OUTPATIENT)
Dept: CARDIOLOGY | Facility: CLINIC | Age: 79
End: 2019-11-06

## 2019-11-06 ENCOUNTER — OFFICE VISIT (OUTPATIENT)
Dept: ONCOLOGY | Facility: CLINIC | Age: 79
End: 2019-11-06

## 2019-11-06 VITALS
TEMPERATURE: 97.7 F | RESPIRATION RATE: 16 BRPM | HEART RATE: 69 BPM | BODY MASS INDEX: 26.16 KG/M2 | DIASTOLIC BLOOD PRESSURE: 70 MMHG | SYSTOLIC BLOOD PRESSURE: 151 MMHG | HEIGHT: 67 IN | WEIGHT: 166.7 LBS | OXYGEN SATURATION: 98 %

## 2019-11-06 DIAGNOSIS — N18.4 ANEMIA DUE TO STAGE 4 CHRONIC KIDNEY DISEASE (HCC): ICD-10-CM

## 2019-11-06 DIAGNOSIS — N18.4 ANEMIA DUE TO STAGE 4 CHRONIC KIDNEY DISEASE (HCC): Primary | ICD-10-CM

## 2019-11-06 DIAGNOSIS — D63.1 ANEMIA DUE TO STAGE 4 CHRONIC KIDNEY DISEASE (HCC): Primary | ICD-10-CM

## 2019-11-06 DIAGNOSIS — D63.1 ANEMIA DUE TO STAGE 4 CHRONIC KIDNEY DISEASE (HCC): ICD-10-CM

## 2019-11-06 DIAGNOSIS — I42.8 NONISCHEMIC CARDIOMYOPATHY (HCC): Primary | ICD-10-CM

## 2019-11-06 LAB
BASOPHILS # BLD AUTO: 0.02 10*3/MM3 (ref 0–0.2)
BASOPHILS NFR BLD AUTO: 0.4 % (ref 0–1.5)
DEPRECATED RDW RBC AUTO: 67.3 FL (ref 37–54)
EOSINOPHIL # BLD AUTO: 0.1 10*3/MM3 (ref 0–0.4)
EOSINOPHIL NFR BLD AUTO: 1.8 % (ref 0.3–6.2)
ERYTHROCYTE [DISTWIDTH] IN BLOOD BY AUTOMATED COUNT: 18.2 % (ref 12.3–15.4)
HCT VFR BLD AUTO: 33.3 % (ref 34–46.6)
HGB BLD-MCNC: 10.4 G/DL (ref 12–15.9)
IMM GRANULOCYTES # BLD AUTO: 0.06 10*3/MM3 (ref 0–0.05)
IMM GRANULOCYTES NFR BLD AUTO: 1.1 % (ref 0–0.5)
LYMPHOCYTES # BLD AUTO: 0.58 10*3/MM3 (ref 0.7–3.1)
LYMPHOCYTES NFR BLD AUTO: 10.6 % (ref 19.6–45.3)
MCH RBC QN AUTO: 31.9 PG (ref 26.6–33)
MCHC RBC AUTO-ENTMCNC: 31.2 G/DL (ref 31.5–35.7)
MCV RBC AUTO: 102.1 FL (ref 79–97)
MONOCYTES # BLD AUTO: 0.5 10*3/MM3 (ref 0.1–0.9)
MONOCYTES NFR BLD AUTO: 9.1 % (ref 5–12)
NEUTROPHILS # BLD AUTO: 4.22 10*3/MM3 (ref 1.7–7)
NEUTROPHILS NFR BLD AUTO: 77 % (ref 42.7–76)
NRBC BLD AUTO-RTO: 0.4 /100 WBC (ref 0–0.2)
PLATELET # BLD AUTO: 180 10*3/MM3 (ref 140–450)
PMV BLD AUTO: 10.1 FL (ref 6–12)
RBC # BLD AUTO: 3.26 10*6/MM3 (ref 3.77–5.28)
WBC NRBC COR # BLD: 5.48 10*3/MM3 (ref 3.4–10.8)

## 2019-11-06 PROCEDURE — 93284 PRGRMG EVAL IMPLANTABLE DFB: CPT | Performed by: INTERNAL MEDICINE

## 2019-11-06 PROCEDURE — G0463 HOSPITAL OUTPT CLINIC VISIT: HCPCS | Performed by: INTERNAL MEDICINE

## 2019-11-06 PROCEDURE — 85025 COMPLETE CBC W/AUTO DIFF WBC: CPT

## 2019-11-06 PROCEDURE — 99215 OFFICE O/P EST HI 40 MIN: CPT | Performed by: INTERNAL MEDICINE

## 2019-11-06 PROCEDURE — 36415 COLL VENOUS BLD VENIPUNCTURE: CPT

## 2019-11-06 RX ORDER — POLYETHYLENE GLYCOL 3350 17 G/17G
17 POWDER, FOR SOLUTION ORAL DAILY PRN
COMMUNITY
Start: 2019-10-28 | End: 2021-01-01

## 2019-11-13 ENCOUNTER — RESEARCH ENCOUNTER (OUTPATIENT)
Dept: CARDIOLOGY | Facility: CLINIC | Age: 79
End: 2019-11-13

## 2019-11-13 NOTE — RESEARCH
Research study: DECIDE-ICD Trial  Subject initials: RICHARD  Subject study ID#: [id number]    The patient PATY completed her second survey for the DECIDE study and returned it via the US Mail per our instructions. I entered her responses into the study database ([employer]) and mailed a gift card to her per study protocol.     The final study survey will occur a few months from now and we have communicated this to PATY. She has our contact information and can call us with any questions or concerns in the meantime.       Mona MELON RN  Research Coordinator

## 2019-11-19 ENCOUNTER — CLINICAL SUPPORT NO REQUIREMENTS (OUTPATIENT)
Dept: CARDIOLOGY | Facility: CLINIC | Age: 79
End: 2019-11-19

## 2019-11-19 DIAGNOSIS — I25.5 ISCHEMIC CARDIOMYOPATHY: Primary | ICD-10-CM

## 2019-11-21 ENCOUNTER — INFUSION (OUTPATIENT)
Dept: ONCOLOGY | Facility: HOSPITAL | Age: 79
End: 2019-11-21

## 2019-11-21 ENCOUNTER — LAB (OUTPATIENT)
Dept: OTHER | Facility: HOSPITAL | Age: 79
End: 2019-11-21

## 2019-11-21 VITALS
OXYGEN SATURATION: 94 % | SYSTOLIC BLOOD PRESSURE: 151 MMHG | BODY MASS INDEX: 25.87 KG/M2 | TEMPERATURE: 98 F | WEIGHT: 165.2 LBS | RESPIRATION RATE: 16 BRPM | HEART RATE: 92 BPM | DIASTOLIC BLOOD PRESSURE: 55 MMHG

## 2019-11-21 DIAGNOSIS — N18.4 ANEMIA DUE TO STAGE 4 CHRONIC KIDNEY DISEASE (HCC): ICD-10-CM

## 2019-11-21 DIAGNOSIS — D63.1 ANEMIA OF CHRONIC RENAL FAILURE, STAGE 4 (SEVERE) (HCC): Primary | ICD-10-CM

## 2019-11-21 DIAGNOSIS — D63.1 ANEMIA DUE TO STAGE 4 CHRONIC KIDNEY DISEASE (HCC): ICD-10-CM

## 2019-11-21 DIAGNOSIS — N18.4 ANEMIA OF CHRONIC RENAL FAILURE, STAGE 4 (SEVERE) (HCC): Primary | ICD-10-CM

## 2019-11-21 LAB
BASOPHILS # BLD AUTO: 0.02 10*3/MM3 (ref 0–0.2)
BASOPHILS NFR BLD AUTO: 0.4 % (ref 0–1.5)
DEPRECATED RDW RBC AUTO: 57 FL (ref 37–54)
EOSINOPHIL # BLD AUTO: 0.09 10*3/MM3 (ref 0–0.4)
EOSINOPHIL NFR BLD AUTO: 1.7 % (ref 0.3–6.2)
ERYTHROCYTE [DISTWIDTH] IN BLOOD BY AUTOMATED COUNT: 15.7 % (ref 12.3–15.4)
HCT VFR BLD AUTO: 27 % (ref 34–46.6)
HGB BLD-MCNC: 8.7 G/DL (ref 12–15.9)
IMM GRANULOCYTES # BLD AUTO: 0.01 10*3/MM3 (ref 0–0.05)
IMM GRANULOCYTES NFR BLD AUTO: 0.2 % (ref 0–0.5)
LYMPHOCYTES # BLD AUTO: 0.68 10*3/MM3 (ref 0.7–3.1)
LYMPHOCYTES NFR BLD AUTO: 13.1 % (ref 19.6–45.3)
MCH RBC QN AUTO: 32 PG (ref 26.6–33)
MCHC RBC AUTO-ENTMCNC: 32.2 G/DL (ref 31.5–35.7)
MCV RBC AUTO: 99.3 FL (ref 79–97)
MONOCYTES # BLD AUTO: 0.48 10*3/MM3 (ref 0.1–0.9)
MONOCYTES NFR BLD AUTO: 9.2 % (ref 5–12)
NEUTROPHILS # BLD AUTO: 3.92 10*3/MM3 (ref 1.7–7)
NEUTROPHILS NFR BLD AUTO: 75.4 % (ref 42.7–76)
NRBC BLD AUTO-RTO: 0 /100 WBC (ref 0–0.2)
PLATELET # BLD AUTO: 203 10*3/MM3 (ref 140–450)
PMV BLD AUTO: 10.1 FL (ref 6–12)
RBC # BLD AUTO: 2.72 10*6/MM3 (ref 3.77–5.28)
WBC NRBC COR # BLD: 5.2 10*3/MM3 (ref 3.4–10.8)

## 2019-11-21 PROCEDURE — 96372 THER/PROPH/DIAG INJ SC/IM: CPT

## 2019-11-21 PROCEDURE — 25010000002 EPOETIN ALFA PER 1000 UNITS: Performed by: NURSE PRACTITIONER

## 2019-11-21 PROCEDURE — 36415 COLL VENOUS BLD VENIPUNCTURE: CPT

## 2019-11-21 PROCEDURE — 85025 COMPLETE CBC W/AUTO DIFF WBC: CPT | Performed by: INTERNAL MEDICINE

## 2019-11-21 RX ADMIN — ERYTHROPOIETIN 30000 UNITS: 20000 INJECTION, SOLUTION INTRAVENOUS; SUBCUTANEOUS at 10:38

## 2019-11-21 NOTE — NURSING NOTE
Lab Results   Component Value Date    WBC 5.20 11/21/2019    HGB 8.7 (L) 11/21/2019    HCT 27.0 (L) 11/21/2019    MCV 99.3 (H) 11/21/2019     11/21/2019     Pt voices fatigue but denies further complaints. Procrit adjusted and given per protocol.  Pt not to return for 2 weeks but instructed to call sooner if symptoms worsen.  She v/u.

## 2019-11-26 ENCOUNTER — LAB (OUTPATIENT)
Dept: OTHER | Facility: HOSPITAL | Age: 79
End: 2019-11-26

## 2019-11-26 ENCOUNTER — CLINICAL SUPPORT (OUTPATIENT)
Dept: ONCOLOGY | Facility: HOSPITAL | Age: 79
End: 2019-11-26

## 2019-11-26 ENCOUNTER — TELEPHONE (OUTPATIENT)
Dept: ONCOLOGY | Facility: CLINIC | Age: 79
End: 2019-11-26

## 2019-11-26 DIAGNOSIS — N18.4 ANEMIA OF CHRONIC RENAL FAILURE, STAGE 4 (SEVERE) (HCC): Primary | ICD-10-CM

## 2019-11-26 DIAGNOSIS — N18.4 ANEMIA DUE TO STAGE 4 CHRONIC KIDNEY DISEASE (HCC): ICD-10-CM

## 2019-11-26 DIAGNOSIS — Z45.2 ENCOUNTER FOR FITTING AND ADJUSTMENT OF VASCULAR CATHETER: ICD-10-CM

## 2019-11-26 DIAGNOSIS — D63.1 ANEMIA DUE TO STAGE 4 CHRONIC KIDNEY DISEASE (HCC): ICD-10-CM

## 2019-11-26 DIAGNOSIS — D63.1 ANEMIA OF CHRONIC RENAL FAILURE, STAGE 4 (SEVERE) (HCC): Primary | ICD-10-CM

## 2019-11-26 LAB
ABO GROUP BLD: NORMAL
BASOPHILS # BLD AUTO: 0.01 10*3/MM3 (ref 0–0.2)
BASOPHILS NFR BLD AUTO: 0.2 % (ref 0–1.5)
BLD GP AB SCN SERPL QL: NEGATIVE
DEPRECATED RDW RBC AUTO: 57.5 FL (ref 37–54)
EOSINOPHIL # BLD AUTO: 0.08 10*3/MM3 (ref 0–0.4)
EOSINOPHIL NFR BLD AUTO: 1.6 % (ref 0.3–6.2)
ERYTHROCYTE [DISTWIDTH] IN BLOOD BY AUTOMATED COUNT: 15.7 % (ref 12.3–15.4)
HCT VFR BLD AUTO: 25.1 % (ref 34–46.6)
HGB BLD-MCNC: 7.8 G/DL (ref 12–15.9)
IMM GRANULOCYTES # BLD AUTO: 0.02 10*3/MM3 (ref 0–0.05)
IMM GRANULOCYTES NFR BLD AUTO: 0.4 % (ref 0–0.5)
LYMPHOCYTES # BLD AUTO: 0.63 10*3/MM3 (ref 0.7–3.1)
LYMPHOCYTES NFR BLD AUTO: 12.3 % (ref 19.6–45.3)
MCH RBC QN AUTO: 31.3 PG (ref 26.6–33)
MCHC RBC AUTO-ENTMCNC: 31.1 G/DL (ref 31.5–35.7)
MCV RBC AUTO: 100.8 FL (ref 79–97)
MONOCYTES # BLD AUTO: 0.45 10*3/MM3 (ref 0.1–0.9)
MONOCYTES NFR BLD AUTO: 8.8 % (ref 5–12)
NEUTROPHILS # BLD AUTO: 3.95 10*3/MM3 (ref 1.7–7)
NEUTROPHILS NFR BLD AUTO: 76.7 % (ref 42.7–76)
NRBC BLD AUTO-RTO: 0 /100 WBC (ref 0–0.2)
PLATELET # BLD AUTO: 209 10*3/MM3 (ref 140–450)
PMV BLD AUTO: 10 FL (ref 6–12)
RBC # BLD AUTO: 2.49 10*6/MM3 (ref 3.77–5.28)
RH BLD: NEGATIVE
T&S EXPIRATION DATE: NORMAL
WBC NRBC COR # BLD: 5.14 10*3/MM3 (ref 3.4–10.8)

## 2019-11-26 PROCEDURE — 85025 COMPLETE CBC W/AUTO DIFF WBC: CPT | Performed by: INTERNAL MEDICINE

## 2019-11-26 PROCEDURE — 86901 BLOOD TYPING SEROLOGIC RH(D): CPT | Performed by: NURSE PRACTITIONER

## 2019-11-26 PROCEDURE — 96523 IRRIG DRUG DELIVERY DEVICE: CPT

## 2019-11-26 PROCEDURE — 36415 COLL VENOUS BLD VENIPUNCTURE: CPT

## 2019-11-26 PROCEDURE — 86923 COMPATIBILITY TEST ELECTRIC: CPT

## 2019-11-26 PROCEDURE — 86850 RBC ANTIBODY SCREEN: CPT | Performed by: NURSE PRACTITIONER

## 2019-11-26 PROCEDURE — 86900 BLOOD TYPING SEROLOGIC ABO: CPT | Performed by: NURSE PRACTITIONER

## 2019-11-26 RX ORDER — SODIUM CHLORIDE 0.9 % (FLUSH) 0.9 %
10 SYRINGE (ML) INJECTION AS NEEDED
Status: CANCELLED | OUTPATIENT
Start: 2019-11-26

## 2019-11-26 RX ORDER — DIPHENHYDRAMINE HCL 25 MG
25 CAPSULE ORAL ONCE
Status: CANCELLED | OUTPATIENT
Start: 2019-11-26 | End: 2019-11-26

## 2019-11-26 RX ORDER — ACETAMINOPHEN 325 MG/1
650 TABLET ORAL ONCE
Status: CANCELLED | OUTPATIENT
Start: 2019-11-26 | End: 2019-11-26

## 2019-11-26 RX ORDER — ACETAMINOPHEN 120 MG/1
650 SUPPOSITORY RECTAL ONCE
Status: CANCELLED | OUTPATIENT
Start: 2019-11-26 | End: 1840-12-31

## 2019-11-26 RX ORDER — SODIUM CHLORIDE 0.9 % (FLUSH) 0.9 %
10 SYRINGE (ML) INJECTION AS NEEDED
Status: DISCONTINUED | OUTPATIENT
Start: 2019-11-26 | End: 2019-11-26 | Stop reason: HOSPADM

## 2019-11-26 RX ADMIN — Medication 10 ML: at 14:18

## 2019-11-26 RX ADMIN — SODIUM CHLORIDE, PRESERVATIVE FREE 500 UNITS: 5 INJECTION INTRAVENOUS at 14:18

## 2019-11-26 NOTE — PROGRESS NOTES
Lab Results   Component Value Date    WBC 5.14 11/26/2019    HGB 7.8 (L) 11/26/2019    HCT 25.1 (L) 11/26/2019    .8 (H) 11/26/2019     11/26/2019     2 Units PRBC ordered per MEL Paula for Hgb 7.8.  T&C done and armband placed and pt instructed not to remove.  Pt v/u.

## 2019-11-26 NOTE — TELEPHONE ENCOUNTER
Pt is fatigued and feels like  she needs blood transfusion. Pt will be added to the [facility] schedule for a CBC and RN review today. Ondina Mcrae nurse informed and message sent to scheduling, pt V/U.

## 2019-11-27 ENCOUNTER — HOSPITAL ENCOUNTER (OUTPATIENT)
Dept: INFUSION THERAPY | Facility: HOSPITAL | Age: 79
Setting detail: INFUSION SERIES
Discharge: HOME | End: 2019-11-27

## 2019-11-27 VITALS
DIASTOLIC BLOOD PRESSURE: 62 MMHG | RESPIRATION RATE: 20 BRPM | OXYGEN SATURATION: 100 % | SYSTOLIC BLOOD PRESSURE: 130 MMHG | HEART RATE: 66 BPM | TEMPERATURE: 97.9 F

## 2019-11-27 DIAGNOSIS — D63.1 ANEMIA DUE TO STAGE 4 CHRONIC KIDNEY DISEASE (HCC): Primary | ICD-10-CM

## 2019-11-27 DIAGNOSIS — N18.4 ANEMIA DUE TO STAGE 4 CHRONIC KIDNEY DISEASE (HCC): Primary | ICD-10-CM

## 2019-11-27 PROCEDURE — 86900 BLOOD TYPING SEROLOGIC ABO: CPT

## 2019-11-27 PROCEDURE — P9016 RBC LEUKOCYTES REDUCED: HCPCS

## 2019-11-27 PROCEDURE — 63710000001 DIPHENHYDRAMINE PER 50 MG: Performed by: NURSE PRACTITIONER

## 2019-11-27 PROCEDURE — 36430 TRANSFUSION BLD/BLD COMPNT: CPT

## 2019-11-27 RX ORDER — ACETAMINOPHEN 325 MG/1
650 TABLET ORAL ONCE
Status: COMPLETED | OUTPATIENT
Start: 2019-11-27 | End: 2019-11-27

## 2019-11-27 RX ORDER — ACETAMINOPHEN 650 MG/1
650 SUPPOSITORY RECTAL ONCE
Status: COMPLETED | OUTPATIENT
Start: 2019-11-27 | End: 2019-11-27

## 2019-11-27 RX ORDER — DIPHENHYDRAMINE HCL 25 MG
25 CAPSULE ORAL ONCE
Status: COMPLETED | OUTPATIENT
Start: 2019-11-27 | End: 2019-11-27

## 2019-11-27 RX ADMIN — ACETAMINOPHEN 650 MG: 325 TABLET, FILM COATED ORAL at 09:18

## 2019-11-27 RX ADMIN — DIPHENHYDRAMINE HYDROCHLORIDE 25 MG: 25 CAPSULE ORAL at 09:19

## 2019-11-27 RX ADMIN — Medication 500 UNITS: at 15:02

## 2019-12-04 ENCOUNTER — APPOINTMENT (OUTPATIENT)
Dept: ONCOLOGY | Facility: HOSPITAL | Age: 79
End: 2019-12-04

## 2019-12-04 ENCOUNTER — INFUSION (OUTPATIENT)
Dept: ONCOLOGY | Facility: HOSPITAL | Age: 79
End: 2019-12-04

## 2019-12-04 ENCOUNTER — APPOINTMENT (OUTPATIENT)
Dept: OTHER | Facility: HOSPITAL | Age: 79
End: 2019-12-04

## 2019-12-04 ENCOUNTER — LAB (OUTPATIENT)
Dept: LAB | Facility: HOSPITAL | Age: 79
End: 2019-12-04

## 2019-12-04 DIAGNOSIS — IMO0001 ADVERSE EFFECT OF IRON OR ITS COMPOUND, SUBSEQUENT ENCOUNTER: ICD-10-CM

## 2019-12-04 DIAGNOSIS — D63.1 ANEMIA DUE TO STAGE 4 CHRONIC KIDNEY DISEASE (HCC): ICD-10-CM

## 2019-12-04 DIAGNOSIS — D63.1 ANEMIA OF CHRONIC RENAL FAILURE, STAGE 4 (SEVERE) (HCC): Primary | ICD-10-CM

## 2019-12-04 DIAGNOSIS — N18.4 ANEMIA OF CHRONIC RENAL FAILURE, STAGE 4 (SEVERE) (HCC): Primary | ICD-10-CM

## 2019-12-04 DIAGNOSIS — N18.4 STAGE 4 CHRONIC KIDNEY DISEASE (HCC): ICD-10-CM

## 2019-12-04 DIAGNOSIS — E53.8 B12 DEFICIENCY: ICD-10-CM

## 2019-12-04 DIAGNOSIS — N18.4 ANEMIA DUE TO STAGE 4 CHRONIC KIDNEY DISEASE (HCC): ICD-10-CM

## 2019-12-04 DIAGNOSIS — D50.8 OTHER IRON DEFICIENCY ANEMIA: ICD-10-CM

## 2019-12-04 LAB
BASOPHILS # BLD AUTO: 0.02 10*3/MM3 (ref 0–0.2)
BASOPHILS NFR BLD AUTO: 0.3 % (ref 0–1.5)
DEPRECATED RDW RBC AUTO: 57.5 FL (ref 37–54)
EOSINOPHIL # BLD AUTO: 0.11 10*3/MM3 (ref 0–0.4)
EOSINOPHIL NFR BLD AUTO: 1.8 % (ref 0.3–6.2)
ERYTHROCYTE [DISTWIDTH] IN BLOOD BY AUTOMATED COUNT: 16 % (ref 12.3–15.4)
HCT VFR BLD AUTO: 29.6 % (ref 34–46.6)
HGB BLD-MCNC: 9.5 G/DL (ref 12–15.9)
IMM GRANULOCYTES # BLD AUTO: 0.04 10*3/MM3 (ref 0–0.05)
IMM GRANULOCYTES NFR BLD AUTO: 0.7 % (ref 0–0.5)
LYMPHOCYTES # BLD AUTO: 0.59 10*3/MM3 (ref 0.7–3.1)
LYMPHOCYTES NFR BLD AUTO: 9.8 % (ref 19.6–45.3)
MCH RBC QN AUTO: 31.6 PG (ref 26.6–33)
MCHC RBC AUTO-ENTMCNC: 32.1 G/DL (ref 31.5–35.7)
MCV RBC AUTO: 98.3 FL (ref 79–97)
MONOCYTES # BLD AUTO: 0.52 10*3/MM3 (ref 0.1–0.9)
MONOCYTES NFR BLD AUTO: 8.6 % (ref 5–12)
NEUTROPHILS # BLD AUTO: 4.75 10*3/MM3 (ref 1.7–7)
NEUTROPHILS NFR BLD AUTO: 78.8 % (ref 42.7–76)
NRBC BLD AUTO-RTO: 0 /100 WBC (ref 0–0.2)
PLATELET # BLD AUTO: 143 10*3/MM3 (ref 140–450)
PMV BLD AUTO: 9.8 FL (ref 6–12)
RBC # BLD AUTO: 3.01 10*6/MM3 (ref 3.77–5.28)
WBC NRBC COR # BLD: 6.03 10*3/MM3 (ref 3.4–10.8)

## 2019-12-04 PROCEDURE — 25010000002 EPOETIN ALFA PER 1000 UNITS: Performed by: INTERNAL MEDICINE

## 2019-12-04 PROCEDURE — 85025 COMPLETE CBC W/AUTO DIFF WBC: CPT

## 2019-12-04 PROCEDURE — 25010000002 CYANOCOBALAMIN PER 1000 MCG: Performed by: INTERNAL MEDICINE

## 2019-12-04 PROCEDURE — 96372 THER/PROPH/DIAG INJ SC/IM: CPT

## 2019-12-04 PROCEDURE — 36415 COLL VENOUS BLD VENIPUNCTURE: CPT

## 2019-12-04 RX ORDER — CYANOCOBALAMIN 1000 UG/ML
1000 INJECTION, SOLUTION INTRAMUSCULAR; SUBCUTANEOUS
Status: DISCONTINUED | OUTPATIENT
Start: 2019-12-04 | End: 2019-12-04 | Stop reason: HOSPADM

## 2019-12-04 RX ADMIN — ERYTHROPOIETIN 30000 UNITS: 20000 INJECTION, SOLUTION INTRAVENOUS; SUBCUTANEOUS at 10:58

## 2019-12-04 RX ADMIN — CYANOCOBALAMIN 1000 MCG: 1000 INJECTION, SOLUTION INTRAMUSCULAR at 10:59

## 2019-12-19 ENCOUNTER — APPOINTMENT (OUTPATIENT)
Dept: ONCOLOGY | Facility: HOSPITAL | Age: 79
End: 2019-12-19

## 2019-12-19 ENCOUNTER — INFUSION (OUTPATIENT)
Dept: ONCOLOGY | Facility: HOSPITAL | Age: 79
End: 2019-12-19

## 2019-12-19 DIAGNOSIS — D63.1 ANEMIA DUE TO STAGE 4 CHRONIC KIDNEY DISEASE (HCC): Primary | ICD-10-CM

## 2019-12-19 DIAGNOSIS — D63.1 ANEMIA OF CHRONIC RENAL FAILURE, STAGE 4 (SEVERE) (HCC): ICD-10-CM

## 2019-12-19 DIAGNOSIS — N18.4 STAGE 4 CHRONIC KIDNEY DISEASE (HCC): ICD-10-CM

## 2019-12-19 DIAGNOSIS — N18.4 ANEMIA OF CHRONIC RENAL FAILURE, STAGE 4 (SEVERE) (HCC): ICD-10-CM

## 2019-12-19 DIAGNOSIS — IMO0001 ADVERSE EFFECT OF IRON OR ITS COMPOUND, SUBSEQUENT ENCOUNTER: ICD-10-CM

## 2019-12-19 DIAGNOSIS — D50.8 OTHER IRON DEFICIENCY ANEMIA: ICD-10-CM

## 2019-12-19 DIAGNOSIS — E53.8 B12 DEFICIENCY: ICD-10-CM

## 2019-12-19 DIAGNOSIS — N18.4 ANEMIA DUE TO STAGE 4 CHRONIC KIDNEY DISEASE (HCC): Primary | ICD-10-CM

## 2019-12-19 LAB
BASOPHILS # BLD AUTO: 0.01 10*3/MM3 (ref 0–0.2)
BASOPHILS NFR BLD AUTO: 0.2 % (ref 0–1.5)
DEPRECATED RDW RBC AUTO: 58.4 FL (ref 37–54)
EOSINOPHIL # BLD AUTO: 0.06 10*3/MM3 (ref 0–0.4)
EOSINOPHIL NFR BLD AUTO: 1.5 % (ref 0.3–6.2)
ERYTHROCYTE [DISTWIDTH] IN BLOOD BY AUTOMATED COUNT: 15.8 % (ref 12.3–15.4)
FERRITIN SERPL-MCNC: 117.8 NG/ML (ref 13–150)
HCT VFR BLD AUTO: 22.2 % (ref 34–46.6)
HGB BLD-MCNC: 6.9 G/DL (ref 12–15.9)
HGB RETIC QN AUTO: 32.5 PG (ref 29.8–36.1)
IMM GRANULOCYTES # BLD AUTO: 0.01 10*3/MM3 (ref 0–0.05)
IMM GRANULOCYTES NFR BLD AUTO: 0.2 % (ref 0–0.5)
IMM RETICS NFR: 12.1 % (ref 3–15.8)
IRON 24H UR-MRATE: 44 MCG/DL (ref 37–145)
IRON SATN MFR SERPL: 16 % (ref 20–50)
LYMPHOCYTES # BLD AUTO: 0.57 10*3/MM3 (ref 0.7–3.1)
LYMPHOCYTES NFR BLD AUTO: 14 % (ref 19.6–45.3)
MCH RBC QN AUTO: 31.5 PG (ref 26.6–33)
MCHC RBC AUTO-ENTMCNC: 31.1 G/DL (ref 31.5–35.7)
MCV RBC AUTO: 101.4 FL (ref 79–97)
MONOCYTES # BLD AUTO: 0.36 10*3/MM3 (ref 0.1–0.9)
MONOCYTES NFR BLD AUTO: 8.8 % (ref 5–12)
NEUTROPHILS # BLD AUTO: 3.07 10*3/MM3 (ref 1.7–7)
NEUTROPHILS NFR BLD AUTO: 75.3 % (ref 42.7–76)
NRBC BLD AUTO-RTO: 0 /100 WBC (ref 0–0.2)
PLATELET # BLD AUTO: 173 10*3/MM3 (ref 140–450)
PMV BLD AUTO: 9.4 FL (ref 6–12)
RBC # BLD AUTO: 2.19 10*6/MM3 (ref 3.77–5.28)
RETICS/RBC NFR AUTO: 3.14 % (ref 0.7–1.9)
TIBC SERPL-MCNC: 283 MCG/DL (ref 298–536)
TRANSFERRIN SERPL-MCNC: 190 MG/DL (ref 200–360)
WBC NRBC COR # BLD: 4.08 10*3/MM3 (ref 3.4–10.8)

## 2019-12-19 PROCEDURE — 96372 THER/PROPH/DIAG INJ SC/IM: CPT | Performed by: NURSE PRACTITIONER

## 2019-12-19 PROCEDURE — 85025 COMPLETE CBC W/AUTO DIFF WBC: CPT | Performed by: INTERNAL MEDICINE

## 2019-12-19 PROCEDURE — 86901 BLOOD TYPING SEROLOGIC RH(D): CPT

## 2019-12-19 PROCEDURE — 86923 COMPATIBILITY TEST ELECTRIC: CPT

## 2019-12-19 PROCEDURE — 25010000002 EPOETIN ALFA PER 1000 UNITS: Performed by: NURSE PRACTITIONER

## 2019-12-19 PROCEDURE — 83540 ASSAY OF IRON: CPT | Performed by: INTERNAL MEDICINE

## 2019-12-19 PROCEDURE — 86850 RBC ANTIBODY SCREEN: CPT

## 2019-12-19 PROCEDURE — 82728 ASSAY OF FERRITIN: CPT | Performed by: INTERNAL MEDICINE

## 2019-12-19 PROCEDURE — 86900 BLOOD TYPING SEROLOGIC ABO: CPT

## 2019-12-19 PROCEDURE — 25010000002 CYANOCOBALAMIN PER 1000 MCG: Performed by: NURSE PRACTITIONER

## 2019-12-19 PROCEDURE — 85046 RETICYTE/HGB CONCENTRATE: CPT | Performed by: INTERNAL MEDICINE

## 2019-12-19 PROCEDURE — 84466 ASSAY OF TRANSFERRIN: CPT | Performed by: INTERNAL MEDICINE

## 2019-12-19 RX ORDER — CYANOCOBALAMIN 1000 UG/ML
1000 INJECTION, SOLUTION INTRAMUSCULAR; SUBCUTANEOUS
Status: DISCONTINUED | OUTPATIENT
Start: 2019-12-19 | End: 2019-12-19 | Stop reason: HOSPADM

## 2019-12-19 RX ORDER — ACETAMINOPHEN 325 MG/1
650 TABLET ORAL ONCE
Status: DISCONTINUED | OUTPATIENT
Start: 2019-12-19 | End: 2019-12-21 | Stop reason: HOSPADM

## 2019-12-19 RX ORDER — ACETAMINOPHEN 325 MG/1
650 TABLET ORAL ONCE
Status: CANCELLED | OUTPATIENT
Start: 2019-12-19 | End: 2019-12-19

## 2019-12-19 RX ORDER — SODIUM CHLORIDE 9 MG/ML
250 INJECTION, SOLUTION INTRAVENOUS AS NEEDED
Status: CANCELLED | OUTPATIENT
Start: 2019-12-21

## 2019-12-19 RX ORDER — DIPHENHYDRAMINE HCL 25 MG
25 CAPSULE ORAL ONCE
Status: CANCELLED | OUTPATIENT
Start: 2019-12-21 | End: 2019-12-19

## 2019-12-19 RX ADMIN — CYANOCOBALAMIN 1000 MCG: 1000 INJECTION, SOLUTION INTRAMUSCULAR at 11:59

## 2019-12-19 RX ADMIN — ERYTHROPOIETIN 30000 UNITS: 20000 INJECTION, SOLUTION INTRAVENOUS; SUBCUTANEOUS at 12:00

## 2019-12-19 NOTE — PROGRESS NOTES
Orders entered for 2 units PRBCs with referral to [facility] for transfusion per communication from ISMA Perez RN,  ELAIDO Lloyd APRN

## 2019-12-21 ENCOUNTER — INFUSION (OUTPATIENT)
Dept: ONCOLOGY | Facility: HOSPITAL | Age: 79
End: 2019-12-21

## 2019-12-21 VITALS
OXYGEN SATURATION: 100 % | RESPIRATION RATE: 18 BRPM | DIASTOLIC BLOOD PRESSURE: 58 MMHG | SYSTOLIC BLOOD PRESSURE: 136 MMHG | TEMPERATURE: 97 F | HEART RATE: 70 BPM

## 2019-12-21 DIAGNOSIS — D63.1 ANEMIA DUE TO STAGE 4 CHRONIC KIDNEY DISEASE (HCC): Primary | ICD-10-CM

## 2019-12-21 DIAGNOSIS — N18.4 ANEMIA DUE TO STAGE 4 CHRONIC KIDNEY DISEASE (HCC): Primary | ICD-10-CM

## 2019-12-21 DIAGNOSIS — Z45.2 ENCOUNTER FOR FITTING AND ADJUSTMENT OF VASCULAR CATHETER: ICD-10-CM

## 2019-12-21 LAB
ABO GROUP BLD: NORMAL
BLD GP AB SCN SERPL QL: NEGATIVE
RH BLD: NEGATIVE
T&S EXPIRATION DATE: NORMAL

## 2019-12-21 PROCEDURE — 36430 TRANSFUSION BLD/BLD COMPNT: CPT

## 2019-12-21 PROCEDURE — P9016 RBC LEUKOCYTES REDUCED: HCPCS

## 2019-12-21 PROCEDURE — 86900 BLOOD TYPING SEROLOGIC ABO: CPT

## 2019-12-21 PROCEDURE — 63710000001 DIPHENHYDRAMINE PER 50 MG: Performed by: NURSE PRACTITIONER

## 2019-12-21 RX ORDER — SODIUM CHLORIDE 0.9 % (FLUSH) 0.9 %
10 SYRINGE (ML) INJECTION AS NEEDED
Status: DISCONTINUED | OUTPATIENT
Start: 2019-12-21 | End: 2019-12-21 | Stop reason: HOSPADM

## 2019-12-21 RX ORDER — SODIUM CHLORIDE 9 MG/ML
250 INJECTION, SOLUTION INTRAVENOUS AS NEEDED
Status: DISCONTINUED | OUTPATIENT
Start: 2019-12-21 | End: 2019-12-21 | Stop reason: HOSPADM

## 2019-12-21 RX ORDER — DIPHENHYDRAMINE HCL 25 MG
25 CAPSULE ORAL ONCE
Status: COMPLETED | OUTPATIENT
Start: 2019-12-21 | End: 2019-12-21

## 2019-12-21 RX ORDER — SODIUM CHLORIDE 0.9 % (FLUSH) 0.9 %
10 SYRINGE (ML) INJECTION AS NEEDED
Status: CANCELLED | OUTPATIENT
Start: 2019-12-21

## 2019-12-21 RX ADMIN — SODIUM CHLORIDE, PRESERVATIVE FREE 10 ML: 5 INJECTION INTRAVENOUS at 13:28

## 2019-12-21 RX ADMIN — DIPHENHYDRAMINE HYDROCHLORIDE 25 MG: 25 CAPSULE ORAL at 08:08

## 2019-12-21 RX ADMIN — HEPARIN 500 UNITS: 100 SYRINGE at 13:28

## 2020-01-02 ENCOUNTER — APPOINTMENT (OUTPATIENT)
Dept: OTHER | Facility: HOSPITAL | Age: 80
End: 2020-01-02

## 2020-01-02 ENCOUNTER — HOSPITAL ENCOUNTER (OUTPATIENT)
Dept: GENERAL RADIOLOGY | Facility: HOSPITAL | Age: 80
Discharge: HOME | End: 2020-01-02
Admitting: INTERNAL MEDICINE

## 2020-01-02 ENCOUNTER — OFFICE VISIT (OUTPATIENT)
Dept: ONCOLOGY | Facility: CLINIC | Age: 80
End: 2020-01-02

## 2020-01-02 ENCOUNTER — INFUSION (OUTPATIENT)
Dept: ONCOLOGY | Facility: HOSPITAL | Age: 80
End: 2020-01-02

## 2020-01-02 ENCOUNTER — LAB (OUTPATIENT)
Dept: OTHER | Facility: HOSPITAL | Age: 80
End: 2020-01-02

## 2020-01-02 ENCOUNTER — APPOINTMENT (OUTPATIENT)
Dept: ONCOLOGY | Facility: CLINIC | Age: 80
End: 2020-01-02

## 2020-01-02 ENCOUNTER — TRANSCRIBE ORDERS (OUTPATIENT)
Dept: ADMINISTRATIVE | Facility: HOSPITAL | Age: 80
End: 2020-01-02

## 2020-01-02 ENCOUNTER — APPOINTMENT (OUTPATIENT)
Dept: ONCOLOGY | Facility: HOSPITAL | Age: 80
End: 2020-01-02

## 2020-01-02 VITALS
OXYGEN SATURATION: 98 % | HEART RATE: 58 BPM | RESPIRATION RATE: 16 BRPM | SYSTOLIC BLOOD PRESSURE: 126 MMHG | BODY MASS INDEX: 25.74 KG/M2 | HEIGHT: 67 IN | WEIGHT: 164 LBS | TEMPERATURE: 98 F | DIASTOLIC BLOOD PRESSURE: 49 MMHG

## 2020-01-02 DIAGNOSIS — D63.1 ANEMIA OF CHRONIC RENAL FAILURE, STAGE 4 (SEVERE) (HCC): Primary | ICD-10-CM

## 2020-01-02 DIAGNOSIS — N18.4 ANEMIA DUE TO STAGE 4 CHRONIC KIDNEY DISEASE (HCC): Primary | ICD-10-CM

## 2020-01-02 DIAGNOSIS — N18.4 ANEMIA OF CHRONIC RENAL FAILURE, STAGE 4 (SEVERE) (HCC): Primary | ICD-10-CM

## 2020-01-02 DIAGNOSIS — M25.512 LEFT SHOULDER PAIN, UNSPECIFIED CHRONICITY: Primary | ICD-10-CM

## 2020-01-02 DIAGNOSIS — M25.512 LEFT SHOULDER PAIN, UNSPECIFIED CHRONICITY: ICD-10-CM

## 2020-01-02 DIAGNOSIS — D63.1 ANEMIA DUE TO STAGE 4 CHRONIC KIDNEY DISEASE (HCC): Primary | ICD-10-CM

## 2020-01-02 DIAGNOSIS — N18.4 ANEMIA OF CHRONIC RENAL FAILURE, STAGE 4 (SEVERE) (HCC): ICD-10-CM

## 2020-01-02 DIAGNOSIS — D63.1 ANEMIA OF CHRONIC RENAL FAILURE, STAGE 4 (SEVERE) (HCC): ICD-10-CM

## 2020-01-02 LAB
BASOPHILS # BLD AUTO: 0.02 10*3/MM3 (ref 0–0.2)
BASOPHILS NFR BLD AUTO: 0.4 % (ref 0–1.5)
DEPRECATED RDW RBC AUTO: 59.3 FL (ref 37–54)
EOSINOPHIL # BLD AUTO: 0.08 10*3/MM3 (ref 0–0.4)
EOSINOPHIL NFR BLD AUTO: 1.6 % (ref 0.3–6.2)
ERYTHROCYTE [DISTWIDTH] IN BLOOD BY AUTOMATED COUNT: 16.7 % (ref 12.3–15.4)
HCT VFR BLD AUTO: 31.2 % (ref 34–46.6)
HGB BLD-MCNC: 9.9 G/DL (ref 12–15.9)
IMM GRANULOCYTES # BLD AUTO: 0.02 10*3/MM3 (ref 0–0.05)
IMM GRANULOCYTES NFR BLD AUTO: 0.4 % (ref 0–0.5)
LYMPHOCYTES # BLD AUTO: 0.64 10*3/MM3 (ref 0.7–3.1)
LYMPHOCYTES NFR BLD AUTO: 12.6 % (ref 19.6–45.3)
MCH RBC QN AUTO: 30.7 PG (ref 26.6–33)
MCHC RBC AUTO-ENTMCNC: 31.7 G/DL (ref 31.5–35.7)
MCV RBC AUTO: 96.6 FL (ref 79–97)
MONOCYTES # BLD AUTO: 0.49 10*3/MM3 (ref 0.1–0.9)
MONOCYTES NFR BLD AUTO: 9.7 % (ref 5–12)
NEUTROPHILS # BLD AUTO: 3.81 10*3/MM3 (ref 1.7–7)
NEUTROPHILS NFR BLD AUTO: 75.3 % (ref 42.7–76)
NRBC BLD AUTO-RTO: 0 /100 WBC (ref 0–0.2)
PLATELET # BLD AUTO: 152 10*3/MM3 (ref 140–450)
PMV BLD AUTO: 10.3 FL (ref 6–12)
RBC # BLD AUTO: 3.23 10*6/MM3 (ref 3.77–5.28)
WBC NRBC COR # BLD: 5.06 10*3/MM3 (ref 3.4–10.8)

## 2020-01-02 PROCEDURE — 96372 THER/PROPH/DIAG INJ SC/IM: CPT | Performed by: INTERNAL MEDICINE

## 2020-01-02 PROCEDURE — 99214 OFFICE O/P EST MOD 30 MIN: CPT | Performed by: INTERNAL MEDICINE

## 2020-01-02 PROCEDURE — 25010000002 EPOETIN ALFA PER 1000 UNITS: Performed by: INTERNAL MEDICINE

## 2020-01-02 PROCEDURE — 73030 X-RAY EXAM OF SHOULDER: CPT

## 2020-01-02 PROCEDURE — 85025 COMPLETE CBC W/AUTO DIFF WBC: CPT | Performed by: NURSE PRACTITIONER

## 2020-01-02 PROCEDURE — 36415 COLL VENOUS BLD VENIPUNCTURE: CPT

## 2020-01-02 RX ADMIN — ERYTHROPOIETIN 33000 UNITS: 20000 INJECTION, SOLUTION INTRAVENOUS; SUBCUTANEOUS at 12:21

## 2020-01-03 RX ORDER — CARVEDILOL 12.5 MG/1
TABLET ORAL
Qty: 90 TABLET | Refills: 1 | Status: SHIPPED | OUTPATIENT
Start: 2020-01-03 | End: 2020-02-12

## 2020-01-16 ENCOUNTER — INFUSION (OUTPATIENT)
Dept: ONCOLOGY | Facility: HOSPITAL | Age: 80
End: 2020-01-16

## 2020-01-16 ENCOUNTER — LAB (OUTPATIENT)
Dept: OTHER | Facility: HOSPITAL | Age: 80
End: 2020-01-16

## 2020-01-16 VITALS
SYSTOLIC BLOOD PRESSURE: 128 MMHG | OXYGEN SATURATION: 99 % | DIASTOLIC BLOOD PRESSURE: 65 MMHG | HEART RATE: 84 BPM | WEIGHT: 166.8 LBS | BODY MASS INDEX: 26.12 KG/M2 | TEMPERATURE: 98 F

## 2020-01-16 DIAGNOSIS — D63.1 ANEMIA OF CHRONIC RENAL FAILURE, STAGE 4 (SEVERE) (HCC): Primary | ICD-10-CM

## 2020-01-16 DIAGNOSIS — IMO0001 ADVERSE EFFECT OF IRON OR ITS COMPOUND, SUBSEQUENT ENCOUNTER: ICD-10-CM

## 2020-01-16 DIAGNOSIS — N18.4 STAGE 4 CHRONIC KIDNEY DISEASE (HCC): ICD-10-CM

## 2020-01-16 DIAGNOSIS — N18.4 ANEMIA DUE TO STAGE 4 CHRONIC KIDNEY DISEASE (HCC): ICD-10-CM

## 2020-01-16 DIAGNOSIS — D50.8 OTHER IRON DEFICIENCY ANEMIA: ICD-10-CM

## 2020-01-16 DIAGNOSIS — K31.819 GAVE (GASTRIC ANTRAL VASCULAR ECTASIA): Primary | ICD-10-CM

## 2020-01-16 DIAGNOSIS — N18.4 ANEMIA OF CHRONIC RENAL FAILURE, STAGE 4 (SEVERE) (HCC): Primary | ICD-10-CM

## 2020-01-16 DIAGNOSIS — E53.8 B12 DEFICIENCY: ICD-10-CM

## 2020-01-16 DIAGNOSIS — D63.1 ANEMIA DUE TO STAGE 4 CHRONIC KIDNEY DISEASE (HCC): ICD-10-CM

## 2020-01-16 LAB
ABO GROUP BLD: NORMAL
BASOPHILS # BLD AUTO: 0.02 10*3/MM3 (ref 0–0.2)
BASOPHILS NFR BLD AUTO: 0.4 % (ref 0–1.5)
BLD GP AB SCN SERPL QL: NEGATIVE
DEPRECATED RDW RBC AUTO: 61.4 FL (ref 37–54)
EOSINOPHIL # BLD AUTO: 0.09 10*3/MM3 (ref 0–0.4)
EOSINOPHIL NFR BLD AUTO: 2 % (ref 0.3–6.2)
ERYTHROCYTE [DISTWIDTH] IN BLOOD BY AUTOMATED COUNT: 17.1 % (ref 12.3–15.4)
FERRITIN SERPL-MCNC: 66 NG/ML (ref 13–150)
HCT VFR BLD AUTO: 23.8 % (ref 34–46.6)
HGB BLD-MCNC: 7.5 G/DL (ref 12–15.9)
IMM GRANULOCYTES # BLD AUTO: 0.05 10*3/MM3 (ref 0–0.05)
IMM GRANULOCYTES NFR BLD AUTO: 1.1 % (ref 0–0.5)
IRON 24H UR-MRATE: 56 MCG/DL (ref 37–145)
IRON SATN MFR SERPL: 21 % (ref 20–50)
LYMPHOCYTES # BLD AUTO: 0.49 10*3/MM3 (ref 0.7–3.1)
LYMPHOCYTES NFR BLD AUTO: 11 % (ref 19.6–45.3)
MCH RBC QN AUTO: 30.9 PG (ref 26.6–33)
MCHC RBC AUTO-ENTMCNC: 31.5 G/DL (ref 31.5–35.7)
MCV RBC AUTO: 97.9 FL (ref 79–97)
MONOCYTES # BLD AUTO: 0.31 10*3/MM3 (ref 0.1–0.9)
MONOCYTES NFR BLD AUTO: 7 % (ref 5–12)
NEUTROPHILS # BLD AUTO: 3.49 10*3/MM3 (ref 1.7–7)
NEUTROPHILS NFR BLD AUTO: 78.5 % (ref 42.7–76)
NRBC BLD AUTO-RTO: 0 /100 WBC (ref 0–0.2)
PLATELET # BLD AUTO: 163 10*3/MM3 (ref 140–450)
PMV BLD AUTO: 10.1 FL (ref 6–12)
RBC # BLD AUTO: 2.43 10*6/MM3 (ref 3.77–5.28)
RH BLD: NEGATIVE
T&S EXPIRATION DATE: NORMAL
TIBC SERPL-MCNC: 265 MCG/DL (ref 298–536)
TRANSFERRIN SERPL-MCNC: 178 MG/DL (ref 200–360)
WBC NRBC COR # BLD: 4.45 10*3/MM3 (ref 3.4–10.8)

## 2020-01-16 PROCEDURE — 96372 THER/PROPH/DIAG INJ SC/IM: CPT

## 2020-01-16 PROCEDURE — 86923 COMPATIBILITY TEST ELECTRIC: CPT

## 2020-01-16 PROCEDURE — 85025 COMPLETE CBC W/AUTO DIFF WBC: CPT | Performed by: INTERNAL MEDICINE

## 2020-01-16 PROCEDURE — 36415 COLL VENOUS BLD VENIPUNCTURE: CPT

## 2020-01-16 PROCEDURE — 86901 BLOOD TYPING SEROLOGIC RH(D): CPT | Performed by: INTERNAL MEDICINE

## 2020-01-16 PROCEDURE — 25010000002 EPOETIN ALFA PER 1000 UNITS: Performed by: INTERNAL MEDICINE

## 2020-01-16 PROCEDURE — 86900 BLOOD TYPING SEROLOGIC ABO: CPT | Performed by: INTERNAL MEDICINE

## 2020-01-16 PROCEDURE — 82728 ASSAY OF FERRITIN: CPT | Performed by: INTERNAL MEDICINE

## 2020-01-16 PROCEDURE — 25010000002 CYANOCOBALAMIN PER 1000 MCG: Performed by: INTERNAL MEDICINE

## 2020-01-16 PROCEDURE — 83540 ASSAY OF IRON: CPT | Performed by: INTERNAL MEDICINE

## 2020-01-16 PROCEDURE — 86850 RBC ANTIBODY SCREEN: CPT | Performed by: INTERNAL MEDICINE

## 2020-01-16 PROCEDURE — 84466 ASSAY OF TRANSFERRIN: CPT | Performed by: INTERNAL MEDICINE

## 2020-01-16 RX ORDER — SODIUM CHLORIDE 9 MG/ML
250 INJECTION, SOLUTION INTRAVENOUS AS NEEDED
Status: CANCELLED | OUTPATIENT
Start: 2020-01-18

## 2020-01-16 RX ORDER — DIPHENHYDRAMINE HCL 25 MG
25 CAPSULE ORAL ONCE
Status: CANCELLED | OUTPATIENT
Start: 2020-01-16 | End: 2020-01-16

## 2020-01-16 RX ORDER — CYANOCOBALAMIN 1000 UG/ML
1000 INJECTION, SOLUTION INTRAMUSCULAR; SUBCUTANEOUS
Status: DISCONTINUED | OUTPATIENT
Start: 2020-01-16 | End: 2020-01-16 | Stop reason: HOSPADM

## 2020-01-16 RX ORDER — ACETAMINOPHEN 325 MG/1
650 TABLET ORAL ONCE
Status: CANCELLED | OUTPATIENT
Start: 2020-01-18 | End: 2020-01-16

## 2020-01-16 RX ADMIN — ERYTHROPOIETIN 33000 UNITS: 20000 INJECTION, SOLUTION INTRAVENOUS; SUBCUTANEOUS at 11:18

## 2020-01-16 RX ADMIN — CYANOCOBALAMIN 1000 MCG: 1000 INJECTION, SOLUTION INTRAMUSCULAR at 11:18

## 2020-01-16 NOTE — NURSING NOTE
Patient arrived for CBC check and procrit. HGB was 7.5 today reviewed with DR Code and patient was set up for 2 units of blood on Saturday. Also called Dr Roach office and they will set patient up for EGD and notify her of appointment time.  Lab Results   Component Value Date    WBC 4.45 01/16/2020    HGB 7.5 (L) 01/16/2020    HCT 23.8 (L) 01/16/2020    MCV 97.9 (H) 01/16/2020     01/16/2020

## 2020-01-16 NOTE — PROGRESS NOTES
Order entered for 2 units PRBCs with referral to [facility] for transfusion, pre-medications of Benadryl 25 mg and Tylenol 650 mg PO per protocol,  Procrit to be scheduled for weekly, instead of every 2 weeks Per communication from ISMA Perez RN, ELADIO Mauro Code

## 2020-01-18 ENCOUNTER — INFUSION (OUTPATIENT)
Dept: ONCOLOGY | Facility: HOSPITAL | Age: 80
End: 2020-01-18

## 2020-01-18 VITALS
SYSTOLIC BLOOD PRESSURE: 123 MMHG | RESPIRATION RATE: 16 BRPM | HEART RATE: 61 BPM | TEMPERATURE: 97.1 F | OXYGEN SATURATION: 100 % | DIASTOLIC BLOOD PRESSURE: 53 MMHG

## 2020-01-18 DIAGNOSIS — Z45.2 ENCOUNTER FOR FITTING AND ADJUSTMENT OF VASCULAR CATHETER: ICD-10-CM

## 2020-01-18 DIAGNOSIS — D63.1 ANEMIA OF CHRONIC RENAL FAILURE, STAGE 4 (SEVERE) (HCC): Primary | ICD-10-CM

## 2020-01-18 DIAGNOSIS — N18.4 ANEMIA OF CHRONIC RENAL FAILURE, STAGE 4 (SEVERE) (HCC): Primary | ICD-10-CM

## 2020-01-18 DIAGNOSIS — N18.4 STAGE 4 CHRONIC KIDNEY DISEASE (HCC): ICD-10-CM

## 2020-01-18 PROCEDURE — P9016 RBC LEUKOCYTES REDUCED: HCPCS

## 2020-01-18 PROCEDURE — 63710000001 DIPHENHYDRAMINE PER 50 MG: Performed by: INTERNAL MEDICINE

## 2020-01-18 PROCEDURE — 36430 TRANSFUSION BLD/BLD COMPNT: CPT

## 2020-01-18 PROCEDURE — 86900 BLOOD TYPING SEROLOGIC ABO: CPT

## 2020-01-18 RX ORDER — HEPARIN SODIUM (PORCINE) LOCK FLUSH IV SOLN 100 UNIT/ML 100 UNIT/ML
500 SOLUTION INTRAVENOUS AS NEEDED
Status: CANCELLED | OUTPATIENT
Start: 2020-01-18

## 2020-01-18 RX ORDER — DIPHENHYDRAMINE HCL 25 MG
25 CAPSULE ORAL ONCE
Status: COMPLETED | OUTPATIENT
Start: 2020-01-18 | End: 2020-01-18

## 2020-01-18 RX ORDER — ACETAMINOPHEN 325 MG/1
650 TABLET ORAL ONCE
Status: COMPLETED | OUTPATIENT
Start: 2020-01-18 | End: 2020-01-18

## 2020-01-18 RX ORDER — SODIUM CHLORIDE 0.9 % (FLUSH) 0.9 %
10 SYRINGE (ML) INJECTION AS NEEDED
Status: ACTIVE | OUTPATIENT
Start: 2020-01-18

## 2020-01-18 RX ORDER — SODIUM CHLORIDE 0.9 % (FLUSH) 0.9 %
10 SYRINGE (ML) INJECTION AS NEEDED
Status: CANCELLED | OUTPATIENT
Start: 2020-01-18

## 2020-01-18 RX ORDER — SODIUM CHLORIDE 9 MG/ML
250 INJECTION, SOLUTION INTRAVENOUS AS NEEDED
Status: ACTIVE | OUTPATIENT
Start: 2020-01-18

## 2020-01-18 RX ADMIN — Medication 500 UNITS: at 13:59

## 2020-01-18 RX ADMIN — ACETAMINOPHEN 650 MG: 325 TABLET, FILM COATED ORAL at 08:27

## 2020-01-18 RX ADMIN — DIPHENHYDRAMINE HYDROCHLORIDE 25 MG: 25 CAPSULE ORAL at 08:42

## 2020-01-18 RX ADMIN — SODIUM CHLORIDE, PRESERVATIVE FREE 10 ML: 5 INJECTION INTRAVENOUS at 13:59

## 2020-01-20 ENCOUNTER — TELEPHONE (OUTPATIENT)
Dept: GASTROENTEROLOGY | Facility: CLINIC | Age: 80
End: 2020-01-20

## 2020-01-20 NOTE — TELEPHONE ENCOUNTER
----- Message from Juan Antonio Denney MA sent at 1/17/2020 12:28 PM EST -----      ----- Message -----  From: Anuel Roach MD  Sent: 1/16/2020   5:46 PM EST  To: Juan Antonio Denney MA    She was just short of her 4 month f/u so no dont need appt I had said in last note OK to repeat her EGD w APC anytime her transfusion requirements increased so I put in orders for  EGD w APC / BLOU / GAVE-

## 2020-01-20 NOTE — TELEPHONE ENCOUNTER
Patient called stating was told there was order for EGD.  Wants to schedule now.  Scheduled at [facility] 01/28/2020 at 3pm - arrive 2pm.  E-mail instructions [email or web address] today.

## 2020-01-23 ENCOUNTER — LAB (OUTPATIENT)
Dept: OTHER | Facility: HOSPITAL | Age: 80
End: 2020-01-23

## 2020-01-23 ENCOUNTER — INFUSION (OUTPATIENT)
Dept: ONCOLOGY | Facility: HOSPITAL | Age: 80
End: 2020-01-23

## 2020-01-23 VITALS
OXYGEN SATURATION: 95 % | RESPIRATION RATE: 16 BRPM | BODY MASS INDEX: 26.31 KG/M2 | DIASTOLIC BLOOD PRESSURE: 62 MMHG | TEMPERATURE: 97.9 F | WEIGHT: 168 LBS | SYSTOLIC BLOOD PRESSURE: 132 MMHG | HEART RATE: 86 BPM

## 2020-01-23 DIAGNOSIS — N18.4 ANEMIA DUE TO STAGE 4 CHRONIC KIDNEY DISEASE (HCC): ICD-10-CM

## 2020-01-23 DIAGNOSIS — D63.1 ANEMIA DUE TO STAGE 4 CHRONIC KIDNEY DISEASE (HCC): ICD-10-CM

## 2020-01-23 DIAGNOSIS — D63.1 ANEMIA OF CHRONIC RENAL FAILURE, STAGE 4 (SEVERE) (HCC): Primary | ICD-10-CM

## 2020-01-23 DIAGNOSIS — N18.4 ANEMIA OF CHRONIC RENAL FAILURE, STAGE 4 (SEVERE) (HCC): Primary | ICD-10-CM

## 2020-01-23 LAB
BASOPHILS # BLD AUTO: 0.02 10*3/MM3 (ref 0–0.2)
BASOPHILS NFR BLD AUTO: 0.3 % (ref 0–1.5)
DEPRECATED RDW RBC AUTO: 56 FL (ref 37–54)
EOSINOPHIL # BLD AUTO: 0.13 10*3/MM3 (ref 0–0.4)
EOSINOPHIL NFR BLD AUTO: 2.1 % (ref 0.3–6.2)
ERYTHROCYTE [DISTWIDTH] IN BLOOD BY AUTOMATED COUNT: 16 % (ref 12.3–15.4)
HCT VFR BLD AUTO: 30.6 % (ref 34–46.6)
HGB BLD-MCNC: 9.8 G/DL (ref 12–15.9)
IMM GRANULOCYTES # BLD AUTO: 0.04 10*3/MM3 (ref 0–0.05)
IMM GRANULOCYTES NFR BLD AUTO: 0.7 % (ref 0–0.5)
LYMPHOCYTES # BLD AUTO: 0.62 10*3/MM3 (ref 0.7–3.1)
LYMPHOCYTES NFR BLD AUTO: 10.2 % (ref 19.6–45.3)
MCH RBC QN AUTO: 31.2 PG (ref 26.6–33)
MCHC RBC AUTO-ENTMCNC: 32 G/DL (ref 31.5–35.7)
MCV RBC AUTO: 97.5 FL (ref 79–97)
MONOCYTES # BLD AUTO: 0.42 10*3/MM3 (ref 0.1–0.9)
MONOCYTES NFR BLD AUTO: 6.9 % (ref 5–12)
NEUTROPHILS # BLD AUTO: 4.87 10*3/MM3 (ref 1.7–7)
NEUTROPHILS NFR BLD AUTO: 79.8 % (ref 42.7–76)
NRBC BLD AUTO-RTO: 0 /100 WBC (ref 0–0.2)
PLATELET # BLD AUTO: 176 10*3/MM3 (ref 140–450)
PMV BLD AUTO: 9.6 FL (ref 6–12)
RBC # BLD AUTO: 3.14 10*6/MM3 (ref 3.77–5.28)
WBC NRBC COR # BLD: 6.1 10*3/MM3 (ref 3.4–10.8)

## 2020-01-23 PROCEDURE — 85025 COMPLETE CBC W/AUTO DIFF WBC: CPT | Performed by: INTERNAL MEDICINE

## 2020-01-23 PROCEDURE — 96372 THER/PROPH/DIAG INJ SC/IM: CPT

## 2020-01-23 PROCEDURE — 25010000002 EPOETIN ALFA PER 1000 UNITS: Performed by: NURSE PRACTITIONER

## 2020-01-23 PROCEDURE — 36415 COLL VENOUS BLD VENIPUNCTURE: CPT

## 2020-01-23 RX ADMIN — ERYTHROPOIETIN 33000 UNITS: 20000 INJECTION, SOLUTION INTRAVENOUS; SUBCUTANEOUS at 11:05

## 2020-01-23 NOTE — NURSING NOTE
Pt presents for procrit and asked that I take a look at her lower legs.  They appeared very red and swollen and she had been to urgent care where they told her she had cellulitis. She was put on a topical cream but pt unsure of what it is.  Reviewed with MEL Paula with v/o to have patient contact her PCP for further direction.  She v/u.

## 2020-01-28 ENCOUNTER — ANESTHESIA (OUTPATIENT)
Dept: GASTROENTEROLOGY | Facility: HOSPITAL | Age: 80
End: 2020-01-28

## 2020-01-28 ENCOUNTER — ANESTHESIA EVENT (OUTPATIENT)
Dept: GASTROENTEROLOGY | Facility: HOSPITAL | Age: 80
End: 2020-01-28

## 2020-01-28 ENCOUNTER — HOSPITAL ENCOUNTER (OUTPATIENT)
Facility: HOSPITAL | Age: 80
Setting detail: HOSPITAL OUTPATIENT SURGERY
Discharge: HOME | End: 2020-01-28
Attending: INTERNAL MEDICINE | Admitting: INTERNAL MEDICINE

## 2020-01-28 VITALS
HEIGHT: 67 IN | HEART RATE: 64 BPM | TEMPERATURE: 98.1 F | DIASTOLIC BLOOD PRESSURE: 52 MMHG | BODY MASS INDEX: 26.37 KG/M2 | WEIGHT: 168 LBS | OXYGEN SATURATION: 97 % | SYSTOLIC BLOOD PRESSURE: 117 MMHG | RESPIRATION RATE: 16 BRPM

## 2020-01-28 DIAGNOSIS — K31.819 GAVE (GASTRIC ANTRAL VASCULAR ECTASIA): ICD-10-CM

## 2020-01-28 DIAGNOSIS — T18.2XXA GASTRIC BEZOAR, INITIAL ENCOUNTER: Primary | ICD-10-CM

## 2020-01-28 PROCEDURE — 43235 EGD DIAGNOSTIC BRUSH WASH: CPT | Performed by: INTERNAL MEDICINE

## 2020-01-28 PROCEDURE — 25010000002 PROPOFOL 10 MG/ML EMULSION: Performed by: ANESTHESIOLOGY

## 2020-01-28 RX ORDER — PROPOFOL 10 MG/ML
VIAL (ML) INTRAVENOUS CONTINUOUS PRN
Status: DISCONTINUED | OUTPATIENT
Start: 2020-01-28 | End: 2020-01-28 | Stop reason: SURG

## 2020-01-28 RX ORDER — PROPOFOL 10 MG/ML
VIAL (ML) INTRAVENOUS AS NEEDED
Status: DISCONTINUED | OUTPATIENT
Start: 2020-01-28 | End: 2020-01-28 | Stop reason: SURG

## 2020-01-28 RX ORDER — SODIUM CHLORIDE 9 MG/ML
1000 INJECTION, SOLUTION INTRAVENOUS CONTINUOUS
Status: DISCONTINUED | OUTPATIENT
Start: 2020-01-28 | End: 2020-01-28 | Stop reason: HOSPADM

## 2020-01-28 RX ORDER — SODIUM CHLORIDE 0.9 % (FLUSH) 0.9 %
10 SYRINGE (ML) INJECTION AS NEEDED
Status: DISCONTINUED | OUTPATIENT
Start: 2020-01-28 | End: 2020-01-28 | Stop reason: HOSPADM

## 2020-01-28 RX ORDER — LIDOCAINE HYDROCHLORIDE 20 MG/ML
INJECTION, SOLUTION INFILTRATION; PERINEURAL AS NEEDED
Status: DISCONTINUED | OUTPATIENT
Start: 2020-01-28 | End: 2020-01-28 | Stop reason: SURG

## 2020-01-28 RX ORDER — SODIUM CHLORIDE, SODIUM LACTATE, POTASSIUM CHLORIDE, CALCIUM CHLORIDE 600; 310; 30; 20 MG/100ML; MG/100ML; MG/100ML; MG/100ML
1000 INJECTION, SOLUTION INTRAVENOUS CONTINUOUS
Status: DISCONTINUED | OUTPATIENT
Start: 2020-01-28 | End: 2020-01-28 | Stop reason: HOSPADM

## 2020-01-28 RX ADMIN — SODIUM CHLORIDE 1000 ML: 9 INJECTION, SOLUTION INTRAVENOUS at 13:21

## 2020-01-28 RX ADMIN — LIDOCAINE HYDROCHLORIDE 100 MG: 20 INJECTION, SOLUTION INFILTRATION; PERINEURAL at 13:37

## 2020-01-28 RX ADMIN — PROPOFOL 100 MG: 10 INJECTION, EMULSION INTRAVENOUS at 13:36

## 2020-01-28 RX ADMIN — PROPOFOL 100 MCG/KG/MIN: 10 INJECTION, EMULSION INTRAVENOUS at 13:36

## 2020-01-28 NOTE — ANESTHESIA POSTPROCEDURE EVALUATION
"Patient: Charmaine Machuca    Procedure Summary     Date:  01/28/20 Room / Location:   [facility]    Anesthesia Start:  1331 Anesthesia Stop:  1348    Procedure:  ESOPHAGOGASTRODUODENOSCOPY with APC (N/A Esophagus) Diagnosis:       GAVE (gastric antral vascular ectasia)      Gastric bezoar      (GAVE (gastric antral vascular ectasia) [K31.819])    Surgeon:  Anuel Roach MD Provider:  Chilo Rajput MD    Anesthesia Type:  MAC ASA Status:  4          Anesthesia Type: MAC    Vitals  Vitals Value Taken Time   /57 1/28/2020  1:57 PM   Temp     Pulse 69 1/28/2020  1:57 PM   Resp     SpO2 96 % 1/28/2020  1:57 PM           Post Anesthesia Care and Evaluation    Patient location during evaluation: bedside  Patient participation: complete - patient participated  Level of consciousness: awake and alert  Pain management: adequate  Airway patency: patent  Anesthetic complications: No anesthetic complications    Cardiovascular status: acceptable  Respiratory status: acceptable  Hydration status: acceptable    Comments: /57 (BP Location: Left arm, Patient Position: Lying)   Pulse 69   Temp 36.7 °C (98.1 °F) (Oral)   Resp 16   Ht 170.2 cm (67\")   Wt 76.2 kg (168 lb)   SpO2 96%   BMI 26.31 kg/m²       "

## 2020-01-28 NOTE — ANESTHESIA PREPROCEDURE EVALUATION
Anesthesia Evaluation     Patient summary reviewed and Nursing notes reviewed   no history of anesthetic complications:               Airway   Mallampati: II  TM distance: >3 FB  Neck ROM: full  no difficulty expected  Dental - normal exam   (+) poor dentition    Pulmonary     breath sounds clear to auscultation  (+) shortness of breath,   (-) sleep apnea, decreased breath sounds, wheezes  Cardiovascular - normal exam  Exercise tolerance: good (4-7 METS)    Rhythm: regular  Rate: normal    (+) pacemaker ICD interrogated 3-6 months ago, hypertension, valvular problems/murmurs MR, CAD, dysrhythmias, CHF , MCALLISTER, hyperlipidemia,   (-) past MI, angina, orthopnea, PND, PVD      Neuro/Psych  (+) CVA, dizziness/light headedness, numbness, psychiatric history,     (-) seizures, neuromuscular disease, TIA, weakness  GI/Hepatic/Renal/Endo    (+)  hiatal hernia, GERD, GI bleeding upper , renal disease CRI,   (-) liver disease, diabetes    Musculoskeletal (-) negative ROS    Abdominal  - normal exam   Substance History - negative use  (-) alcohol use, drug use     OB/GYN negative ob/gyn ROS         Other - negative ROS                       Anesthesia Plan    ASA 4     MAC     intravenous induction     Anesthetic plan, all risks, benefits, and alternatives have been provided, discussed and informed consent has been obtained with: patient.

## 2020-01-30 ENCOUNTER — LAB (OUTPATIENT)
Dept: OTHER | Facility: HOSPITAL | Age: 80
End: 2020-01-30

## 2020-01-30 ENCOUNTER — INFUSION (OUTPATIENT)
Dept: ONCOLOGY | Facility: HOSPITAL | Age: 80
End: 2020-01-30

## 2020-01-30 VITALS
BODY MASS INDEX: 26.16 KG/M2 | OXYGEN SATURATION: 98 % | RESPIRATION RATE: 14 BRPM | DIASTOLIC BLOOD PRESSURE: 54 MMHG | SYSTOLIC BLOOD PRESSURE: 138 MMHG | TEMPERATURE: 98.2 F | HEART RATE: 83 BPM | WEIGHT: 167 LBS

## 2020-01-30 DIAGNOSIS — N18.4 ANEMIA DUE TO STAGE 4 CHRONIC KIDNEY DISEASE (HCC): ICD-10-CM

## 2020-01-30 DIAGNOSIS — N18.4 ANEMIA OF CHRONIC RENAL FAILURE, STAGE 4 (SEVERE) (HCC): Primary | ICD-10-CM

## 2020-01-30 DIAGNOSIS — D63.1 ANEMIA DUE TO STAGE 4 CHRONIC KIDNEY DISEASE (HCC): ICD-10-CM

## 2020-01-30 DIAGNOSIS — D63.1 ANEMIA OF CHRONIC RENAL FAILURE, STAGE 4 (SEVERE) (HCC): Primary | ICD-10-CM

## 2020-01-30 LAB
BASOPHILS # BLD AUTO: 0.01 10*3/MM3 (ref 0–0.2)
BASOPHILS NFR BLD AUTO: 0.2 % (ref 0–1.5)
DEPRECATED RDW RBC AUTO: 59.2 FL (ref 37–54)
EOSINOPHIL # BLD AUTO: 0.14 10*3/MM3 (ref 0–0.4)
EOSINOPHIL NFR BLD AUTO: 2.8 % (ref 0.3–6.2)
ERYTHROCYTE [DISTWIDTH] IN BLOOD BY AUTOMATED COUNT: 16.1 % (ref 12.3–15.4)
HCT VFR BLD AUTO: 30 % (ref 34–46.6)
HGB BLD-MCNC: 9.3 G/DL (ref 12–15.9)
IMM GRANULOCYTES # BLD AUTO: 0.09 10*3/MM3 (ref 0–0.05)
IMM GRANULOCYTES NFR BLD AUTO: 1.8 % (ref 0–0.5)
LYMPHOCYTES # BLD AUTO: 0.5 10*3/MM3 (ref 0.7–3.1)
LYMPHOCYTES NFR BLD AUTO: 10 % (ref 19.6–45.3)
MCH RBC QN AUTO: 30.9 PG (ref 26.6–33)
MCHC RBC AUTO-ENTMCNC: 31 G/DL (ref 31.5–35.7)
MCV RBC AUTO: 99.7 FL (ref 79–97)
MONOCYTES # BLD AUTO: 0.36 10*3/MM3 (ref 0.1–0.9)
MONOCYTES NFR BLD AUTO: 7.2 % (ref 5–12)
NEUTROPHILS # BLD AUTO: 3.89 10*3/MM3 (ref 1.7–7)
NEUTROPHILS NFR BLD AUTO: 78 % (ref 42.7–76)
NRBC BLD AUTO-RTO: 0 /100 WBC (ref 0–0.2)
PLATELET # BLD AUTO: 185 10*3/MM3 (ref 140–450)
PMV BLD AUTO: 9.9 FL (ref 6–12)
RBC # BLD AUTO: 3.01 10*6/MM3 (ref 3.77–5.28)
WBC NRBC COR # BLD: 4.99 10*3/MM3 (ref 3.4–10.8)

## 2020-01-30 PROCEDURE — 25010000002 EPOETIN ALFA PER 1000 UNITS: Performed by: NURSE PRACTITIONER

## 2020-01-30 PROCEDURE — 85025 COMPLETE CBC W/AUTO DIFF WBC: CPT | Performed by: INTERNAL MEDICINE

## 2020-01-30 PROCEDURE — 36415 COLL VENOUS BLD VENIPUNCTURE: CPT

## 2020-01-30 PROCEDURE — 96372 THER/PROPH/DIAG INJ SC/IM: CPT

## 2020-01-30 RX ADMIN — ERYTHROPOIETIN 33000 UNITS: 20000 INJECTION, SOLUTION INTRAVENOUS; SUBCUTANEOUS at 11:04

## 2020-02-04 ENCOUNTER — HOSPITAL ENCOUNTER (OUTPATIENT)
Dept: GENERAL RADIOLOGY | Facility: HOSPITAL | Age: 80
Discharge: HOME | End: 2020-02-04
Admitting: INTERNAL MEDICINE

## 2020-02-04 ENCOUNTER — HOSPITAL ENCOUNTER (OUTPATIENT)
Dept: GENERAL RADIOLOGY | Facility: HOSPITAL | Age: 80
Discharge: HOME | End: 2020-02-04

## 2020-02-04 DIAGNOSIS — M25.551 RIGHT HIP PAIN: ICD-10-CM

## 2020-02-04 PROCEDURE — 72110 X-RAY EXAM L-2 SPINE 4/>VWS: CPT

## 2020-02-04 PROCEDURE — 73502 X-RAY EXAM HIP UNI 2-3 VIEWS: CPT

## 2020-02-06 ENCOUNTER — INFUSION (OUTPATIENT)
Dept: ONCOLOGY | Facility: HOSPITAL | Age: 80
End: 2020-02-06

## 2020-02-06 VITALS
HEART RATE: 84 BPM | DIASTOLIC BLOOD PRESSURE: 72 MMHG | TEMPERATURE: 97.4 F | BODY MASS INDEX: 26.12 KG/M2 | SYSTOLIC BLOOD PRESSURE: 129 MMHG | WEIGHT: 166.8 LBS | OXYGEN SATURATION: 95 %

## 2020-02-06 DIAGNOSIS — Z45.2 ENCOUNTER FOR FITTING AND ADJUSTMENT OF VASCULAR CATHETER: ICD-10-CM

## 2020-02-06 DIAGNOSIS — D63.1 ANEMIA OF CHRONIC RENAL FAILURE, STAGE 4 (SEVERE) (HCC): ICD-10-CM

## 2020-02-06 DIAGNOSIS — N18.4 ANEMIA OF CHRONIC RENAL FAILURE, STAGE 4 (SEVERE) (HCC): ICD-10-CM

## 2020-02-06 DIAGNOSIS — N18.30 CHRONIC KIDNEY DISEASE (CKD), STAGE III (MODERATE) (HCC): ICD-10-CM

## 2020-02-06 DIAGNOSIS — E27.40 CHRONIC ADRENAL INSUFFICIENCY (HCC): Primary | ICD-10-CM

## 2020-02-06 LAB
25(OH)D3 SERPL-MCNC: 58.9 NG/ML (ref 30–100)
ALBUMIN SERPL-MCNC: 3.5 G/DL (ref 3.5–5.2)
ALBUMIN/GLOB SERPL: 1.3 G/DL
ALP SERPL-CCNC: 59 U/L (ref 39–117)
ALT SERPL W P-5'-P-CCNC: 9 U/L (ref 1–33)
ANION GAP SERPL CALCULATED.3IONS-SCNC: 8.8 MMOL/L (ref 5–15)
AST SERPL-CCNC: 19 U/L (ref 1–32)
BASOPHILS # BLD AUTO: 0.01 10*3/MM3 (ref 0–0.2)
BASOPHILS NFR BLD AUTO: 0.2 % (ref 0–1.5)
BILIRUB SERPL-MCNC: 0.2 MG/DL (ref 0.1–1.2)
BUN BLD-MCNC: 51 MG/DL (ref 8–23)
BUN/CREAT SERPL: 16.6 (ref 7–25)
CALCIUM SPEC-SCNC: 9.7 MG/DL (ref 8.6–10.5)
CHLORIDE SERPL-SCNC: 100 MMOL/L (ref 98–107)
CO2 SERPL-SCNC: 31.2 MMOL/L (ref 22–29)
CREAT BLD-MCNC: 3.08 MG/DL (ref 0.57–1)
DEPRECATED RDW RBC AUTO: 58.1 FL (ref 37–54)
EOSINOPHIL # BLD AUTO: 0.13 10*3/MM3 (ref 0–0.4)
EOSINOPHIL NFR BLD AUTO: 2.4 % (ref 0.3–6.2)
ERYTHROCYTE [DISTWIDTH] IN BLOOD BY AUTOMATED COUNT: 15.6 % (ref 12.3–15.4)
GFR SERPL CREATININE-BSD FRML MDRD: 15 ML/MIN/1.73
GLOBULIN UR ELPH-MCNC: 2.8 GM/DL
GLUCOSE BLD-MCNC: 151 MG/DL (ref 65–99)
HCT VFR BLD AUTO: 28.9 % (ref 34–46.6)
HGB BLD-MCNC: 8.7 G/DL (ref 12–15.9)
IMM GRANULOCYTES # BLD AUTO: 0.02 10*3/MM3 (ref 0–0.05)
IMM GRANULOCYTES NFR BLD AUTO: 0.4 % (ref 0–0.5)
LYMPHOCYTES # BLD AUTO: 0.52 10*3/MM3 (ref 0.7–3.1)
LYMPHOCYTES NFR BLD AUTO: 9.7 % (ref 19.6–45.3)
MCH RBC QN AUTO: 30.6 PG (ref 26.6–33)
MCHC RBC AUTO-ENTMCNC: 30.1 G/DL (ref 31.5–35.7)
MCV RBC AUTO: 101.8 FL (ref 79–97)
MONOCYTES # BLD AUTO: 0.44 10*3/MM3 (ref 0.1–0.9)
MONOCYTES NFR BLD AUTO: 8.2 % (ref 5–12)
NEUTROPHILS # BLD AUTO: 4.22 10*3/MM3 (ref 1.7–7)
NEUTROPHILS NFR BLD AUTO: 79.1 % (ref 42.7–76)
NRBC BLD AUTO-RTO: 0 /100 WBC (ref 0–0.2)
PHOSPHATE SERPL-MCNC: 3.8 MG/DL (ref 2.5–4.5)
PLATELET # BLD AUTO: 181 10*3/MM3 (ref 140–450)
PMV BLD AUTO: 8.8 FL (ref 6–12)
POTASSIUM BLD-SCNC: 4.5 MMOL/L (ref 3.5–5.2)
PROT SERPL-MCNC: 6.3 G/DL (ref 6–8.5)
PTH-INTACT SERPL-MCNC: 133 PG/ML (ref 15–65)
RBC # BLD AUTO: 2.84 10*6/MM3 (ref 3.77–5.28)
SODIUM BLD-SCNC: 140 MMOL/L (ref 136–145)
URATE SERPL-MCNC: 5.3 MG/DL (ref 2.4–5.7)
WBC NRBC COR # BLD: 5.34 10*3/MM3 (ref 3.4–10.8)

## 2020-02-06 PROCEDURE — 84550 ASSAY OF BLOOD/URIC ACID: CPT | Performed by: INTERNAL MEDICINE

## 2020-02-06 PROCEDURE — 80053 COMPREHEN METABOLIC PANEL: CPT | Performed by: INTERNAL MEDICINE

## 2020-02-06 PROCEDURE — 83970 ASSAY OF PARATHORMONE: CPT | Performed by: INTERNAL MEDICINE

## 2020-02-06 PROCEDURE — 82306 VITAMIN D 25 HYDROXY: CPT | Performed by: INTERNAL MEDICINE

## 2020-02-06 PROCEDURE — 84100 ASSAY OF PHOSPHORUS: CPT | Performed by: INTERNAL MEDICINE

## 2020-02-06 PROCEDURE — 96372 THER/PROPH/DIAG INJ SC/IM: CPT

## 2020-02-06 PROCEDURE — 25010000003 HEPARIN LOCK FLUCH PER 10 UNITS: Performed by: INTERNAL MEDICINE

## 2020-02-06 PROCEDURE — 85025 COMPLETE CBC W/AUTO DIFF WBC: CPT | Performed by: NURSE PRACTITIONER

## 2020-02-06 PROCEDURE — 25010000002 EPOETIN ALFA PER 1000 UNITS: Performed by: NURSE PRACTITIONER

## 2020-02-06 RX ORDER — SODIUM CHLORIDE 0.9 % (FLUSH) 0.9 %
10 SYRINGE (ML) INJECTION AS NEEDED
Status: DISCONTINUED | OUTPATIENT
Start: 2020-02-06 | End: 2020-02-06 | Stop reason: HOSPADM

## 2020-02-06 RX ORDER — HEPARIN SODIUM (PORCINE) LOCK FLUSH IV SOLN 100 UNIT/ML 100 UNIT/ML
500 SOLUTION INTRAVENOUS AS NEEDED
Status: CANCELLED | OUTPATIENT
Start: 2020-02-06

## 2020-02-06 RX ORDER — SODIUM CHLORIDE 0.9 % (FLUSH) 0.9 %
10 SYRINGE (ML) INJECTION AS NEEDED
Status: CANCELLED | OUTPATIENT
Start: 2020-02-06

## 2020-02-06 RX ORDER — HEPARIN SODIUM (PORCINE) LOCK FLUSH IV SOLN 100 UNIT/ML 100 UNIT/ML
500 SOLUTION INTRAVENOUS AS NEEDED
Status: DISCONTINUED | OUTPATIENT
Start: 2020-02-06 | End: 2020-02-06 | Stop reason: HOSPADM

## 2020-02-06 RX ADMIN — ERYTHROPOIETIN 40000 UNITS: 40000 INJECTION, SOLUTION INTRAVENOUS; SUBCUTANEOUS at 11:31

## 2020-02-06 RX ADMIN — Medication 500 UNITS: at 10:57

## 2020-02-06 RX ADMIN — SODIUM CHLORIDE, PRESERVATIVE FREE 10 ML: 5 INJECTION INTRAVENOUS at 10:57

## 2020-02-06 NOTE — NURSING NOTE
Lab Results   Component Value Date    WBC 5.34 02/06/2020    HGB 8.7 (L) 02/06/2020    HCT 28.9 (L) 02/06/2020    .8 (H) 02/06/2020     02/06/2020     Procrit given per protocol.  Order obtained from Dr. Abraham's office for lab collection and placed in scan pile for the patient's chart.  Pt denies new complaints and knows to call with concerns.

## 2020-02-11 ENCOUNTER — OFFICE VISIT (OUTPATIENT)
Dept: GASTROENTEROLOGY | Facility: CLINIC | Age: 80
End: 2020-02-11

## 2020-02-11 VITALS — WEIGHT: 166.89 LBS | HEIGHT: 67 IN | BODY MASS INDEX: 26.19 KG/M2

## 2020-02-11 DIAGNOSIS — N18.4 STAGE 4 CHRONIC KIDNEY DISEASE (HCC): ICD-10-CM

## 2020-02-11 DIAGNOSIS — D50.0 IRON DEFICIENCY ANEMIA DUE TO CHRONIC BLOOD LOSS: ICD-10-CM

## 2020-02-11 DIAGNOSIS — K31.819 GAVE (GASTRIC ANTRAL VASCULAR ECTASIA): ICD-10-CM

## 2020-02-11 DIAGNOSIS — K31.84 GASTROPARESIS: Primary | ICD-10-CM

## 2020-02-11 PROCEDURE — 99213 OFFICE O/P EST LOW 20 MIN: CPT | Performed by: INTERNAL MEDICINE

## 2020-02-11 RX ORDER — ERYTHROMYCIN STEARATE 250 MG
125 TABLET ORAL
Qty: 60 TABLET | Refills: 11 | Status: SHIPPED | OUTPATIENT
Start: 2020-02-11 | End: 2020-02-12

## 2020-02-11 NOTE — PROGRESS NOTES
PATIENT INFORMATION  Charmaine Machuca       - 1940    CHIEF COMPLAINT  Chief Complaint   Patient presents with   • Follow-up     4 mo follow up on iron def anemia       HISTORY OF PRESENT ILLNESS  Reviewed records rom BRYANNA and has seen nearly everyone in town but ifound her GAVE so she stuck with me and he last attempt was deferred due to large food bezoar     Reviewed diet changes for gastropares and will give her Emycin as well inhopes of being able to repeat her EGD with APC          REVIEW OF SYSTEMS  Review of Systems   Constitutional: Positive for fatigue.   HENT: Positive for rhinorrhea.    Cardiovascular: Positive for palpitations and leg swelling.   Gastrointestinal: Positive for blood in stool.   Endocrine: Positive for polydipsia.   Musculoskeletal: Positive for arthralgias, back pain, gait problem and joint swelling.   Allergic/Immunologic: Positive for environmental allergies.   Neurological: Positive for dizziness, light-headedness and numbness.   Hematological: Bruises/bleeds easily.   All other systems reviewed and are negative.        ACTIVE PROBLEMS  Patient Active Problem List    Diagnosis   • GAVE (gastric antral vascular ectasia) [K31.819]   • History of recent stroke [Z86.73]   • Symptomatic anemia [D64.9]   • Acute renal failure superimposed on chronic kidney disease (CMS/HCC) [N17.9, N18.9]   • Anemia due to acute blood loss [D62]   • Chronic kidney disease, stage III (moderate) (CMS/HCC)  [N18.3]   • Weakness on left side of face [R29.810]   • B12 deficiency [E53.8]   • Adverse effect of iron or its compound, subsequent encounter [T45.4X5D]   • Encounter for fitting and adjustment of vascular catheter [Z45.2]   • Anemia of chronic disease [D63.8]   • CKD (chronic kidney disease) [N18.9]   • Anemia of chronic renal failure, stage 4 (severe) (CMS/HCC) [N18.4, D63.1]   • Dysuria [R30.0]   • Iron deficiency anemia [D50.9]   • History of anemia due to chronic kidney disease [N18.9,  Z86.2]   • CKD (chronic kidney disease) [N18.9]   • Anemia [D64.9]   • Carpal tunnel syndrome [G56.00]   • Periodic limb movement disorder [G47.61]   • Peripheral neuropathy [G62.9]   • Restless legs syndrome [G25.81]   • Chronic systolic congestive heart failure (CMS/HCC) [I50.22]   • Cardiomyopathy (CMS/HCC) [I42.9]   • Pacemaker lead failure [T82.110A]   • Chest pain [R07.9]   • 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 [M47.812]   • Spondylolisthesis [M43.10]   • Chronic adrenal insufficiency (CMS/HCC) [E27.40]   • Congestive heart failure (CMS/HCC) [I50.9]   • Gastroparesis [K31.84]   • Hypotension [I95.9]   • Hypothyroidism [E03.9]   • 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]   • Osteoarthritis [M19.90]   • Automatic implantable cardioverter-defibrillator in situ [Z95.810]   • History of total knee replacement [Z96.659]         PAST MEDICAL HISTORY  Past Medical History:   Diagnosis Date   • Anemia     Iron deficiency   • Anxiety    • Cardiomyopathy (CMS/HCC)     S/P pacemaker and defibrillator   • CHF (congestive heart failure) (CMS/HCC)    • Chronic renal failure, stage 4 (severe) (CMS/HCC)    • Colon polyp    • Coronary artery disease     pacemaker, defibrillator   • Depression    • Dizzy    • Gastroparesis    • GAVE (gastric antral vascular ectasia)    • GERD (gastroesophageal reflux disease)    • Gout    • Hemorrhoids    • Hiatal hernia    • History of Clostridium difficile colitis 2013   • History of kidney stones    • History of pancreatitis     2014   • History of skin cancer    • History of transfusion     MULTIPLE    • Hyperlipidemia    • Hypertension    • Hypothyroidism    • Left bundle branch block    • Mitral and aortic valve disease    • Mitral valve insufficiency    • Myoclonus     S/P Depakote   • Osteoarthritis    • Pancytopenia (CMS/HCC)    • Peripheral neuropathy    • Presence of cardiac pacemaker     AND DEFIBRILLATOR   • Pulmonary hypertension (CMS/HCC)    • SOB (shortness of breath) on exertion    •  Spinal stenosis    • Stroke (CMS/HCC)          SURGICAL HISTORY  Past Surgical History:   Procedure Laterality Date   • APPENDECTOMY N/A    • ARTERIOVENOUS FISTULA/SHUNT SURGERY Right 2/18/2019    Procedure: RIGHT BASILIC FISTULA CREATION WITHOUT TRANSPOSITION;  Surgeon: Royer Dozier MD;  Location: [facility] MAIN OR;  Service: Vascular   • ARTERIOVENOUS FISTULA/SHUNT SURGERY Right 5/31/2019    Procedure: RIGHT 2ND STAGE BASILIC VEIN CREATION;  Surgeon: Royer Dozier MD;  Location: [facility] MAIN OR;  Service: Vascular   • CARDIAC CATHETERIZATION Left 03/28/2006    Arterial catheter insertion, cath left ventriculography, coronary angiography and left heart catheterization-Dr. Estevan Matamoros   • CARDIAC DEFIBRILLATOR PLACEMENT N/A 11/30/2007    Biventricular implantable cardioverter defibrillator-Dr. Leandro Huber   • CARDIAC ELECTROPHYSIOLOGY PROCEDURE N/A 10/28/2019    Procedure: GENERATOR CHANGE BI-V ICD  medtronic;  Surgeon: Leandro Huber MD;  Location: [facility] CATH INVASIVE LOCATION;  Service: Cardiology   • CATARACT EXTRACTION Bilateral 2011   • COLONOSCOPY N/A 2014    done at [facility]   • CYSTECTOMY N/A     Ovarian cystectomy   • ENDOSCOPY N/A 04/28/2006    EGD with biopsies. Paraesophageal hiatal hernia from 34 to 44 cm, antral gastritis-Dr. Nehemiah Beal   • ENDOSCOPY N/A 09/29/2004    Hiatal hernia, gastritis and an erosion of the pylorus-Dr. Yang Pavon   • ENDOSCOPY N/A 9/1/2019    Procedure: ESOPHAGOGASTRODUODENOSCOPY with APC;  Surgeon: Anuel Roach MD;  Location: [facility] ENDOSCOPY;  Service: Gastroenterology   • ENDOSCOPY N/A 1/28/2020    Procedure: ESOPHAGOGASTRODUODENOSCOPY with APC;  Surgeon: Anuel Roach MD;  Location: [facility] ENDOSCOPY;  Service: Gastroenterology   • ENTEROSCOPY SMALL BOWEL N/A 10/30/2006    Small bowel enteroscopy with biopsies-Dr. Rory Sevilla   • FLEXIBLE SIGMOIDOSCOPY N/A 09/29/2004    Unsuccessful colonoscopy due to poop prep, a very  tortuous sigmoid, bowel prep in the form of enema given and a barium enema was obtained-Dr. Yang Pavon   • FRACTURE SURGERY  2015    Broke big toe   • HEMATOMA EVACUATION TRUNK N/A 02/07/2014    Pocket evacuation of hematoma at pacemaker site-Dr. Leandro Huber   • HEMORRHOIDECTOMY N/A 04/19/2004    Flexible sigmoidoscopy and stapled hemorrhoidectomy-Dr. Yang Pavon   • HYSTERECTOMY Bilateral 1977   • IMPLANTABLE CARDIOVERTER DEFIBRILLATOR LEAD REPLACEMENT/POCKET REVISION Left 3/28/2016    Procedure: AUTOMATIC IMPLANTABLE CARDIOVERTER DEFIBRILLATOR LEAD REPLACEMENT WITH LASER LEAD EXTRACTION;  Surgeon: Leandro Huber MD;  Location: [facility] license of [facility] OR 18/19;  Service:    • IMPLANTABLE CARDIOVERTER DEFIBRILLATOR LEAD REPLACEMENT/POCKET REVISION N/A 01/13/2014    Biventricular ICD replacement-Dr. Jillian Huber   • LAPAROSCOPY REPAIR HIATAL HERNIA N/A 06/27/2006    Laparoscopic paraesophageal hiatal hernia repair with Nissen fundoplication and cholecystectomy-Dr. Sean Yoon   • NATALIE GONZALEZ ([facility]) PROCEDURE     • PA INSJ TUNNELED CVC W/O SUBQ PORT/ AGE 3 YR/> Right 10/3/2017    Procedure: Right internal jugular port placement;  Surgeon: Tiffanie Couch MD;  Location: [facility] OR;  Service: General   • TOE SURGERY Left     SET BIG TOE   • TOTAL KNEE ARTHROPLASTY Left 08/2014   • VENOUS ACCESS DEVICE (PORT) INSERTION Right          FAMILY HISTORY  Family History   Problem Relation Age of Onset   • Coronary artery disease Other    • Dementia Other    • Kidney disease Other    • Arthritis Father    • Early death Father         Kidney failure   • Kidney disease Father    • Heart disease Mother    • Mental illness Mother         Dementia   • Malig Hyperthermia Neg Hx    • Colon cancer Neg Hx    • Colon polyps Neg Hx          SOCIAL HISTORY  Social History     Occupational History     Employer: RETIRED   Tobacco Use   • Smoking status: Never Smoker   • Smokeless tobacco: Never Used    Substance and Sexual Activity   • Alcohol use: No   • Drug use: No   • Sexual activity: Defer       Debilities/Disabilities Identified: None    Emotional Behavior: Appropriate    CURRENT MEDICATIONS    Current Outpatient Medications:   •  aspirin 81 MG tablet, Take 81 mg by mouth Daily., Disp: , Rfl:   •  atorvastatin (LIPITOR) 40 MG tablet, Take 1 tablet by mouth Every Night., Disp: 30 tablet, Rfl: 0  •  calcitriol (ROCALTROL) 0.25 MCG capsule, Take 0.25 mcg by mouth Every Other Day. 1 tablet every other day and 2 tablets every other day, Disp: , Rfl:   •  calcium carbonate (OS-RAYMOND) 600 MG tablet, Take 600 mg by mouth Daily., Disp: , Rfl:   •  carbidopa-levodopa ER (SINEMET CR)  MG per tablet, Take 1 tablet by mouth Every Evening., Disp: , Rfl:   •  carvedilol (COREG) 12.5 MG tablet, TAKE 1 TABLET DAILY, Disp: 90 tablet, Rfl: 1  •  Cholecalciferol (VITAMIN D3) 5000 units capsule capsule, Take 5,000 Units by mouth Daily., Disp: , Rfl:   •  diazepam (VALIUM) 5 MG tablet, Take 5 mg by mouth Every Night., Disp: , Rfl:   •  DULOXETINE HCL PO, Take  by mouth 2 (Two) Times a Day., Disp: , Rfl:   •  erythromycin (ERYTHROCIN) 250 MG tablet, Take 0.5 tablets by mouth 4 (Four) Times a Day With Meals & at Bedtime., Disp: 60 tablet, Rfl: 11  •  famotidine (PEPCID) 20 MG tablet, Take 1 tablet by mouth 2 (Two) Times a Day., Disp: 180 tablet, Rfl: 3  •  febuxostat (ULORIC) 40 MG tablet, Take 40 mg by mouth Daily., Disp: , Rfl:   •  furosemide (LASIX) 40 MG tablet, Take 1 tablet by mouth Daily., Disp: 90 tablet, Rfl: 3  •  Gabapentin, Once-Daily, (GRALISE) 300 MG tablet, Take 300 mg by mouth Daily With Dinner. MAY REPEAT LATE EVENING IF NEEDED, Disp: , Rfl:   •  Glucosamine-Chondroit-Vit C-Mn (GLUCOSAMINE CHONDROITIN COMPLX) capsule, Take 1,500 mg by mouth Every Other Day. PT HOLDING FOR SURGERY, Disp: , Rfl:   •  levothyroxine (SYNTHROID, LEVOTHROID) 25 MCG tablet, Take 25 mcg by mouth Daily., Disp: , Rfl:   •  magnesium  "oxide 250 MG tablet, Take 250 mg by mouth Daily., Disp: , Rfl:   •  Multiple Vitamin (MULTI-DAY VITAMINS) tablet, Take 1 tablet by mouth Daily. HOLD FOR SURGERY, Disp: , Rfl:   •  polyethylene glycol (MIRALAX) powder, , Disp: , Rfl:   •  psyllium (METAMUCIL) 58.6 % powder, Take 1 packet by mouth Daily., Disp: , Rfl:   •  rotigotine (NEUPRO) 6 MG/24HR patch, Place 1 patch on the skin as directed by provider Daily., Disp: , Rfl:   •  Vitamin E 400 UNITS tablet, Take 400 Units by mouth Daily. PT HOLDING FOR SURGERY, Disp: , Rfl:   No current facility-administered medications for this visit.     Facility-Administered Medications Ordered in Other Visits:   •  heparin flush (porcine) 100 UNIT/ML injection 500 Units, 500 Units, Intravenous, PRN, Anuel Scott MD, 500 Units at 11/27/17 1347  •  heparin flush (porcine) 100 UNIT/ML injection 500 Units, 500 Units, Intravenous, PRN, Neno Merritt II, MD, 500 Units at 01/18/20 1359  •  sodium chloride 0.9 % flush 10 mL, 10 mL, Intravenous, PRN, Anuel Scott MD, 10 mL at 11/27/17 1347  •  sodium chloride 0.9 % flush 10 mL, 10 mL, Intravenous, PRN, Neno Merritt II, MD, 10 mL at 01/18/20 1359  •  sodium chloride 0.9 % infusion 250 mL, 250 mL, Intravenous, PRN, Neno Merritt II, MD    ALLERGIES  Chlorhexidine; Codeine; and Propoxyphene    VITALS  Vitals:    02/11/20 1104   Weight: 75.7 kg (166 lb 14.2 oz)   Height: 170.2 cm (67.01\")       LAST RESULTS   Infusion on 02/06/2020   Component Date Value Ref Range Status   • Glucose 02/06/2020 151* 65 - 99 mg/dL Final   • BUN 02/06/2020 51* 8 - 23 mg/dL Final   • Creatinine 02/06/2020 3.08* 0.57 - 1.00 mg/dL Final   • Sodium 02/06/2020 140  136 - 145 mmol/L Final   • Potassium 02/06/2020 4.5  3.5 - 5.2 mmol/L Final   • Chloride 02/06/2020 100  98 - 107 mmol/L Final   • CO2 02/06/2020 31.2* 22.0 - 29.0 mmol/L Final   • Calcium 02/06/2020 9.7  8.6 - 10.5 mg/dL Final   • Total Protein 02/06/2020 6.3  6.0 - 8.5 g/dL Final   • " Albumin 02/06/2020 3.50  3.50 - 5.20 g/dL Final   • ALT (SGPT) 02/06/2020 9  1 - 33 U/L Final   • AST (SGOT) 02/06/2020 19  1 - 32 U/L Final   • Alkaline Phosphatase 02/06/2020 59  39 - 117 U/L Final   • Total Bilirubin 02/06/2020 0.2  0.1 - 1.2 mg/dL Final   • eGFR Non African Amer 02/06/2020 15* >60 mL/min/1.73 Final   • Globulin 02/06/2020 2.8  gm/dL Final   • A/G Ratio 02/06/2020 1.3  g/dL Final   • BUN/Creatinine Ratio 02/06/2020 16.6  7.0 - 25.0 Final   • Anion Gap 02/06/2020 8.8  5.0 - 15.0 mmol/L Final   • Phosphorus 02/06/2020 3.8  2.5 - 4.5 mg/dL Final   • PTH, Intact 02/06/2020 133.0* 15.0 - 65.0 pg/mL Final   • Uric Acid 02/06/2020 5.3  2.4 - 5.7 mg/dL Final   • 25 Hydroxy, Vitamin D 02/06/2020 58.9  30.0 - 100.0 ng/ml Final   • WBC 02/06/2020 5.34  3.40 - 10.80 10*3/mm3 Final   • RBC 02/06/2020 2.84* 3.77 - 5.28 10*6/mm3 Final   • Hemoglobin 02/06/2020 8.7* 12.0 - 15.9 g/dL Final   • Hematocrit 02/06/2020 28.9* 34.0 - 46.6 % Final   • MCV 02/06/2020 101.8* 79.0 - 97.0 fL Final   • MCH 02/06/2020 30.6  26.6 - 33.0 pg Final   • MCHC 02/06/2020 30.1* 31.5 - 35.7 g/dL Final   • RDW 02/06/2020 15.6* 12.3 - 15.4 % Final   • RDW-SD 02/06/2020 58.1* 37.0 - 54.0 fl Final   • MPV 02/06/2020 8.8  6.0 - 12.0 fL Final   • Platelets 02/06/2020 181  140 - 450 10*3/mm3 Final   • Neutrophil % 02/06/2020 79.1* 42.7 - 76.0 % Final   • Lymphocyte % 02/06/2020 9.7* 19.6 - 45.3 % Final   • Monocyte % 02/06/2020 8.2  5.0 - 12.0 % Final   • Eosinophil % 02/06/2020 2.4  0.3 - 6.2 % Final   • Basophil % 02/06/2020 0.2  0.0 - 1.5 % Final   • Immature Grans % 02/06/2020 0.4  0.0 - 0.5 % Final   • Neutrophils, Absolute 02/06/2020 4.22  1.70 - 7.00 10*3/mm3 Final   • Lymphocytes, Absolute 02/06/2020 0.52* 0.70 - 3.10 10*3/mm3 Final   • Monocytes, Absolute 02/06/2020 0.44  0.10 - 0.90 10*3/mm3 Final   • Eosinophils, Absolute 02/06/2020 0.13  0.00 - 0.40 10*3/mm3 Final   • Basophils, Absolute 02/06/2020 0.01  0.00 - 0.20 10*3/mm3  Final   • Immature Grans, Absolute 02/06/2020 0.02  0.00 - 0.05 10*3/mm3 Final   • nRBC 02/06/2020 0.0  0.0 - 0.2 /100 WBC Final     Xr Spine Lumbar Complete 4+vw    Result Date: 2/5/2020  Narrative: LUMBAR SPINE PLAIN FILM SERIES AND FRONTAL PROJECTION OF THE PELVIS WITH TWO RADIOGRAPHIC VIEWS OF THE RIGHT HIP  CLINICAL HISTORY: Pain in the right hip and low back.  FINDINGS: AP, lateral, and bilateral oblique projections of the lumbar spine demonstrate degenerative retrolisthesis of L5 on S1 by approximately 5 mm and degenerative anterior spondylolisthesis of L4 on L5 by approximately 4 mm. Prominent degenerative disc changes are identified at the level of the L4-5 interspace. There is mild compression deformity identified at T12 level which is a similar appearance when compared to the prior study of 06/21/2019. Additionally, at the T11 level, there is a question of mild age-indeterminate compression deformity. This could be further evaluated with dedicated imaging of the thoracic spine if clinically indicated. Facet arthropathy is most prominently identified at the L4-5 and L5-S1 levels. Further evaluation of lumbar spine pathology could be performed with MR imaging for much more sensitive and specific evaluation as clinically indicated.  Frontal projection of the pelvis and 2 radiographic views of the right hip demonstrate mild osteoarthritic changes within both hips. However, no acute abnormality is appreciated within the pelvis or either hip. Incidental note is made of atherosclerotic vascular calcifications. Chain sutures are identified overlying the symphysis pubis.  This report was finalized on 2/5/2020 7:24 AM by Dr. Elier Vance M.D.      Xr Hip With Or Without Pelvis 2 - 3 View Right    Result Date: 2/5/2020  Narrative: LUMBAR SPINE PLAIN FILM SERIES AND FRONTAL PROJECTION OF THE PELVIS WITH TWO RADIOGRAPHIC VIEWS OF THE RIGHT HIP  CLINICAL HISTORY: Pain in the right hip and low back.  FINDINGS: AP,  lateral, and bilateral oblique projections of the lumbar spine demonstrate degenerative retrolisthesis of L5 on S1 by approximately 5 mm and degenerative anterior spondylolisthesis of L4 on L5 by approximately 4 mm. Prominent degenerative disc changes are identified at the level of the L4-5 interspace. There is mild compression deformity identified at T12 level which is a similar appearance when compared to the prior study of 06/21/2019. Additionally, at the T11 level, there is a question of mild age-indeterminate compression deformity. This could be further evaluated with dedicated imaging of the thoracic spine if clinically indicated. Facet arthropathy is most prominently identified at the L4-5 and L5-S1 levels. Further evaluation of lumbar spine pathology could be performed with MR imaging for much more sensitive and specific evaluation as clinically indicated.  Frontal projection of the pelvis and 2 radiographic views of the right hip demonstrate mild osteoarthritic changes within both hips. However, no acute abnormality is appreciated within the pelvis or either hip. Incidental note is made of atherosclerotic vascular calcifications. Chain sutures are identified overlying the symphysis pubis.  This report was finalized on 2/5/2020 7:24 AM by Dr. Elier Vance M.D.        PHYSICAL EXAM  Physical Exam   Constitutional: She is oriented to person, place, and time. She appears well-developed and well-nourished.   HENT:   Head: Normocephalic and atraumatic.   Eyes: Pupils are equal, round, and reactive to light. Conjunctivae and EOM are normal. No scleral icterus.   Neck: Normal range of motion. Neck supple. No thyromegaly present.   Cardiovascular: Normal rate, regular rhythm, normal heart sounds and intact distal pulses. Exam reveals no gallop.   No murmur heard.  Pulmonary/Chest: Effort normal and breath sounds normal. She has no wheezes. She has no rales.   Abdominal: Soft. Bowel sounds are normal. She exhibits  no shifting dullness, no distension, no fluid wave, no abdominal bruit, no ascites and no mass. There is no hepatosplenomegaly. There is no tenderness. There is no guarding and negative Singh's sign. Hernia confirmed negative in the ventral area.   Musculoskeletal: Normal range of motion. She exhibits no edema.   Lymphadenopathy:     She has no cervical adenopathy.   Neurological: She is alert and oriented to person, place, and time.   Skin: Skin is warm and dry. No rash noted. She is not diaphoretic. No erythema.   Psychiatric: She has a normal mood and affect. Her behavior is normal.       ASSESSMENT  Diagnoses and all orders for this visit:    Gastroparesis    GAVE (gastric antral vascular ectasia)    Iron deficiency anemia due to chronic blood loss    Stage 4 chronic kidney disease (CMS/HCC)    Other orders  -     erythromycin (ERYTHROCIN) 250 MG tablet; Take 0.5 tablets by mouth 4 (Four) Times a Day With Meals & at Bedtime.          PLAN  Return in about 2 months (around 4/11/2020).

## 2020-02-12 ENCOUNTER — APPOINTMENT (OUTPATIENT)
Dept: GENERAL RADIOLOGY | Facility: HOSPITAL | Age: 80
End: 2020-02-12

## 2020-02-12 ENCOUNTER — HOSPITAL ENCOUNTER (OUTPATIENT)
Facility: HOSPITAL | Age: 80
Setting detail: OBSERVATION
Discharge: HOME | End: 2020-02-13
Attending: EMERGENCY MEDICINE | Admitting: HOSPITALIST

## 2020-02-12 ENCOUNTER — APPOINTMENT (OUTPATIENT)
Dept: CT IMAGING | Facility: HOSPITAL | Age: 80
End: 2020-02-12

## 2020-02-12 DIAGNOSIS — S06.6X0A SUBARACHNOID HEMORRHAGE FOLLOWING INJURY, NO LOSS OF CONSCIOUSNESS, INITIAL ENCOUNTER (HCC): ICD-10-CM

## 2020-02-12 DIAGNOSIS — D63.1 ANEMIA OF CHRONIC RENAL FAILURE, STAGE 4 (SEVERE) (HCC): ICD-10-CM

## 2020-02-12 DIAGNOSIS — S02.92XA MULTIPLE FACIAL FRACTURES, CLOSED, INITIAL ENCOUNTER (HCC): ICD-10-CM

## 2020-02-12 DIAGNOSIS — N18.4 ANEMIA OF CHRONIC RENAL FAILURE, STAGE 4 (SEVERE) (HCC): ICD-10-CM

## 2020-02-12 DIAGNOSIS — S06.5X0A TRAUMATIC SUBDURAL HEMATOMA WITHOUT LOSS OF CONSCIOUSNESS, INITIAL ENCOUNTER (HCC): Primary | ICD-10-CM

## 2020-02-12 PROBLEM — I60.9 SUBARACHNOID HEMORRHAGE (HCC): Status: ACTIVE | Noted: 2020-02-12
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
## 2020-02-12 PROBLEM — S06.5XAA SUBDURAL HEMATOMA, POST-TRAUMATIC (HCC): Status: ACTIVE | Noted: 2020-02-12

## 2020-02-12 PROBLEM — S02.85XA ORBITAL FRACTURE, CLOSED, INITIAL ENCOUNTER (HCC): Status: ACTIVE | Noted: 2020-02-12

## 2020-02-12 PROBLEM — K30 DELAYED GASTRIC EMPTYING: Status: ACTIVE | Noted: 2020-02-12

## 2020-02-12 LAB
ABO GROUP BLD: NORMAL
ALBUMIN SERPL-MCNC: 3.2 G/DL (ref 3.5–5.2)
ALBUMIN/GLOB SERPL: 1.3 G/DL
ALP SERPL-CCNC: 55 U/L (ref 39–117)
ALT SERPL W P-5'-P-CCNC: <5 U/L (ref 1–33)
ANION GAP SERPL CALCULATED.3IONS-SCNC: 11.4 MMOL/L (ref 5–15)
APTT PPP: 39.5 SECONDS (ref 22.7–35.4)
AST SERPL-CCNC: 17 U/L (ref 1–32)
BASOPHILS # BLD AUTO: 0.01 10*3/MM3 (ref 0–0.2)
BASOPHILS NFR BLD AUTO: 0.2 % (ref 0–1.5)
BILIRUB SERPL-MCNC: 0.2 MG/DL (ref 0.2–1.2)
BLD GP AB SCN SERPL QL: NEGATIVE
BUN BLD-MCNC: 36 MG/DL (ref 8–23)
BUN/CREAT SERPL: 15.8 (ref 7–25)
CALCIUM SPEC-SCNC: 9.4 MG/DL (ref 8.6–10.5)
CHLORIDE SERPL-SCNC: 101 MMOL/L (ref 98–107)
CO2 SERPL-SCNC: 28.6 MMOL/L (ref 22–29)
CREAT BLD-MCNC: 2.28 MG/DL (ref 0.57–1)
DEPRECATED RDW RBC AUTO: 51.6 FL (ref 37–54)
EOSINOPHIL # BLD AUTO: 0.07 10*3/MM3 (ref 0–0.4)
EOSINOPHIL NFR BLD AUTO: 1.2 % (ref 0.3–6.2)
ERYTHROCYTE [DISTWIDTH] IN BLOOD BY AUTOMATED COUNT: 14.5 % (ref 12.3–15.4)
GFR SERPL CREATININE-BSD FRML MDRD: 21 ML/MIN/1.73
GLOBULIN UR ELPH-MCNC: 2.5 GM/DL
GLUCOSE BLD-MCNC: 99 MG/DL (ref 65–99)
HCT VFR BLD AUTO: 22.2 % (ref 34–46.6)
HCT VFR BLD AUTO: 24.7 % (ref 34–46.6)
HGB BLD-MCNC: 7.1 G/DL (ref 12–15.9)
HGB BLD-MCNC: 7.6 G/DL (ref 12–15.9)
IMM GRANULOCYTES # BLD AUTO: 0.01 10*3/MM3 (ref 0–0.05)
IMM GRANULOCYTES NFR BLD AUTO: 0.2 % (ref 0–0.5)
INR PPP: 1.02 (ref 0.9–1.1)
LYMPHOCYTES # BLD AUTO: 0.53 10*3/MM3 (ref 0.7–3.1)
LYMPHOCYTES NFR BLD AUTO: 9.1 % (ref 19.6–45.3)
MCH RBC QN AUTO: 29.9 PG (ref 26.6–33)
MCHC RBC AUTO-ENTMCNC: 30.8 G/DL (ref 31.5–35.7)
MCV RBC AUTO: 97.2 FL (ref 79–97)
MONOCYTES # BLD AUTO: 0.36 10*3/MM3 (ref 0.1–0.9)
MONOCYTES NFR BLD AUTO: 6.2 % (ref 5–12)
NEUTROPHILS # BLD AUTO: 4.84 10*3/MM3 (ref 1.7–7)
NEUTROPHILS NFR BLD AUTO: 83.1 % (ref 42.7–76)
NRBC BLD AUTO-RTO: 0 /100 WBC (ref 0–0.2)
PLATELET # BLD AUTO: 188 10*3/MM3 (ref 140–450)
PMV BLD AUTO: 9.2 FL (ref 6–12)
POTASSIUM BLD-SCNC: 3.5 MMOL/L (ref 3.5–5.2)
PROT SERPL-MCNC: 5.7 G/DL (ref 6–8.5)
PROTHROMBIN TIME: 13.1 SECONDS (ref 11.7–14.2)
RBC # BLD AUTO: 2.54 10*6/MM3 (ref 3.77–5.28)
RH BLD: NEGATIVE
SODIUM BLD-SCNC: 141 MMOL/L (ref 136–145)
T&S EXPIRATION DATE: NORMAL
WBC NRBC COR # BLD: 5.82 10*3/MM3 (ref 3.4–10.8)

## 2020-02-12 PROCEDURE — 96375 TX/PRO/DX INJ NEW DRUG ADDON: CPT

## 2020-02-12 PROCEDURE — 86850 RBC ANTIBODY SCREEN: CPT | Performed by: PHYSICIAN ASSISTANT

## 2020-02-12 PROCEDURE — G0378 HOSPITAL OBSERVATION PER HR: HCPCS

## 2020-02-12 PROCEDURE — 36415 COLL VENOUS BLD VENIPUNCTURE: CPT | Performed by: NURSE PRACTITIONER

## 2020-02-12 PROCEDURE — 85610 PROTHROMBIN TIME: CPT | Performed by: PHYSICIAN ASSISTANT

## 2020-02-12 PROCEDURE — 70450 CT HEAD/BRAIN W/O DYE: CPT

## 2020-02-12 PROCEDURE — 25010000003 HEPARIN LOCK FLUCH PER 10 UNITS

## 2020-02-12 PROCEDURE — 80053 COMPREHEN METABOLIC PANEL: CPT | Performed by: PHYSICIAN ASSISTANT

## 2020-02-12 PROCEDURE — 99214 OFFICE O/P EST MOD 30 MIN: CPT | Performed by: PHYSICIAN ASSISTANT

## 2020-02-12 PROCEDURE — 86923 COMPATIBILITY TEST ELECTRIC: CPT

## 2020-02-12 PROCEDURE — 99285 EMERGENCY DEPT VISIT HI MDM: CPT

## 2020-02-12 PROCEDURE — 96374 THER/PROPH/DIAG INJ IV PUSH: CPT

## 2020-02-12 PROCEDURE — 85018 HEMOGLOBIN: CPT | Performed by: NURSE PRACTITIONER

## 2020-02-12 PROCEDURE — 70486 CT MAXILLOFACIAL W/O DYE: CPT

## 2020-02-12 PROCEDURE — 86901 BLOOD TYPING SEROLOGIC RH(D): CPT | Performed by: PHYSICIAN ASSISTANT

## 2020-02-12 PROCEDURE — 73130 X-RAY EXAM OF HAND: CPT

## 2020-02-12 PROCEDURE — 25010000002 TDAP 5-2.5-18.5 LF-MCG/0.5 SUSPENSION: Performed by: PHYSICIAN ASSISTANT

## 2020-02-12 PROCEDURE — 25010000002 HYDROMORPHONE PER 4 MG: Performed by: EMERGENCY MEDICINE

## 2020-02-12 PROCEDURE — 90715 TDAP VACCINE 7 YRS/> IM: CPT | Performed by: PHYSICIAN ASSISTANT

## 2020-02-12 PROCEDURE — 85025 COMPLETE CBC W/AUTO DIFF WBC: CPT | Performed by: PHYSICIAN ASSISTANT

## 2020-02-12 PROCEDURE — 85014 HEMATOCRIT: CPT | Performed by: NURSE PRACTITIONER

## 2020-02-12 PROCEDURE — 85730 THROMBOPLASTIN TIME PARTIAL: CPT | Performed by: PHYSICIAN ASSISTANT

## 2020-02-12 PROCEDURE — 73070 X-RAY EXAM OF ELBOW: CPT

## 2020-02-12 PROCEDURE — 90471 IMMUNIZATION ADMIN: CPT | Performed by: PHYSICIAN ASSISTANT

## 2020-02-12 PROCEDURE — 86900 BLOOD TYPING SEROLOGIC ABO: CPT | Performed by: PHYSICIAN ASSISTANT

## 2020-02-12 RX ORDER — LIDOCAINE HYDROCHLORIDE AND EPINEPHRINE 10; 10 MG/ML; UG/ML
10 INJECTION, SOLUTION INFILTRATION; PERINEURAL ONCE
Status: COMPLETED | OUTPATIENT
Start: 2020-02-12 | End: 2020-02-12

## 2020-02-12 RX ORDER — IPRATROPIUM BROMIDE 42 UG/1
1-2 SPRAY, METERED NASAL 4 TIMES DAILY PRN
COMMUNITY
End: 2020-02-17

## 2020-02-12 RX ORDER — CARVEDILOL 12.5 MG/1
12.5 TABLET ORAL DAILY
COMMUNITY
End: 2020-05-04

## 2020-02-12 RX ORDER — ATORVASTATIN CALCIUM 20 MG/1
20 TABLET, FILM COATED ORAL DAILY
COMMUNITY
End: 2021-01-01 | Stop reason: ALTCHOICE

## 2020-02-12 RX ORDER — HYDROMORPHONE HYDROCHLORIDE 1 MG/ML
0.5 INJECTION, SOLUTION INTRAMUSCULAR; INTRAVENOUS; SUBCUTANEOUS ONCE
Status: COMPLETED | OUTPATIENT
Start: 2020-02-12 | End: 2020-02-12

## 2020-02-12 RX ORDER — HEPARIN SODIUM (PORCINE) LOCK FLUSH IV SOLN 100 UNIT/ML 100 UNIT/ML
500 SOLUTION INTRAVENOUS ONCE
Status: COMPLETED | OUTPATIENT
Start: 2020-02-12 | End: 2020-02-12

## 2020-02-12 RX ORDER — HEPARIN SODIUM (PORCINE) LOCK FLUSH IV SOLN 100 UNIT/ML 100 UNIT/ML
SOLUTION INTRAVENOUS
Status: COMPLETED
Start: 2020-02-12 | End: 2020-02-12

## 2020-02-12 RX ORDER — HYDROMORPHONE HYDROCHLORIDE 1 MG/ML
0.5 INJECTION, SOLUTION INTRAMUSCULAR; INTRAVENOUS; SUBCUTANEOUS
Status: DISCONTINUED | OUTPATIENT
Start: 2020-02-12 | End: 2020-02-13 | Stop reason: HOSPADM

## 2020-02-12 RX ORDER — ACETAMINOPHEN 500 MG
1000 TABLET ORAL ONCE
Status: COMPLETED | OUTPATIENT
Start: 2020-02-12 | End: 2020-02-12

## 2020-02-12 RX ORDER — CALCITRIOL 0.5 UG/1
0.5 CAPSULE, LIQUID FILLED ORAL EVERY OTHER DAY
COMMUNITY
End: 2021-01-01 | Stop reason: ALTCHOICE

## 2020-02-12 RX ADMIN — HYDROMORPHONE HYDROCHLORIDE 0.5 MG: 1 INJECTION, SOLUTION INTRAMUSCULAR; INTRAVENOUS; SUBCUTANEOUS at 14:44

## 2020-02-12 RX ADMIN — Medication 500 UNITS: at 14:44

## 2020-02-12 RX ADMIN — HEPARIN SODIUM (PORCINE) LOCK FLUSH IV SOLN 100 UNIT/ML 500 UNITS: 100 SOLUTION at 14:44

## 2020-02-12 RX ADMIN — ACETAMINOPHEN 1000 MG: 500 TABLET, FILM COATED ORAL at 12:58

## 2020-02-12 RX ADMIN — LIDOCAINE HYDROCHLORIDE,EPINEPHRINE BITARTRATE 10 ML: 10; .01 INJECTION, SOLUTION INFILTRATION; PERINEURAL at 16:05

## 2020-02-12 RX ADMIN — TETANUS TOXOID, REDUCED DIPHTHERIA TOXOID AND ACELLULAR PERTUSSIS VACCINE, ADSORBED 0.5 ML: 5; 2.5; 8; 8; 2.5 SUSPENSION INTRAMUSCULAR at 12:33

## 2020-02-12 NOTE — ED TRIAGE NOTES
Per EMS, pt tripped and fell at [facility] today.  Has laceration to R side of face.  Denies blood thinner use, denies LOC.

## 2020-02-12 NOTE — PROGRESS NOTES
Discharge Planning Assessment  [facility]     Patient Name: Charmaine Machuca  MRN: [id number]  Today's Date: 2/12/2020    Admit Date: 2/12/2020    Discharge Needs Assessment     Row Name 02/12/20 1846       Living Environment    Lives With  spouse    Name(s) of Who Lives With Patient  Zackery Machuca    Current Living Arrangements  -- patio home    Duration at Residence  6 months    Primary Care Provided by  self    Provides Primary Care For  no one    Caregiving Concerns  continue to assess during admission    Family Caregiver Names  -- [phone]/ [phone]    Quality of Family Relationships  helpful;involved;supportive    Able to Return to Prior Arrangements  -- continue to assess d/c needs       Resource/Environmental Concerns    Transportation Concerns  car, none       Transition Planning    Patient/Family Anticipates Transition to  home with family;home with help/services;inpatient rehabilitation facility    Patient/Family Anticipated Services at Transition      Transportation Anticipated  family or friend will provide       Discharge Needs Assessment    Concerns to be Addressed  discharge planning    Equipment Currently Used at Home  -- patient has walker, cane, transport chair currently but not in use    Anticipated Changes Related to Illness  inability to care for self    Outpatient/Agency/Support Group Needs  outpatient therapy;skilled nursing facility outpatient therapy vs SNF vs home health    Provided post acute provider list?  Yes    Current Discharge Risk  physical impairment        Discharge Plan     Row Name 02/12/20 8612       Plan    Plan Comments  Entered room, introduced self and explained role. Verified information on facesheet; willie (spouse and daughter Doris) at bedside. Patient currently lives with spouse Zackery in patio home. She is independent w/ADL's, however is unable to drive at this time. Patient has DME including cane, walker and transport chair from  previous illness but doesnt currently use them. Patient has prescriptions filled through [employer] mail order and [facility] . Patient has previously had acute rehab at [facility], received outpatient therapy and home health. Patient may be interested in skilled rehab if eligible. Provided [facility] and [facility] for review. Family can provide transport for patient upon d/c.         Destination      Coordination has not been started for this encounter.      Durable Medical Equipment      Coordination has not been started for this encounter.      Dialysis/Infusion      Coordination has not been started for this encounter.      Home Medical Care      Coordination has not been started for this encounter.      Therapy      Coordination has not been started for this encounter.      Community Resources      Coordination has not been started for this encounter.          Demographic Summary     Row Name 02/12/20 1836       General Information    Admission Type  observation    Reason for Consult  discharge planning    Preferred Language  English     Used During This Interaction  no       Contact Information    Permission Granted to Share Info With  ;family/designee    Contact Information Obtained for      Contact Information Comments  Spouse Sreekanth Vizcarra information correct on facesheet           Name,   Ekta Hood    Phone,   [phone]        Functional Status     Row Name 02/12/20 1168       Functional Status    Usual Activity Tolerance  good    Current Activity Tolerance  fair    Functional Status Comments  patient ambulatoroy with assist of walker in the ED       Functional Status, IADL    Medications  independent    Meal Preparation  independent    Housekeeping  assistive person;independent    Laundry  assistive person;independent    IADL Comments  patient is mostly independent w/ADL's; spouse at bedside- patient has hx  of myoclonus       Mental Status    General Appearance WDL  -- right facial swelling/ecchymosis/laceration       Mental Status Summary    Recent Changes in Mental Status/Cognitive Functioning  no changes       Employment/    Employment Status  retired        Psychosocial    No documentation.       Abuse/Neglect    No documentation.       Legal    No documentation.       Substance Abuse    No documentation.       Patient Forms    No documentation.           Ekta Hood RN

## 2020-02-12 NOTE — CONSULTS
Neurosurgery (on-call MD unless specified)  Consult performed by: Bree Copeland PA-C  Consult ordered by: Rita Hines PA  Reason for consult: SDH and SAH after fall  Assessment/Recommendations: Ms. Machuca is a 80 yr old female with PMH cardiomyopathy with pacemaker/defibrillator, CHF, CRF stage 4, HTN, pancytopenia, peripheral neuropathy, pulm HTN, stroke in Aug 2019 with slight residual L facial droop. Pt is on ASA 81mg at home. Pt evidently fell earlier today while going to get her license renewed. Brought to ER after fall, CT shows small acute R frontal parietal SDH as well as L frontal and R sylvian fissure traumatic SAH.  Also shows multiple facial fractures including orbital and max sinus fractures.  She is c/o mild HA, R eye pain, R head pain.  Mild dizziness as well. No N/V. Not sure if there was any LOC but if so was very brief. No sz activity.     Reviewed CT findings with patient and family. No indication for any surgery. Just need to observe. Hold ASA, check coags (in light of renal issues), CT in am. ST/PT/OT.            Patient Care Team:  Fannie Godfrey MD as PCP - General (Internal Medicine)  Anuel Scott MD as PCP - Claims Attributed  Anuel Scott MD as Consulting Physician (Hematology and Oncology)  Fannie Godfrey MD as Referring Physician (Internal Medicine)  Estevan Matamoros MD as Consulting Physician (Cardiology)  Parveen Abraham MD as Consulting Physician (Nephrology)  Ondina Haro MD as Consulting Physician (Neurology)  Estevan Oropeza MD as Consulting Physician (Hematology and Oncology)  Neno Merritt II, MD as Consulting Physician (Hematology and Oncology)    Chief complaint:SDH, SAH after a fall    Subjective     Ct head reviewed as discussed above      ..Lab             02/12/20                       1441          WBC          5.82          HEMOGLOBIN   7.6*          HEMATOCRIT   24.7*         PLATELETS    188               ..Lab              02/06/20                       1053          SODIUM       140           POTASSIUM    4.5           CHLORIDE     100           CO2          31.2*         BUN          51*           CREATININE   3.08*         GLUCOSE      151*          CALCIUM      9.7               Fall   Associated symptoms include headaches.   Laceration          Review of Systems   Eyes: Positive for pain. Negative for visual disturbance.   Neurological: Positive for dizziness and headaches. Negative for weakness.   All other systems reviewed and are negative.       Past Medical History:   Diagnosis Date   • Anemia     Iron deficiency   • Anxiety    • Cardiomyopathy (CMS/HCC)     S/P pacemaker and defibrillator   • CHF (congestive heart failure) (CMS/HCC)    • Chronic renal failure, stage 4 (severe) (CMS/HCC)    • Colon polyp    • Coronary artery disease     pacemaker, defibrillator   • Depression    • Dizzy    • Gastroparesis    • GAVE (gastric antral vascular ectasia)    • GERD (gastroesophageal reflux disease)    • Gout    • Hemorrhoids    • Hiatal hernia    • History of Clostridium difficile colitis 2013   • History of kidney stones    • History of pancreatitis     2014   • History of skin cancer    • History of transfusion     MULTIPLE    • Hyperlipidemia    • Hypertension    • Hypothyroidism    • Left bundle branch block    • Mitral and aortic valve disease    • Mitral valve insufficiency    • Myoclonus     S/P Depakote   • Osteoarthritis    • Pancytopenia (CMS/HCC)    • Peripheral neuropathy    • Presence of cardiac pacemaker     AND DEFIBRILLATOR   • Pulmonary hypertension (CMS/HCC)    • SOB (shortness of breath) on exertion    • Spinal stenosis    • Stroke (CMS/HCC)    ,   Past Surgical History:   Procedure Laterality Date   • APPENDECTOMY N/A    • ARTERIOVENOUS FISTULA/SHUNT SURGERY Right 2/18/2019    Procedure: RIGHT BASILIC FISTULA CREATION WITHOUT TRANSPOSITION;  Surgeon: Royer Dozier MD;  Location: [facility];  Service:  Vascular   • ARTERIOVENOUS FISTULA/SHUNT SURGERY Right 5/31/2019    Procedure: RIGHT 2ND STAGE BASILIC VEIN CREATION;  Surgeon: Royer Dozier MD;  Location: [facility] MAIN OR;  Service: Vascular   • CARDIAC CATHETERIZATION Left 03/28/2006    Arterial catheter insertion, cath left ventriculography, coronary angiography and left heart catheterization-Dr. Estevan Matamoros   • CARDIAC DEFIBRILLATOR PLACEMENT N/A 11/30/2007    Biventricular implantable cardioverter defibrillator-Dr. Leandro Huber   • CARDIAC ELECTROPHYSIOLOGY PROCEDURE N/A 10/28/2019    Procedure: GENERATOR CHANGE BI-V ICD  medtronic;  Surgeon: Leandro Huber MD;  Location: [facility] CATH INVASIVE LOCATION;  Service: Cardiology   • CATARACT EXTRACTION Bilateral 2011   • COLONOSCOPY N/A 2014    done at [facility]   • CYSTECTOMY N/A     Ovarian cystectomy   • ENDOSCOPY N/A 04/28/2006    EGD with biopsies. Paraesophageal hiatal hernia from 34 to 44 cm, antral gastritis-Dr. Nehemiah Beal   • ENDOSCOPY N/A 09/29/2004    Hiatal hernia, gastritis and an erosion of the pylorus-Dr. Yang Pavon   • ENDOSCOPY N/A 9/1/2019    Procedure: ESOPHAGOGASTRODUODENOSCOPY with APC;  Surgeon: Anuel Roach MD;  Location: [facility] ENDOSCOPY;  Service: Gastroenterology   • ENDOSCOPY N/A 1/28/2020    Procedure: ESOPHAGOGASTRODUODENOSCOPY with APC;  Surgeon: Anuel Roach MD;  Location: [facility] ENDOSCOPY;  Service: Gastroenterology   • ENTEROSCOPY SMALL BOWEL N/A 10/30/2006    Small bowel enteroscopy with biopsies-Dr. Rory Sevilla   • FLEXIBLE SIGMOIDOSCOPY N/A 09/29/2004    Unsuccessful colonoscopy due to poop prep, a very tortuous sigmoid, bowel prep in the form of enema given and a barium enema was obtained-Dr. Yang Pavon   • FRACTURE SURGERY  2015    Broke big toe   • HEMATOMA EVACUATION TRUNK N/A 02/07/2014    Pocket evacuation of hematoma at pacemaker site-Dr. Leandro Huber   • HEMORRHOIDECTOMY N/A 04/19/2004    Flexible sigmoidoscopy and  stapled hemorrhoidectomy-Dr. Yang Pavon   • HYSTERECTOMY Bilateral 1977   • IMPLANTABLE CARDIOVERTER DEFIBRILLATOR LEAD REPLACEMENT/POCKET REVISION Left 3/28/2016    Procedure: AUTOMATIC IMPLANTABLE CARDIOVERTER DEFIBRILLATOR LEAD REPLACEMENT WITH LASER LEAD EXTRACTION;  Surgeon: Leandro Huber MD;  Location: [facility] OR 18/19;  Service:    • IMPLANTABLE CARDIOVERTER DEFIBRILLATOR LEAD REPLACEMENT/POCKET REVISION N/A 01/13/2014    Biventricular ICD replacement-Dr. Jillian Huber   • LAPAROSCOPY REPAIR HIATAL HERNIA N/A 06/27/2006    Laparoscopic paraesophageal hiatal hernia repair with Nissen fundoplication and cholecystectomy-Dr. Sean Yoon   • NATALIE GONZALEZ ([facility]) PROCEDURE     • RI INSJ TUNNELED CVC W/O SUBQ PORT/ AGE 3 YR/> Right 10/3/2017    Procedure: Right internal jugular port placement;  Surgeon: Tiffanie Couch MD;  Location: [facility];  Service: General   • TOE SURGERY Left     SET BIG TOE   • TOTAL KNEE ARTHROPLASTY Left 08/2014   • VENOUS ACCESS DEVICE (PORT) INSERTION Right    ,   Family History   Problem Relation Age of Onset   • Coronary artery disease Other    • Dementia Other    • Kidney disease Other    • Arthritis Father    • Early death Father         Kidney failure   • Kidney disease Father    • Heart disease Mother    • Mental illness Mother         Dementia   • Malig Hyperthermia Neg Hx    • Colon cancer Neg Hx    • Colon polyps Neg Hx    ,   Social History     Tobacco Use   • Smoking status: Never Smoker   • Smokeless tobacco: Never Used   Substance Use Topics   • Alcohol use: No   • Drug use: No   ,   (Not in a hospital admission), Scheduled Meds:  , Continuous Infusions:    No current facility-administered medications for this encounter. , PRN Meds:   and Allergies:  Chlorhexidine; Codeine; and Propoxyphene    Objective      Vital Signs  Temp:  [97.4 °F (36.3 °C)] 97.4 °F (36.3 °C)  Heart Rate:  [60-87] 61  Resp:  [15-20] 15  BP: (146-182)/(73-83)  171/77    Physical Exam   Constitutional: She is oriented to person, place, and time. She appears well-developed and well-nourished.   HENT:   Head: Normocephalic.   Right Ear: External ear normal.   Left Ear: External ear normal.   R periorbital and facial bruising and swelling   Eyes: Pupils are equal, round, and reactive to light. EOM are normal. Right eye exhibits no discharge. Left eye exhibits no discharge.   R eye injected, subconjunctival hemorrahge   Neck: Normal range of motion. Neck supple. No tracheal deviation present.   Cardiovascular: Intact distal pulses.   Pulmonary/Chest: Effort normal. No stridor. No respiratory distress.   Musculoskeletal: Normal range of motion. She exhibits no edema, tenderness or deformity.   Neurological: She is alert and oriented to person, place, and time. She has normal strength and normal reflexes. She displays no atrophy, no tremor and normal reflexes. No cranial nerve deficit or sensory deficit. She exhibits normal muscle tone. She displays a negative Romberg sign. She displays no seizure activity. Coordination and gait normal.   No long tract signs   Skin: Skin is warm and dry.   Psychiatric: She has a normal mood and affect. Her behavior is normal. Judgment and thought content normal.   Nursing note and vitals reviewed.      Results Review:    I reviewed the patient's new clinical results.  I reviewed the patient's new imaging results and agree with the interpretation.        Assessment/Plan       Chronic systolic congestive heart failure (CMS/HCC)    Cardiomyopathy (CMS/HCC)    Hypothyroidism    Automatic implantable cardioverter-defibrillator in situ    Anemia of chronic renal failure, stage 4 (severe) (CMS/HCC)    GAVE (gastric antral vascular ectasia)    Delayed gastric emptying      Assessment:  (S/p fall with small acute R SDH as well as bilateral small traumatic SAH  Multiple facial fractures  Pancytopenia  Stage 4 kidney failure  Stroke Aug  2019  CHF/cardiomyopathy  Pacemaker/defibrillator  ).     Plan:   (Again, nothing surgical at this time. Will check coags, check CT in am.  Can go to the floor from our perspective. ENT to see for facial fractures.).       I discussed the patients findings and my recommendations with patient, family and consulting provider    Bree Copeland PA-C  02/12/20  3:04 PM    Time: 40min

## 2020-02-12 NOTE — H&P
History and Physical    Patient Name: Charmaine Machuca  Age/Sex: 80 y.o. female  : 1940  MRN: [id number]    Date of Admission: 2020  Date of Encounter Visit: 20  Encounter Provider: CHANTELL Cunningham  Place of Service: [facility]  Patient Care Team:  Fannie Godfrey MD as PCP - General (Internal Medicine)  Anuel Scott MD as PCP - Claims Attributed  Anuel Scott MD as Consulting Physician (Hematology and Oncology)  Fannie Godfrey MD as Referring Physician (Internal Medicine)  Estevan Matamoros MD as Consulting Physician (Cardiology)  Parveen Abraham MD as Consulting Physician (Nephrology)  Ondina Haro MD as Consulting Physician (Neurology)  Estevan Oropeza MD as Consulting Physician (Hematology and Oncology)  Neno Merritt II, MD as Consulting Physician (Hematology and Oncology)    Subjective:     Chief Complaint:   Chief Complaint   Patient presents with   • Fall   • Laceration       History of Present Illness  Charmaine Machuca is a 80 y.o. female with a history of cardiomyopathy s/p AICD, CHF, CKD4, GAVE, chronic anemia requiring blood transfusions, CAD and prior stoke. Patient presented with complaints of right sided pain after sustaining a fall earlier today. She reports tripping over [place] curb [place]. She fell to her right sitting hitting her arm and face. She denied any recent chest pain, palpitations, dizziness or syncope. However, she does not recall a few minutes right after the fall and apparently was cussing which is not typical for her.     She is on chronic aspirin for history of CAD and stroke and could not tolerate Plavix in the past. Otherwise she is not on any blood thinners and denies any recent NSAID use. She has CKD4 with shunt in place and recurrent anemia with history of GAVE. She has a medi-port and typically gets blood transfusions about once a month with last one being about 3 weeks  ago. Denies any recent AICD shocks. Over the past few weeks she has noticed increased BLE edema and is on chronic diuretics. She recently had an EGD and was found to have a food bolus and signs of gastroparesis. She was given a prescription for erythromycin, but has not taken yet. She was also scheduled to have a gastric emptying study on Friday.     Review of Systems   Constitutional: Negative for chills, fatigue and fever.   HENT: Negative for congestion and trouble swallowing.    Eyes: Negative for visual disturbance.   Respiratory: Negative for cough, shortness of breath and wheezing.    Cardiovascular: Positive for leg swelling. Negative for chest pain and palpitations.   Gastrointestinal: Positive for constipation and diarrhea. Negative for abdominal pain, nausea and vomiting.   Genitourinary: Negative for difficulty urinating and dysuria.   Musculoskeletal: Positive for arthralgias and back pain.   Neurological: Positive for weakness, numbness (chronic neuropathy) and headaches. Negative for dizziness and syncope.   Psychiatric/Behavioral: Negative for confusion. The patient is not nervous/anxious.    All other systems reviewed and are negative.    Past Medical and Surgical History:  Past Medical History:   Diagnosis Date   • Anemia     Iron deficiency   • Anxiety    • Cardiomyopathy (CMS/HCC)     S/P pacemaker and defibrillator   • CHF (congestive heart failure) (CMS/HCC)    • Chronic renal failure, stage 4 (severe) (CMS/HCC)    • Colon polyp    • Coronary artery disease     pacemaker, defibrillator   • Depression    • Dizzy    • Gastroparesis    • GAVE (gastric antral vascular ectasia)    • GERD (gastroesophageal reflux disease)    • Gout    • Hemorrhoids    • Hiatal hernia    • History of Clostridium difficile colitis 2013   • History of kidney stones    • History of pancreatitis     2014   • History of skin cancer    • History of transfusion     MULTIPLE    • Hyperlipidemia    • Hypertension    •  Hypothyroidism    • Left bundle branch block    • Mitral and aortic valve disease    • Mitral valve insufficiency    • Myoclonus     S/P Depakote   • Osteoarthritis    • Pancytopenia (CMS/HCC)    • Peripheral neuropathy    • Presence of cardiac pacemaker     AND DEFIBRILLATOR   • Pulmonary hypertension (CMS/HCC)    • SOB (shortness of breath) on exertion    • Spinal stenosis    • Stroke (CMS/HCC)        Past Surgical History:   Procedure Laterality Date   • APPENDECTOMY N/A    • ARTERIOVENOUS FISTULA/SHUNT SURGERY Right 2/18/2019    Procedure: RIGHT BASILIC FISTULA CREATION WITHOUT TRANSPOSITION;  Surgeon: Royer Dozier MD;  Location: [facility] MAIN OR;  Service: Vascular   • ARTERIOVENOUS FISTULA/SHUNT SURGERY Right 5/31/2019    Procedure: RIGHT 2ND STAGE BASILIC VEIN CREATION;  Surgeon: Royer Dozier MD;  Location: [facility] OR;  Service: Vascular   • CARDIAC CATHETERIZATION Left 03/28/2006    Arterial catheter insertion, cath left ventriculography, coronary angiography and left heart catheterization-Dr. Estevan Matamoros   • CARDIAC DEFIBRILLATOR PLACEMENT N/A 11/30/2007    Biventricular implantable cardioverter defibrillator-Dr. Leandro Huber   • CARDIAC ELECTROPHYSIOLOGY PROCEDURE N/A 10/28/2019    Procedure: GENERATOR CHANGE BI-V ICD  medtronic;  Surgeon: Leandro Huber MD;  Location: [facility] CATH INVASIVE LOCATION;  Service: Cardiology   • CATARACT EXTRACTION Bilateral 2011   • COLONOSCOPY N/A 2014    done at [facility]   • CYSTECTOMY N/A     Ovarian cystectomy   • ENDOSCOPY N/A 04/28/2006    EGD with biopsies. Paraesophageal hiatal hernia from 34 to 44 cm, antral gastritis-Dr. Nehemiah Beal   • ENDOSCOPY N/A 09/29/2004    Hiatal hernia, gastritis and an erosion of the pylorus-Dr. Yang Pavon   • ENDOSCOPY N/A 9/1/2019    Procedure: ESOPHAGOGASTRODUODENOSCOPY with APC;  Surgeon: Anuel Roach MD;  Location: [facility] ENDOSCOPY;  Service: Gastroenterology   • ENDOSCOPY N/A 1/28/2020     Procedure: ESOPHAGOGASTRODUODENOSCOPY with APC;  Surgeon: Anuel Roach MD;  Location: [facility] ENDOSCOPY;  Service: Gastroenterology   • ENTEROSCOPY SMALL BOWEL N/A 10/30/2006    Small bowel enteroscopy with biopsies-Dr. Rory Sevilla   • FLEXIBLE SIGMOIDOSCOPY N/A 09/29/2004    Unsuccessful colonoscopy due to poop prep, a very tortuous sigmoid, bowel prep in the form of enema given and a barium enema was obtained-Dr. Yang Pavon   • FRACTURE SURGERY  2015    Broke big toe   • HEMATOMA EVACUATION TRUNK N/A 02/07/2014    Pocket evacuation of hematoma at pacemaker site-Dr. Leandro Huber   • HEMORRHOIDECTOMY N/A 04/19/2004    Flexible sigmoidoscopy and stapled hemorrhoidectomy-Dr. Yang Pavon   • HYSTERECTOMY Bilateral 1977   • IMPLANTABLE CARDIOVERTER DEFIBRILLATOR LEAD REPLACEMENT/POCKET REVISION Left 3/28/2016    Procedure: AUTOMATIC IMPLANTABLE CARDIOVERTER DEFIBRILLATOR LEAD REPLACEMENT WITH LASER LEAD EXTRACTION;  Surgeon: Leandro Huber MD;  Location: [facility] HYBRID OR 18/19;  Service:    • IMPLANTABLE CARDIOVERTER DEFIBRILLATOR LEAD REPLACEMENT/POCKET REVISION N/A 01/13/2014    Biventricular ICD replacement-Dr. Jillian Huber   • LAPAROSCOPY REPAIR HIATAL HERNIA N/A 06/27/2006    Laparoscopic paraesophageal hiatal hernia repair with Nissen fundoplication and cholecystectomy-Dr. Sean Yoon   • NATALIE GONZALEZ ([facility]) PROCEDURE     • AR INSJ TUNNELED CVC W/O SUBQ PORT/ AGE 3 YR/> Right 10/3/2017    Procedure: Right internal jugular port placement;  Surgeon: Tiffanie Couch MD;  Location: [facility] MAIN OR;  Service: General   • TOE SURGERY Left     SET BIG TOE   • TOTAL KNEE ARTHROPLASTY Left 08/2014   • VENOUS ACCESS DEVICE (PORT) INSERTION Right        Home Medications:     (Not in a hospital admission)    Inpatient Medications:  Scheduled Meds:  Continuous Infusions:  No current facility-administered medications for this encounter.   PRN Meds:.    Allergies:  Allergies    "  Allergen Reactions   • Chlorhexidine Rash and Itching     Patient states \"blue dye\" in chlorhexidine.  States the orange and clear are OK to use   • Codeine Unknown - Low Severity     HYPER, DOES NOT HELP PAIN   • Propoxyphene Unknown - Low Severity     Belligerent, acting drunk  (darvon)       Past Social History:  Social History     Socioeconomic History   • Marital status:      Spouse name: Zackery   • Number of children: 5   • Years of education: 1 yr college   • Highest education level: Not on file   Occupational History     Employer: RETIRED   Tobacco Use   • Smoking status: Never Smoker   • Smokeless tobacco: Never Used   Substance and Sexual Activity   • Alcohol use: No   • Drug use: No   • Sexual activity: Defer       Past Family History:  Family History   Problem Relation Age of Onset   • Coronary artery disease Other    • Dementia Other    • Kidney disease Other    • Arthritis Father    • Early death Father         Kidney failure   • Kidney disease Father    • Heart disease Mother    • Mental illness Mother         Dementia   • Malig Hyperthermia Neg Hx    • Colon cancer Neg Hx    • Colon polyps Neg Hx        Objective:   Temp:  [97.4 °F (36.3 °C)] 97.4 °F (36.3 °C)  Heart Rate:  [60-87] 61  Resp:  [15-20] 15  BP: (146-182)/(73-83) 171/77   SpO2:  [99 %-100 %] 100 %  on    Device (Oxygen Therapy): room air  No intake or output data in the 24 hours ending 02/12/20 1449  Body mass index is 26.75 kg/m².      02/12/20  1112   Weight: 77.5 kg (170 lb 12.8 oz)     Weight change:     Physical Exam   Constitutional: She is oriented to person, place, and time. She appears well-developed and well-nourished.   Right chest port, left AICD present   HENT:   Head: Normocephalic.   Right facial swelling with R periorbital ecchymosis extending down to right jaw line.   Small laceration right temporal area   Eyes: Conjunctivae are normal. No scleral icterus.   Neck: Neck supple. No tracheal deviation present. "   Cardiovascular: Normal rate, regular rhythm and normal heart sounds.  Occasional extrasystoles are present.   No murmur heard.  Pulmonary/Chest: Effort normal. No respiratory distress. She has no wheezes. She has no rales.   Diminished bases worse in the left   Abdominal: Soft. There is no tenderness.   Hypoactive bowel sounds   Musculoskeletal: Normal range of motion. She exhibits edema (2+ BLE).   Neurological: She is alert and oriented to person, place, and time.   Skin: Skin is warm and dry.   RUE AV fistula with bruit and thrill (less thrill on lower end)   Psychiatric: She has a normal mood and affect. Her behavior is normal.        Lab Review:     Results from last 7 days   Lab Units 02/06/20  1053   SODIUM mmol/L 140   POTASSIUM mmol/L 4.5   CHLORIDE mmol/L 100   CO2 mmol/L 31.2*   BUN mg/dL 51*   CREATININE mg/dL 3.08*   GLUCOSE mg/dL 151*   CALCIUM mg/dL 9.7   AST (SGOT) U/L 19   ALT (SGPT) U/L 9     Estimated Creatinine Clearance: 15.1 mL/min (A) (by C-G formula based on SCr of 3.08 mg/dL (H)).                            Invalid input(s):  PHOS      Results from last 7 days   Lab Units 02/06/20  1053   WBC 10*3/mm3 5.34   HEMOGLOBIN g/dL 8.7*   HEMATOCRIT % 28.9*   PLATELETS 10*3/mm3 181   MCV fL 101.8*   MCH pg 30.6   MCHC g/dL 30.1*   RDW % 15.6*   RDW-SD fl 58.1*   MPV fL 8.8   NEUTROPHIL % % 79.1*   LYMPHOCYTE % % 9.7*   MONOCYTES % % 8.2   EOSINOPHIL % % 2.4   BASOPHIL % % 0.2   IMM GRAN % % 0.4   NEUTROS ABS 10*3/mm3 4.22   LYMPHS ABS 10*3/mm3 0.52*   MONOS ABS 10*3/mm3 0.44   EOS ABS 10*3/mm3 0.13   BASOS ABS 10*3/mm3 0.01   IMMATURE GRANS (ABS) 10*3/mm3 0.02   NRBC /100 WBC 0.0                                   Results from last 7 days   Lab Units 02/06/20  1053   URIC ACID mg/dL 5.3       Imaging:  Imaging Results (Last 24 Hours)     Procedure Component Value Units Date/Time    CT Head Without Contrast [[id number]] Collected:  02/12/20 1410     Updated:  02/12/20 1410    Narrative:       CT SCAN  OF THE HEAD AND MAXILLOFACIAL REGION WITHOUT CONTRAST     CLINICAL HISTORY: Laceration of right eye and has bleeding from the  nares.     TECHNIQUE: CT scan of the head was obtained with 2 mm axial bone  algorithm and 3 mm axial soft tissue algorithm images. No intravenous  contrast was administered.     FINDINGS:  An acute subdural hematoma is identified lateral to the right frontal  and temporal lobes. Lateral to the right temporal lobe, this subdural  hematoma measures up to approximately 5 mm in diameter. An additional  subdural hematoma is seen lateral to the left parietal and occipital  lobes. This particular acute subdural hematoma measures up to  approximately 4-5 mm in maximal diameter. A thin subdural hematoma is  appreciated anterior to the right frontal lobe measuring up to  approximately 2 mm in diameter. There is also increased density along  the medial reflection of the right tentorium cerebelli where there may  be subtle subdural hemorrhage layering. Relatively localized areas of  subarachnoid hemorrhage are identified within the right sylvian fissure  as well as a sulcus along the medial aspect of left frontal lobe. Sulcal  effacement is identified at these sites of extra-axial hemorrhage.  However, there is no significant shift of the midline structures or  compartmental shift.     Otherwise, the gray-white matter differentiation is within normal  limits. The basal ganglia and thalami are unremarkable in appearance.  There are findings compatible with a chronic white matter infarct within  the right corona radiata measuring up to 7 mm in diameter. Old lacunar  disease is incidentally noted within the left lentiform nucleus.  Atherosclerotic changes are also incidentally identified.     There are multiple maxillofacial fractures including a fracture of the  right zygomatic arch, the lateral wall of the right orbit, the floor of  the right orbit, the anterior wall of the right maxillary sinus, and  the  posterior wall of the right maxillary sinus. Hematoma is appreciated  within the right maxillary sinus. Additionally, there is prominent  premaxillary and preseptal soft tissue hematoma in addition to a right  anterior frontal scalp hematoma.       Impression:       There are areas of subdural hematoma identified anterior to the right  frontal lobe, lateral to the right frontal and temporal lobes, and  lateral to the left parietal and occipital lobes. Additionally, there is  increased density along the medial reflection of the right tentorium  cerebelli where there may be some additional subdural hematoma.  Localized areas of subarachnoid hemorrhage are seen within a sulcus of  the medial portion of the left frontal lobe as well as within the right  sylvian fissure. Localized mass effect is seen with sulcal effacement.  However, there is no compartmental shift or significant shift of the  midline structures.     Multiple maxillofacial fractures are identified including fractures of  the right zygomatic arch, the lateral wall of the right orbit, the floor  of the right orbit, the anterior wall of the right maxillary sinus, the  posterior wall of the right maxillary sinus. These will be discussed in  greater detail in the maxillofacial CT portion of the report.     TECHNIQUE: CT scan of the maxillofacial region was obtained with 1 mm  axial, coronal, and sagittal images.     FINDINGS:  There is a fracture of the right zygomatic arch with overriding of  fracture components. There is a fracture which involves the lateral wall  of the right orbit with mild comminution. A fracture is seen involving  the right orbital floor and this fracture likely extends into the  infraorbital foramen. Comminuted fractures are identified within the  lateral wall of the right maxillary sinus. Additionally, comminuted  fractures are noted within the anterior wall of the right maxillary  sinus. There is a sliver of hematoma along the  undersurface of the right  inferior rectus muscle. Otherwise, there is no evidence for prolapse of  either the extraocular musculature or the retrobulbar fat through the  fracture plane. There is hematoma within the right maxillary sinus.  Additionally, there is hematoma within the premaxillary region, the  right preseptal soft tissues, and the right frontal scalp.     IMPRESSION:  There are multiple maxillofacial fractures which essentially constitute  a trimalar fracture and these fractures involve the right zygomatic  arch, the lateral wall of the right orbit, the right orbital floor, the  anterior wall of the right maxillary sinus, and the posterior wall of  the right maxillary sinus. Again, there is hematoma within the right  maxillary sinus. There is only a sliver of hematoma along the  undersurface of the right inferior rectus muscle although there is no  evidence for prolapse of the extraocular musculature or the orbital fat  through the fracture site. Incidental note is made of prominent right  preseptal soft tissue swelling in addition to prominent soft tissue  swelling within the right premaxillary region and a prominent size right  frontal convexity scalp hematoma.     These findings were discussed with Rita Hines on 02/12/2020 at  approximately 1:30 PM.      Radiation dose reduction techniques were utilized, including automated  exposure control and exposure modulation based on body size.             CT Facial Bones Without Contrast [[id number]] Collected:  02/12/20 1410     Updated:  02/12/20 1410    Narrative:       CT SCAN OF THE HEAD AND MAXILLOFACIAL REGION WITHOUT CONTRAST     CLINICAL HISTORY: Laceration of right eye and has bleeding from the  nares.     TECHNIQUE: CT scan of the head was obtained with 2 mm axial bone  algorithm and 3 mm axial soft tissue algorithm images. No intravenous  contrast was administered.     FINDINGS:  An acute subdural hematoma is identified lateral to the right  frontal  and temporal lobes. Lateral to the right temporal lobe, this subdural  hematoma measures up to approximately 5 mm in diameter. An additional  subdural hematoma is seen lateral to the left parietal and occipital  lobes. This particular acute subdural hematoma measures up to  approximately 4-5 mm in maximal diameter. A thin subdural hematoma is  appreciated anterior to the right frontal lobe measuring up to  approximately 2 mm in diameter. There is also increased density along  the medial reflection of the right tentorium cerebelli where there may  be subtle subdural hemorrhage layering. Relatively localized areas of  subarachnoid hemorrhage are identified within the right sylvian fissure  as well as a sulcus along the medial aspect of left frontal lobe. Sulcal  effacement is identified at these sites of extra-axial hemorrhage.  However, there is no significant shift of the midline structures or  compartmental shift.     Otherwise, the gray-white matter differentiation is within normal  limits. The basal ganglia and thalami are unremarkable in appearance.  There are findings compatible with a chronic white matter infarct within  the right corona radiata measuring up to 7 mm in diameter. Old lacunar  disease is incidentally noted within the left lentiform nucleus.  Atherosclerotic changes are also incidentally identified.     There are multiple maxillofacial fractures including a fracture of the  right zygomatic arch, the lateral wall of the right orbit, the floor of  the right orbit, the anterior wall of the right maxillary sinus, and the  posterior wall of the right maxillary sinus. Hematoma is appreciated  within the right maxillary sinus. Additionally, there is prominent  premaxillary and preseptal soft tissue hematoma in addition to a right  anterior frontal scalp hematoma.       Impression:       There are areas of subdural hematoma identified anterior to the right  frontal lobe, lateral to the right  frontal and temporal lobes, and  lateral to the left parietal and occipital lobes. Additionally, there is  increased density along the medial reflection of the right tentorium  cerebelli where there may be some additional subdural hematoma.  Localized areas of subarachnoid hemorrhage are seen within a sulcus of  the medial portion of the left frontal lobe as well as within the right  sylvian fissure. Localized mass effect is seen with sulcal effacement.  However, there is no compartmental shift or significant shift of the  midline structures.     Multiple maxillofacial fractures are identified including fractures of  the right zygomatic arch, the lateral wall of the right orbit, the floor  of the right orbit, the anterior wall of the right maxillary sinus, the  posterior wall of the right maxillary sinus. These will be discussed in  greater detail in the maxillofacial CT portion of the report.     TECHNIQUE: CT scan of the maxillofacial region was obtained with 1 mm  axial, coronal, and sagittal images.     FINDINGS:  There is a fracture of the right zygomatic arch with overriding of  fracture components. There is a fracture which involves the lateral wall  of the right orbit with mild comminution. A fracture is seen involving  the right orbital floor and this fracture likely extends into the  infraorbital foramen. Comminuted fractures are identified within the  lateral wall of the right maxillary sinus. Additionally, comminuted  fractures are noted within the anterior wall of the right maxillary  sinus. There is a sliver of hematoma along the undersurface of the right  inferior rectus muscle. Otherwise, there is no evidence for prolapse of  either the extraocular musculature or the retrobulbar fat through the  fracture plane. There is hematoma within the right maxillary sinus.  Additionally, there is hematoma within the premaxillary region, the  right preseptal soft tissues, and the right frontal scalp.        IMPRESSION:  There are multiple maxillofacial fractures which essentially constitute  a trimalar fracture and these fractures involve the right zygomatic  arch, the lateral wall of the right orbit, the right orbital floor, the  anterior wall of the right maxillary sinus, and the posterior wall of  the right maxillary sinus. Again, there is hematoma within the right  maxillary sinus. There is only a sliver of hematoma along the  undersurface of the right inferior rectus muscle although there is no  evidence for prolapse of the extraocular musculature or the orbital fat  through the fracture site. Incidental note is made of prominent right  preseptal soft tissue swelling in addition to prominent soft tissue  swelling within the right premaxillary region and a prominent size right  frontal convexity scalp hematoma.     These findings were discussed with Rita Hines on 02/12/2020 at  approximately 1:30 PM.      Radiation dose reduction techniques were utilized, including automated  exposure control and exposure modulation based on body size.             XR Hand 3+ View Right [[id number]] Collected:  02/12/20 1242     Updated:  02/12/20 1247    Narrative:       XR HAND 3+ VW RIGHT-     INDICATIONS: Trauma     TECHNIQUE: 3 views of the right hand     COMPARISON: None available     FINDINGS:     No acute fracture or dislocation is noted. 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 Mild distal soft tissue  swelling. Arterial calcifications are prominent. Follow-up/further  evaluation can be obtained as indications persist.       Impression:          As described.     This report was finalized on 2/12/2020 12:44 PM by Dr. Jose Stover M.D.       XR Elbow 2 View Right [[id number]] Collected:  02/12/20 1241     Updated:  02/12/20 1245    Narrative:       XR ELBOW 2 VW RIGHT-     INDICATIONS: Trauma     TECHNIQUE: 3 views  of the right elbow     COMPARISON: None available     FINDINGS:     Conspicuous soft tissue swelling is seen medially at the elbow. Arterial  calcifications are present. Surgical clips are noted at the upper arm.  No acute fracture, erosion, dislocation, or elbow effusion is  identified. Follow-up/further evaluation can be obtained as indications  persist.       Impression:          As described.     This report was finalized on 2/12/2020 12:42 PM by Dr. Jose Stover M.D.             EKG:  No orders to display       I reviewed the patient's new clinical results and medications.  I reviewed the patient's new imaging results and agree with the interpretation.  I personally viewed and interpreted the patient's EKG/Telemetry data.      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       Assessment and Plan:     Patient is an 80 y.o female who presented with a traumatic fall and was found to have SDH/SAH and periorbital fracture.       Traumatic SDH/SAH/ fall  - SDH to the right frontal, temporal and occipital lobes  - areas of SAH with mild mass effect with no shift  - consult DAVID, reviewed with APRN and ok for telemetry bed.      -neuro checks Q4H  -repeat CT head in am and PRN for any neurological decline  -PT to eval  -bedside swallow and if passed ok for diet, if fails make NPO and consult speech therapy.   -hold aspirin      Orbital fracture, closed, initial encounter  -extensive right multiple maxillofacial fractures  -consult maxillofacial surgery  - ICE PRN  -PRN pain meds    CKD4  -has shunt in place. Baseline creatinine 2.5-3 with GFR near 15. Currently labs are improved.   - monitor renal function closely and consider nephrology consult if symptoms worsen.       Anemia of chronic disease  - Hgb down to 7.6 from 8.7 on 2/6/20.  -Related to CKD, but likely to drop given active bleeds. Has required multiple transfusions in the past.   - Q6H H&H, type and screen  - PRN transfusion if hemoglobin drops below 7       GAVE/ Delayed gastric emptying  - may consider gastric emptying study or GI consult while here.       Chronic systolic CHF/ ICM/ s/p AICD  -last EF if normal. No recent cardiac symptoms. chronic BLE edema   -on BB and chronic lasix      Hypothyroidism  -resume home levothyroxine    Admit to neuro monitored bed  DVT prophylaxis: SCDs  Code status addressed: FULL CODE  Diet: HHRenal  Consult CCP to help with DC planning    I discussed the patients findings and my recommendations with patient, family and ER Staff and DAVID NP.      CHANTELL Cunningham  [facility]  02/12/20  2:49 PM    Dictated utilizing Dragon dictation

## 2020-02-12 NOTE — ED NOTES
Nursing report ED to floor  Charmaine Machuca  80 y.o.  female    HPI (triage note):   Chief Complaint   Patient presents with   • Fall   • Laceration       Admitting doctor:   Marie Macdonald MD    Admitting diagnosis:   The primary encounter diagnosis was Traumatic subdural hematoma without loss of consciousness, initial encounter (CMS/[facility]). Diagnoses of Subarachnoid hemorrhage following injury, no loss of consciousness, initial encounter (CMS/[facility]) and Multiple facial fractures, closed, initial encounter (CMS/[facility]) were also pertinent to this visit.    Code status:   Current Code Status     Date Active Code Status Order ID Comments User Context       2/12/2020 1505 CPR [id number]  Keisha Mckeon APRN ED       Questions for Current Code Status     Question Answer Comment    Code Status CPR     Medical Interventions (Level of Support Prior to Arrest) Full           Allergies:   Chlorhexidine; Codeine; and Propoxyphene    Weight:       02/12/20  1112   Weight: 77.5 kg (170 lb 12.8 oz)       Most recent vitals:   Vitals:    02/12/20 1245 02/12/20 1259 02/12/20 1330 02/12/20 1549   BP:   171/77 168/72   Pulse: 61 60 61 62   Resp:   15 17   Temp:       TempSrc:       SpO2:   100% 99%   Weight:       Height:           Active LDAs/IV Access:   Lines, Drains & Airways    Active LDAs     Name:   Placement date:   Placement time:   Site:   Days:    External Urinary Catheter   02/12/20    1311    --   less than 1    Single Lumen Implantable Port 10/31/17 Right Chest   10/31/17 estimated date per patient    -- unknown    Chest   834                Labs (abnormal labs have a star):   Labs Reviewed   COMPREHENSIVE METABOLIC PANEL - Abnormal; Notable for the following components:       Result Value    BUN 36 (*)     Creatinine 2.28 (*)     Total Protein 5.7 (*)     Albumin 3.20 (*)     eGFR Non  Amer 21 (*)     All other components within normal limits    Narrative:     GFR Normal >60  Chronic Kidney Disease <60  Kidney  Failure <15     APTT - Abnormal; Notable for the following components:    PTT 39.5 (*)     All other components within normal limits   CBC WITH AUTO DIFFERENTIAL - Abnormal; Notable for the following components:    RBC 2.54 (*)     Hemoglobin 7.6 (*)     Hematocrit 24.7 (*)     MCV 97.2 (*)     MCHC 30.8 (*)     Neutrophil % 83.1 (*)     Lymphocyte % 9.1 (*)     Lymphocytes, Absolute 0.53 (*)     All other components within normal limits   PROTIME-INR - Normal   URINALYSIS W/ MICROSCOPIC IF INDICATED (NO CULTURE)   HEMOGLOBIN AND HEMATOCRIT, BLOOD   TYPE AND SCREEN   PREPARE RBC   CBC AND DIFFERENTIAL    Narrative:     The following orders were created for panel order CBC & Differential.  Procedure                               Abnormality         Status                     ---------                               -----------         ------                     CBC Auto Differential[[id number]]        Abnormal            Final result                 Please view results for these tests on the individual orders.       EKG:   No orders to display       Meds given in ED:   Medications   HYDROmorphone (DILAUDID) injection 0.5 mg (has no administration in time range)   Tdap (BOOSTRIX) injection 0.5 mL (0.5 mL Intramuscular Given 2/12/20 1233)   acetaminophen (TYLENOL) tablet 1,000 mg (1,000 mg Oral Given 2/12/20 1258)   HYDROmorphone (DILAUDID) injection 0.5 mg (0.5 mg Intravenous Given 2/12/20 1444)   heparin injection 500 Units (500 Units Intravenous Given 2/12/20 1444)   lidocaine-EPINEPHrine (XYLOCAINE W/EPI) 1 %-1:291001 injection 10 mL (10 mL Injection Given by Other 2/12/20 1605)       Imaging results:  Xr Elbow 2 View Right    Result Date: 2/12/2020   As described.  This report was finalized on 2/12/2020 12:42 PM by Dr. Jose Stover M.D.      Xr Hand 3+ View Right    Result Date: 2/12/2020   As described.  This report was finalized on 2/12/2020 12:44 PM by Dr. Jose Stover M.D.      Ct Head Without  Contrast    Result Date: 2/12/2020  There are areas of subdural hematoma identified anterior to the right frontal lobe, lateral to the right frontal and temporal lobes, and lateral to the left parietal and occipital lobes. Additionally, there is increased density along the medial reflection of the right tentorium cerebelli where there may be some additional subdural hematoma. Localized areas of subarachnoid hemorrhage are seen within a sulcus of the medial portion of the left frontal lobe as well as within the right sylvian fissure. Localized mass effect is seen with sulcal effacement. However, there is no compartmental shift or significant shift of the midline structures.  Multiple maxillofacial fractures are identified including fractures of the right zygomatic arch, the lateral wall of the right orbit, the floor of the right orbit, the anterior wall of the right maxillary sinus, the posterior wall of the right maxillary sinus. These will be discussed in greater detail in the maxillofacial CT portion of the report.  TECHNIQUE: CT scan of the maxillofacial region was obtained with 1 mm axial, coronal, and sagittal images.  FINDINGS: There is a fracture of the right zygomatic arch with overriding of fracture components. There is a fracture which involves the lateral wall of the right orbit with mild comminution. A fracture is seen involving the right orbital floor and this fracture likely extends into the infraorbital foramen. Comminuted fractures are identified within the lateral wall of the right maxillary sinus. Additionally, comminuted fractures are noted within the anterior wall of the right maxillary sinus. There is a sliver of hematoma along the undersurface of the right inferior rectus muscle. Otherwise, there is no evidence for prolapse of either the extraocular musculature or the retrobulbar fat through the fracture plane. There is hematoma within the right maxillary sinus. Additionally, there is hematoma  within the premaxillary region, the right preseptal soft tissues, and the right frontal scalp.  IMPRESSION: There are multiple maxillofacial fractures which essentially constitute a trimalar fracture and these fractures involve the right zygomatic arch, the lateral wall of the right orbit, the right orbital floor, the anterior wall of the right maxillary sinus, and the posterior wall of the right maxillary sinus. Again, there is hematoma within the right maxillary sinus. There is only a sliver of hematoma along the undersurface of the right inferior rectus muscle although there is no evidence for prolapse of the extraocular musculature or the orbital fat through the fracture site. Incidental note is made of prominent right preseptal soft tissue swelling in addition to prominent soft tissue swelling within the right premaxillary region and a prominent size right frontal convexity scalp hematoma.  These findings were discussed with Rita Hines on 02/12/2020 at approximately 1:30 PM.  Radiation dose reduction techniques were utilized, including automated exposure control and exposure modulation based on body size.        Ct Facial Bones Without Contrast    Result Date: 2/12/2020  There are areas of subdural hematoma identified anterior to the right frontal lobe, lateral to the right frontal and temporal lobes, and lateral to the left parietal and occipital lobes. Additionally, there is increased density along the medial reflection of the right tentorium cerebelli where there may be some additional subdural hematoma. Localized areas of subarachnoid hemorrhage are seen within a sulcus of the medial portion of the left frontal lobe as well as within the right sylvian fissure. Localized mass effect is seen with sulcal effacement. However, there is no compartmental shift or significant shift of the midline structures.  Multiple maxillofacial fractures are identified including fractures of the right zygomatic arch, the  lateral wall of the right orbit, the floor of the right orbit, the anterior wall of the right maxillary sinus, the posterior wall of the right maxillary sinus. These will be discussed in greater detail in the maxillofacial CT portion of the report.  TECHNIQUE: CT scan of the maxillofacial region was obtained with 1 mm axial, coronal, and sagittal images.  FINDINGS: There is a fracture of the right zygomatic arch with overriding of fracture components. There is a fracture which involves the lateral wall of the right orbit with mild comminution. A fracture is seen involving the right orbital floor and this fracture likely extends into the infraorbital foramen. Comminuted fractures are identified within the lateral wall of the right maxillary sinus. Additionally, comminuted fractures are noted within the anterior wall of the right maxillary sinus. There is a sliver of hematoma along the undersurface of the right inferior rectus muscle. Otherwise, there is no evidence for prolapse of either the extraocular musculature or the retrobulbar fat through the fracture plane. There is hematoma within the right maxillary sinus. Additionally, there is hematoma within the premaxillary region, the right preseptal soft tissues, and the right frontal scalp.  IMPRESSION: There are multiple maxillofacial fractures which essentially constitute a trimalar fracture and these fractures involve the right zygomatic arch, the lateral wall of the right orbit, the right orbital floor, the anterior wall of the right maxillary sinus, and the posterior wall of the right maxillary sinus. Again, there is hematoma within the right maxillary sinus. There is only a sliver of hematoma along the undersurface of the right inferior rectus muscle although there is no evidence for prolapse of the extraocular musculature or the orbital fat through the fracture site. Incidental note is made of prominent right preseptal soft tissue swelling in addition to  prominent soft tissue swelling within the right premaxillary region and a prominent size right frontal convexity scalp hematoma.  These findings were discussed with Rita Hines on 02/12/2020 at approximately 1:30 PM.  Radiation dose reduction techniques were utilized, including automated exposure control and exposure modulation based on body size.          Ambulatory status:   - up with assistance    Social issues:   Social History     Socioeconomic History   • Marital status:      Spouse name: Zackery   • Number of children: 5   • Years of education: 1 yr college   • Highest education level: Not on file   Occupational History     Employer: RETIRED   Tobacco Use   • Smoking status: Never Smoker   • Smokeless tobacco: Never Used   Substance and Sexual Activity   • Alcohol use: No   • Drug use: No   • Sexual activity: Defer        Lisbet Stephens RN  02/12/20 [id number]

## 2020-02-12 NOTE — ED PROVIDER NOTES
Pt is a 80 y.o. female who presents to the ED complaining of head injury s/p mechanical fall just PTA. Pt states she tripped while going up steps and landed on the right side of her face, right elbow, and right hand.    On exam,  Right periorbital ecchymosis and 2 cm laceration to lateral right eye      Plan: Obtain CTs and XRs. Repair laceration      MD ATTESTATION NOTE    The ANDREW and I have discussed this patient's history, physical exam, and treatment plan.  I have reviewed the documentation and personally had a face to face interaction with the patient. I affirm the documentation and agree with the treatment and plan.  The attached note describes my personal findings.      Documentation assistance provided by tia Dial for Dr. Loera. Information recorded by the scribe was done at my direction and has been verified and validated by me.             Neno Dial  02/12/20 2083       Bj Wang MD  02/12/20 [id number]

## 2020-02-12 NOTE — ED NOTES
Patient presents to er via ems from [facility].  Patient had a mechanical fall striking right sided of her face, right elbow, right third finger and right thigh.  Small laceration to right eye and has some constant bleeding from right nare.       Lisbet Stephens RN  02/12/20 1130

## 2020-02-12 NOTE — ED PROVIDER NOTES
EMERGENCY DEPARTMENT ENCOUNTER    Room Number:  22/22  Date seen:  2/12/2020  Time seen: 11:02 AM  PCP: Fannie Godfrey MD    HPI:  Chief complaint: Facial laceration   Context:Charmaine Machuca is a 80 y.o. female who presents to the ED via EMS with c/o laceration and ecchymosis to R periorbital area s/p mechanical fall PTA. Pt reports she was walking [place] the steps at the [employer] when she tripped and fell forward, causing her to hit her face on the concrete. Pt affirms R elbow hematoma and R hand pain from the fall but denies additional injuries. Pt denies LOC. She denies headache, neck pain, nausea and vomiting and vision change. She denies being anticoagulated.     Onset: Sudden  Location:R periorbital area  Radiation: n/a  Duration: Onset PTA  Timing: Constant   Character:Laceration   Aggravating Factors: None  Alleviating Factors: None  Severity: Moderate      ALLERGIES  Chlorhexidine; Codeine; and Propoxyphene    PAST MEDICAL HISTORY  Active Ambulatory Problems     Diagnosis Date Noted   • Pacemaker lead failure 03/28/2016   • Chronic systolic congestive heart failure (CMS/HCC) 06/15/2016   • Cardiomyopathy (CMS/HCC) 06/15/2016   • Carpal tunnel syndrome 07/19/2016   • Periodic limb movement disorder 07/19/2016   • Peripheral neuropathy 07/19/2016   • Restless legs syndrome 07/19/2016   • Anemia 09/22/2016   • CKD (chronic kidney disease) 11/22/2016   • History of anemia due to chronic kidney disease 11/23/2016   • Iron deficiency anemia 04/13/2017   • Dysuria 07/17/2017   • Chronic adrenal insufficiency (CMS/HCC) 08/17/2013   • 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 08/05/2015   • Chest pain 11/29/2015   • Congestive heart failure (CMS/HCC) 08/15/2013   • Gastroparesis 08/15/2013   • Hypotension 08/15/2013   • Hypothyroidism 08/15/2013   • Myoclonus 08/15/2013   • Osteoarthritis 08/15/2013   • Automatic implantable cardioverter-defibrillator in situ 08/15/2013   • Spondylolisthesis 08/05/2015   •  History of total knee replacement 08/15/2013   • Anemia of chronic renal failure, stage 4 (severe) (CMS/HCC) 09/06/2017   • CKD (chronic kidney disease) 09/13/2017   • Anemia of chronic disease 09/26/2017   • Encounter for fitting and adjustment of vascular catheter 10/04/2017   • Adverse effect of iron or its compound, subsequent encounter 06/27/2019   • B12 deficiency 07/11/2019   • Weakness on left side of face 08/17/2019   • Chronic kidney disease, stage III (moderate) (CMS/[facility])     • Symptomatic anemia 08/29/2019   • Acute renal failure superimposed on chronic kidney disease (CMS/HCC) 08/29/2019   • History of recent stroke 08/30/2019   • Anemia due to acute blood loss 08/29/2019   • GAVE (gastric antral vascular ectasia) 09/01/2019     Resolved Ambulatory Problems     Diagnosis Date Noted   • No Resolved Ambulatory Problems     Past Medical History:   Diagnosis Date   • Anxiety    • CHF (congestive heart failure) (CMS/[facility])    • Chronic renal failure, stage 4 (severe) (CMS/HCC)    • Colon polyp    • Coronary artery disease    • Depression    • Dizzy    • GERD (gastroesophageal reflux disease)    • Gout    • Hemorrhoids    • Hiatal hernia    • History of Clostridium difficile colitis 2013   • History of kidney stones    • History of pancreatitis    • History of skin cancer    • History of transfusion    • Hyperlipidemia    • Hypertension    • Left bundle branch block    • Mitral and aortic valve disease    • Mitral valve insufficiency    • Pancytopenia (CMS/HCC)    • Presence of cardiac pacemaker    • Pulmonary hypertension (CMS/HCC)    • SOB (shortness of breath) on exertion    • Spinal stenosis    • Stroke (CMS/[facility])        PAST SURGICAL HISTORY  Past Surgical History:   Procedure Laterality Date   • APPENDECTOMY N/A    • ARTERIOVENOUS FISTULA/SHUNT SURGERY Right 2/18/2019    Procedure: RIGHT BASILIC FISTULA CREATION WITHOUT TRANSPOSITION;  Surgeon: Royer Dozier MD;  Location: [facility];  Service:  Vascular   • ARTERIOVENOUS FISTULA/SHUNT SURGERY Right 5/31/2019    Procedure: RIGHT 2ND STAGE BASILIC VEIN CREATION;  Surgeon: Royer Dozier MD;  Location: [facility] MAIN OR;  Service: Vascular   • CARDIAC CATHETERIZATION Left 03/28/2006    Arterial catheter insertion, cath left ventriculography, coronary angiography and left heart catheterization-Dr. Estevan Matamoros   • CARDIAC DEFIBRILLATOR PLACEMENT N/A 11/30/2007    Biventricular implantable cardioverter defibrillator-Dr. Leandro Huber   • CARDIAC ELECTROPHYSIOLOGY PROCEDURE N/A 10/28/2019    Procedure: GENERATOR CHANGE BI-V ICD  medtronic;  Surgeon: Leandro Huber MD;  Location: [facility] CATH INVASIVE LOCATION;  Service: Cardiology   • CATARACT EXTRACTION Bilateral 2011   • COLONOSCOPY N/A 2014    done at [facility]   • CYSTECTOMY N/A     Ovarian cystectomy   • ENDOSCOPY N/A 04/28/2006    EGD with biopsies. Paraesophageal hiatal hernia from 34 to 44 cm, antral gastritis-Dr. Nehemiah Beal   • ENDOSCOPY N/A 09/29/2004    Hiatal hernia, gastritis and an erosion of the pylorus-Dr. Yang Pavon   • ENDOSCOPY N/A 9/1/2019    Procedure: ESOPHAGOGASTRODUODENOSCOPY with APC;  Surgeon: Anuel Roach MD;  Location: [facility] ENDOSCOPY;  Service: Gastroenterology   • ENDOSCOPY N/A 1/28/2020    Procedure: ESOPHAGOGASTRODUODENOSCOPY with APC;  Surgeon: Anuel Roach MD;  Location: [facility] ENDOSCOPY;  Service: Gastroenterology   • ENTEROSCOPY SMALL BOWEL N/A 10/30/2006    Small bowel enteroscopy with biopsies-Dr. Rory Sevilla   • FLEXIBLE SIGMOIDOSCOPY N/A 09/29/2004    Unsuccessful colonoscopy due to poop prep, a very tortuous sigmoid, bowel prep in the form of enema given and a barium enema was obtained-Dr. Yang Pavon   • FRACTURE SURGERY  2015    Broke big toe   • HEMATOMA EVACUATION TRUNK N/A 02/07/2014    Pocket evacuation of hematoma at pacemaker site-Dr. Leandro Huber   • HEMORRHOIDECTOMY N/A 04/19/2004    Flexible sigmoidoscopy and  stapled hemorrhoidectomy-Dr. Yang aPvon   • HYSTERECTOMY Bilateral 1977   • IMPLANTABLE CARDIOVERTER DEFIBRILLATOR LEAD REPLACEMENT/POCKET REVISION Left 3/28/2016    Procedure: AUTOMATIC IMPLANTABLE CARDIOVERTER DEFIBRILLATOR LEAD REPLACEMENT WITH LASER LEAD EXTRACTION;  Surgeon: Leandro Huber MD;  Location: [facility] OR 18/19;  Service:    • IMPLANTABLE CARDIOVERTER DEFIBRILLATOR LEAD REPLACEMENT/POCKET REVISION N/A 01/13/2014    Biventricular ICD replacement-Dr. Jillian Huber   • LAPAROSCOPY REPAIR HIATAL HERNIA N/A 06/27/2006    Laparoscopic paraesophageal hiatal hernia repair with Nissen fundoplication and cholecystectomy-Dr. Sean Yoon   • NATALIE GONZALEZ ([facility]) PROCEDURE     • NE INSJ TUNNELED CVC W/O SUBQ PORT/ AGE 3 YR/> Right 10/3/2017    Procedure: Right internal jugular port placement;  Surgeon: Tiffanie Couch MD;  Location: [facility];  Service: General   • TOE SURGERY Left     SET BIG TOE   • TOTAL KNEE ARTHROPLASTY Left 08/2014   • VENOUS ACCESS DEVICE (PORT) INSERTION Right        FAMILY HISTORY  Family History   Problem Relation Age of Onset   • Coronary artery disease Other    • Dementia Other    • Kidney disease Other    • Arthritis Father    • Early death Father         Kidney failure   • Kidney disease Father    • Heart disease Mother    • Mental illness Mother         Dementia   • Malig Hyperthermia Neg Hx    • Colon cancer Neg Hx    • Colon polyps Neg Hx        SOCIAL HISTORY  Social History     Socioeconomic History   • Marital status:      Spouse name: Zackery   • Number of children: 5   • Years of education: 1 yr college   • Highest education level: Not on file   Occupational History     Employer: RETIRED   Tobacco Use   • Smoking status: Never Smoker   • Smokeless tobacco: Never Used   Substance and Sexual Activity   • Alcohol use: No   • Drug use: No   • Sexual activity: Defer       REVIEW OF SYSTEMS  Review of Systems   Constitutional: Negative  for chills and fever.   HENT: Negative.    Eyes: Negative.    Respiratory: Negative for shortness of breath.    Cardiovascular: Negative for chest pain.   Gastrointestinal: Negative for abdominal pain.   Genitourinary: Negative.    Musculoskeletal: Positive for arthralgias.   Skin: Positive for color change and wound.   Neurological: Negative.    Psychiatric/Behavioral: Negative.        PHYSICAL EXAM  ED Triage Vitals [02/12/20 1051]   Temp Heart Rate Resp BP SpO2   97.4 °F (36.3 °C) 87 20 146/82 99 %      Temp src Heart Rate Source Patient Position BP Location FiO2 (%)   Tympanic -- -- -- --     Physical Exam   Constitutional: No distress.   HENT:   Head: Normocephalic. Head is without raccoon's eyes and without Foss's sign.   Right Ear: No hemotympanum.   Left Ear: No hemotympanum.   Nose: No nasal septal hematoma.   Edema and ecchymosis to the R periorbital cheek and frontal areas, laceration to R upper cheek   Eyes: Pupils are equal, round, and reactive to light. EOM are normal.   Neck: Normal range of motion. No spinous process tenderness and no muscular tenderness present.   Cardiovascular: Normal rate and regular rhythm.   Pulmonary/Chest: Effort normal and breath sounds normal. No respiratory distress.   Abdominal: Soft. There is no tenderness.   Musculoskeletal: Normal range of motion. She exhibits no edema.        Cervical back: She exhibits no tenderness.        Thoracic back: She exhibits no tenderness.        Lumbar back: She exhibits no tenderness.   Hematoma to medial aspect of R elbow, ecchymosis and tenderness to R 3rd MCP, extremities are otherwise atraumatic and non-tender, full ROM in extremities x 4   Neurological: She is alert. She has normal sensation and normal strength. GCS score is 15.   Skin: Skin is warm.   Nursing note and vitals reviewed.      LAB RESULTS  Recent Results (from the past 24 hour(s))   CBC Auto Differential    Collection Time: 02/12/20  2:41 PM   Result Value Ref Range     WBC 5.82 3.40 - 10.80 10*3/mm3    RBC 2.54 (L) 3.77 - 5.28 10*6/mm3    Hemoglobin 7.6 (L) 12.0 - 15.9 g/dL    Hematocrit 24.7 (L) 34.0 - 46.6 %    MCV 97.2 (H) 79.0 - 97.0 fL    MCH 29.9 26.6 - 33.0 pg    MCHC 30.8 (L) 31.5 - 35.7 g/dL    RDW 14.5 12.3 - 15.4 %    RDW-SD 51.6 37.0 - 54.0 fl    MPV 9.2 6.0 - 12.0 fL    Platelets 188 140 - 450 10*3/mm3    Neutrophil % 83.1 (H) 42.7 - 76.0 %    Lymphocyte % 9.1 (L) 19.6 - 45.3 %    Monocyte % 6.2 5.0 - 12.0 %    Eosinophil % 1.2 0.3 - 6.2 %    Basophil % 0.2 0.0 - 1.5 %    Immature Grans % 0.2 0.0 - 0.5 %    Neutrophils, Absolute 4.84 1.70 - 7.00 10*3/mm3    Lymphocytes, Absolute 0.53 (L) 0.70 - 3.10 10*3/mm3    Monocytes, Absolute 0.36 0.10 - 0.90 10*3/mm3    Eosinophils, Absolute 0.07 0.00 - 0.40 10*3/mm3    Basophils, Absolute 0.01 0.00 - 0.20 10*3/mm3    Immature Grans, Absolute 0.01 0.00 - 0.05 10*3/mm3    nRBC 0.0 0.0 - 0.2 /100 WBC       I ordered the above labs and reviewed the results    RADIOLOGY  XR Elbow 2 View Right   Final Result       As described.       This report was finalized on 2/12/2020 12:42 PM by Dr. Jose Stover M.D.          XR Hand 3+ View Right   Final Result       As described.       This report was finalized on 2/12/2020 12:44 PM by Dr. Jose Stover M.D.          CT Head Without Contrast   Preliminary Result   There are areas of subdural hematoma identified anterior to the right   frontal lobe, lateral to the right frontal and temporal lobes, and   lateral to the left parietal and occipital lobes. Additionally, there is   increased density along the medial reflection of the right tentorium   cerebelli where there may be some additional subdural hematoma.   Localized areas of subarachnoid hemorrhage are seen within a sulcus of   the medial portion of the left frontal lobe as well as within the right   sylvian fissure. Localized mass effect is seen with sulcal effacement.   However, there is no compartmental shift or  significant shift of the   midline structures.       Multiple maxillofacial fractures are identified including fractures of   the right zygomatic arch, the lateral wall of the right orbit, the floor   of the right orbit, the anterior wall of the right maxillary sinus, the   posterior wall of the right maxillary sinus. These will be discussed in   greater detail in the maxillofacial CT portion of the report.       TECHNIQUE: CT scan of the maxillofacial region was obtained with 1 mm   axial, coronal, and sagittal images.       FINDINGS:   There is a fracture of the right zygomatic arch with overriding of   fracture components. There is a fracture which involves the lateral wall   of the right orbit with mild comminution. A fracture is seen involving   the right orbital floor and this fracture likely extends into the   infraorbital foramen. Comminuted fractures are identified within the   lateral wall of the right maxillary sinus. Additionally, comminuted   fractures are noted within the anterior wall of the right maxillary   sinus. There is a sliver of hematoma along the undersurface of the right   inferior rectus muscle. Otherwise, there is no evidence for prolapse of   either the extraocular musculature or the retrobulbar fat through the   fracture plane. There is hematoma within the right maxillary sinus.   Additionally, there is hematoma within the premaxillary region, the   right preseptal soft tissues, and the right frontal scalp.       IMPRESSION:   There are multiple maxillofacial fractures which essentially constitute   a trimalar fracture and these fractures involve the right zygomatic   arch, the lateral wall of the right orbit, the right orbital floor, the   anterior wall of the right maxillary sinus, and the posterior wall of   the right maxillary sinus. Again, there is hematoma within the right   maxillary sinus. There is only a sliver of hematoma along the   undersurface of the right inferior rectus  muscle although there is no   evidence for prolapse of the extraocular musculature or the orbital fat   through the fracture site. Incidental note is made of prominent right   preseptal soft tissue swelling in addition to prominent soft tissue   swelling within the right premaxillary region and a prominent size right   frontal convexity scalp hematoma.       These findings were discussed with Rita Hines on 02/12/2020 at   approximately 1:30 PM.        Radiation dose reduction techniques were utilized, including automated   exposure control and exposure modulation based on body size.                  CT Facial Bones Without Contrast   Preliminary Result   There are areas of subdural hematoma identified anterior to the right   frontal lobe, lateral to the right frontal and temporal lobes, and   lateral to the left parietal and occipital lobes. Additionally, there is   increased density along the medial reflection of the right tentorium   cerebelli where there may be some additional subdural hematoma.   Localized areas of subarachnoid hemorrhage are seen within a sulcus of   the medial portion of the left frontal lobe as well as within the right   sylvian fissure. Localized mass effect is seen with sulcal effacement.   However, there is no compartmental shift or significant shift of the   midline structures.       Multiple maxillofacial fractures are identified including fractures of   the right zygomatic arch, the lateral wall of the right orbit, the floor   of the right orbit, the anterior wall of the right maxillary sinus, the   posterior wall of the right maxillary sinus. These will be discussed in   greater detail in the maxillofacial CT portion of the report.       TECHNIQUE: CT scan of the maxillofacial region was obtained with 1 mm   axial, coronal, and sagittal images.       FINDINGS:   There is a fracture of the right zygomatic arch with overriding of   fracture components. There is a fracture which  involves the lateral wall   of the right orbit with mild comminution. A fracture is seen involving   the right orbital floor and this fracture likely extends into the   infraorbital foramen. Comminuted fractures are identified within the   lateral wall of the right maxillary sinus. Additionally, comminuted   fractures are noted within the anterior wall of the right maxillary   sinus. There is a sliver of hematoma along the undersurface of the right   inferior rectus muscle. Otherwise, there is no evidence for prolapse of   either the extraocular musculature or the retrobulbar fat through the   fracture plane. There is hematoma within the right maxillary sinus.   Additionally, there is hematoma within the premaxillary region, the   right preseptal soft tissues, and the right frontal scalp.       IMPRESSION:   There are multiple maxillofacial fractures which essentially constitute   a trimalar fracture and these fractures involve the right zygomatic   arch, the lateral wall of the right orbit, the right orbital floor, the   anterior wall of the right maxillary sinus, and the posterior wall of   the right maxillary sinus. Again, there is hematoma within the right   maxillary sinus. There is only a sliver of hematoma along the   undersurface of the right inferior rectus muscle although there is no   evidence for prolapse of the extraocular musculature or the orbital fat   through the fracture site. Incidental note is made of prominent right   preseptal soft tissue swelling in addition to prominent soft tissue   swelling within the right premaxillary region and a prominent size right   frontal convexity scalp hematoma.       These findings were discussed with Rita Hines on 02/12/2020 at   approximately 1:30 PM.        Radiation dose reduction techniques were utilized, including automated   exposure control and exposure modulation based on body size.                      I ordered the above noted radiological studies  "and reviewed the images on the PACS system. Spoke with  regarding imaging results.    MEDICATIONS GIVEN IN ER  Medications   Tdap (BOOSTRIX) injection 0.5 mL (0.5 mL Intramuscular Given 2/12/20 1233)   acetaminophen (TYLENOL) tablet 1,000 mg (1,000 mg Oral Given 2/12/20 1258)   HYDROmorphone (DILAUDID) injection 0.5 mg (0.5 mg Intravenous Given 2/12/20 1444)   heparin injection 500 Units (500 Units Intravenous Given 2/12/20 1444)         PROCEDURES  Procedures      PROGRESS AND CONSULTS    Progress Notes:           1240: Reviewed pt's history and workup with .  After a bedside evaluation;  agrees with the plan of care    1335: Spoke with  (radiology) regarding CT head and CT facial bones results- see final reads for details    1340: Rechecked pt who is resting comfortably and in NAD. Informed pt and spouse of all workup results including CT head and facial bones. Discussed plan to admit to ICU. Pt understands and agrees with the plan, all questions answered.    1401:  spoke with CHANTELL Ball (neurosurgery) who agrees to consult. Would like pt admitted to tele bed.     1420: Spoke with  ([facility]) who agreed to admit     Laceration repair performed by Wally Kemp PA-C      Disposition vitals:  /77   Pulse 61   Temp 97.4 °F (36.3 °C) (Tympanic)   Resp 15   Ht 170.2 cm (67\")   Wt 77.5 kg (170 lb 12.8 oz)   SpO2 100%   BMI 26.75 kg/m²       DIAGNOSIS  Final diagnoses:   Traumatic subdural hematoma without loss of consciousness, initial encounter (CMS/[facility])   Subarachnoid hemorrhage following injury, no loss of consciousness, initial encounter (CMS/[facility])   Multiple facial fractures, closed, initial encounter (CMS/[facility])         Documentation assistance provided by tia Stewart for Rita Hines PA-C.  Information recorded by the scribrebeca was done at my direction and has been verified and validated by me.                  Princess Stewart  02/12/20 1500     "   Rita Hines PA  02/13/20 2029

## 2020-02-12 NOTE — ED NOTES
Ambulated patient to the bathroom with rolling walker.  Patient had slightly unsteadiness but ambulated with stand by assist and returned to exam room.      Lisbet Stephens RN  02/12/20 9144

## 2020-02-12 NOTE — ED PROVIDER NOTES
Laceration Repair  Date/Time: 2/12/2020 3:15 PM  Performed by: Wally Kemp PA  Authorized by: Bj Wang MD     Consent:     Consent obtained:  Verbal    Consent given by:  Patient  Anesthesia (see MAR for exact dosages):     Anesthesia method:  Local infiltration    Local anesthetic:  Lidocaine 1% WITH epi  Laceration details:     Location:  Face    Face location:  R cheek    Length (cm):  2  Repair type:     Repair type:  Simple  Pre-procedure details:     Preparation:  Patient was prepped and draped in usual sterile fashion and imaging obtained to evaluate for foreign bodies  Exploration:     Wound exploration: wound explored through full range of motion and entire depth of wound probed and visualized    Treatment:     Area cleansed with:  Saline and Hibiclens    Amount of cleaning:  Standard    Irrigation solution:  Sterile saline    Irrigation method:  Syringe  Skin repair:     Repair method:  Sutures    Suture size:  6-0    Suture material:  Nylon    Number of sutures:  4  Post-procedure details:     Dressing:  Open (no dressing)    Patient tolerance of procedure:  Tolerated well, no immediate complications         Anastasiya Moses  02/12/20 1545       Wally Kemp PA  02/12/20 1557

## 2020-02-13 ENCOUNTER — APPOINTMENT (OUTPATIENT)
Dept: ONCOLOGY | Facility: HOSPITAL | Age: 80
End: 2020-02-13

## 2020-02-13 ENCOUNTER — APPOINTMENT (OUTPATIENT)
Dept: CT IMAGING | Facility: HOSPITAL | Age: 80
End: 2020-02-13

## 2020-02-13 VITALS
WEIGHT: 170.8 LBS | OXYGEN SATURATION: 95 % | RESPIRATION RATE: 18 BRPM | BODY MASS INDEX: 26.81 KG/M2 | SYSTOLIC BLOOD PRESSURE: 120 MMHG | HEART RATE: 72 BPM | DIASTOLIC BLOOD PRESSURE: 57 MMHG | TEMPERATURE: 98.1 F | HEIGHT: 67 IN

## 2020-02-13 DIAGNOSIS — S06.5XAA SDH (SUBDURAL HEMATOMA) (HCC): Primary | ICD-10-CM

## 2020-02-13 LAB
ABO + RH BLD: NORMAL
ALBUMIN SERPL-MCNC: 2.8 G/DL (ref 3.5–5.2)
ANION GAP SERPL CALCULATED.3IONS-SCNC: 12.4 MMOL/L (ref 5–15)
BACTERIA UR QL AUTO: ABNORMAL /HPF
BH BB BLOOD EXPIRATION DATE: NORMAL
BH BB BLOOD TYPE BARCODE: 600
BH BB DISPENSE STATUS: NORMAL
BH BB PRODUCT CODE: NORMAL
BH BB UNIT NUMBER: NORMAL
BILIRUB UR QL STRIP: NEGATIVE
BUN BLD-MCNC: 40 MG/DL (ref 8–23)
BUN/CREAT SERPL: 15.6 (ref 7–25)
CALCIUM SPEC-SCNC: 8.7 MG/DL (ref 8.6–10.5)
CHLORIDE SERPL-SCNC: 105 MMOL/L (ref 98–107)
CLARITY UR: CLEAR
CO2 SERPL-SCNC: 25.6 MMOL/L (ref 22–29)
COLOR UR: YELLOW
CREAT BLD-MCNC: 2.56 MG/DL (ref 0.57–1)
DEPRECATED RDW RBC AUTO: 48.1 FL (ref 37–54)
ERYTHROCYTE [DISTWIDTH] IN BLOOD BY AUTOMATED COUNT: 14.2 % (ref 12.3–15.4)
GFR SERPL CREATININE-BSD FRML MDRD: 18 ML/MIN/1.73
GLUCOSE BLD-MCNC: 108 MG/DL (ref 65–99)
GLUCOSE UR STRIP-MCNC: NEGATIVE MG/DL
HCT VFR BLD AUTO: 20.9 % (ref 34–46.6)
HCT VFR BLD AUTO: 25.3 % (ref 34–46.6)
HCT VFR BLD AUTO: 25.4 % (ref 34–46.6)
HCT VFR BLD AUTO: 25.4 % (ref 34–46.6)
HGB BLD-MCNC: 6.5 G/DL (ref 12–15.9)
HGB BLD-MCNC: 8 G/DL (ref 12–15.9)
HGB BLD-MCNC: 8.2 G/DL (ref 12–15.9)
HGB BLD-MCNC: 8.2 G/DL (ref 12–15.9)
HGB UR QL STRIP.AUTO: NEGATIVE
HYALINE CASTS UR QL AUTO: ABNORMAL /LPF
KETONES UR QL STRIP: NEGATIVE
LEUKOCYTE ESTERASE UR QL STRIP.AUTO: ABNORMAL
MCH RBC QN AUTO: 30.6 PG (ref 26.6–33)
MCHC RBC AUTO-ENTMCNC: 32.3 G/DL (ref 31.5–35.7)
MCV RBC AUTO: 94.8 FL (ref 79–97)
NITRITE UR QL STRIP: NEGATIVE
PH UR STRIP.AUTO: 6 [PH] (ref 5–8)
PHOSPHATE SERPL-MCNC: 4.2 MG/DL (ref 2.5–4.5)
PLATELET # BLD AUTO: 188 10*3/MM3 (ref 140–450)
PMV BLD AUTO: 9.9 FL (ref 6–12)
POTASSIUM BLD-SCNC: 4.2 MMOL/L (ref 3.5–5.2)
PROT UR QL STRIP: NEGATIVE
RBC # BLD AUTO: 2.68 10*6/MM3 (ref 3.77–5.28)
RBC # UR: ABNORMAL /HPF
REF LAB TEST METHOD: ABNORMAL
SODIUM BLD-SCNC: 143 MMOL/L (ref 136–145)
SP GR UR STRIP: 1.01 (ref 1–1.03)
SQUAMOUS #/AREA URNS HPF: ABNORMAL /HPF
UNIT  ABO: NORMAL
UNIT  RH: NORMAL
UROBILINOGEN UR QL STRIP: ABNORMAL
WBC NRBC COR # BLD: 5.67 10*3/MM3 (ref 3.4–10.8)
WBC UR QL AUTO: ABNORMAL /HPF

## 2020-02-13 PROCEDURE — 25010000002 HYDROMORPHONE PER 4 MG: Performed by: NURSE PRACTITIONER

## 2020-02-13 PROCEDURE — 25010000003 HEPARIN LOCK FLUCH PER 10 UNITS: Performed by: HOSPITALIST

## 2020-02-13 PROCEDURE — 85018 HEMOGLOBIN: CPT | Performed by: NURSE PRACTITIONER

## 2020-02-13 PROCEDURE — 97162 PT EVAL MOD COMPLEX 30 MIN: CPT

## 2020-02-13 PROCEDURE — 97110 THERAPEUTIC EXERCISES: CPT

## 2020-02-13 PROCEDURE — 96376 TX/PRO/DX INJ SAME DRUG ADON: CPT

## 2020-02-13 PROCEDURE — P9016 RBC LEUKOCYTES REDUCED: HCPCS

## 2020-02-13 PROCEDURE — 70450 CT HEAD/BRAIN W/O DYE: CPT

## 2020-02-13 PROCEDURE — 80069 RENAL FUNCTION PANEL: CPT | Performed by: NURSE PRACTITIONER

## 2020-02-13 PROCEDURE — 99213 OFFICE O/P EST LOW 20 MIN: CPT | Performed by: NURSE PRACTITIONER

## 2020-02-13 PROCEDURE — 85014 HEMATOCRIT: CPT | Performed by: NURSE PRACTITIONER

## 2020-02-13 PROCEDURE — G0378 HOSPITAL OBSERVATION PER HR: HCPCS

## 2020-02-13 PROCEDURE — 86900 BLOOD TYPING SEROLOGIC ABO: CPT

## 2020-02-13 PROCEDURE — 81001 URINALYSIS AUTO W/SCOPE: CPT | Performed by: NURSE PRACTITIONER

## 2020-02-13 PROCEDURE — 85027 COMPLETE CBC AUTOMATED: CPT | Performed by: NURSE PRACTITIONER

## 2020-02-13 PROCEDURE — 36430 TRANSFUSION BLD/BLD COMPNT: CPT

## 2020-02-13 RX ORDER — POLYETHYLENE GLYCOL 3350 17 G/17G
17 POWDER, FOR SOLUTION ORAL DAILY PRN
Status: DISCONTINUED | OUTPATIENT
Start: 2020-02-13 | End: 2020-02-13 | Stop reason: HOSPADM

## 2020-02-13 RX ORDER — HYDROCODONE BITARTRATE AND ACETAMINOPHEN 5; 325 MG/1; MG/1
1 TABLET ORAL EVERY 6 HOURS PRN
Qty: 12 TABLET | Refills: 0 | Status: SHIPPED | OUTPATIENT
Start: 2020-02-13 | End: 2021-01-01 | Stop reason: ALTCHOICE

## 2020-02-13 RX ORDER — ONDANSETRON 2 MG/ML
4 INJECTION INTRAMUSCULAR; INTRAVENOUS EVERY 6 HOURS PRN
Status: DISCONTINUED | OUTPATIENT
Start: 2020-02-13 | End: 2020-02-13 | Stop reason: HOSPADM

## 2020-02-13 RX ORDER — PSYLLIUM SEED (WITH DEXTROSE)
2 POWDER (GRAM) ORAL DAILY
Status: DISCONTINUED | OUTPATIENT
Start: 2020-02-13 | End: 2020-02-13 | Stop reason: HOSPADM

## 2020-02-13 RX ORDER — NITROGLYCERIN 0.4 MG/1
0.4 TABLET SUBLINGUAL
Status: DISCONTINUED | OUTPATIENT
Start: 2020-02-13 | End: 2020-02-13 | Stop reason: HOSPADM

## 2020-02-13 RX ORDER — SODIUM CHLORIDE 0.9 % (FLUSH) 0.9 %
10 SYRINGE (ML) INJECTION EVERY 12 HOURS SCHEDULED
Status: DISCONTINUED | OUTPATIENT
Start: 2020-02-13 | End: 2020-02-13 | Stop reason: HOSPADM

## 2020-02-13 RX ORDER — ACETAMINOPHEN 325 MG/1
650 TABLET ORAL EVERY 4 HOURS PRN
Start: 2020-02-13

## 2020-02-13 RX ORDER — SODIUM CHLORIDE 0.9 % (FLUSH) 0.9 %
10 SYRINGE (ML) INJECTION AS NEEDED
Status: DISCONTINUED | OUTPATIENT
Start: 2020-02-13 | End: 2020-02-13 | Stop reason: HOSPADM

## 2020-02-13 RX ORDER — ACETAMINOPHEN 325 MG/1
650 TABLET ORAL EVERY 4 HOURS PRN
Status: DISCONTINUED | OUTPATIENT
Start: 2020-02-13 | End: 2020-02-13 | Stop reason: HOSPADM

## 2020-02-13 RX ORDER — HEPARIN SODIUM (PORCINE) LOCK FLUSH IV SOLN 100 UNIT/ML 100 UNIT/ML
5 SOLUTION INTRAVENOUS AS NEEDED
Status: DISCONTINUED | OUTPATIENT
Start: 2020-02-13 | End: 2020-02-13 | Stop reason: HOSPADM

## 2020-02-13 RX ADMIN — HYDROMORPHONE HYDROCHLORIDE 0.5 MG: 1 INJECTION, SOLUTION INTRAMUSCULAR; INTRAVENOUS; SUBCUTANEOUS at 13:05

## 2020-02-13 RX ADMIN — Medication 2 WAFER: at 10:17

## 2020-02-13 RX ADMIN — Medication 500 UNITS: at 17:37

## 2020-02-13 RX ADMIN — ROTIGOTINE 1 PATCH: 4 PATCH, EXTENDED RELEASE TRANSDERMAL at 02:31

## 2020-02-13 RX ADMIN — SODIUM CHLORIDE, PRESERVATIVE FREE 10 ML: 5 INJECTION INTRAVENOUS at 03:45

## 2020-02-13 RX ADMIN — HYDROMORPHONE HYDROCHLORIDE 0.5 MG: 1 INJECTION, SOLUTION INTRAMUSCULAR; INTRAVENOUS; SUBCUTANEOUS at 05:54

## 2020-02-13 RX ADMIN — SODIUM CHLORIDE, PRESERVATIVE FREE 10 ML: 5 INJECTION INTRAVENOUS at 10:18

## 2020-02-13 RX ADMIN — ROTIGOTINE 2 PATCH: 1 PATCH, EXTENDED RELEASE TRANSDERMAL at 02:31

## 2020-02-13 NOTE — PLAN OF CARE
Problem: Patient Care Overview  Goal: Plan of Care Review  Flowsheets  Taken 2/13/2020 1003 by Emma Davis PT  Progress: improving  Outcome Summary: Pt doing well this am. She was brought to hospital yesterday after experiencing a fall when she missed a step trying to go to [facility]. Pt currently c/o soreness on R side. Pt previously independent with all mobility and ADl's. Her  does the driving, but pt is otherwise independent. Currently, pt moving well and able to ambulate over 300 ft without any difficulty. She is hopeful to DC home soon and denies any further acute PT needs at this time. PT will sign off. Informed RN.   Taken 2/13/2020 0508 by Bartolome Herrmann RN  Plan of Care Reviewed With: patient

## 2020-02-13 NOTE — PLAN OF CARE
Problem: Patient Care Overview  Goal: Plan of Care Review  Flowsheets (Taken 2/13/2020 0504)  Progress: no change  Plan of Care Reviewed With: patient  Outcome Summary: Arrived from ED, reports having fallen on a sidewalk after tripping on a step. Patient reports little pain, VSS, A&Ox4. Patient recieved 1 unit of blood, no evidence of a reaction. awaiting AM labs.

## 2020-02-13 NOTE — DISCHARGE SUMMARY
untitled image    Discharge Summary         Patient Name: Charmaine Machuca    Age/Sex: 80 y.o. female    : 1940    MRN: [id number]    Patient Care Team:    Fannie Godfrey MD as PCP - General (Internal Medicine)    Anuel Scott MD as PCP - Claims Attributed    Anuel Scott MD as Consulting Physician (Hematology and Oncology)    Fannie Godfrey MD as Referring Physician (Internal Medicine)    Estevan Matamoros MD as Consulting Physician (Cardiology)    Parveen Abraham MD as Consulting Physician (Nephrology)    Ondina Haro MD as Consulting Physician (Neurology)    Estevan Oropeza MD as Consulting Physician (Hematology and Oncology)    Neno Merritt II, MD as Consulting Physician (Hematology and Oncology)           Hospital Course            Date of Admit: 2020    Date of Discharge:  20     Discharge Condition: Stable         Discharge Diagnoses:      Subarachnoid hemorrhage (CMS/HCC)      Chronic systolic congestive heart failure (CMS/HCC)      Cardiomyopathy (CMS/HCC)      Hypothyroidism      Automatic implantable cardioverter-defibrillator in situ      Anemia of chronic renal failure, stage 4 (severe) (CMS/HCC)      CKD (chronic kidney disease) stage 4, GFR 15-29 ml/min (CMS/HCC)      GAVE (gastric antral vascular ectasia)      Delayed gastric emptying      Traumatic subdural hematoma without loss of consciousness (CMS/HCC)      Orbital fracture, closed, initial encounter      Subdural hematoma, post-traumatic (CMS/HCC)      Fall         Present on Admission:    • Anemia of chronic renal failure, stage 4 (severe) (CMS/HCC)    • Automatic implantable cardioverter-defibrillator in situ    • Cardiomyopathy (CMS/HCC)    • Chronic systolic congestive heart failure (CMS/HCC)    • GAVE (gastric antral vascular ectasia)    • Hypothyroidism    • Traumatic subdural hematoma without loss of consciousness (CMS/HCC)              Hospital Course:     Charmaine Machuca  [facility]ed [facility] complaints of headache and right sided pain after sustaining a fall. Please see the admitting history and physical for further details. She was found to have periorbital fractures, anemia, small subdural hematoma and bilateral small traumatic subarachnoid hemorrhages and was admitted to the hospital for further evaluation and treatment. Patient was evaluated by neurosurgery and monitored closely with no neurological decline.          Aspirin was stopped. Her hemoglobin dropped to 6.5 and she was transfused with 1 unit PRBC with improvement in hemoglobin to 8.2 and remained stable. Repeat CT of head showed no significant change and patient was cleared for discharge. She will have a repeat CT of head on 3/5/20 and follow up with APRN with [facility] at that time. She will need a repeat CBC on Monday and close follow up with hematology for any further blood transfusions. She was encouraged to return to the ER for any sign of neurological decline. Maxillofacial surgery was consulted, but had never seen the patient and referral was given to see the         GIOVANNI was reviewed and patient was given a prescription for hydrocodone for pain, which she tolerated well in the past. She was instructed to use sparingly and supplement with tylenol, but to avoid any NSAIDs.          Consults:     IP CONSULT TO NEUROSURGERY    IP CONSULT TO PULMONOLOGY    IP CONSULT TO HOSPITALIST    IP CONSULT TO NEUROSURGERY    IP CONSULT TO ORAL & MAXILLOFACIAL SURGERY    IP CONSULT TO CASE MANAGEMENT     IP CONSULT TO SPIRITUAL CARE           Significant Discharge Diagnostics       Procedures Performed:                  Pertinent Lab Results:            Results from last 7 days       Lab     Units     02/13/20    0558     02/12/20    1441       SODIUM     mmol/L     143     141       POTASSIUM     mmol/L     4.2     3.5       CHLORIDE     mmol/L     105     101       CO2     mmol/L      25.6     28.6       BUN     mg/dL     40*     36*       CREATININE     mg/dL     2.56*     2.28*       GLUCOSE     mg/dL     108*     99       CALCIUM     mg/dL     8.7     9.4       AST (SGOT)     U/L      --      17       ALT (SGPT)     U/L      --      <5                            Results from last 7 days       Lab     Units     02/13/20    0558     02/12/20    2355     02/12/20 2018 02/12/20    1441       WBC     10*3/mm3     5.67      --       --      5.82       HEMOGLOBIN     g/dL     8.2*  8.2*     6.5*     7.1*     7.6*       HEMATOCRIT     %     25.4*  25.4*     20.9*     22.2*     24.7*       PLATELETS     10*3/mm3     188      --       --      188       MCV     fL     94.8      --       --      97.2*       MCH     pg     30.6      --       --      29.9       MCHC     g/dL     32.3      --       --      30.8*       RDW     %     14.2      --       --      14.5       RDW-SD     fl     48.1      --       --      51.6       MPV     fL     9.9      --       --      9.2       NEUTROPHIL %     %      --       --       --      83.1*       LYMPHOCYTE %     %      --       --       --      9.1*       MONOCYTES %     %      --       --       --      6.2       EOSINOPHIL %     %      --       --       --      1.2       BASOPHIL %     %      --       --       --      0.2       IMM GRAN %     %      --       --       --      0.2       NEUTROS ABS     10*3/mm3      --       --       --      4.84       LYMPHS ABS     10*3/mm3      --       --       --      0.53*       MONOS ABS     10*3/mm3      --       --       --      0.36       EOS ABS     10*3/mm3      --       --       --      0.07       BASOS ABS     10*3/mm3      --       --       --      0.01       IMMATURE GRANS (ABS)     10*3/mm3      --       --       --      0.01       NRBC     /100 WBC      --       --       --      0.0                   Results from last 7 days       Lab     Units     02/12/20    1441       INR           1.02       APTT      seconds     39.5*                             Invalid input(s): LDLCALC                                                     Results from last 7 days       Lab     Units     02/13/20    1043       NITRITE UA           Negative       WBC UA     /HPF     3-5*       BACTERIA UA     /HPF     None Seen       SQUAM EPITHEL UA     /HPF     3-6*                             Imaging Results:               Imaging Results (Most Recent)                    Procedure       Component       Value       Units       Date/Time               CT Head Without Contrast [[id number]]     Collected:  02/13/20 0540                   Updated:  02/13/20 0550             Narrative:                CT HEAD WITHOUT CONTRAST         HISTORY: Subdural hematoma. Subarachnoid hemorrhage.         COMPARISON: 02/12/2020         TECHNIQUE: Axial CT imaging was obtained from the vertex of the skull    through the skull base. No IV contrast was administered.         FINDINGS:    Anterior right frontal lobe subdural hematoma is again noted. This    appears stable in size. It measures roughly 3 to 4 mm. The patient is    noted to have foci of subarachnoid hemorrhage. These are seen within the    left parafalcine region, as well as within the right sylvian fissure.    Again, but this not appear significantly changed. Left parietal subdural    hematoma again measures about 4 mm. An additional small amount of    hemorrhage layers over the left tentorium. There is no midline shift or    mass effect. Air-fluid level is again noted within the right maxillary    sinus. Please see the report from the maxillofacial CT for full    description of those findings. Mastoid air cells appear clear. Hematoma    is again noted overlying the right zygomatic arch. There is extensive    soft tissue swelling overlying the right side of the face. Similar    findings were present on the prior study.                  Impression:                No significant interval change in  areas of subdural and subarachnoid    hemorrhage seen within both cerebral hemispheres.         Radiation dose reduction techniques were utilized, including automated    exposure control and exposure modulation based on body size.         This report was finalized on 2/13/2020 5:47 AM by Dr. Nancy Shipley M.D.                  CT Head Without Contrast [[id number]]     Collected:  02/12/20 1410                   Updated:  02/12/20 1410             Narrative:                CT SCAN OF THE HEAD AND MAXILLOFACIAL REGION WITHOUT CONTRAST         CLINICAL HISTORY: Laceration of right eye and has bleeding from the    nares.         TECHNIQUE: CT scan of the head was obtained with 2 mm axial bone    algorithm and 3 mm axial soft tissue algorithm images. No intravenous    contrast was administered.         FINDINGS:    An acute subdural hematoma is identified lateral to the right frontal    and temporal lobes. Lateral to the right temporal lobe, this subdural    hematoma measures up to approximately 5 mm in diameter. An additional    subdural hematoma is seen lateral to the left parietal and occipital    lobes. This particular acute subdural hematoma measures up to    approximately 4-5 mm in maximal diameter. A thin subdural hematoma is    appreciated anterior to the right frontal lobe measuring up to    approximately 2 mm in diameter. There is also increased density along    the medial reflection of the right tentorium cerebelli where there may    be subtle subdural hemorrhage layering. Relatively localized areas of    subarachnoid hemorrhage are identified within the right sylvian fissure    as well as a sulcus along the medial aspect of left frontal lobe. Sulcal    effacement is identified at these sites of extra-axial hemorrhage.    However, there is no significant shift of the midline structures or    compartmental shift.         Otherwise, the gray-white matter differentiation is within normal    limits. The  basal ganglia and thalami are unremarkable in appearance.    There are findings compatible with a chronic white matter infarct within    the right corona radiata measuring up to 7 mm in diameter. Old lacunar    disease is incidentally noted within the left lentiform nucleus.    Atherosclerotic changes are also incidentally identified.         There are multiple maxillofacial fractures including a fracture of the    right zygomatic arch, the lateral wall of the right orbit, the floor of    the right orbit, the anterior wall of the right maxillary sinus, and the    posterior wall of the right maxillary sinus. Hematoma is appreciated    within the right maxillary sinus. Additionally, there is prominent    premaxillary and preseptal soft tissue hematoma in addition to a right    anterior frontal scalp hematoma.                  Impression:                There are areas of subdural hematoma identified anterior to the right    frontal lobe, lateral to the right frontal and temporal lobes, and    lateral to the left parietal and occipital lobes. Additionally, there is    increased density along the medial reflection of the right tentorium    cerebelli where there may be some additional subdural hematoma.    Localized areas of subarachnoid hemorrhage are seen within a sulcus of    the medial portion of the left frontal lobe as well as within the right    sylvian fissure. Localized mass effect is seen with sulcal effacement.    However, there is no compartmental shift or significant shift of the    midline structures.         Multiple maxillofacial fractures are identified including fractures of    the right zygomatic arch, the lateral wall of the right orbit, the floor    of the right orbit, the anterior wall of the right maxillary sinus, the    posterior wall of the right maxillary sinus. These will be discussed in    greater detail in the maxillofacial CT portion of the report.         TECHNIQUE: CT scan of the  maxillofacial region was obtained with 1 mm    axial, coronal, and sagittal images.         FINDINGS:    There is a fracture of the right zygomatic arch with overriding of    fracture components. There is a fracture which involves the lateral wall    of the right orbit with mild comminution. A fracture is seen involving    the right orbital floor and this fracture likely extends into the    infraorbital foramen. Comminuted fractures are identified within the    lateral wall of the right maxillary sinus. Additionally, comminuted    fractures are noted within the anterior wall of the right maxillary    sinus. There is a sliver of hematoma along the undersurface of the right    inferior rectus muscle. Otherwise, there is no evidence for prolapse of    either the extraocular musculature or the retrobulbar fat through the    fracture plane. There is hematoma within the right maxillary sinus.    Additionally, there is hematoma within the premaxillary region, the    right preseptal soft tissues, and the right frontal scalp.         IMPRESSION:    There are multiple maxillofacial fractures which essentially constitute    a trimalar fracture and these fractures involve the right zygomatic    arch, the lateral wall of the right orbit, the right orbital floor, the    anterior wall of the right maxillary sinus, and the posterior wall of    the right maxillary sinus. Again, there is hematoma within the right    maxillary sinus. There is only a sliver of hematoma along the    undersurface of the right inferior rectus muscle although there is no    evidence for prolapse of the extraocular musculature or the orbital fat    through the fracture site. Incidental note is made of prominent right    preseptal soft tissue swelling in addition to prominent soft tissue    swelling within the right premaxillary region and a prominent size right    frontal convexity scalp hematoma.         These findings were discussed with Rita Hines on  02/12/2020 at    approximately 1:30 PM.          Radiation dose reduction techniques were utilized, including automated    exposure control and exposure modulation based on body size.                            CT Facial Bones Without Contrast [[id number]]     Collected:  02/12/20 1410                   Updated:  02/12/20 1410             Narrative:                CT SCAN OF THE HEAD AND MAXILLOFACIAL REGION WITHOUT CONTRAST         CLINICAL HISTORY: Laceration of right eye and has bleeding from the    nares.         TECHNIQUE: CT scan of the head was obtained with 2 mm axial bone    algorithm and 3 mm axial soft tissue algorithm images. No intravenous    contrast was administered.         FINDINGS:    An acute subdural hematoma is identified lateral to the right frontal    and temporal lobes. Lateral to the right temporal lobe, this subdural    hematoma measures up to approximately 5 mm in diameter. An additional    subdural hematoma is seen lateral to the left parietal and occipital    lobes. This particular acute subdural hematoma measures up to    approximately 4-5 mm in maximal diameter. A thin subdural hematoma is    appreciated anterior to the right frontal lobe measuring up to    approximately 2 mm in diameter. There is also increased density along    the medial reflection of the right tentorium cerebelli where there may    be subtle subdural hemorrhage layering. Relatively localized areas of    subarachnoid hemorrhage are identified within the right sylvian fissure    as well as a sulcus along the medial aspect of left frontal lobe. Sulcal    effacement is identified at these sites of extra-axial hemorrhage.    However, there is no significant shift of the midline structures or    compartmental shift.         Otherwise, the gray-white matter differentiation is within normal    limits. The basal ganglia and thalami are unremarkable in appearance.    There are findings compatible with a chronic white matter  infarct within    the right corona radiata measuring up to 7 mm in diameter. Old lacunar    disease is incidentally noted within the left lentiform nucleus.    Atherosclerotic changes are also incidentally identified.         There are multiple maxillofacial fractures including a fracture of the    right zygomatic arch, the lateral wall of the right orbit, the floor of    the right orbit, the anterior wall of the right maxillary sinus, and the    posterior wall of the right maxillary sinus. Hematoma is appreciated    within the right maxillary sinus. Additionally, there is prominent    premaxillary and preseptal soft tissue hematoma in addition to a right    anterior frontal scalp hematoma.                  Impression:                There are areas of subdural hematoma identified anterior to the right    frontal lobe, lateral to the right frontal and temporal lobes, and    lateral to the left parietal and occipital lobes. Additionally, there is    increased density along the medial reflection of the right tentorium    cerebelli where there may be some additional subdural hematoma.    Localized areas of subarachnoid hemorrhage are seen within a sulcus of    the medial portion of the left frontal lobe as well as within the right    sylvian fissure. Localized mass effect is seen with sulcal effacement.    However, there is no compartmental shift or significant shift of the    midline structures.         Multiple maxillofacial fractures are identified including fractures of    the right zygomatic arch, the lateral wall of the right orbit, the floor    of the right orbit, the anterior wall of the right maxillary sinus, the    posterior wall of the right maxillary sinus. These will be discussed in    greater detail in the maxillofacial CT portion of the report.         TECHNIQUE: CT scan of the maxillofacial region was obtained with 1 mm    axial, coronal, and sagittal images.         FINDINGS:    There is a fracture of  the right zygomatic arch with overriding of    fracture components. There is a fracture which involves the lateral wall    of the right orbit with mild comminution. A fracture is seen involving    the right orbital floor and this fracture likely extends into the    infraorbital foramen. Comminuted fractures are identified within the    lateral wall of the right maxillary sinus. Additionally, comminuted    fractures are noted within the anterior wall of the right maxillary    sinus. There is a sliver of hematoma along the undersurface of the right    inferior rectus muscle. Otherwise, there is no evidence for prolapse of    either the extraocular musculature or the retrobulbar fat through the    fracture plane. There is hematoma within the right maxillary sinus.    Additionally, there is hematoma within the premaxillary region, the    right preseptal soft tissues, and the right frontal scalp.         IMPRESSION:    There are multiple maxillofacial fractures which essentially constitute    a trimalar fracture and these fractures involve the right zygomatic    arch, the lateral wall of the right orbit, the right orbital floor, the    anterior wall of the right maxillary sinus, and the posterior wall of    the right maxillary sinus. Again, there is hematoma within the right    maxillary sinus. There is only a sliver of hematoma along the    undersurface of the right inferior rectus muscle although there is no    evidence for prolapse of the extraocular musculature or the orbital fat    through the fracture site. Incidental note is made of prominent right    preseptal soft tissue swelling in addition to prominent soft tissue    swelling within the right premaxillary region and a prominent size right    frontal convexity scalp hematoma.         These findings were discussed with Rita Hines on 02/12/2020 at    approximately 1:30 PM.          Radiation dose reduction techniques were utilized, including  automated    exposure control and exposure modulation based on body size.                            XR Hand 3+ View Right [[id number]]     Collected:  02/12/20 1242                   Updated:  02/12/20 1247             Narrative:                XR HAND 3+ VW RIGHT-         INDICATIONS: Trauma         TECHNIQUE: 3 views of the right hand         COMPARISON: None available         FINDINGS:         No acute fracture or dislocation is noted. 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 Mild distal soft tissue    swelling. Arterial calcifications are prominent. Follow-up/further    evaluation can be obtained as indications persist.                  Impression:                     As described.         This report was finalized on 2/12/2020 12:44 PM by Dr. Jose Stover M.D.                  XR Elbow 2 View Right [[id number]]     Collected:  02/12/20 1241                   Updated:  02/12/20 1245             Narrative:                XR ELBOW 2 VW RIGHT-         INDICATIONS: Trauma         TECHNIQUE: 3 views of the right elbow         COMPARISON: None available         FINDINGS:         Conspicuous soft tissue swelling is seen medially at the elbow. Arterial    calcifications are present. Surgical clips are noted at the upper arm.    No acute fracture, erosion, dislocation, or elbow effusion is    identified. Follow-up/further evaluation can be obtained as indications    persist.                  Impression:                     As described.         This report was finalized on 2/12/2020 12:42 PM by Dr. Jose Stover M.D.                                         Objective:       Temp:  [97.8 °F (36.6 °C)-98.8 °F (37.1 °C)] 98.1 °F (36.7 °C)    Heart Rate:  [61-77] 72    Resp:  [16-18] 18    BP: (116-168)/(46-72) 120/57     SpO2:  [90 %-99 %] 95 %  on    Device (Oxygen Therapy): room air         Intake/Output  Summary (Last 24 hours) at 2/13/2020 1510    Last data filed at 2/13/2020 0336          Gross per 24 hour       Intake     550 ml       Output     --       Net     550 ml            Body mass index is 26.75 kg/m².    Vitals                                                                            Physical Exam     Constitutional: She is oriented to person, place, and time. No distress.     HENT:     Nose: Nose normal.   Right facial swelling and ecchymosis extending from right temporal to neck.   Stitches right temporal area with mild bloody drainage     Eyes: Right conjunctiva is injected.   Mild periorbital edema and ecchymosis.   Right scleral hemorrhage     Cardiovascular: Normal rate and regular rhythm.  Occasional extrasystoles are present.     Pulmonary/Chest: Effort normal. She has no wheezes. She has no rales.   Diminished bases, worse on the left     Abdominal: Soft. Bowel sounds are normal. There is no tenderness.   Musculoskeletal: Normal range of motion. She exhibits edema (2+ BLE).   Neurological: She is alert and oriented to person, place, and time.     Skin: Skin is warm and dry. She is not diaphoretic.   RUE AV fistula with bruit and thrill (less thrill on lower end)                  Discharge Medications and Instructions:            Discharge Medications                   Discharge Medications                      New Medications                    Instructions       Start Date         acetaminophen 325 MG tablet    Commonly known as:  TYLENOL          650 mg, Oral, Every 4 Hours PRN                  HYDROcodone-acetaminophen 5-325 MG per tablet    Commonly known as:  NORCO          1 tablet, Oral, Every 6 Hours PRN                                          Continue These Medications                    Instructions       Start Date         atorvastatin 20 MG tablet    Commonly known as:  LIPITOR          20 mg, Oral, Daily                  calcitriol 0.25 MCG capsule    Commonly known as:   ROCALTROL          0.25 mcg, Oral, Every Other Day, Alternate days with calcitriol 0.5mcg                  calcitriol 0.5 MCG capsule    Commonly known as:  ROCALTROL          0.5 mcg, Oral, Every Other Day, Alternate days with calcitriol 0.25mcg                   calcium carbonate 600 MG tablet    Commonly known as:  OS-RAYMOND          600 mg, Oral, Daily                  carbidopa-levodopa ER  MG per tablet    Commonly known as:  SINEMET CR          1 tablet, Oral, Every Evening                  carvedilol 12.5 MG tablet    Commonly known as:  COREG          12.5 mg, Oral, Daily                  diazePAM 5 MG tablet    Commonly known as:  VALIUM          5 mg, Oral, Nightly                  furosemide 40 MG tablet    Commonly known as:  LASIX          40 mg, Oral, Daily                  GLUCOSAMINE CHONDROITIN COMPLX capsule          1,500 mg, Oral, Every Other Day                  GRALISE 300 MG tablet    Generic drug:  Gabapentin (Once-Daily)          300 mg, Oral, Daily With Dinner, MAY REPEAT LATE EVENING IF NEEDED                  ipratropium 0.06 % nasal spray    Commonly known as:  ATROVENT          1-2 sprays, Nasal, 4 Times Daily PRN                  levothyroxine 25 MCG tablet    Commonly known as:  SYNTHROID, LEVOTHROID          25 mcg, Oral, Daily                  magnesium oxide 250 MG tablet          250 mg, Oral, Daily                  MIRALAX powder    Generic drug:  polyethylene glycol          17 g, Oral, Daily PRN                  MULTI-DAY VITAMINS tablet          1 tablet, Oral, Daily, HOLD FOR SURGERY                  psyllium 58.6 % powder    Commonly known as:  METAMUCIL          1 packet, Oral, Daily                  rotigotine 6 MG/24HR patch    Commonly known as:  NEUPRO          1 patch, Transdermal, Daily                  ULORIC 40 MG tablet    Generic drug:  febuxostat          40 mg, Oral, Daily                  vitamin D3 125 MCG (5000 UT) capsule capsule          5,000 Units,  Oral, Daily                  Vitamin E 400 units tablet          400 Units, Oral, Daily                                Stop These Medications          aspirin 81 MG tablet                                Medication Reconciliation: Please see electronically completed Med Rec.         Discharge Diet:            Dietary Orders (From admission, onward)                                 Start                 Ordered             02/12/20 1453             Diet Regular; Cardiac, Renal  Diet Effective Now             Question     Answer     Comment       Diet Texture / Consistency     Regular             Common Modifiers     Cardiac             Common Modifiers     Renal                     02/12/20 1505                                            Activity at Discharge:            Discharge disposition: Home         Discharge Instructions and Follow ups:           Additional Instructions for the Follow-ups that You Need to Schedule                CBC (No Diff)        Feb 17, 2020 (Approximate)                  Send results to Dr. Merritt with CBC                 Order Comments:  Send results to Dr. Merritt with CBC                                        Follow-up Information                    Fannie Godfrey MD Follow up in 1 week(s).        Specialty:  Internal Medicine    Why:  repeat CBC and evaluate stitches    Contact information:    [address]     [facility] [id number] [phone]                                           Anuel Scott MD .        Specialties:  Hematology and Oncology, Oncology, Hematology    Contact information:    [address]    [facility] [address] [phone]                                           Dorothy Reed, APRN Follow up on 3/5/2020.        Specialty:  Neurosurgery    Why:  March 5, 2020 at 2:15. She will need to go to the [facility] radiology dept same day for head CT at 1:30 pm.     Contact information:    [address]    [address]  [address]    [phone]                                           [facility] ORAL & MAXILLOFACIAL SURGERY  Follow up.        Why:  orbital fractures    Contact information:    [address]    [facility] [address]    [phone]                                      Shaina, Neno SALINAS II, MD Follow up.        Specialties:  Hematology and Oncology, Oncology, Hematology    Contact information:    [address]    [address]    [facility] [address]    [phone]                                                       Future Appointments       Date     Time     Provider     Department     Center       2/17/2020      9:30 AM     [facility] IN OFFICE, [facility]     [facility] CD LCGKR     None       2/17/2020     10:00 AM     Pastora Wilson APRN     [facility] CD LCGKR     None       2/21/2020      7:45 AM     [facility] NM 2(FORTE)     BH [facility] NM     [facility]       2/27/2020     10:00 AM     LAB CHAIR [facility]     [facility]       2/27/2020     10:40 AM     Neno Merritt II, MD     [facility] CBC EAST     [facility]       2/27/2020     11:00 AM     INJECTION CHAIR [facility] INFUS EP     LAG       3/5/2020      1:30 PM     ARRON CT 2     BH [facility] CT     [facility]       3/5/2020      2:15 PM     Dorothy Reed, APRN     MG[facility] NS ARRON     None       4/14/2020     10:30 AM     Anuel Roach MD     MGK GE KRSGE     None                 Total time spent discharging patient including evaluation, medication reconciliation, arranging follow up, and post hospitalization instructions and education total time exceeds 30 minutes.          CHANTELL Cunningham    02/13/20    2:46 PM                Cosigned by: Wally Salazar MD at 02/13/20 5591               Revision History

## 2020-02-13 NOTE — THERAPY DISCHARGE NOTE
Patient Name: Charmaine Machuca  : 1940    MRN: [id number]                              Today's Date: 2020       Admit Date: 2020    Visit Dx:     ICD-10-CM ICD-9-CM   1. Traumatic subdural hematoma without loss of consciousness, initial encounter (CMS/HCC) S06.5X0A 852.21   2. Subarachnoid hemorrhage following injury, no loss of consciousness, initial encounter (CMS/HCC) S06.6X0A 852.01   3. 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   • Chest pain   • Congestive heart failure (CMS/HCC)   • Gastroparesis   • Hypotension   • Hypothyroidism   • Myoclonus   • Osteoarthritis   • Automatic implantable cardioverter-defibrillator in situ   • Spondylolisthesis   • History of total knee replacement   • Anemia of chronic renal failure, stage 4 (severe) (CMS/HCC)   • CKD (chronic kidney disease) stage 4, GFR 15-29 ml/min (CMS/HCC)   • Anemia of chronic disease   • Encounter for fitting and adjustment of vascular catheter   • Adverse effect of iron or its compound, subsequent encounter   • B12 deficiency   • Weakness on left side of face   • Chronic kidney disease, stage III (moderate) (CMS/[facility])    • Symptomatic anemia   • Acute renal failure superimposed on chronic kidney disease (CMS/[facility])   • History of recent stroke   • Anemia due to acute blood loss   • GAVE (gastric antral vascular ectasia)   • Delayed gastric emptying   • Traumatic subdural hematoma without loss of  consciousness (CMS/HCC)   • Orbital fracture, closed, initial encounter   • Subdural hematoma, post-traumatic (CMS/HCC)   • Subarachnoid hemorrhage (CMS/HCC)   • Fall     Past Medical History:   Diagnosis Date   • Anemia     Iron deficiency   • Anxiety    • Cardiomyopathy (CMS/HCC)     S/P pacemaker and defibrillator   • CHF (congestive heart failure) (CMS/HCC)    • Chronic renal failure, stage 4 (severe) (CMS/HCC)    • Colon polyp    • Coronary artery disease     pacemaker, defibrillator   • Depression    • Dizzy    • Gastroparesis    • GAVE (gastric antral vascular ectasia)    • GERD (gastroesophageal reflux disease)    • Gout    • Hemorrhoids    • Hiatal hernia    • History of Clostridium difficile colitis 2013   • History of kidney stones    • History of pancreatitis     2014   • History of skin cancer    • History of transfusion     MULTIPLE    • Hyperlipidemia    • Hypertension    • Hypothyroidism    • Left bundle branch block    • Mitral and aortic valve disease    • Mitral valve insufficiency    • Myoclonus     S/P Depakote   • Osteoarthritis    • Pancytopenia (CMS/HCC)    • Peripheral neuropathy    • Presence of cardiac pacemaker     AND DEFIBRILLATOR   • Pulmonary hypertension (CMS/HCC)    • SOB (shortness of breath) on exertion    • Spinal stenosis    • Stroke (CMS/HCC)      Past Surgical History:   Procedure Laterality Date   • APPENDECTOMY N/A    • ARTERIOVENOUS FISTULA/SHUNT SURGERY Right 2/18/2019    Procedure: RIGHT BASILIC FISTULA CREATION WITHOUT TRANSPOSITION;  Surgeon: Royer Dozier MD;  Location: [facility] OR;  Service: Vascular   • ARTERIOVENOUS FISTULA/SHUNT SURGERY Right 5/31/2019    Procedure: RIGHT 2ND STAGE BASILIC VEIN CREATION;  Surgeon: Royer Dozier MD;  Location: [facility] OR;  Service: Vascular   • CARDIAC CATHETERIZATION Left 03/28/2006    Arterial catheter insertion, cath left ventriculography, coronary angiography and left heart catheterization-Dr. Estevan Matamoros   •  CARDIAC DEFIBRILLATOR PLACEMENT N/A 11/30/2007    Biventricular implantable cardioverter defibrillator-Dr. Leandro Huber   • CARDIAC ELECTROPHYSIOLOGY PROCEDURE N/A 10/28/2019    Procedure: GENERATOR CHANGE BI-V ICD  medtronic;  Surgeon: Leandro Huber MD;  Location: [facility] INVASIVE LOCATION;  Service: Cardiology   • CATARACT EXTRACTION Bilateral 2011   • COLONOSCOPY N/A 2014    done at [facility]   • CYSTECTOMY N/A     Ovarian cystectomy   • ENDOSCOPY N/A 04/28/2006    EGD with biopsies. Paraesophageal hiatal hernia from 34 to 44 cm, antral gastritis-Dr. Nehemiah Beal   • ENDOSCOPY N/A 09/29/2004    Hiatal hernia, gastritis and an erosion of the pylorus-Dr. Yang Pavon   • ENDOSCOPY N/A 9/1/2019    Procedure: ESOPHAGOGASTRODUODENOSCOPY with APC;  Surgeon: Anuel Roach MD;  Location: [facility] ENDOSCOPY;  Service: Gastroenterology   • ENDOSCOPY N/A 1/28/2020    Procedure: ESOPHAGOGASTRODUODENOSCOPY with APC;  Surgeon: Anuel Roach MD;  Location: [facility] ENDOSCOPY;  Service: Gastroenterology   • ENTEROSCOPY SMALL BOWEL N/A 10/30/2006    Small bowel enteroscopy with biopsies-Dr. oRry Sevilla   • FLEXIBLE SIGMOIDOSCOPY N/A 09/29/2004    Unsuccessful colonoscopy due to poop prep, a very tortuous sigmoid, bowel prep in the form of enema given and a barium enema was obtained-Dr. Ynag Pavon   • FRACTURE SURGERY  2015    Broke big toe   • HEMATOMA EVACUATION TRUNK N/A 02/07/2014    Pocket evacuation of hematoma at pacemaker site-Dr. Leandro Huber   • HEMORRHOIDECTOMY N/A 04/19/2004    Flexible sigmoidoscopy and stapled hemorrhoidectomy-Dr. Yang Pavon   • HYSTERECTOMY Bilateral 1977   • IMPLANTABLE CARDIOVERTER DEFIBRILLATOR LEAD REPLACEMENT/POCKET REVISION Left 3/28/2016    Procedure: AUTOMATIC IMPLANTABLE CARDIOVERTER DEFIBRILLATOR LEAD REPLACEMENT WITH LASER LEAD EXTRACTION;  Surgeon: Leandro Huber MD;  Location: [facility] OR 18/19;  Service:    • IMPLANTABLE  CARDIOVERTER DEFIBRILLATOR LEAD REPLACEMENT/POCKET REVISION N/A 01/13/2014    Biventricular ICD replacement-Dr. Jillian Huber   • LAPAROSCOPY REPAIR HIATAL HERNIA N/A 06/27/2006    Laparoscopic paraesophageal hiatal hernia repair with Nissen fundoplication and cholecystectomy-Dr. Sean Yoon   • NATALIE GONZALEZ ([facility]) PROCEDURE     • SC INSJ TUNNELED CVC W/O SUBQ PORT/ AGE 3 YR/> Right 10/3/2017    Procedure: Right internal jugular port placement;  Surgeon: Tiffanie Couch MD;  Location: [facility] OR;  Service: General   • TOE SURGERY Left     SET BIG TOE   • TOTAL KNEE ARTHROPLASTY Left 08/2014   • VENOUS ACCESS DEVICE (PORT) INSERTION Right      General Information     Row Name 02/13/20 1001          PT Evaluation Time/Intention    Document Type  evaluation;discharge evaluation/summary  -EJ     Mode of Treatment  physical therapy  -EJ     Row Name 02/13/20 1001          General Information    Patient Profile Reviewed?  yes  -EJ     Prior Level of Function  independent:;ADL's;all household mobility;community mobility  -EJ     Barriers to Rehab  none identified  -EJ     Row Name 02/13/20 1001          Relationship/Environment    Lives With  spouse  -EJ     Row Name 02/13/20 1001          Resource/Environmental Concerns    Current Living Arrangements  home/apartment/condo  -EJ     Row Name 02/13/20 1001          Home Main Entrance    Number of Stairs, Main Entrance  none  -EJ     Row Name 02/13/20 1001          Stairs Within Home, Primary    Number of Stairs, Within Home, Primary  none  -EJ     Row Name 02/13/20 1001          Cognitive Assessment/Intervention- PT/OT    Orientation Status (Cognition)  oriented x 4  -EJ       User Key  (r) = Recorded By, (t) = Taken By, (c) = Cosigned By    Initials Name Provider Type    EJ Emma Davis, PT Physical Therapist        Mobility     Row Name 02/13/20 1002          Bed Mobility Assessment/Treatment    Bed Mobility Assessment/Treatment  bed  mobility (all) activities  -     Baldwin Level (Bed Mobility)  supervision  -[facility] Name 02/13/20 1002          Sit-Stand Transfer    Sit-Stand Baldwin (Transfers)  verbal cues;stand by assist  -     Assistive Device (Sit-Stand Transfers)  walker, front-wheeled  -EJ     [facility] Name 02/13/20 1002          Gait/Stairs Assessment/Training    Gait/Stairs Assessment/Training  gait/ambulation independence  -EJ     Baldwin Level (Gait)  verbal cues;stand by assist;contact guard  -     Assistive Device (Gait)  walker, front-wheeled  -EJ     Distance in Feet (Gait)  300, ambulated 150 ft with R wx and another 150 without AD.   -EJ     Deviations/Abnormal Patterns (Gait)  bala decreased;stride length decreased  -EJ     Comment (Gait/Stairs)  No LOB or unsteadiness noted  -EJ       User Key  (r) = Recorded By, (t) = Taken By, (c) = Cosigned By    Initials Name Provider Type    Emma Al, PT Physical Therapist        Obj/Interventions     Row Name 02/13/20 1002          General ROM    GENERAL ROM COMMENTS  WFL  -[facility] 02/13/20 1002          MMT (Manual Muscle Testing)    General MMT Comments  WFl  -EJ       User Key  (r) = Recorded By, (t) = Taken By, (c) = Cosigned By    Initials Name Provider Type    Emma Al, PT Physical Therapist        Goals/Plan    No documentation.       Clinical Impression     Row Name 02/13/20 1003          Pain Assessment    Additional Documentation  Pain Scale: Numbers Pre/Post-Treatment (Group)  -EJ     Row Name 02/13/20 1003          Pain Scale: Numbers Pre/Post-Treatment    Pre/Post Treatment Pain Comment  soreness on R side from fall  -     Pain Intervention(s)  Repositioned  -EJ     Row Name 02/13/20 1003          Plan of Care Review    Progress  improving  -     Outcome Summary  Pt doing well this am. She was brought to hospital yesterday after experiencing a fall when she missed a step trying to go to [facility]. Pt currently c/o  soreness on R side. Pt previously independent with all mobility and ADl's. Her  does the driving, but pt is otherwise independent. Currently, pt moving well and able to ambulate over 300 ft without any difficulty. She is hopeful to DC home soon and denies any further acute PT needs at this time. PT will sign off. Informed RN.   -     Row Name 02/13/20 1003          Physical Therapy Clinical Impression    Patient/Family Goals Statement (PT Clinical Impression)  plans home with   -EJ     Criteria for Skilled Interventions Met (PT Clinical Impression)  no  -EJ     Row Name 02/13/20 1003          Positioning and Restraints    Pre-Treatment Position  in bed  -EJ     Post Treatment Position  chair  -EJ     In Chair  notified nsg;reclined;call light within reach;encouraged to call for assist bed alarm not on upon entry to room, chair not present  -EJ       User Key  (r) = Recorded By, (t) = Taken By, (c) = Cosigned By    Initials Name Provider Type    Emma Al, PT Physical Therapist        Outcome Measures     Row Name 02/13/20 1006          How much help from another person do you currently need...    Turning from your back to your side while in flat bed without using bedrails?  4  -EJ     Moving from lying on back to sitting on the side of a flat bed without bedrails?  3  -EJ     Moving to and from a bed to a chair (including a wheelchair)?  3  -EJ     Standing up from a chair using your arms (e.g., wheelchair, bedside chair)?  3  -EJ     Climbing 3-5 steps with a railing?  3  -EJ     To walk in hospital room?  3  -EJ     AM-PAC 6 Clicks Score (PT)  19  -EJ     Row Name 02/13/20 1006          Functional Assessment    Outcome Measure Options  AM-PAC 6 Clicks Basic Mobility (PT)  -EJ       User Key  (r) = Recorded By, (t) = Taken By, (c) = Cosigned By    Initials Name Provider Type    Emma Al, PT Physical Therapist          PT Recommendation and Plan     Outcome Summary/Treatment  Plan (PT)  Anticipated Discharge Disposition (PT): home with assist  Progress: improving  Outcome Summary: Pt doing well this am. She was brought to hospital yesterday after experiencing a fall when she missed a step trying to go to [facility]. Pt currently c/o soreness on R side. Pt previously independent with all mobility and ADl's. Her  does the driving, but pt is otherwise independent. Currently, pt moving well and able to ambulate over 300 ft without any difficulty. She is hopeful to DC home soon and denies any further acute PT needs at this time. PT will sign off. Informed RN.      Time Calculation:   PT Charges     Row Name 02/13/20 1008             Time Calculation    Start Time  0945  -EJ      Stop Time  1005  -EJ      Time Calculation (min)  20 min  -EJ      PT Received On  02/13/20  -EJ         Time Calculation- PT    Total Timed Code Minutes- PT  10 minute(s)  -EJ        User Key  (r) = Recorded By, (t) = Taken By, (c) = Cosigned By    Initials Name Provider Type    Emma Al, PT Physical Therapist        Therapy Charges for Today     Code Description Service Date Service Provider Modifiers Qty    [id number]  PT EVAL MOD COMPLEXITY 2 2/13/2020 Emma aDvis, PT GP 1    [id number] HC PT THER PROC EA 15 MIN 2/13/2020 Emma Davis, PT GP 1          PT G-Codes  Outcome Measure Options: AM-PAC 6 Clicks Basic Mobility (PT)  AM-PAC 6 Clicks Score (PT): 19    PT Discharge Summary  Anticipated Discharge Disposition (PT): home with assist  Reason for Discharge: Independent, At baseline function    Emma Davis, CRUZ  2/13/2020

## 2020-02-13 NOTE — PROGRESS NOTES
NEUROSURGERY PROGRESS NOTE     LOS: 0 days   Patient Care Team:  Fannie Godfrey MD as PCP - General (Internal Medicine)  Anuel Scott MD as PCP - Claims Attributed  Anuel Scott MD as Consulting Physician (Hematology and Oncology)  Fannie Godfrey MD as Referring Physician (Internal Medicine)  Estevan Matamoros MD as Consulting Physician (Cardiology)  Parveen Abraham MD as Consulting Physician (Nephrology)  Ondina Haro MD as Consulting Physician (Neurology)  Estevan Oropeza MD as Consulting Physician (Hematology and Oncology)  Neno Merritt II, MD as Consulting Physician (Hematology and Oncology)    Chief Complaint:  Fall with facial trauma. Traumatic SAH/SDH    Subjective     Interval History:     Sitting up in chair. C/O expected R facial pain. No headache. No nausea or vomiting. No visual disturbance.     History taken from: patient chart. No family present.     Objective      Vital Signs  Temp:  [97.8 °F (36.6 °C)-98.8 °F (37.1 °C)] 97.8 °F (36.6 °C)  Heart Rate:  [60-77] 72  Resp:  [15-18] 16  BP: (116-182)/(46-83) 140/60  Body mass index is 26.75 kg/m².      Physical Exam    Head trauma noted. Bruising, swelling to R orbit, R forehead, R cheek.   Laceration along R zygomatic arch.  R sclera also red. PERRL. EOM's intact.   AA&O x 3.  Speech is normal.  Converses appropriately.     Tongue midline without fasiculations or atrophy.   Follows commands x 4 extremities. No drift, no tremor, no dysmetria.       Results Review:     I reviewed the patient's new clinical results.  I reviewed the patient's new imaging results and agree with the interpretation.  Discussed with Dr. Cohen.    Labs:    Lab Results (last 24 hours)     Procedure Component Value Units Date/Time    CBC & Differential [[id number]] Collected:  02/12/20 1441    Specimen:  Blood Updated:  02/12/20 7815    Narrative:       The following orders were created for panel order CBC & Differential.  Procedure                                Abnormality         Status                     ---------                               -----------         ------                     CBC Auto Differential[[id number]]        Abnormal            Final result                 Please view results for these tests on the individual orders.    Comprehensive Metabolic Panel [[id number]]  (Abnormal) Collected:  02/12/20 1441    Specimen:  Blood Updated:  02/12/20 1522     Glucose 99 mg/dL      BUN 36 mg/dL      Creatinine 2.28 mg/dL      Sodium 141 mmol/L      Potassium 3.5 mmol/L      Chloride 101 mmol/L      CO2 28.6 mmol/L      Calcium 9.4 mg/dL      Total Protein 5.7 g/dL      Albumin 3.20 g/dL      ALT (SGPT) <5 U/L      AST (SGOT) 17 U/L      Alkaline Phosphatase 55 U/L      Total Bilirubin 0.2 mg/dL      eGFR Non African Amer 21 mL/min/1.73      Globulin 2.5 gm/dL      A/G Ratio 1.3 g/dL      BUN/Creatinine Ratio 15.8     Anion Gap 11.4 mmol/L     Narrative:       GFR Normal >60  Chronic Kidney Disease <60  Kidney Failure <15      Protime-INR [[id number]]  (Normal) Collected:  02/12/20 1441    Specimen:  Blood Updated:  02/12/20 1510     Protime 13.1 Seconds      INR 1.02    aPTT [[id number]]  (Abnormal) Collected:  02/12/20 1441    Specimen:  Blood Updated:  02/12/20 1510     PTT 39.5 seconds     CBC Auto Differential [[id number]]  (Abnormal) Collected:  02/12/20 1441    Specimen:  Blood Updated:  02/12/20 1455     WBC 5.82 10*3/mm3      RBC 2.54 10*6/mm3      Hemoglobin 7.6 g/dL      Hematocrit 24.7 %      MCV 97.2 fL      MCH 29.9 pg      MCHC 30.8 g/dL      RDW 14.5 %      RDW-SD 51.6 fl      MPV 9.2 fL      Platelets 188 10*3/mm3      Neutrophil % 83.1 %      Lymphocyte % 9.1 %      Monocyte % 6.2 %      Eosinophil % 1.2 %      Basophil % 0.2 %      Immature Grans % 0.2 %      Neutrophils, Absolute 4.84 10*3/mm3      Lymphocytes, Absolute 0.53 10*3/mm3      Monocytes, Absolute 0.36 10*3/mm3      Eosinophils, Absolute 0.07 10*3/mm3      Basophils, Absolute  0.01 10*3/mm3      Immature Grans, Absolute 0.01 10*3/mm3      nRBC 0.0 /100 WBC     Hemoglobin & Hematocrit, Blood [[id number]]  (Abnormal) Collected:  02/12/20 2018    Specimen:  Blood Updated:  02/12/20 2034     Hemoglobin 7.1 g/dL      Hematocrit 22.2 %     Hemoglobin & Hematocrit, Blood [[id number]]  (Abnormal) Collected:  02/12/20 2355    Specimen:  Blood Updated:  02/13/20 0033     Hemoglobin 6.5 g/dL      Hematocrit 20.9 %     Hemoglobin & Hematocrit, Blood [[id number]]  (Abnormal) Collected:  02/13/20 0558    Specimen:  Blood Updated:  02/13/20 0649     Hemoglobin 8.2 g/dL      Hematocrit 25.4 %     CBC (No Diff) [[id number]]  (Abnormal) Collected:  02/13/20 0558    Specimen:  Blood Updated:  02/13/20 0649     WBC 5.67 10*3/mm3      RBC 2.68 10*6/mm3      Hemoglobin 8.2 g/dL      Hematocrit 25.4 %      MCV 94.8 fL      MCH 30.6 pg      MCHC 32.3 g/dL      RDW 14.2 %      RDW-SD 48.1 fl      MPV 9.9 fL      Platelets 188 10*3/mm3     Renal Function Panel [[id number]]  (Abnormal) Collected:  02/13/20 0558    Specimen:  Blood Updated:  02/13/20 0714     Glucose 108 mg/dL      BUN 40 mg/dL      Creatinine 2.56 mg/dL      Sodium 143 mmol/L      Potassium 4.2 mmol/L      Chloride 105 mmol/L      CO2 25.6 mmol/L      Calcium 8.7 mg/dL      Albumin 2.80 g/dL      Phosphorus 4.2 mg/dL      Anion Gap 12.4 mmol/L      BUN/Creatinine Ratio 15.6     eGFR Non African Amer 18 mL/min/1.73     Narrative:       GFR Normal >60  Chronic Kidney Disease <60  Kidney Failure <15      Urinalysis With Microscopic If Indicated (No Culture) - Urine, Clean Catch [[id number]]  (Abnormal) Collected:  02/13/20 1043    Specimen:  Urine, Clean Catch Updated:  02/13/20 1120     Color, UA Yellow     Appearance, UA Clear     pH, UA 6.0     Specific Gravity, UA 1.015     Glucose, UA Negative     Ketones, UA Negative     Bilirubin, UA Negative     Blood, UA Negative     Protein, UA Negative     Leuk Esterase, UA Trace     Nitrite, UA Negative      Urobilinogen, UA 0.2 E.U./dL    Urinalysis, Microscopic Only - Urine, Clean Catch [[id number]]  (Abnormal) Collected:  02/13/20 1043    Specimen:  Urine, Clean Catch Updated:  02/13/20 1120     RBC, UA 0-2 /HPF      WBC, UA 3-5 /HPF      Bacteria, UA None Seen /HPF      Squamous Epithelial Cells, UA 3-6 /HPF      Hyaline Casts, UA 0-2 /LPF      Methodology Automated Microscopy          Imaging:    CT HEAD WITHOUT CONTRAST 02/13/2020    R frontal. L parietal SDH's and L parafalcine traumatic SAH stable. No new hemorrhage.         Current Medications:   Scheduled Meds:    METAMUCIL 2 wafer Oral Daily   rotigotine 1 patch Transdermal Q24H   And      rotigotine 2 patch Transdermal Q24H   sodium chloride 10 mL Intravenous Q12H     Assessment/Plan       Subarachnoid hemorrhage (CMS/HCC)    Chronic systolic congestive heart failure (CMS/HCC)    Cardiomyopathy (CMS/HCC)    Hypothyroidism    Automatic implantable cardioverter-defibrillator in situ    Anemia of chronic renal failure, stage 4 (severe) (CMS/HCC)    CKD (chronic kidney disease) stage 4, GFR 15-29 ml/min (CMS/HCC)    GAVE (gastric antral vascular ectasia)    Delayed gastric emptying    Traumatic subdural hematoma without loss of consciousness (CMS/HCC)    Orbital fracture, closed, initial encounter    Subdural hematoma, post-traumatic (CMS/HCC)    Fall      Plan:      Continue to observe non-operatively.  Dr. Cohen has viewed today's CT scan. Patient is cleared for d/c anytime from [facility] standpoint.   Our office has arranged f/u in our office with nurse practitioner CHANTELL De La Vega on March 5, 2020 at 2:15. She will need to go to the hospital radiology dept same day for head CT at 1:30 pm.   Please call if there are any further questions or concerns.        CHANTELL Mancini  02/13/20  11:44 AM

## 2020-02-13 NOTE — CONSULTS
Responded to request for spiritual care. Pt asked for visit by [facility] . I informed pt that [facility] comes by daily to offer communion. Pt was appreciative of support.

## 2020-02-13 NOTE — PROGRESS NOTES
Continued Stay Note  [facility]     Patient Name: Charmaine Machuca  MRN: [id number]  Today's Date: 2/13/2020    Admit Date: 2/12/2020    Discharge Plan     Row Name 02/13/20 0814       Plan    Plan  Pt plans to return home with her  upon DC.  CCP will followup after PT and assist if needed.      Plan Comments  Spoke with pt at bedside.  Discussed DC plans.  She states she does not feels she will need HH or SNF at this time.  PT eval is pending and CCP will followup afterwards.         Discharge Codes    No documentation.             Fannie Mott RN

## 2020-02-13 NOTE — NURSING NOTE
Order to set patient up with outpatient oral and maxillofacial surgeon. Called 3 places that either could not get patient in or would not except insurance. Patient states she knows a oral and maxillofacial surgeon that she has used before. Arcadio Mckeon APRN ok with DC and patient f/u with her oral surgeon.

## 2020-02-13 NOTE — PROGRESS NOTES
Discharge Summary    Patient Name: Charmaine Machuca  Age/Sex: 80 y.o. female  : 1940  MRN: [id number]  Patient Care Team:  Fannie Godfrey MD as PCP - General (Internal Medicine)  Anuel Scott MD as PCP - Claims Attributed  Anuel Scott MD as Consulting Physician (Hematology and Oncology)  Fannie Godfrey MD as Referring Physician (Internal Medicine)  Estevan Matamoros MD as Consulting Physician (Cardiology)  Parveen Abraham MD as Consulting Physician (Nephrology)  Ondina Haro MD as Consulting Physician (Neurology)  Estevan Oropeza MD as Consulting Physician (Hematology and Oncology)  Neno Merritt II, MD as Consulting Physician (Hematology and Oncology)    Hospital Course     Date of Admit: 2020  Date of Discharge:  20   Discharge Condition: Stable    Discharge Diagnoses:    Subarachnoid hemorrhage (CMS/HCC)    Chronic systolic congestive heart failure (CMS/HCC)    Cardiomyopathy (CMS/HCC)    Hypothyroidism    Automatic implantable cardioverter-defibrillator in situ    Anemia of chronic renal failure, stage 4 (severe) (CMS/HCC)    CKD (chronic kidney disease) stage 4, GFR 15-29 ml/min (CMS/HCC)    GAVE (gastric antral vascular ectasia)    Delayed gastric emptying    Traumatic subdural hematoma without loss of consciousness (CMS/HCC)    Orbital fracture, closed, initial encounter    Subdural hematoma, post-traumatic (CMS/HCC)    Fall    Present on Admission:  • Anemia of chronic renal failure, stage 4 (severe) (CMS/HCC)  • Automatic implantable cardioverter-defibrillator in situ  • Cardiomyopathy (CMS/HCC)  • Chronic systolic congestive heart failure (CMS/HCC)  • GAVE (gastric antral vascular ectasia)  • Hypothyroidism  • Traumatic subdural hematoma without loss of consciousness (CMS/HCC)      Hospital Course:   Charmaine Machuca presented to [facility] with complaints of headache and right sided pain after sustaining a fall. Please see  the admitting history and physical for further details. She was found to have periorbital fractures, anemia, small subdural hematoma and bilateral small traumatic subarachnoid hemorrhages and was admitted to the hospital for further evaluation and treatment. Patient was evaluated by neurosurgery and monitored closely with no neurological decline.     Aspirin was stopped. Her hemoglobin dropped to 6.5 and she was transfused with 1 unit PRBC with improvement in hemoglobin to 8.2 and remained stable. Repeat CT of head showed no significant change and patient was cleared for discharge. She will have a repeat CT of head on 3/5/20 and follow up with APRN with DAVID at that time. She will need a repeat CBC on Monday and close follow up with hematology for any further blood transfusions. She was encouraged to return to the ER for any sign of neurological decline. Maxillofacial surgery was consulted, but had never seen the patient and referral was given to see the    GIOVANNI was reviewed and patient was given a prescription for hydrocodone for pain, which she tolerated well in the past. She was instructed to use sparingly and supplement with tylenol, but to avoid any NSAIDs.     Consults:   IP CONSULT TO NEUROSURGERY  IP CONSULT TO PULMONOLOGY  IP CONSULT TO HOSPITALIST  IP CONSULT TO NEUROSURGERY  IP CONSULT TO ORAL & MAXILLOFACIAL SURGERY  IP CONSULT TO CASE MANAGEMENT   IP CONSULT TO SPIRITUAL CARE    Significant Discharge Diagnostics   Procedures Performed:         Pertinent Lab Results:  Results from last 7 days   Lab Units 02/13/20 0558 02/12/20  1441   SODIUM mmol/L 143 141   POTASSIUM mmol/L 4.2 3.5   CHLORIDE mmol/L 105 101   CO2 mmol/L 25.6 28.6   BUN mg/dL 40* 36*   CREATININE mg/dL 2.56* 2.28*   GLUCOSE mg/dL 108* 99   CALCIUM mg/dL 8.7 9.4   AST (SGOT) U/L  --  17   ALT (SGPT) U/L  --  <5         Results from last 7 days   Lab Units 02/13/20  0558 02/12/20  2355 02/12/20 2018 02/12/20  1441   WBC  10*3/mm3 5.67  --   --  5.82   HEMOGLOBIN g/dL 8.2*  8.2* 6.5* 7.1* 7.6*   HEMATOCRIT % 25.4*  25.4* 20.9* 22.2* 24.7*   PLATELETS 10*3/mm3 188  --   --  188   MCV fL 94.8  --   --  97.2*   MCH pg 30.6  --   --  29.9   MCHC g/dL 32.3  --   --  30.8*   RDW % 14.2  --   --  14.5   RDW-SD fl 48.1  --   --  51.6   MPV fL 9.9  --   --  9.2   NEUTROPHIL % %  --   --   --  83.1*   LYMPHOCYTE % %  --   --   --  9.1*   MONOCYTES % %  --   --   --  6.2   EOSINOPHIL % %  --   --   --  1.2   BASOPHIL % %  --   --   --  0.2   IMM GRAN % %  --   --   --  0.2   NEUTROS ABS 10*3/mm3  --   --   --  4.84   LYMPHS ABS 10*3/mm3  --   --   --  0.53*   MONOS ABS 10*3/mm3  --   --   --  0.36   EOS ABS 10*3/mm3  --   --   --  0.07   BASOS ABS 10*3/mm3  --   --   --  0.01   IMMATURE GRANS (ABS) 10*3/mm3  --   --   --  0.01   NRBC /100 WBC  --   --   --  0.0     Results from last 7 days   Lab Units 02/12/20  1441   INR  1.02   APTT seconds 39.5*               Invalid input(s): LDLCALC                              Results from last 7 days   Lab Units 02/13/20  1043   NITRITE UA  Negative   WBC UA /HPF 3-5*   BACTERIA UA /HPF None Seen   SQUAM EPITHEL UA /HPF 3-6*               Imaging Results:  Imaging Results (Most Recent)     Procedure Component Value Units Date/Time    CT Head Without Contrast [[id number]] Collected:  02/13/20 0540     Updated:  02/13/20 0550    Narrative:       CT HEAD WITHOUT CONTRAST     HISTORY: Subdural hematoma. Subarachnoid hemorrhage.     COMPARISON: 02/12/2020     TECHNIQUE: Axial CT imaging was obtained from the vertex of the skull  through the skull base. No IV contrast was administered.     FINDINGS:  Anterior right frontal lobe subdural hematoma is again noted. This  appears stable in size. It measures roughly 3 to 4 mm. The patient is  noted to have foci of subarachnoid hemorrhage. These are seen within the  left parafalcine region, as well as within the right sylvian fissure.  Again, but this not appear  significantly changed. Left parietal subdural  hematoma again measures about 4 mm. An additional small amount of  hemorrhage layers over the left tentorium. There is no midline shift or  mass effect. Air-fluid level is again noted within the right maxillary  sinus. Please see the report from the maxillofacial CT for full  description of those findings. Mastoid air cells appear clear. Hematoma  is again noted overlying the right zygomatic arch. There is extensive  soft tissue swelling overlying the right side of the face. Similar  findings were present on the prior study.       Impression:       No significant interval change in areas of subdural and subarachnoid  hemorrhage seen within both cerebral hemispheres.     Radiation dose reduction techniques were utilized, including automated  exposure control and exposure modulation based on body size.     This report was finalized on 2/13/2020 5:47 AM by Dr. Nancy Shipley M.D.       CT Head Without Contrast [[id number]] Collected:  02/12/20 1410     Updated:  02/12/20 1410    Narrative:       CT SCAN OF THE HEAD AND MAXILLOFACIAL REGION WITHOUT CONTRAST     CLINICAL HISTORY: Laceration of right eye and has bleeding from the  nares.     TECHNIQUE: CT scan of the head was obtained with 2 mm axial bone  algorithm and 3 mm axial soft tissue algorithm images. No intravenous  contrast was administered.     FINDINGS:  An acute subdural hematoma is identified lateral to the right frontal  and temporal lobes. Lateral to the right temporal lobe, this subdural  hematoma measures up to approximately 5 mm in diameter. An additional  subdural hematoma is seen lateral to the left parietal and occipital  lobes. This particular acute subdural hematoma measures up to  approximately 4-5 mm in maximal diameter. A thin subdural hematoma is  appreciated anterior to the right frontal lobe measuring up to  approximately 2 mm in diameter. There is also increased density along  the medial  reflection of the right tentorium cerebelli where there may  be subtle subdural hemorrhage layering. Relatively localized areas of  subarachnoid hemorrhage are identified within the right sylvian fissure  as well as a sulcus along the medial aspect of left frontal lobe. Sulcal  effacement is identified at these sites of extra-axial hemorrhage.  However, there is no significant shift of the midline structures or  compartmental shift.     Otherwise, the gray-white matter differentiation is within normal  limits. The basal ganglia and thalami are unremarkable in appearance.  There are findings compatible with a chronic white matter infarct within  the right corona radiata measuring up to 7 mm in diameter. Old lacunar  disease is incidentally noted within the left lentiform nucleus.  Atherosclerotic changes are also incidentally identified.     There are multiple maxillofacial fractures including a fracture of the  right zygomatic arch, the lateral wall of the right orbit, the floor of  the right orbit, the anterior wall of the right maxillary sinus, and the  posterior wall of the right maxillary sinus. Hematoma is appreciated  within the right maxillary sinus. Additionally, there is prominent  premaxillary and preseptal soft tissue hematoma in addition to a right  anterior frontal scalp hematoma.       Impression:       There are areas of subdural hematoma identified anterior to the right  frontal lobe, lateral to the right frontal and temporal lobes, and  lateral to the left parietal and occipital lobes. Additionally, there is  increased density along the medial reflection of the right tentorium  cerebelli where there may be some additional subdural hematoma.  Localized areas of subarachnoid hemorrhage are seen within a sulcus of  the medial portion of the left frontal lobe as well as within the right  sylvian fissure. Localized mass effect is seen with sulcal effacement.  However, there is no compartmental shift or  significant shift of the  midline structures.     Multiple maxillofacial fractures are identified including fractures of  the right zygomatic arch, the lateral wall of the right orbit, the floor  of the right orbit, the anterior wall of the right maxillary sinus, the  posterior wall of the right maxillary sinus. These will be discussed in  greater detail in the maxillofacial CT portion of the report.     TECHNIQUE: CT scan of the maxillofacial region was obtained with 1 mm  axial, coronal, and sagittal images.     FINDINGS:  There is a fracture of the right zygomatic arch with overriding of  fracture components. There is a fracture which involves the lateral wall  of the right orbit with mild comminution. A fracture is seen involving  the right orbital floor and this fracture likely extends into the  infraorbital foramen. Comminuted fractures are identified within the  lateral wall of the right maxillary sinus. Additionally, comminuted  fractures are noted within the anterior wall of the right maxillary  sinus. There is a sliver of hematoma along the undersurface of the right  inferior rectus muscle. Otherwise, there is no evidence for prolapse of  either the extraocular musculature or the retrobulbar fat through the  fracture plane. There is hematoma within the right maxillary sinus.  Additionally, there is hematoma within the premaxillary region, the  right preseptal soft tissues, and the right frontal scalp.     IMPRESSION:  There are multiple maxillofacial fractures which essentially constitute  a trimalar fracture and these fractures involve the right zygomatic  arch, the lateral wall of the right orbit, the right orbital floor, the  anterior wall of the right maxillary sinus, and the posterior wall of  the right maxillary sinus. Again, there is hematoma within the right  maxillary sinus. There is only a sliver of hematoma along the  undersurface of the right inferior rectus muscle although there is  no  evidence for prolapse of the extraocular musculature or the orbital fat  through the fracture site. Incidental note is made of prominent right  preseptal soft tissue swelling in addition to prominent soft tissue  swelling within the right premaxillary region and a prominent size right  frontal convexity scalp hematoma.     These findings were discussed with Rita Hines on 02/12/2020 at  approximately 1:30 PM.      Radiation dose reduction techniques were utilized, including automated  exposure control and exposure modulation based on body size.             CT Facial Bones Without Contrast [[id number]] Collected:  02/12/20 1410     Updated:  02/12/20 1410    Narrative:       CT SCAN OF THE HEAD AND MAXILLOFACIAL REGION WITHOUT CONTRAST     CLINICAL HISTORY: Laceration of right eye and has bleeding from the  nares.     TECHNIQUE: CT scan of the head was obtained with 2 mm axial bone  algorithm and 3 mm axial soft tissue algorithm images. No intravenous  contrast was administered.     FINDINGS:  An acute subdural hematoma is identified lateral to the right frontal  and temporal lobes. Lateral to the right temporal lobe, this subdural  hematoma measures up to approximately 5 mm in diameter. An additional  subdural hematoma is seen lateral to the left parietal and occipital  lobes. This particular acute subdural hematoma measures up to  approximately 4-5 mm in maximal diameter. A thin subdural hematoma is  appreciated anterior to the right frontal lobe measuring up to  approximately 2 mm in diameter. There is also increased density along  the medial reflection of the right tentorium cerebelli where there may  be subtle subdural hemorrhage layering. Relatively localized areas of  subarachnoid hemorrhage are identified within the right sylvian fissure  as well as a sulcus along the medial aspect of left frontal lobe. Sulcal  effacement is identified at these sites of extra-axial hemorrhage.  However, there is no  significant shift of the midline structures or  compartmental shift.     Otherwise, the gray-white matter differentiation is within normal  limits. The basal ganglia and thalami are unremarkable in appearance.  There are findings compatible with a chronic white matter infarct within  the right corona radiata measuring up to 7 mm in diameter. Old lacunar  disease is incidentally noted within the left lentiform nucleus.  Atherosclerotic changes are also incidentally identified.     There are multiple maxillofacial fractures including a fracture of the  right zygomatic arch, the lateral wall of the right orbit, the floor of  the right orbit, the anterior wall of the right maxillary sinus, and the  posterior wall of the right maxillary sinus. Hematoma is appreciated  within the right maxillary sinus. Additionally, there is prominent  premaxillary and preseptal soft tissue hematoma in addition to a right  anterior frontal scalp hematoma.       Impression:       There are areas of subdural hematoma identified anterior to the right  frontal lobe, lateral to the right frontal and temporal lobes, and  lateral to the left parietal and occipital lobes. Additionally, there is  increased density along the medial reflection of the right tentorium  cerebelli where there may be some additional subdural hematoma.  Localized areas of subarachnoid hemorrhage are seen within a sulcus of  the medial portion of the left frontal lobe as well as within the right  sylvian fissure. Localized mass effect is seen with sulcal effacement.  However, there is no compartmental shift or significant shift of the  midline structures.     Multiple maxillofacial fractures are identified including fractures of  the right zygomatic arch, the lateral wall of the right orbit, the floor  of the right orbit, the anterior wall of the right maxillary sinus, the  posterior wall of the right maxillary sinus. These will be discussed in  greater detail in the  maxillofacial CT portion of the report.     TECHNIQUE: CT scan of the maxillofacial region was obtained with 1 mm  axial, coronal, and sagittal images.     FINDINGS:  There is a fracture of the right zygomatic arch with overriding of  fracture components. There is a fracture which involves the lateral wall  of the right orbit with mild comminution. A fracture is seen involving  the right orbital floor and this fracture likely extends into the  infraorbital foramen. Comminuted fractures are identified within the  lateral wall of the right maxillary sinus. Additionally, comminuted  fractures are noted within the anterior wall of the right maxillary  sinus. There is a sliver of hematoma along the undersurface of the right  inferior rectus muscle. Otherwise, there is no evidence for prolapse of  either the extraocular musculature or the retrobulbar fat through the  fracture plane. There is hematoma within the right maxillary sinus.  Additionally, there is hematoma within the premaxillary region, the  right preseptal soft tissues, and the right frontal scalp.     IMPRESSION:  There are multiple maxillofacial fractures which essentially constitute  a trimalar fracture and these fractures involve the right zygomatic  arch, the lateral wall of the right orbit, the right orbital floor, the  anterior wall of the right maxillary sinus, and the posterior wall of  the right maxillary sinus. Again, there is hematoma within the right  maxillary sinus. There is only a sliver of hematoma along the  undersurface of the right inferior rectus muscle although there is no  evidence for prolapse of the extraocular musculature or the orbital fat  through the fracture site. Incidental note is made of prominent right  preseptal soft tissue swelling in addition to prominent soft tissue  swelling within the right premaxillary region and a prominent size right  frontal convexity scalp hematoma.     These findings were discussed with Rita  Donny on 02/12/2020 at  approximately 1:30 PM.      Radiation dose reduction techniques were utilized, including automated  exposure control and exposure modulation based on body size.             XR Hand 3+ View Right [[id number]] Collected:  02/12/20 1242     Updated:  02/12/20 1247    Narrative:       XR HAND 3+ VW RIGHT-     INDICATIONS: Trauma     TECHNIQUE: 3 views of the right hand     COMPARISON: None available     FINDINGS:     No acute fracture or dislocation is noted. 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 Mild distal soft tissue  swelling. Arterial calcifications are prominent. Follow-up/further  evaluation can be obtained as indications persist.       Impression:          As described.     This report was finalized on 2/12/2020 12:44 PM by Dr. Jose Stover M.D.       XR Elbow 2 View Right [[id number]] Collected:  02/12/20 1241     Updated:  02/12/20 1245    Narrative:       XR ELBOW 2 VW RIGHT-     INDICATIONS: Trauma     TECHNIQUE: 3 views of the right elbow     COMPARISON: None available     FINDINGS:     Conspicuous soft tissue swelling is seen medially at the elbow. Arterial  calcifications are present. Surgical clips are noted at the upper arm.  No acute fracture, erosion, dislocation, or elbow effusion is  identified. Follow-up/further evaluation can be obtained as indications  persist.       Impression:          As described.     This report was finalized on 2/12/2020 12:42 PM by Dr. Jose Stover M.D.               Objective:   Temp:  [97.8 °F (36.6 °C)-98.8 °F (37.1 °C)] 98.1 °F (36.7 °C)  Heart Rate:  [61-77] 72  Resp:  [16-18] 18  BP: (116-168)/(46-72) 120/57   SpO2:  [90 %-99 %] 95 %  on    Device (Oxygen Therapy): room air    Intake/Output Summary (Last 24 hours) at 2/13/2020 1510  Last data filed at 2/13/2020 0336  Gross per 24 hour   Intake 550 ml   Output --   Net 550 ml      Body mass index is 26.75 kg/m².      02/12/20  1112 02/12/20 2006   Weight: 77.5 kg (170 lb 12.8 oz) 77.5 kg (170 lb 12.8 oz)       Physical Exam   Constitutional: She is oriented to person, place, and time. No distress.   HENT:   Nose: Nose normal.   Right facial swelling and ecchymosis extending from right temporal to neck.   Stitches right temporal area with mild bloody drainage   Eyes: Right conjunctiva is injected.   Mild periorbital edema and ecchymosis.   Right scleral hemorrhage   Cardiovascular: Normal rate and regular rhythm.  Occasional extrasystoles are present.   Pulmonary/Chest: Effort normal. She has no wheezes. She has no rales.   Diminished bases, worse on the left   Abdominal: Soft. Bowel sounds are normal. There is no tenderness.   Musculoskeletal: Normal range of motion. She exhibits edema (2+ BLE).   Neurological: She is alert and oriented to person, place, and time.   Skin: Skin is warm and dry. She is not diaphoretic.   RUE AV fistula with bruit and thrill (less thrill on lower end)        Discharge Medications and Instructions:     Discharge Medications     Discharge Medications      New Medications      Instructions Start Date   acetaminophen 325 MG tablet  Commonly known as:  TYLENOL   650 mg, Oral, Every 4 Hours PRN      HYDROcodone-acetaminophen 5-325 MG per tablet  Commonly known as:  NORCO   1 tablet, Oral, Every 6 Hours PRN         Continue These Medications      Instructions Start Date   atorvastatin 20 MG tablet  Commonly known as:  LIPITOR   20 mg, Oral, Daily      calcitriol 0.25 MCG capsule  Commonly known as:  ROCALTROL   0.25 mcg, Oral, Every Other Day, Alternate days with calcitriol 0.5mcg      calcitriol 0.5 MCG capsule  Commonly known as:  ROCALTROL   0.5 mcg, Oral, Every Other Day, Alternate days with calcitriol 0.25mcg       calcium carbonate 600 MG tablet  Commonly known as:  OS-RAYMOND   600 mg, Oral, Daily      carbidopa-levodopa ER  MG per tablet  Commonly known  as:  SINEMET CR   1 tablet, Oral, Every Evening      carvedilol 12.5 MG tablet  Commonly known as:  COREG   12.5 mg, Oral, Daily      diazePAM 5 MG tablet  Commonly known as:  VALIUM   5 mg, Oral, Nightly      furosemide 40 MG tablet  Commonly known as:  LASIX   40 mg, Oral, Daily      GLUCOSAMINE CHONDROITIN COMPLX capsule   1,500 mg, Oral, Every Other Day      GRALISE 300 MG tablet  Generic drug:  Gabapentin (Once-Daily)   300 mg, Oral, Daily With Dinner, MAY REPEAT LATE EVENING IF NEEDED      ipratropium 0.06 % nasal spray  Commonly known as:  ATROVENT   1-2 sprays, Nasal, 4 Times Daily PRN      levothyroxine 25 MCG tablet  Commonly known as:  SYNTHROID, LEVOTHROID   25 mcg, Oral, Daily      magnesium oxide 250 MG tablet   250 mg, Oral, Daily      MIRALAX powder  Generic drug:  polyethylene glycol   17 g, Oral, Daily PRN      MULTI-DAY VITAMINS tablet   1 tablet, Oral, Daily, HOLD FOR SURGERY      psyllium 58.6 % powder  Commonly known as:  METAMUCIL   1 packet, Oral, Daily      rotigotine 6 MG/24HR patch  Commonly known as:  NEUPRO   1 patch, Transdermal, Daily      ULORIC 40 MG tablet  Generic drug:  febuxostat   40 mg, Oral, Daily      vitamin D3 125 MCG (5000 UT) capsule capsule   5,000 Units, Oral, Daily      Vitamin E 400 units tablet   400 Units, Oral, Daily         Stop These Medications    aspirin 81 MG tablet             Medication Reconciliation: Please see electronically completed Med Rec.    Discharge Diet:    Dietary Orders (From admission, onward)     Start     Ordered    02/12/20 1453  Diet Regular; Cardiac, Renal  Diet Effective Now     Question Answer Comment   Diet Texture / Consistency Regular    Common Modifiers Cardiac    Common Modifiers Renal        02/12/20 1505                Activity at Discharge:       Discharge disposition: Home    Discharge Instructions and Follow ups:  Additional Instructions for the Follow-ups that You Need to Schedule     CBC (No Diff)    Feb 17, 2020  (Approximate)      Send results to Dr. Merritt with CBC    Order Comments:  Send results to Dr. Merritt with CBC            Follow-up Information     Fannie Gofdrey MD Follow up in 1 week(s).    Specialty:  Internal Medicine  Why:  repeat CBC and evaluate stitches  Contact information:  [address]  [facility] [address]  [facility] [address]  [phone]             Anuel Scott MD .    Specialties:  Hematology and Oncology, Oncology, Hematology  Contact information:  [address]  [facility] [address]  [phone]             Dorothy Reed APRN Follow up on 3/5/2020.    Specialty:  Neurosurgery  Why:  March 5, 2020 at 2:15. She will need to go to the [employer] [facility] radiology dept same day for head CT at 1:30 pm.   Contact information:  [address] [facility] [address]  [place] [address]  [phone]             [facility]ILL[facility] SURGERY  Follow up.    Why:  orbital fractures  Contact information:  [address]  [facility] [id number] [phone]           Neno Merritt II, MD Follow up.    Specialties:  Hematology and Oncology, Oncology, Hematology  Contact information:  [address] [facility] [address]  [place] [address]  [phone]                 Future Appointments   Date Time Provider Department Center   2/17/2020  9:30 AM PACEART IN OFFICE, LCG [facility] MGK CD LCGKR None   2/17/2020 10:00 AM Pastora Wilson APRN MGK CD LCGKR None   2/21/2020  7:45 AM [facility] NM 2(FORTE) BH [facility] NM [facility]   2/27/2020 10:00 AM LAB CHAIR CBC LAB [facility] LAG [facility]   2/27/2020 10:40 AM Neno Merritt II, MD [facility] CBC EAST [facility]   2/27/2020 11:00 AM INJECTION CHAIR CBC [facility] INFUS EP LAG   3/5/2020  1:30 PM ARRON CT 2 BH ARRON CT [facility]   3/5/2020  2:15 PM Dorothy Reed APRN MGK NS ARRON None   4/14/2020 10:30 AM DocAnuel MD MGK GE KRSGE None       Total time spent discharging patient including evaluation, medication reconciliation, arranging follow up, and post hospitalization instructions and  education total time exceeds 30 minutes.      Keisha Mckeon, CHANTELL  02/13/20  2:46 PM

## 2020-02-14 ENCOUNTER — DOCUMENTATION (OUTPATIENT)
Dept: ONCOLOGY | Facility: CLINIC | Age: 80
End: 2020-02-14

## 2020-02-14 ENCOUNTER — TELEPHONE (OUTPATIENT)
Dept: ONCOLOGY | Facility: CLINIC | Age: 80
End: 2020-02-14

## 2020-02-14 ENCOUNTER — READMISSION MANAGEMENT (OUTPATIENT)
Dept: CALL CENTER | Facility: HOSPITAL | Age: 80
End: 2020-02-14

## 2020-02-14 PROBLEM — N18.4 CHRONIC KIDNEY DISEASE, STAGE 4 (SEVERE) (HCC): Status: ACTIVE | Noted: 2020-02-14

## 2020-02-14 NOTE — OUTREACH NOTE
Prep Survey      Responses   Facility patient discharged from?  [place]   Is patient eligible?  Yes   Discharge diagnosis  Subarachnoid hemorrhage    Does the patient have one of the following disease processes/diagnoses(primary or secondary)?  Stroke (TIA)   Does the patient have Home health ordered?  No   Is there a DME ordered?  No   Prep survey completed?  Yes          Leslee Muñoz RN

## 2020-02-14 NOTE — TELEPHONE ENCOUNTER
RESCHEDULED APPT FOR PT AND ALSO SENT MESS TO DR ANDREW ABOUT THE PT BEING IN THE HOSPITAL AND HOW SHE IS FEELING

## 2020-02-14 NOTE — PROGRESS NOTES
Case Management Discharge Note      Final Note: DC'd home    Provided post acute provider list?: Yes    Destination      No service has been selected for the patient.      Durable Medical Equipment      No service has been selected for the patient.      Dialysis/Infusion      No service has been selected for the patient.      Home Medical Care      No service has been selected for the patient.      Therapy      No service has been selected for the patient.      Community Resources      No service has been selected for the patient.             Final Discharge Disposition Code: 01 - home or self-care

## 2020-02-15 NOTE — PROGRESS NOTES
Okay please set her up to see a nurse practitioner next week.  It looks like she is scheduled for lab and possible Procrit on Thursday and to see me the following Thursday.  It is okay to schedule her earlier in the week if she would prefer.  However, asked her to see a nurse practitioner 1 day next week and she will see me as scheduled the following week  ===View-only below this line===    ----- Message -----  From: Azar Avery  Sent: 2/14/2020  10:42 AM EST  To: Neno Andrew II, MD  Subject: APPOINTMENT                                      DR ANDREW PT HAS JUST GOTTEN OUT OF THE HOSPITAL FROM A FALL AND THAT SHE IS RESCHEDULING HER LABS AND PROCRIT AND B12, SHE WANTED YOU TO KNOW THAT SHE HAD BEEN IN THE HOSPITAL AND THAT THEY GAVE HER 2 UNITS OF BLOOD, BUT THAT SHE FEELS THAT IT IS STILL TOO LOW, AS THE NUMBER IS AT 8.2, I HAVE RESCHEDULED HER FOR LABS AND PROCRIT THAT WAS MISSED ON 2/13/20, PLEASE ADVISE    THANK YOU   AZAR

## 2020-02-16 LAB
ABO + RH BLD: NORMAL
BH BB BLOOD EXPIRATION DATE: NORMAL
BH BB BLOOD TYPE BARCODE: 600
BH BB DISPENSE STATUS: NORMAL
BH BB PRODUCT CODE: NORMAL
BH BB UNIT NUMBER: NORMAL
UNIT  ABO: NORMAL
UNIT  RH: NORMAL

## 2020-02-17 ENCOUNTER — TELEPHONE (OUTPATIENT)
Dept: GENERAL RADIOLOGY | Facility: HOSPITAL | Age: 80
End: 2020-02-17

## 2020-02-17 ENCOUNTER — CLINICAL SUPPORT NO REQUIREMENTS (OUTPATIENT)
Dept: CARDIOLOGY | Facility: CLINIC | Age: 80
End: 2020-02-17

## 2020-02-17 ENCOUNTER — READMISSION MANAGEMENT (OUTPATIENT)
Dept: CALL CENTER | Facility: HOSPITAL | Age: 80
End: 2020-02-17

## 2020-02-17 ENCOUNTER — OFFICE VISIT (OUTPATIENT)
Dept: CARDIOLOGY | Facility: CLINIC | Age: 80
End: 2020-02-17

## 2020-02-17 VITALS
BODY MASS INDEX: 26.84 KG/M2 | HEART RATE: 63 BPM | HEIGHT: 67 IN | DIASTOLIC BLOOD PRESSURE: 80 MMHG | WEIGHT: 171 LBS | SYSTOLIC BLOOD PRESSURE: 110 MMHG

## 2020-02-17 DIAGNOSIS — I50.22 CHRONIC SYSTOLIC CONGESTIVE HEART FAILURE (HCC): Primary | ICD-10-CM

## 2020-02-17 DIAGNOSIS — I25.5 ISCHEMIC CARDIOMYOPATHY: ICD-10-CM

## 2020-02-17 DIAGNOSIS — D63.1 ANEMIA, CHRONIC RENAL FAILURE, STAGE 4 (SEVERE) (HCC): ICD-10-CM

## 2020-02-17 DIAGNOSIS — N18.4 ANEMIA, CHRONIC RENAL FAILURE, STAGE 4 (SEVERE) (HCC): ICD-10-CM

## 2020-02-17 DIAGNOSIS — I42.9 CARDIOMYOPATHY, UNSPECIFIED TYPE (HCC): ICD-10-CM

## 2020-02-17 DIAGNOSIS — I42.8 NONISCHEMIC CARDIOMYOPATHY (HCC): Primary | ICD-10-CM

## 2020-02-17 DIAGNOSIS — N18.4 RENAL FAILURE, CHRONIC, STAGE 4 (SEVERE) (HCC): ICD-10-CM

## 2020-02-17 PROCEDURE — 93290 INTERROG DEV EVAL ICPMS IP: CPT | Performed by: INTERNAL MEDICINE

## 2020-02-17 PROCEDURE — 93284 PRGRMG EVAL IMPLANTABLE DFB: CPT | Performed by: INTERNAL MEDICINE

## 2020-02-17 PROCEDURE — 93000 ELECTROCARDIOGRAM COMPLETE: CPT | Performed by: NURSE PRACTITIONER

## 2020-02-17 PROCEDURE — 99214 OFFICE O/P EST MOD 30 MIN: CPT | Performed by: NURSE PRACTITIONER

## 2020-02-17 NOTE — OUTREACH NOTE
Stroke Week 1 Survey      Responses   Facility patient discharged from?  [place] [facility]   Does the patient have one of the following disease processes/diagnoses(primary or secondary)?  Stroke (TIA)   Is there a successful TCM telephone encounter documented?  No   Week 1 attempt successful?  Yes   Call start time  1303   Call end time  1306   Discharge diagnosis  Subarachnoid hemorrhage    Is patient permission given to speak with other caregiver?  Yes   List who call center can speak with  Zackery-    Person spoke with today (if not patient) and relationship  Zackery-     Meds reviewed with patient/caregiver?  Yes   Is the patient having any side effects they believe may be caused by any medication additions or changes?  No   Does the patient have all medications ordered at discharge?  Yes   Is the patient taking all medications as directed (includes completed medication regime)?  Yes   Does the patient have a primary care provider?   Yes   Does the patient have an appointment with their PCP within 7 days of discharge?  Yes   Has the patient kept scheduled appointments due by today?  Yes   Psychosocial issues?  No   Does the patient require any assistance with activities of daily living such as eating, bathing, dressing, walking, etc.?  No   Does the patient have any residual symptoms from stroke/TIA?  No   Does the patient understand the diet ordered at discharge?  Yes   Did the patient receive a copy of their discharge instructions?  Yes   Nursing interventions  Reviewed instructions with patient   What is the patient's perception of their health status since discharge?  Improving   Nursing interventions  Nurse provided patient education   Is the patient able to teach back FAST for Stroke?  Yes   Is the patient/caregiver able to teach back the risk factors for a stroke?  High Cholesterol, High blood pressure-goal below 120/80   Is the patient/caregiver able to teach back signs and symptoms related to disease  process for when to call PCP?  Yes   Is the patient/caregiver able to teach back signs and symptoms related to disease process for when to call 911?  Yes   Is the patient/caregiver able to teach back the hierarchy of who to call/visit for symptoms/problems? PCP, Specialist, Home health nurse, Urgent Care, ED, 911  Yes   Week 1 call completed?  Yes          Shirley Clarke RN

## 2020-02-17 NOTE — PROGRESS NOTES
Date of Office Visit: 20  Encounter Provider: CHANTELL Lubin  Place of Service: [facility] CARDIOLOGY  Patient Name: Charmaine Machuca  :1940    Chief Complaint   Patient presents with   • Coronary Artery Disease   • Congestive Heart Failure   • Follow-up   :     HPI: Charmaine Machuca is a 80 y.o. female  with history of hypertension, hyperlipidemia, left bundle branch block, nonischemic cardiomyopathy status post biventricular defibrillator placement.  She also has history of anemia, chronic kidney disease, and syncope.      She is followed by Dr. Estevan Matamoros.  I will see her for the first time today and have reviewed her medical record.    She is found to have very low ejection fraction.  She was enrolled in the [facility] study of ACE inhibitor plus Atacand versus ACE inhibitor plus a placebo.  In 2005 she had a perfusion stress test which showed infarct but no ischemia.  Cardiac catheterization 2016 showed moderate left ventricular function, elevated right-sided pressure, left anterior end-diastolic pressure, mild mitral insufficiency and normal coronary arteries.  She was treated medically.  She had worsening dyspnea and in 2007 had biventricular defibrillator placed.  She had issues with GI problems, fatigue, weakness, dizziness, syncope, near syncope.  She had multiple medication adjustments.  Diuretics were adjusted.  There was some concern about her symptoms being related to Depakote.  She had a generator change 2014 unfortunately 2 days after developed small bowel obstruction had to be admitted and treated for that.  She developed C. difficile infection and was treated for that.  She also suffered a pocket hematoma had to have that evacuated.  Follow-up echocardiogram 2015 showed normal ejection fraction, mild mitral insufficiency, mild aortic valve calcification without stenosis.  She was hospitalized at [place]  [facility] November 2015 with atypical chest discomfort, left side in nature and under the breast.  There was some pleuritic component.  She had a ventilator perfusion scan which was negative for pulmonary emboli.  She had a 2D echocardiogram which showed normal left ventricular systolic function no significant valvular disease.  In March 2016 she was found to have RV lead failure had that removed and had a new lead placed.  In August 2019 she reported issues with kidney function.  She had been anemic and required blood transfusion in addition to Epogen.  In October 2019 she had a new generator implanted with Medtronic device.    She then was hospitalized in February 2020 after a mechanical fall where she suffered a traumatic subdural hematoma without loss of consciousness.  Her aspirin was held.     She presents today for 6-month follow-up.  She has a follow-up CT of the head March 5 and is to remain off aspirin until further recommendation is made pending that result.  She had blood transfusion this past Thursday has a follow-up with hematology later this week.  She reports increased shortness of breath with exertion over the last couple weeks which she experiences when her blood count gets low.  She is had no palpitation, chest pain tightness pressure, worsening edema, near-syncope or syncope.  She follows closely with nephrology has not needed to start dialysis.  She also reports being treated for right lower extremity cellulitis 2 to 3 weeks ago has a follow-up appointment with her PCP next week.    Allergies   Allergen Reactions   • Chlorhexidine Rash and Itching     Patient states \"blue dye\" in chlorhexidine.  States the orange and clear are OK to use   • Codeine Unknown - Low Severity     HYPER, DOES NOT HELP PAIN   • Propoxyphene Unknown - Low Severity     Belligerent, acting drunk  (darvon)       Past Medical History:   Diagnosis Date   • Anemia     Iron deficiency   • Anxiety    • Cardiomyopathy " (CMS/HCC)     S/P pacemaker and defibrillator   • CHF (congestive heart failure) (CMS/HCC)    • Chronic renal failure, stage 4 (severe) (CMS/HCC)    • Colon polyp    • Coronary artery disease     pacemaker, defibrillator   • Depression    • Dizzy    • Gastroparesis    • GAVE (gastric antral vascular ectasia)    • GERD (gastroesophageal reflux disease)    • Gout    • Hemorrhoids    • Hiatal hernia    • History of Clostridium difficile colitis 2013   • History of kidney stones    • History of pancreatitis     2014   • History of skin cancer    • History of transfusion     MULTIPLE    • Hyperlipidemia    • Hypertension    • Hypothyroidism    • Left bundle branch block    • Mitral and aortic valve disease    • Mitral valve insufficiency    • Myoclonus     S/P Depakote   • Osteoarthritis    • Pancytopenia (CMS/HCC)    • Peripheral neuropathy    • Presence of cardiac pacemaker     AND DEFIBRILLATOR   • Pulmonary hypertension (CMS/HCC)    • SOB (shortness of breath) on exertion    • Spinal stenosis    • Stroke (CMS/HCC)        Past Surgical History:   Procedure Laterality Date   • APPENDECTOMY N/A    • ARTERIOVENOUS FISTULA/SHUNT SURGERY Right 2/18/2019    Procedure: RIGHT BASILIC FISTULA CREATION WITHOUT TRANSPOSITION;  Surgeon: Royer Dozier MD;  Location: [facility];  Service: Vascular   • ARTERIOVENOUS FISTULA/SHUNT SURGERY Right 5/31/2019    Procedure: RIGHT 2ND STAGE BASILIC VEIN CREATION;  Surgeon: Royer Dozier MD;  Location: [facility] OR;  Service: Vascular   • CARDIAC CATHETERIZATION Left 03/28/2006    Arterial catheter insertion, cath left ventriculography, coronary angiography and left heart catheterization-Dr. Estevan Matamoros   • CARDIAC DEFIBRILLATOR PLACEMENT N/A 11/30/2007    Biventricular implantable cardioverter defibrillator-Dr. Leandro Huber   • CARDIAC ELECTROPHYSIOLOGY PROCEDURE N/A 10/28/2019    Procedure: GENERATOR CHANGE BI-V ICD  medtronic;  Surgeon: Leandro Huber MD;   Location: [facility] CATH INVASIVE LOCATION;  Service: Cardiology   • CATARACT EXTRACTION Bilateral 2011   • COLONOSCOPY N/A 2014    done at [facility]   • CYSTECTOMY N/A     Ovarian cystectomy   • ENDOSCOPY N/A 04/28/2006    EGD with biopsies. Paraesophageal hiatal hernia from 34 to 44 cm, antral gastritis-Dr. Nehemiah Beal   • ENDOSCOPY N/A 09/29/2004    Hiatal hernia, gastritis and an erosion of the pylorus-Dr. Yang Pavon   • ENDOSCOPY N/A 9/1/2019    Procedure: ESOPHAGOGASTRODUODENOSCOPY with APC;  Surgeon: Anuel Roach MD;  Location: [facility] ENDOSCOPY;  Service: Gastroenterology   • ENDOSCOPY N/A 1/28/2020    Procedure: ESOPHAGOGASTRODUODENOSCOPY with APC;  Surgeon: Anuel Roach MD;  Location: [facility] ENDOSCOPY;  Service: Gastroenterology   • ENTEROSCOPY SMALL BOWEL N/A 10/30/2006    Small bowel enteroscopy with biopsies-Dr. Rory Sevilla   • FLEXIBLE SIGMOIDOSCOPY N/A 09/29/2004    Unsuccessful colonoscopy due to poop prep, a very tortuous sigmoid, bowel prep in the form of enema given and a barium enema was obtained-Dr. Yang Pavon   • FRACTURE SURGERY  2015    Broke big toe   • HEMATOMA EVACUATION TRUNK N/A 02/07/2014    Pocket evacuation of hematoma at pacemaker site-Dr. Leandro Huber   • HEMORRHOIDECTOMY N/A 04/19/2004    Flexible sigmoidoscopy and stapled hemorrhoidectomy-Dr. Yang Pavon   • HYSTERECTOMY Bilateral 1977   • IMPLANTABLE CARDIOVERTER DEFIBRILLATOR LEAD REPLACEMENT/POCKET REVISION Left 3/28/2016    Procedure: AUTOMATIC IMPLANTABLE CARDIOVERTER DEFIBRILLATOR LEAD REPLACEMENT WITH LASER LEAD EXTRACTION;  Surgeon: Leandro Huber MD;  Location: [facility] OR 18/19;  Service:    • IMPLANTABLE CARDIOVERTER DEFIBRILLATOR LEAD REPLACEMENT/POCKET REVISION N/A 01/13/2014    Biventricular ICD replacement-Dr. Jillian Huber   • LAPAROSCOPY REPAIR HIATAL HERNIA N/A 06/27/2006    Laparoscopic paraesophageal hiatal hernia repair with Nissen fundoplication and  cholecystectomy-Dr. Sean Yoon   • NATALIE GONZALEZ ([facility]) PROCEDURE     • W[place] INSJ TUNNELED CVC W/O SUBQ PORT/ AGE 3 YR/> Right 10/3/2017    Procedure: Right internal jugular port placement;  Surgeon: Tiffanie Couch MD;  Location: [facility] OR;  Service: General   • TOE SURGERY Left     SET BIG TOE   • TOTAL KNEE ARTHROPLASTY Left 08/2014   • VENOUS ACCESS DEVICE (PORT) INSERTION Right          Family and social history reviewed.     ROS  All other systems were reviewed and are negative          Objective:     There were no vitals filed for this visit.  There is no height or weight on file to calculate BMI.    PHYSICAL EXAM:  Physical Exam   Constitutional: She is oriented to person, place, and time. She appears well-developed and well-nourished. No distress.   HENT:   Head: Normocephalic.   Eyes: Conjunctivae are normal.   Neck: Normal range of motion. No JVD present.   Cardiovascular: Normal rate, regular rhythm, normal heart sounds and intact distal pulses.   No murmur heard.  Pulses:       Carotid pulses are 2+ on the right side, and 2+ on the left side.       Radial pulses are 2+ on the right side, and 2+ on the left side.        Posterior tibial pulses are 2+ on the right side, and 2+ on the left side.   Right upper extremity fistula   Pulmonary/Chest: Effort normal and breath sounds normal. No respiratory distress. She has no wheezes. She has no rhonchi. She has no rales. She exhibits no tenderness.   Abdominal: Soft. Bowel sounds are normal. She exhibits no distension.   Musculoskeletal: Normal range of motion. She exhibits edema (L >R 1+).   Neurological: She is alert and oriented to person, place, and time.   Skin: Skin is warm, dry and intact. No rash noted. She is not diaphoretic. There is erythema. No cyanosis.   Right lower extremity   Psychiatric: She has a normal mood and affect. Her behavior is normal. Judgment and thought content normal.         ECG 12 Lead  Date/Time:  2/17/2020 10:25 AM  Performed by: Pastora Wilson APRN  Authorized by: Pastora Wilson APRN   Comparison: compared with previous ECG   Similar to previous ECG  Rhythm: sinus rhythm and paced  Rate: normal  Pacing: atrial sensed rhythm and ventricular paced rhythm  Clinical impression: abnormal EKG            Current Outpatient Medications   Medication Sig Dispense Refill   • acetaminophen (TYLENOL) 325 MG tablet Take 2 tablets by mouth Every 4 (Four) Hours As Needed for Mild Pain .     • atorvastatin (LIPITOR) 20 MG tablet Take 20 mg by mouth Daily.     • calcitriol (ROCALTROL) 0.25 MCG capsule Take 0.25 mcg by mouth Every Other Day. Alternate days with calcitriol 0.5mcg     • calcitriol (ROCALTROL) 0.5 MCG capsule Take 0.5 mcg by mouth Every Other Day. Alternate days with calcitriol 0.25mcg     • calcium carbonate (OS-RAYMOND) 600 MG tablet Take 600 mg by mouth Daily.     • carbidopa-levodopa ER (SINEMET CR)  MG per tablet Take 1 tablet by mouth Every Evening.     • carvedilol (COREG) 12.5 MG tablet Take 12.5 mg by mouth Daily.     • Cholecalciferol (VITAMIN D3) 5000 units capsule capsule Take 5,000 Units by mouth Daily.     • diazepam (VALIUM) 5 MG tablet Take 5 mg by mouth Every Night.     • febuxostat (ULORIC) 40 MG tablet Take 40 mg by mouth Daily.     • furosemide (LASIX) 40 MG tablet Take 1 tablet by mouth Daily. 90 tablet 3   • Gabapentin, Once-Daily, (GRALISE) 300 MG tablet Take 300 mg by mouth Daily With Dinner. MAY REPEAT LATE EVENING IF NEEDED     • Glucosamine-Chondroit-Vit C-Mn (GLUCOSAMINE CHONDROITIN COMPLX) capsule Take 1,500 mg by mouth Every Other Day.     • HYDROcodone-acetaminophen (NORCO) 5-325 MG per tablet Take 1 tablet by mouth Every 6 (Six) Hours As Needed for Severe Pain . 12 tablet 0   • ipratropium (ATROVENT) 0.06 % nasal spray 1-2 sprays into the nostril(s) as directed by provider 4 (Four) Times a Day As Needed for Rhinitis.     • levothyroxine (SYNTHROID, LEVOTHROID) 25 MCG tablet Take  25 mcg by mouth Daily.     • magnesium oxide 250 MG tablet Take 250 mg by mouth Daily.     • Multiple Vitamin (MULTI-DAY VITAMINS) tablet Take 1 tablet by mouth Daily. HOLD FOR SURGERY     • polyethylene glycol (MIRALAX) powder Take 17 g by mouth Daily As Needed.     • psyllium (METAMUCIL) 58.6 % powder Take 1 packet by mouth Daily.     • rotigotine (NEUPRO) 6 MG/24HR patch Place 1 patch on the skin as directed by provider Daily.     • Vitamin E 400 UNITS tablet Take 400 Units by mouth Daily.       No current facility-administered medications for this visit.      Facility-Administered Medications Ordered in Other Visits   Medication Dose Route Frequency Provider Last Rate Last Dose   • heparin flush (porcine) 100 UNIT/ML injection 500 Units  500 Units Intravenous PRN Anuel Scott MD   500 Units at 11/27/17 1347   • heparin flush (porcine) 100 UNIT/ML injection 500 Units  500 Units Intravenous PRN Neno Merritt II, MD   500 Units at 01/18/20 1359   • sodium chloride 0.9 % flush 10 mL  10 mL Intravenous PRN Anuel Scott MD   10 mL at 11/27/17 1347   • sodium chloride 0.9 % flush 10 mL  10 mL Intravenous PRN Neno Merritt II, MD   10 mL at 01/18/20 1359   • sodium chloride 0.9 % infusion 250 mL  250 mL Intravenous PRN eNno Merritt II, MD         Assessment:      No diagnosis found.     No orders of the defined types were placed in this encounter.        Plan:   1.  80-year-old female with history of nonischemic cardiomyopathy ejection last echocardiogram September 2019 showed normal left ventricular systolic function    2. History of congestive heart failure status post Bi-V ICD. Following in our defibrillator clinic new generator implanted October 2019 interrogated today has remote check in 3 months  3. History of syncope, falling spells, and dizziness of unclear etiology. Resolved. May have been from Depakote.  4. Hyperlipidemia, followed by her PCP  5. History of kidney stones.  6. History of left  bundle branch block. unchanged.  7. History of anemia. iron deficient still following with hematology.  Blood transfusion last week  8. History of renal failure.  Progressively worse she has an upper extremity fistula.   She has not been started on dialysis.  She is  Following with nephrology with Dr. Tex Abraham  9. History of hypertension.  Blood pressure at goal.  10.  History of probable depression.  Much improved.  11.  History of myoclonus.   12. Mitral Insufficiency. Echocardiogram 8/18 just mild  13.  Anemia secondary to stage IV renal disease follows with hematology.   14.  History of subdural hematoma after mechanical fall earlier this month has repeat CT on 3/5/2020.  She is to remain off aspirin until cleared by neurosurgery  10.  Adrenal insufficiency  11.  Recent right lower extremity cellulitis treated with antibiotic continues to have some residual erythema she has a follow-up with her PCP next week    Follow-up in 6 months call with questions or concerns      It has been a pleasure to participate in this patient's care.      Thank you,  CHANTELL Lubin      **I used Dragon to dictate this note:**

## 2020-02-17 NOTE — TELEPHONE ENCOUNTER
----- Message from Neno Merritt II, MD sent at 2/14/2020  7:11 PM EST -----  Regarding: RE: APPOINTMENT  Okay please set her up to see a nurse practitioner next week.  It looks like she is scheduled for lab and possible Procrit on Thursday and to see me the following Thursday.  It is okay to schedule her earlier in the week if she would prefer.  However, asked her to see a nurse practitioner 1 day next week and she will see me as scheduled the following week  ----- Message -----  From: Gena Azar MARBIN  Sent: 2/14/2020  10:42 AM EST  To: Neno Merritt II, MD  Subject: APPOINTMENT                                      DR LORRIE ARROYO HAS JUST GOTTEN OUT OF THE HOSPITAL FROM A FALL AND THAT SHE IS RESCHEDULING HER LABS AND PROCRIT AND B12, SHE WANTED YOU TO KNOW THAT SHE HAD BEEN IN THE HOSPITAL AND THAT THEY GAVE HER 2 UNITS OF BLOOD, BUT THAT SHE FEELS THAT IT IS STILL TOO LOW, AS THE NUMBER IS AT 8.2, I HAVE RESCHEDULED HER FOR LABS AND PROCRIT THAT WAS MISSED ON 2/13/20, PLEASE ADVISE    THANK YOU   AZAR

## 2020-02-18 ENCOUNTER — OFFICE VISIT (OUTPATIENT)
Dept: ONCOLOGY | Facility: CLINIC | Age: 80
End: 2020-02-18

## 2020-02-18 ENCOUNTER — INFUSION (OUTPATIENT)
Dept: ONCOLOGY | Facility: HOSPITAL | Age: 80
End: 2020-02-18

## 2020-02-18 VITALS
DIASTOLIC BLOOD PRESSURE: 69 MMHG | SYSTOLIC BLOOD PRESSURE: 147 MMHG | WEIGHT: 171.4 LBS | BODY MASS INDEX: 26.9 KG/M2 | HEART RATE: 67 BPM | HEIGHT: 67 IN | TEMPERATURE: 97.8 F | RESPIRATION RATE: 18 BRPM | OXYGEN SATURATION: 100 %

## 2020-02-18 DIAGNOSIS — N18.4 CKD (CHRONIC KIDNEY DISEASE) STAGE 4, GFR 15-29 ML/MIN (HCC): ICD-10-CM

## 2020-02-18 DIAGNOSIS — Z45.2 ENCOUNTER FOR FITTING AND ADJUSTMENT OF VASCULAR CATHETER: ICD-10-CM

## 2020-02-18 DIAGNOSIS — E53.8 B12 DEFICIENCY: ICD-10-CM

## 2020-02-18 DIAGNOSIS — N18.4 ANEMIA OF CHRONIC RENAL FAILURE, STAGE 4 (SEVERE) (HCC): Primary | ICD-10-CM

## 2020-02-18 DIAGNOSIS — N18.4 ANEMIA DUE TO STAGE 4 CHRONIC KIDNEY DISEASE (HCC): Primary | ICD-10-CM

## 2020-02-18 DIAGNOSIS — D63.1 ANEMIA DUE TO STAGE 4 CHRONIC KIDNEY DISEASE (HCC): Primary | ICD-10-CM

## 2020-02-18 DIAGNOSIS — E27.40 CHRONIC ADRENAL INSUFFICIENCY (HCC): Primary | ICD-10-CM

## 2020-02-18 DIAGNOSIS — D50.8 OTHER IRON DEFICIENCY ANEMIA: ICD-10-CM

## 2020-02-18 DIAGNOSIS — D63.1 ANEMIA OF CHRONIC RENAL FAILURE, STAGE 4 (SEVERE) (HCC): Primary | ICD-10-CM

## 2020-02-18 DIAGNOSIS — IMO0001 ADVERSE EFFECT OF IRON OR ITS COMPOUND, SUBSEQUENT ENCOUNTER: ICD-10-CM

## 2020-02-18 LAB
ABO GROUP BLD: NORMAL
BASOPHILS # BLD AUTO: 0.02 10*3/MM3 (ref 0–0.2)
BASOPHILS NFR BLD AUTO: 0.4 % (ref 0–1.5)
BLD GP AB SCN SERPL QL: NEGATIVE
DEPRECATED RDW RBC AUTO: 54.4 FL (ref 37–54)
EOSINOPHIL # BLD AUTO: 0.13 10*3/MM3 (ref 0–0.4)
EOSINOPHIL NFR BLD AUTO: 2.4 % (ref 0.3–6.2)
ERYTHROCYTE [DISTWIDTH] IN BLOOD BY AUTOMATED COUNT: 14.8 % (ref 12.3–15.4)
FERRITIN SERPL-MCNC: 89 NG/ML (ref 13–150)
HCT VFR BLD AUTO: 25.7 % (ref 34–46.6)
HGB BLD-MCNC: 7.8 G/DL (ref 12–15.9)
HGB RETIC QN AUTO: 27.1 PG (ref 29.8–36.1)
IMM GRANULOCYTES # BLD AUTO: 0.1 10*3/MM3 (ref 0–0.05)
IMM GRANULOCYTES NFR BLD AUTO: 1.9 % (ref 0–0.5)
IMM RETICS NFR: 10.9 % (ref 3–15.8)
IRON 24H UR-MRATE: 36 MCG/DL (ref 37–145)
IRON SATN MFR SERPL: 13 % (ref 20–50)
LYMPHOCYTES # BLD AUTO: 0.57 10*3/MM3 (ref 0.7–3.1)
LYMPHOCYTES NFR BLD AUTO: 10.7 % (ref 19.6–45.3)
MCH RBC QN AUTO: 30.1 PG (ref 26.6–33)
MCHC RBC AUTO-ENTMCNC: 30.4 G/DL (ref 31.5–35.7)
MCV RBC AUTO: 99.2 FL (ref 79–97)
MONOCYTES # BLD AUTO: 0.42 10*3/MM3 (ref 0.1–0.9)
MONOCYTES NFR BLD AUTO: 7.9 % (ref 5–12)
NEUTROPHILS # BLD AUTO: 4.1 10*3/MM3 (ref 1.7–7)
NEUTROPHILS NFR BLD AUTO: 76.7 % (ref 42.7–76)
NRBC BLD AUTO-RTO: 0 /100 WBC (ref 0–0.2)
PLATELET # BLD AUTO: 210 10*3/MM3 (ref 140–450)
PMV BLD AUTO: 9.5 FL (ref 6–12)
RBC # BLD AUTO: 2.59 10*6/MM3 (ref 3.77–5.28)
RETICS/RBC NFR AUTO: 2.68 % (ref 0.7–1.9)
RH BLD: NEGATIVE
T&S EXPIRATION DATE: NORMAL
TIBC SERPL-MCNC: 286 MCG/DL (ref 298–536)
TRANSFERRIN SERPL-MCNC: 192 MG/DL (ref 200–360)
WBC NRBC COR # BLD: 5.34 10*3/MM3 (ref 3.4–10.8)

## 2020-02-18 PROCEDURE — 99214 OFFICE O/P EST MOD 30 MIN: CPT | Performed by: NURSE PRACTITIONER

## 2020-02-18 PROCEDURE — 86923 COMPATIBILITY TEST ELECTRIC: CPT

## 2020-02-18 PROCEDURE — 84466 ASSAY OF TRANSFERRIN: CPT | Performed by: INTERNAL MEDICINE

## 2020-02-18 PROCEDURE — 96372 THER/PROPH/DIAG INJ SC/IM: CPT

## 2020-02-18 PROCEDURE — 25010000002 EPOETIN ALFA PER 1000 UNITS: Performed by: NURSE PRACTITIONER

## 2020-02-18 PROCEDURE — 25010000002 CYANOCOBALAMIN PER 1000 MCG: Performed by: NURSE PRACTITIONER

## 2020-02-18 PROCEDURE — 85025 COMPLETE CBC W/AUTO DIFF WBC: CPT | Performed by: INTERNAL MEDICINE

## 2020-02-18 PROCEDURE — 83540 ASSAY OF IRON: CPT | Performed by: INTERNAL MEDICINE

## 2020-02-18 PROCEDURE — 85046 RETICYTE/HGB CONCENTRATE: CPT | Performed by: INTERNAL MEDICINE

## 2020-02-18 PROCEDURE — 86901 BLOOD TYPING SEROLOGIC RH(D): CPT | Performed by: NURSE PRACTITIONER

## 2020-02-18 PROCEDURE — 25010000003 HEPARIN LOCK FLUCH PER 10 UNITS: Performed by: INTERNAL MEDICINE

## 2020-02-18 PROCEDURE — 82728 ASSAY OF FERRITIN: CPT | Performed by: INTERNAL MEDICINE

## 2020-02-18 PROCEDURE — 86850 RBC ANTIBODY SCREEN: CPT | Performed by: NURSE PRACTITIONER

## 2020-02-18 PROCEDURE — 86900 BLOOD TYPING SEROLOGIC ABO: CPT | Performed by: NURSE PRACTITIONER

## 2020-02-18 PROCEDURE — 96523 IRRIG DRUG DELIVERY DEVICE: CPT

## 2020-02-18 RX ORDER — SODIUM CHLORIDE 9 MG/ML
250 INJECTION, SOLUTION INTRAVENOUS AS NEEDED
Status: CANCELLED | OUTPATIENT
Start: 2020-02-20

## 2020-02-18 RX ORDER — AZITHROMYCIN 250 MG/1
250 TABLET, FILM COATED ORAL DAILY
COMMUNITY
End: 2020-04-03

## 2020-02-18 RX ORDER — HEPARIN SODIUM (PORCINE) LOCK FLUSH IV SOLN 100 UNIT/ML 100 UNIT/ML
500 SOLUTION INTRAVENOUS AS NEEDED
Status: CANCELLED | OUTPATIENT
Start: 2020-02-18

## 2020-02-18 RX ORDER — ACETAMINOPHEN 325 MG/1
650 TABLET ORAL ONCE
Status: CANCELLED | OUTPATIENT
Start: 2020-02-20 | End: 2020-02-18

## 2020-02-18 RX ORDER — SODIUM CHLORIDE 0.9 % (FLUSH) 0.9 %
10 SYRINGE (ML) INJECTION AS NEEDED
Status: CANCELLED | OUTPATIENT
Start: 2020-02-18

## 2020-02-18 RX ORDER — DIPHENHYDRAMINE HCL 25 MG
25 CAPSULE ORAL ONCE
Status: CANCELLED | OUTPATIENT
Start: 2020-02-20 | End: 2020-02-18

## 2020-02-18 RX ORDER — CYANOCOBALAMIN 1000 UG/ML
1000 INJECTION, SOLUTION INTRAMUSCULAR; SUBCUTANEOUS
Status: DISCONTINUED | OUTPATIENT
Start: 2020-02-18 | End: 2020-02-18 | Stop reason: HOSPADM

## 2020-02-18 RX ORDER — SODIUM CHLORIDE 0.9 % (FLUSH) 0.9 %
10 SYRINGE (ML) INJECTION AS NEEDED
Status: DISCONTINUED | OUTPATIENT
Start: 2020-02-18 | End: 2020-02-18 | Stop reason: HOSPADM

## 2020-02-18 RX ORDER — HEPARIN SODIUM (PORCINE) LOCK FLUSH IV SOLN 100 UNIT/ML 100 UNIT/ML
500 SOLUTION INTRAVENOUS AS NEEDED
Status: DISCONTINUED | OUTPATIENT
Start: 2020-02-18 | End: 2020-02-18 | Stop reason: HOSPADM

## 2020-02-18 RX ADMIN — ERYTHROPOIETIN 40000 UNITS: 40000 INJECTION, SOLUTION INTRAVENOUS; SUBCUTANEOUS at 12:15

## 2020-02-18 RX ADMIN — Medication 10 ML: at 11:10

## 2020-02-18 RX ADMIN — CYANOCOBALAMIN 1000 MCG: 1000 INJECTION, SOLUTION INTRAMUSCULAR at 12:13

## 2020-02-18 RX ADMIN — SODIUM CHLORIDE, PRESERVATIVE FREE 500 UNITS: 5 INJECTION INTRAVENOUS at 11:10

## 2020-02-18 NOTE — PROGRESS NOTES
.     REASONS FOR FOLLOWUP:   1.Anemia  2.  Recent fall requiring hospitalization from February 12, 2022 February 14, 2020.     HISTORY OF PRESENT ILLNESS:  The patient is a 80 y.o. year old female  who is here for follow-up with the above-mentioned history here today for scheduled lab review and Procrit.  She did suffer a fall at home resulting in a periorbital fracture requiring stitches.  She reports that she simply misstepped and fell.  She denies new neurologic symptoms.  She has suffered with headaches and dizziness for quite some time but does not attribute her fall to the symptoms.    Her hemoglobin today 7.8.  She denies bleeding and has required multiple blood transfusions in the past.  She is scheduled for Procrit today.  He denies chest pain or heart palpitations.  She denies new bleeding, and the wounds she suffered from falling are healing well.        Past Medical History:   Diagnosis Date   • Anemia     Iron deficiency   • Anxiety    • Cardiomyopathy (CMS/HCC)     S/P pacemaker and defibrillator   • CHF (congestive heart failure) (CMS/HCC)    • Chronic renal failure, stage 4 (severe) (CMS/HCC)    • Colon polyp    • Coronary artery disease     pacemaker, defibrillator   • Depression    • Dizzy    • Gastroparesis    • GAVE (gastric antral vascular ectasia)    • GERD (gastroesophageal reflux disease)    • Gout    • Hemorrhoids    • Hiatal hernia    • History of Clostridium difficile colitis 2013   • History of kidney stones    • History of pancreatitis     2014   • History of skin cancer    • History of transfusion     MULTIPLE    • Hyperlipidemia    • Hypertension    • Hypothyroidism    • Left bundle branch block    • Mitral and aortic valve disease    • Mitral valve insufficiency    • Myoclonus     S/P Depakote   • Osteoarthritis    • Pancytopenia (CMS/HCC)    • Peripheral neuropathy    • Presence of cardiac pacemaker     AND DEFIBRILLATOR   • Pulmonary hypertension (CMS/HCC)    • SOB (shortness  of breath) on exertion    • Spinal stenosis    • Stroke (CMS/HCC)      Past Surgical History:   Procedure Laterality Date   • APPENDECTOMY N/A    • ARTERIOVENOUS FISTULA/SHUNT SURGERY Right 2/18/2019    Procedure: RIGHT BASILIC FISTULA CREATION WITHOUT TRANSPOSITION;  Surgeon: Royer Dozier MD;  Location: [facility] MAIN OR;  Service: Vascular   • ARTERIOVENOUS FISTULA/SHUNT SURGERY Right 5/31/2019    Procedure: RIGHT 2ND STAGE BASILIC VEIN CREATION;  Surgeon: Royer Dozier MD;  Location: [facility] MAIN OR;  Service: Vascular   • CARDIAC CATHETERIZATION Left 03/28/2006    Arterial catheter insertion, cath left ventriculography, coronary angiography and left heart catheterization-Dr. Estevan Matamoros   • CARDIAC DEFIBRILLATOR PLACEMENT N/A 11/30/2007    Biventricular implantable cardioverter defibrillator-Dr. Leandro Huber   • CARDIAC ELECTROPHYSIOLOGY PROCEDURE N/A 10/28/2019    Procedure: GENERATOR CHANGE BI-V ICD  medtronic;  Surgeon: Leandro Huber MD;  Location: [facility] CATH INVASIVE LOCATION;  Service: Cardiology   • CATARACT EXTRACTION Bilateral 2011   • COLONOSCOPY N/A 2014    done at [facility]   • CYSTECTOMY N/A     Ovarian cystectomy   • ENDOSCOPY N/A 04/28/2006    EGD with biopsies. Paraesophageal hiatal hernia from 34 to 44 cm, antral gastritis-Dr. Nehemiah Beal   • ENDOSCOPY N/A 09/29/2004    Hiatal hernia, gastritis and an erosion of the pylorus-Dr. Yang Pavon   • ENDOSCOPY N/A 9/1/2019    Procedure: ESOPHAGOGASTRODUODENOSCOPY with APC;  Surgeon: Anuel Roach MD;  Location: [facility] ENDOSCOPY;  Service: Gastroenterology   • ENDOSCOPY N/A 1/28/2020    Procedure: ESOPHAGOGASTRODUODENOSCOPY with APC;  Surgeon: Anuel Roach MD;  Location: [facility] ENDOSCOPY;  Service: Gastroenterology   • ENTEROSCOPY SMALL BOWEL N/A 10/30/2006    Small bowel enteroscopy with biopsies-Dr. Rory Sevilla   • FLEXIBLE SIGMOIDOSCOPY N/A 09/29/2004    Unsuccessful colonoscopy due to poop prep, a very  tortuous sigmoid, bowel prep in the form of enema given and a barium enema was obtained-Dr. Yang Pavon   • FRACTURE SURGERY  2015    Broke big toe   • HEMATOMA EVACUATION TRUNK N/A 02/07/2014    Pocket evacuation of hematoma at pacemaker site-Dr. Leandro Huber   • HEMORRHOIDECTOMY N/A 04/19/2004    Flexible sigmoidoscopy and stapled hemorrhoidectomy-Dr. Yang Pavon   • HYSTERECTOMY Bilateral 1977   • IMPLANTABLE CARDIOVERTER DEFIBRILLATOR LEAD REPLACEMENT/POCKET REVISION Left 3/28/2016    Procedure: AUTOMATIC IMPLANTABLE CARDIOVERTER DEFIBRILLATOR LEAD REPLACEMENT WITH LASER LEAD EXTRACTION;  Surgeon: Leandro Huber MD;  Location: [facility] OR 18/19;  Service:    • IMPLANTABLE CARDIOVERTER DEFIBRILLATOR LEAD REPLACEMENT/POCKET REVISION N/A 01/13/2014    Biventricular ICD replacement-Dr. Jlilian Huber   • LAPAROSCOPY REPAIR HIATAL HERNIA N/A 06/27/2006    Laparoscopic paraesophageal hiatal hernia repair with Nissen fundoplication and cholecystectomy-Dr. Sean Yoon   • NATALIE GONZALEZ ([facility]) PROCEDURE     • MT INSJ TUNNELED CVC W/O SUBQ PORT/ AGE 3 YR/> Right 10/3/2017    Procedure: Right internal jugular port placement;  Surgeon: Tiffanie Couch MD;  Location: [facility] OR;  Service: General   • TOE SURGERY Left     SET BIG TOE   • TOTAL KNEE ARTHROPLASTY Left 08/2014   • VENOUS ACCESS DEVICE (PORT) INSERTION Right        MEDICATIONS    Current Outpatient Medications:   •  acetaminophen (TYLENOL) 325 MG tablet, Take 2 tablets by mouth Every 4 (Four) Hours As Needed for Mild Pain ., Disp: , Rfl:   •  atorvastatin (LIPITOR) 20 MG tablet, Take 20 mg by mouth Daily., Disp: , Rfl:   •  calcitriol (ROCALTROL) 0.25 MCG capsule, Take 0.25 mcg by mouth Every Other Day. Alternate days with calcitriol 0.5mcg, Disp: , Rfl:   •  calcitriol (ROCALTROL) 0.5 MCG capsule, Take 0.5 mcg by mouth Every Other Day. Alternate days with calcitriol 0.25mcg, Disp: , Rfl:   •  calcium carbonate (OS-RAYMOND)  600 MG tablet, Take 600 mg by mouth Daily., Disp: , Rfl:   •  carbidopa-levodopa ER (SINEMET CR)  MG per tablet, Take 1 tablet by mouth Every Evening., Disp: , Rfl:   •  carvedilol (COREG) 12.5 MG tablet, Take 12.5 mg by mouth Daily., Disp: , Rfl:   •  Cholecalciferol (VITAMIN D3) 5000 units capsule capsule, Take 5,000 Units by mouth Daily., Disp: , Rfl:   •  diazepam (VALIUM) 5 MG tablet, Take 5 mg by mouth Every Night., Disp: , Rfl:   •  febuxostat (ULORIC) 40 MG tablet, Take 40 mg by mouth Daily., Disp: , Rfl:   •  furosemide (LASIX) 40 MG tablet, Take 1 tablet by mouth Daily., Disp: 90 tablet, Rfl: 3  •  Gabapentin, Once-Daily, (GRALISE) 300 MG tablet, Take 300 mg by mouth Daily With Dinner. MAY REPEAT LATE EVENING IF NEEDED, Disp: , Rfl:   •  Glucosamine-Chondroit-Vit C-Mn (GLUCOSAMINE CHONDROITIN COMPLX) capsule, Take 1,500 mg by mouth Every Other Day., Disp: , Rfl:   •  HYDROcodone-acetaminophen (NORCO) 5-325 MG per tablet, Take 1 tablet by mouth Every 6 (Six) Hours As Needed for Severe Pain ., Disp: 12 tablet, Rfl: 0  •  levothyroxine (SYNTHROID, LEVOTHROID) 25 MCG tablet, Take 25 mcg by mouth Daily., Disp: , Rfl:   •  magnesium oxide 250 MG tablet, Take 250 mg by mouth Daily., Disp: , Rfl:   •  Multiple Vitamin (MULTI-DAY VITAMINS) tablet, Take 1 tablet by mouth Daily. HOLD FOR SURGERY, Disp: , Rfl:   •  polyethylene glycol (MIRALAX) powder, Take 17 g by mouth Daily As Needed., Disp: , Rfl:   •  psyllium (METAMUCIL) 58.6 % powder, Take 1 packet by mouth Daily., Disp: , Rfl:   •  rotigotine (NEUPRO) 6 MG/24HR patch, Place 1 patch on the skin as directed by provider Daily., Disp: , Rfl:   •  Vitamin E 400 UNITS tablet, Take 400 Units by mouth Daily., Disp: , Rfl:   No current facility-administered medications for this visit.     Facility-Administered Medications Ordered in Other Visits:   •  heparin flush (porcine) 100 UNIT/ML injection 500 Units, 500 Units, Intravenous, PRN, Anuel Scott MD, 500  "Units at 11/27/17 1347  •  heparin flush (porcine) 100 UNIT/ML injection 500 Units, 500 Units, Intravenous, PRN, Neno Merritt II, MD, 500 Units at 01/18/20 1359  •  heparin injection 500 Units, 500 Units, Intravenous, PRN, Neno Merritt II, MD  •  sodium chloride 0.9 % flush 10 mL, 10 mL, Intravenous, PRN, Anuel Scott MD, 10 mL at 11/27/17 1347  •  sodium chloride 0.9 % flush 10 mL, 10 mL, Intravenous, PRN, Neno Merritt II, MD, 10 mL at 01/18/20 1359  •  sodium chloride 0.9 % flush 10 mL, 10 mL, Intravenous, PRN, Neno Merritt II, MD  •  sodium chloride 0.9 % infusion 250 mL, 250 mL, Intravenous, PRN, Neno Merritt II, MD    ALLERGIES:     Allergies   Allergen Reactions   • Chlorhexidine Rash and Itching     Patient states \"blue dye\" in chlorhexidine.  States the orange and clear are OK to use   • Codeine Unknown - Low Severity     HYPER, DOES NOT HELP PAIN   • Propoxyphene Unknown - Low Severity     Belligerent, acting drunk  (darvon)       SOCIAL HISTORY:       Social History     Socioeconomic History   • Marital status:      Spouse name: Zackery   • Number of children: 5   • Years of education: 1 yr college   • Highest education level: Not on file   Occupational History     Employer: RETIRED   Tobacco Use   • Smoking status: Never Smoker   • Smokeless tobacco: Never Used   • Tobacco comment: caffeine use - coffee and soda   Substance and Sexual Activity   • Alcohol use: No   • Drug use: No   • Sexual activity: Defer         FAMILY HISTORY:  Family History   Problem Relation Age of Onset   • Coronary artery disease Other    • Dementia Other    • Kidney disease Other    • Arthritis Father    • Early death Father         Kidney failure   • Kidney disease Father    • Heart disease Mother    • Mental illness Mother         Dementia   • Malig Hyperthermia Neg Hx    • Colon cancer Neg Hx    • Colon polyps Neg Hx        REVIEW OF SYSTEMS:  Review of Systems   Constitutional: Positive for fatigue. " Negative for activity change.   HENT: Negative for nosebleeds and trouble swallowing.    Respiratory: Negative for shortness of breath and wheezing.    Cardiovascular: Negative for chest pain and palpitations.   Gastrointestinal: Negative for constipation, diarrhea and nausea.   Genitourinary: Negative for dysuria and hematuria.   Musculoskeletal: Negative for arthralgias and myalgias.        See hpi   Skin: Negative for rash and wound.   Neurological: Positive for light-headedness (see hpi). Negative for seizures and syncope.   Hematological: Negative for adenopathy. Does not bruise/bleed easily.   Psychiatric/Behavioral: Negative for confusion.            There were no vitals filed for this visit.  Current Status 1/2/2020   ECOG score 0        PHYSICAL EXAM:    CONSTITUTIONAL:  Vital signs reviewed.  No distress, looks comfortable.  She is accompanied by her .  He does have right subconjunctival hemorrhage noted.  She also has stitches the right zygomatic bone.  There is no drainage or signs of infection.  She does have bruising on the right side of the face extending to the neck and right side of the chest.  EYES:  Conjunctiva and lids unremarkable.  PERRLA  EARS,NOSE,MOUTH,THROAT:  Ears and nose appear unremarkable.  Lips, teeth, gums appear unremarkable.  RESPIRATORY:  Normal respiratory effort.  Lungs clear to auscultation bilaterally.  CARDIOVASCULAR:  Normal S1, S2.  No murmurs rubs or gallops.  No significant lower extremity edema.  GASTROINTESTINAL: Abdomen appears unremarkable.  Nontender.  No hepatomegaly.  No splenomegaly.  LYMPHATIC:  No cervical, supraclavicular, axillary lymphadenopathy.  SKIN: See Constitutional  PSYCHIATRIC:  Normal judgment and insight.  Normal mood and affect.    RECENT LABS:        WBC   Date/Time Value Ref Range Status   02/13/2020 05:58 AM 5.67 3.40 - 10.80 10*3/mm3 Final     Hemoglobin   Date/Time Value Ref Range Status   02/13/2020 03:28 PM 8.0 (L) 12.0 - 15.9 g/dL  Final     Platelets   Date/Time Value Ref Range Status   02/13/2020 05:58  140 - 450 10*3/mm3 Final       Assessment/Plan   There are no diagnoses linked to this encounter.     *Anemia due to stage IV chronic kidney disease.    · Weekly Procrit if Hb <10.  · Hgb 7.8 today.    *Admitted 8/29/2019-9/2/2019 due to Hb of 5.  Transfused 2 units.  Heme positive stools.  EGD revealed GAVE.  Laser photocoagulation  · She states Dr. Roach plans EGD every 3 months.    *Iron deficiency anemia.  · Does not respond to oral iron due to poor absorption.  · 2 doses Injectafer late August 2019 resulted in normal iron labs and improvement of Hb from 9.4 up to 11.1 on 9/26/2019.  · Hb down to 10.3, 10/3/2019.  · Iron labs normal 10/31/2019, ferritin 200, 21% saturation.  · Hb dropped to 7.8 on 11/26/2019, requiring transfusion.  · Hb dropped to 6.9 on 12/19/2019, requiring transfusion.  · Hb 9.9 today, 1/2/2020.  · Hemoglobin 7.8 today.  Iron Profile pending at time of dictation    *Source of iron deficiency.  · Previously followed regularly with Dr. Earl Avelar.  · States she cannot have colonoscopies due to tortuous colon and therefore has virtual colonoscopies.  · She states she saw Dr. Avelar after the 6/27/2019 visit with me due to recent dark stools.  She states Dr. Avelar did a rectal exam which was heme negative and recommended no further evaluation.  · On 8/22/2019, I told her I suspect she is having occult GI bleeding considering she is needing IV iron frequently.  However, with new stroke mid May 2019 and the addition of Plavix to aspirin, risk may be too great to stop antiplatelet agents for further GI evaluation.  Patient and  agree.  · During late August 2019 hospitalization for GI bleed with Hb of 5, EGD through Dr. Roach revealed G [address].  Laser photocoagulation.  · Subsequently, following with Dr. Roach.  He initially planned EGD every 3 months.  · However, on the 10/18/2019 office visit, he  changed the plan to be return to see him mid February 2020 and stated he could retreat her GAVE if she develops a significant drop in her Hb.  · She has a gastric emptying study scheduled for February 21, she will then see Dr. Roach on April 14, 2020.    *Macrocytosis.  B12 and folate unremarkable  MCV 99.2.    *Reticulocytosis.  · On 8/22/2019, unremarkable: Direct [place], haptoglobin, bilirubin ( with normal range up to 214.  Therefore, likely not significant).  · Reticulocyte percentage today is 2.68, reticulocyte hemoglobin 20.7 0.1    *Thrombocytopenia.  Intermittent since 9/19/2019.  PLT [id number].    *Circulating nucleated red blood cells.  Intermittent.  Could consider a bone marrow biopsy at some point.  I suspect this is occurring as her bone marrow is trying to increase production after bleeding episodes.  Nucleated red blood cells seen on 10/31/2019 and again, 11/6/2019    *Stroke mid August 2019.  Presumed.  Could not have MRI due to pacemaker.  Plavix was added to aspirin.    *Recent fall due to misstep resulting in very orbital fracture.    Plan  · We will order 2 units of packed red blood cells today.  She will also receive Procrit today as she missed last week due to hospitalization.  · The patient is currently scheduled to see Dr. Merritt, February 27,2020.   · Continue week Procrit if Hb <10.  (Not needing Procrit weekly)  · I have asked the patient to call the office with any new or worsening symptoms for her next scheduled visits.  · We have offered weekly CBC but she prefers to maintain every 2-week  · We will await her iron profile and ferritin and move forward accordingly  · She will follow-up with Dr. Roach as scheduled.  · Could consider a bone marrow biopsy in the future.

## 2020-02-20 ENCOUNTER — HOSPITAL ENCOUNTER (OUTPATIENT)
Dept: INFUSION THERAPY | Facility: HOSPITAL | Age: 80
Setting detail: INFUSION SERIES
Discharge: HOME | End: 2020-02-20

## 2020-02-20 ENCOUNTER — APPOINTMENT (OUTPATIENT)
Dept: ONCOLOGY | Facility: HOSPITAL | Age: 80
End: 2020-02-20

## 2020-02-20 VITALS
TEMPERATURE: 97.7 F | DIASTOLIC BLOOD PRESSURE: 61 MMHG | HEART RATE: 66 BPM | OXYGEN SATURATION: 100 % | SYSTOLIC BLOOD PRESSURE: 126 MMHG | RESPIRATION RATE: 16 BRPM

## 2020-02-20 DIAGNOSIS — E27.40 CHRONIC ADRENAL INSUFFICIENCY (HCC): Primary | ICD-10-CM

## 2020-02-20 DIAGNOSIS — Z45.2 ENCOUNTER FOR FITTING AND ADJUSTMENT OF VASCULAR CATHETER: ICD-10-CM

## 2020-02-20 PROCEDURE — P9016 RBC LEUKOCYTES REDUCED: HCPCS

## 2020-02-20 PROCEDURE — 36430 TRANSFUSION BLD/BLD COMPNT: CPT

## 2020-02-20 PROCEDURE — 86900 BLOOD TYPING SEROLOGIC ABO: CPT

## 2020-02-20 PROCEDURE — 63710000001 DIPHENHYDRAMINE PER 50 MG: Performed by: NURSE PRACTITIONER

## 2020-02-20 PROCEDURE — 25010000003 HEPARIN LOCK FLUCH PER 10 UNITS: Performed by: INTERNAL MEDICINE

## 2020-02-20 RX ORDER — ACETAMINOPHEN 325 MG/1
650 TABLET ORAL ONCE
Status: COMPLETED | OUTPATIENT
Start: 2020-02-20 | End: 2020-02-20

## 2020-02-20 RX ORDER — HEPARIN SODIUM (PORCINE) LOCK FLUSH IV SOLN 100 UNIT/ML 100 UNIT/ML
500 SOLUTION INTRAVENOUS AS NEEDED
Status: CANCELLED | OUTPATIENT
Start: 2020-02-20

## 2020-02-20 RX ORDER — SODIUM CHLORIDE 0.9 % (FLUSH) 0.9 %
10 SYRINGE (ML) INJECTION AS NEEDED
Status: CANCELLED | OUTPATIENT
Start: 2020-02-20

## 2020-02-20 RX ORDER — DIPHENHYDRAMINE HCL 25 MG
25 CAPSULE ORAL ONCE
Status: COMPLETED | OUTPATIENT
Start: 2020-02-20 | End: 2020-02-20

## 2020-02-20 RX ORDER — SODIUM CHLORIDE 9 MG/ML
250 INJECTION, SOLUTION INTRAVENOUS AS NEEDED
Status: DISCONTINUED | OUTPATIENT
Start: 2020-02-20 | End: 2020-02-22 | Stop reason: HOSPADM

## 2020-02-20 RX ORDER — HEPARIN SODIUM (PORCINE) LOCK FLUSH IV SOLN 100 UNIT/ML 100 UNIT/ML
500 SOLUTION INTRAVENOUS AS NEEDED
Status: DISCONTINUED | OUTPATIENT
Start: 2020-02-20 | End: 2020-02-22 | Stop reason: HOSPADM

## 2020-02-20 RX ADMIN — ACETAMINOPHEN 650 MG: 325 TABLET, FILM COATED ORAL at 08:37

## 2020-02-20 RX ADMIN — Medication 500 UNITS: at 15:12

## 2020-02-20 RX ADMIN — DIPHENHYDRAMINE HYDROCHLORIDE 25 MG: 25 CAPSULE ORAL at 08:37

## 2020-02-20 NOTE — PROGRESS NOTES
Tolerated transfusions with no concerns. [facility] port de-accessed and site covered with latex free band aide. Discharged home ambulatory with  at 1525.

## 2020-02-20 NOTE — PROGRESS NOTES
Blood transfusion education complete and consent signed.  Right medi port accessed easily using sterile technique according to policy; good blood return and flushed easily. Dressed with bio patch and tegaderm. Tolerated lprocedure well.

## 2020-02-21 ENCOUNTER — HOSPITAL ENCOUNTER (OUTPATIENT)
Dept: NUCLEAR MEDICINE | Facility: HOSPITAL | Age: 80
Discharge: HOME | End: 2020-02-21

## 2020-02-21 DIAGNOSIS — T18.2XXA GASTRIC BEZOAR, INITIAL ENCOUNTER: ICD-10-CM

## 2020-02-24 ENCOUNTER — READMISSION MANAGEMENT (OUTPATIENT)
Dept: CALL CENTER | Facility: HOSPITAL | Age: 80
End: 2020-02-24

## 2020-02-24 NOTE — OUTREACH NOTE
Stroke Week 2 Survey      Responses   Facility patient discharged from?  [place]   Does the patient have one of the following disease processes/diagnoses(primary or secondary)?  Stroke (TIA)   Week 2 attempt successful?  No   Unsuccessful attempts  Attempt 1          Esdras Ruiz RN

## 2020-02-25 ENCOUNTER — READMISSION MANAGEMENT (OUTPATIENT)
Dept: CALL CENTER | Facility: HOSPITAL | Age: 80
End: 2020-02-25

## 2020-02-25 NOTE — OUTREACH NOTE
Stroke Week 2 Survey      Responses   Facility patient discharged from?  [place]   Does the patient have one of the following disease processes/diagnoses(primary or secondary)?  Stroke (TIA)   Week 2 attempt successful?  Yes   Call start time  0956   Rescheduled  Rescheduled-pt requested [pt had a call coming in from  and wanted to take it. I told her we would call her back]   Call end time  0958   Discharge diagnosis  Subarachnoid hemorrhage    Meds reviewed with patient/caregiver?  Yes   Is the patient taking all medications as directed (includes completed medication regime)?  Yes          Soumya Lowry RN

## 2020-02-26 ENCOUNTER — READMISSION MANAGEMENT (OUTPATIENT)
Dept: CALL CENTER | Facility: HOSPITAL | Age: 80
End: 2020-02-26

## 2020-02-26 NOTE — OUTREACH NOTE
Stroke Week 2 Survey      Responses   Facility patient discharged from?  [place]   Does the patient have one of the following disease processes/diagnoses(primary or secondary)?  Stroke (TIA)   Week 2 attempt successful?  Yes   Call start time  1641   Call end time  1652   Discharge diagnosis  Subarachnoid hemorrhage    Meds reviewed with patient/caregiver?  Yes   Is the patient taking all medications as directed (includes completed medication regime)?  Yes   Does the patient have a primary care provider?   Yes   Has the patient kept scheduled appointments due by today?  Yes   Comments  Appt tomorrow with Opth    Has seen PCP    Saw Oral Maxillofacial yesterday 02/25/2020   Does the patient require any assistance with activities of daily living such as eating, bathing, dressing, walking, etc.?  No   Does the patient have any residual symptoms from stroke/TIA?  Yes   Residual symptoms comments  Has a problem with balance, uses a cane or assist of .   Does the patient understand the diet ordered at discharge?  Yes   What is the patient's perception of their health status since discharge?  Improving   Is the patient able to teach back FAST for Stroke?  Yes   Is the patient/caregiver able to teach back the risk factors for a stroke?  High blood pressure-goal below 120/80, Smoking, High Cholesterol, Physical inactivity and obesity, History of TIAs, History of Afib, Excessive alcohol intake, Carotid or other artery disease, Diabetes   Week 2 call completed?  Yes          Judith Jones RN

## 2020-02-27 ENCOUNTER — OFFICE VISIT (OUTPATIENT)
Dept: ONCOLOGY | Facility: CLINIC | Age: 80
End: 2020-02-27

## 2020-02-27 ENCOUNTER — LAB (OUTPATIENT)
Dept: OTHER | Facility: HOSPITAL | Age: 80
End: 2020-02-27

## 2020-02-27 ENCOUNTER — INFUSION (OUTPATIENT)
Dept: ONCOLOGY | Facility: HOSPITAL | Age: 80
End: 2020-02-27

## 2020-02-27 VITALS
DIASTOLIC BLOOD PRESSURE: 71 MMHG | HEIGHT: 67 IN | SYSTOLIC BLOOD PRESSURE: 168 MMHG | RESPIRATION RATE: 14 BRPM | BODY MASS INDEX: 26.42 KG/M2 | WEIGHT: 168.3 LBS | TEMPERATURE: 97.5 F | HEART RATE: 68 BPM | OXYGEN SATURATION: 96 %

## 2020-02-27 VITALS — SYSTOLIC BLOOD PRESSURE: 150 MMHG | DIASTOLIC BLOOD PRESSURE: 80 MMHG

## 2020-02-27 DIAGNOSIS — D63.1 ANEMIA DUE TO STAGE 4 CHRONIC KIDNEY DISEASE (HCC): Primary | ICD-10-CM

## 2020-02-27 DIAGNOSIS — N18.30 CHRONIC KIDNEY DISEASE, STAGE III (MODERATE) (HCC): Primary | ICD-10-CM

## 2020-02-27 DIAGNOSIS — N18.4 ANEMIA DUE TO STAGE 4 CHRONIC KIDNEY DISEASE (HCC): ICD-10-CM

## 2020-02-27 DIAGNOSIS — D63.1 ANEMIA DUE TO STAGE 4 CHRONIC KIDNEY DISEASE (HCC): ICD-10-CM

## 2020-02-27 DIAGNOSIS — IMO0001 ADVERSE EFFECT OF IRON OR ITS COMPOUND, SUBSEQUENT ENCOUNTER: ICD-10-CM

## 2020-02-27 DIAGNOSIS — D63.1 ANEMIA IN STAGE 4 CHRONIC KIDNEY DISEASE (HCC): ICD-10-CM

## 2020-02-27 DIAGNOSIS — D63.8 ANEMIA OF CHRONIC DISEASE: ICD-10-CM

## 2020-02-27 DIAGNOSIS — N18.4 ANEMIA DUE TO STAGE 4 CHRONIC KIDNEY DISEASE (HCC): Primary | ICD-10-CM

## 2020-02-27 DIAGNOSIS — N18.4 ANEMIA IN STAGE 4 CHRONIC KIDNEY DISEASE (HCC): ICD-10-CM

## 2020-02-27 DIAGNOSIS — Z45.2 ENCOUNTER FOR FITTING AND ADJUSTMENT OF VASCULAR CATHETER: ICD-10-CM

## 2020-02-27 LAB
BASOPHILS # BLD AUTO: 0.01 10*3/MM3 (ref 0–0.2)
BASOPHILS NFR BLD AUTO: 0.2 % (ref 0–1.5)
DEPRECATED RDW RBC AUTO: 52 FL (ref 37–54)
EOSINOPHIL # BLD AUTO: 0.13 10*3/MM3 (ref 0–0.4)
EOSINOPHIL NFR BLD AUTO: 2.2 % (ref 0.3–6.2)
ERYTHROCYTE [DISTWIDTH] IN BLOOD BY AUTOMATED COUNT: 15.8 % (ref 12.3–15.4)
HCT VFR BLD AUTO: 32.5 % (ref 34–46.6)
HGB BLD-MCNC: 10.5 G/DL (ref 12–15.9)
IMM GRANULOCYTES # BLD AUTO: 0.07 10*3/MM3 (ref 0–0.05)
IMM GRANULOCYTES NFR BLD AUTO: 1.2 % (ref 0–0.5)
LYMPHOCYTES # BLD AUTO: 0.71 10*3/MM3 (ref 0.7–3.1)
LYMPHOCYTES NFR BLD AUTO: 11.9 % (ref 19.6–45.3)
MCH RBC QN AUTO: 30.5 PG (ref 26.6–33)
MCHC RBC AUTO-ENTMCNC: 32.3 G/DL (ref 31.5–35.7)
MCV RBC AUTO: 94.5 FL (ref 79–97)
MONOCYTES # BLD AUTO: 0.4 10*3/MM3 (ref 0.1–0.9)
MONOCYTES NFR BLD AUTO: 6.7 % (ref 5–12)
NEUTROPHILS # BLD AUTO: 4.66 10*3/MM3 (ref 1.7–7)
NEUTROPHILS NFR BLD AUTO: 77.8 % (ref 42.7–76)
NRBC BLD AUTO-RTO: 0 /100 WBC (ref 0–0.2)
PLATELET # BLD AUTO: 186 10*3/MM3 (ref 140–450)
PMV BLD AUTO: 9.4 FL (ref 6–12)
RBC # BLD AUTO: 3.44 10*6/MM3 (ref 3.77–5.28)
WBC NRBC COR # BLD: 5.98 10*3/MM3 (ref 3.4–10.8)

## 2020-02-27 PROCEDURE — 25010000002 FERRIC CARBOXYMALTOSE 750 MG/15ML SOLUTION 15 ML VIAL: Performed by: INTERNAL MEDICINE

## 2020-02-27 PROCEDURE — 99215 OFFICE O/P EST HI 40 MIN: CPT | Performed by: INTERNAL MEDICINE

## 2020-02-27 PROCEDURE — 63710000001 PROCHLORPERAZINE MALEATE PER 5 MG: Performed by: INTERNAL MEDICINE

## 2020-02-27 PROCEDURE — 25010000003 HEPARIN LOCK FLUCH PER 10 UNITS: Performed by: INTERNAL MEDICINE

## 2020-02-27 PROCEDURE — 36415 COLL VENOUS BLD VENIPUNCTURE: CPT

## 2020-02-27 PROCEDURE — 96374 THER/PROPH/DIAG INJ IV PUSH: CPT

## 2020-02-27 PROCEDURE — A9270 NON-COVERED ITEM OR SERVICE: HCPCS | Performed by: INTERNAL MEDICINE

## 2020-02-27 PROCEDURE — 85025 COMPLETE CBC W/AUTO DIFF WBC: CPT | Performed by: INTERNAL MEDICINE

## 2020-02-27 RX ORDER — SODIUM CHLORIDE 0.9 % (FLUSH) 0.9 %
10 SYRINGE (ML) INJECTION AS NEEDED
Status: DISCONTINUED | OUTPATIENT
Start: 2020-02-27 | End: 2020-02-27 | Stop reason: HOSPADM

## 2020-02-27 RX ORDER — SODIUM CHLORIDE 0.9 % (FLUSH) 0.9 %
10 SYRINGE (ML) INJECTION AS NEEDED
Status: CANCELLED | OUTPATIENT
Start: 2020-02-27

## 2020-02-27 RX ORDER — HEPARIN SODIUM (PORCINE) LOCK FLUSH IV SOLN 100 UNIT/ML 100 UNIT/ML
500 SOLUTION INTRAVENOUS AS NEEDED
Status: CANCELLED | OUTPATIENT
Start: 2020-02-27

## 2020-02-27 RX ORDER — HEPARIN SODIUM (PORCINE) LOCK FLUSH IV SOLN 100 UNIT/ML 100 UNIT/ML
500 SOLUTION INTRAVENOUS AS NEEDED
Status: DISCONTINUED | OUTPATIENT
Start: 2020-02-27 | End: 2020-02-27 | Stop reason: HOSPADM

## 2020-02-27 RX ORDER — ERYTHROMYCIN STEARATE 250 MG/1
TABLET, FILM COATED ORAL
COMMUNITY
Start: 2020-02-17 | End: 2020-03-02 | Stop reason: SDUPTHER

## 2020-02-27 RX ORDER — PROCHLORPERAZINE MALEATE 5 MG/1
10 TABLET ORAL ONCE
Status: COMPLETED | OUTPATIENT
Start: 2020-02-27 | End: 2020-02-27

## 2020-02-27 RX ORDER — SODIUM CHLORIDE 9 MG/ML
250 INJECTION, SOLUTION INTRAVENOUS ONCE
Status: COMPLETED | OUTPATIENT
Start: 2020-02-27 | End: 2020-02-27

## 2020-02-27 RX ADMIN — SODIUM CHLORIDE, PRESERVATIVE FREE 10 ML: 5 INJECTION INTRAVENOUS at 12:42

## 2020-02-27 RX ADMIN — Medication 500 UNITS: at 12:42

## 2020-02-27 RX ADMIN — SODIUM CHLORIDE 250 ML: 900 INJECTION, SOLUTION INTRAVENOUS at 11:25

## 2020-02-27 RX ADMIN — PROCHLORPERAZINE MALEATE 10 MG: 5 TABLET ORAL at 11:31

## 2020-02-27 RX ADMIN — FERRIC CARBOXYMALTOSE INJECTION 750 MG: 50 INJECTION, SOLUTION INTRAVENOUS at 11:42

## 2020-02-27 NOTE — PROGRESS NOTES
.     REASONS FOR FOLLOWUP: Anemia    HISTORY OF PRESENT ILLNESS:  The patient is a 80 y.o. year old female  who is here for follow-up with the above-mentioned history.    Since she last saw me, she was admitted with periorbital fractures, anemia, small subdural hematoma and bilateral small traumatic subarachnoid hemorrhages after a fall.  She has required transfusions.    She states she still has some head pain since the fall.  She is following up with several physicians regarding this.  She is seeing an ophthalmologist today due to floaters.  She states she has a follow-up with neurosurgery.  She states she has follow-up CT scans as well.    Denies any significant bleeding.    Past Medical History:   Diagnosis Date   • Anemia     Iron deficiency   • Anxiety    • Cardiomyopathy (CMS/HCC)     S/P pacemaker and defibrillator   • CHF (congestive heart failure) (CMS/HCC)    • Chronic renal failure, stage 4 (severe) (CMS/HCC)    • Colon polyp    • Coronary artery disease     pacemaker, defibrillator   • Depression    • Dizzy    • Gastroparesis    • GAVE (gastric antral vascular ectasia)    • GERD (gastroesophageal reflux disease)    • Gout    • Hemorrhoids    • Hiatal hernia    • History of Clostridium difficile colitis 2013   • History of kidney stones    • History of pancreatitis     2014   • History of skin cancer    • History of transfusion     MULTIPLE    • Hyperlipidemia    • Hypertension    • Hypothyroidism    • Left bundle branch block    • Mitral and aortic valve disease    • Mitral valve insufficiency    • Myoclonus     S/P Depakote   • Osteoarthritis    • Pancytopenia (CMS/HCC)    • Peripheral neuropathy    • Presence of cardiac pacemaker     AND DEFIBRILLATOR   • Pulmonary hypertension (CMS/HCC)    • SOB (shortness of breath) on exertion    • Spinal stenosis    • Stroke (CMS/HCC)      Past Surgical History:   Procedure Laterality Date   • APPENDECTOMY N/A    • ARTERIOVENOUS FISTULA/SHUNT SURGERY Right  2/18/2019    Procedure: RIGHT BASILIC FISTULA CREATION WITHOUT TRANSPOSITION;  Surgeon: Royer Dozier MD;  Location: [facility] MAIN OR;  Service: Vascular   • ARTERIOVENOUS FISTULA/SHUNT SURGERY Right 5/31/2019    Procedure: RIGHT 2ND STAGE BASILIC VEIN CREATION;  Surgeon: Royer Dozier MD;  Location: [facility] MAIN OR;  Service: Vascular   • CARDIAC CATHETERIZATION Left 03/28/2006    Arterial catheter insertion, cath left ventriculography, coronary angiography and left heart catheterization-Dr. Estevan Matamoros   • CARDIAC DEFIBRILLATOR PLACEMENT N/A 11/30/2007    Biventricular implantable cardioverter defibrillator-Dr. Leandro Huber   • CARDIAC ELECTROPHYSIOLOGY PROCEDURE N/A 10/28/2019    Procedure: GENERATOR CHANGE BI-V ICD  medtronic;  Surgeon: Leandro Huber MD;  Location: [facility] CATH INVASIVE LOCATION;  Service: Cardiology   • CATARACT EXTRACTION Bilateral 2011   • COLONOSCOPY N/A 2014    done at [facility]   • CYSTECTOMY N/A     Ovarian cystectomy   • ENDOSCOPY N/A 04/28/2006    EGD with biopsies. Paraesophageal hiatal hernia from 34 to 44 cm, antral gastritis-Dr. Nehemiah Beal   • ENDOSCOPY N/A 09/29/2004    Hiatal hernia, gastritis and an erosion of the pylorus-Dr. Yang Pavon   • ENDOSCOPY N/A 9/1/2019    Procedure: ESOPHAGOGASTRODUODENOSCOPY with APC;  Surgeon: Anuel Roach MD;  Location: [facility] ENDOSCOPY;  Service: Gastroenterology   • ENDOSCOPY N/A 1/28/2020    Procedure: ESOPHAGOGASTRODUODENOSCOPY with APC;  Surgeon: Anuel Roach MD;  Location: [facility] ENDOSCOPY;  Service: Gastroenterology   • ENTEROSCOPY SMALL BOWEL N/A 10/30/2006    Small bowel enteroscopy with biopsies-Dr. Rory Sevilla   • FLEXIBLE SIGMOIDOSCOPY N/A 09/29/2004    Unsuccessful colonoscopy due to poop prep, a very tortuous sigmoid, bowel prep in the form of enema given and a barium enema was obtained-Dr. Yang Pavon   • FRACTURE SURGERY  2015    Broke big toe   • HEMATOMA EVACUATION TRUNK N/A  02/07/2014    Pocket evacuation of hematoma at pacemaker site-Dr. Leandro Huber   • HEMORRHOIDECTOMY N/A 04/19/2004    Flexible sigmoidoscopy and stapled hemorrhoidectomy-Dr. Yang Pavon   • HYSTERECTOMY Bilateral 1977   • IMPLANTABLE CARDIOVERTER DEFIBRILLATOR LEAD REPLACEMENT/POCKET REVISION Left 3/28/2016    Procedure: AUTOMATIC IMPLANTABLE CARDIOVERTER DEFIBRILLATOR LEAD REPLACEMENT WITH LASER LEAD EXTRACTION;  Surgeon: Leandro Huber MD;  Location: [facility] HYBRID OR 18/19;  Service:    • IMPLANTABLE CARDIOVERTER DEFIBRILLATOR LEAD REPLACEMENT/POCKET REVISION N/A 01/13/2014    Biventricular ICD replacement-Dr. Jillian Huber   • LAPAROSCOPY REPAIR HIATAL HERNIA N/A 06/27/2006    Laparoscopic paraesophageal hiatal hernia repair with Nissen fundoplication and cholecystectomy-Dr. Sean Yoon   • NATALIE GONZALEZ ([facility]) PROCEDURE     • DE INSJ TUNNELED CVC W/O SUBQ PORT/ AGE 3 YR/> Right 10/3/2017    Procedure: Right internal jugular port placement;  Surgeon: Tiffanie Couch MD;  Location: [facility] MAIN OR;  Service: General   • TOE SURGERY Left     SET BIG TOE   • TOTAL KNEE ARTHROPLASTY Left 08/2014   • VENOUS ACCESS DEVICE (PORT) INSERTION Right        MEDICATIONS    Current Outpatient Medications:   •  acetaminophen (TYLENOL) 325 MG tablet, Take 2 tablets by mouth Every 4 (Four) Hours As Needed for Mild Pain ., Disp: , Rfl:   •  atorvastatin (LIPITOR) 20 MG tablet, Take 20 mg by mouth Daily., Disp: , Rfl:   •  azithromycin (ZITHROMAX) 250 MG tablet, Take 250 mg by mouth Daily. Take 2 tablets the first day, then 1 tablet daily for 4 days., Disp: , Rfl:   •  calcitriol (ROCALTROL) 0.25 MCG capsule, Take 0.25 mcg by mouth Every Other Day. Alternate days with calcitriol 0.5mcg, Disp: , Rfl:   •  calcitriol (ROCALTROL) 0.5 MCG capsule, Take 0.5 mcg by mouth Every Other Day. Alternate days with calcitriol 0.25mcg, Disp: , Rfl:   •  calcium carbonate (OS-RAYMOND) 600 MG tablet, Take 600 mg by  mouth Daily., Disp: , Rfl:   •  carbidopa-levodopa ER (SINEMET CR)  MG per tablet, Take 1 tablet by mouth Every Evening., Disp: , Rfl:   •  carvedilol (COREG) 12.5 MG tablet, Take 12.5 mg by mouth Daily., Disp: , Rfl:   •  Cholecalciferol (VITAMIN D3) 5000 units capsule capsule, Take 5,000 Units by mouth Daily., Disp: , Rfl:   •  diazepam (VALIUM) 5 MG tablet, Take 5 mg by mouth Every Night., Disp: , Rfl:   •  ERYTHROCIN STEARATE 250 MG tablet, TAKE 0.5 TABLETS BY MOUTH 4 (FOUR) TIMES A DAY WITH MEALS & AT BEDTIME., Disp: , Rfl:   •  febuxostat (ULORIC) 40 MG tablet, Take 40 mg by mouth Daily., Disp: , Rfl:   •  furosemide (LASIX) 40 MG tablet, Take 1 tablet by mouth Daily., Disp: 90 tablet, Rfl: 3  •  Gabapentin, Once-Daily, (GRALISE) 300 MG tablet, Take 300 mg by mouth Daily With Dinner. MAY REPEAT LATE EVENING IF NEEDED, Disp: , Rfl:   •  Glucosamine-Chondroit-Vit C-Mn (GLUCOSAMINE CHONDROITIN COMPLX) capsule, Take 1,500 mg by mouth Every Other Day., Disp: , Rfl:   •  HYDROcodone-acetaminophen (NORCO) 5-325 MG per tablet, Take 1 tablet by mouth Every 6 (Six) Hours As Needed for Severe Pain ., Disp: 12 tablet, Rfl: 0  •  levothyroxine (SYNTHROID, LEVOTHROID) 25 MCG tablet, Take 25 mcg by mouth Daily., Disp: , Rfl:   •  magnesium oxide 250 MG tablet, Take 250 mg by mouth Daily., Disp: , Rfl:   •  Multiple Vitamin (MULTI-DAY VITAMINS) tablet, Take 1 tablet by mouth Daily. HOLD FOR SURGERY, Disp: , Rfl:   •  polyethylene glycol (MIRALAX) powder, Take 17 g by mouth Daily As Needed., Disp: , Rfl:   •  psyllium (METAMUCIL) 58.6 % powder, Take 1 packet by mouth Daily., Disp: , Rfl:   •  rotigotine (NEUPRO) 6 MG/24HR patch, Place 1 patch on the skin as directed by provider Daily., Disp: , Rfl:   •  Vitamin E 400 UNITS tablet, Take 400 Units by mouth Daily., Disp: , Rfl:   No current facility-administered medications for this visit.     Facility-Administered Medications Ordered in Other Visits:   •  heparin flush  "(porcine) 100 UNIT/ML injection 500 Units, 500 Units, Intravenous, PRN, Anuel Scott MD, 500 Units at 11/27/17 1347  •  heparin flush (porcine) 100 UNIT/ML injection 500 Units, 500 Units, Intravenous, PRN, Neno Merritt II, MD, 500 Units at 01/18/20 1359  •  sodium chloride 0.9 % flush 10 mL, 10 mL, Intravenous, PRN, Anuel Scott MD, 10 mL at 11/27/17 1347  •  sodium chloride 0.9 % flush 10 mL, 10 mL, Intravenous, PRN, Neno Merritt II, MD, 10 mL at 01/18/20 1359  •  sodium chloride 0.9 % infusion 250 mL, 250 mL, Intravenous, PRN, Neno Merritt II, MD    ALLERGIES:     Allergies   Allergen Reactions   • Chlorhexidine Rash and Itching     Patient states \"blue dye\" in chlorhexidine.  States the orange and clear are OK to use   • Codeine Unknown - Low Severity     HYPER, DOES NOT HELP PAIN   • Propoxyphene Unknown - Low Severity     Belligerent, acting drunk  (darvon)       SOCIAL HISTORY:       Social History     Socioeconomic History   • Marital status:      Spouse name: Zackery   • Number of children: 5   • Years of education: 1 yr college   • Highest education level: Not on file   Occupational History     Employer: RETIRED   Tobacco Use   • Smoking status: Never Smoker   • Smokeless tobacco: Never Used   • Tobacco comment: caffeine use - coffee and soda   Substance and Sexual Activity   • Alcohol use: No   • Drug use: No   • Sexual activity: Defer         FAMILY HISTORY:  Family History   Problem Relation Age of Onset   • Coronary artery disease Other    • Dementia Other    • Kidney disease Other    • Arthritis Father    • Early death Father         Kidney failure   • Kidney disease Father    • Heart disease Mother    • Mental illness Mother         Dementia   • Malig Hyperthermia Neg Hx    • Colon cancer Neg Hx    • Colon polyps Neg Hx        REVIEW OF SYSTEMS:  Review of Systems   Constitutional: Positive for fatigue. Negative for activity change.   HENT: Negative for nosebleeds and trouble " "swallowing.    Respiratory: Negative for shortness of breath and wheezing.    Cardiovascular: Negative for chest pain and palpitations.   Gastrointestinal: Negative for constipation, diarrhea and nausea.   Genitourinary: Negative for dysuria and hematuria.   Musculoskeletal: Negative for arthralgias and myalgias.   Skin: Negative for rash and wound.   Neurological: Positive for light-headedness. Negative for seizures and syncope.   Hematological: Negative for adenopathy. Does not bruise/bleed easily.   Psychiatric/Behavioral: Negative for confusion.            Vitals:    02/27/20 0956   BP: 168/71   Pulse: 68   Resp: 14   Temp: 97.5 °F (36.4 °C)   TempSrc: Oral   SpO2: 96%   Weight: 76.3 kg (168 lb 4.8 oz)   Height: 170.2 cm (67.01\")   PainSc:   4   PainLoc: Head     Current Status 2/27/2020   ECOG score 0        PHYSICAL EXAM:    CONSTITUTIONAL:  Vital signs reviewed.  No distress, looks comfortable.  EYES:  Conjunctiva and lids unremarkable.  PERRLA  EARS,NOSE,MOUTH,THROAT:  Ears and nose appear unremarkable.  Lips, teeth, gums appear unremarkable.  RESPIRATORY:  Normal respiratory effort.  Lungs clear to auscultation bilaterally.  CARDIOVASCULAR:  Normal S1, S2.  No murmurs rubs or gallops.  No significant lower extremity edema.  GASTROINTESTINAL: Abdomen appears unremarkable.  Nontender.  No hepatomegaly.  No splenomegaly.  LYMPHATIC:  No cervical, supraclavicular, axillary lymphadenopathy.  SKIN:  Warm.  No rashes.  PSYCHIATRIC:  Normal judgment and insight.  Normal mood and affect.    RECENT LABS:        WBC   Date/Time Value Ref Range Status   02/27/2020 10:04 AM 5.98 3.40 - 10.80 10*3/mm3 Final     Hemoglobin   Date/Time Value Ref Range Status   02/27/2020 10:04 AM 10.5 (L) 12.0 - 15.9 g/dL Final     Platelets   Date/Time Value Ref Range Status   02/27/2020 10:04  140 - 450 10*3/mm3 Final       Assessment/Plan   Anemia due to stage 4 chronic kidney disease (CMS/HCC)       *Anemia due to stage IV " chronic kidney disease.    · Weekly Procrit if Hb <10.  Hb 10.5 today, after transfusion    *Admitted 8/29/2019-9/2/2019 due to Hb of 5.  Transfused 2 units.  Heme positive stools.  EGD revealed GAVE.  Laser photocoagulation  · She states Dr. Roach plans EGD every 3 months.    *Iron deficiency anemia.  · Does not respond to oral iron due to poor absorption.  · 2 doses Injectafer late August 2019 resulted in normal iron labs and improvement of Hb from 9.4 up to 11.1 on 9/26/2019.  · Hb down to 10.3, 10/3/2019.  · Iron labs normal 10/31/2019, ferritin 200, 21% saturation.  · Hb dropped to 7.8 on 11/26/2019, requiring transfusion.  · Hb dropped to 6.9 on 12/19/2019, requiring transfusion.  · Hb dropped to 6.5 on 2/12/2020, requiring transfusion  · Hb dropped to 7.8 on 2/18/2020, requiring transfusion.  · On 2/18/2020, ferritin 89, 13% iron saturation.  · Hb 10.5    *Source of iron deficiency.  · Previously followed regularly with Dr. Earl Avelar.  · States she cannot have colonoscopies due to tortuous colon and therefore has virtual colonoscopies.  · She states she saw Dr. Avelar after the 6/27/2019 visit with me due to recent dark stools.  She states Dr. Avelar did a rectal exam which was heme negative and recommended no further evaluation.  · On 8/22/2019, I told her I suspect she is having occult GI bleeding considering she is needing IV iron frequently.  However, with new stroke mid May 2019 and the addition of Plavix to aspirin, risk may be too great to stop antiplatelet agents for further GI evaluation.  Patient and  agree.  · During late August 2019 hospitalization for GI bleed with Hb of 5, EGD through Dr. Roach revealed G AVE.  Laser photocoagulation.  · Subsequently, following with Dr. Roach.  He initially planned EGD every 3 months.  · However, on the 10/18/2019 office visit, he changed the plan to be return to see him mid February 2020 and stated he could retreat her GAVE if she develops  a significant drop in her Hb.  · Dr. Roach stated on the 2/11/2020 office note last EGD could not be performed due to a large food bezoar.  He gave her erythromycin and reviewed dietary changes for gastroparesis in hopes of being able to repeat EGD with APC.    *Macrocytosis.  B12 and folate unremarkable  MCV 94.5    *Reticulocytosis.  · On 8/22/2019, unremarkable: Direct [place], haptoglobin, bilirubin ( with normal range up to 214.  Therefore, likely not significant).    *Thrombocytopenia.  Intermittent since 9/19/2019.      *Circulating nucleated red blood cells.  Intermittent.  Could consider a bone marrow biopsy at some point.  I suspect this is occurring as her bone marrow is trying to increase production after bleeding episodes.  Nucleated red blood cells seen on 10/31/2019 and again, 11/6/2019    *Stroke mid August 2019.  Presumed.  Could not have MRI due to pacemaker.  Plavix was added to aspirin.    Plan  · 1 dose Injectafer  · Weekly CBC.  Procrit if Hb <10.   · (Previously on every 2-week CBC.  But required transfusions.  She prefers weekly)  · MD 2 months or so.  2 weeks prior: Iron labs  · Dr. Roach is hoping to improve gastroparesis to allow EGD for treatment of G AVE.  He is following her.    · Could consider a bone marrow biopsy in the future.       assisted with history.  I reviewed and summarized hospital records and Dr. Roach's office note

## 2020-02-27 NOTE — NURSING NOTE
Patient's BP retook manually and called pharmacist to let know 160/80. Dr. Merritt notified and treatment is to proceed with Injectafer as planned today.

## 2020-03-03 RX ORDER — ERYTHROMYCIN STEARATE 250 MG/1
125 TABLET, FILM COATED ORAL 4 TIMES DAILY
Qty: 60 TABLET | Refills: 11 | Status: SHIPPED | OUTPATIENT
Start: 2020-03-03 | End: 2020-03-12

## 2020-03-05 ENCOUNTER — HOSPITAL ENCOUNTER (OUTPATIENT)
Dept: CT IMAGING | Facility: HOSPITAL | Age: 80
Discharge: HOME | End: 2020-03-05
Admitting: NURSE PRACTITIONER

## 2020-03-05 ENCOUNTER — APPOINTMENT (OUTPATIENT)
Dept: CT IMAGING | Facility: HOSPITAL | Age: 80
End: 2020-03-05

## 2020-03-05 ENCOUNTER — READMISSION MANAGEMENT (OUTPATIENT)
Dept: CALL CENTER | Facility: HOSPITAL | Age: 80
End: 2020-03-05

## 2020-03-05 ENCOUNTER — LAB (OUTPATIENT)
Dept: OTHER | Facility: HOSPITAL | Age: 80
End: 2020-03-05

## 2020-03-05 ENCOUNTER — INFUSION (OUTPATIENT)
Dept: ONCOLOGY | Facility: HOSPITAL | Age: 80
End: 2020-03-05

## 2020-03-05 VITALS
BODY MASS INDEX: 26.06 KG/M2 | OXYGEN SATURATION: 99 % | TEMPERATURE: 97.8 F | HEART RATE: 72 BPM | WEIGHT: 166.4 LBS | RESPIRATION RATE: 16 BRPM | DIASTOLIC BLOOD PRESSURE: 71 MMHG | SYSTOLIC BLOOD PRESSURE: 164 MMHG

## 2020-03-05 DIAGNOSIS — N18.4 ANEMIA OF CHRONIC RENAL FAILURE, STAGE 4 (SEVERE) (HCC): Primary | ICD-10-CM

## 2020-03-05 DIAGNOSIS — D63.1 ANEMIA IN STAGE 4 CHRONIC KIDNEY DISEASE (HCC): ICD-10-CM

## 2020-03-05 DIAGNOSIS — N18.4 ANEMIA IN STAGE 4 CHRONIC KIDNEY DISEASE (HCC): ICD-10-CM

## 2020-03-05 DIAGNOSIS — S06.5XAA SDH (SUBDURAL HEMATOMA) (HCC): ICD-10-CM

## 2020-03-05 DIAGNOSIS — D63.1 ANEMIA OF CHRONIC RENAL FAILURE, STAGE 4 (SEVERE) (HCC): Primary | ICD-10-CM

## 2020-03-05 LAB
BASOPHILS # BLD AUTO: 0.02 10*3/MM3 (ref 0–0.2)
BASOPHILS NFR BLD AUTO: 0.3 % (ref 0–1.5)
DEPRECATED RDW RBC AUTO: 56 FL (ref 37–54)
EOSINOPHIL # BLD AUTO: 0.14 10*3/MM3 (ref 0–0.4)
EOSINOPHIL NFR BLD AUTO: 2.2 % (ref 0.3–6.2)
ERYTHROCYTE [DISTWIDTH] IN BLOOD BY AUTOMATED COUNT: 15.9 % (ref 12.3–15.4)
HCT VFR BLD AUTO: 31.8 % (ref 34–46.6)
HGB BLD-MCNC: 9.9 G/DL (ref 12–15.9)
IMM GRANULOCYTES # BLD AUTO: 0.03 10*3/MM3 (ref 0–0.05)
IMM GRANULOCYTES NFR BLD AUTO: 0.5 % (ref 0–0.5)
LYMPHOCYTES # BLD AUTO: 0.97 10*3/MM3 (ref 0.7–3.1)
LYMPHOCYTES NFR BLD AUTO: 14.9 % (ref 19.6–45.3)
MCH RBC QN AUTO: 30.1 PG (ref 26.6–33)
MCHC RBC AUTO-ENTMCNC: 31.1 G/DL (ref 31.5–35.7)
MCV RBC AUTO: 96.7 FL (ref 79–97)
MONOCYTES # BLD AUTO: 0.53 10*3/MM3 (ref 0.1–0.9)
MONOCYTES NFR BLD AUTO: 8.2 % (ref 5–12)
NEUTROPHILS # BLD AUTO: 4.81 10*3/MM3 (ref 1.7–7)
NEUTROPHILS NFR BLD AUTO: 73.9 % (ref 42.7–76)
NRBC BLD AUTO-RTO: 0 /100 WBC (ref 0–0.2)
PLATELET # BLD AUTO: 179 10*3/MM3 (ref 140–450)
PMV BLD AUTO: 10.3 FL (ref 6–12)
RBC # BLD AUTO: 3.29 10*6/MM3 (ref 3.77–5.28)
WBC NRBC COR # BLD: 6.5 10*3/MM3 (ref 3.4–10.8)

## 2020-03-05 PROCEDURE — 70450 CT HEAD/BRAIN W/O DYE: CPT

## 2020-03-05 PROCEDURE — 25010000002 EPOETIN ALFA PER 1000 UNITS: Performed by: NURSE PRACTITIONER

## 2020-03-05 PROCEDURE — 36415 COLL VENOUS BLD VENIPUNCTURE: CPT

## 2020-03-05 PROCEDURE — 85025 COMPLETE CBC W/AUTO DIFF WBC: CPT | Performed by: INTERNAL MEDICINE

## 2020-03-05 PROCEDURE — 96372 THER/PROPH/DIAG INJ SC/IM: CPT

## 2020-03-05 RX ADMIN — ERYTHROPOIETIN 30000 UNITS: 20000 INJECTION, SOLUTION INTRAVENOUS; SUBCUTANEOUS at 11:41

## 2020-03-05 NOTE — PROGRESS NOTES
Subjective   Patient ID: Charmaine Machuca is a 80 y.o. female is here today for a HFU for SDH on 2/12/20.  CT head on 3/5/20.    History of Present Illness      Ms. Richards is here today with her .  We are seeing her today as a follow-up to recent hospital consultation for fall with traumatic subdural hematoma and subarachnoid hemorrhage.  The fall occurred on February 12, 2020.  Her following CT scans had remained stable and she was subsequently cleared for discharge home.  The patient states that she has noticed improvement in her headaches since yesterday.  She is doing well however she has developed some blurred vision in the right eye which she describes as almost like a veil-like appearance to the objects that she sees in her right eye.  She is already seen an ophthalmologist and is planning to see Dr. Duarte again with further testing.    The following portions of the patient's history were reviewed and updated as appropriate: allergies, current medications, past family history, past medical history, past social history, past surgical history and problem list.    Review of Systems   Eyes: Positive for visual disturbance (floaters).   Musculoskeletal: Positive for gait problem.   Neurological: Positive for dizziness and light-headedness. Negative for syncope and headaches.   Psychiatric/Behavioral: Negative for confusion and decreased concentration.   All other systems reviewed and are negative.      Objective   Physical Exam   Constitutional: She is oriented to person, place, and time. She appears well-developed and well-nourished. She is cooperative. No distress.   Still some evidence of trauma to the right orbital region there is some mild ecchymosis in this location.   HENT:   Head: Normocephalic and atraumatic.   Eyes: Pupils are equal, round, and reactive to light. Conjunctivae and EOM are normal. Right eye exhibits no discharge. Left eye exhibits no discharge.   Neck: Normal range of motion.  Neck supple. No tracheal deviation present.   Cardiovascular: Normal rate and intact distal pulses.   Pulmonary/Chest: Effort normal. No respiratory distress.   Abdominal: Soft. She exhibits no distension. There is no tenderness.   Musculoskeletal: Normal range of motion. She exhibits no edema or tenderness.   Neurological: She is alert and oriented to person, place, and time. She has normal reflexes. She displays normal reflexes. No sensory deficit. She exhibits normal muscle tone. Coordination normal. GCS eye subscore is 4. GCS verbal subscore is 5. GCS motor subscore is 6.   AA&O x 3.  Speech is normal.  Converses appropriately.  PERRL. EOM's intact. Face symmetric. Tongue midline without fasiculations or atrophy. Sensation equal and intact throughout the face.  Hearing is intact bilaterally to finger rustle.  Negative Romberg's.  Negative pronator drift.  No dysmetria.  The patient has no difficulty with tandem walking. Gait is normal.       Skin: Skin is warm and dry. She is not diaphoretic. No erythema.   Psychiatric: She has a normal mood and affect. Thought content normal.   Vitals reviewed.      Assessment/Plan      Independent Review of Radiographic Studies:      Independently reviewed the CT images of the head without contrast dated March 5, 2020 today in the office.  The CT scan showed evolution of the traumatic subarachnoid hemorrhage as well as the thin acute subdural hematomas along the right sylvian fissure.  There is no evidence of hydrocephalus or mass-effect.    Medical Decision Making:      Overall Ms. Ndiaye feels that she is getting better.  The repeat head CT scan which shows resolution of the traumatic subdural hematoma however I would like to keep an eye on things and have her return to the office in 4 to 6 weeks with a repeat CT scan of the brain without contrast.  She will call the office if she experiences any worsening symptoms or redevelops any headache symptoms.  I will see her  back in the office in 1 month.    Charmaine was seen today for hfu/sdh.    Diagnoses and all orders for this visit:    Traumatic head injury less than 3 months ago, subsequent encounter  -     CT Head Without Contrast; Future    SDH (subdural hematoma) (CMS/[facility])  -     CT Head Without Contrast; Future      Return in about 1 month (around 4/6/2020).

## 2020-03-05 NOTE — OUTREACH NOTE
Stroke Week 3 Survey      Responses   [facility] patient discharged from?  [place]   Does the patient have one of the following disease processes/diagnoses(primary or secondary)?  Stroke (TIA)   Week 3 attempt successful?  Yes   Call start time  1228   Call end time  1234   Discharge diagnosis  Subarachnoid hemorrhage    Meds reviewed with patient/caregiver?  Yes   Is the patient taking all medications as directed (includes completed medication regime)?  Yes   Comments regarding appointments  CT Scan on 3/5/20,    Has the patient kept scheduled appointments due by today?  Yes   Psychosocial issues?  No   Does the patient require any assistance with activities of daily living such as eating, bathing, dressing, walking, etc.?  No   Does the patient have any residual symptoms from stroke/TIA?  Yes   Residual symptoms comments  Pt reports she has headaches although they are better   Does the patient understand the diet ordered at discharge?  Yes   Nursing interventions  Educated on MyChart   What is the patient's perception of their health status since discharge?  Improving   Nursing interventions  Nurse provided patient education   Is the patient able to teach back FAST for Stroke?  Yes   Is the patient/caregiver able to teach back the risk factors for a stroke?  High blood pressure-goal below 120/80, Smoking, Diabetes, High Cholesterol [Nonsmoker,  not a diabetic]   If the patient is a current smoker, are they able to teach back resources for cessation?  -- [Nonsmoker]   Week 3 call completed?  Yes          Kayli Wheat RN

## 2020-03-06 ENCOUNTER — OFFICE VISIT (OUTPATIENT)
Dept: NEUROSURGERY | Facility: CLINIC | Age: 80
End: 2020-03-06

## 2020-03-06 ENCOUNTER — APPOINTMENT (OUTPATIENT)
Dept: OTHER | Facility: HOSPITAL | Age: 80
End: 2020-03-06

## 2020-03-06 ENCOUNTER — APPOINTMENT (OUTPATIENT)
Dept: ONCOLOGY | Facility: HOSPITAL | Age: 80
End: 2020-03-06

## 2020-03-06 VITALS
HEART RATE: 73 BPM | WEIGHT: 166 LBS | DIASTOLIC BLOOD PRESSURE: 63 MMHG | HEIGHT: 67 IN | BODY MASS INDEX: 26.06 KG/M2 | SYSTOLIC BLOOD PRESSURE: 123 MMHG

## 2020-03-06 DIAGNOSIS — S09.90XD TRAUMATIC HEAD INJURY LESS THAN 3 MONTHS AGO, SUBSEQUENT ENCOUNTER: Primary | ICD-10-CM

## 2020-03-06 DIAGNOSIS — S06.5XAA SDH (SUBDURAL HEMATOMA) (HCC): ICD-10-CM

## 2020-03-06 PROCEDURE — 99213 OFFICE O/P EST LOW 20 MIN: CPT | Performed by: NURSE PRACTITIONER

## 2020-03-09 ENCOUNTER — TELEPHONE (OUTPATIENT)
Dept: ONCOLOGY | Facility: CLINIC | Age: 80
End: 2020-03-09

## 2020-03-09 NOTE — TELEPHONE ENCOUNTER
PT WANTS TO RESCHEDULE APPOINTMENT FOR 04/02/20 @ 10:00  TO 10:30,   PT ALREADY HAS AN APPOINTMENT @10:30 FOR INFUSIION,   PLEASE SAYS SHE ALREADY HAS ANOTHER APPT TO GO TO.   PLEASE CALL PT BACK [phone] TO SCHEDULE APPOINTMENT

## 2020-03-10 ENCOUNTER — TELEPHONE (OUTPATIENT)
Dept: GASTROENTEROLOGY | Facility: CLINIC | Age: 80
End: 2020-03-10

## 2020-03-12 ENCOUNTER — INFUSION (OUTPATIENT)
Dept: ONCOLOGY | Facility: HOSPITAL | Age: 80
End: 2020-03-12

## 2020-03-12 ENCOUNTER — LAB (OUTPATIENT)
Dept: OTHER | Facility: HOSPITAL | Age: 80
End: 2020-03-12

## 2020-03-12 VITALS
DIASTOLIC BLOOD PRESSURE: 65 MMHG | WEIGHT: 166 LBS | TEMPERATURE: 98 F | BODY MASS INDEX: 26 KG/M2 | SYSTOLIC BLOOD PRESSURE: 138 MMHG | HEART RATE: 88 BPM | OXYGEN SATURATION: 96 %

## 2020-03-12 DIAGNOSIS — D63.1 ANEMIA IN STAGE 4 CHRONIC KIDNEY DISEASE (HCC): ICD-10-CM

## 2020-03-12 DIAGNOSIS — N18.4 ANEMIA OF CHRONIC RENAL FAILURE, STAGE 4 (SEVERE) (HCC): Primary | ICD-10-CM

## 2020-03-12 DIAGNOSIS — N18.4 ANEMIA IN STAGE 4 CHRONIC KIDNEY DISEASE (HCC): ICD-10-CM

## 2020-03-12 DIAGNOSIS — D63.1 ANEMIA OF CHRONIC RENAL FAILURE, STAGE 4 (SEVERE) (HCC): Primary | ICD-10-CM

## 2020-03-12 LAB
BASOPHILS # BLD AUTO: 0.01 10*3/MM3 (ref 0–0.2)
BASOPHILS NFR BLD AUTO: 0.2 % (ref 0–1.5)
DEPRECATED RDW RBC AUTO: 60.1 FL (ref 37–54)
EOSINOPHIL # BLD AUTO: 0.14 10*3/MM3 (ref 0–0.4)
EOSINOPHIL NFR BLD AUTO: 2.6 % (ref 0.3–6.2)
ERYTHROCYTE [DISTWIDTH] IN BLOOD BY AUTOMATED COUNT: 17.6 % (ref 12.3–15.4)
HCT VFR BLD AUTO: 30 % (ref 34–46.6)
HGB BLD-MCNC: 9.3 G/DL (ref 12–15.9)
IMM GRANULOCYTES # BLD AUTO: 0.03 10*3/MM3 (ref 0–0.05)
IMM GRANULOCYTES NFR BLD AUTO: 0.6 % (ref 0–0.5)
LYMPHOCYTES # BLD AUTO: 0.63 10*3/MM3 (ref 0.7–3.1)
LYMPHOCYTES NFR BLD AUTO: 11.8 % (ref 19.6–45.3)
MCH RBC QN AUTO: 30.7 PG (ref 26.6–33)
MCHC RBC AUTO-ENTMCNC: 31 G/DL (ref 31.5–35.7)
MCV RBC AUTO: 99 FL (ref 79–97)
MONOCYTES # BLD AUTO: 0.35 10*3/MM3 (ref 0.1–0.9)
MONOCYTES NFR BLD AUTO: 6.6 % (ref 5–12)
NEUTROPHILS # BLD AUTO: 4.16 10*3/MM3 (ref 1.7–7)
NEUTROPHILS NFR BLD AUTO: 78.2 % (ref 42.7–76)
NRBC BLD AUTO-RTO: 0 /100 WBC (ref 0–0.2)
PLATELET # BLD AUTO: 171 10*3/MM3 (ref 140–450)
PMV BLD AUTO: 9.6 FL (ref 6–12)
RBC # BLD AUTO: 3.03 10*6/MM3 (ref 3.77–5.28)
WBC NRBC COR # BLD: 5.32 10*3/MM3 (ref 3.4–10.8)

## 2020-03-12 PROCEDURE — 96372 THER/PROPH/DIAG INJ SC/IM: CPT

## 2020-03-12 PROCEDURE — 25010000002 EPOETIN ALFA PER 1000 UNITS: Performed by: NURSE PRACTITIONER

## 2020-03-12 PROCEDURE — 85025 COMPLETE CBC W/AUTO DIFF WBC: CPT | Performed by: INTERNAL MEDICINE

## 2020-03-12 PROCEDURE — 36415 COLL VENOUS BLD VENIPUNCTURE: CPT

## 2020-03-12 RX ORDER — ERYTHROMYCIN 250 MG/1
125.5 TABLET, COATED ORAL 4 TIMES DAILY
Qty: 60 TABLET | Refills: 11 | Status: SHIPPED | OUTPATIENT
Start: 2020-03-12 | End: 2020-06-12 | Stop reason: SDUPTHER

## 2020-03-12 RX ADMIN — ERYTHROPOIETIN 30000 UNITS: 20000 INJECTION, SOLUTION INTRAVENOUS; SUBCUTANEOUS at 10:56

## 2020-03-16 ENCOUNTER — READMISSION MANAGEMENT (OUTPATIENT)
Dept: CALL CENTER | Facility: HOSPITAL | Age: 80
End: 2020-03-16

## 2020-03-19 ENCOUNTER — LAB (OUTPATIENT)
Dept: OTHER | Facility: HOSPITAL | Age: 80
End: 2020-03-19

## 2020-03-19 ENCOUNTER — TELEPHONE (OUTPATIENT)
Dept: NEUROSURGERY | Facility: CLINIC | Age: 80
End: 2020-03-19

## 2020-03-19 ENCOUNTER — INFUSION (OUTPATIENT)
Dept: ONCOLOGY | Facility: HOSPITAL | Age: 80
End: 2020-03-19

## 2020-03-19 ENCOUNTER — PATIENT MESSAGE (OUTPATIENT)
Dept: NEUROSURGERY | Facility: CLINIC | Age: 80
End: 2020-03-19

## 2020-03-19 VITALS
BODY MASS INDEX: 25.69 KG/M2 | WEIGHT: 164 LBS | TEMPERATURE: 98.3 F | DIASTOLIC BLOOD PRESSURE: 55 MMHG | OXYGEN SATURATION: 99 % | SYSTOLIC BLOOD PRESSURE: 153 MMHG | HEART RATE: 82 BPM

## 2020-03-19 DIAGNOSIS — D63.1 ANEMIA IN STAGE 4 CHRONIC KIDNEY DISEASE (HCC): ICD-10-CM

## 2020-03-19 DIAGNOSIS — N18.4 CKD (CHRONIC KIDNEY DISEASE) STAGE 4, GFR 15-29 ML/MIN (HCC): ICD-10-CM

## 2020-03-19 DIAGNOSIS — D63.1 ANEMIA OF CHRONIC RENAL FAILURE, STAGE 4 (SEVERE) (HCC): Primary | ICD-10-CM

## 2020-03-19 DIAGNOSIS — E53.8 B12 DEFICIENCY: ICD-10-CM

## 2020-03-19 DIAGNOSIS — IMO0001 ADVERSE EFFECT OF IRON OR ITS COMPOUND, SUBSEQUENT ENCOUNTER: ICD-10-CM

## 2020-03-19 DIAGNOSIS — N18.4 ANEMIA OF CHRONIC RENAL FAILURE, STAGE 4 (SEVERE) (HCC): Primary | ICD-10-CM

## 2020-03-19 DIAGNOSIS — D50.8 OTHER IRON DEFICIENCY ANEMIA: ICD-10-CM

## 2020-03-19 DIAGNOSIS — N18.4 ANEMIA IN STAGE 4 CHRONIC KIDNEY DISEASE (HCC): ICD-10-CM

## 2020-03-19 LAB
BASOPHILS # BLD AUTO: 0.01 10*3/MM3 (ref 0–0.2)
BASOPHILS NFR BLD AUTO: 0.2 % (ref 0–1.5)
DEPRECATED RDW RBC AUTO: 68.4 FL (ref 37–54)
EOSINOPHIL # BLD AUTO: 0.09 10*3/MM3 (ref 0–0.4)
EOSINOPHIL NFR BLD AUTO: 1.6 % (ref 0.3–6.2)
ERYTHROCYTE [DISTWIDTH] IN BLOOD BY AUTOMATED COUNT: 18.8 % (ref 12.3–15.4)
HCT VFR BLD AUTO: 28.2 % (ref 34–46.6)
HGB BLD-MCNC: 8.9 G/DL (ref 12–15.9)
IMM GRANULOCYTES # BLD AUTO: 0.03 10*3/MM3 (ref 0–0.05)
IMM GRANULOCYTES NFR BLD AUTO: 0.5 % (ref 0–0.5)
LYMPHOCYTES # BLD AUTO: 0.71 10*3/MM3 (ref 0.7–3.1)
LYMPHOCYTES NFR BLD AUTO: 12.6 % (ref 19.6–45.3)
MCH RBC QN AUTO: 31.6 PG (ref 26.6–33)
MCHC RBC AUTO-ENTMCNC: 31.6 G/DL (ref 31.5–35.7)
MCV RBC AUTO: 100 FL (ref 79–97)
MONOCYTES # BLD AUTO: 0.41 10*3/MM3 (ref 0.1–0.9)
MONOCYTES NFR BLD AUTO: 7.3 % (ref 5–12)
NEUTROPHILS # BLD AUTO: 4.4 10*3/MM3 (ref 1.7–7)
NEUTROPHILS NFR BLD AUTO: 77.8 % (ref 42.7–76)
NRBC BLD AUTO-RTO: 0 /100 WBC (ref 0–0.2)
PLATELET # BLD AUTO: 150 10*3/MM3 (ref 140–450)
PMV BLD AUTO: 10.1 FL (ref 6–12)
RBC # BLD AUTO: 2.82 10*6/MM3 (ref 3.77–5.28)
WBC NRBC COR # BLD: 5.65 10*3/MM3 (ref 3.4–10.8)

## 2020-03-19 PROCEDURE — 25010000002 CYANOCOBALAMIN PER 1000 MCG: Performed by: NURSE PRACTITIONER

## 2020-03-19 PROCEDURE — 96372 THER/PROPH/DIAG INJ SC/IM: CPT

## 2020-03-19 PROCEDURE — 25010000002 EPOETIN ALFA PER 1000 UNITS: Performed by: NURSE PRACTITIONER

## 2020-03-19 PROCEDURE — 85025 COMPLETE CBC W/AUTO DIFF WBC: CPT | Performed by: INTERNAL MEDICINE

## 2020-03-19 PROCEDURE — 36415 COLL VENOUS BLD VENIPUNCTURE: CPT

## 2020-03-19 RX ORDER — CYANOCOBALAMIN 1000 UG/ML
1000 INJECTION, SOLUTION INTRAMUSCULAR; SUBCUTANEOUS
Status: DISCONTINUED | OUTPATIENT
Start: 2020-03-19 | End: 2020-03-19 | Stop reason: HOSPADM

## 2020-03-19 RX ADMIN — ERYTHROPOIETIN 30000 UNITS: 20000 INJECTION, SOLUTION INTRAVENOUS; SUBCUTANEOUS at 10:31

## 2020-03-19 RX ADMIN — CYANOCOBALAMIN 1000 MCG: 1000 INJECTION, SOLUTION INTRAMUSCULAR at 10:31

## 2020-03-19 NOTE — TELEPHONE ENCOUNTER
----- Message from Inocencia Grayson MA sent at 3/19/2020  2:08 PM EDT -----  Regarding: FW: Non-Urgent Medical Question  Contact: [phone]  Can you call patient with results when they are ready?   ----- Message -----  From: Charmaine Machuca  Sent: 3/19/2020   1:59 PM EDT  To: k Neurosurgery [facility] Clinical [place]  Subject: Non-Urgent Medical Question                      Ms Sorenson,       I am due a cat scan on , with follow-up with you on . Under present circumstances, would it be possible (and advisable) to have the scan and later discuss the results with you by phone? I’m not venturing out unless absolutely necessary.       Thanks,         Charmaine Machuca        [address]       Phone: [phone]

## 2020-03-20 ENCOUNTER — APPOINTMENT (OUTPATIENT)
Dept: NUCLEAR MEDICINE | Facility: HOSPITAL | Age: 80
End: 2020-03-20

## 2020-03-26 ENCOUNTER — LAB (OUTPATIENT)
Dept: OTHER | Facility: HOSPITAL | Age: 80
End: 2020-03-26

## 2020-03-26 ENCOUNTER — INFUSION (OUTPATIENT)
Dept: ONCOLOGY | Facility: HOSPITAL | Age: 80
End: 2020-03-26

## 2020-03-26 VITALS
SYSTOLIC BLOOD PRESSURE: 130 MMHG | BODY MASS INDEX: 26 KG/M2 | DIASTOLIC BLOOD PRESSURE: 64 MMHG | WEIGHT: 166 LBS | OXYGEN SATURATION: 98 % | HEART RATE: 59 BPM | TEMPERATURE: 97.9 F

## 2020-03-26 DIAGNOSIS — Z45.2 ENCOUNTER FOR FITTING AND ADJUSTMENT OF VASCULAR CATHETER: ICD-10-CM

## 2020-03-26 DIAGNOSIS — N18.4 ANEMIA IN STAGE 4 CHRONIC KIDNEY DISEASE (HCC): ICD-10-CM

## 2020-03-26 DIAGNOSIS — D63.1 ANEMIA IN STAGE 4 CHRONIC KIDNEY DISEASE (HCC): ICD-10-CM

## 2020-03-26 DIAGNOSIS — N18.4 ANEMIA OF CHRONIC RENAL FAILURE, STAGE 4 (SEVERE) (HCC): Primary | ICD-10-CM

## 2020-03-26 DIAGNOSIS — D63.1 ANEMIA OF CHRONIC RENAL FAILURE, STAGE 4 (SEVERE) (HCC): Primary | ICD-10-CM

## 2020-03-26 LAB
ABO GROUP BLD: NORMAL
BASOPHILS # BLD AUTO: 0.02 10*3/MM3 (ref 0–0.2)
BASOPHILS NFR BLD AUTO: 0.4 % (ref 0–1.5)
BLD GP AB SCN SERPL QL: NEGATIVE
DEPRECATED RDW RBC AUTO: 71.4 FL (ref 37–54)
EOSINOPHIL # BLD AUTO: 0.07 10*3/MM3 (ref 0–0.4)
EOSINOPHIL NFR BLD AUTO: 1.4 % (ref 0.3–6.2)
ERYTHROCYTE [DISTWIDTH] IN BLOOD BY AUTOMATED COUNT: 19.4 % (ref 12.3–15.4)
HCT VFR BLD AUTO: 25.1 % (ref 34–46.6)
HGB BLD-MCNC: 7.9 G/DL (ref 12–15.9)
IMM GRANULOCYTES # BLD AUTO: 0.07 10*3/MM3 (ref 0–0.05)
IMM GRANULOCYTES NFR BLD AUTO: 1.4 % (ref 0–0.5)
LYMPHOCYTES # BLD AUTO: 0.86 10*3/MM3 (ref 0.7–3.1)
LYMPHOCYTES NFR BLD AUTO: 17 % (ref 19.6–45.3)
MCH RBC QN AUTO: 32.1 PG (ref 26.6–33)
MCHC RBC AUTO-ENTMCNC: 31.5 G/DL (ref 31.5–35.7)
MCV RBC AUTO: 102 FL (ref 79–97)
MONOCYTES # BLD AUTO: 0.33 10*3/MM3 (ref 0.1–0.9)
MONOCYTES NFR BLD AUTO: 6.5 % (ref 5–12)
NEUTROPHILS # BLD AUTO: 3.72 10*3/MM3 (ref 1.7–7)
NEUTROPHILS NFR BLD AUTO: 73.3 % (ref 42.7–76)
NRBC BLD AUTO-RTO: 0 /100 WBC (ref 0–0.2)
PLATELET # BLD AUTO: 179 10*3/MM3 (ref 140–450)
PMV BLD AUTO: 10.6 FL (ref 6–12)
RBC # BLD AUTO: 2.46 10*6/MM3 (ref 3.77–5.28)
RH BLD: NEGATIVE
T&S EXPIRATION DATE: NORMAL
WBC NRBC COR # BLD: 5.07 10*3/MM3 (ref 3.4–10.8)

## 2020-03-26 PROCEDURE — 96372 THER/PROPH/DIAG INJ SC/IM: CPT

## 2020-03-26 PROCEDURE — 36415 COLL VENOUS BLD VENIPUNCTURE: CPT

## 2020-03-26 PROCEDURE — 25010000003 HEPARIN LOCK FLUCH PER 10 UNITS: Performed by: INTERNAL MEDICINE

## 2020-03-26 PROCEDURE — 86923 COMPATIBILITY TEST ELECTRIC: CPT

## 2020-03-26 PROCEDURE — 25010000002 EPOETIN ALFA PER 1000 UNITS: Performed by: NURSE PRACTITIONER

## 2020-03-26 PROCEDURE — 86901 BLOOD TYPING SEROLOGIC RH(D): CPT

## 2020-03-26 PROCEDURE — 85025 COMPLETE CBC W/AUTO DIFF WBC: CPT | Performed by: INTERNAL MEDICINE

## 2020-03-26 PROCEDURE — 86900 BLOOD TYPING SEROLOGIC ABO: CPT

## 2020-03-26 PROCEDURE — 96523 IRRIG DRUG DELIVERY DEVICE: CPT

## 2020-03-26 PROCEDURE — 86850 RBC ANTIBODY SCREEN: CPT

## 2020-03-26 RX ORDER — SODIUM CHLORIDE 9 MG/ML
250 INJECTION, SOLUTION INTRAVENOUS AS NEEDED
Status: CANCELLED | OUTPATIENT
Start: 2020-03-28

## 2020-03-26 RX ORDER — HEPARIN SODIUM (PORCINE) LOCK FLUSH IV SOLN 100 UNIT/ML 100 UNIT/ML
500 SOLUTION INTRAVENOUS AS NEEDED
Status: CANCELLED | OUTPATIENT
Start: 2020-03-26

## 2020-03-26 RX ORDER — SODIUM CHLORIDE 0.9 % (FLUSH) 0.9 %
10 SYRINGE (ML) INJECTION AS NEEDED
Status: CANCELLED | OUTPATIENT
Start: 2020-03-26

## 2020-03-26 RX ORDER — SODIUM CHLORIDE 0.9 % (FLUSH) 0.9 %
10 SYRINGE (ML) INJECTION AS NEEDED
Status: DISCONTINUED | OUTPATIENT
Start: 2020-03-26 | End: 2020-03-26 | Stop reason: HOSPADM

## 2020-03-26 RX ORDER — HEPARIN SODIUM (PORCINE) LOCK FLUSH IV SOLN 100 UNIT/ML 100 UNIT/ML
500 SOLUTION INTRAVENOUS AS NEEDED
Status: DISCONTINUED | OUTPATIENT
Start: 2020-03-26 | End: 2020-03-26 | Stop reason: HOSPADM

## 2020-03-26 RX ORDER — DIPHENHYDRAMINE HCL 25 MG
25 CAPSULE ORAL ONCE
Status: CANCELLED | OUTPATIENT
Start: 2020-03-28 | End: 2020-03-26

## 2020-03-26 RX ORDER — ACETAMINOPHEN 325 MG/1
650 TABLET ORAL ONCE
Status: CANCELLED | OUTPATIENT
Start: 2020-03-28 | End: 2020-03-26

## 2020-03-26 RX ADMIN — ERYTHROPOIETIN 30000 UNITS: 20000 INJECTION, SOLUTION INTRAVENOUS; SUBCUTANEOUS at 10:44

## 2020-03-26 RX ADMIN — Medication 500 UNITS: at 10:45

## 2020-03-26 RX ADMIN — SODIUM CHLORIDE, PRESERVATIVE FREE 10 ML: 5 INJECTION INTRAVENOUS at 10:45

## 2020-03-26 NOTE — NURSING NOTE
Lab Results   Component Value Date    WBC 5.07 03/26/2020    HGB 7.9 (L) 03/26/2020    HCT 25.1 (L) 03/26/2020    .0 (H) 03/26/2020     03/26/2020     Procrit given per protocol.  Reviewed Hgb 7.9 with MEL Paula with v/o for 2 units PRBC transfusion.  Pt armband placed and t&c done.  Pt scheduled for Saturday at [facility].  Pt to return next Thursday for poss procrit and knows to call sooner with concerns.

## 2020-03-28 ENCOUNTER — INFUSION (OUTPATIENT)
Dept: ONCOLOGY | Facility: HOSPITAL | Age: 80
End: 2020-03-28

## 2020-03-28 VITALS
DIASTOLIC BLOOD PRESSURE: 55 MMHG | SYSTOLIC BLOOD PRESSURE: 116 MMHG | HEART RATE: 74 BPM | TEMPERATURE: 97.1 F | RESPIRATION RATE: 16 BRPM

## 2020-03-28 DIAGNOSIS — D63.1 ANEMIA OF CHRONIC RENAL FAILURE, STAGE 4 (SEVERE) (HCC): Primary | ICD-10-CM

## 2020-03-28 DIAGNOSIS — N18.4 ANEMIA OF CHRONIC RENAL FAILURE, STAGE 4 (SEVERE) (HCC): Primary | ICD-10-CM

## 2020-03-28 DIAGNOSIS — Z45.2 ENCOUNTER FOR FITTING AND ADJUSTMENT OF VASCULAR CATHETER: ICD-10-CM

## 2020-03-28 PROCEDURE — P9016 RBC LEUKOCYTES REDUCED: HCPCS

## 2020-03-28 PROCEDURE — 25010000003 HEPARIN LOCK FLUCH PER 10 UNITS: Performed by: INTERNAL MEDICINE

## 2020-03-28 PROCEDURE — 36430 TRANSFUSION BLD/BLD COMPNT: CPT

## 2020-03-28 PROCEDURE — 86900 BLOOD TYPING SEROLOGIC ABO: CPT

## 2020-03-28 PROCEDURE — 63710000001 DIPHENHYDRAMINE PER 50 MG: Performed by: NURSE PRACTITIONER

## 2020-03-28 RX ORDER — ACETAMINOPHEN 325 MG/1
650 TABLET ORAL ONCE
Status: COMPLETED | OUTPATIENT
Start: 2020-03-28 | End: 2020-03-28

## 2020-03-28 RX ORDER — SODIUM CHLORIDE 0.9 % (FLUSH) 0.9 %
10 SYRINGE (ML) INJECTION AS NEEDED
Status: CANCELLED | OUTPATIENT
Start: 2020-03-28

## 2020-03-28 RX ORDER — SODIUM CHLORIDE 0.9 % (FLUSH) 0.9 %
10 SYRINGE (ML) INJECTION AS NEEDED
Status: DISCONTINUED | OUTPATIENT
Start: 2020-03-28 | End: 2020-03-28 | Stop reason: HOSPADM

## 2020-03-28 RX ORDER — DIPHENHYDRAMINE HCL 25 MG
25 CAPSULE ORAL ONCE
Status: COMPLETED | OUTPATIENT
Start: 2020-03-28 | End: 2020-03-28

## 2020-03-28 RX ORDER — HEPARIN SODIUM (PORCINE) LOCK FLUSH IV SOLN 100 UNIT/ML 100 UNIT/ML
500 SOLUTION INTRAVENOUS AS NEEDED
Status: CANCELLED | OUTPATIENT
Start: 2020-03-28

## 2020-03-28 RX ORDER — HEPARIN SODIUM (PORCINE) LOCK FLUSH IV SOLN 100 UNIT/ML 100 UNIT/ML
500 SOLUTION INTRAVENOUS AS NEEDED
Status: DISCONTINUED | OUTPATIENT
Start: 2020-03-28 | End: 2020-03-28 | Stop reason: HOSPADM

## 2020-03-28 RX ORDER — SODIUM CHLORIDE 9 MG/ML
250 INJECTION, SOLUTION INTRAVENOUS AS NEEDED
Status: DISCONTINUED | OUTPATIENT
Start: 2020-03-28 | End: 2020-03-28 | Stop reason: HOSPADM

## 2020-03-28 RX ADMIN — DIPHENHYDRAMINE HYDROCHLORIDE 25 MG: 25 CAPSULE ORAL at 08:41

## 2020-03-28 RX ADMIN — ACETAMINOPHEN 650 MG: 325 TABLET, FILM COATED ORAL at 08:41

## 2020-03-28 RX ADMIN — Medication 500 UNITS: at 13:36

## 2020-03-29 LAB
BH BB BLOOD EXPIRATION DATE: NORMAL
BH BB BLOOD EXPIRATION DATE: NORMAL
BH BB BLOOD TYPE BARCODE: 600
BH BB BLOOD TYPE BARCODE: 600
BH BB DISPENSE STATUS: NORMAL
BH BB DISPENSE STATUS: NORMAL
BH BB PRODUCT CODE: NORMAL
BH BB PRODUCT CODE: NORMAL
BH BB UNIT NUMBER: NORMAL
BH BB UNIT NUMBER: NORMAL
CROSSMATCH INTERPRETATION: NORMAL
CROSSMATCH INTERPRETATION: NORMAL
UNIT  ABO: NORMAL
UNIT  ABO: NORMAL
UNIT  RH: NORMAL
UNIT  RH: NORMAL

## 2020-03-30 ENCOUNTER — RESEARCH ENCOUNTER (OUTPATIENT)
Dept: CARDIOLOGY | Facility: CLINIC | Age: 80
End: 2020-03-30

## 2020-03-30 NOTE — RESEARCH
[facility]  [address]  [place] [address]  [phone]    Research Note:   DECIDE-ICD    Patient Information:  Charmaine Carringtonpaula  Study ID #:  [id number]  Subject Initials: RICHARD FUNEZ. completed the 6-month surveys for the Decide-ICD Trial.  We received her completed responses today in the mail and entered them into [employer] as per protocol. She has completed all of the surveys for the Decide-ICD Trial and has completed the study as per protocol.  We mailed her 6- month gift card today. She will follow-up here in the office as per her regular schedule.          Roxi Daley RN  Research Coordinator

## 2020-04-02 ENCOUNTER — HOSPITAL ENCOUNTER (OUTPATIENT)
Dept: CT IMAGING | Facility: HOSPITAL | Age: 80
Discharge: HOME | End: 2020-04-02
Admitting: NURSE PRACTITIONER

## 2020-04-02 ENCOUNTER — INFUSION (OUTPATIENT)
Dept: ONCOLOGY | Facility: HOSPITAL | Age: 80
End: 2020-04-02

## 2020-04-02 ENCOUNTER — LAB (OUTPATIENT)
Dept: OTHER | Facility: HOSPITAL | Age: 80
End: 2020-04-02

## 2020-04-02 VITALS
BODY MASS INDEX: 26.06 KG/M2 | HEART RATE: 66 BPM | SYSTOLIC BLOOD PRESSURE: 157 MMHG | OXYGEN SATURATION: 100 % | RESPIRATION RATE: 16 BRPM | WEIGHT: 166.4 LBS | TEMPERATURE: 98.1 F | DIASTOLIC BLOOD PRESSURE: 75 MMHG

## 2020-04-02 DIAGNOSIS — S09.90XD TRAUMATIC HEAD INJURY LESS THAN 3 MONTHS AGO, SUBSEQUENT ENCOUNTER: ICD-10-CM

## 2020-04-02 DIAGNOSIS — D63.1 ANEMIA IN STAGE 4 CHRONIC KIDNEY DISEASE (HCC): ICD-10-CM

## 2020-04-02 DIAGNOSIS — S06.5XAA SDH (SUBDURAL HEMATOMA) (HCC): ICD-10-CM

## 2020-04-02 DIAGNOSIS — N18.4 ANEMIA IN STAGE 4 CHRONIC KIDNEY DISEASE (HCC): ICD-10-CM

## 2020-04-02 LAB
BASOPHILS # BLD AUTO: 0.02 10*3/MM3 (ref 0–0.2)
BASOPHILS NFR BLD AUTO: 0.3 % (ref 0–1.5)
DEPRECATED RDW RBC AUTO: 71.2 FL (ref 37–54)
EOSINOPHIL # BLD AUTO: 0.07 10*3/MM3 (ref 0–0.4)
EOSINOPHIL NFR BLD AUTO: 1.2 % (ref 0.3–6.2)
ERYTHROCYTE [DISTWIDTH] IN BLOOD BY AUTOMATED COUNT: 20.7 % (ref 12.3–15.4)
HCT VFR BLD AUTO: 32.6 % (ref 34–46.6)
HGB BLD-MCNC: 10.5 G/DL (ref 12–15.9)
IMM GRANULOCYTES # BLD AUTO: 0.04 10*3/MM3 (ref 0–0.05)
IMM GRANULOCYTES NFR BLD AUTO: 0.7 % (ref 0–0.5)
LYMPHOCYTES # BLD AUTO: 0.68 10*3/MM3 (ref 0.7–3.1)
LYMPHOCYTES NFR BLD AUTO: 11.3 % (ref 19.6–45.3)
MCH RBC QN AUTO: 31 PG (ref 26.6–33)
MCHC RBC AUTO-ENTMCNC: 32.2 G/DL (ref 31.5–35.7)
MCV RBC AUTO: 96.2 FL (ref 79–97)
MONOCYTES # BLD AUTO: 0.37 10*3/MM3 (ref 0.1–0.9)
MONOCYTES NFR BLD AUTO: 6.1 % (ref 5–12)
NEUTROPHILS # BLD AUTO: 4.85 10*3/MM3 (ref 1.7–7)
NEUTROPHILS NFR BLD AUTO: 80.4 % (ref 42.7–76)
NRBC BLD AUTO-RTO: 0 /100 WBC (ref 0–0.2)
PLATELET # BLD AUTO: 191 10*3/MM3 (ref 140–450)
PMV BLD AUTO: 9.8 FL (ref 6–12)
RBC # BLD AUTO: 3.39 10*6/MM3 (ref 3.77–5.28)
WBC NRBC COR # BLD: 6.03 10*3/MM3 (ref 3.4–10.8)

## 2020-04-02 PROCEDURE — 70450 CT HEAD/BRAIN W/O DYE: CPT

## 2020-04-02 PROCEDURE — G0463 HOSPITAL OUTPT CLINIC VISIT: HCPCS

## 2020-04-02 PROCEDURE — 85025 COMPLETE CBC W/AUTO DIFF WBC: CPT | Performed by: INTERNAL MEDICINE

## 2020-04-02 PROCEDURE — 36415 COLL VENOUS BLD VENIPUNCTURE: CPT

## 2020-04-02 NOTE — NURSING NOTE
Lab Results   Component Value Date    WBC 6.03 04/02/2020    HGB 10.5 (L) 04/02/2020    HCT 32.6 (L) 04/02/2020    MCV 96.2 04/02/2020     04/02/2020     Procrit not indicated for Hgb 10.5.  Pt denies complaints.  She got 2 units blood on Saturday.  Pt instructed to call with concerns and will return next week for CBC and possible procrit.

## 2020-04-03 ENCOUNTER — OFFICE VISIT (OUTPATIENT)
Dept: NEUROSURGERY | Facility: CLINIC | Age: 80
End: 2020-04-03

## 2020-04-03 DIAGNOSIS — S06.5X0D TRAUMATIC SUBDURAL HEMATOMA WITHOUT LOSS OF CONSCIOUSNESS, SUBSEQUENT ENCOUNTER: Primary | ICD-10-CM

## 2020-04-03 PROCEDURE — 99213 OFFICE O/P EST LOW 20 MIN: CPT | Performed by: NURSE PRACTITIONER

## 2020-04-03 NOTE — PROGRESS NOTES
Subjective   History of Present Illness: Charmaine Machuca is a 80 y.o. female is here today for telephone visit follow-up with a new Head CT ordered to follow up on SDH on 2/12/20. She had a fall 2/12/2020. She denies any headaches, dizziness or vision changes.    You have chosen to receive care through a telephone visit today. Do you consent to use a telephone visit for your medical care today? Patient answered YES      History of Present Illness     The patient was unable to come to the office today given the coronavirus outbreak.  I called and spoke with her and she states that she is doing and feeling well.  She denies any dizziness or headaches.  She does have some ongoing balance and gait instability, but this was the case prior to her fall.  She is feeling well and denies any new problems.  She does continue to deal with chronic spasticity issues.  She does not take any blood thinners.       There were no vitals taken for this visit.      The following portions of the patient's history were reviewed and updated as appropriate: allergies, current medications, past family history, past medical history, past social history, past surgical history and problem list.    Review of Systems   Eyes: Negative for visual disturbance.   Respiratory: Negative for chest tightness and shortness of breath.    Cardiovascular: Negative for chest pain.   Neurological: Negative for dizziness and headaches.       Objective     There were no vitals filed for this visit.  There is no height or weight on file to calculate BMI.      Physical Exam  Neurologic Exam        Assessment/Plan   Independent Review of Radiographic Studies:      I personally reviewed the images from the following studies.  CT head [facility] dated April 2, 2020 shows no change compared to prior imaging from March 2020.  There is been complete resolution of the intracranial hemorrhage and no residual subdural hematoma or subarachnoid hemorrhage.  She also  has healing fractures of the right mid zygomatic arch and the inferior lateral wall of the right orbit.  No new abnormalities identified.        Medical Decision Making:    I called the patient and gave her the head CT findings which are stable.  She has had resolution of the intracranial hemorrhage and subdural hematoma.  She states that she is doing and feeling well.  She denies any new problems.  From our standpoint we will see her back on an as-needed basis.  I spent approximately 10 minutes on the phone with the patient.  She will call at anytime with any questions or concerns.    Plan: Return office as needed    Charmaine was seen today for follow-up.    Diagnoses and all orders for this visit:    Traumatic subdural hematoma without loss of consciousness, subsequent encounter      Return if symptoms worsen or fail to improve.

## 2020-04-09 ENCOUNTER — INFUSION (OUTPATIENT)
Dept: ONCOLOGY | Facility: HOSPITAL | Age: 80
End: 2020-04-09

## 2020-04-09 ENCOUNTER — LAB (OUTPATIENT)
Dept: OTHER | Facility: HOSPITAL | Age: 80
End: 2020-04-09

## 2020-04-09 VITALS
WEIGHT: 166.6 LBS | OXYGEN SATURATION: 100 % | RESPIRATION RATE: 16 BRPM | SYSTOLIC BLOOD PRESSURE: 152 MMHG | HEART RATE: 69 BPM | DIASTOLIC BLOOD PRESSURE: 78 MMHG | BODY MASS INDEX: 26.09 KG/M2 | TEMPERATURE: 97.6 F

## 2020-04-09 DIAGNOSIS — D50.8 OTHER IRON DEFICIENCY ANEMIA: Primary | ICD-10-CM

## 2020-04-09 DIAGNOSIS — Z45.2 ENCOUNTER FOR FITTING AND ADJUSTMENT OF VASCULAR CATHETER: ICD-10-CM

## 2020-04-09 DIAGNOSIS — D63.1 ANEMIA IN STAGE 4 CHRONIC KIDNEY DISEASE (HCC): ICD-10-CM

## 2020-04-09 DIAGNOSIS — D63.1 ANEMIA OF CHRONIC RENAL FAILURE, STAGE 4 (SEVERE) (HCC): Primary | ICD-10-CM

## 2020-04-09 DIAGNOSIS — N18.4 ANEMIA OF CHRONIC RENAL FAILURE, STAGE 4 (SEVERE) (HCC): Primary | ICD-10-CM

## 2020-04-09 DIAGNOSIS — N18.4 ANEMIA IN STAGE 4 CHRONIC KIDNEY DISEASE (HCC): ICD-10-CM

## 2020-04-09 LAB
ABO GROUP BLD: NORMAL
BASOPHILS # BLD AUTO: 0.02 10*3/MM3 (ref 0–0.2)
BASOPHILS NFR BLD AUTO: 0.4 % (ref 0–1.5)
BLD GP AB SCN SERPL QL: NEGATIVE
DEPRECATED RDW RBC AUTO: 65.1 FL (ref 37–54)
EOSINOPHIL # BLD AUTO: 0.06 10*3/MM3 (ref 0–0.4)
EOSINOPHIL NFR BLD AUTO: 1.3 % (ref 0.3–6.2)
ERYTHROCYTE [DISTWIDTH] IN BLOOD BY AUTOMATED COUNT: 18.6 % (ref 12.3–15.4)
FERRITIN SERPL-MCNC: 105.6 NG/ML (ref 13–150)
HCT VFR BLD AUTO: 24.8 % (ref 34–46.6)
HGB BLD-MCNC: 7.9 G/DL (ref 12–15.9)
HGB RETIC QN AUTO: 33.3 PG (ref 29.8–36.1)
HYPOCHROMIA BLD QL: NORMAL
IMM GRANULOCYTES # BLD AUTO: 0.03 10*3/MM3 (ref 0–0.05)
IMM GRANULOCYTES NFR BLD AUTO: 0.6 % (ref 0–0.5)
IMM RETICS NFR: 6.7 % (ref 3–15.8)
IRON 24H UR-MRATE: 40 MCG/DL (ref 37–145)
IRON SATN MFR SERPL: 14 % (ref 20–50)
LYMPHOCYTES # BLD AUTO: 0.63 10*3/MM3 (ref 0.7–3.1)
LYMPHOCYTES NFR BLD AUTO: 13.3 % (ref 19.6–45.3)
MACROCYTES BLD QL SMEAR: NORMAL
MCH RBC QN AUTO: 30.6 PG (ref 26.6–33)
MCHC RBC AUTO-ENTMCNC: 31.9 G/DL (ref 31.5–35.7)
MCV RBC AUTO: 96.1 FL (ref 79–97)
MONOCYTES # BLD AUTO: 0.35 10*3/MM3 (ref 0.1–0.9)
MONOCYTES NFR BLD AUTO: 7.4 % (ref 5–12)
NEUTROPHILS # BLD AUTO: 3.64 10*3/MM3 (ref 1.7–7)
NEUTROPHILS NFR BLD AUTO: 77 % (ref 42.7–76)
NRBC BLD AUTO-RTO: 0 /100 WBC (ref 0–0.2)
PLAT MORPH BLD: NORMAL
PLATELET # BLD AUTO: 151 10*3/MM3 (ref 140–450)
PMV BLD AUTO: 10.2 FL (ref 6–12)
RBC # BLD AUTO: 2.58 10*6/MM3 (ref 3.77–5.28)
RETICS/RBC NFR AUTO: 1.41 % (ref 0.7–1.9)
RH BLD: NEGATIVE
T&S EXPIRATION DATE: NORMAL
TIBC SERPL-MCNC: 285 MCG/DL (ref 298–536)
TRANSFERRIN SERPL-MCNC: 191 MG/DL (ref 200–360)
WBC MORPH BLD: NORMAL
WBC NRBC COR # BLD: 4.73 10*3/MM3 (ref 3.4–10.8)

## 2020-04-09 PROCEDURE — 25010000003 HEPARIN LOCK FLUCH PER 10 UNITS: Performed by: INTERNAL MEDICINE

## 2020-04-09 PROCEDURE — 84466 ASSAY OF TRANSFERRIN: CPT | Performed by: NURSE PRACTITIONER

## 2020-04-09 PROCEDURE — 85007 BL SMEAR W/DIFF WBC COUNT: CPT | Performed by: INTERNAL MEDICINE

## 2020-04-09 PROCEDURE — 96523 IRRIG DRUG DELIVERY DEVICE: CPT

## 2020-04-09 PROCEDURE — 83540 ASSAY OF IRON: CPT | Performed by: NURSE PRACTITIONER

## 2020-04-09 PROCEDURE — 86923 COMPATIBILITY TEST ELECTRIC: CPT

## 2020-04-09 PROCEDURE — 86850 RBC ANTIBODY SCREEN: CPT | Performed by: NURSE PRACTITIONER

## 2020-04-09 PROCEDURE — 86900 BLOOD TYPING SEROLOGIC ABO: CPT | Performed by: NURSE PRACTITIONER

## 2020-04-09 PROCEDURE — 82728 ASSAY OF FERRITIN: CPT | Performed by: NURSE PRACTITIONER

## 2020-04-09 PROCEDURE — 86901 BLOOD TYPING SEROLOGIC RH(D): CPT | Performed by: NURSE PRACTITIONER

## 2020-04-09 PROCEDURE — 85046 RETICYTE/HGB CONCENTRATE: CPT | Performed by: NURSE PRACTITIONER

## 2020-04-09 PROCEDURE — 25010000002 EPOETIN ALFA PER 1000 UNITS: Performed by: INTERNAL MEDICINE

## 2020-04-09 PROCEDURE — 85025 COMPLETE CBC W/AUTO DIFF WBC: CPT | Performed by: INTERNAL MEDICINE

## 2020-04-09 PROCEDURE — 36415 COLL VENOUS BLD VENIPUNCTURE: CPT

## 2020-04-09 PROCEDURE — 96372 THER/PROPH/DIAG INJ SC/IM: CPT

## 2020-04-09 RX ORDER — HEPARIN SODIUM (PORCINE) LOCK FLUSH IV SOLN 100 UNIT/ML 100 UNIT/ML
500 SOLUTION INTRAVENOUS AS NEEDED
Status: DISCONTINUED | OUTPATIENT
Start: 2020-04-09 | End: 2020-04-09 | Stop reason: HOSPADM

## 2020-04-09 RX ORDER — DIPHENHYDRAMINE HCL 25 MG
25 CAPSULE ORAL ONCE
Status: CANCELLED | OUTPATIENT
Start: 2020-04-09 | End: 2020-04-09

## 2020-04-09 RX ORDER — ACETAMINOPHEN 325 MG/1
650 TABLET ORAL ONCE
Status: CANCELLED | OUTPATIENT
Start: 2020-04-11 | End: 2020-04-09

## 2020-04-09 RX ORDER — SODIUM CHLORIDE 0.9 % (FLUSH) 0.9 %
10 SYRINGE (ML) INJECTION AS NEEDED
Status: DISCONTINUED | OUTPATIENT
Start: 2020-04-09 | End: 2020-04-09 | Stop reason: HOSPADM

## 2020-04-09 RX ORDER — SODIUM CHLORIDE 0.9 % (FLUSH) 0.9 %
10 SYRINGE (ML) INJECTION AS NEEDED
Status: CANCELLED | OUTPATIENT
Start: 2020-04-09

## 2020-04-09 RX ORDER — HEPARIN SODIUM (PORCINE) LOCK FLUSH IV SOLN 100 UNIT/ML 100 UNIT/ML
500 SOLUTION INTRAVENOUS AS NEEDED
Status: CANCELLED | OUTPATIENT
Start: 2020-04-09

## 2020-04-09 RX ORDER — SODIUM CHLORIDE 9 MG/ML
250 INJECTION, SOLUTION INTRAVENOUS AS NEEDED
Status: CANCELLED | OUTPATIENT
Start: 2020-04-11

## 2020-04-09 RX ADMIN — ERYTHROPOIETIN 37000 UNITS: 20000 INJECTION, SOLUTION INTRAVENOUS; SUBCUTANEOUS at 12:43

## 2020-04-09 RX ADMIN — SODIUM CHLORIDE, PRESERVATIVE FREE 500 UNITS: 5 INJECTION INTRAVENOUS at 12:16

## 2020-04-09 RX ADMIN — SODIUM CHLORIDE, PRESERVATIVE FREE 10 ML: 5 INJECTION INTRAVENOUS at 12:16

## 2020-04-09 NOTE — PROGRESS NOTES
Orders entered for 2 units PRBCs to be given on Saturday at [address] [place] [address] ELADIO ABDUL

## 2020-04-09 NOTE — NURSING NOTE
"Reviewed with her todays CBC   Lab Results   Component Value Date    WBC 4.73 04/09/2020    HGB 7.9 (L) 04/09/2020    HCT 24.8 (L) 04/09/2020    MCV 96.1 04/09/2020     04/09/2020     Discussed with COTY Butler with orders to set up for transfusion of 2 units prbc's.  Port accessed and labs drawn per order Dr. Merritt as well as T&S.   Also to receive procrit today; per protocol she is to be decreased 25% as she did not receive a dose the last visit due to the fact she had previously received 2 units prbc's. This was discussed with Cindi who reports that re: the potential decrease as she missed a dose of the procrit  \"this is be worked on with Wally but still no answer\". In discussion with pharmacist, Abi, who was questioning the decreased dose it was taken to COTY Butler because it was informed that if a provider approved the increased dose that it would be acceptable. The increased dose was approved per COTY Butler and the 37,000 units was administered.  "

## 2020-04-11 ENCOUNTER — INFUSION (OUTPATIENT)
Dept: ONCOLOGY | Facility: HOSPITAL | Age: 80
End: 2020-04-11

## 2020-04-11 VITALS
OXYGEN SATURATION: 100 % | RESPIRATION RATE: 16 BRPM | DIASTOLIC BLOOD PRESSURE: 55 MMHG | SYSTOLIC BLOOD PRESSURE: 127 MMHG | TEMPERATURE: 97.2 F | HEART RATE: 74 BPM

## 2020-04-11 DIAGNOSIS — N18.4 ANEMIA OF CHRONIC RENAL FAILURE, STAGE 4 (SEVERE) (HCC): Primary | ICD-10-CM

## 2020-04-11 DIAGNOSIS — Z45.2 ENCOUNTER FOR FITTING AND ADJUSTMENT OF VASCULAR CATHETER: ICD-10-CM

## 2020-04-11 DIAGNOSIS — D63.1 ANEMIA OF CHRONIC RENAL FAILURE, STAGE 4 (SEVERE) (HCC): Primary | ICD-10-CM

## 2020-04-11 PROCEDURE — P9016 RBC LEUKOCYTES REDUCED: HCPCS

## 2020-04-11 PROCEDURE — 63710000001 DIPHENHYDRAMINE PER 50 MG: Performed by: NURSE PRACTITIONER

## 2020-04-11 PROCEDURE — 86900 BLOOD TYPING SEROLOGIC ABO: CPT

## 2020-04-11 PROCEDURE — 36430 TRANSFUSION BLD/BLD COMPNT: CPT

## 2020-04-11 PROCEDURE — 25010000003 HEPARIN LOCK FLUCH PER 10 UNITS: Performed by: INTERNAL MEDICINE

## 2020-04-11 RX ORDER — ACETAMINOPHEN 325 MG/1
650 TABLET ORAL ONCE
Status: COMPLETED | OUTPATIENT
Start: 2020-04-11 | End: 2020-04-11

## 2020-04-11 RX ORDER — SODIUM CHLORIDE 9 MG/ML
250 INJECTION, SOLUTION INTRAVENOUS AS NEEDED
Status: DISCONTINUED | OUTPATIENT
Start: 2020-04-11 | End: 2020-04-11 | Stop reason: HOSPADM

## 2020-04-11 RX ORDER — HEPARIN SODIUM (PORCINE) LOCK FLUSH IV SOLN 100 UNIT/ML 100 UNIT/ML
500 SOLUTION INTRAVENOUS AS NEEDED
Status: CANCELLED | OUTPATIENT
Start: 2020-04-11

## 2020-04-11 RX ORDER — SODIUM CHLORIDE 0.9 % (FLUSH) 0.9 %
10 SYRINGE (ML) INJECTION AS NEEDED
Status: CANCELLED | OUTPATIENT
Start: 2020-04-11

## 2020-04-11 RX ORDER — SODIUM CHLORIDE 0.9 % (FLUSH) 0.9 %
10 SYRINGE (ML) INJECTION AS NEEDED
Status: DISCONTINUED | OUTPATIENT
Start: 2020-04-11 | End: 2020-04-11 | Stop reason: HOSPADM

## 2020-04-11 RX ORDER — HEPARIN SODIUM (PORCINE) LOCK FLUSH IV SOLN 100 UNIT/ML 100 UNIT/ML
500 SOLUTION INTRAVENOUS AS NEEDED
Status: DISCONTINUED | OUTPATIENT
Start: 2020-04-11 | End: 2020-04-11 | Stop reason: HOSPADM

## 2020-04-11 RX ORDER — DIPHENHYDRAMINE HCL 25 MG
25 CAPSULE ORAL ONCE
Status: COMPLETED | OUTPATIENT
Start: 2020-04-11 | End: 2020-04-11

## 2020-04-11 RX ADMIN — Medication 500 UNITS: at 13:05

## 2020-04-11 RX ADMIN — DIPHENHYDRAMINE HYDROCHLORIDE 25 MG: 25 CAPSULE ORAL at 07:59

## 2020-04-11 RX ADMIN — ACETAMINOPHEN 650 MG: 325 TABLET, FILM COATED ORAL at 07:59

## 2020-04-12 LAB
BH BB BLOOD EXPIRATION DATE: NORMAL
BH BB BLOOD EXPIRATION DATE: NORMAL
BH BB BLOOD TYPE BARCODE: 6200
BH BB BLOOD TYPE BARCODE: 6200
BH BB DISPENSE STATUS: NORMAL
BH BB DISPENSE STATUS: NORMAL
BH BB PRODUCT CODE: NORMAL
BH BB PRODUCT CODE: NORMAL
BH BB UNIT NUMBER: NORMAL
BH BB UNIT NUMBER: NORMAL
CROSSMATCH INTERPRETATION: NORMAL
CROSSMATCH INTERPRETATION: NORMAL
UNIT  ABO: NORMAL
UNIT  ABO: NORMAL
UNIT  RH: NORMAL
UNIT  RH: NORMAL

## 2020-04-14 ENCOUNTER — DOCUMENTATION (OUTPATIENT)
Dept: ONCOLOGY | Facility: CLINIC | Age: 80
End: 2020-04-14

## 2020-04-14 ENCOUNTER — TELEPHONE (OUTPATIENT)
Dept: ONCOLOGY | Facility: HOSPITAL | Age: 80
End: 2020-04-14

## 2020-04-14 ENCOUNTER — TELEPHONE (OUTPATIENT)
Dept: ONCOLOGY | Facility: CLINIC | Age: 80
End: 2020-04-14

## 2020-04-14 ENCOUNTER — TELEMEDICINE (OUTPATIENT)
Dept: GASTROENTEROLOGY | Facility: CLINIC | Age: 80
End: 2020-04-14

## 2020-04-14 DIAGNOSIS — K31.84 GASTROPARESIS: ICD-10-CM

## 2020-04-14 DIAGNOSIS — N18.4 ANEMIA DUE TO STAGE 4 CHRONIC KIDNEY DISEASE (HCC): ICD-10-CM

## 2020-04-14 DIAGNOSIS — D62 ANEMIA DUE TO ACUTE BLOOD LOSS: Primary | ICD-10-CM

## 2020-04-14 DIAGNOSIS — K31.819 GAVE (GASTRIC ANTRAL VASCULAR ECTASIA): ICD-10-CM

## 2020-04-14 DIAGNOSIS — D63.1 ANEMIA DUE TO STAGE 4 CHRONIC KIDNEY DISEASE (HCC): ICD-10-CM

## 2020-04-14 PROCEDURE — 99213 OFFICE O/P EST LOW 20 MIN: CPT | Performed by: INTERNAL MEDICINE

## 2020-04-14 NOTE — PROGRESS NOTES
Charmaine Machuca is a 80 y.o. female who presents with No chief complaint on file.  Was seen by Video Visit    Subjective     Fell in February with concussion and recovered nicely. Also has been seeing black stools again and has had to be trasfused twice over the last two weeks.    Did not get the gastric emptying but is on her Emycin- denies bloating or vomiting and has had two episodes of  inconitnent stools but they were black so most likely just blood induced diarrhea.    SHe is presentuing to the hospital for transfusions so her risk of hosp exposure is already there and her anesthesia risk is not increased except for when her HGB is low, so will recommend EGD and APC for her documented GAVE. Will also have her skip supper prior to her EGD and drink liquids only to ensure a clear field that we didn't have at her last attempt        Past Medical History:   Diagnosis Date   • Anemia     Iron deficiency   • Anxiety    • Cardiomyopathy (CMS/HCC)     S/P pacemaker and defibrillator   • CHF (congestive heart failure) (CMS/HCC)    • Chronic renal failure, stage 4 (severe) (CMS/HCC)    • Colon polyp    • Coronary artery disease     pacemaker, defibrillator   • Depression    • Dizzy    • Gastroparesis    • GAVE (gastric antral vascular ectasia)    • GERD (gastroesophageal reflux disease)    • Gout    • Hemorrhoids    • Hiatal hernia    • History of Clostridium difficile colitis 2013   • History of kidney stones    • History of pancreatitis     2014   • History of skin cancer    • History of transfusion     MULTIPLE    • Hyperlipidemia    • Hypertension    • Hypothyroidism    • Left bundle branch block    • Mitral and aortic valve disease    • Mitral valve insufficiency    • Myoclonus     S/P Depakote   • Osteoarthritis    • Pancytopenia (CMS/HCC)    • Peripheral neuropathy    • Presence of cardiac pacemaker     AND DEFIBRILLATOR   • Pulmonary hypertension (CMS/HCC)    • SOB (shortness of breath) on exertion    •  Spinal stenosis    • Stroke (CMS/HCC)        Social History     Socioeconomic History   • Marital status:      Spouse name: Zackery   • Number of children: 5   • Years of education: 1 yr college   • Highest education level: Not on file   Occupational History     Employer: RETIRED   Tobacco Use   • Smoking status: Never Smoker   • Smokeless tobacco: Never Used   • Tobacco comment: caffeine use - coffee and soda   Substance and Sexual Activity   • Alcohol use: No   • Drug use: No   • Sexual activity: Defer         Current Outpatient Medications:   •  acetaminophen (TYLENOL) 325 MG tablet, Take 2 tablets by mouth Every 4 (Four) Hours As Needed for Mild Pain ., Disp: , Rfl:   •  atorvastatin (LIPITOR) 20 MG tablet, Take 20 mg by mouth Daily., Disp: , Rfl:   •  calcitriol (ROCALTROL) 0.5 MCG capsule, Take 0.5 mcg by mouth Every Other Day. Alternate days with calcitriol 0.25mcg, Disp: , Rfl:   •  calcium carbonate (OS-RAYMOND) 600 MG tablet, Take 600 mg by mouth Daily., Disp: , Rfl:   •  carbidopa-levodopa ER (SINEMET CR)  MG per tablet, Take 1 tablet by mouth Every Evening., Disp: , Rfl:   •  carvedilol (COREG) 12.5 MG tablet, Take 12.5 mg by mouth Daily., Disp: , Rfl:   •  Cholecalciferol (VITAMIN D3) 5000 units capsule capsule, Take 5,000 Units by mouth Daily., Disp: , Rfl:   •  diazepam (VALIUM) 5 MG tablet, Take 5 mg by mouth Every Night., Disp: , Rfl:   •  erythromycin base (E-MYCIN) 250 MG tablet, Take 0.5 tablets by mouth 4 (Four) Times a Day., Disp: 60 tablet, Rfl: 11  •  febuxostat (ULORIC) 40 MG tablet, Take 40 mg by mouth Daily., Disp: , Rfl:   •  furosemide (LASIX) 40 MG tablet, Take 1 tablet by mouth Daily., Disp: 90 tablet, Rfl: 3  •  Gabapentin Enacarbil  MG tablet controlled-release, Take 300 mg by mouth Daily., Disp: , Rfl:   •  Gabapentin, Once-Daily, (GRALISE) 300 MG tablet, Take 300 mg by mouth Daily With Dinner. MAY REPEAT LATE EVENING IF NEEDED, Disp: , Rfl:   •   Glucosamine-Chondroit-Vit C-Mn (GLUCOSAMINE CHONDROITIN COMPLX) capsule, Take 1,500 mg by mouth Every Other Day., Disp: , Rfl:   •  HYDROcodone-acetaminophen (NORCO) 5-325 MG per tablet, Take 1 tablet by mouth Every 6 (Six) Hours As Needed for Severe Pain ., Disp: 12 tablet, Rfl: 0  •  levothyroxine (SYNTHROID, LEVOTHROID) 25 MCG tablet, Take 25 mcg by mouth Daily., Disp: , Rfl:   •  magnesium oxide 250 MG tablet, Take 250 mg by mouth Daily., Disp: , Rfl:   •  Multiple Vitamin (MULTI-DAY VITAMINS) tablet, Take 1 tablet by mouth Daily. HOLD FOR SURGERY, Disp: , Rfl:   •  polyethylene glycol (MIRALAX) powder, Take 17 g by mouth Daily As Needed., Disp: , Rfl:   •  psyllium (METAMUCIL) 58.6 % powder, Take 1 packet by mouth Daily., Disp: , Rfl:   •  rotigotine (NEUPRO) 6 MG/24HR patch, Place 1 patch on the skin as directed by provider Daily., Disp: , Rfl:   •  Vitamin E 400 UNITS tablet, Take 400 Units by mouth Daily., Disp: , Rfl:   No current facility-administered medications for this visit.     Facility-Administered Medications Ordered in Other Visits:   •  heparin flush (porcine) 100 UNIT/ML injection 500 Units, 500 Units, Intravenous, PRN, Anuel Scott MD, 500 Units at 11/27/17 1347  •  heparin flush (porcine) 100 UNIT/ML injection 500 Units, 500 Units, Intravenous, PRN, Neno Merritt II, MD, 500 Units at 01/18/20 1359  •  sodium chloride 0.9 % flush 10 mL, 10 mL, Intravenous, PRN, Anuel Scott MD, 10 mL at 11/27/17 1347  •  sodium chloride 0.9 % flush 10 mL, 10 mL, Intravenous, PRN, Neno Merritt II, MD, 10 mL at 01/18/20 1359  •  sodium chloride 0.9 % infusion 250 mL, 250 mL, Intravenous, PRN, Neno Merritt II, MD    Review of Systems    Objective   There were no vitals filed for this visit.  There were no vitals filed for this visit.  There is no height or weight on file to calculate BMI.      Physical Exam    WBC   Date Value Ref Range Status   04/09/2020 4.73 3.40 - 10.80 10*3/mm3 Final     RBC    Date Value Ref Range Status   04/09/2020 2.58 (L) 3.77 - 5.28 10*6/mm3 Final     Hemoglobin   Date Value Ref Range Status   04/09/2020 7.9 (L) 12.0 - 15.9 g/dL Final     Hematocrit   Date Value Ref Range Status   04/09/2020 24.8 (L) 34.0 - 46.6 % Final     MCV   Date Value Ref Range Status   04/09/2020 96.1 79.0 - 97.0 fL Final     MCH   Date Value Ref Range Status   04/09/2020 30.6 26.6 - 33.0 pg Final     MCHC   Date Value Ref Range Status   04/09/2020 31.9 31.5 - 35.7 g/dL Final     RDW   Date Value Ref Range Status   04/09/2020 18.6 (H) 12.3 - 15.4 % Final     RDW-SD   Date Value Ref Range Status   04/09/2020 65.1 (H) 37.0 - 54.0 fl Final     MPV   Date Value Ref Range Status   04/09/2020 10.2 6.0 - 12.0 fL Final     Platelets   Date Value Ref Range Status   04/09/2020 151 140 - 450 10*3/mm3 Final     Neutrophil %   Date Value Ref Range Status   04/09/2020 77.0 (H) 42.7 - 76.0 % Final     Lymphocyte %   Date Value Ref Range Status   04/09/2020 13.3 (L) 19.6 - 45.3 % Final     Monocyte %   Date Value Ref Range Status   04/09/2020 7.4 5.0 - 12.0 % Final     Eosinophil %   Date Value Ref Range Status   04/09/2020 1.3 0.3 - 6.2 % Final     Basophil %   Date Value Ref Range Status   04/09/2020 0.4 0.0 - 1.5 % Final     Immature Grans %   Date Value Ref Range Status   04/09/2020 0.6 (H) 0.0 - 0.5 % Final     Neutrophils, Absolute   Date Value Ref Range Status   04/09/2020 3.64 1.70 - 7.00 10*3/mm3 Final     Lymphocytes, Absolute   Date Value Ref Range Status   04/09/2020 0.63 (L) 0.70 - 3.10 10*3/mm3 Final     Monocytes, Absolute   Date Value Ref Range Status   04/09/2020 0.35 0.10 - 0.90 10*3/mm3 Final     Eosinophils, Absolute   Date Value Ref Range Status   04/09/2020 0.06 0.00 - 0.40 10*3/mm3 Final     Basophils, Absolute   Date Value Ref Range Status   04/09/2020 0.02 0.00 - 0.20 10*3/mm3 Final     Immature Grans, Absolute   Date Value Ref Range Status   04/09/2020 0.03 0.00 - 0.05 10*3/mm3 Final     nRBC    Date Value Ref Range Status   04/09/2020 0.0 0.0 - 0.2 /100 WBC Final       Lab Results   Component Value Date    GLUCOSE 108 (H) 02/13/2020    BUN 40 (H) 02/13/2020    CREATININE 2.56 (H) 02/13/2020    EGFRIFNONA 18 (L) 02/13/2020    EGFRIFAFRI  08/30/2019      Comment:      <15 Indicative of kidney failure.    BCR 15.6 02/13/2020    CO2 25.6 02/13/2020    CALCIUM 8.7 02/13/2020    PROTENTOTREF 4.0 (L) 06/27/2019    ALBUMIN 2.80 (L) 02/13/2020    LABIL2 1.5 06/27/2019    AST 17 02/12/2020    ALT <5 02/12/2020         Imaging Results (Last 7 Days)     ** No results found for the last 168 hours. **            Assessment/Plan   Diagnoses and all orders for this visit:    Anemia due to acute blood loss  -     Case Request; Standing  -     Case Request    GAVE (gastric antral vascular ectasia)  -     Case Request; Standing  -     Case Request    Gastroparesis    Anemia due to stage 4 chronic kidney disease (CMS/HCC)    Will proceed to EGD w APC at [facility] and she gus not eat supper the night before and stilltake her Emycin    Dictated utilizing Dragon dictation

## 2020-04-14 NOTE — H&P (VIEW-ONLY)
Charmaine Machuca is a 80 y.o. female who presents with No chief complaint on file.  Was seen by Video Visit    Subjective     Fell in February with concussion and recovered nicely. Also has been seeing black stools again and has had to be trasfused twice over the last two weeks.    Did not get the gastric emptying but is on her Emycin- denies bloating or vomiting and has had two episodes of  inconitnent stools but they were black so most likely just blood induced diarrhea.    SHe is presentuing to the hospital for transfusions so her risk of hosp exposure is already there and her anesthesia risk is not increased except for when her HGB is low, so will recommend EGD and APC for her documented GAVE. Will also have her skip supper prior to her EGD and drink liquids only to ensure a clear field that we didn't have at her last attempt        Past Medical History:   Diagnosis Date   • Anemia     Iron deficiency   • Anxiety    • Cardiomyopathy (CMS/HCC)     S/P pacemaker and defibrillator   • CHF (congestive heart failure) (CMS/HCC)    • Chronic renal failure, stage 4 (severe) (CMS/HCC)    • Colon polyp    • Coronary artery disease     pacemaker, defibrillator   • Depression    • Dizzy    • Gastroparesis    • GAVE (gastric antral vascular ectasia)    • GERD (gastroesophageal reflux disease)    • Gout    • Hemorrhoids    • Hiatal hernia    • History of Clostridium difficile colitis 2013   • History of kidney stones    • History of pancreatitis     2014   • History of skin cancer    • History of transfusion     MULTIPLE    • Hyperlipidemia    • Hypertension    • Hypothyroidism    • Left bundle branch block    • Mitral and aortic valve disease    • Mitral valve insufficiency    • Myoclonus     S/P Depakote   • Osteoarthritis    • Pancytopenia (CMS/HCC)    • Peripheral neuropathy    • Presence of cardiac pacemaker     AND DEFIBRILLATOR   • Pulmonary hypertension (CMS/HCC)    • SOB (shortness of breath) on exertion    •  Spinal stenosis    • Stroke (CMS/HCC)        Social History     Socioeconomic History   • Marital status:      Spouse name: Zackery   • Number of children: 5   • Years of education: 1 yr college   • Highest education level: Not on file   Occupational History     Employer: RETIRED   Tobacco Use   • Smoking status: Never Smoker   • Smokeless tobacco: Never Used   • Tobacco comment: caffeine use - coffee and soda   Substance and Sexual Activity   • Alcohol use: No   • Drug use: No   • Sexual activity: Defer         Current Outpatient Medications:   •  acetaminophen (TYLENOL) 325 MG tablet, Take 2 tablets by mouth Every 4 (Four) Hours As Needed for Mild Pain ., Disp: , Rfl:   •  atorvastatin (LIPITOR) 20 MG tablet, Take 20 mg by mouth Daily., Disp: , Rfl:   •  calcitriol (ROCALTROL) 0.5 MCG capsule, Take 0.5 mcg by mouth Every Other Day. Alternate days with calcitriol 0.25mcg, Disp: , Rfl:   •  calcium carbonate (OS-RAYMOND) 600 MG tablet, Take 600 mg by mouth Daily., Disp: , Rfl:   •  carbidopa-levodopa ER (SINEMET CR)  MG per tablet, Take 1 tablet by mouth Every Evening., Disp: , Rfl:   •  carvedilol (COREG) 12.5 MG tablet, Take 12.5 mg by mouth Daily., Disp: , Rfl:   •  Cholecalciferol (VITAMIN D3) 5000 units capsule capsule, Take 5,000 Units by mouth Daily., Disp: , Rfl:   •  diazepam (VALIUM) 5 MG tablet, Take 5 mg by mouth Every Night., Disp: , Rfl:   •  erythromycin base (E-MYCIN) 250 MG tablet, Take 0.5 tablets by mouth 4 (Four) Times a Day., Disp: 60 tablet, Rfl: 11  •  febuxostat (ULORIC) 40 MG tablet, Take 40 mg by mouth Daily., Disp: , Rfl:   •  furosemide (LASIX) 40 MG tablet, Take 1 tablet by mouth Daily., Disp: 90 tablet, Rfl: 3  •  Gabapentin Enacarbil  MG tablet controlled-release, Take 300 mg by mouth Daily., Disp: , Rfl:   •  Gabapentin, Once-Daily, (GRALISE) 300 MG tablet, Take 300 mg by mouth Daily With Dinner. MAY REPEAT LATE EVENING IF NEEDED, Disp: , Rfl:   •   Glucosamine-Chondroit-Vit C-Mn (GLUCOSAMINE CHONDROITIN COMPLX) capsule, Take 1,500 mg by mouth Every Other Day., Disp: , Rfl:   •  HYDROcodone-acetaminophen (NORCO) 5-325 MG per tablet, Take 1 tablet by mouth Every 6 (Six) Hours As Needed for Severe Pain ., Disp: 12 tablet, Rfl: 0  •  levothyroxine (SYNTHROID, LEVOTHROID) 25 MCG tablet, Take 25 mcg by mouth Daily., Disp: , Rfl:   •  magnesium oxide 250 MG tablet, Take 250 mg by mouth Daily., Disp: , Rfl:   •  Multiple Vitamin (MULTI-DAY VITAMINS) tablet, Take 1 tablet by mouth Daily. HOLD FOR SURGERY, Disp: , Rfl:   •  polyethylene glycol (MIRALAX) powder, Take 17 g by mouth Daily As Needed., Disp: , Rfl:   •  psyllium (METAMUCIL) 58.6 % powder, Take 1 packet by mouth Daily., Disp: , Rfl:   •  rotigotine (NEUPRO) 6 MG/24HR patch, Place 1 patch on the skin as directed by provider Daily., Disp: , Rfl:   •  Vitamin E 400 UNITS tablet, Take 400 Units by mouth Daily., Disp: , Rfl:   No current facility-administered medications for this visit.     Facility-Administered Medications Ordered in Other Visits:   •  heparin flush (porcine) 100 UNIT/ML injection 500 Units, 500 Units, Intravenous, PRN, Anuel Scott MD, 500 Units at 11/27/17 1347  •  heparin flush (porcine) 100 UNIT/ML injection 500 Units, 500 Units, Intravenous, PRN, Neno Merritt II, MD, 500 Units at 01/18/20 1359  •  sodium chloride 0.9 % flush 10 mL, 10 mL, Intravenous, PRN, Anuel Scott MD, 10 mL at 11/27/17 1347  •  sodium chloride 0.9 % flush 10 mL, 10 mL, Intravenous, PRN, Neno Merritt II, MD, 10 mL at 01/18/20 1359  •  sodium chloride 0.9 % infusion 250 mL, 250 mL, Intravenous, PRN, Neno Merritt II, MD    Review of Systems    Objective   There were no vitals filed for this visit.  There were no vitals filed for this visit.  There is no height or weight on file to calculate BMI.      Physical Exam    WBC   Date Value Ref Range Status   04/09/2020 4.73 3.40 - 10.80 10*3/mm3 Final     RBC    Date Value Ref Range Status   04/09/2020 2.58 (L) 3.77 - 5.28 10*6/mm3 Final     Hemoglobin   Date Value Ref Range Status   04/09/2020 7.9 (L) 12.0 - 15.9 g/dL Final     Hematocrit   Date Value Ref Range Status   04/09/2020 24.8 (L) 34.0 - 46.6 % Final     MCV   Date Value Ref Range Status   04/09/2020 96.1 79.0 - 97.0 fL Final     MCH   Date Value Ref Range Status   04/09/2020 30.6 26.6 - 33.0 pg Final     MCHC   Date Value Ref Range Status   04/09/2020 31.9 31.5 - 35.7 g/dL Final     RDW   Date Value Ref Range Status   04/09/2020 18.6 (H) 12.3 - 15.4 % Final     RDW-SD   Date Value Ref Range Status   04/09/2020 65.1 (H) 37.0 - 54.0 fl Final     MPV   Date Value Ref Range Status   04/09/2020 10.2 6.0 - 12.0 fL Final     Platelets   Date Value Ref Range Status   04/09/2020 151 140 - 450 10*3/mm3 Final     Neutrophil %   Date Value Ref Range Status   04/09/2020 77.0 (H) 42.7 - 76.0 % Final     Lymphocyte %   Date Value Ref Range Status   04/09/2020 13.3 (L) 19.6 - 45.3 % Final     Monocyte %   Date Value Ref Range Status   04/09/2020 7.4 5.0 - 12.0 % Final     Eosinophil %   Date Value Ref Range Status   04/09/2020 1.3 0.3 - 6.2 % Final     Basophil %   Date Value Ref Range Status   04/09/2020 0.4 0.0 - 1.5 % Final     Immature Grans %   Date Value Ref Range Status   04/09/2020 0.6 (H) 0.0 - 0.5 % Final     Neutrophils, Absolute   Date Value Ref Range Status   04/09/2020 3.64 1.70 - 7.00 10*3/mm3 Final     Lymphocytes, Absolute   Date Value Ref Range Status   04/09/2020 0.63 (L) 0.70 - 3.10 10*3/mm3 Final     Monocytes, Absolute   Date Value Ref Range Status   04/09/2020 0.35 0.10 - 0.90 10*3/mm3 Final     Eosinophils, Absolute   Date Value Ref Range Status   04/09/2020 0.06 0.00 - 0.40 10*3/mm3 Final     Basophils, Absolute   Date Value Ref Range Status   04/09/2020 0.02 0.00 - 0.20 10*3/mm3 Final     Immature Grans, Absolute   Date Value Ref Range Status   04/09/2020 0.03 0.00 - 0.05 10*3/mm3 Final     nRBC    Date Value Ref Range Status   04/09/2020 0.0 0.0 - 0.2 /100 WBC Final       Lab Results   Component Value Date    GLUCOSE 108 (H) 02/13/2020    BUN 40 (H) 02/13/2020    CREATININE 2.56 (H) 02/13/2020    EGFRIFNONA 18 (L) 02/13/2020    EGFRIFAFRI  08/30/2019      Comment:      <15 Indicative of kidney failure.    BCR 15.6 02/13/2020    CO2 25.6 02/13/2020    CALCIUM 8.7 02/13/2020    PROTENTOTREF 4.0 (L) 06/27/2019    ALBUMIN 2.80 (L) 02/13/2020    LABIL2 1.5 06/27/2019    AST 17 02/12/2020    ALT <5 02/12/2020         Imaging Results (Last 7 Days)     ** No results found for the last 168 hours. **            Assessment/Plan   Diagnoses and all orders for this visit:    Anemia due to acute blood loss  -     Case Request; Standing  -     Case Request    GAVE (gastric antral vascular ectasia)  -     Case Request; Standing  -     Case Request    Gastroparesis    Anemia due to stage 4 chronic kidney disease (CMS/HCC)    Will proceed to EGD w APC at [facility] and she gus not eat supper the night before and stilltake her Emycin    Dictated utilizing Dragon dictation

## 2020-04-14 NOTE — TELEPHONE ENCOUNTER
----- Message from Neno Merritt II, MD sent at 4/14/2020  2:31 PM EDT -----  If she really wants to skip her lab, okay.  However, she has been dropping abruptly and therefore I think she would be better served by maintaining the weekly visits.  This is not a strong recommendation and if she is very worried about the coronavirus and wants to change to every 2 weeks that is okay.  However, I think she would be better off to maintain the weekly checks.  ----- Message -----  From: Shivani Hawkins RN  Sent: 4/14/2020  12:28 PM EDT  To: Neno Merritt II, MD    Pt would like to know if she can skip her 4/16 lab. She had 2 units of PRBC on 4/11. Please advise and respond to the clinical pool. Thanks

## 2020-04-14 NOTE — TELEPHONE ENCOUNTER
Message sent to Dr. Merritt regarding pt's request to skip 4/16 lab appt. Pt will be informed after we hear back from Dr. Merritt. PT V/U.

## 2020-04-14 NOTE — TELEPHONE ENCOUNTER
----- Message from Shivani Hawkins RN sent at 4/14/2020  2:38 PM EDT -----  Please cancel pts'/  siim

## 2020-04-14 NOTE — PROGRESS NOTES
If she really wants to skip her lab, okay.  However, she has been dropping abruptly and therefore I think she would be better served by maintaining the weekly visits.  This is not a strong recommendation and if she is very worried about the coronavirus and wants to change to every 2 weeks that is okay.  However, I think she would be better off to maintain the weekly checks.  ===View-only below this line===    ----- Message -----  From: Shivani Hawkins RN  Sent: 4/14/2020  12:28 PM EDT  To: Neno Merritt II, MD    Pt would like to know if she can skip her 4/16 lab. She had 2 units of PRBC on 4/11. Please advise and respond to the clinical pool. Thanks

## 2020-04-14 NOTE — TELEPHONE ENCOUNTER
Pt will skip this weeks appt. If she has any SOA and fatigue she will call us. She will keep her appt for next week. Pts  V/U.

## 2020-04-14 NOTE — TELEPHONE ENCOUNTER
PT CALLED REGARDING HER APPT ON 4/16/20.  ON Saturday 4/11/20 PT RECEIVED 2 UNITS OF BLOOD.   SHE BELIEVES HER HEMOGLOBIN SHOULD BE OK.    HER QUESTION IS CAN SHE SKIP HER 4/16/20 APPT.    SHE WILL COME TO HER NEXT APPT ON 4/23/20.    PLEASE GIVE MARICHUY A CALL [phone]

## 2020-04-16 ENCOUNTER — APPOINTMENT (OUTPATIENT)
Dept: OTHER | Facility: HOSPITAL | Age: 80
End: 2020-04-16

## 2020-04-16 ENCOUNTER — APPOINTMENT (OUTPATIENT)
Dept: ONCOLOGY | Facility: HOSPITAL | Age: 80
End: 2020-04-16

## 2020-04-21 ENCOUNTER — HOSPITAL ENCOUNTER (OUTPATIENT)
Facility: HOSPITAL | Age: 80
Setting detail: HOSPITAL OUTPATIENT SURGERY
Discharge: HOME | End: 2020-04-21
Attending: INTERNAL MEDICINE | Admitting: INTERNAL MEDICINE

## 2020-04-21 ENCOUNTER — ANESTHESIA (OUTPATIENT)
Dept: GASTROENTEROLOGY | Facility: HOSPITAL | Age: 80
End: 2020-04-21

## 2020-04-21 ENCOUNTER — ANESTHESIA EVENT (OUTPATIENT)
Dept: GASTROENTEROLOGY | Facility: HOSPITAL | Age: 80
End: 2020-04-21

## 2020-04-21 VITALS
SYSTOLIC BLOOD PRESSURE: 135 MMHG | HEART RATE: 74 BPM | OXYGEN SATURATION: 99 % | HEIGHT: 67 IN | DIASTOLIC BLOOD PRESSURE: 74 MMHG | BODY MASS INDEX: 26.35 KG/M2 | TEMPERATURE: 98.6 F | WEIGHT: 167.9 LBS | RESPIRATION RATE: 12 BRPM

## 2020-04-21 DIAGNOSIS — D62 ANEMIA DUE TO ACUTE BLOOD LOSS: ICD-10-CM

## 2020-04-21 DIAGNOSIS — K31.819 GAVE (GASTRIC ANTRAL VASCULAR ECTASIA): ICD-10-CM

## 2020-04-21 PROCEDURE — 43255 EGD CONTROL BLEEDING ANY: CPT | Performed by: INTERNAL MEDICINE

## 2020-04-21 PROCEDURE — 25010000003 HEPARIN LOCK FLUCH PER 10 UNITS: Performed by: INTERNAL MEDICINE

## 2020-04-21 PROCEDURE — 25010000002 PROPOFOL 10 MG/ML EMULSION: Performed by: NURSE ANESTHETIST, CERTIFIED REGISTERED

## 2020-04-21 RX ORDER — PROPOFOL 10 MG/ML
VIAL (ML) INTRAVENOUS CONTINUOUS PRN
Status: DISCONTINUED | OUTPATIENT
Start: 2020-04-21 | End: 2020-04-21 | Stop reason: SURG

## 2020-04-21 RX ORDER — GLYCOPYRROLATE 0.2 MG/ML
INJECTION INTRAMUSCULAR; INTRAVENOUS AS NEEDED
Status: DISCONTINUED | OUTPATIENT
Start: 2020-04-21 | End: 2020-04-21 | Stop reason: SURG

## 2020-04-21 RX ORDER — HEPARIN SODIUM (PORCINE) LOCK FLUSH IV SOLN 100 UNIT/ML 100 UNIT/ML
500 SOLUTION INTRAVENOUS ONCE
Status: COMPLETED | OUTPATIENT
Start: 2020-04-21 | End: 2020-04-21

## 2020-04-21 RX ORDER — SODIUM CHLORIDE 9 MG/ML
1000 INJECTION, SOLUTION INTRAVENOUS CONTINUOUS
Status: DISCONTINUED | OUTPATIENT
Start: 2020-04-21 | End: 2020-04-21 | Stop reason: HOSPADM

## 2020-04-21 RX ORDER — LIDOCAINE HYDROCHLORIDE 20 MG/ML
INJECTION, SOLUTION INFILTRATION; PERINEURAL AS NEEDED
Status: DISCONTINUED | OUTPATIENT
Start: 2020-04-21 | End: 2020-04-21 | Stop reason: SURG

## 2020-04-21 RX ADMIN — LIDOCAINE HYDROCHLORIDE 100 MG: 20 INJECTION, SOLUTION INFILTRATION; PERINEURAL at 07:41

## 2020-04-21 RX ADMIN — PROPOFOL 100 MCG/KG/MIN: 10 INJECTION, EMULSION INTRAVENOUS at 07:41

## 2020-04-21 RX ADMIN — SODIUM CHLORIDE 1000 ML: 9 INJECTION, SOLUTION INTRAVENOUS at 07:15

## 2020-04-21 RX ADMIN — Medication 500 UNITS: at 08:52

## 2020-04-21 RX ADMIN — GLYCOPYRROLATE 0.2 MCG: 0.2 INJECTION INTRAMUSCULAR; INTRAVENOUS at 07:38

## 2020-04-21 NOTE — ANESTHESIA PREPROCEDURE EVALUATION
Anesthesia Evaluation     Patient summary reviewed and Nursing notes reviewed   NPO Solid Status: > 8 hours  NPO Liquid Status: > 2 hours           Airway   Mallampati: II  TM distance: >3 FB  Neck ROM: full  Dental - normal exam     Pulmonary - normal exam   (+) shortness of breath,   Cardiovascular - normal exam    ECG reviewed    (+) pacemaker pacemaker, hypertension, valvular problems/murmurs MR, CAD, dysrhythmias, CHF , hyperlipidemia,       Neuro/Psych  (+) CVA, dizziness/light headedness, numbness, psychiatric history,     GI/Hepatic/Renal/Endo    (+)  hiatal hernia, GERD, GI bleeding , renal disease CRI,     Musculoskeletal     Abdominal    Substance History - negative use     OB/GYN negative ob/gyn ROS         Other   arthritis,                      Anesthesia Plan    ASA 4     MAC       Anesthetic plan, all risks, benefits, and alternatives have been provided, discussed and informed consent has been obtained with: patient.

## 2020-04-21 NOTE — BRIEF OP NOTE
ESOPHAGOGASTRODUODENOSCOPY  Progress Note    Charmaine Machuca  4/21/2020    Pre-op Diagnosis:   Anemia due to acute blood loss [D62]  GAVE (gastric antral vascular ectasia) [K31.819]       Post-Op Diagnosis Codes:     * Anemia due to acute blood loss [D62]     * GAVE (gastric antral vascular ectasia) [K31.819]     * Reflux esophagitis [K21.0]    Procedure/CPT® Codes:      Procedure(s):  ESOPHAGOGASTRODUODENOSCOPY WITH APC    Surgeon(s):  Anuel Roach MD    Anesthesia: Monitored Anesthesia Care    Staff:   Endo Technician: Ekta Velasquez  Endo Nurse: Tamera Allred RN    Estimated Blood Loss: none    Urine Voided: * No values recorded between 4/21/2020  7:29 AM and 4/21/2020  8:04 AM *    Specimens:                None          Drains: * No LDAs found *    Findings: Normal Duodenum  Moderate GAVE- APC 2L/80watts  Reflux Esophagitis    Complications: None      Anuel Roach MD     Date: 4/21/2020  Time: 08:11

## 2020-04-21 NOTE — ANESTHESIA POSTPROCEDURE EVALUATION
"Patient: Charmaine Machuca    Procedure Summary     Date:  04/21/20 Room / Location:   [facility]    Anesthesia Start:  0736 Anesthesia Stop:  0820    Procedure:  ESOPHAGOGASTRODUODENOSCOPY WITH APC (N/A Esophagus) Diagnosis:       Anemia due to acute blood loss      GAVE (gastric antral vascular ectasia)      Reflux esophagitis      (Anemia due to acute blood loss [D62])      (GAVE (gastric antral vascular ectasia) [K31.819])    Surgeon:  Anuel Roach MD Provider:  Mayo Rivas MD    Anesthesia Type:  MAC ASA Status:  4          Anesthesia Type: No value filed.    Vitals  Vitals Value Taken Time   /71 4/21/2020  8:26 AM   Temp     Pulse 73 4/21/2020  8:26 AM   Resp 14 4/21/2020  8:26 AM   SpO2 99 % 4/21/2020  8:26 AM           Post Anesthesia Care and Evaluation    Patient location during evaluation: bedside  Patient participation: complete - patient participated  Level of consciousness: awake  Pain score: 2  Pain management: adequate  Airway patency: patent  Anesthetic complications: No anesthetic complications  PONV Status: none  Cardiovascular status: acceptable  Respiratory status: acceptable  Hydration status: acceptable    Comments: /71 (BP Location: Left arm, Patient Position: Sitting)   Pulse 73   Temp 37 °C (98.6 °F) (Oral)   Resp 14   Ht 170.2 cm (67\")   Wt 76.2 kg (167 lb 14.4 oz)   SpO2 99%   BMI 26.30 kg/m²         "

## 2020-04-21 NOTE — DISCHARGE INSTRUCTIONS
For the next 24 hours patient needs to be with a responsible adult.    For 24 hours DO NOT drive, operate machinery, appliances, drink alcohol, make important decisions or sign legal documents.    Start with a light or bland diet if you are feeling sick to your stomach otherwise advance to regular diet as tolerated.    Follow recommendations on procedure report if provided by your doctor.    Call Dr Roach for problems [phone]    Problems may include but not limited to: large amounts of bleeding, trouble breathing, repeated vomiting, severe unrelieved pain, fever or chills.

## 2020-04-21 NOTE — INTERVAL H&P NOTE
"Vital Signs  /88 (BP Location: Left arm, Patient Position: Lying)   Pulse 54   Temp 98.6 °F (37 °C) (Oral)   Resp 14   Ht 170.2 cm (67\")   Wt 76.2 kg (167 lb 14.4 oz)   SpO2 99%   BMI 26.30 kg/m²     H&P reviewed. The patient was examined and there are no changes to the H&P.        "

## 2020-04-21 NOTE — NURSING NOTE
Spoke to magnet representative, Susanne Doe. Magnet used and ok to go home. Patient has no changes  alert and oriented.  No compaints of pain. Patient aware.

## 2020-04-21 NOTE — PROGRESS NOTES
.     REASONS FOR FOLLOWUP: Anemia    HISTORY OF PRESENT ILLNESS:  The patient is a 80 y.o. year old female  who is here for follow-up with the above-mentioned history.    EGD 2 days ago.  GAVE was treated.  Denies chest pain or shortness of breath.  Has had dark stools for several months.    Past Medical History:   Diagnosis Date   • Anemia     Iron deficiency   • Anxiety    • Cardiomyopathy (CMS/HCC)     S/P pacemaker and defibrillator   • CHF (congestive heart failure) (CMS/HCC)    • Chronic renal failure, stage 4 (severe) (CMS/HCC)    • Colon polyp    • Coronary artery disease     pacemaker, defibrillator   • Depression    • Dizzy    • Gastroparesis    • GAVE (gastric antral vascular ectasia)    • GERD (gastroesophageal reflux disease)    • Gout    • Hemorrhoids    • Hiatal hernia    • History of Clostridium difficile colitis 2013   • History of kidney stones    • History of pancreatitis     2014   • History of skin cancer    • History of transfusion     MULTIPLE    • Hyperlipidemia    • Hypertension    • Hypothyroidism    • Left bundle branch block    • Mitral and aortic valve disease    • Mitral valve insufficiency    • Myoclonus     S/P Depakote   • Osteoarthritis    • Pancytopenia (CMS/HCC)    • Peripheral neuropathy    • Presence of cardiac pacemaker     AND DEFIBRILLATOR   • Pulmonary hypertension (CMS/HCC)    • SOB (shortness of breath) on exertion    • Spinal stenosis    • Stroke (CMS/HCC)      Past Surgical History:   Procedure Laterality Date   • APPENDECTOMY N/A    • ARTERIOVENOUS FISTULA/SHUNT SURGERY Right 2/18/2019    Procedure: RIGHT BASILIC FISTULA CREATION WITHOUT TRANSPOSITION;  Surgeon: Royer Dozier MD;  Location: [facility] OR;  Service: Vascular   • ARTERIOVENOUS FISTULA/SHUNT SURGERY Right 5/31/2019    Procedure: RIGHT 2ND STAGE BASILIC VEIN CREATION;  Surgeon: Royer Dozier MD;  Location: [facility] OR;  Service: Vascular   • CARDIAC CATHETERIZATION Left 03/28/2006     Arterial catheter insertion, cath left ventriculography, coronary angiography and left heart catheterization-Dr. Estevan Matamoros   • CARDIAC DEFIBRILLATOR PLACEMENT N/A 11/30/2007    Biventricular implantable cardioverter defibrillator-Dr. Leandro Huber   • CARDIAC ELECTROPHYSIOLOGY PROCEDURE N/A 10/28/2019    Procedure: GENERATOR CHANGE BI-V ICD  medtronic;  Surgeon: Leandro Huber MD;  Location: [facility] CATH INVASIVE LOCATION;  Service: Cardiology   • CATARACT EXTRACTION Bilateral 2011   • COLONOSCOPY N/A 2014    [facility] at [facility]   • CYSTECTOMY N/A     Ovarian cystectomy   • ENDOSCOPY N/A 04/28/2006    EGD with biopsies. Paraesophageal hiatal hernia from 34 to 44 cm, antral gastritis-Dr. Nehemiah Beal   • ENDOSCOPY N/A 09/29/2004    Hiatal hernia, gastritis and an erosion of the pylorus-Dr. Yang Pavon   • ENDOSCOPY N/A 9/1/2019    Procedure: ESOPHAGOGASTRODUODENOSCOPY with APC;  Surgeon: Anuel Roach MD;  Location: [facility] ENDOSCOPY;  Service: Gastroenterology   • ENDOSCOPY N/A 1/28/2020    Procedure: ESOPHAGOGASTRODUODENOSCOPY with APC;  Surgeon: Anuel Roach MD;  Location: [facility] ENDOSCOPY;  Service: Gastroenterology   • ENTEROSCOPY SMALL BOWEL N/A 10/30/2006    Small bowel enteroscopy with biopsies-Dr. Rory Sevilla   • FLEXIBLE SIGMOIDOSCOPY N/A 09/29/2004    Unsuccessful colonoscopy due to poop prep, a very tortuous sigmoid, bowel prep in the form of enema given and a barium enema was obtained-Dr. Yang Pavon   • FRACTURE SURGERY  2015    Broke big toe   • HEMATOMA EVACUATION TRUNK N/A 02/07/2014    Pocket evacuation of hematoma at pacemaker site-Dr. Leandro Huber   • HEMORRHOIDECTOMY N/A 04/19/2004    Flexible sigmoidoscopy and stapled hemorrhoidectomy-Dr. Yang Pavon   • HYSTERECTOMY Bilateral 1977   • IMPLANTABLE CARDIOVERTER DEFIBRILLATOR LEAD REPLACEMENT/POCKET REVISION Left 3/28/2016    Procedure: AUTOMATIC IMPLANTABLE CARDIOVERTER DEFIBRILLATOR LEAD REPLACEMENT  WITH LASER LEAD EXTRACTION;  Surgeon: Leandro Huber MD;  Location: [facility] HYBRID OR 18/19;  Service:    • IMPLANTABLE CARDIOVERTER DEFIBRILLATOR LEAD REPLACEMENT/POCKET REVISION N/A 01/13/2014    Biventricular ICD replacement-Dr. Jillian Huber   • LAPAROSCOPY REPAIR HIATAL HERNIA N/A 06/27/2006    Laparoscopic paraesophageal hiatal hernia repair with Nissen fundoplication and cholecystectomy-Dr. Sean Yoon   • NATALIE GONZALEZ ([facility]) PROCEDURE     • PA INSJ TUNNELED CVC W/O SUBQ PORT/ AGE 3 YR/> Right 10/3/2017    Procedure: Right internal jugular port placement;  Surgeon: Tiffanie Couch MD;  Location: [facility] MAIN OR;  Service: General   • TOE SURGERY Left     SET BIG TOE   • TOTAL KNEE ARTHROPLASTY Left 08/2014   • VENOUS ACCESS DEVICE (PORT) INSERTION Right        MEDICATIONS    Current Outpatient Medications:   •  acetaminophen (TYLENOL) 325 MG tablet, Take 2 tablets by mouth Every 4 (Four) Hours As Needed for Mild Pain ., Disp: , Rfl:   •  atorvastatin (LIPITOR) 20 MG tablet, Take 20 mg by mouth Daily., Disp: , Rfl:   •  calcitriol (ROCALTROL) 0.5 MCG capsule, Take 0.5 mcg by mouth Every Other Day. Alternate days with calcitriol 0.25mcg, Disp: , Rfl:   •  calcium carbonate (OS-RAYMOND) 600 MG tablet, Take 600 mg by mouth Daily., Disp: , Rfl:   •  carbidopa-levodopa ER (SINEMET CR)  MG per tablet, Take 1 tablet by mouth Every Evening., Disp: , Rfl:   •  carvedilol (COREG) 12.5 MG tablet, Take 12.5 mg by mouth Daily., Disp: , Rfl:   •  Cholecalciferol (VITAMIN D3) 5000 units capsule capsule, Take 5,000 Units by mouth Daily., Disp: , Rfl:   •  diazepam (VALIUM) 5 MG tablet, Take 5 mg by mouth Every Night., Disp: , Rfl:   •  erythromycin base (E-MYCIN) 250 MG tablet, Take 0.5 tablets by mouth 4 (Four) Times a Day., Disp: 60 tablet, Rfl: 11  •  febuxostat (ULORIC) 40 MG tablet, Take 40 mg by mouth Daily., Disp: , Rfl:   •  furosemide (LASIX) 40 MG tablet, Take 1 tablet by mouth Daily.,  Disp: 90 tablet, Rfl: 3  •  Gabapentin Enacarbil  MG tablet controlled-release, Take 300 mg by mouth Daily., Disp: , Rfl:   •  Gabapentin, Once-Daily, (GRALISE) 300 MG tablet, Take 300 mg by mouth Daily With Dinner. MAY REPEAT LATE EVENING IF NEEDED, Disp: , Rfl:   •  Glucosamine-Chondroit-Vit C-Mn (GLUCOSAMINE CHONDROITIN COMPLX) capsule, Take 1,500 mg by mouth Every Other Day., Disp: , Rfl:   •  HYDROcodone-acetaminophen (NORCO) 5-325 MG per tablet, Take 1 tablet by mouth Every 6 (Six) Hours As Needed for Severe Pain ., Disp: 12 tablet, Rfl: 0  •  levothyroxine (SYNTHROID, LEVOTHROID) 25 MCG tablet, Take 25 mcg by mouth Daily., Disp: , Rfl:   •  magnesium oxide 250 MG tablet, Take 250 mg by mouth Daily., Disp: , Rfl:   •  Multiple Vitamin (MULTI-DAY VITAMINS) tablet, Take 1 tablet by mouth Daily. HOLD FOR SURGERY, Disp: , Rfl:   •  polyethylene glycol (MIRALAX) powder, Take 17 g by mouth Daily As Needed., Disp: , Rfl:   •  psyllium (METAMUCIL) 58.6 % powder, Take 1 packet by mouth Daily., Disp: , Rfl:   •  rotigotine (NEUPRO) 6 MG/24HR patch, Place 1 patch on the skin as directed by provider Daily., Disp: , Rfl:   •  Vitamin E 400 UNITS tablet, Take 400 Units by mouth Daily., Disp: , Rfl:   No current facility-administered medications for this visit.     Facility-Administered Medications Ordered in Other Visits:   •  ferric carboxymaltose (INJECTAFER) 750 mg in sodium chloride 0.9 % 100 mL IVPB, 750 mg, Intravenous, Once, Neno Merritt II, MD  •  heparin flush (porcine) 100 UNIT/ML injection 500 Units, 500 Units, Intravenous, PRN, Anuel Scott MD, 500 Units at 11/27/17 1347  •  heparin flush (porcine) 100 UNIT/ML injection 500 Units, 500 Units, Intravenous, PRN, Neno Merritt II, MD, 500 Units at 01/18/20 1359  •  heparin injection 500 Units, 500 Units, Intravenous, PRN, Neno Merritt II, MD  •  sodium chloride 0.9 % flush 10 mL, 10 mL, Intravenous, PRN, Anuel Scott MD, 10 mL at 11/27/17  "1347  •  sodium chloride 0.9 % flush 10 mL, 10 mL, Intravenous, PRN, Neno Merritt II, MD, 10 mL at 01/18/20 1359  •  sodium chloride 0.9 % flush 10 mL, 10 mL, Intravenous, PRN, Neno Merritt II, MD  •  sodium chloride 0.9 % infusion 250 mL, 250 mL, Intravenous, PRN, Neno Merritt II, MD    ALLERGIES:     Allergies   Allergen Reactions   • Chlorhexidine Rash and Itching     Patient states \"blue dye\" in chlorhexidine.  States the orange and clear are OK to use   • Codeine Unknown - Low Severity     HYPER, DOES NOT HELP PAIN       SOCIAL HISTORY:       Social History     Socioeconomic History   • Marital status:      Spouse name: Zackery   • Number of children: 5   • Years of education: 1 yr college   • Highest education level: Not on file   Occupational History     Employer: RETIRED   Tobacco Use   • Smoking status: Never Smoker   • Smokeless tobacco: Never Used   • Tobacco comment: caffeine use - coffee and soda   Substance and Sexual Activity   • Alcohol use: No   • Drug use: No   • Sexual activity: Defer         FAMILY HISTORY:  Family History   Problem Relation Age of Onset   • Coronary artery disease Other    • Dementia Other    • Kidney disease Other    • Arthritis Father    • Early death Father         Kidney failure   • Kidney disease Father    • Heart disease Mother    • Mental illness Mother         Dementia   • Malig Hyperthermia Neg Hx    • Colon cancer Neg Hx    • Colon polyps Neg Hx        REVIEW OF SYSTEMS:  Review of Systems   Constitutional: Negative for activity change.   HENT: Negative for nosebleeds and trouble swallowing.    Respiratory: Negative for shortness of breath and wheezing.    Cardiovascular: Negative for chest pain and palpitations.   Gastrointestinal: Negative for constipation, diarrhea and nausea.   Genitourinary: Negative for dysuria and hematuria.   Musculoskeletal: Negative for arthralgias and myalgias.   Skin: Negative for rash and wound.   Neurological: Negative for " "seizures and syncope.   Hematological: Negative for adenopathy. Does not bruise/bleed easily.   Psychiatric/Behavioral: Negative for confusion.            Vitals:    04/23/20 1102   BP: 153/71   Pulse: 76   Resp: 16   Temp: 98.7 °F (37.1 °C)   TempSrc: Oral   SpO2: 97%   Weight: 75.6 kg (166 lb 11.2 oz)   Height: 170.2 cm (67.01\")   PainSc: 0-No pain     Current Status 4/23/2020   ECOG score 0        PHYSICAL EXAM:    Patient screened positive for depression based on a PHQ-9 score of 1 on 2/27/2020.       CONSTITUTIONAL:  Vital signs reviewed.    No distress, looks comfortable.  EYES:  Conjunctiva and lids unremarkable.  Extraocular eye movements intact.  EARS,NOSE,MOUTH,THROAT:  Ears and nose appear unremarkable.  Lips, teeth, gums appear unremarkable.  RESPIRATORY:  Normal respiratory effort.    CARDIOVASCULAR:    No significant lower extremity edema.  SKIN: No wounds.  No rashes.  MUSCULOSKELETAL/EXTREMITIES: No clubbing or cyanosis.  No apparent unilateral weakness.  NEURO: CN 2-12 appear intact. No focal neurological deficits noted.  PSYCHIATRIC:  Normal judgment and insight.  Normal mood and affect.      RECENT LABS:        WBC   Date/Time Value Ref Range Status   04/23/2020 11:02 AM 5.94 3.40 - 10.80 10*3/mm3 Final     Hemoglobin   Date/Time Value Ref Range Status   04/23/2020 11:02 AM 9.0 (L) 12.0 - 15.9 g/dL Final     Platelets   Date/Time Value Ref Range Status   04/23/2020 11:02  (L) 140 - 450 10*3/mm3 Final       Assessment/Plan   There are no diagnoses linked to this encounter.     *Anemia due to stage IV chronic kidney disease.    · Weekly Procrit if Hb <10.  Transfused after the Hb of 7.9 on 4/9/2020.  Hb 9 today, 4/23/2020.    *Admitted 8/29/2019-9/2/2019 due to Hb of 5.  Transfused 2 units.  Heme positive stools.  EGD revealed GAVE.  Laser photocoagulation  · She states Dr. Roach plans EGD every 3 months.    *Iron deficiency anemia.  · Does not respond to oral iron due to poor " absorption.  · 2 doses Injectafer late August 2019 resulted in normal iron labs and improvement of Hb from 9.4 up to 11.1 on 9/26/2019.  · Hb down to 10.3, 10/3/2019.  · Iron labs normal 10/31/2019, ferritin 200, 21% saturation.  · Hb dropped to 7.8 on 11/26/2019, requiring transfusion.  · Hb dropped to 6.9 on 12/19/2019, requiring transfusion.  · Hb dropped to 6.5 on 2/12/2020, requiring transfusion  · Hb dropped to 7.8 on 2/18/2020, requiring transfusion.  · On 2/18/2020, ferritin 89, 13% iron saturation.  Gave 1 dose Injectafer.  · On 4/23/2020, ferritin 115, 5% saturation, give 2 doses Injectafer.    *Source of iron deficiency.  · Previously followed regularly with Dr. Earl Avelar.  · States she cannot have colonoscopies due to tortuous colon and therefore has virtual colonoscopies.  · She states she saw Dr. Avelar after the 6/27/2019 visit with me due to recent dark stools.  She states Dr. Avelar did a rectal exam which was heme negative and recommended no further evaluation.  · On 8/22/2019, I told her I suspect she is having occult GI bleeding considering she is needing IV iron frequently.  However, with new stroke mid May 2019 and the addition of Plavix to aspirin, risk may be too great to stop antiplatelet agents for further GI evaluation.  Patient and  agree.  · During late August 2019 hospitalization for GI bleed with Hb of 5, EGD through Dr. Roach revealed G AVE.  Laser photocoagulation.  · Subsequently, following with Dr. Roach.  He initially planned EGD every 3 months.  · However, on the 10/18/2019 office visit, he changed the plan to be return to see him mid February 2020 and stated he could retreat her GAVE if she develops a significant drop in her Hb.  · Dr. Roach stated on the 2/11/2020 office note last EGD could not be performed due to a large food bezoar.  He gave her erythromycin and reviewed dietary changes for gastroparesis in hopes of being able to repeat EGD with  [facility].  · EGD with [facility] on 4/21/2020, Dr. Roach.  He planned follow-up office visit in 4 weeks.    *Macrocytosis.  B12 and folate unremarkable  MCV 97.3    *Reticulocytosis.  · On 8/22/2019, unremarkable: Direct [place], haptoglobin, bilirubin ( with normal range up to 214.  Therefore, likely not significant).    *Thrombocytopenia.  Intermittent since 9/19/2019.      *Circulating nucleated red blood cells.  Intermittent.  Could consider a bone marrow biopsy at some point.  I suspect this is occurring as her bone marrow is trying to increase production after bleeding episodes.  Nucleated red blood cells seen on 10/31/2019 and again, 11/6/2019    *Stroke mid August 2019.  Presumed.  Could not have MRI due to pacemaker.  Plavix was added to aspirin.    Plan  · 2 doses Injectafer  · Weekly CBC.  Procrit if Hb <10.   · (Previously on every 2-week CBC.  But required transfusions.  She prefers weekly, as do I  with her transfusion requirement)  · Iron labs 1 month.  · MD 2 months or so.  1 wk prior: Iron labs  · Dr. Roach plans office follow-up late May 2020.  Last EGD with [facility] was 4/21/2020.    · Could consider a bone marrow biopsy in the future.    EGD reviewed and images personally viewed by me.  She has gastric antral vascular ectasia    You have chosen to receive care through a telehealth visit.  Do you consent to use a video/audio connection for your medical care today? Yes

## 2020-04-22 NOTE — ADDENDUM NOTE
Addendum  created 04/21/20 2055 by Wally Escobedo MD    Review and Sign - Ready for Procedure, Review and Sign - Signed

## 2020-04-23 ENCOUNTER — APPOINTMENT (OUTPATIENT)
Dept: ONCOLOGY | Facility: HOSPITAL | Age: 80
End: 2020-04-23

## 2020-04-23 ENCOUNTER — INFUSION (OUTPATIENT)
Dept: ONCOLOGY | Facility: HOSPITAL | Age: 80
End: 2020-04-23

## 2020-04-23 ENCOUNTER — TELEMEDICINE (OUTPATIENT)
Dept: ONCOLOGY | Facility: CLINIC | Age: 80
End: 2020-04-23

## 2020-04-23 VITALS
TEMPERATURE: 98.7 F | SYSTOLIC BLOOD PRESSURE: 153 MMHG | DIASTOLIC BLOOD PRESSURE: 71 MMHG | BODY MASS INDEX: 26.16 KG/M2 | WEIGHT: 166.7 LBS | OXYGEN SATURATION: 97 % | RESPIRATION RATE: 16 BRPM | HEIGHT: 67 IN | HEART RATE: 76 BPM

## 2020-04-23 VITALS — SYSTOLIC BLOOD PRESSURE: 122 MMHG | DIASTOLIC BLOOD PRESSURE: 75 MMHG | HEART RATE: 78 BPM

## 2020-04-23 DIAGNOSIS — N18.4 CKD (CHRONIC KIDNEY DISEASE) STAGE 4, GFR 15-29 ML/MIN (HCC): ICD-10-CM

## 2020-04-23 DIAGNOSIS — D50.8 OTHER IRON DEFICIENCY ANEMIA: ICD-10-CM

## 2020-04-23 DIAGNOSIS — Z45.2 ENCOUNTER FOR FITTING AND ADJUSTMENT OF VASCULAR CATHETER: ICD-10-CM

## 2020-04-23 DIAGNOSIS — N18.4 ANEMIA IN STAGE 4 CHRONIC KIDNEY DISEASE (HCC): Primary | ICD-10-CM

## 2020-04-23 DIAGNOSIS — N18.4 ANEMIA IN STAGE 4 CHRONIC KIDNEY DISEASE (HCC): ICD-10-CM

## 2020-04-23 DIAGNOSIS — D62 ANEMIA DUE TO ACUTE BLOOD LOSS: Primary | ICD-10-CM

## 2020-04-23 DIAGNOSIS — N18.30 CHRONIC KIDNEY DISEASE, STAGE III (MODERATE) (HCC): Primary | ICD-10-CM

## 2020-04-23 DIAGNOSIS — D63.1 ANEMIA OF CHRONIC RENAL FAILURE, STAGE 4 (SEVERE) (HCC): ICD-10-CM

## 2020-04-23 DIAGNOSIS — N18.4 ANEMIA OF CHRONIC RENAL FAILURE, STAGE 4 (SEVERE) (HCC): ICD-10-CM

## 2020-04-23 DIAGNOSIS — D63.1 ANEMIA IN STAGE 4 CHRONIC KIDNEY DISEASE (HCC): Primary | ICD-10-CM

## 2020-04-23 DIAGNOSIS — D63.8 ANEMIA OF CHRONIC DISEASE: ICD-10-CM

## 2020-04-23 DIAGNOSIS — IMO0001 ADVERSE EFFECT OF IRON OR ITS COMPOUND, SUBSEQUENT ENCOUNTER: ICD-10-CM

## 2020-04-23 DIAGNOSIS — D63.1 ANEMIA IN STAGE 4 CHRONIC KIDNEY DISEASE (HCC): ICD-10-CM

## 2020-04-23 DIAGNOSIS — E53.8 B12 DEFICIENCY: ICD-10-CM

## 2020-04-23 LAB
BASOPHILS # BLD AUTO: 0.01 10*3/MM3 (ref 0–0.2)
BASOPHILS NFR BLD AUTO: 0.2 % (ref 0–1.5)
DEPRECATED RDW RBC AUTO: 60.4 FL (ref 37–54)
EOSINOPHIL # BLD AUTO: 0.09 10*3/MM3 (ref 0–0.4)
EOSINOPHIL NFR BLD AUTO: 1.5 % (ref 0.3–6.2)
ERYTHROCYTE [DISTWIDTH] IN BLOOD BY AUTOMATED COUNT: 16.8 % (ref 12.3–15.4)
FERRITIN SERPL-MCNC: 114.8 NG/ML (ref 13–150)
HCT VFR BLD AUTO: 28.9 % (ref 34–46.6)
HGB BLD-MCNC: 9 G/DL (ref 12–15.9)
HGB RETIC QN AUTO: 32.4 PG (ref 29.8–36.1)
IMM GRANULOCYTES # BLD AUTO: 0.03 10*3/MM3 (ref 0–0.05)
IMM GRANULOCYTES NFR BLD AUTO: 0.5 % (ref 0–0.5)
IMM RETICS NFR: 7.8 % (ref 3–15.8)
IRON 24H UR-MRATE: 12 MCG/DL (ref 37–145)
IRON SATN MFR SERPL: 5 % (ref 20–50)
LYMPHOCYTES # BLD AUTO: 0.47 10*3/MM3 (ref 0.7–3.1)
LYMPHOCYTES NFR BLD AUTO: 7.9 % (ref 19.6–45.3)
MCH RBC QN AUTO: 30.3 PG (ref 26.6–33)
MCHC RBC AUTO-ENTMCNC: 31.1 G/DL (ref 31.5–35.7)
MCV RBC AUTO: 97.3 FL (ref 79–97)
MONOCYTES # BLD AUTO: 0.64 10*3/MM3 (ref 0.1–0.9)
MONOCYTES NFR BLD AUTO: 10.8 % (ref 5–12)
NEUTROPHILS # BLD AUTO: 4.7 10*3/MM3 (ref 1.7–7)
NEUTROPHILS NFR BLD AUTO: 79.1 % (ref 42.7–76)
NRBC BLD AUTO-RTO: 0 /100 WBC (ref 0–0.2)
PLATELET # BLD AUTO: 123 10*3/MM3 (ref 140–450)
PMV BLD AUTO: 9.6 FL (ref 6–12)
RBC # BLD AUTO: 2.97 10*6/MM3 (ref 3.77–5.28)
RETICS/RBC NFR AUTO: 1.25 % (ref 0.7–1.9)
TIBC SERPL-MCNC: 250 MCG/DL (ref 298–536)
TRANSFERRIN SERPL-MCNC: 168 MG/DL (ref 200–360)
WBC NRBC COR # BLD: 5.94 10*3/MM3 (ref 3.4–10.8)

## 2020-04-23 PROCEDURE — 84466 ASSAY OF TRANSFERRIN: CPT | Performed by: INTERNAL MEDICINE

## 2020-04-23 PROCEDURE — 83540 ASSAY OF IRON: CPT | Performed by: INTERNAL MEDICINE

## 2020-04-23 PROCEDURE — 85025 COMPLETE CBC W/AUTO DIFF WBC: CPT | Performed by: INTERNAL MEDICINE

## 2020-04-23 PROCEDURE — 63710000001 PROCHLORPERAZINE MALEATE PER 5 MG: Performed by: INTERNAL MEDICINE

## 2020-04-23 PROCEDURE — 82728 ASSAY OF FERRITIN: CPT | Performed by: INTERNAL MEDICINE

## 2020-04-23 PROCEDURE — A9270 NON-COVERED ITEM OR SERVICE: HCPCS | Performed by: INTERNAL MEDICINE

## 2020-04-23 PROCEDURE — 25010000002 CYANOCOBALAMIN PER 1000 MCG: Performed by: INTERNAL MEDICINE

## 2020-04-23 PROCEDURE — 25010000002 FERRIC CARBOXYMALTOSE 750 MG/15ML SOLUTION 15 ML VIAL: Performed by: INTERNAL MEDICINE

## 2020-04-23 PROCEDURE — 99214 OFFICE O/P EST MOD 30 MIN: CPT | Performed by: INTERNAL MEDICINE

## 2020-04-23 PROCEDURE — 96374 THER/PROPH/DIAG INJ IV PUSH: CPT

## 2020-04-23 PROCEDURE — 96372 THER/PROPH/DIAG INJ SC/IM: CPT

## 2020-04-23 PROCEDURE — 25010000003 HEPARIN LOCK FLUCH PER 10 UNITS: Performed by: INTERNAL MEDICINE

## 2020-04-23 PROCEDURE — 96365 THER/PROPH/DIAG IV INF INIT: CPT

## 2020-04-23 PROCEDURE — 85046 RETICYTE/HGB CONCENTRATE: CPT | Performed by: INTERNAL MEDICINE

## 2020-04-23 RX ORDER — SODIUM CHLORIDE 0.9 % (FLUSH) 0.9 %
10 SYRINGE (ML) INJECTION AS NEEDED
Status: CANCELLED | OUTPATIENT
Start: 2020-04-23

## 2020-04-23 RX ORDER — SODIUM CHLORIDE 9 MG/ML
250 INJECTION, SOLUTION INTRAVENOUS ONCE
Status: CANCELLED | OUTPATIENT
Start: 2020-04-23

## 2020-04-23 RX ORDER — HEPARIN SODIUM (PORCINE) LOCK FLUSH IV SOLN 100 UNIT/ML 100 UNIT/ML
500 SOLUTION INTRAVENOUS AS NEEDED
Status: CANCELLED | OUTPATIENT
Start: 2020-04-23

## 2020-04-23 RX ORDER — CYANOCOBALAMIN 1000 UG/ML
1000 INJECTION, SOLUTION INTRAMUSCULAR; SUBCUTANEOUS
Status: DISCONTINUED | OUTPATIENT
Start: 2020-04-23 | End: 2020-04-23 | Stop reason: HOSPADM

## 2020-04-23 RX ORDER — HEPARIN SODIUM (PORCINE) LOCK FLUSH IV SOLN 100 UNIT/ML 100 UNIT/ML
500 SOLUTION INTRAVENOUS AS NEEDED
Status: DISCONTINUED | OUTPATIENT
Start: 2020-04-23 | End: 2020-04-23 | Stop reason: HOSPADM

## 2020-04-23 RX ORDER — SODIUM CHLORIDE 9 MG/ML
250 INJECTION, SOLUTION INTRAVENOUS ONCE
Status: COMPLETED | OUTPATIENT
Start: 2020-04-23 | End: 2020-04-23

## 2020-04-23 RX ORDER — SODIUM CHLORIDE 0.9 % (FLUSH) 0.9 %
10 SYRINGE (ML) INJECTION AS NEEDED
Status: DISCONTINUED | OUTPATIENT
Start: 2020-04-23 | End: 2020-04-23 | Stop reason: HOSPADM

## 2020-04-23 RX ORDER — PROCHLORPERAZINE MALEATE 5 MG/1
10 TABLET ORAL ONCE
Status: COMPLETED | OUTPATIENT
Start: 2020-04-23 | End: 2020-04-23

## 2020-04-23 RX ORDER — PROCHLORPERAZINE MALEATE 5 MG/1
10 TABLET ORAL ONCE
Status: CANCELLED | OUTPATIENT
Start: 2020-04-23

## 2020-04-23 RX ADMIN — PROCHLORPERAZINE MALEATE 10 MG: 5 TABLET ORAL at 12:31

## 2020-04-23 RX ADMIN — CYANOCOBALAMIN 1000 MCG: 1000 INJECTION, SOLUTION INTRAMUSCULAR at 13:34

## 2020-04-23 RX ADMIN — SODIUM CHLORIDE 100 ML: 9 INJECTION, SOLUTION INTRAVENOUS at 12:30

## 2020-04-23 RX ADMIN — FERRIC CARBOXYMALTOSE INJECTION 750 MG: 50 INJECTION, SOLUTION INTRAVENOUS at 12:46

## 2020-04-23 RX ADMIN — Medication 500 UNITS: at 13:35

## 2020-04-23 RX ADMIN — SODIUM CHLORIDE, PRESERVATIVE FREE 10 ML: 5 INJECTION INTRAVENOUS at 13:34

## 2020-04-23 NOTE — PROGRESS NOTES
Injectafer 750 mg IV x 2 doses, first dose today. Ferritin and iron profile in 4 weeks and 7 weeks,  Retic. Hgb, and CBC in 7 weeks.  CBC weekly with Procrit if hgb < 10 per notes Dr. Merritt

## 2020-04-30 ENCOUNTER — INFUSION (OUTPATIENT)
Dept: ONCOLOGY | Facility: HOSPITAL | Age: 80
End: 2020-04-30

## 2020-04-30 ENCOUNTER — LAB (OUTPATIENT)
Dept: OTHER | Facility: HOSPITAL | Age: 80
End: 2020-04-30

## 2020-04-30 VITALS
SYSTOLIC BLOOD PRESSURE: 149 MMHG | DIASTOLIC BLOOD PRESSURE: 70 MMHG | BODY MASS INDEX: 26.34 KG/M2 | HEART RATE: 54 BPM | OXYGEN SATURATION: 95 % | TEMPERATURE: 98.3 F | HEIGHT: 67 IN | RESPIRATION RATE: 18 BRPM | WEIGHT: 167.8 LBS

## 2020-04-30 DIAGNOSIS — N18.30 CHRONIC KIDNEY DISEASE, STAGE III (MODERATE) (HCC): Primary | ICD-10-CM

## 2020-04-30 DIAGNOSIS — D63.8 ANEMIA OF CHRONIC DISEASE: ICD-10-CM

## 2020-04-30 DIAGNOSIS — Z45.2 ENCOUNTER FOR FITTING AND ADJUSTMENT OF VASCULAR CATHETER: ICD-10-CM

## 2020-04-30 DIAGNOSIS — IMO0001 ADVERSE EFFECT OF IRON OR ITS COMPOUND, SUBSEQUENT ENCOUNTER: ICD-10-CM

## 2020-04-30 DIAGNOSIS — D63.1 ANEMIA IN STAGE 4 CHRONIC KIDNEY DISEASE (HCC): ICD-10-CM

## 2020-04-30 DIAGNOSIS — N18.4 ANEMIA IN STAGE 4 CHRONIC KIDNEY DISEASE (HCC): ICD-10-CM

## 2020-04-30 LAB
BASOPHILS # BLD AUTO: 0.02 10*3/MM3 (ref 0–0.2)
BASOPHILS NFR BLD AUTO: 0.3 % (ref 0–1.5)
DEPRECATED RDW RBC AUTO: 54.8 FL (ref 37–54)
EOSINOPHIL # BLD AUTO: 0.1 10*3/MM3 (ref 0–0.4)
EOSINOPHIL NFR BLD AUTO: 1.4 % (ref 0.3–6.2)
ERYTHROCYTE [DISTWIDTH] IN BLOOD BY AUTOMATED COUNT: 15.9 % (ref 12.3–15.4)
HCT VFR BLD AUTO: 32 % (ref 34–46.6)
HGB BLD-MCNC: 10.4 G/DL (ref 12–15.9)
IMM GRANULOCYTES # BLD AUTO: 0.06 10*3/MM3 (ref 0–0.05)
IMM GRANULOCYTES NFR BLD AUTO: 0.8 % (ref 0–0.5)
LYMPHOCYTES # BLD AUTO: 0.82 10*3/MM3 (ref 0.7–3.1)
LYMPHOCYTES NFR BLD AUTO: 11.3 % (ref 19.6–45.3)
MCH RBC QN AUTO: 30.3 PG (ref 26.6–33)
MCHC RBC AUTO-ENTMCNC: 32.5 G/DL (ref 31.5–35.7)
MCV RBC AUTO: 93.3 FL (ref 79–97)
MONOCYTES # BLD AUTO: 0.49 10*3/MM3 (ref 0.1–0.9)
MONOCYTES NFR BLD AUTO: 6.8 % (ref 5–12)
NEUTROPHILS # BLD AUTO: 5.76 10*3/MM3 (ref 1.7–7)
NEUTROPHILS NFR BLD AUTO: 79.4 % (ref 42.7–76)
NRBC BLD AUTO-RTO: 0 /100 WBC (ref 0–0.2)
PLATELET # BLD AUTO: 195 10*3/MM3 (ref 140–450)
PMV BLD AUTO: 9.6 FL (ref 6–12)
RBC # BLD AUTO: 3.43 10*6/MM3 (ref 3.77–5.28)
WBC NRBC COR # BLD: 7.25 10*3/MM3 (ref 3.4–10.8)

## 2020-04-30 PROCEDURE — 63710000001 PROCHLORPERAZINE MALEATE PER 5 MG: Performed by: INTERNAL MEDICINE

## 2020-04-30 PROCEDURE — 25010000003 HEPARIN LOCK FLUSH PER 10 UNITS: Performed by: INTERNAL MEDICINE

## 2020-04-30 PROCEDURE — 85025 COMPLETE CBC W/AUTO DIFF WBC: CPT | Performed by: INTERNAL MEDICINE

## 2020-04-30 PROCEDURE — 25010000002 FERRIC CARBOXYMALTOSE 750 MG/15ML SOLUTION 15 ML VIAL: Performed by: INTERNAL MEDICINE

## 2020-04-30 PROCEDURE — 96374 THER/PROPH/DIAG INJ IV PUSH: CPT

## 2020-04-30 RX ORDER — HEPARIN SODIUM (PORCINE) LOCK FLUSH IV SOLN 100 UNIT/ML 100 UNIT/ML
500 SOLUTION INTRAVENOUS AS NEEDED
Status: DISCONTINUED | OUTPATIENT
Start: 2020-04-30 | End: 2020-04-30 | Stop reason: HOSPADM

## 2020-04-30 RX ORDER — SODIUM CHLORIDE 0.9 % (FLUSH) 0.9 %
10 SYRINGE (ML) INJECTION AS NEEDED
Status: DISCONTINUED | OUTPATIENT
Start: 2020-04-30 | End: 2020-04-30 | Stop reason: HOSPADM

## 2020-04-30 RX ORDER — HEPARIN SODIUM (PORCINE) LOCK FLUSH IV SOLN 100 UNIT/ML 100 UNIT/ML
500 SOLUTION INTRAVENOUS AS NEEDED
Status: CANCELLED | OUTPATIENT
Start: 2020-04-30

## 2020-04-30 RX ORDER — SODIUM CHLORIDE 9 MG/ML
250 INJECTION, SOLUTION INTRAVENOUS ONCE
Status: COMPLETED | OUTPATIENT
Start: 2020-04-30 | End: 2020-04-30

## 2020-04-30 RX ORDER — PROCHLORPERAZINE MALEATE 5 MG/1
10 TABLET ORAL ONCE
Status: COMPLETED | OUTPATIENT
Start: 2020-04-30 | End: 2020-04-30

## 2020-04-30 RX ORDER — SODIUM CHLORIDE 0.9 % (FLUSH) 0.9 %
10 SYRINGE (ML) INJECTION AS NEEDED
Status: CANCELLED | OUTPATIENT
Start: 2020-04-30

## 2020-04-30 RX ADMIN — Medication 500 UNITS: at 12:11

## 2020-04-30 RX ADMIN — SODIUM CHLORIDE 250 ML: 9 INJECTION, SOLUTION INTRAVENOUS at 11:17

## 2020-04-30 RX ADMIN — FERRIC CARBOXYMALTOSE INJECTION 750 MG: 50 INJECTION, SOLUTION INTRAVENOUS at 11:19

## 2020-04-30 RX ADMIN — PROCHLORPERAZINE MALEATE 10 MG: 5 TABLET ORAL at 11:17

## 2020-04-30 RX ADMIN — SODIUM CHLORIDE, PRESERVATIVE FREE 10 ML: 5 INJECTION INTRAVENOUS at 12:11

## 2020-05-04 RX ORDER — CARVEDILOL 12.5 MG/1
TABLET ORAL
Qty: 90 TABLET | Refills: 1 | Status: SHIPPED | OUTPATIENT
Start: 2020-05-04 | End: 2020-06-30 | Stop reason: SDUPTHER

## 2020-05-06 ENCOUNTER — TELEPHONE (OUTPATIENT)
Dept: ONCOLOGY | Facility: CLINIC | Age: 80
End: 2020-05-06

## 2020-05-06 NOTE — TELEPHONE ENCOUNTER
Patient needs to reschedule 6/25 appointment.  It is too early in the day.  They have a little drive to get here.  Please schedule to 10:00 or 10:30,(preferable), on that day.    [phone]

## 2020-05-07 ENCOUNTER — LAB (OUTPATIENT)
Dept: OTHER | Facility: HOSPITAL | Age: 80
End: 2020-05-07

## 2020-05-07 ENCOUNTER — INFUSION (OUTPATIENT)
Dept: ONCOLOGY | Facility: HOSPITAL | Age: 80
End: 2020-05-07

## 2020-05-07 VITALS
WEIGHT: 166.6 LBS | DIASTOLIC BLOOD PRESSURE: 84 MMHG | OXYGEN SATURATION: 95 % | SYSTOLIC BLOOD PRESSURE: 170 MMHG | HEIGHT: 67 IN | TEMPERATURE: 97.3 F | RESPIRATION RATE: 18 BRPM | HEART RATE: 61 BPM | BODY MASS INDEX: 26.15 KG/M2

## 2020-05-07 DIAGNOSIS — N18.4 ANEMIA IN STAGE 4 CHRONIC KIDNEY DISEASE (HCC): ICD-10-CM

## 2020-05-07 DIAGNOSIS — D63.1 ANEMIA IN STAGE 4 CHRONIC KIDNEY DISEASE (HCC): ICD-10-CM

## 2020-05-07 LAB
BASOPHILS # BLD AUTO: 0.02 10*3/MM3 (ref 0–0.2)
BASOPHILS NFR BLD AUTO: 0.2 % (ref 0–1.5)
DEPRECATED RDW RBC AUTO: 54.4 FL (ref 37–54)
EOSINOPHIL # BLD AUTO: 0.09 10*3/MM3 (ref 0–0.4)
EOSINOPHIL NFR BLD AUTO: 1 % (ref 0.3–6.2)
ERYTHROCYTE [DISTWIDTH] IN BLOOD BY AUTOMATED COUNT: 15.9 % (ref 12.3–15.4)
HCT VFR BLD AUTO: 32.1 % (ref 34–46.6)
HGB BLD-MCNC: 10.4 G/DL (ref 12–15.9)
IMM GRANULOCYTES # BLD AUTO: 0.11 10*3/MM3 (ref 0–0.05)
IMM GRANULOCYTES NFR BLD AUTO: 1.2 % (ref 0–0.5)
LYMPHOCYTES # BLD AUTO: 0.88 10*3/MM3 (ref 0.7–3.1)
LYMPHOCYTES NFR BLD AUTO: 9.7 % (ref 19.6–45.3)
MCH RBC QN AUTO: 30.2 PG (ref 26.6–33)
MCHC RBC AUTO-ENTMCNC: 32.4 G/DL (ref 31.5–35.7)
MCV RBC AUTO: 93.3 FL (ref 79–97)
MONOCYTES # BLD AUTO: 0.57 10*3/MM3 (ref 0.1–0.9)
MONOCYTES NFR BLD AUTO: 6.3 % (ref 5–12)
NEUTROPHILS # BLD AUTO: 7.37 10*3/MM3 (ref 1.7–7)
NEUTROPHILS NFR BLD AUTO: 81.6 % (ref 42.7–76)
NRBC BLD AUTO-RTO: 0 /100 WBC (ref 0–0.2)
PLATELET # BLD AUTO: 171 10*3/MM3 (ref 140–450)
PMV BLD AUTO: 9.3 FL (ref 6–12)
RBC # BLD AUTO: 3.44 10*6/MM3 (ref 3.77–5.28)
WBC NRBC COR # BLD: 9.04 10*3/MM3 (ref 3.4–10.8)

## 2020-05-07 PROCEDURE — 85025 COMPLETE CBC W/AUTO DIFF WBC: CPT | Performed by: INTERNAL MEDICINE

## 2020-05-07 NOTE — NURSING NOTE
Lab Results   Component Value Date    WBC 9.04 05/07/2020    HGB 10.4 (L) 05/07/2020    HCT 32.1 (L) 05/07/2020    MCV 93.3 05/07/2020     05/07/2020     Procrit not indicated for Hgb 10.4.  Pt denies complaints.  Next appt reviewed and she was instructed to call with complaints.

## 2020-05-14 ENCOUNTER — INFUSION (OUTPATIENT)
Dept: ONCOLOGY | Facility: HOSPITAL | Age: 80
End: 2020-05-14

## 2020-05-14 ENCOUNTER — LAB (OUTPATIENT)
Dept: OTHER | Facility: HOSPITAL | Age: 80
End: 2020-05-14

## 2020-05-14 VITALS
WEIGHT: 165 LBS | RESPIRATION RATE: 18 BRPM | TEMPERATURE: 97.3 F | HEIGHT: 67 IN | HEART RATE: 67 BPM | SYSTOLIC BLOOD PRESSURE: 140 MMHG | DIASTOLIC BLOOD PRESSURE: 76 MMHG | OXYGEN SATURATION: 91 % | BODY MASS INDEX: 25.9 KG/M2

## 2020-05-14 DIAGNOSIS — N18.30 CHRONIC KIDNEY DISEASE, STAGE III (MODERATE) (HCC): ICD-10-CM

## 2020-05-14 DIAGNOSIS — N18.30 CHRONIC KIDNEY DISEASE, STAGE III (MODERATE) (HCC): Primary | ICD-10-CM

## 2020-05-14 LAB
BASOPHILS # BLD AUTO: 0.01 10*3/MM3 (ref 0–0.2)
BASOPHILS NFR BLD AUTO: 0.1 % (ref 0–1.5)
DEPRECATED RDW RBC AUTO: 56.4 FL (ref 37–54)
EOSINOPHIL # BLD AUTO: 0.11 10*3/MM3 (ref 0–0.4)
EOSINOPHIL NFR BLD AUTO: 1.4 % (ref 0.3–6.2)
ERYTHROCYTE [DISTWIDTH] IN BLOOD BY AUTOMATED COUNT: 15.9 % (ref 12.3–15.4)
HCT VFR BLD AUTO: 32.2 % (ref 34–46.6)
HGB BLD-MCNC: 10.4 G/DL (ref 12–15.9)
IMM GRANULOCYTES # BLD AUTO: 0.04 10*3/MM3 (ref 0–0.05)
IMM GRANULOCYTES NFR BLD AUTO: 0.5 % (ref 0–0.5)
LYMPHOCYTES # BLD AUTO: 0.63 10*3/MM3 (ref 0.7–3.1)
LYMPHOCYTES NFR BLD AUTO: 8.2 % (ref 19.6–45.3)
MCH RBC QN AUTO: 30.9 PG (ref 26.6–33)
MCHC RBC AUTO-ENTMCNC: 32.3 G/DL (ref 31.5–35.7)
MCV RBC AUTO: 95.5 FL (ref 79–97)
MONOCYTES # BLD AUTO: 0.36 10*3/MM3 (ref 0.1–0.9)
MONOCYTES NFR BLD AUTO: 4.7 % (ref 5–12)
NEUTROPHILS # BLD AUTO: 6.5 10*3/MM3 (ref 1.7–7)
NEUTROPHILS NFR BLD AUTO: 85.1 % (ref 42.7–76)
NRBC BLD AUTO-RTO: 0 /100 WBC (ref 0–0.2)
PLATELET # BLD AUTO: 145 10*3/MM3 (ref 140–450)
PMV BLD AUTO: 10.5 FL (ref 6–12)
RBC # BLD AUTO: 3.37 10*6/MM3 (ref 3.77–5.28)
WBC NRBC COR # BLD: 7.65 10*3/MM3 (ref 3.4–10.8)

## 2020-05-14 PROCEDURE — 36415 COLL VENOUS BLD VENIPUNCTURE: CPT

## 2020-05-14 PROCEDURE — 85025 COMPLETE CBC W/AUTO DIFF WBC: CPT | Performed by: INTERNAL MEDICINE

## 2020-05-14 NOTE — NURSING NOTE
Lab Results   Component Value Date    WBC 7.65 05/14/2020    HGB 10.4 (L) 05/14/2020    HCT 32.2 (L) 05/14/2020    MCV 95.5 05/14/2020     05/14/2020     Procrit not indicated for Hgb 10.4.  Pt denies new complaints.  Next appt reviewed and pt knows to call sooner with concerns.

## 2020-05-21 ENCOUNTER — INFUSION (OUTPATIENT)
Dept: ONCOLOGY | Facility: HOSPITAL | Age: 80
End: 2020-05-21

## 2020-05-21 VITALS
DIASTOLIC BLOOD PRESSURE: 83 MMHG | TEMPERATURE: 97.3 F | BODY MASS INDEX: 26.21 KG/M2 | HEIGHT: 67 IN | RESPIRATION RATE: 18 BRPM | HEART RATE: 60 BPM | OXYGEN SATURATION: 93 % | WEIGHT: 167 LBS | SYSTOLIC BLOOD PRESSURE: 153 MMHG

## 2020-05-21 DIAGNOSIS — D63.1 ANEMIA IN STAGE 4 CHRONIC KIDNEY DISEASE (HCC): Primary | ICD-10-CM

## 2020-05-21 DIAGNOSIS — D63.1 ANEMIA OF CHRONIC RENAL FAILURE, STAGE 4 (SEVERE) (HCC): ICD-10-CM

## 2020-05-21 DIAGNOSIS — N18.4 ANEMIA OF CHRONIC RENAL FAILURE, STAGE 4 (SEVERE) (HCC): ICD-10-CM

## 2020-05-21 DIAGNOSIS — N18.30 CHRONIC KIDNEY DISEASE, STAGE III (MODERATE) (HCC): ICD-10-CM

## 2020-05-21 DIAGNOSIS — IMO0001 ADVERSE EFFECT OF IRON OR ITS COMPOUND, SUBSEQUENT ENCOUNTER: ICD-10-CM

## 2020-05-21 DIAGNOSIS — Z45.2 ENCOUNTER FOR FITTING AND ADJUSTMENT OF VASCULAR CATHETER: ICD-10-CM

## 2020-05-21 DIAGNOSIS — N18.4 ANEMIA IN STAGE 4 CHRONIC KIDNEY DISEASE (HCC): Primary | ICD-10-CM

## 2020-05-21 DIAGNOSIS — N18.4 CKD (CHRONIC KIDNEY DISEASE) STAGE 4, GFR 15-29 ML/MIN (HCC): ICD-10-CM

## 2020-05-21 DIAGNOSIS — E53.8 B12 DEFICIENCY: Primary | ICD-10-CM

## 2020-05-21 DIAGNOSIS — D50.8 OTHER IRON DEFICIENCY ANEMIA: ICD-10-CM

## 2020-05-21 LAB
BASOPHILS # BLD AUTO: 0.02 10*3/MM3 (ref 0–0.2)
BASOPHILS NFR BLD AUTO: 0.4 % (ref 0–1.5)
DEPRECATED RDW RBC AUTO: 55 FL (ref 37–54)
EOSINOPHIL # BLD AUTO: 0.08 10*3/MM3 (ref 0–0.4)
EOSINOPHIL NFR BLD AUTO: 1.6 % (ref 0.3–6.2)
ERYTHROCYTE [DISTWIDTH] IN BLOOD BY AUTOMATED COUNT: 15.6 % (ref 12.3–15.4)
FERRITIN SERPL-MCNC: 808.4 NG/ML (ref 13–150)
HCT VFR BLD AUTO: 30.1 % (ref 34–46.6)
HGB BLD-MCNC: 9.5 G/DL (ref 12–15.9)
IMM GRANULOCYTES # BLD AUTO: 0.01 10*3/MM3 (ref 0–0.05)
IMM GRANULOCYTES NFR BLD AUTO: 0.2 % (ref 0–0.5)
IRON 24H UR-MRATE: 52 MCG/DL (ref 37–145)
IRON SATN MFR SERPL: 24 % (ref 20–50)
LYMPHOCYTES # BLD AUTO: 0.56 10*3/MM3 (ref 0.7–3.1)
LYMPHOCYTES NFR BLD AUTO: 11.1 % (ref 19.6–45.3)
MCH RBC QN AUTO: 30.2 PG (ref 26.6–33)
MCHC RBC AUTO-ENTMCNC: 31.6 G/DL (ref 31.5–35.7)
MCV RBC AUTO: 95.6 FL (ref 79–97)
MONOCYTES # BLD AUTO: 0.49 10*3/MM3 (ref 0.1–0.9)
MONOCYTES NFR BLD AUTO: 9.7 % (ref 5–12)
NEUTROPHILS # BLD AUTO: 3.89 10*3/MM3 (ref 1.7–7)
NEUTROPHILS NFR BLD AUTO: 77 % (ref 42.7–76)
NRBC BLD AUTO-RTO: 0 /100 WBC (ref 0–0.2)
PLATELET # BLD AUTO: 136 10*3/MM3 (ref 140–450)
PMV BLD AUTO: 9.7 FL (ref 6–12)
RBC # BLD AUTO: 3.15 10*6/MM3 (ref 3.77–5.28)
TIBC SERPL-MCNC: 218 MCG/DL (ref 298–536)
TRANSFERRIN SERPL-MCNC: 146 MG/DL (ref 200–360)
WBC NRBC COR # BLD: 5.05 10*3/MM3 (ref 3.4–10.8)

## 2020-05-21 PROCEDURE — 82728 ASSAY OF FERRITIN: CPT | Performed by: INTERNAL MEDICINE

## 2020-05-21 PROCEDURE — 96372 THER/PROPH/DIAG INJ SC/IM: CPT

## 2020-05-21 PROCEDURE — 36592 COLLECT BLOOD FROM PICC: CPT

## 2020-05-21 PROCEDURE — 25010000002 EPOETIN ALFA PER 1000 UNITS: Performed by: NURSE PRACTITIONER

## 2020-05-21 PROCEDURE — 25010000003 HEPARIN LOCK FLUSH PER 10 UNITS: Performed by: INTERNAL MEDICINE

## 2020-05-21 PROCEDURE — 25010000002 CYANOCOBALAMIN PER 1000 MCG: Performed by: INTERNAL MEDICINE

## 2020-05-21 PROCEDURE — 83540 ASSAY OF IRON: CPT | Performed by: INTERNAL MEDICINE

## 2020-05-21 PROCEDURE — 85025 COMPLETE CBC W/AUTO DIFF WBC: CPT | Performed by: INTERNAL MEDICINE

## 2020-05-21 PROCEDURE — 84466 ASSAY OF TRANSFERRIN: CPT | Performed by: INTERNAL MEDICINE

## 2020-05-21 RX ORDER — HEPARIN SODIUM (PORCINE) LOCK FLUSH IV SOLN 100 UNIT/ML 100 UNIT/ML
500 SOLUTION INTRAVENOUS AS NEEDED
Status: DISCONTINUED | OUTPATIENT
Start: 2020-05-21 | End: 2020-05-21 | Stop reason: HOSPADM

## 2020-05-21 RX ORDER — CYANOCOBALAMIN 1000 UG/ML
1000 INJECTION, SOLUTION INTRAMUSCULAR; SUBCUTANEOUS
Status: DISCONTINUED | OUTPATIENT
Start: 2020-05-21 | End: 2020-05-21 | Stop reason: HOSPADM

## 2020-05-21 RX ORDER — SODIUM CHLORIDE 0.9 % (FLUSH) 0.9 %
10 SYRINGE (ML) INJECTION AS NEEDED
Status: CANCELLED | OUTPATIENT
Start: 2020-05-21

## 2020-05-21 RX ORDER — SODIUM CHLORIDE 0.9 % (FLUSH) 0.9 %
10 SYRINGE (ML) INJECTION AS NEEDED
Status: DISCONTINUED | OUTPATIENT
Start: 2020-05-21 | End: 2020-05-21 | Stop reason: HOSPADM

## 2020-05-21 RX ORDER — CYANOCOBALAMIN 1000 UG/ML
INJECTION, SOLUTION INTRAMUSCULAR; SUBCUTANEOUS
Status: DISCONTINUED
Start: 2020-05-21 | End: 2020-05-21 | Stop reason: HOSPADM

## 2020-05-21 RX ORDER — HEPARIN SODIUM (PORCINE) LOCK FLUSH IV SOLN 100 UNIT/ML 100 UNIT/ML
500 SOLUTION INTRAVENOUS AS NEEDED
Status: CANCELLED | OUTPATIENT
Start: 2020-05-21

## 2020-05-21 RX ADMIN — SODIUM CHLORIDE, PRESERVATIVE FREE 10 ML: 5 INJECTION INTRAVENOUS at 11:09

## 2020-05-21 RX ADMIN — Medication 500 UNITS: at 11:09

## 2020-05-21 RX ADMIN — ERYTHROPOIETIN 27000 UNITS: 20000 INJECTION, SOLUTION INTRAVENOUS; SUBCUTANEOUS at 11:14

## 2020-05-21 RX ADMIN — CYANOCOBALAMIN 1000 MCG: 1000 INJECTION, SOLUTION INTRAMUSCULAR at 11:13

## 2020-05-22 ENCOUNTER — TELEPHONE (OUTPATIENT)
Dept: ONCOLOGY | Facility: CLINIC | Age: 80
End: 2020-05-22

## 2020-05-22 NOTE — TELEPHONE ENCOUNTER
PT CALLING STATING THAT LAST THURS SHE WAS NAUSEATED. YESTERDAY SHE FELT COLD AND HAD CHILLS. DENIES FEVER. PT GOT EPO AND B12 YESTERDAY. PT THINKING SHE WAS HAVING A REACTION TO THOSE INJ'S. TOLD HER NO UNLIKELY. ADVISED HER TO MONITOR FOR FEVER AND REPORT TO HER PCP W/ FURTHER ISSUE. PT V/U.

## 2020-05-28 ENCOUNTER — INFUSION (OUTPATIENT)
Dept: ONCOLOGY | Facility: HOSPITAL | Age: 80
End: 2020-05-28

## 2020-05-28 VITALS — BODY MASS INDEX: 25.87 KG/M2 | WEIGHT: 165.2 LBS

## 2020-05-28 DIAGNOSIS — N17.9 ACUTE RENAL FAILURE SUPERIMPOSED ON CHRONIC KIDNEY DISEASE, UNSPECIFIED CKD STAGE, UNSPECIFIED ACUTE RENAL FAILURE TYPE (HCC): ICD-10-CM

## 2020-05-28 DIAGNOSIS — N18.30 CHRONIC KIDNEY DISEASE, STAGE III (MODERATE) (HCC): ICD-10-CM

## 2020-05-28 DIAGNOSIS — Z45.2 ENCOUNTER FOR FITTING AND ADJUSTMENT OF VASCULAR CATHETER: Primary | ICD-10-CM

## 2020-05-28 DIAGNOSIS — N18.4 ANEMIA OF CHRONIC RENAL FAILURE, STAGE 4 (SEVERE) (HCC): ICD-10-CM

## 2020-05-28 DIAGNOSIS — N18.9 ACUTE RENAL FAILURE SUPERIMPOSED ON CHRONIC KIDNEY DISEASE, UNSPECIFIED CKD STAGE, UNSPECIFIED ACUTE RENAL FAILURE TYPE (HCC): ICD-10-CM

## 2020-05-28 DIAGNOSIS — D63.1 ANEMIA OF CHRONIC RENAL FAILURE, STAGE 4 (SEVERE) (HCC): ICD-10-CM

## 2020-05-28 LAB
BASOPHILS # BLD AUTO: 0.02 10*3/MM3 (ref 0–0.2)
BASOPHILS NFR BLD AUTO: 0.3 % (ref 0–1.5)
DEPRECATED RDW RBC AUTO: 56 FL (ref 37–54)
EOSINOPHIL # BLD AUTO: 0.14 10*3/MM3 (ref 0–0.4)
EOSINOPHIL NFR BLD AUTO: 2 % (ref 0.3–6.2)
ERYTHROCYTE [DISTWIDTH] IN BLOOD BY AUTOMATED COUNT: 16 % (ref 12.3–15.4)
HCT VFR BLD AUTO: 31.4 % (ref 34–46.6)
HGB BLD-MCNC: 9.9 G/DL (ref 12–15.9)
IMM GRANULOCYTES # BLD AUTO: 0.09 10*3/MM3 (ref 0–0.05)
IMM GRANULOCYTES NFR BLD AUTO: 1.3 % (ref 0–0.5)
LYMPHOCYTES # BLD AUTO: 0.77 10*3/MM3 (ref 0.7–3.1)
LYMPHOCYTES NFR BLD AUTO: 10.9 % (ref 19.6–45.3)
MCH RBC QN AUTO: 30.9 PG (ref 26.6–33)
MCHC RBC AUTO-ENTMCNC: 31.5 G/DL (ref 31.5–35.7)
MCV RBC AUTO: 98.1 FL (ref 79–97)
MONOCYTES # BLD AUTO: 0.55 10*3/MM3 (ref 0.1–0.9)
MONOCYTES NFR BLD AUTO: 7.8 % (ref 5–12)
NEUTROPHILS # BLD AUTO: 5.52 10*3/MM3 (ref 1.7–7)
NEUTROPHILS NFR BLD AUTO: 77.7 % (ref 42.7–76)
NRBC BLD AUTO-RTO: 0.3 /100 WBC (ref 0–0.2)
PLATELET # BLD AUTO: 170 10*3/MM3 (ref 140–450)
PMV BLD AUTO: 9 FL (ref 6–12)
RBC # BLD AUTO: 3.2 10*6/MM3 (ref 3.77–5.28)
WBC NRBC COR # BLD: 7.09 10*3/MM3 (ref 3.4–10.8)

## 2020-05-28 PROCEDURE — 25010000002 EPOETIN ALFA PER 1000 UNITS: Performed by: NURSE PRACTITIONER

## 2020-05-28 PROCEDURE — 25010000003 HEPARIN LOCK FLUSH PER 10 UNITS: Performed by: INTERNAL MEDICINE

## 2020-05-28 PROCEDURE — 96372 THER/PROPH/DIAG INJ SC/IM: CPT

## 2020-05-28 PROCEDURE — 85025 COMPLETE CBC W/AUTO DIFF WBC: CPT | Performed by: INTERNAL MEDICINE

## 2020-05-28 RX ORDER — HEPARIN SODIUM (PORCINE) LOCK FLUSH IV SOLN 100 UNIT/ML 100 UNIT/ML
500 SOLUTION INTRAVENOUS AS NEEDED
Status: CANCELLED | OUTPATIENT
Start: 2020-05-28

## 2020-05-28 RX ORDER — SODIUM CHLORIDE 0.9 % (FLUSH) 0.9 %
10 SYRINGE (ML) INJECTION AS NEEDED
Status: CANCELLED | OUTPATIENT
Start: 2020-05-28

## 2020-05-28 RX ORDER — HEPARIN SODIUM (PORCINE) LOCK FLUSH IV SOLN 100 UNIT/ML 100 UNIT/ML
500 SOLUTION INTRAVENOUS AS NEEDED
Status: DISCONTINUED | OUTPATIENT
Start: 2020-05-28 | End: 2020-05-28 | Stop reason: HOSPADM

## 2020-05-28 RX ORDER — SODIUM CHLORIDE 0.9 % (FLUSH) 0.9 %
10 SYRINGE (ML) INJECTION AS NEEDED
Status: DISCONTINUED | OUTPATIENT
Start: 2020-05-28 | End: 2020-05-28 | Stop reason: HOSPADM

## 2020-05-28 RX ADMIN — Medication 500 UNITS: at 11:03

## 2020-05-28 RX ADMIN — ERYTHROPOIETIN 27000 UNITS: 20000 INJECTION, SOLUTION INTRAVENOUS; SUBCUTANEOUS at 11:20

## 2020-05-28 RX ADMIN — SODIUM CHLORIDE, PRESERVATIVE FREE 10 ML: 5 INJECTION INTRAVENOUS at 11:03

## 2020-06-04 ENCOUNTER — INFUSION (OUTPATIENT)
Dept: ONCOLOGY | Facility: HOSPITAL | Age: 80
End: 2020-06-04

## 2020-06-04 VITALS
OXYGEN SATURATION: 98 % | DIASTOLIC BLOOD PRESSURE: 75 MMHG | BODY MASS INDEX: 27.32 KG/M2 | WEIGHT: 170 LBS | HEIGHT: 66 IN | SYSTOLIC BLOOD PRESSURE: 144 MMHG | RESPIRATION RATE: 18 BRPM | TEMPERATURE: 98.4 F | HEART RATE: 75 BPM

## 2020-06-04 DIAGNOSIS — N18.4 ANEMIA OF CHRONIC RENAL FAILURE, STAGE 4 (SEVERE) (HCC): ICD-10-CM

## 2020-06-04 DIAGNOSIS — N18.30 CHRONIC KIDNEY DISEASE, STAGE III (MODERATE) (HCC): Primary | ICD-10-CM

## 2020-06-04 DIAGNOSIS — Z45.2 ENCOUNTER FOR FITTING AND ADJUSTMENT OF VASCULAR CATHETER: ICD-10-CM

## 2020-06-04 DIAGNOSIS — D63.1 ANEMIA OF CHRONIC RENAL FAILURE, STAGE 4 (SEVERE) (HCC): ICD-10-CM

## 2020-06-04 LAB
25(OH)D3 SERPL-MCNC: 50.3 NG/ML (ref 30–100)
ALBUMIN SERPL-MCNC: 3.6 G/DL (ref 3.5–5.2)
ALBUMIN/GLOB SERPL: 1.3 G/DL
ALP SERPL-CCNC: 80 U/L (ref 39–117)
ALT SERPL W P-5'-P-CCNC: 8 U/L (ref 1–33)
ANION GAP SERPL CALCULATED.3IONS-SCNC: 8.9 MMOL/L (ref 5–15)
AST SERPL-CCNC: 19 U/L (ref 1–32)
BASOPHILS # BLD AUTO: 0.01 10*3/MM3 (ref 0–0.2)
BASOPHILS NFR BLD AUTO: 0.1 % (ref 0–1.5)
BILIRUB SERPL-MCNC: 0.3 MG/DL (ref 0.1–1.2)
BUN BLD-MCNC: 45 MG/DL (ref 8–23)
BUN/CREAT SERPL: 15.5 (ref 7–25)
CALCIUM SPEC-SCNC: 9.2 MG/DL (ref 8.6–10.5)
CHLORIDE SERPL-SCNC: 107 MMOL/L (ref 98–107)
CO2 SERPL-SCNC: 25.1 MMOL/L (ref 22–29)
CREAT BLD-MCNC: 2.91 MG/DL (ref 0.57–1)
DEPRECATED RDW RBC AUTO: 64.8 FL (ref 37–54)
EOSINOPHIL # BLD AUTO: 0.1 10*3/MM3 (ref 0–0.4)
EOSINOPHIL NFR BLD AUTO: 1.4 % (ref 0.3–6.2)
ERYTHROCYTE [DISTWIDTH] IN BLOOD BY AUTOMATED COUNT: 17.4 % (ref 12.3–15.4)
GFR SERPL CREATININE-BSD FRML MDRD: 15 ML/MIN/1.73
GLOBULIN UR ELPH-MCNC: 2.8 GM/DL
GLUCOSE BLD-MCNC: 91 MG/DL (ref 65–99)
HCT VFR BLD AUTO: 32.3 % (ref 34–46.6)
HGB BLD-MCNC: 9.9 G/DL (ref 12–15.9)
IMM GRANULOCYTES # BLD AUTO: 0.04 10*3/MM3 (ref 0–0.05)
IMM GRANULOCYTES NFR BLD AUTO: 0.6 % (ref 0–0.5)
LYMPHOCYTES # BLD AUTO: 0.53 10*3/MM3 (ref 0.7–3.1)
LYMPHOCYTES NFR BLD AUTO: 7.4 % (ref 19.6–45.3)
MCH RBC QN AUTO: 31.2 PG (ref 26.6–33)
MCHC RBC AUTO-ENTMCNC: 30.7 G/DL (ref 31.5–35.7)
MCV RBC AUTO: 101.9 FL (ref 79–97)
MONOCYTES # BLD AUTO: 0.43 10*3/MM3 (ref 0.1–0.9)
MONOCYTES NFR BLD AUTO: 6 % (ref 5–12)
NEUTROPHILS # BLD AUTO: 6.05 10*3/MM3 (ref 1.7–7)
NEUTROPHILS NFR BLD AUTO: 84.5 % (ref 42.7–76)
NRBC BLD AUTO-RTO: 0 /100 WBC (ref 0–0.2)
PHOSPHATE SERPL-MCNC: 4.1 MG/DL (ref 2.5–4.5)
PLATELET # BLD AUTO: 127 10*3/MM3 (ref 140–450)
PMV BLD AUTO: 9.2 FL (ref 6–12)
POTASSIUM BLD-SCNC: 4.6 MMOL/L (ref 3.5–5.2)
PROT SERPL-MCNC: 6.4 G/DL (ref 6–8.5)
PTH-INTACT SERPL-MCNC: 136 PG/ML (ref 15–65)
RBC # BLD AUTO: 3.17 10*6/MM3 (ref 3.77–5.28)
SODIUM BLD-SCNC: 141 MMOL/L (ref 136–145)
URATE SERPL-MCNC: 5.4 MG/DL (ref 2.4–5.7)
WBC NRBC COR # BLD: 7.16 10*3/MM3 (ref 3.4–10.8)

## 2020-06-04 PROCEDURE — 96372 THER/PROPH/DIAG INJ SC/IM: CPT

## 2020-06-04 PROCEDURE — 83970 ASSAY OF PARATHORMONE: CPT | Performed by: INTERNAL MEDICINE

## 2020-06-04 PROCEDURE — 84100 ASSAY OF PHOSPHORUS: CPT | Performed by: INTERNAL MEDICINE

## 2020-06-04 PROCEDURE — 25010000003 HEPARIN LOCK FLUSH PER 10 UNITS: Performed by: INTERNAL MEDICINE

## 2020-06-04 PROCEDURE — 96523 IRRIG DRUG DELIVERY DEVICE: CPT

## 2020-06-04 PROCEDURE — 85025 COMPLETE CBC W/AUTO DIFF WBC: CPT | Performed by: NURSE PRACTITIONER

## 2020-06-04 PROCEDURE — 80053 COMPREHEN METABOLIC PANEL: CPT | Performed by: INTERNAL MEDICINE

## 2020-06-04 PROCEDURE — 82306 VITAMIN D 25 HYDROXY: CPT | Performed by: INTERNAL MEDICINE

## 2020-06-04 PROCEDURE — 84550 ASSAY OF BLOOD/URIC ACID: CPT | Performed by: INTERNAL MEDICINE

## 2020-06-04 PROCEDURE — 25010000002 EPOETIN ALFA PER 1000 UNITS: Performed by: NURSE PRACTITIONER

## 2020-06-04 RX ORDER — SODIUM CHLORIDE 0.9 % (FLUSH) 0.9 %
10 SYRINGE (ML) INJECTION AS NEEDED
Status: DISCONTINUED | OUTPATIENT
Start: 2020-06-04 | End: 2020-06-04 | Stop reason: HOSPADM

## 2020-06-04 RX ORDER — HEPARIN SODIUM (PORCINE) LOCK FLUSH IV SOLN 100 UNIT/ML 100 UNIT/ML
500 SOLUTION INTRAVENOUS AS NEEDED
Status: DISCONTINUED | OUTPATIENT
Start: 2020-06-04 | End: 2020-06-04 | Stop reason: HOSPADM

## 2020-06-04 RX ORDER — SODIUM CHLORIDE 0.9 % (FLUSH) 0.9 %
10 SYRINGE (ML) INJECTION AS NEEDED
Status: CANCELLED | OUTPATIENT
Start: 2020-06-04

## 2020-06-04 RX ORDER — HEPARIN SODIUM (PORCINE) LOCK FLUSH IV SOLN 100 UNIT/ML 100 UNIT/ML
500 SOLUTION INTRAVENOUS AS NEEDED
Status: CANCELLED | OUTPATIENT
Start: 2020-06-04

## 2020-06-04 RX ADMIN — SODIUM CHLORIDE, PRESERVATIVE FREE 10 ML: 5 INJECTION INTRAVENOUS at 11:08

## 2020-06-04 RX ADMIN — ERYTHROPOIETIN 27000 UNITS: 20000 INJECTION, SOLUTION INTRAVENOUS; SUBCUTANEOUS at 11:14

## 2020-06-04 RX ADMIN — Medication 500 UNITS: at 11:08

## 2020-06-04 NOTE — NURSING NOTE
Lab Results   Component Value Date    WBC 7.16 06/04/2020    HGB 9.9 (L) 06/04/2020    HCT 32.3 (L) 06/04/2020    .9 (H) 06/04/2020     (L) 06/04/2020     Procrit given per protocol.  Pt denies new complaints.  Next appt reviewed and pt instructed to call sooner with concerns.

## 2020-06-11 ENCOUNTER — LAB (OUTPATIENT)
Dept: OTHER | Facility: HOSPITAL | Age: 80
End: 2020-06-11

## 2020-06-11 ENCOUNTER — INFUSION (OUTPATIENT)
Dept: ONCOLOGY | Facility: HOSPITAL | Age: 80
End: 2020-06-11

## 2020-06-11 VITALS
TEMPERATURE: 98.1 F | OXYGEN SATURATION: 100 % | DIASTOLIC BLOOD PRESSURE: 72 MMHG | HEIGHT: 67 IN | SYSTOLIC BLOOD PRESSURE: 163 MMHG | RESPIRATION RATE: 18 BRPM | BODY MASS INDEX: 26.37 KG/M2 | HEART RATE: 70 BPM | WEIGHT: 168 LBS

## 2020-06-11 DIAGNOSIS — N18.30 CHRONIC KIDNEY DISEASE, STAGE III (MODERATE) (HCC): ICD-10-CM

## 2020-06-11 LAB
BASOPHILS # BLD AUTO: 0.01 10*3/MM3 (ref 0–0.2)
BASOPHILS NFR BLD AUTO: 0.2 % (ref 0–1.5)
DEPRECATED RDW RBC AUTO: 62.3 FL (ref 37–54)
EOSINOPHIL # BLD AUTO: 0.09 10*3/MM3 (ref 0–0.4)
EOSINOPHIL NFR BLD AUTO: 1.7 % (ref 0.3–6.2)
ERYTHROCYTE [DISTWIDTH] IN BLOOD BY AUTOMATED COUNT: 17.2 % (ref 12.3–15.4)
HCT VFR BLD AUTO: 34.7 % (ref 34–46.6)
HGB BLD-MCNC: 10.9 G/DL (ref 12–15.9)
IMM GRANULOCYTES # BLD AUTO: 0.03 10*3/MM3 (ref 0–0.05)
IMM GRANULOCYTES NFR BLD AUTO: 0.6 % (ref 0–0.5)
LYMPHOCYTES # BLD AUTO: 0.62 10*3/MM3 (ref 0.7–3.1)
LYMPHOCYTES NFR BLD AUTO: 11.7 % (ref 19.6–45.3)
MCH RBC QN AUTO: 31.2 PG (ref 26.6–33)
MCHC RBC AUTO-ENTMCNC: 31.4 G/DL (ref 31.5–35.7)
MCV RBC AUTO: 99.4 FL (ref 79–97)
MONOCYTES # BLD AUTO: 0.39 10*3/MM3 (ref 0.1–0.9)
MONOCYTES NFR BLD AUTO: 7.4 % (ref 5–12)
NEUTROPHILS # BLD AUTO: 4.15 10*3/MM3 (ref 1.7–7)
NEUTROPHILS NFR BLD AUTO: 78.4 % (ref 42.7–76)
NRBC BLD AUTO-RTO: 0 /100 WBC (ref 0–0.2)
PLATELET # BLD AUTO: 193 10*3/MM3 (ref 140–450)
PMV BLD AUTO: 9.3 FL (ref 6–12)
RBC # BLD AUTO: 3.49 10*6/MM3 (ref 3.77–5.28)
WBC NRBC COR # BLD: 5.29 10*3/MM3 (ref 3.4–10.8)

## 2020-06-11 PROCEDURE — 85025 COMPLETE CBC W/AUTO DIFF WBC: CPT | Performed by: INTERNAL MEDICINE

## 2020-06-11 PROCEDURE — 36415 COLL VENOUS BLD VENIPUNCTURE: CPT

## 2020-06-11 NOTE — NURSING NOTE
Lab Results   Component Value Date    WBC 5.29 06/11/2020    HGB 10.9 (L) 06/11/2020    HCT 34.7 06/11/2020    MCV 99.4 (H) 06/11/2020     06/11/2020     Procrit not indicated for Hgb 10.9. Pt denies complaints.  Next appt reviewed and pt instructed to call sooner with concerns and v/u

## 2020-06-12 RX ORDER — ERYTHROMYCIN 250 MG/1
125 TABLET, COATED ORAL 4 TIMES DAILY
Qty: 180 TABLET | Refills: 3 | Status: SHIPPED | OUTPATIENT
Start: 2020-06-12 | End: 2021-01-01

## 2020-06-12 NOTE — TELEPHONE ENCOUNTER
Patient would like a 90 rx sent to mail order [facility] pharmacy. Patient said she take 1/2 a tab four times daily.

## 2020-06-18 ENCOUNTER — INFUSION (OUTPATIENT)
Dept: ONCOLOGY | Facility: HOSPITAL | Age: 80
End: 2020-06-18

## 2020-06-18 DIAGNOSIS — Z45.2 ENCOUNTER FOR FITTING AND ADJUSTMENT OF VASCULAR CATHETER: ICD-10-CM

## 2020-06-18 DIAGNOSIS — N18.4 ANEMIA IN STAGE 4 CHRONIC KIDNEY DISEASE (HCC): Primary | ICD-10-CM

## 2020-06-18 DIAGNOSIS — D63.1 ANEMIA IN STAGE 4 CHRONIC KIDNEY DISEASE (HCC): Primary | ICD-10-CM

## 2020-06-18 LAB
BASOPHILS # BLD AUTO: 0.02 10*3/MM3 (ref 0–0.2)
BASOPHILS NFR BLD AUTO: 0.5 % (ref 0–1.5)
DEPRECATED RDW RBC AUTO: 58.9 FL (ref 37–54)
EOSINOPHIL # BLD AUTO: 0.09 10*3/MM3 (ref 0–0.4)
EOSINOPHIL NFR BLD AUTO: 2.3 % (ref 0.3–6.2)
ERYTHROCYTE [DISTWIDTH] IN BLOOD BY AUTOMATED COUNT: 15.7 % (ref 12.3–15.4)
FERRITIN SERPL-MCNC: 461.3 NG/ML (ref 13–150)
HCT VFR BLD AUTO: 34.6 % (ref 34–46.6)
HGB BLD-MCNC: 10.7 G/DL (ref 12–15.9)
HGB RETIC QN AUTO: 34 PG (ref 29.8–36.1)
IMM GRANULOCYTES # BLD AUTO: 0.01 10*3/MM3 (ref 0–0.05)
IMM GRANULOCYTES NFR BLD AUTO: 0.3 % (ref 0–0.5)
IMM RETICS NFR: 5.2 % (ref 3–15.8)
IRON 24H UR-MRATE: 69 MCG/DL (ref 37–145)
IRON SATN MFR SERPL: 31 % (ref 20–50)
LYMPHOCYTES # BLD AUTO: 0.48 10*3/MM3 (ref 0.7–3.1)
LYMPHOCYTES NFR BLD AUTO: 12.2 % (ref 19.6–45.3)
MCH RBC QN AUTO: 31.1 PG (ref 26.6–33)
MCHC RBC AUTO-ENTMCNC: 30.9 G/DL (ref 31.5–35.7)
MCV RBC AUTO: 100.6 FL (ref 79–97)
MONOCYTES # BLD AUTO: 0.33 10*3/MM3 (ref 0.1–0.9)
MONOCYTES NFR BLD AUTO: 8.4 % (ref 5–12)
NEUTROPHILS # BLD AUTO: 2.99 10*3/MM3 (ref 1.7–7)
NEUTROPHILS NFR BLD AUTO: 76.3 % (ref 42.7–76)
NRBC BLD AUTO-RTO: 0 /100 WBC (ref 0–0.2)
PLATELET # BLD AUTO: 143 10*3/MM3 (ref 140–450)
PMV BLD AUTO: 9.4 FL (ref 6–12)
RBC # BLD AUTO: 3.44 10*6/MM3 (ref 3.77–5.28)
RETICS/RBC NFR AUTO: 0.83 % (ref 0.7–1.9)
TIBC SERPL-MCNC: 222 MCG/DL (ref 298–536)
TRANSFERRIN SERPL-MCNC: 149 MG/DL (ref 200–360)
WBC NRBC COR # BLD: 3.92 10*3/MM3 (ref 3.4–10.8)

## 2020-06-18 PROCEDURE — 84466 ASSAY OF TRANSFERRIN: CPT | Performed by: INTERNAL MEDICINE

## 2020-06-18 PROCEDURE — 83540 ASSAY OF IRON: CPT | Performed by: INTERNAL MEDICINE

## 2020-06-18 PROCEDURE — 82728 ASSAY OF FERRITIN: CPT | Performed by: INTERNAL MEDICINE

## 2020-06-18 PROCEDURE — 96523 IRRIG DRUG DELIVERY DEVICE: CPT

## 2020-06-18 PROCEDURE — G0463 HOSPITAL OUTPT CLINIC VISIT: HCPCS

## 2020-06-18 PROCEDURE — 25010000003 HEPARIN LOCK FLUSH PER 10 UNITS: Performed by: INTERNAL MEDICINE

## 2020-06-18 PROCEDURE — 85046 RETICYTE/HGB CONCENTRATE: CPT | Performed by: INTERNAL MEDICINE

## 2020-06-18 PROCEDURE — 85025 COMPLETE CBC W/AUTO DIFF WBC: CPT | Performed by: INTERNAL MEDICINE

## 2020-06-18 RX ORDER — SODIUM CHLORIDE 0.9 % (FLUSH) 0.9 %
10 SYRINGE (ML) INJECTION AS NEEDED
Status: DISCONTINUED | OUTPATIENT
Start: 2020-06-18 | End: 2020-06-18 | Stop reason: HOSPADM

## 2020-06-18 RX ORDER — HEPARIN SODIUM (PORCINE) LOCK FLUSH IV SOLN 100 UNIT/ML 100 UNIT/ML
500 SOLUTION INTRAVENOUS AS NEEDED
Status: DISCONTINUED | OUTPATIENT
Start: 2020-06-18 | End: 2020-06-18 | Stop reason: HOSPADM

## 2020-06-18 RX ORDER — HEPARIN SODIUM (PORCINE) LOCK FLUSH IV SOLN 100 UNIT/ML 100 UNIT/ML
500 SOLUTION INTRAVENOUS AS NEEDED
Status: CANCELLED | OUTPATIENT
Start: 2020-06-18

## 2020-06-18 RX ORDER — SODIUM CHLORIDE 0.9 % (FLUSH) 0.9 %
10 SYRINGE (ML) INJECTION AS NEEDED
Status: CANCELLED | OUTPATIENT
Start: 2020-06-18

## 2020-06-18 RX ADMIN — Medication 10 ML: at 10:15

## 2020-06-18 RX ADMIN — SODIUM CHLORIDE, PRESERVATIVE FREE 500 UNITS: 5 INJECTION INTRAVENOUS at 10:15

## 2020-06-18 NOTE — NURSING NOTE
Lab Results   Component Value Date    WBC 3.92 06/18/2020    HGB 10.7 (L) 06/18/2020    HCT 34.6 06/18/2020    .6 (H) 06/18/2020     06/18/2020     Procrit not indicated for Hgb 10.7.  Pt voices no new complaints.  Next appt reviewed and pt knows to call sooner with concerns.

## 2020-06-25 ENCOUNTER — APPOINTMENT (OUTPATIENT)
Dept: ONCOLOGY | Facility: HOSPITAL | Age: 80
End: 2020-06-25

## 2020-06-25 ENCOUNTER — LAB (OUTPATIENT)
Dept: OTHER | Facility: HOSPITAL | Age: 80
End: 2020-06-25

## 2020-06-25 ENCOUNTER — OFFICE VISIT (OUTPATIENT)
Dept: ONCOLOGY | Facility: CLINIC | Age: 80
End: 2020-06-25

## 2020-06-25 VITALS
HEART RATE: 70 BPM | BODY MASS INDEX: 26.53 KG/M2 | TEMPERATURE: 97.3 F | SYSTOLIC BLOOD PRESSURE: 181 MMHG | DIASTOLIC BLOOD PRESSURE: 72 MMHG | WEIGHT: 169 LBS | HEIGHT: 67 IN | OXYGEN SATURATION: 99 % | RESPIRATION RATE: 14 BRPM

## 2020-06-25 DIAGNOSIS — D62 ANEMIA DUE TO ACUTE BLOOD LOSS: Primary | ICD-10-CM

## 2020-06-25 DIAGNOSIS — N18.30 CHRONIC KIDNEY DISEASE, STAGE III (MODERATE) (HCC): ICD-10-CM

## 2020-06-25 LAB
BASOPHILS # BLD AUTO: 0.01 10*3/MM3 (ref 0–0.2)
BASOPHILS NFR BLD AUTO: 0.2 % (ref 0–1.5)
DEPRECATED RDW RBC AUTO: 53.2 FL (ref 37–54)
EOSINOPHIL # BLD AUTO: 0.11 10*3/MM3 (ref 0–0.4)
EOSINOPHIL NFR BLD AUTO: 1.9 % (ref 0.3–6.2)
ERYTHROCYTE [DISTWIDTH] IN BLOOD BY AUTOMATED COUNT: 14.7 % (ref 12.3–15.4)
HCT VFR BLD AUTO: 33.7 % (ref 34–46.6)
HGB BLD-MCNC: 10.7 G/DL (ref 12–15.9)
IMM GRANULOCYTES # BLD AUTO: 0.03 10*3/MM3 (ref 0–0.05)
IMM GRANULOCYTES NFR BLD AUTO: 0.5 % (ref 0–0.5)
LYMPHOCYTES # BLD AUTO: 0.74 10*3/MM3 (ref 0.7–3.1)
LYMPHOCYTES NFR BLD AUTO: 12.6 % (ref 19.6–45.3)
MCH RBC QN AUTO: 31.1 PG (ref 26.6–33)
MCHC RBC AUTO-ENTMCNC: 31.8 G/DL (ref 31.5–35.7)
MCV RBC AUTO: 98 FL (ref 79–97)
MONOCYTES # BLD AUTO: 0.41 10*3/MM3 (ref 0.1–0.9)
MONOCYTES NFR BLD AUTO: 7 % (ref 5–12)
NEUTROPHILS # BLD AUTO: 4.58 10*3/MM3 (ref 1.7–7)
NEUTROPHILS NFR BLD AUTO: 77.8 % (ref 42.7–76)
NRBC BLD AUTO-RTO: 0 /100 WBC (ref 0–0.2)
PLATELET # BLD AUTO: 142 10*3/MM3 (ref 140–450)
PMV BLD AUTO: 9.6 FL (ref 6–12)
RBC # BLD AUTO: 3.44 10*6/MM3 (ref 3.77–5.28)
WBC NRBC COR # BLD: 5.88 10*3/MM3 (ref 3.4–10.8)

## 2020-06-25 PROCEDURE — 85025 COMPLETE CBC W/AUTO DIFF WBC: CPT | Performed by: INTERNAL MEDICINE

## 2020-06-25 PROCEDURE — 36415 COLL VENOUS BLD VENIPUNCTURE: CPT

## 2020-06-25 PROCEDURE — 99214 OFFICE O/P EST MOD 30 MIN: CPT | Performed by: INTERNAL MEDICINE

## 2020-06-25 NOTE — PROGRESS NOTES
.     REASONS FOR FOLLOWUP: Anemia    HISTORY OF PRESENT ILLNESS:  The patient is a 80 y.o. year old female  who is here for follow-up with the above-mentioned history.    Denies bleeding, chest pain, shortness of breath.    Past Medical History:   Diagnosis Date   • Anemia     Iron deficiency   • Anxiety    • Cardiomyopathy (CMS/HCC)     S/P pacemaker and defibrillator   • CHF (congestive heart failure) (CMS/HCC)    • Chronic renal failure, stage 4 (severe) (CMS/HCC)    • Colon polyp    • Coronary artery disease     pacemaker, defibrillator   • Depression    • Dizzy    • Gastroparesis    • GAVE (gastric antral vascular ectasia)    • GERD (gastroesophageal reflux disease)    • Gout    • Hemorrhoids    • Hiatal hernia    • History of Clostridium difficile colitis 2013   • History of kidney stones    • History of pancreatitis     2014   • History of skin cancer    • History of transfusion     MULTIPLE    • Hyperlipidemia    • Hypertension    • Hypothyroidism    • Left bundle branch block    • Mitral and aortic valve disease    • Mitral valve insufficiency    • Myoclonus     S/P Depakote   • Osteoarthritis    • Pancytopenia (CMS/HCC)    • Peripheral neuropathy    • Presence of cardiac pacemaker     AND DEFIBRILLATOR   • Pulmonary hypertension (CMS/HCC)    • SOB (shortness of breath) on exertion    • Spinal stenosis    • Stroke (CMS/HCC)      Past Surgical History:   Procedure Laterality Date   • APPENDECTOMY N/A    • ARTERIOVENOUS FISTULA/SHUNT SURGERY Right 2/18/2019    Procedure: RIGHT BASILIC FISTULA CREATION WITHOUT TRANSPOSITION;  Surgeon: Royer Dozier MD;  Location: [facility];  Service: Vascular   • ARTERIOVENOUS FISTULA/SHUNT SURGERY Right 5/31/2019    Procedure: RIGHT 2ND STAGE BASILIC VEIN CREATION;  Surgeon: Royer Dozier MD;  Location: [facility];  Service: Vascular   • CARDIAC CATHETERIZATION Left 03/28/2006    Arterial catheter insertion, cath left ventriculography, coronary  angiography and left heart catheterization-Dr. Estevan Matamoros   • CARDIAC DEFIBRILLATOR PLACEMENT N/A 11/30/2007    Biventricular implantable cardioverter defibrillator-Dr. Leandro Huber   • CARDIAC ELECTROPHYSIOLOGY PROCEDURE N/A 10/28/2019    Procedure: GENERATOR CHANGE BI-V ICD  medtronic;  Surgeon: Leandro Huber MD;  Location: [facility] CATH INVASIVE LOCATION;  Service: Cardiology   • CATARACT EXTRACTION Bilateral 2011   • COLONOSCOPY N/A 2014    done at [facility]   • CYSTECTOMY N/A     Ovarian cystectomy   • ENDOSCOPY N/A 04/28/2006    EGD with biopsies. Paraesophageal hiatal hernia from 34 to 44 cm, antral gastritis-Dr. Nehemiah Beal   • ENDOSCOPY N/A 09/29/2004    Hiatal hernia, gastritis and an erosion of the pylorus-Dr. Yang Pavon   • ENDOSCOPY N/A 9/1/2019    Procedure: ESOPHAGOGASTRODUODENOSCOPY with APC;  Surgeon: Anuel Roach MD;  Location: [facility] ENDOSCOPY;  Service: Gastroenterology   • ENDOSCOPY N/A 1/28/2020    Procedure: ESOPHAGOGASTRODUODENOSCOPY with APC;  Surgeon: Anuel Roach MD;  Location: [facility] ENDOSCOPY;  Service: Gastroenterology   • ENDOSCOPY N/A 4/21/2020    Procedure: ESOPHAGOGASTRODUODENOSCOPY WITH APC;  Surgeon: Anuel Roach MD;  [facility];  Service: Gastroenterology;  Laterality: N/A;  PRE- MELENA, GAVE  POST- ESOPHAGITIS, GAVE   • ENTEROSCOPY SMALL BOWEL N/A 10/30/2006    Small bowel enteroscopy with biopsies-Dr. Rory Sevilla   • FLEXIBLE SIGMOIDOSCOPY N/A 09/29/2004    Unsuccessful colonoscopy due to poop prep, a very tortuous sigmoid, bowel prep in the form of enema given and a barium enema was obtained-Dr. Yang Pavon   • FRACTURE SURGERY  2015    Broke big toe   • HEMATOMA EVACUATION TRUNK N/A 02/07/2014    Pocket evacuation of hematoma at pacemaker site-Dr. Leandro Huber   • HEMORRHOIDECTOMY N/A 04/19/2004    Flexible sigmoidoscopy and stapled hemorrhoidectomy-Dr. Yang Pavon   • HYSTERECTOMY Bilateral  1977   • IMPLANTABLE CARDIOVERTER DEFIBRILLATOR LEAD REPLACEMENT/POCKET REVISION Left 3/28/2016    Procedure: AUTOMATIC IMPLANTABLE CARDIOVERTER DEFIBRILLATOR LEAD REPLACEMENT WITH LASER LEAD EXTRACTION;  Surgeon: Leandro Huber MD;  Location: [facility] HYBRID OR 18/19;  Service:    • IMPLANTABLE CARDIOVERTER DEFIBRILLATOR LEAD REPLACEMENT/POCKET REVISION N/A 01/13/2014    Biventricular ICD replacement-Dr. Jillian Huber   • LAPAROSCOPY REPAIR HIATAL HERNIA N/A 06/27/2006    Laparoscopic paraesophageal hiatal hernia repair with Nissen fundoplication and cholecystectomy-Dr. Sean Yoon   • NATALIE GONZALEZ ([facility]) PROCEDURE     • SC INSJ TUNNELED CVC W/O SUBQ PORT/ AGE 3 YR/> Right 10/3/2017    Procedure: Right internal jugular port placement;  Surgeon: Tiffanie Couch MD;  Location: [facility] MAIN OR;  Service: General   • TOE SURGERY Left     SET BIG TOE   • TOTAL KNEE ARTHROPLASTY Left 08/2014   • VENOUS ACCESS DEVICE (PORT) INSERTION Right        MEDICATIONS    Current Outpatient Medications:   •  acetaminophen (TYLENOL) 325 MG tablet, Take 2 tablets by mouth Every 4 (Four) Hours As Needed for Mild Pain ., Disp: , Rfl:   •  atorvastatin (LIPITOR) 20 MG tablet, Take 20 mg by mouth Daily., Disp: , Rfl:   •  calcitriol (ROCALTROL) 0.5 MCG capsule, Take 0.5 mcg by mouth Every Other Day. Alternate days with calcitriol 0.25mcg, Disp: , Rfl:   •  calcium carbonate (OS-RAYMOND) 600 MG tablet, Take 600 mg by mouth Daily., Disp: , Rfl:   •  carbidopa-levodopa ER (SINEMET CR)  MG per tablet, Take 1 tablet by mouth Every Evening., Disp: , Rfl:   •  carvedilol (COREG) 12.5 MG tablet, TAKE 1 TABLET DAILY, Disp: 90 tablet, Rfl: 1  •  Cholecalciferol (VITAMIN D3) 5000 units capsule capsule, Take 5,000 Units by mouth Daily., Disp: , Rfl:   •  diazepam (VALIUM) 5 MG tablet, Take 5 mg by mouth Every Night., Disp: , Rfl:   •  erythromycin base (E-MYCIN) 250 MG tablet, Take 0.5 tablets by mouth 4 (Four) Times a  Day., Disp: 180 tablet, Rfl: 3  •  febuxostat (ULORIC) 40 MG tablet, Take 40 mg by mouth Daily., Disp: , Rfl:   •  furosemide (LASIX) 40 MG tablet, Take 1 tablet by mouth Daily., Disp: 90 tablet, Rfl: 3  •  Gabapentin Enacarbil  MG tablet controlled-release, Take 300 mg by mouth Daily., Disp: , Rfl:   •  Gabapentin, Once-Daily, (GRALISE) 300 MG tablet, Take 300 mg by mouth Daily With Dinner. MAY REPEAT LATE EVENING IF NEEDED, Disp: , Rfl:   •  Glucosamine-Chondroit-Vit C-Mn (GLUCOSAMINE CHONDROITIN COMPLX) capsule, Take 1,500 mg by mouth Every Other Day., Disp: , Rfl:   •  HYDROcodone-acetaminophen (NORCO) 5-325 MG per tablet, Take 1 tablet by mouth Every 6 (Six) Hours As Needed for Severe Pain ., Disp: 12 tablet, Rfl: 0  •  levothyroxine (SYNTHROID, LEVOTHROID) 25 MCG tablet, Take 25 mcg by mouth Daily., Disp: , Rfl:   •  magnesium oxide 250 MG tablet, Take 250 mg by mouth Daily., Disp: , Rfl:   •  Multiple Vitamin (MULTI-DAY VITAMINS) tablet, Take 1 tablet by mouth Daily. HOLD FOR SURGERY, Disp: , Rfl:   •  polyethylene glycol (MIRALAX) powder, Take 17 g by mouth Daily As Needed., Disp: , Rfl:   •  psyllium (METAMUCIL) 0.52 g capsule, Take 0.52 g by mouth., Disp: , Rfl:   •  psyllium (METAMUCIL) 58.6 % powder, Take 1 packet by mouth Daily., Disp: , Rfl:   •  rotigotine (NEUPRO) 6 MG/24HR patch, Place 1 patch on the skin as directed by provider Daily., Disp: , Rfl:   •  Vitamin E 400 UNITS tablet, Take 400 Units by mouth Daily., Disp: , Rfl:   No current facility-administered medications for this visit.     Facility-Administered Medications Ordered in Other Visits:   •  heparin flush (porcine) 100 UNIT/ML injection 500 Units, 500 Units, Intravenous, PRN, Anuel Scott MD, 500 Units at 11/27/17 1347  •  heparin flush (porcine) 100 UNIT/ML injection 500 Units, 500 Units, Intravenous, PRN, Neno Merritt II, MD, 500 Units at 01/18/20 8359  •  sodium chloride 0.9 % flush 10 mL, 10 mL, Intravenous, PRN,  "Anuel Scott MD, 10 mL at 11/27/17 1347  •  sodium chloride 0.9 % flush 10 mL, 10 mL, Intravenous, PRN, Neno Merritt II, MD, 10 mL at 01/18/20 1359  •  sodium chloride 0.9 % infusion 250 mL, 250 mL, Intravenous, PRN, Neno Merritt II, MD    ALLERGIES:     Allergies   Allergen Reactions   • Chlorhexidine Rash and Itching     Patient states \"blue dye\" in chlorhexidine.  States the orange and clear are OK to use   • Codeine Unknown - Low Severity     HYPER, DOES NOT HELP PAIN       SOCIAL HISTORY:       Social History     Socioeconomic History   • Marital status:      Spouse name: Zackery   • Number of children: 5   • Years of education: 1 yr college   • Highest education level: Not on file   Occupational History     Employer: RETIRED   Tobacco Use   • Smoking status: Never Smoker   • Smokeless tobacco: Never Used   • Tobacco comment: caffeine use - coffee and soda   Substance and Sexual Activity   • Alcohol use: No   • Drug use: No   • Sexual activity: Defer         FAMILY HISTORY:  Family History   Problem Relation Age of Onset   • Coronary artery disease Other    • Dementia Other    • Kidney disease Other    • Arthritis Father    • Early death Father         Kidney failure   • Kidney disease Father    • Heart disease Mother    • Mental illness Mother         Dementia   • Malig Hyperthermia Neg Hx    • Colon cancer Neg Hx    • Colon polyps Neg Hx        REVIEW OF SYSTEMS:  Review of Systems   Constitutional: Negative for activity change.   HENT: Negative for nosebleeds and trouble swallowing.    Respiratory: Negative for shortness of breath and wheezing.    Cardiovascular: Negative for chest pain and palpitations.   Gastrointestinal: Negative for constipation, diarrhea and nausea.   Genitourinary: Negative for dysuria and hematuria.   Musculoskeletal: Negative for arthralgias and myalgias.   Skin: Negative for rash and wound.   Neurological: Negative for seizures and syncope.   Hematological: Negative " "for adenopathy. Does not bruise/bleed easily.   Psychiatric/Behavioral: Negative for confusion.            Vitals:    06/25/20 1001   BP: (!) 181/72   Pulse: 70   Resp: 14   Temp: 97.3 °F (36.3 °C)   TempSrc: Skin   SpO2: 99%   Weight: 76.7 kg (169 lb)   Height: 170.2 cm (67.01\")   PainSc:   2   PainLoc: Head     Current Status 6/25/2020   ECOG score 1        PHYSICAL EXAM:      Patient screened negative for depression based on a PHQ-9 score of 1 on 2/27/2020.     This patient's ACP documentation is up to date, and there's nothing further left to document.     CONSTITUTIONAL:  Vital signs reviewed.  No distress, looks comfortable.  EYES:  Conjunctiva and lids unremarkable.  PERRLA  EARS,NOSE,MOUTH,THROAT:  Ears and nose appear unremarkable.  Lips, teeth, gums appear unremarkable.  RESPIRATORY:  Normal respiratory effort.  Lungs clear to auscultation bilaterally.  CARDIOVASCULAR:  Normal S1, S2.  No murmurs rubs or gallops.  No significant lower extremity edema.  GASTROINTESTINAL: Abdomen appears unremarkable.  Nontender.  No hepatomegaly.  No splenomegaly.  LYMPHATIC:  No cervical, supraclavicular, axillary lymphadenopathy.  SKIN:  Warm.  No rashes.  PSYCHIATRIC:  Normal judgment and insight.  Normal mood and affect.   RECENT LABS:        WBC   Date/Time Value Ref Range Status   06/25/2020 10:01 AM 5.88 3.40 - 10.80 10*3/mm3 Final     Hemoglobin   Date/Time Value Ref Range Status   06/25/2020 10:01 AM 10.7 (L) 12.0 - 15.9 g/dL Final     Platelets   Date/Time Value Ref Range Status   06/25/2020 10:01  140 - 450 10*3/mm3 Final       Assessment/Plan   There are no diagnoses linked to this encounter.     *Anemia due to stage IV chronic kidney disease.    · Weekly Procrit if Hb <10.  Last Procrit was 6/4/2020.  Hb above 10 since then    *Admitted 8/29/2019-9/2/2019 due to Hb of 5.  Transfused 2 units.  Heme positive stools.  EGD revealed GAVE.  Laser photocoagulation  · She states Dr. Roach plans EGD every 3 " months.    *Iron deficiency anemia.  · Does not respond to oral iron due to poor absorption.  · 2 doses Injectafer late August 2019 resulted in normal iron labs and improvement of Hb from 9.4 up to 11.1 on 9/26/2019.  · Hb down to 10.3, 10/3/2019.  · Iron labs normal 10/31/2019, ferritin 200, 21% saturation.  · Hb dropped to 7.8 on 11/26/2019, requiring transfusion.  · Hb dropped to 6.9 on 12/19/2019, requiring transfusion.  · Hb dropped to 6.5 on 2/12/2020, requiring transfusion  · Hb dropped to 7.8 on 2/18/2020, requiring transfusion.  · On 2/18/2020, ferritin 89, 13% iron saturation.  Gave 1 dose Injectafer.  · On 4/23/2020, ferritin 115, 5% saturation, give 2 doses Injectafer.  · On 6/18/2020, ferritin 461, 31% saturation, Hb 10.7.  · Hb 10.7 today, 6/25/2020.  No need for IV iron at this time.    *Source of iron deficiency.  · Previously followed regularly with Dr. Earl Avelar.  · States she cannot have colonoscopies due to tortuous colon and therefore has virtual colonoscopies.  · She states she saw Dr. Avelar after the 6/27/2019 visit with me due to recent dark stools.  She states Dr. Avelar did a rectal exam which was heme negative and recommended no further evaluation.  · On 8/22/2019, I told her I suspect she is having occult GI bleeding considering she is needing IV iron frequently.  However, with new stroke mid May 2019 and the addition of Plavix to aspirin, risk may be too great to stop antiplatelet agents for further GI evaluation.  Patient and  agree.  · During late August 2019 hospitalization for GI bleed with Hb of 5, EGD through Dr. Roach revealed G A[address].  Laser photocoagulation.  · Subsequently, following with Dr. Roach.  He initially planned EGD every 3 months.  · However, on the 10/18/2019 office visit, he changed the plan to be return to see him mid February 2020 and stated he could retreat her GAVE if she develops a significant drop in her Hb.  · Dr. Roach stated on the  2/11/2020 office note last EGD could not be performed due to a large food bezoar.  He gave her erythromycin and reviewed dietary changes for gastroparesis in hopes of being able to repeat EGD with APC.  · EGD with [facility] on 4/21/2020, Dr. Roach.  He planned follow-up office visit in 4 weeks.    *Macrocytosis.  B12 and folate unremarkable  MCV 98    *Reticulocytosis.  · On 8/22/2019, unremarkable: Direct [place], haptoglobin, bilirubin ( with normal range up to 214.  Therefore, likely not significant).  · Reticulocytes currently 0.8%.    *Thrombocytopenia.  Intermittent since 9/19/2019.      *Circulating nucleated red blood cells.  Intermittent.  Could consider a bone marrow biopsy at some point.  I suspect this is occurring as her bone marrow is trying to increase production after bleeding episodes.  Nucleated red blood cells seen on 10/31/2019 and again, 11/6/2019    *Stroke mid August 2019.  Presumed.  Could not have MRI due to pacemaker.  Plavix was added to aspirin.    Plan  · No need for Injectafer today  · Weekly CBC.  Procrit if Hb <10.   · (Previously on every 2-week CBC.  But required transfusions.  Therefore, she was changed back to weekly.  However, today, 6/25/2020, she requested to try every 2 weeks again)  · Iron labs 1 month and 2 months.  · MD 2-1/2 months  · MD 2 months or so.  1 wk prior: Iron labs  · Dr. Roach  Last EGD with [facility] was 4/21/2020.  She will call his office to see when she needs her next follow-up    · Could consider a bone marrow biopsy in the future.

## 2020-06-30 RX ORDER — CARVEDILOL 12.5 MG/1
12.5 TABLET ORAL DAILY
Qty: 90 TABLET | Refills: 1 | Status: SHIPPED | OUTPATIENT
Start: 2020-06-30 | End: 2021-02-08 | Stop reason: SDUPTHER

## 2020-07-09 ENCOUNTER — INFUSION (OUTPATIENT)
Dept: ONCOLOGY | Facility: HOSPITAL | Age: 80
End: 2020-07-09

## 2020-07-09 ENCOUNTER — LAB (OUTPATIENT)
Dept: OTHER | Facility: HOSPITAL | Age: 80
End: 2020-07-09

## 2020-07-09 VITALS
RESPIRATION RATE: 18 BRPM | OXYGEN SATURATION: 99 % | WEIGHT: 162.4 LBS | SYSTOLIC BLOOD PRESSURE: 169 MMHG | HEIGHT: 67 IN | DIASTOLIC BLOOD PRESSURE: 67 MMHG | BODY MASS INDEX: 25.49 KG/M2 | HEART RATE: 71 BPM | TEMPERATURE: 98 F

## 2020-07-09 DIAGNOSIS — D50.8 OTHER IRON DEFICIENCY ANEMIA: ICD-10-CM

## 2020-07-09 DIAGNOSIS — IMO0001 ADVERSE EFFECT OF IRON OR ITS COMPOUND, SUBSEQUENT ENCOUNTER: ICD-10-CM

## 2020-07-09 DIAGNOSIS — D62 ANEMIA DUE TO ACUTE BLOOD LOSS: ICD-10-CM

## 2020-07-09 DIAGNOSIS — N18.4 ANEMIA OF CHRONIC RENAL FAILURE, STAGE 4 (SEVERE) (HCC): Primary | ICD-10-CM

## 2020-07-09 DIAGNOSIS — N18.4 CKD (CHRONIC KIDNEY DISEASE) STAGE 4, GFR 15-29 ML/MIN (HCC): ICD-10-CM

## 2020-07-09 DIAGNOSIS — D63.1 ANEMIA OF CHRONIC RENAL FAILURE, STAGE 4 (SEVERE) (HCC): Primary | ICD-10-CM

## 2020-07-09 DIAGNOSIS — E53.8 B12 DEFICIENCY: ICD-10-CM

## 2020-07-09 LAB
BASOPHILS # BLD AUTO: 0.01 10*3/MM3 (ref 0–0.2)
BASOPHILS NFR BLD AUTO: 0.2 % (ref 0–1.5)
DEPRECATED RDW RBC AUTO: 52.5 FL (ref 37–54)
EOSINOPHIL # BLD AUTO: 0.11 10*3/MM3 (ref 0–0.4)
EOSINOPHIL NFR BLD AUTO: 2.2 % (ref 0.3–6.2)
ERYTHROCYTE [DISTWIDTH] IN BLOOD BY AUTOMATED COUNT: 14.9 % (ref 12.3–15.4)
HCT VFR BLD AUTO: 29.4 % (ref 34–46.6)
HGB BLD-MCNC: 9.6 G/DL (ref 12–15.9)
IMM GRANULOCYTES # BLD AUTO: 0.05 10*3/MM3 (ref 0–0.05)
IMM GRANULOCYTES NFR BLD AUTO: 1 % (ref 0–0.5)
LYMPHOCYTES # BLD AUTO: 0.64 10*3/MM3 (ref 0.7–3.1)
LYMPHOCYTES NFR BLD AUTO: 12.6 % (ref 19.6–45.3)
MCH RBC QN AUTO: 31.5 PG (ref 26.6–33)
MCHC RBC AUTO-ENTMCNC: 32.7 G/DL (ref 31.5–35.7)
MCV RBC AUTO: 96.4 FL (ref 79–97)
MONOCYTES # BLD AUTO: 0.46 10*3/MM3 (ref 0.1–0.9)
MONOCYTES NFR BLD AUTO: 9.1 % (ref 5–12)
NEUTROPHILS NFR BLD AUTO: 3.81 10*3/MM3 (ref 1.7–7)
NEUTROPHILS NFR BLD AUTO: 74.9 % (ref 42.7–76)
NRBC BLD AUTO-RTO: 0 /100 WBC (ref 0–0.2)
PLATELET # BLD AUTO: 155 10*3/MM3 (ref 140–450)
PMV BLD AUTO: 10 FL (ref 6–12)
RBC # BLD AUTO: 3.05 10*6/MM3 (ref 3.77–5.28)
WBC # BLD AUTO: 5.08 10*3/MM3 (ref 3.4–10.8)

## 2020-07-09 PROCEDURE — 85025 COMPLETE CBC W/AUTO DIFF WBC: CPT | Performed by: INTERNAL MEDICINE

## 2020-07-09 PROCEDURE — 25010000002 CYANOCOBALAMIN PER 1000 MCG: Performed by: INTERNAL MEDICINE

## 2020-07-09 PROCEDURE — 96372 THER/PROPH/DIAG INJ SC/IM: CPT

## 2020-07-09 PROCEDURE — 36415 COLL VENOUS BLD VENIPUNCTURE: CPT

## 2020-07-09 PROCEDURE — 25010000002 EPOETIN ALFA PER 1000 UNITS: Performed by: INTERNAL MEDICINE

## 2020-07-09 RX ORDER — CYANOCOBALAMIN 1000 UG/ML
1000 INJECTION, SOLUTION INTRAMUSCULAR; SUBCUTANEOUS
Status: DISCONTINUED | OUTPATIENT
Start: 2020-07-09 | End: 2020-07-09 | Stop reason: HOSPADM

## 2020-07-09 RX ADMIN — CYANOCOBALAMIN 1000 MCG: 1000 INJECTION, SOLUTION INTRAMUSCULAR at 10:48

## 2020-07-09 RX ADMIN — ERYTHROPOIETIN 20000 UNITS: 20000 INJECTION, SOLUTION INTRAVENOUS; SUBCUTANEOUS at 10:49

## 2020-07-23 ENCOUNTER — LAB (OUTPATIENT)
Dept: ONCOLOGY | Facility: HOSPITAL | Age: 80
End: 2020-07-23

## 2020-07-23 ENCOUNTER — APPOINTMENT (OUTPATIENT)
Dept: ONCOLOGY | Facility: HOSPITAL | Age: 80
End: 2020-07-23

## 2020-07-23 VITALS
TEMPERATURE: 97.4 F | DIASTOLIC BLOOD PRESSURE: 71 MMHG | BODY MASS INDEX: 25.65 KG/M2 | OXYGEN SATURATION: 98 % | SYSTOLIC BLOOD PRESSURE: 158 MMHG | RESPIRATION RATE: 16 BRPM | WEIGHT: 163.8 LBS | HEART RATE: 72 BPM

## 2020-07-23 DIAGNOSIS — D62 ANEMIA DUE TO ACUTE BLOOD LOSS: Primary | ICD-10-CM

## 2020-07-23 DIAGNOSIS — N18.4 ANEMIA OF CHRONIC RENAL FAILURE, STAGE 4 (SEVERE) (HCC): ICD-10-CM

## 2020-07-23 DIAGNOSIS — D63.1 ANEMIA OF CHRONIC RENAL FAILURE, STAGE 4 (SEVERE) (HCC): ICD-10-CM

## 2020-07-23 DIAGNOSIS — Z45.2 ENCOUNTER FOR FITTING AND ADJUSTMENT OF VASCULAR CATHETER: ICD-10-CM

## 2020-07-23 LAB
BASOPHILS # BLD AUTO: 0.01 10*3/MM3 (ref 0–0.2)
BASOPHILS NFR BLD AUTO: 0.2 % (ref 0–1.5)
DEPRECATED RDW RBC AUTO: 56.2 FL (ref 37–54)
EOSINOPHIL # BLD AUTO: 0.07 10*3/MM3 (ref 0–0.4)
EOSINOPHIL NFR BLD AUTO: 1.4 % (ref 0.3–6.2)
ERYTHROCYTE [DISTWIDTH] IN BLOOD BY AUTOMATED COUNT: 15.3 % (ref 12.3–15.4)
FERRITIN SERPL-MCNC: 459.2 NG/ML (ref 13–150)
HCT VFR BLD AUTO: 28.7 % (ref 34–46.6)
HGB BLD-MCNC: 9 G/DL (ref 12–15.9)
HGB RETIC QN AUTO: 35.6 PG (ref 29.8–36.1)
IMM GRANULOCYTES # BLD AUTO: 0.02 10*3/MM3 (ref 0–0.05)
IMM GRANULOCYTES NFR BLD AUTO: 0.4 % (ref 0–0.5)
IMM RETICS NFR: 11.8 % (ref 3–15.8)
IRON 24H UR-MRATE: 57 MCG/DL (ref 37–145)
IRON SATN MFR SERPL: 28 % (ref 20–50)
LYMPHOCYTES # BLD AUTO: 0.58 10*3/MM3 (ref 0.7–3.1)
LYMPHOCYTES NFR BLD AUTO: 11.5 % (ref 19.6–45.3)
MCH RBC QN AUTO: 31.4 PG (ref 26.6–33)
MCHC RBC AUTO-ENTMCNC: 31.4 G/DL (ref 31.5–35.7)
MCV RBC AUTO: 100 FL (ref 79–97)
MONOCYTES # BLD AUTO: 0.39 10*3/MM3 (ref 0.1–0.9)
MONOCYTES NFR BLD AUTO: 7.7 % (ref 5–12)
NEUTROPHILS NFR BLD AUTO: 3.97 10*3/MM3 (ref 1.7–7)
NEUTROPHILS NFR BLD AUTO: 78.8 % (ref 42.7–76)
NRBC BLD AUTO-RTO: 0 /100 WBC (ref 0–0.2)
PLATELET # BLD AUTO: 129 10*3/MM3 (ref 140–450)
PMV BLD AUTO: 9.3 FL (ref 6–12)
RBC # BLD AUTO: 2.87 10*6/MM3 (ref 3.77–5.28)
RETICS/RBC NFR AUTO: 1.36 % (ref 0.7–1.9)
TIBC SERPL-MCNC: 206 MCG/DL (ref 298–536)
TRANSFERRIN SERPL-MCNC: 138 MG/DL (ref 200–360)
WBC # BLD AUTO: 5.04 10*3/MM3 (ref 3.4–10.8)

## 2020-07-23 PROCEDURE — 85046 RETICYTE/HGB CONCENTRATE: CPT | Performed by: INTERNAL MEDICINE

## 2020-07-23 PROCEDURE — 96523 IRRIG DRUG DELIVERY DEVICE: CPT

## 2020-07-23 PROCEDURE — 84466 ASSAY OF TRANSFERRIN: CPT | Performed by: INTERNAL MEDICINE

## 2020-07-23 PROCEDURE — 82728 ASSAY OF FERRITIN: CPT | Performed by: INTERNAL MEDICINE

## 2020-07-23 PROCEDURE — 25010000002 EPOETIN ALFA PER 1000 UNITS: Performed by: NURSE PRACTITIONER

## 2020-07-23 PROCEDURE — 96372 THER/PROPH/DIAG INJ SC/IM: CPT

## 2020-07-23 PROCEDURE — 85025 COMPLETE CBC W/AUTO DIFF WBC: CPT | Performed by: INTERNAL MEDICINE

## 2020-07-23 PROCEDURE — 25010000003 HEPARIN LOCK FLUSH PER 10 UNITS: Performed by: INTERNAL MEDICINE

## 2020-07-23 PROCEDURE — 83540 ASSAY OF IRON: CPT | Performed by: INTERNAL MEDICINE

## 2020-07-23 RX ORDER — SODIUM CHLORIDE 0.9 % (FLUSH) 0.9 %
10 SYRINGE (ML) INJECTION AS NEEDED
Status: CANCELLED | OUTPATIENT
Start: 2020-07-23

## 2020-07-23 RX ORDER — HEPARIN SODIUM (PORCINE) LOCK FLUSH IV SOLN 100 UNIT/ML 100 UNIT/ML
500 SOLUTION INTRAVENOUS AS NEEDED
Status: DISCONTINUED | OUTPATIENT
Start: 2020-07-23 | End: 2020-07-23 | Stop reason: HOSPADM

## 2020-07-23 RX ORDER — SODIUM CHLORIDE 0.9 % (FLUSH) 0.9 %
10 SYRINGE (ML) INJECTION AS NEEDED
Status: DISCONTINUED | OUTPATIENT
Start: 2020-07-23 | End: 2020-07-23 | Stop reason: HOSPADM

## 2020-07-23 RX ORDER — HEPARIN SODIUM (PORCINE) LOCK FLUSH IV SOLN 100 UNIT/ML 100 UNIT/ML
500 SOLUTION INTRAVENOUS AS NEEDED
Status: CANCELLED | OUTPATIENT
Start: 2020-07-23

## 2020-07-23 RX ADMIN — ERYTHROPOIETIN 20000 UNITS: 20000 INJECTION, SOLUTION INTRAVENOUS; SUBCUTANEOUS at 11:22

## 2020-07-23 RX ADMIN — Medication 500 UNITS: at 10:44

## 2020-07-23 RX ADMIN — SODIUM CHLORIDE, PRESERVATIVE FREE 10 ML: 5 INJECTION INTRAVENOUS at 10:44

## 2020-07-30 ENCOUNTER — TELEPHONE (OUTPATIENT)
Dept: CARDIOLOGY | Facility: CLINIC | Age: 80
End: 2020-07-30

## 2020-07-30 NOTE — TELEPHONE ENCOUNTER
Patient called to see if she can be cleared to have a temporal artery biopsy done 8/17/20 by Dr. Kian Hurst.  Patient is currently taking ASA 81 mg.  Please advise if patient will need to be seen or dictate a letter to clear her.  Thank you/ LUCAS     Patient's phone number is [phone]

## 2020-07-30 NOTE — TELEPHONE ENCOUNTER
Faxed clearance letter to Dr. Conner Hurst fax# [phone]. Faxed confirmation received. / LUCAS

## 2020-08-06 ENCOUNTER — INFUSION (OUTPATIENT)
Dept: ONCOLOGY | Facility: HOSPITAL | Age: 80
End: 2020-08-06

## 2020-08-06 ENCOUNTER — LAB (OUTPATIENT)
Dept: OTHER | Facility: HOSPITAL | Age: 80
End: 2020-08-06

## 2020-08-06 DIAGNOSIS — N18.4 ANEMIA OF CHRONIC RENAL FAILURE, STAGE 4 (SEVERE) (HCC): Primary | ICD-10-CM

## 2020-08-06 DIAGNOSIS — IMO0001 ADVERSE EFFECT OF IRON OR ITS COMPOUND, SUBSEQUENT ENCOUNTER: ICD-10-CM

## 2020-08-06 DIAGNOSIS — D50.8 OTHER IRON DEFICIENCY ANEMIA: ICD-10-CM

## 2020-08-06 DIAGNOSIS — E53.8 B12 DEFICIENCY: ICD-10-CM

## 2020-08-06 DIAGNOSIS — D63.1 ANEMIA OF CHRONIC RENAL FAILURE, STAGE 4 (SEVERE) (HCC): ICD-10-CM

## 2020-08-06 DIAGNOSIS — N18.4 CKD (CHRONIC KIDNEY DISEASE) STAGE 4, GFR 15-29 ML/MIN (HCC): ICD-10-CM

## 2020-08-06 DIAGNOSIS — N18.4 ANEMIA OF CHRONIC RENAL FAILURE, STAGE 4 (SEVERE) (HCC): ICD-10-CM

## 2020-08-06 DIAGNOSIS — D63.1 ANEMIA OF CHRONIC RENAL FAILURE, STAGE 4 (SEVERE) (HCC): Primary | ICD-10-CM

## 2020-08-06 LAB
BASOPHILS # BLD AUTO: 0.02 10*3/MM3 (ref 0–0.2)
BASOPHILS NFR BLD AUTO: 0.3 % (ref 0–1.5)
DEPRECATED RDW RBC AUTO: 54 FL (ref 37–54)
EOSINOPHIL # BLD AUTO: 0.07 10*3/MM3 (ref 0–0.4)
EOSINOPHIL NFR BLD AUTO: 1.2 % (ref 0.3–6.2)
ERYTHROCYTE [DISTWIDTH] IN BLOOD BY AUTOMATED COUNT: 15 % (ref 12.3–15.4)
HCT VFR BLD AUTO: 27 % (ref 34–46.6)
HGB BLD-MCNC: 8.6 G/DL (ref 12–15.9)
IMM GRANULOCYTES # BLD AUTO: 0.03 10*3/MM3 (ref 0–0.05)
IMM GRANULOCYTES NFR BLD AUTO: 0.5 % (ref 0–0.5)
LYMPHOCYTES # BLD AUTO: 0.6 10*3/MM3 (ref 0.7–3.1)
LYMPHOCYTES NFR BLD AUTO: 10.5 % (ref 19.6–45.3)
MCH RBC QN AUTO: 31.2 PG (ref 26.6–33)
MCHC RBC AUTO-ENTMCNC: 31.9 G/DL (ref 31.5–35.7)
MCV RBC AUTO: 97.8 FL (ref 79–97)
MONOCYTES # BLD AUTO: 0.3 10*3/MM3 (ref 0.1–0.9)
MONOCYTES NFR BLD AUTO: 5.2 % (ref 5–12)
NEUTROPHILS NFR BLD AUTO: 4.7 10*3/MM3 (ref 1.7–7)
NEUTROPHILS NFR BLD AUTO: 82.3 % (ref 42.7–76)
NRBC BLD AUTO-RTO: 0 /100 WBC (ref 0–0.2)
PLATELET # BLD AUTO: 167 10*3/MM3 (ref 140–450)
PMV BLD AUTO: 10 FL (ref 6–12)
RBC # BLD AUTO: 2.76 10*6/MM3 (ref 3.77–5.28)
WBC # BLD AUTO: 5.72 10*3/MM3 (ref 3.4–10.8)

## 2020-08-06 PROCEDURE — 85025 COMPLETE CBC W/AUTO DIFF WBC: CPT | Performed by: INTERNAL MEDICINE

## 2020-08-06 PROCEDURE — 36415 COLL VENOUS BLD VENIPUNCTURE: CPT

## 2020-08-06 PROCEDURE — 25010000002 CYANOCOBALAMIN PER 1000 MCG: Performed by: NURSE PRACTITIONER

## 2020-08-06 PROCEDURE — 25010000002 EPOETIN ALFA PER 1000 UNITS: Performed by: NURSE PRACTITIONER

## 2020-08-06 PROCEDURE — 96372 THER/PROPH/DIAG INJ SC/IM: CPT

## 2020-08-06 RX ORDER — CYANOCOBALAMIN 1000 UG/ML
1000 INJECTION, SOLUTION INTRAMUSCULAR; SUBCUTANEOUS
Status: DISCONTINUED | OUTPATIENT
Start: 2020-08-06 | End: 2020-08-06 | Stop reason: HOSPADM

## 2020-08-06 RX ADMIN — ERYTHROPOIETIN 20000 UNITS: 20000 INJECTION, SOLUTION INTRAVENOUS; SUBCUTANEOUS at 11:21

## 2020-08-06 RX ADMIN — CYANOCOBALAMIN 1000 MCG: 1000 INJECTION, SOLUTION INTRAMUSCULAR at 11:21

## 2020-08-07 ENCOUNTER — TELEPHONE (OUTPATIENT)
Dept: ONCOLOGY | Facility: CLINIC | Age: 80
End: 2020-08-07

## 2020-08-07 NOTE — TELEPHONE ENCOUNTER
[facility] called to say they do not need a clearance letter for surgery. They can see MD note in epic.

## 2020-08-07 NOTE — TELEPHONE ENCOUNTER
Pt called because she ha blood in her urine sample and was suggested she get a script for it. Please call pt for more information @[phone]

## 2020-08-12 ENCOUNTER — TELEPHONE (OUTPATIENT)
Dept: ONCOLOGY | Facility: CLINIC | Age: 80
End: 2020-08-12

## 2020-08-12 ENCOUNTER — TELEPHONE (OUTPATIENT)
Dept: GENERAL RADIOLOGY | Facility: HOSPITAL | Age: 80
End: 2020-08-12

## 2020-08-12 NOTE — TELEPHONE ENCOUNTER
----- Message from Bushra Auguste RN sent at 8/12/2020 12:16 PM EDT -----  Pt needs to go to EP tomorrow for a cbc and lab RN

## 2020-08-12 NOTE — TELEPHONE ENCOUNTER
Pt called stating she feels like she may need a blood transfusion. She is asking if she can go to EP tomorrow to get her labs checked. Message routed to appt desk.

## 2020-08-12 NOTE — TELEPHONE ENCOUNTER
CALLED PT TO SEE WHEN SHE WANTED TO COME TO THE [place] OFFICE THAT SHE NEEDED A LAB AND RN REVIEW

## 2020-08-13 ENCOUNTER — CLINICAL SUPPORT (OUTPATIENT)
Dept: ONCOLOGY | Facility: HOSPITAL | Age: 80
End: 2020-08-13

## 2020-08-13 ENCOUNTER — LAB (OUTPATIENT)
Dept: OTHER | Facility: HOSPITAL | Age: 80
End: 2020-08-13

## 2020-08-13 VITALS
RESPIRATION RATE: 20 BRPM | SYSTOLIC BLOOD PRESSURE: 181 MMHG | OXYGEN SATURATION: 99 % | DIASTOLIC BLOOD PRESSURE: 70 MMHG | HEART RATE: 69 BPM | TEMPERATURE: 98.4 F

## 2020-08-13 DIAGNOSIS — D62 ANEMIA DUE TO ACUTE BLOOD LOSS: ICD-10-CM

## 2020-08-13 LAB
BASOPHILS # BLD AUTO: 0.02 10*3/MM3 (ref 0–0.2)
BASOPHILS NFR BLD AUTO: 0.3 % (ref 0–1.5)
DEPRECATED RDW RBC AUTO: 54.3 FL (ref 37–54)
EOSINOPHIL # BLD AUTO: 0.08 10*3/MM3 (ref 0–0.4)
EOSINOPHIL NFR BLD AUTO: 1.4 % (ref 0.3–6.2)
ERYTHROCYTE [DISTWIDTH] IN BLOOD BY AUTOMATED COUNT: 14.8 % (ref 12.3–15.4)
HCT VFR BLD AUTO: 31.6 % (ref 34–46.6)
HGB BLD-MCNC: 10.1 G/DL (ref 12–15.9)
IMM GRANULOCYTES # BLD AUTO: 0.03 10*3/MM3 (ref 0–0.05)
IMM GRANULOCYTES NFR BLD AUTO: 0.5 % (ref 0–0.5)
LYMPHOCYTES # BLD AUTO: 0.75 10*3/MM3 (ref 0.7–3.1)
LYMPHOCYTES NFR BLD AUTO: 12.8 % (ref 19.6–45.3)
MCH RBC QN AUTO: 31.8 PG (ref 26.6–33)
MCHC RBC AUTO-ENTMCNC: 32 G/DL (ref 31.5–35.7)
MCV RBC AUTO: 99.4 FL (ref 79–97)
MONOCYTES # BLD AUTO: 0.44 10*3/MM3 (ref 0.1–0.9)
MONOCYTES NFR BLD AUTO: 7.5 % (ref 5–12)
NEUTROPHILS NFR BLD AUTO: 4.55 10*3/MM3 (ref 1.7–7)
NEUTROPHILS NFR BLD AUTO: 77.5 % (ref 42.7–76)
NRBC BLD AUTO-RTO: 0 /100 WBC (ref 0–0.2)
PLATELET # BLD AUTO: 198 10*3/MM3 (ref 140–450)
PMV BLD AUTO: 9.8 FL (ref 6–12)
RBC # BLD AUTO: 3.18 10*6/MM3 (ref 3.77–5.28)
WBC # BLD AUTO: 5.87 10*3/MM3 (ref 3.4–10.8)

## 2020-08-13 PROCEDURE — 36415 COLL VENOUS BLD VENIPUNCTURE: CPT

## 2020-08-13 PROCEDURE — G0463 HOSPITAL OUTPT CLINIC VISIT: HCPCS

## 2020-08-13 PROCEDURE — 85025 COMPLETE CBC W/AUTO DIFF WBC: CPT | Performed by: INTERNAL MEDICINE

## 2020-08-13 NOTE — NURSING NOTE
"Here for CBC with nurse review \"one week early as I feel like I need a blood transfusion\".   Lab Results   Component Value Date    WBC 5.87 08/13/2020    HGB 10.1 (L) 08/13/2020    HCT 31.6 (L) 08/13/2020    MCV 99.4 (H) 08/13/2020     08/13/2020     Reports drinking well at home but states that she will \"do a little more\". Also states that headaches persists x 2 months and that she has a tissue biopsy scheduled.  Will return next week unless she needs to come in sooner. Discharged per assist of cane to waiting area to spouse.       "

## 2020-08-20 ENCOUNTER — LAB (OUTPATIENT)
Dept: OTHER | Facility: HOSPITAL | Age: 80
End: 2020-08-20

## 2020-08-20 ENCOUNTER — INFUSION (OUTPATIENT)
Dept: ONCOLOGY | Facility: HOSPITAL | Age: 80
End: 2020-08-20

## 2020-08-20 VITALS
TEMPERATURE: 98.2 F | HEART RATE: 74 BPM | SYSTOLIC BLOOD PRESSURE: 182 MMHG | RESPIRATION RATE: 18 BRPM | DIASTOLIC BLOOD PRESSURE: 69 MMHG | OXYGEN SATURATION: 98 % | HEIGHT: 67 IN | WEIGHT: 159 LBS | BODY MASS INDEX: 24.96 KG/M2

## 2020-08-20 DIAGNOSIS — N18.4 ANEMIA OF CHRONIC RENAL FAILURE, STAGE 4 (SEVERE) (HCC): ICD-10-CM

## 2020-08-20 DIAGNOSIS — D63.1 ANEMIA OF CHRONIC RENAL FAILURE, STAGE 4 (SEVERE) (HCC): ICD-10-CM

## 2020-08-20 DIAGNOSIS — D62 ANEMIA DUE TO ACUTE BLOOD LOSS: Primary | ICD-10-CM

## 2020-08-20 DIAGNOSIS — Z45.2 ENCOUNTER FOR FITTING AND ADJUSTMENT OF VASCULAR CATHETER: ICD-10-CM

## 2020-08-20 LAB
BASOPHILS # BLD AUTO: 0.02 10*3/MM3 (ref 0–0.2)
BASOPHILS NFR BLD AUTO: 0.3 % (ref 0–1.5)
DEPRECATED RDW RBC AUTO: 55.2 FL (ref 37–54)
EOSINOPHIL # BLD AUTO: 0.05 10*3/MM3 (ref 0–0.4)
EOSINOPHIL NFR BLD AUTO: 0.8 % (ref 0.3–6.2)
ERYTHROCYTE [DISTWIDTH] IN BLOOD BY AUTOMATED COUNT: 14.6 % (ref 12.3–15.4)
FERRITIN SERPL-MCNC: 344.9 NG/ML (ref 13–150)
HCT VFR BLD AUTO: 30.1 % (ref 34–46.6)
HGB BLD-MCNC: 9.4 G/DL (ref 12–15.9)
HGB RETIC QN AUTO: 32.9 PG (ref 29.8–36.1)
IMM GRANULOCYTES # BLD AUTO: 0.03 10*3/MM3 (ref 0–0.05)
IMM GRANULOCYTES NFR BLD AUTO: 0.5 % (ref 0–0.5)
IMM RETICS NFR: 6 % (ref 3–15.8)
IRON 24H UR-MRATE: 58 MCG/DL (ref 37–145)
IRON SATN MFR SERPL: 29 % (ref 20–50)
LYMPHOCYTES # BLD AUTO: 0.6 10*3/MM3 (ref 0.7–3.1)
LYMPHOCYTES NFR BLD AUTO: 10.2 % (ref 19.6–45.3)
MCH RBC QN AUTO: 31.9 PG (ref 26.6–33)
MCHC RBC AUTO-ENTMCNC: 31.2 G/DL (ref 31.5–35.7)
MCV RBC AUTO: 102 FL (ref 79–97)
MONOCYTES # BLD AUTO: 0.49 10*3/MM3 (ref 0.1–0.9)
MONOCYTES NFR BLD AUTO: 8.3 % (ref 5–12)
NEUTROPHILS NFR BLD AUTO: 4.7 10*3/MM3 (ref 1.7–7)
NEUTROPHILS NFR BLD AUTO: 79.9 % (ref 42.7–76)
NRBC BLD AUTO-RTO: 0 /100 WBC (ref 0–0.2)
PLATELET # BLD AUTO: 135 10*3/MM3 (ref 140–450)
PMV BLD AUTO: 9.4 FL (ref 6–12)
RBC # BLD AUTO: 2.95 10*6/MM3 (ref 3.77–5.28)
RETICS # AUTO: 0.03 10*6/MM3 (ref 0.02–0.13)
RETICS/RBC NFR AUTO: 1.08 % (ref 0.7–1.9)
TIBC SERPL-MCNC: 197 MCG/DL (ref 298–536)
TRANSFERRIN SERPL-MCNC: 132 MG/DL (ref 200–360)
WBC # BLD AUTO: 5.89 10*3/MM3 (ref 3.4–10.8)

## 2020-08-20 PROCEDURE — 82728 ASSAY OF FERRITIN: CPT | Performed by: INTERNAL MEDICINE

## 2020-08-20 PROCEDURE — 96372 THER/PROPH/DIAG INJ SC/IM: CPT

## 2020-08-20 PROCEDURE — 25010000002 EPOETIN ALFA PER 1000 UNITS: Performed by: NURSE PRACTITIONER

## 2020-08-20 PROCEDURE — 85046 RETICYTE/HGB CONCENTRATE: CPT | Performed by: INTERNAL MEDICINE

## 2020-08-20 PROCEDURE — 85025 COMPLETE CBC W/AUTO DIFF WBC: CPT | Performed by: INTERNAL MEDICINE

## 2020-08-20 PROCEDURE — 25010000003 HEPARIN LOCK FLUSH PER 10 UNITS: Performed by: INTERNAL MEDICINE

## 2020-08-20 PROCEDURE — 83540 ASSAY OF IRON: CPT | Performed by: INTERNAL MEDICINE

## 2020-08-20 PROCEDURE — 84466 ASSAY OF TRANSFERRIN: CPT | Performed by: INTERNAL MEDICINE

## 2020-08-20 RX ORDER — HEPARIN SODIUM (PORCINE) LOCK FLUSH IV SOLN 100 UNIT/ML 100 UNIT/ML
500 SOLUTION INTRAVENOUS AS NEEDED
Status: CANCELLED | OUTPATIENT
Start: 2020-08-20

## 2020-08-20 RX ORDER — SODIUM CHLORIDE 0.9 % (FLUSH) 0.9 %
10 SYRINGE (ML) INJECTION AS NEEDED
Status: DISCONTINUED | OUTPATIENT
Start: 2020-08-20 | End: 2020-08-20 | Stop reason: HOSPADM

## 2020-08-20 RX ORDER — HEPARIN SODIUM (PORCINE) LOCK FLUSH IV SOLN 100 UNIT/ML 100 UNIT/ML
500 SOLUTION INTRAVENOUS AS NEEDED
Status: DISCONTINUED | OUTPATIENT
Start: 2020-08-20 | End: 2020-08-20 | Stop reason: HOSPADM

## 2020-08-20 RX ORDER — SODIUM CHLORIDE 0.9 % (FLUSH) 0.9 %
10 SYRINGE (ML) INJECTION AS NEEDED
Status: CANCELLED | OUTPATIENT
Start: 2020-08-20

## 2020-08-20 RX ADMIN — SODIUM CHLORIDE, PRESERVATIVE FREE 500 UNITS: 5 INJECTION INTRAVENOUS at 10:50

## 2020-08-20 RX ADMIN — ERYTHROPOIETIN 15000 UNITS: 20000 INJECTION, SOLUTION INTRAVENOUS; SUBCUTANEOUS at 11:37

## 2020-08-20 RX ADMIN — Medication 10 ML: at 10:50

## 2020-08-20 NOTE — NURSING NOTE
Lab Results   Component Value Date    WBC 5.89 08/20/2020    HGB 9.4 (L) 08/20/2020    HCT 30.1 (L) 08/20/2020    .0 (H) 08/20/2020     (L) 08/20/2020     Procrit dosed per protocol.  Pt voices no new complaints.  Next appt reviewed and pt knows to call sooner with concerns.

## 2020-09-02 ENCOUNTER — OFFICE VISIT (OUTPATIENT)
Dept: CARDIOLOGY | Facility: CLINIC | Age: 80
End: 2020-09-02

## 2020-09-02 VITALS
WEIGHT: 159.8 LBS | HEART RATE: 80 BPM | BODY MASS INDEX: 25.08 KG/M2 | HEIGHT: 67 IN | DIASTOLIC BLOOD PRESSURE: 80 MMHG | SYSTOLIC BLOOD PRESSURE: 140 MMHG

## 2020-09-02 DIAGNOSIS — I10 ESSENTIAL HYPERTENSION: ICD-10-CM

## 2020-09-02 DIAGNOSIS — I42.8 NICM (NONISCHEMIC CARDIOMYOPATHY) (HCC): ICD-10-CM

## 2020-09-02 DIAGNOSIS — E78.2 MIXED HYPERLIPIDEMIA: ICD-10-CM

## 2020-09-02 DIAGNOSIS — I50.22 CHRONIC SYSTOLIC CONGESTIVE HEART FAILURE (HCC): Primary | ICD-10-CM

## 2020-09-02 DIAGNOSIS — N18.4 RENAL FAILURE, CHRONIC, STAGE 4 (SEVERE) (HCC): ICD-10-CM

## 2020-09-02 PROCEDURE — 93000 ELECTROCARDIOGRAM COMPLETE: CPT | Performed by: INTERNAL MEDICINE

## 2020-09-02 PROCEDURE — 99214 OFFICE O/P EST MOD 30 MIN: CPT | Performed by: INTERNAL MEDICINE

## 2020-09-02 RX ORDER — DULOXETIN HYDROCHLORIDE 30 MG/1
30 CAPSULE, DELAYED RELEASE ORAL DAILY
COMMUNITY
Start: 2020-07-12 | End: 2021-01-01

## 2020-09-02 RX ORDER — ASPIRIN 81 MG/1
81 TABLET ORAL DAILY
COMMUNITY
End: 2021-02-12 | Stop reason: HOSPADM

## 2020-09-02 NOTE — PROGRESS NOTES
Date of Office Visit: 20  Encounter Provider: Estevan Matamoros MD  Place of Service: [facility] CARDIOLOGY  Patient Name: Charmaine Machuca  :1940  Referral Provider:No ref. provider found      Chief Complaint   Patient presents with   • Follow-up     History of Present Illness      The patient is a pleasant 80-year-old female with a history of nonischemic  cardiomyopathy, hypertension, hyperlipidemia and LBBB. Original ejection   fraction was extremely low. She was enrolled in the [place] study of ACE   inhibitor plus Atacand versus ACE inhibitor plus a placebo. Then, in 2005   she had a perfusion study that showed infarct but no ischemia. She had cardiac   catheterization in 2006, which showed moderate left ventricular dysfunction,   elevated right-sided pressure, left anterior end-diastolic pressure, mild mitral insufficiency,  but normal coronary arteries. She was treated medically. She then had  worsening dyspnea. In 2007, had a Bi-V defibrillator placed and she  continued to have GI problems, fatigue, weakness, dizziness, syncopal, near  syncopal spells, multiple medications were adjusted. Diuretics were  adjusted. On diuretics, off diuretics, volume overloaded, volume depleted.  She was diagnosed with myoclonus. Multiple GI complaints.  She then had her generator changed in 2014. Unfortunately, a day or two after that  developed small bowel obstruction, had to be admitted to the hospital and was then treated  for that. That resolved. She developed C. difficile infection, was treated for that. She  developed a pocket hematoma and had to have that evacuated.   She had a followup echocardiogram in 2015 that showed normal ejection fraction, mild  mitral insufficiency, mild aortic valve calcification without stenosis. She comes in for  followup now. She went to [facility] 2015 with this atypical chest  discomfort, left-sided under her  breast, pleuritic, worse when she would take a breath in,  worse when she would move to one side. No real increased shortness of breath with it, but  it was hard to take breath in. While there, she had a ventilation perfusion scan that was  negative for pulmonary embolus. She had a 2D echocardiogram that, again, showed normal LV  systolic function and no significant valvular disease.  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  Had that removed and new lead replaced.    She got in for follow-up.  She has been doing well from a heart standpoint but unfortunately she is had a lot of other issues in February she tripped fell and hit her face.  Now she has been having headaches they thought maybe she had temporal artery arteritis had biopsy that was negative but still having had a headache she still weak and fatigued she still has balance problems.  She has been seeing hematology and getting iron and B12 injections letter last hemoglobin I saw was 9.4.  Then she is not really having any chest pain or pressure.  No real shortness of breath orthopnea or PND no palpitations and no near syncope or syncope.    Cardiomyopathy   Associated symptoms include myalgias and numbness. Pertinent negatives include no abdominal pain, congestion, diaphoresis, fever, headaches, joint swelling, nausea, rash, vertigo, vomiting or weakness.   Atrial Fibrillation   Symptoms are negative for dizziness, shortness of breath and weakness. Past medical history includes atrial fibrillation, AICD and CHF.   Congestive Heart Failure   Pertinent negatives include no abdominal pain, muscle weakness, nocturia or shortness of breath.         Past Medical History:   Diagnosis Date   • Anemia     Iron deficiency   • Anxiety    • Cardiomyopathy (CMS/HCC)     S/P pacemaker and defibrillator   • CHF (congestive heart failure) (CMS/HCC)    • Chronic renal failure, stage 4 (severe) (CMS/HCC)    • Colon polyp    • Coronary artery disease      pacemaker, defibrillator   • Depression    • Dizzy    • Gastroparesis    • GAVE (gastric antral vascular ectasia)    • GERD (gastroesophageal reflux disease)    • Gout    • Hemorrhoids    • Hiatal hernia    • History of Clostridium difficile colitis 2013   • History of kidney stones    • History of pancreatitis     2014   • History of skin cancer    • History of transfusion     MULTIPLE    • Hyperlipidemia    • Hypertension    • Hypothyroidism    • Left bundle branch block    • Mitral and aortic valve disease    • Mitral valve insufficiency    • Myoclonus     S/P Depakote   • Osteoarthritis    • Pancytopenia (CMS/HCC)    • Peripheral neuropathy    • Presence of cardiac pacemaker     AND DEFIBRILLATOR   • Pulmonary hypertension (CMS/HCC)    • SOB (shortness of breath) on exertion    • Spinal stenosis    • Stroke (CMS/HCC)          Past Surgical History:   Procedure Laterality Date   • APPENDECTOMY N/A    • ARTERIOVENOUS FISTULA/SHUNT SURGERY Right 2/18/2019    Procedure: RIGHT BASILIC FISTULA CREATION WITHOUT TRANSPOSITION;  Surgeon: Royer Dozier MD;  Location: [facility] OR;  Service: Vascular   • ARTERIOVENOUS FISTULA/SHUNT SURGERY Right 5/31/2019    Procedure: RIGHT 2ND STAGE BASILIC VEIN CREATION;  Surgeon: Royer Dozier MD;  Location: [facility] OR;  Service: Vascular   • CARDIAC CATHETERIZATION Left 03/28/2006    Arterial catheter insertion, cath left ventriculography, coronary angiography and left heart catheterization-Dr. Estevan Matamoros   • CARDIAC DEFIBRILLATOR PLACEMENT N/A 11/30/2007    Biventricular implantable cardioverter defibrillator-Dr. Leandro Huber   • CARDIAC ELECTROPHYSIOLOGY PROCEDURE N/A 10/28/2019    Procedure: GENERATOR CHANGE BI-V ICD  medtronic;  Surgeon: Leandro Huber MD;  Location: [facility] INVASIVE LOCATION;  Service: Cardiology   • CATARACT EXTRACTION Bilateral 2011   • COLONOSCOPY N/A 2014    done at [facility]   • CYSTECTOMY N/A     Ovarian cystectomy   • ENDOSCOPY  N/A 04/28/2006    EGD with biopsies. Paraesophageal hiatal hernia from 34 to 44 cm, antral gastritis-Dr. Nehemiah Beal   • ENDOSCOPY N/A 09/29/2004    Hiatal hernia, gastritis and an erosion of the pylorus-Dr. Yang Pavon   • ENDOSCOPY N/A 9/1/2019    Procedure: ESOPHAGOGASTRODUODENOSCOPY with APC;  Surgeon: Anuel Roach MD;  Location: [facility] ENDOSCOPY;  Service: Gastroenterology   • ENDOSCOPY N/A 1/28/2020    Procedure: ESOPHAGOGASTRODUODENOSCOPY with APC;  Surgeon: Anuel Roach MD;  Location: [facility] ENDOSCOPY;  Service: Gastroenterology   • ENDOSCOPY N/A 4/21/2020    Procedure: ESOPHAGOGASTRODUODENOSCOPY WITH APC;  Surgeon: Anuel Roach MD;  Location: [facility] ENDOSCOPY;  Service: Gastroenterology;  Laterality: N/A;  PRE- MELENA, GAVE  POST- ESOPHAGITIS, GAVE   • ENTEROSCOPY SMALL BOWEL N/A 10/30/2006    Small bowel enteroscopy with biopsies-Dr. Rory Sevilla   • FLEXIBLE SIGMOIDOSCOPY N/A 09/29/2004    Unsuccessful colonoscopy due to poop prep, a very tortuous sigmoid, bowel prep in the form of enema given and a barium enema was obtained-Dr. Yang Pavon   • FRACTURE SURGERY  2015    Broke big toe   • HEMATOMA EVACUATION TRUNK N/A 02/07/2014    Pocket evacuation of hematoma at pacemaker site-Dr. Leandro Huber   • HEMORRHOIDECTOMY N/A 04/19/2004    Flexible sigmoidoscopy and stapled hemorrhoidectomy-Dr. Yang Pavon   • HYSTERECTOMY Bilateral 1977   • IMPLANTABLE CARDIOVERTER DEFIBRILLATOR LEAD REPLACEMENT/POCKET REVISION Left 3/28/2016    Procedure: AUTOMATIC IMPLANTABLE CARDIOVERTER DEFIBRILLATOR LEAD REPLACEMENT WITH LASER LEAD EXTRACTION;  Surgeon: Leandro Huber MD;  Location: [facility] OR 18/19;  Service:    • IMPLANTABLE CARDIOVERTER DEFIBRILLATOR LEAD REPLACEMENT/POCKET REVISION N/A 01/13/2014    Biventricular ICD replacement-Dr. Jillian Huber   • LAPAROSCOPY REPAIR HIATAL HERNIA N/A 06/27/2006    Laparoscopic paraesophageal hiatal hernia repair  with Nissen fundoplication and cholecystectomy-Dr. Sean Yoon   • NATALIE GONZALEZ ([facility]) PROCEDURE     • CT INSJ TUNNELED CVC W/O SUBQ PORT/ AGE 3 YR/> Right 10/3/2017    Procedure: Right internal jugular port placement;  Surgeon: Tiffanie Couch MD;  Location: [facility] OR;  Service: General   • TOE SURGERY Left     SET BIG TOE   • TOTAL KNEE ARTHROPLASTY Left 08/2014   • VENOUS ACCESS DEVICE (PORT) INSERTION Right          Current Outpatient Medications on File Prior to Visit   Medication Sig Dispense Refill   • acetaminophen (TYLENOL) 325 MG tablet Take 2 tablets by mouth Every 4 (Four) Hours As Needed for Mild Pain .     • aspirin (aspirin) 81 MG EC tablet Take 81 mg by mouth Daily.     • atorvastatin (LIPITOR) 20 MG tablet Take 20 mg by mouth Daily.     • calcitriol (ROCALTROL) 0.5 MCG capsule Take 0.5 mcg by mouth Every Other Day. Alternate days with calcitriol 0.25mcg     • calcium carbonate (OS-RAYMOND) 600 MG tablet Take 600 mg by mouth Daily.     • carbidopa-levodopa ER (SINEMET CR)  MG per tablet Take 1 tablet by mouth Every Evening.     • carvedilol (COREG) 12.5 MG tablet Take 1 tablet by mouth Daily. 90 tablet 1   • Cholecalciferol (VITAMIN D3) 5000 units capsule capsule Take 5,000 Units by mouth Daily.     • diazepam (VALIUM) 5 MG tablet Take 5 mg by mouth Every Night.     • DULoxetine (CYMBALTA) 30 MG capsule Take 30 mg by mouth Daily.     • erythromycin base (E-MYCIN) 250 MG tablet Take 0.5 tablets by mouth 4 (Four) Times a Day. 180 tablet 3   • Famotidine (PEPCID PO) Take  by mouth Daily.     • febuxostat (ULORIC) 40 MG tablet Take 40 mg by mouth Daily.     • Gabapentin, Once-Daily, (GRALISE) 300 MG tablet Take 300 mg by mouth Daily With Dinner. MAY REPEAT LATE EVENING IF NEEDED     • Glucosamine-Chondroit-Vit C-Mn (GLUCOSAMINE CHONDROITIN COMPLX) capsule Take 1,500 mg by mouth Every Other Day.     • HYDROcodone-acetaminophen (NORCO) 5-325 MG per tablet Take 1 tablet by mouth  Every 6 (Six) Hours As Needed for Severe Pain . 12 tablet 0   • levothyroxine (SYNTHROID, LEVOTHROID) 25 MCG tablet Take 25 mcg by mouth Daily.     • magnesium oxide 250 MG tablet Take 250 mg by mouth Daily.     • Multiple Vitamin (MULTI-DAY VITAMINS) tablet Take 1 tablet by mouth Daily. HOLD FOR SURGERY     • polyethylene glycol (MIRALAX) powder Take 17 g by mouth Daily As Needed.     • psyllium (METAMUCIL) 0.52 g capsule Take 0.52 g by mouth.     • rotigotine (NEUPRO) 6 MG/24HR patch Place 1 patch on the skin as directed by provider Daily.     • Vitamin E 400 UNITS tablet Take 400 Units by mouth Daily.     • [DISCONTINUED] furosemide (LASIX) 40 MG tablet Take 1 tablet by mouth Daily. 90 tablet 3   • [DISCONTINUED] Gabapentin Enacarbil  MG tablet controlled-release Take 300 mg by mouth Daily.     • [DISCONTINUED] psyllium (METAMUCIL) 58.6 % powder Take 1 packet by mouth Daily.       Current Facility-Administered Medications on File Prior to Visit   Medication Dose Route Frequency Provider Last Rate Last Dose   • heparin flush (porcine) 100 UNIT/ML injection 500 Units  500 Units Intravenous PRN Anuel Scott MD   500 Units at 11/27/17 1347   • heparin flush (porcine) 100 UNIT/ML injection 500 Units  500 Units Intravenous PRN Neno Merritt II, MD   500 Units at 01/18/20 1359   • sodium chloride 0.9 % flush 10 mL  10 mL Intravenous PRN Anuel Scott MD   10 mL at 11/27/17 1347   • sodium chloride 0.9 % flush 10 mL  10 mL Intravenous PRN Neno Merritt II, MD   10 mL at 01/18/20 1359   • sodium chloride 0.9 % infusion 250 mL  250 mL Intravenous PRN Neno Merritt II, MD             Social History     Socioeconomic History   • Marital status:      Spouse name: Zackery   • Number of children: 5   • Years of education: 1 yr college   • Highest education level: Not on file   Occupational History     Employer: RETIRED   Tobacco Use   • Smoking status: Never Smoker   • Smokeless tobacco: Never Used   •  Tobacco comment: caffeine use - coffee and soda   Substance and Sexual Activity   • Alcohol use: No   • Drug use: No   • Sexual activity: Defer   Lifestyle   • Physical activity:     Days per week: 0 days     Minutes per session: 0 min   • Stress: Not on file       Family History   Problem Relation Age of Onset   • Coronary artery disease Other    • Dementia Other    • Kidney disease Other    • Arthritis Father    • Early death Father         Kidney failure   • Kidney disease Father    • Heart disease Mother    • Mental illness Mother         Dementia   • Malig Hyperthermia Neg Hx    • Colon cancer Neg Hx    • Colon polyps Neg Hx          Review of Systems   Constitution: Positive for malaise/fatigue. Negative for decreased appetite, diaphoresis, fever, weight gain and weight loss.   HENT: Negative for congestion, hearing loss, nosebleeds and tinnitus.    Eyes: Negative for blurred vision, double vision, vision loss in left eye, vision loss in right eye and visual disturbance.   Cardiovascular:        As noted in HPI   Respiratory: Negative for shortness of breath.         As noted HPI   Endocrine: Negative for cold intolerance and heat intolerance.   Hematologic/Lymphatic: Negative for bleeding problem. Does not bruise/bleed easily.   Skin: Negative for color change, flushing, itching and rash.   Musculoskeletal: Positive for myalgias. Negative for arthritis, back pain, joint pain, joint swelling and muscle weakness.   Gastrointestinal: Positive for diarrhea. Negative for bloating, abdominal pain, constipation, dysphagia, heartburn, hematemesis, hematochezia, melena, nausea and vomiting.   Genitourinary: Negative for bladder incontinence, dysuria, frequency, nocturia and urgency.   Neurological: Positive for light-headedness and numbness. Negative for dizziness, focal weakness, headaches, loss of balance, paresthesias, vertigo and weakness.   Psychiatric/Behavioral: Positive for depression. Negative for memory loss  "and substance abuse.       Procedures      ECG 12 Lead  Date/Time: 9/2/2020 1:02 PM  Performed by: Estevan Matamoros MD  Authorized by: Estevan Matamoros MD   Comparison: compared with previous ECG   Similar to previous ECG  Rhythm: sinus rhythm  Rhythm comments: ventricular paced.                  Objective:    /80 (BP Location: Left arm)   Pulse 80   Ht 170.2 cm (67\")   Wt 72.5 kg (159 lb 12.8 oz)   BMI 25.03 kg/m²        Physical Exam  Physical Exam   Constitutional: She is oriented to person, place, and time. She appears well-developed and well-nourished. No distress.   HENT:   Head: Normocephalic.   Eyes: Pupils are equal, round, and reactive to light. Conjunctivae are normal. No scleral icterus.   Neck: Normal carotid pulses, no hepatojugular reflux and no JVD present. Carotid bruit is not present. No tracheal deviation, no edema and no erythema present. No thyromegaly present.   Cardiovascular: Normal rate, regular rhythm, S1 normal, S2 normal and intact distal pulses.  No extrasystoles are present. PMI is not displaced. Exam reveals no gallop, no distant heart sounds and no friction rub.   Murmur heard.   Systolic murmur is present with a grade of 2/6 at the lower left sternal border.  Pulses:       Carotid pulses are 2+ on the right side, and 2+ on the left side.       Radial pulses are 2+ on the right side, and 2+ on the left side.        Femoral pulses are 2+ on the right side, and 2+ on the left side.       Dorsalis pedis pulses are 2+ on the right side, and 2+ on the left side.        Posterior tibial pulses are 2+ on the right side, and 2+ on the left side.   Pulmonary/Chest: Effort normal and breath sounds normal. No respiratory distress. She has no decreased breath sounds. She has no wheezes. She has no rhonchi. She has no rales. She exhibits no tenderness.   Abdominal: Soft. Bowel sounds are normal. She exhibits no distension and no mass. There is no hepatosplenomegaly. There is no " tenderness. There is no rebound and no guarding.   Musculoskeletal: She exhibits edema (2+ bilateral to kness.). She exhibits no tenderness or deformity.   Neurological: She is alert and oriented to person, place, and time.   Skin: Skin is warm and dry. No rash noted. She is not diaphoretic. No cyanosis or erythema. No pallor. Nails show no clubbing.   Psychiatric: She has a normal mood and affect. Her speech is normal and behavior is normal. Judgment and thought content normal.           Assessment:   1. This is a 80-year-old female with history of nonischemic cardiomyopathy.  Echocardiogram 8/18 again normal left ventricular ejection fraction.  No heart failure.  In fact cardiac wise she seems very stable will continue the same she will see our nurse practitioner in 6 months.  2. History of congestive heart failure status post Bi-V ICD. Following in our defibrillator clinic. Now stable.  3. History of syncope, falling spells, and dizziness of unclear etiology. Resolved. May have been from Depakote.  4. Hyperlipidemia, followed by her PCP  5. History of kidney stones.  6. History of left bundle branch block. unchanged.  7. History of anemia. iron deficient still following with hematology.   8. History of renal failure.  Progressively worse.    Following with nephrology.  9. History of hypertension.  Blood pressure at goal.  10. Probable depression.  Much improved.  11. Myoclonus.   12. Mitral Insufficiency. Echocardiogram 8/18 just mild.  Will need a follow-up echocardiogram in about a year or so.  13.  Anemia.  Secondary to renal failure.    Following up with hematology.   Receiving IV iron and B12.    Having a lot of other symptoms that are clearly noncardiac.  She is following with her PCP as well as neurology.         Plan:

## 2020-09-03 ENCOUNTER — LAB (OUTPATIENT)
Dept: OTHER | Facility: HOSPITAL | Age: 80
End: 2020-09-03

## 2020-09-03 ENCOUNTER — OFFICE VISIT (OUTPATIENT)
Dept: ONCOLOGY | Facility: CLINIC | Age: 80
End: 2020-09-03

## 2020-09-03 ENCOUNTER — INFUSION (OUTPATIENT)
Dept: ONCOLOGY | Facility: HOSPITAL | Age: 80
End: 2020-09-03

## 2020-09-03 VITALS
BODY MASS INDEX: 25.1 KG/M2 | SYSTOLIC BLOOD PRESSURE: 194 MMHG | RESPIRATION RATE: 18 BRPM | HEIGHT: 67 IN | DIASTOLIC BLOOD PRESSURE: 77 MMHG | HEART RATE: 66 BPM | OXYGEN SATURATION: 99 % | WEIGHT: 159.9 LBS | TEMPERATURE: 97.3 F

## 2020-09-03 DIAGNOSIS — E53.8 B12 DEFICIENCY: ICD-10-CM

## 2020-09-03 DIAGNOSIS — N18.4 CKD (CHRONIC KIDNEY DISEASE) STAGE 4, GFR 15-29 ML/MIN (HCC): ICD-10-CM

## 2020-09-03 DIAGNOSIS — N18.4 ANEMIA OF CHRONIC RENAL FAILURE, STAGE 4 (SEVERE) (HCC): Primary | ICD-10-CM

## 2020-09-03 DIAGNOSIS — IMO0001 ADVERSE EFFECT OF IRON OR ITS COMPOUND, SUBSEQUENT ENCOUNTER: ICD-10-CM

## 2020-09-03 DIAGNOSIS — D62 ANEMIA DUE TO ACUTE BLOOD LOSS: ICD-10-CM

## 2020-09-03 DIAGNOSIS — D63.1 ANEMIA OF CHRONIC RENAL FAILURE, STAGE 4 (SEVERE) (HCC): Primary | ICD-10-CM

## 2020-09-03 DIAGNOSIS — D62 ANEMIA DUE TO ACUTE BLOOD LOSS: Primary | ICD-10-CM

## 2020-09-03 DIAGNOSIS — D50.8 OTHER IRON DEFICIENCY ANEMIA: ICD-10-CM

## 2020-09-03 LAB
BASOPHILS # BLD AUTO: 0.02 10*3/MM3 (ref 0–0.2)
BASOPHILS NFR BLD AUTO: 0.4 % (ref 0–1.5)
DEPRECATED RDW RBC AUTO: 48.3 FL (ref 37–54)
EOSINOPHIL # BLD AUTO: 0.07 10*3/MM3 (ref 0–0.4)
EOSINOPHIL NFR BLD AUTO: 1.5 % (ref 0.3–6.2)
ERYTHROCYTE [DISTWIDTH] IN BLOOD BY AUTOMATED COUNT: 13.7 % (ref 12.3–15.4)
HCT VFR BLD AUTO: 30.4 % (ref 34–46.6)
HGB BLD-MCNC: 9.8 G/DL (ref 12–15.9)
IMM GRANULOCYTES # BLD AUTO: 0.03 10*3/MM3 (ref 0–0.05)
IMM GRANULOCYTES NFR BLD AUTO: 0.6 % (ref 0–0.5)
LYMPHOCYTES # BLD AUTO: 0.58 10*3/MM3 (ref 0.7–3.1)
LYMPHOCYTES NFR BLD AUTO: 12.3 % (ref 19.6–45.3)
MCH RBC QN AUTO: 31.5 PG (ref 26.6–33)
MCHC RBC AUTO-ENTMCNC: 32.2 G/DL (ref 31.5–35.7)
MCV RBC AUTO: 97.7 FL (ref 79–97)
MONOCYTES # BLD AUTO: 0.36 10*3/MM3 (ref 0.1–0.9)
MONOCYTES NFR BLD AUTO: 7.7 % (ref 5–12)
NEUTROPHILS NFR BLD AUTO: 3.64 10*3/MM3 (ref 1.7–7)
NEUTROPHILS NFR BLD AUTO: 77.5 % (ref 42.7–76)
NRBC BLD AUTO-RTO: 0 /100 WBC (ref 0–0.2)
PLATELET # BLD AUTO: 162 10*3/MM3 (ref 140–450)
PMV BLD AUTO: 10.3 FL (ref 6–12)
RBC # BLD AUTO: 3.11 10*6/MM3 (ref 3.77–5.28)
WBC # BLD AUTO: 4.7 10*3/MM3 (ref 3.4–10.8)

## 2020-09-03 PROCEDURE — 99214 OFFICE O/P EST MOD 30 MIN: CPT | Performed by: INTERNAL MEDICINE

## 2020-09-03 PROCEDURE — 85025 COMPLETE CBC W/AUTO DIFF WBC: CPT | Performed by: INTERNAL MEDICINE

## 2020-09-03 PROCEDURE — 96372 THER/PROPH/DIAG INJ SC/IM: CPT

## 2020-09-03 PROCEDURE — 25010000002 EPOETIN ALFA PER 1000 UNITS: Performed by: INTERNAL MEDICINE

## 2020-09-03 PROCEDURE — 25010000002 CYANOCOBALAMIN PER 1000 MCG: Performed by: INTERNAL MEDICINE

## 2020-09-03 PROCEDURE — 36415 COLL VENOUS BLD VENIPUNCTURE: CPT

## 2020-09-03 RX ORDER — CYANOCOBALAMIN 1000 UG/ML
1000 INJECTION, SOLUTION INTRAMUSCULAR; SUBCUTANEOUS
Status: DISCONTINUED | OUTPATIENT
Start: 2020-09-03 | End: 2020-09-03 | Stop reason: HOSPADM

## 2020-09-03 RX ORDER — MULTIVITAMIN WITH IRON
TABLET ORAL DAILY
COMMUNITY
End: 2021-01-01

## 2020-09-03 RX ORDER — CALCITRIOL 0.25 UG/1
CAPSULE, LIQUID FILLED ORAL
COMMUNITY
Start: 2020-07-26 | End: 2021-01-01 | Stop reason: ALTCHOICE

## 2020-09-03 RX ORDER — UREA 200 MG/G
GEL TOPICAL
COMMUNITY
Start: 2020-07-27 | End: 2021-01-01 | Stop reason: ALTCHOICE

## 2020-09-03 RX ADMIN — ERYTHROPOIETIN 15000 UNITS: 20000 INJECTION, SOLUTION INTRAVENOUS; SUBCUTANEOUS at 11:28

## 2020-09-03 RX ADMIN — CYANOCOBALAMIN 1000 MCG: 1000 INJECTION, SOLUTION INTRAMUSCULAR at 11:28

## 2020-09-03 NOTE — PROGRESS NOTES
.     REASONS FOR FOLLOWUP: Anemia    HISTORY OF PRESENT ILLNESS:  The patient is a 80 y.o. year old female  who is here for follow-up with the above-mentioned history.    Progressively more fatigued.  Also having a problem with headaches.  Has been seeing a headache specialist for this.    Increase in dyspnea on exertion.  No chest pain.  No bleeding.    Past Medical History:   Diagnosis Date   • Anemia     Iron deficiency   • Anxiety    • Cardiomyopathy (CMS/HCC)     S/P pacemaker and defibrillator   • CHF (congestive heart failure) (CMS/HCC)    • Chronic renal failure, stage 4 (severe) (CMS/HCC)    • Colon polyp    • Coronary artery disease     pacemaker, defibrillator   • Depression    • Dizzy    • Gastroparesis    • GAVE (gastric antral vascular ectasia)    • GERD (gastroesophageal reflux disease)    • Gout    • Hemorrhoids    • Hiatal hernia    • History of Clostridium difficile colitis 2013   • History of kidney stones    • History of pancreatitis     2014   • History of skin cancer    • History of transfusion     MULTIPLE    • Hyperlipidemia    • Hypertension    • Hypothyroidism    • Left bundle branch block    • Mitral and aortic valve disease    • Mitral valve insufficiency    • Myoclonus     S/P Depakote   • Osteoarthritis    • Pancytopenia (CMS/HCC)    • Peripheral neuropathy    • Presence of cardiac pacemaker     AND DEFIBRILLATOR   • Pulmonary hypertension (CMS/HCC)    • SOB (shortness of breath) on exertion    • Spinal stenosis    • Stroke (CMS/HCC)      Past Surgical History:   Procedure Laterality Date   • APPENDECTOMY N/A    • ARTERIOVENOUS FISTULA/SHUNT SURGERY Right 2/18/2019    Procedure: RIGHT BASILIC FISTULA CREATION WITHOUT TRANSPOSITION;  Surgeon: Royer Dozier MD;  Location: [facility];  Service: Vascular   • ARTERIOVENOUS FISTULA/SHUNT SURGERY Right 5/31/2019    Procedure: RIGHT 2ND STAGE BASILIC VEIN CREATION;  Surgeon: Royer Dozier MD;  Location: [facility] OR;   Service: Vascular   • CARDIAC CATHETERIZATION Left 03/28/2006    Arterial catheter insertion, cath left ventriculography, coronary angiography and left heart catheterization-Dr. Estevan Matamoros   • CARDIAC DEFIBRILLATOR PLACEMENT N/A 11/30/2007    Biventricular implantable cardioverter defibrillator-Dr. Leandro Huber   • CARDIAC ELECTROPHYSIOLOGY PROCEDURE N/A 10/28/2019    Procedure: GENERATOR CHANGE BI-V ICD  medtronic;  Surgeon: Leandro Huber MD;  Location: [facility] INVASIVE LOCATION;  Service: Cardiology   • CATARACT EXTRACTION Bilateral 2011   • COLONOSCOPY N/A 2014    done at [facility]   • CYSTECTOMY N/A     Ovarian cystectomy   • ENDOSCOPY N/A 04/28/2006    EGD with biopsies. Paraesophageal hiatal hernia from 34 to 44 cm, antral gastritis-Dr. Nehemiah Beal   • ENDOSCOPY N/A 09/29/2004    Hiatal hernia, gastritis and an erosion of the pylorus-Dr. Yang Pavon   • ENDOSCOPY N/A 9/1/2019    Procedure: ESOPHAGOGASTRODUODENOSCOPY with APC;  Surgeon: Anuel Roach MD;  Location: [facility] ENDOSCOPY;  Service: Gastroenterology   • ENDOSCOPY N/A 1/28/2020    Procedure: ESOPHAGOGASTRODUODENOSCOPY with APC;  Surgeon: Anuel Roach MD;  Location: [facility] ENDOSCOPY;  Service: Gastroenterology   • ENDOSCOPY N/A 4/21/2020    Procedure: ESOPHAGOGASTRODUODENOSCOPY WITH APC;  Surgeon: Anuel Roach MD;  Location: [facility] ENDOSCOPY;  Service: Gastroenterology;  Laterality: N/A;  PRE- MELENA, GAVE  POST- ESOPHAGITIS, GAVE   • ENTEROSCOPY SMALL BOWEL N/A 10/30/2006    Small bowel enteroscopy with biopsies-Dr. Rory Sevilla   • FLEXIBLE SIGMOIDOSCOPY N/A 09/29/2004    Unsuccessful colonoscopy due to poop prep, a very tortuous sigmoid, bowel prep in the form of enema given and a barium enema was obtained-Dr. Yang Pavon   • FRACTURE SURGERY  2015    Broke big toe   • HEMATOMA EVACUATION TRUNK N/A 02/07/2014    Pocket evacuation of hematoma at pacemaker site-Dr. Leandro Huber   •  HEMORRHOIDECTOMY N/A 04/19/2004    Flexible sigmoidoscopy and stapled hemorrhoidectomy-Dr. Yang Pavon   • HYSTERECTOMY Bilateral 1977   • IMPLANTABLE CARDIOVERTER DEFIBRILLATOR LEAD REPLACEMENT/POCKET REVISION Left 3/28/2016    Procedure: AUTOMATIC IMPLANTABLE CARDIOVERTER DEFIBRILLATOR LEAD REPLACEMENT WITH LASER LEAD EXTRACTION;  Surgeon: Leandro Huber MD;  Location: [facility] HYBRID OR 18/19;  Service:    • IMPLANTABLE CARDIOVERTER DEFIBRILLATOR LEAD REPLACEMENT/POCKET REVISION N/A 01/13/2014    Biventricular ICD replacement-Dr. Jillian Huber   • LAPAROSCOPY REPAIR HIATAL HERNIA N/A 06/27/2006    Laparoscopic paraesophageal hiatal hernia repair with Nissen fundoplication and cholecystectomy-Dr. Sean Yoon   • NATALIE GONZALEZ ([facility]) PROCEDURE     • OK INSJ TUNNELED CVC W/O SUBQ PORT/ AGE 3 YR/> Right 10/3/2017    Procedure: Right internal jugular port placement;  Surgeon: Tiffanie Couch MD;  Location: [facility] MAIN OR;  Service: General   • TOE SURGERY Left     SET BIG TOE   • TOTAL KNEE ARTHROPLASTY Left 08/2014   • VENOUS ACCESS DEVICE (PORT) INSERTION Right        MEDICATIONS    Current Outpatient Medications:   •  acetaminophen (TYLENOL) 325 MG tablet, Take 2 tablets by mouth Every 4 (Four) Hours As Needed for Mild Pain ., Disp: , Rfl:   •  aspirin (aspirin) 81 MG EC tablet, Take 81 mg by mouth Daily., Disp: , Rfl:   •  atorvastatin (LIPITOR) 20 MG tablet, Take 20 mg by mouth Daily., Disp: , Rfl:   •  calcitriol (ROCALTROL) 0.25 MCG capsule, , Disp: , Rfl:   •  calcitriol (ROCALTROL) 0.5 MCG capsule, Take 0.5 mcg by mouth Every Other Day. Alternate days with calcitriol 0.25mcg, Disp: , Rfl:   •  calcium carbonate (OS-RAYMOND) 600 MG tablet, Take 600 mg by mouth Daily., Disp: , Rfl:   •  carbidopa-levodopa ER (SINEMET CR)  MG per tablet, Take 1 tablet by mouth Every Evening., Disp: , Rfl:   •  carvedilol (COREG) 12.5 MG tablet, Take 1 tablet by mouth Daily., Disp: 90 tablet, Rfl:  1  •  Cholecalciferol (VITAMIN D3 PO), Take  by mouth., Disp: , Rfl:   •  Cholecalciferol (VITAMIN D3) 5000 units capsule capsule, Take 5,000 Units by mouth Daily., Disp: , Rfl:   •  diazepam (VALIUM) 5 MG tablet, Take 5 mg by mouth Every Night., Disp: , Rfl:   •  DULoxetine (CYMBALTA) 30 MG capsule, Take 30 mg by mouth Daily., Disp: , Rfl:   •  erythromycin base (E-MYCIN) 250 MG tablet, Take 0.5 tablets by mouth 4 (Four) Times a Day., Disp: 180 tablet, Rfl: 3  •  Famotidine (PEPCID PO), Take  by mouth Daily., Disp: , Rfl:   •  febuxostat (ULORIC) 40 MG tablet, Take 40 mg by mouth Daily., Disp: , Rfl:   •  Gabapentin, Once-Daily, (GRALISE) 300 MG tablet, Take 300 mg by mouth Daily With Dinner. MAY REPEAT LATE EVENING IF NEEDED, Disp: , Rfl:   •  Glucosamine-Chondroit-Vit C-Mn (GLUCOSAMINE CHONDROITIN COMPLX) capsule, Take 1,500 mg by mouth Every Other Day., Disp: , Rfl:   •  HYDROcodone-acetaminophen (NORCO) 5-325 MG per tablet, Take 1 tablet by mouth Every 6 (Six) Hours As Needed for Severe Pain ., Disp: 12 tablet, Rfl: 0  •  levothyroxine (SYNTHROID, LEVOTHROID) 25 MCG tablet, Take 25 mcg by mouth Daily., Disp: , Rfl:   •  Magnesium 250 MG tablet, Take  by mouth Daily., Disp: , Rfl:   •  magnesium oxide 250 MG tablet, Take 250 mg by mouth Daily., Disp: , Rfl:   •  Multiple Vitamin (MULTI-DAY VITAMINS) tablet, Take 1 tablet by mouth Daily. HOLD FOR SURGERY, Disp: , Rfl:   •  MULTIPLE VITAMINS-IRON PO, Take  by mouth Daily., Disp: , Rfl:   •  polyethylene glycol (MIRALAX) powder, Take 17 g by mouth Daily As Needed., Disp: , Rfl:   •  psyllium (METAMUCIL) 0.52 g capsule, Take 0.52 g by mouth., Disp: , Rfl:   •  rotigotine (NEUPRO) 6 MG/24HR patch, Place 1 patch on the skin as directed by provider Daily., Disp: , Rfl:   •  urea (CARMOL) 20 % cream, , Disp: , Rfl:   •  Vitamin E 400 UNITS tablet, Take 400 Units by mouth Daily., Disp: , Rfl:   No current facility-administered medications for this visit.  "    Facility-Administered Medications Ordered in Other Visits:   •  heparin flush (porcine) 100 UNIT/ML injection 500 Units, 500 Units, Intravenous, PRN, Anuel Scott MD, 500 Units at 11/27/17 1347  •  heparin flush (porcine) 100 UNIT/ML injection 500 Units, 500 Units, Intravenous, PRN, Neno Merritt II, MD, 500 Units at 01/18/20 1359  •  sodium chloride 0.9 % flush 10 mL, 10 mL, Intravenous, PRN, Anuel Scott MD, 10 mL at 11/27/17 1347  •  sodium chloride 0.9 % flush 10 mL, 10 mL, Intravenous, PRN, Neno Merritt II, MD, 10 mL at 01/18/20 1359  •  sodium chloride 0.9 % infusion 250 mL, 250 mL, Intravenous, PRN, Neno Merritt II, MD    ALLERGIES:     Allergies   Allergen Reactions   • Chlorhexidine Rash and Itching     Patient states \"blue dye\" in chlorhexidine.  States the orange and clear are OK to use   • Codeine Unknown - Low Severity     HYPER, DOES NOT HELP PAIN       SOCIAL HISTORY:       Social History     Socioeconomic History   • Marital status:      Spouse name: Zackery   • Number of children: 5   • Years of education: 1 yr college   • Highest education level: Not on file   Occupational History     Employer: RETIRED   Tobacco Use   • Smoking status: Never Smoker   • Smokeless tobacco: Never Used   • Tobacco comment: caffeine use - coffee and soda   Substance and Sexual Activity   • Alcohol use: No   • Drug use: No   • Sexual activity: Defer   Lifestyle   • Physical activity:     Days per week: 0 days     Minutes per session: 0 min   • Stress: Not on file         FAMILY HISTORY:  Family History   Problem Relation Age of Onset   • Coronary artery disease Other    • Dementia Other    • Kidney disease Other    • Arthritis Father    • Early death Father         Kidney failure   • Kidney disease Father    • Heart disease Mother    • Mental illness Mother         Dementia   • Malig Hyperthermia Neg Hx    • Colon cancer Neg Hx    • Colon polyps Neg Hx        REVIEW OF SYSTEMS:  Review of " "Systems   Constitutional: Negative for activity change.   HENT: Negative for nosebleeds and trouble swallowing.    Respiratory: Negative for shortness of breath and wheezing.    Cardiovascular: Negative for chest pain and palpitations.   Gastrointestinal: Negative for constipation, diarrhea and nausea.   Genitourinary: Negative for dysuria and hematuria.   Musculoskeletal: Negative for arthralgias and myalgias.   Skin: Negative for rash and wound.   Neurological: Negative for seizures and syncope.   Hematological: Negative for adenopathy. Does not bruise/bleed easily.   Psychiatric/Behavioral: Negative for confusion.            Vitals:    09/03/20 1019   BP: (!) 194/77   Pulse: 66   Resp: 18   Temp: 97.3 °F (36.3 °C)   TempSrc: Skin   SpO2: 99%   Weight: 72.5 kg (159 lb 14.4 oz)   Height: 170.2 cm (67.01\")   PainSc: 0-No pain     Current Status 9/3/2020   ECOG score 1        PHYSICAL EXAM:        CONSTITUTIONAL:  Vital signs reviewed.  No distress, looks comfortable.  EYES:  Conjunctiva and lids unremarkable.  PERRLA  EARS,NOSE,MOUTH,THROAT:  Ears and nose appear unremarkable.  Lips, teeth, gums appear unremarkable.  RESPIRATORY:  Normal respiratory effort.  Lungs clear to auscultation bilaterally.  CARDIOVASCULAR:  Normal S1, S2.  No murmurs rubs or gallops.  No significant lower extremity edema.  GASTROINTESTINAL: Abdomen appears unremarkable.  Nontender.  No hepatomegaly.  No splenomegaly.  LYMPHATIC:  No cervical, supraclavicular, axillary lymphadenopathy.  SKIN:  Warm.  No rashes.  PSYCHIATRIC:  Normal judgment and insight.  Normal mood and affect.             WBC   Date/Time Value Ref Range Status   09/03/2020 10:06 AM 4.70 3.40 - 10.80 10*3/mm3 Final   08/06/2020 12:54 PM 6.34 4.5 - 11.0 10*3/uL Final     Hemoglobin   Date/Time Value Ref Range Status   09/03/2020 10:06 AM 9.8 (L) 12.0 - 15.9 g/dL Final   08/06/2020 12:54 PM 9.4 (L) 12.0 - 16.0 g/dL Final     Platelets   Date/Time Value Ref Range Status "   09/03/2020 10:06  140 - 450 10*3/mm3 Final   08/06/2020 12:54  140 - 440 10*3/uL Final       Assessment/Plan   Anemia due to acute blood loss  - CBC & Differential  - Ferritin  - Iron Profile  - Retic With IRF & RET-He  - CBC & Differential  - CBC & Differential  - Ferritin  - Iron Profile  - Retic With IRF & RET-He  - CBC & Differential  - CBC & Differential       *Anemia due to stage IV chronic kidney disease.    · Procrit if Hb <10.  Hb 9.8    *Admitted 8/29/2019-9/2/2019 due to Hb of 5.  Transfused 2 units.  Heme positive stools.  EGD revealed GAVE.  Laser photocoagulation  · She states Dr. Roach plans EGD every 3 months.    *Iron deficiency anemia.  · Does not respond to oral iron due to poor absorption.  · 2 doses Injectafer late August 2019 resulted in normal iron labs and improvement of Hb from 9.4 up to 11.1 on 9/26/2019.  · Hb down to 10.3, 10/3/2019.  · Iron labs normal 10/31/2019, ferritin 200, 21% saturation.  · Hb dropped to 7.8 on 11/26/2019, requiring transfusion.  · Hb dropped to 6.9 on 12/19/2019, requiring transfusion.  · Hb dropped to 6.5 on 2/12/2020, requiring transfusion  · Hb dropped to 7.8 on 2/18/2020, requiring transfusion.  · On 2/18/2020, ferritin 89, 13% iron saturation.  Gave 1 dose Injectafer.  · On 4/23/2020, ferritin 115, 5% saturation, give 2 doses Injectafer.  · On 6/18/2020, ferritin 461, 31% saturation, Hb 10.7.  · Hb 9.8.  On 8/20/2020, ferritin 345, 29% saturation.  No need for IV iron at this time.    *Source of iron deficiency.  · Previously followed regularly with Dr. Earl Avelar.  · States she cannot have colonoscopies due to tortuous colon and therefore has virtual colonoscopies.  · She states she saw Dr. Avelar after the 6/27/2019 visit with me due to recent dark stools.  She states Dr. Avelar did a rectal exam which was heme negative and recommended no further evaluation.  · On 8/22/2019, I told her I suspect she is having occult GI bleeding considering  she is needing IV iron frequently.  However, with new stroke mid May 2019 and the addition of Plavix to aspirin, risk may be too great to stop antiplatelet agents for further GI evaluation.  Patient and  agree.  · During late August 2019 hospitalization for GI bleed with Hb of 5, EGD through Dr. Roach revealed G [address].  Laser photocoagulation.  · Subsequently, following with Dr. Roach.  He initially planned EGD every 3 months.  · However, on the 10/18/2019 office visit, he changed the plan to be return to see him mid February 2020 and stated he could retreat her GAVE if she develops a significant drop in her Hb.  · Dr. Roach stated on the 2/11/2020 office note last EGD could not be performed due to a large food bezoar.  He gave her erythromycin and reviewed dietary changes for gastroparesis in hopes of being able to repeat EGD with APC.  · EGD with APC on 4/21/2020, Dr. Roach.    · She states she is maintaining follow-up with Dr. Roach.    *Macrocytosis.  B12 and folate unremarkable  MCV 97.7    *Reticulocytosis.  · On 8/22/2019, unremarkable: Direct [place], haptoglobin, bilirubin ( with normal range up to 214.  Therefore, likely not significant).    *Thrombocytopenia.  Intermittent since 9/19/2019.      *Circulating nucleated red blood cells.  Intermittent.  Could consider a bone marrow biopsy at some point.  I suspect this is occurring as her bone marrow is trying to increase production after bleeding episodes.  Nucleated red blood cells seen on 10/31/2019 and again, 11/6/2019    *Stroke mid August 2019.  Presumed.  Could not have MRI due to pacemaker.  Plavix was added to aspirin.    *Increase in fatigue and headaches.  Seeing a headache specialist.  I do not think her increase in fatigue would be due to anemia since her Hb is relatively stable.    Plan  · No need for Injectafer today  · Every 2-week CBC.  Procrit if Hb <10.   · (Previously on every 2-week CBC.  But  required transfusions.  Therefore, she was changed back to weekly.  However, 6/25/2020, she requested to try every 2 weeks again)  · Iron labs 1 month and 2 months.  · MD 2-1/2 months  · MD 2 months or so.  1 wk prior: Iron labs   · She maintains follow-up with Dr. Roach        · Could consider a bone marrow biopsy in the future.    Male friend/family member assisted with history

## 2020-09-17 ENCOUNTER — LAB (OUTPATIENT)
Dept: OTHER | Facility: HOSPITAL | Age: 80
End: 2020-09-17

## 2020-09-17 ENCOUNTER — INFUSION (OUTPATIENT)
Dept: ONCOLOGY | Facility: HOSPITAL | Age: 80
End: 2020-09-17

## 2020-09-17 VITALS
TEMPERATURE: 97.1 F | OXYGEN SATURATION: 99 % | DIASTOLIC BLOOD PRESSURE: 77 MMHG | BODY MASS INDEX: 24.64 KG/M2 | HEIGHT: 67 IN | RESPIRATION RATE: 18 BRPM | SYSTOLIC BLOOD PRESSURE: 190 MMHG | HEART RATE: 79 BPM | WEIGHT: 157 LBS

## 2020-09-17 DIAGNOSIS — D62 ANEMIA DUE TO ACUTE BLOOD LOSS: ICD-10-CM

## 2020-09-17 DIAGNOSIS — N18.4 ANEMIA OF CHRONIC RENAL FAILURE, STAGE 4 (SEVERE) (HCC): Primary | ICD-10-CM

## 2020-09-17 DIAGNOSIS — D63.1 ANEMIA OF CHRONIC RENAL FAILURE, STAGE 4 (SEVERE) (HCC): Primary | ICD-10-CM

## 2020-09-17 DIAGNOSIS — Z45.2 ENCOUNTER FOR FITTING AND ADJUSTMENT OF VASCULAR CATHETER: ICD-10-CM

## 2020-09-17 LAB
25(OH)D3 SERPL-MCNC: 48 NG/ML (ref 30–100)
ALBUMIN SERPL-MCNC: 3.3 G/DL (ref 3.5–5.2)
ALBUMIN/GLOB SERPL: 1.3 G/DL
ALP SERPL-CCNC: 55 U/L (ref 39–117)
ALT SERPL W P-5'-P-CCNC: <5 U/L (ref 1–33)
ANION GAP SERPL CALCULATED.3IONS-SCNC: 9.2 MMOL/L (ref 5–15)
AST SERPL-CCNC: 20 U/L (ref 1–32)
BASOPHILS # BLD AUTO: 0.01 10*3/MM3 (ref 0–0.2)
BASOPHILS NFR BLD AUTO: 0.2 % (ref 0–1.5)
BILIRUB SERPL-MCNC: <0.2 MG/DL (ref 0–1.2)
BUN SERPL-MCNC: 34 MG/DL (ref 8–23)
BUN/CREAT SERPL: 15 (ref 7–25)
CALCIUM SPEC-SCNC: 9.3 MG/DL (ref 8.6–10.5)
CHLORIDE SERPL-SCNC: 104 MMOL/L (ref 98–107)
CO2 SERPL-SCNC: 27.8 MMOL/L (ref 22–29)
CREAT SERPL-MCNC: 2.27 MG/DL (ref 0.57–1)
DEPRECATED RDW RBC AUTO: 48 FL (ref 37–54)
EOSINOPHIL # BLD AUTO: 0.1 10*3/MM3 (ref 0–0.4)
EOSINOPHIL NFR BLD AUTO: 2 % (ref 0.3–6.2)
ERYTHROCYTE [DISTWIDTH] IN BLOOD BY AUTOMATED COUNT: 13.4 % (ref 12.3–15.4)
GFR SERPL CREATININE-BSD FRML MDRD: 21 ML/MIN/1.73
GLOBULIN UR ELPH-MCNC: 2.5 GM/DL
GLUCOSE SERPL-MCNC: 140 MG/DL (ref 65–99)
HCT VFR BLD AUTO: 31.8 % (ref 34–46.6)
HGB BLD-MCNC: 10 G/DL (ref 12–15.9)
IMM GRANULOCYTES # BLD AUTO: 0.02 10*3/MM3 (ref 0–0.05)
IMM GRANULOCYTES NFR BLD AUTO: 0.4 % (ref 0–0.5)
LYMPHOCYTES # BLD AUTO: 0.97 10*3/MM3 (ref 0.7–3.1)
LYMPHOCYTES NFR BLD AUTO: 19 % (ref 19.6–45.3)
MCH RBC QN AUTO: 30.4 PG (ref 26.6–33)
MCHC RBC AUTO-ENTMCNC: 31.4 G/DL (ref 31.5–35.7)
MCV RBC AUTO: 96.7 FL (ref 79–97)
MONOCYTES # BLD AUTO: 0.42 10*3/MM3 (ref 0.1–0.9)
MONOCYTES NFR BLD AUTO: 8.2 % (ref 5–12)
NEUTROPHILS NFR BLD AUTO: 3.59 10*3/MM3 (ref 1.7–7)
NEUTROPHILS NFR BLD AUTO: 70.2 % (ref 42.7–76)
NRBC BLD AUTO-RTO: 0 /100 WBC (ref 0–0.2)
PHOSPHATE SERPL-MCNC: 3.5 MG/DL (ref 2.5–4.5)
PLATELET # BLD AUTO: 164 10*3/MM3 (ref 140–450)
PMV BLD AUTO: 10.2 FL (ref 6–12)
POTASSIUM SERPL-SCNC: 4.6 MMOL/L (ref 3.5–5.2)
PROT SERPL-MCNC: 5.8 G/DL (ref 6–8.5)
PTH-INTACT SERPL-MCNC: 60.3 PG/ML (ref 15–65)
RBC # BLD AUTO: 3.29 10*6/MM3 (ref 3.77–5.28)
SODIUM SERPL-SCNC: 141 MMOL/L (ref 136–145)
URATE SERPL-MCNC: 6.6 MG/DL (ref 2.4–5.7)
WBC # BLD AUTO: 5.11 10*3/MM3 (ref 3.4–10.8)

## 2020-09-17 PROCEDURE — 84550 ASSAY OF BLOOD/URIC ACID: CPT | Performed by: INTERNAL MEDICINE

## 2020-09-17 PROCEDURE — 96523 IRRIG DRUG DELIVERY DEVICE: CPT

## 2020-09-17 PROCEDURE — G0463 HOSPITAL OUTPT CLINIC VISIT: HCPCS

## 2020-09-17 PROCEDURE — 85025 COMPLETE CBC W/AUTO DIFF WBC: CPT | Performed by: INTERNAL MEDICINE

## 2020-09-17 PROCEDURE — 25010000003 HEPARIN LOCK FLUSH PER 10 UNITS: Performed by: INTERNAL MEDICINE

## 2020-09-17 PROCEDURE — 36415 COLL VENOUS BLD VENIPUNCTURE: CPT

## 2020-09-17 PROCEDURE — 82306 VITAMIN D 25 HYDROXY: CPT | Performed by: INTERNAL MEDICINE

## 2020-09-17 PROCEDURE — 80053 COMPREHEN METABOLIC PANEL: CPT | Performed by: INTERNAL MEDICINE

## 2020-09-17 PROCEDURE — 83970 ASSAY OF PARATHORMONE: CPT | Performed by: INTERNAL MEDICINE

## 2020-09-17 PROCEDURE — 84100 ASSAY OF PHOSPHORUS: CPT | Performed by: INTERNAL MEDICINE

## 2020-09-17 RX ORDER — HEPARIN SODIUM (PORCINE) LOCK FLUSH IV SOLN 100 UNIT/ML 100 UNIT/ML
500 SOLUTION INTRAVENOUS AS NEEDED
Status: CANCELLED | OUTPATIENT
Start: 2020-09-17

## 2020-09-17 RX ORDER — SODIUM CHLORIDE 0.9 % (FLUSH) 0.9 %
10 SYRINGE (ML) INJECTION AS NEEDED
Status: DISCONTINUED | OUTPATIENT
Start: 2020-09-17 | End: 2020-09-17 | Stop reason: HOSPADM

## 2020-09-17 RX ORDER — HEPARIN SODIUM (PORCINE) LOCK FLUSH IV SOLN 100 UNIT/ML 100 UNIT/ML
500 SOLUTION INTRAVENOUS AS NEEDED
Status: DISCONTINUED | OUTPATIENT
Start: 2020-09-17 | End: 2020-09-17 | Stop reason: HOSPADM

## 2020-09-17 RX ORDER — SODIUM CHLORIDE 0.9 % (FLUSH) 0.9 %
10 SYRINGE (ML) INJECTION AS NEEDED
Status: CANCELLED | OUTPATIENT
Start: 2020-09-17

## 2020-09-17 RX ADMIN — SODIUM CHLORIDE, PRESERVATIVE FREE 500 UNITS: 5 INJECTION INTRAVENOUS at 11:16

## 2020-09-17 RX ADMIN — Medication 10 ML: at 11:16

## 2020-09-17 NOTE — NURSING NOTE
Lab Results   Component Value Date    WBC 5.11 09/17/2020    HGB 10.0 (L) 09/17/2020    HCT 31.8 (L) 09/17/2020    MCV 96.7 09/17/2020     09/17/2020     Pt is here for lab with RN review.  CBC reviewed with pt, counts are stable for this pt at this time. Pt has no new complaints.  Procrit not indicated for Hgb 10.  Pt to return in 2 weeks and knows to call sooner with concerns.  Labs collected via port per written orders from Dr. Abraham.

## 2020-10-01 ENCOUNTER — INFUSION (OUTPATIENT)
Dept: ONCOLOGY | Facility: HOSPITAL | Age: 80
End: 2020-10-01

## 2020-10-01 ENCOUNTER — LAB (OUTPATIENT)
Dept: ONCOLOGY | Facility: HOSPITAL | Age: 80
End: 2020-10-01

## 2020-10-01 VITALS — BODY MASS INDEX: 23.87 KG/M2 | WEIGHT: 152.4 LBS

## 2020-10-01 DIAGNOSIS — D50.8 OTHER IRON DEFICIENCY ANEMIA: ICD-10-CM

## 2020-10-01 DIAGNOSIS — IMO0001 ADVERSE EFFECT OF IRON OR ITS COMPOUND, SUBSEQUENT ENCOUNTER: ICD-10-CM

## 2020-10-01 DIAGNOSIS — T45.4X5A ADVERSE EFFECT OF IRON, INITIAL ENCOUNTER: ICD-10-CM

## 2020-10-01 DIAGNOSIS — E53.8 VITAMIN B 12 DEFICIENCY: ICD-10-CM

## 2020-10-01 DIAGNOSIS — N18.4 ANEMIA OF CHRONIC RENAL FAILURE, STAGE 4 (SEVERE) (HCC): Primary | ICD-10-CM

## 2020-10-01 DIAGNOSIS — E53.8 B12 DEFICIENCY: ICD-10-CM

## 2020-10-01 DIAGNOSIS — D62 ANEMIA DUE TO ACUTE BLOOD LOSS: ICD-10-CM

## 2020-10-01 DIAGNOSIS — Z45.2 ENCOUNTER FOR FITTING AND ADJUSTMENT OF VASCULAR CATHETER: ICD-10-CM

## 2020-10-01 DIAGNOSIS — N18.4 ANEMIA OF CHRONIC RENAL FAILURE, STAGE 4 (SEVERE) (HCC): ICD-10-CM

## 2020-10-01 DIAGNOSIS — N18.4 CKD (CHRONIC KIDNEY DISEASE) STAGE 4, GFR 15-29 ML/MIN (HCC): ICD-10-CM

## 2020-10-01 DIAGNOSIS — D63.1 ANEMIA OF CHRONIC RENAL FAILURE, STAGE 4 (SEVERE) (HCC): ICD-10-CM

## 2020-10-01 DIAGNOSIS — D63.1 ANEMIA OF CHRONIC RENAL FAILURE, STAGE 4 (SEVERE) (HCC): Primary | ICD-10-CM

## 2020-10-01 PROBLEM — D64.9 ABSOLUTE ANEMIA: Status: ACTIVE | Noted: 2020-10-01

## 2020-10-01 LAB
ABO GROUP BLD: NORMAL
BASOPHILS # BLD AUTO: 0.01 10*3/MM3 (ref 0–0.2)
BASOPHILS NFR BLD AUTO: 0.2 % (ref 0–1.5)
BLD GP AB SCN SERPL QL: NEGATIVE
DEPRECATED RDW RBC AUTO: 49.7 FL (ref 37–54)
EOSINOPHIL # BLD AUTO: 0.05 10*3/MM3 (ref 0–0.4)
EOSINOPHIL NFR BLD AUTO: 0.8 % (ref 0.3–6.2)
ERYTHROCYTE [DISTWIDTH] IN BLOOD BY AUTOMATED COUNT: 13.9 % (ref 12.3–15.4)
FERRITIN SERPL-MCNC: 284.2 NG/ML (ref 13–150)
HCT VFR BLD AUTO: 26 % (ref 34–46.6)
HGB BLD-MCNC: 8 G/DL (ref 12–15.9)
HGB RETIC QN AUTO: 34.6 PG (ref 29.8–36.1)
IMM GRANULOCYTES # BLD AUTO: 0.01 10*3/MM3 (ref 0–0.05)
IMM GRANULOCYTES NFR BLD AUTO: 0.2 % (ref 0–0.5)
IMM RETICS NFR: 9.5 % (ref 3–15.8)
IRON 24H UR-MRATE: 41 MCG/DL (ref 37–145)
IRON SATN MFR SERPL: 18 % (ref 20–50)
LYMPHOCYTES # BLD AUTO: 0.65 10*3/MM3 (ref 0.7–3.1)
LYMPHOCYTES NFR BLD AUTO: 10.6 % (ref 19.6–45.3)
MCH RBC QN AUTO: 30.3 PG (ref 26.6–33)
MCHC RBC AUTO-ENTMCNC: 30.8 G/DL (ref 31.5–35.7)
MCV RBC AUTO: 98.5 FL (ref 79–97)
MONOCYTES # BLD AUTO: 0.36 10*3/MM3 (ref 0.1–0.9)
MONOCYTES NFR BLD AUTO: 5.9 % (ref 5–12)
NEUTROPHILS NFR BLD AUTO: 5.04 10*3/MM3 (ref 1.7–7)
NEUTROPHILS NFR BLD AUTO: 82.3 % (ref 42.7–76)
NRBC BLD AUTO-RTO: 0 /100 WBC (ref 0–0.2)
PLATELET # BLD AUTO: 163 10*3/MM3 (ref 140–450)
PMV BLD AUTO: 10 FL (ref 6–12)
RBC # BLD AUTO: 2.64 10*6/MM3 (ref 3.77–5.28)
RETICS # AUTO: 0.04 10*6/MM3 (ref 0.02–0.13)
RETICS/RBC NFR AUTO: 1.33 % (ref 0.7–1.9)
RH BLD: NEGATIVE
T&S EXPIRATION DATE: NORMAL
TIBC SERPL-MCNC: 224 MCG/DL (ref 298–536)
TRANSFERRIN SERPL-MCNC: 150 MG/DL (ref 200–360)
WBC # BLD AUTO: 6.12 10*3/MM3 (ref 3.4–10.8)

## 2020-10-01 PROCEDURE — 86850 RBC ANTIBODY SCREEN: CPT | Performed by: NURSE PRACTITIONER

## 2020-10-01 PROCEDURE — 86901 BLOOD TYPING SEROLOGIC RH(D): CPT | Performed by: NURSE PRACTITIONER

## 2020-10-01 PROCEDURE — 86923 COMPATIBILITY TEST ELECTRIC: CPT

## 2020-10-01 PROCEDURE — 25010000002 EPOETIN ALFA PER 1000 UNITS: Performed by: INTERNAL MEDICINE

## 2020-10-01 PROCEDURE — 25010000002 CYANOCOBALAMIN PER 1000 MCG

## 2020-10-01 PROCEDURE — 85025 COMPLETE CBC W/AUTO DIFF WBC: CPT | Performed by: INTERNAL MEDICINE

## 2020-10-01 PROCEDURE — 82728 ASSAY OF FERRITIN: CPT | Performed by: INTERNAL MEDICINE

## 2020-10-01 PROCEDURE — 25010000003 HEPARIN LOCK FLUSH PER 10 UNITS: Performed by: INTERNAL MEDICINE

## 2020-10-01 PROCEDURE — 84466 ASSAY OF TRANSFERRIN: CPT | Performed by: INTERNAL MEDICINE

## 2020-10-01 PROCEDURE — 96372 THER/PROPH/DIAG INJ SC/IM: CPT

## 2020-10-01 PROCEDURE — 85046 RETICYTE/HGB CONCENTRATE: CPT | Performed by: INTERNAL MEDICINE

## 2020-10-01 PROCEDURE — 86900 BLOOD TYPING SEROLOGIC ABO: CPT | Performed by: NURSE PRACTITIONER

## 2020-10-01 PROCEDURE — 83540 ASSAY OF IRON: CPT | Performed by: INTERNAL MEDICINE

## 2020-10-01 RX ORDER — HEPARIN SODIUM (PORCINE) LOCK FLUSH IV SOLN 100 UNIT/ML 100 UNIT/ML
500 SOLUTION INTRAVENOUS AS NEEDED
Status: CANCELLED | OUTPATIENT
Start: 2020-10-01

## 2020-10-01 RX ORDER — ACETAMINOPHEN 325 MG/1
650 TABLET ORAL ONCE
Status: CANCELLED | OUTPATIENT
Start: 2020-10-03 | End: 2020-10-01

## 2020-10-01 RX ORDER — SODIUM CHLORIDE 0.9 % (FLUSH) 0.9 %
10 SYRINGE (ML) INJECTION AS NEEDED
Status: DISCONTINUED | OUTPATIENT
Start: 2020-10-01 | End: 2020-10-01 | Stop reason: HOSPADM

## 2020-10-01 RX ORDER — SODIUM CHLORIDE 0.9 % (FLUSH) 0.9 %
10 SYRINGE (ML) INJECTION AS NEEDED
Status: CANCELLED | OUTPATIENT
Start: 2020-10-01

## 2020-10-01 RX ORDER — CYANOCOBALAMIN 1000 UG/ML
1000 INJECTION, SOLUTION INTRAMUSCULAR; SUBCUTANEOUS
Status: CANCELLED | OUTPATIENT
Start: 2020-10-01

## 2020-10-01 RX ORDER — CYANOCOBALAMIN 1000 UG/ML
1000 INJECTION, SOLUTION INTRAMUSCULAR; SUBCUTANEOUS
Status: CANCELLED | OUTPATIENT
Start: 2020-10-29

## 2020-10-01 RX ORDER — DIPHENHYDRAMINE HCL 25 MG
25 CAPSULE ORAL ONCE
Status: CANCELLED | OUTPATIENT
Start: 2020-10-03 | End: 2020-10-01

## 2020-10-01 RX ORDER — CYANOCOBALAMIN 1000 UG/ML
1000 INJECTION, SOLUTION INTRAMUSCULAR; SUBCUTANEOUS ONCE
Status: DISCONTINUED | OUTPATIENT
Start: 2020-10-01 | End: 2020-10-01 | Stop reason: HOSPADM

## 2020-10-01 RX ORDER — HEPARIN SODIUM (PORCINE) LOCK FLUSH IV SOLN 100 UNIT/ML 100 UNIT/ML
500 SOLUTION INTRAVENOUS AS NEEDED
Status: DISCONTINUED | OUTPATIENT
Start: 2020-10-01 | End: 2020-10-01 | Stop reason: HOSPADM

## 2020-10-01 RX ORDER — CYANOCOBALAMIN 1000 UG/ML
1000 INJECTION, SOLUTION INTRAMUSCULAR; SUBCUTANEOUS ONCE
Status: CANCELLED | OUTPATIENT
Start: 2020-10-01

## 2020-10-01 RX ORDER — SODIUM CHLORIDE 9 MG/ML
250 INJECTION, SOLUTION INTRAVENOUS AS NEEDED
Status: CANCELLED | OUTPATIENT
Start: 2020-10-03

## 2020-10-01 RX ORDER — CYANOCOBALAMIN 1000 UG/ML
INJECTION, SOLUTION INTRAMUSCULAR; SUBCUTANEOUS
Status: COMPLETED
Start: 2020-10-01 | End: 2020-10-01

## 2020-10-01 RX ADMIN — Medication 500 UNITS: at 11:40

## 2020-10-01 RX ADMIN — ERYTHROPOIETIN 11000 UNITS: 20000 INJECTION, SOLUTION INTRAVENOUS; SUBCUTANEOUS at 11:39

## 2020-10-01 RX ADMIN — CYANOCOBALAMIN 1000 MCG: 1000 INJECTION, SOLUTION INTRAMUSCULAR at 11:39

## 2020-10-01 RX ADMIN — SODIUM CHLORIDE, PRESERVATIVE FREE 10 ML: 5 INJECTION INTRAVENOUS at 11:40

## 2020-10-01 NOTE — NURSING NOTE
Lab Results   Component Value Date    WBC 6.12 10/01/2020    HGB 8.0 (L) 10/01/2020    HCT 26.0 (L) 10/01/2020    MCV 98.5 (H) 10/01/2020     10/01/2020     Hgb reviewed with MEL Daley with v/o for 2 Units PRBC's.  T&C done and armband placed and pt instructed not to remove until after transfusion on Saturday morning.  Procrit given per protocol and B12 given.  Pt instructed to call with concerns prior to her next visit and v/u.

## 2020-10-01 NOTE — PROGRESS NOTES
Supportive plans for B12 injections and port flush plan discontinued and re-entered as RN unable to release medications associated with plans re: message about inactive diagnosis code.

## 2020-10-03 ENCOUNTER — INFUSION (OUTPATIENT)
Dept: ONCOLOGY | Facility: HOSPITAL | Age: 80
End: 2020-10-03

## 2020-10-03 DIAGNOSIS — D63.1 ANEMIA OF CHRONIC RENAL FAILURE, STAGE 4 (SEVERE) (HCC): ICD-10-CM

## 2020-10-03 DIAGNOSIS — D50.8 OTHER IRON DEFICIENCY ANEMIA: ICD-10-CM

## 2020-10-03 DIAGNOSIS — Z45.2 ENCOUNTER FOR FITTING AND ADJUSTMENT OF VASCULAR CATHETER: ICD-10-CM

## 2020-10-03 DIAGNOSIS — E53.8 VITAMIN B 12 DEFICIENCY: ICD-10-CM

## 2020-10-03 DIAGNOSIS — N18.4 ANEMIA OF CHRONIC RENAL FAILURE, STAGE 4 (SEVERE) (HCC): ICD-10-CM

## 2020-10-03 DIAGNOSIS — T45.4X5A ADVERSE EFFECT OF IRON, INITIAL ENCOUNTER: ICD-10-CM

## 2020-10-03 RX ORDER — SODIUM CHLORIDE 9 MG/ML
250 INJECTION, SOLUTION INTRAVENOUS AS NEEDED
Status: DISCONTINUED | OUTPATIENT
Start: 2020-10-03 | End: 2021-01-01 | Stop reason: HOSPADM

## 2020-10-03 RX ORDER — ACETAMINOPHEN 325 MG/1
650 TABLET ORAL ONCE
Status: DISCONTINUED | OUTPATIENT
Start: 2020-10-03 | End: 2021-01-01 | Stop reason: HOSPADM

## 2020-10-03 RX ORDER — DIPHENHYDRAMINE HCL 25 MG
25 CAPSULE ORAL ONCE
Status: DISCONTINUED | OUTPATIENT
Start: 2020-10-03 | End: 2021-01-01 | Stop reason: HOSPADM

## 2020-10-06 ENCOUNTER — TELEPHONE (OUTPATIENT)
Dept: ONCOLOGY | Facility: CLINIC | Age: 80
End: 2020-10-06

## 2020-10-06 NOTE — TELEPHONE ENCOUNTER
Patient states her blood pressure has been running high.  She would like to speak to Dr. Merritt or his nurse about this.  She said she was in the hospital-D/C yesterday.  She was at [facility].  She said her bp ran 180-190.  She said no one did anything about it and she is worried about it.  This morning her bp is 185/85.    Please call her at- [phone]

## 2020-10-15 ENCOUNTER — LAB (OUTPATIENT)
Dept: OTHER | Facility: HOSPITAL | Age: 80
End: 2020-10-15

## 2020-10-15 ENCOUNTER — INFUSION (OUTPATIENT)
Dept: ONCOLOGY | Facility: HOSPITAL | Age: 80
End: 2020-10-15

## 2020-10-15 ENCOUNTER — INFUSION (OUTPATIENT)
Dept: LAB | Facility: HOSPITAL | Age: 80
End: 2020-10-15

## 2020-10-15 VITALS
TEMPERATURE: 97.1 F | HEART RATE: 70 BPM | RESPIRATION RATE: 18 BRPM | DIASTOLIC BLOOD PRESSURE: 66 MMHG | SYSTOLIC BLOOD PRESSURE: 123 MMHG | HEIGHT: 67 IN | BODY MASS INDEX: 23.61 KG/M2 | OXYGEN SATURATION: 96 % | WEIGHT: 150.4 LBS

## 2020-10-15 DIAGNOSIS — Z45.2 ENCOUNTER FOR FITTING AND ADJUSTMENT OF VASCULAR CATHETER: Primary | ICD-10-CM

## 2020-10-15 DIAGNOSIS — D62 ANEMIA DUE TO ACUTE BLOOD LOSS: ICD-10-CM

## 2020-10-15 LAB
BASOPHILS # BLD AUTO: 0.03 10*3/MM3 (ref 0–0.2)
BASOPHILS NFR BLD AUTO: 0.3 % (ref 0–1.5)
DEPRECATED RDW RBC AUTO: 48.1 FL (ref 37–54)
EOSINOPHIL # BLD AUTO: 0.11 10*3/MM3 (ref 0–0.4)
EOSINOPHIL NFR BLD AUTO: 1.3 % (ref 0.3–6.2)
ERYTHROCYTE [DISTWIDTH] IN BLOOD BY AUTOMATED COUNT: 13.9 % (ref 12.3–15.4)
HCT VFR BLD AUTO: 32.1 % (ref 34–46.6)
HGB BLD-MCNC: 10.3 G/DL (ref 12–15.9)
IMM GRANULOCYTES # BLD AUTO: 0.04 10*3/MM3 (ref 0–0.05)
IMM GRANULOCYTES NFR BLD AUTO: 0.5 % (ref 0–0.5)
LYMPHOCYTES # BLD AUTO: 0.79 10*3/MM3 (ref 0.7–3.1)
LYMPHOCYTES NFR BLD AUTO: 9.1 % (ref 19.6–45.3)
MCH RBC QN AUTO: 30.6 PG (ref 26.6–33)
MCHC RBC AUTO-ENTMCNC: 32.1 G/DL (ref 31.5–35.7)
MCV RBC AUTO: 95.3 FL (ref 79–97)
MONOCYTES # BLD AUTO: 0.44 10*3/MM3 (ref 0.1–0.9)
MONOCYTES NFR BLD AUTO: 5.1 % (ref 5–12)
NEUTROPHILS NFR BLD AUTO: 7.26 10*3/MM3 (ref 1.7–7)
NEUTROPHILS NFR BLD AUTO: 83.7 % (ref 42.7–76)
NRBC BLD AUTO-RTO: 0 /100 WBC (ref 0–0.2)
PLATELET # BLD AUTO: 162 10*3/MM3 (ref 140–450)
PMV BLD AUTO: 10.4 FL (ref 6–12)
RBC # BLD AUTO: 3.37 10*6/MM3 (ref 3.77–5.28)
WBC # BLD AUTO: 8.67 10*3/MM3 (ref 3.4–10.8)

## 2020-10-15 PROCEDURE — 85025 COMPLETE CBC W/AUTO DIFF WBC: CPT | Performed by: INTERNAL MEDICINE

## 2020-10-15 RX ORDER — HEPARIN SODIUM (PORCINE) LOCK FLUSH IV SOLN 100 UNIT/ML 100 UNIT/ML
500 SOLUTION INTRAVENOUS AS NEEDED
Status: CANCELLED | OUTPATIENT
Start: 2020-10-15

## 2020-10-15 RX ORDER — SODIUM CHLORIDE 0.9 % (FLUSH) 0.9 %
10 SYRINGE (ML) INJECTION AS NEEDED
Status: DISCONTINUED | OUTPATIENT
Start: 2020-10-15 | End: 2020-10-28 | Stop reason: HOSPADM

## 2020-10-15 RX ORDER — SODIUM CHLORIDE 0.9 % (FLUSH) 0.9 %
10 SYRINGE (ML) INJECTION AS NEEDED
Status: CANCELLED | OUTPATIENT
Start: 2020-10-15

## 2020-10-15 RX ORDER — HEPARIN SODIUM (PORCINE) LOCK FLUSH IV SOLN 100 UNIT/ML 100 UNIT/ML
500 SOLUTION INTRAVENOUS AS NEEDED
Status: DISCONTINUED | OUTPATIENT
Start: 2020-10-15 | End: 2020-10-28 | Stop reason: HOSPADM

## 2020-10-15 NOTE — NURSING NOTE
Lab Results   Component Value Date    WBC 8.67 10/15/2020    HGB 10.3 (L) 10/15/2020    HCT 32.1 (L) 10/15/2020    MCV 95.3 10/15/2020     10/15/2020     Procrit not indicated for Hgb 10.3.  Pt voices trouble with her right leg giving out but no s/s of anemia.  Pt instructed to call with concerns and v/u.

## 2020-10-29 ENCOUNTER — INFUSION (OUTPATIENT)
Dept: ONCOLOGY | Facility: HOSPITAL | Age: 80
End: 2020-10-29

## 2020-10-29 ENCOUNTER — OFFICE VISIT (OUTPATIENT)
Dept: ONCOLOGY | Facility: CLINIC | Age: 80
End: 2020-10-29

## 2020-10-29 VITALS
DIASTOLIC BLOOD PRESSURE: 65 MMHG | TEMPERATURE: 97.3 F | RESPIRATION RATE: 16 BRPM | OXYGEN SATURATION: 96 % | HEART RATE: 75 BPM | HEIGHT: 67 IN | BODY MASS INDEX: 23.55 KG/M2 | SYSTOLIC BLOOD PRESSURE: 134 MMHG

## 2020-10-29 DIAGNOSIS — Z45.2 ENCOUNTER FOR FITTING AND ADJUSTMENT OF VASCULAR CATHETER: ICD-10-CM

## 2020-10-29 DIAGNOSIS — R73.9 HYPERGLYCEMIA: ICD-10-CM

## 2020-10-29 DIAGNOSIS — D62 ANEMIA DUE TO ACUTE BLOOD LOSS: Primary | ICD-10-CM

## 2020-10-29 DIAGNOSIS — E53.8 VITAMIN B 12 DEFICIENCY: ICD-10-CM

## 2020-10-29 DIAGNOSIS — N18.4 ANEMIA OF CHRONIC RENAL FAILURE, STAGE 4 (SEVERE) (HCC): Primary | ICD-10-CM

## 2020-10-29 DIAGNOSIS — D63.1 ANEMIA OF CHRONIC RENAL FAILURE, STAGE 4 (SEVERE) (HCC): Primary | ICD-10-CM

## 2020-10-29 DIAGNOSIS — N18.31 STAGE 3A CHRONIC KIDNEY DISEASE (HCC): ICD-10-CM

## 2020-10-29 DIAGNOSIS — N18.4 CHRONIC KIDNEY DISEASE, STAGE IV (SEVERE) (HCC): ICD-10-CM

## 2020-10-29 DIAGNOSIS — M10.00 IDIOPATHIC GOUT, UNSPECIFIED CHRONICITY, UNSPECIFIED SITE: ICD-10-CM

## 2020-10-29 DIAGNOSIS — E78.00 HYPERCHOLESTEROLEMIA: ICD-10-CM

## 2020-10-29 DIAGNOSIS — N18.30 CHRONIC KIDNEY DISEASE, STAGE III (MODERATE) (HCC): ICD-10-CM

## 2020-10-29 DIAGNOSIS — N25.81 SECONDARY HYPERPARATHYROIDISM OF RENAL ORIGIN (HCC): ICD-10-CM

## 2020-10-29 DIAGNOSIS — E03.9 ACQUIRED HYPOTHYROIDISM: Primary | ICD-10-CM

## 2020-10-29 LAB
ALBUMIN SERPL-MCNC: 3.4 G/DL (ref 3.5–5.2)
ALBUMIN/GLOB SERPL: 1.2 G/DL
ALP SERPL-CCNC: 54 U/L (ref 39–117)
ALT SERPL W P-5'-P-CCNC: 9 U/L (ref 1–33)
ANION GAP SERPL CALCULATED.3IONS-SCNC: 7.3 MMOL/L (ref 5–15)
AST SERPL-CCNC: 26 U/L (ref 1–32)
BASOPHILS # BLD AUTO: 0.02 10*3/MM3 (ref 0–0.2)
BASOPHILS NFR BLD AUTO: 0.3 % (ref 0–1.5)
BILIRUB SERPL-MCNC: 0.2 MG/DL (ref 0–1.2)
BUN SERPL-MCNC: 53 MG/DL (ref 8–23)
BUN/CREAT SERPL: 19.7 (ref 7–25)
CALCIUM SPEC-SCNC: 10 MG/DL (ref 8.6–10.5)
CHLORIDE SERPL-SCNC: 104 MMOL/L (ref 98–107)
CHOLEST SERPL-MCNC: 153 MG/DL (ref 0–200)
CO2 SERPL-SCNC: 32.7 MMOL/L (ref 22–29)
CREAT SERPL-MCNC: 2.69 MG/DL (ref 0.57–1)
DEPRECATED RDW RBC AUTO: 49.6 FL (ref 37–54)
EOSINOPHIL # BLD AUTO: 0.15 10*3/MM3 (ref 0–0.4)
EOSINOPHIL NFR BLD AUTO: 2.4 % (ref 0.3–6.2)
ERYTHROCYTE [DISTWIDTH] IN BLOOD BY AUTOMATED COUNT: 13.9 % (ref 12.3–15.4)
FERRITIN SERPL-MCNC: 359.1 NG/ML (ref 13–150)
GFR SERPL CREATININE-BSD FRML MDRD: 17 ML/MIN/1.73
GLOBULIN UR ELPH-MCNC: 2.8 GM/DL
GLUCOSE SERPL-MCNC: 72 MG/DL (ref 65–99)
HCT VFR BLD AUTO: 27.6 % (ref 34–46.6)
HDLC SERPL-MCNC: 49 MG/DL (ref 40–60)
HGB BLD-MCNC: 8.6 G/DL (ref 12–15.9)
HGB RETIC QN AUTO: 35.2 PG (ref 29.8–36.1)
IMM GRANULOCYTES # BLD AUTO: 0.02 10*3/MM3 (ref 0–0.05)
IMM GRANULOCYTES NFR BLD AUTO: 0.3 % (ref 0–0.5)
IMM RETICS NFR: 13.2 % (ref 3–15.8)
IRON 24H UR-MRATE: 49 MCG/DL (ref 37–145)
IRON SATN MFR SERPL: 21 % (ref 20–50)
LDLC SERPL CALC-MCNC: 76 MG/DL (ref 0–100)
LDLC/HDLC SERPL: 1.46 {RATIO}
LYMPHOCYTES # BLD AUTO: 0.71 10*3/MM3 (ref 0.7–3.1)
LYMPHOCYTES NFR BLD AUTO: 11.2 % (ref 19.6–45.3)
MCH RBC QN AUTO: 30.3 PG (ref 26.6–33)
MCHC RBC AUTO-ENTMCNC: 31.2 G/DL (ref 31.5–35.7)
MCV RBC AUTO: 97.2 FL (ref 79–97)
MONOCYTES # BLD AUTO: 0.5 10*3/MM3 (ref 0.1–0.9)
MONOCYTES NFR BLD AUTO: 7.9 % (ref 5–12)
NEUTROPHILS NFR BLD AUTO: 4.93 10*3/MM3 (ref 1.7–7)
NEUTROPHILS NFR BLD AUTO: 77.9 % (ref 42.7–76)
NRBC BLD AUTO-RTO: 0 /100 WBC (ref 0–0.2)
PHOSPHATE SERPL-MCNC: 3.6 MG/DL (ref 2.5–4.5)
PLATELET # BLD AUTO: 227 10*3/MM3 (ref 140–450)
PMV BLD AUTO: 9.1 FL (ref 6–12)
POTASSIUM SERPL-SCNC: 4.2 MMOL/L (ref 3.5–5.2)
PROT SERPL-MCNC: 6.2 G/DL (ref 6–8.5)
PTH-INTACT SERPL-MCNC: 68 PG/ML (ref 15–65)
RBC # BLD AUTO: 2.84 10*6/MM3 (ref 3.77–5.28)
RETICS # AUTO: 0.04 10*6/MM3 (ref 0.02–0.13)
RETICS/RBC NFR AUTO: 1.35 % (ref 0.7–1.9)
SODIUM SERPL-SCNC: 144 MMOL/L (ref 136–145)
TIBC SERPL-MCNC: 237 MCG/DL (ref 298–536)
TRANSFERRIN SERPL-MCNC: 159 MG/DL (ref 200–360)
TRIGL SERPL-MCNC: 162 MG/DL (ref 0–150)
TSH SERPL DL<=0.05 MIU/L-ACNC: 3.42 UIU/ML (ref 0.27–4.2)
URATE SERPL-MCNC: 6.1 MG/DL (ref 2.4–5.7)
VLDLC SERPL-MCNC: 28 MG/DL (ref 5–40)
WBC # BLD AUTO: 6.33 10*3/MM3 (ref 3.4–10.8)

## 2020-10-29 PROCEDURE — 25010000003 HEPARIN LOCK FLUSH PER 10 UNITS: Performed by: INTERNAL MEDICINE

## 2020-10-29 PROCEDURE — 99214 OFFICE O/P EST MOD 30 MIN: CPT | Performed by: INTERNAL MEDICINE

## 2020-10-29 PROCEDURE — 84466 ASSAY OF TRANSFERRIN: CPT | Performed by: INTERNAL MEDICINE

## 2020-10-29 PROCEDURE — 80061 LIPID PANEL: CPT | Performed by: INTERNAL MEDICINE

## 2020-10-29 PROCEDURE — 85046 RETICYTE/HGB CONCENTRATE: CPT | Performed by: INTERNAL MEDICINE

## 2020-10-29 PROCEDURE — 96372 THER/PROPH/DIAG INJ SC/IM: CPT

## 2020-10-29 PROCEDURE — 84443 ASSAY THYROID STIM HORMONE: CPT | Performed by: INTERNAL MEDICINE

## 2020-10-29 PROCEDURE — 84100 ASSAY OF PHOSPHORUS: CPT | Performed by: INTERNAL MEDICINE

## 2020-10-29 PROCEDURE — 84550 ASSAY OF BLOOD/URIC ACID: CPT | Performed by: INTERNAL MEDICINE

## 2020-10-29 PROCEDURE — 25010000002 EPOETIN ALFA PER 1000 UNITS: Performed by: INTERNAL MEDICINE

## 2020-10-29 PROCEDURE — 25010000002 CYANOCOBALAMIN PER 1000 MCG: Performed by: INTERNAL MEDICINE

## 2020-10-29 PROCEDURE — 83540 ASSAY OF IRON: CPT | Performed by: INTERNAL MEDICINE

## 2020-10-29 PROCEDURE — 80053 COMPREHEN METABOLIC PANEL: CPT | Performed by: INTERNAL MEDICINE

## 2020-10-29 PROCEDURE — 82728 ASSAY OF FERRITIN: CPT | Performed by: INTERNAL MEDICINE

## 2020-10-29 PROCEDURE — 85025 COMPLETE CBC W/AUTO DIFF WBC: CPT | Performed by: INTERNAL MEDICINE

## 2020-10-29 PROCEDURE — 83970 ASSAY OF PARATHORMONE: CPT | Performed by: INTERNAL MEDICINE

## 2020-10-29 RX ORDER — ATORVASTATIN CALCIUM 40 MG/1
40 TABLET, FILM COATED ORAL DAILY
COMMUNITY
Start: 2020-10-05 | End: 2021-01-03

## 2020-10-29 RX ORDER — HEPARIN SODIUM (PORCINE) LOCK FLUSH IV SOLN 100 UNIT/ML 100 UNIT/ML
500 SOLUTION INTRAVENOUS AS NEEDED
Status: DISCONTINUED | OUTPATIENT
Start: 2020-10-29 | End: 2020-10-29 | Stop reason: HOSPADM

## 2020-10-29 RX ORDER — INFLUENZA A VIRUS A/MICHIGAN/45/2015 X-275 (H1N1) ANTIGEN (FORMALDEHYDE INACTIVATED), INFLUENZA A VIRUS A/SINGAPORE/INFIMH-16-0019/2016 IVR-186 (H3N2) ANTIGEN (FORMALDEHYDE INACTIVATED), INFLUENZA B VIRUS B/PHUKET/3073/2013 ANTIGEN (FORMALDEHYDE INACTIVATED), AND INFLUENZA B VIRUS B/MARYLAND/15/2016 BX-69A ANTIGEN (FORMALDEHYDE INACTIVATED) 60; 60; 60; 60 UG/.7ML; UG/.7ML; UG/.7ML; UG/.7ML
INJECTION, SUSPENSION INTRAMUSCULAR
Status: ON HOLD | COMMUNITY
Start: 2020-09-10 | End: 2020-11-24

## 2020-10-29 RX ORDER — FUROSEMIDE 40 MG/1
80 TABLET ORAL 3 TIMES DAILY
COMMUNITY
End: 2021-01-01 | Stop reason: SDUPTHER

## 2020-10-29 RX ORDER — CYANOCOBALAMIN 1000 UG/ML
1000 INJECTION, SOLUTION INTRAMUSCULAR; SUBCUTANEOUS ONCE
Status: COMPLETED | OUTPATIENT
Start: 2020-10-29 | End: 2020-10-29

## 2020-10-29 RX ORDER — SODIUM CHLORIDE 0.9 % (FLUSH) 0.9 %
10 SYRINGE (ML) INJECTION AS NEEDED
Status: CANCELLED | OUTPATIENT
Start: 2020-10-29

## 2020-10-29 RX ORDER — SODIUM CHLORIDE 0.9 % (FLUSH) 0.9 %
10 SYRINGE (ML) INJECTION AS NEEDED
Status: DISCONTINUED | OUTPATIENT
Start: 2020-10-29 | End: 2020-10-29 | Stop reason: HOSPADM

## 2020-10-29 RX ORDER — HEPARIN SODIUM (PORCINE) LOCK FLUSH IV SOLN 100 UNIT/ML 100 UNIT/ML
500 SOLUTION INTRAVENOUS AS NEEDED
Status: CANCELLED | OUTPATIENT
Start: 2020-10-29

## 2020-10-29 RX ORDER — CEFDINIR 300 MG/1
CAPSULE ORAL
Status: ON HOLD | COMMUNITY
Start: 2020-10-05 | End: 2020-11-24

## 2020-10-29 RX ADMIN — SODIUM CHLORIDE, PRESERVATIVE FREE 10 ML: 5 INJECTION INTRAVENOUS at 12:29

## 2020-10-29 RX ADMIN — ERYTHROPOIETIN 8000 UNITS: 20000 INJECTION, SOLUTION INTRAVENOUS; SUBCUTANEOUS at 12:08

## 2020-10-29 RX ADMIN — Medication 500 UNITS: at 12:29

## 2020-10-29 RX ADMIN — CYANOCOBALAMIN 1000 MCG: 1000 INJECTION, SOLUTION INTRAMUSCULAR at 12:09

## 2020-10-29 NOTE — PROGRESS NOTES
.     REASONS FOR FOLLOWUP: Anemia    HISTORY OF PRESENT ILLNESS:  The patient is a 80 y.o. year old female  who is here for follow-up with the above-mentioned history.    States she feels better than she has for a while despite the fact that her Hb is lower, down to 8.6.  Denies chest pain or shortness of breath.  She states her stools have been black for the past couple of weeks.  It has been 6 months since her last EGD.  She is going to call Dr. Roach to see about getting another.    Past Medical History:   Diagnosis Date   • Anemia     Iron deficiency   • Anxiety    • Cardiomyopathy (CMS/HCC)     S/P pacemaker and defibrillator   • CHF (congestive heart failure) (CMS/HCC)    • Chronic renal failure, stage 4 (severe) (CMS/HCC)    • Colon polyp    • Coronary artery disease     pacemaker, defibrillator   • Depression    • Dizzy    • Gastroparesis    • GAVE (gastric antral vascular ectasia)    • GERD (gastroesophageal reflux disease)    • Gout    • Hemorrhoids    • Hiatal hernia    • History of Clostridium difficile colitis 2013   • History of kidney stones    • History of pancreatitis     2014   • History of skin cancer    • History of transfusion     MULTIPLE    • Hyperlipidemia    • Hypertension    • Hypothyroidism    • Left bundle branch block    • Mitral and aortic valve disease    • Mitral valve insufficiency    • Myoclonus     S/P Depakote   • Osteoarthritis    • Pancytopenia (CMS/HCC)    • Peripheral neuropathy    • Presence of cardiac pacemaker     AND DEFIBRILLATOR   • Pulmonary hypertension (CMS/HCC)    • SOB (shortness of breath) on exertion    • Spinal stenosis    • Stroke (CMS/HCC)      Past Surgical History:   Procedure Laterality Date   • APPENDECTOMY N/A    • ARTERIOVENOUS FISTULA/SHUNT SURGERY Right 2/18/2019    Procedure: RIGHT BASILIC FISTULA CREATION WITHOUT TRANSPOSITION;  Surgeon: Royer Dozier MD;  Location: [facility];  Service: Vascular   • ARTERIOVENOUS FISTULA/SHUNT  SURGERY Right 5/31/2019    Procedure: RIGHT 2ND STAGE BASILIC VEIN CREATION;  Surgeon: Royer Dozier MD;  Location: [facility] MAIN OR;  Service: Vascular   • CARDIAC CATHETERIZATION Left 03/28/2006    Arterial catheter insertion, cath left ventriculography, coronary angiography and left heart catheterization-Dr. Estevan Matamoros   • CARDIAC DEFIBRILLATOR PLACEMENT N/A 11/30/2007    Biventricular implantable cardioverter defibrillator-Dr. Leandro Huber   • CARDIAC ELECTROPHYSIOLOGY PROCEDURE N/A 10/28/2019    Procedure: GENERATOR CHANGE BI-V ICD  medtronic;  Surgeon: Leandro Huber MD;  Location: [facility] CATH INVASIVE LOCATION;  Service: Cardiology   • CATARACT EXTRACTION Bilateral 2011   • COLONOSCOPY N/A 2014    done at [facility]   • CYSTECTOMY N/A     Ovarian cystectomy   • ENDOSCOPY N/A 04/28/2006    EGD with biopsies. Paraesophageal hiatal hernia from 34 to 44 cm, antral gastritis-Dr. Nehemiah Beal   • ENDOSCOPY N/A 09/29/2004    Hiatal hernia, gastritis and an erosion of the pylorus-Dr. Yang Pavon   • ENDOSCOPY N/A 9/1/2019    Procedure: ESOPHAGOGASTRODUODENOSCOPY with APC;  Surgeon: Anuel Roach MD;  Location: [facility] ENDOSCOPY;  Service: Gastroenterology   • ENDOSCOPY N/A 1/28/2020    Procedure: ESOPHAGOGASTRODUODENOSCOPY with APC;  Surgeon: Anuel Roach MD;  Location: [facility] ENDOSCOPY;  Service: Gastroenterology   • ENDOSCOPY N/A 4/21/2020    Procedure: ESOPHAGOGASTRODUODENOSCOPY WITH APC;  Surgeon: Anuel Roach MD;  Location: [facility] ENDOSCOPY;  Service: Gastroenterology;  Laterality: N/A;  PRE- MELENA, GAVE  POST- ESOPHAGITIS, GAVE   • ENTEROSCOPY SMALL BOWEL N/A 10/30/2006    Small bowel enteroscopy with biopsies-Dr. Rory Sevilla   • FLEXIBLE SIGMOIDOSCOPY N/A 09/29/2004    Unsuccessful colonoscopy due to poop prep, a very tortuous sigmoid, bowel prep in the form of enema given and a barium enema was obtained-Dr. Yang Pavon   • FRACTURE SURGERY  2015     Broke big toe   • HEMATOMA EVACUATION TRUNK N/A 02/07/2014    Pocket evacuation of hematoma at pacemaker site-Dr. Leandro Huber   • HEMORRHOIDECTOMY N/A 04/19/2004    Flexible sigmoidoscopy and stapled hemorrhoidectomy-Dr. Yang Pavon   • HYSTERECTOMY Bilateral 1977   • IMPLANTABLE CARDIOVERTER DEFIBRILLATOR LEAD REPLACEMENT/POCKET REVISION Left 3/28/2016    Procedure: AUTOMATIC IMPLANTABLE CARDIOVERTER DEFIBRILLATOR LEAD REPLACEMENT WITH LASER LEAD EXTRACTION;  Surgeon: Leandro Huber MD;  Location: [facility] OR 18/19;  Service:    • IMPLANTABLE CARDIOVERTER DEFIBRILLATOR LEAD REPLACEMENT/POCKET REVISION N/A 01/13/2014    Biventricular ICD replacement-Dr. Jillian Huber   • LAPAROSCOPY REPAIR HIATAL HERNIA N/A 06/27/2006    Laparoscopic paraesophageal hiatal hernia repair with Nissen fundoplication and cholecystectomy-Dr. Sean Yoon   • NATALIE GONZALEZ ([facility]) PROCEDURE     • TN INSJ TUNNELED CVC W/O SUBQ PORT/ AGE 3 YR/> Right 10/3/2017    Procedure: Right internal jugular port placement;  Surgeon: Tiffanie Couch MD;  Location: [facility] OR;  Service: General   • TOE SURGERY Left     SET BIG TOE   • TOTAL KNEE ARTHROPLASTY Left 08/2014   • VENOUS ACCESS DEVICE (PORT) INSERTION Right        MEDICATIONS    Current Outpatient Medications:   •  acetaminophen (TYLENOL) 325 MG tablet, Take 2 tablets by mouth Every 4 (Four) Hours As Needed for Mild Pain ., Disp: , Rfl:   •  aspirin (aspirin) 81 MG EC tablet, Take 81 mg by mouth Daily., Disp: , Rfl:   •  atorvastatin (LIPITOR) 40 MG tablet, Take 40 mg by mouth Daily., Disp: , Rfl:   •  calcitriol (ROCALTROL) 0.25 MCG capsule, , Disp: , Rfl:   •  calcitriol (ROCALTROL) 0.5 MCG capsule, Take 0.5 mcg by mouth Every Other Day. Alternate days with calcitriol 0.25mcg, Disp: , Rfl:   •  calcium carbonate (OS-RAYMOND) 600 MG tablet, Take 600 mg by mouth Daily., Disp: , Rfl:   •  carbidopa-levodopa ER (SINEMET CR)  MG per tablet, Take 1  tablet by mouth Every Evening., Disp: , Rfl:   •  carvedilol (COREG) 12.5 MG tablet, Take 1 tablet by mouth Daily., Disp: 90 tablet, Rfl: 1  •  cefdinir (OMNICEF) 300 MG capsule, TAKE 1 CAPSULE BY MOUTH TWICE A DAY FOR 3 DAYS\, Disp: , Rfl:   •  Cholecalciferol (VITAMIN D3 PO), Take  by mouth., Disp: , Rfl:   •  Cholecalciferol (VITAMIN D3) 5000 units capsule capsule, Take 5,000 Units by mouth Daily., Disp: , Rfl:   •  diazepam (VALIUM) 5 MG tablet, Take 5 mg by mouth Every Night., Disp: , Rfl:   •  DULoxetine (CYMBALTA) 30 MG capsule, Take 30 mg by mouth Daily., Disp: , Rfl:   •  erythromycin base (E-MYCIN) 250 MG tablet, Take 0.5 tablets by mouth 4 (Four) Times a Day., Disp: 180 tablet, Rfl: 3  •  Famotidine (PEPCID PO), Take  by mouth Daily., Disp: , Rfl:   •  febuxostat (ULORIC) 40 MG tablet, Take 40 mg by mouth Daily., Disp: , Rfl:   •  Fluzone High-Dose Quadrivalent 0.7 ML suspension prefilled syringe, ADM 0.7ML IM UTD, Disp: , Rfl:   •  furosemide (LASIX) 40 MG tablet, Take 80 mg by mouth 3 (Three) Times a Day., Disp: , Rfl:   •  Gabapentin, Once-Daily, (GRALISE) 300 MG tablet, Take 300 mg by mouth Daily With Dinner. MAY REPEAT LATE EVENING IF NEEDED, Disp: , Rfl:   •  Glucosamine-Chondroit-Vit C-Mn (GLUCOSAMINE CHONDROITIN COMPLX) capsule, Take 1,500 mg by mouth Every Other Day., Disp: , Rfl:   •  HYDROcodone-acetaminophen (NORCO) 5-325 MG per tablet, Take 1 tablet by mouth Every 6 (Six) Hours As Needed for Severe Pain ., Disp: 12 tablet, Rfl: 0  •  levothyroxine (SYNTHROID, LEVOTHROID) 25 MCG tablet, Take 25 mcg by mouth Daily., Disp: , Rfl:   •  Magnesium 250 MG tablet, Take  by mouth Daily., Disp: , Rfl:   •  magnesium oxide 250 MG tablet, Take 250 mg by mouth Daily., Disp: , Rfl:   •  Multiple Vitamin (MULTI-DAY VITAMINS) tablet, Take 1 tablet by mouth Daily. HOLD FOR SURGERY, Disp: , Rfl:   •  MULTIPLE VITAMINS-IRON PO, Take  by mouth Daily., Disp: , Rfl:   •  polyethylene glycol (MIRALAX) powder, Take  "17 g by mouth Daily As Needed., Disp: , Rfl:   •  psyllium (METAMUCIL) 0.52 g capsule, Take 0.52 g by mouth., Disp: , Rfl:   •  rotigotine (NEUPRO) 6 MG/24HR patch, Place 1 patch on the skin as directed by provider Daily., Disp: , Rfl:   •  urea (CARMOL) 20 % cream, , Disp: , Rfl:   •  Vitamin E 400 UNITS tablet, Take 400 Units by mouth Daily., Disp: , Rfl:   •  atorvastatin (LIPITOR) 20 MG tablet, Take 20 mg by mouth Daily., Disp: , Rfl:   No current facility-administered medications for this visit.     Facility-Administered Medications Ordered in Other Visits:   •  acetaminophen (TYLENOL) tablet 650 mg, 650 mg, Oral, Once, Thuy Cruz APRN  •  diphenhydrAMINE (BENADRYL) capsule 25 mg, 25 mg, Oral, Once, Thuy Cruz APRN  •  heparin flush (porcine) 100 UNIT/ML injection 500 Units, 500 Units, Intravenous, PRN, Anuel Scott MD, 500 Units at 11/27/17 1347  •  heparin flush (porcine) 100 UNIT/ML injection 500 Units, 500 Units, Intravenous, PRN, Neno Merritt II, MD, 500 Units at 01/18/20 1359  •  sodium chloride 0.9 % flush 10 mL, 10 mL, Intravenous, PRN, Anuel Scott MD, 10 mL at 11/27/17 1347  •  sodium chloride 0.9 % flush 10 mL, 10 mL, Intravenous, PRN, Neno Merritt II, MD, 10 mL at 01/18/20 1359  •  sodium chloride 0.9 % infusion 250 mL, 250 mL, Intravenous, PRN, Neno Merritt II, MD  •  sodium chloride 0.9 % infusion 250 mL, 250 mL, Intravenous, PRN, Thuy Cruz APRN    ALLERGIES:     Allergies   Allergen Reactions   • Chlorhexidine Rash and Itching     Patient states \"blue dye\" in chlorhexidine.  States the orange and clear are OK to use   • Codeine Unknown - Low Severity     HYPER, DOES NOT HELP PAIN       SOCIAL HISTORY:       Social History     Socioeconomic History   • Marital status:      Spouse name: Zackery   • Number of children: 5   • Years of education: 1 yr college   • Highest education level: Not on file   Occupational History     Employer: " "RETIRED   Tobacco Use   • Smoking status: Never Smoker   • Smokeless tobacco: Never Used   • Tobacco comment: caffeine use - coffee and soda   Substance and Sexual Activity   • Alcohol use: No   • Drug use: No   • Sexual activity: Defer   Lifestyle   • Physical activity     Days per week: 0 days     Minutes per session: 0 min   • Stress: Not on file         FAMILY HISTORY:  Family History   Problem Relation Age of Onset   • Coronary artery disease Other    • Dementia Other    • Kidney disease Other    • Arthritis Father    • Early death Father         Kidney failure   • Kidney disease Father    • Heart disease Mother    • Mental illness Mother         Dementia   • Malig Hyperthermia Neg Hx    • Colon cancer Neg Hx    • Colon polyps Neg Hx        REVIEW OF SYSTEMS:  Review of Systems   Constitutional: Negative for activity change.   HENT: Negative for nosebleeds and trouble swallowing.    Respiratory: Negative for shortness of breath and wheezing.    Cardiovascular: Negative for chest pain and palpitations.   Gastrointestinal: Negative for constipation, diarrhea and nausea.   Genitourinary: Negative for dysuria and hematuria.   Musculoskeletal: Negative for arthralgias and myalgias.   Skin: Negative for rash and wound.   Neurological: Negative for seizures and syncope.   Hematological: Negative for adenopathy. Does not bruise/bleed easily.   Psychiatric/Behavioral: Negative for confusion.            Vitals:    10/29/20 1046   BP: 134/65   Pulse: 75   Resp: 16   Temp: 97.3 °F (36.3 °C)   TempSrc: Skin   SpO2: 96%   Height: 170.2 cm (67.01\")   PainSc: 0-No pain     Current Status 10/29/2020   ECOG score 0        PHYSICAL EXAM:        CONSTITUTIONAL:  Vital signs reviewed.  No distress, looks comfortable.  EYES:  Conjunctiva and lids unremarkable.  PERRLA  EARS,NOSE,MOUTH,THROAT:  Ears and nose appear unremarkable.  Lips, teeth, gums appear unremarkable.  RESPIRATORY:  Normal respiratory effort.  Lungs clear to " auscultation bilaterally.  CARDIOVASCULAR:  Normal S1, S2.  No murmurs rubs or gallops.  No significant lower extremity edema.  GASTROINTESTINAL: Abdomen appears unremarkable.  Nontender.  No hepatomegaly.  No splenomegaly.  LYMPHATIC:  No cervical, supraclavicular, axillary lymphadenopathy.  SKIN:  Warm.  No rashes.  PSYCHIATRIC:  Normal judgment and insight.  Normal mood and affect.         WBC   Date/Time Value Ref Range Status   10/29/2020 10:48 AM 6.33 3.40 - 10.80 10*3/mm3 Final   10/04/2020 09:02 AM 8.97 4.5 - 11.0 10*3/uL Final     Hemoglobin   Date/Time Value Ref Range Status   10/29/2020 10:48 AM 8.6 (L) 12.0 - 15.9 g/dL Final   10/04/2020 09:02 AM 11.3 (L) 12.0 - 16.0 g/dL Final     Platelets   Date/Time Value Ref Range Status   10/29/2020 10:48  140 - 450 10*3/mm3 Final   10/04/2020 09:02  140 - 440 10*3/uL Final       Assessment/Plan   There are no diagnoses linked to this encounter.     *Anemia due to stage IV chronic kidney disease.    · Procrit if Hb <10.  Hb 8.6    *Admitted 8/29/2019-9/2/2019 due to Hb of 5.  Transfused 2 units.  Heme positive stools.  EGD revealed GAVE.  Laser photocoagulation  · She states Dr. Roach plans EGD every 3 months.    *Iron deficiency anemia.  · Does not respond to oral iron due to poor absorption.  · 2 doses Injectafer late August 2019 resulted in normal iron labs and improvement of Hb from 9.4 up to 11.1 on 9/26/2019.  · Hb down to 10.3, 10/3/2019.  · Iron labs normal 10/31/2019, ferritin 200, 21% saturation.  · Hb dropped to 7.8 on 11/26/2019, requiring transfusion.  · Hb dropped to 6.9 on 12/19/2019, requiring transfusion.  · Hb dropped to 6.5 on 2/12/2020, requiring transfusion  · Hb dropped to 7.8 on 2/18/2020, requiring transfusion.  · On 2/18/2020, ferritin 89, 13% iron saturation.  Gave 1 dose Injectafer.  · On 4/23/2020, ferritin 115, 5% saturation, give 2 doses Injectafer.  · On 6/18/2020, ferritin 461, 31% saturation, Hb 10.7.  · Hb 9.8.  On  8/20/2020, ferritin 345, 29% saturation.  No need for IV iron at this time.  · Hb 8.6.  Ferritin 359, 21% saturation, normal reticulate hemoglobin    *Source of iron deficiency.  · Previously followed regularly with Dr. Earl Avelar.  · States she cannot have colonoscopies due to tortuous colon and therefore has virtual colonoscopies.  · She states she saw Dr. Avelar after the 6/27/2019 visit with me due to recent dark stools.  She states Dr. Avelar did a rectal exam which was heme negative and recommended no further evaluation.  · On 8/22/2019, I told her I suspect she is having occult GI bleeding considering she is needing IV iron frequently.  However, with new stroke mid May 2019 and the addition of Plavix to aspirin, risk may be too great to stop antiplatelet agents for further GI evaluation.  Patient and  agree.  · During late August 2019 hospitalization for GI bleed with Hb of 5, EGD through Dr. Roach revealed G A[address].  Laser photocoagulation.  · Subsequently, following with Dr. Roach.  He initially planned EGD every 3 months.  · However, on the 10/18/2019 office visit, he changed the plan to be return to see him mid February 2020 and stated he could retreat her GAVE if she develops a significant drop in her Hb.  · Dr. Roach stated on the 2/11/2020 office note last EGD could not be performed due to a large food bezoar.  He gave her erythromycin and reviewed dietary changes for gastroparesis in hopes of being able to repeat EGD with APC.  · EGD with APC on 4/21/2020, Dr. Roach.    · She states she is maintaining follow-up with Dr. Roach.  · Last EGD was 6 months ago.  Black stools x2 weeks, with Hb drop.  She is going to call Dr. Roach to see about arranging another EGD.    *Macrocytosis.  B12 and folate unremarkable  MCV 97.2    *Reticulocytosis.  · On 8/22/2019, unremarkable: Direct [place], haptoglobin, bilirubin ( with normal range up to 214.  Therefore, likely not  significant).    *Thrombocytopenia.  Intermittent since 9/19/2019.      *Circulating nucleated red blood cells.  Intermittent.  Could consider a bone marrow biopsy at some point.  I suspect this is occurring as her bone marrow is trying to increase production after bleeding episodes.  Nucleated red blood cells seen on 10/31/2019 and again, 11/6/2019    *Stroke mid August 2019.  Presumed.  Could not have MRI due to pacemaker.  Plavix was added to aspirin.    *Increase in fatigue and headaches.  Seeing a headache specialist.  I do not think her increase in fatigue would be due to anemia since her Hb is relatively stable.    Plan  Weekly CBC and Procrit if Hb <10  Repeat iron labs in 2 weeks  MD CBC Procrit 3 weeks  She declines help from our office in getting her an appointment with Dr. Roach.  She is going to call for an appointment Dr. Roach as I suspect she needs an EGD.  It has been 6 months and she has black stools and Hb has dropped.    Plan was:  · Every 2-week CBC.  Procrit if Hb <10.   · (Previously on every 2-week CBC.  But required transfusions.  Therefore, she was changed back to weekly.  However, 6/25/2020, she requested to try every 2 weeks again)  · Iron labs 1 month and 2 months.  · MD 2-1/2 months  · MD 2 months or so.  1 wk prior: Iron labs   · She maintains follow-up with Dr. Roach        · Could consider a bone marrow biopsy in the future.    Male friend/family member assisted with history

## 2020-11-05 ENCOUNTER — LAB (OUTPATIENT)
Dept: OTHER | Facility: HOSPITAL | Age: 80
End: 2020-11-05

## 2020-11-05 ENCOUNTER — INFUSION (OUTPATIENT)
Dept: ONCOLOGY | Facility: HOSPITAL | Age: 80
End: 2020-11-05

## 2020-11-05 VITALS
HEART RATE: 65 BPM | SYSTOLIC BLOOD PRESSURE: 159 MMHG | DIASTOLIC BLOOD PRESSURE: 63 MMHG | OXYGEN SATURATION: 97 % | RESPIRATION RATE: 16 BRPM | TEMPERATURE: 97.3 F

## 2020-11-05 DIAGNOSIS — N18.4 ANEMIA OF CHRONIC RENAL FAILURE, STAGE 4 (SEVERE) (HCC): ICD-10-CM

## 2020-11-05 DIAGNOSIS — D62 ANEMIA DUE TO ACUTE BLOOD LOSS: ICD-10-CM

## 2020-11-05 DIAGNOSIS — Z45.2 ENCOUNTER FOR FITTING AND ADJUSTMENT OF VASCULAR CATHETER: Primary | ICD-10-CM

## 2020-11-05 DIAGNOSIS — D63.1 ANEMIA OF CHRONIC RENAL FAILURE, STAGE 4 (SEVERE) (HCC): ICD-10-CM

## 2020-11-05 LAB
BASOPHILS # BLD AUTO: 0.01 10*3/MM3 (ref 0–0.2)
BASOPHILS NFR BLD AUTO: 0.2 % (ref 0–1.5)
DEPRECATED RDW RBC AUTO: 51.6 FL (ref 37–54)
EOSINOPHIL # BLD AUTO: 0.11 10*3/MM3 (ref 0–0.4)
EOSINOPHIL NFR BLD AUTO: 1.7 % (ref 0.3–6.2)
ERYTHROCYTE [DISTWIDTH] IN BLOOD BY AUTOMATED COUNT: 14.4 % (ref 12.3–15.4)
HCT VFR BLD AUTO: 27.6 % (ref 34–46.6)
HGB BLD-MCNC: 8.7 G/DL (ref 12–15.9)
IMM GRANULOCYTES # BLD AUTO: 0.03 10*3/MM3 (ref 0–0.05)
IMM GRANULOCYTES NFR BLD AUTO: 0.5 % (ref 0–0.5)
LYMPHOCYTES # BLD AUTO: 0.68 10*3/MM3 (ref 0.7–3.1)
LYMPHOCYTES NFR BLD AUTO: 10.7 % (ref 19.6–45.3)
MCH RBC QN AUTO: 31.3 PG (ref 26.6–33)
MCHC RBC AUTO-ENTMCNC: 31.5 G/DL (ref 31.5–35.7)
MCV RBC AUTO: 99.3 FL (ref 79–97)
MONOCYTES # BLD AUTO: 0.41 10*3/MM3 (ref 0.1–0.9)
MONOCYTES NFR BLD AUTO: 6.5 % (ref 5–12)
NEUTROPHILS NFR BLD AUTO: 5.11 10*3/MM3 (ref 1.7–7)
NEUTROPHILS NFR BLD AUTO: 80.4 % (ref 42.7–76)
NRBC BLD AUTO-RTO: 0 /100 WBC (ref 0–0.2)
PLATELET # BLD AUTO: 214 10*3/MM3 (ref 140–450)
PMV BLD AUTO: 8.7 FL (ref 6–12)
RBC # BLD AUTO: 2.78 10*6/MM3 (ref 3.77–5.28)
WBC # BLD AUTO: 6.35 10*3/MM3 (ref 3.4–10.8)

## 2020-11-05 PROCEDURE — 96372 THER/PROPH/DIAG INJ SC/IM: CPT

## 2020-11-05 PROCEDURE — 25010000003 HEPARIN LOCK FLUSH PER 10 UNITS: Performed by: INTERNAL MEDICINE

## 2020-11-05 PROCEDURE — 36415 COLL VENOUS BLD VENIPUNCTURE: CPT

## 2020-11-05 PROCEDURE — 85025 COMPLETE CBC W/AUTO DIFF WBC: CPT | Performed by: INTERNAL MEDICINE

## 2020-11-05 PROCEDURE — 25010000002 EPOETIN ALFA PER 1000 UNITS: Performed by: NURSE PRACTITIONER

## 2020-11-05 RX ORDER — HEPARIN SODIUM (PORCINE) LOCK FLUSH IV SOLN 100 UNIT/ML 100 UNIT/ML
500 SOLUTION INTRAVENOUS AS NEEDED
Status: CANCELLED | OUTPATIENT
Start: 2020-11-05

## 2020-11-05 RX ORDER — SODIUM CHLORIDE 0.9 % (FLUSH) 0.9 %
10 SYRINGE (ML) INJECTION AS NEEDED
Status: CANCELLED | OUTPATIENT
Start: 2020-11-05

## 2020-11-05 RX ORDER — HEPARIN SODIUM (PORCINE) LOCK FLUSH IV SOLN 100 UNIT/ML 100 UNIT/ML
500 SOLUTION INTRAVENOUS AS NEEDED
Status: DISCONTINUED | OUTPATIENT
Start: 2020-11-05 | End: 2020-11-05 | Stop reason: HOSPADM

## 2020-11-05 RX ORDER — SODIUM CHLORIDE 0.9 % (FLUSH) 0.9 %
10 SYRINGE (ML) INJECTION AS NEEDED
Status: DISCONTINUED | OUTPATIENT
Start: 2020-11-05 | End: 2020-11-05 | Stop reason: HOSPADM

## 2020-11-05 RX ADMIN — ERYTHROPOIETIN 8000 UNITS: 20000 INJECTION, SOLUTION INTRAVENOUS; SUBCUTANEOUS at 11:07

## 2020-11-05 RX ADMIN — Medication 500 UNITS: at 11:07

## 2020-11-05 RX ADMIN — SODIUM CHLORIDE, PRESERVATIVE FREE 10 ML: 5 INJECTION INTRAVENOUS at 11:07

## 2020-11-05 NOTE — NURSING NOTE
Lab Results   Component Value Date    WBC 6.35 11/05/2020    HGB 8.7 (L) 11/05/2020    HCT 27.6 (L) 11/05/2020    MCV 99.3 (H) 11/05/2020     11/05/2020     Procrit given per protocol.  Pt c/o fatigue and was surprised Hgb was not lower.  Pt next appt reviewed and instructed to call with worsening symptoms.

## 2020-11-10 ENCOUNTER — TELEMEDICINE (OUTPATIENT)
Dept: GASTROENTEROLOGY | Facility: CLINIC | Age: 80
End: 2020-11-10

## 2020-11-10 ENCOUNTER — PREP FOR SURGERY (OUTPATIENT)
Dept: OTHER | Facility: HOSPITAL | Age: 80
End: 2020-11-10

## 2020-11-10 DIAGNOSIS — K92.1 MELENA: ICD-10-CM

## 2020-11-10 DIAGNOSIS — D63.1 ANEMIA DUE TO STAGE 4 CHRONIC KIDNEY DISEASE (HCC): ICD-10-CM

## 2020-11-10 DIAGNOSIS — K31.819 GAVE (GASTRIC ANTRAL VASCULAR ECTASIA): Primary | ICD-10-CM

## 2020-11-10 DIAGNOSIS — D63.1 ANEMIA OF CHRONIC RENAL FAILURE, STAGE 4 (SEVERE) (HCC): ICD-10-CM

## 2020-11-10 DIAGNOSIS — N18.4 ANEMIA DUE TO STAGE 4 CHRONIC KIDNEY DISEASE (HCC): ICD-10-CM

## 2020-11-10 DIAGNOSIS — D50.0 IRON DEFICIENCY ANEMIA DUE TO CHRONIC BLOOD LOSS: ICD-10-CM

## 2020-11-10 DIAGNOSIS — K31.84 GASTROPARESIS: ICD-10-CM

## 2020-11-10 DIAGNOSIS — K58.2 IRRITABLE BOWEL SYNDROME WITH BOTH CONSTIPATION AND DIARRHEA: ICD-10-CM

## 2020-11-10 DIAGNOSIS — N18.4 ANEMIA OF CHRONIC RENAL FAILURE, STAGE 4 (SEVERE) (HCC): ICD-10-CM

## 2020-11-10 DIAGNOSIS — N73.6 FEMALE PELVIC PERITONEAL ADHESIONS: ICD-10-CM

## 2020-11-10 PROCEDURE — 99214 OFFICE O/P EST MOD 30 MIN: CPT | Performed by: NURSE PRACTITIONER

## 2020-11-10 NOTE — H&P (VIEW-ONLY)
Tele visit:  Unable to complete visit using a video connection to the patient. A phone visit was used to complete this visits. You have chosen to receive care through a telephone visit. Do you consent to use a telephone visit for your medical care today? Yes    PATIENT INFORMATION  Charmaine Machuca     - 1940    CHIEF COMPLAINT  Chief Complaint   Patient presents with   • Black or Bloody Stool       HISTORY OF PRESENT ILLNESS  20 visit with Dr. Roach: Reviewed records [facility] and has seen nearly everyone in town but ifound her GAVE so she stuck with me and he last attempt was deferred due to large food bezoar, gastroparesis tx with EES,     20 EGD:  Normal examined duodenum.  - Gastric antral vascular ectasia with bleeding. Treated with argon plasma coagulation (APC).  - Non-severe reflux esophagitis.  - No specimens collected.  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  With drop in HGB see labs below.  Occasional diarrhea.  Is still taking her EES four times a day for her gastroparesis.  Fatigued.      REVIEWED PERTINENT RESULTS/ LABS  Lab Results   Component Value Date    CASEREPORT  2018     Surgical Pathology Report                         Case: [id number]                                  Authorizing Provider:  Anuel Scott MD       Collected:           2018 01:45 PM          Ordering Location:     [facility]  Received:            2018 02:12 PM                                 CT                                                                           Pathologist:           Alexandre Villafana MD                                                                           Specimens:   1) - Iliac Crest, Right -  Biopsy, Right iliac marrow                                                2) - Iliac Crest, Right - Aspirate, Bone marrow aspirate received, 5 smear prepared                 and slides sent to [facility] .                                                            3) - Iliac Crest, Right - Aspirate, Received 2 greeb top tubes, 1 purple top tube,                  sent all tubes to [facility] for comprehensive evaluation                         FINALDX  05/24/2018     1.  RIGHT ILIAC MARROW BIOPSY:    BONE WITH CELLULAR MARROW.    COMMENT:  Metastatic carcinoma and granuloma are not identified.  A paraffin block will be forwarded to [facility] for comprehensive hematopathology analysis. The summary report from [facility] will represent the final diagnosis.      2&3: RIGHT ILIAC ASPIRATE, SMEARS, GREEN TOPPED TUBES, PURPLE TOP TUBE.:   FORWARDED TO [facility] FOR ADDITIONAL STUDIES INCLUDING FLOW CYTOMETRY.     CMK/brb     CPT CODES:  1.  27204, 72647        Lab Results   Component Value Date    HGB 8.7 (L) 11/05/2020    MCV 99.3 (H) 11/05/2020     11/05/2020    ALT 9 10/29/2020    AST 26 10/29/2020    HGBA1C 5.1 10/03/2020    INR 1.0 08/06/2020    TRIG 162 (H) 10/29/2020    FERRITIN 359.10 (H) 10/29/2020    IRON 49 10/29/2020    TIBC 237 (L) 10/29/2020      Component      Latest Ref Rng & Units 10/4/2020 10/15/2020 10/29/2020 11/5/2020                WBC      3.40 - 10.80 10*3/mm3 8.97 8.67 6.33 6.35   RBC      3.77 - 5.28 10*6/mm3 3.60 (L) 3.37 (L) 2.84 (L) 2.78 (L)   Hemoglobin      12.0 - 15.9 g/dL 11.3 (L) 10.3 (L) 8.6 (L) 8.7 (L)     No results found.    CURRENT MEDICATIONS    Current Outpatient Medications:   •  acetaminophen (TYLENOL) 325 MG tablet, Take 2 tablets by mouth Every 4 (Four) Hours As Needed for Mild Pain ., Disp: , Rfl:   •  aspirin (aspirin) 81 MG EC tablet, Take 81 mg by mouth Daily., Disp: , Rfl:   •  atorvastatin (LIPITOR) 20 MG tablet, Take  20 mg by mouth Daily., Disp: , Rfl:   •  atorvastatin (LIPITOR) 40 MG tablet, Take 40 mg by mouth Daily., Disp: , Rfl:   •  calcitriol (ROCALTROL) 0.25 MCG capsule, , Disp: , Rfl:   •  calcitriol (ROCALTROL) 0.5 MCG capsule, Take 0.5 mcg by mouth Every Other Day. Alternate days with calcitriol 0.25mcg, Disp: , Rfl:   •  calcium carbonate (OS-RAYMOND) 600 MG tablet, Take 600 mg by mouth Daily., Disp: , Rfl:   •  carbidopa-levodopa ER (SINEMET CR)  MG per tablet, Take 1 tablet by mouth Every Evening., Disp: , Rfl:   •  carvedilol (COREG) 12.5 MG tablet, Take 1 tablet by mouth Daily., Disp: 90 tablet, Rfl: 1  •  cefdinir (OMNICEF) 300 MG capsule, TAKE 1 CAPSULE BY MOUTH TWICE A DAY FOR 3 DAYS\, Disp: , Rfl:   •  Cholecalciferol (VITAMIN D3 PO), Take  by mouth., Disp: , Rfl:   •  Cholecalciferol (VITAMIN D3) 5000 units capsule capsule, Take 5,000 Units by mouth Daily., Disp: , Rfl:   •  diazepam (VALIUM) 5 MG tablet, Take 5 mg by mouth Every Night., Disp: , Rfl:   •  DULoxetine (CYMBALTA) 30 MG capsule, Take 30 mg by mouth Daily., Disp: , Rfl:   •  erythromycin base (E-MYCIN) 250 MG tablet, Take 0.5 tablets by mouth 4 (Four) Times a Day., Disp: 180 tablet, Rfl: 3  •  Famotidine (PEPCID PO), Take  by mouth Daily., Disp: , Rfl:   •  febuxostat (ULORIC) 40 MG tablet, Take 40 mg by mouth Daily., Disp: , Rfl:   •  Fluzone High-Dose Quadrivalent 0.7 ML suspension prefilled syringe, ADM 0.7ML IM UTD, Disp: , Rfl:   •  furosemide (LASIX) 40 MG tablet, Take 80 mg by mouth 3 (Three) Times a Day., Disp: , Rfl:   •  Gabapentin, Once-Daily, (GRALISE) 300 MG tablet, Take 300 mg by mouth Daily With Dinner. MAY REPEAT LATE EVENING IF NEEDED, Disp: , Rfl:   •  Glucosamine-Chondroit-Vit C-Mn (GLUCOSAMINE CHONDROITIN COMPLX) capsule, Take 1,500 mg by mouth Every Other Day., Disp: , Rfl:   •  HYDROcodone-acetaminophen (NORCO) 5-325 MG per tablet, Take 1 tablet by mouth Every 6 (Six) Hours As Needed for Severe Pain ., Disp: 12 tablet,  Rfl: 0  •  levothyroxine (SYNTHROID, LEVOTHROID) 25 MCG tablet, Take 25 mcg by mouth Daily., Disp: , Rfl:   •  Magnesium 250 MG tablet, Take  by mouth Daily., Disp: , Rfl:   •  magnesium oxide 250 MG tablet, Take 250 mg by mouth Daily., Disp: , Rfl:   •  Multiple Vitamin (MULTI-DAY VITAMINS) tablet, Take 1 tablet by mouth Daily. HOLD FOR SURGERY, Disp: , Rfl:   •  MULTIPLE VITAMINS-IRON PO, Take  by mouth Daily., Disp: , Rfl:   •  polyethylene glycol (MIRALAX) powder, Take 17 g by mouth Daily As Needed., Disp: , Rfl:   •  psyllium (METAMUCIL) 0.52 g capsule, Take 0.52 g by mouth., Disp: , Rfl:   •  rotigotine (NEUPRO) 6 MG/24HR patch, Place 1 patch on the skin as directed by provider Daily., Disp: , Rfl:   •  urea (CARMOL) 20 % cream, , Disp: , Rfl:   •  Vitamin E 400 UNITS tablet, Take 400 Units by mouth Daily., Disp: , Rfl:   No current facility-administered medications for this visit.     Facility-Administered Medications Ordered in Other Visits:   •  acetaminophen (TYLENOL) tablet 650 mg, 650 mg, Oral, Once, Thuy Cruz APRN  •  diphenhydrAMINE (BENADRYL) capsule 25 mg, 25 mg, Oral, Once, Thuy Cruz APRN  •  heparin flush (porcine) 100 UNIT/ML injection 500 Units, 500 Units, Intravenous, PRN, Anuel Scott MD, 500 Units at 11/27/17 1347  •  heparin flush (porcine) 100 UNIT/ML injection 500 Units, 500 Units, Intravenous, PRN, Neno Merritt II, MD, 500 Units at 01/18/20 1359  •  sodium chloride 0.9 % flush 10 mL, 10 mL, Intravenous, PRN, Anuel Scott MD, 10 mL at 11/27/17 1347  •  sodium chloride 0.9 % flush 10 mL, 10 mL, Intravenous, PRN, Neno Merritt II, MD, 10 mL at 01/18/20 1359  •  sodium chloride 0.9 % infusion 250 mL, 250 mL, Intravenous, PRN, Neno Merritt II, MD  •  sodium chloride 0.9 % infusion 250 mL, 250 mL, Intravenous, PRN, Thuy Cruz APRN    ALLERGIES  Chlorhexidine and Codeine    REVIEW OF SYSTEMS  ROS: 14 point review of systems was  performed and all other systems were reviewed and are negative except for documented findings in HPI and today's encounter.   Review of Systems      History     Last Reviewed by Ayana Bui APRN on 11/10/2020 at 10:39 AM    Sections Reviewed    Tobacco, Family, Medical, Surgical      Problem list reviewed by Ayana Bui APRN on 11/10/2020 at 10:39 AM  Medicines reviewed by Ayana Bui APRN on 11/10/2020 at 10:39 AM  Allergies reviewed by Ayana Bui APRN on 11/10/2020 at 10:39 AM      ACTIVE PROBLEMS  Patient Active Problem List    Diagnosis   • Vitamin B 12 deficiency [E53.8]   • Iron adverse reaction [T45.4X5A]   • Absolute anemia [D64.9]   • Chronic kidney disease, stage 4 (severe) (CMS/HCC) [N18.4]   • Delayed gastric emptying [K30]   • Traumatic subdural hematoma without loss of consciousness (CMS/HCC) [S06.5X0A]   • Orbital fracture, closed, initial encounter (CMS/[facility]) [S02.85XA]   • Subdural hematoma, post-traumatic (CMS/HCC) [S06.5X9A]   • Subarachnoid hemorrhage (CMS/HCC) [I60.9]   • Fall [W19.XXXA]   • GAVE (gastric antral vascular ectasia) [K31.819]   • History of recent stroke [Z86.73]   • Symptomatic anemia [D64.9]   • Acute renal failure superimposed on chronic kidney disease (CMS/HCC) [N17.9, N18.9]   • Anemia due to acute blood loss [D62]   • Chronic kidney disease, stage III (moderate) (CMS/HCC)  [N18.30]   • Weakness on left side of face [R29.810]   • B12 deficiency [E53.8]   • Encounter for fitting and adjustment of vascular catheter [Z45.2]   • Anemia of chronic disease [D63.8]   • CKD (chronic kidney disease) stage 4, GFR 15-29 ml/min (CMS/HCC) [N18.4]   • Anemia of chronic renal failure, stage 4 (severe) (CMS/HCC) [N18.4, D63.1]   • Dysuria [R30.0]   • Iron deficiency anemia [D50.9]   • History of anemia due to chronic kidney disease [N18.9, Z86.2]   • CKD (chronic kidney disease) [N18.9]   • Anemia [D64.9]   • Carpal tunnel syndrome [G56.00]   • Periodic limb movement disorder  [G47.61]   • Peripheral neuropathy [G62.9]   • Restless legs syndrome [G25.81]   • Chronic systolic congestive heart failure (CMS/HCC) [I50.22]   • Cardiomyopathy (CMS/HCC) [I42.9]   • Pacemaker lead failure [T82.110A]   • Chest pain [R07.9]   • 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 [M47.812]   • Spondylolisthesis [M43.10]   • Chronic adrenal insufficiency (CMS/HCC) [E27.40]   • Adrenal insufficiency (CMS/HCC) [E27.40]   • Congestive heart failure (CMS/HCC) [I50.9]   • Gastroparesis [K31.84]   • Hypotension [I95.9]   • Hypothyroidism [E03.9]   • 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]   • Osteoarthritis [M19.90]   • Automatic implantable cardioverter-defibrillator in situ [Z95.810]   • History of total knee replacement [Z96.659]   • CHF (congestive heart failure) (CMS/HCC) [I50.9]   • Neuropathy [G62.9]         PAST MEDICAL HISTORY  Past Medical History:   Diagnosis Date   • Anemia     Iron deficiency   • Anxiety    • Cardiomyopathy (CMS/HCC)     S/P pacemaker and defibrillator   • CHF (congestive heart failure) (CMS/HCC)    • Chronic renal failure, stage 4 (severe) (CMS/HCC)    • Colon polyp    • Coronary artery disease     pacemaker, defibrillator   • Depression    • Dizzy    • Gastroparesis    • GAVE (gastric antral vascular ectasia)    • GERD (gastroesophageal reflux disease)    • Gout    • Hemorrhoids    • Hiatal hernia    • History of Clostridium difficile colitis 2013   • History of kidney stones    • History of pancreatitis     2014   • History of skin cancer    • History of transfusion     MULTIPLE    • Hyperlipidemia    • Hypertension    • Hypothyroidism    • Left bundle branch block    • Mitral and aortic valve disease    • Mitral valve insufficiency    • Myoclonus     S/P Depakote   • Osteoarthritis    • Pancytopenia (CMS/HCC)    • Peripheral neuropathy    • Presence of cardiac pacemaker     AND DEFIBRILLATOR   • Pulmonary hypertension (CMS/HCC)    • SOB (shortness of breath) on exertion    • Spinal stenosis     • Stroke (CMS/HCC)          SURGICAL HISTORY  Past Surgical History:   Procedure Laterality Date   • APPENDECTOMY N/A    • ARTERIOVENOUS FISTULA/SHUNT SURGERY Right 2/18/2019    Procedure: RIGHT BASILIC FISTULA CREATION WITHOUT TRANSPOSITION;  Surgeon: Royer Dozier MD;  Location: [facility] MAIN OR;  Service: Vascular   • ARTERIOVENOUS FISTULA/SHUNT SURGERY Right 5/31/2019    Procedure: RIGHT 2ND STAGE BASILIC VEIN CREATION;  Surgeon: Royer Dozier MD;  Location: [facility] MAIN OR;  Service: Vascular   • CARDIAC CATHETERIZATION Left 03/28/2006    Arterial catheter insertion, cath left ventriculography, coronary angiography and left heart catheterization-Dr. Estevan Matamoros   • CARDIAC DEFIBRILLATOR PLACEMENT N/A 11/30/2007    Biventricular implantable cardioverter defibrillator-Dr. Leandro Huber   • CARDIAC ELECTROPHYSIOLOGY PROCEDURE N/A 10/28/2019    Procedure: GENERATOR CHANGE BI-V ICD  medtronic;  Surgeon: Leandro Huber MD;  Location: [facility] CATH INVASIVE LOCATION;  Service: Cardiology   • CATARACT EXTRACTION Bilateral 2011   • COLONOSCOPY N/A 2014    done at [facility]   • CYSTECTOMY N/A     Ovarian cystectomy   • ENDOSCOPY N/A 04/28/2006    EGD with biopsies. Paraesophageal hiatal hernia from 34 to 44 cm, antral gastritis-Dr. Nehemiah Beal   • ENDOSCOPY N/A 09/29/2004    Hiatal hernia, gastritis and an erosion of the pylorus-Dr. Yang Pavon   • ENDOSCOPY N/A 9/1/2019    Procedure: ESOPHAGOGASTRODUODENOSCOPY with APC;  Surgeon: Anuel Roach MD;  Location: [facility] ENDOSCOPY;  Service: Gastroenterology   • ENDOSCOPY N/A 1/28/2020    Procedure: ESOPHAGOGASTRODUODENOSCOPY with APC;  Surgeon: Anuel Roach MD;  Location: [facility] ENDOSCOPY;  Service: Gastroenterology   • ENDOSCOPY N/A 4/21/2020    Procedure: ESOPHAGOGASTRODUODENOSCOPY WITH APC;  Surgeon: Anuel Roach MD;  Location: [facility] ENDOSCOPY;  Service: Gastroenterology;  Laterality: N/A;  PRE- MELENA,  GAVE  POST- ESOPHAGITIS, GAVE   • ENTEROSCOPY SMALL BOWEL N/A 10/30/2006    Small bowel enteroscopy with biopsies-Dr. Rory Sevilla   • FLEXIBLE SIGMOIDOSCOPY N/A 09/29/2004    Unsuccessful colonoscopy due to poop prep, a very tortuous sigmoid, bowel prep in the form of enema given and a barium enema was obtained-Dr. Yang Pavon   • FRACTURE SURGERY  2015    Broke big toe   • HEMATOMA EVACUATION TRUNK N/A 02/07/2014    Pocket evacuation of hematoma at pacemaker site-Dr. Leandro Huber   • HEMORRHOIDECTOMY N/A 04/19/2004    Flexible sigmoidoscopy and stapled hemorrhoidectomy-Dr. Yang Pavon   • HYSTERECTOMY Bilateral 1977   • IMPLANTABLE CARDIOVERTER DEFIBRILLATOR LEAD REPLACEMENT/POCKET REVISION Left 3/28/2016    Procedure: AUTOMATIC IMPLANTABLE CARDIOVERTER DEFIBRILLATOR LEAD REPLACEMENT WITH LASER LEAD EXTRACTION;  Surgeon: Leandro Huber MD;  Location: [facility] OR 18/19;  Service:    • IMPLANTABLE CARDIOVERTER DEFIBRILLATOR LEAD REPLACEMENT/POCKET REVISION N/A 01/13/2014    Biventricular ICD replacement-Dr. Jillian Huber   • LAPAROSCOPY REPAIR HIATAL HERNIA N/A 06/27/2006    Laparoscopic paraesophageal hiatal hernia repair with Nissen fundoplication and cholecystectomy-Dr. Sean Yoon   • NATALIE GONZALEZ ([facility]) PROCEDURE     • TX INSJ TUNNELED CVC W/O SUBQ PORT/ AGE 3 YR/> Right 10/3/2017    Procedure: Right internal jugular port placement;  Surgeon: Tiffanie Couch MD;  Location: [facility];  Service: General   • TOE SURGERY Left     SET BIG TOE   • TOTAL KNEE ARTHROPLASTY Left 08/2014   • VENOUS ACCESS DEVICE (PORT) INSERTION Right          FAMILY HISTORY  Family History   Problem Relation Age of Onset   • Coronary artery disease Other    • Dementia Other    • Kidney disease Other    • Arthritis Father    • Early death Father         Kidney failure   • Kidney disease Father    • Heart disease Mother    • Mental illness Mother         Dementia   • Malig Hyperthermia Neg Hx  "   • Colon cancer Neg Hx    • Colon polyps Neg Hx          SOCIAL HISTORY  Social History     Occupational History     Employer: RETIRED   Tobacco Use   • Smoking status: Never Smoker   • Smokeless tobacco: Never Used   • Tobacco comment: caffeine use - coffee and soda   Substance and Sexual Activity   • Alcohol use: No   • Drug use: No   • Sexual activity: Defer         PHYSICAL EXAM  Debilities/Disabilities Identified: None  Emotional Behavior: Appropriate  80 y.o. female  Wt Readings from Last 3 Encounters:   10/15/20 68.2 kg (150 lb 6.4 oz)   10/01/20 69.1 kg (152 lb 6.4 oz)   09/17/20 71.2 kg (157 lb)     Ht Readings from Last 1 Encounters:   10/29/20 170.2 cm (67.01\")     There is no height or weight on file to calculate BMI.  There were no vitals filed for this visit.  Vital signs reviewed.          Physical Exam  As reported by patient above d/t telemedicine visit.    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     Will get her scheduled asap for EGD with APC.  She has an appt with [facility] on Thursday with labs.   So will coordinate with them on her treatment.  Will consult with Dr. Roach on when he would like to fit her in depending on anemia level.     I have discussed the above plan with the patient.  They verbalize understanding and are in agreement with the plan.  They have been advised to " contact the office for any questions, concerns, or changes related to their health.    25 min spent with patient today >50% spent counseling about natural history and expected course of assessed complaint and reviewed treatment options that have been tried and not tried and those currently available. Questions answered.

## 2020-11-10 NOTE — PROGRESS NOTES
Tele visit:  Unable to complete visit using a video connection to the patient. A phone visit was used to complete this visits. You have chosen to receive care through a telephone visit. Do you consent to use a telephone visit for your medical care today? Yes    PATIENT INFORMATION  Charmaine Machuca     - 1940    CHIEF COMPLAINT  Chief Complaint   Patient presents with   • Black or Bloody Stool       HISTORY OF PRESENT ILLNESS  20 visit with Dr. Raoch: Reviewed records [facility] and has seen nearly everyone in town but ifound her GAVE so she stuck with me and he last attempt was deferred due to large food bezoar, gastroparesis tx with EES,     20 EGD:  Normal examined duodenum.  - Gastric antral vascular ectasia with bleeding. Treated with argon plasma coagulation (APC).  - Non-severe reflux esophagitis.  - No specimens collected.  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  With drop in HGB see labs below.  Occasional diarrhea.  Is still taking her EES four times a day for her gastroparesis.  Fatigued.      REVIEWED PERTINENT RESULTS/ LABS  Lab Results   Component Value Date    CASEREPORT  2018     Surgical Pathology Report                         Case: [id number]                                  Authorizing Provider:  Anuel Scott MD       Collected:           2018 01:45 PM          Ordering Location:     [facility]  Received:            2018 02:12 PM                                 CT                                                                           Pathologist:           Alexandre Villafana MD                                                                           Specimens:   1) - Iliac Crest, Right -  Biopsy, Right iliac marrow                                                2) - Iliac Crest, Right - Aspirate, Bone marrow aspirate received, 5 smear prepared                 and slides sent to [facility] .                                                            3) - Iliac Crest, Right - Aspirate, Received 2 greeb top tubes, 1 purple top tube,                  sent all tubes to [facility] for comprehensive evaluation                         FINALDX  05/24/2018     1.  RIGHT ILIAC MARROW BIOPSY:    BONE WITH CELLULAR MARROW.    COMMENT:  Metastatic carcinoma and granuloma are not identified.  A paraffin block will be forwarded to [facility] for comprehensive hematopathology analysis. The summary report from [facility] will represent the final diagnosis.      2&3: RIGHT ILIAC ASPIRATE, SMEARS, GREEN TOPPED TUBES, PURPLE TOP TUBE.:   FORWARDED TO [facility] FOR ADDITIONAL STUDIES INCLUDING FLOW CYTOMETRY.     CMK/brb     CPT CODES:  1.  68478, 81863        Lab Results   Component Value Date    HGB 8.7 (L) 11/05/2020    MCV 99.3 (H) 11/05/2020     11/05/2020    ALT 9 10/29/2020    AST 26 10/29/2020    HGBA1C 5.1 10/03/2020    INR 1.0 08/06/2020    TRIG 162 (H) 10/29/2020    FERRITIN 359.10 (H) 10/29/2020    IRON 49 10/29/2020    TIBC 237 (L) 10/29/2020      Component      Latest Ref Rng & Units 10/4/2020 10/15/2020 10/29/2020 11/5/2020                WBC      3.40 - 10.80 10*3/mm3 8.97 8.67 6.33 6.35   RBC      3.77 - 5.28 10*6/mm3 3.60 (L) 3.37 (L) 2.84 (L) 2.78 (L)   Hemoglobin      12.0 - 15.9 g/dL 11.3 (L) 10.3 (L) 8.6 (L) 8.7 (L)     No results found.    CURRENT MEDICATIONS    Current Outpatient Medications:   •  acetaminophen (TYLENOL) 325 MG tablet, Take 2 tablets by mouth Every 4 (Four) Hours As Needed for Mild Pain ., Disp: , Rfl:   •  aspirin (aspirin) 81 MG EC tablet, Take 81 mg by mouth Daily., Disp: , Rfl:   •  atorvastatin (LIPITOR) 20 MG tablet, Take  20 mg by mouth Daily., Disp: , Rfl:   •  atorvastatin (LIPITOR) 40 MG tablet, Take 40 mg by mouth Daily., Disp: , Rfl:   •  calcitriol (ROCALTROL) 0.25 MCG capsule, , Disp: , Rfl:   •  calcitriol (ROCALTROL) 0.5 MCG capsule, Take 0.5 mcg by mouth Every Other Day. Alternate days with calcitriol 0.25mcg, Disp: , Rfl:   •  calcium carbonate (OS-RAYMOND) 600 MG tablet, Take 600 mg by mouth Daily., Disp: , Rfl:   •  carbidopa-levodopa ER (SINEMET CR)  MG per tablet, Take 1 tablet by mouth Every Evening., Disp: , Rfl:   •  carvedilol (COREG) 12.5 MG tablet, Take 1 tablet by mouth Daily., Disp: 90 tablet, Rfl: 1  •  cefdinir (OMNICEF) 300 MG capsule, TAKE 1 CAPSULE BY MOUTH TWICE A DAY FOR 3 DAYS\, Disp: , Rfl:   •  Cholecalciferol (VITAMIN D3 PO), Take  by mouth., Disp: , Rfl:   •  Cholecalciferol (VITAMIN D3) 5000 units capsule capsule, Take 5,000 Units by mouth Daily., Disp: , Rfl:   •  diazepam (VALIUM) 5 MG tablet, Take 5 mg by mouth Every Night., Disp: , Rfl:   •  DULoxetine (CYMBALTA) 30 MG capsule, Take 30 mg by mouth Daily., Disp: , Rfl:   •  erythromycin base (E-MYCIN) 250 MG tablet, Take 0.5 tablets by mouth 4 (Four) Times a Day., Disp: 180 tablet, Rfl: 3  •  Famotidine (PEPCID PO), Take  by mouth Daily., Disp: , Rfl:   •  febuxostat (ULORIC) 40 MG tablet, Take 40 mg by mouth Daily., Disp: , Rfl:   •  Fluzone High-Dose Quadrivalent 0.7 ML suspension prefilled syringe, ADM 0.7ML IM UTD, Disp: , Rfl:   •  furosemide (LASIX) 40 MG tablet, Take 80 mg by mouth 3 (Three) Times a Day., Disp: , Rfl:   •  Gabapentin, Once-Daily, (GRALISE) 300 MG tablet, Take 300 mg by mouth Daily With Dinner. MAY REPEAT LATE EVENING IF NEEDED, Disp: , Rfl:   •  Glucosamine-Chondroit-Vit C-Mn (GLUCOSAMINE CHONDROITIN COMPLX) capsule, Take 1,500 mg by mouth Every Other Day., Disp: , Rfl:   •  HYDROcodone-acetaminophen (NORCO) 5-325 MG per tablet, Take 1 tablet by mouth Every 6 (Six) Hours As Needed for Severe Pain ., Disp: 12 tablet,  Rfl: 0  •  levothyroxine (SYNTHROID, LEVOTHROID) 25 MCG tablet, Take 25 mcg by mouth Daily., Disp: , Rfl:   •  Magnesium 250 MG tablet, Take  by mouth Daily., Disp: , Rfl:   •  magnesium oxide 250 MG tablet, Take 250 mg by mouth Daily., Disp: , Rfl:   •  Multiple Vitamin (MULTI-DAY VITAMINS) tablet, Take 1 tablet by mouth Daily. HOLD FOR SURGERY, Disp: , Rfl:   •  MULTIPLE VITAMINS-IRON PO, Take  by mouth Daily., Disp: , Rfl:   •  polyethylene glycol (MIRALAX) powder, Take 17 g by mouth Daily As Needed., Disp: , Rfl:   •  psyllium (METAMUCIL) 0.52 g capsule, Take 0.52 g by mouth., Disp: , Rfl:   •  rotigotine (NEUPRO) 6 MG/24HR patch, Place 1 patch on the skin as directed by provider Daily., Disp: , Rfl:   •  urea (CARMOL) 20 % cream, , Disp: , Rfl:   •  Vitamin E 400 UNITS tablet, Take 400 Units by mouth Daily., Disp: , Rfl:   No current facility-administered medications for this visit.     Facility-Administered Medications Ordered in Other Visits:   •  acetaminophen (TYLENOL) tablet 650 mg, 650 mg, Oral, Once, Thuy Cruz APRN  •  diphenhydrAMINE (BENADRYL) capsule 25 mg, 25 mg, Oral, Once, Thuy Cruz APRN  •  heparin flush (porcine) 100 UNIT/ML injection 500 Units, 500 Units, Intravenous, PRN, Anuel Scott MD, 500 Units at 11/27/17 1347  •  heparin flush (porcine) 100 UNIT/ML injection 500 Units, 500 Units, Intravenous, PRN, Neno Merritt II, MD, 500 Units at 01/18/20 1359  •  sodium chloride 0.9 % flush 10 mL, 10 mL, Intravenous, PRN, Anuel Scott MD, 10 mL at 11/27/17 1347  •  sodium chloride 0.9 % flush 10 mL, 10 mL, Intravenous, PRN, Neno Merritt II, MD, 10 mL at 01/18/20 1359  •  sodium chloride 0.9 % infusion 250 mL, 250 mL, Intravenous, PRN, Neno Merritt II, MD  •  sodium chloride 0.9 % infusion 250 mL, 250 mL, Intravenous, PRN, Thuy Cruz APRN    ALLERGIES  Chlorhexidine and Codeine    REVIEW OF SYSTEMS  ROS: 14 point review of systems was  performed and all other systems were reviewed and are negative except for documented findings in HPI and today's encounter.   Review of Systems      History     Last Reviewed by Ayana Bui APRN on 11/10/2020 at 10:39 AM    Sections Reviewed    Tobacco, Family, Medical, Surgical      Problem list reviewed by Ayana Bui APRN on 11/10/2020 at 10:39 AM  Medicines reviewed by Ayana Bui APRN on 11/10/2020 at 10:39 AM  Allergies reviewed by Ayana Bui APRN on 11/10/2020 at 10:39 AM      ACTIVE PROBLEMS  Patient Active Problem List    Diagnosis   • Vitamin B 12 deficiency [E53.8]   • Iron adverse reaction [T45.4X5A]   • Absolute anemia [D64.9]   • Chronic kidney disease, stage 4 (severe) (CMS/HCC) [N18.4]   • Delayed gastric emptying [K30]   • Traumatic subdural hematoma without loss of consciousness (CMS/HCC) [S06.5X0A]   • Orbital fracture, closed, initial encounter (CMS/[facility]) [S02.85XA]   • Subdural hematoma, post-traumatic (CMS/HCC) [S06.5X9A]   • Subarachnoid hemorrhage (CMS/HCC) [I60.9]   • Fall [W19.XXXA]   • GAVE (gastric antral vascular ectasia) [K31.819]   • History of recent stroke [Z86.73]   • Symptomatic anemia [D64.9]   • Acute renal failure superimposed on chronic kidney disease (CMS/HCC) [N17.9, N18.9]   • Anemia due to acute blood loss [D62]   • Chronic kidney disease, stage III (moderate) (CMS/HCC)  [N18.30]   • Weakness on left side of face [R29.810]   • B12 deficiency [E53.8]   • Encounter for fitting and adjustment of vascular catheter [Z45.2]   • Anemia of chronic disease [D63.8]   • CKD (chronic kidney disease) stage 4, GFR 15-29 ml/min (CMS/HCC) [N18.4]   • Anemia of chronic renal failure, stage 4 (severe) (CMS/HCC) [N18.4, D63.1]   • Dysuria [R30.0]   • Iron deficiency anemia [D50.9]   • History of anemia due to chronic kidney disease [N18.9, Z86.2]   • CKD (chronic kidney disease) [N18.9]   • Anemia [D64.9]   • Carpal tunnel syndrome [G56.00]   • Periodic limb movement disorder  [G47.61]   • Peripheral neuropathy [G62.9]   • Restless legs syndrome [G25.81]   • Chronic systolic congestive heart failure (CMS/HCC) [I50.22]   • Cardiomyopathy (CMS/HCC) [I42.9]   • Pacemaker lead failure [T82.110A]   • Chest pain [R07.9]   • 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 [M47.812]   • Spondylolisthesis [M43.10]   • Chronic adrenal insufficiency (CMS/HCC) [E27.40]   • Adrenal insufficiency (CMS/HCC) [E27.40]   • Congestive heart failure (CMS/HCC) [I50.9]   • Gastroparesis [K31.84]   • Hypotension [I95.9]   • Hypothyroidism [E03.9]   • 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]   • Osteoarthritis [M19.90]   • Automatic implantable cardioverter-defibrillator in situ [Z95.810]   • History of total knee replacement [Z96.659]   • CHF (congestive heart failure) (CMS/HCC) [I50.9]   • Neuropathy [G62.9]         PAST MEDICAL HISTORY  Past Medical History:   Diagnosis Date   • Anemia     Iron deficiency   • Anxiety    • Cardiomyopathy (CMS/HCC)     S/P pacemaker and defibrillator   • CHF (congestive heart failure) (CMS/HCC)    • Chronic renal failure, stage 4 (severe) (CMS/HCC)    • Colon polyp    • Coronary artery disease     pacemaker, defibrillator   • Depression    • Dizzy    • Gastroparesis    • GAVE (gastric antral vascular ectasia)    • GERD (gastroesophageal reflux disease)    • Gout    • Hemorrhoids    • Hiatal hernia    • History of Clostridium difficile colitis 2013   • History of kidney stones    • History of pancreatitis     2014   • History of skin cancer    • History of transfusion     MULTIPLE    • Hyperlipidemia    • Hypertension    • Hypothyroidism    • Left bundle branch block    • Mitral and aortic valve disease    • Mitral valve insufficiency    • Myoclonus     S/P Depakote   • Osteoarthritis    • Pancytopenia (CMS/HCC)    • Peripheral neuropathy    • Presence of cardiac pacemaker     AND DEFIBRILLATOR   • Pulmonary hypertension (CMS/HCC)    • SOB (shortness of breath) on exertion    • Spinal stenosis     • Stroke (CMS/HCC)          SURGICAL HISTORY  Past Surgical History:   Procedure Laterality Date   • APPENDECTOMY N/A    • ARTERIOVENOUS FISTULA/SHUNT SURGERY Right 2/18/2019    Procedure: RIGHT BASILIC FISTULA CREATION WITHOUT TRANSPOSITION;  Surgeon: Royer Dozier MD;  Location: [facility] MAIN OR;  Service: Vascular   • ARTERIOVENOUS FISTULA/SHUNT SURGERY Right 5/31/2019    Procedure: RIGHT 2ND STAGE BASILIC VEIN CREATION;  Surgeon: Royer Dozier MD;  Location: [facility] MAIN OR;  Service: Vascular   • CARDIAC CATHETERIZATION Left 03/28/2006    Arterial catheter insertion, cath left ventriculography, coronary angiography and left heart catheterization-Dr. Estevan Matamoros   • CARDIAC DEFIBRILLATOR PLACEMENT N/A 11/30/2007    Biventricular implantable cardioverter defibrillator-Dr. Leandro Huber   • CARDIAC ELECTROPHYSIOLOGY PROCEDURE N/A 10/28/2019    Procedure: GENERATOR CHANGE BI-V ICD  medtronic;  Surgeon: Leandro Huber MD;  Location: [facility] CATH INVASIVE LOCATION;  Service: Cardiology   • CATARACT EXTRACTION Bilateral 2011   • COLONOSCOPY N/A 2014    done at [facility]   • CYSTECTOMY N/A     Ovarian cystectomy   • ENDOSCOPY N/A 04/28/2006    EGD with biopsies. Paraesophageal hiatal hernia from 34 to 44 cm, antral gastritis-Dr. Nehemiah Beal   • ENDOSCOPY N/A 09/29/2004    Hiatal hernia, gastritis and an erosion of the pylorus-Dr. Yang Pavon   • ENDOSCOPY N/A 9/1/2019    Procedure: ESOPHAGOGASTRODUODENOSCOPY with APC;  Surgeon: Anuel Roach MD;  Location: [facility] ENDOSCOPY;  Service: Gastroenterology   • ENDOSCOPY N/A 1/28/2020    Procedure: ESOPHAGOGASTRODUODENOSCOPY with APC;  Surgeon: Anuel Roach MD;  Location: [facility] ENDOSCOPY;  Service: Gastroenterology   • ENDOSCOPY N/A 4/21/2020    Procedure: ESOPHAGOGASTRODUODENOSCOPY WITH APC;  Surgeon: Anuel Roach MD;  Location: [facility] ENDOSCOPY;  Service: Gastroenterology;  Laterality: N/A;  PRE- MELENA,  GAVE  POST- ESOPHAGITIS, GAVE   • ENTEROSCOPY SMALL BOWEL N/A 10/30/2006    Small bowel enteroscopy with biopsies-Dr. Rory Sevilla   • FLEXIBLE SIGMOIDOSCOPY N/A 09/29/2004    Unsuccessful colonoscopy due to poop prep, a very tortuous sigmoid, bowel prep in the form of enema given and a barium enema was obtained-Dr. Yang Pavon   • FRACTURE SURGERY  2015    Broke big toe   • HEMATOMA EVACUATION TRUNK N/A 02/07/2014    Pocket evacuation of hematoma at pacemaker site-Dr. Leandro Huber   • HEMORRHOIDECTOMY N/A 04/19/2004    Flexible sigmoidoscopy and stapled hemorrhoidectomy-Dr. Yang Pavon   • HYSTERECTOMY Bilateral 1977   • IMPLANTABLE CARDIOVERTER DEFIBRILLATOR LEAD REPLACEMENT/POCKET REVISION Left 3/28/2016    Procedure: AUTOMATIC IMPLANTABLE CARDIOVERTER DEFIBRILLATOR LEAD REPLACEMENT WITH LASER LEAD EXTRACTION;  Surgeon: Leandro Huber MD;  Location: [facility] OR 18/19;  Service:    • IMPLANTABLE CARDIOVERTER DEFIBRILLATOR LEAD REPLACEMENT/POCKET REVISION N/A 01/13/2014    Biventricular ICD replacement-Dr. Jillian Huber   • LAPAROSCOPY REPAIR HIATAL HERNIA N/A 06/27/2006    Laparoscopic paraesophageal hiatal hernia repair with Nissen fundoplication and cholecystectomy-Dr. Sean Yoon   • NATALIE GONZALEZ ([facility]) PROCEDURE     • VT INSJ TUNNELED CVC W/O SUBQ PORT/ AGE 3 YR/> Right 10/3/2017    Procedure: Right internal jugular port placement;  Surgeon: Tiffanie Couch MD;  Location: [facility];  Service: General   • TOE SURGERY Left     SET BIG TOE   • TOTAL KNEE ARTHROPLASTY Left 08/2014   • VENOUS ACCESS DEVICE (PORT) INSERTION Right          FAMILY HISTORY  Family History   Problem Relation Age of Onset   • Coronary artery disease Other    • Dementia Other    • Kidney disease Other    • Arthritis Father    • Early death Father         Kidney failure   • Kidney disease Father    • Heart disease Mother    • Mental illness Mother         Dementia   • Malig Hyperthermia Neg Hx  "   • Colon cancer Neg Hx    • Colon polyps Neg Hx          SOCIAL HISTORY  Social History     Occupational History     Employer: RETIRED   Tobacco Use   • Smoking status: Never Smoker   • Smokeless tobacco: Never Used   • Tobacco comment: caffeine use - coffee and soda   Substance and Sexual Activity   • Alcohol use: No   • Drug use: No   • Sexual activity: Defer         PHYSICAL EXAM  Debilities/Disabilities Identified: None  Emotional Behavior: Appropriate  80 y.o. female  Wt Readings from Last 3 Encounters:   10/15/20 68.2 kg (150 lb 6.4 oz)   10/01/20 69.1 kg (152 lb 6.4 oz)   09/17/20 71.2 kg (157 lb)     Ht Readings from Last 1 Encounters:   10/29/20 170.2 cm (67.01\")     There is no height or weight on file to calculate BMI.  There were no vitals filed for this visit.  Vital signs reviewed.          Physical Exam  As reported by patient above d/t telemedicine visit.    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     Will get her scheduled asap for EGD with APC.  She has an appt with [facility] on Thursday with labs.   So will coordinate with them on her treatment.  Will consult with Dr. Roach on when he would like to fit her in depending on anemia level.     I have discussed the above plan with the patient.  They verbalize understanding and are in agreement with the plan.  They have been advised to " contact the office for any questions, concerns, or changes related to their health.    25 min spent with patient today >50% spent counseling about natural history and expected course of assessed complaint and reviewed treatment options that have been tried and not tried and those currently available. Questions answered.

## 2020-11-11 PROBLEM — K92.1 MELENA: Status: ACTIVE | Noted: 2020-11-11

## 2020-11-11 PROBLEM — D50.0 IRON DEFICIENCY ANEMIA DUE TO CHRONIC BLOOD LOSS: Status: ACTIVE | Noted: 2020-11-11

## 2020-11-11 NOTE — PROGRESS NOTES
DONE.  SCHEDULED AT [place] 11/17/2020 - ARRIVE 1PM.  GAVE INSTRUCTIONS OVER THE PHONE.  COVID TEST 11/14/2020 [facility]

## 2020-11-12 ENCOUNTER — INFUSION (OUTPATIENT)
Dept: ONCOLOGY | Facility: HOSPITAL | Age: 80
End: 2020-11-12

## 2020-11-12 ENCOUNTER — TRANSCRIBE ORDERS (OUTPATIENT)
Dept: ADMINISTRATIVE | Facility: HOSPITAL | Age: 80
End: 2020-11-12

## 2020-11-12 VITALS — SYSTOLIC BLOOD PRESSURE: 129 MMHG | DIASTOLIC BLOOD PRESSURE: 65 MMHG

## 2020-11-12 DIAGNOSIS — D63.1 ANEMIA OF CHRONIC RENAL FAILURE, STAGE 4 (SEVERE) (HCC): ICD-10-CM

## 2020-11-12 DIAGNOSIS — Z01.818 PREOP EXAMINATION: Primary | ICD-10-CM

## 2020-11-12 DIAGNOSIS — D63.1 ANEMIA OF CHRONIC RENAL FAILURE, STAGE 4 (SEVERE) (HCC): Primary | ICD-10-CM

## 2020-11-12 DIAGNOSIS — D62 ANEMIA DUE TO ACUTE BLOOD LOSS: Primary | ICD-10-CM

## 2020-11-12 DIAGNOSIS — E53.8 VITAMIN B 12 DEFICIENCY: ICD-10-CM

## 2020-11-12 DIAGNOSIS — N18.4 ANEMIA OF CHRONIC RENAL FAILURE, STAGE 4 (SEVERE) (HCC): ICD-10-CM

## 2020-11-12 DIAGNOSIS — N18.4 ANEMIA OF CHRONIC RENAL FAILURE, STAGE 4 (SEVERE) (HCC): Primary | ICD-10-CM

## 2020-11-12 DIAGNOSIS — Z45.2 ENCOUNTER FOR FITTING AND ADJUSTMENT OF VASCULAR CATHETER: ICD-10-CM

## 2020-11-12 LAB
ABO GROUP BLD: NORMAL
BASOPHILS # BLD AUTO: 0.02 10*3/MM3 (ref 0–0.2)
BASOPHILS NFR BLD AUTO: 0.3 % (ref 0–1.5)
BLD GP AB SCN SERPL QL: NEGATIVE
DEPRECATED RDW RBC AUTO: 56.9 FL (ref 37–54)
EOSINOPHIL # BLD AUTO: 0.09 10*3/MM3 (ref 0–0.4)
EOSINOPHIL NFR BLD AUTO: 1.4 % (ref 0.3–6.2)
ERYTHROCYTE [DISTWIDTH] IN BLOOD BY AUTOMATED COUNT: 15.5 % (ref 12.3–15.4)
FERRITIN SERPL-MCNC: 249.3 NG/ML (ref 13–150)
HCT VFR BLD AUTO: 26.6 % (ref 34–46.6)
HGB BLD-MCNC: 8.2 G/DL (ref 12–15.9)
HGB RETIC QN AUTO: 33.9 PG (ref 29.8–36.1)
IMM GRANULOCYTES # BLD AUTO: 0.02 10*3/MM3 (ref 0–0.05)
IMM GRANULOCYTES NFR BLD AUTO: 0.3 % (ref 0–0.5)
IMM RETICS NFR: 14.5 % (ref 3–15.8)
IRON 24H UR-MRATE: 47 MCG/DL (ref 37–145)
IRON SATN MFR SERPL: 18 % (ref 20–50)
LYMPHOCYTES # BLD AUTO: 0.65 10*3/MM3 (ref 0.7–3.1)
LYMPHOCYTES NFR BLD AUTO: 9.9 % (ref 19.6–45.3)
MCH RBC QN AUTO: 31.2 PG (ref 26.6–33)
MCHC RBC AUTO-ENTMCNC: 30.8 G/DL (ref 31.5–35.7)
MCV RBC AUTO: 101.1 FL (ref 79–97)
MONOCYTES # BLD AUTO: 0.39 10*3/MM3 (ref 0.1–0.9)
MONOCYTES NFR BLD AUTO: 5.9 % (ref 5–12)
NEUTROPHILS NFR BLD AUTO: 5.39 10*3/MM3 (ref 1.7–7)
NEUTROPHILS NFR BLD AUTO: 82.2 % (ref 42.7–76)
NRBC BLD AUTO-RTO: 0 /100 WBC (ref 0–0.2)
PLATELET # BLD AUTO: 165 10*3/MM3 (ref 140–450)
PMV BLD AUTO: 8.9 FL (ref 6–12)
RBC # BLD AUTO: 2.63 10*6/MM3 (ref 3.77–5.28)
RETICS # AUTO: 0.06 10*6/MM3 (ref 0.02–0.13)
RETICS/RBC NFR AUTO: 2.35 % (ref 0.7–1.9)
RH BLD: NEGATIVE
T&S EXPIRATION DATE: NORMAL
TIBC SERPL-MCNC: 255 MCG/DL (ref 298–536)
TRANSFERRIN SERPL-MCNC: 171 MG/DL (ref 200–360)
WBC # BLD AUTO: 6.56 10*3/MM3 (ref 3.4–10.8)

## 2020-11-12 PROCEDURE — 25010000002 CYANOCOBALAMIN PER 1000 MCG: Performed by: INTERNAL MEDICINE

## 2020-11-12 PROCEDURE — 25010000002 EPOETIN ALFA PER 1000 UNITS: Performed by: INTERNAL MEDICINE

## 2020-11-12 PROCEDURE — 82728 ASSAY OF FERRITIN: CPT | Performed by: INTERNAL MEDICINE

## 2020-11-12 PROCEDURE — 86901 BLOOD TYPING SEROLOGIC RH(D): CPT

## 2020-11-12 PROCEDURE — 96523 IRRIG DRUG DELIVERY DEVICE: CPT

## 2020-11-12 PROCEDURE — 84466 ASSAY OF TRANSFERRIN: CPT | Performed by: INTERNAL MEDICINE

## 2020-11-12 PROCEDURE — 86850 RBC ANTIBODY SCREEN: CPT

## 2020-11-12 PROCEDURE — 85046 RETICYTE/HGB CONCENTRATE: CPT | Performed by: INTERNAL MEDICINE

## 2020-11-12 PROCEDURE — 25010000003 HEPARIN LOCK FLUSH PER 10 UNITS: Performed by: INTERNAL MEDICINE

## 2020-11-12 PROCEDURE — 86900 BLOOD TYPING SEROLOGIC ABO: CPT

## 2020-11-12 PROCEDURE — 85025 COMPLETE CBC W/AUTO DIFF WBC: CPT | Performed by: INTERNAL MEDICINE

## 2020-11-12 PROCEDURE — 96372 THER/PROPH/DIAG INJ SC/IM: CPT

## 2020-11-12 PROCEDURE — 83540 ASSAY OF IRON: CPT | Performed by: INTERNAL MEDICINE

## 2020-11-12 PROCEDURE — 86923 COMPATIBILITY TEST ELECTRIC: CPT

## 2020-11-12 RX ORDER — DIPHENHYDRAMINE HCL 25 MG
25 CAPSULE ORAL ONCE
Status: CANCELLED | OUTPATIENT
Start: 2020-11-14 | End: 2020-11-12

## 2020-11-12 RX ORDER — SODIUM CHLORIDE 9 MG/ML
250 INJECTION, SOLUTION INTRAVENOUS AS NEEDED
Status: CANCELLED | OUTPATIENT
Start: 2020-11-14

## 2020-11-12 RX ORDER — HEPARIN SODIUM (PORCINE) LOCK FLUSH IV SOLN 100 UNIT/ML 100 UNIT/ML
500 SOLUTION INTRAVENOUS AS NEEDED
Status: CANCELLED | OUTPATIENT
Start: 2020-11-12

## 2020-11-12 RX ORDER — HEPARIN SODIUM (PORCINE) LOCK FLUSH IV SOLN 100 UNIT/ML 100 UNIT/ML
500 SOLUTION INTRAVENOUS AS NEEDED
Status: DISCONTINUED | OUTPATIENT
Start: 2020-11-12 | End: 2020-11-13 | Stop reason: HOSPADM

## 2020-11-12 RX ORDER — SODIUM CHLORIDE 0.9 % (FLUSH) 0.9 %
10 SYRINGE (ML) INJECTION AS NEEDED
Status: CANCELLED | OUTPATIENT
Start: 2020-11-12

## 2020-11-12 RX ORDER — SODIUM CHLORIDE 0.9 % (FLUSH) 0.9 %
10 SYRINGE (ML) INJECTION AS NEEDED
Status: DISCONTINUED | OUTPATIENT
Start: 2020-11-12 | End: 2020-11-13 | Stop reason: HOSPADM

## 2020-11-12 RX ORDER — CYANOCOBALAMIN 1000 UG/ML
1000 INJECTION, SOLUTION INTRAMUSCULAR; SUBCUTANEOUS ONCE
Status: COMPLETED | OUTPATIENT
Start: 2020-11-12 | End: 2020-11-12

## 2020-11-12 RX ORDER — ACETAMINOPHEN 325 MG/1
650 TABLET ORAL ONCE
Status: CANCELLED | OUTPATIENT
Start: 2020-11-14 | End: 2020-11-12

## 2020-11-12 RX ADMIN — ERYTHROPOIETIN 8000 UNITS: 20000 INJECTION, SOLUTION INTRAVENOUS; SUBCUTANEOUS at 10:52

## 2020-11-12 RX ADMIN — CYANOCOBALAMIN 1000 MCG: 1000 INJECTION, SOLUTION INTRAMUSCULAR at 10:58

## 2020-11-12 RX ADMIN — Medication 500 UNITS: at 10:50

## 2020-11-12 RX ADMIN — SODIUM CHLORIDE, PRESERVATIVE FREE 10 ML: 5 INJECTION INTRAVENOUS at 10:50

## 2020-11-12 NOTE — NURSING NOTE
Pt here for procrit and B12. She has been feeling more tired.  Right chest port accessed for labs. Left accessed in case pt needs blood transfusion.    Hgb 8.2 today. She would like to go ahead and have blood transfusion ordered bc she is due to have scope 11/24 with Dr. Roach and the hgb needs to be above 8 (per pt's ) in order to get the scope. She doesn't want to wait until next week for transfusion in case hgb drops any further.    I reviewed with CHANTELL Daley.  Orders given for 2 units PRBC's. Pt scheduled for Saturday morning at [facility] for transfusion.  Type and screen drawn here today and yellow blood band applied. Pt wants to leave port needle in place for transfusion.      Procrit given per protocol and B12 IM given today.  Copy of labs given. Pt requests I send lab results to Dr. Roach. I will await all resulted labs from today before I send.  Pt and  v/u.

## 2020-11-14 ENCOUNTER — INFUSION (OUTPATIENT)
Dept: ONCOLOGY | Facility: HOSPITAL | Age: 80
End: 2020-11-14

## 2020-11-14 VITALS
DIASTOLIC BLOOD PRESSURE: 59 MMHG | SYSTOLIC BLOOD PRESSURE: 147 MMHG | OXYGEN SATURATION: 100 % | RESPIRATION RATE: 20 BRPM | TEMPERATURE: 97.4 F | HEART RATE: 59 BPM

## 2020-11-14 DIAGNOSIS — N18.4 ANEMIA OF CHRONIC RENAL FAILURE, STAGE 4 (SEVERE) (HCC): Primary | ICD-10-CM

## 2020-11-14 DIAGNOSIS — Z45.2 ENCOUNTER FOR FITTING AND ADJUSTMENT OF VASCULAR CATHETER: ICD-10-CM

## 2020-11-14 DIAGNOSIS — D63.1 ANEMIA OF CHRONIC RENAL FAILURE, STAGE 4 (SEVERE) (HCC): Primary | ICD-10-CM

## 2020-11-14 PROCEDURE — 63710000001 DIPHENHYDRAMINE PER 50 MG: Performed by: NURSE PRACTITIONER

## 2020-11-14 PROCEDURE — 25010000003 HEPARIN LOCK FLUSH PER 10 UNITS: Performed by: INTERNAL MEDICINE

## 2020-11-14 PROCEDURE — 36430 TRANSFUSION BLD/BLD COMPNT: CPT

## 2020-11-14 PROCEDURE — P9016 RBC LEUKOCYTES REDUCED: HCPCS

## 2020-11-14 PROCEDURE — 86900 BLOOD TYPING SEROLOGIC ABO: CPT

## 2020-11-14 RX ORDER — SODIUM CHLORIDE 9 MG/ML
250 INJECTION, SOLUTION INTRAVENOUS AS NEEDED
Status: DISCONTINUED | OUTPATIENT
Start: 2020-11-14 | End: 2020-11-14 | Stop reason: HOSPADM

## 2020-11-14 RX ORDER — SODIUM CHLORIDE 0.9 % (FLUSH) 0.9 %
10 SYRINGE (ML) INJECTION AS NEEDED
Status: DISCONTINUED | OUTPATIENT
Start: 2020-11-14 | End: 2020-11-14 | Stop reason: HOSPADM

## 2020-11-14 RX ORDER — SODIUM CHLORIDE 0.9 % (FLUSH) 0.9 %
10 SYRINGE (ML) INJECTION AS NEEDED
Status: CANCELLED | OUTPATIENT
Start: 2020-11-14

## 2020-11-14 RX ORDER — HEPARIN SODIUM (PORCINE) LOCK FLUSH IV SOLN 100 UNIT/ML 100 UNIT/ML
500 SOLUTION INTRAVENOUS AS NEEDED
Status: CANCELLED | OUTPATIENT
Start: 2020-11-14

## 2020-11-14 RX ORDER — HEPARIN SODIUM (PORCINE) LOCK FLUSH IV SOLN 100 UNIT/ML 100 UNIT/ML
500 SOLUTION INTRAVENOUS AS NEEDED
Status: DISCONTINUED | OUTPATIENT
Start: 2020-11-14 | End: 2020-11-14 | Stop reason: HOSPADM

## 2020-11-14 RX ORDER — ACETAMINOPHEN 325 MG/1
650 TABLET ORAL ONCE
Status: COMPLETED | OUTPATIENT
Start: 2020-11-14 | End: 2020-11-14

## 2020-11-14 RX ORDER — DIPHENHYDRAMINE HCL 25 MG
25 CAPSULE ORAL ONCE
Status: COMPLETED | OUTPATIENT
Start: 2020-11-14 | End: 2020-11-14

## 2020-11-14 RX ADMIN — Medication 500 UNITS: at 13:31

## 2020-11-14 RX ADMIN — ACETAMINOPHEN 650 MG: 325 TABLET, FILM COATED ORAL at 08:22

## 2020-11-14 RX ADMIN — DIPHENHYDRAMINE HYDROCHLORIDE 25 MG: 25 CAPSULE ORAL at 08:22

## 2020-11-14 RX ADMIN — SODIUM CHLORIDE, PRESERVATIVE FREE 10 ML: 5 INJECTION INTRAVENOUS at 13:31

## 2020-11-19 NOTE — PROGRESS NOTES
.     REASONS FOR FOLLOWUP: Anemia    HISTORY OF PRESENT ILLNESS:  The patient is a 80 y.o. year old female  who is here for follow-up with the above-mentioned history.    She is felt much better since the transfusion.  She has had dark stools.  She saw GI for this and colonoscopies point 11/24/2020      Past Medical History:   Diagnosis Date   • Anemia     Iron deficiency   • Anxiety    • Cardiomyopathy (CMS/HCC)     S/P pacemaker and defibrillator   • CHF (congestive heart failure) (CMS/HCC)    • Chronic renal failure, stage 4 (severe) (CMS/HCC)    • Colon polyp    • Coronary artery disease     pacemaker, defibrillator   • Depression    • Dizzy    • Gastroparesis    • GAVE (gastric antral vascular ectasia)    • GERD (gastroesophageal reflux disease)    • Gout    • Hemorrhoids    • Hiatal hernia    • History of Clostridium difficile colitis 2013   • History of kidney stones    • History of pancreatitis     2014   • History of skin cancer    • History of transfusion     MULTIPLE    • Hyperlipidemia    • Hypertension    • Hypothyroidism    • Left bundle branch block    • Mitral and aortic valve disease    • Mitral valve insufficiency    • Myoclonus     S/P Depakote   • Osteoarthritis    • Pancytopenia (CMS/HCC)    • Peripheral neuropathy    • Presence of cardiac pacemaker     AND DEFIBRILLATOR   • Pulmonary hypertension (CMS/HCC)    • SOB (shortness of breath) on exertion    • Spinal stenosis    • Stroke (CMS/HCC)      Past Surgical History:   Procedure Laterality Date   • APPENDECTOMY N/A    • ARTERIOVENOUS FISTULA/SHUNT SURGERY Right 2/18/2019    Procedure: RIGHT BASILIC FISTULA CREATION WITHOUT TRANSPOSITION;  Surgeon: Royer Dozier MD;  Location: [facility] OR;  Service: Vascular   • ARTERIOVENOUS FISTULA/SHUNT SURGERY Right 5/31/2019    Procedure: RIGHT 2ND STAGE BASILIC VEIN CREATION;  Surgeon: Royer Dozier MD;  Location: [facility] OR;  Service: Vascular   • CARDIAC CATHETERIZATION Left  03/28/2006    Arterial catheter insertion, cath left ventriculography, coronary angiography and left heart catheterization-Dr. Estevan Matamoros   • CARDIAC DEFIBRILLATOR PLACEMENT N/A 11/30/2007    Biventricular implantable cardioverter defibrillator-Dr. Leandro Huber   • CARDIAC ELECTROPHYSIOLOGY PROCEDURE N/A 10/28/2019    Procedure: GENERATOR CHANGE BI-V ICD  medtronic;  Surgeon: Leandro Huber MD;  Location: [facility] CATH INVASIVE LOCATION;  Service: Cardiology   • CATARACT EXTRACTION Bilateral 2011   • COLONOSCOPY N/A 2014    done at [facility]   • CYSTECTOMY N/A     Ovarian cystectomy   • ENDOSCOPY N/A 04/28/2006    EGD with biopsies. Paraesophageal hiatal hernia from 34 to 44 cm, antral gastritis-Dr. Nehemiah Beal   • ENDOSCOPY N/A 09/29/2004    Hiatal hernia, gastritis and an erosion of the pylorus-Dr. Yang Pavon   • ENDOSCOPY N/A 9/1/2019    Procedure: ESOPHAGOGASTRODUODENOSCOPY with APC;  Surgeon: Anuel Roach MD;  Location: [facility] ENDOSCOPY;  Service: Gastroenterology   • ENDOSCOPY N/A 1/28/2020    Procedure: ESOPHAGOGASTRODUODENOSCOPY with APC;  Surgeon: Anuel Roach MD;  Location: [facility] ENDOSCOPY;  Service: Gastroenterology   • ENDOSCOPY N/A 4/21/2020    Procedure: ESOPHAGOGASTRODUODENOSCOPY WITH APC;  Surgeon: Anuel Roach MD;  Location: [facility] ENDOSCOPY;  Service: Gastroenterology;  Laterality: N/A;  PRE- MELENA, GAVE  POST- ESOPHAGITIS, GAVE   • ENTEROSCOPY SMALL BOWEL N/A 10/30/2006    Small bowel enteroscopy with biopsies-Dr. Rory Sevilla   • FLEXIBLE SIGMOIDOSCOPY N/A 09/29/2004    Unsuccessful colonoscopy due to poop prep, a very tortuous sigmoid, bowel prep in the form of enema given and a barium enema was obtained-Dr. Yang Pavon   • FRACTURE SURGERY  2015    Broke big toe   • HEMATOMA EVACUATION TRUNK N/A 02/07/2014    Pocket evacuation of hematoma at pacemaker site-Dr. Leandro Huber   • HEMORRHOIDECTOMY N/A 04/19/2004    Flexible  sigmoidoscopy and stapled hemorrhoidectomy-Dr. Yang Pavon   • HYSTERECTOMY Bilateral 1977   • IMPLANTABLE CARDIOVERTER DEFIBRILLATOR LEAD REPLACEMENT/POCKET REVISION Left 3/28/2016    Procedure: AUTOMATIC IMPLANTABLE CARDIOVERTER DEFIBRILLATOR LEAD REPLACEMENT WITH LASER LEAD EXTRACTION;  Surgeon: Leandro Huber MD;  Location: [facility] OR 18/19;  Service:    • IMPLANTABLE CARDIOVERTER DEFIBRILLATOR LEAD REPLACEMENT/POCKET REVISION N/A 01/13/2014    Biventricular ICD replacement-Dr. Jillian Huber   • LAPAROSCOPY REPAIR HIATAL HERNIA N/A 06/27/2006    Laparoscopic paraesophageal hiatal hernia repair with Nissen fundoplication and cholecystectomy-Dr. Sean Yoon   • NATALIE GONZALEZ ([facility]) PROCEDURE     • OR INSJ TUNNELED CVC W/O SUBQ PORT/ AGE 3 YR/> Right 10/3/2017    Procedure: Right internal jugular port placement;  Surgeon: Tiffanie Couch MD;  Location: [facility] OR;  Service: General   • TOE SURGERY Left     SET BIG TOE   • TOTAL KNEE ARTHROPLASTY Left 08/2014   • VENOUS ACCESS DEVICE (PORT) INSERTION Right        MEDICATIONS    Current Outpatient Medications:   •  acetaminophen (TYLENOL) 325 MG tablet, Take 2 tablets by mouth Every 4 (Four) Hours As Needed for Mild Pain ., Disp: , Rfl:   •  aspirin (aspirin) 81 MG EC tablet, Take 81 mg by mouth Daily., Disp: , Rfl:   •  atorvastatin (LIPITOR) 20 MG tablet, Take 20 mg by mouth Daily., Disp: , Rfl:   •  atorvastatin (LIPITOR) 40 MG tablet, Take 40 mg by mouth Daily., Disp: , Rfl:   •  calcitriol (ROCALTROL) 0.25 MCG capsule, , Disp: , Rfl:   •  calcitriol (ROCALTROL) 0.5 MCG capsule, Take 0.5 mcg by mouth Every Other Day. Alternate days with calcitriol 0.25mcg, Disp: , Rfl:   •  calcium carbonate (OS-RAYMOND) 600 MG tablet, Take 600 mg by mouth Daily., Disp: , Rfl:   •  carbidopa-levodopa ER (SINEMET CR)  MG per tablet, Take 1 tablet by mouth Every Evening., Disp: , Rfl:   •  carvedilol (COREG) 12.5 MG tablet, Take 1 tablet by  mouth Daily., Disp: 90 tablet, Rfl: 1  •  cefdinir (OMNICEF) 300 MG capsule, TAKE 1 CAPSULE BY MOUTH TWICE A DAY FOR 3 DAYS\, Disp: , Rfl:   •  Cholecalciferol (VITAMIN D3 PO), Take  by mouth., Disp: , Rfl:   •  Cholecalciferol (VITAMIN D3) 5000 units capsule capsule, Take 5,000 Units by mouth Daily., Disp: , Rfl:   •  diazepam (VALIUM) 5 MG tablet, Take 5 mg by mouth Every Night., Disp: , Rfl:   •  DULoxetine (CYMBALTA) 30 MG capsule, Take 30 mg by mouth Daily., Disp: , Rfl:   •  erythromycin base (E-MYCIN) 250 MG tablet, Take 0.5 tablets by mouth 4 (Four) Times a Day., Disp: 180 tablet, Rfl: 3  •  Famotidine (PEPCID PO), Take  by mouth Daily., Disp: , Rfl:   •  febuxostat (ULORIC) 40 MG tablet, Take 40 mg by mouth Daily., Disp: , Rfl:   •  Fluzone High-Dose Quadrivalent 0.7 ML suspension prefilled syringe, ADM 0.7ML IM UTD, Disp: , Rfl:   •  furosemide (LASIX) 40 MG tablet, Take 80 mg by mouth 3 (Three) Times a Day., Disp: , Rfl:   •  Gabapentin, Once-Daily, (GRALISE) 300 MG tablet, Take 300 mg by mouth Daily With Dinner. MAY REPEAT LATE EVENING IF NEEDED, Disp: , Rfl:   •  Glucosamine-Chondroit-Vit C-Mn (GLUCOSAMINE CHONDROITIN COMPLX) capsule, Take 1,500 mg by mouth Every Other Day., Disp: , Rfl:   •  HYDROcodone-acetaminophen (NORCO) 5-325 MG per tablet, Take 1 tablet by mouth Every 6 (Six) Hours As Needed for Severe Pain ., Disp: 12 tablet, Rfl: 0  •  levothyroxine (SYNTHROID, LEVOTHROID) 25 MCG tablet, Take 25 mcg by mouth Daily., Disp: , Rfl:   •  Magnesium 250 MG tablet, Take  by mouth Daily., Disp: , Rfl:   •  magnesium oxide 250 MG tablet, Take 250 mg by mouth Daily., Disp: , Rfl:   •  Multiple Vitamin (MULTI-DAY VITAMINS) tablet, Take 1 tablet by mouth Daily. HOLD FOR SURGERY, Disp: , Rfl:   •  MULTIPLE VITAMINS-IRON PO, Take  by mouth Daily., Disp: , Rfl:   •  polyethylene glycol (MIRALAX) powder, Take 17 g by mouth Daily As Needed., Disp: , Rfl:   •  psyllium (METAMUCIL) 0.52 g capsule, Take 0.52 g by  "mouth., Disp: , Rfl:   •  rotigotine (NEUPRO) 6 MG/24HR patch, Place 1 patch on the skin as directed by provider Daily., Disp: , Rfl:   •  urea (CARMOL) 20 % cream, , Disp: , Rfl:   •  Vitamin E 400 UNITS tablet, Take 400 Units by mouth Daily., Disp: , Rfl:   No current facility-administered medications for this visit.     Facility-Administered Medications Ordered in Other Visits:   •  acetaminophen (TYLENOL) tablet 650 mg, 650 mg, Oral, Once, Thuy Cruz APRN  •  diphenhydrAMINE (BENADRYL) capsule 25 mg, 25 mg, Oral, Once, Thuy Cruz APRN  •  heparin flush (porcine) 100 UNIT/ML injection 500 Units, 500 Units, Intravenous, PRN, Anuel Scott MD, 500 Units at 11/27/17 1347  •  heparin flush (porcine) 100 UNIT/ML injection 500 Units, 500 Units, Intravenous, PRN, Neno Merritt II, MD, 500 Units at 01/18/20 1359  •  heparin injection 500 Units, 500 Units, Intravenous, PRN, Neno Merritt II, MD, 500 Units at 11/20/20 1056  •  sodium chloride 0.9 % flush 10 mL, 10 mL, Intravenous, PRN, Anuel Scott MD, 10 mL at 11/27/17 1347  •  sodium chloride 0.9 % flush 10 mL, 10 mL, Intravenous, PRN, Neno Merritt II, MD, 10 mL at 01/18/20 1359  •  sodium chloride 0.9 % flush 10 mL, 10 mL, Intravenous, PRN, Neno Merritt II, MD, 10 mL at 11/20/20 1056  •  sodium chloride 0.9 % infusion 250 mL, 250 mL, Intravenous, PRN, Neno Merritt II, MD  •  sodium chloride 0.9 % infusion 250 mL, 250 mL, Intravenous, PRN, Thuy Cruz APRN    ALLERGIES:     Allergies   Allergen Reactions   • Chlorhexidine Rash and Itching     Patient states \"blue dye\" in chlorhexidine.  States the orange and clear are OK to use   • Codeine Unknown - Low Severity     HYPER, DOES NOT HELP PAIN       SOCIAL HISTORY:       Social History     Socioeconomic History   • Marital status:      Spouse name: Zackery   • Number of children: 5   • Years of education: 1 yr college   • Highest education level: Not on file " "  Occupational History     Employer: RETIRED   Tobacco Use   • Smoking status: Never Smoker   • Smokeless tobacco: Never Used   • Tobacco comment: caffeine use - coffee and soda   Substance and Sexual Activity   • Alcohol use: No   • Drug use: No   • Sexual activity: Defer   Lifestyle   • Physical activity     Days per week: 0 days     Minutes per session: 0 min   • Stress: Not on file         FAMILY HISTORY:  Family History   Problem Relation Age of Onset   • Coronary artery disease Other    • Dementia Other    • Kidney disease Other    • Arthritis Father    • Early death Father         Kidney failure   • Kidney disease Father    • Heart disease Mother    • Mental illness Mother         Dementia   • Malig Hyperthermia Neg Hx    • Colon cancer Neg Hx    • Colon polyps Neg Hx        REVIEW OF SYSTEMS:  Review of Systems   Constitutional: Negative for activity change.   HENT: Negative for nosebleeds and trouble swallowing.    Respiratory: Negative for shortness of breath and wheezing.    Cardiovascular: Negative for chest pain and palpitations.   Gastrointestinal: Negative for constipation, diarrhea and nausea.   Genitourinary: Negative for dysuria and hematuria.   Musculoskeletal: Negative for arthralgias and myalgias.   Skin: Negative for rash and wound.   Neurological: Negative for seizures and syncope.   Hematological: Negative for adenopathy. Does not bruise/bleed easily.   Psychiatric/Behavioral: Negative for confusion.            Vitals:    11/20/20 1018   BP: 164/81   Pulse: 68   Resp: 16   Temp: 97.3 °F (36.3 °C)   TempSrc: Temporal   SpO2: 98%   Weight: 67.6 kg (149 lb)   Height: 170.2 cm (67.01\")   PainSc: 0-No pain  Comment: anemia     Current Status 11/20/2020   ECOG score 1        PHYSICAL EXAM:        CONSTITUTIONAL:  Vital signs reviewed.  No distress, looks comfortable.  EYES:  Conjunctiva and lids unremarkable.  PERRLA  EARS,NOSE,MOUTH,THROAT:  Ears and nose appear unremarkable.  Lips, teeth, gums " appear unremarkable.  RESPIRATORY:  Normal respiratory effort.  Lungs clear to auscultation bilaterally.  CARDIOVASCULAR:  Normal S1, S2.  No murmurs rubs or gallops.  No significant lower extremity edema.  GASTROINTESTINAL: Abdomen appears unremarkable.  Nontender.  No hepatomegaly.  No splenomegaly.  LYMPHATIC:  No cervical, supraclavicular, axillary lymphadenopathy.  SKIN:  Warm.  No rashes.  PSYCHIATRIC:  Normal judgment and insight.  Normal mood and affect.            WBC   Date/Time Value Ref Range Status   11/20/2020 10:08 AM 6.37 3.40 - 10.80 10*3/mm3 Final   10/04/2020 09:02 AM 8.97 4.5 - 11.0 10*3/uL Final     Hemoglobin   Date/Time Value Ref Range Status   11/20/2020 10:08 AM 11.3 (L) 12.0 - 15.9 g/dL Final   10/04/2020 09:02 AM 11.3 (L) 12.0 - 16.0 g/dL Final     Platelets   Date/Time Value Ref Range Status   11/20/2020 10:08  140 - 450 10*3/mm3 Final   10/04/2020 09:02  140 - 440 10*3/uL Final       Assessment/Plan   Anemia due to acute blood loss  - CBC & Differential  - Ferritin  - Iron Profile  - Retic With IRF & RET-He  - CBC & Differential  - CBC & Differential  - Ferritin  - Iron Profile  - Retic With IRF & RET-He  - CBC & Differential  - CBC & Differential       *Anemia due to stage IV chronic kidney disease.    · Procrit if Hb <10.  Hb 11.3    *Admitted 8/29/2019-9/2/2019 due to Hb of 5.  Transfused 2 units.  Heme positive stools.  EGD revealed GAVE.  Laser photocoagulation  · She states Dr. Roach plans EGD every 3 months.    *Iron deficiency anemia.  · Does not respond to oral iron due to poor absorption.  · 2 doses Injectafer late August 2019 resulted in normal iron labs and improvement of Hb from 9.4 up to 11.1 on 9/26/2019.  · Hb down to 10.3, 10/3/2019.  · Iron labs normal 10/31/2019, ferritin 200, 21% saturation.  · Hb dropped to 7.8 on 11/26/2019, requiring transfusion.  · Hb dropped to 6.9 on 12/19/2019, requiring transfusion.  · Hb dropped to 6.5 on 2/12/2020, requiring  transfusion  · Hb dropped to 7.8 on 2/18/2020, requiring transfusion.  · On 2/18/2020, ferritin 89, 13% iron saturation.  Gave 1 dose Injectafer.  · On 4/23/2020, ferritin 115, 5% saturation, give 2 doses Injectafer.  · On 6/18/2020, ferritin 461, 31% saturation, Hb 10.7.  · Hb 9.8.  On 8/20/2020, ferritin 345, 29% saturation.  No need for IV iron at this time.  · On 10/29/2020, Hb 8.6.  Ferritin 359, 21% saturation, normal reticulate hemoglobin  · On 11/12/2020, Hb 8.2, ferritin 249, 18% saturation  · Transfused per patient request because EGD planned 11/24/2020 and she reported she can only have the EGD if Hb >8.  · On 11/20/2020, Hb 11.3    *Source of iron deficiency.  · Previously followed regularly with Dr. Earl Avelar.  · States she cannot have colonoscopies due to tortuous colon and therefore has virtual colonoscopies.  · She states she saw Dr. Avelar after the 6/27/2019 visit with me due to recent dark stools.  She states Dr. Avelar did a rectal exam which was heme negative and recommended no further evaluation.  · On 8/22/2019, I told her I suspect she is having occult GI bleeding considering she is needing IV iron frequently.  However, with new stroke mid May 2019 and the addition of Plavix to aspirin, risk may be too great to stop antiplatelet agents for further GI evaluation.  Patient and  agree.  · During late August 2019 hospitalization for GI bleed with Hb of 5, EGD through Dr. Roach revealed G AVE.  Laser photocoagulation.  · Subsequently, following with Dr. Roach.  He initially planned EGD every 3 months.  · However, on the 10/18/2019 office visit, he changed the plan to be return to see him mid February 2020 and stated he could retreat her GAVE if she develops a significant drop in her Hb.  · Dr. Roach stated on the 2/11/2020 office note last EGD could not be performed due to a large food bezoar.  He gave her erythromycin and reviewed dietary changes for gastroparesis in hopes of  being able to repeat EGD with APC.  · EGD with APC on 4/21/2020, Dr. Roach.    · She states she is maintaining follow-up with Dr. Roach.  · Last EGD was 6 months ago.  Black stools x2 weeks, with Hb drop.  She is going to call Dr. Roach to see about arranging another EGD.    *Macrocytosis.  B12 and folate unremarkable  MCV 97    *Reticulocytosis.  · On 8/22/2019, unremarkable: Direct [place], haptoglobin, bilirubin ( with normal range up to 214.  Therefore, likely not significant).    *Thrombocytopenia.  Intermittent since 9/19/2019.      *Circulating nucleated red blood cells.  Intermittent.  · Could consider a bone marrow biopsy at some point.  · I suspect this is occurring as her bone marrow is trying to increase production after bleeding episodes.  · Nucleated red blood cells seen on 10/31/2019 and again, 11/6/2019    *Stroke mid August 2019.  Presumed.  Could not have MRI due to pacemaker.  Plavix was added to aspirin.  · Patient states Plavix was stopped by other physicians sometime around mid 2020.  · She follows with neurology.  Defer to neurology if Plavix is needed.  · Remains on aspirin    *Increase in fatigue and headaches.  Seeing a headache specialist.  I do not think her increase in fatigue would be due to anemia since her Hb is relatively stable.    Plan  · Every 2-week CBC and Procrit if Hb <10  · Iron labs every 4 weeks   · MD CBC Procrit 2-1/2 months  · She maintains follow-up with Dr. Roach        · Could consider a bone marrow biopsy in the future.    Male friend/family member assisted with history

## 2020-11-20 ENCOUNTER — INFUSION (OUTPATIENT)
Dept: ONCOLOGY | Facility: HOSPITAL | Age: 80
End: 2020-11-20

## 2020-11-20 ENCOUNTER — LAB (OUTPATIENT)
Dept: OTHER | Facility: HOSPITAL | Age: 80
End: 2020-11-20

## 2020-11-20 ENCOUNTER — OFFICE VISIT (OUTPATIENT)
Dept: ONCOLOGY | Facility: CLINIC | Age: 80
End: 2020-11-20

## 2020-11-20 VITALS
HEIGHT: 67 IN | WEIGHT: 149 LBS | DIASTOLIC BLOOD PRESSURE: 81 MMHG | BODY MASS INDEX: 23.39 KG/M2 | RESPIRATION RATE: 16 BRPM | HEART RATE: 68 BPM | TEMPERATURE: 97.3 F | OXYGEN SATURATION: 98 % | SYSTOLIC BLOOD PRESSURE: 164 MMHG

## 2020-11-20 DIAGNOSIS — Z45.2 ENCOUNTER FOR FITTING AND ADJUSTMENT OF VASCULAR CATHETER: ICD-10-CM

## 2020-11-20 DIAGNOSIS — D62 ANEMIA DUE TO ACUTE BLOOD LOSS: Primary | ICD-10-CM

## 2020-11-20 LAB
BASOPHILS # BLD AUTO: 0.02 10*3/MM3 (ref 0–0.2)
BASOPHILS NFR BLD AUTO: 0.3 % (ref 0–1.5)
DEPRECATED RDW RBC AUTO: 52.2 FL (ref 37–54)
EOSINOPHIL # BLD AUTO: 0.08 10*3/MM3 (ref 0–0.4)
EOSINOPHIL NFR BLD AUTO: 1.3 % (ref 0.3–6.2)
ERYTHROCYTE [DISTWIDTH] IN BLOOD BY AUTOMATED COUNT: 14.6 % (ref 12.3–15.4)
HCT VFR BLD AUTO: 35.7 % (ref 34–46.6)
HGB BLD-MCNC: 11.3 G/DL (ref 12–15.9)
IMM GRANULOCYTES # BLD AUTO: 0.01 10*3/MM3 (ref 0–0.05)
IMM GRANULOCYTES NFR BLD AUTO: 0.2 % (ref 0–0.5)
LYMPHOCYTES # BLD AUTO: 0.54 10*3/MM3 (ref 0.7–3.1)
LYMPHOCYTES NFR BLD AUTO: 8.5 % (ref 19.6–45.3)
MCH RBC QN AUTO: 30.7 PG (ref 26.6–33)
MCHC RBC AUTO-ENTMCNC: 31.7 G/DL (ref 31.5–35.7)
MCV RBC AUTO: 97 FL (ref 79–97)
MONOCYTES # BLD AUTO: 0.47 10*3/MM3 (ref 0.1–0.9)
MONOCYTES NFR BLD AUTO: 7.4 % (ref 5–12)
NEUTROPHILS NFR BLD AUTO: 5.25 10*3/MM3 (ref 1.7–7)
NEUTROPHILS NFR BLD AUTO: 82.3 % (ref 42.7–76)
NRBC BLD AUTO-RTO: 0 /100 WBC (ref 0–0.2)
PLATELET # BLD AUTO: 167 10*3/MM3 (ref 140–450)
PMV BLD AUTO: 8.9 FL (ref 6–12)
RBC # BLD AUTO: 3.68 10*6/MM3 (ref 3.77–5.28)
WBC # BLD AUTO: 6.37 10*3/MM3 (ref 3.4–10.8)

## 2020-11-20 PROCEDURE — 85025 COMPLETE CBC W/AUTO DIFF WBC: CPT | Performed by: INTERNAL MEDICINE

## 2020-11-20 PROCEDURE — 99214 OFFICE O/P EST MOD 30 MIN: CPT | Performed by: INTERNAL MEDICINE

## 2020-11-20 PROCEDURE — 25010000003 HEPARIN LOCK FLUSH PER 10 UNITS: Performed by: INTERNAL MEDICINE

## 2020-11-20 PROCEDURE — 36591 DRAW BLOOD OFF VENOUS DEVICE: CPT

## 2020-11-20 RX ORDER — SODIUM CHLORIDE 0.9 % (FLUSH) 0.9 %
10 SYRINGE (ML) INJECTION AS NEEDED
Status: CANCELLED | OUTPATIENT
Start: 2020-11-20

## 2020-11-20 RX ORDER — HEPARIN SODIUM (PORCINE) LOCK FLUSH IV SOLN 100 UNIT/ML 100 UNIT/ML
500 SOLUTION INTRAVENOUS AS NEEDED
Status: DISCONTINUED | OUTPATIENT
Start: 2020-11-20 | End: 2020-11-20 | Stop reason: HOSPADM

## 2020-11-20 RX ORDER — SODIUM CHLORIDE 0.9 % (FLUSH) 0.9 %
10 SYRINGE (ML) INJECTION AS NEEDED
Status: DISCONTINUED | OUTPATIENT
Start: 2020-11-20 | End: 2020-11-20 | Stop reason: HOSPADM

## 2020-11-20 RX ORDER — HEPARIN SODIUM (PORCINE) LOCK FLUSH IV SOLN 100 UNIT/ML 100 UNIT/ML
500 SOLUTION INTRAVENOUS AS NEEDED
Status: CANCELLED | OUTPATIENT
Start: 2020-11-20

## 2020-11-20 RX ADMIN — SODIUM CHLORIDE, PRESERVATIVE FREE 10 ML: 5 INJECTION INTRAVENOUS at 10:56

## 2020-11-20 RX ADMIN — Medication 500 UNITS: at 10:56

## 2020-11-20 NOTE — NURSING NOTE
Hgb 11.3, procrit not needed today per treatment plan.     Lab Results   Component Value Date    WBC 6.37 11/20/2020    HGB 11.3 (L) 11/20/2020    HCT 35.7 11/20/2020    MCV 97.0 11/20/2020     11/20/2020

## 2020-11-21 ENCOUNTER — LAB (OUTPATIENT)
Dept: LAB | Facility: HOSPITAL | Age: 80
End: 2020-11-21

## 2020-11-21 DIAGNOSIS — Z01.818 PREOP EXAMINATION: ICD-10-CM

## 2020-11-21 PROCEDURE — C9803 HOPD COVID-19 SPEC COLLECT: HCPCS

## 2020-11-21 PROCEDURE — U0004 COV-19 TEST NON-CDC HGH THRU: HCPCS | Performed by: OBSTETRICS & GYNECOLOGY

## 2020-11-23 ENCOUNTER — ANESTHESIA EVENT (OUTPATIENT)
Dept: PERIOP | Facility: HOSPITAL | Age: 80
End: 2020-11-23

## 2020-11-23 LAB — SARS-COV-2 RNA RESP QL NAA+PROBE: NOT DETECTED

## 2020-11-24 ENCOUNTER — ANESTHESIA (OUTPATIENT)
Dept: PERIOP | Facility: HOSPITAL | Age: 80
End: 2020-11-24

## 2020-11-24 ENCOUNTER — HOSPITAL ENCOUNTER (OUTPATIENT)
Facility: HOSPITAL | Age: 80
Setting detail: HOSPITAL OUTPATIENT SURGERY
Discharge: HOME | End: 2020-11-24
Attending: INTERNAL MEDICINE | Admitting: INTERNAL MEDICINE

## 2020-11-24 VITALS
HEART RATE: 58 BPM | RESPIRATION RATE: 15 BRPM | SYSTOLIC BLOOD PRESSURE: 176 MMHG | WEIGHT: 149.4 LBS | HEIGHT: 67 IN | BODY MASS INDEX: 23.45 KG/M2 | OXYGEN SATURATION: 100 % | TEMPERATURE: 97.7 F | DIASTOLIC BLOOD PRESSURE: 62 MMHG

## 2020-11-24 DIAGNOSIS — K31.84 GASTROPARESIS: ICD-10-CM

## 2020-11-24 DIAGNOSIS — K31.819 GAVE (GASTRIC ANTRAL VASCULAR ECTASIA): ICD-10-CM

## 2020-11-24 DIAGNOSIS — K92.1 MELENA: ICD-10-CM

## 2020-11-24 DIAGNOSIS — D50.0 IRON DEFICIENCY ANEMIA DUE TO CHRONIC BLOOD LOSS: ICD-10-CM

## 2020-11-24 PROCEDURE — 25010000002 PROPOFOL 10 MG/ML EMULSION: Performed by: NURSE ANESTHETIST, CERTIFIED REGISTERED

## 2020-11-24 PROCEDURE — 43235 EGD DIAGNOSTIC BRUSH WASH: CPT | Performed by: INTERNAL MEDICINE

## 2020-11-24 RX ORDER — SODIUM CHLORIDE 9 MG/ML
40 INJECTION, SOLUTION INTRAVENOUS AS NEEDED
Status: DISCONTINUED | OUTPATIENT
Start: 2020-11-24 | End: 2020-11-24 | Stop reason: HOSPADM

## 2020-11-24 RX ORDER — MAGNESIUM HYDROXIDE 1200 MG/15ML
LIQUID ORAL AS NEEDED
Status: DISCONTINUED | OUTPATIENT
Start: 2020-11-24 | End: 2020-11-24 | Stop reason: HOSPADM

## 2020-11-24 RX ORDER — LIDOCAINE HYDROCHLORIDE 10 MG/ML
0.5 INJECTION, SOLUTION EPIDURAL; INFILTRATION; INTRACAUDAL; PERINEURAL ONCE AS NEEDED
Status: DISCONTINUED | OUTPATIENT
Start: 2020-11-24 | End: 2020-11-24 | Stop reason: HOSPADM

## 2020-11-24 RX ORDER — SODIUM CHLORIDE, SODIUM LACTATE, POTASSIUM CHLORIDE, CALCIUM CHLORIDE 600; 310; 30; 20 MG/100ML; MG/100ML; MG/100ML; MG/100ML
100 INJECTION, SOLUTION INTRAVENOUS CONTINUOUS
Status: DISCONTINUED | OUTPATIENT
Start: 2020-11-24 | End: 2020-11-24 | Stop reason: HOSPADM

## 2020-11-24 RX ORDER — SODIUM CHLORIDE 0.9 % (FLUSH) 0.9 %
10 SYRINGE (ML) INJECTION EVERY 12 HOURS SCHEDULED
Status: DISCONTINUED | OUTPATIENT
Start: 2020-11-24 | End: 2020-11-24 | Stop reason: HOSPADM

## 2020-11-24 RX ORDER — SODIUM CHLORIDE 0.9 % (FLUSH) 0.9 %
10 SYRINGE (ML) INJECTION AS NEEDED
Status: DISCONTINUED | OUTPATIENT
Start: 2020-11-24 | End: 2020-11-24 | Stop reason: HOSPADM

## 2020-11-24 RX ORDER — PROPOFOL 10 MG/ML
VIAL (ML) INTRAVENOUS AS NEEDED
Status: DISCONTINUED | OUTPATIENT
Start: 2020-11-24 | End: 2020-11-24 | Stop reason: SURG

## 2020-11-24 RX ORDER — LIDOCAINE HYDROCHLORIDE 20 MG/ML
INJECTION, SOLUTION INFILTRATION; PERINEURAL AS NEEDED
Status: DISCONTINUED | OUTPATIENT
Start: 2020-11-24 | End: 2020-11-24 | Stop reason: SURG

## 2020-11-24 RX ORDER — ONDANSETRON 2 MG/ML
4 INJECTION INTRAMUSCULAR; INTRAVENOUS ONCE AS NEEDED
Status: DISCONTINUED | OUTPATIENT
Start: 2020-11-24 | End: 2020-11-24 | Stop reason: HOSPADM

## 2020-11-24 RX ORDER — SODIUM CHLORIDE, SODIUM LACTATE, POTASSIUM CHLORIDE, CALCIUM CHLORIDE 600; 310; 30; 20 MG/100ML; MG/100ML; MG/100ML; MG/100ML
9 INJECTION, SOLUTION INTRAVENOUS CONTINUOUS
Status: DISCONTINUED | OUTPATIENT
Start: 2020-11-24 | End: 2020-11-24 | Stop reason: HOSPADM

## 2020-11-24 RX ADMIN — SODIUM CHLORIDE, POTASSIUM CHLORIDE, SODIUM LACTATE AND CALCIUM CHLORIDE 9 ML/HR: 600; 310; 30; 20 INJECTION, SOLUTION INTRAVENOUS at 14:25

## 2020-11-24 RX ADMIN — PROPOFOL 50 MG: 10 INJECTION, EMULSION INTRAVENOUS at 15:29

## 2020-11-24 RX ADMIN — LIDOCAINE HYDROCHLORIDE 100 MG: 20 INJECTION, SOLUTION INFILTRATION; PERINEURAL at 15:25

## 2020-11-24 RX ADMIN — PROPOFOL 25 MG: 10 INJECTION, EMULSION INTRAVENOUS at 15:36

## 2020-11-24 RX ADMIN — PROPOFOL 25 MG: 10 INJECTION, EMULSION INTRAVENOUS at 15:32

## 2020-11-24 NOTE — INTERVAL H&P NOTE
"Vital Signs  BP (!) 188/85 (BP Location: Left arm, Patient Position: Lying)   Pulse 60   Temp 97.7 °F (36.5 °C) (Oral)   Resp 20   Ht 170.2 cm (67\")   Wt 67.8 kg (149 lb 6.4 oz)   SpO2 100%   BMI 23.40 kg/m²     H&P reviewed. The patient was examined and there are no changes to the H&P.        "

## 2020-11-24 NOTE — OP NOTE
ESOPHAGOGASTRODUODENOSCOPY  Procedure Report    Patient Name:  Charmaine Machuca  YOB: 1940    Date of Surgery:  11/24/2020     Indications:  GAVE (gastric antral vascular ectasia) [K31.819]  Iron deficiency anemia due to chronic blood loss [D50.0]  Gastroparesis [K31.84]  Melena [K92.1]      Pre-op Diagnosis:   GAVE (gastric antral vascular ectasia) [K31.819]  Iron deficiency anemia due to chronic blood loss [D50.0]  Gastroparesis [K31.84]  Melena [K92.1]    Post-Op Diagnosis Codes:     * GAVE (gastric antral vascular ectasia) [K31.819]     * Iron deficiency anemia due to chronic blood loss [D50.0]     * Gastroparesis [K31.84]     * Melena [K92.1]     * Gastric bezoar [T18.2XXA]     * Reflux esophagitis [K21.00]         Procedure/CPT® Codes:      Procedure(s):  ESOPHAGOGASTRODUODENOSCOPY    Staff:  Surgeon(s):  Anuel Roach MD         Anesthesia: Monitored Anesthesia Care    Estimated Blood Loss: none    Specimens: None    Implants:    Nothing was implanted during the procedure      Description of Procedure: After having signed informed consent she was brought to the endoscopy suite and placed in the left lateral decubitus position and given her IV sedation. A bite block was placed between her incisors. The scope was introduced in the oropharynx and advanced under direct visualization to the ascending esophagus through the distal esophagus. There was evidence of some reflux esophagitis, but no evidence of stricture. No ulcer. The scope was advanced to the stomach and there was significant amount of retained food contents that extended from the body into the antrum. The scope was easily advanced through the esophagus to the prepyloric area. There was evidence of mild to moderate GAVE. There was no active bleeding. The scope was able to be advanced through the pylorus to the duodenal bulb around the angle of the 2nd and 3rd portions of the duodenum revealing no evidence of proximal  intestinal obstruction. The scope was withdrawn back to the stomach. Again, there was a large Bezoar present. The stomach was decompressed with suction. However, due to concerns of emesis especially post treatment and aspiration. The stomach was decompressed and the scope was taken from the patient. No therapy was applied. The patient tolerated the brief procedure well.          Findings: Reflux Esophagitis  Large Gastric Food Bezoar  GAVE - Mild Mod-Non Bleeding  Not treated due to risk of Emesis/Aspiration       Complications: None    Recommendations: Reschedule and would hold any solid food the evening before.      Anuel Roach MD     Date: 11/24/2020  Time: 15:40 EST

## 2020-11-24 NOTE — BRIEF OP NOTE
ESOPHAGOGASTRODUODENOSCOPY  Progress Note    Charmaine Machuca  11/24/2020    Pre-op Diagnosis:   GAVE (gastric antral vascular ectasia) [K31.819]  Iron deficiency anemia due to chronic blood loss [D50.0]  Gastroparesis [K31.84]  Melena [K92.1]       Post-Op Diagnosis Codes:     * GAVE (gastric antral vascular ectasia) [K31.819]     * Iron deficiency anemia due to chronic blood loss [D50.0]     * Gastroparesis [K31.84]     * Melena [K92.1]     * Gastric bezoar [T18.2XXA]     * Reflux esophagitis [K21.00]    Procedure/CPT® Codes:        Procedure(s):  ESOPHAGOGASTRODUODENOSCOPY with Argon Plasma Coagulation    Surgeon(s):  Anuel Roach MD    Anesthesia: Monitored Anesthesia Care    Staff:   Circulator: Annette Norton RN  Scrub Person: Brandy López         Estimated Blood Loss: none    Urine Voided: * No values recorded between 11/24/2020  3:19 PM and 11/24/2020  3:36 PM *    Specimens:                None          Drains: * No LDAs found *    Findings: Reflux Esophagitis  Large Gastric Food Bezoar  GAVE - Mild Mod-Non Bleeding  Not treated due to risk of Emesis/Aspiration    Complications: None          Anuel Roach MD     Date: 11/24/2020  Time: 15:38 EST

## 2020-11-24 NOTE — ANESTHESIA POSTPROCEDURE EVALUATION
Patient: Charmaine Machuca    Procedure Summary     Date: 11/24/20 Room / Location: [facility] ENDOSCOPY 1 /  [facility] OR    Anesthesia Start: 1522 Anesthesia Stop: 1541    Procedure: ESOPHAGOGASTRODUODENOSCOPY (N/A Esophagus) Diagnosis:       GAVE (gastric antral vascular ectasia)      Iron deficiency anemia due to chronic blood loss      Gastroparesis      Melena      Gastric bezoar      Reflux esophagitis      (GAVE (gastric antral vascular ectasia) [K31.819])      (Iron deficiency anemia due to chronic blood loss [D50.0])      (Gastroparesis [K31.84])      (Melena [K92.1])    Surgeon: Anuel Roach MD Provider: Tamela Chung CRNA    Anesthesia Type: MAC ASA Status: 4          Anesthesia Type: MAC    Vitals  Vitals Value Taken Time   /52 11/24/20 1550   Temp     Pulse 59 11/24/20 1550   Resp 15 11/24/20 1550   SpO2 100 % 11/24/20 1550           Post Anesthesia Care and Evaluation    Patient location during evaluation: PHASE II  Patient participation: complete - patient participated  Level of consciousness: awake and alert  Pain score: 0  Pain management: adequate  Airway patency: patent  Anesthetic complications: No anesthetic complications  PONV Status: none  Cardiovascular status: acceptable  Respiratory status: acceptable  Hydration status: acceptable

## 2020-11-24 NOTE — ANESTHESIA PREPROCEDURE EVALUATION
Anesthesia Evaluation     Patient summary reviewed and Nursing notes reviewed   NPO Solid Status: > 8 hours  NPO Liquid Status: > 2 hours           Airway   Mallampati: II  TM distance: >3 FB  Neck ROM: full  No difficulty expected  Dental - normal exam     Pulmonary - normal exam   (+) shortness of breath ( with exertion),   Cardiovascular - normal exam  Exercise tolerance: poor (<4 METS)    ECG reviewed    (+) pacemaker pacemaker, ICD, hypertension, valvular problems/murmurs MR, CAD, dysrhythmias, CHF , hyperlipidemia,       Neuro/Psych  (+) TIA, CVA ( 1/2020 droopy mouth, left side ) residual symptoms, dizziness/light headedness, numbness, psychiatric history,     GI/Hepatic/Renal/Endo    (+)  hiatal hernia, GERD, GI bleeding , renal disease CRI,     Musculoskeletal     Abdominal    Substance History - negative use     OB/GYN negative ob/gyn ROS         Other   arthritis,      ROS/Med Hx Other: gingerale at 1100    ECG 12 Lead  Order: [id number]  Status:  Final result   Visible to patient:  Yes (MyChart) Next appt:  12/03/2020 at 10:10 AM in [facility] (LAB CHAIR CBC LAB [place]) Dx:  Chronic systolic congestive heart irineo...    Narrative      Estevan Matamoros MD     9/2/2020  1:06 PM     ECG 12 Lead   Date/Time: 9/2/2020 1:02 PM   Performed by: Estevan Matamoros MD   Authorized by: Estevan Matamoros MD   Comparison: compared with previous ECG   Similar to previous ECG   Rhythm: sinus rhythm   Rhythm comments: ventricular paced.       Interpretation Summary    · Left ventricular systolic function is normal. Calculated EF = 57%. Normal left ventricular cavity size and wall thickness noted. All left ventricular wall segments contract normally. Left ventricular diastolic function was indeterminate.  · Left atrial cavity size is mildly dilated.                               Anesthesia Plan    ASA 4     MAC       Anesthetic plan, all risks, benefits, and alternatives have been provided, discussed and informed  consent has been obtained with: patient and spouse/significant other.    Plan discussed with CRNA.

## 2020-11-25 ENCOUNTER — TELEPHONE (OUTPATIENT)
Dept: GASTROENTEROLOGY | Facility: CLINIC | Age: 80
End: 2020-11-25

## 2020-12-03 ENCOUNTER — APPOINTMENT (OUTPATIENT)
Dept: ONCOLOGY | Facility: HOSPITAL | Age: 80
End: 2020-12-03

## 2020-12-03 ENCOUNTER — LAB (OUTPATIENT)
Dept: ONCOLOGY | Facility: HOSPITAL | Age: 80
End: 2020-12-03

## 2020-12-03 VITALS
HEART RATE: 66 BPM | BODY MASS INDEX: 23.48 KG/M2 | SYSTOLIC BLOOD PRESSURE: 117 MMHG | TEMPERATURE: 97.5 F | HEIGHT: 67 IN | WEIGHT: 149.6 LBS | OXYGEN SATURATION: 99 % | RESPIRATION RATE: 18 BRPM | DIASTOLIC BLOOD PRESSURE: 69 MMHG

## 2020-12-03 DIAGNOSIS — N25.81 SECONDARY HYPERPARATHYROIDISM OF RENAL ORIGIN (HCC): ICD-10-CM

## 2020-12-03 DIAGNOSIS — D62 ANEMIA DUE TO ACUTE BLOOD LOSS: ICD-10-CM

## 2020-12-03 DIAGNOSIS — E55.9 AVITAMINOSIS D: ICD-10-CM

## 2020-12-03 DIAGNOSIS — D63.1 ANEMIA OF CHRONIC RENAL FAILURE, STAGE 4 (SEVERE) (HCC): ICD-10-CM

## 2020-12-03 DIAGNOSIS — N18.4 ANEMIA OF CHRONIC RENAL FAILURE, STAGE 4 (SEVERE) (HCC): ICD-10-CM

## 2020-12-03 DIAGNOSIS — Z45.2 ENCOUNTER FOR FITTING AND ADJUSTMENT OF VASCULAR CATHETER: ICD-10-CM

## 2020-12-03 DIAGNOSIS — N18.5 CHRONIC KIDNEY DISEASE, STAGE V (HCC): Primary | ICD-10-CM

## 2020-12-03 LAB
25(OH)D3 SERPL-MCNC: 56.1 NG/ML (ref 30–100)
ALBUMIN SERPL-MCNC: 3.8 G/DL (ref 3.5–5.2)
ALBUMIN/GLOB SERPL: 1.5 G/DL
ALP SERPL-CCNC: 45 U/L (ref 39–117)
ALT SERPL W P-5'-P-CCNC: <5 U/L (ref 1–33)
ANION GAP SERPL CALCULATED.3IONS-SCNC: 4.6 MMOL/L (ref 5–15)
AST SERPL-CCNC: 17 U/L (ref 1–32)
BASOPHILS # BLD AUTO: 0.02 10*3/MM3 (ref 0–0.2)
BASOPHILS NFR BLD AUTO: 0.4 % (ref 0–1.5)
BILIRUB SERPL-MCNC: 0.2 MG/DL (ref 0–1.2)
BUN SERPL-MCNC: 68 MG/DL (ref 8–23)
BUN/CREAT SERPL: 22.7 (ref 7–25)
CALCIUM SPEC-SCNC: 10.6 MG/DL (ref 8.6–10.5)
CHLORIDE SERPL-SCNC: 103 MMOL/L (ref 98–107)
CO2 SERPL-SCNC: 32.4 MMOL/L (ref 22–29)
CREAT SERPL-MCNC: 2.99 MG/DL (ref 0.57–1)
DEPRECATED RDW RBC AUTO: 48.3 FL (ref 37–54)
EOSINOPHIL # BLD AUTO: 0.07 10*3/MM3 (ref 0–0.4)
EOSINOPHIL NFR BLD AUTO: 1.3 % (ref 0.3–6.2)
ERYTHROCYTE [DISTWIDTH] IN BLOOD BY AUTOMATED COUNT: 13.6 % (ref 12.3–15.4)
GFR SERPL CREATININE-BSD FRML MDRD: 15 ML/MIN/1.73
GLOBULIN UR ELPH-MCNC: 2.5 GM/DL
GLUCOSE SERPL-MCNC: 73 MG/DL (ref 65–99)
HCT VFR BLD AUTO: 31.4 % (ref 34–46.6)
HGB BLD-MCNC: 9.9 G/DL (ref 12–15.9)
IMM GRANULOCYTES # BLD AUTO: 0.02 10*3/MM3 (ref 0–0.05)
IMM GRANULOCYTES NFR BLD AUTO: 0.4 % (ref 0–0.5)
LYMPHOCYTES # BLD AUTO: 0.7 10*3/MM3 (ref 0.7–3.1)
LYMPHOCYTES NFR BLD AUTO: 12.7 % (ref 19.6–45.3)
MCH RBC QN AUTO: 30.3 PG (ref 26.6–33)
MCHC RBC AUTO-ENTMCNC: 31.5 G/DL (ref 31.5–35.7)
MCV RBC AUTO: 96 FL (ref 79–97)
MONOCYTES # BLD AUTO: 0.39 10*3/MM3 (ref 0.1–0.9)
MONOCYTES NFR BLD AUTO: 7.1 % (ref 5–12)
NEUTROPHILS NFR BLD AUTO: 4.3 10*3/MM3 (ref 1.7–7)
NEUTROPHILS NFR BLD AUTO: 78.1 % (ref 42.7–76)
NRBC BLD AUTO-RTO: 0 /100 WBC (ref 0–0.2)
PHOSPHATE SERPL-MCNC: 3.9 MG/DL (ref 2.5–4.5)
PLATELET # BLD AUTO: 164 10*3/MM3 (ref 140–450)
PMV BLD AUTO: 9.6 FL (ref 6–12)
POTASSIUM SERPL-SCNC: 4.2 MMOL/L (ref 3.5–5.2)
PROT SERPL-MCNC: 6.3 G/DL (ref 6–8.5)
PTH-INTACT SERPL-MCNC: 86.9 PG/ML (ref 15–65)
RBC # BLD AUTO: 3.27 10*6/MM3 (ref 3.77–5.28)
SODIUM SERPL-SCNC: 140 MMOL/L (ref 136–145)
URATE SERPL-MCNC: 7.1 MG/DL (ref 2.4–5.7)
WBC # BLD AUTO: 5.5 10*3/MM3 (ref 3.4–10.8)

## 2020-12-03 PROCEDURE — 80053 COMPREHEN METABOLIC PANEL: CPT | Performed by: INTERNAL MEDICINE

## 2020-12-03 PROCEDURE — 25010000002 EPOETIN ALFA PER 1000 UNITS: Performed by: NURSE PRACTITIONER

## 2020-12-03 PROCEDURE — 83970 ASSAY OF PARATHORMONE: CPT | Performed by: INTERNAL MEDICINE

## 2020-12-03 PROCEDURE — 84100 ASSAY OF PHOSPHORUS: CPT | Performed by: INTERNAL MEDICINE

## 2020-12-03 PROCEDURE — 96523 IRRIG DRUG DELIVERY DEVICE: CPT

## 2020-12-03 PROCEDURE — 84550 ASSAY OF BLOOD/URIC ACID: CPT | Performed by: INTERNAL MEDICINE

## 2020-12-03 PROCEDURE — 85025 COMPLETE CBC W/AUTO DIFF WBC: CPT | Performed by: INTERNAL MEDICINE

## 2020-12-03 PROCEDURE — 96372 THER/PROPH/DIAG INJ SC/IM: CPT

## 2020-12-03 PROCEDURE — 36415 COLL VENOUS BLD VENIPUNCTURE: CPT

## 2020-12-03 PROCEDURE — 82306 VITAMIN D 25 HYDROXY: CPT | Performed by: INTERNAL MEDICINE

## 2020-12-03 PROCEDURE — 25010000003 HEPARIN LOCK FLUSH PER 10 UNITS: Performed by: INTERNAL MEDICINE

## 2020-12-03 RX ORDER — HEPARIN SODIUM (PORCINE) LOCK FLUSH IV SOLN 100 UNIT/ML 100 UNIT/ML
500 SOLUTION INTRAVENOUS AS NEEDED
Status: DISCONTINUED | OUTPATIENT
Start: 2020-12-03 | End: 2020-12-03 | Stop reason: HOSPADM

## 2020-12-03 RX ORDER — HEPARIN SODIUM (PORCINE) LOCK FLUSH IV SOLN 100 UNIT/ML 100 UNIT/ML
500 SOLUTION INTRAVENOUS AS NEEDED
Status: CANCELLED | OUTPATIENT
Start: 2020-12-03

## 2020-12-03 RX ORDER — SODIUM CHLORIDE 0.9 % (FLUSH) 0.9 %
10 SYRINGE (ML) INJECTION AS NEEDED
Status: DISCONTINUED | OUTPATIENT
Start: 2020-12-03 | End: 2020-12-03 | Stop reason: HOSPADM

## 2020-12-03 RX ORDER — SODIUM CHLORIDE 0.9 % (FLUSH) 0.9 %
10 SYRINGE (ML) INJECTION AS NEEDED
Status: CANCELLED | OUTPATIENT
Start: 2020-12-03

## 2020-12-03 RX ADMIN — SODIUM CHLORIDE, PRESERVATIVE FREE 10 ML: 5 INJECTION INTRAVENOUS at 10:38

## 2020-12-03 RX ADMIN — Medication 500 UNITS: at 10:38

## 2020-12-03 RX ADMIN — ERYTHROPOIETIN 8000 UNITS: 20000 INJECTION, SOLUTION INTRAVENOUS; SUBCUTANEOUS at 10:59

## 2020-12-17 ENCOUNTER — APPOINTMENT (OUTPATIENT)
Dept: OTHER | Facility: HOSPITAL | Age: 80
End: 2020-12-17

## 2020-12-17 ENCOUNTER — INFUSION (OUTPATIENT)
Dept: ONCOLOGY | Facility: HOSPITAL | Age: 80
End: 2020-12-17

## 2020-12-17 DIAGNOSIS — D62 ANEMIA DUE TO ACUTE BLOOD LOSS: Primary | ICD-10-CM

## 2020-12-17 DIAGNOSIS — N18.4 ANEMIA OF CHRONIC RENAL FAILURE, STAGE 4 (SEVERE) (HCC): ICD-10-CM

## 2020-12-17 DIAGNOSIS — Z45.2 ENCOUNTER FOR FITTING AND ADJUSTMENT OF VASCULAR CATHETER: ICD-10-CM

## 2020-12-17 DIAGNOSIS — D63.1 ANEMIA OF CHRONIC RENAL FAILURE, STAGE 4 (SEVERE) (HCC): ICD-10-CM

## 2020-12-17 LAB
ABO GROUP BLD: NORMAL
BASOPHILS # BLD AUTO: 0.01 10*3/MM3 (ref 0–0.2)
BASOPHILS NFR BLD AUTO: 0.1 % (ref 0–1.5)
BLD GP AB SCN SERPL QL: NEGATIVE
DEPRECATED RDW RBC AUTO: 51.2 FL (ref 37–54)
EOSINOPHIL # BLD AUTO: 0.09 10*3/MM3 (ref 0–0.4)
EOSINOPHIL NFR BLD AUTO: 1.3 % (ref 0.3–6.2)
ERYTHROCYTE [DISTWIDTH] IN BLOOD BY AUTOMATED COUNT: 14.4 % (ref 12.3–15.4)
FERRITIN SERPL-MCNC: 251.7 NG/ML (ref 13–150)
HCT VFR BLD AUTO: 24.4 % (ref 34–46.6)
HGB BLD-MCNC: 7.7 G/DL (ref 12–15.9)
HGB RETIC QN AUTO: 35.1 PG (ref 29.8–36.1)
IMM GRANULOCYTES # BLD AUTO: 0.03 10*3/MM3 (ref 0–0.05)
IMM GRANULOCYTES NFR BLD AUTO: 0.4 % (ref 0–0.5)
IMM RETICS NFR: 10.3 % (ref 3–15.8)
IRON 24H UR-MRATE: 65 MCG/DL (ref 37–145)
IRON SATN MFR SERPL: 27 % (ref 20–50)
LYMPHOCYTES # BLD AUTO: 0.58 10*3/MM3 (ref 0.7–3.1)
LYMPHOCYTES NFR BLD AUTO: 8.3 % (ref 19.6–45.3)
MCH RBC QN AUTO: 30.8 PG (ref 26.6–33)
MCHC RBC AUTO-ENTMCNC: 31.6 G/DL (ref 31.5–35.7)
MCV RBC AUTO: 97.6 FL (ref 79–97)
MONOCYTES # BLD AUTO: 0.33 10*3/MM3 (ref 0.1–0.9)
MONOCYTES NFR BLD AUTO: 4.7 % (ref 5–12)
NEUTROPHILS NFR BLD AUTO: 5.92 10*3/MM3 (ref 1.7–7)
NEUTROPHILS NFR BLD AUTO: 85.2 % (ref 42.7–76)
NRBC BLD AUTO-RTO: 0 /100 WBC (ref 0–0.2)
PLATELET # BLD AUTO: 147 10*3/MM3 (ref 140–450)
PMV BLD AUTO: 9.3 FL (ref 6–12)
RBC # BLD AUTO: 2.5 10*6/MM3 (ref 3.77–5.28)
RETICS # AUTO: 0.03 10*6/MM3 (ref 0.02–0.13)
RETICS/RBC NFR AUTO: 1.22 % (ref 0.7–1.9)
RH BLD: NEGATIVE
T&S EXPIRATION DATE: NORMAL
TIBC SERPL-MCNC: 243 MCG/DL (ref 298–536)
TRANSFERRIN SERPL-MCNC: 163 MG/DL (ref 200–360)
WBC # BLD AUTO: 6.96 10*3/MM3 (ref 3.4–10.8)

## 2020-12-17 PROCEDURE — 85046 RETICYTE/HGB CONCENTRATE: CPT | Performed by: INTERNAL MEDICINE

## 2020-12-17 PROCEDURE — 82728 ASSAY OF FERRITIN: CPT | Performed by: INTERNAL MEDICINE

## 2020-12-17 PROCEDURE — 25010000002 EPOETIN ALFA PER 1000 UNITS: Performed by: NURSE PRACTITIONER

## 2020-12-17 PROCEDURE — 86923 COMPATIBILITY TEST ELECTRIC: CPT

## 2020-12-17 PROCEDURE — 25010000003 HEPARIN LOCK FLUSH PER 10 UNITS: Performed by: INTERNAL MEDICINE

## 2020-12-17 PROCEDURE — 96523 IRRIG DRUG DELIVERY DEVICE: CPT

## 2020-12-17 PROCEDURE — 86901 BLOOD TYPING SEROLOGIC RH(D): CPT

## 2020-12-17 PROCEDURE — 83540 ASSAY OF IRON: CPT | Performed by: INTERNAL MEDICINE

## 2020-12-17 PROCEDURE — 86900 BLOOD TYPING SEROLOGIC ABO: CPT

## 2020-12-17 PROCEDURE — 96372 THER/PROPH/DIAG INJ SC/IM: CPT

## 2020-12-17 PROCEDURE — 86850 RBC ANTIBODY SCREEN: CPT

## 2020-12-17 PROCEDURE — 84466 ASSAY OF TRANSFERRIN: CPT | Performed by: INTERNAL MEDICINE

## 2020-12-17 PROCEDURE — 85025 COMPLETE CBC W/AUTO DIFF WBC: CPT | Performed by: INTERNAL MEDICINE

## 2020-12-17 RX ORDER — SODIUM CHLORIDE 9 MG/ML
250 INJECTION, SOLUTION INTRAVENOUS AS NEEDED
Status: CANCELLED | OUTPATIENT
Start: 2020-12-19

## 2020-12-17 RX ORDER — HEPARIN SODIUM (PORCINE) LOCK FLUSH IV SOLN 100 UNIT/ML 100 UNIT/ML
500 SOLUTION INTRAVENOUS AS NEEDED
Status: CANCELLED | OUTPATIENT
Start: 2020-12-17

## 2020-12-17 RX ORDER — SODIUM CHLORIDE 0.9 % (FLUSH) 0.9 %
10 SYRINGE (ML) INJECTION AS NEEDED
Status: CANCELLED | OUTPATIENT
Start: 2020-12-17

## 2020-12-17 RX ORDER — DIPHENHYDRAMINE HCL 25 MG
25 CAPSULE ORAL ONCE
Status: CANCELLED | OUTPATIENT
Start: 2020-12-19 | End: 2020-12-17

## 2020-12-17 RX ORDER — ACETAMINOPHEN 325 MG/1
650 TABLET ORAL ONCE
Status: CANCELLED | OUTPATIENT
Start: 2020-12-19 | End: 2020-12-17

## 2020-12-17 RX ORDER — SODIUM CHLORIDE 0.9 % (FLUSH) 0.9 %
10 SYRINGE (ML) INJECTION AS NEEDED
Status: DISCONTINUED | OUTPATIENT
Start: 2020-12-17 | End: 2020-12-17 | Stop reason: HOSPADM

## 2020-12-17 RX ORDER — HEPARIN SODIUM (PORCINE) LOCK FLUSH IV SOLN 100 UNIT/ML 100 UNIT/ML
500 SOLUTION INTRAVENOUS AS NEEDED
Status: DISCONTINUED | OUTPATIENT
Start: 2020-12-17 | End: 2020-12-17 | Stop reason: HOSPADM

## 2020-12-17 RX ADMIN — SODIUM CHLORIDE, PRESERVATIVE FREE 10 ML: 5 INJECTION INTRAVENOUS at 10:24

## 2020-12-17 RX ADMIN — Medication 500 UNITS: at 10:24

## 2020-12-17 RX ADMIN — ERYTHROPOIETIN 10000 UNITS: 10000 INJECTION, SOLUTION INTRAVENOUS; SUBCUTANEOUS at 11:31

## 2020-12-17 NOTE — NURSING NOTE
Lab Results   Component Value Date    WBC 6.96 12/17/2020    HGB 7.7 (L) 12/17/2020    HCT 24.4 (L) 12/17/2020    MCV 97.6 (H) 12/17/2020     12/17/2020     2 Units PRBC's ordered per Nova Paula for Hgb 7.7.  Procrit given per protocol.  Pt instructed to keep armband in place and appt scheduled for Saturday AM. Pt next appt reviewed and she knows to call with concerns.

## 2020-12-19 ENCOUNTER — INFUSION (OUTPATIENT)
Dept: ONCOLOGY | Facility: HOSPITAL | Age: 80
End: 2020-12-19

## 2020-12-19 VITALS
DIASTOLIC BLOOD PRESSURE: 60 MMHG | SYSTOLIC BLOOD PRESSURE: 130 MMHG | OXYGEN SATURATION: 100 % | RESPIRATION RATE: 20 BRPM | HEART RATE: 76 BPM | TEMPERATURE: 96.8 F

## 2020-12-19 DIAGNOSIS — D63.1 ANEMIA OF CHRONIC RENAL FAILURE, STAGE 4 (SEVERE) (HCC): ICD-10-CM

## 2020-12-19 DIAGNOSIS — Z45.2 ENCOUNTER FOR FITTING AND ADJUSTMENT OF VASCULAR CATHETER: ICD-10-CM

## 2020-12-19 DIAGNOSIS — D62 ANEMIA DUE TO ACUTE BLOOD LOSS: Primary | ICD-10-CM

## 2020-12-19 DIAGNOSIS — N18.4 ANEMIA OF CHRONIC RENAL FAILURE, STAGE 4 (SEVERE) (HCC): ICD-10-CM

## 2020-12-19 PROCEDURE — 96523 IRRIG DRUG DELIVERY DEVICE: CPT

## 2020-12-19 PROCEDURE — P9016 RBC LEUKOCYTES REDUCED: HCPCS

## 2020-12-19 PROCEDURE — 86900 BLOOD TYPING SEROLOGIC ABO: CPT

## 2020-12-19 PROCEDURE — 63710000001 DIPHENHYDRAMINE PER 50 MG: Performed by: NURSE PRACTITIONER

## 2020-12-19 PROCEDURE — 36430 TRANSFUSION BLD/BLD COMPNT: CPT

## 2020-12-19 PROCEDURE — 25010000003 HEPARIN LOCK FLUSH PER 10 UNITS: Performed by: INTERNAL MEDICINE

## 2020-12-19 RX ORDER — HEPARIN SODIUM (PORCINE) LOCK FLUSH IV SOLN 100 UNIT/ML 100 UNIT/ML
500 SOLUTION INTRAVENOUS AS NEEDED
Status: DISCONTINUED | OUTPATIENT
Start: 2020-12-19 | End: 2020-12-19 | Stop reason: HOSPADM

## 2020-12-19 RX ORDER — DIPHENHYDRAMINE HCL 25 MG
25 CAPSULE ORAL ONCE
Status: COMPLETED | OUTPATIENT
Start: 2020-12-19 | End: 2020-12-19

## 2020-12-19 RX ORDER — HEPARIN SODIUM (PORCINE) LOCK FLUSH IV SOLN 100 UNIT/ML 100 UNIT/ML
500 SOLUTION INTRAVENOUS AS NEEDED
Status: CANCELLED | OUTPATIENT
Start: 2020-12-19

## 2020-12-19 RX ORDER — SODIUM CHLORIDE 0.9 % (FLUSH) 0.9 %
10 SYRINGE (ML) INJECTION AS NEEDED
Status: CANCELLED | OUTPATIENT
Start: 2020-12-19

## 2020-12-19 RX ORDER — SODIUM CHLORIDE 0.9 % (FLUSH) 0.9 %
10 SYRINGE (ML) INJECTION AS NEEDED
Status: DISCONTINUED | OUTPATIENT
Start: 2020-12-19 | End: 2020-12-19 | Stop reason: HOSPADM

## 2020-12-19 RX ORDER — SODIUM CHLORIDE 9 MG/ML
250 INJECTION, SOLUTION INTRAVENOUS AS NEEDED
Status: DISCONTINUED | OUTPATIENT
Start: 2020-12-19 | End: 2020-12-19 | Stop reason: HOSPADM

## 2020-12-19 RX ORDER — ACETAMINOPHEN 325 MG/1
650 TABLET ORAL ONCE
Status: COMPLETED | OUTPATIENT
Start: 2020-12-19 | End: 2020-12-19

## 2020-12-19 RX ADMIN — SODIUM CHLORIDE, PRESERVATIVE FREE 10 ML: 5 INJECTION INTRAVENOUS at 13:42

## 2020-12-19 RX ADMIN — ACETAMINOPHEN 650 MG: 325 TABLET, FILM COATED ORAL at 08:25

## 2020-12-19 RX ADMIN — DIPHENHYDRAMINE HYDROCHLORIDE 25 MG: 25 CAPSULE ORAL at 08:25

## 2020-12-19 RX ADMIN — Medication 500 UNITS: at 13:42

## 2020-12-31 ENCOUNTER — INFUSION (OUTPATIENT)
Dept: ONCOLOGY | Facility: HOSPITAL | Age: 80
End: 2020-12-31

## 2020-12-31 ENCOUNTER — LAB (OUTPATIENT)
Dept: OTHER | Facility: HOSPITAL | Age: 80
End: 2020-12-31

## 2020-12-31 DIAGNOSIS — N18.4 ANEMIA OF CHRONIC RENAL FAILURE, STAGE 4 (SEVERE) (HCC): Primary | ICD-10-CM

## 2020-12-31 DIAGNOSIS — D63.1 ANEMIA OF CHRONIC RENAL FAILURE, STAGE 4 (SEVERE) (HCC): Primary | ICD-10-CM

## 2020-12-31 DIAGNOSIS — D63.1 ANEMIA OF CHRONIC RENAL FAILURE, STAGE 4 (SEVERE) (HCC): ICD-10-CM

## 2020-12-31 DIAGNOSIS — N18.4 ANEMIA OF CHRONIC RENAL FAILURE, STAGE 4 (SEVERE) (HCC): ICD-10-CM

## 2020-12-31 LAB
BASOPHILS # BLD AUTO: 0.03 10*3/MM3 (ref 0–0.2)
BASOPHILS NFR BLD AUTO: 0.5 % (ref 0–1.5)
DEPRECATED RDW RBC AUTO: 51.6 FL (ref 37–54)
EOSINOPHIL # BLD AUTO: 0.11 10*3/MM3 (ref 0–0.4)
EOSINOPHIL NFR BLD AUTO: 1.7 % (ref 0.3–6.2)
ERYTHROCYTE [DISTWIDTH] IN BLOOD BY AUTOMATED COUNT: 14.9 % (ref 12.3–15.4)
HCT VFR BLD AUTO: 30.4 % (ref 34–46.6)
HGB BLD-MCNC: 9.9 G/DL (ref 12–15.9)
IMM GRANULOCYTES # BLD AUTO: 0.03 10*3/MM3 (ref 0–0.05)
IMM GRANULOCYTES NFR BLD AUTO: 0.5 % (ref 0–0.5)
LYMPHOCYTES # BLD AUTO: 0.79 10*3/MM3 (ref 0.7–3.1)
LYMPHOCYTES NFR BLD AUTO: 12.2 % (ref 19.6–45.3)
MCH RBC QN AUTO: 30.7 PG (ref 26.6–33)
MCHC RBC AUTO-ENTMCNC: 32.6 G/DL (ref 31.5–35.7)
MCV RBC AUTO: 94.1 FL (ref 79–97)
MONOCYTES # BLD AUTO: 0.46 10*3/MM3 (ref 0.1–0.9)
MONOCYTES NFR BLD AUTO: 7.1 % (ref 5–12)
NEUTROPHILS NFR BLD AUTO: 5.06 10*3/MM3 (ref 1.7–7)
NEUTROPHILS NFR BLD AUTO: 78 % (ref 42.7–76)
NRBC BLD AUTO-RTO: 0 /100 WBC (ref 0–0.2)
PLATELET # BLD AUTO: 170 10*3/MM3 (ref 140–450)
PMV BLD AUTO: 9.6 FL (ref 6–12)
RBC # BLD AUTO: 3.23 10*6/MM3 (ref 3.77–5.28)
WBC # BLD AUTO: 6.48 10*3/MM3 (ref 3.4–10.8)

## 2020-12-31 PROCEDURE — 36415 COLL VENOUS BLD VENIPUNCTURE: CPT

## 2020-12-31 PROCEDURE — 96372 THER/PROPH/DIAG INJ SC/IM: CPT

## 2020-12-31 PROCEDURE — 85025 COMPLETE CBC W/AUTO DIFF WBC: CPT | Performed by: NURSE PRACTITIONER

## 2020-12-31 PROCEDURE — 25010000002 EPOETIN ALFA PER 1000 UNITS: Performed by: INTERNAL MEDICINE

## 2020-12-31 RX ADMIN — ERYTHROPOIETIN 10000 UNITS: 10000 INJECTION, SOLUTION INTRAVENOUS; SUBCUTANEOUS at 11:05

## 2021-01-01 ENCOUNTER — OFFICE VISIT (OUTPATIENT)
Dept: ONCOLOGY | Facility: CLINIC | Age: 81
End: 2021-01-01

## 2021-01-01 ENCOUNTER — LAB (OUTPATIENT)
Dept: OTHER | Facility: HOSPITAL | Age: 81
End: 2021-01-01

## 2021-01-01 ENCOUNTER — HOSPITAL ENCOUNTER (OUTPATIENT)
Dept: NUCLEAR MEDICINE | Facility: HOSPITAL | Age: 81
Discharge: HOME | End: 2021-05-05

## 2021-01-01 ENCOUNTER — APPOINTMENT (OUTPATIENT)
Dept: ONCOLOGY | Facility: HOSPITAL | Age: 81
End: 2021-01-01

## 2021-01-01 ENCOUNTER — HOSPITAL ENCOUNTER (OUTPATIENT)
Dept: CARDIOLOGY | Facility: HOSPITAL | Age: 81
Discharge: HOME | End: 2021-04-14
Admitting: NURSE PRACTITIONER

## 2021-01-01 ENCOUNTER — OFFICE VISIT (OUTPATIENT)
Dept: CARDIOLOGY | Facility: CLINIC | Age: 81
End: 2021-01-01

## 2021-01-01 ENCOUNTER — APPOINTMENT (OUTPATIENT)
Dept: GENERAL RADIOLOGY | Facility: HOSPITAL | Age: 81
End: 2021-01-01

## 2021-01-01 ENCOUNTER — HOSPITAL ENCOUNTER (EMERGENCY)
Facility: HOSPITAL | Age: 81
Discharge: HOME | End: 2021-03-25
Attending: EMERGENCY MEDICINE | Admitting: EMERGENCY MEDICINE

## 2021-01-01 ENCOUNTER — TELEPHONE (OUTPATIENT)
Dept: ONCOLOGY | Facility: CLINIC | Age: 81
End: 2021-01-01

## 2021-01-01 ENCOUNTER — TRANSCRIBE ORDERS (OUTPATIENT)
Dept: LAB | Facility: SURGERY CENTER | Age: 81
End: 2021-01-01

## 2021-01-01 ENCOUNTER — LAB (OUTPATIENT)
Dept: ONCOLOGY | Facility: HOSPITAL | Age: 81
End: 2021-01-01

## 2021-01-01 ENCOUNTER — INFUSION (OUTPATIENT)
Dept: ONCOLOGY | Facility: HOSPITAL | Age: 81
End: 2021-01-01

## 2021-01-01 ENCOUNTER — TELEPHONE (OUTPATIENT)
Dept: CARDIOLOGY | Facility: CLINIC | Age: 81
End: 2021-01-01

## 2021-01-01 ENCOUNTER — HOSPITAL ENCOUNTER (OUTPATIENT)
Dept: GENERAL RADIOLOGY | Facility: HOSPITAL | Age: 81
Discharge: HOME | End: 2021-05-05
Admitting: NURSE PRACTITIONER

## 2021-01-01 ENCOUNTER — CLINICAL SUPPORT (OUTPATIENT)
Dept: ONCOLOGY | Facility: HOSPITAL | Age: 81
End: 2021-01-01

## 2021-01-01 ENCOUNTER — APPOINTMENT (OUTPATIENT)
Dept: OTHER | Facility: HOSPITAL | Age: 81
End: 2021-01-01

## 2021-01-01 ENCOUNTER — LAB (OUTPATIENT)
Dept: LAB | Facility: SURGERY CENTER | Age: 81
End: 2021-01-01

## 2021-01-01 ENCOUNTER — CLINICAL SUPPORT NO REQUIREMENTS (OUTPATIENT)
Dept: CARDIOLOGY | Facility: CLINIC | Age: 81
End: 2021-01-01

## 2021-01-01 ENCOUNTER — APPOINTMENT (OUTPATIENT)
Dept: CT IMAGING | Facility: HOSPITAL | Age: 81
End: 2021-01-01

## 2021-01-01 ENCOUNTER — DOCUMENTATION (OUTPATIENT)
Dept: ONCOLOGY | Facility: CLINIC | Age: 81
End: 2021-01-01

## 2021-01-01 ENCOUNTER — HOSPITAL ENCOUNTER (OUTPATIENT)
Facility: HOSPITAL | Age: 81
Setting detail: HOSPITAL OUTPATIENT SURGERY
Discharge: HOME | End: 2021-10-08
Attending: INTERNAL MEDICINE | Admitting: INTERNAL MEDICINE

## 2021-01-01 ENCOUNTER — OFFICE VISIT (OUTPATIENT)
Dept: ORTHOPEDIC SURGERY | Facility: CLINIC | Age: 81
End: 2021-01-01

## 2021-01-01 ENCOUNTER — PREP FOR SURGERY (OUTPATIENT)
Dept: OTHER | Facility: HOSPITAL | Age: 81
End: 2021-01-01

## 2021-01-01 ENCOUNTER — LAB (OUTPATIENT)
Dept: LAB | Facility: HOSPITAL | Age: 81
End: 2021-01-01

## 2021-01-01 ENCOUNTER — APPOINTMENT (OUTPATIENT)
Dept: CARDIOLOGY | Facility: HOSPITAL | Age: 81
End: 2021-01-01

## 2021-01-01 ENCOUNTER — TELEPHONE (OUTPATIENT)
Dept: ORTHOPEDIC SURGERY | Facility: CLINIC | Age: 81
End: 2021-01-01

## 2021-01-01 ENCOUNTER — HOSPITAL ENCOUNTER (OUTPATIENT)
Dept: CT IMAGING | Facility: HOSPITAL | Age: 81
Discharge: HOME | End: 2021-09-30
Admitting: NURSE PRACTITIONER

## 2021-01-01 ENCOUNTER — OFFICE VISIT (OUTPATIENT)
Dept: GASTROENTEROLOGY | Facility: CLINIC | Age: 81
End: 2021-01-01

## 2021-01-01 ENCOUNTER — ANESTHESIA EVENT (OUTPATIENT)
Dept: PERIOP | Facility: HOSPITAL | Age: 81
End: 2021-01-01

## 2021-01-01 ENCOUNTER — TELEPHONE (OUTPATIENT)
Dept: GASTROENTEROLOGY | Facility: CLINIC | Age: 81
End: 2021-01-01

## 2021-01-01 ENCOUNTER — ANESTHESIA (OUTPATIENT)
Dept: PERIOP | Facility: HOSPITAL | Age: 81
End: 2021-01-01

## 2021-01-01 ENCOUNTER — HOSPITAL ENCOUNTER (OUTPATIENT)
Dept: CARDIOLOGY | Facility: HOSPITAL | Age: 81
Discharge: HOME | End: 2021-06-17
Admitting: NURSE PRACTITIONER

## 2021-01-01 ENCOUNTER — HOSPITAL ENCOUNTER (OUTPATIENT)
Dept: CT IMAGING | Facility: HOSPITAL | Age: 81
Discharge: HOME | End: 2021-04-20

## 2021-01-01 VITALS — TEMPERATURE: 97.3 F | RESPIRATION RATE: 18 BRPM

## 2021-01-01 VITALS
RESPIRATION RATE: 18 BRPM | HEIGHT: 67 IN | HEART RATE: 89 BPM | SYSTOLIC BLOOD PRESSURE: 166 MMHG | WEIGHT: 154.6 LBS | OXYGEN SATURATION: 98 % | DIASTOLIC BLOOD PRESSURE: 65 MMHG | BODY MASS INDEX: 24.27 KG/M2 | TEMPERATURE: 97.2 F

## 2021-01-01 VITALS
RESPIRATION RATE: 20 BRPM | HEART RATE: 96 BPM | SYSTOLIC BLOOD PRESSURE: 118 MMHG | DIASTOLIC BLOOD PRESSURE: 56 MMHG | TEMPERATURE: 99.3 F | OXYGEN SATURATION: 98 %

## 2021-01-01 VITALS
OXYGEN SATURATION: 96 % | DIASTOLIC BLOOD PRESSURE: 72 MMHG | BODY MASS INDEX: 24.17 KG/M2 | HEART RATE: 79 BPM | WEIGHT: 154 LBS | RESPIRATION RATE: 18 BRPM | HEIGHT: 67 IN | TEMPERATURE: 97.1 F | SYSTOLIC BLOOD PRESSURE: 171 MMHG

## 2021-01-01 VITALS
SYSTOLIC BLOOD PRESSURE: 114 MMHG | RESPIRATION RATE: 18 BRPM | TEMPERATURE: 101.5 F | HEIGHT: 67 IN | BODY MASS INDEX: 22.76 KG/M2 | OXYGEN SATURATION: 94 % | HEART RATE: 76 BPM | WEIGHT: 145 LBS | DIASTOLIC BLOOD PRESSURE: 48 MMHG

## 2021-01-01 VITALS
SYSTOLIC BLOOD PRESSURE: 161 MMHG | OXYGEN SATURATION: 100 % | BODY MASS INDEX: 23.96 KG/M2 | HEIGHT: 66 IN | HEART RATE: 73 BPM | TEMPERATURE: 97.1 F | DIASTOLIC BLOOD PRESSURE: 60 MMHG | RESPIRATION RATE: 17 BRPM

## 2021-01-01 VITALS
DIASTOLIC BLOOD PRESSURE: 51 MMHG | RESPIRATION RATE: 18 BRPM | HEART RATE: 71 BPM | BODY MASS INDEX: 23.8 KG/M2 | HEART RATE: 62 BPM | OXYGEN SATURATION: 99 % | SYSTOLIC BLOOD PRESSURE: 145 MMHG | HEIGHT: 66 IN | TEMPERATURE: 97.1 F | BODY MASS INDEX: 23.4 KG/M2 | SYSTOLIC BLOOD PRESSURE: 159 MMHG | RESPIRATION RATE: 18 BRPM | DIASTOLIC BLOOD PRESSURE: 65 MMHG | WEIGHT: 145 LBS | OXYGEN SATURATION: 93 % | TEMPERATURE: 96.9 F | WEIGHT: 148.1 LBS

## 2021-01-01 VITALS
SYSTOLIC BLOOD PRESSURE: 154 MMHG | OXYGEN SATURATION: 99 % | HEIGHT: 67 IN | DIASTOLIC BLOOD PRESSURE: 88 MMHG | RESPIRATION RATE: 16 BRPM | TEMPERATURE: 96.8 F | HEART RATE: 64 BPM | BODY MASS INDEX: 23.83 KG/M2 | WEIGHT: 151.8 LBS

## 2021-01-01 VITALS
OXYGEN SATURATION: 93 % | SYSTOLIC BLOOD PRESSURE: 170 MMHG | TEMPERATURE: 97.3 F | BODY MASS INDEX: 24.8 KG/M2 | DIASTOLIC BLOOD PRESSURE: 66 MMHG | WEIGHT: 158 LBS | RESPIRATION RATE: 18 BRPM | HEART RATE: 76 BPM | HEIGHT: 67 IN

## 2021-01-01 VITALS
HEART RATE: 66 BPM | DIASTOLIC BLOOD PRESSURE: 88 MMHG | SYSTOLIC BLOOD PRESSURE: 138 MMHG | HEIGHT: 67 IN | WEIGHT: 154 LBS | BODY MASS INDEX: 24.17 KG/M2

## 2021-01-01 VITALS
DIASTOLIC BLOOD PRESSURE: 67 MMHG | HEART RATE: 71 BPM | WEIGHT: 144.4 LBS | TEMPERATURE: 96.9 F | RESPIRATION RATE: 18 BRPM | SYSTOLIC BLOOD PRESSURE: 171 MMHG | HEIGHT: 66 IN | OXYGEN SATURATION: 100 % | OXYGEN SATURATION: 99 % | BODY MASS INDEX: 24.12 KG/M2 | RESPIRATION RATE: 18 BRPM | BODY MASS INDEX: 23.21 KG/M2 | HEART RATE: 76 BPM | HEIGHT: 67 IN | TEMPERATURE: 97.7 F

## 2021-01-01 VITALS
RESPIRATION RATE: 18 BRPM | BODY MASS INDEX: 24.23 KG/M2 | OXYGEN SATURATION: 100 % | WEIGHT: 154.4 LBS | HEART RATE: 72 BPM | HEIGHT: 67 IN | TEMPERATURE: 97.3 F

## 2021-01-01 VITALS
RESPIRATION RATE: 15 BRPM | DIASTOLIC BLOOD PRESSURE: 68 MMHG | BODY MASS INDEX: 23.37 KG/M2 | HEART RATE: 73 BPM | HEIGHT: 66 IN | TEMPERATURE: 98.4 F | WEIGHT: 145.4 LBS | SYSTOLIC BLOOD PRESSURE: 163 MMHG | OXYGEN SATURATION: 98 %

## 2021-01-01 VITALS
RESPIRATION RATE: 18 BRPM | SYSTOLIC BLOOD PRESSURE: 122 MMHG | WEIGHT: 156 LBS | WEIGHT: 155.6 LBS | OXYGEN SATURATION: 100 % | OXYGEN SATURATION: 99 % | DIASTOLIC BLOOD PRESSURE: 81 MMHG | HEART RATE: 76 BPM | HEART RATE: 80 BPM | RESPIRATION RATE: 18 BRPM | BODY MASS INDEX: 24.42 KG/M2 | HEIGHT: 67 IN | TEMPERATURE: 97.1 F | HEIGHT: 67 IN | TEMPERATURE: 97.1 F | BODY MASS INDEX: 24.48 KG/M2 | DIASTOLIC BLOOD PRESSURE: 68 MMHG | SYSTOLIC BLOOD PRESSURE: 143 MMHG

## 2021-01-01 VITALS
SYSTOLIC BLOOD PRESSURE: 148 MMHG | HEIGHT: 67 IN | WEIGHT: 157.6 LBS | OXYGEN SATURATION: 100 % | RESPIRATION RATE: 18 BRPM | DIASTOLIC BLOOD PRESSURE: 73 MMHG | BODY MASS INDEX: 24.74 KG/M2 | HEART RATE: 76 BPM | TEMPERATURE: 97.1 F

## 2021-01-01 VITALS
BODY MASS INDEX: 23.46 KG/M2 | DIASTOLIC BLOOD PRESSURE: 80 MMHG | HEART RATE: 65 BPM | HEIGHT: 67 IN | TEMPERATURE: 97.3 F | WEIGHT: 149.5 LBS | RESPIRATION RATE: 18 BRPM | OXYGEN SATURATION: 98 % | SYSTOLIC BLOOD PRESSURE: 165 MMHG

## 2021-01-01 VITALS
RESPIRATION RATE: 18 BRPM | BODY MASS INDEX: 23.46 KG/M2 | TEMPERATURE: 96.9 F | SYSTOLIC BLOOD PRESSURE: 148 MMHG | HEIGHT: 66 IN | DIASTOLIC BLOOD PRESSURE: 64 MMHG | WEIGHT: 146 LBS | OXYGEN SATURATION: 100 % | HEART RATE: 60 BPM

## 2021-01-01 VITALS
HEART RATE: 66 BPM | WEIGHT: 148.4 LBS | DIASTOLIC BLOOD PRESSURE: 65 MMHG | TEMPERATURE: 96.8 F | BODY MASS INDEX: 23.85 KG/M2 | OXYGEN SATURATION: 100 % | HEIGHT: 66 IN | RESPIRATION RATE: 18 BRPM | SYSTOLIC BLOOD PRESSURE: 168 MMHG

## 2021-01-01 VITALS
DIASTOLIC BLOOD PRESSURE: 76 MMHG | HEIGHT: 67 IN | HEART RATE: 64 BPM | WEIGHT: 155 LBS | BODY MASS INDEX: 24.33 KG/M2 | SYSTOLIC BLOOD PRESSURE: 124 MMHG

## 2021-01-01 VITALS
DIASTOLIC BLOOD PRESSURE: 72 MMHG | HEIGHT: 66 IN | BODY MASS INDEX: 22.03 KG/M2 | WEIGHT: 137.1 LBS | SYSTOLIC BLOOD PRESSURE: 122 MMHG

## 2021-01-01 VITALS — HEIGHT: 66 IN | TEMPERATURE: 97.8 F | BODY MASS INDEX: 23.3 KG/M2 | WEIGHT: 145 LBS

## 2021-01-01 VITALS
BODY MASS INDEX: 23.33 KG/M2 | DIASTOLIC BLOOD PRESSURE: 70 MMHG | SYSTOLIC BLOOD PRESSURE: 142 MMHG | WEIGHT: 145.2 LBS | HEIGHT: 66 IN | HEART RATE: 71 BPM

## 2021-01-01 VITALS
TEMPERATURE: 96.9 F | SYSTOLIC BLOOD PRESSURE: 131 MMHG | OXYGEN SATURATION: 100 % | RESPIRATION RATE: 18 BRPM | HEART RATE: 60 BPM | DIASTOLIC BLOOD PRESSURE: 58 MMHG

## 2021-01-01 VITALS
WEIGHT: 145.6 LBS | OXYGEN SATURATION: 100 % | HEIGHT: 66 IN | DIASTOLIC BLOOD PRESSURE: 73 MMHG | HEART RATE: 68 BPM | BODY MASS INDEX: 23.4 KG/M2 | RESPIRATION RATE: 18 BRPM | TEMPERATURE: 96.9 F | SYSTOLIC BLOOD PRESSURE: 170 MMHG

## 2021-01-01 VITALS
WEIGHT: 146.8 LBS | DIASTOLIC BLOOD PRESSURE: 57 MMHG | OXYGEN SATURATION: 99 % | BODY MASS INDEX: 23.69 KG/M2 | TEMPERATURE: 97.4 F | HEART RATE: 70 BPM | SYSTOLIC BLOOD PRESSURE: 165 MMHG

## 2021-01-01 VITALS
WEIGHT: 155.8 LBS | BODY MASS INDEX: 24.45 KG/M2 | HEIGHT: 67 IN | OXYGEN SATURATION: 99 % | DIASTOLIC BLOOD PRESSURE: 75 MMHG | TEMPERATURE: 96.8 F | SYSTOLIC BLOOD PRESSURE: 157 MMHG | HEART RATE: 88 BPM | RESPIRATION RATE: 18 BRPM

## 2021-01-01 VITALS
TEMPERATURE: 97.5 F | RESPIRATION RATE: 16 BRPM | DIASTOLIC BLOOD PRESSURE: 71 MMHG | HEIGHT: 66 IN | HEART RATE: 67 BPM | OXYGEN SATURATION: 98 % | BODY MASS INDEX: 23.82 KG/M2 | WEIGHT: 148.2 LBS | SYSTOLIC BLOOD PRESSURE: 173 MMHG

## 2021-01-01 VITALS — TEMPERATURE: 97.5 F | BODY MASS INDEX: 22.98 KG/M2 | WEIGHT: 143 LBS | HEIGHT: 66 IN

## 2021-01-01 DIAGNOSIS — M47.812 OSTEOARTHRITIS OF CERVICAL SPINE WITHOUT MYELOPATHY: ICD-10-CM

## 2021-01-01 DIAGNOSIS — Z86.73 HISTORY OF RECENT STROKE: ICD-10-CM

## 2021-01-01 DIAGNOSIS — T82.110D FAILURE OF PACEMAKER LEAD, SUBSEQUENT ENCOUNTER: ICD-10-CM

## 2021-01-01 DIAGNOSIS — N18.4 STAGE 4 CHRONIC KIDNEY DISEASE (HCC): ICD-10-CM

## 2021-01-01 DIAGNOSIS — S02.85XA ORBITAL FRACTURE, CLOSED, INITIAL ENCOUNTER (HCC): ICD-10-CM

## 2021-01-01 DIAGNOSIS — G56.03 BILATERAL CARPAL TUNNEL SYNDROME: ICD-10-CM

## 2021-01-01 DIAGNOSIS — I60.9 SUBARACHNOID HEMORRHAGE (HCC): ICD-10-CM

## 2021-01-01 DIAGNOSIS — I10 ESSENTIAL HYPERTENSION: ICD-10-CM

## 2021-01-01 DIAGNOSIS — D50.0 IRON DEFICIENCY ANEMIA DUE TO CHRONIC BLOOD LOSS: Primary | ICD-10-CM

## 2021-01-01 DIAGNOSIS — K31.84 GASTROPARESIS: ICD-10-CM

## 2021-01-01 DIAGNOSIS — D63.8 ANEMIA OF CHRONIC DISEASE: ICD-10-CM

## 2021-01-01 DIAGNOSIS — N18.4 CHRONIC KIDNEY DISEASE, STAGE 4 (SEVERE) (HCC): ICD-10-CM

## 2021-01-01 DIAGNOSIS — E53.8 B12 DEFICIENCY: ICD-10-CM

## 2021-01-01 DIAGNOSIS — D64.9 SYMPTOMATIC ANEMIA: ICD-10-CM

## 2021-01-01 DIAGNOSIS — G62.9 NEUROPATHY: ICD-10-CM

## 2021-01-01 DIAGNOSIS — Z86.2 HISTORY OF ANEMIA DUE TO CHRONIC KIDNEY DISEASE: ICD-10-CM

## 2021-01-01 DIAGNOSIS — G25.3 MYOCLONUS: ICD-10-CM

## 2021-01-01 DIAGNOSIS — Z45.2 ENCOUNTER FOR FITTING AND ADJUSTMENT OF VASCULAR CATHETER: ICD-10-CM

## 2021-01-01 DIAGNOSIS — R07.9 CHEST PAIN, UNSPECIFIED TYPE: ICD-10-CM

## 2021-01-01 DIAGNOSIS — N18.4 ANEMIA OF CHRONIC RENAL FAILURE, STAGE 4 (SEVERE) (HCC): ICD-10-CM

## 2021-01-01 DIAGNOSIS — N18.4 CKD (CHRONIC KIDNEY DISEASE) STAGE 4, GFR 15-29 ML/MIN (HCC): ICD-10-CM

## 2021-01-01 DIAGNOSIS — R30.0 DYSURIA: ICD-10-CM

## 2021-01-01 DIAGNOSIS — D50.0 IRON DEFICIENCY ANEMIA DUE TO CHRONIC BLOOD LOSS: ICD-10-CM

## 2021-01-01 DIAGNOSIS — D63.1 ANEMIA OF CHRONIC RENAL FAILURE, STAGE 4 (SEVERE) (HCC): ICD-10-CM

## 2021-01-01 DIAGNOSIS — D62 ANEMIA DUE TO ACUTE BLOOD LOSS: ICD-10-CM

## 2021-01-01 DIAGNOSIS — I25.5 ISCHEMIC CARDIOMYOPATHY: Primary | ICD-10-CM

## 2021-01-01 DIAGNOSIS — R29.810 WEAKNESS ON LEFT SIDE OF FACE: ICD-10-CM

## 2021-01-01 DIAGNOSIS — E27.40 ADRENAL INSUFFICIENCY (HCC): ICD-10-CM

## 2021-01-01 DIAGNOSIS — N18.9 HISTORY OF ANEMIA DUE TO CHRONIC KIDNEY DISEASE: ICD-10-CM

## 2021-01-01 DIAGNOSIS — D62 ANEMIA DUE TO ACUTE BLOOD LOSS: Primary | ICD-10-CM

## 2021-01-01 DIAGNOSIS — K31.819 GAVE (GASTRIC ANTRAL VASCULAR ECTASIA): Primary | ICD-10-CM

## 2021-01-01 DIAGNOSIS — Z86.2 HISTORY OF ANEMIA DUE TO CHRONIC KIDNEY DISEASE: Primary | ICD-10-CM

## 2021-01-01 DIAGNOSIS — K92.1 MELENA: ICD-10-CM

## 2021-01-01 DIAGNOSIS — M43.18 SPONDYLOLISTHESIS OF SACRAL REGION: ICD-10-CM

## 2021-01-01 DIAGNOSIS — I50.22 CHRONIC SYSTOLIC CONGESTIVE HEART FAILURE (HCC): ICD-10-CM

## 2021-01-01 DIAGNOSIS — G47.61 PERIODIC LIMB MOVEMENT DISORDER: ICD-10-CM

## 2021-01-01 DIAGNOSIS — E53.8 VITAMIN B 12 DEFICIENCY: ICD-10-CM

## 2021-01-01 DIAGNOSIS — I25.5 ISCHEMIC CARDIOMYOPATHY: ICD-10-CM

## 2021-01-01 DIAGNOSIS — K31.819 GAVE (GASTRIC ANTRAL VASCULAR ECTASIA): ICD-10-CM

## 2021-01-01 DIAGNOSIS — N18.4 CHRONIC KIDNEY DISEASE, STAGE 4 (SEVERE) (HCC): Primary | ICD-10-CM

## 2021-01-01 DIAGNOSIS — G25.81 RESTLESS LEGS SYNDROME: ICD-10-CM

## 2021-01-01 DIAGNOSIS — E27.40 CHRONIC ADRENAL INSUFFICIENCY (HCC): ICD-10-CM

## 2021-01-01 DIAGNOSIS — N18.9 HISTORY OF ANEMIA DUE TO CHRONIC KIDNEY DISEASE: Primary | ICD-10-CM

## 2021-01-01 DIAGNOSIS — G62.9 PERIPHERAL POLYNEUROPATHY: ICD-10-CM

## 2021-01-01 DIAGNOSIS — M54.2 NECK PAIN: ICD-10-CM

## 2021-01-01 DIAGNOSIS — K30 DELAYED GASTRIC EMPTYING: ICD-10-CM

## 2021-01-01 DIAGNOSIS — D63.1 ANEMIA OF CHRONIC RENAL FAILURE, STAGE 4 (SEVERE) (HCC): Primary | ICD-10-CM

## 2021-01-01 DIAGNOSIS — D63.8 ANEMIA OF CHRONIC DISEASE: Primary | ICD-10-CM

## 2021-01-01 DIAGNOSIS — N18.4 STAGE 4 CHRONIC KIDNEY DISEASE (HCC): Primary | ICD-10-CM

## 2021-01-01 DIAGNOSIS — R06.09 DOE (DYSPNEA ON EXERTION): ICD-10-CM

## 2021-01-01 DIAGNOSIS — I35.1 NONRHEUMATIC AORTIC VALVE INSUFFICIENCY: ICD-10-CM

## 2021-01-01 DIAGNOSIS — I27.20 PULMONARY HYPERTENSION (HCC): Primary | ICD-10-CM

## 2021-01-01 DIAGNOSIS — M54.2 NECK PAIN: Primary | ICD-10-CM

## 2021-01-01 DIAGNOSIS — I27.20 PULMONARY HYPERTENSION (HCC): ICD-10-CM

## 2021-01-01 DIAGNOSIS — N18.4 ANEMIA OF CHRONIC RENAL FAILURE, STAGE 4 (SEVERE) (HCC): Primary | ICD-10-CM

## 2021-01-01 DIAGNOSIS — Z45.2 ENCOUNTER FOR FITTING AND ADJUSTMENT OF VASCULAR CATHETER: Primary | ICD-10-CM

## 2021-01-01 DIAGNOSIS — I50.42 CHRONIC COMBINED SYSTOLIC AND DIASTOLIC CONGESTIVE HEART FAILURE (HCC): Primary | ICD-10-CM

## 2021-01-01 DIAGNOSIS — Z95.810 AUTOMATIC IMPLANTABLE CARDIOVERTER-DEFIBRILLATOR IN SITU: ICD-10-CM

## 2021-01-01 DIAGNOSIS — R60.9 DEPENDENT EDEMA: Primary | ICD-10-CM

## 2021-01-01 DIAGNOSIS — R11.0 NAUSEA: ICD-10-CM

## 2021-01-01 DIAGNOSIS — M47.812 SPONDYLOSIS OF CERVICAL REGION WITHOUT MYELOPATHY OR RADICULOPATHY: ICD-10-CM

## 2021-01-01 DIAGNOSIS — I50.22 CHRONIC SYSTOLIC CONGESTIVE HEART FAILURE (HCC): Primary | ICD-10-CM

## 2021-01-01 DIAGNOSIS — Z01.818 OTHER SPECIFIED PRE-OPERATIVE EXAMINATION: ICD-10-CM

## 2021-01-01 DIAGNOSIS — Z45.2 ENCOUNTER FOR ADJUSTMENT OR MANAGEMENT OF VASCULAR ACCESS DEVICE: Primary | ICD-10-CM

## 2021-01-01 DIAGNOSIS — D50.8 OTHER IRON DEFICIENCY ANEMIA: ICD-10-CM

## 2021-01-01 DIAGNOSIS — I34.0 NON-RHEUMATIC MITRAL REGURGITATION: ICD-10-CM

## 2021-01-01 DIAGNOSIS — R06.09 DOE (DYSPNEA ON EXERTION): Primary | ICD-10-CM

## 2021-01-01 DIAGNOSIS — M79.89 LEFT UPPER EXTREMITY SWELLING: Primary | ICD-10-CM

## 2021-01-01 DIAGNOSIS — E56.9 HYPOVITAMINOSIS: ICD-10-CM

## 2021-01-01 DIAGNOSIS — M47.22 CERVICAL SPONDYLOSIS WITH RADICULOPATHY: Primary | ICD-10-CM

## 2021-01-01 DIAGNOSIS — Z01.818 OTHER SPECIFIED PRE-OPERATIVE EXAMINATION: Primary | ICD-10-CM

## 2021-01-01 DIAGNOSIS — N18.30 CHRONIC KIDNEY DISEASE, STAGE III (MODERATE) (HCC): Primary | ICD-10-CM

## 2021-01-01 DIAGNOSIS — M79.89 LEFT UPPER EXTREMITY SWELLING: ICD-10-CM

## 2021-01-01 DIAGNOSIS — N18.30 STAGE 3 CHRONIC KIDNEY DISEASE, UNSPECIFIED WHETHER STAGE 3A OR 3B CKD (HCC): ICD-10-CM

## 2021-01-01 DIAGNOSIS — Z78.9 NON-SMOKER: ICD-10-CM

## 2021-01-01 LAB
25(OH)D3 SERPL-MCNC: 38.2 NG/ML (ref 30–100)
25(OH)D3 SERPL-MCNC: 46.5 NG/ML
ABO GROUP BLD: NORMAL
ALBUMIN SERPL-MCNC: 3.4 G/DL (ref 3.5–5.2)
ALBUMIN SERPL-MCNC: 3.5 G/DL (ref 3.5–5.2)
ALBUMIN SERPL-MCNC: 3.7 G/DL (ref 3.5–5.2)
ALBUMIN SERPL-MCNC: 3.7 G/DL (ref 3.5–5.2)
ALBUMIN/GLOB SERPL: 1.2 G/DL
ALBUMIN/GLOB SERPL: 1.4 G/DL
ALBUMIN/GLOB SERPL: 1.4 G/DL
ALBUMIN/GLOB SERPL: 1.5 G/DL
ALP SERPL-CCNC: 64 U/L (ref 39–117)
ALP SERPL-CCNC: 68 U/L (ref 39–117)
ALP SERPL-CCNC: 68 U/L (ref 39–117)
ALP SERPL-CCNC: 69 U/L (ref 39–117)
ALT SERPL W P-5'-P-CCNC: 6 U/L (ref 1–33)
ALT SERPL W P-5'-P-CCNC: <5 U/L (ref 1–33)
ANION GAP SERPL CALCULATED.3IONS-SCNC: 10.4 MMOL/L (ref 5–15)
ANION GAP SERPL CALCULATED.3IONS-SCNC: 10.8 MMOL/L (ref 5–15)
ANION GAP SERPL CALCULATED.3IONS-SCNC: 11.2 MMOL/L (ref 5–15)
ANION GAP SERPL CALCULATED.3IONS-SCNC: 9.1 MMOL/L (ref 5–15)
ANISOCYTOSIS BLD QL: NORMAL
AORTIC DIMENSIONLESS INDEX: 0.4 (DI)
ASCENDING AORTA: 3.3 CM
AST SERPL-CCNC: 12 U/L (ref 1–32)
AST SERPL-CCNC: 15 U/L (ref 1–32)
AST SERPL-CCNC: 18 U/L (ref 1–32)
AST SERPL-CCNC: 19 U/L (ref 1–32)
BASO STIPL COARSE BLD QL SMEAR: NORMAL
BASOPHILS # BLD AUTO: 0.01 10*3/MM3 (ref 0–0.2)
BASOPHILS # BLD AUTO: 0.02 10*3/MM3 (ref 0–0.2)
BASOPHILS # BLD AUTO: 0.03 10*3/MM3 (ref 0–0.2)
BASOPHILS NFR BLD AUTO: 0.1 % (ref 0–1.5)
BASOPHILS NFR BLD AUTO: 0.2 % (ref 0–1.5)
BASOPHILS NFR BLD AUTO: 0.3 % (ref 0–1.5)
BASOPHILS NFR BLD AUTO: 0.4 % (ref 0–1.5)
BH BB BLOOD EXPIRATION DATE: NORMAL
BH BB BLOOD TYPE BARCODE: 600
BH BB DISPENSE STATUS: NORMAL
BH BB PRODUCT CODE: NORMAL
BH BB UNIT NUMBER: NORMAL
BH CV ECHO MEAS - ACS: 1.4 CM
BH CV ECHO MEAS - AO MAX PG (FULL): 19.7 MMHG
BH CV ECHO MEAS - AO MAX PG: 23.5 MMHG
BH CV ECHO MEAS - AO MEAN PG (FULL): 10.8 MMHG
BH CV ECHO MEAS - AO MEAN PG: 13 MMHG
BH CV ECHO MEAS - AO ROOT AREA (BSA CORRECTED): 1.7
BH CV ECHO MEAS - AO ROOT AREA: 7.6 CM^2
BH CV ECHO MEAS - AO ROOT DIAM: 3.1 CM
BH CV ECHO MEAS - AO V2 MAX: 242.5 CM/SEC
BH CV ECHO MEAS - AO V2 MEAN: 170.4 CM/SEC
BH CV ECHO MEAS - AO V2 VTI: 66.6 CM
BH CV ECHO MEAS - ASC AORTA: 3.3 CM
BH CV ECHO MEAS - AVA(I,A): 1.1 CM^2
BH CV ECHO MEAS - AVA(I,D): 1.1 CM^2
BH CV ECHO MEAS - AVA(V,A): 1.1 CM^2
BH CV ECHO MEAS - AVA(V,D): 1.1 CM^2
BH CV ECHO MEAS - BSA(HAYCOCK): 1.8 M^2
BH CV ECHO MEAS - BSA: 1.8 M^2
BH CV ECHO MEAS - BZI_BMI: 24.3 KILOGRAMS/M^2
BH CV ECHO MEAS - BZI_METRIC_HEIGHT: 170.2 CM
BH CV ECHO MEAS - BZI_METRIC_WEIGHT: 70.3 KG
BH CV ECHO MEAS - EDV(MOD-SP2): 110 ML
BH CV ECHO MEAS - EDV(MOD-SP4): 102 ML
BH CV ECHO MEAS - EDV(TEICH): 72.4 ML
BH CV ECHO MEAS - EF(CUBED): 65.8 %
BH CV ECHO MEAS - EF(MOD-BP): 53.7 %
BH CV ECHO MEAS - EF(MOD-SP2): 55.5 %
BH CV ECHO MEAS - EF(MOD-SP4): 52 %
BH CV ECHO MEAS - EF(TEICH): 57.8 %
BH CV ECHO MEAS - EF_3D-VOL: 55 %
BH CV ECHO MEAS - ESV(MOD-SP2): 49 ML
BH CV ECHO MEAS - ESV(MOD-SP4): 49 ML
BH CV ECHO MEAS - ESV(TEICH): 30.5 ML
BH CV ECHO MEAS - FS: 30.1 %
BH CV ECHO MEAS - IVS/LVPW: 1.1
BH CV ECHO MEAS - IVSD: 1.3 CM
BH CV ECHO MEAS - LA 3D VOL INDEX: 59
BH CV ECHO MEAS - LAT PEAK E' VEL: 5.9 CM/SEC
BH CV ECHO MEAS - LV DIASTOLIC VOL/BSA (35-75): 56.2 ML/M^2
BH CV ECHO MEAS - LV MASS(C)D: 186.3 GRAMS
BH CV ECHO MEAS - LV MASS(C)DI: 102.7 GRAMS/M^2
BH CV ECHO MEAS - LV MAX PG: 3.8 MMHG
BH CV ECHO MEAS - LV MEAN PG: 2.2 MMHG
BH CV ECHO MEAS - LV SYSTOLIC VOL/BSA (12-30): 27 ML/M^2
BH CV ECHO MEAS - LV V1 MAX: 97.3 CM/SEC
BH CV ECHO MEAS - LV V1 MEAN: 70.1 CM/SEC
BH CV ECHO MEAS - LV V1 VTI: 26.8 CM
BH CV ECHO MEAS - LVIDD: 4.1 CM
BH CV ECHO MEAS - LVIDS: 2.8 CM
BH CV ECHO MEAS - LVLD AP2: 7.3 CM
BH CV ECHO MEAS - LVLD AP4: 7.2 CM
BH CV ECHO MEAS - LVLS AP2: 6 CM
BH CV ECHO MEAS - LVLS AP4: 5.9 CM
BH CV ECHO MEAS - LVOT AREA (M): 2.8 CM^2
BH CV ECHO MEAS - LVOT AREA: 2.7 CM^2
BH CV ECHO MEAS - LVOT DIAM: 1.9 CM
BH CV ECHO MEAS - LVPWD: 1.2 CM
BH CV ECHO MEAS - MED PEAK E' VEL: 5.6 CM/SEC
BH CV ECHO MEAS - MR MAX PG: 87.9 MMHG
BH CV ECHO MEAS - MR MAX VEL: 468.7 CM/SEC
BH CV ECHO MEAS - MV A DUR: 0.1 SEC
BH CV ECHO MEAS - MV A MAX VEL: 93.6 CM/SEC
BH CV ECHO MEAS - MV DEC SLOPE: 919.1 CM/SEC^2
BH CV ECHO MEAS - MV DEC TIME: 0.17 SEC
BH CV ECHO MEAS - MV E MAX VEL: 115 CM/SEC
BH CV ECHO MEAS - MV E/A: 1.2
BH CV ECHO MEAS - MV MAX PG: 8.6 MMHG
BH CV ECHO MEAS - MV MEAN PG: 2.9 MMHG
BH CV ECHO MEAS - MV P1/2T MAX VEL: 156.9 CM/SEC
BH CV ECHO MEAS - MV P1/2T: 50 MSEC
BH CV ECHO MEAS - MV V2 MAX: 146.3 CM/SEC
BH CV ECHO MEAS - MV V2 MEAN: 77.3 CM/SEC
BH CV ECHO MEAS - MV V2 VTI: 35.1 CM
BH CV ECHO MEAS - MVA P1/2T LCG: 1.4 CM^2
BH CV ECHO MEAS - MVA(P1/2T): 4.4 CM^2
BH CV ECHO MEAS - MVA(VTI): 2.1 CM^2
BH CV ECHO MEAS - PA MAX PG (FULL): 2 MMHG
BH CV ECHO MEAS - PA MAX PG: 3.2 MMHG
BH CV ECHO MEAS - PA V2 MAX: 89.5 CM/SEC
BH CV ECHO MEAS - PULM A REVS DUR: 0.07 SEC
BH CV ECHO MEAS - PULM A REVS VEL: 35.2 CM/SEC
BH CV ECHO MEAS - PULM DIAS VEL: 77.1 CM/SEC
BH CV ECHO MEAS - PULM S/D: 0.78
BH CV ECHO MEAS - PULM SYS VEL: 59.8 CM/SEC
BH CV ECHO MEAS - PVA(V,A): 2.7 CM^2
BH CV ECHO MEAS - PVA(V,D): 2.7 CM^2
BH CV ECHO MEAS - QP/QS: 0.73
BH CV ECHO MEAS - RAP SYSTOLE: 8 MMHG
BH CV ECHO MEAS - RV MAX PG: 1.2 MMHG
BH CV ECHO MEAS - RV MEAN PG: 0.7 MMHG
BH CV ECHO MEAS - RV V1 MAX: 54.8 CM/SEC
BH CV ECHO MEAS - RV V1 MEAN: 39.3 CM/SEC
BH CV ECHO MEAS - RV V1 VTI: 12.2 CM
BH CV ECHO MEAS - RVOT AREA: 4.4 CM^2
BH CV ECHO MEAS - RVOT DIAM: 2.4 CM
BH CV ECHO MEAS - RVSP: 59.5 MMHG
BH CV ECHO MEAS - SI(AO): 278.3 ML/M^2
BH CV ECHO MEAS - SI(CUBED): 24.2 ML/M^2
BH CV ECHO MEAS - SI(LVOT): 40.7 ML/M^2
BH CV ECHO MEAS - SI(MOD-SP2): 33.6 ML/M^2
BH CV ECHO MEAS - SI(MOD-SP4): 29.2 ML/M^2
BH CV ECHO MEAS - SI(TEICH): 23.1 ML/M^2
BH CV ECHO MEAS - SUP REN AO DIAM: 1.3 CM
BH CV ECHO MEAS - SV(AO): 505 ML
BH CV ECHO MEAS - SV(CUBED): 44 ML
BH CV ECHO MEAS - SV(LVOT): 73.8 ML
BH CV ECHO MEAS - SV(MOD-SP2): 61 ML
BH CV ECHO MEAS - SV(MOD-SP4): 53 ML
BH CV ECHO MEAS - SV(RVOT): 53.6 ML
BH CV ECHO MEAS - SV(TEICH): 41.9 ML
BH CV ECHO MEAS - TAPSE (>1.6): 1.7 CM
BH CV ECHO MEAS - TR MAX VEL: 359 CM/SEC
BH CV ECHO MEASUREMENTS AVERAGE E/E' RATIO: 20
BH CV LOWER VASCULAR LEFT COMMON FEMORAL AUGMENT: NORMAL
BH CV LOWER VASCULAR LEFT COMMON FEMORAL COMPETENT: NORMAL
BH CV LOWER VASCULAR LEFT COMMON FEMORAL COMPRESS: NORMAL
BH CV LOWER VASCULAR LEFT COMMON FEMORAL PHASIC: NORMAL
BH CV LOWER VASCULAR LEFT COMMON FEMORAL SPONT: NORMAL
BH CV LOWER VASCULAR LEFT DISTAL FEMORAL COMPRESS: NORMAL
BH CV LOWER VASCULAR LEFT GASTRONEMIUS COMPRESS: NORMAL
BH CV LOWER VASCULAR LEFT GREATER SAPH AK COMPRESS: NORMAL
BH CV LOWER VASCULAR LEFT GREATER SAPH BK COMPRESS: NORMAL
BH CV LOWER VASCULAR LEFT LESSER SAPH COMPRESS: NORMAL
BH CV LOWER VASCULAR LEFT MID FEMORAL AUGMENT: NORMAL
BH CV LOWER VASCULAR LEFT MID FEMORAL COMPETENT: NORMAL
BH CV LOWER VASCULAR LEFT MID FEMORAL COMPRESS: NORMAL
BH CV LOWER VASCULAR LEFT MID FEMORAL PHASIC: NORMAL
BH CV LOWER VASCULAR LEFT MID FEMORAL SPONT: NORMAL
BH CV LOWER VASCULAR LEFT PERONEAL COMPRESS: NORMAL
BH CV LOWER VASCULAR LEFT POPLITEAL AUGMENT: NORMAL
BH CV LOWER VASCULAR LEFT POPLITEAL COMPETENT: NORMAL
BH CV LOWER VASCULAR LEFT POPLITEAL COMPRESS: NORMAL
BH CV LOWER VASCULAR LEFT POPLITEAL PHASIC: NORMAL
BH CV LOWER VASCULAR LEFT POPLITEAL SPONT: NORMAL
BH CV LOWER VASCULAR LEFT POSTERIOR TIBIAL COMPRESS: NORMAL
BH CV LOWER VASCULAR LEFT PROFUNDA FEMORAL COMPRESS: NORMAL
BH CV LOWER VASCULAR LEFT PROXIMAL FEMORAL COMPRESS: NORMAL
BH CV LOWER VASCULAR LEFT SAPHENOFEMORAL JUNCTION COMPRESS: NORMAL
BH CV LOWER VASCULAR RIGHT COMMON FEMORAL AUGMENT: NORMAL
BH CV LOWER VASCULAR RIGHT COMMON FEMORAL COMPETENT: NORMAL
BH CV LOWER VASCULAR RIGHT COMMON FEMORAL COMPRESS: NORMAL
BH CV LOWER VASCULAR RIGHT COMMON FEMORAL PHASIC: NORMAL
BH CV LOWER VASCULAR RIGHT COMMON FEMORAL SPONT: NORMAL
BH CV UPPER VENOUS LEFT AXILLARY AUGMENT: NORMAL
BH CV UPPER VENOUS LEFT AXILLARY COMPETENT: NORMAL
BH CV UPPER VENOUS LEFT AXILLARY COMPRESS: NORMAL
BH CV UPPER VENOUS LEFT AXILLARY PHASIC: NORMAL
BH CV UPPER VENOUS LEFT AXILLARY SPONT: NORMAL
BH CV UPPER VENOUS LEFT BASILIC FOREARM COMPRESS: NORMAL
BH CV UPPER VENOUS LEFT BASILIC UPPER COMPRESS: NORMAL
BH CV UPPER VENOUS LEFT BRACHIAL COMPRESS: NORMAL
BH CV UPPER VENOUS LEFT CEPHALIC FOREARM COMPRESS: NORMAL
BH CV UPPER VENOUS LEFT INTERNAL JUGULAR AUGMENT: NORMAL
BH CV UPPER VENOUS LEFT INTERNAL JUGULAR COMPETENT: NORMAL
BH CV UPPER VENOUS LEFT INTERNAL JUGULAR COMPRESS: NORMAL
BH CV UPPER VENOUS LEFT INTERNAL JUGULAR PHASIC: NORMAL
BH CV UPPER VENOUS LEFT INTERNAL JUGULAR SPONT: NORMAL
BH CV UPPER VENOUS LEFT RADIAL COMPRESS: NORMAL
BH CV UPPER VENOUS LEFT SUBCLAVIAN AUGMENT: NORMAL
BH CV UPPER VENOUS LEFT SUBCLAVIAN COMPETENT: NORMAL
BH CV UPPER VENOUS LEFT SUBCLAVIAN COMPRESS: NORMAL
BH CV UPPER VENOUS LEFT SUBCLAVIAN PHASIC: NORMAL
BH CV UPPER VENOUS LEFT SUBCLAVIAN SPONT: NORMAL
BH CV UPPER VENOUS LEFT ULNAR COMPRESS: NORMAL
BH CV UPPER VENOUS RIGHT INTERNAL JUGULAR AUGMENT: NORMAL
BH CV UPPER VENOUS RIGHT INTERNAL JUGULAR COMPETENT: NORMAL
BH CV UPPER VENOUS RIGHT INTERNAL JUGULAR COMPRESS: NORMAL
BH CV UPPER VENOUS RIGHT INTERNAL JUGULAR PHASIC: NORMAL
BH CV UPPER VENOUS RIGHT INTERNAL JUGULAR SPONT: NORMAL
BH CV UPPER VENOUS RIGHT SUBCLAVIAN AUGMENT: NORMAL
BH CV UPPER VENOUS RIGHT SUBCLAVIAN COMPETENT: NORMAL
BH CV UPPER VENOUS RIGHT SUBCLAVIAN COMPRESS: NORMAL
BH CV UPPER VENOUS RIGHT SUBCLAVIAN PHASIC: NORMAL
BH CV UPPER VENOUS RIGHT SUBCLAVIAN SPONT: NORMAL
BH CV XLRA - RV BASE: 3.5 CM
BH CV XLRA - RV LENGTH: 6.8 CM
BH CV XLRA - RV MID: 2.4 CM
BH CV XLRA - TDI S': 15.4 CM/SEC
BILIRUB SERPL-MCNC: 0.2 MG/DL (ref 0–1.2)
BILIRUB SERPL-MCNC: 0.3 MG/DL (ref 0–1.2)
BILIRUB SERPL-MCNC: <0.2 MG/DL (ref 0–1.2)
BILIRUB SERPL-MCNC: <0.2 MG/DL (ref 0–1.2)
BLD GP AB SCN SERPL QL: NEGATIVE
BUN SERPL-MCNC: 43 MG/DL (ref 8–23)
BUN SERPL-MCNC: 52 MG/DL (ref 8–23)
BUN SERPL-MCNC: 61 MG/DL (ref 8–23)
BUN SERPL-MCNC: 65 MG/DL (ref 8–23)
BUN/CREAT SERPL: 18.1 (ref 7–25)
BUN/CREAT SERPL: 19.2 (ref 7–25)
BUN/CREAT SERPL: 21.6 (ref 7–25)
BUN/CREAT SERPL: 24.5 (ref 7–25)
CALCIUM SPEC-SCNC: 8.5 MG/DL (ref 8.6–10.5)
CALCIUM SPEC-SCNC: 9 MG/DL (ref 8.6–10.5)
CALCIUM SPEC-SCNC: 9 MG/DL (ref 8.6–10.5)
CALCIUM SPEC-SCNC: 9.1 MG/DL (ref 8.6–10.5)
CALCIUM SPEC-SCNC: 9.3 MG/DL (ref 8.6–10.5)
CHLORIDE SERPL-SCNC: 101 MMOL/L (ref 98–107)
CHLORIDE SERPL-SCNC: 106 MMOL/L (ref 98–107)
CHLORIDE SERPL-SCNC: 110 MMOL/L (ref 98–107)
CHLORIDE SERPL-SCNC: 112 MMOL/L (ref 98–107)
CO2 SERPL-SCNC: 20.2 MMOL/L (ref 22–29)
CO2 SERPL-SCNC: 20.9 MMOL/L (ref 22–29)
CO2 SERPL-SCNC: 22.8 MMOL/L (ref 22–29)
CO2 SERPL-SCNC: 28.6 MMOL/L (ref 22–29)
CREAT BLDA-MCNC: 2.9 MG/DL (ref 0.6–1.3)
CREAT SERPL-MCNC: 2.38 MG/DL (ref 0.57–1)
CREAT SERPL-MCNC: 2.41 MG/DL (ref 0.57–1)
CREAT SERPL-MCNC: 2.65 MG/DL (ref 0.57–1)
CREAT SERPL-MCNC: 3.17 MG/DL (ref 0.57–1)
CROSSMATCH INTERPRETATION: NORMAL
D-LACTATE SERPL-SCNC: 0.8 MMOL/L (ref 0.5–2)
DEPRECATED RDW RBC AUTO: 46.2 FL (ref 37–54)
DEPRECATED RDW RBC AUTO: 49.1 FL (ref 37–54)
DEPRECATED RDW RBC AUTO: 52 FL (ref 37–54)
DEPRECATED RDW RBC AUTO: 52.1 FL (ref 37–54)
DEPRECATED RDW RBC AUTO: 53.1 FL (ref 37–54)
DEPRECATED RDW RBC AUTO: 53.6 FL (ref 37–54)
DEPRECATED RDW RBC AUTO: 54.5 FL (ref 37–54)
DEPRECATED RDW RBC AUTO: 55.3 FL (ref 37–54)
DEPRECATED RDW RBC AUTO: 55.8 FL (ref 37–54)
DEPRECATED RDW RBC AUTO: 58.5 FL (ref 37–54)
DEPRECATED RDW RBC AUTO: 58.8 FL (ref 37–54)
DEPRECATED RDW RBC AUTO: 59.6 FL (ref 37–54)
DEPRECATED RDW RBC AUTO: 59.7 FL (ref 37–54)
DEPRECATED RDW RBC AUTO: 60.5 FL (ref 37–54)
DEPRECATED RDW RBC AUTO: 60.7 FL (ref 37–54)
DEPRECATED RDW RBC AUTO: 63.7 FL (ref 37–54)
DEPRECATED RDW RBC AUTO: 63.7 FL (ref 37–54)
DEPRECATED RDW RBC AUTO: 64.6 FL (ref 37–54)
DEPRECATED RDW RBC AUTO: 67.2 FL (ref 37–54)
DEPRECATED RDW RBC AUTO: 68.2 FL (ref 37–54)
DEPRECATED RDW RBC AUTO: 69.9 FL (ref 37–54)
EOSINOPHIL # BLD AUTO: 0.05 10*3/MM3 (ref 0–0.4)
EOSINOPHIL # BLD AUTO: 0.06 10*3/MM3 (ref 0–0.4)
EOSINOPHIL # BLD AUTO: 0.06 10*3/MM3 (ref 0–0.4)
EOSINOPHIL # BLD AUTO: 0.07 10*3/MM3 (ref 0–0.4)
EOSINOPHIL # BLD AUTO: 0.08 10*3/MM3 (ref 0–0.4)
EOSINOPHIL # BLD AUTO: 0.08 10*3/MM3 (ref 0–0.4)
EOSINOPHIL # BLD AUTO: 0.09 10*3/MM3 (ref 0–0.4)
EOSINOPHIL # BLD AUTO: 0.1 10*3/MM3 (ref 0–0.4)
EOSINOPHIL # BLD AUTO: 0.11 10*3/MM3 (ref 0–0.4)
EOSINOPHIL # BLD AUTO: 0.12 10*3/MM3 (ref 0–0.4)
EOSINOPHIL # BLD AUTO: 0.13 10*3/MM3 (ref 0–0.4)
EOSINOPHIL # BLD AUTO: 0.14 10*3/MM3 (ref 0–0.4)
EOSINOPHIL # BLD AUTO: 0.16 10*3/MM3 (ref 0–0.4)
EOSINOPHIL # BLD AUTO: 0.21 10*3/MM3 (ref 0–0.4)
EOSINOPHIL # BLD MANUAL: 0.12 10*3/MM3 (ref 0–0.4)
EOSINOPHIL NFR BLD AUTO: 1 % (ref 0.3–6.2)
EOSINOPHIL NFR BLD AUTO: 1.1 % (ref 0.3–6.2)
EOSINOPHIL NFR BLD AUTO: 1.3 % (ref 0.3–6.2)
EOSINOPHIL NFR BLD AUTO: 1.3 % (ref 0.3–6.2)
EOSINOPHIL NFR BLD AUTO: 1.4 % (ref 0.3–6.2)
EOSINOPHIL NFR BLD AUTO: 1.4 % (ref 0.3–6.2)
EOSINOPHIL NFR BLD AUTO: 1.5 % (ref 0.3–6.2)
EOSINOPHIL NFR BLD AUTO: 1.6 % (ref 0.3–6.2)
EOSINOPHIL NFR BLD AUTO: 1.7 % (ref 0.3–6.2)
EOSINOPHIL NFR BLD AUTO: 1.8 % (ref 0.3–6.2)
EOSINOPHIL NFR BLD AUTO: 2 % (ref 0.3–6.2)
EOSINOPHIL NFR BLD AUTO: 2.1 % (ref 0.3–6.2)
EOSINOPHIL NFR BLD AUTO: 3 % (ref 0.3–6.2)
EOSINOPHIL NFR BLD MANUAL: 2 % (ref 0.3–6.2)
ERYTHROCYTE [DISTWIDTH] IN BLOOD BY AUTOMATED COUNT: 13.1 % (ref 12.3–15.4)
ERYTHROCYTE [DISTWIDTH] IN BLOOD BY AUTOMATED COUNT: 13.7 % (ref 12.3–15.4)
ERYTHROCYTE [DISTWIDTH] IN BLOOD BY AUTOMATED COUNT: 14.1 % (ref 12.3–15.4)
ERYTHROCYTE [DISTWIDTH] IN BLOOD BY AUTOMATED COUNT: 14.5 % (ref 12.3–15.4)
ERYTHROCYTE [DISTWIDTH] IN BLOOD BY AUTOMATED COUNT: 14.5 % (ref 12.3–15.4)
ERYTHROCYTE [DISTWIDTH] IN BLOOD BY AUTOMATED COUNT: 14.6 % (ref 12.3–15.4)
ERYTHROCYTE [DISTWIDTH] IN BLOOD BY AUTOMATED COUNT: 14.6 % (ref 12.3–15.4)
ERYTHROCYTE [DISTWIDTH] IN BLOOD BY AUTOMATED COUNT: 14.7 % (ref 12.3–15.4)
ERYTHROCYTE [DISTWIDTH] IN BLOOD BY AUTOMATED COUNT: 14.7 % (ref 12.3–15.4)
ERYTHROCYTE [DISTWIDTH] IN BLOOD BY AUTOMATED COUNT: 15.2 % (ref 12.3–15.4)
ERYTHROCYTE [DISTWIDTH] IN BLOOD BY AUTOMATED COUNT: 15.5 % (ref 12.3–15.4)
ERYTHROCYTE [DISTWIDTH] IN BLOOD BY AUTOMATED COUNT: 15.7 % (ref 12.3–15.4)
ERYTHROCYTE [DISTWIDTH] IN BLOOD BY AUTOMATED COUNT: 15.9 % (ref 12.3–15.4)
ERYTHROCYTE [DISTWIDTH] IN BLOOD BY AUTOMATED COUNT: 16.3 % (ref 12.3–15.4)
ERYTHROCYTE [DISTWIDTH] IN BLOOD BY AUTOMATED COUNT: 16.5 % (ref 12.3–15.4)
ERYTHROCYTE [DISTWIDTH] IN BLOOD BY AUTOMATED COUNT: 16.7 % (ref 12.3–15.4)
ERYTHROCYTE [DISTWIDTH] IN BLOOD BY AUTOMATED COUNT: 16.7 % (ref 12.3–15.4)
ERYTHROCYTE [DISTWIDTH] IN BLOOD BY AUTOMATED COUNT: 16.8 % (ref 12.3–15.4)
ERYTHROCYTE [DISTWIDTH] IN BLOOD BY AUTOMATED COUNT: 16.9 % (ref 12.3–15.4)
ERYTHROCYTE [DISTWIDTH] IN BLOOD BY AUTOMATED COUNT: 17.3 % (ref 12.3–15.4)
ERYTHROCYTE [DISTWIDTH] IN BLOOD BY AUTOMATED COUNT: 18.2 % (ref 12.3–15.4)
ERYTHROCYTE [DISTWIDTH] IN BLOOD BY AUTOMATED COUNT: 18.3 % (ref 12.3–15.4)
ERYTHROCYTE [DISTWIDTH] IN BLOOD BY AUTOMATED COUNT: 19.1 % (ref 12.3–15.4)
FERRITIN SERPL-MCNC: 138 NG/ML (ref 13–150)
FERRITIN SERPL-MCNC: 177.7 NG/ML (ref 13–150)
FERRITIN SERPL-MCNC: 221.2 NG/ML (ref 13–150)
FERRITIN SERPL-MCNC: 251.1 NG/ML (ref 13–150)
FERRITIN SERPL-MCNC: 309.2 NG/ML (ref 13–150)
FERRITIN SERPL-MCNC: 320 NG/ML (ref 13–150)
FERRITIN SERPL-MCNC: 472.9 NG/ML (ref 13–150)
FERRITIN SERPL-MCNC: 662.2 NG/ML (ref 13–150)
FERRITIN SERPL-MCNC: 738.8 NG/ML (ref 13–150)
FERRITIN SERPL-MCNC: 83.9 NG/ML (ref 13–150)
GFR SERPL CREATININE-BSD FRML MDRD: 14 ML/MIN/1.73
GFR SERPL CREATININE-BSD FRML MDRD: 17 ML/MIN/1.73
GFR SERPL CREATININE-BSD FRML MDRD: 19 ML/MIN/1.73
GFR SERPL CREATININE-BSD FRML MDRD: 19 ML/MIN/1.73
GFR SERPL CREATININE-BSD FRML MDRD: ABNORMAL ML/MIN/{1.73_M2}
GLOBULIN UR ELPH-MCNC: 2.3 GM/DL
GLOBULIN UR ELPH-MCNC: 2.5 GM/DL
GLOBULIN UR ELPH-MCNC: 2.6 GM/DL
GLOBULIN UR ELPH-MCNC: 3 GM/DL
GLUCOSE SERPL-MCNC: 109 MG/DL (ref 65–99)
GLUCOSE SERPL-MCNC: 109 MG/DL (ref 65–99)
GLUCOSE SERPL-MCNC: 118 MG/DL (ref 65–99)
GLUCOSE SERPL-MCNC: 84 MG/DL (ref 65–99)
HCT VFR BLD AUTO: 25.3 % (ref 34–46.6)
HCT VFR BLD AUTO: 26.1 % (ref 34–46.6)
HCT VFR BLD AUTO: 27.1 % (ref 34–46.6)
HCT VFR BLD AUTO: 27.4 % (ref 34–46.6)
HCT VFR BLD AUTO: 27.5 % (ref 34–46.6)
HCT VFR BLD AUTO: 27.7 % (ref 34–46.6)
HCT VFR BLD AUTO: 27.8 % (ref 34–46.6)
HCT VFR BLD AUTO: 27.9 % (ref 34–46.6)
HCT VFR BLD AUTO: 28.5 % (ref 34–46.6)
HCT VFR BLD AUTO: 28.7 % (ref 34–46.6)
HCT VFR BLD AUTO: 28.7 % (ref 34–46.6)
HCT VFR BLD AUTO: 29.1 % (ref 34–46.6)
HCT VFR BLD AUTO: 29.3 % (ref 34–46.6)
HCT VFR BLD AUTO: 29.3 % (ref 34–46.6)
HCT VFR BLD AUTO: 30 % (ref 34–46.6)
HCT VFR BLD AUTO: 30.1 % (ref 34–46.6)
HCT VFR BLD AUTO: 30.5 % (ref 34–46.6)
HCT VFR BLD AUTO: 30.9 % (ref 34–46.6)
HCT VFR BLD AUTO: 31 % (ref 34–46.6)
HCT VFR BLD AUTO: 31.3 % (ref 34–46.6)
HCT VFR BLD AUTO: 32.1 % (ref 34–46.6)
HCT VFR BLD AUTO: 33.2 % (ref 34–46.6)
HCT VFR BLD AUTO: 33.4 % (ref 34–46.6)
HGB BLD-MCNC: 10.1 G/DL (ref 12–15.9)
HGB BLD-MCNC: 10.1 G/DL (ref 12–15.9)
HGB BLD-MCNC: 10.3 G/DL (ref 12–15.9)
HGB BLD-MCNC: 10.5 G/DL (ref 12–15.9)
HGB BLD-MCNC: 7.9 G/DL (ref 12–15.9)
HGB BLD-MCNC: 8 G/DL (ref 12–15.9)
HGB BLD-MCNC: 8.2 G/DL (ref 12–15.9)
HGB BLD-MCNC: 8.3 G/DL (ref 12–15.9)
HGB BLD-MCNC: 8.5 G/DL (ref 12–15.9)
HGB BLD-MCNC: 8.6 G/DL (ref 12–15.9)
HGB BLD-MCNC: 8.8 G/DL (ref 12–15.9)
HGB BLD-MCNC: 9.2 G/DL (ref 12–15.9)
HGB BLD-MCNC: 9.3 G/DL (ref 12–15.9)
HGB BLD-MCNC: 9.4 G/DL (ref 12–15.9)
HGB BLD-MCNC: 9.5 G/DL (ref 12–15.9)
HGB BLD-MCNC: 9.6 G/DL (ref 12–15.9)
HGB BLD-MCNC: 9.6 G/DL (ref 12–15.9)
HGB BLD-MCNC: 9.8 G/DL (ref 12–15.9)
HGB BLD-MCNC: 9.8 G/DL (ref 12–15.9)
HGB RETIC QN AUTO: 30.5 PG (ref 29.8–36.1)
HGB RETIC QN AUTO: 32.2 PG (ref 29.8–36.1)
HGB RETIC QN AUTO: 33.7 PG (ref 29.8–36.1)
HGB RETIC QN AUTO: 33.8 PG (ref 29.8–36.1)
HGB RETIC QN AUTO: 34.2 PG (ref 29.8–36.1)
HGB RETIC QN AUTO: 34.7 PG (ref 29.8–36.1)
HGB RETIC QN AUTO: 35.3 PG (ref 29.8–36.1)
HGB RETIC QN AUTO: 36.2 PG (ref 29.8–36.1)
HGB RETIC QN AUTO: 37.4 PG (ref 29.8–36.1)
HOLD SPECIMEN: NORMAL
IMM GRANULOCYTES # BLD AUTO: 0.01 10*3/MM3 (ref 0–0.05)
IMM GRANULOCYTES # BLD AUTO: 0.02 10*3/MM3 (ref 0–0.05)
IMM GRANULOCYTES # BLD AUTO: 0.03 10*3/MM3 (ref 0–0.05)
IMM GRANULOCYTES # BLD AUTO: 0.04 10*3/MM3 (ref 0–0.05)
IMM GRANULOCYTES # BLD AUTO: 0.04 10*3/MM3 (ref 0–0.05)
IMM GRANULOCYTES # BLD AUTO: 0.05 10*3/MM3 (ref 0–0.05)
IMM GRANULOCYTES # BLD AUTO: 0.08 10*3/MM3 (ref 0–0.05)
IMM GRANULOCYTES # BLD AUTO: 0.09 10*3/MM3 (ref 0–0.05)
IMM GRANULOCYTES # BLD AUTO: 0.09 10*3/MM3 (ref 0–0.05)
IMM GRANULOCYTES NFR BLD AUTO: 0.2 % (ref 0–0.5)
IMM GRANULOCYTES NFR BLD AUTO: 0.3 % (ref 0–0.5)
IMM GRANULOCYTES NFR BLD AUTO: 0.4 % (ref 0–0.5)
IMM GRANULOCYTES NFR BLD AUTO: 0.5 % (ref 0–0.5)
IMM GRANULOCYTES NFR BLD AUTO: 0.6 % (ref 0–0.5)
IMM GRANULOCYTES NFR BLD AUTO: 0.9 % (ref 0–0.5)
IMM GRANULOCYTES NFR BLD AUTO: 1.1 % (ref 0–0.5)
IMM GRANULOCYTES NFR BLD AUTO: 1.1 % (ref 0–0.5)
IMM RETICS NFR: 17.1 % (ref 3–15.8)
IMM RETICS NFR: 18.8 % (ref 3–15.8)
IMM RETICS NFR: 21.2 % (ref 3–15.8)
IMM RETICS NFR: 22.2 % (ref 3–15.8)
IMM RETICS NFR: 25.4 % (ref 3–15.8)
IMM RETICS NFR: 5 % (ref 3–15.8)
IMM RETICS NFR: 7.4 % (ref 3–15.8)
IMM RETICS NFR: 8.1 % (ref 3–15.8)
IMM RETICS NFR: 8.4 % (ref 3–15.8)
IRON 24H UR-MRATE: 23 MCG/DL (ref 37–145)
IRON 24H UR-MRATE: 37 MCG/DL (ref 37–145)
IRON 24H UR-MRATE: 45 MCG/DL (ref 37–145)
IRON 24H UR-MRATE: 45 MCG/DL (ref 37–145)
IRON 24H UR-MRATE: 47 MCG/DL (ref 37–145)
IRON 24H UR-MRATE: 48 MCG/DL (ref 37–145)
IRON 24H UR-MRATE: 48 MCG/DL (ref 37–145)
IRON 24H UR-MRATE: 59 MCG/DL (ref 37–145)
IRON 24H UR-MRATE: 72 MCG/DL (ref 37–145)
IRON 24H UR-MRATE: 80 MCG/DL (ref 37–145)
IRON SATN MFR SERPL: 17 % (ref 20–50)
IRON SATN MFR SERPL: 19 % (ref 20–50)
IRON SATN MFR SERPL: 21 % (ref 20–50)
IRON SATN MFR SERPL: 25 % (ref 20–50)
IRON SATN MFR SERPL: 36 % (ref 20–50)
IRON SATN MFR SERPL: 39 % (ref 20–50)
IRON SATN MFR SERPL: 9 % (ref 20–50)
LEFT ATRIUM VOLUME INDEX: 34 ML/M2
LEFT ATRIUM VOLUME: 61 CM3
LYMPHOCYTES # BLD AUTO: 0.36 10*3/MM3 (ref 0.7–3.1)
LYMPHOCYTES # BLD AUTO: 0.48 10*3/MM3 (ref 0.7–3.1)
LYMPHOCYTES # BLD AUTO: 0.5 10*3/MM3 (ref 0.7–3.1)
LYMPHOCYTES # BLD AUTO: 0.52 10*3/MM3 (ref 0.7–3.1)
LYMPHOCYTES # BLD AUTO: 0.53 10*3/MM3 (ref 0.7–3.1)
LYMPHOCYTES # BLD AUTO: 0.55 10*3/MM3 (ref 0.7–3.1)
LYMPHOCYTES # BLD AUTO: 0.56 10*3/MM3 (ref 0.7–3.1)
LYMPHOCYTES # BLD AUTO: 0.57 10*3/MM3 (ref 0.7–3.1)
LYMPHOCYTES # BLD AUTO: 0.61 10*3/MM3 (ref 0.7–3.1)
LYMPHOCYTES # BLD AUTO: 0.62 10*3/MM3 (ref 0.7–3.1)
LYMPHOCYTES # BLD AUTO: 0.63 10*3/MM3 (ref 0.7–3.1)
LYMPHOCYTES # BLD AUTO: 0.65 10*3/MM3 (ref 0.7–3.1)
LYMPHOCYTES # BLD AUTO: 0.66 10*3/MM3 (ref 0.7–3.1)
LYMPHOCYTES # BLD AUTO: 0.69 10*3/MM3 (ref 0.7–3.1)
LYMPHOCYTES # BLD AUTO: 0.7 10*3/MM3 (ref 0.7–3.1)
LYMPHOCYTES # BLD AUTO: 0.75 10*3/MM3 (ref 0.7–3.1)
LYMPHOCYTES # BLD AUTO: 0.78 10*3/MM3 (ref 0.7–3.1)
LYMPHOCYTES # BLD AUTO: 0.84 10*3/MM3 (ref 0.7–3.1)
LYMPHOCYTES # BLD AUTO: 0.88 10*3/MM3 (ref 0.7–3.1)
LYMPHOCYTES # BLD AUTO: 0.96 10*3/MM3 (ref 0.7–3.1)
LYMPHOCYTES # BLD MANUAL: 0.31 10*3/MM3 (ref 0.7–3.1)
LYMPHOCYTES NFR BLD AUTO: 11.1 % (ref 19.6–45.3)
LYMPHOCYTES NFR BLD AUTO: 11.2 % (ref 19.6–45.3)
LYMPHOCYTES NFR BLD AUTO: 11.5 % (ref 19.6–45.3)
LYMPHOCYTES NFR BLD AUTO: 11.8 % (ref 19.6–45.3)
LYMPHOCYTES NFR BLD AUTO: 12.1 % (ref 19.6–45.3)
LYMPHOCYTES NFR BLD AUTO: 12.8 % (ref 19.6–45.3)
LYMPHOCYTES NFR BLD AUTO: 13.5 % (ref 19.6–45.3)
LYMPHOCYTES NFR BLD AUTO: 13.9 % (ref 19.6–45.3)
LYMPHOCYTES NFR BLD AUTO: 6.7 % (ref 19.6–45.3)
LYMPHOCYTES NFR BLD AUTO: 7 % (ref 19.6–45.3)
LYMPHOCYTES NFR BLD AUTO: 7.4 % (ref 19.6–45.3)
LYMPHOCYTES NFR BLD AUTO: 8.4 % (ref 19.6–45.3)
LYMPHOCYTES NFR BLD AUTO: 8.4 % (ref 19.6–45.3)
LYMPHOCYTES NFR BLD AUTO: 8.5 % (ref 19.6–45.3)
LYMPHOCYTES NFR BLD AUTO: 8.6 % (ref 19.6–45.3)
LYMPHOCYTES NFR BLD AUTO: 9 % (ref 19.6–45.3)
LYMPHOCYTES NFR BLD AUTO: 9 % (ref 19.6–45.3)
LYMPHOCYTES NFR BLD AUTO: 9.1 % (ref 19.6–45.3)
LYMPHOCYTES NFR BLD AUTO: 9.2 % (ref 19.6–45.3)
LYMPHOCYTES NFR BLD AUTO: 9.2 % (ref 19.6–45.3)
LYMPHOCYTES NFR BLD AUTO: 9.4 % (ref 19.6–45.3)
LYMPHOCYTES NFR BLD AUTO: 9.7 % (ref 19.6–45.3)
LYMPHOCYTES NFR BLD MANUAL: 5 % (ref 19.6–45.3)
MAXIMAL PREDICTED HEART RATE: 137 BPM
MCH RBC QN AUTO: 30.3 PG (ref 26.6–33)
MCH RBC QN AUTO: 30.6 PG (ref 26.6–33)
MCH RBC QN AUTO: 30.8 PG (ref 26.6–33)
MCH RBC QN AUTO: 30.9 PG (ref 26.6–33)
MCH RBC QN AUTO: 30.9 PG (ref 26.6–33)
MCH RBC QN AUTO: 31 PG (ref 26.6–33)
MCH RBC QN AUTO: 31.1 PG (ref 26.6–33)
MCH RBC QN AUTO: 31.2 PG (ref 26.6–33)
MCH RBC QN AUTO: 31.3 PG (ref 26.6–33)
MCH RBC QN AUTO: 31.3 PG (ref 26.6–33)
MCH RBC QN AUTO: 31.4 PG (ref 26.6–33)
MCH RBC QN AUTO: 31.6 PG (ref 26.6–33)
MCH RBC QN AUTO: 31.7 PG (ref 26.6–33)
MCH RBC QN AUTO: 31.9 PG (ref 26.6–33)
MCH RBC QN AUTO: 32 PG (ref 26.6–33)
MCH RBC QN AUTO: 32.2 PG (ref 26.6–33)
MCH RBC QN AUTO: 32.8 PG (ref 26.6–33)
MCHC RBC AUTO-ENTMCNC: 29.9 G/DL (ref 31.5–35.7)
MCHC RBC AUTO-ENTMCNC: 30 G/DL (ref 31.5–35.7)
MCHC RBC AUTO-ENTMCNC: 30.4 G/DL (ref 31.5–35.7)
MCHC RBC AUTO-ENTMCNC: 30.5 G/DL (ref 31.5–35.7)
MCHC RBC AUTO-ENTMCNC: 30.6 G/DL (ref 31.5–35.7)
MCHC RBC AUTO-ENTMCNC: 30.6 G/DL (ref 31.5–35.7)
MCHC RBC AUTO-ENTMCNC: 30.7 G/DL (ref 31.5–35.7)
MCHC RBC AUTO-ENTMCNC: 30.7 G/DL (ref 31.5–35.7)
MCHC RBC AUTO-ENTMCNC: 30.8 G/DL (ref 31.5–35.7)
MCHC RBC AUTO-ENTMCNC: 30.9 G/DL (ref 31.5–35.7)
MCHC RBC AUTO-ENTMCNC: 30.9 G/DL (ref 31.5–35.7)
MCHC RBC AUTO-ENTMCNC: 31 G/DL (ref 31.5–35.7)
MCHC RBC AUTO-ENTMCNC: 31 G/DL (ref 31.5–35.7)
MCHC RBC AUTO-ENTMCNC: 31.1 G/DL (ref 31.5–35.7)
MCHC RBC AUTO-ENTMCNC: 31.2 G/DL (ref 31.5–35.7)
MCHC RBC AUTO-ENTMCNC: 31.3 G/DL (ref 31.5–35.7)
MCHC RBC AUTO-ENTMCNC: 31.4 G/DL (ref 31.5–35.7)
MCHC RBC AUTO-ENTMCNC: 31.9 G/DL (ref 31.5–35.7)
MCHC RBC AUTO-ENTMCNC: 32.1 G/DL (ref 31.5–35.7)
MCHC RBC AUTO-ENTMCNC: 32.1 G/DL (ref 31.5–35.7)
MCHC RBC AUTO-ENTMCNC: 32.6 G/DL (ref 31.5–35.7)
MCHC RBC AUTO-ENTMCNC: 32.6 G/DL (ref 31.5–35.7)
MCHC RBC AUTO-ENTMCNC: 33.4 G/DL (ref 31.5–35.7)
MCV RBC AUTO: 100 FL (ref 79–97)
MCV RBC AUTO: 100.3 FL (ref 79–97)
MCV RBC AUTO: 100.7 FL (ref 79–97)
MCV RBC AUTO: 101.1 FL (ref 79–97)
MCV RBC AUTO: 101.1 FL (ref 79–97)
MCV RBC AUTO: 101.4 FL (ref 79–97)
MCV RBC AUTO: 101.5 FL (ref 79–97)
MCV RBC AUTO: 101.8 FL (ref 79–97)
MCV RBC AUTO: 102 FL (ref 79–97)
MCV RBC AUTO: 104.6 FL (ref 79–97)
MCV RBC AUTO: 105 FL (ref 79–97)
MCV RBC AUTO: 106.2 FL (ref 79–97)
MCV RBC AUTO: 95.8 FL (ref 79–97)
MCV RBC AUTO: 96.9 FL (ref 79–97)
MCV RBC AUTO: 97 FL (ref 79–97)
MCV RBC AUTO: 97 FL (ref 79–97)
MCV RBC AUTO: 97.5 FL (ref 79–97)
MCV RBC AUTO: 98.4 FL (ref 79–97)
MCV RBC AUTO: 98.7 FL (ref 79–97)
MCV RBC AUTO: 98.9 FL (ref 79–97)
MCV RBC AUTO: 99.1 FL (ref 79–97)
MONOCYTES # BLD AUTO: 0.18 10*3/MM3 (ref 0.1–0.9)
MONOCYTES # BLD AUTO: 0.24 10*3/MM3 (ref 0.1–0.9)
MONOCYTES # BLD AUTO: 0.3 10*3/MM3 (ref 0.1–0.9)
MONOCYTES # BLD AUTO: 0.3 10*3/MM3 (ref 0.1–0.9)
MONOCYTES # BLD AUTO: 0.32 10*3/MM3 (ref 0.1–0.9)
MONOCYTES # BLD AUTO: 0.35 10*3/MM3 (ref 0.1–0.9)
MONOCYTES # BLD AUTO: 0.4 10*3/MM3 (ref 0.1–0.9)
MONOCYTES # BLD AUTO: 0.42 10*3/MM3 (ref 0.1–0.9)
MONOCYTES # BLD AUTO: 0.44 10*3/MM3 (ref 0.1–0.9)
MONOCYTES # BLD AUTO: 0.46 10*3/MM3 (ref 0.1–0.9)
MONOCYTES # BLD AUTO: 0.47 10*3/MM3 (ref 0.1–0.9)
MONOCYTES # BLD AUTO: 0.48 10*3/MM3 (ref 0.1–0.9)
MONOCYTES # BLD AUTO: 0.52 10*3/MM3 (ref 0.1–0.9)
MONOCYTES # BLD AUTO: 0.53 10*3/MM3 (ref 0.1–0.9)
MONOCYTES # BLD AUTO: 0.57 10*3/MM3 (ref 0.1–0.9)
MONOCYTES # BLD AUTO: 0.58 10*3/MM3 (ref 0.1–0.9)
MONOCYTES # BLD AUTO: 0.59 10*3/MM3 (ref 0.1–0.9)
MONOCYTES # BLD AUTO: 0.62 10*3/MM3 (ref 0.1–0.9)
MONOCYTES # BLD AUTO: 0.89 10*3/MM3 (ref 0.1–0.9)
MONOCYTES NFR BLD AUTO: 3.7 % (ref 5–12)
MONOCYTES NFR BLD AUTO: 4.2 % (ref 5–12)
MONOCYTES NFR BLD AUTO: 5.1 % (ref 5–12)
MONOCYTES NFR BLD AUTO: 5.2 % (ref 5–12)
MONOCYTES NFR BLD AUTO: 5.7 % (ref 5–12)
MONOCYTES NFR BLD AUTO: 5.8 % (ref 5–12)
MONOCYTES NFR BLD AUTO: 6.2 % (ref 5–12)
MONOCYTES NFR BLD AUTO: 6.4 % (ref 5–12)
MONOCYTES NFR BLD AUTO: 6.6 % (ref 5–12)
MONOCYTES NFR BLD AUTO: 6.7 % (ref 5–12)
MONOCYTES NFR BLD AUTO: 7 % (ref 5–12)
MONOCYTES NFR BLD AUTO: 7 % (ref 5–12)
MONOCYTES NFR BLD AUTO: 7.1 % (ref 5–12)
MONOCYTES NFR BLD AUTO: 7.3 % (ref 5–12)
MONOCYTES NFR BLD AUTO: 7.6 % (ref 5–12)
MONOCYTES NFR BLD AUTO: 7.6 % (ref 5–12)
MONOCYTES NFR BLD AUTO: 7.8 % (ref 5–12)
MONOCYTES NFR BLD AUTO: 8.2 % (ref 5–12)
MONOCYTES NFR BLD AUTO: 8.4 % (ref 5–12)
MONOCYTES NFR BLD AUTO: 8.5 % (ref 5–12)
MONOCYTES NFR BLD AUTO: 8.8 % (ref 5–12)
MONOCYTES NFR BLD AUTO: 9.7 % (ref 5–12)
NEUTROPHILS # BLD AUTO: 5.78 10*3/MM3 (ref 1.7–7)
NEUTROPHILS NFR BLD AUTO: 3.65 10*3/MM3 (ref 1.7–7)
NEUTROPHILS NFR BLD AUTO: 3.8 10*3/MM3 (ref 1.7–7)
NEUTROPHILS NFR BLD AUTO: 4.24 10*3/MM3 (ref 1.7–7)
NEUTROPHILS NFR BLD AUTO: 4.26 10*3/MM3 (ref 1.7–7)
NEUTROPHILS NFR BLD AUTO: 4.27 10*3/MM3 (ref 1.7–7)
NEUTROPHILS NFR BLD AUTO: 4.56 10*3/MM3 (ref 1.7–7)
NEUTROPHILS NFR BLD AUTO: 4.68 10*3/MM3 (ref 1.7–7)
NEUTROPHILS NFR BLD AUTO: 5.07 10*3/MM3 (ref 1.7–7)
NEUTROPHILS NFR BLD AUTO: 5.13 10*3/MM3 (ref 1.7–7)
NEUTROPHILS NFR BLD AUTO: 5.14 10*3/MM3 (ref 1.7–7)
NEUTROPHILS NFR BLD AUTO: 5.2 10*3/MM3 (ref 1.7–7)
NEUTROPHILS NFR BLD AUTO: 5.28 10*3/MM3 (ref 1.7–7)
NEUTROPHILS NFR BLD AUTO: 5.32 10*3/MM3 (ref 1.7–7)
NEUTROPHILS NFR BLD AUTO: 5.55 10*3/MM3 (ref 1.7–7)
NEUTROPHILS NFR BLD AUTO: 5.87 10*3/MM3 (ref 1.7–7)
NEUTROPHILS NFR BLD AUTO: 5.88 10*3/MM3 (ref 1.7–7)
NEUTROPHILS NFR BLD AUTO: 6.07 10*3/MM3 (ref 1.7–7)
NEUTROPHILS NFR BLD AUTO: 6.19 10*3/MM3 (ref 1.7–7)
NEUTROPHILS NFR BLD AUTO: 6.26 10*3/MM3 (ref 1.7–7)
NEUTROPHILS NFR BLD AUTO: 6.4 10*3/MM3 (ref 1.7–7)
NEUTROPHILS NFR BLD AUTO: 6.64 10*3/MM3 (ref 1.7–7)
NEUTROPHILS NFR BLD AUTO: 73.6 % (ref 42.7–76)
NEUTROPHILS NFR BLD AUTO: 76.5 % (ref 42.7–76)
NEUTROPHILS NFR BLD AUTO: 76.7 % (ref 42.7–76)
NEUTROPHILS NFR BLD AUTO: 77.7 % (ref 42.7–76)
NEUTROPHILS NFR BLD AUTO: 78.7 % (ref 42.7–76)
NEUTROPHILS NFR BLD AUTO: 78.8 % (ref 42.7–76)
NEUTROPHILS NFR BLD AUTO: 8.45 10*3/MM3 (ref 1.7–7)
NEUTROPHILS NFR BLD AUTO: 80 % (ref 42.7–76)
NEUTROPHILS NFR BLD AUTO: 80.3 % (ref 42.7–76)
NEUTROPHILS NFR BLD AUTO: 80.4 % (ref 42.7–76)
NEUTROPHILS NFR BLD AUTO: 81.2 % (ref 42.7–76)
NEUTROPHILS NFR BLD AUTO: 81.2 % (ref 42.7–76)
NEUTROPHILS NFR BLD AUTO: 81.4 % (ref 42.7–76)
NEUTROPHILS NFR BLD AUTO: 81.8 % (ref 42.7–76)
NEUTROPHILS NFR BLD AUTO: 81.9 % (ref 42.7–76)
NEUTROPHILS NFR BLD AUTO: 82.3 % (ref 42.7–76)
NEUTROPHILS NFR BLD AUTO: 82.3 % (ref 42.7–76)
NEUTROPHILS NFR BLD AUTO: 82.7 % (ref 42.7–76)
NEUTROPHILS NFR BLD AUTO: 82.7 % (ref 42.7–76)
NEUTROPHILS NFR BLD AUTO: 83.1 % (ref 42.7–76)
NEUTROPHILS NFR BLD AUTO: 85 % (ref 42.7–76)
NEUTROPHILS NFR BLD AUTO: 87 % (ref 42.7–76)
NEUTROPHILS NFR BLD AUTO: 87.3 % (ref 42.7–76)
NEUTROPHILS NFR BLD MANUAL: 93 % (ref 42.7–76)
NRBC BLD AUTO-RTO: 0 /100 WBC (ref 0–0.2)
NRBC BLD AUTO-RTO: 0.5 /100 WBC (ref 0–0.2)
NT-PROBNP SERPL-MCNC: 3562 PG/ML (ref 0–1800)
PHOSPHATE SERPL-MCNC: 4.2 MG/DL (ref 2.5–4.5)
PHOSPHATE SERPL-MCNC: 4.7 MG/DL (ref 2.5–4.5)
PHOSPHATE SERPL-MCNC: 5 MG/DL (ref 2.5–4.5)
PLAT MORPH BLD: NORMAL
PLAT MORPH BLD: NORMAL
PLATELET # BLD AUTO: 160 10*3/MM3 (ref 140–450)
PLATELET # BLD AUTO: 162 10*3/MM3 (ref 140–450)
PLATELET # BLD AUTO: 164 10*3/MM3 (ref 140–450)
PLATELET # BLD AUTO: 172 10*3/MM3 (ref 140–450)
PLATELET # BLD AUTO: 178 10*3/MM3 (ref 140–450)
PLATELET # BLD AUTO: 180 10*3/MM3 (ref 140–450)
PLATELET # BLD AUTO: 182 10*3/MM3 (ref 140–450)
PLATELET # BLD AUTO: 182 10*3/MM3 (ref 140–450)
PLATELET # BLD AUTO: 186 10*3/MM3 (ref 140–450)
PLATELET # BLD AUTO: 188 10*3/MM3 (ref 140–450)
PLATELET # BLD AUTO: 189 10*3/MM3 (ref 140–450)
PLATELET # BLD AUTO: 190 10*3/MM3 (ref 140–450)
PLATELET # BLD AUTO: 195 10*3/MM3 (ref 140–450)
PLATELET # BLD AUTO: 197 10*3/MM3 (ref 140–450)
PLATELET # BLD AUTO: 209 10*3/MM3 (ref 140–450)
PLATELET # BLD AUTO: 209 10*3/MM3 (ref 140–450)
PLATELET # BLD AUTO: 211 10*3/MM3 (ref 140–450)
PLATELET # BLD AUTO: 211 10*3/MM3 (ref 140–450)
PLATELET # BLD AUTO: 225 10*3/MM3 (ref 140–450)
PLATELET # BLD AUTO: 229 10*3/MM3 (ref 140–450)
PLATELET # BLD AUTO: 232 10*3/MM3 (ref 140–450)
PLATELET # BLD AUTO: 264 10*3/MM3 (ref 140–450)
PLATELET # BLD AUTO: 308 10*3/MM3 (ref 140–450)
PMV BLD AUTO: 10 FL (ref 6–12)
PMV BLD AUTO: 10.3 FL (ref 6–12)
PMV BLD AUTO: 10.5 FL (ref 6–12)
PMV BLD AUTO: 8.7 FL (ref 6–12)
PMV BLD AUTO: 8.8 FL (ref 6–12)
PMV BLD AUTO: 8.8 FL (ref 6–12)
PMV BLD AUTO: 8.9 FL (ref 6–12)
PMV BLD AUTO: 9 FL (ref 6–12)
PMV BLD AUTO: 9.1 FL (ref 6–12)
PMV BLD AUTO: 9.2 FL (ref 6–12)
PMV BLD AUTO: 9.3 FL (ref 6–12)
PMV BLD AUTO: 9.4 FL (ref 6–12)
PMV BLD AUTO: 9.4 FL (ref 6–12)
PMV BLD AUTO: 9.5 FL (ref 6–12)
PMV BLD AUTO: 9.5 FL (ref 6–12)
PMV BLD AUTO: 9.6 FL (ref 6–12)
PMV BLD AUTO: 9.6 FL (ref 6–12)
PMV BLD AUTO: 9.7 FL (ref 6–12)
PMV BLD AUTO: 9.9 FL (ref 6–12)
POLYCHROMASIA BLD QL SMEAR: NORMAL
POTASSIUM SERPL-SCNC: 4.1 MMOL/L (ref 3.5–5.2)
POTASSIUM SERPL-SCNC: 4.3 MMOL/L (ref 3.5–5.2)
POTASSIUM SERPL-SCNC: 4.6 MMOL/L (ref 3.5–5.2)
POTASSIUM SERPL-SCNC: 5 MMOL/L (ref 3.5–5.2)
PROT SERPL-MCNC: 5.8 G/DL (ref 6–8.5)
PROT SERPL-MCNC: 5.9 G/DL (ref 6–8.5)
PROT SERPL-MCNC: 6.3 G/DL (ref 6–8.5)
PROT SERPL-MCNC: 6.7 G/DL (ref 6–8.5)
PTH-INTACT SERPL-MCNC: 119 PG/ML (ref 15–65)
PTH-INTACT SERPL-MCNC: 195 PG/ML (ref 15–65)
PTH-INTACT SERPL-MCNC: 293 PG/ML (ref 15–65)
QT INTERVAL: 336 MS
RBC # BLD AUTO: 2.41 10*6/MM3 (ref 3.77–5.28)
RBC # BLD AUTO: 2.56 10*6/MM3 (ref 3.77–5.28)
RBC # BLD AUTO: 2.62 10*6/MM3 (ref 3.77–5.28)
RBC # BLD AUTO: 2.68 10*6/MM3 (ref 3.77–5.28)
RBC # BLD AUTO: 2.75 10*6/MM3 (ref 3.77–5.28)
RBC # BLD AUTO: 2.76 10*6/MM3 (ref 3.77–5.28)
RBC # BLD AUTO: 2.76 10*6/MM3 (ref 3.77–5.28)
RBC # BLD AUTO: 2.78 10*6/MM3 (ref 3.77–5.28)
RBC # BLD AUTO: 2.82 10*6/MM3 (ref 3.77–5.28)
RBC # BLD AUTO: 2.83 10*6/MM3 (ref 3.77–5.28)
RBC # BLD AUTO: 2.84 10*6/MM3 (ref 3.77–5.28)
RBC # BLD AUTO: 2.86 10*6/MM3 (ref 3.77–5.28)
RBC # BLD AUTO: 2.98 10*6/MM3 (ref 3.77–5.28)
RBC # BLD AUTO: 3 10*6/MM3 (ref 3.77–5.28)
RBC # BLD AUTO: 3.03 10*6/MM3 (ref 3.77–5.28)
RBC # BLD AUTO: 3.06 10*6/MM3 (ref 3.77–5.28)
RBC # BLD AUTO: 3.06 10*6/MM3 (ref 3.77–5.28)
RBC # BLD AUTO: 3.09 10*6/MM3 (ref 3.77–5.28)
RBC # BLD AUTO: 3.17 10*6/MM3 (ref 3.77–5.28)
RBC # BLD AUTO: 3.2 10*6/MM3 (ref 3.77–5.28)
RBC # BLD AUTO: 3.26 10*6/MM3 (ref 3.77–5.28)
RBC # BLD AUTO: 3.31 10*6/MM3 (ref 3.77–5.28)
RBC # BLD AUTO: 3.37 10*6/MM3 (ref 3.77–5.28)
RBC MORPH BLD: NORMAL
RETICS # AUTO: 0.03 10*6/MM3 (ref 0.02–0.13)
RETICS # AUTO: 0.03 10*6/MM3 (ref 0.02–0.13)
RETICS # AUTO: 0.04 10*6/MM3 (ref 0.02–0.13)
RETICS # AUTO: 0.06 10*6/MM3 (ref 0.02–0.13)
RETICS # AUTO: 0.06 10*6/MM3 (ref 0.02–0.13)
RETICS # AUTO: 0.07 10*6/MM3 (ref 0.02–0.13)
RETICS # AUTO: 0.08 10*6/MM3 (ref 0.02–0.13)
RETICS # AUTO: 0.08 10*6/MM3 (ref 0.02–0.13)
RETICS # AUTO: 0.09 10*6/MM3 (ref 0.02–0.13)
RETICS/RBC NFR AUTO: 0.92 % (ref 0.7–1.9)
RETICS/RBC NFR AUTO: 1.14 % (ref 0.7–1.9)
RETICS/RBC NFR AUTO: 1.62 % (ref 0.7–1.9)
RETICS/RBC NFR AUTO: 1.96 % (ref 0.7–1.9)
RETICS/RBC NFR AUTO: 2 % (ref 0.7–1.9)
RETICS/RBC NFR AUTO: 2.41 % (ref 0.7–1.9)
RETICS/RBC NFR AUTO: 2.77 % (ref 0.7–1.9)
RETICS/RBC NFR AUTO: 2.79 % (ref 0.7–1.9)
RETICS/RBC NFR AUTO: 3.42 % (ref 0.7–1.9)
RH BLD: NEGATIVE
SARS-COV-2 ORF1AB RESP QL NAA+PROBE: NOT DETECTED
SARS-COV-2 ORF1AB RESP QL NAA+PROBE: NOT DETECTED
SINUS: 2.8 CM
SODIUM SERPL-SCNC: 140 MMOL/L (ref 136–145)
SODIUM SERPL-SCNC: 143 MMOL/L (ref 136–145)
STJ: 3 CM
STRESS TARGET HR: 116 BPM
T&S EXPIRATION DATE: NORMAL
TIBC SERPL-MCNC: 203 MCG/DL (ref 298–536)
TIBC SERPL-MCNC: 207 MCG/DL (ref 298–536)
TIBC SERPL-MCNC: 213 MCG/DL (ref 298–536)
TIBC SERPL-MCNC: 213 MCG/DL (ref 298–536)
TIBC SERPL-MCNC: 215 MCG/DL (ref 298–536)
TIBC SERPL-MCNC: 225 MCG/DL (ref 298–536)
TIBC SERPL-MCNC: 225 MCG/DL (ref 298–536)
TIBC SERPL-MCNC: 234 MCG/DL (ref 298–536)
TIBC SERPL-MCNC: 250 MCG/DL (ref 298–536)
TIBC SERPL-MCNC: 253 MCG/DL (ref 298–536)
TRANSFERRIN SERPL-MCNC: 136 MG/DL (ref 200–360)
TRANSFERRIN SERPL-MCNC: 139 MG/DL (ref 200–360)
TRANSFERRIN SERPL-MCNC: 143 MG/DL (ref 200–360)
TRANSFERRIN SERPL-MCNC: 143 MG/DL (ref 200–360)
TRANSFERRIN SERPL-MCNC: 144 MG/DL (ref 200–360)
TRANSFERRIN SERPL-MCNC: 151 MG/DL (ref 200–360)
TRANSFERRIN SERPL-MCNC: 151 MG/DL (ref 200–360)
TRANSFERRIN SERPL-MCNC: 157 MG/DL (ref 200–360)
TRANSFERRIN SERPL-MCNC: 168 MG/DL (ref 200–360)
TRANSFERRIN SERPL-MCNC: 170 MG/DL (ref 200–360)
TROPONIN T SERPL-MCNC: <0.01 NG/ML (ref 0–0.03)
UNIT  ABO: NORMAL
UNIT  RH: NORMAL
URATE SERPL-MCNC: 10 MG/DL (ref 2.4–5.7)
URATE SERPL-MCNC: 6.6 MG/DL (ref 2.4–5.7)
URATE SERPL-MCNC: 7.7 MG/DL (ref 2.4–5.7)
VIT B12 BLD-MCNC: 1063 PG/ML (ref 211–946)
VIT B12 BLD-MCNC: 573 PG/ML (ref 211–946)
VIT B12 BLD-MCNC: 620 PG/ML (ref 211–946)
VIT B12 BLD-MCNC: 631 PG/ML (ref 211–946)
VIT B12 BLD-MCNC: 705 PG/ML (ref 211–946)
VIT B12 BLD-MCNC: 741 PG/ML (ref 211–946)
WBC # BLD AUTO: 10.52 10*3/MM3 (ref 3.4–10.8)
WBC # BLD AUTO: 4.75 10*3/MM3 (ref 3.4–10.8)
WBC # BLD AUTO: 4.88 10*3/MM3 (ref 3.4–10.8)
WBC # BLD AUTO: 4.96 10*3/MM3 (ref 3.4–10.8)
WBC # BLD AUTO: 5.46 10*3/MM3 (ref 3.4–10.8)
WBC # BLD AUTO: 5.51 10*3/MM3 (ref 3.4–10.8)
WBC # BLD AUTO: 5.56 10*3/MM3 (ref 3.4–10.8)
WBC # BLD AUTO: 5.76 10*3/MM3 (ref 3.4–10.8)
WBC # BLD AUTO: 6.18 10*3/MM3 (ref 3.4–10.8)
WBC # BLD AUTO: 6.21 10*3/MM3 (ref 3.4–10.8)
WBC # BLD AUTO: 6.23 10*3/MM3 (ref 3.4–10.8)
WBC # BLD AUTO: 6.29 10*3/MM3 (ref 3.4–10.8)
WBC # BLD AUTO: 6.48 10*3/MM3 (ref 3.4–10.8)
WBC # BLD AUTO: 6.5 10*3/MM3 (ref 3.4–10.8)
WBC # BLD AUTO: 6.62 10*3/MM3 (ref 3.4–10.8)
WBC # BLD AUTO: 6.9 10*3/MM3 (ref 3.4–10.8)
WBC # BLD AUTO: 7.12 10*3/MM3 (ref 3.4–10.8)
WBC # BLD AUTO: 7.15 10*3/MM3 (ref 3.4–10.8)
WBC # BLD AUTO: 7.16 10*3/MM3 (ref 3.4–10.8)
WBC # BLD AUTO: 7.47 10*3/MM3 (ref 3.4–10.8)
WBC # BLD AUTO: 7.78 10*3/MM3 (ref 3.4–10.8)
WBC # BLD AUTO: 7.94 10*3/MM3 (ref 3.4–10.8)
WBC # BLD AUTO: 8.17 10*3/MM3 (ref 3.4–10.8)
WBC MORPH BLD: NORMAL
WBC MORPH BLD: NORMAL
WHOLE BLOOD HOLD SPECIMEN: NORMAL
WHOLE BLOOD HOLD SPECIMEN: NORMAL

## 2021-01-01 PROCEDURE — 84466 ASSAY OF TRANSFERRIN: CPT | Performed by: INTERNAL MEDICINE

## 2021-01-01 PROCEDURE — 82728 ASSAY OF FERRITIN: CPT | Performed by: INTERNAL MEDICINE

## 2021-01-01 PROCEDURE — 99214 OFFICE O/P EST MOD 30 MIN: CPT | Performed by: ORTHOPAEDIC SURGERY

## 2021-01-01 PROCEDURE — P9016 RBC LEUKOCYTES REDUCED: HCPCS

## 2021-01-01 PROCEDURE — 85046 RETICYTE/HGB CONCENTRATE: CPT | Performed by: INTERNAL MEDICINE

## 2021-01-01 PROCEDURE — 82565 ASSAY OF CREATININE: CPT

## 2021-01-01 PROCEDURE — 83540 ASSAY OF IRON: CPT | Performed by: INTERNAL MEDICINE

## 2021-01-01 PROCEDURE — 86901 BLOOD TYPING SEROLOGIC RH(D): CPT

## 2021-01-01 PROCEDURE — 96372 THER/PROPH/DIAG INJ SC/IM: CPT

## 2021-01-01 PROCEDURE — 25010000002 EPOETIN ALFA PER 1000 UNITS: Performed by: NURSE PRACTITIONER

## 2021-01-01 PROCEDURE — 96374 THER/PROPH/DIAG INJ IV PUSH: CPT

## 2021-01-01 PROCEDURE — 25010000002 FERRIC CARBOXYMALTOSE 750 MG/15ML SOLUTION 15 ML VIAL: Performed by: NURSE PRACTITIONER

## 2021-01-01 PROCEDURE — 93295 DEV INTERROG REMOTE 1/2/MLT: CPT | Performed by: INTERNAL MEDICINE

## 2021-01-01 PROCEDURE — 85025 COMPLETE CBC W/AUTO DIFF WBC: CPT | Performed by: INTERNAL MEDICINE

## 2021-01-01 PROCEDURE — 36430 TRANSFUSION BLD/BLD COMPNT: CPT

## 2021-01-01 PROCEDURE — 36415 COLL VENOUS BLD VENIPUNCTURE: CPT

## 2021-01-01 PROCEDURE — 25010000002 EPOETIN ALFA PER 1000 UNITS: Performed by: INTERNAL MEDICINE

## 2021-01-01 PROCEDURE — 86900 BLOOD TYPING SEROLOGIC ABO: CPT | Performed by: EMERGENCY MEDICINE

## 2021-01-01 PROCEDURE — 85025 COMPLETE CBC W/AUTO DIFF WBC: CPT

## 2021-01-01 PROCEDURE — 99214 OFFICE O/P EST MOD 30 MIN: CPT | Performed by: INTERNAL MEDICINE

## 2021-01-01 PROCEDURE — 93306 TTE W/DOPPLER COMPLETE: CPT | Performed by: INTERNAL MEDICINE

## 2021-01-01 PROCEDURE — 25010000002 FUROSEMIDE PER 20 MG: Performed by: INTERNAL MEDICINE

## 2021-01-01 PROCEDURE — 93296 REM INTERROG EVL PM/IDS: CPT | Performed by: INTERNAL MEDICINE

## 2021-01-01 PROCEDURE — 36591 DRAW BLOOD OFF VENOUS DEVICE: CPT

## 2021-01-01 PROCEDURE — 93290 INTERROG DEV EVAL ICPMS IP: CPT | Performed by: INTERNAL MEDICINE

## 2021-01-01 PROCEDURE — 93010 ELECTROCARDIOGRAM REPORT: CPT | Performed by: INTERNAL MEDICINE

## 2021-01-01 PROCEDURE — 80053 COMPREHEN METABOLIC PANEL: CPT

## 2021-01-01 PROCEDURE — 63710000001 DIPHENHYDRAMINE PER 50 MG: Performed by: NURSE PRACTITIONER

## 2021-01-01 PROCEDURE — G0463 HOSPITAL OUTPT CLINIC VISIT: HCPCS

## 2021-01-01 PROCEDURE — 82607 VITAMIN B-12: CPT | Performed by: INTERNAL MEDICINE

## 2021-01-01 PROCEDURE — 84100 ASSAY OF PHOSPHORUS: CPT | Performed by: INTERNAL MEDICINE

## 2021-01-01 PROCEDURE — 86900 BLOOD TYPING SEROLOGIC ABO: CPT | Performed by: INTERNAL MEDICINE

## 2021-01-01 PROCEDURE — 25010000002 PROPOFOL 10 MG/ML EMULSION: Performed by: NURSE ANESTHETIST, CERTIFIED REGISTERED

## 2021-01-01 PROCEDURE — U0005 INFEC AGEN DETEC AMPLI PROBE: HCPCS | Performed by: SURGERY

## 2021-01-01 PROCEDURE — 86923 COMPATIBILITY TEST ELECTRIC: CPT

## 2021-01-01 PROCEDURE — 83970 ASSAY OF PARATHORMONE: CPT | Performed by: INTERNAL MEDICINE

## 2021-01-01 PROCEDURE — 84550 ASSAY OF BLOOD/URIC ACID: CPT | Performed by: INTERNAL MEDICINE

## 2021-01-01 PROCEDURE — 84484 ASSAY OF TROPONIN QUANT: CPT | Performed by: NURSE PRACTITIONER

## 2021-01-01 PROCEDURE — 25010000002 ONDANSETRON PER 1 MG: Performed by: INTERNAL MEDICINE

## 2021-01-01 PROCEDURE — 93284 PRGRMG EVAL IMPLANTABLE DFB: CPT | Performed by: INTERNAL MEDICINE

## 2021-01-01 PROCEDURE — 25010000002 HEPARIN LOCK FLUSH PER 10 UNITS: Performed by: INTERNAL MEDICINE

## 2021-01-01 PROCEDURE — 86850 RBC ANTIBODY SCREEN: CPT | Performed by: EMERGENCY MEDICINE

## 2021-01-01 PROCEDURE — 82306 VITAMIN D 25 HYDROXY: CPT | Performed by: INTERNAL MEDICINE

## 2021-01-01 PROCEDURE — 99213 OFFICE O/P EST LOW 20 MIN: CPT | Performed by: NURSE PRACTITIONER

## 2021-01-01 PROCEDURE — 80053 COMPREHEN METABOLIC PANEL: CPT | Performed by: NURSE PRACTITIONER

## 2021-01-01 PROCEDURE — 86900 BLOOD TYPING SEROLOGIC ABO: CPT

## 2021-01-01 PROCEDURE — 0 TECHNETIUM ALBUMIN AGGREGATED: Performed by: NURSE PRACTITIONER

## 2021-01-01 PROCEDURE — 93000 ELECTROCARDIOGRAM COMPLETE: CPT | Performed by: INTERNAL MEDICINE

## 2021-01-01 PROCEDURE — 25010000002 ONDANSETRON PER 1 MG: Performed by: NURSE ANESTHETIST, CERTIFIED REGISTERED

## 2021-01-01 PROCEDURE — C9803 HOPD COVID-19 SPEC COLLECT: HCPCS

## 2021-01-01 PROCEDURE — 86850 RBC ANTIBODY SCREEN: CPT | Performed by: INTERNAL MEDICINE

## 2021-01-01 PROCEDURE — U0005 INFEC AGEN DETEC AMPLI PROBE: HCPCS | Performed by: NURSE PRACTITIONER

## 2021-01-01 PROCEDURE — 83605 ASSAY OF LACTIC ACID: CPT | Performed by: NURSE PRACTITIONER

## 2021-01-01 PROCEDURE — 99284 EMERGENCY DEPT VISIT MOD MDM: CPT

## 2021-01-01 PROCEDURE — A9540 TC99M MAA: HCPCS | Performed by: NURSE PRACTITIONER

## 2021-01-01 PROCEDURE — 99214 OFFICE O/P EST MOD 30 MIN: CPT | Performed by: NURSE PRACTITIONER

## 2021-01-01 PROCEDURE — 80053 COMPREHEN METABOLIC PANEL: CPT | Performed by: INTERNAL MEDICINE

## 2021-01-01 PROCEDURE — U0004 COV-19 TEST NON-CDC HGH THRU: HCPCS | Performed by: NURSE PRACTITIONER

## 2021-01-01 PROCEDURE — 85025 COMPLETE CBC W/AUTO DIFF WBC: CPT | Performed by: NURSE PRACTITIONER

## 2021-01-01 PROCEDURE — 86901 BLOOD TYPING SEROLOGIC RH(D): CPT | Performed by: EMERGENCY MEDICINE

## 2021-01-01 PROCEDURE — 93005 ELECTROCARDIOGRAM TRACING: CPT | Performed by: NURSE PRACTITIONER

## 2021-01-01 PROCEDURE — 85007 BL SMEAR W/DIFF WBC COUNT: CPT | Performed by: INTERNAL MEDICINE

## 2021-01-01 PROCEDURE — 63710000001 PROCHLORPERAZINE MALEATE PER 5 MG: Performed by: NURSE PRACTITIONER

## 2021-01-01 PROCEDURE — 99213 OFFICE O/P EST LOW 20 MIN: CPT | Performed by: INTERNAL MEDICINE

## 2021-01-01 PROCEDURE — 25010000003 HEPARIN LOCK FLUSH PER 10 UNITS: Performed by: INTERNAL MEDICINE

## 2021-01-01 PROCEDURE — 63710000001 DIPHENHYDRAMINE PER 50 MG: Performed by: INTERNAL MEDICINE

## 2021-01-01 PROCEDURE — A9270 NON-COVERED ITEM OR SERVICE: HCPCS | Performed by: NURSE PRACTITIONER

## 2021-01-01 PROCEDURE — 86850 RBC ANTIBODY SCREEN: CPT

## 2021-01-01 PROCEDURE — 71046 X-RAY EXAM CHEST 2 VIEWS: CPT

## 2021-01-01 PROCEDURE — U0004 COV-19 TEST NON-CDC HGH THRU: HCPCS | Performed by: SURGERY

## 2021-01-01 PROCEDURE — 43255 EGD CONTROL BLEEDING ANY: CPT | Performed by: INTERNAL MEDICINE

## 2021-01-01 PROCEDURE — 72040 X-RAY EXAM NECK SPINE 2-3 VW: CPT | Performed by: NURSE PRACTITIONER

## 2021-01-01 PROCEDURE — 93971 EXTREMITY STUDY: CPT

## 2021-01-01 PROCEDURE — 78580 LUNG PERFUSION IMAGING: CPT

## 2021-01-01 PROCEDURE — 86901 BLOOD TYPING SEROLOGIC RH(D): CPT | Performed by: INTERNAL MEDICINE

## 2021-01-01 PROCEDURE — 93306 TTE W/DOPPLER COMPLETE: CPT

## 2021-01-01 PROCEDURE — 83880 ASSAY OF NATRIURETIC PEPTIDE: CPT | Performed by: NURSE PRACTITIONER

## 2021-01-01 PROCEDURE — C9803 HOPD COVID-19 SPEC COLLECT: HCPCS | Performed by: NURSE PRACTITIONER

## 2021-01-01 PROCEDURE — 72125 CT NECK SPINE W/O DYE: CPT

## 2021-01-01 PROCEDURE — 71045 X-RAY EXAM CHEST 1 VIEW: CPT

## 2021-01-01 PROCEDURE — 85007 BL SMEAR W/DIFF WBC COUNT: CPT | Performed by: NURSE PRACTITIONER

## 2021-01-01 RX ORDER — SODIUM CHLORIDE 0.9 % (FLUSH) 0.9 %
10 SYRINGE (ML) INJECTION AS NEEDED
Status: DISCONTINUED | OUTPATIENT
Start: 2021-01-01 | End: 2021-01-01 | Stop reason: HOSPADM

## 2021-01-01 RX ORDER — SODIUM CHLORIDE 9 MG/ML
250 INJECTION, SOLUTION INTRAVENOUS AS NEEDED
Status: DISCONTINUED | OUTPATIENT
Start: 2021-01-01 | End: 2021-01-01 | Stop reason: HOSPADM

## 2021-01-01 RX ORDER — ACETAMINOPHEN 325 MG/1
650 TABLET ORAL ONCE
Status: CANCELLED | OUTPATIENT
Start: 2021-01-01 | End: 2021-01-01

## 2021-01-01 RX ORDER — SODIUM CHLORIDE 0.9 % (FLUSH) 0.9 %
10 SYRINGE (ML) INJECTION AS NEEDED
Status: CANCELLED | OUTPATIENT
Start: 2021-01-01

## 2021-01-01 RX ORDER — ONDANSETRON 2 MG/ML
4 INJECTION INTRAMUSCULAR; INTRAVENOUS EVERY 6 HOURS PRN
Status: DISCONTINUED | OUTPATIENT
Start: 2021-01-01 | End: 2021-01-01 | Stop reason: HOSPADM

## 2021-01-01 RX ORDER — HEPARIN SODIUM (PORCINE) LOCK FLUSH IV SOLN 100 UNIT/ML 100 UNIT/ML
500 SOLUTION INTRAVENOUS AS NEEDED
Status: CANCELLED | OUTPATIENT
Start: 2021-01-01

## 2021-01-01 RX ORDER — HEPARIN SODIUM (PORCINE) LOCK FLUSH IV SOLN 100 UNIT/ML 100 UNIT/ML
500 SOLUTION INTRAVENOUS AS NEEDED
Status: DISCONTINUED | OUTPATIENT
Start: 2021-01-01 | End: 2021-01-01

## 2021-01-01 RX ORDER — DIPHENHYDRAMINE HCL 25 MG
25 CAPSULE ORAL ONCE
Status: COMPLETED | OUTPATIENT
Start: 2021-01-01 | End: 2021-01-01

## 2021-01-01 RX ORDER — LIDOCAINE HYDROCHLORIDE 10 MG/ML
0.5 INJECTION, SOLUTION EPIDURAL; INFILTRATION; INTRACAUDAL; PERINEURAL ONCE AS NEEDED
Status: DISCONTINUED | OUTPATIENT
Start: 2021-01-01 | End: 2021-01-01 | Stop reason: HOSPADM

## 2021-01-01 RX ORDER — DULOXETIN HYDROCHLORIDE 30 MG/1
1 CAPSULE, DELAYED RELEASE ORAL DAILY
Status: ON HOLD | COMMUNITY
End: 2022-01-01

## 2021-01-01 RX ORDER — HEPARIN SODIUM (PORCINE) LOCK FLUSH IV SOLN 100 UNIT/ML 100 UNIT/ML
500 SOLUTION INTRAVENOUS AS NEEDED
Status: DISCONTINUED | OUTPATIENT
Start: 2021-01-01 | End: 2021-01-01 | Stop reason: HOSPADM

## 2021-01-01 RX ORDER — CALCITRIOL 0.25 UG/1
0.25 CAPSULE, LIQUID FILLED ORAL DAILY
COMMUNITY
End: 2021-01-01

## 2021-01-01 RX ORDER — SODIUM CHLORIDE, SODIUM LACTATE, POTASSIUM CHLORIDE, CALCIUM CHLORIDE 600; 310; 30; 20 MG/100ML; MG/100ML; MG/100ML; MG/100ML
9 INJECTION, SOLUTION INTRAVENOUS CONTINUOUS
Status: DISCONTINUED | OUTPATIENT
Start: 2021-01-01 | End: 2021-01-01 | Stop reason: HOSPADM

## 2021-01-01 RX ORDER — ONDANSETRON 2 MG/ML
4 INJECTION INTRAMUSCULAR; INTRAVENOUS ONCE AS NEEDED
Status: COMPLETED | OUTPATIENT
Start: 2021-01-01 | End: 2021-01-01

## 2021-01-01 RX ORDER — SODIUM CHLORIDE 9 MG/ML
250 INJECTION, SOLUTION INTRAVENOUS AS NEEDED
Status: CANCELLED | OUTPATIENT
Start: 2021-01-01

## 2021-01-01 RX ORDER — ACETAMINOPHEN 325 MG/1
650 TABLET ORAL ONCE
Status: DISCONTINUED | OUTPATIENT
Start: 2021-01-01 | End: 2021-01-01 | Stop reason: HOSPADM

## 2021-01-01 RX ORDER — DIPHENHYDRAMINE HCL 25 MG
25 CAPSULE ORAL ONCE
Status: CANCELLED | OUTPATIENT
Start: 2021-01-01 | End: 2021-01-01

## 2021-01-01 RX ORDER — FUROSEMIDE 10 MG/ML
20 INJECTION INTRAMUSCULAR; INTRAVENOUS ONCE
Status: COMPLETED | OUTPATIENT
Start: 2021-01-01 | End: 2021-01-01

## 2021-01-01 RX ORDER — FAMOTIDINE 40 MG/1
TABLET, FILM COATED ORAL
Qty: 90 TABLET | Refills: 3 | Status: SHIPPED | OUTPATIENT
Start: 2021-01-01 | End: 2021-01-01

## 2021-01-01 RX ORDER — ONDANSETRON 4 MG/1
4 TABLET, ORALLY DISINTEGRATING ORAL EVERY 8 HOURS PRN
Qty: 20 TABLET | Refills: 3 | Status: SHIPPED | OUTPATIENT
Start: 2021-01-01 | End: 2021-01-01 | Stop reason: ALTCHOICE

## 2021-01-01 RX ORDER — LIDOCAINE HYDROCHLORIDE 20 MG/ML
INJECTION, SOLUTION INFILTRATION; PERINEURAL AS NEEDED
Status: DISCONTINUED | OUTPATIENT
Start: 2021-01-01 | End: 2021-01-01 | Stop reason: SURG

## 2021-01-01 RX ORDER — SODIUM CHLORIDE 9 MG/ML
40 INJECTION, SOLUTION INTRAVENOUS AS NEEDED
Status: DISCONTINUED | OUTPATIENT
Start: 2021-01-01 | End: 2021-01-01 | Stop reason: HOSPADM

## 2021-01-01 RX ORDER — PROPOFOL 10 MG/ML
VIAL (ML) INTRAVENOUS AS NEEDED
Status: DISCONTINUED | OUTPATIENT
Start: 2021-01-01 | End: 2021-01-01 | Stop reason: SURG

## 2021-01-01 RX ORDER — SODIUM CHLORIDE, SODIUM LACTATE, POTASSIUM CHLORIDE, CALCIUM CHLORIDE 600; 310; 30; 20 MG/100ML; MG/100ML; MG/100ML; MG/100ML
100 INJECTION, SOLUTION INTRAVENOUS CONTINUOUS
Status: DISCONTINUED | OUTPATIENT
Start: 2021-01-01 | End: 2021-01-01 | Stop reason: HOSPADM

## 2021-01-01 RX ORDER — PROCHLORPERAZINE MALEATE 5 MG/1
10 TABLET ORAL ONCE
Status: COMPLETED | OUTPATIENT
Start: 2021-01-01 | End: 2021-01-01

## 2021-01-01 RX ORDER — SODIUM CHLORIDE 0.9 % (FLUSH) 0.9 %
10 SYRINGE (ML) INJECTION AS NEEDED
Status: DISCONTINUED | OUTPATIENT
Start: 2021-01-01 | End: 2021-01-01

## 2021-01-01 RX ORDER — GLYCOPYRROLATE 0.2 MG/ML
INJECTION INTRAMUSCULAR; INTRAVENOUS AS NEEDED
Status: DISCONTINUED | OUTPATIENT
Start: 2021-01-01 | End: 2021-01-01 | Stop reason: SURG

## 2021-01-01 RX ORDER — GABAPENTIN 300 MG/1
CAPSULE ORAL
Status: ON HOLD | COMMUNITY
End: 2022-01-01

## 2021-01-01 RX ORDER — FLUOXETINE 10 MG/1
10 CAPSULE ORAL DAILY
COMMUNITY
Start: 2021-01-01 | End: 2021-01-01

## 2021-01-01 RX ORDER — PREDNISONE 10 MG/1
10 TABLET ORAL TAKE AS DIRECTED
Qty: 12 TABLET | Refills: 3 | Status: SHIPPED | OUTPATIENT
Start: 2021-01-01 | End: 2021-01-01

## 2021-01-01 RX ORDER — GABAPENTIN 300 MG/1
600 TABLET, FILM COATED ORAL DAILY
COMMUNITY
Start: 2021-01-01 | End: 2021-01-01

## 2021-01-01 RX ORDER — FAMOTIDINE 20 MG/1
1 TABLET, FILM COATED ORAL EVERY 12 HOURS
COMMUNITY

## 2021-01-01 RX ORDER — ONDANSETRON 4 MG/1
4 TABLET, FILM COATED ORAL EVERY 8 HOURS PRN
COMMUNITY
End: 2021-01-01

## 2021-01-01 RX ORDER — KETAMINE HYDROCHLORIDE 100 MG/ML
INJECTION INTRAMUSCULAR; INTRAVENOUS AS NEEDED
Status: DISCONTINUED | OUTPATIENT
Start: 2021-01-01 | End: 2021-01-01 | Stop reason: SURG

## 2021-01-01 RX ORDER — LIDOCAINE 50 MG/G
1 PATCH TOPICAL NIGHTLY
COMMUNITY
Start: 2021-01-01

## 2021-01-01 RX ORDER — ERYTHROMYCIN 250 MG/1
TABLET, COATED ORAL
Qty: 180 TABLET | Refills: 3 | Status: SHIPPED | OUTPATIENT
Start: 2021-01-01

## 2021-01-01 RX ORDER — SODIUM CHLORIDE 9 MG/ML
250 INJECTION, SOLUTION INTRAVENOUS ONCE
Status: COMPLETED | OUTPATIENT
Start: 2021-01-01 | End: 2021-01-01

## 2021-01-01 RX ORDER — PANTOPRAZOLE SODIUM 40 MG/1
40 TABLET, DELAYED RELEASE ORAL DAILY
Qty: 90 TABLET | Refills: 3 | Status: SHIPPED | OUTPATIENT
Start: 2021-01-01

## 2021-01-01 RX ORDER — FEBUXOSTAT 80 MG/1
80 TABLET, FILM COATED ORAL DAILY
COMMUNITY
Start: 2021-01-01 | End: 2022-01-01 | Stop reason: HOSPADM

## 2021-01-01 RX ORDER — SUCRALFATE 1 G/1
1 TABLET ORAL 4 TIMES DAILY
Qty: 120 TABLET | Refills: 5 | Status: SHIPPED | OUTPATIENT
Start: 2021-01-01 | End: 2021-01-01 | Stop reason: ALTCHOICE

## 2021-01-01 RX ORDER — IRON 18 MG
1 TABLET ORAL
COMMUNITY
End: 2021-01-01

## 2021-01-01 RX ORDER — ASPIRIN 81 MG/1
TABLET ORAL DAILY
COMMUNITY

## 2021-01-01 RX ORDER — FUROSEMIDE 40 MG/1
TABLET ORAL
Start: 2021-01-01 | End: 2022-01-01 | Stop reason: HOSPADM

## 2021-01-01 RX ORDER — OMEPRAZOLE 20 MG/1
20 CAPSULE, DELAYED RELEASE ORAL DAILY
Qty: 90 CAPSULE | Refills: 3 | Status: SHIPPED | OUTPATIENT
Start: 2021-01-01 | End: 2021-01-01 | Stop reason: ALTCHOICE

## 2021-01-01 RX ORDER — PROMETHAZINE HYDROCHLORIDE 12.5 MG/1
12.5 TABLET ORAL EVERY 6 HOURS PRN
COMMUNITY

## 2021-01-01 RX ORDER — METHYLPREDNISOLONE 4 MG/1
TABLET ORAL
Qty: 21 TABLET | Refills: 0 | Status: SHIPPED | OUTPATIENT
Start: 2021-01-01 | End: 2021-01-01

## 2021-01-01 RX ORDER — ASPIRIN 81 MG/1
81 TABLET ORAL DAILY
COMMUNITY
End: 2021-01-01 | Stop reason: HOSPADM

## 2021-01-01 RX ORDER — ACETAMINOPHEN 325 MG/1
650 TABLET ORAL ONCE
Status: COMPLETED | OUTPATIENT
Start: 2021-01-01 | End: 2021-01-01

## 2021-01-01 RX ORDER — BUPROPION HYDROCHLORIDE 150 MG/1
150 TABLET, EXTENDED RELEASE ORAL DAILY
COMMUNITY
End: 2021-01-01 | Stop reason: SDUPTHER

## 2021-01-01 RX ORDER — SODIUM CHLORIDE 0.9 % (FLUSH) 0.9 %
10 SYRINGE (ML) INJECTION EVERY 12 HOURS SCHEDULED
Status: DISCONTINUED | OUTPATIENT
Start: 2021-01-01 | End: 2021-01-01 | Stop reason: HOSPADM

## 2021-01-01 RX ORDER — BUPROPION HYDROCHLORIDE 150 MG/1
150 TABLET, EXTENDED RELEASE ORAL
COMMUNITY
End: 2022-01-01 | Stop reason: HOSPADM

## 2021-01-01 RX ORDER — SODIUM CHLORIDE 9 MG/ML
250 INJECTION, SOLUTION INTRAVENOUS AS NEEDED
Status: ACTIVE | OUTPATIENT
Start: 2021-01-01

## 2021-01-01 RX ORDER — ACETAMINOPHEN 500 MG
1000 TABLET ORAL ONCE
Status: COMPLETED | OUTPATIENT
Start: 2021-01-01 | End: 2021-01-01

## 2021-01-01 RX ADMIN — KETAMINE HYDROCHLORIDE 10 MG: 100 INJECTION INTRAMUSCULAR; INTRAVENOUS at 11:44

## 2021-01-01 RX ADMIN — SODIUM CHLORIDE, PRESERVATIVE FREE 10 ML: 5 INJECTION INTRAVENOUS at 11:40

## 2021-01-01 RX ADMIN — DIPHENHYDRAMINE HYDROCHLORIDE 25 MG: 25 CAPSULE ORAL at 08:07

## 2021-01-01 RX ADMIN — PROPOFOL 20 MG: 10 INJECTION, EMULSION INTRAVENOUS at 11:59

## 2021-01-01 RX ADMIN — Medication 500 UNITS: at 12:04

## 2021-01-01 RX ADMIN — ERYTHROPOIETIN 13000 UNITS: 20000 INJECTION, SOLUTION INTRAVENOUS; SUBCUTANEOUS at 10:52

## 2021-01-01 RX ADMIN — ERYTHROPOIETIN 12000 UNITS: 20000 INJECTION, SOLUTION INTRAVENOUS; SUBCUTANEOUS at 11:05

## 2021-01-01 RX ADMIN — SODIUM CHLORIDE 250 ML: 9 INJECTION, SOLUTION INTRAVENOUS at 10:50

## 2021-01-01 RX ADMIN — PROCHLORPERAZINE MALEATE 10 MG: 5 TABLET ORAL at 10:48

## 2021-01-01 RX ADMIN — ERYTHROPOIETIN 15000 UNITS: 20000 INJECTION, SOLUTION INTRAVENOUS; SUBCUTANEOUS at 09:53

## 2021-01-01 RX ADMIN — KIT FOR THE PREPARATION OF TECHNETIUM TC 99M ALBUMIN AGGREGATED 1 DOSE: 2.5 INJECTION, POWDER, FOR SOLUTION INTRAVENOUS at 11:53

## 2021-01-01 RX ADMIN — ERYTHROPOIETIN 15000 UNITS: 20000 INJECTION, SOLUTION INTRAVENOUS; SUBCUTANEOUS at 11:16

## 2021-01-01 RX ADMIN — ERYTHROPOIETIN 16000 UNITS: 20000 INJECTION, SOLUTION INTRAVENOUS; SUBCUTANEOUS at 11:36

## 2021-01-01 RX ADMIN — ERYTHROPOIETIN 13000 UNITS: 20000 INJECTION, SOLUTION INTRAVENOUS; SUBCUTANEOUS at 10:43

## 2021-01-01 RX ADMIN — Medication 500 UNITS: at 11:13

## 2021-01-01 RX ADMIN — ERYTHROPOIETIN 13000 UNITS: 20000 INJECTION, SOLUTION INTRAVENOUS; SUBCUTANEOUS at 11:27

## 2021-01-01 RX ADMIN — ONDANSETRON 4 MG: 2 INJECTION INTRAMUSCULAR; INTRAVENOUS at 12:12

## 2021-01-01 RX ADMIN — PROPOFOL 10 MG: 10 INJECTION, EMULSION INTRAVENOUS at 11:49

## 2021-01-01 RX ADMIN — ACETAMINOPHEN 1000 MG: 500 TABLET, FILM COATED ORAL at 14:14

## 2021-01-01 RX ADMIN — GLYCOPYRROLATE 0.1 MCG: 0.2 INJECTION INTRAMUSCULAR; INTRAVENOUS at 12:12

## 2021-01-01 RX ADMIN — ERYTHROPOIETIN 15000 UNITS: 20000 INJECTION, SOLUTION INTRAVENOUS; SUBCUTANEOUS at 10:55

## 2021-01-01 RX ADMIN — ERYTHROPOIETIN 12000 UNITS: 20000 INJECTION, SOLUTION INTRAVENOUS; SUBCUTANEOUS at 10:59

## 2021-01-01 RX ADMIN — Medication 500 UNITS: at 10:37

## 2021-01-01 RX ADMIN — LIDOCAINE HYDROCHLORIDE 50 MG: 20 INJECTION, SOLUTION INFILTRATION; PERINEURAL at 11:46

## 2021-01-01 RX ADMIN — PROPOFOL 10 MG: 10 INJECTION, EMULSION INTRAVENOUS at 11:57

## 2021-01-01 RX ADMIN — PROCHLORPERAZINE MALEATE 10 MG: 5 TABLET ORAL at 10:50

## 2021-01-01 RX ADMIN — Medication 10 ML: at 12:04

## 2021-01-01 RX ADMIN — SODIUM CHLORIDE, PRESERVATIVE FREE 10 ML: 5 INJECTION INTRAVENOUS at 10:32

## 2021-01-01 RX ADMIN — ERYTHROPOIETIN 15000 UNITS: 20000 INJECTION, SOLUTION INTRAVENOUS; SUBCUTANEOUS at 11:41

## 2021-01-01 RX ADMIN — ACETAMINOPHEN 650 MG: 325 TABLET, FILM COATED ORAL at 10:59

## 2021-01-01 RX ADMIN — SODIUM CHLORIDE, PRESERVATIVE FREE 10 ML: 5 INJECTION INTRAVENOUS at 10:52

## 2021-01-01 RX ADMIN — ERYTHROPOIETIN 18000 UNITS: 20000 INJECTION, SOLUTION INTRAVENOUS; SUBCUTANEOUS at 11:54

## 2021-01-01 RX ADMIN — SODIUM CHLORIDE, PRESERVATIVE FREE 10 ML: 5 INJECTION INTRAVENOUS at 12:55

## 2021-01-01 RX ADMIN — KETAMINE HYDROCHLORIDE 20 MG: 100 INJECTION INTRAMUSCULAR; INTRAVENOUS at 11:46

## 2021-01-01 RX ADMIN — SODIUM CHLORIDE 250 ML: 9 INJECTION, SOLUTION INTRAVENOUS at 10:48

## 2021-01-01 RX ADMIN — PROPOFOL 50 MG: 10 INJECTION, EMULSION INTRAVENOUS at 11:46

## 2021-01-01 RX ADMIN — FERRIC CARBOXYMALTOSE INJECTION 750 MG: 50 INJECTION, SOLUTION INTRAVENOUS at 11:01

## 2021-01-01 RX ADMIN — ERYTHROPOIETIN 13000 UNITS: 20000 INJECTION, SOLUTION INTRAVENOUS; SUBCUTANEOUS at 11:22

## 2021-01-01 RX ADMIN — SODIUM CHLORIDE, PRESERVATIVE FREE 10 ML: 5 INJECTION INTRAVENOUS at 11:13

## 2021-01-01 RX ADMIN — Medication 500 UNITS: at 11:41

## 2021-01-01 RX ADMIN — ERYTHROPOIETIN 16000 UNITS: 20000 INJECTION, SOLUTION INTRAVENOUS; SUBCUTANEOUS at 11:13

## 2021-01-01 RX ADMIN — SODIUM CHLORIDE, PRESERVATIVE FREE 10 ML: 5 INJECTION INTRAVENOUS at 11:54

## 2021-01-01 RX ADMIN — Medication 500 UNITS: at 12:55

## 2021-01-01 RX ADMIN — Medication 500 UNITS: at 10:10

## 2021-01-01 RX ADMIN — DIPHENHYDRAMINE HYDROCHLORIDE 25 MG: 25 CAPSULE ORAL at 07:54

## 2021-01-01 RX ADMIN — FERRIC CARBOXYMALTOSE INJECTION 750 MG: 50 INJECTION, SOLUTION INTRAVENOUS at 10:50

## 2021-01-01 RX ADMIN — Medication 500 UNITS: at 11:54

## 2021-01-01 RX ADMIN — SODIUM CHLORIDE, POTASSIUM CHLORIDE, SODIUM LACTATE AND CALCIUM CHLORIDE 9 ML/HR: 600; 310; 30; 20 INJECTION, SOLUTION INTRAVENOUS at 11:22

## 2021-01-01 RX ADMIN — PROPOFOL 10 MG: 10 INJECTION, EMULSION INTRAVENOUS at 11:53

## 2021-01-01 RX ADMIN — ERYTHROPOIETIN 12000 UNITS: 20000 INJECTION, SOLUTION INTRAVENOUS; SUBCUTANEOUS at 10:50

## 2021-01-01 RX ADMIN — Medication 500 UNITS: at 10:56

## 2021-01-01 RX ADMIN — SODIUM CHLORIDE, PRESERVATIVE FREE 10 ML: 5 INJECTION INTRAVENOUS at 10:56

## 2021-01-01 RX ADMIN — DIPHENHYDRAMINE HYDROCHLORIDE 25 MG: 25 CAPSULE ORAL at 07:51

## 2021-01-01 RX ADMIN — PROPOFOL 10 MG: 10 INJECTION, EMULSION INTRAVENOUS at 12:03

## 2021-01-01 RX ADMIN — ERYTHROPOIETIN 16000 UNITS: 20000 INJECTION, SOLUTION INTRAVENOUS; SUBCUTANEOUS at 11:40

## 2021-01-01 RX ADMIN — Medication 500 UNITS: at 10:52

## 2021-01-01 RX ADMIN — Medication 500 UNITS: at 14:43

## 2021-01-01 RX ADMIN — Medication 500 UNITS: at 10:32

## 2021-01-01 RX ADMIN — SODIUM CHLORIDE, PRESERVATIVE FREE 10 ML: 5 INJECTION INTRAVENOUS at 10:37

## 2021-01-01 RX ADMIN — SODIUM CHLORIDE, PRESERVATIVE FREE 10 ML: 5 INJECTION INTRAVENOUS at 10:10

## 2021-01-01 RX ADMIN — Medication 500 UNITS: at 10:24

## 2021-01-01 RX ADMIN — FUROSEMIDE 20 MG: 10 INJECTION, SOLUTION INTRAMUSCULAR; INTRAVENOUS at 10:58

## 2021-01-01 RX ADMIN — SODIUM CHLORIDE, PRESERVATIVE FREE 10 ML: 5 INJECTION INTRAVENOUS at 10:24

## 2021-01-01 RX ADMIN — ONDANSETRON 4 MG: 2 INJECTION INTRAMUSCULAR; INTRAVENOUS at 10:12

## 2021-01-01 RX ADMIN — ERYTHROPOIETIN 16000 UNITS: 20000 INJECTION, SOLUTION INTRAVENOUS; SUBCUTANEOUS at 11:41

## 2021-01-14 ENCOUNTER — INFUSION (OUTPATIENT)
Dept: ONCOLOGY | Facility: HOSPITAL | Age: 81
End: 2021-01-14

## 2021-01-14 VITALS
SYSTOLIC BLOOD PRESSURE: 131 MMHG | HEIGHT: 67 IN | OXYGEN SATURATION: 99 % | RESPIRATION RATE: 18 BRPM | WEIGHT: 150.6 LBS | TEMPERATURE: 97.7 F | BODY MASS INDEX: 23.64 KG/M2 | DIASTOLIC BLOOD PRESSURE: 75 MMHG | HEART RATE: 81 BPM

## 2021-01-14 DIAGNOSIS — D63.1 ANEMIA OF CHRONIC RENAL FAILURE, STAGE 4 (SEVERE) (HCC): ICD-10-CM

## 2021-01-14 DIAGNOSIS — N18.4 ANEMIA DUE TO STAGE 4 CHRONIC KIDNEY DISEASE (HCC): ICD-10-CM

## 2021-01-14 DIAGNOSIS — D63.1 ANEMIA DUE TO STAGE 4 CHRONIC KIDNEY DISEASE (HCC): ICD-10-CM

## 2021-01-14 DIAGNOSIS — E53.8 VITAMIN B 12 DEFICIENCY: ICD-10-CM

## 2021-01-14 DIAGNOSIS — D62 ANEMIA DUE TO ACUTE BLOOD LOSS: Primary | ICD-10-CM

## 2021-01-14 DIAGNOSIS — Z45.2 ENCOUNTER FOR FITTING AND ADJUSTMENT OF VASCULAR CATHETER: ICD-10-CM

## 2021-01-14 DIAGNOSIS — N18.4 ANEMIA OF CHRONIC RENAL FAILURE, STAGE 4 (SEVERE) (HCC): ICD-10-CM

## 2021-01-14 LAB
BASOPHILS # BLD AUTO: 0.02 10*3/MM3 (ref 0–0.2)
BASOPHILS NFR BLD AUTO: 0.4 % (ref 0–1.5)
DEPRECATED RDW RBC AUTO: 54.9 FL (ref 37–54)
EOSINOPHIL # BLD AUTO: 0.11 10*3/MM3 (ref 0–0.4)
EOSINOPHIL NFR BLD AUTO: 2 % (ref 0.3–6.2)
ERYTHROCYTE [DISTWIDTH] IN BLOOD BY AUTOMATED COUNT: 14.9 % (ref 12.3–15.4)
FERRITIN SERPL-MCNC: 211.6 NG/ML (ref 13–150)
HCT VFR BLD AUTO: 27.8 % (ref 34–46.6)
HGB BLD-MCNC: 8.8 G/DL (ref 12–15.9)
HGB RETIC QN AUTO: 34.4 PG (ref 29.8–36.1)
IMM GRANULOCYTES # BLD AUTO: 0.01 10*3/MM3 (ref 0–0.05)
IMM GRANULOCYTES NFR BLD AUTO: 0.2 % (ref 0–0.5)
IMM RETICS NFR: 9.9 % (ref 3–15.8)
IRON 24H UR-MRATE: 59 MCG/DL (ref 37–145)
IRON SATN MFR SERPL: 25 % (ref 20–50)
LYMPHOCYTES # BLD AUTO: 0.59 10*3/MM3 (ref 0.7–3.1)
LYMPHOCYTES NFR BLD AUTO: 10.6 % (ref 19.6–45.3)
MCH RBC QN AUTO: 31.4 PG (ref 26.6–33)
MCHC RBC AUTO-ENTMCNC: 31.7 G/DL (ref 31.5–35.7)
MCV RBC AUTO: 99.3 FL (ref 79–97)
MONOCYTES # BLD AUTO: 0.23 10*3/MM3 (ref 0.1–0.9)
MONOCYTES NFR BLD AUTO: 4.1 % (ref 5–12)
NEUTROPHILS NFR BLD AUTO: 4.6 10*3/MM3 (ref 1.7–7)
NEUTROPHILS NFR BLD AUTO: 82.7 % (ref 42.7–76)
NRBC BLD AUTO-RTO: 0 /100 WBC (ref 0–0.2)
PLATELET # BLD AUTO: 161 10*3/MM3 (ref 140–450)
PMV BLD AUTO: 9.1 FL (ref 6–12)
RBC # BLD AUTO: 2.8 10*6/MM3 (ref 3.77–5.28)
RETICS # AUTO: 0.04 10*6/MM3 (ref 0.02–0.13)
RETICS/RBC NFR AUTO: 1.5 % (ref 0.7–1.9)
TIBC SERPL-MCNC: 238 MCG/DL (ref 298–536)
TRANSFERRIN SERPL-MCNC: 160 MG/DL (ref 200–360)
WBC # BLD AUTO: 5.56 10*3/MM3 (ref 3.4–10.8)

## 2021-01-14 PROCEDURE — 36415 COLL VENOUS BLD VENIPUNCTURE: CPT

## 2021-01-14 PROCEDURE — 83540 ASSAY OF IRON: CPT | Performed by: INTERNAL MEDICINE

## 2021-01-14 PROCEDURE — 96372 THER/PROPH/DIAG INJ SC/IM: CPT

## 2021-01-14 PROCEDURE — 36591 DRAW BLOOD OFF VENOUS DEVICE: CPT

## 2021-01-14 PROCEDURE — 25010000003 HEPARIN LOCK FLUSH PER 10 UNITS: Performed by: INTERNAL MEDICINE

## 2021-01-14 PROCEDURE — 85025 COMPLETE CBC W/AUTO DIFF WBC: CPT | Performed by: INTERNAL MEDICINE

## 2021-01-14 PROCEDURE — 96523 IRRIG DRUG DELIVERY DEVICE: CPT

## 2021-01-14 PROCEDURE — 84466 ASSAY OF TRANSFERRIN: CPT | Performed by: INTERNAL MEDICINE

## 2021-01-14 PROCEDURE — 85046 RETICYTE/HGB CONCENTRATE: CPT | Performed by: INTERNAL MEDICINE

## 2021-01-14 PROCEDURE — 82728 ASSAY OF FERRITIN: CPT | Performed by: INTERNAL MEDICINE

## 2021-01-14 PROCEDURE — 25010000002 EPOETIN ALFA PER 1000 UNITS: Performed by: NURSE PRACTITIONER

## 2021-01-14 PROCEDURE — 25010000002 CYANOCOBALAMIN PER 1000 MCG: Performed by: NURSE PRACTITIONER

## 2021-01-14 RX ORDER — HEPARIN SODIUM (PORCINE) LOCK FLUSH IV SOLN 100 UNIT/ML 100 UNIT/ML
500 SOLUTION INTRAVENOUS AS NEEDED
Status: DISCONTINUED | OUTPATIENT
Start: 2021-01-14 | End: 2021-01-14 | Stop reason: HOSPADM

## 2021-01-14 RX ORDER — SODIUM CHLORIDE 0.9 % (FLUSH) 0.9 %
10 SYRINGE (ML) INJECTION AS NEEDED
Status: DISCONTINUED | OUTPATIENT
Start: 2021-01-14 | End: 2021-01-14 | Stop reason: HOSPADM

## 2021-01-14 RX ORDER — SODIUM CHLORIDE 0.9 % (FLUSH) 0.9 %
10 SYRINGE (ML) INJECTION AS NEEDED
Status: CANCELLED | OUTPATIENT
Start: 2021-01-14

## 2021-01-14 RX ORDER — HEPARIN SODIUM (PORCINE) LOCK FLUSH IV SOLN 100 UNIT/ML 100 UNIT/ML
500 SOLUTION INTRAVENOUS AS NEEDED
Status: CANCELLED | OUTPATIENT
Start: 2021-01-14

## 2021-01-14 RX ORDER — CYANOCOBALAMIN 1000 UG/ML
1000 INJECTION, SOLUTION INTRAMUSCULAR; SUBCUTANEOUS ONCE
Status: COMPLETED | OUTPATIENT
Start: 2021-01-14 | End: 2021-01-14

## 2021-01-14 RX ADMIN — SODIUM CHLORIDE, PRESERVATIVE FREE 10 ML: 5 INJECTION INTRAVENOUS at 10:08

## 2021-01-14 RX ADMIN — Medication 500 UNITS: at 10:08

## 2021-01-14 RX ADMIN — ERYTHROPOIETIN 10000 UNITS: 10000 INJECTION, SOLUTION INTRAVENOUS; SUBCUTANEOUS at 10:56

## 2021-01-14 RX ADMIN — CYANOCOBALAMIN 1000 MCG: 1000 INJECTION, SOLUTION INTRAMUSCULAR at 10:47

## 2021-01-14 NOTE — NURSING NOTE
Procrit given per protocol.  Pt denies new complaints.  Next appt reviewed and pt knows to call sooner with concerns.

## 2021-01-25 ENCOUNTER — TELEPHONE (OUTPATIENT)
Dept: SURGERY | Facility: CLINIC | Age: 81
End: 2021-01-25

## 2021-01-25 NOTE — TELEPHONE ENCOUNTER
PT WOULD LIKE A REFILL OF HER FAMOTIDINE 20MG BID SENT TO [facility].  PT #[phone]

## 2021-01-26 RX ORDER — FAMOTIDINE 40 MG/1
20 TABLET, FILM COATED ORAL 2 TIMES DAILY
Qty: 180 TABLET | Refills: 3 | Status: SHIPPED | OUTPATIENT
Start: 2021-01-26 | End: 2021-01-01

## 2021-01-28 ENCOUNTER — OFFICE VISIT (OUTPATIENT)
Dept: ONCOLOGY | Facility: CLINIC | Age: 81
End: 2021-01-28

## 2021-01-28 ENCOUNTER — LAB (OUTPATIENT)
Dept: OTHER | Facility: HOSPITAL | Age: 81
End: 2021-01-28

## 2021-01-28 ENCOUNTER — INFUSION (OUTPATIENT)
Dept: ONCOLOGY | Facility: HOSPITAL | Age: 81
End: 2021-01-28

## 2021-01-28 VITALS
SYSTOLIC BLOOD PRESSURE: 173 MMHG | TEMPERATURE: 97.8 F | HEART RATE: 74 BPM | BODY MASS INDEX: 23.7 KG/M2 | RESPIRATION RATE: 16 BRPM | HEIGHT: 67 IN | OXYGEN SATURATION: 96 % | DIASTOLIC BLOOD PRESSURE: 76 MMHG | WEIGHT: 151 LBS

## 2021-01-28 VITALS — SYSTOLIC BLOOD PRESSURE: 142 MMHG | DIASTOLIC BLOOD PRESSURE: 49 MMHG | HEART RATE: 56 BPM

## 2021-01-28 DIAGNOSIS — N18.4 ANEMIA OF CHRONIC RENAL FAILURE, STAGE 4 (SEVERE) (HCC): Primary | ICD-10-CM

## 2021-01-28 DIAGNOSIS — D63.1 ANEMIA OF CHRONIC RENAL FAILURE, STAGE 4 (SEVERE) (HCC): Primary | ICD-10-CM

## 2021-01-28 DIAGNOSIS — D62 ANEMIA DUE TO ACUTE BLOOD LOSS: Primary | ICD-10-CM

## 2021-01-28 DIAGNOSIS — D50.0 IRON DEFICIENCY ANEMIA DUE TO CHRONIC BLOOD LOSS: Primary | ICD-10-CM

## 2021-01-28 DIAGNOSIS — Z45.2 ENCOUNTER FOR FITTING AND ADJUSTMENT OF VASCULAR CATHETER: ICD-10-CM

## 2021-01-28 LAB
ABO GROUP BLD: NORMAL
BASOPHILS # BLD AUTO: 0.02 10*3/MM3 (ref 0–0.2)
BASOPHILS NFR BLD AUTO: 0.3 % (ref 0–1.5)
BLD GP AB SCN SERPL QL: NEGATIVE
DEPRECATED RDW RBC AUTO: 55.8 FL (ref 37–54)
EOSINOPHIL # BLD AUTO: 0.08 10*3/MM3 (ref 0–0.4)
EOSINOPHIL NFR BLD AUTO: 1.1 % (ref 0.3–6.2)
ERYTHROCYTE [DISTWIDTH] IN BLOOD BY AUTOMATED COUNT: 15.6 % (ref 12.3–15.4)
HCT VFR BLD AUTO: 24.5 % (ref 34–46.6)
HGB BLD-MCNC: 7.8 G/DL (ref 12–15.9)
IMM GRANULOCYTES # BLD AUTO: 0.02 10*3/MM3 (ref 0–0.05)
IMM GRANULOCYTES NFR BLD AUTO: 0.3 % (ref 0–0.5)
LYMPHOCYTES # BLD AUTO: 0.77 10*3/MM3 (ref 0.7–3.1)
LYMPHOCYTES NFR BLD AUTO: 10.9 % (ref 19.6–45.3)
MCH RBC QN AUTO: 31.1 PG (ref 26.6–33)
MCHC RBC AUTO-ENTMCNC: 31.8 G/DL (ref 31.5–35.7)
MCV RBC AUTO: 97.6 FL (ref 79–97)
MONOCYTES # BLD AUTO: 0.44 10*3/MM3 (ref 0.1–0.9)
MONOCYTES NFR BLD AUTO: 6.2 % (ref 5–12)
NEUTROPHILS NFR BLD AUTO: 5.72 10*3/MM3 (ref 1.7–7)
NEUTROPHILS NFR BLD AUTO: 81.2 % (ref 42.7–76)
NRBC BLD AUTO-RTO: 0 /100 WBC (ref 0–0.2)
PLATELET # BLD AUTO: 160 10*3/MM3 (ref 140–450)
PMV BLD AUTO: 9.8 FL (ref 6–12)
RBC # BLD AUTO: 2.51 10*6/MM3 (ref 3.77–5.28)
RH BLD: NEGATIVE
T&S EXPIRATION DATE: NORMAL
WBC # BLD AUTO: 7.05 10*3/MM3 (ref 3.4–10.8)

## 2021-01-28 PROCEDURE — 86901 BLOOD TYPING SEROLOGIC RH(D): CPT

## 2021-01-28 PROCEDURE — 86850 RBC ANTIBODY SCREEN: CPT

## 2021-01-28 PROCEDURE — 36415 COLL VENOUS BLD VENIPUNCTURE: CPT

## 2021-01-28 PROCEDURE — 96523 IRRIG DRUG DELIVERY DEVICE: CPT

## 2021-01-28 PROCEDURE — 96372 THER/PROPH/DIAG INJ SC/IM: CPT

## 2021-01-28 PROCEDURE — 99214 OFFICE O/P EST MOD 30 MIN: CPT | Performed by: INTERNAL MEDICINE

## 2021-01-28 PROCEDURE — 86900 BLOOD TYPING SEROLOGIC ABO: CPT

## 2021-01-28 PROCEDURE — 86923 COMPATIBILITY TEST ELECTRIC: CPT

## 2021-01-28 PROCEDURE — 85025 COMPLETE CBC W/AUTO DIFF WBC: CPT | Performed by: INTERNAL MEDICINE

## 2021-01-28 PROCEDURE — 25010000003 HEPARIN LOCK FLUSH PER 10 UNITS: Performed by: INTERNAL MEDICINE

## 2021-01-28 PROCEDURE — 25010000002 EPOETIN ALFA PER 1000 UNITS: Performed by: INTERNAL MEDICINE

## 2021-01-28 RX ORDER — SODIUM CHLORIDE 9 MG/ML
250 INJECTION, SOLUTION INTRAVENOUS AS NEEDED
Status: CANCELLED | OUTPATIENT
Start: 2021-01-30

## 2021-01-28 RX ORDER — ACETAMINOPHEN 325 MG/1
650 TABLET ORAL ONCE
Status: COMPLETED | OUTPATIENT
Start: 2021-01-28 | End: 2021-01-30

## 2021-01-28 RX ORDER — SODIUM CHLORIDE 0.9 % (FLUSH) 0.9 %
10 SYRINGE (ML) INJECTION AS NEEDED
Status: DISCONTINUED | OUTPATIENT
Start: 2021-01-28 | End: 2021-01-28 | Stop reason: HOSPADM

## 2021-01-28 RX ORDER — ACETAMINOPHEN 325 MG/1
650 TABLET ORAL ONCE
Status: CANCELLED | OUTPATIENT
Start: 2021-01-28 | End: 2021-01-28

## 2021-01-28 RX ORDER — HEPARIN SODIUM (PORCINE) LOCK FLUSH IV SOLN 100 UNIT/ML 100 UNIT/ML
500 SOLUTION INTRAVENOUS AS NEEDED
Status: DISCONTINUED | OUTPATIENT
Start: 2021-01-28 | End: 2021-01-28 | Stop reason: HOSPADM

## 2021-01-28 RX ORDER — DIPHENHYDRAMINE HCL 25 MG
25 CAPSULE ORAL ONCE
Status: CANCELLED | OUTPATIENT
Start: 2021-01-30 | End: 2021-01-28

## 2021-01-28 RX ORDER — HEPARIN SODIUM (PORCINE) LOCK FLUSH IV SOLN 100 UNIT/ML 100 UNIT/ML
500 SOLUTION INTRAVENOUS AS NEEDED
Status: CANCELLED | OUTPATIENT
Start: 2021-01-28

## 2021-01-28 RX ORDER — SODIUM CHLORIDE 0.9 % (FLUSH) 0.9 %
10 SYRINGE (ML) INJECTION AS NEEDED
Status: CANCELLED | OUTPATIENT
Start: 2021-01-28

## 2021-01-28 RX ADMIN — ERYTHROPOIETIN 10000 UNITS: 10000 INJECTION, SOLUTION INTRAVENOUS; SUBCUTANEOUS at 11:29

## 2021-01-28 RX ADMIN — Medication 500 UNITS: at 11:29

## 2021-01-28 RX ADMIN — SODIUM CHLORIDE, PRESERVATIVE FREE 10 ML: 5 INJECTION INTRAVENOUS at 11:29

## 2021-01-28 NOTE — PROGRESS NOTES
.     REASONS FOR FOLLOWUP: Anemia    HISTORY OF PRESENT ILLNESS:  The patient is a 80 y.o. year old female  who is here for follow-up with the above-mentioned history.     Has had dark stools lately.  Feels fine.  No chest pain or shortness of breath.    Past Medical History:   Diagnosis Date   • Anemia     Iron deficiency   • Anxiety    • Cardiomyopathy (CMS/HCC)     S/P pacemaker and defibrillator   • CHF (congestive heart failure) (CMS/HCC)    • Chronic renal failure, stage 4 (severe) (CMS/HCC)    • Colon polyp    • Coronary artery disease     pacemaker, defibrillator   • Depression    • Dizzy    • Gastroparesis    • GAVE (gastric antral vascular ectasia)    • GERD (gastroesophageal reflux disease)    • Gout    • Hemorrhoids    • Hiatal hernia    • History of Clostridium difficile colitis 2013   • History of kidney stones    • History of pancreatitis     2014   • History of skin cancer    • History of transfusion     MULTIPLE    • Hyperlipidemia    • Hypertension    • Hypothyroidism    • Left bundle branch block    • Mitral and aortic valve disease    • Mitral valve insufficiency    • Myoclonus     S/P Depakote   • Osteoarthritis    • Pancytopenia (CMS/HCC)    • Peripheral neuropathy    • Presence of cardiac pacemaker     AND DEFIBRILLATOR   • Pulmonary hypertension (CMS/HCC)    • SOB (shortness of breath) on exertion    • Spinal stenosis    • Stroke (CMS/HCC)      Past Surgical History:   Procedure Laterality Date   • APPENDECTOMY N/A    • ARTERIOVENOUS FISTULA/SHUNT SURGERY Right 2/18/2019    Procedure: RIGHT BASILIC FISTULA CREATION WITHOUT TRANSPOSITION;  Surgeon: Royer Dozier MD;  Location: [facility];  Service: Vascular   • ARTERIOVENOUS FISTULA/SHUNT SURGERY Right 5/31/2019    Procedure: RIGHT 2ND STAGE BASILIC VEIN CREATION;  Surgeon: Royer Dozier MD;  Location: [facility] OR;  Service: Vascular   • CARDIAC CATHETERIZATION Left 03/28/2006    Arterial catheter insertion, cath left  ventriculography, coronary angiography and left heart catheterization-Dr. Estevan Matamoros   • CARDIAC DEFIBRILLATOR PLACEMENT N/A 11/30/2007    Biventricular implantable cardioverter defibrillator-Dr. Leandro Huber   • CARDIAC ELECTROPHYSIOLOGY PROCEDURE N/A 10/28/2019    Procedure: GENERATOR CHANGE BI-V ICD  medtronic;  Surgeon: Leandro Huber MD;  Location: [facility] CATH INVASIVE LOCATION;  Service: Cardiology   • CATARACT EXTRACTION Bilateral 2011   • COLONOSCOPY N/A 2014    done at [facility]   • CYSTECTOMY N/A     Ovarian cystectomy   • ENDOSCOPY N/A 04/28/2006    EGD with biopsies. Paraesophageal hiatal hernia from 34 to 44 cm, antral gastritis-Dr. Nehemiah Beal   • ENDOSCOPY N/A 09/29/2004    Hiatal hernia, gastritis and an erosion of the pylorus-Dr. Yang Pavon   • ENDOSCOPY N/A 9/1/2019    Procedure: ESOPHAGOGASTRODUODENOSCOPY with APC;  Surgeon: Anuel Roach MD;  Location: [facility] ENDOSCOPY;  Service: Gastroenterology   • ENDOSCOPY N/A 1/28/2020    Procedure: ESOPHAGOGASTRODUODENOSCOPY with APC;  Surgeon: Anuel Roach MD;  Location[facility] ENDOSCOPY;  Service: Gastroenterology   • ENDOSCOPY N/A 4/21/2020    Procedure: ESOPHAGOGASTRODUODENOSCOPY WITH APC;  Surgeon: Anuel Roach MD;  [facility] ENDOSCOPY;  Service: Gastroenterology;  Laterality: N/A;  PRE- MELENA, GAVE  POST- ESOPHAGITIS, GAVE   • ENDOSCOPY N/A 11/24/2020    Procedure: ESOPHAGOGASTRODUODENOSCOPY;  Surgeon: Anuel Roach MD;  Location: [facility];  Service: Gastroenterology;  Laterality: N/A;  reflux esophagitis  Gastric Food Bezoar   • ENTEROSCOPY SMALL BOWEL N/A 10/30/2006    Small bowel enteroscopy with biopsies-Dr. Rory Sevilla   • FLEXIBLE SIGMOIDOSCOPY N/A 09/29/2004    Unsuccessful colonoscopy due to poop prep, a very tortuous sigmoid, bowel prep in the form of enema given and a barium enema was obtained-Dr. Yang Pavon   • FRACTURE SURGERY  2015    Broke big toe   •  HEMATOMA EVACUATION TRUNK N/A 02/07/2014    Pocket evacuation of hematoma at pacemaker site-Dr. Leandro Huber   • HEMORRHOIDECTOMY N/A 04/19/2004    Flexible sigmoidoscopy and stapled hemorrhoidectomy-Dr. Yang Pavon   • HYSTERECTOMY Bilateral 1977   • IMPLANTABLE CARDIOVERTER DEFIBRILLATOR LEAD REPLACEMENT/POCKET REVISION Left 3/28/2016    Procedure: AUTOMATIC IMPLANTABLE CARDIOVERTER DEFIBRILLATOR LEAD REPLACEMENT WITH LASER LEAD EXTRACTION;  Surgeon: Leandro Huber MD;  Location: [facility] OR 18/19;  Service:    • IMPLANTABLE CARDIOVERTER DEFIBRILLATOR LEAD REPLACEMENT/POCKET REVISION N/A 01/13/2014    Biventricular ICD replacement-Dr. Jillian Huber   • LAPAROSCOPY REPAIR HIATAL HERNIA N/A 06/27/2006    Laparoscopic paraesophageal hiatal hernia repair with Nissen fundoplication and cholecystectomy-Dr. Sean Yoon   • NATALIE GONZALEZ ([facility]) PROCEDURE     • NC INSJ TUNNELED CVC W/O SUBQ PORT/ AGE 3 YR/> Right 10/3/2017    Procedure: Right internal jugular port placement;  Surgeon: Tiffanie Couch MD;  Location: [facility] OR;  Service: General   • TOE SURGERY Left     SET BIG TOE   • TOTAL KNEE ARTHROPLASTY Left 08/2014   • VENOUS ACCESS DEVICE (PORT) INSERTION Right        MEDICATIONS    Current Outpatient Medications:   •  acetaminophen (TYLENOL) 325 MG tablet, Take 2 tablets by mouth Every 4 (Four) Hours As Needed for Mild Pain ., Disp: , Rfl:   •  aspirin (aspirin) 81 MG EC tablet, Take 81 mg by mouth Daily., Disp: , Rfl:   •  atorvastatin (LIPITOR) 20 MG tablet, Take 20 mg by mouth Daily., Disp: , Rfl:   •  calcitriol (ROCALTROL) 0.25 MCG capsule, , Disp: , Rfl:   •  calcitriol (ROCALTROL) 0.5 MCG capsule, Take 0.5 mcg by mouth Every Other Day. Alternate days with calcitriol 0.25mcg, Disp: , Rfl:   •  calcium carbonate (OS-RAYMOND) 600 MG tablet, Take 600 mg by mouth Daily., Disp: , Rfl:   •  carbidopa-levodopa ER (SINEMET CR)  MG per tablet, Take 1 tablet by mouth Every  Evening., Disp: , Rfl:   •  carvedilol (COREG) 12.5 MG tablet, Take 1 tablet by mouth Daily., Disp: 90 tablet, Rfl: 1  •  Cholecalciferol (VITAMIN D3) 5000 units capsule capsule, Take 5,000 Units by mouth Daily., Disp: , Rfl:   •  diazepam (VALIUM) 5 MG tablet, Take 5 mg by mouth Every Night., Disp: , Rfl:   •  DULoxetine (CYMBALTA) 30 MG capsule, Take 30 mg by mouth Daily., Disp: , Rfl:   •  erythromycin base (E-MYCIN) 250 MG tablet, Take 0.5 tablets by mouth 4 (Four) Times a Day., Disp: 180 tablet, Rfl: 3  •  famotidine (Pepcid) 40 MG tablet, Take 0.5 tablets by mouth 2 (Two) Times a Day., Disp: 180 tablet, Rfl: 3  •  febuxostat (ULORIC) 40 MG tablet, Take 40 mg by mouth Daily., Disp: , Rfl:   •  furosemide (LASIX) 40 MG tablet, Take 80 mg by mouth 3 (Three) Times a Day., Disp: , Rfl:   •  Gabapentin, Once-Daily, (GRALISE) 300 MG tablet, Take 300 mg by mouth Daily With Dinner. MAY REPEAT LATE EVENING IF NEEDED, Disp: , Rfl:   •  Glucosamine-Chondroit-Vit C-Mn (GLUCOSAMINE CHONDROITIN COMPLX) capsule, Take 1,500 mg by mouth Every Other Day., Disp: , Rfl:   •  HYDROcodone-acetaminophen (NORCO) 5-325 MG per tablet, Take 1 tablet by mouth Every 6 (Six) Hours As Needed for Severe Pain ., Disp: 12 tablet, Rfl: 0  •  levothyroxine (SYNTHROID, LEVOTHROID) 25 MCG tablet, Take 25 mcg by mouth Daily., Disp: , Rfl:   •  Magnesium 250 MG tablet, Take  by mouth Daily., Disp: , Rfl:   •  magnesium oxide 250 MG tablet, Take 250 mg by mouth Daily., Disp: , Rfl:   •  Multiple Vitamin (MULTI-DAY VITAMINS) tablet, Take 1 tablet by mouth Daily. HOLD FOR SURGERY, Disp: , Rfl:   •  MULTIPLE VITAMINS-IRON PO, Take  by mouth Daily., Disp: , Rfl:   •  polyethylene glycol (MIRALAX) powder, Take 17 g by mouth Daily As Needed., Disp: , Rfl:   •  psyllium (METAMUCIL) 0.52 g capsule, Take 0.52 g by mouth., Disp: , Rfl:   •  rotigotine (NEUPRO) 6 MG/24HR patch, Place 1 patch on the skin as directed by provider Daily., Disp: , Rfl:   •  urea  "(CARMOL) 20 % cream, , Disp: , Rfl:   •  Vitamin E 400 UNITS tablet, Take 400 Units by mouth Daily., Disp: , Rfl:   No current facility-administered medications for this visit.     Facility-Administered Medications Ordered in Other Visits:   •  acetaminophen (TYLENOL) tablet 650 mg, 650 mg, Oral, Once, Thuy Cruz APRN  •  diphenhydrAMINE (BENADRYL) capsule 25 mg, 25 mg, Oral, Once, Thuy Cruz APRN  •  heparin flush (porcine) 100 UNIT/ML injection 500 Units, 500 Units, Intravenous, PRN, Anuel Scott MD, 500 Units at 11/27/17 1347  •  heparin flush (porcine) 100 UNIT/ML injection 500 Units, 500 Units, Intravenous, PRN, Neno Merritt II, MD, 500 Units at 01/18/20 1359  •  heparin injection 500 Units, 500 Units, Intravenous, PRN, Neno Merritt II, MD, 500 Units at 01/28/21 1129  •  sodium chloride 0.9 % flush 10 mL, 10 mL, Intravenous, PRN, Anuel Scott MD, 10 mL at 11/27/17 1347  •  sodium chloride 0.9 % flush 10 mL, 10 mL, Intravenous, PRN, Neno Merritt II, MD, 10 mL at 01/18/20 1359  •  sodium chloride 0.9 % flush 10 mL, 10 mL, Intravenous, PRN, Neno Merritt II, MD, 10 mL at 01/28/21 1129  •  sodium chloride 0.9 % infusion 250 mL, 250 mL, Intravenous, PRN, Neno Merritt II, MD  •  sodium chloride 0.9 % infusion 250 mL, 250 mL, Intravenous, PRN, Thuy Cruz APRN    ALLERGIES:     Allergies   Allergen Reactions   • Chlorhexidine Rash and Itching     Patient states \"blue dye\" in chlorhexidine.  States the orange and clear are OK to use   • Codeine Unknown - Low Severity     HYPER, DOES NOT HELP PAIN       SOCIAL HISTORY:       Social History     Socioeconomic History   • Marital status:      Spouse name: Zackery   • Number of children: 5   • Years of education: 1 yr college   • Highest education level: Not on file   Occupational History     Employer: RETIRED   Tobacco Use   • Smoking status: Never Smoker   • Smokeless tobacco: Never Used   • Tobacco " "comment: caffeine use - coffee and soda   Substance and Sexual Activity   • Alcohol use: No   • Drug use: No   • Sexual activity: Defer   Lifestyle   • Physical activity     Days per week: 0 days     Minutes per session: 0 min   • Stress: Not on file         FAMILY HISTORY:  Family History   Problem Relation Age of Onset   • Coronary artery disease Other    • Dementia Other    • Kidney disease Other    • Arthritis Father    • Early death Father         Kidney failure   • Kidney disease Father    • Heart disease Mother    • Mental illness Mother         Dementia   • Malig Hyperthermia Neg Hx    • Colon cancer Neg Hx    • Colon polyps Neg Hx        REVIEW OF SYSTEMS:  Review of Systems   Constitutional: Negative for activity change.   HENT: Negative for nosebleeds and trouble swallowing.    Respiratory: Negative for shortness of breath and wheezing.    Cardiovascular: Negative for chest pain and palpitations.   Gastrointestinal: Negative for constipation, diarrhea and nausea.   Genitourinary: Negative for dysuria and hematuria.   Musculoskeletal: Negative for arthralgias and myalgias.   Skin: Negative for rash and wound.   Neurological: Negative for seizures and syncope.   Hematological: Negative for adenopathy. Does not bruise/bleed easily.   Psychiatric/Behavioral: Negative for confusion.            Vitals:    01/28/21 1030   BP: 173/76   Pulse: 74   Resp: 16   Temp: 97.8 °F (36.6 °C)   SpO2: 96%   Weight: 68.5 kg (151 lb)   Height: 170.2 cm (67.01\")   PainSc: 0-No pain     Current Status 1/28/2021   ECOG score 0        PHYSICAL EXAM:        CONSTITUTIONAL:  Vital signs reviewed.  No distress, looks comfortable.  EYES:  Conjunctiva and lids unremarkable.  PERRLA  EARS,NOSE,MOUTH,THROAT:  Ears and nose appear unremarkable.  Lips, teeth, gums appear unremarkable.  RESPIRATORY:  Normal respiratory effort.  Lungs clear to auscultation bilaterally.  CARDIOVASCULAR:  Normal S1, S2.  No murmurs rubs or gallops.  No " significant lower extremity edema.  GASTROINTESTINAL: Abdomen appears unremarkable.  Nontender.  No hepatomegaly.  No splenomegaly.  LYMPHATIC:  No cervical, supraclavicular, axillary lymphadenopathy.  SKIN:  Warm.  No rashes.  PSYCHIATRIC:  Normal judgment and insight.  Normal mood and affect.         WBC   Date/Time Value Ref Range Status   01/28/2021 10:29 AM 7.05 3.40 - 10.80 10*3/mm3 Final   10/04/2020 09:02 AM 8.97 4.5 - 11.0 10*3/uL Final     Hemoglobin   Date/Time Value Ref Range Status   01/28/2021 10:29 AM 7.8 (L) 12.0 - 15.9 g/dL Final   10/04/2020 09:02 AM 11.3 (L) 12.0 - 16.0 g/dL Final     Platelets   Date/Time Value Ref Range Status   01/28/2021 10:29  140 - 450 10*3/mm3 Final   10/04/2020 09:02  140 - 440 10*3/uL Final       Assessment/Plan   There are no diagnoses linked to this encounter.     *Anemia due to stage IV chronic kidney disease.    · Procrit if Hb <10.  Hb 7.8    *Admitted 8/29/2019-9/2/2019 due to Hb of 5.  Transfused 2 units.  Heme positive stools.  EGD revealed GAVE.  Laser photocoagulation  · She states Dr. Roach plans EGD every 3 months.    *Iron deficiency anemia.  · Does not respond to oral iron due to poor absorption.  · 2 doses Injectafer late August 2019 resulted in normal iron labs and improvement of Hb from 9.4 up to 11.1 on 9/26/2019.  · Hb down to 10.3, 10/3/2019.  · Iron labs normal 10/31/2019, ferritin 200, 21% saturation.  · Hb dropped to 7.8 on 11/26/2019, requiring transfusion.  · Hb dropped to 6.9 on 12/19/2019, requiring transfusion.  · Hb dropped to 6.5 on 2/12/2020, requiring transfusion  · Hb dropped to 7.8 on 2/18/2020, requiring transfusion.  · On 2/18/2020, ferritin 89, 13% iron saturation.  Gave 1 dose Injectafer.  · On 4/23/2020, ferritin 115, 5% saturation, give 2 doses Injectafer.  · On 6/18/2020, ferritin 461, 31% saturation, Hb 10.7.  · Hb 9.8.  On 8/20/2020, ferritin 345, 29% saturation.  No need for IV iron at this time.  · On  10/29/2020, Hb 8.6.  Ferritin 359, 21% saturation, normal reticulate hemoglobin  · On 11/12/2020, Hb 8.2, ferritin 249, 18% saturation  · Transfused per patient request because EGD planned 11/24/2020 and she reported she can only have the EGD if Hb >8.  · On 1/14/2021, ferritin 212, 25% saturation  · G AVE was not treated during last EGD on 11/24/2020 due to large gastric food bezoar/risk of emesis, aspiration.  Dr. Roach recommended rescheduling and holding any solid food the evening before.  · This has not been rescheduled.  Hb is dropping and now needs a transfusion.  We will try to schedule this for her    *Source of iron deficiency.  · Previously followed regularly with Dr. Earl Avelar.  · States she cannot have colonoscopies due to tortuous colon and therefore has virtual colonoscopies.  · She states she saw Dr. Avelar after the 6/27/2019 visit with me due to recent dark stools.  She states Dr. Avelar did a rectal exam which was heme negative and recommended no further evaluation.  · On 8/22/2019, I told her I suspect she is having occult GI bleeding considering she is needing IV iron frequently.  However, with new stroke mid May 2019 and the addition of Plavix to aspirin, risk may be too great to stop antiplatelet agents for further GI evaluation.  Patient and  agree.  · During late August 2019 hospitalization for GI bleed with Hb of 5, EGD through Dr. Roach revealed G AVE.  Laser photocoagulation.  · Subsequently, following with Dr. Roach.  He initially planned EGD every 3 months.  · However, on the 10/18/2019 office visit, he changed the plan to be return to see him mid February 2020 and stated he could retreat her GAVE if she develops a significant drop in her Hb.  · Dr. Roach stated on the 2/11/2020 office note last EGD could not be performed due to a large food bezoar.  He gave her erythromycin and reviewed dietary changes for gastroparesis in hopes of being able to repeat EGD  with APC.  · EGD with APC on 4/21/2020, Dr. Roach.    · She states she is maintaining follow-up with Dr. Roach.  · Last EGD was 6 months ago.  Black stools x2 weeks, with Hb drop.  She is going to call Dr. Roach to see about arranging another EGD.    *Macrocytosis.  B12 and folate unremarkable  MCV 97.6    *Reticulocytosis.  · On 8/22/2019, unremarkable: Direct [place], haptoglobin, bilirubin ( with normal range up to 214.  Therefore, likely not significant).    *Thrombocytopenia.  Intermittent since 9/19/2019.      *Circulating nucleated red blood cells.  Intermittent.  · Could consider a bone marrow biopsy at some point.  · I suspect this is occurring as her bone marrow is trying to increase production after bleeding episodes.  · Nucleated red blood cells seen on 10/31/2019 and again, 11/6/2019    *Stroke mid August 2019.  Presumed.  Could not have MRI due to pacemaker.  Plavix was added to aspirin.  · Patient states Plavix was stopped by other physicians sometime around mid 2020.  · She follows with neurology.  Defer to neurology if Plavix is needed.  · Remains on aspirin    *Increase in fatigue and headaches.  Seeing a headache specialist.  I do not think her increase in fatigue would be due to anemia since her Hb is relatively stable.    *B12 deficiency.  Has been on B12 injections monthly through our office.  · On 1/28/2021, patient states she feels terrible every time she gets a B12 injection including shivering.  · On 1/28/2021, we will try to stop B12 injections.  · If becomes B12 deficient in the future, try oral B12.    Plan  · Every 2-week CBC and Procrit if Hb <10  · (I offered weekly but she wants to maintain every 2 weeks)  · Iron labs and B12 level every 4 weeks   · MD CBC Procrit 2-1/2 months  · She maintains follow-up with Dr. Roach  · We will ask Dr. Roach's office to do an EGD  · 2 unit PRBC transfusion      · Could consider a bone marrow biopsy in the  future.    Male friend/family member assisted with history

## 2021-01-29 ENCOUNTER — TRANSCRIBE ORDERS (OUTPATIENT)
Dept: LAB | Facility: HOSPITAL | Age: 81
End: 2021-01-29

## 2021-01-29 ENCOUNTER — TELEPHONE (OUTPATIENT)
Dept: GASTROENTEROLOGY | Facility: CLINIC | Age: 81
End: 2021-01-29

## 2021-01-29 ENCOUNTER — PREP FOR SURGERY (OUTPATIENT)
Dept: OTHER | Facility: HOSPITAL | Age: 81
End: 2021-01-29

## 2021-01-29 DIAGNOSIS — K31.819 GAVE (GASTRIC ANTRAL VASCULAR ECTASIA): Primary | ICD-10-CM

## 2021-01-29 DIAGNOSIS — Z01.818 OTHER SPECIFIED PRE-OPERATIVE EXAMINATION: Primary | ICD-10-CM

## 2021-01-29 NOTE — TELEPHONE ENCOUNTER
CALL FROM PATIENT.  SCHEDULED AT [facility] ON 02/12/2021 AT 02/12/2021 AT 2:30PM - ARRIVE 11:30AM.  EMAILED INSTRUCTIONS [email or web address] TODAY.    COVID TEST AT [facility] ON 02/10/2021 AT 11:10AM.  NEED TO SELF QUARANTINE UNTIL AFTER PROCEDURE.  SHE UNDERSTANDS.

## 2021-01-29 NOTE — TELEPHONE ENCOUNTER
[facility] Sevilla, Anuel Munoz MD             K31.819 (ICD-10-CM) - GAVE (gastric antral vascular ectasia)   D50.0 (ICD-10-CM) - Iron deficiency anemia due to chronic blood loss   K31.84 (ICD-10-CM) - Gastroparesis   K92.1 (ICD-10-CM) - Melena     Scheduled at [facility] 02/12/2021.  Need order, please.  THANK YOU!!     She has appointment with you on 02/09/2021, will reschedule after her procedure on 02/12/2021.

## 2021-01-30 ENCOUNTER — INFUSION (OUTPATIENT)
Dept: ONCOLOGY | Facility: HOSPITAL | Age: 81
End: 2021-01-30

## 2021-01-30 VITALS
TEMPERATURE: 97.1 F | HEART RATE: 84 BPM | DIASTOLIC BLOOD PRESSURE: 61 MMHG | OXYGEN SATURATION: 100 % | SYSTOLIC BLOOD PRESSURE: 154 MMHG | RESPIRATION RATE: 16 BRPM

## 2021-01-30 DIAGNOSIS — D50.0 IRON DEFICIENCY ANEMIA DUE TO CHRONIC BLOOD LOSS: Primary | ICD-10-CM

## 2021-01-30 DIAGNOSIS — Z45.2 ENCOUNTER FOR FITTING AND ADJUSTMENT OF VASCULAR CATHETER: ICD-10-CM

## 2021-01-30 PROCEDURE — 86900 BLOOD TYPING SEROLOGIC ABO: CPT

## 2021-01-30 PROCEDURE — P9016 RBC LEUKOCYTES REDUCED: HCPCS

## 2021-01-30 PROCEDURE — 63710000001 DIPHENHYDRAMINE PER 50 MG: Performed by: INTERNAL MEDICINE

## 2021-01-30 PROCEDURE — 36430 TRANSFUSION BLD/BLD COMPNT: CPT

## 2021-01-30 PROCEDURE — 25010000003 HEPARIN LOCK FLUSH PER 10 UNITS: Performed by: INTERNAL MEDICINE

## 2021-01-30 RX ORDER — DIPHENHYDRAMINE HCL 25 MG
25 CAPSULE ORAL ONCE
Status: COMPLETED | OUTPATIENT
Start: 2021-01-30 | End: 2021-01-30

## 2021-01-30 RX ORDER — SODIUM CHLORIDE 0.9 % (FLUSH) 0.9 %
10 SYRINGE (ML) INJECTION AS NEEDED
Status: CANCELLED | OUTPATIENT
Start: 2021-01-30

## 2021-01-30 RX ORDER — HEPARIN SODIUM (PORCINE) LOCK FLUSH IV SOLN 100 UNIT/ML 100 UNIT/ML
500 SOLUTION INTRAVENOUS AS NEEDED
Status: DISCONTINUED | OUTPATIENT
Start: 2021-01-30 | End: 2021-01-30 | Stop reason: HOSPADM

## 2021-01-30 RX ORDER — SODIUM CHLORIDE 0.9 % (FLUSH) 0.9 %
10 SYRINGE (ML) INJECTION AS NEEDED
Status: DISCONTINUED | OUTPATIENT
Start: 2021-01-30 | End: 2021-01-30 | Stop reason: HOSPADM

## 2021-01-30 RX ORDER — SODIUM CHLORIDE 9 MG/ML
250 INJECTION, SOLUTION INTRAVENOUS AS NEEDED
Status: DISCONTINUED | OUTPATIENT
Start: 2021-01-30 | End: 2021-01-30 | Stop reason: HOSPADM

## 2021-01-30 RX ORDER — HEPARIN SODIUM (PORCINE) LOCK FLUSH IV SOLN 100 UNIT/ML 100 UNIT/ML
500 SOLUTION INTRAVENOUS AS NEEDED
Status: CANCELLED | OUTPATIENT
Start: 2021-01-30

## 2021-01-30 RX ADMIN — HEPARIN 500 UNITS: 100 SYRINGE at 13:13

## 2021-01-30 RX ADMIN — ACETAMINOPHEN 650 MG: 325 TABLET, FILM COATED ORAL at 08:10

## 2021-01-30 RX ADMIN — DIPHENHYDRAMINE HYDROCHLORIDE 25 MG: 25 CAPSULE ORAL at 08:09

## 2021-02-08 ENCOUNTER — TELEPHONE (OUTPATIENT)
Dept: CARDIOLOGY | Facility: CLINIC | Age: 81
End: 2021-02-08

## 2021-02-08 RX ORDER — CARVEDILOL 12.5 MG/1
12.5 TABLET ORAL 2 TIMES DAILY WITH MEALS
Qty: 60 TABLET | Refills: 5
Start: 2021-02-08 | End: 2022-01-01 | Stop reason: HOSPADM

## 2021-02-08 NOTE — TELEPHONE ENCOUNTER
Patient called to report her BP diastolic has been running 70-80's and it is usually 50 - 60.  Systolic running 120-130's.  HR mid 60's.  She is currently taking Carvedilol 12.5 mg bid.  I noticed on the medication list the dose is one daily.  Patient states her bottle does indicate twice daily dose. / LUCAS     Patient can be reached at [phone]

## 2021-02-08 NOTE — TELEPHONE ENCOUNTER
Called and spoke with patient blood pressure okay.  I added it her med list to reflect carvedilol twice daily which she confirm she is taking.  She is to call with any further questions or concerns.

## 2021-02-10 ENCOUNTER — LAB (OUTPATIENT)
Dept: LAB | Facility: HOSPITAL | Age: 81
End: 2021-02-10

## 2021-02-10 DIAGNOSIS — Z01.818 OTHER SPECIFIED PRE-OPERATIVE EXAMINATION: ICD-10-CM

## 2021-02-10 PROCEDURE — U0005 INFEC AGEN DETEC AMPLI PROBE: HCPCS

## 2021-02-10 PROCEDURE — U0004 COV-19 TEST NON-CDC HGH THRU: HCPCS

## 2021-02-10 PROCEDURE — C9803 HOPD COVID-19 SPEC COLLECT: HCPCS

## 2021-02-11 ENCOUNTER — APPOINTMENT (OUTPATIENT)
Dept: OTHER | Facility: HOSPITAL | Age: 81
End: 2021-02-11

## 2021-02-11 ENCOUNTER — APPOINTMENT (OUTPATIENT)
Dept: ONCOLOGY | Facility: HOSPITAL | Age: 81
End: 2021-02-11

## 2021-02-11 ENCOUNTER — ANESTHESIA EVENT (OUTPATIENT)
Dept: PERIOP | Facility: HOSPITAL | Age: 81
End: 2021-02-11

## 2021-02-11 LAB — SARS-COV-2 RNA RESP QL NAA+PROBE: NOT DETECTED

## 2021-02-12 ENCOUNTER — HOSPITAL ENCOUNTER (OUTPATIENT)
Facility: HOSPITAL | Age: 81
Setting detail: HOSPITAL OUTPATIENT SURGERY
Discharge: HOME | End: 2021-02-12
Attending: INTERNAL MEDICINE | Admitting: INTERNAL MEDICINE

## 2021-02-12 ENCOUNTER — ANESTHESIA (OUTPATIENT)
Dept: PERIOP | Facility: HOSPITAL | Age: 81
End: 2021-02-12

## 2021-02-12 VITALS
BODY MASS INDEX: 23.58 KG/M2 | RESPIRATION RATE: 20 BRPM | SYSTOLIC BLOOD PRESSURE: 191 MMHG | HEART RATE: 61 BPM | OXYGEN SATURATION: 97 % | TEMPERATURE: 97.8 F | WEIGHT: 150.6 LBS | DIASTOLIC BLOOD PRESSURE: 90 MMHG

## 2021-02-12 PROCEDURE — 43255 EGD CONTROL BLEEDING ANY: CPT | Performed by: INTERNAL MEDICINE

## 2021-02-12 PROCEDURE — 25010000002 ONDANSETRON PER 1 MG: Performed by: NURSE ANESTHETIST, CERTIFIED REGISTERED

## 2021-02-12 PROCEDURE — 25010000002 PROPOFOL 10 MG/ML EMULSION: Performed by: NURSE ANESTHETIST, CERTIFIED REGISTERED

## 2021-02-12 RX ORDER — SODIUM CHLORIDE 0.9 % (FLUSH) 0.9 %
10 SYRINGE (ML) INJECTION AS NEEDED
Status: DISCONTINUED | OUTPATIENT
Start: 2021-02-12 | End: 2021-02-12 | Stop reason: HOSPADM

## 2021-02-12 RX ORDER — SODIUM CHLORIDE, SODIUM LACTATE, POTASSIUM CHLORIDE, CALCIUM CHLORIDE 600; 310; 30; 20 MG/100ML; MG/100ML; MG/100ML; MG/100ML
9 INJECTION, SOLUTION INTRAVENOUS CONTINUOUS
Status: DISCONTINUED | OUTPATIENT
Start: 2021-02-12 | End: 2021-02-12 | Stop reason: HOSPADM

## 2021-02-12 RX ORDER — SODIUM CHLORIDE, SODIUM LACTATE, POTASSIUM CHLORIDE, CALCIUM CHLORIDE 600; 310; 30; 20 MG/100ML; MG/100ML; MG/100ML; MG/100ML
INJECTION, SOLUTION INTRAVENOUS CONTINUOUS PRN
Status: DISCONTINUED | OUTPATIENT
Start: 2021-02-12 | End: 2021-02-12 | Stop reason: SURG

## 2021-02-12 RX ORDER — SODIUM CHLORIDE 0.9 % (FLUSH) 0.9 %
10 SYRINGE (ML) INJECTION EVERY 12 HOURS SCHEDULED
Status: DISCONTINUED | OUTPATIENT
Start: 2021-02-12 | End: 2021-02-12 | Stop reason: HOSPADM

## 2021-02-12 RX ORDER — SODIUM CHLORIDE 9 MG/ML
40 INJECTION, SOLUTION INTRAVENOUS AS NEEDED
Status: DISCONTINUED | OUTPATIENT
Start: 2021-02-12 | End: 2021-02-12 | Stop reason: HOSPADM

## 2021-02-12 RX ORDER — LIDOCAINE HYDROCHLORIDE 20 MG/ML
INJECTION, SOLUTION INFILTRATION; PERINEURAL AS NEEDED
Status: DISCONTINUED | OUTPATIENT
Start: 2021-02-12 | End: 2021-02-12 | Stop reason: SURG

## 2021-02-12 RX ORDER — ONDANSETRON 2 MG/ML
4 INJECTION INTRAMUSCULAR; INTRAVENOUS ONCE
Status: COMPLETED | OUTPATIENT
Start: 2021-02-12 | End: 2021-02-12

## 2021-02-12 RX ORDER — PROPOFOL 10 MG/ML
VIAL (ML) INTRAVENOUS AS NEEDED
Status: DISCONTINUED | OUTPATIENT
Start: 2021-02-12 | End: 2021-02-12 | Stop reason: SURG

## 2021-02-12 RX ORDER — LIDOCAINE HYDROCHLORIDE 10 MG/ML
0.5 INJECTION, SOLUTION EPIDURAL; INFILTRATION; INTRACAUDAL; PERINEURAL ONCE AS NEEDED
Status: DISCONTINUED | OUTPATIENT
Start: 2021-02-12 | End: 2021-02-12 | Stop reason: HOSPADM

## 2021-02-12 RX ADMIN — SODIUM CHLORIDE, POTASSIUM CHLORIDE, SODIUM LACTATE AND CALCIUM CHLORIDE: 600; 310; 30; 20 INJECTION, SOLUTION INTRAVENOUS at 11:50

## 2021-02-12 RX ADMIN — PROPOFOL 10 MG: 10 INJECTION, EMULSION INTRAVENOUS at 13:03

## 2021-02-12 RX ADMIN — PROPOFOL 50 MG: 10 INJECTION, EMULSION INTRAVENOUS at 12:57

## 2021-02-12 RX ADMIN — PROPOFOL 10 MG: 10 INJECTION, EMULSION INTRAVENOUS at 13:02

## 2021-02-12 RX ADMIN — PROPOFOL 10 MG: 10 INJECTION, EMULSION INTRAVENOUS at 13:01

## 2021-02-12 RX ADMIN — LIDOCAINE HYDROCHLORIDE 80 MG: 20 INJECTION, SOLUTION INFILTRATION; PERINEURAL at 12:52

## 2021-02-12 RX ADMIN — PROPOFOL 30 MG: 10 INJECTION, EMULSION INTRAVENOUS at 12:59

## 2021-02-12 RX ADMIN — PROPOFOL 10 MG: 10 INJECTION, EMULSION INTRAVENOUS at 13:05

## 2021-02-12 RX ADMIN — ONDANSETRON 4 MG: 2 INJECTION, SOLUTION INTRAMUSCULAR; INTRAVENOUS at 13:37

## 2021-02-12 RX ADMIN — PROPOFOL 10 MG: 10 INJECTION, EMULSION INTRAVENOUS at 13:00

## 2021-02-12 RX ADMIN — PROPOFOL 10 MG: 10 INJECTION, EMULSION INTRAVENOUS at 13:08

## 2021-02-12 NOTE — ANESTHESIA POSTPROCEDURE EVALUATION
Patient: Charmaine Machuca    Procedure Summary     Date: 02/12/21 Room / Location: [facility] ENDOSCOPY 1 /  [facility] OR    Anesthesia Start: 1246 Anesthesia Stop: 1315    Procedure: ESOPHAGOGASTRODUODENOSCOPY (N/A Esophagus) Diagnosis:       GAVE (gastric antral vascular ectasia)      Reflux esophagitis      (GAVE (gastric antral vascular ectasia) [K31.819])    Surgeon: Anuel Roach MD Provider: Alexandre Luke CRNA    Anesthesia Type: MAC ASA Status: 3          Anesthesia Type: MAC    Vitals  Vitals Value Taken Time   /90 02/12/21 1354   Temp 97.8 °F (36.6 °C) 02/12/21 1330   Pulse 61 02/12/21 1354   Resp 20 02/12/21 1354   SpO2 97 % 02/12/21 1354           Post Anesthesia Care and Evaluation    Patient location during evaluation: PHASE II  Patient participation: complete - patient participated  Level of consciousness: awake  Pain score: 0  Pain management: adequate  Airway patency: patent  Anesthetic complications: No anesthetic complications  PONV Status: none  Cardiovascular status: acceptable  Respiratory status: acceptable  Hydration status: acceptable

## 2021-02-12 NOTE — OP NOTE
ESOPHAGOGASTRODUODENOSCOPY  Procedure Report    Patient Name:  Charmaine Machuca  YOB: 1940    Date of Surgery:  2/12/2021     Indications:  GAVE (gastric antral vascular ectasia) [K31.819]      Pre-op Diagnosis:   GAVE (gastric antral vascular ectasia) [K31.819]    Post-Op Diagnosis Codes:     * GAVE (gastric antral vascular ectasia) [K31.819]     * Reflux esophagitis [K21.00]         Procedure/CPT® Codes:      Procedure(s):  ESOPHAGOGASTRODUODENOSCOPY    Staff:  Surgeon(s):  Anuel Roach MD         Anesthesia: Monitored Anesthesia Care    Estimated Blood Loss: none    Specimens: None    Implants:    Nothing was implanted during the procedure      Description of Procedure: After having signed informed consent she was brought to the endoscopy suite and placed in the left lateral decubitus position, given her IV sedation and bite block was placed between her incisors. The scope was introduced in the oropharynx and advanced under direct visualization into the esophagus, through the distal esophagus. There was evidence of mild reflux esophagitis that was well-controlled. The scope was advanced to the stomach and retroflexed, revealing normal cardia and fundus. The scope was de-retroflexed and advanced to the antrum. There was moderate gastric antral vascular ectasias with some oozing at a couple of different spots. The scope was advanced through the pylorus and duodenal bulb, which was examined and normal, around the angle of the second and third portions of the duodenum which were normal as well. The scope was withdrawn back into the antrum. An APC probe was then passed and primed. Treatment to the ectasias started at the pylorus and extended along the rays of the vascular ectasias with excellent hemostasis throughout the whole process. The stomach was decompressed with suction. The scope was withdrawn. The gastroesophageal junction showed evidence of non-severe reflux that appear to  be well-controlled on present medicines. There was no residual gastric content. As the scope was taken from the patient the remainder of the esophageal mucosa was normal appearing. She tolerated the lengthy procedure well.           Findings: Normal Duodenum  Moderate GAVE-APC  Non-Severe Reflux Esophagitis     Complications: None    Recommendations: Follow up in Office      Anuel Roach MD     Date: 2/12/2021  Time: 13:18 EST

## 2021-02-12 NOTE — H&P
Patient Care Team:  Fannie Godfrey MD as PCP - General (Internal Medicine)  Anuel Scott MD as Consulting Physician (Hematology and Oncology)  Fannie Godfrey MD as Referring Physician (Internal Medicine)  Estevan Matamoros MD as Consulting Physician (Cardiology)  Parveen Abraham MD as Consulting Physician (Nephrology)  Ondina Haro MD as Consulting Physician (Neurology)  Estevan Oropeza MD as Consulting Physician (Hematology and Oncology)  Neno Merritt II, MD as Consulting Physician (Hematology and Oncology)  Anuel Roach MD as Consulting Physician (Gastroenterology)    CHIEF COMPLAINT: GAVE    HISTORY OF PRESENT ILLNESS:  History of ABLA from her GAVE was in for treatment and her gastroparesis had left food in the way    Past Medical History:   Diagnosis Date   • Anemia     Iron deficiency   • Anxiety    • Cardiomyopathy (CMS/HCC)     S/P pacemaker and defibrillator   • CHF (congestive heart failure) (CMS/HCC)    • Chronic renal failure, stage 4 (severe) (CMS/HCC)    • Colon polyp    • Coronary artery disease     pacemaker, defibrillator   • Depression    • Dizzy    • Gastroparesis    • GAVE (gastric antral vascular ectasia)    • GERD (gastroesophageal reflux disease)    • Gout    • Hemorrhoids    • Hiatal hernia    • History of Clostridium difficile colitis 2013   • History of kidney stones    • History of pancreatitis     2014   • History of skin cancer    • History of transfusion     MULTIPLE    • Hyperlipidemia    • Hypertension    • Hypothyroidism    • Left bundle branch block    • Mitral and aortic valve disease    • Mitral valve insufficiency    • Myoclonus     S/P Depakote   • Osteoarthritis    • Pancytopenia (CMS/HCC)    • Peripheral neuropathy    • Presence of cardiac pacemaker     AND DEFIBRILLATOR   • Pulmonary hypertension (CMS/HCC)    • SOB (shortness of breath) on exertion    • Spinal stenosis    • Stroke (CMS/HCC)     2019   • TIA (transient ischemic attack)       Past Surgical History:   Procedure Laterality Date   • APPENDECTOMY N/A    • ARTERIOVENOUS FISTULA/SHUNT SURGERY Right 2/18/2019    Procedure: RIGHT BASILIC FISTULA CREATION WITHOUT TRANSPOSITION;  Surgeon: Royer Dozier MD;  Location: [facility] MAIN OR;  Service: Vascular   • ARTERIOVENOUS FISTULA/SHUNT SURGERY Right 5/31/2019    Procedure: RIGHT 2ND STAGE BASILIC VEIN CREATION;  Surgeon: oRyer Dozier MD;  Location: [facility] MAIN OR;  Service: Vascular   • CARDIAC CATHETERIZATION Left 03/28/2006    Arterial catheter insertion, cath left ventriculography, coronary angiography and left heart catheterization-Dr. Estevan Matamoros   • CARDIAC DEFIBRILLATOR PLACEMENT N/A 11/30/2007    Biventricular implantable cardioverter defibrillator-Dr. Leandro Huber   • CARDIAC ELECTROPHYSIOLOGY PROCEDURE N/A 10/28/2019    Procedure: GENERATOR CHANGE BI-V ICD  medtronic;  Surgeon: Leandro Huber MD;  Location: [facility] CATH INVASIVE LOCATION;  Service: Cardiology   • CATARACT EXTRACTION Bilateral 2011   • COLONOSCOPY N/A 2014    done at [facility]   • CYSTECTOMY N/A     Ovarian cystectomy   • ENDOSCOPY N/A 04/28/2006    EGD with biopsies. Paraesophageal hiatal hernia from 34 to 44 cm, antral gastritis-Dr. Nehemiah Beal   • ENDOSCOPY N/A 09/29/2004    Hiatal hernia, gastritis and an erosion of the pylorus-Dr. Yang Pavon   • ENDOSCOPY N/A 9/1/2019    Procedure: ESOPHAGOGASTRODUODENOSCOPY with APC;  Surgeon: Anuel Roach MD;  Location: [facility] ENDOSCOPY;  Service: Gastroenterology   • ENDOSCOPY N/A 1/28/2020    Procedure: ESOPHAGOGASTRODUODENOSCOPY with APC;  Surgeon: Anuel Roach MD;  [facility];  Service: Gastroenterology   • ENDOSCOPY N/A 4/21/2020    Procedure: ESOPHAGOGASTRODUODENOSCOPY WITH APC;  Surgeon: Anuel Roach MD;  Location: [facility] ENDOSCOPY;  Service: Gastroenterology;  Laterality: N/A;  PRE- MELENA, GAVE  POST- ESOPHAGITIS, GAVE   • ENDOSCOPY N/A  11/24/2020    Procedure: ESOPHAGOGASTRODUODENOSCOPY;  Surgeon: Anuel Roach MD;  Location: [facility] OR;  Service: Gastroenterology;  Laterality: N/A;  reflux esophagitis  Gastric Food Bezoar   • ENTEROSCOPY SMALL BOWEL N/A 10/30/2006    Small bowel enteroscopy with biopsies-Dr. Rory Sevilla   • FLEXIBLE SIGMOIDOSCOPY N/A 09/29/2004    Unsuccessful colonoscopy due to poop prep, a very tortuous sigmoid, bowel prep in the form of enema given and a barium enema was obtained-Dr. Yang Pavon   • FRACTURE SURGERY  2015    Broke big toe   • HEMATOMA EVACUATION TRUNK N/A 02/07/2014    Pocket evacuation of hematoma at pacemaker site-Dr. Leandro Huber   • HEMORRHOIDECTOMY N/A 04/19/2004    Flexible sigmoidoscopy and stapled hemorrhoidectomy-Dr. Yang Pavon   • HYSTERECTOMY Bilateral 1977   • IMPLANTABLE CARDIOVERTER DEFIBRILLATOR LEAD REPLACEMENT/POCKET REVISION Left 3/28/2016    Procedure: AUTOMATIC IMPLANTABLE CARDIOVERTER DEFIBRILLATOR LEAD REPLACEMENT WITH LASER LEAD EXTRACTION;  Surgeon: Leandro Huber MD;  Location: [facility] OR 18/19;  Service:    • IMPLANTABLE CARDIOVERTER DEFIBRILLATOR LEAD REPLACEMENT/POCKET REVISION N/A 01/13/2014    Biventricular ICD replacement-Dr. Jillian Huber   • LAPAROSCOPY REPAIR HIATAL HERNIA N/A 06/27/2006    Laparoscopic paraesophageal hiatal hernia repair with Nissen fundoplication and cholecystectomy-Dr. Sean Yoon   • NATALIE GONZALEZ ([facility]) PROCEDURE     • NE INSJ TUNNELED CVC W/O SUBQ PORT/ AGE 3 YR/> Right 10/3/2017    Procedure: Right internal jugular port placement;  Surgeon: Tiffanie Couch MD;  Location: [facility] OR;  Service: General   • TOE SURGERY Left     SET BIG TOE   • TOTAL KNEE ARTHROPLASTY Left 08/2014   • VENOUS ACCESS DEVICE (PORT) INSERTION Right      Family History   Problem Relation Age of Onset   • Coronary artery disease Other    • Dementia Other    • Kidney disease Other    • Arthritis Father    • Early death  Father         Kidney failure   • Kidney disease Father    • Heart disease Mother    • Mental illness Mother         Dementia   • Malig Hyperthermia Neg Hx    • Colon cancer Neg Hx    • Colon polyps Neg Hx      Social History     Tobacco Use   • Smoking status: Never Smoker   • Smokeless tobacco: Never Used   • Tobacco comment: caffeine use - coffee and soda   Substance Use Topics   • Alcohol use: No   • Drug use: No     Medications Prior to Admission   Medication Sig Dispense Refill Last Dose   • atorvastatin (LIPITOR) 20 MG tablet Take 20 mg by mouth Daily.   2/11/2021 at Unknown time   • calcitriol (ROCALTROL) 0.25 MCG capsule    2/11/2021 at Unknown time   • calcitriol (ROCALTROL) 0.5 MCG capsule Take 0.5 mcg by mouth Every Other Day. Alternate days with calcitriol 0.25mcg   2/11/2021 at Unknown time   • calcium carbonate (OS-RAYMOND) 600 MG tablet Take 600 mg by mouth Daily.   2/11/2021 at Unknown time   • carbidopa-levodopa ER (SINEMET CR)  MG per tablet Take 1 tablet by mouth Every Evening.   2/11/2021 at Unknown time   • carvedilol (COREG) 12.5 MG tablet Take 1 tablet by mouth 2 (Two) Times a Day With Meals. 60 tablet 5 2/11/2021 at Unknown time   • Cholecalciferol (VITAMIN D3) 5000 units capsule capsule Take 5,000 Units by mouth Daily.   2/11/2021 at Unknown time   • DULoxetine (CYMBALTA) 30 MG capsule Take 30 mg by mouth Daily.   2/11/2021 at Unknown time   • erythromycin base (E-MYCIN) 250 MG tablet Take 0.5 tablets by mouth 4 (Four) Times a Day. 180 tablet 3 2/12/2021 at Unknown time   • famotidine (Pepcid) 40 MG tablet Take 0.5 tablets by mouth 2 (Two) Times a Day. 180 tablet 3 2/11/2021 at Unknown time   • furosemide (LASIX) 40 MG tablet Take 80 mg by mouth 3 (Three) Times a Day.   2/12/2021 at Unknown time   • Gabapentin, Once-Daily, (GRALISE) 300 MG tablet Take 300 mg by mouth Daily With Dinner. MAY REPEAT LATE EVENING IF NEEDED   2/11/2021 at Unknown time   • levothyroxine (SYNTHROID, LEVOTHROID)  25 MCG tablet Take 25 mcg by mouth Daily.   2/12/2021 at Unknown time   • Magnesium 250 MG tablet Take  by mouth Daily.   2/11/2021 at Unknown time   • Multiple Vitamin (MULTI-DAY VITAMINS) tablet Take 1 tablet by mouth Daily. HOLD FOR SURGERY   2/11/2021 at Unknown time   • MULTIPLE VITAMINS-IRON PO Take  by mouth Daily.   2/11/2021 at Unknown time   • polyethylene glycol (MIRALAX) powder Take 17 g by mouth Daily As Needed.   2/11/2021 at Unknown time   • psyllium (METAMUCIL) 0.52 g capsule Take 0.52 g by mouth.   2/11/2021 at Unknown time   • rotigotine (NEUPRO) 6 MG/24HR patch Place 1 patch on the skin as directed by provider Daily.   2/11/2021 at Unknown time   • Vitamin E 400 UNITS tablet Take 400 Units by mouth Daily.   2/11/2021 at Unknown time   • acetaminophen (TYLENOL) 325 MG tablet Take 2 tablets by mouth Every 4 (Four) Hours As Needed for Mild Pain .   2/11/2021   • aspirin (aspirin) 81 MG EC tablet Take 81 mg by mouth Daily.   2/5/2021   • diazepam (VALIUM) 5 MG tablet Take 5 mg by mouth Every Night.   1/29/2021   • febuxostat (ULORIC) 40 MG tablet Take 40 mg by mouth Daily.   More than a month at Unknown time   • Glucosamine-Chondroit-Vit C-Mn (GLUCOSAMINE CHONDROITIN COMPLX) capsule Take 1,500 mg by mouth Every Other Day.   2/11/2021   • HYDROcodone-acetaminophen (NORCO) 5-325 MG per tablet Take 1 tablet by mouth Every 6 (Six) Hours As Needed for Severe Pain . 12 tablet 0 More than a month at Unknown time   • magnesium oxide 250 MG tablet Take 250 mg by mouth Daily.   2/11/2021   • urea (CARMOL) 20 % cream         Allergies:  Chlorhexidine and Codeine    REVIEW OF SYSTEMS:  Please see the above history of present illness for pertinent positives and negatives.  The remainder of the patient's systems have been reviewed and are negative.     Vital Signs  Temp:  [98.2 °F (36.8 °C)] 98.2 °F (36.8 °C)  Heart Rate:  [65] 65  Resp:  [15] 15  BP: (186)/(67) 186/67    Flowsheet Rows      First Filed Value    Admission Height  --   Admission Weight  68.3 kg (150 lb 9.6 oz) Documented at 02/12/2021 1200           Physical Exam:  Physical Exam   Constitutional: Patient appears well-developed and well-nourished and in no acute distress   HEENT:   Head: Normocephalic and atraumatic.   Eyes:  Pupils are equal, round, and reactive to light. EOM are intact. Sclerae are anicteric and non-injected.  Mouth and Throat: Patient has moist mucous membranes. Oropharynx is clear of any erythema or exudate.     Neck: Neck supple. No JVD present. No thyromegaly present. No lymphadenopathy present.  Cardiovascular: Regular rate, regular rhythm, S1 normal and S2 normal.  Exam reveals no gallop and no friction rub.  No murmur heard.  Pulmonary/Chest: Lungs are clear to auscultation bilaterally. No respiratory distress. No wheezes. No rhonchi. No rales.   Abdominal: Soft. Bowel sounds are normal. No distension and no mass. There is no hepatosplenomegaly. There is no tenderness.   Musculoskeletal: Normal Muscle tone  Extremities: No edema. Pulses are palpable in all 4 extremities.  Neurological: Patient is alert and oriented to person, place, and time. Cranial nerves II-XII are grossly intact with no focal deficits.  Skin: Skin is warm. No rash noted. Nails show no clubbing.  No cyanosis or erythema.    Debilities/Disabilities Identified: None  Emotional Behavior: Appropriate     Results Review:    I reviewed the patient's new clinical results.  Lab Results (most recent)     None          Imaging Results (Most Recent)     None        reviewed    ECG/EMG Results (most recent)     None        reviewed    Assessment/Plan   GAVE/  EGD w APC      I discussed the patients findings and my recommendations with patient.     Anuel Roach MD  02/12/21  13:15 EST    Time: 10 min prior to procedure.

## 2021-02-12 NOTE — BRIEF OP NOTE
ESOPHAGOGASTRODUODENOSCOPY  Progress Note    Charmaine Machuca  2/12/2021    Pre-op Diagnosis:   GAVE (gastric antral vascular ectasia) [K31.819]       Post-Op Diagnosis Codes:     * GAVE (gastric antral vascular ectasia) [K31.819]     * Reflux esophagitis [K21.00]    Procedure/CPT® Codes:        Procedure(s):  ESOPHAGOGASTRODUODENOSCOPY    Surgeon(s):  Anuel Roach MD    Anesthesia: Monitored Anesthesia Care    Staff:   Circulator: Tierney Fuentes RN  Scrub Person: Puja Smith         Estimated Blood Loss: none    Urine Voided: * No values recorded between 2/12/2021 12:46 PM and 2/12/2021  1:17 PM *    Specimens:                None          Drains: * No LDAs found *    Findings: Normal Duodenum  Moderate GAVE-APC  Non-Severe Reflux Esophagitis    Complications: None          Anuel Roach MD     Date: 2/12/2021  Time: 13:17 EST

## 2021-02-12 NOTE — ANESTHESIA PREPROCEDURE EVALUATION
Anesthesia Evaluation     Patient summary reviewed and Nursing notes reviewed   no history of anesthetic complications:  NPO Solid Status: > 8 hours  NPO Liquid Status: > 4 hours           Airway   Mallampati: II  TM distance: >3 FB  Neck ROM: full  No difficulty expected  Dental - normal exam     Pulmonary - negative pulmonary ROS and normal exam    breath sounds clear to auscultation  Cardiovascular - normal exam  Exercise tolerance: poor (<4 METS) (Poor balance)    Rhythm: regular  Rate: normal    (+) pacemaker ICD, pacemaker interrogated <1 month ago, hypertension well controlled, dysrhythmias (LBBB), CHF ,     ROS comment: Cardiomyopathy  Patient Information    Patient Name   Charmaine Machuca MRN   [id number] Sex   Female  (Age)   1940 (81 y.o.)  PACS Images       Show images for Adult Transthoracic Echo Complete W/ Cont if Necessary Per Protocol   Sedation Narrator Report      Sedation Narrator Report    Interpretation Summary    · Left ventricular systolic function is normal. Calculated EF = 57%. Normal left ventricular cavity size and wall thickness noted. All left ventricular wall segments contract normally. Left ventricular diastolic function was indeterminate.  · Left atrial cavity size is mildly dilated.        Neuro/Psych  (+) TIA (one year ago), CVA (1.5 yrs ago with only slight droop of left mouth.), dizziness/light headedness (frequent), numbness (neuropathy ends of fingers and in feet),       ROS Comment: Fall one year ago, hit head-no surgery needed, observed.  GI/Hepatic/Renal/Endo    (+)  GERD,  renal disease (stage 4) CRI, thyroid problem hypothyroidism    ROS Comment: Adrenal insufficiency    Musculoskeletal     (+) back pain, chronic pain, neck pain,   Abdominal  - normal exam   Substance History - negative use     OB/GYN negative ob/gyn ROS         Other   arthritis (hands and feet), blood dyscrasia anemia,                   Anesthesia Plan    ASA 3     MAC     intravenous  induction     Anesthetic plan, all risks, benefits, and alternatives have been provided, discussed and informed consent has been obtained with: patient.

## 2021-02-15 ENCOUNTER — LAB (OUTPATIENT)
Dept: OTHER | Facility: HOSPITAL | Age: 81
End: 2021-02-15

## 2021-02-15 ENCOUNTER — INFUSION (OUTPATIENT)
Dept: ONCOLOGY | Facility: HOSPITAL | Age: 81
End: 2021-02-15

## 2021-02-15 VITALS
BODY MASS INDEX: 24.08 KG/M2 | HEIGHT: 66 IN | DIASTOLIC BLOOD PRESSURE: 56 MMHG | RESPIRATION RATE: 18 BRPM | WEIGHT: 149.8 LBS | OXYGEN SATURATION: 100 % | HEART RATE: 70 BPM | SYSTOLIC BLOOD PRESSURE: 163 MMHG | TEMPERATURE: 97.5 F

## 2021-02-15 DIAGNOSIS — D50.0 IRON DEFICIENCY ANEMIA DUE TO CHRONIC BLOOD LOSS: ICD-10-CM

## 2021-02-15 DIAGNOSIS — D63.1 ANEMIA OF CHRONIC RENAL FAILURE, STAGE 4 (SEVERE) (HCC): Primary | ICD-10-CM

## 2021-02-15 DIAGNOSIS — E53.8 VITAMIN B 12 DEFICIENCY: ICD-10-CM

## 2021-02-15 DIAGNOSIS — N18.4 ANEMIA OF CHRONIC RENAL FAILURE, STAGE 4 (SEVERE) (HCC): Primary | ICD-10-CM

## 2021-02-15 DIAGNOSIS — T45.4X5D ADVERSE EFFECT OF IRON, SUBSEQUENT ENCOUNTER: ICD-10-CM

## 2021-02-15 LAB
BASOPHILS # BLD AUTO: 0.02 10*3/MM3 (ref 0–0.2)
BASOPHILS NFR BLD AUTO: 0.3 % (ref 0–1.5)
DEPRECATED RDW RBC AUTO: 50.9 FL (ref 37–54)
EOSINOPHIL # BLD AUTO: 0.11 10*3/MM3 (ref 0–0.4)
EOSINOPHIL NFR BLD AUTO: 1.6 % (ref 0.3–6.2)
ERYTHROCYTE [DISTWIDTH] IN BLOOD BY AUTOMATED COUNT: 14.6 % (ref 12.3–15.4)
HCT VFR BLD AUTO: 28.7 % (ref 34–46.6)
HGB BLD-MCNC: 9.5 G/DL (ref 12–15.9)
IMM GRANULOCYTES # BLD AUTO: 0.05 10*3/MM3 (ref 0–0.05)
IMM GRANULOCYTES NFR BLD AUTO: 0.7 % (ref 0–0.5)
LYMPHOCYTES # BLD AUTO: 0.82 10*3/MM3 (ref 0.7–3.1)
LYMPHOCYTES NFR BLD AUTO: 12 % (ref 19.6–45.3)
MCH RBC QN AUTO: 31.4 PG (ref 26.6–33)
MCHC RBC AUTO-ENTMCNC: 33.1 G/DL (ref 31.5–35.7)
MCV RBC AUTO: 94.7 FL (ref 79–97)
MONOCYTES # BLD AUTO: 0.59 10*3/MM3 (ref 0.1–0.9)
MONOCYTES NFR BLD AUTO: 8.6 % (ref 5–12)
NEUTROPHILS NFR BLD AUTO: 5.26 10*3/MM3 (ref 1.7–7)
NEUTROPHILS NFR BLD AUTO: 76.8 % (ref 42.7–76)
NRBC BLD AUTO-RTO: 0 /100 WBC (ref 0–0.2)
PLATELET # BLD AUTO: 175 10*3/MM3 (ref 140–450)
PMV BLD AUTO: 10.1 FL (ref 6–12)
RBC # BLD AUTO: 3.03 10*6/MM3 (ref 3.77–5.28)
WBC # BLD AUTO: 6.85 10*3/MM3 (ref 3.4–10.8)

## 2021-02-15 PROCEDURE — 85025 COMPLETE CBC W/AUTO DIFF WBC: CPT | Performed by: INTERNAL MEDICINE

## 2021-02-15 PROCEDURE — 96372 THER/PROPH/DIAG INJ SC/IM: CPT

## 2021-02-15 PROCEDURE — 36415 COLL VENOUS BLD VENIPUNCTURE: CPT

## 2021-02-15 PROCEDURE — 25010000002 EPOETIN ALFA PER 1000 UNITS: Performed by: NURSE PRACTITIONER

## 2021-02-15 RX ADMIN — ERYTHROPOIETIN 12000 UNITS: 20000 INJECTION, SOLUTION INTRAVENOUS; SUBCUTANEOUS at 11:10

## 2021-02-23 NOTE — TELEPHONE ENCOUNTER
Call from patient.  She states every time she eats she gets nauseated.  Any suggestions.  Had EGD couple weeks ago.  Please advise.

## 2021-02-24 NOTE — TELEPHONE ENCOUNTER
We really need to get her on sucralfate qid right before meals and at bedtime (30 min after her other medications).  Also low dose once daily omeprazole 20mg.  And I will send in zofran insolvable tabs for nausea.

## 2021-02-25 NOTE — NURSING NOTE
Lab Results   Component Value Date    WBC 5.76 02/25/2021    HGB 9.6 (L) 02/25/2021    HCT 30.9 (L) 02/25/2021    .0 (H) 02/25/2021     02/25/2021     Procrit given per protocol.  Pt denies new complaints.  Next appt reviewed and pt instructed to call sooner with concerns.

## 2021-03-11 NOTE — NURSING NOTE
Lab Results   Component Value Date    WBC 5.56 03/11/2021    HGB 10.3 (L) 03/11/2021    HCT 32.1 (L) 03/11/2021    MCV 97.0 03/11/2021     03/11/2021     Procrit not indicated for Hgb 10.3.  Pt denies new complaints and voices feeling better.  Next appt reviewed and pt instructed to call sooner with concerns.

## 2021-03-16 NOTE — PROGRESS NOTES
PATIENT INFORMATION  Charmaine Machuca       - 1940    CHIEF COMPLAINT  Chief Complaint   Patient presents with   • Nausea     20 Minutes after eating patient states she gets very nauseated. Also here for follow up from recent EGD.        HISTORY OF PRESENT ILLNESS  Here for follow up of her GAVE her Gastroparesis and her CKD IV    Her HGB is up to 10; and we reviewed her last EGD and how to treat her gastroparesis. And does knkow how to use liquids but she doesn't like it and avoid ensures and boost      REVIEWED PERTINENT RESULTS/ LABS  Lab Results   Component Value Date    CASEREPORT  2018     Surgical Pathology Report                         Case: [id number]                                  Authorizing Provider:  Anuel Scott MD       Collected:           2018 01:45 PM          Ordering Location:     [facility]  Received:            2018 02:12 PM                                 CT                                                                           Pathologist:           Alexandre Villafana MD                                                                           Specimens:   1) - Iliac Crest, Right - Biopsy, Right iliac marrow                                                2) - Iliac Crest, Right - Aspirate, Bone marrow aspirate received, 5 smear prepared                 and slides sent to [facility] .                                                            3) - Iliac Crest, Right - Aspirate, Received 2 greeb top tubes, 1 purple top tube,                  sent all tubes to [facility] for comprehensive evaluation                         FINALDX  2018     1.  RIGHT ILIAC MARROW BIOPSY:    BONE WITH CELLULAR MARROW.    COMMENT:  Metastatic carcinoma and granuloma are not identified.  A paraffin block will be forwarded to [facility] for  comprehensive hematopathology analysis. The summary report from [facility] will represent the final diagnosis.      2&3: RIGHT ILIAC ASPIRATE, SMEARS, GREEN TOPPED TUBES, PURPLE TOP TUBE.:   FORWARDED TO [facility] FOR ADDITIONAL STUDIES INCLUDING FLOW CYTOMETRY.     CMK/brb     CPT CODES:  1.  18716, 37063        Lab Results   Component Value Date    HGB 10.3 (L) 03/11/2021    MCV 97.0 03/11/2021     03/11/2021    ALT <5 02/25/2021    AST 15 02/25/2021    HGBA1C 5.1 10/03/2020    INR 1.0 08/06/2020    TRIG 162 (H) 10/29/2020    FERRITIN 138.00 02/25/2021    IRON 45 02/25/2021    TIBC 213 (L) 02/25/2021      No results found.    REVIEW OF SYSTEMS  Review of Systems      ACTIVE PROBLEMS  Patient Active Problem List    Diagnosis    • Iron deficiency anemia due to chronic blood loss [D50.0]    • Melena [K92.1]    • Vitamin B 12 deficiency [E53.8]    • Iron adverse reaction [T45.4X5A]    • Absolute anemia [D64.9]    • Chronic kidney disease, stage 4 (severe) (CMS/HCC) [N18.4]    • Delayed gastric emptying [K30]    • Traumatic subdural hematoma without loss of consciousness (CMS/HCC) [S06.5X0A]    • Orbital fracture, closed, initial encounter (CMS/HCC) [S02.85XA]    • Subdural hematoma, post-traumatic (CMS/HCC) [S06.5X9A]    • Subarachnoid hemorrhage (CMS/HCC) [I60.9]    • Fall [W19.XXXA]    • GAVE (gastric antral vascular ectasia) [K31.819]    • History of recent stroke [Z86.73]    • Symptomatic anemia [D64.9]    • Acute renal failure superimposed on chronic kidney disease (CMS/HCC) [N17.9, N18.9]    • Anemia due to acute blood loss [D62]    • Chronic kidney disease, stage III (moderate) (CMS/HCC)  [N18.30]    • Weakness on left side of face [R29.810]    • B12 deficiency [E53.8]    • Encounter for fitting and adjustment of vascular catheter [Z45.2]    • Anemia of chronic disease [D63.8]    • CKD (chronic kidney disease) stage 4, GFR 15-29 ml/min (CMS/[facility]) [N18.4]    • Anemia of chronic renal  failure, stage 4 (severe) (CMS/HCC) [N18.4, D63.1]    • Dysuria [R30.0]    • Iron deficiency anemia [D50.9]    • History of anemia due to chronic kidney disease [N18.9, Z86.2]    • CKD (chronic kidney disease) [N18.9]    • Anemia [D64.9]    • Carpal tunnel syndrome [G56.00]    • Periodic limb movement disorder [G47.61]    • Peripheral neuropathy [G62.9]    • Restless legs syndrome [G25.81]    • Chronic systolic congestive heart failure (CMS/HCC) [I50.22]    • Cardiomyopathy (CMS/HCC) [I42.9]    • Pacemaker lead failure [T82.110A]    • Chest pain [R07.9]    • 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 [M47.812]    • Spondylolisthesis [M43.10]    • Chronic adrenal insufficiency (CMS/HCC) [E27.40]    • Adrenal insufficiency (CMS/HCC) [E27.40]    • Congestive heart failure (CMS/HCC) [I50.9]    • Gastroparesis [K31.84]    • Hypotension [I95.9]    • Hypothyroidism [E03.9]    • 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]    • Osteoarthritis [M19.90]    • Automatic implantable cardioverter-defibrillator in situ [Z95.810]    • History of total knee replacement [Z96.659]    • CHF (congestive heart failure) (CMS/HCC) [I50.9]    • Neuropathy [G62.9]          PAST MEDICAL HISTORY  Past Medical History:   Diagnosis Date   • Anemia     Iron deficiency   • Anxiety    • Cardiomyopathy (CMS/HCC)     S/P pacemaker and defibrillator   • CHF (congestive heart failure) (CMS/HCC)    • Chronic renal failure, stage 4 (severe) (CMS/HCC)    • Colon polyp    • Coronary artery disease     pacemaker, defibrillator   • Depression    • Dizzy    • Gastroparesis    • GAVE (gastric antral vascular ectasia)    • GERD (gastroesophageal reflux disease)    • Gout    • Hemorrhoids    • Hiatal hernia    • History of Clostridium difficile colitis 2013   • History of kidney stones    • History of pancreatitis     2014   • History of skin cancer    • History of transfusion     MULTIPLE    • Hyperlipidemia    • Hypertension    • Hypothyroidism    • Left bundle branch block     • Mitral and aortic valve disease    • Mitral valve insufficiency    • Myoclonus     S/P Depakote   • Osteoarthritis    • Pancytopenia (CMS/HCC)    • Peripheral neuropathy    • Presence of cardiac pacemaker     AND DEFIBRILLATOR   • Pulmonary hypertension (CMS/HCC)    • SOB (shortness of breath) on exertion    • Spinal stenosis    • Stroke (CMS/HCC)     2019   • TIA (transient ischemic attack)          SURGICAL HISTORY  Past Surgical History:   Procedure Laterality Date   • APPENDECTOMY N/A    • ARTERIOVENOUS FISTULA/SHUNT SURGERY Right 2/18/2019    Procedure: RIGHT BASILIC FISTULA CREATION WITHOUT TRANSPOSITION;  Surgeon: Royer Dozier MD;  Location: [facility] MAIN OR;  Service: Vascular   • ARTERIOVENOUS FISTULA/SHUNT SURGERY Right 5/31/2019    Procedure: RIGHT 2ND STAGE BASILIC VEIN CREATION;  Surgeon: Royer Dozier MD;  Location: [facility] MAIN OR;  Service: Vascular   • CARDIAC CATHETERIZATION Left 03/28/2006    Arterial catheter insertion, cath left ventriculography, coronary angiography and left heart catheterization-Dr. Estevan Matamoros   • CARDIAC DEFIBRILLATOR PLACEMENT N/A 11/30/2007    Biventricular implantable cardioverter defibrillator-Dr. Leandro Huber   • CARDIAC ELECTROPHYSIOLOGY PROCEDURE N/A 10/28/2019    Procedure: GENERATOR CHANGE BI-V ICD  medtronic;  Surgeon: Leandro Huber MD;  Location: [facility] CATH INVASIVE LOCATION;  Service: Cardiology   • CATARACT EXTRACTION Bilateral 2011   • COLONOSCOPY N/A 2014    done at [facility]   • CYSTECTOMY N/A     Ovarian cystectomy   • ENDOSCOPY N/A 04/28/2006    EGD with biopsies. Paraesophageal hiatal hernia from 34 to 44 cm, antral gastritis-Dr. Nehemiah Beal   • ENDOSCOPY N/A 09/29/2004    Hiatal hernia, gastritis and an erosion of the pylorus-Dr. Yang Pavon   • ENDOSCOPY N/A 9/1/2019    Procedure: ESOPHAGOGASTRODUODENOSCOPY with APC;  Surgeon: Anuel Roach MD;  Location: [facility] ENDOSCOPY;  Service: Gastroenterology   • ENDOSCOPY N/A  1/28/2020    Procedure: ESOPHAGOGASTRODUODENOSCOPY with APC;  Surgeon: Anuel Roach MD;  Location:  [facility];  Service: Gastroenterology   • ENDOSCOPY N/A 4/21/2020    Procedure: ESOPHAGOGASTRODUODENOSCOPY WITH APC;  Surgeon: Anuel Roach MD;  Location:  [facility];  Service: Gastroenterology;  Laterality: N/A;  PRE- MELENA, GAVE  POST- ESOPHAGITIS, GAVE   • ENDOSCOPY N/A 11/24/2020    Procedure: ESOPHAGOGASTRODUODENOSCOPY;  Surgeon: Anuel Roach MD;  Location:  [facility] OR;  Service: Gastroenterology;  Laterality: N/A;  reflux esophagitis  Gastric Food Bezoar   • ENDOSCOPY N/A 2/12/2021    Procedure: ESOPHAGOGASTRODUODENOSCOPY;  Surgeon: Anuel Roach MD;  Location:  [facility] OR;  Service: Gastroenterology;  Laterality: N/A;  with APC- GAVE; Gastroparesis;    • ENTEROSCOPY SMALL BOWEL N/A 10/30/2006    Small bowel enteroscopy with biopsies-Dr. Rory Sevilla   • FLEXIBLE SIGMOIDOSCOPY N/A 09/29/2004    Unsuccessful colonoscopy due to poop prep, a very tortuous sigmoid, bowel prep in the form of enema given and a barium enema was obtained-Dr. Yang Pavon   • FRACTURE SURGERY  2015    Broke big toe   • HEMATOMA EVACUATION TRUNK N/A 02/07/2014    Pocket evacuation of hematoma at pacemaker site-Dr. Leandro Huber   • HEMORRHOIDECTOMY N/A 04/19/2004    Flexible sigmoidoscopy and stapled hemorrhoidectomy-Dr. Yang Pavon   • HYSTERECTOMY Bilateral 1977   • IMPLANTABLE CARDIOVERTER DEFIBRILLATOR LEAD REPLACEMENT/POCKET REVISION Left 3/28/2016    Procedure: AUTOMATIC IMPLANTABLE CARDIOVERTER DEFIBRILLATOR LEAD REPLACEMENT WITH LASER LEAD EXTRACTION;  Surgeon: Leandro Huber MD;  Location: [facility] HYBRID OR 18/19;  Service:    • IMPLANTABLE CARDIOVERTER DEFIBRILLATOR LEAD REPLACEMENT/POCKET REVISION N/A 01/13/2014    Biventricular ICD replacement-Dr. Jillian Huber   • LAPAROSCOPY REPAIR HIATAL HERNIA N/A 06/27/2006    Laparoscopic paraesophageal  hiatal hernia repair with Nissen fundoplication and cholecystectomy-Dr. Sean Yoon   • NATALIE GONZALEZ ([facility]) PROCEDURE     • NC INSJ TUNNELED CVC W/O SUBQ PORT/ AGE 3 YR/> Right 10/3/2017    Procedure: Right internal jugular port placement;  Surgeon: Tiffanie Couch MD;  Location: [facility] OR;  Service: General   • TOE SURGERY Left     SET BIG TOE   • TOTAL KNEE ARTHROPLASTY Left 08/2014   • VENOUS ACCESS DEVICE (PORT) INSERTION Right          FAMILY HISTORY  Family History   Problem Relation Age of Onset   • Coronary artery disease Other    • Dementia Other    • Kidney disease Other    • Arthritis Father    • Early death Father         Kidney failure   • Kidney disease Father    • Heart disease Mother    • Mental illness Mother         Dementia   • Malig Hyperthermia Neg Hx    • Colon cancer Neg Hx    • Colon polyps Neg Hx          SOCIAL HISTORY  Social History     Occupational History     Employer: RETIRED   Tobacco Use   • Smoking status: Never Smoker   • Smokeless tobacco: Never Used   • Tobacco comment: caffeine use - coffee and soda   Vaping Use   • Vaping Use: Never used   Substance and Sexual Activity   • Alcohol use: No   • Drug use: No   • Sexual activity: Defer         CURRENT MEDICATIONS    Current Outpatient Medications:   •  acetaminophen (TYLENOL) 325 MG tablet, Take 2 tablets by mouth Every 4 (Four) Hours As Needed for Mild Pain ., Disp: , Rfl:   •  aspirin 81 MG EC tablet, Take 81 mg by mouth Daily., Disp: , Rfl:   •  calcitriol (ROCALTROL) 0.25 MCG capsule, , Disp: , Rfl:   •  calcitriol (ROCALTROL) 0.5 MCG capsule, Take 0.5 mcg by mouth Every Other Day. Alternate days with calcitriol 0.25mcg, Disp: , Rfl:   •  carbidopa-levodopa ER (SINEMET CR)  MG per tablet, Take 1 tablet by mouth Every Evening., Disp: , Rfl:   •  carvedilol (COREG) 12.5 MG tablet, Take 1 tablet by mouth 2 (Two) Times a Day With Meals., Disp: 60 tablet, Rfl: 5  •  DULoxetine (CYMBALTA) 30 MG  capsule, Take 30 mg by mouth Daily., Disp: , Rfl:   •  erythromycin base (E-MYCIN) 250 MG tablet, Take 0.5 tablets by mouth 4 (Four) Times a Day., Disp: 180 tablet, Rfl: 3  •  famotidine (Pepcid) 40 MG tablet, Take 0.5 tablets by mouth 2 (Two) Times a Day., Disp: 180 tablet, Rfl: 3  •  febuxostat (ULORIC) 40 MG tablet, Take 40 mg by mouth Daily., Disp: , Rfl:   •  furosemide (LASIX) 40 MG tablet, Take 80 mg by mouth 3 (Three) Times a Day., Disp: , Rfl:   •  Gabapentin, Once-Daily, (GRALISE) 300 MG tablet, Take 300 mg by mouth Daily With Dinner. MAY REPEAT LATE EVENING IF NEEDED, Disp: , Rfl:   •  Glucosamine-Chondroit-Vit C-Mn (GLUCOSAMINE CHONDROITIN COMPLX) capsule, Take 1,500 mg by mouth Every Other Day., Disp: , Rfl:   •  levothyroxine (SYNTHROID, LEVOTHROID) 25 MCG tablet, Take 25 mcg by mouth Daily., Disp: , Rfl:   •  Magnesium 250 MG tablet, Take  by mouth Daily., Disp: , Rfl:   •  Multiple Vitamin (MULTI-DAY VITAMINS) tablet, Take 1 tablet by mouth Daily. HOLD FOR SURGERY, Disp: , Rfl:   •  polyethylene glycol (MIRALAX) powder, Take 17 g by mouth Daily As Needed., Disp: , Rfl:   •  psyllium (METAMUCIL) 0.52 g capsule, Take 0.52 g by mouth., Disp: , Rfl:   •  rotigotine (NEUPRO) 6 MG/24HR patch, Place 1 patch on the skin as directed by provider Daily., Disp: , Rfl:   •  Vitamin E 400 UNITS tablet, Take 400 Units by mouth Daily., Disp: , Rfl:   •  HYDROcodone-acetaminophen (NORCO) 5-325 MG per tablet, Take 1 tablet by mouth Every 6 (Six) Hours As Needed for Severe Pain ., Disp: 12 tablet, Rfl: 0  •  ondansetron ODT (ZOFRAN-ODT) 4 MG disintegrating tablet, Place 1 tablet on the tongue Every 8 (Eight) Hours As Needed for Nausea or Vomiting., Disp: 20 tablet, Rfl: 3  •  sucralfate (Carafate) 1 g tablet, Take 1 tablet by mouth 4 (Four) Times a Day. Crush tablet in wax paper mix with 1-2 tsp of water to make slurry and drink., Disp: 120 tablet, Rfl: 5  •  urea (CARMOL) 20 % cream, , Disp: , Rfl:   No current  "facility-administered medications for this visit.    Facility-Administered Medications Ordered in Other Visits:   •  acetaminophen (TYLENOL) tablet 650 mg, 650 mg, Oral, Once, Thuy Cruz APRN  •  diphenhydrAMINE (BENADRYL) capsule 25 mg, 25 mg, Oral, Once, Thuy Cruz APRN  •  heparin flush (porcine) 100 UNIT/ML injection 500 Units, 500 Units, Intravenous, PRN, Anuel Scott MD, 500 Units at 11/27/17 1347  •  heparin flush (porcine) 100 UNIT/ML injection 500 Units, 500 Units, Intravenous, PRN, Neno Merritt II, MD, 500 Units at 01/18/20 1359  •  sodium chloride 0.9 % flush 10 mL, 10 mL, Intravenous, PRN, Anuel Scott MD, 10 mL at 11/27/17 1347  •  sodium chloride 0.9 % flush 10 mL, 10 mL, Intravenous, PRN, Neno Merritt II, MD, 10 mL at 01/18/20 1359  •  sodium chloride 0.9 % infusion 250 mL, 250 mL, Intravenous, PRN, Neno Merritt II, MD  •  sodium chloride 0.9 % infusion 250 mL, 250 mL, Intravenous, PRN, Thuy Cruz APRN    ALLERGIES  Chlorhexidine and Codeine    VITALS  Vitals:    03/16/21 1037   BP: 154/88   Pulse: 64   Resp: 16   Temp: 96.8 °F (36 °C)   TempSrc: Temporal   SpO2: 99%   Weight: 68.9 kg (151 lb 12.8 oz)   Height: 170.2 cm (67\")       PHYSICAL EXAM  Debilities/Disabilities Identified: None  Emotional Behavior: Appropriate  Wt Readings from Last 3 Encounters:   03/16/21 68.9 kg (151 lb 12.8 oz)   02/15/21 67.9 kg (149 lb 12.8 oz)   02/12/21 68.3 kg (150 lb 9.6 oz)     Ht Readings from Last 1 Encounters:   03/16/21 170.2 cm (67\")     Body mass index is 23.78 kg/m².  Physical Exam  Constitutional:       Appearance: She is well-developed.   HENT:      Head: Normocephalic and atraumatic.   Eyes:      General: No scleral icterus.     Pupils: Pupils are equal, round, and reactive to light.   Neck:      Thyroid: No thyromegaly.   Cardiovascular:      Rate and Rhythm: Normal rate and regular rhythm.      Heart sounds: Normal heart sounds. No murmur. No " gallop.    Pulmonary:      Effort: Pulmonary effort is normal.      Breath sounds: Normal breath sounds. No wheezing or rales.   Abdominal:      General: Bowel sounds are normal. There is no distension or abdominal bruit.      Palpations: Abdomen is soft. Abdomen is not rigid. There is no shifting dullness, fluid wave, hepatomegaly, splenomegaly, mass or pulsatile mass.      Tenderness: There is abdominal tenderness in the epigastric area. There is no guarding or rebound. Negative signs include Singh's sign.      Hernia: No hernia is present. There is no hernia in the ventral area.   Musculoskeletal:         General: Normal range of motion.      Cervical back: Normal range of motion and neck supple.   Lymphadenopathy:      Cervical: No cervical adenopathy.   Skin:     General: Skin is warm and dry.      Findings: No erythema or rash.   Neurological:      Mental Status: She is alert and oriented to person, place, and time.   Psychiatric:         Mood and Affect: Mood normal.         Behavior: Behavior normal.         CLINICAL DATA REVIEWED   reviewed previous lab results and integrated with today's visit, reviewed notes from other physicians and/or last GI encounter, reviewed previous endoscopy results and available photos, reviewed surgical pathology results from previous biopsies    ASSESSMENT  Diagnoses and all orders for this visit:    GAVE (gastric antral vascular ectasia)    Gastroparesis    CKD (chronic kidney disease) stage 4, GFR 15-29 ml/min (CMS/[facility])    Ischemic cardiomyopathy    Other orders  -     aspirin 81 MG EC tablet; Take 81 mg by mouth Daily.          PLAN  WIll continue same and only consider repeat EGD with clinical bleeding and refractory anemia  No follow-ups on file.    I have discussed the above plan with the patient.  They verbalize understanding and are in agreement with the plan.  They have been advised to contact the office for any questions, concerns, or changes related to their  health.

## 2021-03-25 NOTE — PROGRESS NOTES
Hi,    When she comes next week for CBC please make sure she also has a stat ferritin and stat iron panel and reticulated hemoglobin (I know she had these today but it might take another week to see them drop.  Therefore, they should be checked again next week)    Thanks!

## 2021-03-29 NOTE — PROGRESS NOTES
Date of Office Visit: 21  Encounter Provider: CHANTELL Lubin  Place of Service: [facility]  Patient Name: Charmaine Machuca  :1940    Chief Complaint   Patient presents with   • Nonischemic cardiomyopathy   • Follow-up   • Chronic systolic congestive heart failure   :     HPI: Charmaine Machuca is a 81 y.o. female  with history of hypertension, hyperlipidemia, left bundle branch block, nonischemic cardiomyopathy status post biventricular defibrillator placement.  She also has history of anemia, chronic kidney disease, and syncope.    She was followed by Dr. Matamoros. I will visit with her in follow up today and have reviewed her medical record.     She was  found to have very low ejection fraction.  She was enrolled in the [facility] study of ACE inhibitor plus Atacand versus ACE inhibitor plus a placebo.  In 2005 she had a perfusion stress test which showed infarct but no ischemia.  Cardiac catheterization 2016 showed moderate left ventricular function, elevated right-sided pressure, left anterior end-diastolic pressure, mild mitral insufficiency and normal coronary arteries.  She was treated medically.  She had worsening dyspnea and in 2007 had biventricular defibrillator placed.  She had issues with GI problems, fatigue, weakness, dizziness, syncope, near syncope.  She had multiple medication adjustments.  Diuretics were adjusted.  There was some concern about her symptoms being related to Depakote.  She had a generator change 2014 unfortunately 2 days after developed small bowel obstruction had to be admitted and treated for that.  She developed C. difficile infection and was treated for that.  She also suffered a pocket hematoma had to have that evacuated.  Follow-up echocardiogram 2015 showed normal ejection fraction, mild mitral insufficiency, mild aortic valve calcification without stenosis.  She was hospitalized  "at [facility] November 2015 with atypical chest discomfort, left side in nature and under the breast.  There was some pleuritic component.  She had a ventilator perfusion scan which was negative for pulmonary emboli.  She had a 2D echocardiogram which showed normal left ventricular systolic function no significant valvular disease.  In March 2016 she was found to have RV lead failure had that removed and had a new lead placed.  In August 2019 she reported issues with kidney function.  She had been anemic and required blood transfusion in addition to Epogen.  In October 2019 she had a new generator implanted with [employer] device.     She then was hospitalized in February 2020 after a mechanical fall where she suffered a traumatic subdural hematoma without loss of consciousness.  Her aspirin was held.     She presents today for reassessment. Patient is a poor historian due to memory issues but has her  here accompanying her.  She had emergency department visit last week due to left lower extremity swelling.  She had a normal left lower extremity venous scan and chest x-ray was unremarkable.  proBNP was elevated at 3562. Cr 2.65 BUN 65, GFR 17. Hemoglobin was 9.8.  She states that 2 to 3 weeks ago her nephrologist, Dr. Abraham increased Lasix.  She has been wearing compression stockings since the ER visit and her swelling has decreased.  She no longer has leg pain which she was having in the left leg.  She states she is also been elevating them at rest which is helped.  She denies palpitation, shortness of breath, chest pain tightness pressure or fatigue.      Allergies   Allergen Reactions   • Chlorhexidine Rash and Itching     Patient states \"blue dye\" in chlorhexidine.  States the orange and clear are OK to use   • Codeine Unknown - Low Severity     HYPER, DOES NOT HELP PAIN           Family and social history reviewed.     Review of Systems   Cardiovascular: Positive for leg swelling.     All " "other systems were reviewed and are negative          Objective:     Vitals:    03/29/21 1006   BP: 138/88   BP Location: Left arm   Patient Position: Sitting   Pulse: 66   Weight: 69.9 kg (154 lb)   Height: 170.2 cm (67\")     Body mass index is 24.12 kg/m².    PHYSICAL EXAM:  Cardiovascular:      Murmurs: There is a grade 2/6 systolic murmur at the URSB.      Comments: Puffy ankles and pretibial L >R compression stockings in place & no pitting edema         Procedures- omitted      Current Outpatient Medications   Medication Sig Dispense Refill   • acetaminophen (TYLENOL) 325 MG tablet Take 2 tablets by mouth Every 4 (Four) Hours As Needed for Mild Pain .     • aspirin 81 MG EC tablet Take 81 mg by mouth Daily.     • carbidopa-levodopa ER (SINEMET CR)  MG per tablet Take 1 tablet by mouth Every Evening.     • carvedilol (COREG) 12.5 MG tablet Take 1 tablet by mouth 2 (Two) Times a Day With Meals. 60 tablet 5   • DULoxetine (CYMBALTA) 30 MG capsule Take 30 mg by mouth Daily.     • erythromycin base (E-MYCIN) 250 MG tablet Take 0.5 tablets by mouth 4 (Four) Times a Day. 180 tablet 3   • famotidine (Pepcid) 40 MG tablet Take 0.5 tablets by mouth 2 (Two) Times a Day. 180 tablet 3   • febuxostat (ULORIC) 40 MG tablet Take 40 mg by mouth Daily.     • furosemide (LASIX) 40 MG tablet Take 80 mg by mouth 3 (Three) Times a Day. 2 IN THE AM, 1 AT LUNCH     • Gabapentin, Once-Daily, (GRALISE) 300 MG tablet Take 300 mg by mouth Daily With Dinner. MAY REPEAT LATE EVENING IF NEEDED     • Glucosamine-Chondroit-Vit C-Mn (GLUCOSAMINE CHONDROITIN COMPLX) capsule Take 1,500 mg by mouth Every Other Day.     • levothyroxine (SYNTHROID, LEVOTHROID) 25 MCG tablet Take 25 mcg by mouth Daily.     • Magnesium 250 MG tablet Take  by mouth Daily.     • Multiple Vitamin (MULTI-DAY VITAMINS) tablet Take 1 tablet by mouth Daily. HOLD FOR SURGERY     • polyethylene glycol (MIRALAX) powder Take 17 g by mouth Daily As Needed.     • psyllium " (METAMUCIL) 0.52 g capsule Take 0.52 g by mouth.     • rotigotine (NEUPRO) 6 MG/24HR patch Place 1 patch on the skin as directed by provider Daily.     • Vitamin E 400 UNITS tablet Take 400 Units by mouth Daily.     • calcitriol (ROCALTROL) 0.25 MCG capsule      • calcitriol (ROCALTROL) 0.5 MCG capsule Take 0.5 mcg by mouth Every Other Day. Alternate days with calcitriol 0.25mcg     • HYDROcodone-acetaminophen (NORCO) 5-325 MG per tablet Take 1 tablet by mouth Every 6 (Six) Hours As Needed for Severe Pain . 12 tablet 0   • ondansetron ODT (ZOFRAN-ODT) 4 MG disintegrating tablet Place 1 tablet on the tongue Every 8 (Eight) Hours As Needed for Nausea or Vomiting. 20 tablet 3   • sucralfate (Carafate) 1 g tablet Take 1 tablet by mouth 4 (Four) Times a Day. Crush tablet in wax paper mix with 1-2 tsp of water to make slurry and drink. 120 tablet 5   • urea (CARMOL) 20 % cream        No current facility-administered medications for this visit.     Facility-Administered Medications Ordered in Other Visits   Medication Dose Route Frequency Provider Last Rate Last Admin   • acetaminophen (TYLENOL) tablet 650 mg  650 mg Oral Once Thuy Cruz APRN       • diphenhydrAMINE (BENADRYL) capsule 25 mg  25 mg Oral Once Thuy Cruz APRN       • heparin flush (porcine) 100 UNIT/ML injection 500 Units  500 Units Intravenous PRN Anuel Scott MD   500 Units at 11/27/17 1347   • heparin flush (porcine) 100 UNIT/ML injection 500 Units  500 Units Intravenous PRN Neno Merritt II, MD   500 Units at 01/18/20 1359   • sodium chloride 0.9 % flush 10 mL  10 mL Intravenous PRN Anuel Scott MD   10 mL at 11/27/17 1347   • sodium chloride 0.9 % flush 10 mL  10 mL Intravenous PRN Neno Merritt II, MD   10 mL at 01/18/20 1359   • sodium chloride 0.9 % infusion 250 mL  250 mL Intravenous PRN Neno Merritt II, MD       • sodium chloride 0.9 % infusion 250 mL  250 mL Intravenous PRN Thuy Cruz, APRN          Assessment:      No diagnosis found.     No orders of the defined types were placed in this encounter.        Plan:       1.  80-year-old female with history of nonischemic cardiomyopathy ejection last echocardiogram September 2019 showed normal left ventricular systolic function-with increased swelling elevated proBNP will repeat echocardiogram.  Murmur appreciated today     2. History of congestive heart failure status post Bi-V ICD. Following in our defibrillator clinic new generator implanted October 2019 interrogated today   3. History of syncope, falling spells, and dizziness of unclear etiology. Resolved. May have been from Depakote.  4. Hyperlipidemia, followed by her PCP  5. History of kidney stones.  6. History of left bundle branch block. unchanged.  7. History of anemia. iron deficient still following with hematology.  Blood transfusion last week  8. History of renal failure.  Progressively worse she has an upper extremity fistula.   She has not been started on dialysis.  She is  Following with nephrology with Dr. Tex Abraham.  I gave her permission to take 1 additional furosemide over the next 3 days then resume usual dosing and she verbalized understanding  9. History of hypertension.  Blood pressure at goal.  10.  History of probable depression.  Much improved.  11.  History of myoclonus.   12. Mitral Insufficiency. Echocardiogram 8/18 just mild-we will repeat that  13.  Anemia secondary to stage IV renal disease follows with hematology.   14.  History of subdural hematoma after mechanical fall earlier this month has repeat CT on 3/5/2020.  She is to remain off aspirin until cleared by neurosurgery  10.  Adrenal insufficiency          Follow up in 6 months with DR. Wallace whom patient has requested to see in the future. Dr. Flores follows her              It has been a pleasure to participate in this patient's care.      Thank you,  CHANTELL Lubin      **I used Dragon to dictate  this note:**

## 2021-03-30 NOTE — TELEPHONE ENCOUNTER
----- Message from Danita Bowser sent at 4/26/2018 12:38 PM EDT -----  Contact: [phone]  Patient called stating that she has been light headed for about 4 months. She hasn't fallen, she just really wobbly.   Patient's cholesterol currently elevated at 241,  Triglycerides  244, HDL cholesterol 45, LDL cholesterol 147,  Non-HDL cholesterol  196  He has been started on Omega 3 acid ethyl esters 1 g capsule daily which she has been taking  We have also added for of a statin 40 mg p o  daily to his regimen  He has been instructed on low-cholesterol low-fat high-protein diet  He has also been encouraged to do cardiovascular activity

## 2021-04-02 NOTE — NURSING NOTE
Hgb down to 8.8 today from 9.8 last week. She is feeling more fatigued today. Her next appt is in 2 weeks with Dr. Merritt. She is in agreement that we need to add in a CBC with possible procrit again in 1 week to make sure the hgb hasn't dropped further requiring PRBC transfusion.    Lab Results   Component Value Date    WBC 7.16 04/02/2021    HGB 8.8 (L) 04/02/2021    HCT 28.7 (L) 04/02/2021    .4 (H) 04/02/2021     04/02/2021     Pt needing outside labs drawn from her [facility] neurologist.  I checked with Nisa Will and no orders are on the fax.  Appears that pt did not have the correct fax number for our office, so it didn't come over correctly.      I gave her the correct fax number and she will reach back out to her [facility] MD. She will have them fax those orders and we can draw the labs when she's back in our office next Thursday.  Pt v/u. No complaints at this time.  Copy of labs also given.

## 2021-04-05 NOTE — TELEPHONE ENCOUNTER
Caller: MARICHUY    Relationship to patient:     Best call back number: [phone]    Chief complaint: PT HAS UPCOMNG APPT WITH MD. PT WANTS TO CANCEL RN REVIEW AND LAB    Type of visit: LAB AND RN REVIEW    Requested date: NONE    If rescheduling, when is the original appointment: 4/8    Additional notes:

## 2021-04-12 NOTE — PROGRESS NOTES
.     REASONS FOR FOLLOWUP: Anemia    HISTORY OF PRESENT ILLNESS:  The patient is a 81 y.o. year old female  who is here for follow-up with the above-mentioned history.     States she is doing well.  Denies bleeding.  Denies chest pain or SOA    Past Medical History:   Diagnosis Date   • Anemia     Iron deficiency   • Anxiety    • Cardiomyopathy (CMS/HCC)     S/P pacemaker and defibrillator   • CHF (congestive heart failure) (CMS/HCC)    • Chronic renal failure, stage 4 (severe) (CMS/HCC)    • Colon polyp    • Coronary artery disease     pacemaker, defibrillator   • Depression    • Dizzy    • Gastroparesis    • GAVE (gastric antral vascular ectasia)    • GERD (gastroesophageal reflux disease)    • Gout    • Hemorrhoids    • Hiatal hernia    • History of Clostridium difficile colitis 2013   • History of kidney stones    • History of pancreatitis     2014   • History of skin cancer    • History of transfusion     MULTIPLE    • Hyperlipidemia    • Hypertension    • Hypothyroidism    • Left bundle branch block    • Mitral and aortic valve disease    • Mitral valve insufficiency    • Myoclonus     S/P Depakote   • Osteoarthritis    • Pancytopenia (CMS/HCC)    • Peripheral neuropathy    • Presence of cardiac pacemaker     AND DEFIBRILLATOR   • Pulmonary hypertension (CMS/HCC)    • SOB (shortness of breath) on exertion    • Spinal stenosis    • Stroke (CMS/HCC)     2019   • TIA (transient ischemic attack)      Past Surgical History:   Procedure Laterality Date   • APPENDECTOMY N/A    • ARTERIOVENOUS FISTULA/SHUNT SURGERY Right 2/18/2019    Procedure: RIGHT BASILIC FISTULA CREATION WITHOUT TRANSPOSITION;  Surgeon: Royer Dozier MD;  Location: [facility] OR;  Service: Vascular   • ARTERIOVENOUS FISTULA/SHUNT SURGERY Right 5/31/2019    Procedure: RIGHT 2ND STAGE BASILIC VEIN CREATION;  Surgeon: Royer Dozier MD;  Location: [facility] OR;  Service: Vascular   • CARDIAC CATHETERIZATION Left 03/28/2006    Arterial  catheter insertion, cath left ventriculography, coronary angiography and left heart catheterization-Dr. Estevan Matamoros   • CARDIAC DEFIBRILLATOR PLACEMENT N/A 11/30/2007    Biventricular implantable cardioverter defibrillator-Dr. Leandro Huber   • CARDIAC ELECTROPHYSIOLOGY PROCEDURE N/A 10/28/2019    Procedure: GENERATOR CHANGE BI-V ICD  medtronic;  Surgeon: Leandro Huber MD;  Location: [facility] CATH INVASIVE LOCATION;  Service: Cardiology   • CATARACT EXTRACTION Bilateral 2011   • COLONOSCOPY N/A 2014    done at [facility]   • CYSTECTOMY N/A     Ovarian cystectomy   • ENDOSCOPY N/A 04/28/2006    EGD with biopsies. Paraesophageal hiatal hernia from 34 to 44 cm, antral gastritis-Dr. Nehemiah Beal   • ENDOSCOPY N/A 09/29/2004    Hiatal hernia, gastritis and an erosion of the pylorus-Dr. Yang Pavon   • ENDOSCOPY N/A 9/1/2019    Procedure: ESOPHAGOGASTRODUODENOSCOPY with APC;  Surgeon: Anuel Roach MD;  [facility];  Service: Gastroenterology   • ENDOSCOPY N/A 1/28/2020    Procedure: ESOPHAGOGASTRODUODENOSCOPY with APC;  Surgeon: Anuel Roach MD;  Location:  [facility];  Service: Gastroenterology   • ENDOSCOPY N/A 4/21/2020    Procedure: ESOPHAGOGASTRODUODENOSCOPY WITH APC;  Surgeon: Anuel Roach MD;  Location:  [facility];  Service: Gastroenterology;  Laterality: N/A;  PRE- MELENA, GAVE  POST- ESOPHAGITIS, GAVE   • ENDOSCOPY N/A 11/24/2020    Procedure: ESOPHAGOGASTRODUODENOSCOPY;  Surgeon: Anuel Roach MD;  Location:  [facility] OR;  Service: Gastroenterology;  Laterality: N/A;  reflux esophagitis  Gastric Food Bezoar   • ENDOSCOPY N/A 2/12/2021    Procedure: ESOPHAGOGASTRODUODENOSCOPY;  Surgeon: Anuel Roach MD;  Location:  [facility] OR;  Service: Gastroenterology;  Laterality: N/A;  with APC- GAVE; Gastroparesis;    • ENTEROSCOPY SMALL BOWEL N/A 10/30/2006    Small bowel enteroscopy with biopsies-Dr. Rory Sevilla   •  FLEXIBLE SIGMOIDOSCOPY N/A 09/29/2004    Unsuccessful colonoscopy due to poop prep, a very tortuous sigmoid, bowel prep in the form of enema given and a barium enema was obtained-Dr. Yang Pavon   • FRACTURE SURGERY  2015    Broke big toe   • HEMATOMA EVACUATION TRUNK N/A 02/07/2014    Pocket evacuation of hematoma at pacemaker site-Dr. Leandro uHber   • HEMORRHOIDECTOMY N/A 04/19/2004    Flexible sigmoidoscopy and stapled hemorrhoidectomy-Dr. Yang Pavon   • HYSTERECTOMY Bilateral 1977   • IMPLANTABLE CARDIOVERTER DEFIBRILLATOR LEAD REPLACEMENT/POCKET REVISION Left 3/28/2016    Procedure: AUTOMATIC IMPLANTABLE CARDIOVERTER DEFIBRILLATOR LEAD REPLACEMENT WITH LASER LEAD EXTRACTION;  Surgeon: Leandro Huber MD;  [facility]  Service:    • IMPLANTABLE CARDIOVERTER DEFIBRILLATOR LEAD REPLACEMENT/POCKET REVISION N/A 01/13/2014    Biventricular ICD replacement-Dr. Jillian Huber   • LAPAROSCOPY REPAIR HIATAL HERNIA N/A 06/27/2006    Laparoscopic paraesophageal hiatal hernia repair with Nissen fundoplication and cholecystectomy-Dr. Sean Yoon   • NATALIE GONZALEZ ([facility]) PROCEDURE     • HI INSJ TUNNELED CVC W/O SUBQ PORT/ AGE 3 YR/> Right 10/3/2017    Procedure: Right internal jugular port placement;  Surgeon: Tiffanie Couch MD;  Location: [facility] OR;  Service: General   • TOE SURGERY Left     SET BIG TOE   • TOTAL KNEE ARTHROPLASTY Left 08/2014   • VENOUS ACCESS DEVICE (PORT) INSERTION Right        MEDICATIONS    Current Outpatient Medications:   •  acetaminophen (TYLENOL) 325 MG tablet, Take 2 tablets by mouth Every 4 (Four) Hours As Needed for Mild Pain ., Disp: , Rfl:   •  aspirin 81 MG EC tablet, Take 81 mg by mouth Daily., Disp: , Rfl:   •  carbidopa-levodopa ER (SINEMET CR)  MG per tablet, Take 1 tablet by mouth Every Evening., Disp: , Rfl:   •  carvedilol (COREG) 12.5 MG tablet, Take 1 tablet by mouth 2 (Two) Times a Day With Meals., Disp: 60 tablet,  Rfl: 5  •  DULoxetine (CYMBALTA) 30 MG capsule, Take 30 mg by mouth Daily., Disp: , Rfl:   •  erythromycin base (E-MYCIN) 250 MG tablet, Take 0.5 tablets by mouth 4 (Four) Times a Day., Disp: 180 tablet, Rfl: 3  •  famotidine (Pepcid) 40 MG tablet, Take 0.5 tablets by mouth 2 (Two) Times a Day., Disp: 180 tablet, Rfl: 3  •  febuxostat (ULORIC) 40 MG tablet, Take 40 mg by mouth Daily., Disp: , Rfl:   •  furosemide (LASIX) 40 MG tablet, 2 tablets IN THE AM, 1 AT LUNCH, Disp: , Rfl:   •  Gabapentin, Once-Daily, (GRALISE) 300 MG tablet, Take 300 mg by mouth Daily With Dinner. MAY REPEAT LATE EVENING IF NEEDED, Disp: , Rfl:   •  Gabapentin, Once-Daily, (Gralise) 300 MG tablet, Take 600 mg by mouth Daily., Disp: , Rfl:   •  Glucosamine-Chondroit-Vit C-Mn (GLUCOSAMINE CHONDROITIN COMPLX) capsule, Take 1,500 mg by mouth Every Other Day., Disp: , Rfl:   •  levothyroxine (SYNTHROID, LEVOTHROID) 25 MCG tablet, Take 25 mcg by mouth Daily., Disp: , Rfl:   •  Magnesium 250 MG tablet, Take  by mouth Daily., Disp: , Rfl:   •  Multiple Vitamin (MULTI-DAY VITAMINS) tablet, Take 1 tablet by mouth Daily. HOLD FOR SURGERY, Disp: , Rfl:   •  polyethylene glycol (MIRALAX) powder, Take 17 g by mouth Daily As Needed., Disp: , Rfl:   •  psyllium (METAMUCIL) 0.52 g capsule, Take 0.52 g by mouth., Disp: , Rfl:   •  rotigotine (NEUPRO) 6 MG/24HR patch, Place 1 patch on the skin as directed by provider Daily., Disp: , Rfl:   •  Vitamin E 400 UNITS tablet, Take 400 Units by mouth Daily., Disp: , Rfl:   No current facility-administered medications for this visit.    Facility-Administered Medications Ordered in Other Visits:   •  acetaminophen (TYLENOL) tablet 650 mg, 650 mg, Oral, Once, Thuy Cruz APRN  •  diphenhydrAMINE (BENADRYL) capsule 25 mg, 25 mg, Oral, Once, [place], Thuy Nancy, APRN  •  epoetin jose (EPOGEN,PROCRIT) injection 15,000 Units, 15,000 Units, Subcutaneous, Once, Neno Merritt II, MD  •  heparin flush  "(porcine) 100 UNIT/ML injection 500 Units, 500 Units, Intravenous, PRN, Anuel Scott MD, 500 Units at 11/27/17 1347  •  heparin flush (porcine) 100 UNIT/ML injection 500 Units, 500 Units, Intravenous, PRN, Neno Merritt II, MD, 500 Units at 01/18/20 1359  •  sodium chloride 0.9 % flush 10 mL, 10 mL, Intravenous, PRN, Anuel Scott MD, 10 mL at 11/27/17 1347  •  sodium chloride 0.9 % flush 10 mL, 10 mL, Intravenous, PRN, Neno Merritt II, MD, 10 mL at 01/18/20 1359  •  sodium chloride 0.9 % infusion 250 mL, 250 mL, Intravenous, PRN, Neno Merritt II, MD  •  sodium chloride 0.9 % infusion 250 mL, 250 mL, Intravenous, PRN, Thuy Cruz APRN    ALLERGIES:     Allergies   Allergen Reactions   • Chlorhexidine Rash and Itching     Patient states \"blue dye\" in chlorhexidine.  States the orange and clear are OK to use   • Codeine Unknown - Low Severity     HYPER, DOES NOT HELP PAIN       SOCIAL HISTORY:       Social History     Socioeconomic History   • Marital status:      Spouse name: Zackery   • Number of children: 5   • Years of education: 1 yr college   • Highest education level: Not on file   Tobacco Use   • Smoking status: Never Smoker   • Smokeless tobacco: Never Used   • Tobacco comment: caffeine use - coffee and soda   Vaping Use   • Vaping Use: Never used   Substance and Sexual Activity   • Alcohol use: No   • Drug use: No   • Sexual activity: Defer         FAMILY HISTORY:  Family History   Problem Relation Age of Onset   • Coronary artery disease Other    • Dementia Other    • Kidney disease Other    • Arthritis Father    • Early death Father         Kidney failure   • Kidney disease Father    • Heart disease Mother    • Mental illness Mother         Dementia   • Malig Hyperthermia Neg Hx    • Colon cancer Neg Hx    • Colon polyps Neg Hx        REVIEW OF SYSTEMS:  Review of Systems   Constitutional: Negative for activity change.   HENT: Negative for nosebleeds and trouble " "swallowing.    Respiratory: Negative for shortness of breath and wheezing.    Cardiovascular: Negative for chest pain and palpitations.   Gastrointestinal: Negative for constipation, diarrhea and nausea.   Genitourinary: Negative for dysuria and hematuria.   Musculoskeletal: Negative for arthralgias and myalgias.   Skin: Negative for rash and wound.   Neurological: Negative for seizures and syncope.   Hematological: Negative for adenopathy. Does not bruise/bleed easily.   Psychiatric/Behavioral: Negative for confusion.            Vitals:    04/12/21 1040   BP: 157/75   Pulse: 88   Resp: 18   Temp: 96.8 °F (36 °C)   TempSrc: Temporal   SpO2: 99%   Weight: 70.7 kg (155 lb 12.8 oz)   Height: 170.2 cm (67.01\")   PainSc:   8   PainLoc: Knee     Current Status 4/12/2021   ECOG score 0        PHYSICAL EXAM:        CONSTITUTIONAL:  Vital signs reviewed.  No distress, looks comfortable.  EYES:  Conjunctiva and lids unremarkable.  PERRLA  EARS,NOSE,MOUTH,THROAT:  Ears and nose appear unremarkable.  Lips, teeth, gums appear unremarkable.  RESPIRATORY:  Normal respiratory effort.  Lungs clear to auscultation bilaterally.  CARDIOVASCULAR:  Normal S1, S2.  No murmurs rubs or gallops.  No significant lower extremity edema.  GASTROINTESTINAL: Abdomen appears unremarkable.  Nontender.  No hepatomegaly.  No splenomegaly.  LYMPHATIC:  No cervical, supraclavicular, axillary lymphadenopathy.  SKIN:  Warm.  No rashes.  PSYCHIATRIC:  Normal judgment and insight.  Normal mood and affect.           WBC   Date/Time Value Ref Range Status   04/12/2021 10:24 AM 6.62 3.40 - 10.80 10*3/mm3 Final   10/04/2020 09:02 AM 8.97 4.5 - 11.0 10*3/uL Final     Hemoglobin   Date/Time Value Ref Range Status   04/12/2021 10:24 AM 9.3 (L) 12.0 - 15.9 g/dL Final   10/04/2020 09:02 AM 11.3 (L) 12.0 - 16.0 g/dL Final     Platelets   Date/Time Value Ref Range Status   04/12/2021 10:24  140 - 450 10*3/mm3 Final   10/04/2020 09:02  140 - 440 10*3/uL " Final       Assessment/Plan   There are no diagnoses linked to this encounter.     *Anemia due to stage IV chronic kidney disease.    · Procrit if Hb <10.  Hb 9.3    *Admitted 8/29/2019-9/2/2019 due to Hb of 5.  Transfused 2 units.  Heme positive stools.  EGD revealed GAVE.  Laser photocoagulation  · She states Dr. Roach plans EGD every 3 months.    *Iron deficiency anemia.  · Does not respond to oral iron due to poor absorption.  · 2 doses Injectafer late August 2019 resulted in normal iron labs and improvement of Hb from 9.4 up to 11.1 on 9/26/2019.  · Hb down to 10.3, 10/3/2019.  · Iron labs normal 10/31/2019, ferritin 200, 21% saturation.  · Hb dropped to 7.8 on 11/26/2019, requiring transfusion.  · Hb dropped to 6.9 on 12/19/2019, requiring transfusion.  · Hb dropped to 6.5 on 2/12/2020, requiring transfusion  · Hb dropped to 7.8 on 2/18/2020, requiring transfusion.  · On 2/18/2020, ferritin 89, 13% iron saturation.  Gave 1 dose Injectafer.  · On 4/23/2020, ferritin 115, 5% saturation, give 2 doses Injectafer.  · On 6/18/2020, ferritin 461, 31% saturation, Hb 10.7.  · Hb 9.8.  On 8/20/2020, ferritin 345, 29% saturation.  No need for IV iron at this time.  · On 10/29/2020, Hb 8.6.  Ferritin 359, 21% saturation, normal reticulate hemoglobin  · On 11/12/2020, Hb 8.2, ferritin 249, 18% saturation  · Transfused per patient request because EGD planned 11/24/2020 and she reported she can only have the EGD if Hb >8.  · On 1/14/2021, ferritin 212, 25% saturation  · G AVE was not treated during last EGD on 11/24/2020 due to large gastric food bezoar/risk of emesis, aspiration.  Dr. Roach recommended rescheduling and holding any solid food the evening before.  · This has not been rescheduled.  Hb is dropping and now needs a transfusion.  We will try to schedule this for her  · 4/2/2021: Ferritin 178, 19% saturation, Hb 8.8  · 4/12/2021: Hb 9.3.  No need for IV iron today    *Source of iron  "deficiency.  · Previously followed regularly with Dr. Earl Avelar.  · States she cannot have colonoscopies due to tortuous colon and therefore has virtual colonoscopies.  · She states she saw Dr. Avelar after the 6/27/2019 visit with me due to recent dark stools.  She states Dr. Avelar did a rectal exam which was heme negative and recommended no further evaluation.  · On 8/22/2019, I told her I suspect she is having occult GI bleeding considering she is needing IV iron frequently.  However, with new stroke mid May 2019 and the addition of Plavix to aspirin, risk may be too great to stop antiplatelet agents for further GI evaluation.  Patient and  agree.  · During late August 2019 hospitalization for GI bleed with Hb of 5, EGD through Dr. Roach revealed G AVE.  Laser photocoagulation.  · Subsequently, following with Dr. Roach.  He initially planned EGD every 3 months.  · However, on the 10/18/2019 office visit, he changed the plan to be return to see him mid February 2020 and stated he could retreat her GAVE if she develops a significant drop in her Hb.  · Dr. Roach stated on the 2/11/2020 office note last EGD could not be performed due to a large food bezoar.  He gave her erythromycin and reviewed dietary changes for gastroparesis in hopes of being able to repeat EGD with APC.  · EGD with APC on 4/21/2020, Dr. Roach.    · She states she is maintaining follow-up with Dr. Roach.  · Last EGD was 6 months ago.  Black stools x2 weeks, with Hb drop.  She is going to call Dr. Roach to see about arranging another EGD.  · Last saw Dr. Roach, 3/17/2021.  He planned no scheduled EGDs.  Instead, return if clinical bleeding or \"refractory anemia\".    *Macrocytosis.   B12 and folate unremarkable  .7    *Reticulocytosis.  · On 8/22/2019, unremarkable: Direct [place], haptoglobin, bilirubin ( with normal range up to 214.  Therefore, likely not significant).    *Thrombocytopenia.  " "Intermittent since 9/19/2019.      *Circulating nucleated red blood cells.  Intermittent.  · Could consider a bone marrow biopsy at some point.  · I suspect this is occurring as her bone marrow is trying to increase production after bleeding episodes.  · Nucleated red blood cells seen on 10/31/2019 and again, 11/6/2019    *Stroke mid August 2019.  Presumed.  Could not have MRI due to pacemaker.  Plavix was added to aspirin.  · Patient states Plavix was stopped by other physicians sometime around mid 2020.  · She follows with neurology.  Defer to neurology if Plavix is needed.  · Remains on aspirin    *Increase in fatigue and headaches.  Seeing a headache specialist.  I do not think her increase in fatigue would be due to anemia since her Hb is relatively stable.    *B12 deficiency.  Has been on B12 injections monthly through our office.  · On 1/28/2021, patient states she feels terrible every time she gets a B12 injection including shivering.  · On 1/28/2021, we will try to stop B12 injections.  · If becomes B12 deficient in the future, try oral B12.  · B12 705 on 3/25/2021    Plan  · Every 2-week CBC and Procrit if Hb <10  · (I offered weekly but she wants to maintain every 2 weeks)  · Iron labs and B12 level every 4 weeks   · MD CBC Procrit 2-1/2 months  · Dr. Roach told her to call for follow-up appointment if clinical bleeding or \"refractory anemia\"      · Could consider a bone marrow biopsy in the future.    Male friend/family member assisted with history      "

## 2021-04-14 NOTE — TELEPHONE ENCOUNTER
S/w . Reviewed echo results. Mild AS but new severe PH. Continues to have MCALLISTER. CTA chest to rule out PE ordered.       Please call  to schedule. Patient has significant memory issues.

## 2021-04-16 NOTE — TELEPHONE ENCOUNTER
Patient notified [facility] with the hospital will be calling her to schedule the CT scan.She verbalized understanding./ LUCAS

## 2021-04-16 NOTE — TELEPHONE ENCOUNTER
Joellen, patient has called back to check on the status of the CT scan.  Will you please look into this for us?  Thank you./ LUCAS     Patient can be reached at [phone]

## 2021-04-19 NOTE — TELEPHONE ENCOUNTER
Pt was scheduled for May 11 for CT scan but [facility] changed it to STAT.    Pt can be reached at [phone]

## 2021-04-20 NOTE — TELEPHONE ENCOUNTER
Call received from CT at [place]. pts creatinine is 2.9 GFR 15.  Unable to perform test.    Discussed with AL   Thanks  Yamileth Palma RN  Triage nurse

## 2021-04-20 NOTE — TELEPHONE ENCOUNTER
Ina with [facility] scheduled patient for CT at [place] today at 12:15.  Patient verbalized understanding./ LUCAS

## 2021-04-28 NOTE — TELEPHONE ENCOUNTER
Perfusion only ordered with CXR per protocol due to inability to perform CTA chest due to renal function

## 2021-04-29 NOTE — NURSING NOTE
Lab Results   Component Value Date    WBC 4.96 04/29/2021    HGB 8.5 (L) 04/29/2021    HCT 27.5 (L) 04/29/2021    MCV 98.9 (H) 04/29/2021     04/29/2021     Procrit given per protocol.  Pt c/o left knee pain but states she is seeing MD tomorrow for fluid drainage.  Next appt in our office reviewed and pt instructed to call sooner with concerns and  V/u.

## 2021-04-29 NOTE — PROGRESS NOTES
The plan was for her to have a CBC every 2 weeks with ferritin and iron panel and reticulated hemoglobin every other CBC (every 4 weeks).  It looks like she is being getting her labs not exactly every 2 weeks.  Perhaps that is where the confusion came in.  However, with these labs from today, I see her Hb dropped to 8.5.  I suspect she might be well and iron again.  It looks like they did not draw ferritin iron panel and reticulated hemoglobin today.  Ideally she should come back and have that done.  If she wants to wait until early next week to have another CBC along with ferritin iron panel and reticulated hemoglobin, that would be fine but I do not really want her to wait an entire 2 weeks for the next lab test considering she is already down to 8.5.    ===View-only below this line===  ----- Message -----  From: Lab, Background User  Sent: 4/29/2021  11:01 AM EDT  To: Neno Merritt II, MD

## 2021-05-03 NOTE — TELEPHONE ENCOUNTER
Called the patient to let her know that per Dr. Merritt her iron labs were low and he would like to do two doses of IV iron. I also let her know that her Hgb was stable at 8.5 again. Patient v/u and I let her know that scheduling would be calling her to set this up.

## 2021-05-03 NOTE — PROGRESS NOTES
Please call her and tell her that hemoglobin is stable at 8.5, but her iron labs are low again.  Please arrange 2 doses Injectafer     ===View-only below this line===  ----- Message -----  From: Lab, Background User  Sent: 5/3/2021  11:01 AM EDT  To: Neno Merritt II, MD

## 2021-05-05 NOTE — TELEPHONE ENCOUNTER
S/w patient perfusion particulate scan/chest x-ray has ruled out pulmonary embolism.  Still with shortness of breath on exertion.  She is agreeable to have a pulmonary evaluation.  I will refer her

## 2021-05-13 NOTE — NURSING NOTE
Pt scheduled for both injectafer and procrit today. Spoke with Cori Paulson, clinical RN for Dr. Merritt. Pt may receive either medication today and the other tomorrow at her preference. Spoke with pt and pt wished to receive injectafer today and will return tomorrow for her procrit injection as Hgb 8.5. Spoke with Ekta in scheduling and pt scheduled for 10:45am tomorrow and future procrit appointments moved out one day to meet the 2 week requirement. Relayed this information to pt who vu.    Lab Results   Component Value Date    WBC 7.78 05/13/2021    HGB 8.5 (L) 05/13/2021    HCT 27.8 (L) 05/13/2021    .7 (H) 05/13/2021     05/13/2021

## 2021-05-20 NOTE — OUTREACH NOTE
Stroke Week 4 Survey      Responses   [facility] patient discharged from?  [place]   Does the patient have one of the following disease processes/diagnoses(primary or secondary)?  Stroke (TIA)   Week 4 attempt successful?  Yes   Call start time  1433   Call end time  1443   Discharge diagnosis  Subarachnoid hemorrhage    Is the patient having any side effects they believe may be caused by any medication additions or changes?  No   Is the patient taking all medications as directed (includes completed medication regime)?  Yes   Has the patient kept scheduled appointments due by today?  Yes   Comments  Will see opt Wed and another appt with neuro   Psychosocial issues?  No   Does the patient require any assistance with activities of daily living such as eating, bathing, dressing, walking, etc.?  No   Does the patient have any residual symptoms from stroke/TIA?  No   What is the patient's perception of their health status since discharge?  Improving   Week 4 Call Completed?  Yes   Would the patient like one additional call?  No   Graduated  Yes          Ekta Gallardo RN  
36.2

## 2021-05-28 NOTE — NURSING NOTE
Lab Results   Component Value Date    WBC 5.46 05/28/2021    HGB 8.2 (L) 05/28/2021    HCT 27.4 (L) 05/28/2021    .6 (H) 05/28/2021     05/28/2021     Procrit given per protocol for Hgb 8.2.  Pt denies new complaints.  Next appt in 2 weeks and pt instructed to call sooner with worsening fatigue, SOB or chest pains.  She v/u.

## 2021-06-03 NOTE — NURSING NOTE
Pt in office today for CBC and RN review. She is normally CBC with possible procrit every 2 weeks. She called the office with symptoms of fatigue and weakness, so today's appointment was an add on to her normal schedule.      Her hgb was 8.2 last week Friday 5/28/21. She did receive procrit 15,000 units at that time.     Hgb 8.8 today, but it's only been 6 days since her last procrit injection, so she does not qualify for procrit today.     She is feeling very weak. Sitting around at home unable to do her normal things. She has started using a cane bc she's afraid to walk across the room with her increased weakness. She feels like she needs a transfusion and is surprised the hgb is up to 8.8 today.    I reviewed labs and pt symptoms with Dr. Merritt.  He ordered 2 units PRBC's since pt unable to get procrit today. He wants her to come back in 2 weeks for CBC with possible procrit.  Pt would like appts back on Thursday's if possible.    Pt to return Thursday 6/17/21.  She will need port flush for CBC, possible procrit and also needs labs drawn from port for Dr. Abraham. I checked with the [facility] lab fax log and they did not have those orders yet. Pt is going to call Dr. Abraham's office to make sure the orders are sent over so we can draw them 6/17/21.    She will also return Thursay 7/1/21 for CBC with possible procrit, but will keep her Friday 7/9/21 MD appt bc Dr. Code not available on Thursday 7/8/21.    Sent pt and  to appt desk for transfusion scheduling and updated appt dates/times.  Type and screen was drawn today and blood bank bracelet on pt.     Lab Results   Component Value Date    WBC 7.47 06/03/2021    HGB 8.8 (L) 06/03/2021    HCT 29.3 (L) 06/03/2021    .2 (H) 06/03/2021     06/03/2021

## 2021-06-17 NOTE — NURSING NOTE
Pt presents c/o left arm pain and swelling.  In the office today, the arm did not appear swolen.  Reviewed with MEL Paula with order for doppler of left upper extremity and results to be called to her.  Pt received procrit per protocol.  Nisa to follow up after doppler.  Pt next appt reviewed and she knows to call sooner with concerns.

## 2021-06-17 NOTE — PROGRESS NOTES
Today's preliminary report: Left upper extremity venous doppler is negative for DVT/SVT. Gave report to CHANTELL Tiwari at 3:25pm. She gave instructions to let the patient know that she is negative for DVT and to apply a compress to her arm if pain persists. Patient understands instructions.

## 2021-07-01 NOTE — NURSING NOTE
Lab Results   Component Value Date    WBC 7.94 07/01/2021    HGB 10.1 (L) 07/01/2021    HCT 31.0 (L) 07/01/2021    MCV 96.9 07/01/2021     07/01/2021     Procrit not indicated for Hgb 10.1.  Pt voices fatigue especially yesterday.  Pt instructed to call if symptoms worsen or do not improve and she v/u.

## 2021-07-07 NOTE — PROGRESS NOTES
.     REASONS FOR FOLLOWUP: Anemia    HISTORY OF PRESENT ILLNESS:  The patient is a 81 y.o. year old female  who is here for follow-up with the above-mentioned history.     Denies bleeding, chest pain, SOA  She has been more fatigued over the past couple of weeks.  She thinks this is related to beginning Prozac for depression.  She is only taking her Prozac intermittently because of the fatigue.  I encouraged her to discuss this with her PCP as I told her this medicine is not designed to be taken intermittently.    Past Medical History:   Diagnosis Date   • Anemia     Iron deficiency   • Anxiety    • Aortic stenosis     mild 04/2021   • Cardiomyopathy (CMS/HCC)     S/P pacemaker and defibrillator   • CHF (congestive heart failure) (CMS/HCC)    • Chronic renal failure, stage 4 (severe) (CMS/HCC)    • Colon polyp    • Coronary artery disease     pacemaker, defibrillator   • Depression    • Dizzy    • Gastroparesis    • GAVE (gastric antral vascular ectasia)    • GERD (gastroesophageal reflux disease)    • Gout    • Hemorrhoids    • Hiatal hernia    • History of Clostridium difficile colitis 2013   • History of kidney stones    • History of pancreatitis     2014   • History of skin cancer    • History of transfusion     MULTIPLE    • Hyperlipidemia    • Hypertension    • Hypothyroidism    • Left bundle branch block    • Mitral and aortic valve disease    • Mitral valve insufficiency    • Myoclonus     S/P Depakote   • Osteoarthritis    • Pancytopenia (CMS/HCC)    • Peripheral neuropathy    • Presence of cardiac pacemaker     AND DEFIBRILLATOR   • Pulmonary hypertension (CMS/HCC)    • Pulmonary hypertension (CMS/HCC)    • SOB (shortness of breath) on exertion    • Spinal stenosis    • Stroke (CMS/HCC)     2019   • TIA (transient ischemic attack)      Past Surgical History:   Procedure Laterality Date   • APPENDECTOMY N/A    • ARTERIOVENOUS FISTULA/SHUNT SURGERY Right 2/18/2019    Procedure: RIGHT BASILIC FISTULA  CREATION WITHOUT TRANSPOSITION;  Surgeon: Royer Dozier MD;  Location: [facility] MAIN OR;  Service: Vascular   • ARTERIOVENOUS FISTULA/SHUNT SURGERY Right 5/31/2019    Procedure: RIGHT 2ND STAGE BASILIC VEIN CREATION;  Surgeon: Royer Dozier MD;  Location: [facility] MAIN OR;  Service: Vascular   • CARDIAC CATHETERIZATION Left 03/28/2006    Arterial catheter insertion, cath left ventriculography, coronary angiography and left heart catheterization-Dr. Estevan Matamoros   • CARDIAC DEFIBRILLATOR PLACEMENT N/A 11/30/2007    Biventricular implantable cardioverter defibrillator-Dr. Leandro Huber   • CARDIAC ELECTROPHYSIOLOGY PROCEDURE N/A 10/28/2019    Procedure: GENERATOR CHANGE BI-V ICD  medtronic;  Surgeon: Leandro Huber MD;  Location: [facility] CATH INVASIVE LOCATION;  Service: Cardiology   • CATARACT EXTRACTION Bilateral 2011   • COLONOSCOPY N/A 2014    done at [facility]   • CYSTECTOMY N/A     Ovarian cystectomy   • ENDOSCOPY N/A 04/28/2006    EGD with biopsies. Paraesophageal hiatal hernia from 34 to 44 cm, antral gastritis-Dr. Nehemiah Beal   • ENDOSCOPY N/A 09/29/2004    Hiatal hernia, gastritis and an erosion of the pylorus-Dr. Yang Pavon   • ENDOSCOPY N/A 9/1/2019    Procedure: ESOPHAGOGASTRODUODENOSCOPY with APC;  Surgeon: Anuel Roach MD;  Location: [facility] ENDOSCOPY;  Service: Gastroenterology   • ENDOSCOPY N/A 1/28/2020    Procedure: ESOPHAGOGASTRODUODENOSCOPY with APC;  Surgeon: Anuel Roach MD;  [facility] END[facility];  Service: Gastroenterology   • ENDOSCOPY N/A 4/21/2020    Procedure: ESOPHAGOGASTRODUODENOSCOPY WITH APC;  Surgeon: Anuel Roach MD;  [facility] END[facility];  Service: Gastroenterology;  Laterality: N/A;  PRE- MELENA, GAVE  POST- ESOPHAGITIS, GAVE   • ENDOSCOPY N/A 11/24/2020    Procedure: ESOPHAGOGASTRODUODENOSCOPY;  Surgeon: Anuel Roach MD;  Location: [facility] OR;  Service: Gastroenterology;  Laterality: N/A;  reflux  esophagitis  Gastric Food Bezoar   • ENDOSCOPY N/A 2/12/2021    Procedure: ESOPHAGOGASTRODUODENOSCOPY;  Surgeon: Anuel Roach MD;  Location: [facility] OR;  Service: Gastroenterology;  Laterality: N/A;  with APC- GAVE; Gastroparesis;    • ENTEROSCOPY SMALL BOWEL N/A 10/30/2006    Small bowel enteroscopy with biopsies-Dr. Rory Sevilla   • FLEXIBLE SIGMOIDOSCOPY N/A 09/29/2004    Unsuccessful colonoscopy due to poop prep, a very tortuous sigmoid, bowel prep in the form of enema given and a barium enema was obtained-Dr. Yang Pavon   • FRACTURE SURGERY  2015    Broke big toe   • HEMATOMA EVACUATION TRUNK N/A 02/07/2014    Pocket evacuation of hematoma at pacemaker site-Dr. Leandro Huber   • HEMORRHOIDECTOMY N/A 04/19/2004    Flexible sigmoidoscopy and stapled hemorrhoidectomy-Dr. Yang Pavon   • HYSTERECTOMY Bilateral 1977   • IMPLANTABLE CARDIOVERTER DEFIBRILLATOR LEAD REPLACEMENT/POCKET REVISION Left 3/28/2016    Procedure: AUTOMATIC IMPLANTABLE CARDIOVERTER DEFIBRILLATOR LEAD REPLACEMENT WITH LASER LEAD EXTRACTION;  Surgeon: Leandro Huber MD;  Location: [facility] OR 18/19;  Service:    • IMPLANTABLE CARDIOVERTER DEFIBRILLATOR LEAD REPLACEMENT/POCKET REVISION N/A 01/13/2014    Biventricular ICD replacement-Dr. Jillian Huber   • LAPAROSCOPY REPAIR HIATAL HERNIA N/A 06/27/2006    Laparoscopic paraesophageal hiatal hernia repair with Nissen fundoplication and cholecystectomy-Dr. Sean Yoon   • NATALIE GONZALEZ ([facility]) PROCEDURE     • NE INSJ TUNNELED CVC W/O SUBQ PORT/ AGE 3 YR/> Right 10/3/2017    Procedure: Right internal jugular port placement;  Surgeon: Tiffanie Couch MD;  Location: [facility] OR;  Service: General   • TOE SURGERY Left     SET BIG TOE   • TOTAL KNEE ARTHROPLASTY Left 08/2014   • VENOUS ACCESS DEVICE (PORT) INSERTION Right        MEDICATIONS    Current Outpatient Medications:   •  acetaminophen (TYLENOL) 325 MG tablet, Take 2 tablets by mouth Every 4  (Four) Hours As Needed for Mild Pain ., Disp: , Rfl:   •  aspirin 81 MG EC tablet, Take 81 mg by mouth Daily., Disp: , Rfl:   •  carbidopa-levodopa ER (SINEMET CR)  MG per tablet, Take 1 tablet by mouth Every Evening., Disp: , Rfl:   •  carvedilol (COREG) 12.5 MG tablet, Take 1 tablet by mouth 2 (Two) Times a Day With Meals., Disp: 60 tablet, Rfl: 5  •  erythromycin base (E-MYCIN) 250 MG tablet, TAKE 1/2 TABLET 4 TIMES A  DAY, Disp: 180 tablet, Rfl: 3  •  famotidine (Pepcid) 40 MG tablet, Take 0.5 tablets by mouth 2 (Two) Times a Day., Disp: 180 tablet, Rfl: 3  •  febuxostat (ULORIC) 40 MG tablet, Take 40 mg by mouth Daily., Disp: , Rfl:   •  Ferrous Sulfate (Iron) 90 (18 Fe) MG tablet, Take 1 tablet by mouth., Disp: , Rfl:   •  FLUoxetine (PROzac) 10 MG capsule, Take 10 mg by mouth Daily., Disp: , Rfl:   •  furosemide (LASIX) 40 MG tablet, 2 tablets IN THE AM, 1 AT LUNCH, Disp: , Rfl:   •  Gabapentin, Once-Daily, (Gralise) 300 MG tablet, Take 600 mg by mouth Daily., Disp: , Rfl:   •  Glucosamine-Chondroit-Vit C-Mn (GLUCOSAMINE CHONDROITIN COMPLX) capsule, Take 1,500 mg by mouth Every Other Day., Disp: , Rfl:   •  levothyroxine (SYNTHROID, LEVOTHROID) 25 MCG tablet, Take 25 mcg by mouth Daily., Disp: , Rfl:   •  Magnesium 250 MG tablet, Take  by mouth Daily., Disp: , Rfl:   •  Multiple Vitamin (MULTI-DAY VITAMINS) tablet, Take 1 tablet by mouth Daily. HOLD FOR SURGERY, Disp: , Rfl:   •  polyethylene glycol (MIRALAX) powder, Take 17 g by mouth Daily As Needed., Disp: , Rfl:   •  psyllium (METAMUCIL) 0.52 g capsule, Take 0.52 g by mouth., Disp: , Rfl:   •  rotigotine (NEUPRO) 6 MG/24HR patch, Place 1 patch on the skin as directed by provider Daily., Disp: , Rfl:   •  Vitamin E 400 UNITS tablet, Take 400 Units by mouth Daily., Disp: , Rfl:   •  DULoxetine (CYMBALTA) 30 MG capsule, Take 30 mg by mouth Daily., Disp: , Rfl:   •  Gabapentin, Once-Daily, (GRALISE) 300 MG tablet, Take 300 mg by mouth Daily With Dinner.  "MAY REPEAT LATE EVENING IF NEEDED, Disp: , Rfl:   No current facility-administered medications for this visit.    Facility-Administered Medications Ordered in Other Visits:   •  acetaminophen (TYLENOL) tablet 650 mg, 650 mg, Oral, Once, Thuy Cruz APRN  •  diphenhydrAMINE (BENADRYL) capsule 25 mg, 25 mg, Oral, Once, Thuy Cruz APRN  •  heparin flush (porcine) 100 UNIT/ML injection 500 Units, 500 Units, Intravenous, PRN, Anuel Scott MD, 500 Units at 11/27/17 1347  •  heparin flush (porcine) 100 UNIT/ML injection 500 Units, 500 Units, Intravenous, PRN, Neno Merritt II, MD, 500 Units at 01/18/20 1359  •  heparin injection 500 Units, 500 Units, Intravenous, PRN, Neno Merritt II, MD  •  sodium chloride 0.9 % flush 10 mL, 10 mL, Intravenous, PRN, Anuel Scott MD, 10 mL at 11/27/17 1347  •  sodium chloride 0.9 % flush 10 mL, 10 mL, Intravenous, PRN, Neno Merritt II, MD, 10 mL at 01/18/20 1359  •  sodium chloride 0.9 % flush 10 mL, 10 mL, Intravenous, PRN, Neno Merritt II, MD  •  sodium chloride 0.9 % infusion 250 mL, 250 mL, Intravenous, PRN, Neno Merritt II, MD  •  sodium chloride 0.9 % infusion 250 mL, 250 mL, Intravenous, PRN, Thuy Cruz APRN  •  sodium chloride 0.9 % infusion 250 mL, 250 mL, Intravenous, PRN, Neno Merritt II, MD    ALLERGIES:     Allergies   Allergen Reactions   • Chlorhexidine Rash and Itching     Patient states \"blue dye\" in chlorhexidine.  States the orange and clear are OK to use   • Codeine Unknown - Low Severity     HYPER, DOES NOT HELP PAIN       SOCIAL HISTORY:       Social History     Socioeconomic History   • Marital status:      Spouse name: Zackery   • Number of children: 5   • Years of education: 1 yr college   • Highest education level: Not on file   Tobacco Use   • Smoking status: Never Smoker   • Smokeless tobacco: Never Used   • Tobacco comment: caffeine use - coffee and soda   Vaping Use   • Vaping Use: Never used " "  Substance and Sexual Activity   • Alcohol use: No   • Drug use: No   • Sexual activity: Defer         FAMILY HISTORY:  Family History   Problem Relation Age of Onset   • Coronary artery disease Other    • Dementia Other    • Kidney disease Other    • Arthritis Father    • Early death Father         Kidney failure   • Kidney disease Father    • Heart disease Mother    • Mental illness Mother         Dementia   • Malig Hyperthermia Neg Hx    • Colon cancer Neg Hx    • Colon polyps Neg Hx        REVIEW OF SYSTEMS:  Review of Systems   Constitutional: Negative for activity change.   HENT: Negative for nosebleeds and trouble swallowing.    Respiratory: Negative for shortness of breath and wheezing.    Cardiovascular: Negative for chest pain and palpitations.   Gastrointestinal: Negative for constipation, diarrhea and nausea.   Genitourinary: Negative for dysuria and hematuria.   Musculoskeletal: Negative for arthralgias and myalgias.   Skin: Negative for rash and wound.   Neurological: Negative for seizures and syncope.   Hematological: Negative for adenopathy. Does not bruise/bleed easily.   Psychiatric/Behavioral: Negative for confusion.            Vitals:    07/09/21 1027   BP: 165/80   Pulse: 65   Resp: 18   Temp: 97.3 °F (36.3 °C)   TempSrc: Temporal   SpO2: 98%   Weight: 67.8 kg (149 lb 8 oz)   Height: 170.2 cm (67.01\")   PainSc: 0-No pain     Current Status 7/9/2021   ECOG score 0        PHYSICAL EXAM:        CONSTITUTIONAL:  Vital signs reviewed.  No distress, looks comfortable.  EYES:  Conjunctiva and lids unremarkable.  PERRLA  EARS,NOSE,MOUTH,THROAT:  Ears and nose appear unremarkable.  Lips, teeth, gums appear unremarkable.  RESPIRATORY:  Normal respiratory effort.  Lungs clear to auscultation bilaterally.  CARDIOVASCULAR:  Normal S1, S2.  No murmurs rubs or gallops.  No significant lower extremity edema.  GASTROINTESTINAL: Abdomen appears unremarkable.  Nontender.  No hepatomegaly.  No " splenomegaly.  LYMPHATIC:  No cervical, supraclavicular, axillary lymphadenopathy.  SKIN:  Warm.  No rashes.  PSYCHIATRIC:  Normal judgment and insight.  Normal mood and affect.         WBC   Date/Time Value Ref Range Status   07/09/2021 10:05 AM 6.90 3.40 - 10.80 10*3/mm3 Final   10/04/2020 09:02 AM 8.97 4.5 - 11.0 10*3/uL Final     Hemoglobin   Date/Time Value Ref Range Status   07/09/2021 10:05 AM 9.5 (L) 12.0 - 15.9 g/dL Final   10/04/2020 09:02 AM 11.3 (L) 12.0 - 16.0 g/dL Final     Platelets   Date/Time Value Ref Range Status   07/09/2021 10:05  140 - 450 10*3/mm3 Final   10/04/2020 09:02  140 - 440 10*3/uL Final       Assessment/Plan   There are no diagnoses linked to this encounter.     *Anemia due to stage IV chronic kidney disease.    · Procrit if Hb <10.  · Did not receive Procrit 7/1/2021 as Hb 10.1.  Last Procrit was 6/17/2021.  · 7/9/2021: Hb 9.5.  Give Procrit.    *Admitted 8/29/2019-9/2/2019 due to Hb of 5.  Transfused 2 units.  Heme positive stools.  EGD revealed GAVE.  Laser photocoagulation  · She states Dr. Roach plans EGD every 3 months.    *Iron deficiency anemia.  · Does not respond to oral iron due to poor absorption.  · 2 doses Injectafer late August 2019 resulted in normal iron labs and improvement of Hb from 9.4 up to 11.1 on 9/26/2019.  · Hb down to 10.3, 10/3/2019.  · Iron labs normal 10/31/2019, ferritin 200, 21% saturation.  · Hb dropped to 7.8 on 11/26/2019, requiring transfusion.  · Hb dropped to 6.9 on 12/19/2019, requiring transfusion.  · Hb dropped to 6.5 on 2/12/2020, requiring transfusion  · Hb dropped to 7.8 on 2/18/2020, requiring transfusion.  · On 2/18/2020, ferritin 89, 13% iron saturation.  Gave 1 dose Injectafer.  · On 4/23/2020, ferritin 115, 5% saturation, give 2 doses Injectafer.  · On 6/18/2020, ferritin 461, 31% saturation, Hb 10.7.  · Hb 9.8.  On 8/20/2020, ferritin 345, 29% saturation.  No need for IV iron at this time.  · On 10/29/2020, Hb 8.6.   Ferritin 359, 21% saturation, normal reticulate hemoglobin  · On 11/12/2020, Hb 8.2, ferritin 249, 18% saturation  · Transfused per patient request because EGD planned 11/24/2020 and she reported she can only have the EGD if Hb >8.  · On 1/14/2021, ferritin 212, 25% saturation  · G AVE was not treated during last EGD on 11/24/2020 due to large gastric food bezoar/risk of emesis, aspiration.  Dr. Roach recommended rescheduling and holding any solid food the evening before.  · This has not been rescheduled.  Hb is dropping and now needs a transfusion.  We will try to schedule this for her  · 4/2/2021: Ferritin 178, 19% saturation, Hb 8.8  · 4/12/2021: Hb 9.3.  No need for IV iron   · 6/17/2021, ferritin 662, 36% saturation.  No need for IV iron    *Source of iron deficiency.  · Previously followed regularly with Dr. Earl Avelar.  · States she cannot have colonoscopies due to tortuous colon and therefore has virtual colonoscopies.  · She states she saw Dr. Avelar after the 6/27/2019 visit with me due to recent dark stools.  She states Dr. Avelar did a rectal exam which was heme negative and recommended no further evaluation.  · On 8/22/2019, I told her I suspect she is having occult GI bleeding considering she is needing IV iron frequently.  However, with new stroke mid May 2019 and the addition of Plavix to aspirin, risk may be too great to stop antiplatelet agents for further GI evaluation.  Patient and  agree.  · During late August 2019 hospitalization for GI bleed with Hb of 5, EGD through Dr. Roach revealed G AVE.  Laser photocoagulation.  · Subsequently, following with Dr. Roach.  He initially planned EGD every 3 months.  · However, on the 10/18/2019 office visit, he changed the plan to be return to see him mid February 2020 and stated he could retreat her GAVE if she develops a significant drop in her Hb.  · Dr. Roach stated on the 2/11/2020 office note last EGD could not be performed due  "to a large food bezoar.  He gave her erythromycin and reviewed dietary changes for gastroparesis in hopes of being able to repeat EGD with APC.  · EGD with APC on 4/21/2020, Dr. Roach.    · She states she is maintaining follow-up with Dr. Roach.  · Last EGD was 6 months ago.  Black stools x2 weeks, with Hb drop.  She is going to call Dr. Roach to see about arranging another EGD.  · Last saw Dr. Roach, 3/17/2021.  He planned no scheduled EGDs.  Instead, return if clinical bleeding or \"refractory anemia\".    *Macrocytosis.   B12 and folate unremarkable  MCV 97    *Reticulocytosis.  · On 8/22/2019, unremarkable: Direct [place], haptoglobin, bilirubin ( with normal range up to 214.  Therefore, likely not significant).    *Thrombocytopenia.  Intermittent since 9/19/2019.      *Circulating nucleated red blood cells.  Intermittent.  · Could consider a bone marrow biopsy at some point.  · I suspect this is occurring as her bone marrow is trying to increase production after bleeding episodes.  · Nucleated red blood cells seen on 10/31/2019 and again, 11/6/2019    *Stroke mid August 2019.  Presumed.  Could not have MRI due to pacemaker.  Plavix was added to aspirin.  · Patient states Plavix was stopped by other physicians sometime around mid 2020.  · She follows with neurology.  Defer to neurology if Plavix is needed.  · Remains on aspirin     *Increase in fatigue and headaches.  Seeing a headache specialist.  I do not think her increase in fatigue would be due to anemia since her Hb is relatively stable.    *B12 deficiency.  Has been on B12 injections monthly through our office.  · On 1/28/2021, patient states she feels terrible every time she gets a B12 injection including shivering.  · On 1/28/2021, we will try to stop B12 injections.  · If becomes B12 deficient in the future, try oral B12.  · B12 705 on 3/25/2021    *Depression  · PCP started Prozac mid June 2021.  · Patient has only been " "taking this intermittently because she feels it causes her fatigue.  I told her the medicine is not designed to be taken intermittently/as needed.  I advise she discuss with her PCP.    *Fatigue.  I do not think this is related to her blood counts as her blood counts have been stable and the fatigue has been worse for the past couple of weeks.      Plan  · Every 2-week CBC and Procrit if Hb <10  · (I offered weekly but she wants to maintain every 2 weeks)  · Iron labs and B12 level every 4 weeks   · MD CBC Procrit 2-1/2 months  · Dr. Roach told her to call for follow-up appointment if clinical bleeding or \"refractory anemia\"    · Could consider a bone marrow biopsy in the future.    Male friend/family member assisted with history      "

## 2021-07-09 NOTE — NURSING NOTE
Lab Results   Component Value Date    WBC 6.90 07/09/2021    HGB 9.5 (L) 07/09/2021    HCT 29.1 (L) 07/09/2021    MCV 97.0 07/09/2021     07/09/2021     Procrit given per protocol.  Pt denies new complaints.  Next appt reviewed and pt knows to call sooner with concerns.

## 2021-07-22 NOTE — NURSING NOTE
Lab Results   Component Value Date    WBC 8.17 07/22/2021    HGB 9.2 (L) 07/22/2021    HCT 28.7 (L) 07/22/2021    .3 (H) 07/22/2021     07/22/2021     Procrit given per protocol.  Pt denies new complaints.  Next appt reviewed and pt instructed to call sooner with concerns and v/u.

## 2021-08-05 NOTE — NURSING NOTE
Lab Results   Component Value Date    WBC 6.23 08/05/2021    HGB 7.9 (L) 08/05/2021    HCT 25.3 (L) 08/05/2021    .0 (H) 08/05/2021     08/05/2021     Procrit given per protocol.  Hgb 7.9 reviewed with MEL Marin with v/o for 2 units PRBC's.  Armband placed and T& C drawn.  Pt knows to keep in place until after transfusion.  Pt voices fatigue but denies other new complaints.   Next appt reviewed and pt knows to call sooner with concerns.

## 2021-08-20 NOTE — NURSING NOTE
Pt here for procrit injection. Hgb noted at 9.8. 13,000 units via a multidose vial drawn up.  SQ injection to left arm with bandaid applied.  Pt denies any complaints upon discharge.

## 2021-09-02 NOTE — PROGRESS NOTES
Hi,    Her Hb fell down to 8.2 today.  Her next appointment is not for 2 weeks.  Because of the abrupt drop in her Hb, she should return in 1 week for CBC, ferritin, iron panel, reticulate hemoglobin and possible Procrit in addition to her other appointments    Thanks!

## 2021-09-02 NOTE — NURSING NOTE
Lab Results   Component Value Date    WBC 5.51 09/02/2021    HGB 8.3 (L) 09/02/2021    HCT 27.1 (L) 09/02/2021    .1 (H) 09/02/2021     09/02/2021     Procrit dose increased per protocol.  Pt voices nausea today but states this is normal for her.  She denies chest pain or SOB.  Next appt reviewed and pt instructed to call sooner with any concerns.

## 2021-09-09 NOTE — NURSING NOTE
Lab Results   Component Value Date    WBC 6.50 09/09/2021    HGB 8.0 (L) 09/09/2021    HCT 26.1 (L) 09/09/2021    .0 (H) 09/09/2021     09/09/2021     Pt c/o chills following her last few injections of Procrit.  She denies fevers.  Hgb 8 and reviewed the above with Nova Marin with v/o for 2 units PRBC's and proceed with Procrit.  Pt instructed to call with worsening symptoms and to monitor her temp if she develops chills again.  Pt will see Dr. Merritt next week to determine if procrit needs continue.  Armband placed for T&C and pt instructed not to remove until after transfusion.

## 2021-09-15 NOTE — PROGRESS NOTES
.     REASONS FOR FOLLOWUP: Anemia    HISTORY OF PRESENT ILLNESS:  The patient is a 81 y.o. year old female  who is here for follow-up with the above-mentioned history.    Required a transfusion last week because Hb down to 8.  Feels better since the transfusion but remains fatigued.  No chest pain or SOA.    She has had dark stools for the past couple of weeks.  She states she thinks she needs another EGD.    States for the past couple months or so she gets significant chills, needing several blankets, about 30 minutes after her Procrit injections.  She also has an increase in her baseline nausea.  Typically takes 1000 mg of Tylenol around 7:30 AM and this dates she typically gets her Procrit shots around 10:30 AM.  Not a good candidate for NSAIDs due to intermittent GI bleeding from G AVE, intermittently requiring transfusions    Past Medical History:   Diagnosis Date   • Anemia     Iron deficiency   • Anxiety    • Aortic stenosis     mild 04/2021   • Cardiomyopathy (CMS/HCC)     S/P pacemaker and defibrillator   • CHF (congestive heart failure) (CMS/HCC)    • Chronic renal failure, stage 4 (severe) (CMS/HCC)    • Colon polyp    • Coronary artery disease     pacemaker, defibrillator   • Depression    • Dizzy    • Gastroparesis    • GAVE (gastric antral vascular ectasia)    • GERD (gastroesophageal reflux disease)    • Gout    • Hemorrhoids    • Hiatal hernia    • History of Clostridium difficile colitis 2013   • History of kidney stones    • History of pancreatitis     2014   • History of skin cancer    • History of transfusion     MULTIPLE    • Hyperlipidemia    • Hypertension    • Hypothyroidism    • Left bundle branch block    • Mitral and aortic valve disease    • Mitral valve insufficiency    • Myoclonus     S/P Depakote   • Osteoarthritis    • Pancytopenia (CMS/HCC)    • Peripheral neuropathy    • Presence of cardiac pacemaker     AND DEFIBRILLATOR   • Pulmonary hypertension (CMS/HCC)    • Pulmonary  hypertension (CMS/HCC)    • SOB (shortness of breath) on exertion    • Spinal stenosis    • Stroke (CMS/HCC)     2019   • TIA (transient ischemic attack)      Past Surgical History:   Procedure Laterality Date   • APPENDECTOMY N/A    • ARTERIOVENOUS FISTULA/SHUNT SURGERY Right 2/18/2019    Procedure: RIGHT BASILIC FISTULA CREATION WITHOUT TRANSPOSITION;  Surgeon: Royer Dozier MD;  Location: [facility] MAIN OR;  Service: Vascular   • ARTERIOVENOUS FISTULA/SHUNT SURGERY Right 5/31/2019    Procedure: RIGHT 2ND STAGE BASILIC VEIN CREATION;  Surgeon: Royer Dozier MD;  Location: [facility] OR;  Service: Vascular   • CARDIAC CATHETERIZATION Left 03/28/2006    Arterial catheter insertion, cath left ventriculography, coronary angiography and left heart catheterization-Dr. Estevan Matamoros   • CARDIAC DEFIBRILLATOR PLACEMENT N/A 11/30/2007    Biventricular implantable cardioverter defibrillator-Dr. Leandro Huber   • CARDIAC ELECTROPHYSIOLOGY PROCEDURE N/A 10/28/2019    Procedure: GENERATOR CHANGE BI-V ICD  medtronic;  Surgeon: Leandro Huber MD;  Location: [facility] CATH INVASIVE LOCATION;  Service: Cardiology   • CATARACT EXTRACTION Bilateral 2011   • COLONOSCOPY N/A 2014    done at [facility]   • CYSTECTOMY N/A     Ovarian cystectomy   • ENDOSCOPY N/A 04/28/2006    EGD with biopsies. Paraesophageal hiatal hernia from 34 to 44 cm, antral gastritis-Dr. Nehemiah Beal   • ENDOSCOPY N/A 09/29/2004    Hiatal hernia, gastritis and an erosion of the pylorus-Dr. Yang Pavon   • ENDOSCOPY N/A 9/1/2019    Procedure: ESOPHAGOGASTRODUODENOSCOPY with APC;  Surgeon: Anuel Roach MD;  Location: [facility] ENDOSCOPY;  Service: Gastroenterology   • ENDOSCOPY N/A 1/28/2020    Procedure: ESOPHAGOGASTRODUODENOSCOPY with APC;  Surgeon: Anuel Roach MD;  Location: [facility] ENDOSCOPY;  Service: Gastroenterology   • ENDOSCOPY N/A 4/21/2020    Procedure: ESOPHAGOGASTRODUODENOSCOPY WITH APC;  Surgeon: Anuel Roach  MD Tammy;  Location: [facility] ENDOSCOPY;  Service: Gastroenterology;  Laterality: N/A;  PRE- MELENA, GAVE  POST- ESOPHAGITIS, GAVE   • ENDOSCOPY N/A 11/24/2020    Procedure: ESOPHAGOGASTRODUODENOSCOPY;  Surgeon: Anuel Rocah MD;  Location: [facility] OR;  Service: Gastroenterology;  Laterality: N/A;  reflux esophagitis  Gastric Food Bezoar   • ENDOSCOPY N/A 2/12/2021    Procedure: ESOPHAGOGASTRODUODENOSCOPY;  Surgeon: Anuel Roach MD;  Location: [facility] OR;  Service: Gastroenterology;  Laterality: N/A;  with APC- GAVE; Gastroparesis;    • ENTEROSCOPY SMALL BOWEL N/A 10/30/2006    Small bowel enteroscopy with biopsies-Dr. Rory Sevilla   • FLEXIBLE SIGMOIDOSCOPY N/A 09/29/2004    Unsuccessful colonoscopy due to poop prep, a very tortuous sigmoid, bowel prep in the form of enema given and a barium enema was obtained-Dr. Yang Pavon   • FRACTURE SURGERY  2015    Broke big toe   • HEMATOMA EVACUATION TRUNK N/A 02/07/2014    Pocket evacuation of hematoma at pacemaker site-Dr. Leandro Huber   • HEMORRHOIDECTOMY N/A 04/19/2004    Flexible sigmoidoscopy and stapled hemorrhoidectomy-Dr. Yang Pavon   • HYSTERECTOMY Bilateral 1977   • IMPLANTABLE CARDIOVERTER DEFIBRILLATOR LEAD REPLACEMENT/POCKET REVISION Left 3/28/2016    Procedure: AUTOMATIC IMPLANTABLE CARDIOVERTER DEFIBRILLATOR LEAD REPLACEMENT WITH LASER LEAD EXTRACTION;  Surgeon: Leandro Huber MD;  Location: [facility] HYBRID OR 18/19;  Service:    • IMPLANTABLE CARDIOVERTER DEFIBRILLATOR LEAD REPLACEMENT/POCKET REVISION N/A 01/13/2014    Biventricular ICD replacement-Dr. Jillian Huber   • LAPAROSCOPY REPAIR HIATAL HERNIA N/A 06/27/2006    Laparoscopic paraesophageal hiatal hernia repair with Nissen fundoplication and cholecystectomy-Dr. Sean Yoon   • NATALIE GONZALEZ (CHARLES) PROCEDURE     • VT INSJ TUNNELED CVC W/O SUBQ PORT/ AGE 3 YR/> Right 10/3/2017    Procedure: Right internal jugular port placement;  Surgeon:  Tiffanie Couch MD;  Location: [facility] OR;  Service: General   • TOE SURGERY Left     SET BIG TOE   • TOTAL KNEE ARTHROPLASTY Left 08/2014   • VENOUS ACCESS DEVICE (PORT) INSERTION Right        MEDICATIONS    Current Outpatient Medications:   •  acetaminophen (TYLENOL) 325 MG tablet, Take 2 tablets by mouth Every 4 (Four) Hours As Needed for Mild Pain ., Disp: , Rfl:   •  aspirin 81 MG EC tablet, Take 81 mg by mouth Daily., Disp: , Rfl:   •  carbidopa-levodopa ER (SINEMET CR)  MG per tablet, Take 1 tablet by mouth Every Evening., Disp: , Rfl:   •  carvedilol (COREG) 12.5 MG tablet, Take 1 tablet by mouth 2 (Two) Times a Day With Meals., Disp: 60 tablet, Rfl: 5  •  erythromycin base (E-MYCIN) 250 MG tablet, TAKE 1/2 TABLET 4 TIMES A  DAY (Patient taking differently: Take 125 mg by mouth 4 (Four) Times a Day.), Disp: 180 tablet, Rfl: 3  •  famotidine (PEPCID) 20 MG tablet, Take 1 tablet by mouth Every 12 (Twelve) Hours., Disp: , Rfl:   •  febuxostat (ULORIC) 80 MG tablet tablet, , Disp: , Rfl:   •  FLUoxetine (PROzac) 10 MG capsule, Take 10 mg by mouth Daily., Disp: , Rfl:   •  furosemide (LASIX) 40 MG tablet, 2 tablets IN THE AM, 1 AT LUNCH, Disp: , Rfl:   •  Gabapentin, Once-Daily, (Gralise) 300 MG tablet, Take 600 mg by mouth Daily., Disp: , Rfl:   •  Glucosamine-Chondroit-Vit C-Mn (GLUCOSAMINE CHONDROITIN COMPLX) capsule, Take 1,500 mg by mouth Every Other Day., Disp: , Rfl:   •  levothyroxine (SYNTHROID, LEVOTHROID) 25 MCG tablet, Take 25 mcg by mouth Daily., Disp: , Rfl:   •  lidocaine (LIDODERM) 5 %, , Disp: , Rfl:   •  Multiple Vitamin (MULTI-DAY VITAMINS) tablet, Take 1 tablet by mouth Daily. HOLD FOR SURGERY, Disp: , Rfl:   •  psyllium (METAMUCIL) 0.52 g capsule, Take 0.52 g by mouth., Disp: , Rfl:   •  rotigotine (NEUPRO) 6 MG/24HR patch, Place 1 patch on the skin as directed by provider Daily., Disp: , Rfl:   •  febuxostat (ULORIC) 40 MG tablet, Take 40 mg by mouth Daily., Disp: , Rfl:   •   "Ferrous Sulfate (Iron) 90 (18 Fe) MG tablet, Take 1 tablet by mouth., Disp: , Rfl:   •  Magnesium 250 MG tablet, Take  by mouth Daily., Disp: , Rfl:   •  polyethylene glycol (MIRALAX) powder, Take 17 g by mouth Daily As Needed., Disp: , Rfl:   No current facility-administered medications for this visit.    Facility-Administered Medications Ordered in Other Visits:   •  heparin flush (porcine) 100 UNIT/ML injection 500 Units, 500 Units, Intravenous, PRN, Anuel Scott MD, 500 Units at 11/27/17 1347  •  heparin flush (porcine) 100 UNIT/ML injection 500 Units, 500 Units, Intravenous, PRN, Neno Merritt II, MD, 500 Units at 01/18/20 1359  •  heparin injection 500 Units, 500 Units, Intravenous, PRN, Neno Merritt II, MD  •  heparin injection 500 Units, 500 Units, Intravenous, PRN, Neno Merritt II, MD  •  sodium chloride 0.9 % flush 10 mL, 10 mL, Intravenous, PRN, Anuel Scott MD, 10 mL at 11/27/17 1347  •  sodium chloride 0.9 % flush 10 mL, 10 mL, Intravenous, PRN, Neno Merritt II, MD, 10 mL at 01/18/20 1359  •  sodium chloride 0.9 % flush 10 mL, 10 mL, Intravenous, PRN, Neno Merritt II, MD  •  sodium chloride 0.9 % flush 10 mL, 10 mL, Intravenous, PRN, Neno Merritt II, MD  •  sodium chloride 0.9 % infusion 250 mL, 250 mL, Intravenous, PRN, Neno Merritt II, MD  •  sodium chloride 0.9 % infusion 250 mL, 250 mL, Intravenous, PRN, Neno Merritt II, MD    ALLERGIES:     Allergies   Allergen Reactions   • Chlorhexidine Rash and Itching     Patient states \"blue dye\" in chlorhexidine.  States the orange and clear are OK to use   • Codeine Unknown - Low Severity     HYPER, DOES NOT HELP PAIN       SOCIAL HISTORY:       Social History     Socioeconomic History   • Marital status:      Spouse name: Zackery   • Number of children: 5   • Years of education: 1 yr college   • Highest education level: Not on file   Tobacco Use   • Smoking status: Never Smoker   • Smokeless tobacco: Never Used   • Tobacco " "comment: caffeine use - coffee and soda   Vaping Use   • Vaping Use: Never used   Substance and Sexual Activity   • Alcohol use: No   • Drug use: No   • Sexual activity: Defer         FAMILY HISTORY:  Family History   Problem Relation Age of Onset   • Coronary artery disease Other    • Dementia Other    • Kidney disease Other    • Arthritis Father    • Early death Father         Kidney failure   • Kidney disease Father    • Heart disease Mother    • Mental illness Mother         Dementia   • Malig Hyperthermia Neg Hx    • Colon cancer Neg Hx    • Colon polyps Neg Hx        REVIEW OF SYSTEMS:  Review of Systems   Constitutional: Negative for activity change.   HENT: Negative for nosebleeds and trouble swallowing.    Respiratory: Negative for shortness of breath and wheezing.    Cardiovascular: Negative for chest pain and palpitations.   Gastrointestinal: Negative for constipation, diarrhea and nausea.   Genitourinary: Negative for dysuria and hematuria.   Musculoskeletal: Negative for arthralgias and myalgias.   Skin: Negative for rash and wound.   Neurological: Negative for seizures and syncope.   Hematological: Negative for adenopathy. Does not bruise/bleed easily.   Psychiatric/Behavioral: Negative for confusion.            Vitals:    09/16/21 1030   BP: 173/71   Pulse: 67   Resp: 16   Temp: 97.5 °F (36.4 °C)   TempSrc: Temporal   SpO2: 98%   Weight: 67.2 kg (148 lb 3.2 oz)   Height: 167.6 cm (65.98\")   PainSc: 0-No pain     Current Status 9/16/2021   ECOG score 1        PHYSICAL EXAM:        CONSTITUTIONAL:  Vital signs reviewed.  No distress, looks comfortable.  EYES:  Conjunctiva and lids unremarkable.  PERRLA  EARS,NOSE,MOUTH,THROAT:  Ears and nose appear unremarkable.  Lips, teeth, gums appear unremarkable.  RESPIRATORY:  Normal respiratory effort.  Lungs clear to auscultation bilaterally.  CARDIOVASCULAR:  Normal S1, S2.  No murmurs rubs or gallops.  No significant lower extremity edema.  GASTROINTESTINAL: " Abdomen appears unremarkable.  Nontender.  No hepatomegaly.  No splenomegaly.  LYMPHATIC:  No cervical, supraclavicular, axillary lymphadenopathy.  SKIN:  Warm.  No rashes.  PSYCHIATRIC:  Normal judgment and insight.  Normal mood and affect.         WBC   Date/Time Value Ref Range Status   09/16/2021 10:24 AM 7.15 3.40 - 10.80 10*3/mm3 Final   10/04/2020 09:02 AM 8.97 4.5 - 11.0 10*3/uL Final     Hemoglobin   Date/Time Value Ref Range Status   09/16/2021 10:24 AM 10.5 (L) 12.0 - 15.9 g/dL Final   10/04/2020 09:02 AM 11.3 (L) 12.0 - 16.0 g/dL Final     Platelets   Date/Time Value Ref Range Status   09/16/2021 10:24  140 - 450 10*3/mm3 Final   10/04/2020 09:02  140 - 440 10*3/uL Final       Assessment/Plan   Iron deficiency anemia due to chronic blood loss  - Vitamin B12  - Ferritin  - Iron Profile  - CBC & Differential  - Retic With IRF & RET-He       *Anemia due to stage IV chronic kidney disease.    · Procrit if Hb <10.  · Did not receive Procrit 7/1/2021 as Hb 10.1.  Last Procrit was 6/17/2021.  · 7/9/2021: Hb 9.5.  Give Procrit.  · 9/16/2021: Hb 10.5 (improved after transfusion).  No Procrit today.    *Admitted 8/29/2019-9/2/2019 due to Hb of 5.  Transfused 2 units.  Heme positive stools.  EGD revealed GAVE.  Laser photocoagulation  · She states Dr. Roach plans EGD every 3 months.    *Iron deficiency anemia.  · Does not respond to oral iron due to poor absorption.  · 2 doses Injectafer late August 2019 resulted in normal iron labs and improvement of Hb from 9.4 up to 11.1 on 9/26/2019.  · Hb down to 10.3, 10/3/2019.  · Iron labs normal 10/31/2019, ferritin 200, 21% saturation.  · Hb dropped to 7.8 on 11/26/2019, requiring transfusion.  · Hb dropped to 6.9 on 12/19/2019, requiring transfusion.  · Hb dropped to 6.5 on 2/12/2020, requiring transfusion  · Hb dropped to 7.8 on 2/18/2020, requiring transfusion.  · On 2/18/2020, ferritin 89, 13% iron saturation.  Gave 1 dose Injectafer.  · On 4/23/2020,  ferritin 115, 5% saturation, give 2 doses Injectafer.  · On 6/18/2020, ferritin 461, 31% saturation, Hb 10.7.  · Hb 9.8.  On 8/20/2020, ferritin 345, 29% saturation.  No need for IV iron at this time.  · On 10/29/2020, Hb 8.6.  Ferritin 359, 21% saturation, normal reticulate hemoglobin  · On 11/12/2020, Hb 8.2, ferritin 249, 18% saturation  · Transfused per patient request because EGD planned 11/24/2020 and she reported she can only have the EGD if Hb >8.  · On 1/14/2021, ferritin 212, 25% saturation  · G AVE was not treated during last EGD on 11/24/2020 due to large gastric food bezoar/risk of emesis, aspiration.  Dr. Roach recommended rescheduling and holding any solid food the evening before.  · This has not been rescheduled.  Hb is dropping and now needs a transfusion.  We will try to schedule this for her  · 4/2/2021: Ferritin 178, 19% saturation, Hb 8.8  · 4/12/2021: Hb 9.3.  No need for IV iron   · 6/17/2021, ferritin 662, 36% saturation.  No need for IV iron  · 9/9/2021: Ferritin 309, 17% saturation.    *Source of iron deficiency.  · Previously followed regularly with Dr. Earl Avelar.  · States she cannot have colonoscopies due to tortuous colon and therefore has virtual colonoscopies.  · She states she saw Dr. Avelar after the 6/27/2019 visit with me due to recent dark stools.  She states Dr. Avelar did a rectal exam which was heme negative and recommended no further evaluation.  · On 8/22/2019, I told her I suspect she is having occult GI bleeding considering she is needing IV iron frequently.  However, with new stroke mid May 2019 and the addition of Plavix to aspirin, risk may be too great to stop antiplatelet agents for further GI evaluation.  Patient and  agree.  · During late August 2019 hospitalization for GI bleed with Hb of 5, EGD through Dr. Roach revealed G AVE.  Laser photocoagulation.  · Subsequently, following with Dr. Roach.  He initially planned EGD every 3  "months.  · However, on the 10/18/2019 office visit, he changed the plan to be return to see him mid February 2020 and stated he could retreat her GAVE if she develops a significant drop in her Hb.  · Dr. Roach stated on the 2/11/2020 office note last EGD could not be performed due to a large food bezoar.  He gave her erythromycin and reviewed dietary changes for gastroparesis in hopes of being able to repeat EGD with APC.  · EGD with APC on 4/21/2020, Dr. Roach.    · She states she is maintaining follow-up with Dr. Roach.  · Last EGD was 6 months ago.  Black stools x2 weeks, with Hb drop.  She is going to call Dr. Roach to see about arranging another EGD.  · Last saw Dr. Roach, 3/17/2021.  He planned no scheduled EGDs.  Instead, return if clinical bleeding or \"refractory anemia\".  · Last EGD 4/21/2020  · We have called to request another EGD    *Macrocytosis.   B12 and folate unremarkable  MCV 99.1    *Reticulocytosis.  · On 8/22/2019, unremarkable: Direct [place], haptoglobin, bilirubin ( with normal range up to 214.  Therefore, likely not significant).    *Thrombocytopenia.  Intermittent since 9/19/2019.      *Circulating nucleated red blood cells.  Intermittent.  · Could consider a bone marrow biopsy at some point.  · I suspect this is occurring as her bone marrow is trying to increase production after bleeding episodes.  · Nucleated red blood cells seen on 10/31/2019 and again, 11/6/2019    *Stroke mid August 2019.  Presumed.  Could not have MRI due to pacemaker.  Plavix was added to aspirin.  · Patient states Plavix was stopped by other physicians sometime around mid 2020.  · She follows with neurology.  Defer to neurology if Plavix is needed.  · Remains on aspirin     *Increase in fatigue and headaches.  Seeing a headache specialist.  I do not think her increase in fatigue would be due to anemia since her Hb is relatively stable.    *B12 deficiency.  Has been on B12 " "injections monthly through our office.  · On 1/28/2021, patient states she feels terrible every time she gets a B12 injection including shivering.  · On 1/28/2021, we will try to stop B12 injections.  · If becomes B12 deficient in the future, try oral B12.  · B12 705 on 3/25/2021  Check this again before her next MD visit    *Depression  · PCP started Prozac mid June 2021.  · Patient has only been taking this intermittently because she feels it causes her fatigue.  I told her the medicine is not designed to be taken intermittently/as needed.  I advise she discuss with her PCP.    *Fatigue.  I do not think this is related to her blood counts as her blood counts have been stable and the fatigue has been worse for the past couple of weeks.    *Possible reactions to Procrit  · 9/16/2021: States for the past couple months or so she gets significant chills, needing several blankets, about 30 minutes after her Procrit injections.  She also has an increase in her baseline nausea.  Typically takes 1000 mg of Tylenol around 7:30 AM and this dates she typically gets her Procrit shots around 10:30 AM.  Not a good candidate for NSAIDs due to intermittent GI bleeding from G A[address], intermittently requiring transfusions  · Prednisone 10 mg oral (with food) 1 hour before Procrit injections    Plan  · Every 2-week CBC and Procrit if Hb <10  · (I offered weekly but she wants to maintain every 2 weeks)  · Iron labs and B12 level every 4 weeks   · MD CBC Procrit 2-1/2 months  · Dr. Roach told her to call for follow-up appointment if clinical bleeding or \"refractory anemia\"  · Make an appointment with Dr. Roach for EGD  · Prescription for prednisone 10 mg oral (with food) 1 hour before Procrit injections.  Dispense #12 with 3 refills    · Could consider a bone marrow biopsy in the future.    Male friend/family member assisted with history  Discussed with pharmacy        "

## 2021-09-15 NOTE — TELEPHONE ENCOUNTER
----- Message from Neno Merritt II, MD sent at 9/15/2021  7:38 AM EDT -----  Hi,    Please make sure she has a CBC, stat ferritin, stat iron panel, reticulate hemoglobin the day she sees me, 9/16/2021.  Ask her to come in early so we can have the results when I see her.  Also, go ahead and type and cross her when those labs are done because I suspect she will need a transfusion.  I realize she just had the iron labs  1 week prior.  She needs to repeat iron labs again.  Please do not let the lab disregard the orders, thinking they were done the week prior so they do not need to be done again.    Thanks!

## 2021-09-16 NOTE — TELEPHONE ENCOUNTER
Cindi from Dr. Merritt's office called.  Patient was seen today.  Would like for her to have EGD ASAP.  She has black stools.  She had blood transfusion the other day.  Please review his office note and advise.

## 2021-09-17 NOTE — TELEPHONE ENCOUNTER
Called the patient to let her know that Dr. Merritt spoke with pharmacy and they both recommended the Prednisone 10 mg 1 hour prior to procrit. I let her know that I sent this in for her to [facility] and to take this with food. Patient v/u.

## 2021-09-20 NOTE — TELEPHONE ENCOUNTER
CALLED AND NOTIFY PATIENT.  SCHEDULED AT [place] ON 10/08/2021 AT 11:45AM - ARRIVE 10:30AM.  WILL MAIL INSTRUCTIONS.    COVID TEST AT [place] ON 10/06/2021 ARRIVE 8AM.  NEED TO SELF QUARANTINE UNTIL AFTER PROCEDURE.  SHE UNDERSTANDS.

## 2021-09-22 NOTE — PATIENT INSTRUCTIONS
"Osteoarthritis    Osteoarthritis is a type of arthritis. It refers to joint pain or joint disease. Osteoarthritis affects tissue that covers the ends of bones in joints (cartilage). Cartilage acts as a cushion between the bones and helps them move smoothly. Osteoarthritis occurs when cartilage in the joints gets worn down. Osteoarthritis is sometimes called \"wear and tear\" arthritis.  Osteoarthritis is the most common form of arthritis. It often occurs in older people. It is a condition that gets worse over time. The joints most often affected by this condition are in the fingers, toes, hips, knees, and spine, including the neck and lower back.  What are the causes?  This condition is caused by the wearing down of cartilage that covers the ends of bones.  What increases the risk?  The following factors may make you more likely to develop this condition:  · Being age 50 or older.  · Obesity.  · Overuse of joints.  · Past injury of a joint.  · Past surgery on a joint.  · Family history of osteoarthritis.  What are the signs or symptoms?  The main symptoms of this condition are pain, swelling, and stiffness in the joint. Other symptoms may include:  · An enlarged joint.  · More pain and further damage caused by small pieces of bone or cartilage that break off and float inside of the joint.  · Small deposits of bone (osteophytes) that grow on the edges of the joint.  · A grating or scraping feeling inside the joint when you move it.  · Popping or creaking sounds when you move.  · Difficulty walking or exercising.  · An inability to  items, twist your hand(s), or control the movements of your hands and fingers.  How is this diagnosed?  This condition may be diagnosed based on:  · Your medical history.  · A physical exam.  · Your symptoms.  · X-rays of the affected joint(s).  · Blood tests to rule out other types of arthritis.  How is this treated?  There is no cure for this condition, but treatment can help control " pain and improve joint function. Treatment may include a combination of therapies, such as:  · Pain relief techniques, such as:  ? Applying heat and cold to the joint.  ? Massage.  ? A form of talk therapy called cognitive behavioral therapy (CBT). This therapy helps you set goals and follow up on the changes that you make.  · Medicines for pain and inflammation. The medicines can be taken by mouth or applied to the skin. They include:  ? NSAIDs, such as ibuprofen.  ? Prescription medicines.  ? Strong anti-inflammatory medicines (corticosteroids).  ? Certain nutritional supplements.  · A prescribed exercise program. You may work with a physical therapist.  · Assistive devices, such as a brace, wrap, splint, specialized glove, or cane.  · A weight control plan.  · Surgery, such as:  ? An osteotomy. This is done to reposition the bones and relieve pain or to remove loose pieces of bone and cartilage.  ? Joint replacement surgery. You may need this surgery if you have advanced osteoarthritis.  Follow these instructions at home:  Activity  · Rest your affected joints as told by your health care provider.  · Exercise as told by your health care provider. He or she may recommend specific types of exercise, such as:  ? Strengthening exercises. These are done to strengthen the muscles that support joints affected by arthritis.  ? Aerobic activities. These are exercises, such as brisk walking or water aerobics, that increase your heart rate.  ? Range-of-motion activities. These help your joints move more easily.  ? Balance and agility exercises.  Managing pain, stiffness, and swelling         · If directed, apply heat to the affected area as often as told by your health care provider. Use the heat source that your health care provider recommends, such as a moist heat pack or a heating pad.  ? If you have a removable assistive device, remove it as told by your health care provider.  ? Place a towel between your skin and the  heat source. If your health care provider tells you to keep the assistive device on while you apply heat, place a towel between the assistive device and the heat source.  ? Leave the heat on for 20-30 minutes.  ? Remove the heat if your skin turns bright red. This is especially important if you are unable to feel pain, heat, or cold. You may have a greater risk of getting burned.  · If directed, put ice on the affected area. To do this:  ? If you have a removable assistive device, remove it as told by your health care provider.  ? Put ice in a plastic bag.  ? Place a towel between your skin and the bag. If your health care provider tells you to keep the assistive device on during icing, place a towel between the assistive device and the bag.  ? Leave the ice on for 20 minutes, 2-3 times a day.  ? Move your fingers or toes often to reduce stiffness and swelling.  ? Raise (elevate) the injured area above the level of your heart while you are sitting or lying down.  General instructions  · Take over-the-counter and prescription medicines only as told by your health care provider.  · Maintain a healthy weight. Follow instructions from your health care provider for weight control.  · Do not use any products that contain nicotine or tobacco, such as cigarettes, e-cigarettes, and chewing tobacco. If you need help quitting, ask your health care provider.  · Use assistive devices as told by your health care provider.  · Keep all follow-up visits as told by your health care provider. This is important.  Where to find more information  · [employer] [place] of Arth[employer] and Musculoskeletal and Skin Diseases: www.niams.nih.gov  · [employer] [place] [employer] Aging: www.tata.nih.gov  · American College of Rheumatology: www.rheumatology.org  Contact a health care provider if:  · You have redness, swelling, or a feeling of warmth in a joint that gets worse.  · You have a fever along with joint or muscle aches.  · You develop a rash.  · You  have trouble doing your normal activities.  Get help right away if:  · You have pain that gets worse and is not relieved by pain medicine.  Summary  · Osteoarthritis is a type of arthritis that affects tissue covering the ends of bones in joints (cartilage).  · This condition is caused by the wearing down of cartilage that covers the ends of bones.  · The main symptom of this condition is pain, swelling, and stiffness in the joint.  · There is no cure for this condition, but treatment can help control pain and improve joint function.  This information is not intended to replace advice given to you by your health care provider. Make sure you discuss any questions you have with your health care provider.  Document Revised: 12/14/2020 Document Reviewed: 12/14/2020  [employer] Patient Education © 2021 [employer]  Neck Exercises  Ask your health care provider which exercises are safe for you. Do exercises exactly as told by your health care provider and adjust them as directed. It is normal to feel mild stretching, pulling, tightness, or discomfort as you do these exercises. Stop right away if you feel sudden pain or your pain gets worse. Do not begin these exercises until told by your health care provider.  Neck exercises can be important for many reasons. They can improve strength and maintain flexibility in your neck, which will help your upper back and prevent neck pain.  Stretching exercises  Rotation neck stretching    1. Sit in a chair or stand up.  2. Place your feet flat on the floor, shoulder width apart.  3. Slowly turn your head (rotate) to the right until a slight stretch is felt. Turn it all the way to the right so you can look over your right shoulder. Do not tilt or tip your head.  4. Hold this position for 10-30 seconds.  5. Slowly turn your head (rotate) to the left until a slight stretch is felt. Turn it all the way to the left so you can look over your left shoulder. Do not tilt or tip your  head.  6. Hold this position for 10-30 seconds.  Repeat __________ times. Complete this exercise __________ times a day.  Neck retraction  1. Sit in a sturdy chair or stand up.  2. Look straight ahead. Do not bend your neck.  3. Use your fingers to push your chin backward (retraction). Do not bend your neck for this movement. Continue to face straight ahead. If you are doing the exercise properly, you will feel a slight sensation in your throat and a stretch at the back of your neck.  4. Hold the stretch for 1-2 seconds.  Repeat __________ times. Complete this exercise __________ times a day.  Strengthening exercises  Neck press  1. Lie on your back on a firm bed or on the floor with a pillow under your head.  2. Use your neck muscles to push your head down on the pillow and straighten your spine.  3. Hold the position as well as you can. Keep your head facing up (in a neutral position) and your chin tucked.  4. Slowly count to 5 while holding this position.  Repeat __________ times. Complete this exercise __________ times a day.  Isometrics  These are exercises in which you strengthen the muscles in your neck while keeping your neck still (isometrics).  1. Sit in a supportive chair and place your hand on your forehead.  2. Keep your head and face facing straight ahead. Do not flex or extend your neck while doing isometrics.  3. Push forward with your head and neck while pushing back with your hand. Hold for 10 seconds.  4. Do the sequence again, this time putting your hand against the back of your head. Use your head and neck to push backward against the hand pressure.  5. Finally, do the same exercise on either side of your head, pushing sideways against the pressure of your hand.  Repeat __________ times. Complete this exercise __________ times a day.  Prone head lifts  1. Lie face-down (prone position), resting on your elbows so that your chest and upper back are raised.  2. Start with your head facing downward,  near your chest. Position your chin either on or near your chest.  3. Slowly lift your head upward. Lift until you are looking straight ahead. Then continue lifting your head as far back as you can comfortably stretch.  4. Hold your head up for 5 seconds. Then slowly lower it to your starting position.  Repeat __________ times. Complete this exercise __________ times a day.  Supine head lifts  1. Lie on your back (supine position), bending your knees to point to the ceiling and keeping your feet flat on the floor.  2. Lift your head slowly off the floor, raising your chin toward your chest.  3. Hold for 5 seconds.  Repeat __________ times. Complete this exercise __________ times a day.  Scapular retraction  1. Stand with your arms at your sides. Look straight ahead.  2. Slowly pull both shoulders (scapulae) backward and downward (retraction) until you feel a stretch between your shoulder blades in your upper back.  3. Hold for 10-30 seconds.  4. Relax and repeat.  Repeat __________ times. Complete this exercise __________ times a day.  Contact a health care provider if:  · Your neck pain or discomfort gets much worse when you do an exercise.  · Your neck pain or discomfort does not improve within 2 hours after you exercise.  If you have any of these problems, stop exercising right away. Do not do the exercises again unless your health care provider says that you can.  Get help right away if:  · You develop sudden, severe neck pain.  If this happens, stop exercising right away. Do not do the exercises again unless your health care provider says that you can.  This information is not intended to replace advice given to you by your health care provider. Make sure you discuss any questions you have with your health care provider.  Document Revised: 10/16/2019 Document Reviewed: 10/16/2019  [employer] Patient Education © 2021 [employer]

## 2021-09-22 NOTE — PROGRESS NOTES
Patient Name: Charmaine Machuca   YOB: 1940  Referring Primary Care Physician: Fannie Godfrey MD  BMI: Body mass index is 23.08 kg/m².    Chief Complaint:    Chief Complaint   Patient presents with   • Cervical Spine - Pain        HPI: New patient worked in to see me today for chronic neck pain that is gotten worse over the past couple months she is having pain at night when she sleeps or tries to move her neck in any direction.  She is sent from her PCP for evaluation and treatment.  No prior imaging has been done.  Patient has a pacemaker and has a history of low hemoglobin and anemia.  Denies any injury or trauma her  is at the bedside denies any radicular symptoms of pain into either of her arms is really focused in the back of the neck causing extensive stiffness and lack of range of motion.  She has a history of renal problems chronic kidney disease stage IV.    Charmaine Machuca is a 81 y.o. female who presents today for evaluation of   Chief Complaint   Patient presents with   • Cervical Spine - Pain         Subjective   Medications:   Home Medications:  Current Outpatient Medications on File Prior to Visit   Medication Sig   • acetaminophen (TYLENOL) 325 MG tablet Take 2 tablets by mouth Every 4 (Four) Hours As Needed for Mild Pain .   • aspirin 81 MG EC tablet Take 81 mg by mouth Daily.   • carbidopa-levodopa ER (SINEMET CR)  MG per tablet Take 1 tablet by mouth Every Evening.   • carvedilol (COREG) 12.5 MG tablet Take 1 tablet by mouth 2 (Two) Times a Day With Meals.   • erythromycin base (E-MYCIN) 250 MG tablet TAKE 1/2 TABLET 4 TIMES A  DAY (Patient taking differently: Take 125 mg by mouth 4 (Four) Times a Day.)   • famotidine (PEPCID) 20 MG tablet Take 1 tablet by mouth Every 12 (Twelve) Hours.   • febuxostat (ULORIC) 40 MG tablet Take 40 mg by mouth Daily.   • febuxostat (ULORIC) 80 MG tablet tablet    • Ferrous Sulfate (Iron) 90 (18 Fe) MG tablet Take 1 tablet by  mouth.   • FLUoxetine (PROzac) 10 MG capsule Take 10 mg by mouth Daily.   • furosemide (LASIX) 40 MG tablet 2 tablets IN THE AM, 1 AT LUNCH   • Gabapentin, Once-Daily, (Gralise) 300 MG tablet Take 600 mg by mouth Daily.   • Glucosamine-Chondroit-Vit C-Mn (GLUCOSAMINE CHONDROITIN COMPLX) capsule Take 1,500 mg by mouth Every Other Day.   • levothyroxine (SYNTHROID, LEVOTHROID) 25 MCG tablet Take 25 mcg by mouth Daily.   • lidocaine (LIDODERM) 5 %    • Magnesium 250 MG tablet Take  by mouth Daily.   • Multiple Vitamin (MULTI-DAY VITAMINS) tablet Take 1 tablet by mouth Daily. HOLD FOR SURGERY   • polyethylene glycol (MIRALAX) powder Take 17 g by mouth Daily As Needed.   • psyllium (METAMUCIL) 0.52 g capsule Take 0.52 g by mouth.   • rotigotine (NEUPRO) 6 MG/24HR patch Place 1 patch on the skin as directed by provider Daily.   • predniSONE (DELTASONE) 10 MG tablet Take 1 tablet by mouth Take As Directed. Take 1 tablet with food 1 hour prior to getting your procrit injection.     Current Facility-Administered Medications on File Prior to Visit   Medication   • heparin flush (porcine) 100 UNIT/ML injection 500 Units   • heparin flush (porcine) 100 UNIT/ML injection 500 Units   • heparin injection 500 Units   • sodium chloride 0.9 % flush 10 mL   • sodium chloride 0.9 % flush 10 mL   • sodium chloride 0.9 % flush 10 mL   • sodium chloride 0.9 % infusion 250 mL   • sodium chloride 0.9 % infusion 250 mL     Current Medications:  Scheduled Meds:  Continuous Infusions:No current facility-administered medications for this visit.    PRN Meds:.    I have reviewed the patient's medical history in detail and updated the computerized patient record.  Review and summarization of old records includes:    Past Medical History:   Diagnosis Date   • Anemia     Iron deficiency   • Anxiety    • Aortic stenosis     mild 04/2021   • Cardiomyopathy (CMS/HCC)     S/P pacemaker and defibrillator   • CHF (congestive heart failure) (CMS/HCC)    •  Chronic renal failure, stage 4 (severe) (CMS/HCC)    • Colon polyp    • Coronary artery disease     pacemaker, defibrillator   • Depression    • Dizzy    • Gastroparesis    • GAVE (gastric antral vascular ectasia)    • GERD (gastroesophageal reflux disease)    • Gout    • Hemorrhoids    • Hiatal hernia    • History of Clostridium difficile colitis 2013   • History of kidney stones    • History of pancreatitis     2014   • History of skin cancer    • History of transfusion     MULTIPLE    • Hyperlipidemia    • Hypertension    • Hypothyroidism    • Left bundle branch block    • Mitral and aortic valve disease    • Mitral valve insufficiency    • Myoclonus     S/P Depakote   • Osteoarthritis    • Pancytopenia (CMS/HCC)    • Peripheral neuropathy    • Presence of cardiac pacemaker     AND DEFIBRILLATOR   • Pulmonary hypertension (CMS/HCC)    • Pulmonary hypertension (CMS/HCC)    • SOB (shortness of breath) on exertion    • Spinal stenosis    • Stroke (CMS/HCC)     2019   • TIA (transient ischemic attack)         Past Surgical History:   Procedure Laterality Date   • APPENDECTOMY N/A    • ARTERIOVENOUS FISTULA/SHUNT SURGERY Right 2/18/2019    Procedure: RIGHT BASILIC FISTULA CREATION WITHOUT TRANSPOSITION;  Surgeon: Royer Dozier MD;  Location: [facility];  Service: Vascular   • ARTERIOVENOUS FISTULA/SHUNT SURGERY Right 5/31/2019    Procedure: RIGHT 2ND STAGE BASILIC VEIN CREATION;  Surgeon: Royer Dozier MD;  Location: [facility];  Service: Vascular   • CARDIAC CATHETERIZATION Left 03/28/2006    Arterial catheter insertion, cath left ventriculography, coronary angiography and left heart catheterization-Dr. Estevan Matamoros   • CARDIAC DEFIBRILLATOR PLACEMENT N/A 11/30/2007    Biventricular implantable cardioverter defibrillator-Dr. Leandro Huber   • CARDIAC ELECTROPHYSIOLOGY PROCEDURE N/A 10/28/2019    Procedure: GENERATOR CHANGE BI-V ICD  medtronic;  Surgeon: Leandro Huber MD;  Location:   [facility];  Service: Cardiology   • CATARACT EXTRACTION Bilateral 2011   • COLONOSCOPY N/A 2014    done at [facility]   • CYSTECTOMY N/A     Ovarian cystectomy   • ENDOSCOPY N/A 04/28/2006    EGD with biopsies. Paraesophageal hiatal hernia from 34 to 44 cm, antral gastritis-Dr. Nehemiah Beal   • ENDOSCOPY N/A 09/29/2004    Hiatal hernia, gastritis and an erosion of the pylorus-Dr. Yang Pavon   • ENDOSCOPY N/A 9/1/2019    Procedure: ESOPHAGOGASTRODUODENOSCOPY with APC;  Surgeon: Anuel Roach MD;  Location:  [facility];  Service: Gastroenterology   • ENDOSCOPY N/A 1/28/2020    Procedure: ESOPHAGOGASTRODUODENOSCOPY with APC;  Surgeon: Anuel Roach MD;  Location:  [facility];  Service: Gastroenterology   • ENDOSCOPY N/A 4/21/2020    Procedure: ESOPHAGOGASTRODUODENOSCOPY WITH APC;  Surgeon: Anuel Roach MD;  Location:  [facility];  Service: Gastroenterology;  Laterality: N/A;  PRE- MELENA, GAVE  POST- ESOPHAGITIS, GAVE   • ENDOSCOPY N/A 11/24/2020    Procedure: ESOPHAGOGASTRODUODENOSCOPY;  Surgeon: Anuel Roach MD;  Location:  [facility];  Service: Gastroenterology;  Laterality: N/A;  reflux esophagitis  Gastric Food Bezoar   • ENDOSCOPY N/A 2/12/2021    Procedure: ESOPHAGOGASTRODUODENOSCOPY;  Surgeon: Anuel Roach MD;  Location:  [facility];  Service: Gastroenterology;  Laterality: N/A;  with APC- GAVE; Gastroparesis;    • ENTEROSCOPY SMALL BOWEL N/A 10/30/2006    Small bowel enteroscopy with biopsies-Dr. Rory Sevilla   • FLEXIBLE SIGMOIDOSCOPY N/A 09/29/2004    Unsuccessful colonoscopy due to poop prep, a very tortuous sigmoid, bowel prep in the form of enema given and a barium enema was obtained-Dr. Yang Pavon   • FRACTURE SURGERY  2015    Broke big toe   • HEMATOMA EVACUATION TRUNK N/A 02/07/2014    Pocket evacuation of hematoma at pacemaker site-Dr. Leandro Huber   • HEMORRHOIDECTOMY N/A 04/19/2004    Flexible  sigmoidoscopy and stapled hemorrhoidectomy-Dr. Yang Pavon   • HYSTERECTOMY Bilateral 1977   • IMPLANTABLE CARDIOVERTER DEFIBRILLATOR LEAD REPLACEMENT/POCKET REVISION Left 3/28/2016    Procedure: AUTOMATIC IMPLANTABLE CARDIOVERTER DEFIBRILLATOR LEAD REPLACEMENT WITH LASER LEAD EXTRACTION;  Surgeon: Leandro Huber MD;  Location: [facility] OR 18/19;  Service:    • IMPLANTABLE CARDIOVERTER DEFIBRILLATOR LEAD REPLACEMENT/POCKET REVISION N/A 01/13/2014    Biventricular ICD replacement-Dr. Jillian Huber   • LAPAROSCOPY REPAIR HIATAL HERNIA N/A 06/27/2006    Laparoscopic paraesophageal hiatal hernia repair with Nissen fundoplication and cholecystectomy-Dr. Sean Yoon   • NATALIE GONZALEZ ([facility]) PROCEDURE     • IN INSJ TUNNELED CVC W/O SUBQ PORT/ AGE 3 YR/> Right 10/3/2017    Procedure: Right internal jugular port placement;  Surgeon: Tiffanie Couch MD;  [facility];  Service: General   • TOE SURGERY Left     SET BIG TOE   • TOTAL KNEE ARTHROPLASTY Left 08/2014   • VENOUS ACCESS DEVICE (PORT) INSERTION Right         Social History     Occupational History     Employer: RETIRED   Tobacco Use   • Smoking status: Never Smoker   • Smokeless tobacco: Never Used   • Tobacco comment: caffeine use - coffee and soda   Vaping Use   • Vaping Use: Never used   Substance and Sexual Activity   • Alcohol use: No   • Drug use: No   • Sexual activity: Defer      Social History     Social History Narrative   • Not on file        Family History   Problem Relation Age of Onset   • Coronary artery disease Other    • Dementia Other    • Kidney disease Other    • Arthritis Father    • Early death Father         Kidney failure   • Kidney disease Father    • Gout Father    • Heart disease Mother    • Mental illness Mother         Dementia   • Malig Hyperthermia Neg Hx    • Colon cancer Neg Hx    • Colon polyps Neg Hx        ROS: 14 point review of systems was performed and all other systems were  "reviewed and are negative except for documented findings in HPI and today's encounter.     Allergies:   Allergies   Allergen Reactions   • Chlorhexidine Rash and Itching     Patient states \"blue dye\" in chlorhexidine.  States the orange and clear are OK to use   • Codeine Unknown - Low Severity     HYPER, DOES NOT HELP PAIN     Constitutional:  Denies fever, shaking or chills   Eyes:  Denies change in visual acuity   HENT:  Denies nasal congestion or sore throat   Respiratory:  Denies cough or shortness of breath   Cardiovascular:  Denies chest pain or severe LE edema   GI:  Denies abdominal pain, nausea, vomiting, bloody stools or diarrhea   Musculoskeletal:  Numbness, tingling, pain, or loss of motor function only as noted above in history of present illness.  : Denies painful urination or hematuria  Integument:  Denies rash, lesion or ulceration   Neurologic:  Denies headache or focal weakness  Endocrine:  Denies lymphadenopathy  Psych:  Denies confusion or change in mental status   Hem:  Denies active bleeding    OBJECTIVE:  Physical Exam: 81 y.o. female  Wt Readings from Last 3 Encounters:   09/22/21 64.9 kg (143 lb)   09/16/21 67.2 kg (148 lb 3.2 oz)   09/09/21 67.3 kg (148 lb 6.4 oz)     Ht Readings from Last 1 Encounters:   09/22/21 167.6 cm (66\")     Body mass index is 23.08 kg/m².  Vitals:    09/22/21 1114   Temp: 97.5 °F (36.4 °C)     Vital signs reviewed.     General Appearance:    Alert, cooperative, in no acute distress                  Eyes: conjunctiva clear  ENT: external ears and nose atraumatic  CV: no peripheral edema  Resp: normal respiratory effort  Skin: no rashes or wounds; normal turgor  Psych: mood and affect appropriate  Lymph: no nodes appreciated  Neuro: gross sensation intact  Vascular:  Palpable peripheral pulse in noted extremity  Musculoskeletal Extremities: Her cervical spine lacks the normal curvature and lordosis she has degenerative stiffness and pain with forward flexion of " rotation and side to side movement there is no radicular pain she has equal grasp bilateral to upper extremities skin is warm dry intact there is no masses or lymphadenopathy    Radiology:   c spine 2views done for pain with no comparison films show severe degenerative changes throughout the cervical spine        Assessment:     ICD-10-CM ICD-9-CM   1. Neck pain  M54.2 723.1   2. Automatic implantable cardioverter-defibrillator in situ  Z95.810 V45.02   3. Spondylosis of cervical region without myelopathy or radiculopathy  M47.812 721.0   4. Iron deficiency anemia due to chronic blood loss  D50.0 280.0   5. Anemia of chronic renal failure, stage 4 (severe) (CMS/[facility])  N18.4 285.21    D63.1 585.4        MDM/Plan:   The diagnosis(es), natural history, pathophysiology and treatment for diagnosis(es) were discussed. Opportunity given and questions answered.  Biomechanics of pertinent body areas discussed.  When appropriate, the use of ambulatory aids discussed.    The diagnosis(es), natural history, pathophysiology and treatment for diagnosis(es) were discussed. Opportunity given and questions answered.  Biomechanics of pertinent body areas discussed.  When appropriate, the use of ambulatory aids discussed.  EXERCISES:  Advice on benefits of, and types of regular/moderate exercise pertaining to orthopedic diagnosis(es).  MEDICATIONS:  The risks, benefits, warnings,side effects and alternatives of medications discussed.  Inflammation/pain control; with cold, heat, elevation and/or liniments discussed as appropriate  MRI.  CONSULT: This Consult is done at the request of a requesting provider to whom I will send this report with this rendered opinion.  MEDICAL RECORDS reviewed from other provider(s) for past and current medical history pertinent to this complaint.    We will do a Medrol Dosepak and order a CT of her neck and have her follow-up with pain management and Piero Solomon to see if there is any options  surgically  9/22/2021    Much of this encounter note is an electronic transcription/translation of spoken language to printed text. The electronic translation of spoken language may permit erroneous, or at times, nonsensical words or phrases to be inadvertently transcribed; Although I have reviewed the note for such errors, some may still exist

## 2021-09-27 PROBLEM — I27.20 PULMONARY HYPERTENSION: Status: ACTIVE | Noted: 2021-01-01

## 2021-09-27 PROBLEM — N18.4 CKD (CHRONIC KIDNEY DISEASE) STAGE 4, GFR 15-29 ML/MIN (HCC): Status: RESOLVED | Noted: 2017-09-13 | Resolved: 2021-01-01

## 2021-09-27 NOTE — PROGRESS NOTES
[facility] Follow Up Office Note     Encounter Date:21  Patient:Charmaine Machuca  :1940  MRN:[id number]      Chief Complaint:   Chief Complaint   Patient presents with   • Cardiomyopathy     6 month f/u   • Congestive Heart Failure   • Hypertension   • Nonrheumatic aortic valve insufficiency         History of Presenting Illness:      Ms. Machuca is a 81 y.o. woman with past medical history notable for nonischemic cardiomyopathy status post biventricular ICD with recovered left ventricular systolic function, left bundle branch block, hypertension, mixed hyperlipidemia, pulmonary hypertension, chronic kidney disease stage IV, chronic anemia related to renal disease as well as gastric bleeding, history of hiatal hernia status post fundoplication complicated by stricture, and venous insufficiency who presents her office for scheduled follow-up.  Patient was last in approximate 6 months ago and at that time was doing about the same but was having more shortness of breath and a repeat echocardiogram was performed which overall demonstrated stable findings with continued preserved left ventricular function but continued significant diastolic dysfunction and pulmonary hypertension.  A VQ scan was also performed which was low risk for PE.  Since that time she is doing about the same.  She still struggles with significant edema and shortness of breath and is following with her nephrologist for management of this given her stage IV kidney disease.    Review of Systems:  Review of Systems   Constitutional: Positive for malaise/fatigue.   HENT: Negative.    Eyes: Negative.    Cardiovascular: Positive for dyspnea on exertion and leg swelling.   Respiratory: Positive for shortness of breath.    Endocrine: Negative.    Hematologic/Lymphatic: Negative.    Skin: Negative.    Musculoskeletal: Negative.    Gastrointestinal: Positive for dysphagia.   Genitourinary: Negative.    Neurological: Negative.     Psychiatric/Behavioral: Negative.    Allergic/Immunologic: Negative.        Current Outpatient Medications on File Prior to Visit   Medication Sig Dispense Refill   • acetaminophen (TYLENOL) 325 MG tablet Take 2 tablets by mouth Every 4 (Four) Hours As Needed for Mild Pain .     • aspirin 81 MG EC tablet Take 81 mg by mouth Daily.     • buPROPion SR (WELLBUTRIN SR) 150 MG 12 hr tablet Take 150 mg by mouth Daily.     • calcitriol (ROCALTROL) 0.25 MCG capsule Take 0.25 mcg by mouth Daily.     • carbidopa-levodopa ER (SINEMET CR)  MG per tablet Take 1 tablet by mouth Every Evening.     • carvedilol (COREG) 12.5 MG tablet Take 1 tablet by mouth 2 (Two) Times a Day With Meals. 60 tablet 5   • erythromycin base (E-MYCIN) 250 MG tablet TAKE 1/2 TABLET 4 TIMES A  DAY (Patient taking differently: Take 125 mg by mouth 4 (Four) Times a Day.) 180 tablet 3   • famotidine (PEPCID) 20 MG tablet Take 1 tablet by mouth Every 12 (Twelve) Hours.     • febuxostat (ULORIC) 80 MG tablet tablet      • Ferrous Sulfate (Iron) 90 (18 Fe) MG tablet Take 1 tablet by mouth.     • furosemide (LASIX) 40 MG tablet 2 tablets IN THE AM, 1 AT LUNCH     • Gabapentin, Once-Daily, (Gralise) 300 MG tablet Take 600 mg by mouth Daily.     • Glucosamine-Chondroit-Vit C-Mn (GLUCOSAMINE CHONDROITIN COMPLX) capsule Take 1,500 mg by mouth Every Other Day.     • levothyroxine (SYNTHROID, LEVOTHROID) 25 MCG tablet Take 25 mcg by mouth Daily.     • lidocaine (LIDODERM) 5 %      • Magnesium 250 MG tablet Take  by mouth Daily.     • Multiple Vitamin (MULTI-DAY VITAMINS) tablet Take 1 tablet by mouth Daily. HOLD FOR SURGERY     • ondansetron (ZOFRAN) 4 MG tablet Take 4 mg by mouth Every 8 (Eight) Hours As Needed for Nausea or Vomiting.     • polyethylene glycol (MIRALAX) powder Take 17 g by mouth Daily As Needed.     • psyllium (METAMUCIL) 0.52 g capsule Take 0.52 g by mouth.     • rotigotine (NEUPRO) 6 MG/24HR patch Place 1 patch on the skin as directed by  "provider Daily. 8 mg patch     • [DISCONTINUED] febuxostat (Uloric) 40 MG tablet Take 40 mg by mouth Daily.     • [DISCONTINUED] FLUoxetine (PROzac) 10 MG capsule Take 10 mg by mouth Daily.     • [DISCONTINUED] methylPREDNISolone (MEDROL) 4 MG dose pack Take as directed on package instructions. 21 tablet 0   • [DISCONTINUED] predniSONE (DELTASONE) 10 MG tablet Take 1 tablet by mouth Take As Directed. Take 1 tablet with food 1 hour prior to getting your procrit injection. 12 tablet 3     Current Facility-Administered Medications on File Prior to Visit   Medication Dose Route Frequency Provider Last Rate Last Admin   • heparin flush (porcine) 100 UNIT/ML injection 500 Units  500 Units Intravenous PRN Anuel Scott MD   500 Units at 11/27/17 1347   • heparin flush (porcine) 100 UNIT/ML injection 500 Units  500 Units Intravenous PRN Neno Merritt II, MD   500 Units at 01/18/20 1359   • heparin injection 500 Units  500 Units Intravenous PRN Neno Merritt II, MD       • sodium chloride 0.9 % flush 10 mL  10 mL Intravenous PRN Anuel Scott MD   10 mL at 11/27/17 1347   • sodium chloride 0.9 % flush 10 mL  10 mL Intravenous PRN Neno Merritt II, MD   10 mL at 01/18/20 1359   • sodium chloride 0.9 % flush 10 mL  10 mL Intravenous PRN Neno Merritt II, MD       • sodium chloride 0.9 % infusion 250 mL  250 mL Intravenous PRN Neno Merritt II, MD       • sodium chloride 0.9 % infusion 250 mL  250 mL Intravenous PRN Neno Merritt II, MD           Allergies   Allergen Reactions   • Chlorhexidine Rash and Itching     Patient states \"blue dye\" in chlorhexidine.  States the orange and clear are OK to use   • Codeine Unknown - Low Severity     HYPER, DOES NOT HELP PAIN       Past Medical History:   Diagnosis Date   • Anemia     Iron deficiency   • Anxiety    • Aortic stenosis     mild 04/2021   • Cardiomyopathy (CMS/[facility])     S/P pacemaker and defibrillator   • CHF (congestive heart failure) (CMS/[facility])    • Chronic " renal failure, stage 4 (severe) (CMS/HCC)    • Colon polyp    • Coronary artery disease     pacemaker, defibrillator   • Depression    • Dizzy    • Gastroparesis    • GAVE (gastric antral vascular ectasia)    • GERD (gastroesophageal reflux disease)    • Gout    • Hemorrhoids    • Hiatal hernia    • History of Clostridium difficile colitis 2013   • History of kidney stones    • History of pancreatitis     2014   • History of skin cancer    • History of transfusion     MULTIPLE    • Hyperlipidemia    • Hypertension    • Hypothyroidism    • Left bundle branch block    • Mitral and aortic valve disease    • Mitral valve insufficiency    • Myoclonus     S/P Depakote   • Osteoarthritis    • Pancytopenia (CMS/HCC)    • Peripheral neuropathy    • Presence of cardiac pacemaker     AND DEFIBRILLATOR   • Pulmonary hypertension (CMS/HCC)    • Pulmonary hypertension (CMS/HCC)    • SOB (shortness of breath) on exertion    • Spinal stenosis    • Stroke (CMS/HCC)     2019   • TIA (transient ischemic attack)        Past Surgical History:   Procedure Laterality Date   • APPENDECTOMY N/A    • ARTERIOVENOUS FISTULA/SHUNT SURGERY Right 2/18/2019    Procedure: RIGHT BASILIC FISTULA CREATION WITHOUT TRANSPOSITION;  Surgeon: Royer Dozier MD;  Location: [facility];  Service: Vascular   • ARTERIOVENOUS FISTULA/SHUNT SURGERY Right 5/31/2019    Procedure: RIGHT 2ND STAGE BASILIC VEIN CREATION;  Surgeon: Royer Dozier MD;  Location: [facility] OR;  Service: Vascular   • CARDIAC CATHETERIZATION Left 03/28/2006    Arterial catheter insertion, cath left ventriculography, coronary angiography and left heart catheterization-Dr. Estevan Matamoros   • CARDIAC DEFIBRILLATOR PLACEMENT N/A 11/30/2007    Biventricular implantable cardioverter defibrillator-Dr. Leandro Huber   • CARDIAC ELECTROPHYSIOLOGY PROCEDURE N/A 10/28/2019    Procedure: GENERATOR CHANGE BI-V ICD  medtronic;  Surgeon: Leandro Huber MD;  Location: [facility]  INVASIVE LOCATION;  Service: Cardiology   • CATARACT EXTRACTION Bilateral 2011   • COLONOSCOPY N/A 2014    done at [facility]   • CYSTECTOMY N/A     Ovarian cystectomy   • ENDOSCOPY N/A 04/28/2006    EGD with biopsies. Paraesophageal hiatal hernia from 34 to 44 cm, antral gastritis-Dr. Nehemiah Beal   • ENDOSCOPY N/A 09/29/2004    Hiatal hernia, gastritis and an erosion of the pylorus-Dr. Yang Pavon   • ENDOSCOPY N/A 9/1/2019    Procedure: ESOPHAGOGASTRODUODENOSCOPY with APC;  Surgeon: Anuel Roach MD;  Location:  [facility];  Service: Gastroenterology   • ENDOSCOPY N/A 1/28/2020    Procedure: ESOPHAGOGASTRODUODENOSCOPY with APC;  Surgeon: Anuel Roach MD;  Location:  [facility];  Service: Gastroenterology   • ENDOSCOPY N/A 4/21/2020    Procedure: ESOPHAGOGASTRODUODENOSCOPY WITH APC;  Surgeon: Anuel Roach MD;  Location:  [facility];  Service: Gastroenterology;  Laterality: N/A;  PRE- MELENA, GAVE  POST- ESOPHAGITIS, GAVE   • ENDOSCOPY N/A 11/24/2020    Procedure: ESOPHAGOGASTRODUODENOSCOPY;  Surgeon: Anuel Roach MD;  Location:  [facility];  Service: Gastroenterology;  Laterality: N/A;  reflux esophagitis  Gastric Food Bezoar   • ENDOSCOPY N/A 2/12/2021    Procedure: ESOPHAGOGASTRODUODENOSCOPY;  Surgeon: Anuel Roach MD;  Location:  [facility] OR;  Service: Gastroenterology;  Laterality: N/A;  with APC- GAVE; Gastroparesis;    • ENTEROSCOPY SMALL BOWEL N/A 10/30/2006    Small bowel enteroscopy with biopsies-Dr. Rory Sevilla   • FLEXIBLE SIGMOIDOSCOPY N/A 09/29/2004    Unsuccessful colonoscopy due to poop prep, a very tortuous sigmoid, bowel prep in the form of enema given and a barium enema was obtained-Dr. Yang Pavon   • FRACTURE SURGERY  2015    Broke big toe   • HEMATOMA EVACUATION TRUNK N/A 02/07/2014    Pocket evacuation of hematoma at pacemaker site-Dr. Leandro Huber   • HEMORRHOIDECTOMY N/A 04/19/2004    Flexible sigmoidoscopy  and stapled hemorrhoidectomy-Dr. Yang Pavon   • HYSTERECTOMY Bilateral 1977   • IMPLANTABLE CARDIOVERTER DEFIBRILLATOR LEAD REPLACEMENT/POCKET REVISION Left 3/28/2016    Procedure: AUTOMATIC IMPLANTABLE CARDIOVERTER DEFIBRILLATOR LEAD REPLACEMENT WITH LASER LEAD EXTRACTION;  Surgeon: Leandro Huber MD;  Location: [facility] OR 18/19;  Service:    • IMPLANTABLE CARDIOVERTER DEFIBRILLATOR LEAD REPLACEMENT/POCKET REVISION N/A 01/13/2014    Biventricular ICD replacement-Dr. Jillian Huber   • LAPAROSCOPY REPAIR HIATAL HERNIA N/A 06/27/2006    Laparoscopic paraesophageal hiatal hernia repair with Nissen fundoplication and cholecystectomy-Dr. Sean Yoon   • NATALIE GONZALEZ ([facility]) PROCEDURE     • [place] INSJ TUNNELED CVC W/O SUBQ PORT/ AGE 3 YR/> Right 10/3/2017    Procedure: Right internal jugular port placement;  Surgeon: Tiffanie Couch MD;  Location: [facility];  Service: General   • TOE SURGERY Left     SET BIG TOE   • TOTAL KNEE ARTHROPLASTY Left 08/2014   • VENOUS ACCESS DEVICE (PORT) INSERTION Right        Social History     Socioeconomic History   • Marital status:      Spouse name: Zackery   • Number of children: 5   • Years of education: 1 yr college   • Highest education level: Not on file   Tobacco Use   • Smoking status: Never Smoker   • Smokeless tobacco: Never Used   • Tobacco comment: caffeine use - coffee and soda   Vaping Use   • Vaping Use: Never used   Substance and Sexual Activity   • Alcohol use: No   • Drug use: No   • Sexual activity: Defer       Family History   Problem Relation Age of Onset   • Coronary artery disease Other    • Dementia Other    • Kidney disease Other    • Arthritis Father    • Early death Father         Kidney failure   • Kidney disease Father    • Gout Father    • Heart disease Mother    • Mental illness Mother         Dementia   • Malig Hyperthermia Neg Hx    • Colon cancer Neg Hx    • Colon polyps Neg Hx        The following portions of  "the patient's history were reviewed and updated as appropriate: allergies, current medications, past family history, past medical history, past social history, past surgical history and problem list.       Objective:       Vitals:    09/27/21 1223   BP: 142/70   BP Location: Left arm   Patient Position: Sitting   Pulse: 71   Weight: 65.9 kg (145 lb 3.2 oz)   Height: 167.6 cm (66\")         Physical Exam:  Constitutional: Well appearing, well developed, no acute distress   HENT: Oropharynx clear and membrane moist  Eyes: Normal conjunctiva, no sclera icterus.  Neck: Supple, no carotid bruit bilaterally.  Cardiovascular: Regular rate and rhythm, Holosystolic murmur at the apex, 1+ bilateral lower extremity edema.  Pulmonary: Normal respiratory effort, normal lung sounds, no wheezing.  Abdominal: Soft, nontender, no hepatosplenomegaly, liver is non-pulsatile.  Neurological: Alert and orient x 3.   Skin: Warm, dry, no ecchymosis, no rash.  Psych: Appropriate mood and affect. Normal judgment and insight.         Lab Results   Component Value Date     09/02/2021     06/17/2021    K 4.6 09/02/2021    K 5.0 06/17/2021     (H) 09/02/2021     06/17/2021    CO2 20.9 (L) 09/02/2021    CO2 22.8 06/17/2021    BUN 61 (H) 09/02/2021    BUN 52 (H) 06/17/2021    CREATININE 3.17 (H) 09/02/2021    CREATININE 2.41 (H) 06/17/2021    EGFRIFNONA 14 (L) 09/02/2021    EGFRIFNONA 19 (L) 06/17/2021    EGFRIFAFRI  09/02/2021      Comment:      <15 Indicative of kidney failure.    EGFRIFAFRI  08/30/2019      Comment:      <15 Indicative of kidney failure.    GLUCOSE 109 (H) 09/02/2021    GLUCOSE 84 06/17/2021    CALCIUM 9.1 09/02/2021    CALCIUM 8.5 (L) 09/02/2021    PROTENTOTREF 4.0 (L) 06/27/2019    ALBUMIN 3.70 09/02/2021    ALBUMIN 3.40 (L) 06/17/2021    BILITOT <0.2 09/02/2021    BILITOT <0.2 06/17/2021    AST 12 09/02/2021    AST 18 06/17/2021    ALT <5 09/02/2021    ALT <5 06/17/2021     Lab Results   Component " Value Date    WBC 7.15 09/16/2021    WBC 6.50 09/09/2021    HGB 10.5 (L) 09/16/2021    HGB 8.0 (L) 09/09/2021    HCT 33.4 (L) 09/16/2021    HCT 26.1 (L) 09/09/2021    MCV 99.1 (H) 09/16/2021    .0 (H) 09/09/2021     09/16/2021     09/09/2021     Lab Results   Component Value Date    CHOL 153 10/29/2020    CHOL 124 08/19/2019    TRIG 162 (H) 10/29/2020    TRIG 195 (H) 10/03/2020    HDL 49 10/29/2020    HDL 47 (L) 10/03/2020    LDL 76 10/29/2020    LDL 56 10/03/2020     Lab Results   Component Value Date    PROBNP 3,562.0 (H) 03/25/2021     Lab Results   Component Value Date    CKTOTAL 52 11/15/2018    TROPONINT <0.010 03/25/2021     Lab Results   Component Value Date    TSH 3.420 10/29/2020    TSH 4.210 10/03/2020           ECG 12 Lead    Date/Time: 9/27/2021 2:14 PM  Performed by: Blake Flores MD  Authorized by: Blake Flores MD   Comparison: compared with previous ECG from 3/25/2021  Similar to previous ECG  Rhythm: sinus rhythm  Pacing: atrial sensed rhythm and ventricular paced rhythm  Clinical impression: abnormal EKG        Echocardiogram 4/14/2021 with images reviewed by myself:  · Calculated left ventricular EF = 53.7% Calculated left ventricular 3D EF = 55% Estimated left ventricular EF was in agreement with the calculated left ventricular EF. Left ventricular systolic function is normal.  · Left ventricular wall thickness is consistent with mild concentric hypertrophy.  · Left ventricular diastolic function is consistent with (grade II w/high LAP) pseudonormalization.  · Left atrial volume is mildly increased.  · Mild aortic valve stenosis is present.  · Moderate tricuspid valve regurgitation is present.  · Estimated right ventricular systolic pressure from tricuspid regurgitation is markedly elevated (>55 mmHg). Calculated right ventricular systolic pressure from tricuspid regurgitation is 59.5 mmHg.  · Severe pulmonary hypertension is present.    Carotid duplex  8/19/2019:  · Mid right internal carotid artery mild stenosis.  · Mid left internal carotid artery mild stenosis.       Assessment:          Diagnosis Plan   1. Chronic combined systolic and diastolic congestive heart failure (HCC)  ECG 12 Lead   2. Stage 4 chronic kidney disease (HCC)     3. Automatic implantable cardioverter-defibrillator in situ     4. Pulmonary hypertension (HCC)            Plan:       Ms. Machuca is a 81 y.o. woman with past medical history notable for nonischemic cardiomyopathy status post biventricular ICD with recovered left ventricular systolic function, left bundle branch block, hypertension, mixed hyperlipidemia, pulmonary hypertension, chronic kidney disease stage IV, chronic anemia related to renal disease as well as gastric bleeding, history of hiatal hernia status post fundoplication complicated by stricture, and venous insufficiency who presents her office for scheduled follow-up.  In general patient is doing reasonably well.  Her recent echocardiogram shows stability in overall left ventricular systolic function but continued diastolic dysfunction and significant pulmonary hypertension with moderate tricuspid regurgitation which is appreciated on exam today.  We are somewhat limited in options to help deal with this given her underlying chronic kidney disease we will trend to our best balance her diuretic use with kidney function.    Nonischemic cardiomyopathy:  · Chronic diastolic congestive heart failure as left ventricular systolic function is recovered  · Continue diuretics as directed by nephrology  · Continue carvedilol  · No ARB/ACE inhibitor due to chronic kidney disease and recovered left ventricular systolic function    Pulmonary hypertension:  · Likely related to underlying diastolic dysfunction but limited diuretic use due to underlying kidney disease    Stage IV kidney disease:  · Following with nephrology  · Does limit diuretic use for management of congestive  heart failure and pulmonary hypertension    Follow-up:  6 months        Blake Flores MD  [facility]  09/27/21  14:16 EDT

## 2021-09-30 NOTE — NURSING NOTE
Procrit held last week with Hgb >10 but only due to blood transfusion therefore, dose not reduced today.

## 2021-10-04 NOTE — TELEPHONE ENCOUNTER
----- Message from CHANTELL Michel sent at 10/4/2021  9:03 AM EDT -----  Multilevel djd to cervical spine - can follow with [facility] and pain management for treatment options. Cim  :  1. There is multilevel cervical degenerative disc disease as described.  2. No acute bony abnormality is seen.     Radiation dose reduction techniques were utilized, including automated  exposure control and exposure modulation based on body size.     This report was finalized on 9/30/2021 5:17 PM by Dr. Yves Drummond M.D.     ----- Message -----  From: Interface, Rad Results [place] In  Sent: 9/30/2021   5:20 PM EDT  To: CHANTELL Michel

## 2021-10-08 NOTE — BRIEF OP NOTE
ESOPHAGOGASTRODUODENOSCOPY WITH ARGON PLASMA COAGULATOR  Progress Note    Charmaine Machuca  10/8/2021    Pre-op Diagnosis:   GAVE (gastric antral vascular ectasia) [K31.819]       Post-Op Diagnosis Codes:     * GAVE (gastric antral vascular ectasia) [K31.819]     * Reflux esophagitis [K21.00]    Procedure/CPT® Codes:        Procedure(s):  ESOPHAGOGASTRODUODENOSCOPY WITH ARGON PLASMA COAGULATOR    Surgeon(s):  Anuel Roach MD    Anesthesia: Monitored Anesthesia Care    Staff:   Circulator: Jenny Mead RN; Marisa Langston RN  Scrub Person: Kathi Johnson; Suleiman Prasad         Estimated Blood Loss: none    Urine Voided: * No values recorded between 10/8/2021 11:36 AM and 10/8/2021 12:06 PM *    Specimens:                None          Drains: * No LDAs found *    Findings: Normal Duodenum  Moderate GAVE w Bleeding- APC Tx  Reflux Esophagitis    Complications: none          Anuel Roach MD     Date: 10/8/2021  Time: 12:12 EDT

## 2021-10-08 NOTE — H&P
Patient Care Team:  Fannie Godfrey MD as PCP - General (Internal Medicine)  Anuel Scott MD as Consulting Physician (Hematology and Oncology)  Fannie Godfrey MD as Referring Physician (Internal Medicine)  Estevan Matamoros MD as Consulting Physician (Cardiology)  Parveen Abraham MD as Consulting Physician (Nephrology)  Ondina Haro MD as Consulting Physician (Neurology)  Estevan Oropeza MD as Consulting Physician (Hematology and Oncology)  Neno Merritt II, MD as Consulting Physician (Hematology and Oncology)  Anuel Roach MD as Consulting Physician (Gastroenterology)    CHIEF COMPLAINT: Melena, Anemia, GAVE    HISTORY OF PRESENT ILLNESS:  Increased transfusion requirements and lst APC was 2/2021    Past Medical History:   Diagnosis Date   • Anemia     Iron deficiency   • Anxiety    • Aortic stenosis     mild 04/2021   • Cardiomyopathy (HCC)     S/P pacemaker and defibrillator   • CHF (congestive heart failure) (HCC)    • Chronic renal failure, stage 4 (severe) (HCC)    • Colon polyp    • Coronary artery disease     pacemaker, defibrillator   • Depression    • Dizzy    • Gastroparesis    • GAVE (gastric antral vascular ectasia)    • GERD (gastroesophageal reflux disease)    • Gout    • Hemorrhoids    • Hiatal hernia    • History of Clostridium difficile colitis 2013   • History of kidney stones    • History of pancreatitis     2014   • History of skin cancer    • History of transfusion     MULTIPLE    • Hyperlipidemia    • Hypertension    • Hypothyroidism    • Left bundle branch block    • Mitral and aortic valve disease    • Mitral valve insufficiency    • Myoclonus     S/P Depakote   • Osteoarthritis    • Pancytopenia (HCC)    • Peripheral neuropathy    • Presence of cardiac pacemaker     AND DEFIBRILLATOR   • Pulmonary hypertension (HCC)    • Pulmonary hypertension (HCC)    • SOB (shortness of breath) on exertion    • Spinal stenosis    • Stroke (HCC)     2019   • TIA  (transient ischemic attack)      Past Surgical History:   Procedure Laterality Date   • APPENDECTOMY N/A    • ARTERIOVENOUS FISTULA/SHUNT SURGERY Right 2/18/2019    Procedure: RIGHT BASILIC FISTULA CREATION WITHOUT TRANSPOSITION;  Surgeon: Royer Dozier MD;  Location: [facility] MAIN OR;  Service: Vascular   • ARTERIOVENOUS FISTULA/SHUNT SURGERY Right 5/31/2019    Procedure: RIGHT 2ND STAGE BASILIC VEIN CREATION;  Surgeon: Royer Dozier MD;  Location: [facility] MAIN OR;  Service: Vascular   • CARDIAC CATHETERIZATION Left 03/28/2006    Arterial catheter insertion, cath left ventriculography, coronary angiography and left heart catheterization-Dr. Estevan Matamoros   • CARDIAC DEFIBRILLATOR PLACEMENT N/A 11/30/2007    Biventricular implantable cardioverter defibrillator-Dr. Leandro Huber   • CARDIAC ELECTROPHYSIOLOGY PROCEDURE N/A 10/28/2019    Procedure: GENERATOR CHANGE BI-V ICD  medtronic;  Surgeon: Leandro Huber MD;  Location: [facility] CATH INVASIVE LOCATION;  Service: Cardiology   • CATARACT EXTRACTION Bilateral 2011   • COLONOSCOPY N/A 2014    done at [facility]   • CYSTECTOMY N/A     Ovarian cystectomy   • ENDOSCOPY N/A 04/28/2006    EGD with biopsies. Paraesophageal hiatal hernia from 34 to 44 cm, antral gastritis-Dr. Nehemiah Beal   • ENDOSCOPY N/A 09/29/2004    Hiatal hernia, gastritis and an erosion of the pylorus-Dr. Yang Pavon   • ENDOSCOPY N/A 9/1/2019    Procedure: ESOPHAGOGASTRODUODENOSCOPY with APC;  Surgeon: Anuel Roach MD;  Location: [facility] ENDOSCOPY;  Service: Gastroenterology   • ENDOSCOPY N/A 1/28/2020    Procedure: ESOPHAGOGASTRODUODENOSCOPY with APC;  Surgeon: Anuel Roach MD;  Location: [facility] ENDOSCOPY;  Service: Gastroenterology   • ENDOSCOPY N/A 4/21/2020    Procedure: ESOPHAGOGASTRODUODENOSCOPY WITH APC;  Surgeon: Anuel Roach MD;  Location: [facility] ENDOSCOPY;  Service: Gastroenterology;  Laterality: N/A;  PRE- MELENA, GAVE  POST- ESOPHAGITIS,  GAVE   • ENDOSCOPY N/A 11/24/2020    Procedure: ESOPHAGOGASTRODUODENOSCOPY;  Surgeon: Anuel Roach MD;  Location: [facility] OR;  Service: Gastroenterology;  Laterality: N/A;  reflux esophagitis  Gastric Food Bezoar   • ENDOSCOPY N/A 2/12/2021    Procedure: ESOPHAGOGASTRODUODENOSCOPY;  Surgeon: Anuel Roach MD;  Location:  [facility] OR;  Service: Gastroenterology;  Laterality: N/A;  with APC- GAVE; Gastroparesis;    • ENTEROSCOPY SMALL BOWEL N/A 10/30/2006    Small bowel enteroscopy with biopsies-Dr. Rory Sevilla   • FLEXIBLE SIGMOIDOSCOPY N/A 09/29/2004    Unsuccessful colonoscopy due to poop prep, a very tortuous sigmoid, bowel prep in the form of enema given and a barium enema was obtained-Dr. Yang Pavon   • FRACTURE SURGERY  2015    Broke big toe   • HEMATOMA EVACUATION TRUNK N/A 02/07/2014    Pocket evacuation of hematoma at pacemaker site-Dr. Leandro Huber   • HEMORRHOIDECTOMY N/A 04/19/2004    Flexible sigmoidoscopy and stapled hemorrhoidectomy-Dr. Yang Pavon   • HYSTERECTOMY Bilateral 1977   • IMPLANTABLE CARDIOVERTER DEFIBRILLATOR LEAD REPLACEMENT/POCKET REVISION Left 3/28/2016    Procedure: AUTOMATIC IMPLANTABLE CARDIOVERTER DEFIBRILLATOR LEAD REPLACEMENT WITH LASER LEAD EXTRACTION;  Surgeon: Leandro Huber MD;  Location: [facility] OR 18/19;  Service:    • IMPLANTABLE CARDIOVERTER DEFIBRILLATOR LEAD REPLACEMENT/POCKET REVISION N/A 01/13/2014    Biventricular ICD replacement-Dr. Jillian Huber   • LAPAROSCOPY REPAIR HIATAL HERNIA N/A 06/27/2006    Laparoscopic paraesophageal hiatal hernia repair with Nissen fundoplication and cholecystectomy-Dr. Sean Yoon   • NATALIE GONZALEZ ([facility]) PROCEDURE     • MD INSJ TUNNELED CVC W/O SUBQ PORT/ AGE 3 YR/> Right 10/3/2017    Procedure: Right internal jugular port placement;  Surgeon: Tiffanie Couch MD;  Location: [facility] OR;  Service: General   • TOE SURGERY Left     SET BIG TOE   • TOTAL KNEE  ARTHROPLASTY Left 08/2014   • VENOUS ACCESS DEVICE (PORT) INSERTION Right      Family History   Problem Relation Age of Onset   • Coronary artery disease Other    • Dementia Other    • Kidney disease Other    • Arthritis Father    • Early death Father         Kidney failure   • Kidney disease Father    • Gout Father    • Heart disease Mother    • Mental illness Mother         Dementia   • Malig Hyperthermia Neg Hx    • Colon cancer Neg Hx    • Colon polyps Neg Hx      Social History     Tobacco Use   • Smoking status: Never Smoker   • Smokeless tobacco: Never Used   • Tobacco comment: caffeine use - coffee and soda   Vaping Use   • Vaping Use: Never used   Substance Use Topics   • Alcohol use: No   • Drug use: No     Medications Prior to Admission   Medication Sig Dispense Refill Last Dose   • acetaminophen (TYLENOL) 325 MG tablet Take 2 tablets by mouth Every 4 (Four) Hours As Needed for Mild Pain .   Past Week at Unknown time   • BUPROPION HCL ER, XL, PO Take  by mouth.      • buPROPion SR (WELLBUTRIN SR) 150 MG 12 hr tablet Take 150 mg by mouth Daily.   10/7/2021 at Unknown time   • carbidopa-levodopa (SINEMET)  MG per tablet Take 2 tablets by mouth Daily.      • carbidopa-levodopa ER (SINEMET CR)  MG per tablet Take 1 tablet by mouth Every Evening.   10/7/2021 at Unknown time   • carvedilol (COREG) 12.5 MG tablet Take 1 tablet by mouth 2 (Two) Times a Day With Meals. 60 tablet 5 10/7/2021 at Unknown time   • erythromycin base (E-MYCIN) 250 MG tablet TAKE 1/2 TABLET 4 TIMES A  DAY (Patient taking differently: Take 125 mg by mouth 4 (Four) Times a Day.) 180 tablet 3 10/7/2021 at Unknown time   • febuxostat (ULORIC) 80 MG tablet tablet    10/7/2021 at Unknown time   • levothyroxine (SYNTHROID, LEVOTHROID) 25 MCG tablet Take 25 mcg by mouth Daily.   10/7/2021 at Unknown time   • lidocaine (LIDODERM) 5 %    10/6/2021 at Unknown time   • promethazine (PHENERGAN) 12.5 MG tablet Take 12.5 mg by mouth Every 6  "(Six) Hours As Needed for Nausea or Vomiting.      • psyllium (METAMUCIL) 0.52 g capsule Take 0.52 g by mouth.   10/7/2021 at Unknown time   • rotigotine (NEUPRO) 8 MG/24HR patch 24 hour 24 hour patch Place 1 patch on the skin as directed by provider Daily. 8 mg patch   10/7/2021 at Unknown time   • aspirin 81 MG EC tablet Take 81 mg by mouth Daily.   9/30/2021   • famotidine (PEPCID) 20 MG tablet Take 1 tablet by mouth Every 12 (Twelve) Hours.      • furosemide (LASIX) 40 MG tablet 2 tablets IN THE AM, 1 AT LUNCH (Patient taking differently: 40 mg 2 (Two) Times a Day. 2 tablet in am and one at lunch)      • Gabapentin, Once-Daily, (Gralise) 300 MG tablet Take 600 mg by mouth Daily.   Unknown at Unknown time   • Glucosamine-Chondroit-Vit C-Mn (GLUCOSAMINE CHONDROITIN COMPLX) capsule Take 1,500 mg by mouth Every Other Day.   9/30/2021   • Multiple Vitamin (MULTI-DAY VITAMINS) tablet Take 1 tablet by mouth Daily. HOLD FOR SURGERY   9/30/2021     Allergies:  Chlorhexidine and Codeine    REVIEW OF SYSTEMS:  Please see the above history of present illness for pertinent positives and negatives.  The remainder of the patient's systems have been reviewed and are negative.     Vital Signs  Temp:  [98.4 °F (36.9 °C)] 98.4 °F (36.9 °C)  Heart Rate:  [64] 64  Resp:  [24] 24  BP: (147)/(49) 147/49    Flowsheet Rows      First Filed Value   Admission Height  167.6 cm (66\") Documented at 10/07/2021 1400   Admission Weight  63 kg (139 lb) Documented at 10/07/2021 1400           Physical Exam:  Physical Exam   Constitutional: Patient appears well-developed and well-nourished and in no acute distress   HEENT:   Head: Normocephalic and atraumatic.   Eyes:  Pupils are equal, round, and reactive to light. EOM are intact. Sclerae are anicteric and non-injected.  Mouth and Throat: Patient has moist mucous membranes. Oropharynx is clear of any erythema or exudate.     Neck: Neck supple. No JVD present. No thyromegaly present. No " lymphadenopathy present.  Cardiovascular: Regular rate, regular rhythm, S1 normal and S2 normal.  Exam reveals no gallop and no friction rub.  No murmur heard.  Pulmonary/Chest: Lungs are clear to auscultation bilaterally. No respiratory distress. No wheezes. No rhonchi. No rales.   Abdominal: Soft. Bowel sounds are normal. No distension and no mass. There is no hepatosplenomegaly. There is no tenderness.   Musculoskeletal: Normal Muscle tone  Extremities: No edema. Pulses are palpable in all 4 extremities.  Neurological: Patient is alert and oriented to person, place, and time. Cranial nerves II-XII are grossly intact with no focal deficits.  Skin: Skin is warm. No rash noted. Nails show no clubbing.  No cyanosis or erythema.    Debilities/Disabilities Identified: None  Emotional Behavior: Appropriate     Results Review:   I reviewed the patient's new clinical results.    Lab Results (most recent)     None          Imaging Results (Most Recent)     None        reviewed    ECG/EMG Results (most recent)     None        reviewed    Assessment/Plan   melena, Anemia, GAVE/  EGD w APC      I discussed the patient's findings and my recommendations with patient.     Anuel Roach MD  10/08/21  11:28 EDT    Time: 10 min prior to procedure.

## 2021-10-08 NOTE — ANESTHESIA POSTPROCEDURE EVALUATION
Patient: Charmaine Machuca    Procedure Summary     Date: 10/08/21 Room / Location: [facility] ENDOSCOPY 1 /  [facility] OR    Anesthesia Start: 1137 Anesthesia Stop: 1214    Procedure: ESOPHAGOGASTRODUODENOSCOPY WITH ARGON PLASMA COAGULATOR (N/A Esophagus) Diagnosis:       GAVE (gastric antral vascular ectasia)      Reflux esophagitis      (GAVE (gastric antral vascular ectasia) [K31.819])    Surgeons: Anule Roach MD Provider: Nichole Liriano CRNA    Anesthesia Type: MAC ASA Status: 3          Anesthesia Type: MAC    Vitals  Vitals Value Taken Time   /68 10/08/21 1245   Temp     Pulse 73 10/08/21 1245   Resp 15 10/08/21 1245   SpO2 98 % 10/08/21 1245           Post Anesthesia Care and Evaluation    Patient location during evaluation: PHASE II  Patient participation: complete - patient participated  Level of consciousness: awake  Pain management: adequate  Airway patency: patent  Anesthetic complications: No anesthetic complications  PONV Status: none  Cardiovascular status: acceptable  Respiratory status: acceptable  Hydration status: acceptable

## 2021-10-08 NOTE — ANESTHESIA PREPROCEDURE EVALUATION
Anesthesia Evaluation     Patient summary reviewed and Nursing notes reviewed   no history of anesthetic complications:  NPO Solid Status: > 8 hours  NPO Liquid Status: > 2 hours           Airway   Mallampati: II  TM distance: >3 FB  Neck ROM: full  No difficulty expected  Dental      Pulmonary     breath sounds clear to auscultation  Cardiovascular   Exercise tolerance: good (4-7 METS)    ECG reviewed  PT is on anticoagulation therapy  Patient on routine beta blocker and Beta blocker given within 24 hours of surgery  Rhythm: regular  Rate: normal    (+) pacemaker ICD, pacemaker interrogated <1 month ago, hypertension well controlled 2 medications or greater, valvular problems/murmurs (MVI) AS, CAD, dysrhythmias (LBBB), CHF , hyperlipidemia,     ROS comment: Narrative    Blake Flores MD     9/27/2021  2:17 PM     ECG 12 Lead     Date/Time: 9/27/2021 2:14 PM   Performed by: Blake Flores MD   Authorized by: Blake Flores MD   Comparison: compared with previous ECG from 3/25/2021   Similar to previous ECG   Rhythm: sinus rhythm   Pacing: atrial sensed rhythm and ventricular paced rhythm   Clinical impression: abnormal EKG    Interpretation Summary    · Calculated left ventricular EF = 53.7% Calculated left ventricular 3D EF = 55% Estimated left ventricular EF was in agreement with the calculated left ventricular EF. Left ventricular systolic function is normal.  · Left ventricular wall thickness is consistent with mild concentric hypertrophy.  · Left ventricular diastolic function is consistent with (grade II w/high LAP) pseudonormalization.  · Left atrial volume is mildly increased.  · Mild aortic valve stenosis is present.  · Moderate tricuspid valve regurgitation is present.  · Estimated right ventricular systolic pressure from tricuspid regurgitation is markedly elevated (>55 mmHg). Calculated right ventricular systolic pressure from tricuspid regurgitation is 59.5 mmHg.  · Severe pulmonary  hypertension is present.        Neuro/Psych  (+) TIA ( 8months ago), CVA (3yrs ago), dizziness/light headedness, tremors (myoclonus ), weakness (generalized ), numbness (hands and feet), psychiatric history Anxiety and Depression,     GI/Hepatic/Renal/Endo    (+)  hiatal hernia, GERD well controlled, PUD,  renal disease (stage 4) CRI, thyroid problem hypothyroidism    Musculoskeletal     (+) neck pain, neck stiffness,   Abdominal    Substance History - negative use     OB/GYN          Other   arthritis,    history of cancer (skin)      Other Comment: Chronic anemia with blood transfusions                 Anesthesia Plan    ASA 3     MAC     intravenous induction     Anesthetic plan, all risks, benefits, and alternatives have been provided, discussed and informed consent has been obtained with: patient.  Use of blood products discussed with patient  Consented to blood products.

## 2021-10-08 NOTE — OP NOTE
ESOPHAGOGASTRODUODENOSCOPY WITH ARGON PLASMA COAGULATOR  Procedure Report    Patient Name:  Charmaine Machuca  YOB: 1940    Date of Surgery:  10/8/2021     Indications:  GAVE (gastric antral vascular ectasia) [K31.819]      Pre-op Diagnosis:   GAVE (gastric antral vascular ectasia) [K31.819]    Post-Op Diagnosis Codes:     * GAVE (gastric antral vascular ectasia) [K31.819]     * Reflux esophagitis [K21.00]         Procedure/CPT® Codes:      Procedure(s):  ESOPHAGOGASTRODUODENOSCOPY WITH ARGON PLASMA COAGULATOR    Staff:  Surgeon(s):  Anuel Roach MD         Anesthesia: Monitored Anesthesia Care    Estimated Blood Loss: none    Specimens: None    Implants:    Nothing was implanted during the procedure      Description of Procedure: After having signed informed consent, she was brought to the endoscopy suite and placed in the left lateral decubitus position and given her IV sedation. A bite block was placed between her incisors. The scope was introduced into the oropharynx and advanced under direct visualization into the esophagus, through the distal esophagus. There was evidence of a mildly irregular Z-line consistent with chronic reflux but no ulcer, no Sola-Gr tear, and no Gotti's disease. The scope was advanced into the stomach and retroflexed revealing normal cardia and fundus, deretroflexed and advanced to the antrum. The antrum showed evidence of moderate gastric antral vascular ectasias with 1 site that was oozing spontaneously. The scope was advanced through the pylorus to the duodenal bulb, which was examined and normal, and around the angle to 2nd and 3rd portion of the duodenum which were normal as well. The scope was withdrawn back into the antrum. An APC probe was passed and primed and treatments were applied to the visible vascular ectasias from the pylorus back to the mid-antrum. There was excellent hemostasis at the end of the procedure. The stomach was  decompressed with suction. The scope was withdrawn. Again the reflux esophagitis was noted though no biopsies were taken. The scope was taken from the patient. She tolerated the fairly lengthy procedure well.             Findings: Normal Duodenum  Moderate GAVE w Bleeding- APC Tx  Reflux Esophagitis         Complications: None    Recommendations: Resume Meds and Diet Today      Anuel Roach MD     Date: 10/8/2021  Time: 12:13 EDT

## 2021-10-14 PROBLEM — Z45.2 ENCOUNTER FOR ADJUSTMENT OR MANAGEMENT OF VASCULAR ACCESS DEVICE: Status: ACTIVE | Noted: 2021-01-01

## 2021-10-14 NOTE — NURSING NOTE
Lab Results   Component Value Date    WBC 4.88 10/14/2021    HGB 8.8 (L) 10/14/2021    HCT 28.5 (L) 10/14/2021    .1 (H) 10/14/2021     10/14/2021     Procrit given per protocol.  Pt voices fatigue and dizziness and is being followed by PCP.  Next appt reviewed and pt instructed to call sooner with concerns and v/u.

## 2021-10-28 NOTE — PROGRESS NOTES
Hgb 10.1. Pt informed procrit not needed today. She v/u. Pt states she is in the process of being scheduled to begin dialysis and questions if she could receive procrit then. Informed her that could be arranged. Pt instructed to call and let us know when she will begin dialysis to be sure it can be coordinated to give procrit there. Pt v/u. She will return in two weeks as scheduled. Encouraged her to call if she feels symptomatic prior to then. Pt v/u.     Lab Results   Component Value Date    WBC 7.12 10/28/2021    HGB 10.1 (L) 10/28/2021    HCT 33.2 (L) 10/28/2021    .8 (H) 10/28/2021     10/28/2021

## 2021-10-29 NOTE — PROGRESS NOTES
"New patient or new problem visit    CC: Neck pain    HPI: She complains of neck pain occipital headache.  Occasional \"neuropathy in fingers \"but no weakness or upper extremity pain.  She has a history of renal failure and is undergone fistula surgery in anticipation of soon to start dialysis.  She is very nervous about this.  Has tried a cortisone pack which helped some years ago and even did some pain management at that time.  She was seen recently by Magda peñaloza.    PFSH: See attached.  As above renal failure.  History of osteoarthritis.    ROS: As above    PE: On exam minimal tenderness to deep palpation.  Slight stiffness rotational motion.  Overall posture looks good the skin is intact.  Strength in the upper extremities bilaterally and Dre test is negative    XRAY: Jethro film x-rays show well-maintained lordosis and multilevel spondylosis with no evidence of acute change.  I reviewed the radiologist report which I agree.  CT scan again shows multilevel spondylosis but no significant canal or foraminal stenosis.    Other: n/a    Impression: Multilevel cervical spondylosis.    Plan: I am going to recommend traction.  If she fails to improve there is some pain management measures that could help and I think upcoming dialysis could possibly help as well in a small way.  I will see her back as needed.  "

## 2021-11-02 NOTE — PROGRESS NOTES
PATIENT INFORMATION  Charmaine Machuca       - 1940    CHIEF COMPLAINT  Chief Complaint   Patient presents with   • Gastroparesis   • gastric antral vascular ectasia     EGD 10/8/21       HISTORY OF PRESENT ILLNESS  Here for F/U of EGD with APC for her GAVE qw given Procrit 3 weeks agon and her CBC was improved a week ago with a HGB of 10 .1      Denies any Melena and just started Dialysis yesterday  Her complaints are mostly of restless leg and inability to sleep. Is optimistic that their visits will actually decrease with the dialysis on schedule      REVIEWED PERTINENT RESULTS/ LABS  Lab Results   Component Value Date    CASEREPORT  2018     Surgical Pathology Report                         Case: [id number]                                  Authorizing Provider:  Anuel Scott MD       Collected:           2018 01:45 PM          Ordering Location:     [facility]  Received:            2018 02:12 PM                                 CT                                                                           Pathologist:           Alexandre Villafana MD                                                                           Specimens:   1) - Iliac Crest, Right - Biopsy, Right iliac marrow                                                2) - Iliac Crest, Right - Aspirate, Bone marrow aspirate received, 5 smear prepared                 and slides sent to [facility] .                                                            3) - Iliac Crest, Right - Aspirate, Received 2 greeb top tubes, 1 purple top tube,                  sent all tubes to [facility] for comprehensive evaluation                         FINALDX  2018     1.  RIGHT ILIAC MARROW BIOPSY:    BONE WITH CELLULAR MARROW.    COMMENT:  Metastatic carcinoma and granuloma are not identified.  A paraffin block  will be forwarded to [facility] for comprehensive hematopathology analysis. The summary report from [facility] will represent the final diagnosis.      2&3: RIGHT ILIAC ASPIRATE, SMEARS, GREEN TOPPED TUBES, PURPLE TOP TUBE.:   FORWARDED TO [facility] FOR ADDITIONAL STUDIES INCLUDING FLOW CYTOMETRY.     CMK/brb     CPT CODES:  1.  87427, 50312        Lab Results   Component Value Date    HGB 10.1 (L) 10/28/2021    .8 (H) 10/28/2021     10/28/2021    ALT <5 09/02/2021    AST 12 09/02/2021    HGBA1C 5.1 10/03/2020    INR 1.0 08/06/2020    TRIG 162 (H) 10/29/2020    FERRITIN 320.00 (H) 10/14/2021    IRON 48 10/14/2021    TIBC 225 (L) 10/14/2021      No results found.    REVIEW OF SYSTEMS  Review of Systems   Constitutional: Negative for activity change, chills, fever and unexpected weight change.   HENT: Negative for congestion.    Eyes: Negative for visual disturbance.   Respiratory: Negative for shortness of breath.    Cardiovascular: Negative for chest pain and palpitations.   Gastrointestinal: Positive for constipation, diarrhea and nausea. Negative for abdominal pain and blood in stool.   Endocrine: Negative for cold intolerance and heat intolerance.   Genitourinary: Negative for hematuria.   Musculoskeletal: Negative for gait problem.   Skin: Negative for color change.   Allergic/Immunologic: Negative for immunocompromised state.   Neurological: Negative for weakness and light-headedness.   Hematological: Negative for adenopathy.   Psychiatric/Behavioral: Negative for sleep disturbance. The patient is not nervous/anxious.          ACTIVE PROBLEMS  Patient Active Problem List    Diagnosis    • Encounter for adjustment or management of vascular access device [Z45.2]    • Pulmonary hypertension (HCC) [I27.20]    • Iron deficiency anemia due to chronic blood loss [D50.0]    • Melena [K92.1]    • Vitamin B 12 deficiency [E53.8]    • Iron adverse reaction [T45.4X5A]    • Absolute  anemia [D64.9]    • Chronic kidney disease, stage 4 (severe) (HCC) [N18.4]    • Delayed gastric emptying [K30]    • Traumatic subdural hematoma without loss of consciousness (HCC) [S06.5X0A]    • Orbital fracture, closed, initial encounter (HCC) [S02.85XA]    • Subdural hematoma, post-traumatic (HCC) [S06.5X9A]    • Subarachnoid hemorrhage (HCC) [I60.9]    • Fall [W19.XXXA]    • GAVE (gastric antral vascular ectasia) [K31.819]    • History of recent stroke [Z86.73]    • Symptomatic anemia [D64.9]    • Acute renal failure superimposed on chronic kidney disease (HCC) [N17.9, N18.9]    • Anemia due to acute blood loss [D62]    • Chronic kidney disease, stage III (moderate) (CMS/HCC)  [N18.30]    • Weakness on left side of face [R29.810]    • B12 deficiency [E53.8]    • Encounter for fitting and adjustment of vascular catheter [Z45.2]    • Anemia of chronic disease [D63.8]    • Anemia of chronic renal failure, stage 4 (severe) (HCC) [N18.4, D63.1]    • Dysuria [R30.0]    • Iron deficiency anemia [D50.9]    • History of anemia due to chronic kidney disease [N18.9, Z86.2]    • Stage 4 chronic kidney disease (HCC) [N18.4]    • Anemia [D64.9]    • Carpal tunnel syndrome [G56.00]    • Periodic limb movement disorder [G47.61]    • Peripheral neuropathy [G62.9]    • Restless legs syndrome [G25.81]    • Chronic combined systolic and diastolic congestive heart failure (HCC) [I50.42]    • Cardiomyopathy (HCC) [I42.9]    • Pacemaker lead failure [T82.110A]    • Chest pain [R07.9]    • 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 [M47.812]    • Spondylolisthesis [M43.10]    • Chronic adrenal insufficiency (HCC) [E27.40]    • Adrenal insufficiency (HCC) [E27.40]    • Congestive heart failure (HCC) [I50.9]    • Gastroparesis [K31.84]    • Hypotension [I95.9]    • Hypothyroidism [E03.9]    • 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]    • Osteoarthritis [M19.90]    • Automatic implantable cardioverter-defibrillator in situ [Z95.810]    • History of  total knee replacement [Z96.659]    • CHF (congestive heart failure) ([facility]) [I50.9]    • Neuropathy [G62.9]          PAST MEDICAL HISTORY  Past Medical History:   Diagnosis Date   • Anemia     Iron deficiency   • Anxiety    • Aortic stenosis     mild 04/2021   • Cardiomyopathy (HCC)     S/P pacemaker and defibrillator   • CHF (congestive heart failure) ([facility])    • Chronic renal failure, stage 4 (severe) ([facility])    • Colon polyp    • Coronary artery disease     pacemaker, defibrillator   • Depression    • Dizzy    • Gastroparesis    • GAVE (gastric antral vascular ectasia)    • GERD (gastroesophageal reflux disease)    • Gout    • Hemorrhoids    • Hiatal hernia    • History of Clostridium difficile colitis 2013   • History of kidney stones    • History of pancreatitis     2014   • History of skin cancer    • History of transfusion     MULTIPLE    • Hyperlipidemia    • Hypertension    • Hypothyroidism    • Kidney failure    • Left bundle branch block    • Mitral and aortic valve disease    • Mitral valve insufficiency    • Myoclonus     S/P Depakote   • Osteoarthritis    • Pancytopenia (HCC)    • Peripheral neuropathy    • Presence of cardiac pacemaker     AND DEFIBRILLATOR   • Pulmonary hypertension (HCC)    • Pulmonary hypertension (HCC)    • SOB (shortness of breath) on exertion    • Spinal stenosis    • Stroke ([facility])     2019   • TIA (transient ischemic attack)          SURGICAL HISTORY  Past Surgical History:   Procedure Laterality Date   • APPENDECTOMY N/A    • ARTERIOVENOUS FISTULA/SHUNT SURGERY Right 2/18/2019    Procedure: RIGHT BASILIC FISTULA CREATION WITHOUT TRANSPOSITION;  Surgeon: Royer Dozier MD;  Location: [facility];  Service: Vascular   • ARTERIOVENOUS FISTULA/SHUNT SURGERY Right 5/31/2019    Procedure: RIGHT 2ND STAGE BASILIC VEIN CREATION;  Surgeon: Royer Dozier MD;  Location: [facility];  Service: Vascular   • CARDIAC CATHETERIZATION Left 03/28/2006    Arterial catheter insertion,  cath left ventriculography, coronary angiography and left heart catheterization-Dr. Estevan Matamoros   • CARDIAC DEFIBRILLATOR PLACEMENT N/A 11/30/2007    Biventricular implantable cardioverter defibrillator-Dr. Leandro Huber   • CARDIAC ELECTROPHYSIOLOGY PROCEDURE N/A 10/28/2019    Procedure: GENERATOR CHANGE BI-V ICD  medtronic;  Surgeon: Leandro Huber MD;  Location: [facility] CATH INVASIVE LOCATION;  Service: Cardiology   • CATARACT EXTRACTION Bilateral 2011   • COLONOSCOPY N/A 2014    done at [facility]   • CYSTECTOMY N/A     Ovarian cystectomy   • ENDOSCOPY N/A 04/28/2006    EGD with biopsies. Paraesophageal hiatal hernia from 34 to 44 cm, antral gastritis-Dr. Nehemiah Beal   • ENDOSCOPY N/A 09/29/2004    Hiatal hernia, gastritis and an erosion of the pylorus-Dr. Yang Pavon   • ENDOSCOPY N/A 9/1/2019    Procedure: ESOPHAGOGASTRODUODENOSCOPY with APC;  Surgeon: Anuel Roach MD;  [facility];  Service: Gastroenterology   • ENDOSCOPY N/A 1/28/2020    Procedure: ESOPHAGOGASTRODUODENOSCOPY with APC;  Surgeon: Anuel Roach MD;  [facility];  Service: Gastroenterology   • ENDOSCOPY N/A 4/21/2020    Procedure: ESOPHAGOGASTRODUODENOSCOPY WITH APC;  Surgeon: Anuel Roach MD;  [facility];  Service: Gastroenterology;  Laterality: N/A;  PRE- MELENA, GAVE  POST- ESOPHAGITIS, GAVE   • ENDOSCOPY N/A 11/24/2020    Procedure: ESOPHAGOGASTRODUODENOSCOPY;  Surgeon: Anuel Roach MD;  Location:  [facility] OR;  Service: Gastroenterology;  Laterality: N/A;  reflux esophagitis  Gastric Food Bezoar   • ENDOSCOPY N/A 2/12/2021    Procedure: ESOPHAGOGASTRODUODENOSCOPY;  Surgeon: Anuel Roach MD;  Location:  [facility] OR;  Service: Gastroenterology;  Laterality: N/A;  with APC- GAVE; Gastroparesis;    • ENDOSCOPY N/A 10/8/2021    Procedure: ESOPHAGOGASTRODUODENOSCOPY WITH ARGON PLASMA COAGULATOR;  Surgeon: Anuel Roach  MD Tammy;  Location: [facility] OR;  Service: Gastroenterology;  Laterality: N/A;  APC of gastric antral vascular ectasia  Reflux   • ENTEROSCOPY SMALL BOWEL N/A 10/30/2006    Small bowel enteroscopy with biopsies-Dr. Rory Sevilla   • FLEXIBLE SIGMOIDOSCOPY N/A 09/29/2004    Unsuccessful colonoscopy due to poop prep, a very tortuous sigmoid, bowel prep in the form of enema given and a barium enema was obtained-Dr. Yang Pavon   • FRACTURE SURGERY  2015    Broke big toe   • HEMATOMA EVACUATION TRUNK N/A 02/07/2014    Pocket evacuation of hematoma at pacemaker site-Dr. Leandro Huber   • HEMORRHOIDECTOMY N/A 04/19/2004    Flexible sigmoidoscopy and stapled hemorrhoidectomy-Dr. Yang Pavon   • HYSTERECTOMY Bilateral 1977   • IMPLANTABLE CARDIOVERTER DEFIBRILLATOR LEAD REPLACEMENT/POCKET REVISION Left 3/28/2016    Procedure: AUTOMATIC IMPLANTABLE CARDIOVERTER DEFIBRILLATOR LEAD REPLACEMENT WITH LASER LEAD EXTRACTION;  Surgeon: Leandro Huber MD;  Location: [facility] OR 18/19;  Service:    • IMPLANTABLE CARDIOVERTER DEFIBRILLATOR LEAD REPLACEMENT/POCKET REVISION N/A 01/13/2014    Biventricular ICD replacement-Dr. Jillian Huber   • LAPAROSCOPY REPAIR HIATAL HERNIA N/A 06/27/2006    Laparoscopic paraesophageal hiatal hernia repair with Nissen fundoplication and cholecystectomy-Dr. Sean Yoon   • NATALIE GONZALEZ ([facility]) PROCEDURE     • [place] INSJ TUNNELED CVC W/O SUBQ PORT/ AGE 3 YR/> Right 10/3/2017    Procedure: Right internal jugular port placement;  Surgeon: Tiffanie Couch MD;  Location: [facility] OR;  Service: General   • TOE SURGERY Left     SET BIG TOE   • TOTAL KNEE ARTHROPLASTY Left 08/2014   • VENOUS ACCESS DEVICE (PORT) INSERTION Right          FAMILY HISTORY  Family History   Problem Relation Age of Onset   • Coronary artery disease Other    • Dementia Other    • Kidney disease Other    • Arthritis Father    • Early death Father         Kidney failure   • Kidney disease Father     • Gout Father    • Heart disease Mother    • Mental illness Mother         Dementia   • Malig Hyperthermia Neg Hx    • Colon cancer Neg Hx    • Colon polyps Neg Hx          SOCIAL HISTORY  Social History     Occupational History     Employer: RETIRED   Tobacco Use   • Smoking status: Never Smoker   • Smokeless tobacco: Never Used   • Tobacco comment: caffeine use - coffee and soda   Vaping Use   • Vaping Use: Never used   Substance and Sexual Activity   • Alcohol use: No   • Drug use: No   • Sexual activity: Defer         CURRENT MEDICATIONS    Current Outpatient Medications:   •  acetaminophen (TYLENOL) 325 MG tablet, Take 2 tablets by mouth Every 4 (Four) Hours As Needed for Mild Pain ., Disp: , Rfl:   •  aspirin (aspirin) 81 MG EC tablet, Take  by mouth Daily., Disp: , Rfl:   •  buPROPion SR (WELLBUTRIN SR) 150 MG 12 hr tablet, Take 150 mg by mouth., Disp: , Rfl:   •  carbidopa-levodopa (SINEMET)  MG per tablet, Take 2 tablets by mouth Daily., Disp: , Rfl:   •  carbidopa-levodopa ER (SINEMET CR)  MG per tablet, Take 1 tablet by mouth Every Evening., Disp: , Rfl:   •  carvedilol (COREG) 12.5 MG tablet, Take 1 tablet by mouth 2 (Two) Times a Day With Meals., Disp: 60 tablet, Rfl: 5  •  DULoxetine (CYMBALTA) 30 MG capsule, Take 1 capsule by mouth Daily., Disp: , Rfl:   •  erythromycin base (E-MYCIN) 250 MG tablet, TAKE 1/2 TABLET 4 TIMES A  DAY (Patient taking differently: Take 125 mg by mouth 4 (Four) Times a Day.), Disp: 180 tablet, Rfl: 3  •  famotidine (PEPCID) 20 MG tablet, Take 1 tablet by mouth Every 12 (Twelve) Hours., Disp: , Rfl:   •  febuxostat (ULORIC) 80 MG tablet tablet, , Disp: , Rfl:   •  furosemide (LASIX) 40 MG tablet, 2 tablets IN THE AM, 1 AT LUNCH (Patient taking differently: 40 mg 2 (Two) Times a Day. 2 tablet in am and one at lunch), Disp: , Rfl:   •  gabapentin (NEURONTIN) 300 MG capsule, Take  by mouth., Disp: , Rfl:   •  Glucosamine-Chondroit-Vit C-Mn (GLUCOSAMINE CHONDROITIN  "COMPLX) capsule, Take 1,500 mg by mouth Every Other Day., Disp: , Rfl:   •  levothyroxine (SYNTHROID, LEVOTHROID) 25 MCG tablet, Take 25 mcg by mouth Daily., Disp: , Rfl:   •  lidocaine (LIDODERM) 5 %, , Disp: , Rfl:   •  Multiple Vitamin (MULTI-DAY VITAMINS) tablet, Take 1 tablet by mouth Daily. HOLD FOR SURGERY, Disp: , Rfl:   •  promethazine (PHENERGAN) 12.5 MG tablet, Take 12.5 mg by mouth Every 6 (Six) Hours As Needed for Nausea or Vomiting., Disp: , Rfl:   •  psyllium (METAMUCIL) 0.52 g capsule, Take 0.52 g by mouth., Disp: , Rfl:   •  rotigotine (NEUPRO) 8 MG/24HR patch 24 hour 24 hour patch, Place 1 patch on the skin as directed by provider Daily. 8 mg patch, Disp: , Rfl:   •  pantoprazole (PROTONIX) 40 MG EC tablet, Take 1 tablet by mouth Daily., Disp: 90 tablet, Rfl: 3  No current facility-administered medications for this visit.    Facility-Administered Medications Ordered in Other Visits:   •  heparin flush (porcine) 100 UNIT/ML injection 500 Units, 500 Units, Intravenous, PRN, Anuel Scott MD, 500 Units at 11/27/17 1347  •  heparin flush (porcine) 100 UNIT/ML injection 500 Units, 500 Units, Intravenous, PRN, Neno Merritt II, MD, 500 Units at 01/18/20 1359  •  sodium chloride 0.9 % flush 10 mL, 10 mL, Intravenous, PRN, Anuel Scott MD, 10 mL at 11/27/17 1347  •  sodium chloride 0.9 % flush 10 mL, 10 mL, Intravenous, PRN, Neno Merritt II, MD, 10 mL at 01/18/20 1359  •  sodium chloride 0.9 % infusion 250 mL, 250 mL, Intravenous, PRN, Neno Merritt II, MD  •  sodium chloride 0.9 % infusion 250 mL, 250 mL, Intravenous, PRN, Neno Merritt II, MD    ALLERGIES  Chlorhexidine, Codeine, Sertraline, and Propoxyphene    VITALS  Vitals:    11/02/21 0954   BP: 122/72   BP Location: Left arm   Patient Position: Sitting   Cuff Size: Adult   Weight: 62.2 kg (137 lb 1.6 oz)   Height: 167.6 cm (66\")       PHYSICAL EXAM  Debilities/Disabilities Identified: None  Emotional Behavior: Appropriate  Wt " "Readings from Last 3 Encounters:   11/02/21 62.2 kg (137 lb 1.6 oz)   10/29/21 65.8 kg (145 lb)   10/14/21 65.8 kg (145 lb)     Ht Readings from Last 1 Encounters:   11/02/21 167.6 cm (66\")     Body mass index is 22.13 kg/m².  Physical Exam  Constitutional:       Appearance: She is well-developed. She is ill-appearing. She is not diaphoretic.   HENT:      Head: Normocephalic and atraumatic.   Eyes:      General: No scleral icterus.     Conjunctiva/sclera: Conjunctivae normal.      Pupils: Pupils are equal, round, and reactive to light.   Neck:      Thyroid: No thyromegaly.   Cardiovascular:      Rate and Rhythm: Normal rate and regular rhythm.      Heart sounds: Normal heart sounds. No murmur heard.  No gallop.    Pulmonary:      Effort: Pulmonary effort is normal.      Breath sounds: Normal breath sounds. No wheezing or rales.   Abdominal:      General: Bowel sounds are normal. There is no distension or abdominal bruit.      Palpations: Abdomen is soft. There is no shifting dullness, fluid wave or mass.      Tenderness: There is no abdominal tenderness. There is no guarding. Negative signs include Singh's sign.      Hernia: There is no hernia in the ventral area.   Musculoskeletal:         General: Normal range of motion.      Cervical back: Normal range of motion and neck supple.   Lymphadenopathy:      Cervical: No cervical adenopathy.   Skin:     General: Skin is warm and dry.      Findings: No erythema or rash.   Neurological:      Mental Status: She is alert and oriented to person, place, and time.   Psychiatric:         Mood and Affect: Mood normal.         Behavior: Behavior normal.         CLINICAL DATA REVIEWED   reviewed previous lab results and integrated with today's visit, reviewed notes from other physicians and/or last GI encounter, reviewed previous endoscopy results and available photos, reviewed surgical pathology results from previous biopsies    ASSESSMENT  Diagnoses and all orders for this " visit:    GAVE (gastric antral vascular ectasia)    Stage 4 chronic kidney disease (HCC)    Anemia due to acute blood loss    Gastroparesis    Other orders  -     pantoprazole (PROTONIX) 40 MG EC tablet; Take 1 tablet by mouth Daily.          PLAN  Continue Same and will see as needed  Return if symptoms worsen or fail to improve.    I have discussed the above plan with the patient.  They verbalize understanding and are in agreement with the plan.  They have been advised to contact the office for any questions, concerns, or changes related to their health.

## 2021-11-02 NOTE — TELEPHONE ENCOUNTER
Caller: Charmaine Machuca    Relationship: Self    Best call back number: [phone]    What is the best time to reach you: ASAP    Who are you requesting to speak with (clinical staff, provider,  specific staff member): NURSE    Do you know the name of the person who called:     What was the call regarding: PT SAYS ANOTHER DR WILL BE TAKING OVER PROCRT, WANTS TO SPEAK ABOUT MOVING RECORDS    Do you require a callback: YES

## 2021-11-09 NOTE — TELEPHONE ENCOUNTER
CANCELLED ALL APPTS AND SENT TO [facility] IN MEDICAL RECORDS TO SEND MEDICAL RECORDS TO DR MARIEE'S OFFICE AS HE WILL CONTINUE HER TX ON HER PROCRIT PER MESS

## 2022-01-01 ENCOUNTER — TELEPHONE (OUTPATIENT)
Dept: CARDIOLOGY | Facility: CLINIC | Age: 82
End: 2022-01-01

## 2022-01-01 ENCOUNTER — APPOINTMENT (OUTPATIENT)
Dept: CARDIOLOGY | Facility: HOSPITAL | Age: 82
End: 2022-01-01

## 2022-01-01 ENCOUNTER — APPOINTMENT (OUTPATIENT)
Dept: CT IMAGING | Facility: HOSPITAL | Age: 82
End: 2022-01-01

## 2022-01-01 ENCOUNTER — READMISSION MANAGEMENT (OUTPATIENT)
Dept: CALL CENTER | Facility: HOSPITAL | Age: 82
End: 2022-01-01

## 2022-01-01 ENCOUNTER — APPOINTMENT (OUTPATIENT)
Dept: GENERAL RADIOLOGY | Facility: HOSPITAL | Age: 82
End: 2022-01-01

## 2022-01-01 ENCOUNTER — HOSPITAL ENCOUNTER (OUTPATIENT)
Facility: HOSPITAL | Age: 82
Setting detail: OBSERVATION
Discharge: HOME | End: 2022-01-09
Attending: EMERGENCY MEDICINE | Admitting: HOSPITALIST

## 2022-01-01 ENCOUNTER — HOSPITAL ENCOUNTER (EMERGENCY)
Facility: HOSPITAL | Age: 82
Discharge: HOME | End: 2022-01-12
Attending: EMERGENCY MEDICINE | Admitting: EMERGENCY MEDICINE

## 2022-01-01 VITALS
DIASTOLIC BLOOD PRESSURE: 62 MMHG | HEIGHT: 66 IN | TEMPERATURE: 97.5 F | HEART RATE: 93 BPM | BODY MASS INDEX: 20.09 KG/M2 | RESPIRATION RATE: 20 BRPM | WEIGHT: 125 LBS | SYSTOLIC BLOOD PRESSURE: 107 MMHG | OXYGEN SATURATION: 96 %

## 2022-01-01 VITALS
SYSTOLIC BLOOD PRESSURE: 126 MMHG | RESPIRATION RATE: 18 BRPM | DIASTOLIC BLOOD PRESSURE: 65 MMHG | HEART RATE: 73 BPM | TEMPERATURE: 98.2 F | HEIGHT: 66 IN | WEIGHT: 137 LBS | OXYGEN SATURATION: 99 % | BODY MASS INDEX: 22.02 KG/M2

## 2022-01-01 DIAGNOSIS — R25.1 TREMOR: Primary | ICD-10-CM

## 2022-01-01 DIAGNOSIS — R41.0 CONFUSION: Primary | ICD-10-CM

## 2022-01-01 DIAGNOSIS — F41.9 ANXIETY: ICD-10-CM

## 2022-01-01 DIAGNOSIS — R53.1 WEAKNESS: ICD-10-CM

## 2022-01-01 DIAGNOSIS — N18.6 ESRD (END STAGE RENAL DISEASE): ICD-10-CM

## 2022-01-01 DIAGNOSIS — S09.90XA MINOR HEAD INJURY, INITIAL ENCOUNTER: ICD-10-CM

## 2022-01-01 LAB
ALBUMIN SERPL-MCNC: 3.1 G/DL (ref 3.5–5.2)
ALBUMIN SERPL-MCNC: 4.3 G/DL (ref 3.5–5.2)
ALBUMIN SERPL-MCNC: 4.3 G/DL (ref 3.5–5.2)
ALBUMIN/GLOB SERPL: 1.9 G/DL
ALBUMIN/GLOB SERPL: 1.9 G/DL
ALP SERPL-CCNC: 74 U/L (ref 39–117)
ALP SERPL-CCNC: 75 U/L (ref 39–117)
ALT SERPL W P-5'-P-CCNC: <5 U/L (ref 1–33)
ALT SERPL W P-5'-P-CCNC: <5 U/L (ref 1–33)
ANION GAP SERPL CALCULATED.3IONS-SCNC: 10.1 MMOL/L (ref 5–15)
ANION GAP SERPL CALCULATED.3IONS-SCNC: 10.4 MMOL/L (ref 5–15)
ANION GAP SERPL CALCULATED.3IONS-SCNC: 11 MMOL/L (ref 5–15)
ANION GAP SERPL CALCULATED.3IONS-SCNC: 14.3 MMOL/L (ref 5–15)
AORTIC DIMENSIONLESS INDEX: 0.4 (DI)
APTT PPP: 30.1 SECONDS (ref 22.7–35.4)
AST SERPL-CCNC: 15 U/L (ref 1–32)
AST SERPL-CCNC: 15 U/L (ref 1–32)
BASOPHILS # BLD AUTO: 0.01 10*3/MM3 (ref 0–0.2)
BASOPHILS # BLD AUTO: 0.01 10*3/MM3 (ref 0–0.2)
BASOPHILS # BLD AUTO: 0.03 10*3/MM3 (ref 0–0.2)
BASOPHILS # BLD AUTO: 0.03 10*3/MM3 (ref 0–0.2)
BASOPHILS NFR BLD AUTO: 0.1 % (ref 0–1.5)
BASOPHILS NFR BLD AUTO: 0.1 % (ref 0–1.5)
BASOPHILS NFR BLD AUTO: 0.4 % (ref 0–1.5)
BASOPHILS NFR BLD AUTO: 0.6 % (ref 0–1.5)
BH CV ECHO MEAS - AO MAX PG (FULL): 15.3 MMHG
BH CV ECHO MEAS - AO MAX PG: 19.2 MMHG
BH CV ECHO MEAS - AO MEAN PG (FULL): 7.8 MMHG
BH CV ECHO MEAS - AO MEAN PG: 9.5 MMHG
BH CV ECHO MEAS - AO V2 MAX: 219.4 CM/SEC
BH CV ECHO MEAS - AO V2 MEAN: 145.6 CM/SEC
BH CV ECHO MEAS - AO V2 VTI: 48.9 CM
BH CV ECHO MEAS - AVA(I,A): 1.1 CM^2
BH CV ECHO MEAS - AVA(I,D): 1.1 CM^2
BH CV ECHO MEAS - AVA(V,A): 1.2 CM^2
BH CV ECHO MEAS - AVA(V,D): 1.2 CM^2
BH CV ECHO MEAS - BSA(HAYCOCK): 1.7 M^2
BH CV ECHO MEAS - BSA: 1.7 M^2
BH CV ECHO MEAS - BZI_BMI: 22.1 KILOGRAMS/M^2
BH CV ECHO MEAS - BZI_METRIC_HEIGHT: 167.6 CM
BH CV ECHO MEAS - BZI_METRIC_WEIGHT: 62.1 KG
BH CV ECHO MEAS - EDV(CUBED): 112.9 ML
BH CV ECHO MEAS - EDV(MOD-SP2): 102 ML
BH CV ECHO MEAS - EDV(MOD-SP4): 95 ML
BH CV ECHO MEAS - EDV(TEICH): 109.3 ML
BH CV ECHO MEAS - EF(CUBED): 52 %
BH CV ECHO MEAS - EF(MOD-BP): 56.5 %
BH CV ECHO MEAS - EF(MOD-SP2): 58.8 %
BH CV ECHO MEAS - EF(MOD-SP4): 53.7 %
BH CV ECHO MEAS - EF(TEICH): 43.9 %
BH CV ECHO MEAS - ESV(CUBED): 54.2 ML
BH CV ECHO MEAS - ESV(MOD-SP2): 42 ML
BH CV ECHO MEAS - ESV(MOD-SP4): 44 ML
BH CV ECHO MEAS - ESV(TEICH): 61.3 ML
BH CV ECHO MEAS - FS: 21.7 %
BH CV ECHO MEAS - IVS/LVPW: 1.2
BH CV ECHO MEAS - IVSD: 1 CM
BH CV ECHO MEAS - LAT PEAK E' VEL: 9.5 CM/SEC
BH CV ECHO MEAS - LV DIASTOLIC VOL/BSA (35-75): 55.8 ML/M^2
BH CV ECHO MEAS - LV MASS(C)D: 159.8 GRAMS
BH CV ECHO MEAS - LV MASS(C)DI: 93.8 GRAMS/M^2
BH CV ECHO MEAS - LV MAX PG: 3.9 MMHG
BH CV ECHO MEAS - LV MEAN PG: 1.7 MMHG
BH CV ECHO MEAS - LV SYSTOLIC VOL/BSA (12-30): 25.8 ML/M^2
BH CV ECHO MEAS - LV V1 MAX: 99.2 CM/SEC
BH CV ECHO MEAS - LV V1 MEAN: 60.3 CM/SEC
BH CV ECHO MEAS - LV V1 VTI: 21.1 CM
BH CV ECHO MEAS - LVIDD: 4.8 CM
BH CV ECHO MEAS - LVIDS: 3.8 CM
BH CV ECHO MEAS - LVLD AP2: 8.4 CM
BH CV ECHO MEAS - LVLD AP4: 8 CM
BH CV ECHO MEAS - LVLS AP2: 7.2 CM
BH CV ECHO MEAS - LVLS AP4: 6.9 CM
BH CV ECHO MEAS - LVOT AREA (M): 2.5 CM^2
BH CV ECHO MEAS - LVOT AREA: 2.6 CM^2
BH CV ECHO MEAS - LVOT DIAM: 1.8 CM
BH CV ECHO MEAS - LVPWD: 0.86 CM
BH CV ECHO MEAS - MED PEAK E' VEL: 6.5 CM/SEC
BH CV ECHO MEAS - MV A DUR: 0.17 SEC
BH CV ECHO MEAS - MV A MAX VEL: 106.7 CM/SEC
BH CV ECHO MEAS - MV DEC SLOPE: 349.1 CM/SEC^2
BH CV ECHO MEAS - MV DEC TIME: 236 SEC
BH CV ECHO MEAS - MV E MAX VEL: 79.7 CM/SEC
BH CV ECHO MEAS - MV E/A: 0.75
BH CV ECHO MEAS - MV MAX PG: 4.9 MMHG
BH CV ECHO MEAS - MV MEAN PG: 1.8 MMHG
BH CV ECHO MEAS - MV P1/2T MAX VEL: 83.3 CM/SEC
BH CV ECHO MEAS - MV P1/2T: 69.9 MSEC
BH CV ECHO MEAS - MV V2 MAX: 111 CM/SEC
BH CV ECHO MEAS - MV V2 MEAN: 63 CM/SEC
BH CV ECHO MEAS - MV V2 VTI: 28.1 CM
BH CV ECHO MEAS - MVA P1/2T LCG: 2.6 CM^2
BH CV ECHO MEAS - MVA(P1/2T): 3.1 CM^2
BH CV ECHO MEAS - MVA(VTI): 2 CM^2
BH CV ECHO MEAS - PA ACC TIME: 0.13 SEC
BH CV ECHO MEAS - PA MAX PG (FULL): 0.15 MMHG
BH CV ECHO MEAS - PA MAX PG: 3.4 MMHG
BH CV ECHO MEAS - PA MEAN PG (FULL): -0.28 MMHG
BH CV ECHO MEAS - PA MEAN PG: 1.5 MMHG
BH CV ECHO MEAS - PA PR(ACCEL): 22 MMHG
BH CV ECHO MEAS - PA V2 MAX: 92.1 CM/SEC
BH CV ECHO MEAS - PA V2 MEAN: 56.9 CM/SEC
BH CV ECHO MEAS - PA V2 VTI: 18.3 CM
BH CV ECHO MEAS - PULM A REVS DUR: 0.16 SEC
BH CV ECHO MEAS - PULM A REVS VEL: 37.7 CM/SEC
BH CV ECHO MEAS - PULM DIAS VEL: 39.8 CM/SEC
BH CV ECHO MEAS - PULM S/D: 1.6
BH CV ECHO MEAS - PULM SYS VEL: 63.4 CM/SEC
BH CV ECHO MEAS - PVA(I,A): 3.1 CM^2
BH CV ECHO MEAS - PVA(I,D): 3.1 CM^2
BH CV ECHO MEAS - PVA(V,A): 2.9 CM^2
BH CV ECHO MEAS - PVA(V,D): 2.9 CM^2
BH CV ECHO MEAS - QP/QS: 1
BH CV ECHO MEAS - RAP SYSTOLE: 3 MMHG
BH CV ECHO MEAS - RV MAX PG: 3.2 MMHG
BH CV ECHO MEAS - RV MEAN PG: 1.8 MMHG
BH CV ECHO MEAS - RV V1 MAX: 90 CM/SEC
BH CV ECHO MEAS - RV V1 MEAN: 64.2 CM/SEC
BH CV ECHO MEAS - RV V1 VTI: 19.1 CM
BH CV ECHO MEAS - RVOT AREA: 3 CM^2
BH CV ECHO MEAS - RVOT DIAM: 1.9 CM
BH CV ECHO MEAS - RVSP: 35.1 MMHG
BH CV ECHO MEAS - SI(CUBED): 34.5 ML/M^2
BH CV ECHO MEAS - SI(LVOT): 32.2 ML/M^2
BH CV ECHO MEAS - SI(MOD-SP2): 35.2 ML/M^2
BH CV ECHO MEAS - SI(MOD-SP4): 29.9 ML/M^2
BH CV ECHO MEAS - SI(TEICH): 28.1 ML/M^2
BH CV ECHO MEAS - SV(CUBED): 58.7 ML
BH CV ECHO MEAS - SV(LVOT): 54.9 ML
BH CV ECHO MEAS - SV(MOD-SP2): 60 ML
BH CV ECHO MEAS - SV(MOD-SP4): 51 ML
BH CV ECHO MEAS - SV(RVOT): 56.7 ML
BH CV ECHO MEAS - SV(TEICH): 47.9 ML
BH CV ECHO MEAS - TAPSE (>1.6): 2 CM
BH CV ECHO MEAS - TR MAX VEL: 283.2 CM/SEC
BH CV ECHO MEASUREMENTS AVERAGE E/E' RATIO: 9.96
BH CV VAS BP LEFT ARM: NORMAL MMHG
BH CV XLRA - RV BASE: 3.8 CM
BH CV XLRA - RV LENGTH: 8.1 CM
BH CV XLRA - RV MID: 2.9 CM
BH CV XLRA - TDI S': 13.6 CM/SEC
BH CV XLRA MEAS LEFT DIST CCA EDV: 18.5 CM/SEC
BH CV XLRA MEAS LEFT DIST CCA PSV: 84.1 CM/SEC
BH CV XLRA MEAS LEFT DIST ICA EDV: 33.2 CM/SEC
BH CV XLRA MEAS LEFT DIST ICA PSV: 120 CM/SEC
BH CV XLRA MEAS LEFT ICA/CCA RATIO: 1.44
BH CV XLRA MEAS LEFT MID ICA EDV: 29.3 CM/SEC
BH CV XLRA MEAS LEFT MID ICA PSV: 121 CM/SEC
BH CV XLRA MEAS LEFT PROX CCA EDV: 15.8 CM/SEC
BH CV XLRA MEAS LEFT PROX CCA PSV: 87.4 CM/SEC
BH CV XLRA MEAS LEFT PROX ECA PSV: 57.7 CM/SEC
BH CV XLRA MEAS LEFT PROX ICA EDV: 17.7 CM/SEC
BH CV XLRA MEAS LEFT PROX ICA PSV: 68.7 CM/SEC
BH CV XLRA MEAS LEFT PROX SCLA PSV: 114 CM/SEC
BH CV XLRA MEAS LEFT VERTEBRAL A EDV: 17.3 CM/SEC
BH CV XLRA MEAS LEFT VERTEBRAL A PSV: 67.6 CM/SEC
BH CV XLRA MEAS RIGHT DIST CCA EDV: 8.64 CM/SEC
BH CV XLRA MEAS RIGHT DIST CCA PSV: 73.5 CM/SEC
BH CV XLRA MEAS RIGHT DIST ICA EDV: 27.4 CM/SEC
BH CV XLRA MEAS RIGHT DIST ICA PSV: 99.7 CM/SEC
BH CV XLRA MEAS RIGHT ICA/CCA RATIO: 1.51
BH CV XLRA MEAS RIGHT MID ICA EDV: 19.3 CM/SEC
BH CV XLRA MEAS RIGHT MID ICA PSV: 111 CM/SEC
BH CV XLRA MEAS RIGHT PROX CCA EDV: 14.7 CM/SEC
BH CV XLRA MEAS RIGHT PROX CCA PSV: 90.9 CM/SEC
BH CV XLRA MEAS RIGHT PROX ECA PSV: 105 CM/SEC
BH CV XLRA MEAS RIGHT PROX ICA EDV: 18.2 CM/SEC
BH CV XLRA MEAS RIGHT PROX ICA PSV: 97.3 CM/SEC
BH CV XLRA MEAS RIGHT PROX SCLA EDV: 19.6 CM/SEC
BH CV XLRA MEAS RIGHT PROX SCLA PSV: 203 CM/SEC
BH CV XLRA MEAS RIGHT VERTEBRAL A EDV: 17.6 CM/SEC
BH CV XLRA MEAS RIGHT VERTEBRAL A PSV: 85 CM/SEC
BILIRUB SERPL-MCNC: 0.2 MG/DL (ref 0–1.2)
BILIRUB SERPL-MCNC: 0.3 MG/DL (ref 0–1.2)
BUN SERPL-MCNC: 17 MG/DL (ref 8–23)
BUN SERPL-MCNC: 37 MG/DL (ref 8–23)
BUN SERPL-MCNC: 39 MG/DL (ref 8–23)
BUN SERPL-MCNC: 42 MG/DL (ref 8–23)
BUN/CREAT SERPL: 6.6 (ref 7–25)
BUN/CREAT SERPL: 7.1 (ref 7–25)
BUN/CREAT SERPL: 7.4 (ref 7–25)
BUN/CREAT SERPL: 7.4 (ref 7–25)
CALCIUM SPEC-SCNC: 8.4 MG/DL (ref 8.6–10.5)
CALCIUM SPEC-SCNC: 8.6 MG/DL (ref 8.6–10.5)
CALCIUM SPEC-SCNC: 9 MG/DL (ref 8.6–10.5)
CALCIUM SPEC-SCNC: 9.7 MG/DL (ref 8.6–10.5)
CHLORIDE SERPL-SCNC: 100 MMOL/L (ref 98–107)
CHLORIDE SERPL-SCNC: 101 MMOL/L (ref 98–107)
CHLORIDE SERPL-SCNC: 102 MMOL/L (ref 98–107)
CHLORIDE SERPL-SCNC: 105 MMOL/L (ref 98–107)
CHOLEST SERPL-MCNC: 124 MG/DL (ref 0–200)
CO2 SERPL-SCNC: 20.7 MMOL/L (ref 22–29)
CO2 SERPL-SCNC: 24.6 MMOL/L (ref 22–29)
CO2 SERPL-SCNC: 26 MMOL/L (ref 22–29)
CO2 SERPL-SCNC: 27.9 MMOL/L (ref 22–29)
CREAT SERPL-MCNC: 2.59 MG/DL (ref 0.57–1)
CREAT SERPL-MCNC: 5 MG/DL (ref 0.57–1)
CREAT SERPL-MCNC: 5.26 MG/DL (ref 0.57–1)
CREAT SERPL-MCNC: 5.89 MG/DL (ref 0.57–1)
DEPRECATED RDW RBC AUTO: 47.7 FL (ref 37–54)
DEPRECATED RDW RBC AUTO: 48.6 FL (ref 37–54)
DEPRECATED RDW RBC AUTO: 49.5 FL (ref 37–54)
DEPRECATED RDW RBC AUTO: 51.3 FL (ref 37–54)
EOSINOPHIL # BLD AUTO: 0.08 10*3/MM3 (ref 0–0.4)
EOSINOPHIL # BLD AUTO: 0.08 10*3/MM3 (ref 0–0.4)
EOSINOPHIL # BLD AUTO: 0.1 10*3/MM3 (ref 0–0.4)
EOSINOPHIL # BLD AUTO: 0.12 10*3/MM3 (ref 0–0.4)
EOSINOPHIL NFR BLD AUTO: 0.9 % (ref 0.3–6.2)
EOSINOPHIL NFR BLD AUTO: 1.1 % (ref 0.3–6.2)
EOSINOPHIL NFR BLD AUTO: 1.5 % (ref 0.3–6.2)
EOSINOPHIL NFR BLD AUTO: 2.2 % (ref 0.3–6.2)
ERYTHROCYTE [DISTWIDTH] IN BLOOD BY AUTOMATED COUNT: 13.4 % (ref 12.3–15.4)
ERYTHROCYTE [DISTWIDTH] IN BLOOD BY AUTOMATED COUNT: 13.5 % (ref 12.3–15.4)
ERYTHROCYTE [DISTWIDTH] IN BLOOD BY AUTOMATED COUNT: 13.7 % (ref 12.3–15.4)
ERYTHROCYTE [DISTWIDTH] IN BLOOD BY AUTOMATED COUNT: 13.9 % (ref 12.3–15.4)
ERYTHROCYTE [SEDIMENTATION RATE] IN BLOOD: 29 MM/HR (ref 0–30)
GFR SERPL CREATININE-BSD FRML MDRD: 18 ML/MIN/1.73
GFR SERPL CREATININE-BSD FRML MDRD: 7 ML/MIN/1.73
GFR SERPL CREATININE-BSD FRML MDRD: 8 ML/MIN/1.73
GFR SERPL CREATININE-BSD FRML MDRD: 8 ML/MIN/1.73
GFR SERPL CREATININE-BSD FRML MDRD: ABNORMAL ML/MIN/{1.73_M2}
GLOBULIN UR ELPH-MCNC: 2.3 GM/DL
GLOBULIN UR ELPH-MCNC: 2.3 GM/DL
GLUCOSE BLDC GLUCOMTR-MCNC: 106 MG/DL (ref 70–130)
GLUCOSE BLDC GLUCOMTR-MCNC: 107 MG/DL (ref 70–130)
GLUCOSE BLDC GLUCOMTR-MCNC: 146 MG/DL (ref 70–130)
GLUCOSE BLDC GLUCOMTR-MCNC: 61 MG/DL (ref 70–130)
GLUCOSE BLDC GLUCOMTR-MCNC: 66 MG/DL (ref 70–130)
GLUCOSE BLDC GLUCOMTR-MCNC: 79 MG/DL (ref 70–130)
GLUCOSE BLDC GLUCOMTR-MCNC: 86 MG/DL (ref 70–130)
GLUCOSE BLDC GLUCOMTR-MCNC: 98 MG/DL (ref 70–130)
GLUCOSE BLDC GLUCOMTR-MCNC: 99 MG/DL (ref 70–130)
GLUCOSE SERPL-MCNC: 101 MG/DL (ref 65–99)
GLUCOSE SERPL-MCNC: 66 MG/DL (ref 65–99)
GLUCOSE SERPL-MCNC: 83 MG/DL (ref 65–99)
GLUCOSE SERPL-MCNC: 91 MG/DL (ref 65–99)
HBA1C MFR BLD: 4.61 % (ref 4.8–5.6)
HBV SURFACE AG SERPL QL IA: NORMAL
HCT VFR BLD AUTO: 30.5 % (ref 34–46.6)
HCT VFR BLD AUTO: 31.4 % (ref 34–46.6)
HCT VFR BLD AUTO: 31.5 % (ref 34–46.6)
HCT VFR BLD AUTO: 34.7 % (ref 34–46.6)
HDLC SERPL-MCNC: 53 MG/DL (ref 40–60)
HGB BLD-MCNC: 10 G/DL (ref 12–15.9)
HGB BLD-MCNC: 10.1 G/DL (ref 12–15.9)
HGB BLD-MCNC: 10.1 G/DL (ref 12–15.9)
HGB BLD-MCNC: 10.9 G/DL (ref 12–15.9)
IMM GRANULOCYTES # BLD AUTO: 0.03 10*3/MM3 (ref 0–0.05)
IMM GRANULOCYTES # BLD AUTO: 0.03 10*3/MM3 (ref 0–0.05)
IMM GRANULOCYTES NFR BLD AUTO: 0.4 % (ref 0–0.5)
IMM GRANULOCYTES NFR BLD AUTO: 0.4 % (ref 0–0.5)
INR PPP: 1.08 (ref 0.9–1.1)
LDLC SERPL CALC-MCNC: 47 MG/DL (ref 0–100)
LDLC/HDLC SERPL: 0.82 {RATIO}
LEFT ARM BP: NORMAL MMHG
LEFT ATRIUM VOLUME INDEX: 31 ML/M2
LEFT ATRIUM VOLUME: 52 CM3
LV EF 2D ECHO EST: 56 %
LYMPHOCYTES # BLD AUTO: 0.52 10*3/MM3 (ref 0.7–3.1)
LYMPHOCYTES # BLD AUTO: 0.54 10*3/MM3 (ref 0.7–3.1)
LYMPHOCYTES # BLD AUTO: 0.55 10*3/MM3 (ref 0.7–3.1)
LYMPHOCYTES # BLD AUTO: 0.76 10*3/MM3 (ref 0.7–3.1)
LYMPHOCYTES NFR BLD AUTO: 10.6 % (ref 19.6–45.3)
LYMPHOCYTES NFR BLD AUTO: 6.4 % (ref 19.6–45.3)
LYMPHOCYTES NFR BLD AUTO: 8.1 % (ref 19.6–45.3)
LYMPHOCYTES NFR BLD AUTO: 9.7 % (ref 19.6–45.3)
MAGNESIUM SERPL-MCNC: 2.4 MG/DL (ref 1.6–2.4)
MAXIMAL PREDICTED HEART RATE: 137 BPM
MAXIMAL PREDICTED HEART RATE: 137 BPM
MCH RBC QN AUTO: 31.7 PG (ref 26.6–33)
MCH RBC QN AUTO: 31.8 PG (ref 26.6–33)
MCH RBC QN AUTO: 32.1 PG (ref 26.6–33)
MCH RBC QN AUTO: 32.2 PG (ref 26.6–33)
MCHC RBC AUTO-ENTMCNC: 31.4 G/DL (ref 31.5–35.7)
MCHC RBC AUTO-ENTMCNC: 31.7 G/DL (ref 31.5–35.7)
MCHC RBC AUTO-ENTMCNC: 32.2 G/DL (ref 31.5–35.7)
MCHC RBC AUTO-ENTMCNC: 33.1 G/DL (ref 31.5–35.7)
MCV RBC AUTO: 100 FL (ref 79–97)
MCV RBC AUTO: 101.2 FL (ref 79–97)
MCV RBC AUTO: 97.1 FL (ref 79–97)
MCV RBC AUTO: 99.7 FL (ref 79–97)
MONOCYTES # BLD AUTO: 0.49 10*3/MM3 (ref 0.1–0.9)
MONOCYTES # BLD AUTO: 0.5 10*3/MM3 (ref 0.1–0.9)
MONOCYTES # BLD AUTO: 0.53 10*3/MM3 (ref 0.1–0.9)
MONOCYTES # BLD AUTO: 0.81 10*3/MM3 (ref 0.1–0.9)
MONOCYTES NFR BLD AUTO: 11.3 % (ref 5–12)
MONOCYTES NFR BLD AUTO: 5.9 % (ref 5–12)
MONOCYTES NFR BLD AUTO: 7.2 % (ref 5–12)
MONOCYTES NFR BLD AUTO: 9.9 % (ref 5–12)
NEUTROPHILS NFR BLD AUTO: 4.17 10*3/MM3 (ref 1.7–7)
NEUTROPHILS NFR BLD AUTO: 5.43 10*3/MM3 (ref 1.7–7)
NEUTROPHILS NFR BLD AUTO: 5.58 10*3/MM3 (ref 1.7–7)
NEUTROPHILS NFR BLD AUTO: 7.27 10*3/MM3 (ref 1.7–7)
NEUTROPHILS NFR BLD AUTO: 76.2 % (ref 42.7–76)
NEUTROPHILS NFR BLD AUTO: 77.4 % (ref 42.7–76)
NEUTROPHILS NFR BLD AUTO: 82.7 % (ref 42.7–76)
NEUTROPHILS NFR BLD AUTO: 86.3 % (ref 42.7–76)
NRBC BLD AUTO-RTO: 0 /100 WBC (ref 0–0.2)
NRBC BLD AUTO-RTO: 0 /100 WBC (ref 0–0.2)
PHOSPHATE SERPL-MCNC: 7 MG/DL (ref 2.5–4.5)
PLATELET # BLD AUTO: 120 10*3/MM3 (ref 140–450)
PLATELET # BLD AUTO: 123 10*3/MM3 (ref 140–450)
PLATELET # BLD AUTO: 132 10*3/MM3 (ref 140–450)
PLATELET # BLD AUTO: 136 10*3/MM3 (ref 140–450)
PMV BLD AUTO: 10 FL (ref 6–12)
PMV BLD AUTO: 10.1 FL (ref 6–12)
PMV BLD AUTO: 10.1 FL (ref 6–12)
PMV BLD AUTO: 10.4 FL (ref 6–12)
POTASSIUM SERPL-SCNC: 4 MMOL/L (ref 3.5–5.2)
POTASSIUM SERPL-SCNC: 4.4 MMOL/L (ref 3.5–5.2)
POTASSIUM SERPL-SCNC: 4.8 MMOL/L (ref 3.5–5.2)
POTASSIUM SERPL-SCNC: 5.1 MMOL/L (ref 3.5–5.2)
PROT SERPL-MCNC: 6.6 G/DL (ref 6–8.5)
PROT SERPL-MCNC: 6.6 G/DL (ref 6–8.5)
PROTHROMBIN TIME: 13.8 SECONDS (ref 11.7–14.2)
RBC # BLD AUTO: 3.14 10*6/MM3 (ref 3.77–5.28)
RBC # BLD AUTO: 3.15 10*6/MM3 (ref 3.77–5.28)
RBC # BLD AUTO: 3.15 10*6/MM3 (ref 3.77–5.28)
RBC # BLD AUTO: 3.43 10*6/MM3 (ref 3.77–5.28)
SARS-COV-2 ORF1AB RESP QL NAA+PROBE: NOT DETECTED
SINUS: 3 CM
SODIUM SERPL-SCNC: 136 MMOL/L (ref 136–145)
SODIUM SERPL-SCNC: 137 MMOL/L (ref 136–145)
SODIUM SERPL-SCNC: 138 MMOL/L (ref 136–145)
SODIUM SERPL-SCNC: 142 MMOL/L (ref 136–145)
STRESS TARGET HR: 116 BPM
STRESS TARGET HR: 116 BPM
TRIGL SERPL-MCNC: 137 MG/DL (ref 0–150)
TSH SERPL DL<=0.05 MIU/L-ACNC: 0.6 UIU/ML (ref 0.27–4.2)
VLDLC SERPL-MCNC: 24 MG/DL (ref 5–40)
WBC NRBC COR # BLD: 5.38 10*3/MM3 (ref 3.4–10.8)
WBC NRBC COR # BLD: 6.76 10*3/MM3 (ref 3.4–10.8)
WBC NRBC COR # BLD: 7.14 10*3/MM3 (ref 3.4–10.8)
WBC NRBC COR # BLD: 8.43 10*3/MM3 (ref 3.4–10.8)

## 2022-01-01 PROCEDURE — G0378 HOSPITAL OBSERVATION PER HR: HCPCS

## 2022-01-01 PROCEDURE — 99222 1ST HOSP IP/OBS MODERATE 55: CPT | Performed by: PSYCHIATRY & NEUROLOGY

## 2022-01-01 PROCEDURE — 82962 GLUCOSE BLOOD TEST: CPT

## 2022-01-01 PROCEDURE — 85025 COMPLETE CBC W/AUTO DIFF WBC: CPT | Performed by: INTERNAL MEDICINE

## 2022-01-01 PROCEDURE — 97530 THERAPEUTIC ACTIVITIES: CPT

## 2022-01-01 PROCEDURE — U0004 COV-19 TEST NON-CDC HGH THRU: HCPCS | Performed by: EMERGENCY MEDICINE

## 2022-01-01 PROCEDURE — 85025 COMPLETE CBC W/AUTO DIFF WBC: CPT | Performed by: NURSE PRACTITIONER

## 2022-01-01 PROCEDURE — 99283 EMERGENCY DEPT VISIT LOW MDM: CPT

## 2022-01-01 PROCEDURE — 93306 TTE W/DOPPLER COMPLETE: CPT

## 2022-01-01 PROCEDURE — 90791 PSYCH DIAGNOSTIC EVALUATION: CPT

## 2022-01-01 PROCEDURE — P9047 ALBUMIN (HUMAN), 25%, 50ML: HCPCS | Performed by: NURSE PRACTITIONER

## 2022-01-01 PROCEDURE — 83735 ASSAY OF MAGNESIUM: CPT | Performed by: INTERNAL MEDICINE

## 2022-01-01 PROCEDURE — 83036 HEMOGLOBIN GLYCOSYLATED A1C: CPT | Performed by: INTERNAL MEDICINE

## 2022-01-01 PROCEDURE — 96374 THER/PROPH/DIAG INJ IV PUSH: CPT

## 2022-01-01 PROCEDURE — 96376 TX/PRO/DX INJ SAME DRUG ADON: CPT

## 2022-01-01 PROCEDURE — 85652 RBC SED RATE AUTOMATED: CPT | Performed by: PSYCHIATRY & NEUROLOGY

## 2022-01-01 PROCEDURE — 97161 PT EVAL LOW COMPLEX 20 MIN: CPT

## 2022-01-01 PROCEDURE — 99232 SBSQ HOSP IP/OBS MODERATE 35: CPT | Performed by: PSYCHIATRY & NEUROLOGY

## 2022-01-01 PROCEDURE — 70450 CT HEAD/BRAIN W/O DYE: CPT

## 2022-01-01 PROCEDURE — 80053 COMPREHEN METABOLIC PANEL: CPT | Performed by: EMERGENCY MEDICINE

## 2022-01-01 PROCEDURE — 36415 COLL VENOUS BLD VENIPUNCTURE: CPT | Performed by: PSYCHIATRY & NEUROLOGY

## 2022-01-01 PROCEDURE — 85025 COMPLETE CBC W/AUTO DIFF WBC: CPT | Performed by: EMERGENCY MEDICINE

## 2022-01-01 PROCEDURE — 63710000001 PROMETHAZINE PER 12.5 MG: Performed by: INTERNAL MEDICINE

## 2022-01-01 PROCEDURE — G0257 UNSCHED DIALYSIS ESRD PT HOS: HCPCS

## 2022-01-01 PROCEDURE — 99285 EMERGENCY DEPT VISIT HI MDM: CPT

## 2022-01-01 PROCEDURE — 93880 EXTRACRANIAL BILAT STUDY: CPT

## 2022-01-01 PROCEDURE — 71045 X-RAY EXAM CHEST 1 VIEW: CPT

## 2022-01-01 PROCEDURE — 80069 RENAL FUNCTION PANEL: CPT | Performed by: NURSE PRACTITIONER

## 2022-01-01 PROCEDURE — 80048 BASIC METABOLIC PNL TOTAL CA: CPT | Performed by: HOSPITALIST

## 2022-01-01 PROCEDURE — 85610 PROTHROMBIN TIME: CPT | Performed by: PSYCHIATRY & NEUROLOGY

## 2022-01-01 PROCEDURE — 25010000002 LORAZEPAM PER 2 MG: Performed by: EMERGENCY MEDICINE

## 2022-01-01 PROCEDURE — 93306 TTE W/DOPPLER COMPLETE: CPT | Performed by: INTERNAL MEDICINE

## 2022-01-01 PROCEDURE — 87340 HEPATITIS B SURFACE AG IA: CPT | Performed by: INTERNAL MEDICINE

## 2022-01-01 PROCEDURE — 92610 EVALUATE SWALLOWING FUNCTION: CPT

## 2022-01-01 PROCEDURE — 84443 ASSAY THYROID STIM HORMONE: CPT | Performed by: PSYCHIATRY & NEUROLOGY

## 2022-01-01 PROCEDURE — 85730 THROMBOPLASTIN TIME PARTIAL: CPT | Performed by: PSYCHIATRY & NEUROLOGY

## 2022-01-01 PROCEDURE — 25010000002 ALBUMIN HUMAN 25% PER 50 ML: Performed by: NURSE PRACTITIONER

## 2022-01-01 PROCEDURE — 80061 LIPID PANEL: CPT | Performed by: INTERNAL MEDICINE

## 2022-01-01 PROCEDURE — U0005 INFEC AGEN DETEC AMPLI PROBE: HCPCS | Performed by: EMERGENCY MEDICINE

## 2022-01-01 PROCEDURE — 80053 COMPREHEN METABOLIC PANEL: CPT | Performed by: NURSE PRACTITIONER

## 2022-01-01 RX ORDER — PROMETHAZINE HYDROCHLORIDE 12.5 MG/1
12.5 TABLET ORAL EVERY 6 HOURS PRN
Status: DISCONTINUED | OUTPATIENT
Start: 2022-01-01 | End: 2022-01-01 | Stop reason: HOSPADM

## 2022-01-01 RX ORDER — CLOPIDOGREL BISULFATE 75 MG/1
75 TABLET ORAL DAILY
Qty: 30 TABLET | Refills: 0 | Status: SHIPPED | OUTPATIENT
Start: 2022-01-01 | End: 2022-01-01

## 2022-01-01 RX ORDER — NICOTINE POLACRILEX 4 MG
15 LOZENGE BUCCAL
Status: DISCONTINUED | OUTPATIENT
Start: 2022-01-01 | End: 2022-01-01 | Stop reason: HOSPADM

## 2022-01-01 RX ORDER — HYDROXYZINE HYDROCHLORIDE 25 MG/1
25 TABLET, FILM COATED ORAL 3 TIMES DAILY PRN
Qty: 21 TABLET | Refills: 0 | Status: SHIPPED | OUTPATIENT
Start: 2022-01-01

## 2022-01-01 RX ORDER — BUPROPION HYDROCHLORIDE 300 MG/1
300 TABLET ORAL DAILY
COMMUNITY
Start: 2021-01-01

## 2022-01-01 RX ORDER — ACETAMINOPHEN 650 MG/1
650 SUPPOSITORY RECTAL EVERY 4 HOURS PRN
Status: DISCONTINUED | OUTPATIENT
Start: 2022-01-01 | End: 2022-01-01 | Stop reason: HOSPADM

## 2022-01-01 RX ORDER — NITROGLYCERIN 0.4 MG/1
0.4 TABLET SUBLINGUAL
Status: DISCONTINUED | OUTPATIENT
Start: 2022-01-01 | End: 2022-01-01 | Stop reason: HOSPADM

## 2022-01-01 RX ORDER — BISACODYL 5 MG/1
5 TABLET, DELAYED RELEASE ORAL DAILY PRN
Status: DISCONTINUED | OUTPATIENT
Start: 2022-01-01 | End: 2022-01-01 | Stop reason: HOSPADM

## 2022-01-01 RX ORDER — LIDOCAINE 50 MG/G
1 PATCH TOPICAL NIGHTLY
Status: DISCONTINUED | OUTPATIENT
Start: 2022-01-01 | End: 2022-01-01 | Stop reason: HOSPADM

## 2022-01-01 RX ORDER — AMOXICILLIN 250 MG
2 CAPSULE ORAL 2 TIMES DAILY PRN
Status: DISCONTINUED | OUTPATIENT
Start: 2022-01-01 | End: 2022-01-01 | Stop reason: HOSPADM

## 2022-01-01 RX ORDER — ASPIRIN 300 MG/1
300 SUPPOSITORY RECTAL DAILY
Status: DISCONTINUED | OUTPATIENT
Start: 2022-01-01 | End: 2022-01-01

## 2022-01-01 RX ORDER — POLYETHYLENE GLYCOL 3350 17 G/17G
17 POWDER, FOR SOLUTION ORAL DAILY PRN
Status: DISCONTINUED | OUTPATIENT
Start: 2022-01-01 | End: 2022-01-01 | Stop reason: HOSPADM

## 2022-01-01 RX ORDER — CLOPIDOGREL BISULFATE 75 MG/1
300 TABLET ORAL ONCE
Status: COMPLETED | OUTPATIENT
Start: 2022-01-01 | End: 2022-01-01

## 2022-01-01 RX ORDER — BISACODYL 10 MG
10 SUPPOSITORY, RECTAL RECTAL DAILY PRN
Status: DISCONTINUED | OUTPATIENT
Start: 2022-01-01 | End: 2022-01-01 | Stop reason: HOSPADM

## 2022-01-01 RX ORDER — LORAZEPAM 2 MG/ML
0.5 INJECTION INTRAMUSCULAR ONCE
Status: COMPLETED | OUTPATIENT
Start: 2022-01-01 | End: 2022-01-01

## 2022-01-01 RX ORDER — DEXTROSE MONOHYDRATE 25 G/50ML
25 INJECTION, SOLUTION INTRAVENOUS
Status: DISCONTINUED | OUTPATIENT
Start: 2022-01-01 | End: 2022-01-01 | Stop reason: HOSPADM

## 2022-01-01 RX ORDER — MIDODRINE HYDROCHLORIDE 5 MG/1
5 TABLET ORAL
Status: DISCONTINUED | OUTPATIENT
Start: 2022-01-01 | End: 2022-01-01 | Stop reason: HOSPADM

## 2022-01-01 RX ORDER — SODIUM CHLORIDE 0.9 % (FLUSH) 0.9 %
10 SYRINGE (ML) INJECTION AS NEEDED
Status: DISCONTINUED | OUTPATIENT
Start: 2022-01-01 | End: 2022-01-01 | Stop reason: HOSPADM

## 2022-01-01 RX ORDER — CLOPIDOGREL BISULFATE 75 MG/1
300 TABLET ORAL DAILY
Status: DISCONTINUED | OUTPATIENT
Start: 2022-01-01 | End: 2022-01-01

## 2022-01-01 RX ORDER — ONDANSETRON 2 MG/ML
4 INJECTION INTRAMUSCULAR; INTRAVENOUS EVERY 6 HOURS PRN
Status: DISCONTINUED | OUTPATIENT
Start: 2022-01-01 | End: 2022-01-01 | Stop reason: HOSPADM

## 2022-01-01 RX ORDER — FEBUXOSTAT 40 MG/1
40 TABLET, FILM COATED ORAL DAILY
Status: DISCONTINUED | OUTPATIENT
Start: 2022-01-01 | End: 2022-01-01 | Stop reason: HOSPADM

## 2022-01-01 RX ORDER — CHOLECALCIFEROL (VITAMIN D3) 125 MCG
5 CAPSULE ORAL NIGHTLY PRN
Status: DISCONTINUED | OUTPATIENT
Start: 2022-01-01 | End: 2022-01-01 | Stop reason: HOSPADM

## 2022-01-01 RX ORDER — ASPIRIN 81 MG/1
81 TABLET, CHEWABLE ORAL DAILY
Status: DISCONTINUED | OUTPATIENT
Start: 2022-01-01 | End: 2022-01-01 | Stop reason: HOSPADM

## 2022-01-01 RX ORDER — BUPROPION HYDROCHLORIDE 150 MG/1
150 TABLET, EXTENDED RELEASE ORAL DAILY
Status: DISCONTINUED | OUTPATIENT
Start: 2022-01-01 | End: 2022-01-01 | Stop reason: HOSPADM

## 2022-01-01 RX ORDER — PROMETHAZINE HYDROCHLORIDE 25 MG/1
1 TABLET ORAL NIGHTLY
COMMUNITY

## 2022-01-01 RX ORDER — CLOPIDOGREL BISULFATE 75 MG/1
300 TABLET ORAL ONCE
Status: DISCONTINUED | OUTPATIENT
Start: 2022-01-01 | End: 2022-01-01

## 2022-01-01 RX ORDER — ATORVASTATIN CALCIUM 80 MG/1
80 TABLET, FILM COATED ORAL NIGHTLY
Status: DISCONTINUED | OUTPATIENT
Start: 2022-01-01 | End: 2022-01-01 | Stop reason: HOSPADM

## 2022-01-01 RX ORDER — CLOPIDOGREL BISULFATE 75 MG/1
75 TABLET ORAL DAILY
Status: DISCONTINUED | OUTPATIENT
Start: 2022-01-01 | End: 2022-01-01 | Stop reason: HOSPADM

## 2022-01-01 RX ORDER — ACETAMINOPHEN 325 MG/1
650 TABLET ORAL EVERY 4 HOURS PRN
Status: DISCONTINUED | OUTPATIENT
Start: 2022-01-01 | End: 2022-01-01 | Stop reason: HOSPADM

## 2022-01-01 RX ORDER — ONDANSETRON 4 MG/1
4 TABLET, FILM COATED ORAL EVERY 6 HOURS PRN
Status: DISCONTINUED | OUTPATIENT
Start: 2022-01-01 | End: 2022-01-01 | Stop reason: HOSPADM

## 2022-01-01 RX ORDER — CALCIUM CARBONATE 200(500)MG
2 TABLET,CHEWABLE ORAL 2 TIMES DAILY PRN
Status: DISCONTINUED | OUTPATIENT
Start: 2022-01-01 | End: 2022-01-01 | Stop reason: HOSPADM

## 2022-01-01 RX ORDER — MIDODRINE HYDROCHLORIDE 5 MG/1
5 TABLET ORAL
Qty: 90 TABLET | Refills: 0 | Status: SHIPPED | OUTPATIENT
Start: 2022-01-01

## 2022-01-01 RX ORDER — GABAPENTIN 300 MG/1
600 TABLET, FILM COATED ORAL DAILY
COMMUNITY
Start: 2021-01-01 | End: 2022-02-28

## 2022-01-01 RX ORDER — CARBIDOPA AND LEVODOPA 25; 100 MG/1; MG/1
1 TABLET, EXTENDED RELEASE ORAL EVERY EVENING
Status: DISCONTINUED | OUTPATIENT
Start: 2022-01-01 | End: 2022-01-01 | Stop reason: HOSPADM

## 2022-01-01 RX ORDER — ALUMINA, MAGNESIA, AND SIMETHICONE 2400; 2400; 240 MG/30ML; MG/30ML; MG/30ML
7.5 SUSPENSION ORAL EVERY 4 HOURS PRN
Status: DISCONTINUED | OUTPATIENT
Start: 2022-01-01 | End: 2022-01-01 | Stop reason: HOSPADM

## 2022-01-01 RX ORDER — LEVOTHYROXINE SODIUM 0.03 MG/1
25 TABLET ORAL DAILY
Status: DISCONTINUED | OUTPATIENT
Start: 2022-01-01 | End: 2022-01-01 | Stop reason: HOSPADM

## 2022-01-01 RX ORDER — LORAZEPAM 2 MG/ML
1 INJECTION INTRAMUSCULAR ONCE
Status: DISCONTINUED | OUTPATIENT
Start: 2022-01-01 | End: 2022-01-01

## 2022-01-01 RX ORDER — SODIUM CHLORIDE 0.9 % (FLUSH) 0.9 %
10 SYRINGE (ML) INJECTION EVERY 12 HOURS SCHEDULED
Status: DISCONTINUED | OUTPATIENT
Start: 2022-01-01 | End: 2022-01-01 | Stop reason: HOSPADM

## 2022-01-01 RX ORDER — ALBUMIN (HUMAN) 12.5 G/50ML
12.5 SOLUTION INTRAVENOUS AS NEEDED
Status: DISPENSED | OUTPATIENT
Start: 2022-01-01 | End: 2022-01-01

## 2022-01-01 RX ORDER — FEBUXOSTAT 40 MG/1
40 TABLET, FILM COATED ORAL DAILY
Qty: 30 TABLET | Refills: 0 | Status: SHIPPED | OUTPATIENT
Start: 2022-01-01 | End: 2022-01-01

## 2022-01-01 RX ORDER — PANTOPRAZOLE SODIUM 40 MG/1
40 TABLET, DELAYED RELEASE ORAL DAILY
Status: DISCONTINUED | OUTPATIENT
Start: 2022-01-01 | End: 2022-01-01 | Stop reason: HOSPADM

## 2022-01-01 RX ORDER — LIDOCAINE AND PRILOCAINE 25; 25 MG/G; MG/G
1 CREAM TOPICAL 3 TIMES WEEKLY
COMMUNITY
Start: 2021-01-01

## 2022-01-01 RX ORDER — CLOPIDOGREL BISULFATE 75 MG/1
75 TABLET ORAL DAILY
Status: DISCONTINUED | OUTPATIENT
Start: 2022-01-01 | End: 2022-01-01

## 2022-01-01 RX ADMIN — CLOPIDOGREL 75 MG: 75 TABLET, FILM COATED ORAL at 09:13

## 2022-01-01 RX ADMIN — MIDODRINE HYDROCHLORIDE 5 MG: 5 TABLET ORAL at 11:12

## 2022-01-01 RX ADMIN — SODIUM CHLORIDE, PRESERVATIVE FREE 10 ML: 5 INJECTION INTRAVENOUS at 09:13

## 2022-01-01 RX ADMIN — ASPIRIN 81 MG: 81 TABLET, CHEWABLE ORAL at 09:12

## 2022-01-01 RX ADMIN — SODIUM CHLORIDE 500 ML: 9 INJECTION, SOLUTION INTRAVENOUS at 13:09

## 2022-01-01 RX ADMIN — PROMETHAZINE HYDROCHLORIDE 12.5 MG: 12.5 TABLET ORAL at 13:05

## 2022-01-01 RX ADMIN — ALBUMIN HUMAN 12.5 G: 0.25 SOLUTION INTRAVENOUS at 13:18

## 2022-01-01 RX ADMIN — ATORVASTATIN CALCIUM 80 MG: 80 TABLET, FILM COATED ORAL at 21:18

## 2022-01-01 RX ADMIN — LORAZEPAM 0.5 MG: 2 INJECTION INTRAMUSCULAR; INTRAVENOUS at 11:58

## 2022-01-01 RX ADMIN — FEBUXOSTAT 40 MG: 40 TABLET, FILM COATED ORAL at 10:43

## 2022-01-01 RX ADMIN — SODIUM CHLORIDE, PRESERVATIVE FREE 10 ML: 5 INJECTION INTRAVENOUS at 21:18

## 2022-01-01 RX ADMIN — SODIUM CHLORIDE, PRESERVATIVE FREE 10 ML: 5 INJECTION INTRAVENOUS at 10:46

## 2022-01-01 RX ADMIN — CARBIDOPA AND LEVODOPA 2 TABLET: 25; 100 TABLET ORAL at 10:43

## 2022-01-01 RX ADMIN — SODIUM CHLORIDE 500 ML: 9 INJECTION, SOLUTION INTRAVENOUS at 12:01

## 2022-01-01 RX ADMIN — SODIUM CHLORIDE, PRESERVATIVE FREE 10 ML: 5 INJECTION INTRAVENOUS at 22:53

## 2022-01-01 RX ADMIN — SODIUM CHLORIDE, PRESERVATIVE FREE 10 ML: 5 INJECTION INTRAVENOUS at 22:51

## 2022-01-01 RX ADMIN — BUPROPION HYDROCHLORIDE 150 MG: 150 TABLET, EXTENDED RELEASE ORAL at 09:13

## 2022-01-01 RX ADMIN — DEXTROSE 25 G: 50 INJECTION, SOLUTION INTRAVENOUS at 22:45

## 2022-01-01 RX ADMIN — DEXTROSE 25 G: 50 INJECTION, SOLUTION INTRAVENOUS at 08:01

## 2022-01-01 RX ADMIN — PANTOPRAZOLE SODIUM 40 MG: 40 TABLET, DELAYED RELEASE ORAL at 09:12

## 2022-01-01 RX ADMIN — LEVOTHYROXINE SODIUM 25 MCG: 0.03 TABLET ORAL at 09:13

## 2022-01-01 RX ADMIN — CARBIDOPA AND LEVODOPA 1 TABLET: 25; 100 TABLET, EXTENDED RELEASE ORAL at 17:23

## 2022-01-01 RX ADMIN — LEVOTHYROXINE SODIUM 25 MCG: 0.03 TABLET ORAL at 10:43

## 2022-01-01 RX ADMIN — BUPROPION HYDROCHLORIDE 150 MG: 150 TABLET, EXTENDED RELEASE ORAL at 10:42

## 2022-01-01 RX ADMIN — SODIUM CHLORIDE, PRESERVATIVE FREE 10 ML: 5 INJECTION INTRAVENOUS at 10:45

## 2022-01-01 RX ADMIN — MIDODRINE HYDROCHLORIDE 5 MG: 5 TABLET ORAL at 10:43

## 2022-01-01 RX ADMIN — PANTOPRAZOLE SODIUM 40 MG: 40 TABLET, DELAYED RELEASE ORAL at 10:49

## 2022-01-01 RX ADMIN — LIDOCAINE 1 PATCH: 50 PATCH TOPICAL at 21:18

## 2022-01-01 RX ADMIN — CARBIDOPA AND LEVODOPA 2 TABLET: 25; 100 TABLET ORAL at 09:12

## 2022-01-01 RX ADMIN — FEBUXOSTAT 40 MG: 40 TABLET, FILM COATED ORAL at 09:13

## 2022-01-01 RX ADMIN — LORAZEPAM 0.5 MG: 2 INJECTION INTRAMUSCULAR; INTRAVENOUS at 12:44

## 2022-01-01 RX ADMIN — MIDODRINE HYDROCHLORIDE 5 MG: 5 TABLET ORAL at 09:13

## 2022-01-01 RX ADMIN — CLOPIDOGREL 300 MG: 75 TABLET, FILM COATED ORAL at 10:42

## 2022-01-01 RX ADMIN — MIDODRINE HYDROCHLORIDE 5 MG: 5 TABLET ORAL at 17:23

## 2022-01-07 PROBLEM — N18.6 ESRD (END STAGE RENAL DISEASE) (HCC): Status: ACTIVE | Noted: 2022-01-01

## 2022-01-07 PROBLEM — R47.1 DYSARTHRIA: Status: ACTIVE | Noted: 2022-01-01

## 2022-01-07 PROBLEM — R41.0 CONFUSION: Status: ACTIVE | Noted: 2022-01-01

## 2022-01-07 NOTE — PLAN OF CARE
Goal Outcome Evaluation:              Outcome Summary: Orders received for swallow eval. Pt just left for vascular lab and MRI. Will assess 1/8.

## 2022-01-07 NOTE — ED TRIAGE NOTES
All triage performed with this RN wearing appropriate PPE.  Pt placed in mask upon arrival to ED.  Patient was at dialysis and she fell. They did report she hit her head. They reported she had speaking problems and facial droop which she reports is from an old stroke and because her mouth is dry. She did not want EMS to call her  so he wouldn't worry. She came to ed because dialysis told her she had to.

## 2022-01-07 NOTE — ED NOTES
Patient and  states that she had glasses upon going to dialysis. Patient did not arrive with glasses on to ER. Searched ER room without success for glasses.     I wore full protective equipment throughout this patient encounter including a face mask, goggles, and gloves. Hand hygiene was performed before donning protective equipment and after removal when leaving the room.       Rita Berrios, DAYANA  01/07/22 1715

## 2022-01-07 NOTE — CONSULTS
[facility] Consult Note      Patient Name: Charmaine Machuca  : 1940  MRN: [id number]  Primary Care Physician:  Fannie Godfrey MD  Referring Physician: Willie Haddad MD  Date of admission: 2022    Subjective     Reason for Consult:  ESRD    HPI:   Charmaine Machuca is a 81 y.o. female with ESRD receiving hemodialysis on MWF at [facility] via [facility]. Other pertinent medical history includes previous stroke, hypertension, non-ischemic cardiomypathy with EF ~60% with ICD-PPM, pulmonary hypertension, myoclonic epilepsy, and GAVE syndrome requiring monthly transfusions. Her last full dialysis treatment was 22 and 1.3L was removed. She has reported poor appetite and her dry weight was lowered 5.5 Kg due to weight loss in recent weeks. She presented for dialysis today at HD clinic but fell backward, hitting her head in the lobby at the dialysis unit prior to treatment and was transported to the ED. CT head showed no acute finding. She was noted to have lower BP in ED (80s/40s); her  also reported that she was confused during the night. She was admitted for further evaluation. We've been asked to manage her dialysis.     · No fever or chills  · Very poor appetite; reports difficulty swallowing solid foods  · Constant lower extremity pain and involuntary movement; seeing neurology (Dr. Koch) as outpatient  · Slurred speech for several days    Review of Systems:   14 point review of systems is otherwise negative except for mentioned above on HPI    Personal History     Past Medical History:   Diagnosis Date   • Anemia     Iron deficiency   • Anxiety    • Aortic stenosis     mild 2021   • Cardiomyopathy (HCC)     S/P pacemaker and defibrillator   • CHF (congestive heart failure) (HCC)    • Chronic renal failure, stage 4 (severe) (HCC)    • Colon polyp    • Coronary artery disease     pacemaker, defibrillator   • Depression    • Dizzy    • Gastroparesis    •  GAVE (gastric antral vascular ectasia)    • GERD (gastroesophageal reflux disease)    • Gout    • Hemorrhoids    • Hiatal hernia    • History of Clostridium difficile colitis 2013   • History of kidney stones    • History of pancreatitis     2014   • History of skin cancer    • History of transfusion     MULTIPLE    • Hyperlipidemia    • Hypertension    • Hypothyroidism    • Kidney failure    • Left bundle branch block    • Mitral and aortic valve disease    • Mitral valve insufficiency    • Myoclonus     S/P Depakote   • Osteoarthritis    • Pancytopenia (HCC)    • Peripheral neuropathy    • Presence of cardiac pacemaker     AND DEFIBRILLATOR   • Pulmonary hypertension (HCC)    • Pulmonary hypertension (HCC)    • SOB (shortness of breath) on exertion    • Spinal stenosis    • Stroke (HCC)     2019   • TIA (transient ischemic attack)        Past Surgical History:   Procedure Laterality Date   • APPENDECTOMY N/A    • ARTERIOVENOUS FISTULA/SHUNT SURGERY Right 2/18/2019    Procedure: RIGHT BASILIC FISTULA CREATION WITHOUT TRANSPOSITION;  Surgeon: Royer Dozier MD;  Location: [facility] OR;  Service: Vascular   • ARTERIOVENOUS FISTULA/SHUNT SURGERY Right 5/31/2019    Procedure: RIGHT 2ND STAGE BASILIC VEIN CREATION;  Surgeon: Royer Dozier MD;  Location: [facility] OR;  Service: Vascular   • CARDIAC CATHETERIZATION Left 03/28/2006    Arterial catheter insertion, cath left ventriculography, coronary angiography and left heart catheterization-Dr. Estevan Matamoros   • CARDIAC DEFIBRILLATOR PLACEMENT N/A 11/30/2007    Biventricular implantable cardioverter defibrillator-Dr. Leandro Huber   • CARDIAC ELECTROPHYSIOLOGY PROCEDURE N/A 10/28/2019    Procedure: GENERATOR CHANGE BI-V ICD  medtronic;  Surgeon: Leandro Huber MD;  Location: [facility] INVASIVE LOCATION;  Service: Cardiology   • CATARACT EXTRACTION Bilateral 2011   • COLONOSCOPY N/A 2014    done at [facility]   • CYSTECTOMY N/A     Ovarian cystectomy   •  ENDOSCOPY N/A 04/28/2006    EGD with biopsies. Paraesophageal hiatal hernia from 34 to 44 cm, antral gastritis-Dr. Nehemiah Beal   • ENDOSCOPY N/A 09/29/2004    Hiatal hernia, gastritis and an erosion of the pylorus-Dr. Yang Pavon   • ENDOSCOPY N/A 9/1/2019    Procedure: ESOPHAGOGASTRODUODENOSCOPY with APC;  Surgeon: Anuel Roach MD;  Location:  [facility];  Service: Gastroenterology   • ENDOSCOPY N/A 1/28/2020    Procedure: ESOPHAGOGASTRODUODENOSCOPY with APC;  Surgeon: Anuel Roach MD;  Location:  [facility];  Service: Gastroenterology   • ENDOSCOPY N/A 4/21/2020    Procedure: ESOPHAGOGASTRODUODENOSCOPY WITH APC;  Surgeon: Anuel Roach MD;  Location:  [facility];  Service: Gastroenterology;  Laterality: N/A;  PRE- MELENA, GAVE  POST- ESOPHAGITIS, GAVE   • ENDOSCOPY N/A 11/24/2020    Procedure: ESOPHAGOGASTRODUODENOSCOPY;  Surgeon: Anuel Roach MD;  Location:  [facility] OR;  Service: Gastroenterology;  Laterality: N/A;  reflux esophagitis  Gastric Food Bezoar   • ENDOSCOPY N/A 2/12/2021    Procedure: ESOPHAGOGASTRODUODENOSCOPY;  Surgeon: Anuel Roach MD;  Location:  [facility] OR;  Service: Gastroenterology;  Laterality: N/A;  with APC- GAVE; Gastroparesis;    • ENDOSCOPY N/A 10/8/2021    Procedure: ESOPHAGOGASTRODUODENOSCOPY WITH ARGON PLASMA COAGULATOR;  Surgeon: Anuel Roach MD;  Location:  [facility] OR;  Service: Gastroenterology;  Laterality: N/A;  APC of gastric antral vascular ectasia  Reflux   • ENTEROSCOPY SMALL BOWEL N/A 10/30/2006    Small bowel enteroscopy with biopsies-Dr. Rory Sevilla   • FLEXIBLE SIGMOIDOSCOPY N/A 09/29/2004    Unsuccessful colonoscopy due to poop prep, a very tortuous sigmoid, bowel prep in the form of enema given and a barium enema was obtained-Dr. Yang Pavon   • FRACTURE SURGERY  2015    Broke big toe   • HEMATOMA EVACUATION TRUNK N/A 02/07/2014    Pocket evacuation of hematoma at  pacemaker site-Dr. Leandro Huber   • HEMORRHOIDECTOMY N/A 04/19/2004    Flexible sigmoidoscopy and stapled hemorrhoidectomy-Dr. Yang Pavon   • HYSTERECTOMY Bilateral 1977   • IMPLANTABLE CARDIOVERTER DEFIBRILLATOR LEAD REPLACEMENT/POCKET REVISION Left 3/28/2016    Procedure: AUTOMATIC IMPLANTABLE CARDIOVERTER DEFIBRILLATOR LEAD REPLACEMENT WITH LASER LEAD EXTRACTION;  Surgeon: Leandro Huber MD;  Location: [facility] OR 18/19;  Service:    • IMPLANTABLE CARDIOVERTER DEFIBRILLATOR LEAD REPLACEMENT/POCKET REVISION N/A 01/13/2014    Biventricular ICD replacement-Dr. Jillian Huber   • LAPAROSCOPY REPAIR HIATAL HERNIA N/A 06/27/2006    Laparoscopic paraesophageal hiatal hernia repair with Nissen fundoplication and cholecystectomy-Dr. Sean Yoon   • NATALIE GONZALEZ ([facility]) PROCEDURE     • CT INSJ TUNNELED CVC W/O SUBQ PORT/ AGE 3 YR/> Right 10/3/2017    Procedure: Right internal jugular port placement;  Surgeon: Tiffanie Couch MD;  Location: [facility] OR;  Service: General   • TOE SURGERY Left     SET BIG TOE   • TOTAL KNEE ARTHROPLASTY Left 08/2014   • VENOUS ACCESS DEVICE (PORT) INSERTION Right        Family History: family history includes Arthritis in her father; Coronary artery disease in an other family member; Dementia in an other family member; Early death in her father; Gout in her father; Heart disease in her mother; Kidney disease in her father and another family member; Mental illness in her mother.    Social History:  reports that she has never smoked. She has never used smokeless tobacco. She reports that she does not drink alcohol and does not use drugs.    Home Medications:  Prior to Admission medications    Medication Sig Start Date End Date Taking? Authorizing Provider   acetaminophen (TYLENOL) 325 MG tablet Take 2 tablets by mouth Every 4 (Four) Hours As Needed for Mild Pain . 2/13/20   Keisha Mckeon APRN   aspirin (aspirin) 81 MG EC tablet Take  by mouth Daily.     Gerardo Sexton MD   buPROPion SR (WELLBUTRIN SR) 150 MG 12 hr tablet Take 150 mg by mouth.    Gerardo Sexton MD   carbidopa-levodopa (SINEMET)  MG per tablet Take 2 tablets by mouth Daily.    Gerardo Sextno MD   carbidopa-levodopa ER (SINEMET CR)  MG per tablet Take 1 tablet by mouth Every Evening. 8/2/18   Gerardo Sexton MD   carvedilol (COREG) 12.5 MG tablet Take 1 tablet by mouth 2 (Two) Times a Day With Meals. 2/8/21   Pastora Wilson APRN   DULoxetine (CYMBALTA) 30 MG capsule Take 1 capsule by mouth Daily.    Gerardo Sexton MD   erythromycin base (E-MYCIN) 250 MG tablet TAKE 1/2 TABLET 4 TIMES A  DAY  Patient taking differently: Take 125 mg by mouth 4 (Four) Times a Day. 5/26/21   DocAnuel MD   famotidine (PEPCID) 20 MG tablet Take 1 tablet by mouth Every 12 (Twelve) Hours.    Gerardo Sexton MD   febuxostat (ULORIC) 80 MG tablet tablet  6/21/21   Gerardo Sexton MD   furosemide (LASIX) 40 MG tablet 2 tablets IN THE AM, 1 AT LUNCH  Patient taking differently: 40 mg 2 (Two) Times a Day. 2 tablet in am and one at lunch 3/29/21   Pastora Wilson APRN   gabapentin (NEURONTIN) 300 MG capsule Take  by mouth.    Gerardo Sexton MD   Glucosamine-Chondroit-Vit C-Mn (GLUCOSAMINE CHONDROITIN COMPLX) capsule Take 1,500 mg by mouth Every Other Day. 8/8/13   Gerardo Sexton MD   levothyroxine (SYNTHROID, LEVOTHROID) 25 MCG tablet Take 25 mcg by mouth Daily. 5/12/12   Gerardo Sexton MD   lidocaine (LIDODERM) 5 %  7/31/21   Gerardo Sexton MD   Multiple Vitamin (MULTI-DAY VITAMINS) tablet Take 1 tablet by mouth Daily. HOLD FOR SURGERY 8/8/13   Gerardo Sexton MD   pantoprazole (PROTONIX) 40 MG EC tablet Take 1 tablet by mouth Daily. 11/2/21   Anuel Roach MD   promethazine (PHENERGAN) 12.5 MG tablet Take 12.5 mg by mouth Every 6 (Six) Hours As Needed for Nausea or Vomiting.    Provider, MD Gerardo  "  psyllium (METAMUCIL) 0.52 g capsule Take 0.52 g by mouth.    Provider, MD Gerardo   rotigotine (NEUPRO) 8 MG/24HR patch 24 hour 24 hour patch Place 1 patch on the skin as directed by provider Daily. 8 mg patch 10/31/18   Provider, MD Gerardo       Allergies:  Allergies   Allergen Reactions   • Chlorhexidine Rash and Itching     Patient states \"blue dye\" in chlorhexidine.  States the orange and clear are OK to use   • Codeine Unknown - Low Severity     HYPER, DOES NOT HELP PAIN   • Sertraline Unknown - Low Severity   • Propoxyphene Unknown - Low Severity     Belligerent, acting drunk  (darvon)       Objective     Vitals:   Temp:  [95.3 °F (35.2 °C)] 95.3 °F (35.2 °C)  Heart Rate:  [65-78] 70  Resp:  [16] 16  BP: ()/(42-70) 97/48    Intake/Output Summary (Last 24 hours) at 1/7/2022 1455  Last data filed at 1/7/2022 1258  Gross per 24 hour   Intake 500 ml   Output --   Net 500 ml       Physical Exam:    General Appearance: alert, oriented x 3, NAD, gaunt, chronically ill  Skin: warm and dry  HEENT: oral mucosa normal, nonicteric sclera, MMM  Neck: supple, no JVD  Lungs: CTA, not labored on RA  Heart: RRR, no rub  Abdomen: soft, nontender, nondistended, BS +  : no palpable bladder  Extremities: +1 edema  Vascular: Right arm AV fistula patent  Neuro: slightly dysarthric speech (this is new); moves all limbs    Scheduled Meds:     aspirin, 300 mg, Rectal, Daily  atorvastatin, 80 mg, Oral, Nightly  clopidogrel, 300 mg, Oral, Once   And  [START ON 1/8/2022] clopidogrel, 75 mg, Oral, Daily  sodium chloride, 10 mL, Intravenous, Q12H  sodium chloride, 10 mL, Intravenous, Q12H      IV Meds:        Results Reviewed:   I have personally reviewed the results from the time of this admission to 1/7/2022 14:55 EST     Lab Results   Component Value Date    GLUCOSE 101 (H) 01/07/2022    CALCIUM 9.0 01/07/2022     01/07/2022    K 4.4 01/07/2022    CO2 27.9 01/07/2022     01/07/2022    BUN 39 (H) 01/07/2022 "    CREATININE 5.26 (H) 01/07/2022    EGFRIFAFRI  01/07/2022      Comment:      <15 Indicative of kidney failure.    EGFRIFNONA 8 (L) 01/07/2022    BCR 7.4 01/07/2022    ANIONGAP 10.1 01/07/2022      Lab Results   Component Value Date    MG 1.6 10/04/2020    PHOS 5.0 (H) 09/02/2021    ALBUMIN 4.30 01/07/2022           Assessment / Plan     ASSESSMENT:  1. ESRD via [facility] AV[facility]; last dialysis was 1/5/22 and 1.3 L removed. Her electrolytes are stable and central volume is fine.  Would normally have had HD today  2. Fall and weakness; she has myoclonic epilepsy and follows with Dr. Kavya Koch at . Her appetite has been poor and she has lost ~ 10 lbs since starting dialysis.  3. Impaired speech with confusion; has history of previous stroke. No acute findings on CT head. Neurology has been consulted. MRI and carotid dopplers have been ordered.  4. Hypertension, though currently with orthostatic hypotension. Will recommend hold lasix and carvedilol for now.   5. Non-ischemic cardiomyopathy with ICD and pulmonary hypertension; last echocardiogram in April 2021 showed EF ~ 55% with grade II diastolic dysfunction, severe pulmonary hypertension, mild AS, and moderate tricuspid regurgitation.   6. Chronic anemia due to GAVE; her hgb is stable.     PLAN:  1. We will arrange HD tomorrow, and next week transition back to MWF schedule.  No need for fluid removal tomorrow with clear lungs and low BP  2. Begin midodrine 5 mg TID  3. Surveillance labs    Thank you for involving us in the care of Charmaine Machuca.  Please feel free to call with any questions.      --Juan Luis Skelton MD    01/07/22  16:31 [facility]       [facility]  [phone]      Much of this encounter note is an electronic transcription/translation of spoken language to printed text. The electronic translation of spoken language may permit erroneous, or at times, nonsensical words or phrases to be inadvertently transcribed; Although I have  reviewed the note for such errors, some may still exist.

## 2022-01-07 NOTE — ED NOTES
Nursing report ED to floor  Charmaine Machuca  81 y.o.  female    HPI :   Chief Complaint   Patient presents with   • Speech Problem       Admitting doctor:   Willie Haddad MD    Admitting diagnosis:   The primary encounter diagnosis was Confusion. Diagnoses of Weakness, Minor head injury, initial encounter, and ESRD (end stage renal disease) (HCC) were also pertinent to this visit.    Code status:   Current Code Status     Date Active Code Status Order ID Comments User Context       Prior    Advance Care Planning Activity          Allergies:   Chlorhexidine, Codeine, Sertraline, and Propoxyphene    Intake and Output    Intake/Output Summary (Last 24 hours) at 1/7/2022 1429  Last data filed at 1/7/2022 1258  Gross per 24 hour   Intake 500 ml   Output --   Net 500 ml       Weight:       01/07/22  1128   Weight: 62.1 kg (137 lb)       Most recent vitals:   Vitals:    01/07/22 1221 01/07/22 1222 01/07/22 1305 01/07/22 1306   BP: 91/42   106/46   Pulse: 65   68   Resp:       Temp:       TempSrc:       SpO2:  100% 100%    Weight:       Height:           Active LDAs/IV Access:   Lines, Drains & Airways     Active LDAs     Name Placement date Placement time Site Days    Peripheral IV 01/07/22 1129 Left Antecubital 01/07/22  1129  Antecubital  less than 1    Single Lumen Implantable Port 10/31/17 Right Chest 10/31/17  estimated date per patient  --  unknown  Chest  1529                Labs (abnormal labs have a star):   Labs Reviewed   COMPREHENSIVE METABOLIC PANEL - Abnormal; Notable for the following components:       Result Value    Glucose 101 (*)     BUN 39 (*)     Creatinine 5.26 (*)     eGFR Non  Amer 8 (*)     All other components within normal limits    Narrative:     GFR Normal >60  Chronic Kidney Disease <60  Kidney Failure <15     CBC WITH AUTO DIFFERENTIAL - Abnormal; Notable for the following components:    RBC 3.43 (*)     Hemoglobin 10.9 (*)     .2 (*)     MCHC 31.4 (*)     Platelets 123  (*)     Neutrophil % 86.3 (*)     Lymphocyte % 6.4 (*)     Neutrophils, Absolute 7.27 (*)     Lymphocytes, Absolute 0.54 (*)     All other components within normal limits   COVID PRE-OP / PRE-PROCEDURE SCREENING ORDER (NO ISOLATION)    Narrative:     The following orders were created for panel order COVID PRE-OP / PRE-PROCEDURE SCREENING ORDER (NO ISOLATION) - Swab, Nasopharynx.  Procedure                               Abnormality         Status                     ---------                               -----------         ------                     COVID-19,APTIMA PANTHER(...[[id number]]                                                   Please view results for these tests on the individual orders.   COVID-19,APTIMA PANTHER (GRIS) [facility]/ MINE, NP/OP SWAB IN UTM/VTM/SALINE TRANSPORT MEDIA,24 HR TAT   POCT GLUCOSE FINGERSTICK   POCT GLUCOSE FINGERSTICK   POCT GLUCOSE FINGERSTICK   POCT GLUCOSE FINGERSTICK   CBC AND DIFFERENTIAL    Narrative:     The following orders were created for panel order CBC & Differential.  Procedure                               Abnormality         Status                     ---------                               -----------         ------                     CBC Auto Differential[[id number]]        Abnormal            Final result                 Please view results for these tests on the individual orders.       EKG:   No orders to display       Meds given in ED:   Medications   sodium chloride 0.9 % flush 10 mL (has no administration in time range)   sodium chloride 0.9 % flush 10 mL (has no administration in time range)   sodium chloride 0.9 % flush 10 mL (has no administration in time range)   atorvastatin (LIPITOR) tablet 80 mg (has no administration in time range)   ondansetron (ZOFRAN) injection 4 mg (has no administration in time range)   bisacodyl (DULCOLAX) suppository 10 mg (has no administration in time range)   aluminum-magnesium hydroxide-simethicone (MAALOX MAX) 400-400-40  MG/5ML suspension 7.5 mL (has no administration in time range)   clopidogrel (PLAVIX) tablet 300 mg (has no administration in time range)     And   clopidogrel (PLAVIX) tablet 75 mg (has no administration in time range)   aspirin suppository 300 mg (has no administration in time range)   sodium chloride 0.9 % bolus 500 mL (0 mL Intravenous Stopped 1/7/22 1258)       Imaging results:  CT Head Without Contrast    Result Date: 1/7/2022  1. There is mild small vessel disease in the cerebral white matter. 2. There are old healed fractures of the right zygomatic arch lateral wall of the right orbit and the walls of the right maxillary sinus. These fractures occurred back in 02/2020 almost 2 years ago. 3. The remainder of the head CTs normal with no acute skull fracture or intracranial hemorrhage seen.  Radiation dose reduction techniques were utilized, including automated exposure control and exposure modulation based on body size.         Ambulatory status:   -  Up with assistance    Social issues:   Social History     Socioeconomic History   • Marital status:      Spouse name: Zackery   • Number of children: 5   • Years of education: 1 yr college   Tobacco Use   • Smoking status: Never Smoker   • Smokeless tobacco: Never Used   • Tobacco comment: caffeine use - coffee and soda   Vaping Use   • Vaping Use: Never used   Substance and Sexual Activity   • Alcohol use: No   • Drug use: No   • Sexual activity: Defer       NIH Stroke Scale: 2  Interval: baseline           Rita Berrios RN  01/07/22 2868

## 2022-01-07 NOTE — ED PROVIDER NOTES
EMERGENCY DEPARTMENT ENCOUNTER    Room Number:  17/17  Date of encounter:  1/7/2022  PCP: Fannie Godfrey MD  Historian: Patient      PPE    Patient was placed in face mask in first look. Patient was wearing facemask when I entered the room and throughout our encounter. I wore full protective equipment throughout this patient encounter including a CAPR face mask, and gloves. Hand hygiene was performed before donning protective equipment and after removal when leaving the room.        HPI:  Chief Complaint: Fall  A complete HPI/ROS/PMH/PSH/SH/FH are unobtainable due to: Patient with slurred speech which she is difficult to understand    Context: Charmaine Machuca is a 81 y.o. female who arrives to the ED via EMS. Patient states that she was in her way into dialysis when she lost her balance and fell backwards. She states that she has issues with balance since having a stroke previously. Patient states that she did hit the back of her head. EMS was called because the dialysis center stated that they noticed left-sided facial droop, patient states that she always has that after having her stroke. She also states that her speech has been different since her stroke. Patient denies neck pain or back pain. She states she is not currently feeling dizzy or lightheaded, no chest pain or shortness of breath.        PAST MEDICAL HISTORY  Active Ambulatory Problems     Diagnosis Date Noted   • Pacemaker lead failure 03/28/2016   • Chronic combined systolic and diastolic congestive heart failure (HCC) 06/15/2016   • Cardiomyopathy (HCC) 06/15/2016   • Carpal tunnel syndrome 07/19/2016   • Periodic limb movement disorder 07/19/2016   • Peripheral neuropathy 07/19/2016   • Restless legs syndrome 07/19/2016   • Anemia 09/22/2016   • Stage 4 chronic kidney disease (HCC) 11/22/2016   • History of anemia due to chronic kidney disease 11/23/2016   • Iron deficiency anemia 04/13/2017   • Dysuria 07/17/2017   • Chronic adrenal  insufficiency (Formerly [facility]) 08/17/2013   • 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 08/05/2015   • Chest pain 11/29/2015   • Congestive heart failure (Formerly [facility]) 08/15/2013   • Gastroparesis 08/15/2013   • Hypotension 08/15/2013   • Hypothyroidism 08/15/2013   • Myoclonus 08/15/2013   • Osteoarthritis 08/15/2013   • Automatic implantable cardioverter-defibrillator in situ 08/15/2013   • Spondylolisthesis 08/05/2015   • History of total knee replacement 08/15/2013   • Anemia of chronic renal failure, stage 4 (severe) (Formerly [facility]) 09/06/2017   • Anemia of chronic disease 09/26/2017   • Encounter for fitting and adjustment of vascular catheter 10/04/2017   • B12 deficiency 07/11/2019   • Weakness on left side of face 08/17/2019   • Chronic kidney disease, stage III (moderate) (CMS/Formerly [facility])     • Symptomatic anemia 08/29/2019   • Acute renal failure superimposed on chronic kidney disease (Formerly [facility]) 08/29/2019   • History of recent stroke 08/30/2019   • Anemia due to acute blood loss 08/29/2019   • GAVE (gastric antral vascular ectasia) 09/01/2019   • Delayed gastric emptying 02/12/2020   • Traumatic subdural hematoma without loss of consciousness (Formerly [facility]) 02/12/2020   • Orbital fracture, closed, initial encounter ([facility]) 02/12/2020   • Subdural hematoma, post-traumatic (Formerly [facility]) 02/12/2020   • Subarachnoid hemorrhage ([facility]) 02/12/2020   • Fall 02/12/2020   • Adrenal insufficiency (Formerly [facility]) 08/17/2013   • Chronic kidney disease, stage 4 (severe) (Former[facility]) 02/14/2020   • CHF (congestive heart failure) (Formerly [facility]) 08/15/2013   • Neuropathy 06/01/2012   • Vitamin B 12 deficiency 10/01/2020   • Iron adverse reaction 10/01/2020   • Absolute anemia 10/01/2020   • Iron deficiency anemia due to chronic blood loss 11/11/2020   • Melena 11/11/2020   • Pulmonary hypertension (Former[facility]) 09/27/2021   • Encounter for adjustment or management of vascular access device 10/14/2021     Resolved Ambulatory Problems     Diagnosis Date Noted   • CKD (chronic kidney disease) stage 4, GFR  15-29 ml/min (HCC) 09/13/2017     Past Medical History:   Diagnosis Date   • Anxiety    • Aortic stenosis    • Chronic renal failure, stage 4 (severe) ([facility])    • Colon polyp    • Coronary artery disease    • Depression    • Dizzy    • GERD (gastroesophageal reflux disease)    • Gout    • Hemorrhoids    • Hiatal hernia    • History of Clostridium difficile colitis 2013   • History of kidney stones    • History of pancreatitis    • History of skin cancer    • History of transfusion    • Hyperlipidemia    • Hypertension    • Kidney failure    • Left bundle branch block    • Mitral and aortic valve disease    • Mitral valve insufficiency    • Pancytopenia ([facility])    • Presence of cardiac pacemaker    • SOB (shortness of breath) on exertion    • Spinal stenosis    • Stroke ([facility])    • TIA (transient ischemic attack)          PAST SURGICAL HISTORY  Past Surgical History:   Procedure Laterality Date   • APPENDECTOMY N/A    • ARTERIOVENOUS FISTULA/SHUNT SURGERY Right 2/18/2019    Procedure: RIGHT BASILIC FISTULA CREATION WITHOUT TRANSPOSITION;  Surgeon: Royer Dozier MD;  Location: [facility] OR;  Service: Vascular   • ARTERIOVENOUS FISTULA/SHUNT SURGERY Right 5/31/2019    Procedure: RIGHT 2ND STAGE BASILIC VEIN CREATION;  Surgeon: Royer Dozier MD;  Location: [facility];  Service: Vascular   • CARDIAC CATHETERIZATION Left 03/28/2006    Arterial catheter insertion, cath left ventriculography, coronary angiography and left heart catheterization-Dr. Estevan Matamoros   • CARDIAC DEFIBRILLATOR PLACEMENT N/A 11/30/2007    Biventricular implantable cardioverter defibrillator-Dr. Leandro Huber   • CARDIAC ELECTROPHYSIOLOGY PROCEDURE N/A 10/28/2019    Procedure: GENERATOR CHANGE BI-V ICD  medtronic;  Surgeon: Leandro Huber MD;  Location: [facility] INVASIVE LOCATION;  Service: Cardiology   • CATARACT EXTRACTION Bilateral 2011   • COLONOSCOPY N/A 2014    done at [facility]   • CYSTECTOMY N/A     Ovarian cystectomy   •  ENDOSCOPY N/A 04/28/2006    EGD with biopsies. Paraesophageal hiatal hernia from 34 to 44 cm, antral gastritis-Dr. Nehemiah Beal   • ENDOSCOPY N/A 09/29/2004    Hiatal hernia, gastritis and an erosion of the pylorus-Dr. Yang Pavon   • ENDOSCOPY N/A 9/1/2019    Procedure: ESOPHAGOGASTRODUODENOSCOPY with APC;  Surgeon: Anuel Roach MD;  Location:  [facility];  Service: Gastroenterology   • ENDOSCOPY N/A 1/28/2020    Procedure: ESOPHAGOGASTRODUODENOSCOPY with APC;  Surgeon: Anuel Roach MD;  Location:  [facility];  Service: Gastroenterology   • ENDOSCOPY N/A 4/21/2020    Procedure: ESOPHAGOGASTRODUODENOSCOPY WITH APC;  Surgeon: Anuel Roach MD;  Location:  [facility];  Service: Gastroenterology;  Laterality: N/A;  PRE- MELENA, GAVE  POST- ESOPHAGITIS, GAVE   • ENDOSCOPY N/A 11/24/2020    Procedure: ESOPHAGOGASTRODUODENOSCOPY;  Surgeon: Anuel Roach MD;  Location:  [facility] OR;  Service: Gastroenterology;  Laterality: N/A;  reflux esophagitis  Gastric Food Bezoar   • ENDOSCOPY N/A 2/12/2021    Procedure: ESOPHAGOGASTRODUODENOSCOPY;  Surgeon: Anuel Roach MD;  Location:  [facility] OR;  Service: Gastroenterology;  Laterality: N/A;  with APC- GAVE; Gastroparesis;    • ENDOSCOPY N/A 10/8/2021    Procedure: ESOPHAGOGASTRODUODENOSCOPY WITH ARGON PLASMA COAGULATOR;  Surgeon: Anuel Roach MD;  Location:  [facility] OR;  Service: Gastroenterology;  Laterality: N/A;  APC of gastric antral vascular ectasia  Reflux   • ENTEROSCOPY SMALL BOWEL N/A 10/30/2006    Small bowel enteroscopy with biopsies-Dr. Rory Sevilla   • FLEXIBLE SIGMOIDOSCOPY N/A 09/29/2004    Unsuccessful colonoscopy due to poop prep, a very tortuous sigmoid, bowel prep in the form of enema given and a barium enema was obtained-Dr. Yang Pavon   • FRACTURE SURGERY  2015    Broke big toe   • HEMATOMA EVACUATION TRUNK N/A 02/07/2014    Pocket evacuation of hematoma at  pacemaker site-Dr. Leandro Huber   • HEMORRHOIDECTOMY N/A 04/19/2004    Flexible sigmoidoscopy and stapled hemorrhoidectomy-Dr. Yang Pavon   • HYSTERECTOMY Bilateral 1977   • IMPLANTABLE CARDIOVERTER DEFIBRILLATOR LEAD REPLACEMENT/POCKET REVISION Left 3/28/2016    Procedure: AUTOMATIC IMPLANTABLE CARDIOVERTER DEFIBRILLATOR LEAD REPLACEMENT WITH LASER LEAD EXTRACTION;  Surgeon: Leandro Huber MD;  Location: [facility] OR 18/19;  Service:    • IMPLANTABLE CARDIOVERTER DEFIBRILLATOR LEAD REPLACEMENT/POCKET REVISION N/A 01/13/2014    Biventricular ICD replacement-Dr. Jillian Huber   • LAPAROSCOPY REPAIR HIATAL HERNIA N/A 06/27/2006    Laparoscopic paraesophageal hiatal hernia repair with Nissen fundoplication and cholecystectomy-Dr. Sean Yoon   • NATALIE GONZALEZ ([facility]) PROCEDURE     • MO INSJ TUNNELED CVC W/O SUBQ PORT/ AGE 3 YR/> Right 10/3/2017    Procedure: Right internal jugular port placement;  Surgeon: Tiffanie Couch MD;  Location: [facility] OR;  Service: General   • TOE SURGERY Left     SET BIG TOE   • TOTAL KNEE ARTHROPLASTY Left 08/2014   • VENOUS ACCESS DEVICE (PORT) INSERTION Right          FAMILY HISTORY  Family History   Problem Relation Age of Onset   • Coronary artery disease Other    • Dementia Other    • Kidney disease Other    • Arthritis Father    • Early death Father         Kidney failure   • Kidney disease Father    • Gout Father    • Heart disease Mother    • Mental illness Mother         Dementia   • Malig Hyperthermia Neg Hx    • Colon cancer Neg Hx    • Colon polyps Neg Hx          SOCIAL HISTORY  Social History     Socioeconomic History   • Marital status:      Spouse name: Zackery   • Number of children: 5   • Years of education: 1 yr college   Tobacco Use   • Smoking status: Never Smoker   • Smokeless tobacco: Never Used   • Tobacco comment: caffeine use - coffee and soda   Vaping Use   • Vaping Use: Never used   Substance and Sexual Activity   •  Alcohol use: No   • Drug use: No   • Sexual activity: Defer         ALLERGIES  Chlorhexidine, Codeine, Sertraline, and Propoxyphene        REVIEW OF SYSTEMS  Review of Systems     All systems reviewed and negative except for those discussed in HPI.        PHYSICAL EXAM    ED Triage Vitals   Temp Heart Rate Resp BP SpO2   01/07/22 1058 01/07/22 1057 01/07/22 1057 01/07/22 1057 01/07/22 1057   95.3 °F (35.2 °C) 78 16 110/70 99 %       Physical Exam  GENERAL: Well appearing, nontoxic appearing, not distressed  HENT: normocephalic, atraumatic  EYES: no scleral icterus, PERRL  CV: regular rhythm, regular rate, no murmur  RESPIRATORY: normal effort, CTAB  ABDOMEN: soft   MUSCULOSKELETAL: no deformity  No cervical, thoracic or lumbar tenderness  NEURO: alert, moves all extremities, follows commands, mental status normal/baseline  Recent and remote memory functions are normal  Patient is attentive with normal concentration  Patient with mildly slurred speech which she states is normal for her  Patient does have left-sided facial droop which she also states is chronic  Eyes close shut strongly bilaterally  Symmetric eyebrow raise bilaterally  EOMI, PERRL  CN II-XII grossly normal otherwise  5/5 strength to bilateral upper and lower extremities  No pronator drift  Intact FNF   SKIN: warm, dry, no rash   Psych: Appropriate mood and affect  Nursing notes and vital signs reviewed      LAB RESULTS  Recent Results (from the past 24 hour(s))   Comprehensive Metabolic Panel    Collection Time: 01/07/22 11:25 AM    Specimen: Blood   Result Value Ref Range    Glucose 101 (H) 65 - 99 mg/dL    BUN 39 (H) 8 - 23 mg/dL    Creatinine 5.26 (H) 0.57 - 1.00 mg/dL    Sodium 138 136 - 145 mmol/L    Potassium 4.4 3.5 - 5.2 mmol/L    Chloride 100 98 - 107 mmol/L    CO2 27.9 22.0 - 29.0 mmol/L    Calcium 9.0 8.6 - 10.5 mg/dL    Total Protein 6.6 6.0 - 8.5 g/dL    Albumin 4.30 3.50 - 5.20 g/dL    ALT (SGPT) <5 1 - 33 U/L    AST (SGOT) 15 1 - 32 U/L     Alkaline Phosphatase 74 39 - 117 U/L    Total Bilirubin 0.2 0.0 - 1.2 mg/dL    eGFR Non African Amer 8 (L) >60 mL/min/1.73    eGFR  African Amer      Globulin 2.3 gm/dL    A/G Ratio 1.9 g/dL    BUN/Creatinine Ratio 7.4 7.0 - 25.0    Anion Gap 10.1 5.0 - 15.0 mmol/L   CBC Auto Differential    Collection Time: 01/07/22 11:25 AM    Specimen: Blood   Result Value Ref Range    WBC 8.43 3.40 - 10.80 10*3/mm3    RBC 3.43 (L) 3.77 - 5.28 10*6/mm3    Hemoglobin 10.9 (L) 12.0 - 15.9 g/dL    Hematocrit 34.7 34.0 - 46.6 %    .2 (H) 79.0 - 97.0 fL    MCH 31.8 26.6 - 33.0 pg    MCHC 31.4 (L) 31.5 - 35.7 g/dL    RDW 13.9 12.3 - 15.4 %    RDW-SD 51.3 37.0 - 54.0 fl    MPV 10.1 6.0 - 12.0 fL    Platelets 123 (L) 140 - 450 10*3/mm3    Neutrophil % 86.3 (H) 42.7 - 76.0 %    Lymphocyte % 6.4 (L) 19.6 - 45.3 %    Monocyte % 5.9 5.0 - 12.0 %    Eosinophil % 0.9 0.3 - 6.2 %    Basophil % 0.1 0.0 - 1.5 %    Immature Grans % 0.4 0.0 - 0.5 %    Neutrophils, Absolute 7.27 (H) 1.70 - 7.00 10*3/mm3    Lymphocytes, Absolute 0.54 (L) 0.70 - 3.10 10*3/mm3    Monocytes, Absolute 0.50 0.10 - 0.90 10*3/mm3    Eosinophils, Absolute 0.08 0.00 - 0.40 10*3/mm3    Basophils, Absolute 0.01 0.00 - 0.20 10*3/mm3    Immature Grans, Absolute 0.03 0.00 - 0.05 10*3/mm3    nRBC 0.0 0.0 - 0.2 /100 WBC       Ordered the above labs and independently reviewed the results.      RADIOLOGY  CT Head Without Contrast    Result Date: 1/7/2022  EMERGENCY NONCONTRAST HEAD CT 01/07/2022  CLINICAL HISTORY: Patient lost balance, fell, hit back of her head. Patient has reported history of a prior subdural hematoma in the past.  TECHNIQUE: Spiral CT images were obtained from the base of skull to the vertex without intravenous contrast and images were reformatted and submitted in 3 mm thick axial CT section with brain algorithm and 2 mm thick axial CT section with high-resolution bone algorithm and 2 mm thick sagittal and coronal reconstructions were performed and  submitted in brain algorithm.  This is correlated to prior noncontrast head CT from [facility] on 04/02/2020 and a prior head CT 02/12/2020.  FINDINGS: There is minimal low-density in the frontal periventricular white matter consistent with minimal small vessel disease. The remainder of the brain parenchyma is normal in attenuation. The ventricles are normal in size. I see no focal mass effect. There is no midline shift. No extra-axial fluid collections are identified. There is no evidence of acute intracranial hemorrhage. There are punctate calcifications within the right sylvian fissure and in the lateral left parietal sulcus unchanged over multiple prior studies felt to be benign dystrophic calcification of no significance. Calvarium and skull base are normal in appearance with no acute skull fracture identified. Back on the head and facial CT 02/12/2020, the patient had multiple acute right-sided facial fractures including the anterior and lateral wall of the right maxillary sinus, lateral right orbital wall and right zygomatic arch. These fractures have healed and there is a healed fracture midportion of right zygomatic arch as well as of the lateral right orbital wall in the right maxillary sinus. The paranasal sinuses, mastoid air cells and middle ear cavities are clear.      1. There is mild small vessel disease in the cerebral white matter. 2. There are old healed fractures of the right zygomatic arch lateral wall of the right orbit and the walls of the right maxillary sinus. These fractures occurred back in 02/2020 almost 2 years ago. 3. The remainder of the head CTs normal with no acute skull fracture or intracranial hemorrhage seen.  Radiation dose reduction techniques were utilized, including automated exposure control and exposure modulation based on body size.       XR Chest 1 View    Result Date: 1/7/2022  ONE VIEW PORTABLE CHEST AT 11:46 AM  HISTORY: Syncopal episode during dialysis.  Hypertension.  FINDINGS: The lungs are well-expanded and clear. There is mild cardiomegaly with a pacemaker in place and showing no change from 05/05/2021. A right-sided MediPort catheter ends in the SVC and no acute abnormality is seen.  This report was finalized on 1/7/2022 12:24 PM by Dr. Jarrod Clifton M.D.        I ordered the above noted radiological studies and viewed the images on the PACS system.       EKG      Independently viewed by me and interpreted by Dr Fernandes         MEDICAL RECORD REVIEW  Medical records reviewed in [facility], patient had a subdural and subarachnoid hemorrhage in 2020.      PROCEDURES    Procedures        DIFFERENTIAL DIAGNOSIS  Differential Diagnosis for head injury include but are not limited to the following:  Cerebral contusion, Concussion ( with or without LOC), Head contusion, Skull fracture, orbital fracture, Hematoma, Intracranial Hemorrhage, Laceration, Post Concussion Syndrome.         PROGRESS, DATA ANALYSIS, CONSULTS, AND MEDICAL DECISION MAKING        ED Course as of 01/07/22 1403   Fri Jan 07, 2022   1122 Discussed with Dr Means regarding patient's history and physical. He states no Team D, plain Ct of Head and he will be down to see patient. [MS]   1131 Discussed pertinent information from history and physical exam with patient.  Discussed differential diagnosis and plan for ED evaluation/work-up and treatment including labs, CT of the head to rule out hemorrhage, fracture or other abnormalities.  All questions answered.  Patient is agreeable with this plan.     [MS]   1159  Discussed with Dr. Whatley regarding the CT Head results which revealed no acute abnormality seen  See dictation for official radiology interpretation.     [MS]   1159 BP(!): 85/47  Patient's blood pressure is 85/47, will order 500 cc bolus of normal saline. [MS]   1209 BUN(!): 39 [MS]   1209 Creatinine(!): 5.26 [MS]   1235 BP: 91/42 [MS]   1235 Heart Rate: 65 [MS]   1318 Patient's  at bedside,he  states that patient told him she took a sleep aid last night and so did not sleep well.  He states that she appears to be a little more confused than her normal self.  Patient was unable to recognize her .  Dr. Fernandes discussed this with Dr. Means.  He has seen and evaluated the patient in the ER.  He would like for medicine to admit the patient and he will order further testing. [MS]      ED Course User Index  [MS] Puja Yuan, APRN     ADMISSION    Discussed treatment plan and reason for admission with pt/family and admitting physician.  Pt/family voiced understanding of the plan for admission for further testing/treatment as needed.      DIAGNOSIS  Final diagnoses:   Confusion   Weakness   Minor head injury, initial encounter   ESRD (end stage renal disease) ([facility])           MEDICATIONS GIVEN IN ED    Medications   sodium chloride 0.9 % flush 10 mL (has no administration in time range)   sodium chloride 0.9 % flush 10 mL (has no administration in time range)   sodium chloride 0.9 % flush 10 mL (has no administration in time range)   atorvastatin (LIPITOR) tablet 80 mg (has no administration in time range)   ondansetron (ZOFRAN) injection 4 mg (has no administration in time range)   bisacodyl (DULCOLAX) suppository 10 mg (has no administration in time range)   aluminum-magnesium hydroxide-simethicone (MAALOX MAX) 400-400-40 MG/5ML suspension 7.5 mL (has no administration in time range)   clopidogrel (PLAVIX) tablet 300 mg (has no administration in time range)     And   clopidogrel (PLAVIX) tablet 75 mg (has no administration in time range)   aspirin suppository 300 mg (has no administration in time range)   sodium chloride 0.9 % bolus 500 mL (0 mL Intravenous Stopped 1/7/22 [id number])           COURSE & MEDICAL DECISION MAKING  Any/All labs and Any/All Imaging studies that were ordered were reviewed and are noted above.  Results were reviewed/discussed with the patient and they were also made aware  of online access.    Pt also made aware that some labs, such as cultures, will not be resulted during ER visit and followup with PMD is necessary.        Puja Yuan, APRN  01/07/22 1406

## 2022-01-07 NOTE — CONSULTS
DOS: 2022  NAME: Charmaine Machuca   : 1940  PCP: Fannie Godfrey MD  CC: Stroke  Referring MD: Nurse practitioner Dorothy    Neurological Problem and Interval History:  81 y.o. right-handed white female with a Hx of severe coronary artery disease as well as renal insufficiency currently on hemodialysis for the last 9 weeks has been having difficulty tolerating it as she feels extremely sick and lightheaded after that.  Over the last 3 to 4 days she has noticed intermittent speech impediment and dysarthric speech and today at the dialysis center she fell and struck her head and so she was not into the dialysis machine and came straight to the emergency room.  Initially her systolic blood pressure was on the low side but here it is even lower at 89/59.  She feels terrible especially if she tries to get up.  She does not want to be admitted to the hospital because she has been here numerous times and she threw a fit when I discussed about her coming into the hospital for further testing.  She has been seen by Dr Kavya Koch at the [facility] neurology center for abnormal involuntary movements.  She has a cardiac pacemaker and defibrillator..  She also has severe coronary artery disease with aortic stenosis, cardiomyopathy, congestive heart failure and has been followed by Dr. Leandro Huber and her nephrologist is Dr. Juan Luis Skelton.    Past Medical/Surgical Hx:  Past Medical History:   Diagnosis Date   • Anemia     Iron deficiency   • Anxiety    • Aortic stenosis     mild 2021   • Cardiomyopathy (HCC)     S/P pacemaker and defibrillator   • CHF (congestive heart failure) (HCC)    • Chronic renal failure, stage 4 (severe) (HCC)    • Colon polyp    • Coronary artery disease     pacemaker, defibrillator   • Depression    • Dizzy    • Gastroparesis    • GAVE (gastric antral vascular ectasia)    • GERD (gastroesophageal reflux disease)    • Gout    • Hemorrhoids    • Hiatal hernia    •  History of Clostridium difficile colitis 2013   • History of kidney stones    • History of pancreatitis     2014   • History of skin cancer    • History of transfusion     MULTIPLE    • Hyperlipidemia    • Hypertension    • Hypothyroidism    • Kidney failure    • Left bundle branch block    • Mitral and aortic valve disease    • Mitral valve insufficiency    • Myoclonus     S/P Depakote   • Osteoarthritis    • Pancytopenia (HCC)    • Peripheral neuropathy    • Presence of cardiac pacemaker     AND DEFIBRILLATOR   • Pulmonary hypertension (HCC)    • Pulmonary hypertension (HCC)    • SOB (shortness of breath) on exertion    • Spinal stenosis    • Stroke (HCC)     2019   • TIA (transient ischemic attack)      Past Surgical History:   Procedure Laterality Date   • APPENDECTOMY N/A    • ARTERIOVENOUS FISTULA/SHUNT SURGERY Right 2/18/2019    Procedure: RIGHT BASILIC FISTULA CREATION WITHOUT TRANSPOSITION;  Surgeon: Royer Dozier MD;  Location: [facility] OR;  Service: Vascular   • ARTERIOVENOUS FISTULA/SHUNT SURGERY Right 5/31/2019    Procedure: RIGHT 2ND STAGE BASILIC VEIN CREATION;  Surgeon: Royer Dozier MD;  Location: [facility] OR;  Service: Vascular   • CARDIAC CATHETERIZATION Left 03/28/2006    Arterial catheter insertion, cath left ventriculography, coronary angiography and left heart catheterization-Dr. Estevan Matamoros   • CARDIAC DEFIBRILLATOR PLACEMENT N/A 11/30/2007    Biventricular implantable cardioverter defibrillator-Dr. Leandro Huber   • CARDIAC ELECTROPHYSIOLOGY PROCEDURE N/A 10/28/2019    Procedure: GENERATOR CHANGE BI-V ICD  medtronic;  Surgeon: Leandro Huber MD;  Location: [facility] INVASIVE LOCATION;  Service: Cardiology   • CATARACT EXTRACTION Bilateral 2011   • COLONOSCOPY N/A 2014    done at [facility]   • CYSTECTOMY N/A     Ovarian cystectomy   • ENDOSCOPY N/A 04/28/2006    EGD with biopsies. Paraesophageal hiatal hernia from 34 to 44 cm, antral gastritis-Dr. Nehemiah Beal   •  ENDOSCOPY N/A 09/29/2004    Hiatal hernia, gastritis and an erosion of the pylorus-Dr. Yang Pavon   • ENDOSCOPY N/A 9/1/2019    Procedure: ESOPHAGOGASTRODUODENOSCOPY with APC;  Surgeon: Anuel Roach MD;  Location:  [facility];  Service: Gastroenterology   • ENDOSCOPY N/A 1/28/2020    Procedure: ESOPHAGOGASTRODUODENOSCOPY with APC;  Surgeon: Anuel Roach MD;  Location:  [facility];  Service: Gastroenterology   • ENDOSCOPY N/A 4/21/2020    Procedure: ESOPHAGOGASTRODUODENOSCOPY WITH APC;  Surgeon: Anuel Roach MD;  Location:  [facility];  Service: Gastroenterology;  Laterality: N/A;  PRE- MELENA, GAVE  POST- ESOPHAGITIS, GAVE   • ENDOSCOPY N/A 11/24/2020    Procedure: ESOPHAGOGASTRODUODENOSCOPY;  Surgeon: Anuel Roach MD;  Location:  [facility] OR;  Service: Gastroenterology;  Laterality: N/A;  reflux esophagitis  Gastric Food Bezoar   • ENDOSCOPY N/A 2/12/2021    Procedure: ESOPHAGOGASTRODUODENOSCOPY;  Surgeon: Anuel Roach MD;  Location:  [facility] OR;  Service: Gastroenterology;  Laterality: N/A;  with APC- GAVE; Gastroparesis;    • ENDOSCOPY N/A 10/8/2021    Procedure: ESOPHAGOGASTRODUODENOSCOPY WITH ARGON PLASMA COAGULATOR;  Surgeon: Anuel Roach MD;  Location:  [facility] OR;  Service: Gastroenterology;  Laterality: N/A;  APC of gastric antral vascular ectasia  Reflux   • ENTEROSCOPY SMALL BOWEL N/A 10/30/2006    Small bowel enteroscopy with biopsies-Dr. Rory Sevilla   • FLEXIBLE SIGMOIDOSCOPY N/A 09/29/2004    Unsuccessful colonoscopy due to poop prep, a very tortuous sigmoid, bowel prep in the form of enema given and a barium enema was obtained-Dr. Yang Pavon   • FRACTURE SURGERY  2015    Broke big toe   • HEMATOMA EVACUATION TRUNK N/A 02/07/2014    Pocket evacuation of hematoma at pacemaker site-Dr. Leandro Huber   • HEMORRHOIDECTOMY N/A 04/19/2004    Flexible sigmoidoscopy and stapled hemorrhoidectomy-  [place]   • HYSTERECTOMY Bilateral 1977   • IMPLANTABLE CARDIOVERTER DEFIBRILLATOR LEAD REPLACEMENT/POCKET REVISION Left 3/28/2016    Procedure: AUTOMATIC IMPLANTABLE CARDIOVERTER DEFIBRILLATOR LEAD REPLACEMENT WITH LASER LEAD EXTRACTION;  Surgeon: Leandro Huber MD;  Location: [facility] OR 18/19;  Service:    • IMPLANTABLE CARDIOVERTER DEFIBRILLATOR LEAD REPLACEMENT/POCKET REVISION N/A 01/13/2014    Biventricular ICD replacement-Dr. Jillian Huber   • LAPAROSCOPY REPAIR HIATAL HERNIA N/A 06/27/2006    Laparoscopic paraesophageal hiatal hernia repair with Nissen fundoplication and cholecystectomy-Dr. Sean Yoon   • NATALIE GONZALEZ ([facility]) PROCEDURE     • OH INSJ TUNNELED CVC W/O SUBQ PORT/ AGE 3 YR/> Right 10/3/2017    Procedure: Right internal jugular port placement;  Surgeon: Tiffanie Couch MD;  Location: [facility] MAIN OR;  Service: General   • TOE SURGERY Left     SET BIG TOE   • TOTAL KNEE ARTHROPLASTY Left 08/2014   • VENOUS ACCESS DEVICE (PORT) INSERTION Right        Review of Systems:    Constitutional: Pleasant lady laying in bed extremely nervous and tearful and wants to go home  Cardiovascular: Denies any chest pain or palpitations.  Respiratory: Denies any shortness of breath.  She has received the 2 doses of the vaccination for Covid along with the booster.  Gastrointestinal: Denies any nausea and vomiting.  Genitourinary: Does not produce much urine and is on hemodialysis.  Musculoskeletal: Very frail and skinny and looks like most of the muscle masses are lost and she is generalized weak  Dermatological: No skin breakdown.  Neurological: Has intermittent slurring of her speech but her face seems to be symmetric.  Psychiatric: Extreme anxiety and depression.  Does not want to be admitted to the hospital and through a fit when I mentioned that.  Ophthalmological: Denies any vision changes.          Medications On Admission  (Not in a hospital  "admission)      Allergies:  Allergies   Allergen Reactions   • Chlorhexidine Rash and Itching     Patient states \"blue dye\" in chlorhexidine.  States the orange and clear are OK to use   • Codeine Unknown - Low Severity     HYPER, DOES NOT HELP PAIN   • Sertraline Unknown - Low Severity   • Propoxyphene Unknown - Low Severity     Belligerent, acting drunk  (darvon)       Social Hx:  Social History     Socioeconomic History   • Marital status:      Spouse name: Zackery   • Number of children: 5   • Years of education: 1 yr college   Tobacco Use   • Smoking status: Never Smoker   • Smokeless tobacco: Never Used   • Tobacco comment: caffeine use - coffee and soda   Vaping Use   • Vaping Use: Never used   Substance and Sexual Activity   • Alcohol use: No   • Drug use: No   • Sexual activity: Defer       Family Hx:  Family History   Problem Relation Age of Onset   • Coronary artery disease Other    • Dementia Other    • Kidney disease Other    • Arthritis Father    • Early death Father         Kidney failure   • Kidney disease Father    • Gout Father    • Heart disease Mother    • Mental illness Mother         Dementia   • Malig Hyperthermia Neg Hx    • Colon cancer Neg Hx    • Colon polyps Neg Hx        Review of Imaging (Interpretation of images not reports): Reviewed the CT of the brain on the PACS that does not show any acute ischemia but chronic white matter disease with atrophy.    Additional Tests Performed: Reviewed the carotid Doppler studies from August 19, 2019 that shows the following:  Study Findings    •     Right CCA Prox:  No plaque visualized.    •     Right CCA Dist:  No plaque visualized.   •     Right ICA Prox:  Plaque present.   •     Right ECA:  Plaque present.   •     Right Vertebral:  Antegrade flow noted.       •     Left CCA Prox:  No plaque visualized.   •     Left CCA Dist:  No plaque visualized.   •     Left ICA Prox:  Plaque present.   •     Left ECA:  Plaque present.   •     Left " "Vertebral:  Antegrade flow noted.     No right arm BP due to arm restriction present.       Study Impression    •     Right ICA Mid:  Imaging indicates 16%-49% stenosis.       •     Left ICA Mid:  Imaging indicates 16-49% stenosis.             Laboratory Results:   Lab Results   Component Value Date    GLUCOSE 109 (H) 09/02/2021    CALCIUM 9.1 09/02/2021    CALCIUM 8.5 (L) 09/02/2021     09/02/2021    K 4.6 09/02/2021    CO2 20.9 (L) 09/02/2021     (H) 09/02/2021    BUN 61 (H) 09/02/2021    CREATININE 3.17 (H) 09/02/2021    EGFRIFAFRI  09/02/2021      Comment:      <15 Indicative of kidney failure.    EGFRIFNONA 14 (L) 09/02/2021    BCR 19.2 09/02/2021    ANIONGAP 9.1 09/02/2021     Lab Results   Component Value Date    WBC 8.43 01/07/2022    HGB 10.9 (L) 01/07/2022    HCT 34.7 01/07/2022    .2 (H) 01/07/2022     (L) 01/07/2022     Lab Results   Component Value Date    CHOL 153 10/29/2020    CHOL 124 08/19/2019     Lab Results   Component Value Date    HDL 49 10/29/2020    HDL 47 (L) 10/03/2020    HDL 37 (L) 08/19/2019     Lab Results   Component Value Date    LDL 76 10/29/2020    LDL 56 10/03/2020    LDL 60 08/19/2019     Lab Results   Component Value Date    TRIG 162 (H) 10/29/2020    TRIG 195 (H) 10/03/2020    TRIG 134 08/19/2019     Lab Results   Component Value Date    HGBA1C 5.1 10/03/2020     Lab Results   Component Value Date    INR 1.0 08/06/2020    INR 1.02 02/12/2020    INR 1.11 (H) 08/17/2019    PROTIME 10.7 08/06/2020    PROTIME 13.1 02/12/2020    PROTIME 14.0 08/17/2019         Physical Examination:  /70   Pulse 78   Temp 95.3 °F (35.2 °C) (Tympanic)   Resp 16   Ht 167.6 cm (65.98\")   Wt 62.1 kg (137 lb)   SpO2 99%   BMI 22.12 kg/m²   General Appearance:   Well developed, well nourished, well groomed, alert, and cooperative.  HEENT: Normocephalic.  Normal fundoscopic exam including normal retina, discs are flat with sharp margins, normal vasculature.  Neck and " Spine: Normal range of motion.  Normal alignment. No mass or tenderness. No bruits.  Cardiac: Regular rate and rhythm. No murmurs.  Peripheral Vasculature: Radial and pedal pulses are equal and symmetric. No signs of distal embolization.  Extremities:    No edema or deformities. Normal joint ROM. GISSELL hoses and SCD's in place  Skin:    No rashes or birth marks.    Neurological examination:  Higher Integrative  Function: Oriented to time, place and person. Normal registration, recall, attention span and concentration. Normal language including comprehension, spontaneous speech, repetition, reading, writing, naming and vocabulary. No neglect with normal visual-spatial function and construction. Normal fund of knowledge and higher integrative function.  CN II: Pupils are equal, round, and reactive to light. Normal visual acuity and visual fields.    CN III IV VI: Extraocular movements are full without nystagmus.   CN V: Normal facial sensation and strength of muscles of mastication.  CN VII: Facial movements are symmetric. No weakness.  CN VIII:   Auditory acuity is normal.  CN IX & X:   Symmetric palatal movement.  CN XI: Sternocleidomastoid and trapezius are normal.  No weakness.  CN XII:   The tongue is midline.  No atrophy or fasciculations.  Motor: Patient very macerated and is loss of muscle mass but has good tone.  The power is decreased throughout because of the weakness generalized.  Reflexes: 1+ in the upper and lower extremities. Plantar responses are flexor.  Sensation: Normal to light touch, pinprick, vibration, temperature, and proprioception in arms and legs.   Station and Gait: Because the patient is so hypotensive with a blood pressure of 85/45 I could not sit her up or stand her up and check her gait and balance her Romberg..    Coordination: Finger to nose test shows no dysmetria.    Heel to shin normal.    NIHSS:    Baseline  0-->Alert: keenly responsive  0-->Answers both questions  correctly  0-->Performs both tasks correctly  0=normal  0=No visual loss  1=Minor paralysis (flattened nasolabial fold, asymmetric on smiling)  0-->No drift: limb holds 90 (or 45) degrees for full 10 secs  0-->No drift: limb holds 90 (or 45) degrees for full 10 secs  0-->No drift: limb holds 90 (or 45) degrees for full 10 secs  0-->No drift: limb holds 90 (or 45) degrees for full 10 secs  0=Absent  0=Normal; no sensory loss  0=No aphasia, normal  1=Mild to moderate, patient slurs at least some words and at worst, can be understood with some difficulty  0=No abnormality    Total score: 2    Diagnoses / Discussion:  81 y.o. who presents with Sx of strokelike symptoms most probably from hypotension causing her underlying condition.  Patient is very hypotensive at this point.  She is also very anxious and depressed and wants to go home because she has been to this hospital numerous times.  She also does not like the hemodialysis.    Plan:  The patient agreed to stay in the hospital we can get stroke work-up including MRI of her brain if her cardiac pacemaker and defibrillator are MRI compatible.  The patient's  is currently here and patient is agreeing to stay.  2D echocardiogram to look for ejection fraction  Carotid Dopplers to look for any hemodynamically significant stenosis.  Hydration gently to correct the orthostatic blood pressure drop.  Nephrology to evaluate for continuing hemodialysis protocol.  Physical therapy, Occupational Therapy and speech pathology to evaluate for inpatient rehab potential.  Patient extremely depressed and anxious because of her underlying medical issues and if she still is refusing treatment may be inpatient psychiatric evaluation will be beneficial.  I discussed my findings in details with our nurse practitioner Dorothy as well as Dr. Mikhail Fernandes and Dr. Juan Luis Skelton.   Discussed signs and symptoms of stroke and when to call 911. Instructed to follow a low fat diet including  the Mediterranean diet. Instructed to take all medications daily as prescribed. Encouraged 30 minutes of exercise 3-4 times a week as tolerated. Stay well hydrated. Discussed potential side effects of new medications and signs and symptoms to report.  Reviewed stroke risk factors and stroke prevention plan. Patient  verbalizes understanding and agrees with plan.     I have discussed the above with the patient and the emergency room team, and as she will be admitted to the hospitalist service, we will be happy to follow-up for this patient.  Time spent with patient: 60min    Coding    Dictated using Dragon dictation.

## 2022-01-07 NOTE — H&P
Patient Name:  Charmaine Machuca  YOB: 1940  MRN:  [id number]  Admit Date:  1/7/2022  Patient Care Team:  Fannie Godfrey MD as PCP - General (Internal Medicine)  Anuel Scott MD as Consulting Physician (Hematology and Oncology)  Fannie Godfrey MD as Referring Physician (Internal Medicine)  Estevan Matamoros MD as Consulting Physician (Cardiology)  Parveen Abraham MD as Consulting Physician (Nephrology)  Ondina Haro MD as Consulting Physician (Neurology)  Estevan Oropeza MD as Consulting Physician (Hematology and Oncology)  Neno Merritt II, MD as Consulting Physician (Hematology and Oncology)  Anuel Roach MD as Consulting Physician (Gastroenterology)      Subjective   History Present Illness     Chief Complaint   Patient presents with   • Speech Problem       Ms. Machuca is a 81 y.o. 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 She has also been lethargic and dysarthric with a left facial droop noted that is apparently chronic. Her blood pressure was low at the dialysis center today, 80s/40s. As a result she did not get her dialysis treatment today and was sent here.     History of Present Illness  Review of Systems   Constitutional: Positive for appetite change (decreased) and fatigue. Negative for chills and fever.   HENT: Negative for congestion, nosebleeds, sore throat and trouble swallowing.    Eyes: Negative for redness and visual disturbance.   Respiratory: Negative for cough, choking and shortness of breath.    Cardiovascular: Negative for chest pain and palpitations.    Gastrointestinal: Positive for nausea (intermittent). Negative for abdominal pain, constipation, diarrhea and vomiting.   Endocrine: Negative for cold intolerance and heat intolerance.   Genitourinary: Negative for difficulty urinating, dysuria, flank pain and hematuria.   Musculoskeletal: Negative for arthralgias and myalgias.   Skin: Negative for pallor and rash.   Neurological: Positive for weakness (generalized). Negative for dizziness and headaches.   Hematological: Negative for adenopathy. Does not bruise/bleed easily.   Psychiatric/Behavioral: Positive for confusion. Negative for decreased concentration.        Personal History     Past Medical History:   Diagnosis Date   • Anemia     Iron deficiency   • Anxiety    • Aortic stenosis     mild 04/2021   • Cardiomyopathy (HCC)     S/P pacemaker and defibrillator   • CHF (congestive heart failure) (HCC)    • Chronic renal failure, stage 4 (severe) (HCC)    • Colon polyp    • Coronary artery disease     pacemaker, defibrillator   • Depression    • Dizzy    • Gastroparesis    • GAVE (gastric antral vascular ectasia)    • GERD (gastroesophageal reflux disease)    • Gout    • Hemorrhoids    • Hiatal hernia    • History of Clostridium difficile colitis 2013   • History of kidney stones    • History of pancreatitis     2014   • History of skin cancer    • History of transfusion     MULTIPLE    • Hyperlipidemia    • Hypertension    • Hypothyroidism    • Kidney failure    • Left bundle branch block    • Mitral and aortic valve disease    • Mitral valve insufficiency    • Myoclonus     S/P Depakote   • Osteoarthritis    • Pancytopenia (HCC)    • Peripheral neuropathy    • Presence of cardiac pacemaker     AND DEFIBRILLATOR   • Pulmonary hypertension (HCC)    • Pulmonary hypertension (HCC)    • SOB (shortness of breath) on exertion    • Spinal stenosis    • Stroke (HCC)     2019   • TIA (transient ischemic attack)      Past Surgical History:   Procedure Laterality Date    • APPENDECTOMY N/A    • ARTERIOVENOUS FISTULA/SHUNT SURGERY Right 2/18/2019    Procedure: RIGHT BASILIC FISTULA CREATION WITHOUT TRANSPOSITION;  Surgeon: Royer Dozier MD;  Location: [facility] MAIN OR;  Service: Vascular   • ARTERIOVENOUS FISTULA/SHUNT SURGERY Right 5/31/2019    Procedure: RIGHT 2ND STAGE BASILIC VEIN CREATION;  Surgeon: Royer Dozier MD;  Location: [facility] MAIN OR;  Service: Vascular   • CARDIAC CATHETERIZATION Left 03/28/2006    Arterial catheter insertion, cath left ventriculography, coronary angiography and left heart catheterization-Dr. Estevan Matamoros   • CARDIAC DEFIBRILLATOR PLACEMENT N/A 11/30/2007    Biventricular implantable cardioverter defibrillator-Dr. Leandro Huber   • CARDIAC ELECTROPHYSIOLOGY PROCEDURE N/A 10/28/2019    Procedure: GENERATOR CHANGE BI-V ICD  medtronic;  Surgeon: Leandro Huber MD;  Location: [facility] CATH INVASIVE LOCATION;  Service: Cardiology   • CATARACT EXTRACTION Bilateral 2011   • COLONOSCOPY N/A 2014    done at [facility]   • CYSTECTOMY N/A     Ovarian cystectomy   • ENDOSCOPY N/A 04/28/2006    EGD with biopsies. Paraesophageal hiatal hernia from 34 to 44 cm, antral gastritis-Dr. Nehemiah Beal   • ENDOSCOPY N/A 09/29/2004    Hiatal hernia, gastritis and an erosion of the pylorus-Dr. Yang Pavon   • ENDOSCOPY N/A 9/1/2019    Procedure: ESOPHAGOGASTRODUODENOSCOPY with APC;  Surgeon: Anuel Roach MD;  Location: [facility] ENDOSCOPY;  Service: Gastroenterology   • ENDOSCOPY N/A 1/28/2020    Procedure: ESOPHAGOGASTRODUODENOSCOPY with APC;  Surgeon: Anuel Roach MD;  Location: [facility] ENDOSCOPY;  Service: Gastroenterology   • ENDOSCOPY N/A 4/21/2020    Procedure: ESOPHAGOGASTRODUODENOSCOPY WITH APC;  Surgeon: Anuel Roach MD;  Location: [facility] ENDOSCOPY;  Service: Gastroenterology;  Laterality: N/A;  PRE- MELENA, GAVE  POST- ESOPHAGITIS, GAVE   • ENDOSCOPY N/A 11/24/2020    Procedure: ESOPHAGOGASTRODUODENOSCOPY;   Surgeon: Anuel Roach MD;  Location:  [facility] OR;  Service: Gastroenterology;  Laterality: N/A;  reflux esophagitis  Gastric Food Bezoar   • ENDOSCOPY N/A 2/12/2021    Procedure: ESOPHAGOGASTRODUODENOSCOPY;  Surgeon: Anuel Roach MD;  Location:  [facility] OR;  Service: Gastroenterology;  Laterality: N/A;  with APC- GAVE; Gastroparesis;    • ENDOSCOPY N/A 10/8/2021    Procedure: ESOPHAGOGASTRODUODENOSCOPY WITH ARGON PLASMA COAGULATOR;  Surgeon: Anuel Roach MD;  Location:  [facility] OR;  Service: Gastroenterology;  Laterality: N/A;  APC of gastric antral vascular ectasia  Reflux   • ENTEROSCOPY SMALL BOWEL N/A 10/30/2006    Small bowel enteroscopy with biopsies-Dr. Rory Sevilla   • FLEXIBLE SIGMOIDOSCOPY N/A 09/29/2004    Unsuccessful colonoscopy due to poop prep, a very tortuous sigmoid, bowel prep in the form of enema given and a barium enema was obtained-Dr. Yang Pavon   • FRACTURE SURGERY  2015    Broke big toe   • HEMATOMA EVACUATION TRUNK N/A 02/07/2014    Pocket evacuation of hematoma at pacemaker site-Dr. Leandro Huber   • HEMORRHOIDECTOMY N/A 04/19/2004    Flexible sigmoidoscopy and stapled hemorrhoidectomy-Dr. Yang Pavon   • HYSTERECTOMY Bilateral 1977   • IMPLANTABLE CARDIOVERTER DEFIBRILLATOR LEAD REPLACEMENT/POCKET REVISION Left 3/28/2016    Procedure: AUTOMATIC IMPLANTABLE CARDIOVERTER DEFIBRILLATOR LEAD REPLACEMENT WITH LASER LEAD EXTRACTION;  Surgeon: Leandro Huber MD;  Location: [facility] OR 18/19;  Service:    • IMPLANTABLE CARDIOVERTER DEFIBRILLATOR LEAD REPLACEMENT/POCKET REVISION N/A 01/13/2014    Biventricular ICD replacement-Dr. Jillian Huber   • LAPAROSCOPY REPAIR HIATAL HERNIA N/A 06/27/2006    Laparoscopic paraesophageal hiatal hernia repair with Nissen fundoplication and cholecystectomy-Dr. Sean Yoon   • NATALIE GONZALEZ (RAOULK) PROCEDURE     • AZ INSJ TUNNELED CVC W/O SUBQ PORT/ AGE 3 YR/> Right 10/3/2017    Procedure:  Right internal jugular port placement;  Surgeon: Tiffanie Couch MD;  Location: [facility] OR;  Service: General   • TOE SURGERY Left     SET BIG TOE   • TOTAL KNEE ARTHROPLASTY Left 08/2014   • VENOUS ACCESS DEVICE (PORT) INSERTION Right      Family History   Problem Relation Age of Onset   • Coronary artery disease Other    • Dementia Other    • Kidney disease Other    • Arthritis Father    • Early death Father         Kidney failure   • Kidney disease Father    • Gout Father    • Heart disease Mother    • Mental illness Mother         Dementia   • Malig Hyperthermia Neg Hx    • Colon cancer Neg Hx    • Colon polyps Neg Hx      Social History     Tobacco Use   • Smoking status: Never Smoker   • Smokeless tobacco: Never Used   • Tobacco comment: caffeine use - coffee and soda   Vaping Use   • Vaping Use: Never used   Substance Use Topics   • Alcohol use: No   • Drug use: No     Current Facility-Administered Medications on File Prior to Encounter   Medication Dose Route Frequency Provider Last Rate Last Admin   • heparin flush (porcine) 100 UNIT/ML injection 500 Units  500 Units Intravenous PRN Anuel Scott MD   500 Units at 11/27/17 1347   • heparin flush (porcine) 100 UNIT/ML injection 500 Units  500 Units Intravenous PRN Neno Merritt II, MD   500 Units at 01/18/20 1359   • sodium chloride 0.9 % flush 10 mL  10 mL Intravenous PRN Anuel Scott MD   10 mL at 11/27/17 1347   • sodium chloride 0.9 % flush 10 mL  10 mL Intravenous PRN Neno Merritt II, MD   10 mL at 01/18/20 1359   • sodium chloride 0.9 % infusion 250 mL  250 mL Intravenous PRN Neno Merritt II, MD       • sodium chloride 0.9 % infusion 250 mL  250 mL Intravenous PRN Neno Merritt II, MD         Current Outpatient Medications on File Prior to Encounter   Medication Sig Dispense Refill   • aspirin (aspirin) 81 MG EC tablet Take  by mouth Daily.     • buPROPion SR (WELLBUTRIN SR) 150 MG 12 hr tablet Take 150 mg by mouth.     •  "carbidopa-levodopa (SINEMET)  MG per tablet Take 2 tablets by mouth Daily.     • carbidopa-levodopa ER (SINEMET CR)  MG per tablet Take 1 tablet by mouth Every Evening.     • carvedilol (COREG) 12.5 MG tablet Take 1 tablet by mouth 2 (Two) Times a Day With Meals. 60 tablet 5   • erythromycin base (E-MYCIN) 250 MG tablet TAKE 1/2 TABLET 4 TIMES A  DAY (Patient taking differently: Take 125 mg by mouth 4 (Four) Times a Day.) 180 tablet 3   • famotidine (PEPCID) 20 MG tablet Take 1 tablet by mouth Every 12 (Twelve) Hours.     • febuxostat (ULORIC) 80 MG tablet tablet 80 mg Daily.     • furosemide (LASIX) 40 MG tablet 2 tablets IN THE AM, 1 AT LUNCH (Patient taking differently: 40 mg 2 (Two) Times a Day. 2 tablet in am and one at lunch)     • Glucosamine-Chondroit-Vit C-Mn (GLUCOSAMINE CHONDROITIN COMPLX) capsule Take 1,500 mg by mouth Every Other Day.     • levothyroxine (SYNTHROID, LEVOTHROID) 25 MCG tablet Take 25 mcg by mouth Daily.     • lidocaine (LIDODERM) 5 % Place 1 patch on the skin as directed by provider Every Night. To soles of feet     • Multiple Vitamin (MULTI-DAY VITAMINS) tablet Take 1 tablet by mouth Daily. HOLD FOR SURGERY     • pantoprazole (PROTONIX) 40 MG EC tablet Take 1 tablet by mouth Daily. 90 tablet 3   • promethazine (PHENERGAN) 12.5 MG tablet Take 12.5 mg by mouth Every 6 (Six) Hours As Needed for Nausea or Vomiting.     • psyllium (METAMUCIL) 0.52 g capsule Take 0.52 g by mouth.     • rotigotine (NEUPRO) 8 MG/24HR patch 24 hour 24 hour patch Place 1 patch on the skin as directed by provider Daily. 8 mg patch     • [DISCONTINUED] DULoxetine (CYMBALTA) 30 MG capsule Take 1 capsule by mouth Daily.     • acetaminophen (TYLENOL) 325 MG tablet Take 2 tablets by mouth Every 4 (Four) Hours As Needed for Mild Pain .     • [DISCONTINUED] gabapentin (NEURONTIN) 300 MG capsule Take  by mouth.       Allergies   Allergen Reactions   • Chlorhexidine Rash and Itching     Patient states \"blue " "dye\" in chlorhexidine.  States the orange and clear are OK to use   • Codeine Unknown - Low Severity     HYPER, DOES NOT HELP PAIN   • Sertraline Unknown - Low Severity   • Propoxyphene Unknown - Low Severity     Belligerent, acting drunk  (darvon)       Objective    Objective     Vital Signs  Temp:  [95.3 °F (35.2 °C)-97.4 °F (36.3 °C)] 97.4 °F (36.3 °C)  Heart Rate:  [65-78] 69  Resp:  [16] 16  BP: ()/(42-70) 129/44  SpO2:  [95 %-100 %] 100 %  on   ;   Device (Oxygen Therapy): room air  Body mass index is 22.12 kg/m².    Physical Exam  Vitals and nursing note reviewed.   Constitutional:       General: She is not in acute distress.     Appearance: She is ill-appearing (chronically). She is not toxic-appearing or diaphoretic.   HENT:      Head: Normocephalic and atraumatic.      Nose: Nose normal.      Mouth/Throat:      Mouth: Mucous membranes are moist.      Pharynx: Oropharynx is clear.   Eyes:      Extraocular Movements: Extraocular movements intact.      Conjunctiva/sclera: Conjunctivae normal.      Pupils: Pupils are equal, round, and reactive to light.   Cardiovascular:      Rate and Rhythm: Normal rate and regular rhythm.      Pulses: Normal pulses.   Pulmonary:      Effort: Pulmonary effort is normal.      Breath sounds: Normal breath sounds.   Abdominal:      General: Bowel sounds are normal.      Palpations: Abdomen is soft.   Musculoskeletal:         General: Swelling (1+ BLE) present. No tenderness.      Cervical back: Normal range of motion and neck supple.   Skin:     General: Skin is warm and dry.      Capillary Refill: Capillary refill takes less than 2 seconds.   Neurological:      Mental Status: She is alert.      Cranial Nerves: Dysarthria present.      Comments: Mild left lower facial weakness   Psychiatric:         Mood and Affect: Mood normal.         Behavior: Behavior normal.     Results Review:  I reviewed the patient's new clinical results.  I reviewed the patient's new imaging " results and agree with the interpretation.  I reviewed the patient's other test results and agree with the interpretation  I personally viewed and interpreted the patient's EKG/Telemetry data  Discussed with ED provider.    Lab Results (last 24 hours)     Procedure Component Value Units Date/Time    CBC & Differential [[id number]]  (Abnormal) Collected: 01/07/22 1125    Specimen: Blood Updated: 01/07/22 1135    Narrative:      The following orders were created for panel order CBC & Differential.  Procedure                               Abnormality         Status                     ---------                               -----------         ------                     CBC Auto Differential[[id number]]        Abnormal            Final result                 Please view results for these tests on the individual orders.    Comprehensive Metabolic Panel [[id number]]  (Abnormal) Collected: 01/07/22 1125    Specimen: Blood Updated: 01/07/22 1204     Glucose 101 mg/dL      BUN 39 mg/dL      Creatinine 5.26 mg/dL      Sodium 138 mmol/L      Potassium 4.4 mmol/L      Chloride 100 mmol/L      CO2 27.9 mmol/L      Calcium 9.0 mg/dL      Total Protein 6.6 g/dL      Albumin 4.30 g/dL      ALT (SGPT) <5 U/L      AST (SGOT) 15 U/L      Alkaline Phosphatase 74 U/L      Total Bilirubin 0.2 mg/dL      eGFR Non African Amer 8 mL/min/1.73      Comment: <15 Indicative of kidney failure.        eGFR   Amer --     Comment: <15 Indicative of kidney failure.        Globulin 2.3 gm/dL      A/G Ratio 1.9 g/dL      BUN/Creatinine Ratio 7.4     Anion Gap 10.1 mmol/L     Narrative:      GFR Normal >60  Chronic Kidney Disease <60  Kidney Failure <15      CBC Auto Differential [[id number]]  (Abnormal) Collected: 01/07/22 1125    Specimen: Blood Updated: 01/07/22 1135     WBC 8.43 10*3/mm3      RBC 3.43 10*6/mm3      Hemoglobin 10.9 g/dL      Hematocrit 34.7 %      .2 fL      MCH 31.8 pg      MCHC 31.4 g/dL      RDW 13.9 %      RDW-SD 51.3  fl      MPV 10.1 fL      Platelets 123 10*3/mm3      Neutrophil % 86.3 %      Lymphocyte % 6.4 %      Monocyte % 5.9 %      Eosinophil % 0.9 %      Basophil % 0.1 %      Immature Grans % 0.4 %      Neutrophils, Absolute 7.27 10*3/mm3      Lymphocytes, Absolute 0.54 10*3/mm3      Monocytes, Absolute 0.50 10*3/mm3      Eosinophils, Absolute 0.08 10*3/mm3      Basophils, Absolute 0.01 10*3/mm3      Immature Grans, Absolute 0.03 10*3/mm3      nRBC 0.0 /100 WBC     Hemoglobin A1c [[id number]]  (Abnormal) Collected: 01/07/22 1125    Specimen: Blood Updated: 01/07/22 1508     Hemoglobin A1C 4.61 %     Narrative:      Hemoglobin A1C Ranges:    Increased Risk for Diabetes  5.7% to 6.4%  Diabetes                     >= 6.5%  Diabetic Goal                < 7.0%    Lipid Panel [[id number]] Collected: 01/07/22 1125    Specimen: Blood Updated: 01/07/22 1508     Total Cholesterol 124 mg/dL      Triglycerides 137 mg/dL      HDL Cholesterol 53 mg/dL      LDL Cholesterol  47 mg/dL      VLDL Cholesterol 24 mg/dL      LDL/HDL Ratio 0.82    Narrative:      Cholesterol Reference Ranges  (U.S. Department of Health and Human Services ATP III Classifications)    Desirable          <200 mg/dL  Borderline High    200-239 mg/dL  High Risk          >240 mg/dL      Triglyceride Reference Ranges  (U.S. Department of Health and Human Services ATP III Classifications)    Normal           <150 mg/dL  Borderline High  150-199 mg/dL  High             200-499 mg/dL  Very High        >500 mg/dL    HDL Reference Ranges  (U.S. Department of Health and Human Services ATP III Classifcations)    Low     <40 mg/dl (major risk factor for CHD)  High    >60 mg/dl ('negative' risk factor for CHD)        LDL Reference Ranges  (U.S. Department of Health and Human Services ATP III Classifcations)    Optimal          <100 mg/dL  Near Optimal     100-129 mg/dL  Borderline High  130-159 mg/dL  High             160-189 mg/dL  Very High        >189 mg/dL    COVID PRE-OP /  PRE-PROCEDURE SCREENING ORDER (NO ISOLATION) - Swab, Nasopharynx [[id number]] Collected: 01/07/22 1451    Specimen: Swab from Nasopharynx Updated: 01/07/22 1453    Narrative:      The following orders were created for panel order COVID PRE-OP / PRE-PROCEDURE SCREENING ORDER (NO ISOLATION) - Swab, Nasopharynx.  Procedure                               Abnormality         Status                     ---------                               -----------         ------                     COVID-19,APTIMA PANTHER(...[[id number]]                      In process                   Please view results for these tests on the individual orders.    COVID-19,APTIMA PANTHER(GRIS),[facility], NP/OP SWAB IN UTM/VTM/SALINE TRANSPORT MEDIA,24 HR TAT - Swab, Nasopharynx [[id number]] Collected: 01/07/22 1451    Specimen: Swab from Nasopharynx Updated: 01/07/22 1453          Imaging Results (Last 24 Hours)     Procedure Component Value Units Date/Time    CT Head Without Contrast [[id number]] Collected: 01/07/22 1212     Updated: 01/07/22 1659    Narrative:      EMERGENCY NONCONTRAST HEAD CT 01/07/2022     CLINICAL HISTORY: Patient lost balance, fell, hit back of her head.  Patient has reported history of a prior subdural hematoma in the past.     TECHNIQUE: Spiral CT images were obtained from the base of skull to the  vertex without intravenous contrast and images were reformatted and  submitted in 3 mm thick axial CT section with brain algorithm and 2 mm  thick axial CT section with high-resolution bone algorithm and 2 mm  thick sagittal and coronal reconstructions were performed and submitted  in brain algorithm.     This is correlated to prior noncontrast head CT from [facility] on 04/02/2020 and a prior head CT 02/12/2020.     FINDINGS: There is minimal low-density in the frontal periventricular  white matter consistent with minimal small vessel disease. The remainder  of the brain parenchyma is normal in attenuation. The  ventricles are  normal in size. I see no focal mass effect. There is no midline shift.  No extra-axial fluid collections are identified. There is no evidence of  acute intracranial hemorrhage. There are punctate calcifications within  the right sylvian fissure and in the lateral left parietal sulcus  unchanged over multiple prior studies felt to be benign dystrophic  calcification of no significance. Calvarium and skull base are normal in  appearance with no acute skull fracture identified. Back on the head and  facial CT 02/12/2020, the patient had multiple acute right-sided facial  fractures including the anterior and lateral wall of the right maxillary  sinus, lateral right orbital wall and right zygomatic arch. These  fractures have healed and there is a healed fracture midportion of right  zygomatic arch as well as of the lateral right orbital wall in the right  maxillary sinus. The paranasal sinuses, mastoid air cells and middle ear  cavities are clear.       Impression:      1. There is mild small vessel disease in the cerebral white matter.  2. There are old healed fractures of the right zygomatic arch lateral  wall of the right orbit and the walls of the right maxillary sinus.  These fractures occurred back in 02/2020 almost 2 years ago.  3. The remainder of the head CTs normal with no acute skull fracture or  intracranial hemorrhage seen.     Radiation dose reduction techniques were utilized, including automated  exposure control and exposure modulation based on body size.     This report was finalized on 1/7/2022 4:55 PM by Dr. Bartolome Whatley M.D.       XR Chest 1 View [[id number]] Collected: 01/07/22 1223     Updated: 01/07/22 1227    Narrative:      ONE VIEW PORTABLE CHEST AT 11:46 AM     HISTORY: Syncopal episode during dialysis. Hypertension.     FINDINGS: The lungs are well-expanded and clear. There is mild  cardiomegaly with a pacemaker in place and showing no change from  05/05/2021. A right-sided  MediPort catheter ends in the SVC and no acute  abnormality is seen.     This report was finalized on 1/7/2022 12:24 PM by Dr. Jarrod Clifton M.D.             Results for orders placed during the hospital encounter of 04/14/21    Adult Transthoracic Echo Complete W/ Cont if Necessary Per Protocol    Interpretation Summary  · Calculated left ventricular EF = 53.7% Calculated left ventricular 3D EF = 55% Estimated left ventricular EF was in agreement with the calculated left ventricular EF. Left ventricular systolic function is normal.  · Left ventricular wall thickness is consistent with mild concentric hypertrophy.  · Left ventricular diastolic function is consistent with (grade II w/high LAP) pseudonormalization.  · Left atrial volume is mildly increased.  · Mild aortic valve stenosis is present.  · Moderate tricuspid valve regurgitation is present.  · Estimated right ventricular systolic pressure from tricuspid regurgitation is markedly elevated (>55 mmHg). Calculated right ventricular systolic pressure from tricuspid regurgitation is 59.5 mmHg.  · Severe pulmonary hypertension is present.      No orders to display        Assessment/Plan     Active Hospital Problems    Diagnosis  POA   • Confusion [R41.0]  Yes   • Dysarthria [R47.1]  Yes   • Pulmonary hypertension (HCC) [I27.20]  Yes   • GAVE (gastric antral vascular ectasia) [K31.819]  Yes   • Chronic combined systolic and diastolic congestive heart failure (HCC) [I50.42]  Yes   • 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]  Yes   • Hypothyroidism [E03.9]  Yes      Resolved Hospital Problems   No resolved problems to display.   Dysarthria  - new onset, also has mild left lower facial weakness which is apparently chronic  - head CT is negtive  - will proceed with CVA workup with MRI brain, carotid dopplers, and echocardiogram  - started on ASA, plavix, lipitor  - follow NIHSS  - appreciate neurology recs    ESRD  - last HD 1/5/21  - nephrology is consulted    Chronic Combined Systolic and  Diastolic CHF  - hold coreg for now due to low BP  - volume managed with dialysis    Hypothyroidism  - continue on levothyroxine    Myoclonus  - on sinemet    Anemia  - due to ESRD and iron deficiency due to chronic blood loss from GAVE  - follows with GI and CBC group as outpatient    I discussed the patient's findings and my recommendations with patient, nursing staff and ED provider.    VTE Prophylaxis - SCDs.  Code Status - Full code.       Willie Haddad MD  [facility]  01/07/22  17:35 EST

## 2022-01-08 NOTE — CONSULTS
"Calm, cooperative on approach.     Day shift RN Anitra Sadler, reports to me that pt answered \"yes\" to thoughts of wishing to die in past month, \"no\" to SI or prior hx of suicide attempts. States she wouldn't act on thoughts of wishing to die, because \"I'm too chicken\".     States she would like to speak with , Zackery Machuca, [phone], to request her numbing cream prior to next morning's dialysis. Provides consent to share PHI with .     Also requesting food, states she hasn't eaten today. States she also hasn't been sleeping well.     Denies any psych hx, \"no I never seen one, I think its stupid, it's a waste of time and money\".     Reports she was a daily beer drinker prior to getting pancreatitis about 5 years ago, \"that was horrible, I'll never do that again\", and hasn't had alcohol since then.     Denies illicit drug use.     Tells me story about how close she is to  and how supportive he is, affect brightens.     Reports she gets myoclonus and has difficulty sleeping sometimes.     [employer], states she is in [place] and Vaibhav Castellanos is president (then talks positively about Emanuel a bit, smiling).     Asked about her statement of wishing to die today, \"I shouldn't a said it ... I got extremely depressed I had gotten in dialysis\" states this was hard, \"but I'm too chicken to do anything ... I'm having a hard time accepting the dialysis\" reports she gets weak afterwards. Denies any hx of suicide attempts, \"no and I won't, really\". Reports she last experienced wishing to die in October, denies experiencing this in past month\".     Engages in discussion about benefits of seeing a psychiatrist for her c/o insomnia and to review her antidepressant. States she is afraid that people will think she is \"nuts\" but appears to have a bit of a sense of humor about this.     States she has 3 books in print and brightens when she talks about her poetry.     Samantha for safety. Also identifies " "\"there is much good in my life, and I want to live\". Also cites her ino. States that suicide would violate her Jain values. Reports that son is coming from [place] on 1/16/21, and looking forward to this (future oriented).     I spoke with pt's , who discussed the difficulty that pt has experienced with her dialysis.  indicated he was well aware of pt's statements about wishing she were dead, but denies thinking that she would actively harm herself. When asked about this, states, \"no I don't think she would actually harm herself\". I educated  about benefits of pt seeing a psychiatrist,  also sounded skeptical, but engages in discussion.     I talked with Liudmila ABDUL with [facility], who provided verbal order to d/c pt's suicide precautions and 1:1 sitter.     Pt did accept OP referrals, states she doesn't believe she needs to be on Access f/u list. Will clear from Access perspective, discussed with DAYANA Kuhn.              "

## 2022-01-08 NOTE — PLAN OF CARE
"Goal Outcome Evaluation:  Plan of Care Reviewed With: patient           Outcome Summary: Pt is an 82 y/o female presenting to [facility] with confusion and a hx of ESRD, CHF traumatic subdural hematoma and failed pacemaker. Pt is Jhonathan with BM, CGA with transfers and ambulation. Pt's gait speed and length is decreased due to weakness and RLE pain as she described her foot as having \"no padding and is all bone\". Pt lives w/ her  has no HI nor any steps within the home. Pt uses a cane at [facility] and was ind PLOF. Pt demonstrates decreased strength, balance and overall functional independence and would benefit from acute skilled PT services.    Patient was not wearing a face mask during this therapy encounter. Therapist used appropriate personal protective equipment including mask and gloves.  Mask used was standard procedure mask. Appropriate PPE was worn during the entire therapy session. Hand hygiene was completed before and after therapy session. Patient is not in enhanced droplet precautions.    "

## 2022-01-08 NOTE — THERAPY EVALUATION
Patient Name: Charmaine Machuca  : 1940    MRN: [id number]                              Today's Date: 2022       Admit Date: 2022    Visit Dx:     ICD-10-CM ICD-9-CM   1. Confusion  R41.0 298.9   2. Weakness  R53.1 780.79   3. Minor head injury, initial encounter  S09.90XA 959.01   4. ESRD (end stage renal disease) ([facility])  N18.6 585.6     Patient Active Problem List   Diagnosis   • Pacemaker lead failure   • Chronic combined systolic and diastolic congestive heart failure ([facility])   • Cardiomyopathy ([facility])   • Carpal tunnel syndrome   • Periodic limb movement disorder   • Peripheral neuropathy   • Restless legs syndrome   • Anemia   • Stage 4 chronic kidney disease ([facility])   • History of anemia due to chronic kidney disease   • Iron deficiency anemia   • Dysuria   • Chronic adrenal insufficiency ([facility])   • 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   • Chest pain   • Congestive heart failure ([facility])   • Gastroparesis   • Hypotension   • Hypothyroidism   • Myoclonus   • Osteoarthritis   • Automatic implantable cardioverter-defibrillator in situ   • Spondylolisthesis   • History of total knee replacement   • Anemia of chronic renal failure, stage 4 (severe) ([facility])   • Anemia of chronic disease   • Encounter for fitting and adjustment of vascular catheter   • B12 deficiency   • Weakness on left side of face   • Chronic kidney disease, stage III (moderate) ([facility])    • Symptomatic anemia   • Acute renal failure superimposed on chronic kidney disease ([facility])   • History of recent stroke   • Anemia due to acute blood loss   • GAVE (gastric antral vascular ectasia)   • Delayed gastric emptying   • Traumatic subdural hematoma without loss of consciousness ([facility])   • Orbital fracture, closed, initial encounter ([facility])   • Subdural hematoma, post-traumatic ([facility])   • Subarachnoid hemorrhage ([facility])   • Fall   • Adrenal insufficiency ([facility])   • Chronic kidney disease, stage 4 (severe) ([facility])   • CHF (congestive heart  failure) ([facility])   • Neuropathy   • Vitamin B 12 deficiency   • Iron adverse reaction   • Absolute anemia   • Iron deficiency anemia due to chronic blood loss   • Melena   • Pulmonary hypertension ([facility]CC)   • Encounter for adjustment or management of vascular access device   • Confusion   • Dysarthria   • ESRD (end stage renal disease) ([facility])     Past Medical History:   Diagnosis Date   • Anemia     Iron deficiency   • Anxiety    • Aortic stenosis     mild 04/2021   • Cardiomyopathy (HCC)     S/P pacemaker and defibrillator   • CHF (congestive heart failure) (HCC)    • Chronic renal failure, stage 4 (severe) (HCC)    • Colon polyp    • Coronary artery disease     pacemaker, defibrillator   • Depression    • Dizzy    • Gastroparesis    • GAVE (gastric antral vascular ectasia)    • GERD (gastroesophageal reflux disease)    • Gout    • Hemorrhoids    • Hiatal hernia    • History of Clostridium difficile colitis 2013   • History of kidney stones    • History of pancreatitis     2014   • History of skin cancer    • History of transfusion     MULTIPLE    • Hyperlipidemia    • Hypertension    • Hypothyroidism    • Kidney failure    • Left bundle branch block    • Mitral and aortic valve disease    • Mitral valve insufficiency    • Myoclonus     S/P Depakote   • Osteoarthritis    • Pancytopenia (HCC)    • Peripheral neuropathy    • Presence of cardiac pacemaker     AND DEFIBRILLATOR   • Pulmonary hypertension (HCC)    • Pulmonary hypertension (HCC)    • SOB (shortness of breath) on exertion    • Spinal stenosis    • Stroke (HCC)     2019   • TIA (transient ischemic attack)      Past Surgical History:   Procedure Laterality Date   • APPENDECTOMY N/A    • ARTERIOVENOUS FISTULA/SHUNT SURGERY Right 2/18/2019    Procedure: RIGHT BASILIC FISTULA CREATION WITHOUT TRANSPOSITION;  Surgeon: Royer Dozier MD;  Location: [facility];  Service: Vascular   • ARTERIOVENOUS FISTULA/SHUNT SURGERY Right 5/31/2019    Procedure: RIGHT 2ND  STAGE BASILIC VEIN CREATION;  Surgeon: Royer Dozier MD;  Location: [facility] MAIN OR;  Service: Vascular   • CARDIAC CATHETERIZATION Left 03/28/2006    Arterial catheter insertion, cath left ventriculography, coronary angiography and left heart catheterization-Dr. Estevan Matamoros   • CARDIAC DEFIBRILLATOR PLACEMENT N/A 11/30/2007    Biventricular implantable cardioverter defibrillator-Dr. Leandro Huber   • CARDIAC ELECTROPHYSIOLOGY PROCEDURE N/A 10/28/2019    Procedure: GENERATOR CHANGE BI-V ICD  medtronic;  Surgeon: Leandro Huber MD;  Location: [facility] CATH INVASIVE LOCATION;  Service: Cardiology   • CATARACT EXTRACTION Bilateral 2011   • COLONOSCOPY N/A 2014    done at [facility]   • CYSTECTOMY N/A     Ovarian cystectomy   • ENDOSCOPY N/A 04/28/2006    EGD with biopsies. Paraesophageal hiatal hernia from 34 to 44 cm, antral gastritis-Dr. Nehemiah Beal   • ENDOSCOPY N/A 09/29/2004    Hiatal hernia, gastritis and an erosion of the pylorus-Dr. Yang Pavon   • ENDOSCOPY N/A 9/1/2019    Procedure: ESOPHAGOGASTRODUODENOSCOPY with APC;  Surgeon: Anuel Roach MD;  [facility];  Service: Gastroenterology   • ENDOSCOPY N/A 1/28/2020    Procedure: ESOPHAGOGASTRODUODENOSCOPY with APC;  Surgeon: Anuel Roach MD;  [facility];  Service: Gastroenterology   • ENDOSCOPY N/A 4/21/2020    Procedure: ESOPHAGOGASTRODUODENOSCOPY WITH APC;  Surgeon: Anuel Roach MD;  [facility];  Service: Gastroenterology;  Laterality: N/A;  PRE- MELENA, GAVE  POST- ESOPHAGITIS, GAVE   • ENDOSCOPY N/A 11/24/2020    Procedure: ESOPHAGOGASTRODUODENOSCOPY;  Surgeon: Anuel Roach MD;  Location: [facility] OR;  Service: Gastroenterology;  Laterality: N/A;  reflux esophagitis  Gastric Food Bezoar   • ENDOSCOPY N/A 2/12/2021    Procedure: ESOPHAGOGASTRODUODENOSCOPY;  Surgeon: Anuel Roach MD;  Location: [facility] OR;  Service:  Gastroenterology;  Laterality: N/A;  with APC- GAVE; Gastroparesis;    • ENDOSCOPY N/A 10/8/2021    Procedure: ESOPHAGOGASTRODUODENOSCOPY WITH ARGON PLASMA COAGULATOR;  Surgeon: Anuel Roach MD;  Location: [facility] OR;  Service: Gastroenterology;  Laterality: N/A;  APC of gastric antral vascular ectasia  Reflux   • ENTEROSCOPY SMALL BOWEL N/A 10/30/2006    Small bowel enteroscopy with biopsies-Dr. Rory Sevilla   • FLEXIBLE SIGMOIDOSCOPY N/A 09/29/2004    Unsuccessful colonoscopy due to poop prep, a very tortuous sigmoid, bowel prep in the form of enema given and a barium enema was obtained-Dr. Yang Pavon   • FRACTURE SURGERY  2015    Broke big toe   • HEMATOMA EVACUATION TRUNK N/A 02/07/2014    Pocket evacuation of hematoma at pacemaker site-Dr. Leandro Huber   • HEMORRHOIDECTOMY N/A 04/19/2004    Flexible sigmoidoscopy and stapled hemorrhoidectomy-Dr. Yang Pavon   • HYSTERECTOMY Bilateral 1977   • IMPLANTABLE CARDIOVERTER DEFIBRILLATOR LEAD REPLACEMENT/POCKET REVISION Left 3/28/2016    Procedure: AUTOMATIC IMPLANTABLE CARDIOVERTER DEFIBRILLATOR LEAD REPLACEMENT WITH LASER LEAD EXTRACTION;  Surgeon: Leandro Huber MD;  Location: [facility] OR 18/19;  Service:    • IMPLANTABLE CARDIOVERTER DEFIBRILLATOR LEAD REPLACEMENT/POCKET REVISION N/A 01/13/2014    Biventricular ICD replacement-Dr. Jillian Huber   • LAPAROSCOPY REPAIR HIATAL HERNIA N/A 06/27/2006    Laparoscopic paraesophageal hiatal hernia repair with Nissen fundoplication and cholecystectomy-Dr. Sean Yoon   • NATALIE GONZALEZ ([facility]) PROCEDURE     • GA INSJ TUNNELED CVC W/O SUBQ PORT/ AGE 5 YR/> Right 10/3/2017    Procedure: Right internal jugular port placement;  Surgeon: Tiffanie Couch MD;  Location: [facility] OR;  Service: General   • TOE SURGERY Left     SET BIG TOE   • TOTAL KNEE ARTHROPLASTY Left 08/2014   • VENOUS ACCESS DEVICE (PORT) INSERTION Right       General Information     Row Name 01/08/22  1144          Physical Therapy Time and Intention    Document Type evaluation  -DP     Mode of Treatment individual therapy; physical therapy  -DP     Row Name 01/08/22 1144          General Information    Patient Profile Reviewed yes  -DP     Prior Level of Function independent:  -DP     Existing Precautions/Restrictions fall  -DP     Barriers to Rehab none identified  -DP     Row Name 01/08/22 1144          Living Environment    Lives With spouse  -DP     Row Name 01/08/22 1144          Home Main Entrance    Number of Stairs, Main Entrance none  -DP     Stair Railings, Main Entrance none  -DP     Row Name 01/08/22 1144          Stairs Within Home, Primary    Number of Stairs, Within Home, Primary none  -DP     Row Name 01/08/22 1144          Cognition    Orientation Status (Cognition) oriented x 4  -DP     Row Name 01/08/22 1144          Safety Issues, Functional Mobility    Impairments Affecting Function (Mobility) balance; cognition; endurance/activity tolerance; pain; strength  -DP     Cognitive Impairments, Mobility Safety/Performance insight into deficits/self-awareness  -DP           User Key  (r) = Recorded By, (t) = Taken By, (c) = Cosigned By    Initials Name Provider Type    DP Avery Kolb, PT Physical Therapist               Mobility     Row Name 01/08/22 1146          Bed Mobility    Assistive Device (Bed Mobility) bed rails; head of bed elevated  -DP     Comment (Bed Mobility) Pt is Jhonathan with bed mob  -DP     Row Name 01/08/22 1146          Transfers    Comment (Transfers) CGA with STS and stand to sit. Pt performed toilet transfer with CGA using BR rails  -DP     Row Name 01/08/22 1146          Bed-Chair Transfer    Bed-Chair [place] (Transfers) not tested  -DP     Row Name 01/08/22 1146          Sit-Stand Transfer    Sit-Stand [place] (Transfers) contact guard  -DP     Assistive Device (Sit-Stand Transfers) walker, front-wheeled  -DP     Row Name 01/08/22 1146          Gait/Stairs  (Locomotion)    [place] Level (Gait) contact guard  -DP     Assistive Device (Gait) walker, front-wheeled  -DP     Distance in Feet (Gait) 10'x2  -DP     Deviations/Abnormal Patterns (Gait) antalgic; base of support, narrow; bala decreased; gait speed decreased; stride length decreased; weight shifting decreased; other (see comments)  Pt has painful gait on RLE  -DP     [place] Level (Stairs) not tested  -DP     Comment (Gait/Stairs) Pt ambulated 10' with CGA from PT to BR. PT demonstrated decreased gait speed due to pain.  -DP           User Key  (r) = Recorded By, (t) = Taken By, (c) = Cosigned By    Initials Name Provider Type    DP Avery Kolb PT Physical Therapist               Obj/Interventions     Row Name 01/08/22 1150          Range of Motion Comprehensive    General Range of Motion no range of motion deficits identified  -DP     Comment, General Range of Motion BLE WFL  -DP     Row Name 01/08/22 1150          Strength Comprehensive (MMT)    General Manual Muscle Testing (MMT) Assessment lower extremity strength deficits identified  -DP     Comment, General Manual Muscle Testing (MMT) Assessment Pt is at least 3+ with all LE test seen with ambulatory tasks completed and ther ex  -DP     Row Name 01/08/22 1150          Balance    Balance Assessment sitting static balance; standing static balance  -DP     Static Sitting Balance WFL  -DP     Static Standing Balance mild impairment  -DP     Balance Interventions sitting; standing; static  -DP     Comment, Balance Pt performed sitting balance on toilet w/ no UE support for 5 mins using CGA from PT. Pt also stood for 3 mins statically with BUE and CGA from PT.  -DP     Row Name 01/08/22 1150          Sensory Assessment (Somatosensory)    Sensory Assessment (Somatosensory) not tested  -DP           User Key  (r) = Recorded By, (t) = Taken By, (c) = Cosigned By    Initials Name Provider Type    Avery De Jesus PT Physical Therapist                "Goals/Plan     Row Name 01/08/22 1201          Bed Mobility Goal 1 (PT)    Activity/Assistive Device (Bed Mobility Goal 1, PT) bed mobility activities, all  -DP     [place] Level/Cues Needed (Bed Mobility Goal 1, PT) modified independence  -DP     Time Frame (Bed Mobility Goal 1, PT) short term goal (STG); 2 weeks  -DP     Row Name 01/08/22 1201          Transfer Goal 1 (PT)    Activity/Assistive Device (Transfer Goal 1, PT) transfers, all  -DP     [place] Level/Cues Needed (Transfer Goal 1, PT) modified independence  -DP     Time Frame (Transfer Goal 1, PT) short term goal (STG); 2 weeks  -DP     Row Name 01/08/22 1201          Gait Training Goal 1 (PT)    Activity/Assistive Device (Gait Training Goal 1, PT) gait (walking locomotion)  -DP     [place] Level (Gait Training Goal 1, PT) standby assist; modified independence  -DP     Distance (Gait Training Goal 1, PT) 150'  -DP     Time Frame (Gait Training Goal 1, PT) short term goal (STG); 2 weeks  -DP           User Key  (r) = Recorded By, (t) = Taken By, (c) = Cosigned By    Initials Name Provider Type    Avery De Jesus, PT Physical Therapist               Clinical Impression     Row Name 01/08/22 9043          Pain Scale: Numbers Pre/Post-Treatment    Pretreatment Pain Rating 0/10 - no pain  -DP     Posttreatment Pain Rating 0/10 - no pain  -DP     Row Name 01/08/22 2291          Plan of Care Review    Plan of Care Reviewed With patient  -DP     Outcome Summary Pt is an 80 y/o female presenting to [facility] with confusion and a hx of ESRD, CHF traumatic subdural hematoma and failed pacemaker. Pt is Jhonathan with BM, CGA with transfers and ambulation. Pt's gait speed and length is decreased due to weakness and RLE pain as she described her foot as having \"no padding and is all bone\". Pt lives w/ her  has no HI nor any steps within the home. Pt uses a cane at [facility] and was ind PLOF. Pt demonstrates decreased strength, balance and overall functional " independence and would benefit from acute skilled PT services.  -DP     Row Name 01/08/22 1154          Therapy Assessment/Plan (PT)    Rehab Potential (PT) good, to achieve stated therapy goals  -DP     Criteria for Skilled Interventions Met (PT) yes  -DP     Predicted Duration of Therapy Intervention (PT) 1 week  -DP     Row Name 01/08/22 1154          Vital Signs    O2 Delivery Pre Treatment room air  -DP     O2 Delivery Intra Treatment room air  -DP     O2 Delivery Post Treatment room air  -DP     Pre Patient Position Supine  -DP     Intra Patient Position Standing  -DP     Post Patient Position Supine  -DP     Row Name 01/08/22 1154          Positioning and Restraints    Pre-Treatment Position in bed  -DP     Post Treatment Position bed  -DP     In Bed notified nsg; supine; encouraged to call for assist  NSG was notified to have call alarm put in room  -DP           User Key  (r) = Recorded By, (t) = Taken By, (c) = Cosigned By    Initials Name Provider Type    Avery De Jesus, PT Physical Therapist               Outcome Measures     Row Name 01/08/22 1202          How much help from another person do you currently need...    Turning from your back to your side while in flat bed without using bedrails? 3  -DP     Moving from lying on back to sitting on the side of a flat bed without bedrails? 3  -DP     Moving to and from a bed to a chair (including a wheelchair)? 3  -DP     Standing up from a chair using your arms (e.g., wheelchair, bedside chair)? 3  -DP     Climbing 3-5 steps with a railing? 2  -DP     To walk in hospital room? 3  -DP     AM-PAC 6 Clicks Score (PT) 17  -DP     Row Name 01/08/22 1202          Functional Assessment    Outcome Measure Options AM-PAC 6 Clicks Basic Mobility (PT)  -DP           User Key  (r) = Recorded By, (t) = Taken By, (c) = Cosigned By    Initials Name Provider Type    Avery De Jesus PT Physical Therapist                             Physical Therapy Education             "     Title: PT OT SLP Therapies (Done)     Topic: Physical Therapy (Done)     Point: Mobility training (Done)     Learning Progress Summary           Patient Acceptance, E,D, VU,DU by DP at 1/8/2022 1203                   Point: Body mechanics (Done)     Learning Progress Summary           Patient Acceptance, E,D, VU,DU by DP at 1/8/2022 1203                   Point: Precautions (Done)     Learning Progress Summary           Patient Acceptance, E,D, VU,DU by DP at 1/8/2022 1203                               User Key     Initials Effective Dates Name Provider Type Discipline    DP 08/24/21 -  Avery Kolb, CRUZ Physical Therapist PT              PT Recommendation and Plan  Planned Therapy Interventions (PT): balance training, bed mobility training, gait training, home exercise program, motor coordination training, neuromuscular re-education, patient/family education, strengthening, transfer training  Plan of Care Reviewed With: patient  Outcome Summary: Pt is an 82 y/o female presenting to [facility] with confusion and a hx of ESRD, CHF traumatic subdural hematoma and failed pacemaker. Pt is Jhonathan with BM, CGA with transfers and ambulation. Pt's gait speed and length is decreased due to weakness and RLE pain as she described her foot as having \"no padding and is all bone\". Pt lives w/ her  has no HI nor any steps within the home. Pt uses a cane at [facility] and was ind PLOF. Pt demonstrates decreased strength, balance and overall functional independence and would benefit from acute skilled PT services.     Time Calculation:    PT Charges     Row Name 01/08/22 1204             Time Calculation    Start Time 0918  -DP      Stop Time 0944  -DP      Time Calculation (min) 26 min  -DP      PT Received On 01/08/22  -DP      PT - Next Appointment 01/10/22  -DP      PT Goal Re-Cert Due Date 01/14/22  -DP              Time Calculation- PT    Total Timed Code Minutes- PT 20 minute(s)  -DP            User Key  (r) = Recorded By, (t) " = Taken By, (c) = Cosigned By    Initials Name Provider Type    DP Avery Kolb, PT Physical Therapist              Therapy Charges for Today     Code Description Service Date Service Provider Modifiers Qty    24945350064 HC PT EVAL LOW COMPLEXITY 2 1/8/2022 Avery Kolb, PT GP 1    24692327911 HC PT THERAPEUTIC ACT EA 15 MIN 1/8/2022 Avery Kolb, PT GP 2          PT G-Codes  Outcome Measure Options: AM-PAC 6 Clicks Basic Mobility (PT)  AM-PAC 6 Clicks Score (PT): 17    Avery Kolb PT  1/8/2022

## 2022-01-08 NOTE — THERAPY EVALUATION
Acute Care - Speech Language Pathology   Swallow Initial Evaluation [facility]     Patient Name: Charmaine Machuca  : 1940  MRN: [id number]  Today's Date: 2022               Admit Date: 2022    Visit Dx:     ICD-10-CM ICD-9-CM   1. Confusion  R41.0 298.9   2. Weakness  R53.1 780.79   3. Minor head injury, initial encounter  S09.90XA 959.01   4. ESRD (end stage renal disease) ([facility])  N18.6 585.6     Patient Active Problem List   Diagnosis   • Pacemaker lead failure   • Chronic combined systolic and diastolic congestive heart failure ([facility])   • Cardiomyopathy ([facility])   • Carpal tunnel syndrome   • Periodic limb movement disorder   • Peripheral neuropathy   • Restless legs syndrome   • Anemia   • Stage 4 chronic kidney disease ([facility])   • History of anemia due to chronic kidney disease   • Iron deficiency anemia   • Dysuria   • Chronic adrenal insufficiency ([facility])   • 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   • Chest pain   • Congestive heart failure ([facility])   • Gastroparesis   • Hypotension   • Hypothyroidism   • Myoclonus   • Osteoarthritis   • Automatic implantable cardioverter-defibrillator in situ   • Spondylolisthesis   • History of total knee replacement   • Anemia of chronic renal failure, stage 4 (severe) ([facility])   • Anemia of chronic disease   • Encounter for fitting and adjustment of vascular catheter   • B12 deficiency   • Weakness on left side of face   • Chronic kidney disease, stage III (moderate) (CMS/[facility])    • Symptomatic anemia   • Acute renal failure superimposed on chronic kidney disease ([facility])   • History of recent stroke   • Anemia due to acute blood loss   • GAVE (gastric antral vascular ectasia)   • Delayed gastric emptying   • Traumatic subdural hematoma without loss of consciousness ([facility])   • Orbital fracture, closed, initial encounter ([facility])   • Subdural hematoma, post-traumatic ([facility])   • Subarachnoid hemorrhage ([facility])   • Fall   • Adrenal insufficiency ([facility])   • Chronic  kidney disease, stage 4 (severe) (HCC)   • CHF (congestive heart failure) (HCC)   • Neuropathy   • Vitamin B 12 deficiency   • Iron adverse reaction   • Absolute anemia   • Iron deficiency anemia due to chronic blood loss   • Melena   • Pulmonary hypertension (HCC)   • Encounter for adjustment or management of vascular access device   • Confusion   • Dysarthria   • ESRD (end stage renal disease) (HCC)     Past Medical History:   Diagnosis Date   • Anemia     Iron deficiency   • Anxiety    • Aortic stenosis     mild 04/2021   • Cardiomyopathy (HCC)     S/P pacemaker and defibrillator   • CHF (congestive heart failure) (HCC)    • Chronic renal failure, stage 4 (severe) (HCC)    • Colon polyp    • Coronary artery disease     pacemaker, defibrillator   • Depression    • Dizzy    • Gastroparesis    • GAVE (gastric antral vascular ectasia)    • GERD (gastroesophageal reflux disease)    • Gout    • Hemorrhoids    • Hiatal hernia    • History of Clostridium difficile colitis 2013   • History of kidney stones    • History of pancreatitis     2014   • History of skin cancer    • History of transfusion     MULTIPLE    • Hyperlipidemia    • Hypertension    • Hypothyroidism    • Kidney failure    • Left bundle branch block    • Mitral and aortic valve disease    • Mitral valve insufficiency    • Myoclonus     S/P Depakote   • Osteoarthritis    • Pancytopenia (HCC)    • Peripheral neuropathy    • Presence of cardiac pacemaker     AND DEFIBRILLATOR   • Pulmonary hypertension (HCC)    • Pulmonary hypertension (HCC)    • SOB (shortness of breath) on exertion    • Spinal stenosis    • Stroke (HCC)     2019   • TIA (transient ischemic attack)      Past Surgical History:   Procedure Laterality Date   • APPENDECTOMY N/A    • ARTERIOVENOUS FISTULA/SHUNT SURGERY Right 2/18/2019    Procedure: RIGHT BASILIC FISTULA CREATION WITHOUT TRANSPOSITION;  Surgeon: Royer Dozier MD;  Location: [facility];  Service: Vascular   • ARTERIOVENOUS  FISTULA/SHUNT SURGERY Right 5/31/2019    Procedure: RIGHT 2ND STAGE BASILIC VEIN CREATION;  Surgeon: Royer Dozier MD;  Location: [facility] MAIN OR;  Service: Vascular   • CARDIAC CATHETERIZATION Left 03/28/2006    Arterial catheter insertion, cath left ventriculography, coronary angiography and left heart catheterization-Dr. Estevan Matamoros   • CARDIAC DEFIBRILLATOR PLACEMENT N/A 11/30/2007    Biventricular implantable cardioverter defibrillator-Dr. Leandro Huber   • CARDIAC ELECTROPHYSIOLOGY PROCEDURE N/A 10/28/2019    Procedure: GENERATOR CHANGE BI-V ICD  medtronic;  Surgeon: Leandro Huber MD;  Location: [facility] CATH INVASIVE LOCATION;  Service: Cardiology   • CATARACT EXTRACTION Bilateral 2011   • COLONOSCOPY N/A 2014    done at [facility]   • CYSTECTOMY N/A     Ovarian cystectomy   • ENDOSCOPY N/A 04/28/2006    EGD with biopsies. Paraesophageal hiatal hernia from 34 to 44 cm, antral gastritis-Dr. Nehemiah Beal   • ENDOSCOPY N/A 09/29/2004    Hiatal hernia, gastritis and an erosion of the pylorus-Dr. Yang Pavon   • ENDOSCOPY N/A 9/1/2019    Procedure: ESOPHAGOGASTRODUODENOSCOPY with APC;  Surgeon: Anuel Roach MD;  [facility];  Service: Gastroenterology   • ENDOSCOPY N/A 1/28/2020    Procedure: ESOPHAGOGASTRODUODENOSCOPY with APC;  Surgeon: Anuel Roach MD;  [facility];  Service: Gastroenterology   • ENDOSCOPY N/A 4/21/2020    Procedure: ESOPHAGOGASTRODUODENOSCOPY WITH APC;  Surgeon: Anuel Roach MD;  [facility];  Service: Gastroenterology;  Laterality: N/A;  PRE- MELENA, GAVE  POST- ESOPHAGITIS, GAVE   • ENDOSCOPY N/A 11/24/2020    Procedure: ESOPHAGOGASTRODUODENOSCOPY;  Surgeon: Anuel Roach MD;  Location: [facility] OR;  Service: Gastroenterology;  Laterality: N/A;  reflux esophagitis  Gastric Food Bezoar   • ENDOSCOPY N/A 2/12/2021    Procedure: ESOPHAGOGASTRODUODENOSCOPY;  Surgeon: Doc  Anuel Munoz MD;  Location: [facility] OR;  Service: Gastroenterology;  Laterality: N/A;  with APC- GAVE; Gastroparesis;    • ENDOSCOPY N/A 10/8/2021    Procedure: ESOPHAGOGASTRODUODENOSCOPY WITH ARGON PLASMA COAGULATOR;  Surgeon: Anuel Roach MD;  Location: [facility] OR;  Service: Gastroenterology;  Laterality: N/A;  APC of gastric antral vascular ectasia  Reflux   • ENTEROSCOPY SMALL BOWEL N/A 10/30/2006    Small bowel enteroscopy with biopsies-Dr. Rory Sevilla   • FLEXIBLE SIGMOIDOSCOPY N/A 09/29/2004    Unsuccessful colonoscopy due to poop prep, a very tortuous sigmoid, bowel prep in the form of enema given and a barium enema was obtained-Dr. Yang Pavon   • FRACTURE SURGERY  2015    Broke big toe   • HEMATOMA EVACUATION TRUNK N/A 02/07/2014    Pocket evacuation of hematoma at pacemaker site-Dr. Leandro Huber   • HEMORRHOIDECTOMY N/A 04/19/2004    Flexible sigmoidoscopy and stapled hemorrhoidectomy-Dr. Yang Pavon   • HYSTERECTOMY Bilateral 1977   • IMPLANTABLE CARDIOVERTER DEFIBRILLATOR LEAD REPLACEMENT/POCKET REVISION Left 3/28/2016    Procedure: AUTOMATIC IMPLANTABLE CARDIOVERTER DEFIBRILLATOR LEAD REPLACEMENT WITH LASER LEAD EXTRACTION;  Surgeon: Leandro Huber MD;  Location: [facility] OR 18/19;  Service:    • IMPLANTABLE CARDIOVERTER DEFIBRILLATOR LEAD REPLACEMENT/POCKET REVISION N/A 01/13/2014    Biventricular ICD replacement-Dr. Jillian Huber   • LAPAROSCOPY REPAIR HIATAL HERNIA N/A 06/27/2006    Laparoscopic paraesophageal hiatal hernia repair with Nissen fundoplication and cholecystectomy-Dr. Sean Yoon   • NATALIE GONZALEZ ([facility]) PROCEDURE     • [place] INSJ TUNNELED CVC W/O SUBQ PORT/ AGE 3 YR/> Right 10/3/2017    Procedure: Right internal jugular port placement;  Surgeon: Tiffanie Couch MD;  Location: [facility] OR;  Service: General   • TOE SURGERY Left     SET BIG TOE   • TOTAL KNEE ARTHROPLASTY Left 08/2014   • VENOUS ACCESS DEVICE (PORT) INSERTION  Right      Patient was not wearing a face mask during this therapy encounter. Therapist used appropriate personal protective equipment including mask, eye protection and gloves.  Mask used was standard procedure mask. Appropriate PPE was worn during the entire therapy session. Hand hygiene was completed before and after therapy session. Patient is not in enhanced droplet precautions.             SLP Recommendation and Plan     SLP Diet Recommendation: regular textures, thin liquids (01/08/22 0900)  Recommended Precautions and Strategies: upright posture during/after eating, small bites of food and sips of liquid, general aspiration precautions (01/08/22 0900)  SLP Rec. for Method of Medication Administration: as tolerated (01/08/22 0900)     Monitor for Signs of Aspiration: yes, notify SLP if any concerns (01/08/22 0900)  Recommended Diagnostics:  (monitor for need for VFSS, SLE and/or CE) (01/08/22 0900)           Therapy Frequency (Swallow): PRN (01/08/22 0900)  Predicted Duration Therapy Intervention (Days): until discharge (01/08/22 0900)                                  Plan of Care Reviewed With: patient  Outcome Summary: Bedside swallow eval completed.  Recommend a regular diet with thin liquids and meds as tolerated.  Swallow precautions include sit up at 90 degrees, small bites and sips, and okay to use straws.  Will monitor for need for VFSS, FEES, and/or speech language cognitive evaluation/tx.  Pt reports mildly exacerbated word finding deficits.  Pt reports left buccal/labial droop since 2019 and states speech intelligibility is at baseline.  RN reports pt has not yet had MRI.  Will check on MRI results before proceeding with a possible language evaluation.      SWALLOW EVALUATION (last 72 hours)     SLP Adult Swallow Evaluation     Row Name 01/08/22 0900                   Rehab Evaluation    Document Type evaluation  -NR        Subjective Information no complaints  -NR        Patient Observations  alert; cooperative; agree to therapy  -NR        Patient Effort good  -NR        Symptoms Noted During/After Treatment none  -NR                  General Information    Patient Profile Reviewed yes  -NR        Pertinent History Of Current Problem Pt admitted for a CVA workup.  Pt fell and hit head at dialysis, came to ED.  Pt with intermittent dysarthria for the past three to four days PTA.  Head CT negative.  MRI yesterday check results.  PMH noted for ESRD, GAVE, myoclonus, hypothyroidism, and CAD  -NR        Current Method of Nutrition NPO  -NR        Prior Level of Function-Swallowing safe, efficient swallowing in all situations  BSE 8/8/19- reg/thin, BSE 8/30/19 reg/thin  -NR        Plans/Goals Discussed with patient; agreed upon  -NR        Barriers to Rehab none identified  -NR        Patient's Goals for Discharge return to regular diet  -NR                  Pain    Additional Documentation Pain Scale: Numbers Pre/Post-Treatment (Group)  -NR                  Pain Scale: Numbers Pre/Post-Treatment    Pretreatment Pain Rating 0/10 - no pain  -NR        Posttreatment Pain Rating 0/10 - no pain  -NR                  Oral Motor Structure and Function    Dentition Assessment missing teeth  -NR        Secretion Management WNL/WFL  -NR        Mucosal Quality cracked; dry  -NR        Volitional Swallow WFL  -NR                  Oral Musculature and Cranial Nerve Assessment    Oral Motor General Assessment oral labial or buccal impairment  -NR        Oral Labial or Buccal Impairment, Detail, Cranial Nerve VII (Facial): left labial droop  since August 2019  -NR                  General Eating/Swallowing Observations    Respiratory Support Currently in Use room air  -NR        Eating/Swallowing Skills self-fed; fed by SLP  -NR        Positioning During Eating upright in bed  -NR        Utensils Used spoon; cup; straw  -NR        Consistencies Trialed regular textures; chopped; mechanical soft, no mixed consistencies;  pureed; ice chips; thin liquids; nectar/syrup-thick liquids  -NR                  Respiratory    Respiratory Status WFL  -NR                  Clinical Swallow Eval    Clinical Swallow Evaluation Summary Mastication, bolus consolidation and transfer appear functional.  No oral residuals noted.  The swallow reflex feels timely upon palpation with adequate laryngeal elevation.  No overt s/s of aspiration noted with any tested consistency.  Recommend a regular texture diet with thin liquids and meds as tolerated.  Swallow precautions include sit up at 90 degrees, small bites and sips, and okay to use straws.  Pt denies exacerbated dysathria or language or cognitive deficits except for mildly increased word finding deficits.  Pt frustrated as she lost her glasses at dialysis.   not present but attempting to find her glasses.  Will follow as needed for a possible language evaluation if CVA confirmed and diet tolerance.  Will monitor for need for VFSS.  RN reports MRI has not yet been completed.  MRI was unable to be completed in 2019.  -NR                  Recommendations    Therapy Frequency (Swallow) PRN  -NR        Predicted Duration Therapy Intervention (Days) until discharge  -NR        SLP Diet Recommendation regular textures; thin liquids  -NR        Recommended Diagnostics --  monitor for need for VFSS, SLE and/or CE  -NR        Recommended Precautions and Strategies upright posture during/after eating; small bites of food and sips of liquid; general aspiration precautions  -NR        Oral Care Recommendations Oral Care before breakfast, after meals and PRN  -NR        SLP Rec. for Method of Medication Administration as tolerated  -NR        Monitor for Signs of Aspiration yes; notify SLP if any concerns  -NR                  Swallow Goals (SLP)    Oral Nutrition/Hydration Goal Selection (SLP) oral nutrition/hydration, SLP goal 1; oral nutrition/hydration, SLP goal 2  -NR                  Oral  Nutrition/Hydration Goal 1 (SLP)    Oral Nutrition/Hydration Goal 1, SLP Tolerate diet without overt or clinical s/s of aspiration  -NR        Time Frame (Oral Nutrition/Hydration Goal 1, SLP) by discharge  -NR                  Oral Nutrition/Hydration Goal 2 (SLP)    Oral Nutrition/Hydration Goal 2, SLP Monitor for need for VFSS or FEES  -NR        Time Frame (Oral Nutrition/Hydration Goal 2, SLP) by discharge  -NR              User Key  (r) = Recorded By, (t) = Taken By, (c) = Cosigned By    Initials Name Effective Dates    NR Roshni Adams MA,CCC-SLP 06/16/21 -                 EDUCATION  The patient has been educated in the following areas:   Dysphagia (Swallowing Impairment).        SLP GOALS     Row Name 01/08/22 0900             Oral Nutrition/Hydration Goal 1 (SLP)    Oral Nutrition/Hydration Goal 1, SLP Tolerate diet without overt or clinical s/s of aspiration  -NR      Time Frame (Oral Nutrition/Hydration Goal 1, SLP) by discharge  -NR              Oral Nutrition/Hydration Goal 2 (SLP)    Oral Nutrition/Hydration Goal 2, SLP Monitor for need for VFSS or FEES  -NR      Time Frame (Oral Nutrition/Hydration Goal 2, SLP) by discharge  -NR            User Key  (r) = Recorded By, (t) = Taken By, (c) = Cosigned By    Initials Name Provider Type    NR Roshni Adams MA,CCC-SLP Speech and Language Pathologist              SLP Outcome Measures (last 72 hours)     SLP Outcome Measures     Row Name 01/08/22 0900             SLP Outcome Measures    Outcome Measure Used? Adult NOMS  -NR              Adult FCM Scores    FCM Chosen Swallowing  -NR      Swallowing FCM Score 6  -NR            User Key  (r) = Recorded By, (t) = Taken By, (c) = Cosigned By    Initials Name Effective Dates    NR Roshni Adams MA,CCC-SLP 06/16/21 -                  Time Calculation:    Time Calculation- SLP     Row Name 01/08/22 1008             Time Calculation- SLP    SLP Start Time 0915  -NR      SLP Stop Time 1008  -NR      SLP Time  Calculation (min) 53 min  -NR            User Key  (r) = Recorded By, (t) = Taken By, (c) = Cosigned By    Initials Name Provider Type    NR Roshni Adams MA,CCC-SLP Speech and Language Pathologist                Therapy Charges for Today     Code Description Service Date Service Provider Modifiers Qty    [id number]  ST EVAL ORAL PHARYNG SWALLOW 4 1/8/2022 Roshni Adams MA,CCC-SLP GN 1               Roshni Adams MA,CCC-SLP  1/8/2022

## 2022-01-08 NOTE — SIGNIFICANT NOTE
01/08/22 1321   OTHER   Discipline occupational therapist   Rehab Time/Intention   Session Not Performed patient unavailable for evaluation  (OT checked on pt, pt ERICA for dialysis this date. OT to f/u at later date)   Recommendation   OT - Next Appointment 01/10/22

## 2022-01-08 NOTE — PROGRESS NOTES
" LOS: 1 day   Patient Care Team:  Fannie Godfrey MD as PCP - General (Internal Medicine)  Anuel Scott MD as Consulting Physician (Hematology and Oncology)  Fannie Godfrey MD as Referring Physician (Internal Medicine)  Estevan Matamoros MD as Consulting Physician (Cardiology)  Parveen Abraham MD as Consulting Physician (Nephrology)  Ondina Haro MD as Consulting Physician (Neurology)  Estevan Oropeza MD as Consulting Physician (Hematology and Oncology)  Neno Merritt II, MD as Consulting Physician (Hematology and Oncology)  Anuel Roach MD as Consulting Physician (Gastroenterology)    Chief Complaint: ESRD    Subjective     History of Present Illness  Pt without any new complaints  Pt seen on dialysis.  Had low bp prior to dialysis today but asymptomatic.    Subjective:  Symptoms:  No shortness of breath, chest pain, chest pressure or anxiety.    Diet:  No nausea or vomiting.    Pain:  She reports no pain.        History taken from: patient    Objective     Vital Sign Min/Max for last 24 hours  Temp  Min: 97.4 °F (36.3 °C)  Max: 98.4 °F (36.9 °C)   BP  Min: 77/48  Max: 129/44   Pulse  Min: 68  Max: 98   Resp  Min: 16  Max: 16   SpO2  Min: 91 %  Max: 100 %   No data recorded   No data recorded     Flowsheet Rows      First Filed Value   Admission Height 167.6 cm (65.98\") Documented at 01/07/2022 1128   Admission Weight 62.1 kg (137 lb) Documented at 01/07/2022 1128          No intake/output data recorded.  I/O last 3 completed shifts:  In: 500 [IV Piggyback:500]  Out: -     Objective:  General Appearance:  Comfortable.    Vital signs: (most recent): Blood pressure (!) 83/44, pulse 91, temperature 98.4 °F (36.9 °C), temperature source Oral, resp. rate 16, height 167.6 cm (65.98\"), weight 62.1 kg (137 lb), SpO2 100 %, not currently breastfeeding.  Vital signs are normal.    HEENT: Normal HEENT exam.    Lungs:  Normal effort and normal respiratory rate.    Heart: Normal rate.  " Regular rhythm.    Abdomen: Abdomen is soft.  Bowel sounds are normal.     Extremities: Normal range of motion.  There is no dependent edema.              Results Review:     I reviewed the patient's new clinical results.    WBC WBC   Date Value Ref Range Status   01/08/2022 6.76 3.40 - 10.80 10*3/mm3 Final   01/07/2022 8.43 3.40 - 10.80 10*3/mm3 Final      HGB Hemoglobin   Date Value Ref Range Status   01/08/2022 10.0 (L) 12.0 - 15.9 g/dL Final   01/07/2022 10.9 (L) 12.0 - 15.9 g/dL Final      HCT Hematocrit   Date Value Ref Range Status   01/08/2022 31.5 (L) 34.0 - 46.6 % Final   01/07/2022 34.7 34.0 - 46.6 % Final      Platlets No results found for: LABPLAT   MCV MCV   Date Value Ref Range Status   01/08/2022 100.0 (H) 79.0 - 97.0 fL Final   01/07/2022 101.2 (H) 79.0 - 97.0 fL Final          Sodium Sodium   Date Value Ref Range Status   01/08/2022 137 136 - 145 mmol/L Final   01/07/2022 138 136 - 145 mmol/L Final      Potassium Potassium   Date Value Ref Range Status   01/08/2022 5.1 3.5 - 5.2 mmol/L Final     Comment:     Slight hemolysis detected by analyzer. Results may be affected.   01/07/2022 4.4 3.5 - 5.2 mmol/L Final      Chloride Chloride   Date Value Ref Range Status   01/08/2022 102 98 - 107 mmol/L Final   01/07/2022 100 98 - 107 mmol/L Final      CO2 CO2   Date Value Ref Range Status   01/08/2022 20.7 (L) 22.0 - 29.0 mmol/L Final   01/07/2022 27.9 22.0 - 29.0 mmol/L Final      BUN BUN   Date Value Ref Range Status   01/08/2022 42 (H) 8 - 23 mg/dL Final   01/07/2022 39 (H) 8 - 23 mg/dL Final      Creatinine Creatinine   Date Value Ref Range Status   01/08/2022 5.89 (H) 0.57 - 1.00 mg/dL Final   01/07/2022 5.26 (H) 0.57 - 1.00 mg/dL Final      Calcium Calcium   Date Value Ref Range Status   01/08/2022 8.4 (L) 8.6 - 10.5 mg/dL Final   01/07/2022 9.0 8.6 - 10.5 mg/dL Final      PO4 No results found for: CAPO4   Albumin Albumin   Date Value Ref Range Status   01/08/2022 3.10 (L) 3.50 - 5.20 g/dL Final    01/07/2022 4.30 3.50 - 5.20 g/dL Final      Magnesium Magnesium   Date Value Ref Range Status   01/08/2022 2.4 1.6 - 2.4 mg/dL Final      Uric Acid No results found for: URICACID     Medication Review:   aspirin, 300 mg, Rectal, Daily  atorvastatin, 80 mg, Oral, Nightly  buPROPion SR, 150 mg, Oral, Daily  carbidopa-levodopa, 2 tablet, Oral, Daily  carbidopa-levodopa ER, 1 tablet, Oral, Q PM  [START ON 1/9/2022] clopidogrel, 75 mg, Oral, Daily  febuxostat, 40 mg, Oral, Daily  levothyroxine, 25 mcg, Oral, Daily  lidocaine, 1 patch, Transdermal, Nightly  midodrine, 5 mg, Oral, TID AC  pantoprazole, 40 mg, Oral, Daily  sodium chloride, 500 mL, Intravenous, Once  sodium chloride, 10 mL, Intravenous, Q12H  sodium chloride, 10 mL, Intravenous, Q12H          Assessment/Plan       Chronic combined systolic and diastolic congestive heart failure (HCC)    Hypothyroidism    Myoclonus    GAVE (gastric antral vascular ectasia)    Pulmonary hypertension (HCC)    Confusion    Dysarthria    ESRD (end stage renal disease) ([facility])      Assessment & Plan  1. ESRD via MADDI AVF; last dialysis was 1/5/22 and 1.3 L removed. Her electrolytes are stable and central volume is fine.  HD today with no UF.  Appears volume depletion.  Has had NS bolus for low bp, midodrine started.  Continue m/w/f after today  2. Fall and weakness; she has myoclonic epilepsy and follows with Dr. Kavya Koch at . Her appetite has been poor and she has lost ~ 10 lbs since starting dialysis.  3. Impaired speech with confusion; appears improved, neurology following.  4. Hypertension, though currently with orthostatic hypotension. Holding bp meds now.  5. Non-ischemic cardiomyopathy with ICD and pulmonary hypertension; last echocardiogram in April 2021 showed EF ~ 55% with grade II diastolic dysfunction, severe pulmonary hypertension, mild AS, and moderate tricuspid regurgitation.   6. Chronic anemia due to GAVE; her hgb is stable.      Terry Rene,  MD  01/08/22  12:58 EST

## 2022-01-08 NOTE — CONSULTS
Acute rehab referral received via stroke order set. Patient with NIHSS 1. Will sign off.     Thank you!    Maurisio Ochoa RN  p

## 2022-01-08 NOTE — PLAN OF CARE
Problem: Adult Inpatient Plan of Care  Goal: Plan of Care Review  Flowsheets (Taken 1/8/2022 1003)  Plan of Care Reviewed With: patient  Outcome Summary: Bedside swallow eval completed.  Recommend a regular diet with thin liquids and meds as tolerated.  Swallow precautions include sit up at 90 degrees, small bites and sips, and okay to use straws.  Will monitor for need for VFSS, FEES, and/or speech language cognitive evaluation/tx.  Pt reports mildly exacerbated word finding deficits.  Pt reports left buccal/labial droop since 2019 and states speech intelligibility is at baseline.  RN reports pt has not yet had MRI.  Will check on MRI results before proceeding with a possible language evaluation.   Goal Outcome Evaluation:  Plan of Care Reviewed With: patient           Outcome Summary: Bedside swallow eval completed.  Recommend a regular diet with thin liquids and meds as tolerated.  Swallow precautions include sit up at 90 degrees, small bites and sips, and okay to use straws.  Will monitor for need for VFSS, FEES, and/or speech language cognitive evaluation/tx.  Pt reports mildly exacerbated word finding deficits.  Pt reports left buccal/labial droop since 2019 and states speech intelligibility is at baseline.  RN reports pt has not yet had MRI.  Will check on MRI results before proceeding with a possible language evaluation.

## 2022-01-08 NOTE — THERAPY TREATMENT NOTE
Patient Name: Charmaine Machuca  : 1940    MRN: [id number]                              Today's Date: 2022       Admit Date: 2022    Visit Dx:     ICD-10-CM ICD-9-CM   1. Confusion  R41.0 298.9   2. Weakness  R53.1 780.79   3. Minor head injury, initial encounter  S09.90XA 959.01   4. ESRD (end stage renal disease) ([facility])  N18.6 585.6     Patient Active Problem List   Diagnosis   • Pacemaker lead failure   • Chronic combined systolic and diastolic congestive heart failure ([facility])   • Cardiomyopathy ([facility])   • Carpal tunnel syndrome   • Periodic limb movement disorder   • Peripheral neuropathy   • Restless legs syndrome   • Anemia   • Stage 4 chronic kidney disease ([facility])   • History of anemia due to chronic kidney disease   • Iron deficiency anemia   • Dysuria   • Chronic adrenal insufficiency ([facility])   • 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   • Chest pain   • Congestive heart failure ([facility])   • Gastroparesis   • Hypotension   • Hypothyroidism   • Myoclonus   • Osteoarthritis   • Automatic implantable cardioverter-defibrillator in situ   • Spondylolisthesis   • History of total knee replacement   • Anemia of chronic renal failure, stage 4 (severe) ([facility])   • Anemia of chronic disease   • Encounter for fitting and adjustment of vascular catheter   • B12 deficiency   • Weakness on left side of face   • Chronic kidney disease, stage III (moderate) ([facility])    • Symptomatic anemia   • Acute renal failure superimposed on chronic kidney disease ([facility])   • History of recent stroke   • Anemia due to acute blood loss   • GAVE (gastric antral vascular ectasia)   • Delayed gastric emptying   • Traumatic subdural hematoma without loss of consciousness ([facility])   • Orbital fracture, closed, initial encounter ([facility])   • Subdural hematoma, post-traumatic ([facility])   • Subarachnoid hemorrhage ([facility])   • Fall   • Adrenal insufficiency ([facility])   • Chronic kidney disease, stage 4 (severe) ([facility])   • CHF (congestive heart  failure) ([facility])   • Neuropathy   • Vitamin B 12 deficiency   • Iron adverse reaction   • Absolute anemia   • Iron deficiency anemia due to chronic blood loss   • Melena   • Pulmonary hypertension ([facility])   • Encounter for adjustment or management of vascular access device   • Confusion   • Dysarthria   • ESRD (end stage renal disease) ([facility])     Past Medical History:   Diagnosis Date   • Anemia     Iron deficiency   • Anxiety    • Aortic stenosis     mild 04/2021   • Cardiomyopathy ([facility])     S/P pacemaker and defibrillator   • CHF (congestive heart failure) ([facility])    • Chronic renal failure, stage 4 (severe) ([facility])    • Colon polyp    • Coronary artery disease     pacemaker, defibrillator   • Depression    • Dizzy    • Gastroparesis    • GAVE (gastric antral vascular ectasia)    • GERD (gastroesophageal reflux disease)    • Gout    • Hemorrhoids    • Hiatal hernia    • History of Clostridium difficile colitis 2013   • History of kidney stones    • History of pancreatitis     2014   • History of skin cancer    • History of transfusion     MULTIPLE    • Hyperlipidemia    • Hypertension    • Hypothyroidism    • Kidney failure    • Left bundle branch block    • Mitral and aortic valve disease    • Mitral valve insufficiency    • Myoclonus     S/P Depakote   • Osteoarthritis    • Pancytopenia (HCC)    • Peripheral neuropathy    • Presence of cardiac pacemaker     AND DEFIBRILLATOR   • Pulmonary hypertension (HCC)    • Pulmonary hypertension (HCC)    • SOB (shortness of breath) on exertion    • Spinal stenosis    • Stroke (HCC)     2019   • TIA (transient ischemic attack)      Past Surgical History:   Procedure Laterality Date   • APPENDECTOMY N/A    • ARTERIOVENOUS FISTULA/SHUNT SURGERY Right 2/18/2019    Procedure: RIGHT BASILIC FISTULA CREATION WITHOUT TRANSPOSITION;  Surgeon: Royer Dozier MD;  Location: [facility];  Service: Vascular   • ARTERIOVENOUS FISTULA/SHUNT SURGERY Right 5/31/2019    Procedure: RIGHT 2ND  STAGE BASILIC VEIN CREATION;  Surgeon: Royer Dozier MD;  Location: [facility] MAIN OR;  Service: Vascular   • CARDIAC CATHETERIZATION Left 03/28/2006    Arterial catheter insertion, cath left ventriculography, coronary angiography and left heart catheterization-Dr. Estevan Matamoros   • CARDIAC DEFIBRILLATOR PLACEMENT N/A 11/30/2007    Biventricular implantable cardioverter defibrillator-Dr. Leandro Huber   • CARDIAC ELECTROPHYSIOLOGY PROCEDURE N/A 10/28/2019    Procedure: GENERATOR CHANGE BI-V ICD  medtronic;  Surgeon: Leandro Huber MD;  Location: [facility] CATH INVASIVE LOCATION;  Service: Cardiology   • CATARACT EXTRACTION Bilateral 2011   • COLONOSCOPY N/A 2014    done at [facility]   • CYSTECTOMY N/A     Ovarian cystectomy   • ENDOSCOPY N/A 04/28/2006    EGD with biopsies. Paraesophageal hiatal hernia from 34 to 44 cm, antral gastritis-Dr. Nehemiah Beal   • ENDOSCOPY N/A 09/29/2004    Hiatal hernia, gastritis and an erosion of the pylorus-Dr. Yang Pavon   • ENDOSCOPY N/A 9/1/2019    Procedure: ESOPHAGOGASTRODUODENOSCOPY with APC;  Surgeon: Anuel Roach MD;  Location: [facility] ENDOSCOPY;  Service: Gastroenterology   • ENDOSCOPY N/A 1/28/2020    Procedure: ESOPHAGOGASTRODUODENOSCOPY with APC;  Surgeon: Anuel Roach MD;  Location: [facility] ENDOSCOPY;  Service: Gastroenterology   • ENDOSCOPY N/A 4/21/2020    Procedure: ESOPHAGOGASTRODUODENOSCOPY WITH APC;  Surgeon: Anuel Roach MD;  Location: [facility] ENDOSCOPY;  Service: Gastroenterology;  Laterality: N/A;  PRE- MELENA, GAVE  POST- ESOPHAGITIS, GAVE   • ENDOSCOPY N/A 11/24/2020    Procedure: ESOPHAGOGASTRODUODENOSCOPY;  Surgeon: Anuel Roach MD;  Location: [facility] OR;  Service: Gastroenterology;  Laterality: N/A;  reflux esophagitis  Gastric Food Bezoar   • ENDOSCOPY N/A 2/12/2021    Procedure: ESOPHAGOGASTRODUODENOSCOPY;  Surgeon: Anuel Roach MD;  Location: [facility] OR;  Service:  Gastroenterology;  Laterality: N/A;  with APC- GAVE; Gastroparesis;    • ENDOSCOPY N/A 10/8/2021    Procedure: ESOPHAGOGASTRODUODENOSCOPY WITH ARGON PLASMA COAGULATOR;  Surgeon: Anuel Roach MD;  Location: [facility] OR;  Service: Gastroenterology;  Laterality: N/A;  APC of gastric antral vascular ectasia  Reflux   • ENTEROSCOPY SMALL BOWEL N/A 10/30/2006    Small bowel enteroscopy with biopsies-Dr. Rory Sevilla   • FLEXIBLE SIGMOIDOSCOPY N/A 09/29/2004    Unsuccessful colonoscopy due to poop prep, a very tortuous sigmoid, bowel prep in the form of enema given and a barium enema was obtained-Dr. Yang Pavon   • FRACTURE SURGERY  2015    Broke big toe   • HEMATOMA EVACUATION TRUNK N/A 02/07/2014    Pocket evacuation of hematoma at pacemaker site-Dr. Leandro Huber   • HEMORRHOIDECTOMY N/A 04/19/2004    Flexible sigmoidoscopy and stapled hemorrhoidectomy-Dr. Yang Pavon   • HYSTERECTOMY Bilateral 1977   • IMPLANTABLE CARDIOVERTER DEFIBRILLATOR LEAD REPLACEMENT/POCKET REVISION Left 3/28/2016    Procedure: AUTOMATIC IMPLANTABLE CARDIOVERTER DEFIBRILLATOR LEAD REPLACEMENT WITH LASER LEAD EXTRACTION;  Surgeon: Leandro Huber MD;  Location: [facility] OR 18/19;  Service:    • IMPLANTABLE CARDIOVERTER DEFIBRILLATOR LEAD REPLACEMENT/POCKET REVISION N/A 01/13/2014    Biventricular ICD replacement-Dr. Jillian Huber   • LAPAROSCOPY REPAIR HIATAL HERNIA N/A 06/27/2006    Laparoscopic paraesophageal hiatal hernia repair with Nissen fundoplication and cholecystectomy-Dr. Sean Yoon   • NATALIE GONZALEZ ([facility]) PROCEDURE     • MS INSJ TUNNELED CVC W/O SUBQ PORT/ AGE 3 YR/> Right 10/3/2017    Procedure: Right internal jugular port placement;  Surgeon: Tiffanie Couch MD;  Location: [facility] OR;  Service: General   • TOE SURGERY Left     SET BIG TOE   • TOTAL KNEE ARTHROPLASTY Left 08/2014   • VENOUS ACCESS DEVICE (PORT) INSERTION Right       General Information     Row Name 01/08/22  1144          Physical Therapy Time and Intention    Document Type evaluation  -DP     Mode of Treatment individual therapy; physical therapy  -DP     Row Name 01/08/22 1144          General Information    Patient Profile Reviewed yes  -DP     Prior Level of Function independent:  -DP     Existing Precautions/Restrictions fall  -DP     Barriers to Rehab none identified  -DP     Row Name 01/08/22 1144          Living Environment    Lives With spouse  -DP     Row Name 01/08/22 1144          Home Main Entrance    Number of Stairs, Main Entrance none  -DP     Stair Railings, Main Entrance none  -DP     Row Name 01/08/22 1144          Stairs Within Home, Primary    Number of Stairs, Within Home, Primary none  -DP     Row Name 01/08/22 1144          Cognition    Orientation Status (Cognition) oriented x 4  -DP     Row Name 01/08/22 1144          Safety Issues, Functional Mobility    Impairments Affecting Function (Mobility) balance; cognition; endurance/activity tolerance; pain; strength  -DP     Cognitive Impairments, Mobility Safety/Performance insight into deficits/self-awareness  -DP           User Key  (r) = Recorded By, (t) = Taken By, (c) = Cosigned By    Initials Name Provider Type    DP Avery Kolb, PT Physical Therapist               Mobility     Row Name 01/08/22 1146          Bed Mobility    Assistive Device (Bed Mobility) bed rails; head of bed elevated  -DP     Comment (Bed Mobility) Pt is Jhonathan with bed mob  -DP     Row Name 01/08/22 1146          Transfers    Comment (Transfers) CGA with STS and stand to sit. Pt performed toilet transfer with CGA using BR rails  -DP     Row Name 01/08/22 1146          Bed-Chair Transfer    Bed-Chair [place] (Transfers) not tested  -DP     Row Name 01/08/22 1146          Sit-Stand Transfer    Sit-Stand [place] (Transfers) contact guard  -DP     Assistive Device (Sit-Stand Transfers) walker, front-wheeled  -DP     Row Name 01/08/22 1146          Gait/Stairs  (Locomotion)    [place] Level (Gait) contact guard  -DP     Assistive Device (Gait) walker, front-wheeled  -DP     Distance in Feet (Gait) 10'x2  -DP     Deviations/Abnormal Patterns (Gait) antalgic; base of support, narrow; bala decreased; gait speed decreased; stride length decreased; weight shifting decreased; other (see comments)  Pt has painful gait on RLE  -DP     [place] Level (Stairs) not tested  -DP     Comment (Gait/Stairs) Pt ambulated 10' with CGA from PT to BR. PT demonstrated decreased gait speed due to pain.  -DP           User Key  (r) = Recorded By, (t) = Taken By, (c) = Cosigned By    Initials Name Provider Type    DP Avery Kolb PT Physical Therapist               Obj/Interventions     Row Name 01/08/22 1150          Range of Motion Comprehensive    General Range of Motion no range of motion deficits identified  -DP     Comment, General Range of Motion BLE WFL  -DP     Row Name 01/08/22 1150          Strength Comprehensive (MMT)    General Manual Muscle Testing (MMT) Assessment lower extremity strength deficits identified  -DP     Comment, General Manual Muscle Testing (MMT) Assessment Pt is at least 3+ with all LE test seen with ambulatory tasks completed and ther ex  -DP     Row Name 01/08/22 1150          Balance    Balance Assessment sitting static balance; standing static balance  -DP     Static Sitting Balance WFL  -DP     Static Standing Balance mild impairment  -DP     Balance Interventions sitting; standing; static  -DP     Comment, Balance Pt performed sitting balance on toilet w/ no UE support for 5 mins using CGA from PT. Pt also stood for 3 mins statically with BUE and CGA from PT.  -DP     Row Name 01/08/22 1150          Sensory Assessment (Somatosensory)    Sensory Assessment (Somatosensory) not tested  -DP           User Key  (r) = Recorded By, (t) = Taken By, (c) = Cosigned By    Initials Name Provider Type    Avery De Jesus PT Physical Therapist                "Goals/Plan     Row Name 01/08/22 1201          Bed Mobility Goal 1 (PT)    Activity/Assistive Device (Bed Mobility Goal 1, PT) bed mobility activities, all  -DP     [place] Level/Cues Needed (Bed Mobility Goal 1, PT) modified independence  -DP     Time Frame (Bed Mobility Goal 1, PT) short term goal (STG); 2 weeks  -DP     Row Name 01/08/22 1201          Transfer Goal 1 (PT)    Activity/Assistive Device (Transfer Goal 1, PT) transfers, all  -DP     [place] Level/Cues Needed (Transfer Goal 1, PT) modified independence  -DP     Time Frame (Transfer Goal 1, PT) short term goal (STG); 2 weeks  -DP     Row Name 01/08/22 1201          Gait Training Goal 1 (PT)    Activity/Assistive Device (Gait Training Goal 1, PT) gait (walking locomotion)  -DP     [place] Level (Gait Training Goal 1, PT) standby assist; modified independence  -DP     Distance (Gait Training Goal 1, PT) 150'  -DP     Time Frame (Gait Training Goal 1, PT) short term goal (STG); 2 weeks  -DP           User Key  (r) = Recorded By, (t) = Taken By, (c) = Cosigned By    Initials Name Provider Type    Avery De Jesus, PT Physical Therapist               Clinical Impression     Row Name 01/08/22 9195          Pain Scale: Numbers Pre/Post-Treatment    Pretreatment Pain Rating 0/10 - no pain  -DP     Posttreatment Pain Rating 0/10 - no pain  -DP     Row Name 01/08/22 2746          Plan of Care Review    Plan of Care Reviewed With patient  -DP     Outcome Summary Pt is an 80 y/o female presenting to [facility] with confusion and a hx of ESRD, CHF traumatic subdural hematoma and failed pacemaker. Pt is Jhonathan with BM, CGA with transfers and ambulation. Pt's gait speed and length is decreased due to weakness and RLE pain as she described her foot as having \"no padding and is all bone\". Pt lives w/ her  has no HI nor any steps within the home. Pt uses a cane at [facility] and was ind PLOF. Pt demonstrates decreased strength, balance and overall functional " independence and would benefit from acute skilled PT services.  -DP     Row Name 01/08/22 1154          Therapy Assessment/Plan (PT)    Rehab Potential (PT) good, to achieve stated therapy goals  -DP     Criteria for Skilled Interventions Met (PT) yes  -DP     Predicted Duration of Therapy Intervention (PT) 1 week  -DP     Row Name 01/08/22 1154          Vital Signs    O2 Delivery Pre Treatment room air  -DP     O2 Delivery Intra Treatment room air  -DP     O2 Delivery Post Treatment room air  -DP     Pre Patient Position Supine  -DP     Intra Patient Position Standing  -DP     Post Patient Position Supine  -DP     Row Name 01/08/22 1154          Positioning and Restraints    Pre-Treatment Position in bed  -DP     Post Treatment Position bed  -DP     In Bed notified nsg; supine; encouraged to call for assist  NSG was notified to have call alarm put in room  -DP           User Key  (r) = Recorded By, (t) = Taken By, (c) = Cosigned By    Initials Name Provider Type    Avery De Jesus, PT Physical Therapist               Outcome Measures     Row Name 01/08/22 1202          How much help from another person do you currently need...    Turning from your back to your side while in flat bed without using bedrails? 3  -DP     Moving from lying on back to sitting on the side of a flat bed without bedrails? 3  -DP     Moving to and from a bed to a chair (including a wheelchair)? 3  -DP     Standing up from a chair using your arms (e.g., wheelchair, bedside chair)? 3  -DP     Climbing 3-5 steps with a railing? 2  -DP     To walk in hospital room? 3  -DP     AM-PAC 6 Clicks Score (PT) 17  -DP     Row Name 01/08/22 1202          Functional Assessment    Outcome Measure Options AM-PAC 6 Clicks Basic Mobility (PT)  -DP           User Key  (r) = Recorded By, (t) = Taken By, (c) = Cosigned By    Initials Name Provider Type    Avery De Jesus PT Physical Therapist                             Physical Therapy Education             "     Title: PT OT SLP Therapies (Done)     Topic: Physical Therapy (Done)     Point: Mobility training (Done)     Learning Progress Summary           Patient Acceptance, E,D, VU,DU by DP at 1/8/2022 1203                   Point: Body mechanics (Done)     Learning Progress Summary           Patient Acceptance, E,D, VU,DU by DP at 1/8/2022 1203                   Point: Precautions (Done)     Learning Progress Summary           Patient Acceptance, E,D, VU,DU by DP at 1/8/2022 1203                               User Key     Initials Effective Dates Name Provider Type Discipline    DP 08/24/21 -  Avery Kolb, CRUZ Physical Therapist PT              PT Recommendation and Plan  Planned Therapy Interventions (PT): balance training, bed mobility training, gait training, home exercise program, motor coordination training, neuromuscular re-education, patient/family education, strengthening, transfer training  Plan of Care Reviewed With: patient  Outcome Summary: Pt is an 80 y/o female presenting to [facility] with confusion and a hx of ESRD, CHF traumatic subdural hematoma and failed pacemaker. Pt is Jhonathan with BM, CGA with transfers and ambulation. Pt's gait speed and length is decreased due to weakness and RLE pain as she described her foot as having \"no padding and is all bone\". Pt lives w/ her  has no HI nor any steps within the home. Pt uses a cane at [facility] and was ind PLOF. Pt demonstrates decreased strength, balance and overall functional independence and would benefit from acute skilled PT services.     Time Calculation:    PT Charges     Row Name 01/08/22 1204             Time Calculation    Start Time 0918  -DP      Stop Time 0944  -DP      Time Calculation (min) 26 min  -DP      PT Received On 01/08/22  -DP      PT - Next Appointment 01/10/22  -DP      PT Goal Re-Cert Due Date 01/14/22  -DP              Time Calculation- PT    Total Timed Code Minutes- PT 20 minute(s)  -DP            User Key  (r) = Recorded By, (t) " = Taken By, (c) = Cosigned By    Initials Name Provider Type    DP Avery Kolb, PT Physical Therapist              Therapy Charges for Today     Code Description Service Date Service Provider Modifiers Qty    54090329436 HC PT EVAL LOW COMPLEXITY 2 1/8/2022 Avery Kolb, PT GP 1    08703455408 HC PT THERAPEUTIC ACT EA 15 MIN 1/8/2022 Avery Kolb, PT GP 1          PT G-Codes  Outcome Measure Options: AM-PAC 6 Clicks Basic Mobility (PT)  AM-PAC 6 Clicks Score (PT): 17    Avery Kolb PT  1/8/2022

## 2022-01-08 NOTE — NURSING NOTE
Unable to administer po meds due to failing nursing bedside swallow eval. Call placed to Dr. Means, new order to change Plavix 300mg po once to tomorrow after speech therapy eval.

## 2022-01-08 NOTE — PROGRESS NOTES
"DOS: 2022  NAME: Charmaine Machuca   : 1940  PCP: Fannie Godfrey MD  Chief Complaint   Patient presents with   • Speech Problem       Chief complaint: Very much awake and alert and oriented x3  Subjective: Patient knows that she is in the hospital and knows the month and the year and able to follow one-step and two-step commands and also read aloud common words and phrases and recognize common objects and complex picture without any problems.    Objective:  Vital signs: BP (!) 83/44 (BP Location: Left arm, Patient Position: Sitting)   Pulse 91   Temp 98.4 °F (36.9 °C) (Oral)   Resp 16   Ht 167.6 cm (65.98\")   Wt 62.1 kg (137 lb)   SpO2 100%   BMI 22.12 kg/m²    Gen: NAD, vitals reviewed  MS: Back to baseline.  CN: Pupils are equal reacting to light and accommodation.  The extraocular muscles are intact.  Visual fields are normal but visual acuity is compromised as she needs glasses.  The auditory acuity is normal.  Muscles of facial expression mastication uvula soft palate and tongue are all symmetrical with full range of motion and the corneal reflexes and gag reflexes and shoulder shrug bilaterally well preserved.  Motor: Muscles of both upper and lower extremities show good tone and power the bulk is reduced because of weight loss.  Sensory: Normal sensations to all modalities bilaterally  DTRs: 1+ bilaterally with downgoing toes.  Coordination: Normal finger-to-nose coordination and normal heel-to-shin testing.  Gait: Gait and Romberg could not be tested at this time.    ROS:  General: Pleasant lady currently laying in bed in no distress.  She is able to take her medications by mouth without problems.  Neurological: No focal neurological deficits.  The NIH stroke scale is 0 at this time.    Laboratory results:  Lab Results   Component Value Date    GLUCOSE 66 2022    CALCIUM 8.4 (L) 2022     2022    K 5.1 2022    CO2 20.7 (L) 2022     2022    " BUN 42 (H) 01/08/2022    CREATININE 5.89 (H) 01/08/2022    EGFRIFAFRI  01/08/2022      Comment:      <15 Indicative of kidney failure.    EGFRIFNONA 7 (L) 01/08/2022    BCR 7.1 01/08/2022    ANIONGAP 14.3 01/08/2022     Lab Results   Component Value Date    WBC 6.76 01/08/2022    HGB 10.0 (L) 01/08/2022    HCT 31.5 (L) 01/08/2022    .0 (H) 01/08/2022     (L) 01/08/2022     Lab Results   Component Value Date    LDL 47 01/07/2022    LDL 76 10/29/2020    LDL 56 10/03/2020         Lab 01/07/22  1125   HEMOGLOBIN A1C 4.61*        Review of labs: Hemoglobin is 10 with hematocrit of 31.5 and MCV is elevated 100 and the platelet count is low at 120,000 LDL is low at 47.    Review and interpretation of imaging: CT of the head was performed on January 7, 2022 that shows the following:    IMPRESSION:  1. There is mild small vessel disease in the cerebral white matter.  2. There are old healed fractures of the right zygomatic arch lateral  wall of the right orbit and the walls of the right maxillary sinus.  These fractures occurred back in 02/2020 almost 2 years ago.  3. The remainder of the head CTs normal with no acute skull fracture or  intracranial hemorrhage seen.    Workup to date: Carotid Doppler studies performed on January 7, 2022 shows the following:    Interpretation Summary    · Right internal carotid artery mild stenosis.  · Left internal carotid artery mild stenosis.    Echocardiogram performed today shows the following:    Interpretation Summary    · Calculated left ventricular EF = 53.7% Calculated left ventricular 3D EF = 55% Estimated left ventricular EF was in agreement with the calculated left ventricular EF. Left ventricular systolic function is normal.  · Left ventricular wall thickness is consistent with mild concentric hypertrophy.  · Left ventricular diastolic function is consistent with (grade II w/high LAP) pseudonormalization.  · Left atrial volume is mildly increased.  · Mild aortic  valve stenosis is present.  · Moderate tricuspid valve regurgitation is present.  · Estimated right ventricular systolic pressure from tricuspid regurgitation is markedly elevated (>55 mmHg). Calculated right ventricular systolic pressure from tricuspid regurgitation is 59.5 mmHg.  · Severe pulmonary hypertension is present        Diagnoses: Patient with dehydration, orthostatic hypotension which caused her to present with strokelike symptoms currently back to baseline.      Comment: Patient will not be able to undergo MRI as one of the leads in our cardiac pacemaker defibrillator is not MRI compatible.    Plan:  1.  Repeat a CT of the brain without contrast for tomorrow morning.  2.  To have physical therapy ambulate her with walker and gait belt and to see if she is safe to go home independently with her .  3.  Physical therapy occupational therapy evaluation for inpatient rehab potential.    Discussed with the registered nurse and the patient and she should be able to take her medications by mouth including loading dose of Plavix.  We'll follow her up with the primary team and spent half an hour in face-to-face evaluation and management of the patient.    Ba Means MD

## 2022-01-08 NOTE — NURSING NOTE
Pt had a 3.5hr HD tx with a net gain of approximately 1L d/t hypotension. She is alert and hemodynamically stable post HD.

## 2022-01-08 NOTE — PROGRESS NOTES
"DAILY PROGRESS NOTE  [facility]    Patient Identification:  Name: Charmaine Machuca  Age: 81 y.o.  Sex: female  :  1940  MRN: [id number]         Primary Care Physician: Fannie Godfrey MD    Subjective:  Interval History:She is feeling a little better.    Objective:    Scheduled Meds:aspirin, 300 mg, Rectal, Daily  atorvastatin, 80 mg, Oral, Nightly  buPROPion SR, 150 mg, Oral, Daily  carbidopa-levodopa, 2 tablet, Oral, Daily  carbidopa-levodopa ER, 1 tablet, Oral, Q PM  [START ON 2022] clopidogrel, 75 mg, Oral, Daily  febuxostat, 40 mg, Oral, Daily  levothyroxine, 25 mcg, Oral, Daily  lidocaine, 1 patch, Transdermal, Nightly  midodrine, 5 mg, Oral, TID AC  pantoprazole, 40 mg, Oral, Daily  sodium chloride, 10 mL, Intravenous, Q12H  sodium chloride, 10 mL, Intravenous, Q12H      Continuous Infusions:     Vital signs in last 24 hours:  Temp:  [96.9 °F (36.1 °C)-98.4 °F (36.9 °C)] 96.9 °F (36.1 °C)  Heart Rate:  [69-98] 82  Resp:  [16-18] 18  BP: ()/(37-54) 96/52    Intake/Output:  No intake or output data in the 24 hours ending 22 1531    Exam:  BP 96/52 (BP Location: Left arm, Patient Position: Lying)   Pulse 82   Temp 96.9 °F (36.1 °C) (Tympanic)   Resp 18   Ht 167.6 cm (65.98\")   Wt 62.1 kg (137 lb)   SpO2 100%   BMI 22.12 kg/m²     General Appearance:    Alert, cooperative, no distress   Head:    Normocephalic, without obvious abnormality, atraumatic   Eyes:       Throat:   Lips, tongue, gums normal   Neck:   Supple, symmetrical, trachea midline, no JVD   Lungs:     Clear to auscultation bilaterally, respirations unlabored   Chest Wall:    No tenderness or deformity    Heart:    Regular rate and rhythm, S1 and S2 normal, no murmur,no  Rub or gallop   Abdomen:     Soft, nontender, bowel sounds active, no masses, no organomegaly    Extremities:   Extremities normal, atraumatic, no cyanosis or edema   Pulses:      Skin:   Skin is warm and dry,  no rashes or palpable " lesions   Neurologic:   no focal deficits noted      Lab Results (last 72 hours)     Procedure Component Value Units Date/Time    POC Glucose Once [[id number]]  (Abnormal) Collected: 01/08/22 1126    Specimen: Blood Updated: 01/08/22 1128     Glucose 146 mg/dL      Comment: Meter: [id number] : [id number] [employer] NA       POC Glucose Once [[id number]]  (Normal) Collected: 01/08/22 0928    Specimen: Blood Updated: 01/08/22 0929     Glucose 106 mg/dL      Comment: Meter: [id number] : [id number] Eduardo Zepeda RN       Hepatitis B Surface Antigen [[id number]]  (Normal) Collected: 01/08/22 0807    Specimen: Blood Updated: 01/08/22 0911     Hepatitis B Surface Ag Non-Reactive    Renal Function Panel [[id number]]  (Abnormal) Collected: 01/08/22 0650    Specimen: Blood Updated: 01/08/22 0907     Glucose 66 mg/dL      BUN 42 mg/dL      Creatinine 5.89 mg/dL      Sodium 137 mmol/L      Potassium 5.1 mmol/L      Comment: Slight hemolysis detected by analyzer. Results may be affected.        Chloride 102 mmol/L      CO2 20.7 mmol/L      Calcium 8.4 mg/dL      Albumin 3.10 g/dL      Phosphorus 7.0 mg/dL      Anion Gap 14.3 mmol/L      BUN/Creatinine Ratio 7.1     eGFR Non African Amer 7 mL/min/1.73      Comment: <15 Indicative of kidney failure.        eGFR   Amer --     Comment: <15 Indicative of kidney failure.       Narrative:      GFR Normal >60  Chronic Kidney Disease <60  Kidney Failure <15      TSH [[id number]]  (Normal) Collected: 01/08/22 0650    Specimen: Blood Updated: 01/08/22 0853     TSH 0.602 uIU/mL     Magnesium [[id number]]  (Normal) Collected: 01/08/22 0650    Specimen: Blood Updated: 01/08/22 0753     Magnesium 2.4 mg/dL     POC Glucose Once [[id number]]  (Abnormal) Collected: 01/08/22 0737    Specimen: Blood Updated: 01/08/22 0738     Glucose 61 mg/dL      Comment: Meter: [id number] : [id number] [employer] NA       CBC & Differential [[id number]]  (Abnormal) Collected: 01/08/22 0650     Specimen: Blood Updated: 01/08/22 0730    Narrative:      The following orders were created for panel order CBC & Differential.  Procedure                               Abnormality         Status                     ---------                               -----------         ------                     CBC Auto Differential[[id number]]        Abnormal            Final result                 Please view results for these tests on the individual orders.    CBC Auto Differential [[id number]]  (Abnormal) Collected: 01/08/22 0650    Specimen: Blood Updated: 01/08/22 0730     WBC 6.76 10*3/mm3      RBC 3.15 10*6/mm3      Hemoglobin 10.0 g/dL      Hematocrit 31.5 %      .0 fL      MCH 31.7 pg      MCHC 31.7 g/dL      RDW 13.5 %      RDW-SD 49.5 fl      MPV 10.4 fL      Platelets 120 10*3/mm3      Neutrophil % 82.7 %      Lymphocyte % 8.1 %      Monocyte % 7.2 %      Eosinophil % 1.5 %      Basophil % 0.1 %      Neutrophils, Absolute 5.58 10*3/mm3      Lymphocytes, Absolute 0.55 10*3/mm3      Monocytes, Absolute 0.49 10*3/mm3      Eosinophils, Absolute 0.10 10*3/mm3      Basophils, Absolute 0.01 10*3/mm3     aPTT [[id number]]  (Normal) Collected: 01/08/22 0650    Specimen: Blood Updated: 01/08/22 0727     PTT 30.1 seconds     Protime-INR [[id number]]  (Normal) Collected: 01/08/22 0650    Specimen: Blood Updated: 01/08/22 0727     Protime 13.8 Seconds      INR 1.08    Sedimentation Rate [[id number]]  (Normal) Collected: 01/08/22 0650    Specimen: Blood Updated: 01/08/22 0720     Sed Rate 29 mm/hr     POC Glucose Once [[id number]]  (Abnormal) Collected: 01/07/22 2158    Specimen: Blood Updated: 01/07/22 2159     Glucose 66 mg/dL      Comment: Meter: [id number] : [id number] Stiven ZAMBRANO PRE-OP / PRE-PROCEDURE SCREENING ORDER (NO ISOLATION) - Swab, Nasopharynx [[id number]]  (Normal) Collected: 01/07/22 1451    Specimen: Swab from Nasopharynx Updated: 01/07/22 3471    Narrative:      The following orders  were created for panel order COVID PRE-OP / PRE-PROCEDURE SCREENING ORDER (NO ISOLATION) - Swab, Nasopharynx.  Procedure                               Abnormality         Status                     ---------                               -----------         ------                     COVID-19,APTIMA PANTHER(...[[id number]]  Normal              Final result                 Please view results for these tests on the individual orders.    COVID-19,APTIMA PANTHER(GRIS), ARRON/ MINE, NP/OP SWAB IN UTM/VTM/SALINE TRANSPORT MEDIA,24 HR TAT - Swab, Nasopharynx [[id number]]  (Normal) Collected: 01/07/22 1451    Specimen: Swab from Nasopharynx Updated: 01/07/22 1851     COVID19 Not Detected    Narrative:      Fact sheet for providers: https://www.fda.gov/media/998560/download     Fact sheet for patients: https://www.fda.gov/media/195153/download    Test performed by RT PCR.    Hemoglobin A1c [[id number]]  (Abnormal) Collected: 01/07/22 1125    Specimen: Blood Updated: 01/07/22 1508     Hemoglobin A1C 4.61 %     Narrative:      Hemoglobin A1C Ranges:    Increased Risk for Diabetes  5.7% to 6.4%  Diabetes                     >= 6.5%  Diabetic Goal                < 7.0%    Lipid Panel [[id number]] Collected: 01/07/22 1125    Specimen: Blood Updated: 01/07/22 1508     Total Cholesterol 124 mg/dL      Triglycerides 137 mg/dL      HDL Cholesterol 53 mg/dL      LDL Cholesterol  47 mg/dL      VLDL Cholesterol 24 mg/dL      LDL/HDL Ratio 0.82    Narrative:      Cholesterol Reference Ranges  (U.S. Department of Health and Human Services ATP III Classifications)    Desirable          <200 mg/dL  Borderline High    200-239 mg/dL  High Risk          >240 mg/dL      Triglyceride Reference Ranges  (U.S. Department of Health and Human Services ATP III Classifications)    Normal           <150 mg/dL  Borderline High  150-199 mg/dL  High             200-499 mg/dL  Very High        >500 mg/dL    HDL Reference Ranges  (U.S. Department of Health and  Human Services ATP III Classifcations)    Low     <40 mg/dl (major risk factor for CHD)  High    >60 mg/dl ('negative' risk factor for CHD)        LDL Reference Ranges  (U.S. Department of Health and Human Services ATP III Classifcations)    Optimal          <100 mg/dL  Near Optimal     100-129 mg/dL  Borderline High  130-159 mg/dL  High             160-189 mg/dL  Very High        >189 mg/dL    Comprehensive Metabolic Panel [[id number]]  (Abnormal) Collected: 01/07/22 1125    Specimen: Blood Updated: 01/07/22 1204     Glucose 101 mg/dL      BUN 39 mg/dL      Creatinine 5.26 mg/dL      Sodium 138 mmol/L      Potassium 4.4 mmol/L      Chloride 100 mmol/L      CO2 27.9 mmol/L      Calcium 9.0 mg/dL      Total Protein 6.6 g/dL      Albumin 4.30 g/dL      ALT (SGPT) <5 U/L      AST (SGOT) 15 U/L      Alkaline Phosphatase 74 U/L      Total Bilirubin 0.2 mg/dL      eGFR Non African Amer 8 mL/min/1.73      Comment: <15 Indicative of kidney failure.        eGFR   Amer --     Comment: <15 Indicative of kidney failure.        Globulin 2.3 gm/dL      A/G Ratio 1.9 g/dL      BUN/Creatinine Ratio 7.4     Anion Gap 10.1 mmol/L     Narrative:      GFR Normal >60  Chronic Kidney Disease <60  Kidney Failure <15      CBC & Differential [[id number]]  (Abnormal) Collected: 01/07/22 1125    Specimen: Blood Updated: 01/07/22 1135    Narrative:      The following orders were created for panel order CBC & Differential.  Procedure                               Abnormality         Status                     ---------                               -----------         ------                     CBC Auto Differential[[id number]]        Abnormal            Final result                 Please view results for these tests on the individual orders.    CBC Auto Differential [[id number]]  (Abnormal) Collected: 01/07/22 1125    Specimen: Blood Updated: 01/07/22 1135     WBC 8.43 10*3/mm3      RBC 3.43 10*6/mm3      Hemoglobin 10.9 g/dL       Hematocrit 34.7 %      .2 fL      MCH 31.8 pg      MCHC 31.4 g/dL      RDW 13.9 %      RDW-SD 51.3 fl      MPV 10.1 fL      Platelets 123 10*3/mm3      Neutrophil % 86.3 %      Lymphocyte % 6.4 %      Monocyte % 5.9 %      Eosinophil % 0.9 %      Basophil % 0.1 %      Immature Grans % 0.4 %      Neutrophils, Absolute 7.27 10*3/mm3      Lymphocytes, Absolute 0.54 10*3/mm3      Monocytes, Absolute 0.50 10*3/mm3      Eosinophils, Absolute 0.08 10*3/mm3      Basophils, Absolute 0.01 10*3/mm3      Immature Grans, Absolute 0.03 10*3/mm3      nRBC 0.0 /100 WBC         Data Review:  Results from last 7 days   Lab Units 01/08/22  0650 01/07/22  1125 01/07/22  1125   SODIUM mmol/L 137  --  138   POTASSIUM mmol/L 5.1  --  4.4   CHLORIDE mmol/L 102  --  100   CO2 mmol/L 20.7*  --  27.9   BUN mg/dL 42*  --  39*   CREATININE mg/dL 5.89*  --  5.26*   GLUCOSE mg/dL 66   < > 101*   CALCIUM mg/dL 8.4*  --  9.0    < > = values in this interval not displayed.     Results from last 7 days   Lab Units 01/08/22  0650 01/07/22  1125   WBC 10*3/mm3 6.76 8.43   HEMOGLOBIN g/dL 10.0* 10.9*   HEMATOCRIT % 31.5* 34.7   PLATELETS 10*3/mm3 120* 123*     Results from last 7 days   Lab Units 01/08/22  0650   TSH uIU/mL 0.602     Results from last 7 days   Lab Units 01/07/22  1125   HEMOGLOBIN A1C % 4.61*     Lab Results   Lab Value Date/Time    TROPONINT <0.010 03/25/2021 1312    TROPONINT <0.010 08/17/2019 2110    TROPONINT <0.01 01/14/2014 1757     Results from last 7 days   Lab Units 01/07/22  1125   CHOLESTEROL mg/dL 124   TRIGLYCERIDES mg/dL 137   HDL CHOL mg/dL 53   LDL CHOL mg/dL 47     Results from last 7 days   Lab Units 01/07/22  1125   ALK PHOS U/L 74   BILIRUBIN mg/dL 0.2   ALT (SGPT) U/L <5   AST (SGOT) U/L 15     Results from last 7 days   Lab Units 01/08/22  0650   TSH uIU/mL 0.602     Results from last 7 days   Lab Units 01/07/22  1125   HEMOGLOBIN A1C % 4.61*     Glucose   Date/Time Value Ref Range Status   01/08/2022 1125  146 (H) 70 - 130 mg/dL Final     Comment:     Meter: [id number] : [id number] An[employer] Chemell NA   01/08/2022 0928 106 70 - 130 mg/dL Final     Comment:     Meter: [id number] : [id number] Kyliepedro SyedKatelynjoy ANNE   01/08/2022 0737 61 (L) 70 - 130 mg/dL Final     Comment:     Meter: [id number] : [id number] Anmol Chemell NA   01/07/2022 2158 66 (L) 70 - 130 mg/dL Final     Comment:     Meter: [id number] : [id number] St[employer] Waters NA     Results from last 7 days   Lab Units 01/08/22  0650   INR  1.08       Past Medical History:   Diagnosis Date   • Anemia     Iron deficiency   • Anxiety    • Aortic stenosis     mild 04/2021   • Cardiomyopathy (HCC)     S/P pacemaker and defibrillator   • CHF (congestive heart failure) ([facility])    • Chronic renal failure, stage 4 (severe) ([facility])    • Colon polyp    • Coronary artery disease     pacemaker, defibrillator   • Depression    • Dizzy    • Gastroparesis    • GAVE (gastric antral vascular ectasia)    • GERD (gastroesophageal reflux disease)    • Gout    • Hemorrhoids    • Hiatal hernia    • History of Clostridium difficile colitis 2013   • History of kidney stones    • History of pancreatitis     2014   • History of skin cancer    • History of transfusion     MULTIPLE    • Hyperlipidemia    • Hypertension    • Hypothyroidism    • Kidney failure    • Left bundle branch block    • Mitral and aortic valve disease    • Mitral valve insufficiency    • Myoclonus     S/P Depakote   • Osteoarthritis    • Pancytopenia (HCC)    • Peripheral neuropathy    • Presence of cardiac pacemaker     AND DEFIBRILLATOR   • Pulmonary hypertension (HCC)    • Pulmonary hypertension (HCC)    • SOB (shortness of breath) on exertion    • Spinal stenosis    • Stroke ([facility])     2019   • TIA (transient ischemic attack)        Assessment:  Active Hospital Problems    Diagnosis  POA   • Confusion [R41.0]  Yes   • Dysarthria [R47.1]  Yes   • ESRD (end stage renal disease) ([facility]) [N18.6]  Yes   • Pulmonary  hypertension (HCC) [I27.20]  Yes   • GAVE (gastric antral vascular ectasia) [K31.819]  Yes   • Chronic combined systolic and diastolic congestive heart failure (HCC) [I50.42]  Yes   • 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]  Yes   • Hypothyroidism [E03.9]  Yes      Resolved Hospital Problems   No resolved problems to display.       Plan:  Continue with current RX . HD per renal.  Repeat CT and continue with therapy.    Melchor Poon MD  1/8/2022  15:31 EST

## 2022-01-09 NOTE — PLAN OF CARE
Problem: Hypertension Comorbidity  Goal: Blood Pressure in Desired Range  Intervention: Maintain Hypertension-Management Strategies  Recent Flowsheet Documentation  Taken 1/8/2022 2118 by Marcia Mccarthy RN  Medication Review/Management: medications reviewed     Problem: Hypertension Comorbidity  Goal: Blood Pressure in Desired Range  Intervention: Maintain Hypertension-Management Strategies  Recent Flowsheet Documentation  Taken 1/8/2022 2118 by Marcia Mccarthy, RN  Medication Review/Management: medications reviewed     Problem: Fall Injury Risk  Goal: Absence of Fall and Fall-Related Injury  Intervention: Identify and Manage Contributors to Fall Injury Risk  Recent Flowsheet Documentation  Taken 1/8/2022 2118 by Marcia Mccarthy, RN  Medication Review/Management: medications reviewed  Intervention: Promote Injury-Free Environment  Recent Flowsheet Documentation  Taken 1/8/2022 2118 by Marcia Mccarthy RN  Safety Promotion/Fall Prevention:   activity supervised   assistive device/personal items within reach   clutter free environment maintained   nonskid shoes/slippers when out of bed   safety round/check completed   Goal Outcome Evaluation:

## 2022-01-09 NOTE — DISCHARGE SUMMARY
PHYSICIAN DISCHARGE SUMMARY                                                                        [facility]    Patient Identification:  Name: Charmaine Machuca  Age: 81 y.o.  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          PMHX:   Past Medical History:   Diagnosis Date   • Anemia     Iron deficiency   • Anxiety    • Aortic stenosis     mild 2021   • Cardiomyopathy (HCC)     S/P pacemaker and defibrillator   • CHF (congestive heart failure) (HCC)    • Chronic renal failure, stage 4 (severe) (HCC)    • Colon polyp    • Coronary artery disease     pacemaker, defibrillator   • Depression    • Dizzy    • Gastroparesis    • GAVE (gastric antral vascular ectasia)    • GERD (gastroesophageal reflux disease)    • Gout    • Hemorrhoids    • Hiatal hernia    • History of Clostridium difficile colitis    • History of kidney stones    • History of pancreatitis        • History of skin cancer    • History of transfusion     MULTIPLE    • Hyperlipidemia    • Hypertension    • Hypothyroidism    • Kidney failure    • Left bundle branch block    • Mitral and aortic valve disease    • Mitral valve insufficiency    • Myoclonus     S/P Depakote   • Osteoarthritis    • Pancytopenia (HCC)    • Peripheral neuropathy    •  Presence of cardiac pacemaker     AND DEFIBRILLATOR   • Pulmonary hypertension (HCC)    • Pulmonary hypertension (HCC)    • SOB (shortness of breath) on exertion    • Spinal stenosis    • Stroke (HCC)     2019   • TIA (transient ischemic attack)      PSHX:   Past Surgical History:   Procedure Laterality Date   • APPENDECTOMY N/A    • ARTERIOVENOUS FISTULA/SHUNT SURGERY Right 2/18/2019    Procedure: RIGHT BASILIC FISTULA CREATION WITHOUT TRANSPOSITION;  Surgeon: Royer Dozier MD;  Location: [facility] MAIN OR;  Service: Vascular   • ARTERIOVENOUS FISTULA/SHUNT SURGERY Right 5/31/2019    Procedure: RIGHT 2ND STAGE BASILIC VEIN CREATION;  Surgeon: Royer Dozier MD;  Location: [facility] MAIN OR;  Service: Vascular   • CARDIAC CATHETERIZATION Left 03/28/2006    Arterial catheter insertion, cath left ventriculography, coronary angiography and left heart catheterization-Dr. Estevan Matamoros   • CARDIAC DEFIBRILLATOR PLACEMENT N/A 11/30/2007    Biventricular implantable cardioverter defibrillator-Dr. Leandro Huber   • CARDIAC ELECTROPHYSIOLOGY PROCEDURE N/A 10/28/2019    Procedure: GENERATOR CHANGE BI-V ICD  medtronic;  Surgeon: Leandro Huber MD;  Location: [facility] CATH INVASIVE LOCATION;  Service: Cardiology   • CATARACT EXTRACTION Bilateral 2011   • COLONOSCOPY N/A 2014    done at [facility]   • CYSTECTOMY N/A     Ovarian cystectomy   • ENDOSCOPY N/A 04/28/2006    EGD with biopsies. Paraesophageal hiatal hernia from 34 to 44 cm, antral gastritis-Dr. Nehemiah Beal   • ENDOSCOPY N/A 09/29/2004    Hiatal hernia, gastritis and an erosion of the pylorus-Dr. Yang Pavon   • ENDOSCOPY N/A 9/1/2019    Procedure: ESOPHAGOGASTRODUODENOSCOPY with APC;  Surgeon: Anuel Roach MD;  Location: [facility] ENDOSCOPY;  Service: Gastroenterology   • ENDOSCOPY N/A 1/28/2020    Procedure: ESOPHAGOGASTRODUODENOSCOPY with APC;  Surgeon: Anuel Roach MD;  Location: [facility] ENDOSCOPY;  Service: Gastroenterology   •  ENDOSCOPY N/A 4/21/2020    Procedure: ESOPHAGOGASTRODUODENOSCOPY WITH APC;  Surgeon: Anuel Roach MD;  Location: [facility] ENDOSCOPY;  Service: Gastroenterology;  Laterality: N/A;  PRE- MELENA, GAVE  POST- ESOPHAGITIS, GAVE   • ENDOSCOPY N/A 11/24/2020    Procedure: ESOPHAGOGASTRODUODENOSCOPY;  Surgeon: Anuel Roach MD;  Location:  [facility] OR;  Service: Gastroenterology;  Laterality: N/A;  reflux esophagitis  Gastric Food Bezoar   • ENDOSCOPY N/A 2/12/2021    Procedure: ESOPHAGOGASTRODUODENOSCOPY;  Surgeon: Anuel Roach MD;  Location:  [facility] OR;  Service: Gastroenterology;  Laterality: N/A;  with APC- GAVE; Gastroparesis;    • ENDOSCOPY N/A 10/8/2021    Procedure: ESOPHAGOGASTRODUODENOSCOPY WITH ARGON PLASMA COAGULATOR;  Surgeon: Anuel Roach MD;  Location: [facility] OR;  Service: Gastroenterology;  Laterality: N/A;  APC of gastric antral vascular ectasia  Reflux   • ENTEROSCOPY SMALL BOWEL N/A 10/30/2006    Small bowel enteroscopy with biopsies-Dr. Rory Sevilla   • FLEXIBLE SIGMOIDOSCOPY N/A 09/29/2004    Unsuccessful colonoscopy due to poop prep, a very tortuous sigmoid, bowel prep in the form of enema given and a barium enema was obtained-Dr. Yang Pavon   • FRACTURE SURGERY  2015    Broke big toe   • HEMATOMA EVACUATION TRUNK N/A 02/07/2014    Pocket evacuation of hematoma at pacemaker site-Dr. Leandro Huber   • HEMORRHOIDECTOMY N/A 04/19/2004    Flexible sigmoidoscopy and stapled hemorrhoidectomy-Dr. Yang Pavon   • HYSTERECTOMY Bilateral 1977   • IMPLANTABLE CARDIOVERTER DEFIBRILLATOR LEAD REPLACEMENT/POCKET REVISION Left 3/28/2016    Procedure: AUTOMATIC IMPLANTABLE CARDIOVERTER DEFIBRILLATOR LEAD REPLACEMENT WITH LASER LEAD EXTRACTION;  Surgeon: Leandro Huber MD;  Location: [facility] HYBRID OR 18/19;  Service:    • IMPLANTABLE CARDIOVERTER DEFIBRILLATOR LEAD REPLACEMENT/POCKET REVISION N/A 01/13/2014    Biventricular ICD replacement-Dr. Walsh  [facility]   • LAPAROSCOPY REPAIR HIATAL HERNIA N/A 06/27/2006    Laparoscopic paraesophageal hiatal hernia repair with Nissen fundoplication and cholecystectomy-Dr. Sean Yoon   • NATALIE GONZALEZ (RAOULK) PROCEDURE     • TX INSJ TUNNELED CVC W/O SUBQ PORT/ AGE 3 YR/> Right 10/3/2017    Procedure: Right internal jugular port placement;  Surgeon: Tiffanie Couch MD;  Location: [facility] OR;  Service: General   • TOE SURGERY Left     SET BIG TOE   • TOTAL KNEE ARTHROPLASTY Left 08/2014   • VENOUS ACCESS DEVICE (PORT) INSERTION Right        Hospital Course: Charmaine Machuca   is a 81 y.o. non-smoker with a history of ESRD on HD MWF, chronic combined systolic and diastolic CHF with AICD, aortic stenosis, traumatic subdural hematoma, peripheral neuropathy, myoclonus, gastroparesis, and intermittent GI bleeding due to GAVE with chronic iron deficiency anemia that presents to [facility] after being sent from her dialysis center today with a fall resulting in her striking her head. She has also been lethargic and dysarthric with a left facial droop noted that is apparently chronic. Her blood pressure was low at the dialysis center today, 80s/40s. As a result she did not get her dialysis treatment today and was sent here.         The patient was admitted to the hospital and seen by neurology.  Stroke work-up was negative.  Patient was felt to have symptoms related to low blood pressure with hypotension causing some of her confusion.  The patient had some adjustments of her medication and ProAmatine was added and Coreg was discontinued.  She looked well enough to go home and she will follow-up with her primary care in 1 week.  She will also follow-up with dialysis as usual and her other specialist.    Consults:     Consults     Date and Time Order Name Status Description    1/7/2022  1:43 PM Inpatient Neurology Consult Stroke      1/7/2022  1:20 PM [facility] (on-call MD unless specified) Details           Results from last 7 days   Lab Units 01/09/22  0519   WBC 10*3/mm3 5.38   HEMOGLOBIN g/dL 10.1*   HEMATOCRIT % 31.4*   PLATELETS 10*3/mm3 132*     Results from last 7 days   Lab Units 01/09/22  0519   SODIUM mmol/L 136   POTASSIUM mmol/L 4.0   CHLORIDE mmol/L 101   CO2 mmol/L 24.6   BUN mg/dL 17   CREATININE mg/dL 2.59*   GLUCOSE mg/dL 91   CALCIUM mg/dL 8.6     Significant Diagnostic Studies:   WBC   Date Value Ref Range Status   01/09/2022 5.38 3.40 - 10.80 10*3/mm3 Final     Hemoglobin   Date Value Ref Range Status   01/09/2022 10.1 (L) 12.0 - 15.9 g/dL Final     Hematocrit   Date Value Ref Range Status   01/09/2022 31.4 (L) 34.0 - 46.6 % Final     Platelets   Date Value Ref Range Status   01/09/2022 132 (L) 140 - 450 10*3/mm3 Final     Sodium   Date Value Ref Range Status   01/09/2022 136 136 - 145 mmol/L Final     Potassium   Date Value Ref Range Status   01/09/2022 4.0 3.5 - 5.2 mmol/L Final     Chloride   Date Value Ref Range Status   01/09/2022 101 98 - 107 mmol/L Final     CO2   Date Value Ref Range Status   01/09/2022 24.6 22.0 - 29.0 mmol/L Final     BUN   Date Value Ref Range Status   01/09/2022 17 8 - 23 mg/dL Final     Creatinine   Date Value Ref Range Status   01/09/2022 2.59 (H) 0.57 - 1.00 mg/dL Final     Glucose   Date Value Ref Range Status   01/09/2022 91 65 - 99 mg/dL Final     Calcium   Date Value Ref Range Status   01/09/2022 8.6 8.6 - 10.5 mg/dL Final     Magnesium   Date Value Ref Range Status   01/08/2022 2.4 1.6 - 2.4 mg/dL Final     Phosphorus   Date Value Ref Range Status   01/08/2022 7.0 (H) 2.5 - 4.5 mg/dL Final     AST (SGOT)   Date Value Ref Range Status   01/07/2022 15 1 - 32 U/L Final     ALT (SGPT)   Date Value Ref Range Status   01/07/2022 <5 1 - 33 U/L Final     Alkaline Phosphatase   Date Value Ref Range Status   01/07/2022 74 39 - 117 U/L Final     INR   Date Value Ref Range Status   01/08/2022 1.08 0.90 - 1.10 Final     No results found for: COLORU, CLARITYU,  SPECGRAV, PHUR, PROTEINUR, GLUCOSEU, KETONESU, BLOODU, NITRITE, LEUKOCYTESUR, BILIRUBINUR, UROBILINOGEN, RBCUA, WBCUA, BACTERIA, UACOMMENT  No results found for: TROPONINT, TROPONINI, BNP  No components found for: HGBA1C;2  No components found for: TSH;2  Imaging Results (All)     Procedure Component Value Units Date/Time    CT Head Without Contrast [[id number]] Collected: 01/09/22 0543     Updated: 01/09/22 0550    Narrative:      CT HEAD WITHOUT CONTRAST     HISTORY: Stroke     COMPARISON: 01/07/2022     TECHNIQUE: Axial CT imaging was obtained through the brain. No IV  contrast was administered.      FINDINGS:  No acute intracranial hemorrhage is seen. There is diffuse atrophy.  There is periventricular and deep white matter microangiopathic change.  There is no midline shift or mass effect. The paranasal sinuses and  mastoid air cells are clear.       Impression:      No acute intracranial findings.     Radiation dose reduction techniques were utilized, including automated  exposure control and exposure modulation based on body size.     This report was finalized on 1/9/2022 5:47 AM by Dr. Nancy Shipley M.D.       CT Head Without Contrast [[id number]] Collected: 01/07/22 1212     Updated: 01/07/22 1659    Narrative:      EMERGENCY NONCONTRAST HEAD CT 01/07/2022     CLINICAL HISTORY: Patient lost balance, fell, hit back of her head.  Patient has reported history of a prior subdural hematoma in the past.     TECHNIQUE: Spiral CT images were obtained from the base of skull to the  vertex without intravenous contrast and images were reformatted and  submitted in 3 mm thick axial CT section with brain algorithm and 2 mm  thick axial CT section with high-resolution bone algorithm and 2 mm  thick sagittal and coronal reconstructions were performed and submitted  in brain algorithm.     This is correlated to prior noncontrast head CT from [facility] on 04/02/2020 and a prior head CT 02/12/2020.      FINDINGS: There is minimal low-density in the frontal periventricular  white matter consistent with minimal small vessel disease. The remainder  of the brain parenchyma is normal in attenuation. The ventricles are  normal in size. I see no focal mass effect. There is no midline shift.  No extra-axial fluid collections are identified. There is no evidence of  acute intracranial hemorrhage. There are punctate calcifications within  the right sylvian fissure and in the lateral left parietal sulcus  unchanged over multiple prior studies felt to be benign dystrophic  calcification of no significance. Calvarium and skull base are normal in  appearance with no acute skull fracture identified. Back on the head and  facial CT 02/12/2020, the patient had multiple acute right-sided facial  fractures including the anterior and lateral wall of the right maxillary  sinus, lateral right orbital wall and right zygomatic arch. These  fractures have healed and there is a healed fracture midportion of right  zygomatic arch as well as of the lateral right orbital wall in the right  maxillary sinus. The paranasal sinuses, mastoid air cells and middle ear  cavities are clear.       Impression:      1. There is mild small vessel disease in the cerebral white matter.  2. There are old healed fractures of the right zygomatic arch lateral  wall of the right orbit and the walls of the right maxillary sinus.  These fractures occurred back in 02/2020 almost 2 years ago.  3. The remainder of the head CTs normal with no acute skull fracture or  intracranial hemorrhage seen.     Radiation dose reduction techniques were utilized, including automated  exposure control and exposure modulation based on body size.     This report was finalized on 1/7/2022 4:55 PM by Dr. Bartolome Whatley M.D.       XR Chest 1 View [[id number]] Collected: 01/07/22 1223     Updated: 01/07/22 1227    Narrative:      ONE VIEW PORTABLE CHEST AT 11:46 AM     HISTORY: Syncopal  episode during dialysis. Hypertension.     FINDINGS: The lungs are well-expanded and clear. There is mild  cardiomegaly with a pacemaker in place and showing no change from  05/05/2021. A right-sided MediPort catheter ends in the SVC and no acute  abnormality is seen.     This report was finalized on 1/7/2022 12:24 PM by Dr. Jarrod Clifton M.D.           Lab Results (last 7 days)     Procedure Component Value Units Date/Time    POC Glucose Once [[id number]]  (Normal) Collected: 01/09/22 1114    Specimen: Blood Updated: 01/09/22 1116     Glucose 99 mg/dL      Comment: Meter: [id number] : [id number] Medio NA       POC Glucose Once [[id number]]  (Normal) Collected: 01/09/22 0724    Specimen: Blood Updated: 01/09/22 0726     Glucose 79 mg/dL      Comment: Meter: [id number] : [id number] Medio NA       POC Glucose Once [[id number]]  (Normal) Collected: 01/09/22 0647    Specimen: Blood Updated: 01/09/22 0652     Glucose 86 mg/dL      Comment: Meter: [id number] : [id number] Fabio Barbosaetra NA       Basic Metabolic Panel [[id number]]  (Abnormal) Collected: 01/09/22 0519    Specimen: Blood Updated: 01/09/22 0602     Glucose 91 mg/dL      BUN 17 mg/dL      Creatinine 2.59 mg/dL      Sodium 136 mmol/L      Potassium 4.0 mmol/L      Chloride 101 mmol/L      CO2 24.6 mmol/L      Calcium 8.6 mg/dL      eGFR Non African Amer 18 mL/min/1.73      BUN/Creatinine Ratio 6.6     Anion Gap 10.4 mmol/L     Narrative:      GFR Normal >60  Chronic Kidney Disease <60  Kidney Failure <15      CBC & Differential [[id number]]  (Abnormal) Collected: 01/09/22 0519    Specimen: Blood Updated: 01/09/22 0546    Narrative:      The following orders were created for panel order CBC & Differential.  Procedure                               Abnormality         Status                     ---------                               -----------         ------                     CBC Auto Differential[[id number]]        Abnormal             Final result                 Please view results for these tests on the individual orders.    CBC Auto Differential [[id number]]  (Abnormal) Collected: 01/09/22 0519    Specimen: Blood Updated: 01/09/22 0546     WBC 5.38 10*3/mm3      RBC 3.15 10*6/mm3      Hemoglobin 10.1 g/dL      Hematocrit 31.4 %      MCV 99.7 fL      MCH 32.1 pg      MCHC 32.2 g/dL      RDW 13.4 %      RDW-SD 48.6 fl      MPV 10.1 fL      Platelets 132 10*3/mm3      Neutrophil % 77.4 %      Lymphocyte % 9.7 %      Monocyte % 9.9 %      Eosinophil % 2.2 %      Basophil % 0.6 %      Neutrophils, Absolute 4.17 10*3/mm3      Lymphocytes, Absolute 0.52 10*3/mm3      Monocytes, Absolute 0.53 10*3/mm3      Eosinophils, Absolute 0.12 10*3/mm3      Basophils, Absolute 0.03 10*3/mm3     POC Glucose Once [[id number]]  (Normal) Collected: 01/08/22 2025    Specimen: Blood Updated: 01/08/22 2026     Glucose 107 mg/dL      Comment: Meter: [id number] : [id number] Fabio [facility] NA       POC Glucose Once [[id number]]  (Normal) Collected: 01/08/22 1701    Specimen: Blood Updated: 01/08/22 1709     Glucose 98 mg/dL      Comment: Meter: [id number] : [id number] [employer] NA       POC Glucose Once [[id number]]  (Abnormal) Collected: 01/08/22 1126    Specimen: Blood Updated: 01/08/22 1128     Glucose 146 mg/dL      Comment: Meter: [id number] : [id number] [employer] NA       POC Glucose Once [[id number]]  (Normal) Collected: 01/08/22 0928    Specimen: Blood Updated: 01/08/22 0929     Glucose 106 mg/dL      Comment: Meter: [id number] : [id number] Eduardo Zepeda RN       Hepatitis B Surface Antigen [[id number]]  (Normal) Collected: 01/08/22 0807    Specimen: Blood Updated: 01/08/22 0911     Hepatitis B Surface Ag Non-Reactive    Renal Function Panel [[id number]]  (Abnormal) Collected: 01/08/22 0650    Specimen: Blood Updated: 01/08/22 0907     Glucose 66 mg/dL      BUN 42 mg/dL      Creatinine 5.89 mg/dL      Sodium 137 mmol/L      Potassium 5.1 mmol/L       Comment: Slight hemolysis detected by analyzer. Results may be affected.        Chloride 102 mmol/L      CO2 20.7 mmol/L      Calcium 8.4 mg/dL      Albumin 3.10 g/dL      Phosphorus 7.0 mg/dL      Anion Gap 14.3 mmol/L      BUN/Creatinine Ratio 7.1     eGFR Non African Amer 7 mL/min/1.73      Comment: <15 Indicative of kidney failure.        eGFR   Amer --     Comment: <15 Indicative of kidney failure.       Narrative:      GFR Normal >60  Chronic Kidney Disease <60  Kidney Failure <15      TSH [[id number]]  (Normal) Collected: 01/08/22 0650    Specimen: Blood Updated: 01/08/22 0853     TSH 0.602 uIU/mL     Magnesium [[id number]]  (Normal) Collected: 01/08/22 0650    Specimen: Blood Updated: 01/08/22 0753     Magnesium 2.4 mg/dL     POC Glucose Once [[id number]]  (Abnormal) Collected: 01/08/22 0737    Specimen: Blood Updated: 01/08/22 0738     Glucose 61 mg/dL      Comment: Meter: [id number] : [id number] [email or web address]       CBC & Differential [[id number]]  (Abnormal) Collected: 01/08/22 0650    Specimen: Blood Updated: 01/08/22 0730    Narrative:      The following orders were created for panel order CBC & Differential.  Procedure                               Abnormality         Status                     ---------                               -----------         ------                     CBC Auto Differential[[id number]]        Abnormal            Final result                 Please view results for these tests on the individual orders.    CBC Auto Differential [[id number]]  (Abnormal) Collected: 01/08/22 0650    Specimen: Blood Updated: 01/08/22 0730     WBC 6.76 10*3/mm3      RBC 3.15 10*6/mm3      Hemoglobin 10.0 g/dL      Hematocrit 31.5 %      .0 fL      MCH 31.7 pg      MCHC 31.7 g/dL      RDW 13.5 %      RDW-SD 49.5 fl      MPV 10.4 fL      Platelets 120 10*3/mm3      Neutrophil % 82.7 %      Lymphocyte % 8.1 %      Monocyte % 7.2 %      Eosinophil % 1.5 %      Basophil % 0.1 %       Neutrophils, Absolute 5.58 10*3/mm3      Lymphocytes, Absolute 0.55 10*3/mm3      Monocytes, Absolute 0.49 10*3/mm3      Eosinophils, Absolute 0.10 10*3/mm3      Basophils, Absolute 0.01 10*3/mm3     aPTT [[id number]]  (Normal) Collected: 01/08/22 0650    Specimen: Blood Updated: 01/08/22 0727     PTT 30.1 seconds     Protime-INR [[id number]]  (Normal) Collected: 01/08/22 0650    Specimen: Blood Updated: 01/08/22 0727     Protime 13.8 Seconds      INR 1.08    Sedimentation Rate [[id number]]  (Normal) Collected: 01/08/22 0650    Specimen: Blood Updated: 01/08/22 0720     Sed Rate 29 mm/hr     POC Glucose Once [[id number]]  (Abnormal) Collected: 01/07/22 2158    Specimen: Blood Updated: 01/07/22 2159     Glucose 66 mg/dL      Comment: Meter: [id number] : [id number] Stiven YOUNG       COVID PRE-OP / PRE-PROCEDURE SCREENING ORDER (NO ISOLATION) - Swab, Nasopharynx [[id number]]  (Normal) Collected: 01/07/22 1451    Specimen: Swab from Nasopharynx Updated: 01/07/22 1851    Narrative:      The following orders were created for panel order COVID PRE-OP / PRE-PROCEDURE SCREENING ORDER (NO ISOLATION) - Swab, Nasopharynx.  Procedure                               Abnormality         Status                     ---------                               -----------         ------                     COVID-19,APTIMA PANTHER(...[[id number]]  Normal              Final result                 Please view results for these tests on the individual orders.    COVID-19,APTIMA PANTHER(GRIS),[facility], NP/OP SWAB IN UTM/VTM/SALINE TRANSPORT MEDIA,24 HR TAT - Swab, Nasopharynx [[id number]]  (Normal) Collected: 01/07/22 1451    Specimen: Swab from Nasopharynx Updated: 01/07/22 1851     COVID19 Not Detected    Narrative:      Fact sheet for providers: https://www.fda.gov/media/215906/download     Fact sheet for patients: https://www.fda.gov/media/783296/download    Test performed by RT PCR.    Hemoglobin A1c [[id number]]  (Abnormal)  Collected: 01/07/22 1125    Specimen: Blood Updated: 01/07/22 1508     Hemoglobin A1C 4.61 %     Narrative:      Hemoglobin A1C Ranges:    Increased Risk for Diabetes  5.7% to 6.4%  Diabetes                     >= 6.5%  Diabetic Goal                < 7.0%    Lipid Panel [[id number]] Collected: 01/07/22 1125    Specimen: Blood Updated: 01/07/22 1508     Total Cholesterol 124 mg/dL      Triglycerides 137 mg/dL      HDL Cholesterol 53 mg/dL      LDL Cholesterol  47 mg/dL      VLDL Cholesterol 24 mg/dL      LDL/HDL Ratio 0.82    Narrative:      Cholesterol Reference Ranges  (U.S. Department of Health and Human Services ATP III Classifications)    Desirable          <200 mg/dL  Borderline High    200-239 mg/dL  High Risk          >240 mg/dL      Triglyceride Reference Ranges  (U.S. Department of Health and Human Services ATP III Classifications)    Normal           <150 mg/dL  Borderline High  150-199 mg/dL  High             200-499 mg/dL  Very High        >500 mg/dL    HDL Reference Ranges  (U.S. Department of Health and Human Services ATP III Classifcations)    Low     <40 mg/dl (major risk factor for CHD)  High    >60 mg/dl ('negative' risk factor for CHD)        LDL Reference Ranges  (U.S. Department of Health and Human Services ATP III Classifcations)    Optimal          <100 mg/dL  Near Optimal     100-129 mg/dL  Borderline High  130-159 mg/dL  High             160-189 mg/dL  Very High        >189 mg/dL    Comprehensive Metabolic Panel [[id number]]  (Abnormal) Collected: 01/07/22 1125    Specimen: Blood Updated: 01/07/22 1204     Glucose 101 mg/dL      BUN 39 mg/dL      Creatinine 5.26 mg/dL      Sodium 138 mmol/L      Potassium 4.4 mmol/L      Chloride 100 mmol/L      CO2 27.9 mmol/L      Calcium 9.0 mg/dL      Total Protein 6.6 g/dL      Albumin 4.30 g/dL      ALT (SGPT) <5 U/L      AST (SGOT) 15 U/L      Alkaline Phosphatase 74 U/L      Total Bilirubin 0.2 mg/dL      eGFR Non African Amer 8 mL/min/1.73       "Comment: <15 Indicative of kidney failure.        eGFR   Amer --     Comment: <15 Indicative of kidney failure.        Globulin 2.3 gm/dL      A/G Ratio 1.9 g/dL      BUN/Creatinine Ratio 7.4     Anion Gap 10.1 mmol/L     Narrative:      GFR Normal >60  Chronic Kidney Disease <60  Kidney Failure <15      CBC & Differential [[id number]]  (Abnormal) Collected: 01/07/22 1125    Specimen: Blood Updated: 01/07/22 1135    Narrative:      The following orders were created for panel order CBC & Differential.  Procedure                               Abnormality         Status                     ---------                               -----------         ------                     CBC Auto Differential[[id number]]        Abnormal            Final result                 Please view results for these tests on the individual orders.    CBC Auto Differential [[id number]]  (Abnormal) Collected: 01/07/22 1125    Specimen: Blood Updated: 01/07/22 1135     WBC 8.43 10*3/mm3      RBC 3.43 10*6/mm3      Hemoglobin 10.9 g/dL      Hematocrit 34.7 %      .2 fL      MCH 31.8 pg      MCHC 31.4 g/dL      RDW 13.9 %      RDW-SD 51.3 fl      MPV 10.1 fL      Platelets 123 10*3/mm3      Neutrophil % 86.3 %      Lymphocyte % 6.4 %      Monocyte % 5.9 %      Eosinophil % 0.9 %      Basophil % 0.1 %      Immature Grans % 0.4 %      Neutrophils, Absolute 7.27 10*3/mm3      Lymphocytes, Absolute 0.54 10*3/mm3      Monocytes, Absolute 0.50 10*3/mm3      Eosinophils, Absolute 0.08 10*3/mm3      Basophils, Absolute 0.01 10*3/mm3      Immature Grans, Absolute 0.03 10*3/mm3      nRBC 0.0 /100 WBC         /53 (BP Location: Left arm, Patient Position: Sitting)   Pulse 92   Temp 98.2 °F (36.8 °C) (Oral)   Resp 18   Ht 167.6 cm (66\")   Wt 62.1 kg (137 lb)   SpO2 98%   BMI 22.11 kg/m²     Discharge Exam:  General Appearance:    Alert, cooperative, no distress                          Head:    Normocephalic, without obvious " abnormality, atraumatic                          Eyes:                            Throat:   Lips, tongue, gums normal                          Neck:   Supple, symmetrical, trachea midline, no JVD                        Lungs:     Clear to auscultation bilaterally, respirations unlabored                Chest Wall:    No tenderness or deformity                        Heart:    Regular rate and rhythm, S1 and S2 normal, no murmur,no  Rub or gallop                  Abdomen:     Soft, non-tender, bowel sounds active, no masses, no organomegaly                  Extremities:   Extremities normal, atraumatic, no cyanosis or edema                             Skin:   Skin is warm and dry,  no rashes or palpable lesions                  Neurologic:   no focal deficits noted     Disposition:  Home    Patient Instructions:      Discharge Medications      New Medications      Instructions Start Date   clopidogrel 75 MG tablet  Commonly known as: PLAVIX   75 mg, Oral, Daily   Start Date: January 10, 2022     midodrine 5 MG tablet  Commonly known as: PROAMATINE   5 mg, Oral, 3 Times Daily Before Meals         Changes to Medications      Instructions Start Date   erythromycin base 250 MG tablet  Commonly known as: E-MYCIN  What changed: See the new instructions.   TAKE 1/2 TABLET 4 TIMES A  DAY      febuxostat 40 MG tablet  Commonly known as: ULORIC  What changed:   · medication strength  · how much to take  · how to take this   40 mg, Oral, Daily   Start Date: January 10, 2022        Continue These Medications      Instructions Start Date   acetaminophen 325 MG tablet  Commonly known as: TYLENOL   650 mg, Oral, Every 4 Hours PRN      aspirin 81 MG EC tablet   Oral, Daily      buPROPion  MG 12 hr tablet  Commonly known as: WELLBUTRIN SR   150 mg, Oral      buPROPion  MG 24 hr tablet  Commonly known as: WELLBUTRIN XL   300 mg, Oral, Daily      carbidopa-levodopa  MG per tablet  Commonly known as: SINEMET   2  tablets, Oral, Daily      carbidopa-levodopa ER  MG per tablet  Commonly known as: SINEMET CR   1 tablet, Oral, Every Evening      famotidine 20 MG tablet  Commonly known as: PEPCID   1 tablet, Oral, Every 12 Hours      Glucosamine Chondroitin Complx capsule   1,500 mg, Oral, Every Other Day      GLUCOSAMINE HCL PO   1 tablet, Oral, Daily      Gralise 300 MG tablet  Generic drug: Gabapentin (Once-Daily)   600 mg, Oral, Daily      levothyroxine 25 MCG tablet  Commonly known as: SYNTHROID, LEVOTHROID   25 mcg, Oral, Daily      lidocaine 5 %  Commonly known as: LIDODERM   1 patch, Transdermal, Nightly, To soles of feet      lidocaine-prilocaine 2.5-2.5 % cream  Commonly known as: EMLA   1 application, Topical, 3 Times Weekly, Prior to dialysis      MIRCERA IJ   75 mcg, Injection, Every 28 Days      Multi-Day Vitamins tablet tablet  Generic drug: multivitamin   1 tablet, Oral, Daily, HOLD FOR SURGERY      Multiple Vitamins-Iron tablet   1 tablet, Oral, Nightly      pantoprazole 40 MG EC tablet  Commonly known as: PROTONIX   40 mg, Oral, Daily      promethazine 12.5 MG tablet  Commonly known as: PHENERGAN   12.5 mg, Oral, Every 6 Hours PRN      psyllium 0.52 g capsule  Commonly known as: METAMUCIL   0.52 g, Oral      rotigotine 8 MG/24HR patch 24 hour 24 hour patch  Commonly known as: NEUPRO   1 patch, Transdermal, Daily, 8 mg patch         Stop These Medications    carvedilol 12.5 MG tablet  Commonly known as: COREG     furosemide 40 MG tablet  Commonly known as: LASIX          Future Appointments   Date Time Provider Department Center   4/11/2022 11:00 AM [facility]AN[facility] [place] [facility] [facility]  [facility]   4/11/2022 11:30 AM Kristine Hagen, [facility]  [facility]      Follow-up Information     Kavya Koch MD Follow up in 2 week(s).    Specialty: Neurology  Why: Patient already known to Dr. Tucker from prior encounters for ballistic movements.  She was admitted with strokelike symptoms but it was found out that she  was extremely hypotensive from the hemodialysis and she did not have any stroke.  Contact information:  [address]  [facility] [address]  [facility] [id number] [phone]             Fannie Godfrey MD Follow up in 1 week(s).    Specialty: Internal Medicine  Contact information:  [address]  [facility] [address]  [facility] [id number] [phone]                       Discharge Order (From admission, onward)     Start     Ordered    01/09/22 1252  Discharge patient  Once        Expected Discharge Date: 01/09/22    Discharge Disposition: Home or Self Care    Physician of Record for Attribution - Please select from Treatment Team: PAOLA POON [[id number]]    Review needed by CMO to determine Physician of Record: No       Question Answer Comment   Physician of Record for Attribution - Please select from Treatment Team PAOLA POON    Review needed by CMO to determine Physician of Record No        01/09/22 1302                Total time spent discharging patient including evaluation,post hospitalization follow up,  medication and post hospitalization instructions and education total time exceeds 30 minutes.    Signed:  Paola Poon MD  1/9/2022  13:04 EST

## 2022-01-09 NOTE — PROGRESS NOTES
"DOS: 2022  NAME: Charmaine Machuca   : 1940  PCP: Fannie Godfrey MD  Chief Complaint   Patient presents with   • Speech Problem       Chief complaint: Feels her restless legs are worsening.  Subjective: She is currently sitting up in a recliner and wanted her feet to touch the ground so that she can stop her restless legs.  She also has ballistic movements at times.  When I asked her to keep her hands outstretched for 10 seconds I did not see any abnormal movements.    Objective:  Vital signs: /48   Pulse 73   Temp 98.2 °F (36.8 °C) (Oral)   Resp 18   Ht 167.6 cm (66\")   Wt 62.1 kg (137 lb)   SpO2 98%   BMI 22.11 kg/m²    Gen: NAD, vitals reviewed  MS: Normal.  CN: The pupils are equal reacting to light and accommodation.  The extraocular muscles are intact.  The visual fields are also intact and auditory acuity is normal.  The muscles of facial expression, mastication, uvula soft palate and tongue are all symmetrical with full range of motion and the corneal reflexes and gag reflexes and shoulder shrug bilaterally well preserved.  Motor: Muscles of both upper and lower extremities show good tone and power.  There is diminished muscle mass because of weight loss  Sensory: No sensory loss noted.  However the patient is extremely sensitive in both her lower extremities.  DTRs: 1+ bilaterally with downgoing toes.  Coordination: Finger-to-nose coordination and normal heel-to-shin testing.  Gait: Patient is able to get up and ambulate but the ballistic movements as well as the excessive restless legs tend to make it worse and so she does not want to be tested for gait and balance at this point.    ROS:  General: Pleasant lady who has lost a lot of weight sitting up comfortably in the recliner without exhibiting any ballistic movements  Neurological: No focal neurological deficits with NIH stroke scale of 0.    Laboratory results:  Lab Results   Component Value Date    GLUCOSE 91 2022    " CALCIUM 8.6 01/09/2022     01/09/2022    K 4.0 01/09/2022    CO2 24.6 01/09/2022     01/09/2022    BUN 17 01/09/2022    CREATININE 2.59 (H) 01/09/2022    EGFRIFAFRI  01/08/2022      Comment:      <15 Indicative of kidney failure.    EGFRIFNONA 18 (L) 01/09/2022    BCR 6.6 (L) 01/09/2022    ANIONGAP 10.4 01/09/2022     Lab Results   Component Value Date    WBC 5.38 01/09/2022    HGB 10.1 (L) 01/09/2022    HCT 31.4 (L) 01/09/2022    MCV 99.7 (H) 01/09/2022     (L) 01/09/2022     Lab Results   Component Value Date    LDL 47 01/07/2022    LDL 76 10/29/2020    LDL 56 10/03/2020         Lab 01/07/22  1125   HEMOGLOBIN A1C 4.61*        Review of labs: Hemoglobin A1c is low at 4.61.  The hemoglobin is low at 10 and the hematocrit is low at 31 and the platelet count is low at 132,000.  The LDL is low at 47.    Review and interpretation of imaging: Repeat CT of the brain was performed this morning that shows the following:    IMPRESSION:  No acute intracranial findings.       Workup to date: CT of the head performed on January 7, 2022 shows the following:    IMPRESSION:  1. There is mild small vessel disease in the cerebral white matter.  2. There are old healed fractures of the right zygomatic arch lateral  wall of the right orbit and the walls of the right maxillary sinus.  These fractures occurred back in 02/2020 almost 2 years ago.  3. The remainder of the head CTs normal with no acute skull fracture or  intracranial hemorrhage seen.         Diagnoses: Patient with resolution of her strokelike symptoms which was secondary to the hypovolemia and hypotension and currently she is back to baseline.      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  However I did not see either one of them.    Plan:  1.  Discussed with Dr. Melchor Poon her hospitalist that if medically stable she can be discharged home today.  2.  To follow-up  within 2 weeks time with Dr. Kavya Koch, her neurologist at the [facility] neurology department.  3.  She might benefit from rotigotine patch at a low dose for her restless legs which could be prescribed by Dr. Stormy Koch.    Discussed with Dr. Melchor Poon and the patient and spent half an hour in face-to-face evaluation and management of the patient and will be signing off at this time.    Ba Means MD

## 2022-01-10 NOTE — OUTREACH NOTE
Prep Survey      Responses   [facility] [place] by [facility] facility patient discharged from? [place]   Is LACE score < 7 ? No   Emergency Room discharge w/ pulse ox? No   Eligibility Readm Mgmt   Discharge diagnosis Confusion    Does the patient have one of the following disease processes/diagnoses(primary or secondary)? Other   Does the patient have Home health ordered? No   Is there a DME ordered? No   Medication alerts for this patient Plavix    General alerts for this patient HD pt   Hx: CHF    Prep survey completed? Yes          Rylie Shay RN

## 2022-01-10 NOTE — CASE MANAGEMENT/SOCIAL WORK
Case Management Discharge Note      Final Note: home         Selected Continued Care - Discharged on 1/9/2022 Admission date: 1/7/2022 - Discharge disposition: Home or Self Care    Destination    No services have been selected for the patient.              Durable Medical Equipment    No services have been selected for the patient.              Dialysis/Infusion    No services have been selected for the patient.              Home Medical Care    No services have been selected for the patient.              Therapy    No services have been selected for the patient.              Community Resources    No services have been selected for the patient.              Community & DME    No services have been selected for the patient.                  Transportation Services  Private: Car    Final Discharge Disposition Code: 01 - home or self-care   Yes

## 2022-01-12 NOTE — ED NOTES
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 Just released from hospital Sunday after admission for fall.  Pt wearing mask in triage. Triage personnel wore appropriate PPE       Roxi Casas RN  01/12/22 4856    "

## 2022-01-12 NOTE — ED PROVIDER NOTES
" EMERGENCY DEPARTMENT ENCOUNTER    Room Number:  13/13  Date of encounter:  1/12/2022  PCP: Fannie Godfrey MD  Patient Care Team:  Fannie Godfrey MD as PCP - General (Internal Medicine)  Anuel Scott MD as Consulting Physician (Hematology and Oncology)  Fannie Godfrey MD as Referring Physician (Internal Medicine)  Estevan Matamoros MD as Consulting Physician (Cardiology)  Parveen Abraham MD as Consulting Physician (Nephrology)  Ondina Haro MD as Consulting Physician (Neurology)  Estevan Oropeza MD as Consulting Physician (Hematology and Oncology)  Neno Merritt II, MD as Consulting Physician (Hematology and Oncology)  Anuel Roach MD as Consulting Physician (Gastroenterology)   Historian: Patient, family    HPI:  Chief Complaint: Tremors  A complete HPI/ROS/PMH/PSH/SH/FH are unobtainable due to: Nothing    Context: Charmaine Machuca is a 81 y.o. female who presents to the ED c/o having tremors.  She reports she has had tremors since October.  She reports that she was placed on medication for her tremors which seemed to help but more recently her tremors have changed.  She reports that she has constant jerking.  She reports that this used to improve when she went on car rides but the car eyes do not seem to be helping anymore.  She states she talked to her neurologist who told her she had anxiety as the underlying cause.  She states that is \"bullshit\" and she knows something else was wrong.  She states she is supposed to be at dialysis currently.  She has not had any falls or injury.  She has no weakness or numbness.  She reports she just got out of the hospital this weekend.    Prior record review: Discharge summary 1/9/2022 for confusion and dysarthria which was felt to be related to hypotension    PAST MEDICAL HISTORY  Active Ambulatory Problems     Diagnosis Date Noted   • Pacemaker lead failure 03/28/2016   • Chronic combined systolic and diastolic congestive heart " failure ([facility]) 06/15/2016   • Cardiomyopathy ([facility]) 06/15/2016   • Carpal tunnel syndrome 07/19/2016   • Periodic limb movement disorder 07/19/2016   • Peripheral neuropathy 07/19/2016   • Restless legs syndrome 07/19/2016   • Anemia 09/22/2016   • Stage 4 chronic kidney disease ([facility]) 11/22/2016   • History of anemia due to chronic kidney disease 11/23/2016   • Iron deficiency anemia 04/13/2017   • Dysuria 07/17/2017   • Chronic adrenal insufficiency ([facility]) 08/17/2013   • 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 08/05/2015   • Chest pain 11/29/2015   • Congestive heart failure ([facility]) 08/15/2013   • Gastroparesis 08/15/2013   • Hypotension 08/15/2013   • Hypothyroidism 08/15/2013   • Myoclonus 08/15/2013   • Osteoarthritis 08/15/2013   • Automatic implantable cardioverter-defibrillator in situ 08/15/2013   • Spondylolisthesis 08/05/2015   • History of total knee replacement 08/15/2013   • Anemia of chronic renal failure, stage 4 (severe) ([facility]) 09/06/2017   • Anemia of chronic disease 09/26/2017   • Encounter for fitting and adjustment of vascular catheter 10/04/2017   • B12 deficiency 07/11/2019   • Weakness on left side of face 08/17/2019   • Chronic kidney disease, stage III (moderate) (CMS/[facility])     • Symptomatic anemia 08/29/2019   • Acute renal failure superimposed on chronic kidney disease ([facility]) 08/29/2019   • History of recent stroke 08/30/2019   • Anemia due to acute blood loss 08/29/2019   • GAVE (gastric antral vascular ectasia) 09/01/2019   • Delayed gastric emptying 02/12/2020   • Traumatic subdural hematoma without loss of consciousness ([facility]) 02/12/2020   • Orbital fracture, closed, initial encounter ([facility]) 02/12/2020   • Subdural hematoma, post-traumatic ([facility]) 02/12/2020   • Subarachnoid hemorrhage ([facility]) 02/12/2020   • Fall 02/12/2020   • Adrenal insufficiency ([facility]) 08/17/2013   • Chronic kidney disease, stage 4 (severe) ([facility]) 02/14/2020   • CHF (congestive heart failure) ([facility]) 08/15/2013   •  Neuropathy 06/01/2012   • Vitamin B 12 deficiency 10/01/2020   • Iron adverse reaction 10/01/2020   • Absolute anemia 10/01/2020   • Iron deficiency anemia due to chronic blood loss 11/11/2020   • Melena 11/11/2020   • Pulmonary hypertension ([facility]) 09/27/2021   • Encounter for adjustment or management of vascular access device 10/14/2021   • Confusion 01/07/2022   • Dysarthria 01/07/2022   • ESRD (end stage renal disease) ([facility]) 01/07/2022     Resolved Ambulatory Problems     Diagnosis Date Noted   • CKD (chronic kidney disease) stage 4, GFR 15-29 ml/min ([facility]) 09/13/2017     Past Medical History:   Diagnosis Date   • Anxiety    • Aortic stenosis    • Chronic renal failure, stage 4 (severe) ([facility])    • Colon polyp    • Coronary artery disease    • Depression    • Dizzy    • GERD (gastroesophageal reflux disease)    • Gout    • Hemorrhoids    • Hiatal hernia    • History of Clostridium difficile colitis 2013   • History of kidney stones    • History of pancreatitis    • History of skin cancer    • History of transfusion    • Hyperlipidemia    • Hypertension    • Kidney failure    • Left bundle branch block    • Mitral and aortic valve disease    • Mitral valve insufficiency    • Pancytopenia ([facility])    • Presence of cardiac pacemaker    • SOB (shortness of breath) on exertion    • Spinal stenosis    • Stroke ([facility])    • TIA (transient ischemic attack)        The patient has a COVID HM Topic on their chart, and they are fully vaccinated.    PAST SURGICAL HISTORY  Past Surgical History:   Procedure Laterality Date   • APPENDECTOMY N/A    • ARTERIOVENOUS FISTULA/SHUNT SURGERY Right 2/18/2019    Procedure: RIGHT BASILIC FISTULA CREATION WITHOUT TRANSPOSITION;  Surgeon: Royer Dozier MD;  Location: [facility] OR;  Service: Vascular   • ARTERIOVENOUS FISTULA/SHUNT SURGERY Right 5/31/2019    Procedure: RIGHT 2ND STAGE BASILIC VEIN CREATION;  Surgeon: Royer Dozier MD;  Location: [facility] OR;  Service: Vascular   •  CARDIAC CATHETERIZATION Left 03/28/2006    Arterial catheter insertion, cath left ventriculography, coronary angiography and left heart catheterization-Dr. Estevan Matamoros   • CARDIAC DEFIBRILLATOR PLACEMENT N/A 11/30/2007    Biventricular implantable cardioverter defibrillator-Dr. Leandro Huber   • CARDIAC ELECTROPHYSIOLOGY PROCEDURE N/A 10/28/2019    Procedure: GENERATOR CHANGE BI-V ICD  [employer]tr[employer];  Surgeon: Leandro Huber MD;  Location: [facility] CATH INVASIVE LOCATION;  Service: Cardiology   • CATARACT EXTRACTION Bilateral 2011   • COLONOSCOPY N/A 2014    done at [facility]   • CYSTECTOMY N/A     Ovarian cystectomy   • ENDOSCOPY N/A 04/28/2006    EGD with biopsies. Paraesophageal hiatal hernia from 34 to 44 cm, antral gastritis-Dr. Nehemiah Beal   • ENDOSCOPY N/A 09/29/2004    Hiatal hernia, gastritis and an erosion of the pylorus-Dr. Yang Pavon   • ENDOSCOPY N/A 9/1/2019    Procedure: ESOPHAGOGASTRODUODENOSCOPY with APC;  Surgeon: Anuel Roach MD;  Location:  [facility];  Service: Gastroenterology   • ENDOSCOPY N/A 1/28/2020    Procedure: ESOPHAGOGASTRODUODENOSCOPY with APC;  Surgeon: Anuel Roach MD;  Location:  [facility];  Service: Gastroenterology   • ENDOSCOPY N/A 4/21/2020    Procedure: ESOPHAGOGASTRODUODENOSCOPY WITH APC;  Surgeon: Anuel Roach MD;  Location:  [facility];  Service: Gastroenterology;  Laterality: N/A;  PRE- MELENA, GAVE  POST- ESOPHAGITIS, GAVE   • ENDOSCOPY N/A 11/24/2020    Procedure: ESOPHAGOGASTRODUODENOSCOPY;  Surgeon: Anuel Roach MD;  Location:  [facility] OR;  Service: Gastroenterology;  Laterality: N/A;  reflux esophagitis  Gastric Food Bezoar   • ENDOSCOPY N/A 2/12/2021    Procedure: ESOPHAGOGASTRODUODENOSCOPY;  Surgeon: Anuel Roach MD;  Location:  [facility] OR;  Service: Gastroenterology;  Laterality: N/A;  with APC- GAVE; Gastroparesis;    • ENDOSCOPY N/A 10/8/2021    Procedure:  ESOPHAGOGASTRODUODENOSCOPY WITH ARGON PLASMA COAGULATOR;  Surgeon: Anuel Roach MD;  Location: [facility] OR;  Service: Gastroenterology;  Laterality: N/A;  APC of gastric antral vascular ectasia  Reflux   • ENTEROSCOPY SMALL BOWEL N/A 10/30/2006    Small bowel enteroscopy with biopsies-Dr. Rory Sevilla   • FLEXIBLE SIGMOIDOSCOPY N/A 09/29/2004    Unsuccessful colonoscopy due to poop prep, a very tortuous sigmoid, bowel prep in the form of enema given and a barium enema was obtained-Dr. Yang Pavon   • FRACTURE SURGERY  2015    Broke big toe   • HEMATOMA EVACUATION TRUNK N/A 02/07/2014    Pocket evacuation of hematoma at pacemaker site-Dr. Leandro Huber   • HEMORRHOIDECTOMY N/A 04/19/2004    Flexible sigmoidoscopy and stapled hemorrhoidectomy-Dr. Yang Pavon   • HYSTERECTOMY Bilateral 1977   • IMPLANTABLE CARDIOVERTER DEFIBRILLATOR LEAD REPLACEMENT/POCKET REVISION Left 3/28/2016    Procedure: AUTOMATIC IMPLANTABLE CARDIOVERTER DEFIBRILLATOR LEAD REPLACEMENT WITH LASER LEAD EXTRACTION;  Surgeon: Leandro Huber MD;  Location: [facility] OR 18/19;  Service:    • IMPLANTABLE CARDIOVERTER DEFIBRILLATOR LEAD REPLACEMENT/POCKET REVISION N/A 01/13/2014    Biventricular ICD replacement-Dr. Jillian Huber   • LAPAROSCOPY REPAIR HIATAL HERNIA N/A 06/27/2006    Laparoscopic paraesophageal hiatal hernia repair with Nissen fundoplication and cholecystectomy-Dr. Sean Yoon   • NATALIE GONZALEZ ([facility]) PROCEDURE     • OR INSJ TUNNELED CVC W/O SUBQ PORT/ AGE 3 YR/> Right 10/3/2017    Procedure: Right internal jugular port placement;  Surgeon: Tiffanie Couch MD;  Location: [facility] OR;  Service: General   • TOE SURGERY Left     SET BIG TOE   • TOTAL KNEE ARTHROPLASTY Left 08/2014   • VENOUS ACCESS DEVICE (PORT) INSERTION Right          FAMILY HISTORY  Family History   Problem Relation Age of Onset   • Coronary artery disease Other    • Dementia Other    • Kidney disease Other    •  Arthritis Father    • Early death Father         Kidney failure   • Kidney disease Father    • Gout Father    • Heart disease Mother    • Mental illness Mother         Dementia   • Malig Hyperthermia Neg Hx    • Colon cancer Neg Hx    • Colon polyps Neg Hx          SOCIAL HISTORY  Social History     Socioeconomic History   • Marital status:      Spouse name: Zackery   • Number of children: 5   • Years of education: 1 yr college   Tobacco Use   • Smoking status: Never Smoker   • Smokeless tobacco: Never Used   • Tobacco comment: caffeine use - coffee and soda   Vaping Use   • Vaping Use: Never used   Substance and Sexual Activity   • Alcohol use: No   • Drug use: No   • Sexual activity: Defer         ALLERGIES  Chlorhexidine, Codeine, Sertraline, and Propoxyphene        REVIEW OF SYSTEMS  Review of Systems   No chest pain, no shortness of breath, no weakness, no numbness, no eval or bladder incontinence, no abdominal pain  All systems reviewed and negative except for those discussed in HPI.       PHYSICAL EXAM    I have reviewed the triage vital signs and nursing notes.    ED Triage Vitals [01/12/22 0358]   Temp Heart Rate Resp BP SpO2   97.5 °F (36.4 °C) 93 20 148/61 97 %      Temp src Heart Rate Source Patient Position BP Location FiO2 (%)   Tympanic Monitor Sitting Left arm --       Physical Exam  GENERAL: Awake, alert, no acute distress, anxious, intermittently raising her voice and yelling, using profanities  SKIN: Warm, dry  HENT: Normocephalic, atraumatic  EYES: no scleral icterus  CV: regular rhythm, regular rate  RESPIRATORY: normal effort, lungs clear  ABDOMEN: soft, nontender, nondistended  MUSCULOSKELETAL: no deformity  NEURO: alert, moves all extremities, follows commands          LAB RESULTS  Recent Results (from the past 24 hour(s))   Comprehensive Metabolic Panel    Collection Time: 01/12/22 11:53 AM    Specimen: Blood   Result Value Ref Range    Glucose 83 65 - 99 mg/dL    BUN 37 (H) 8 - 23  mg/dL    Creatinine 5.00 (H) 0.57 - 1.00 mg/dL    Sodium 142 136 - 145 mmol/L    Potassium 4.8 3.5 - 5.2 mmol/L    Chloride 105 98 - 107 mmol/L    CO2 26.0 22.0 - 29.0 mmol/L    Calcium 9.7 8.6 - 10.5 mg/dL    Total Protein 6.6 6.0 - 8.5 g/dL    Albumin 4.30 3.50 - 5.20 g/dL    ALT (SGPT) <5 1 - 33 U/L    AST (SGOT) 15 1 - 32 U/L    Alkaline Phosphatase 75 39 - 117 U/L    Total Bilirubin 0.3 0.0 - 1.2 mg/dL    eGFR Non African Amer 8 (L) >60 mL/min/1.73    eGFR  African Amer      Globulin 2.3 gm/dL    A/G Ratio 1.9 g/dL    BUN/Creatinine Ratio 7.4 7.0 - 25.0    Anion Gap 11.0 5.0 - 15.0 mmol/L   CBC Auto Differential    Collection Time: 01/12/22 11:53 AM    Specimen: Blood   Result Value Ref Range    WBC 7.14 3.40 - 10.80 10*3/mm3    RBC 3.14 (L) 3.77 - 5.28 10*6/mm3    Hemoglobin 10.1 (L) 12.0 - 15.9 g/dL    Hematocrit 30.5 (L) 34.0 - 46.6 %    MCV 97.1 (H) 79.0 - 97.0 fL    MCH 32.2 26.6 - 33.0 pg    MCHC 33.1 31.5 - 35.7 g/dL    RDW 13.7 12.3 - 15.4 %    RDW-SD 47.7 37.0 - 54.0 fl    MPV 10.0 6.0 - 12.0 fL    Platelets 136 (L) 140 - 450 10*3/mm3    Neutrophil % 76.2 (H) 42.7 - 76.0 %    Lymphocyte % 10.6 (L) 19.6 - 45.3 %    Monocyte % 11.3 5.0 - 12.0 %    Eosinophil % 1.1 0.3 - 6.2 %    Basophil % 0.4 0.0 - 1.5 %    Immature Grans % 0.4 0.0 - 0.5 %    Neutrophils, Absolute 5.43 1.70 - 7.00 10*3/mm3    Lymphocytes, Absolute 0.76 0.70 - 3.10 10*3/mm3    Monocytes, Absolute 0.81 0.10 - 0.90 10*3/mm3    Eosinophils, Absolute 0.08 0.00 - 0.40 10*3/mm3    Basophils, Absolute 0.03 0.00 - 0.20 10*3/mm3    Immature Grans, Absolute 0.03 0.00 - 0.05 10*3/mm3    nRBC 0.0 0.0 - 0.2 /100 WBC       Ordered the above labs and independently reviewed the results.        RADIOLOGY  No Radiology Exams Resulted Within Past 24 Hours    I ordered the above noted radiological studies. Reviewed by me and discussed with radiologist.  See dictation for official radiology interpretation.      PROCEDURES    Procedures      MEDICATIONS GIVEN  IN ER    Medications   sodium chloride 0.9 % flush 10 mL (has no administration in time range)   LORazepam (ATIVAN) injection 0.5 mg (0.5 mg Intravenous Given 1/12/22 1158)   LORazepam (ATIVAN) injection 0.5 mg (0.5 mg Intravenous Given 1/12/22 1244)         PROGRESS, DATA ANALYSIS, CONSULTS, AND MEDICAL DECISION MAKING    All labs have been independently reviewed by me.  All radiology studies have been reviewed by me and discussed with radiologist dictating the report.   EKG's independently viewed and interpreted by me.  Discussion below represents my analysis of pertinent findings related to patient's condition, differential diagnosis, treatment plan and final disposition.    Differential diagnosis includes but is not limited to anxiety, electrolyte abnormality, chronic movement disorder.    ED Course as of 01/12/22 1330   Wed Jan 12, 2022   1151 Immediately upon entering the room, the patient's eyes were closed and she was having no abnormal movements.  When I spoke to her and she opened her eyes she immediately started having abnormal nonrhythmic nonsymmetric movements.  These continued until I had her do intentional movements such as Romberg, squeezing my hands, raising her legs.  They then stopped with each of these movements.  Anytime I asked her about stressors or anxiety she started yelling and using profanities.  She has pressured speech.  I strongly suspect that her symptoms are related to anxiety today.  Plan to check her electrolytes to rule out metabolic cause and give her Ativan to help her with her symptoms. [TR]   1327 I reviewed the work-up and findings with the patient and family at the bedside.  She is clinically much less anxious.  She states her doctor has already scheduled an EEG and an EMG as an outpatient.  She states she has rescheduled her dialysis for tomorrow.  Plan to send her home on some hydroxyzine.  I encouraged her to follow-up with her primary care doctor and her neurologist. [TR]       ED Course User Index  [TR] Kenny Beltran MD           PPE: The patient wore a mask and I wore an N95 mask throughout the entire patient encounter.       AS OF 13:30 EST VITALS:    BP - 107/62  HR - 93  TEMP - 97.5 °F (36.4 °C) (Tympanic)  O2 SATS - 96%        DIAGNOSIS  Final diagnoses:   Tremor   Anxiety         DISPOSITION  ED Disposition     ED Disposition Condition Comment    Discharge Stable                 Kenny Beltran MD  01/12/22 1330

## 2022-01-12 NOTE — ED NOTES
"Pt states that she has had increase in \"jerking\" movements. Pt states that over the last 6mnths she has had jerking intermittently. Over the last couple days the jerking is unrelieved by usual factors. Pt jerking stops when focused on tasks during assessment.     This RN wore mask and goggles during time of contact       Angie Rouse RN  01/12/22 1146    "

## 2022-01-12 NOTE — DISCHARGE INSTRUCTIONS
Follow-up with your primary care doctor and your neurologist for further work-up and evaluation.  Return here for any fever, seizures, confusion.

## 2022-01-13 NOTE — OUTREACH NOTE
Medical Week 1 Survey      Responses   [facility] patient discharged from? [place]   Does the patient have one of the following disease processes/diagnoses(primary or secondary)? Other   Week 1 attempt successful? Yes   Call start time 0949   Revoke Decline to participate  [ says he willcall us if needed or call MD]   Call end time 0952   Is patient permission given to speak with other caregiver? Yes   List who call center can speak with ,Zackery   Person spoke with today (if not patient) and relationship Zackery Ortiz RN

## 2022-02-11 NOTE — TELEPHONE ENCOUNTER
Pt Charmaine Brigette called today to report she is stopping dialysis and has been enrolled in [facility]osp[facility]. She is requesting ICD therapies be turned off in her CRTD. I can coordinate with a [employer] device rep to do this in her home. But I need an order. NS    Verbal order from Dr Huber 2/11/2022 1547, OK to turn off ICD therapies as requested.  MARSHAL Huber/ RITA Thakkar RN

## 2022-02-11 NOTE — TELEPHONE ENCOUNTER
Junior at Hospice call intake and Fadi Hernandez, [employer] device rep contacted and will coordinate to turn off ICD therapies at pt's home. Hospice contact number for local on call Nurse is [phone] and [employer] contact number for Device rep on call is [phone]. For weekend coordination if needed.  NS

## (undated) DEVICE — THE BITE BLOCK MAXI, LATEX FREE STRAP IS USED TO PROTECT THE ENDOSCOPE INSERTION TUBE FROM BEING BITTEN BY THE PATIENT.

## (undated) DEVICE — FIAPC® PROBE W/ FILTER 2200 A OD 2.3MM/6.9FR; L 2.2M/7.2FT: Brand: ERBE

## (undated) DEVICE — KT ORCA ORCAPOD DISP STRL

## (undated) DEVICE — ERBE NESSY®PLATE 170 SPLIT; 168CM²; CABLE 3M: Brand: ERBE

## (undated) DEVICE — ADAPT CLN BIOGUARD AIR/H2O DISP

## (undated) DEVICE — GLV SURG BIOGEL LTX PF 6

## (undated) DEVICE — DRP C/ARM 41X74IN

## (undated) DEVICE — GOWN,PREVENTION PLUS,XLONG/XLARGE,STRL: Brand: MEDLINE

## (undated) DEVICE — CVR PROB 96IN LF STRL

## (undated) DEVICE — SOL NS 500ML

## (undated) DEVICE — Device

## (undated) DEVICE — SUCTION CANISTER, 1000CC,SAFELINER: Brand: DEROYAL

## (undated) DEVICE — BW-412T DISP COMBO CLEANING BRUSH: Brand: SINGLE USE COMBINATION CLEANING BRUSH

## (undated) DEVICE — SUT SILK 3/0 TIES 18IN A184H

## (undated) DEVICE — SOL NACL 0.9PCT 100ML SGL

## (undated) DEVICE — LOU PACE DEFIB: Brand: MEDLINE INDUSTRIES, INC.

## (undated) DEVICE — PK AV FISTL/SHNT 40

## (undated) DEVICE — GLV SURG SENSICARE PI PF LF 8.5 GRN STRL

## (undated) DEVICE — CANN O2 ETCO2 FITS ALL CONN CO2 SMPL A/ 7IN DISP LF

## (undated) DEVICE — CANN NASL CO2 TRULINK W/O2 A/

## (undated) DEVICE — TUBING, SUCTION, 1/4" X 10', STRAIGHT: Brand: MEDLINE

## (undated) DEVICE — SMALL (GREEN) FOR GRAFTS UP TO 8 MM, 20 1/2" / 52 CM (1/PKG): Brand: SCANLAN® VASCULAR TUNNELER SHEATHS AND BULLET TIPS

## (undated) DEVICE — SYR LL 3CC

## (undated) DEVICE — SENSR O2 OXIMAX FNGR A/ 18IN NONSTR

## (undated) DEVICE — SUT SILK 3/0 SH CR5 18IN C0135

## (undated) DEVICE — SUT SILK 2/0 TIES 18IN A185H

## (undated) DEVICE — LN SMPL CO2 SHTRM SD STREAM W/M LUER

## (undated) DEVICE — ANTIBACTERIAL UNDYED BRAIDED (POLYGLACTIN 910), SYNTHETIC ABSORBABLE SUTURE: Brand: COATED VICRYL

## (undated) DEVICE — ISOLATION BAG: Brand: CONVERTORS

## (undated) DEVICE — ADHS SKIN DERMABOND TOP ADVANCED

## (undated) DEVICE — ADHS SKIN DERMABOND

## (undated) DEVICE — GLV SURG SENSICARE PI MIC PF SZ7.5 LF STRL

## (undated) DEVICE — BITEBLOCK OMNI BLOC

## (undated) DEVICE — PLASMABLADE PS200-040 4.0: Brand: PLASMABLADE™

## (undated) DEVICE — APPL CHLORAPREP W/TINT 10.5ML PERC STRL

## (undated) DEVICE — LOU MINOR PROCEDURE: Brand: MEDLINE INDUSTRIES, INC.

## (undated) DEVICE — SUT PROLN 6/0 BV1 D/A 30IN 8709H

## (undated) DEVICE — LIMB HOLDER, WRIST/ANKLE: Brand: DEROYAL

## (undated) DEVICE — JACKT LAB F/R KNIT CUFF/COLR XLG BLU

## (undated) DEVICE — SUT PROLN 2/0 CT2 30IN 8411H

## (undated) DEVICE — INTENDED FOR TISSUE SEPARATION, AND OTHER PROCEDURES THAT REQUIRE A SHARP SURGICAL BLADE TO PUNCTURE OR CUT.: Brand: BARD-PARKER ® CARBON RIB-BACK BLADES

## (undated) DEVICE — SUT SILK 4/0 TIES 18IN A183H

## (undated) DEVICE — GEL ULTRASND HI VISC 20G PACKET STRL

## (undated) DEVICE — GOWN ,SIRUS,NONREINFORCED SMALL: Brand: MEDLINE

## (undated) DEVICE — GLV SURG BIOGEL LTX PF 8 1/2

## (undated) DEVICE — GLV SURG SENSICARE GREEN W/ALOE PF LF 6.5 STRL

## (undated) DEVICE — DRSNG WND GZ PAD BORDERED 4X8IN STRL

## (undated) DEVICE — SUCTION CANISTER, 3000CC,SAFELINER: Brand: DEROYAL

## (undated) DEVICE — DRSNG WND BORDR/ADHS NONADHR/GZ LF 4X4IN STRL

## (undated) DEVICE — NDL HYPO PRECISIONGLIDE REG 25G 1 1/2

## (undated) DEVICE — Device: Brand: DEFENDO AIR/WATER/SUCTION AND BIOPSY VALVE

## (undated) DEVICE — SUT PROLN 5/0 RB1 D/A 36IN 8556H

## (undated) DEVICE — 8 FOOT DISPOSABLE EXTENSION CABLE WITH SAFE CONNECT / ALLIGATOR CLIP